# Patient Record
Sex: MALE | Race: WHITE | NOT HISPANIC OR LATINO
[De-identification: names, ages, dates, MRNs, and addresses within clinical notes are randomized per-mention and may not be internally consistent; named-entity substitution may affect disease eponyms.]

---

## 2017-02-15 ENCOUNTER — APPOINTMENT (OUTPATIENT)
Dept: CARDIOLOGY | Facility: CLINIC | Age: 62
End: 2017-02-15

## 2017-02-15 VITALS — HEIGHT: 72 IN | WEIGHT: 309 LBS | BODY MASS INDEX: 41.85 KG/M2

## 2017-02-15 VITALS — RESPIRATION RATE: 18 BRPM | DIASTOLIC BLOOD PRESSURE: 80 MMHG | HEART RATE: 84 BPM | SYSTOLIC BLOOD PRESSURE: 130 MMHG

## 2017-02-16 ENCOUNTER — APPOINTMENT (OUTPATIENT)
Age: 62
End: 2017-02-16

## 2017-02-16 ENCOUNTER — RX RENEWAL (OUTPATIENT)
Age: 62
End: 2017-02-16

## 2017-05-11 ENCOUNTER — APPOINTMENT (OUTPATIENT)
Dept: CARDIOLOGY | Facility: CLINIC | Age: 62
End: 2017-05-11

## 2017-05-16 ENCOUNTER — OUTPATIENT (OUTPATIENT)
Dept: OUTPATIENT SERVICES | Facility: HOSPITAL | Age: 62
LOS: 1 days | Discharge: HOME | End: 2017-05-16

## 2017-05-26 ENCOUNTER — MEDICATION RENEWAL (OUTPATIENT)
Age: 62
End: 2017-05-26

## 2017-05-31 ENCOUNTER — MEDICATION RENEWAL (OUTPATIENT)
Age: 62
End: 2017-05-31

## 2017-06-12 ENCOUNTER — MEDICATION RENEWAL (OUTPATIENT)
Age: 62
End: 2017-06-12

## 2017-06-22 ENCOUNTER — MEDICATION RENEWAL (OUTPATIENT)
Age: 62
End: 2017-06-22

## 2017-06-27 ENCOUNTER — APPOINTMENT (OUTPATIENT)
Dept: CARDIOLOGY | Facility: CLINIC | Age: 62
End: 2017-06-27

## 2017-06-27 VITALS — HEART RATE: 80 BPM | RESPIRATION RATE: 18 BRPM | DIASTOLIC BLOOD PRESSURE: 70 MMHG | SYSTOLIC BLOOD PRESSURE: 100 MMHG

## 2017-06-27 VITALS — BODY MASS INDEX: 40.36 KG/M2 | HEIGHT: 72 IN | WEIGHT: 298 LBS

## 2017-06-28 DIAGNOSIS — I10 ESSENTIAL (PRIMARY) HYPERTENSION: ICD-10-CM

## 2017-07-21 ENCOUNTER — APPOINTMENT (OUTPATIENT)
Dept: SURGERY | Facility: CLINIC | Age: 62
End: 2017-07-21
Payer: COMMERCIAL

## 2017-07-21 VITALS
DIASTOLIC BLOOD PRESSURE: 70 MMHG | BODY MASS INDEX: 40.23 KG/M2 | HEIGHT: 72 IN | WEIGHT: 297 LBS | SYSTOLIC BLOOD PRESSURE: 138 MMHG

## 2017-07-21 PROCEDURE — 99214 OFFICE O/P EST MOD 30 MIN: CPT

## 2017-08-09 ENCOUNTER — APPOINTMENT (OUTPATIENT)
Dept: CARDIOLOGY | Facility: CLINIC | Age: 62
End: 2017-08-09

## 2017-08-09 VITALS — HEIGHT: 72 IN | BODY MASS INDEX: 40.63 KG/M2 | WEIGHT: 300 LBS

## 2017-08-09 VITALS — DIASTOLIC BLOOD PRESSURE: 80 MMHG | RESPIRATION RATE: 18 BRPM | SYSTOLIC BLOOD PRESSURE: 120 MMHG | HEART RATE: 80 BPM

## 2017-08-25 ENCOUNTER — EMERGENCY (EMERGENCY)
Facility: HOSPITAL | Age: 62
LOS: 0 days | Discharge: LEFT BEFORE DISCHARGE | End: 2017-08-25
Admitting: INTERNAL MEDICINE

## 2017-08-25 DIAGNOSIS — Z79.899 OTHER LONG TERM (CURRENT) DRUG THERAPY: ICD-10-CM

## 2017-08-25 DIAGNOSIS — L03.119 CELLULITIS OF UNSPECIFIED PART OF LIMB: ICD-10-CM

## 2017-08-25 DIAGNOSIS — K86.1 OTHER CHRONIC PANCREATITIS: ICD-10-CM

## 2017-08-25 DIAGNOSIS — Z95.1 PRESENCE OF AORTOCORONARY BYPASS GRAFT: ICD-10-CM

## 2017-08-25 DIAGNOSIS — M62.81 MUSCLE WEAKNESS (GENERALIZED): ICD-10-CM

## 2017-08-25 DIAGNOSIS — E11.8 TYPE 2 DIABETES MELLITUS WITH UNSPECIFIED COMPLICATIONS: ICD-10-CM

## 2017-08-25 DIAGNOSIS — Z79.4 LONG TERM (CURRENT) USE OF INSULIN: ICD-10-CM

## 2017-08-25 DIAGNOSIS — R51 HEADACHE: ICD-10-CM

## 2017-08-25 DIAGNOSIS — M79.605 PAIN IN LEFT LEG: ICD-10-CM

## 2017-08-25 DIAGNOSIS — R20.2 PARESTHESIA OF SKIN: ICD-10-CM

## 2017-08-25 DIAGNOSIS — I48.91 UNSPECIFIED ATRIAL FIBRILLATION: ICD-10-CM

## 2017-08-25 DIAGNOSIS — I10 ESSENTIAL (PRIMARY) HYPERTENSION: ICD-10-CM

## 2017-08-25 DIAGNOSIS — E11.9 TYPE 2 DIABETES MELLITUS WITHOUT COMPLICATIONS: ICD-10-CM

## 2017-08-25 DIAGNOSIS — I25.10 ATHEROSCLEROTIC HEART DISEASE OF NATIVE CORONARY ARTERY WITHOUT ANGINA PECTORIS: ICD-10-CM

## 2017-08-25 DIAGNOSIS — L03.90 CELLULITIS, UNSPECIFIED: ICD-10-CM

## 2017-08-25 DIAGNOSIS — J18.9 PNEUMONIA, UNSPECIFIED ORGANISM: ICD-10-CM

## 2017-09-08 ENCOUNTER — INPATIENT (INPATIENT)
Facility: HOSPITAL | Age: 62
LOS: 0 days | Discharge: HOME | End: 2017-09-09
Attending: INTERNAL MEDICINE | Admitting: INTERNAL MEDICINE

## 2017-09-08 DIAGNOSIS — L03.90 CELLULITIS, UNSPECIFIED: ICD-10-CM

## 2017-09-08 DIAGNOSIS — K86.1 OTHER CHRONIC PANCREATITIS: ICD-10-CM

## 2017-09-08 DIAGNOSIS — I25.10 ATHEROSCLEROTIC HEART DISEASE OF NATIVE CORONARY ARTERY WITHOUT ANGINA PECTORIS: ICD-10-CM

## 2017-09-08 DIAGNOSIS — L03.119 CELLULITIS OF UNSPECIFIED PART OF LIMB: ICD-10-CM

## 2017-09-08 DIAGNOSIS — I48.91 UNSPECIFIED ATRIAL FIBRILLATION: ICD-10-CM

## 2017-09-08 DIAGNOSIS — J18.9 PNEUMONIA, UNSPECIFIED ORGANISM: ICD-10-CM

## 2017-09-08 DIAGNOSIS — E11.8 TYPE 2 DIABETES MELLITUS WITH UNSPECIFIED COMPLICATIONS: ICD-10-CM

## 2017-09-08 DIAGNOSIS — I10 ESSENTIAL (PRIMARY) HYPERTENSION: ICD-10-CM

## 2017-09-12 ENCOUNTER — APPOINTMENT (OUTPATIENT)
Dept: SURGERY | Facility: CLINIC | Age: 62
End: 2017-09-12
Payer: COMMERCIAL

## 2017-09-12 DIAGNOSIS — K85.90 ACUTE PANCREATITIS WITHOUT NECROSIS OR INFECTION, UNSPECIFIED: ICD-10-CM

## 2017-09-12 PROCEDURE — 99213 OFFICE O/P EST LOW 20 MIN: CPT

## 2017-09-13 DIAGNOSIS — K86.1 OTHER CHRONIC PANCREATITIS: ICD-10-CM

## 2017-09-13 DIAGNOSIS — Z79.01 LONG TERM (CURRENT) USE OF ANTICOAGULANTS: ICD-10-CM

## 2017-09-13 DIAGNOSIS — N40.0 BENIGN PROSTATIC HYPERPLASIA WITHOUT LOWER URINARY TRACT SYMPTOMS: ICD-10-CM

## 2017-09-13 DIAGNOSIS — E11.22 TYPE 2 DIABETES MELLITUS WITH DIABETIC CHRONIC KIDNEY DISEASE: ICD-10-CM

## 2017-09-13 DIAGNOSIS — I12.9 HYPERTENSIVE CHRONIC KIDNEY DISEASE WITH STAGE 1 THROUGH STAGE 4 CHRONIC KIDNEY DISEASE, OR UNSPECIFIED CHRONIC KIDNEY DISEASE: ICD-10-CM

## 2017-09-13 DIAGNOSIS — Z79.4 LONG TERM (CURRENT) USE OF INSULIN: ICD-10-CM

## 2017-09-13 DIAGNOSIS — G47.33 OBSTRUCTIVE SLEEP APNEA (ADULT) (PEDIATRIC): ICD-10-CM

## 2017-09-13 DIAGNOSIS — Z95.1 PRESENCE OF AORTOCORONARY BYPASS GRAFT: ICD-10-CM

## 2017-09-13 DIAGNOSIS — N18.3 CHRONIC KIDNEY DISEASE, STAGE 3 (MODERATE): ICD-10-CM

## 2017-09-13 DIAGNOSIS — E11.65 TYPE 2 DIABETES MELLITUS WITH HYPERGLYCEMIA: ICD-10-CM

## 2017-09-13 DIAGNOSIS — R11.2 NAUSEA WITH VOMITING, UNSPECIFIED: ICD-10-CM

## 2017-09-13 DIAGNOSIS — Z87.891 PERSONAL HISTORY OF NICOTINE DEPENDENCE: ICD-10-CM

## 2017-09-13 DIAGNOSIS — E11.00 TYPE 2 DIABETES MELLITUS WITH HYPEROSMOLARITY WITHOUT NONKETOTIC HYPERGLYCEMIC-HYPEROSMOLAR COMA (NKHHC): ICD-10-CM

## 2017-09-13 DIAGNOSIS — I25.10 ATHEROSCLEROTIC HEART DISEASE OF NATIVE CORONARY ARTERY WITHOUT ANGINA PECTORIS: ICD-10-CM

## 2017-09-13 DIAGNOSIS — E87.5 HYPERKALEMIA: ICD-10-CM

## 2017-09-13 DIAGNOSIS — I48.91 UNSPECIFIED ATRIAL FIBRILLATION: ICD-10-CM

## 2017-09-13 DIAGNOSIS — N17.9 ACUTE KIDNEY FAILURE, UNSPECIFIED: ICD-10-CM

## 2017-10-17 ENCOUNTER — MEDICATION RENEWAL (OUTPATIENT)
Age: 62
End: 2017-10-17

## 2017-10-30 ENCOUNTER — INPATIENT (INPATIENT)
Facility: HOSPITAL | Age: 62
LOS: 38 days | Discharge: NURSING, REHAB OR LONG-TERM CARE | End: 2017-12-08
Attending: INTERNAL MEDICINE | Admitting: INTERNAL MEDICINE

## 2017-10-30 DIAGNOSIS — L03.119 CELLULITIS OF UNSPECIFIED PART OF LIMB: ICD-10-CM

## 2017-10-30 DIAGNOSIS — E11.8 TYPE 2 DIABETES MELLITUS WITH UNSPECIFIED COMPLICATIONS: ICD-10-CM

## 2017-10-30 DIAGNOSIS — L03.90 CELLULITIS, UNSPECIFIED: ICD-10-CM

## 2017-10-30 DIAGNOSIS — K86.1 OTHER CHRONIC PANCREATITIS: ICD-10-CM

## 2017-10-30 DIAGNOSIS — I10 ESSENTIAL (PRIMARY) HYPERTENSION: ICD-10-CM

## 2017-10-30 DIAGNOSIS — J18.9 PNEUMONIA, UNSPECIFIED ORGANISM: ICD-10-CM

## 2017-10-30 DIAGNOSIS — I25.10 ATHEROSCLEROTIC HEART DISEASE OF NATIVE CORONARY ARTERY WITHOUT ANGINA PECTORIS: ICD-10-CM

## 2017-10-30 DIAGNOSIS — I48.91 UNSPECIFIED ATRIAL FIBRILLATION: ICD-10-CM

## 2017-12-08 ENCOUNTER — INPATIENT (INPATIENT)
Facility: HOSPITAL | Age: 62
LOS: 13 days | Discharge: HOME HEALTH | End: 2017-12-22
Attending: PHYSICAL MEDICINE & REHABILITATION

## 2017-12-08 DIAGNOSIS — L03.119 CELLULITIS OF UNSPECIFIED PART OF LIMB: ICD-10-CM

## 2017-12-08 DIAGNOSIS — I10 ESSENTIAL (PRIMARY) HYPERTENSION: ICD-10-CM

## 2017-12-08 DIAGNOSIS — J18.9 PNEUMONIA, UNSPECIFIED ORGANISM: ICD-10-CM

## 2017-12-08 DIAGNOSIS — I48.91 UNSPECIFIED ATRIAL FIBRILLATION: ICD-10-CM

## 2017-12-08 DIAGNOSIS — I25.10 ATHEROSCLEROTIC HEART DISEASE OF NATIVE CORONARY ARTERY WITHOUT ANGINA PECTORIS: ICD-10-CM

## 2017-12-08 DIAGNOSIS — L03.90 CELLULITIS, UNSPECIFIED: ICD-10-CM

## 2017-12-08 DIAGNOSIS — K86.1 OTHER CHRONIC PANCREATITIS: ICD-10-CM

## 2017-12-08 DIAGNOSIS — E11.8 TYPE 2 DIABETES MELLITUS WITH UNSPECIFIED COMPLICATIONS: ICD-10-CM

## 2017-12-11 ENCOUNTER — APPOINTMENT (OUTPATIENT)
Dept: OTOLARYNGOLOGY | Facility: CLINIC | Age: 62
End: 2017-12-11

## 2017-12-14 DIAGNOSIS — N40.1 BENIGN PROSTATIC HYPERPLASIA WITH LOWER URINARY TRACT SYMPTOMS: ICD-10-CM

## 2017-12-14 DIAGNOSIS — I50.32 CHRONIC DIASTOLIC (CONGESTIVE) HEART FAILURE: ICD-10-CM

## 2017-12-14 DIAGNOSIS — K86.1 OTHER CHRONIC PANCREATITIS: ICD-10-CM

## 2017-12-14 DIAGNOSIS — G47.33 OBSTRUCTIVE SLEEP APNEA (ADULT) (PEDIATRIC): ICD-10-CM

## 2017-12-14 DIAGNOSIS — Z79.01 LONG TERM (CURRENT) USE OF ANTICOAGULANTS: ICD-10-CM

## 2017-12-14 DIAGNOSIS — M62.82 RHABDOMYOLYSIS: ICD-10-CM

## 2017-12-14 DIAGNOSIS — B35.3 TINEA PEDIS: ICD-10-CM

## 2017-12-14 DIAGNOSIS — I13.0 HYPERTENSIVE HEART AND CHRONIC KIDNEY DISEASE WITH HEART FAILURE AND STAGE 1 THROUGH STAGE 4 CHRONIC KIDNEY DISEASE, OR UNSPECIFIED CHRONIC KIDNEY DISEASE: ICD-10-CM

## 2017-12-14 DIAGNOSIS — Z87.891 PERSONAL HISTORY OF NICOTINE DEPENDENCE: ICD-10-CM

## 2017-12-14 DIAGNOSIS — A41.9 SEPSIS, UNSPECIFIED ORGANISM: ICD-10-CM

## 2017-12-14 DIAGNOSIS — L97.521 NON-PRESSURE CHRONIC ULCER OF OTHER PART OF LEFT FOOT LIMITED TO BREAKDOWN OF SKIN: ICD-10-CM

## 2017-12-14 DIAGNOSIS — E11.621 TYPE 2 DIABETES MELLITUS WITH FOOT ULCER: ICD-10-CM

## 2017-12-14 DIAGNOSIS — J96.01 ACUTE RESPIRATORY FAILURE WITH HYPOXIA: ICD-10-CM

## 2017-12-14 DIAGNOSIS — J15.6 PNEUMONIA DUE TO OTHER GRAM-NEGATIVE BACTERIA: ICD-10-CM

## 2017-12-14 DIAGNOSIS — E83.41 HYPERMAGNESEMIA: ICD-10-CM

## 2017-12-14 DIAGNOSIS — N18.3 CHRONIC KIDNEY DISEASE, STAGE 3 (MODERATE): ICD-10-CM

## 2017-12-14 DIAGNOSIS — E66.01 MORBID (SEVERE) OBESITY DUE TO EXCESS CALORIES: ICD-10-CM

## 2017-12-14 DIAGNOSIS — L97.511 NON-PRESSURE CHRONIC ULCER OF OTHER PART OF RIGHT FOOT LIMITED TO BREAKDOWN OF SKIN: ICD-10-CM

## 2017-12-14 DIAGNOSIS — E87.5 HYPERKALEMIA: ICD-10-CM

## 2017-12-14 DIAGNOSIS — E11.22 TYPE 2 DIABETES MELLITUS WITH DIABETIC CHRONIC KIDNEY DISEASE: ICD-10-CM

## 2017-12-14 DIAGNOSIS — N39.498 OTHER SPECIFIED URINARY INCONTINENCE: ICD-10-CM

## 2017-12-14 DIAGNOSIS — E78.00 PURE HYPERCHOLESTEROLEMIA, UNSPECIFIED: ICD-10-CM

## 2017-12-14 DIAGNOSIS — J18.9 PNEUMONIA, UNSPECIFIED ORGANISM: ICD-10-CM

## 2017-12-14 DIAGNOSIS — Z95.1 PRESENCE OF AORTOCORONARY BYPASS GRAFT: ICD-10-CM

## 2017-12-14 DIAGNOSIS — R31.9 HEMATURIA, UNSPECIFIED: ICD-10-CM

## 2017-12-14 DIAGNOSIS — J96.00 ACUTE RESPIRATORY FAILURE, UNSPECIFIED WHETHER WITH HYPOXIA OR HYPERCAPNIA: ICD-10-CM

## 2017-12-14 DIAGNOSIS — I25.10 ATHEROSCLEROTIC HEART DISEASE OF NATIVE CORONARY ARTERY WITHOUT ANGINA PECTORIS: ICD-10-CM

## 2017-12-14 DIAGNOSIS — J90 PLEURAL EFFUSION, NOT ELSEWHERE CLASSIFIED: ICD-10-CM

## 2017-12-14 DIAGNOSIS — D47.3 ESSENTIAL (HEMORRHAGIC) THROMBOCYTHEMIA: ICD-10-CM

## 2017-12-14 DIAGNOSIS — N17.9 ACUTE KIDNEY FAILURE, UNSPECIFIED: ICD-10-CM

## 2017-12-14 DIAGNOSIS — I48.91 UNSPECIFIED ATRIAL FIBRILLATION: ICD-10-CM

## 2017-12-14 DIAGNOSIS — E87.1 HYPO-OSMOLALITY AND HYPONATREMIA: ICD-10-CM

## 2017-12-22 ENCOUNTER — TRANSCRIPTION ENCOUNTER (OUTPATIENT)
Age: 62
End: 2017-12-22

## 2017-12-29 DIAGNOSIS — B35.1 TINEA UNGUIUM: ICD-10-CM

## 2017-12-29 DIAGNOSIS — I95.2 HYPOTENSION DUE TO DRUGS: ICD-10-CM

## 2017-12-29 DIAGNOSIS — I48.91 UNSPECIFIED ATRIAL FIBRILLATION: ICD-10-CM

## 2017-12-29 DIAGNOSIS — T50.2X5A ADVERSE EFFECT OF CARBONIC-ANHYDRASE INHIBITORS, BENZOTHIADIAZIDES AND OTHER DIURETICS, INITIAL ENCOUNTER: ICD-10-CM

## 2017-12-29 DIAGNOSIS — G72.81 CRITICAL ILLNESS MYOPATHY: ICD-10-CM

## 2017-12-29 DIAGNOSIS — R65.10 SYSTEMIC INFLAMMATORY RESPONSE SYNDROME (SIRS) OF NON-INFECTIOUS ORIGIN WITHOUT ACUTE ORGAN DYSFUNCTION: ICD-10-CM

## 2017-12-29 DIAGNOSIS — E11.9 TYPE 2 DIABETES MELLITUS WITHOUT COMPLICATIONS: ICD-10-CM

## 2017-12-29 DIAGNOSIS — N39.41 URGE INCONTINENCE: ICD-10-CM

## 2017-12-29 DIAGNOSIS — L85.3 XEROSIS CUTIS: ICD-10-CM

## 2017-12-29 DIAGNOSIS — F43.22 ADJUSTMENT DISORDER WITH ANXIETY: ICD-10-CM

## 2017-12-29 DIAGNOSIS — I50.812 CHRONIC RIGHT HEART FAILURE: ICD-10-CM

## 2017-12-29 DIAGNOSIS — N04.9 NEPHROTIC SYNDROME WITH UNSPECIFIED MORPHOLOGIC CHANGES: ICD-10-CM

## 2017-12-29 DIAGNOSIS — E78.5 HYPERLIPIDEMIA, UNSPECIFIED: ICD-10-CM

## 2017-12-29 DIAGNOSIS — I11.0 HYPERTENSIVE HEART DISEASE WITH HEART FAILURE: ICD-10-CM

## 2017-12-29 DIAGNOSIS — Z95.1 PRESENCE OF AORTOCORONARY BYPASS GRAFT: ICD-10-CM

## 2017-12-29 DIAGNOSIS — Z79.01 LONG TERM (CURRENT) USE OF ANTICOAGULANTS: ICD-10-CM

## 2017-12-29 DIAGNOSIS — Z93.0 TRACHEOSTOMY STATUS: ICD-10-CM

## 2017-12-29 DIAGNOSIS — I25.10 ATHEROSCLEROTIC HEART DISEASE OF NATIVE CORONARY ARTERY WITHOUT ANGINA PECTORIS: ICD-10-CM

## 2017-12-29 DIAGNOSIS — B35.3 TINEA PEDIS: ICD-10-CM

## 2017-12-29 DIAGNOSIS — E11.21 TYPE 2 DIABETES MELLITUS WITH DIABETIC NEPHROPATHY: ICD-10-CM

## 2017-12-29 DIAGNOSIS — N40.1 BENIGN PROSTATIC HYPERPLASIA WITH LOWER URINARY TRACT SYMPTOMS: ICD-10-CM

## 2017-12-29 DIAGNOSIS — G47.33 OBSTRUCTIVE SLEEP APNEA (ADULT) (PEDIATRIC): ICD-10-CM

## 2017-12-29 DIAGNOSIS — Z87.891 PERSONAL HISTORY OF NICOTINE DEPENDENCE: ICD-10-CM

## 2017-12-29 DIAGNOSIS — K86.1 OTHER CHRONIC PANCREATITIS: ICD-10-CM

## 2017-12-29 DIAGNOSIS — H61.23 IMPACTED CERUMEN, BILATERAL: ICD-10-CM

## 2017-12-29 DIAGNOSIS — J44.9 CHRONIC OBSTRUCTIVE PULMONARY DISEASE, UNSPECIFIED: ICD-10-CM

## 2017-12-29 DIAGNOSIS — I27.29 OTHER SECONDARY PULMONARY HYPERTENSION: ICD-10-CM

## 2017-12-29 DIAGNOSIS — J11.1 INFLUENZA DUE TO UNIDENTIFIED INFLUENZA VIRUS WITH OTHER RESPIRATORY MANIFESTATIONS: ICD-10-CM

## 2018-01-08 ENCOUNTER — APPOINTMENT (OUTPATIENT)
Dept: OTOLARYNGOLOGY | Facility: CLINIC | Age: 63
End: 2018-01-08
Payer: COMMERCIAL

## 2018-01-08 VITALS — HEIGHT: 72 IN | WEIGHT: 300 LBS | BODY MASS INDEX: 40.63 KG/M2

## 2018-01-08 PROCEDURE — 92557 COMPREHENSIVE HEARING TEST: CPT

## 2018-01-08 PROCEDURE — 99213 OFFICE O/P EST LOW 20 MIN: CPT | Mod: 25

## 2018-01-08 PROCEDURE — 92550 TYMPANOMETRY & REFLEX THRESH: CPT

## 2018-01-08 PROCEDURE — G0268 REMOVAL OF IMPACTED WAX MD: CPT

## 2018-01-08 RX ORDER — TESTOSTERONE CYPIONATE 200 MG/ML
200 INJECTION, SOLUTION INTRAMUSCULAR
Qty: 3 | Refills: 0 | Status: COMPLETED | COMMUNITY
Start: 2017-07-17

## 2018-01-08 RX ORDER — AZITHROMYCIN 250 MG/1
250 TABLET, FILM COATED ORAL
Qty: 6 | Refills: 0 | Status: COMPLETED | COMMUNITY
Start: 2017-07-13

## 2018-01-18 ENCOUNTER — OUTPATIENT (OUTPATIENT)
Dept: OUTPATIENT SERVICES | Facility: HOSPITAL | Age: 63
LOS: 1 days | Discharge: HOME | End: 2018-01-18

## 2018-01-18 DIAGNOSIS — K86.1 OTHER CHRONIC PANCREATITIS: ICD-10-CM

## 2018-01-18 DIAGNOSIS — I25.10 ATHEROSCLEROTIC HEART DISEASE OF NATIVE CORONARY ARTERY WITHOUT ANGINA PECTORIS: ICD-10-CM

## 2018-01-18 DIAGNOSIS — I10 ESSENTIAL (PRIMARY) HYPERTENSION: ICD-10-CM

## 2018-01-18 DIAGNOSIS — J18.9 PNEUMONIA, UNSPECIFIED ORGANISM: ICD-10-CM

## 2018-01-18 DIAGNOSIS — I48.91 UNSPECIFIED ATRIAL FIBRILLATION: ICD-10-CM

## 2018-01-18 DIAGNOSIS — E11.8 TYPE 2 DIABETES MELLITUS WITH UNSPECIFIED COMPLICATIONS: ICD-10-CM

## 2018-01-18 DIAGNOSIS — L03.119 CELLULITIS OF UNSPECIFIED PART OF LIMB: ICD-10-CM

## 2018-01-18 DIAGNOSIS — E78.4 OTHER HYPERLIPIDEMIA: ICD-10-CM

## 2018-01-18 DIAGNOSIS — L03.90 CELLULITIS, UNSPECIFIED: ICD-10-CM

## 2018-01-18 DIAGNOSIS — E11.9 TYPE 2 DIABETES MELLITUS WITHOUT COMPLICATIONS: ICD-10-CM

## 2018-01-25 ENCOUNTER — APPOINTMENT (OUTPATIENT)
Dept: OTOLARYNGOLOGY | Facility: CLINIC | Age: 63
End: 2018-01-25
Payer: COMMERCIAL

## 2018-01-25 VITALS
HEIGHT: 72 IN | DIASTOLIC BLOOD PRESSURE: 87 MMHG | WEIGHT: 300 LBS | SYSTOLIC BLOOD PRESSURE: 133 MMHG | BODY MASS INDEX: 40.63 KG/M2

## 2018-01-25 PROCEDURE — 99212 OFFICE O/P EST SF 10 MIN: CPT | Mod: 25

## 2018-01-25 PROCEDURE — 69210 REMOVE IMPACTED EAR WAX UNI: CPT

## 2018-02-02 ENCOUNTER — MEDICATION RENEWAL (OUTPATIENT)
Age: 63
End: 2018-02-02

## 2018-02-07 ENCOUNTER — APPOINTMENT (OUTPATIENT)
Dept: CARDIOLOGY | Facility: CLINIC | Age: 63
End: 2018-02-07

## 2018-02-07 VITALS — DIASTOLIC BLOOD PRESSURE: 80 MMHG | HEART RATE: 88 BPM | SYSTOLIC BLOOD PRESSURE: 130 MMHG | RESPIRATION RATE: 18 BRPM

## 2018-02-07 VITALS — HEIGHT: 72 IN | BODY MASS INDEX: 39.01 KG/M2 | WEIGHT: 288 LBS

## 2018-02-07 DIAGNOSIS — R00.2 PALPITATIONS: ICD-10-CM

## 2018-05-17 ENCOUNTER — APPOINTMENT (OUTPATIENT)
Dept: OTOLARYNGOLOGY | Facility: CLINIC | Age: 63
End: 2018-05-17
Payer: COMMERCIAL

## 2018-05-17 PROCEDURE — 99212 OFFICE O/P EST SF 10 MIN: CPT | Mod: 25

## 2018-05-17 PROCEDURE — 69210 REMOVE IMPACTED EAR WAX UNI: CPT

## 2018-06-01 ENCOUNTER — MEDICATION RENEWAL (OUTPATIENT)
Age: 63
End: 2018-06-01

## 2018-07-11 ENCOUNTER — RX RENEWAL (OUTPATIENT)
Age: 63
End: 2018-07-11

## 2018-07-15 ENCOUNTER — INPATIENT (INPATIENT)
Facility: HOSPITAL | Age: 63
LOS: 3 days | Discharge: HOME | End: 2018-07-19
Attending: INTERNAL MEDICINE | Admitting: INTERNAL MEDICINE
Payer: COMMERCIAL

## 2018-07-15 VITALS
HEART RATE: 93 BPM | OXYGEN SATURATION: 95 % | DIASTOLIC BLOOD PRESSURE: 59 MMHG | RESPIRATION RATE: 20 BRPM | SYSTOLIC BLOOD PRESSURE: 81 MMHG | HEIGHT: 72 IN | TEMPERATURE: 99 F | WEIGHT: 294.98 LBS

## 2018-07-15 DIAGNOSIS — R74.8 ABNORMAL LEVELS OF OTHER SERUM ENZYMES: ICD-10-CM

## 2018-07-15 DIAGNOSIS — A41.9 SEPSIS, UNSPECIFIED ORGANISM: ICD-10-CM

## 2018-07-15 DIAGNOSIS — Z90.49 ACQUIRED ABSENCE OF OTHER SPECIFIED PARTS OF DIGESTIVE TRACT: Chronic | ICD-10-CM

## 2018-07-15 DIAGNOSIS — E87.1 HYPO-OSMOLALITY AND HYPONATREMIA: ICD-10-CM

## 2018-07-15 DIAGNOSIS — E11.9 TYPE 2 DIABETES MELLITUS WITHOUT COMPLICATIONS: ICD-10-CM

## 2018-07-15 DIAGNOSIS — G47.33 OBSTRUCTIVE SLEEP APNEA (ADULT) (PEDIATRIC): ICD-10-CM

## 2018-07-15 DIAGNOSIS — I48.91 UNSPECIFIED ATRIAL FIBRILLATION: ICD-10-CM

## 2018-07-15 DIAGNOSIS — Z90.410 ACQUIRED TOTAL ABSENCE OF PANCREAS: Chronic | ICD-10-CM

## 2018-07-15 DIAGNOSIS — Z95.5 PRESENCE OF CORONARY ANGIOPLASTY IMPLANT AND GRAFT: Chronic | ICD-10-CM

## 2018-07-15 DIAGNOSIS — N17.9 ACUTE KIDNEY FAILURE, UNSPECIFIED: ICD-10-CM

## 2018-07-15 DIAGNOSIS — K86.1 OTHER CHRONIC PANCREATITIS: ICD-10-CM

## 2018-07-15 DIAGNOSIS — E66.9 OBESITY, UNSPECIFIED: ICD-10-CM

## 2018-07-15 LAB
ANION GAP SERPL CALC-SCNC: 16 MMOL/L — HIGH (ref 7–14)
APTT BLD: 38.9 SEC — SIGNIFICANT CHANGE UP (ref 27–39.2)
BASOPHILS # BLD AUTO: 0.05 K/UL — SIGNIFICANT CHANGE UP (ref 0–0.2)
BASOPHILS NFR BLD AUTO: 0.2 % — SIGNIFICANT CHANGE UP (ref 0–1)
BUN SERPL-MCNC: 49 MG/DL — HIGH (ref 10–20)
CALCIUM SERPL-MCNC: 9.4 MG/DL — SIGNIFICANT CHANGE UP (ref 8.5–10.1)
CHLORIDE SERPL-SCNC: 95 MMOL/L — LOW (ref 98–110)
CK MB CFR SERPL CALC: 2.7 NG/ML — SIGNIFICANT CHANGE UP (ref 0.6–6.3)
CK SERPL-CCNC: 136 U/L — SIGNIFICANT CHANGE UP (ref 0–225)
CO2 SERPL-SCNC: 21 MMOL/L — SIGNIFICANT CHANGE UP (ref 17–32)
CREAT SERPL-MCNC: 1.6 MG/DL — HIGH (ref 0.7–1.5)
EOSINOPHIL # BLD AUTO: 0 K/UL — SIGNIFICANT CHANGE UP (ref 0–0.7)
EOSINOPHIL NFR BLD AUTO: 0 % — SIGNIFICANT CHANGE UP (ref 0–8)
GLUCOSE SERPL-MCNC: 304 MG/DL — HIGH (ref 70–99)
HCT VFR BLD CALC: 42.5 % — SIGNIFICANT CHANGE UP (ref 42–52)
HGB BLD-MCNC: 14.5 G/DL — SIGNIFICANT CHANGE UP (ref 14–18)
IMM GRANULOCYTES NFR BLD AUTO: 1.3 % — HIGH (ref 0.1–0.3)
INR BLD: 1.77 RATIO — HIGH (ref 0.65–1.3)
LACTATE SERPL-SCNC: 3.5 MMOL/L — HIGH (ref 0.5–2.2)
LYMPHOCYTES # BLD AUTO: 0.89 K/UL — LOW (ref 1.2–3.4)
LYMPHOCYTES # BLD AUTO: 3.5 % — LOW (ref 20.5–51.1)
MCHC RBC-ENTMCNC: 30.6 PG — SIGNIFICANT CHANGE UP (ref 27–31)
MCHC RBC-ENTMCNC: 34.1 G/DL — SIGNIFICANT CHANGE UP (ref 32–37)
MCV RBC AUTO: 89.7 FL — SIGNIFICANT CHANGE UP (ref 80–94)
MONOCYTES # BLD AUTO: 1.07 K/UL — HIGH (ref 0.1–0.6)
MONOCYTES NFR BLD AUTO: 4.2 % — SIGNIFICANT CHANGE UP (ref 1.7–9.3)
NEUTROPHILS # BLD AUTO: 23.41 K/UL — HIGH (ref 1.4–6.5)
NEUTROPHILS NFR BLD AUTO: 90.8 % — HIGH (ref 42.2–75.2)
NRBC # BLD: 0 /100 WBCS — SIGNIFICANT CHANGE UP (ref 0–0)
PLATELET # BLD AUTO: 402 K/UL — HIGH (ref 130–400)
POTASSIUM SERPL-MCNC: 5.4 MMOL/L — HIGH (ref 3.5–5)
POTASSIUM SERPL-SCNC: 5.4 MMOL/L — HIGH (ref 3.5–5)
PROTHROM AB SERPL-ACNC: 19.3 SEC — HIGH (ref 9.95–12.87)
RBC # BLD: 4.74 M/UL — SIGNIFICANT CHANGE UP (ref 4.7–6.1)
RBC # FLD: 14.5 % — SIGNIFICANT CHANGE UP (ref 11.5–14.5)
SODIUM SERPL-SCNC: 132 MMOL/L — LOW (ref 135–146)
TROPONIN T SERPL-MCNC: 0.05 NG/ML — CRITICAL HIGH
TROPONIN T SERPL-MCNC: 0.06 NG/ML — CRITICAL HIGH
WBC # BLD: 25.75 K/UL — HIGH (ref 4.8–10.8)
WBC # FLD AUTO: 25.75 K/UL — HIGH (ref 4.8–10.8)

## 2018-07-15 PROCEDURE — 93970 EXTREMITY STUDY: CPT | Mod: 26

## 2018-07-15 RX ORDER — RIVAROXABAN 15 MG-20MG
15 KIT ORAL EVERY 24 HOURS
Qty: 0 | Refills: 0 | Status: DISCONTINUED | OUTPATIENT
Start: 2018-07-15 | End: 2018-07-15

## 2018-07-15 RX ORDER — PIPERACILLIN AND TAZOBACTAM 4; .5 G/20ML; G/20ML
4.5 INJECTION, POWDER, LYOPHILIZED, FOR SOLUTION INTRAVENOUS ONCE
Qty: 0 | Refills: 0 | Status: COMPLETED | OUTPATIENT
Start: 2018-07-15 | End: 2018-07-15

## 2018-07-15 RX ORDER — ACETAMINOPHEN 500 MG
650 TABLET ORAL ONCE
Qty: 0 | Refills: 0 | Status: COMPLETED | OUTPATIENT
Start: 2018-07-15 | End: 2018-07-15

## 2018-07-15 RX ORDER — RIVAROXABAN 15 MG-20MG
20 KIT ORAL EVERY 24 HOURS
Qty: 0 | Refills: 0 | Status: DISCONTINUED | OUTPATIENT
Start: 2018-07-15 | End: 2018-07-19

## 2018-07-15 RX ORDER — DEXTROSE 50 % IN WATER 50 %
25 SYRINGE (ML) INTRAVENOUS ONCE
Qty: 0 | Refills: 0 | Status: DISCONTINUED | OUTPATIENT
Start: 2018-07-15 | End: 2018-07-19

## 2018-07-15 RX ORDER — SODIUM CHLORIDE 9 MG/ML
500 INJECTION INTRAMUSCULAR; INTRAVENOUS; SUBCUTANEOUS
Qty: 0 | Refills: 0 | Status: COMPLETED | OUTPATIENT
Start: 2018-07-15 | End: 2018-07-15

## 2018-07-15 RX ORDER — CHLORHEXIDINE GLUCONATE 213 G/1000ML
1 SOLUTION TOPICAL
Qty: 0 | Refills: 0 | Status: DISCONTINUED | OUTPATIENT
Start: 2018-07-15 | End: 2018-07-19

## 2018-07-15 RX ORDER — DEXTROSE 50 % IN WATER 50 %
12.5 SYRINGE (ML) INTRAVENOUS ONCE
Qty: 0 | Refills: 0 | Status: DISCONTINUED | OUTPATIENT
Start: 2018-07-15 | End: 2018-07-19

## 2018-07-15 RX ORDER — SIMVASTATIN 20 MG/1
20 TABLET, FILM COATED ORAL AT BEDTIME
Qty: 0 | Refills: 0 | Status: DISCONTINUED | OUTPATIENT
Start: 2018-07-15 | End: 2018-07-19

## 2018-07-15 RX ORDER — GLUCAGON INJECTION, SOLUTION 0.5 MG/.1ML
1 INJECTION, SOLUTION SUBCUTANEOUS ONCE
Qty: 0 | Refills: 0 | Status: DISCONTINUED | OUTPATIENT
Start: 2018-07-15 | End: 2018-07-19

## 2018-07-15 RX ORDER — INSULIN GLARGINE 100 [IU]/ML
60 INJECTION, SOLUTION SUBCUTANEOUS EVERY MORNING
Qty: 0 | Refills: 0 | Status: DISCONTINUED | OUTPATIENT
Start: 2018-07-15 | End: 2018-07-19

## 2018-07-15 RX ORDER — ACETAMINOPHEN 500 MG
650 TABLET ORAL EVERY 6 HOURS
Qty: 0 | Refills: 0 | Status: DISCONTINUED | OUTPATIENT
Start: 2018-07-15 | End: 2018-07-19

## 2018-07-15 RX ORDER — ASPIRIN/CALCIUM CARB/MAGNESIUM 324 MG
81 TABLET ORAL DAILY
Qty: 0 | Refills: 0 | Status: DISCONTINUED | OUTPATIENT
Start: 2018-07-15 | End: 2018-07-19

## 2018-07-15 RX ORDER — DEXTROSE 50 % IN WATER 50 %
15 SYRINGE (ML) INTRAVENOUS ONCE
Qty: 0 | Refills: 0 | Status: DISCONTINUED | OUTPATIENT
Start: 2018-07-15 | End: 2018-07-19

## 2018-07-15 RX ORDER — INSULIN LISPRO 100/ML
20 VIAL (ML) SUBCUTANEOUS
Qty: 0 | Refills: 0 | Status: DISCONTINUED | OUTPATIENT
Start: 2018-07-15 | End: 2018-07-19

## 2018-07-15 RX ORDER — SODIUM CHLORIDE 9 MG/ML
1000 INJECTION, SOLUTION INTRAVENOUS
Qty: 0 | Refills: 0 | Status: DISCONTINUED | OUTPATIENT
Start: 2018-07-15 | End: 2018-07-19

## 2018-07-15 RX ORDER — FINASTERIDE 5 MG/1
5 TABLET, FILM COATED ORAL DAILY
Qty: 0 | Refills: 0 | Status: DISCONTINUED | OUTPATIENT
Start: 2018-07-15 | End: 2018-07-19

## 2018-07-15 RX ORDER — SODIUM CHLORIDE 9 MG/ML
1000 INJECTION INTRAMUSCULAR; INTRAVENOUS; SUBCUTANEOUS
Qty: 0 | Refills: 0 | Status: DISCONTINUED | OUTPATIENT
Start: 2018-07-15 | End: 2018-07-16

## 2018-07-15 RX ORDER — ALPRAZOLAM 0.25 MG
1 TABLET ORAL ONCE
Qty: 0 | Refills: 0 | Status: DISCONTINUED | OUTPATIENT
Start: 2018-07-15 | End: 2018-07-15

## 2018-07-15 RX ORDER — PIPERACILLIN AND TAZOBACTAM 4; .5 G/20ML; G/20ML
3.38 INJECTION, POWDER, LYOPHILIZED, FOR SOLUTION INTRAVENOUS EVERY 12 HOURS
Qty: 0 | Refills: 0 | Status: DISCONTINUED | OUTPATIENT
Start: 2018-07-15 | End: 2018-07-16

## 2018-07-15 RX ORDER — INSULIN GLARGINE 100 [IU]/ML
60 INJECTION, SOLUTION SUBCUTANEOUS AT BEDTIME
Qty: 0 | Refills: 0 | Status: DISCONTINUED | OUTPATIENT
Start: 2018-07-15 | End: 2018-07-19

## 2018-07-15 RX ORDER — OXYBUTYNIN CHLORIDE 5 MG
5 TABLET ORAL AT BEDTIME
Qty: 0 | Refills: 0 | Status: DISCONTINUED | OUTPATIENT
Start: 2018-07-15 | End: 2018-07-19

## 2018-07-15 RX ADMIN — SODIUM CHLORIDE 2000 MILLILITER(S): 9 INJECTION INTRAMUSCULAR; INTRAVENOUS; SUBCUTANEOUS at 16:27

## 2018-07-15 RX ADMIN — PIPERACILLIN AND TAZOBACTAM 200 GRAM(S): 4; .5 INJECTION, POWDER, LYOPHILIZED, FOR SOLUTION INTRAVENOUS at 14:30

## 2018-07-15 RX ADMIN — Medication 650 MILLIGRAM(S): at 14:31

## 2018-07-15 RX ADMIN — SODIUM CHLORIDE 2000 MILLILITER(S): 9 INJECTION INTRAMUSCULAR; INTRAVENOUS; SUBCUTANEOUS at 14:27

## 2018-07-15 RX ADMIN — INSULIN GLARGINE 60 UNIT(S): 100 INJECTION, SOLUTION SUBCUTANEOUS at 21:52

## 2018-07-15 RX ADMIN — Medication 1 MILLIGRAM(S): at 23:30

## 2018-07-15 RX ADMIN — SIMVASTATIN 20 MILLIGRAM(S): 20 TABLET, FILM COATED ORAL at 21:53

## 2018-07-15 RX ADMIN — SODIUM CHLORIDE 2000 MILLILITER(S): 9 INJECTION INTRAMUSCULAR; INTRAVENOUS; SUBCUTANEOUS at 16:25

## 2018-07-15 RX ADMIN — SODIUM CHLORIDE 2000 MILLILITER(S): 9 INJECTION INTRAMUSCULAR; INTRAVENOUS; SUBCUTANEOUS at 15:15

## 2018-07-15 RX ADMIN — SODIUM CHLORIDE 2000 MILLILITER(S): 9 INJECTION INTRAMUSCULAR; INTRAVENOUS; SUBCUTANEOUS at 14:19

## 2018-07-15 RX ADMIN — Medication 30 MILLILITER(S): at 23:50

## 2018-07-15 RX ADMIN — Medication 650 MILLIGRAM(S): at 20:15

## 2018-07-15 RX ADMIN — Medication 5 MILLIGRAM(S): at 23:30

## 2018-07-15 RX ADMIN — SODIUM CHLORIDE 75 MILLILITER(S): 9 INJECTION INTRAMUSCULAR; INTRAVENOUS; SUBCUTANEOUS at 20:15

## 2018-07-15 RX ADMIN — SODIUM CHLORIDE 2000 MILLILITER(S): 9 INJECTION INTRAMUSCULAR; INTRAVENOUS; SUBCUTANEOUS at 14:26

## 2018-07-15 NOTE — ED PROVIDER NOTE - CARE PLAN
Principal Discharge DX:	Severe sepsis  Secondary Diagnosis:	Cellulitis  Secondary Diagnosis:	Elevated troponin

## 2018-07-15 NOTE — H&P ADULT - PSH
H/O right coronary artery stent placement    History of cholecystectomy    History of resection of pancreas

## 2018-07-15 NOTE — ED PROVIDER NOTE - CRITICAL CARE PROVIDED
direct patient care (not related to procedure)/documentation/interpretation of diagnostic studies/additional history taking/consultation with other physicians/consult w/ pt's family directly relating to pts condition

## 2018-07-15 NOTE — ED PROVIDER NOTE - MEDICAL DECISION MAKING DETAILS
pt with hx of severe sepsis with sepsis 2/2 cellulitis, originally hypotensive but responding to tx.  ICU consulted - admitted to floor.

## 2018-07-15 NOTE — H&P ADULT - NSHPLABSRESULTS_GEN_ALL_CORE
14.5   25.75 )-----------( 402      ( 15 Jul 2018 14:25 )             42.5   07-15    132<L>  |  95<L>  |  49<H>  ----------------------------<  304<H>  5.4<H>   |  21  |  1.6<H>      Reviewed Past medical record Creatine 12/18/2017 1.01 with GFR 75  Ca    9.4      15 Jul 2018 14:25        EKG SInus tachycardia without st elevation interpreted by me 14.5   25.75 )-----------( 402      ( 15 Jul 2018 14:25 )             42.5   07-15    132<L>  |  95<L>  |  49<H>  ----------------------------<  304<H>  5.4<H>   |  21  |  1.6<H>      Reviewed Past medical record Creatine 12/18/2017 1.01 with GFR 75  Ca    9.4      15 Jul 2018 14:25        EKG SInus tachycardia without st elevation interpreted by me\    CXR f/u official no acute pathology idnetified

## 2018-07-15 NOTE — CONSULT NOTE ADULT - ASSESSMENT
IMPRESSION: RLE cellulitis                         Hypotension responded to fluids      PLAN:    CNS: No sedation    HEENT: Oral care. HOB >45 degrees    PULMONARY: Nasal cannula PRN. Keep pulse ox>93%    CARDIOVASCULAR:     GI: GI prophylaxis.  Feeding     RENAL: f/u lytes    INFECTIOUS DISEASE: Pan cultures. IV abx. ID eval. LE doppler to r/o DVT.    HEMATOLOGICAL:  DVT prophylaxis.    ENDOCRINE:  Follow up FS.  Insulin protocol if needed.    MUSCULOSKELETAL:        CRITICAL CARE TIME SPENT: *** IMPRESSION: RLE cellulitis                         Hypotension responded to fluids      PLAN:    CNS: No sedation    HEENT: Oral care. HOB >45 degrees    PULMONARY: Nasal cannula PRN. Keep pulse ox>93%    CARDIOVASCULAR: Afib on Xarelto. Rate control    GI: GI prophylaxis.  Feeding     RENAL: f/u lytes    INFECTIOUS DISEASE: Pan cultures. IV abx. ID eval. LE doppler to r/o DVT.    HEMATOLOGICAL:  DVT prophylaxis.    ENDOCRINE:  Follow up FS.  Insulin protocol if needed.    MUSCULOSKELETAL:        CRITICAL CARE TIME SPENT: ***

## 2018-07-15 NOTE — H&P ADULT - NSHPSOCIALHISTORY_GEN_ALL_CORE
lives at home alone,   quit smoking 30 years ago  denies ethanol  denies drug use  works as  of it  no recent travel

## 2018-07-15 NOTE — ED PROVIDER NOTE - ATTENDING CONTRIBUTION TO CARE
Pt is a 62yo male who comes in for R leg pain 3d ago with chills last night and redness noted this AM.  Vomiting and diarrhea noted.  No CP/SOB/cough.  No dysuria.  No other complaints.    Exam: R leg cellulitis, no calf tenderness, soft nontender abdomen, clear lungs, MMM, normal neuro exam  Imp: sepsis 2/2 cellulitis  Plan: labs, IVF, abx, admission

## 2018-07-15 NOTE — ED PROVIDER NOTE - PROGRESS NOTE DETAILS
Case d/w ICU Dr. Hinds.  Fellow Dr. Clayton to come to bedside to evaluate pt. Dr. Clayton evaluated pt at bedside - states pt is not ICU candidate at this time.  Will admit to floor. patient hypotensive, febrile, with source likely leg IVF bolus started, abx started, sepsis patient hypotensive, febrile, with source likely leg IVF bolus started, abx started, sepsis code started at 1420

## 2018-07-15 NOTE — H&P ADULT - ASSESSMENT
Patient is a 63y old  Male who presents with a chief complaint of malaise and vomitting     Severe sepsis secondary to right lower extremity cellulits  BP: 113/64 (15 Jul 2018 16:29) (73/33 - 113/64)  Hypotension in ER, DW DR OCONNOR extensively regarding observation in icu , in light of patient resuscitated does no accept patient to unit, eladio Intensivist regarding severe sepsis and lactate decrasing to 2.1 , denied unit , will place in telemetry   if patient is tachycardic - check blood pressure for signs of hypotension , will cw gentle hydration in light of patient having h/o HFpEF , at noted in 12/8/2017 echocardiogram, hold diuretics and antihypertensives for now ,   complicated by troponemia,hyponatremia

## 2018-07-15 NOTE — H&P ADULT - HISTORY OF PRESENT ILLNESS
63y male presente [facility] complaints of right lower extremity erythema , associated with fever, nausea , vomitting x 1 day  , and malaise , constatn without associated factors or aggravating factors.   + Sick contact of daughter who returned from [place]  Yesterday patient went to restaurant in [place] when he began experiencing nausea and vomitting . Then patient returned home and due to no relief came to ER.   PMHx signicant for Pneumonia which resulted in sepsis and intubation .

## 2018-07-15 NOTE — H&P ADULT - NSHPPHYSICALEXAM_GEN_ALL_CORE
GEN Lying in no acute distress  HEENT Pupils equal and reactive to light and accommodationSupple Neck  PULM Clear to auscultation bilaterally  CV s1s2 regular rate and rhythm  GI + bowel sounds nontnender obese  EXT right lower extremity edema non pitting with overlying erythema, near site of prior vein harvest  PSYCH awake alert and oriented x 3

## 2018-07-15 NOTE — CONSULT NOTE ADULT - SUBJECTIVE AND OBJECTIVE BOX
Patient is a 63y old  Male who presents with a chief complaint of RLE cellulitis    HPI: 63 years old M pmhx CABG, pneumonia/sepsis, DM, HTN, Hypercholesterolemia, presented to the ED for evaluation of cellulitis to lower extremity right and fever. He said he has history of similar episodes in the past and admitted for IV abx.     Critical care was consulted for hypotension. Responded to IV fluids. Currently comfortable on bed. C/o chills and nausea.    Deneis any other complaints.    PAST MEDICAL & SURGICAL HISTORY:  Intubation of airway performed without difficulty  Pneumonia  Sepsis  H/O right coronary artery stent placement    Allergies    No Known Allergies    Intolerances      FAMILY HISTORY:    Home Medications:  clorazepate:  (15 Jul 2018 14:48)  Ecotrin:  (15 Jul 2018 14:44)  finasteride:  (15 Jul 2018 14:48)  Flomax 0.4 mg oral capsule:  (15 Jul 2018 14:46)  furosemide 80 mg oral tablet:  (15 Jul 2018 14:45)  HumaLOG:  (15 Jul 2018 14:46)  Klor-Con:  (15 Jul 2018 14:45)  Lantus:  (15 Jul 2018 14:46)  lisinopril:  (15 Jul 2018 14:45)  magnesium oxide:  (15 Jul 2018 14:44)  metOLazone:  (15 Jul 2018 14:45)  Metoprolol Tartrate:  (15 Jul 2018 14:45)  oxybutynin:  (15 Jul 2018 14:47)  simvastatin:  (15 Jul 2018 14:46)  spironolactone:  (15 Jul 2018 14:47)  testosterone:  (15 Jul 2018 14:47)  Xarelto:  (15 Jul 2018 14:47)    Occupation:  Alochol:  Smoking:   Drug Use:  Marital Status:      ROS: as in HPI; All other systems reviewed are negative    ICU Vital Signs Last 24 Hrs  T(C): 37.7 (15 Jul 2018 15:16), Max: 38.2 (15 Jul 2018 14:13)  T(F): 99.9 (15 Jul 2018 15:16), Max: 100.8 (15 Jul 2018 14:13)  HR: 85 (15 Jul 2018 15:16) (85 - 93)  BP: 105/54 (15 Jul 2018 15:16) (73/33 - 105/54)  BP(mean): --  ABP: --  ABP(mean): --  RR: 20 (15 Jul 2018 15:16) (20 - 20)  SpO2: 96% (15 Jul 2018 15:16) (95% - 96%)        Physical Examination:    General: No acute distress.  Alert, oriented, interactive, nonfocal    HEENT: Pupils equal, reactive to light.  Symmetric.    PULM: Clear to auscultation bilaterally, no significant sputum production    CVS: Regular rate and rhythm, no murmurs, rubs, or gallops    ABD: Soft, nondistended, nontender, normoactive bowel sounds, no masses    EXT: RLE swollen, erythematous    SKIN: Warm and well perfused, no rashes noted.              I&O's Detail        LABS:                        14.5   25.75 )-----------( 402      ( 15 Jul 2018 14:25 )             42.5     15 Jul 2018 14:25    132    |  95     |  49     ----------------------------<  304    5.4     |  21     |  1.6      Ca    9.4        15 Jul 2018 14:25        CARDIAC MARKERS ( 15 Jul 2018 14:25 )  x     / 0.06 ng/mL / 136 U/L / x     / 2.7 ng/mL      CAPILLARY BLOOD GLUCOSE        PT/INR - ( 15 Jul 2018 14:25 )   PT: 19.30 sec;   INR: 1.77 ratio         PTT - ( 15 Jul 2018 14:25 )  PTT:38.9 sec    Culture    Lactate, Blood: 3.5 mmol/L (07-15-18 @ 14:25)      MEDICATIONS  (STANDING):  sodium chloride 0.9% Bolus 500 milliLiter(s) IV Bolus every 15 minutes    MEDICATIONS  (PRN):        RADIOLOGY: ***     CXR:  TLC:  OG:  ET tube:          ECHO:

## 2018-07-15 NOTE — ED PROVIDER NOTE - PHYSICAL EXAMINATION
Gen: Alert, NAD, well appearing  Head: NC, AT, PERRL, EOMI, normal lids/conjunctiva  Neck: +supple, no tenderness/meningismus/JVD, +Trachea midline  Pulm: Bilateral BS, normal resp effort, no wheeze/stridor/retractions  CV: RRR  Abd: soft, NT/ND  Skin: +erythema to right lower extremity pedal pulses normal bilaterally  Neuro: AAOx3

## 2018-07-15 NOTE — H&P ADULT - FAMILY HISTORY
Sibling  Still living? Unknown  Family history of lung cancer, Age at diagnosis: Age Unknown     Mother  Still living? Unknown  Family history of diabetes mellitus, Age at diagnosis: Age Unknown     Father  Still living? Unknown  Family history of coronary artery disease, Age at diagnosis: Age Unknown

## 2018-07-15 NOTE — H&P ADULT - PROBLEM SELECTOR PLAN 1
secondary to cellulits, denied  by intensivist for unit, monitor vitals , monitor for signs of tachycardia-> then check blood pressure, cw zosyn , panculture , id consult

## 2018-07-15 NOTE — PROGRESS NOTE ADULT - SUBJECTIVE AND OBJECTIVE BOX
Patient takes 60 units long acting insulin twice daily along with about 20 units short acting insulin with meals. He is eating so I ordered these doses for now

## 2018-07-15 NOTE — ED PROVIDER NOTE - OBJECTIVE STATEMENT
Patient is a 63 years old M pmhx CABG, pneumonia/sepsis, DM, HTN, Hypercholesterolemia, -smoker, presents to the ED for evaluation of cellulitis to lower extremity right and fever since yesterday.

## 2018-07-15 NOTE — ED PROVIDER NOTE - NS ED ROS FT
Review of Systems  Constitutional: (+) fever  Eyes/ENT: (-) blurry vision, (-) epistaxis  Cardiovascular: (-) chest pain, (-) syncope  Respiratory: (-) cough, (-) shortness of breath  Gastrointestinal: (-) vomiting, (-) diarrhea  Integumentary: (-) rash, (-) edema  Neurological: (-) headache, (-) altered mental status

## 2018-07-16 LAB
AMYLASE P1 CFR SERPL: 28 U/L — SIGNIFICANT CHANGE UP (ref 25–115)
ANION GAP SERPL CALC-SCNC: 15 MMOL/L — HIGH (ref 7–14)
BUN SERPL-MCNC: 37 MG/DL — HIGH (ref 10–20)
CALCIUM SERPL-MCNC: 8.4 MG/DL — LOW (ref 8.5–10.1)
CHLORIDE SERPL-SCNC: 100 MMOL/L — SIGNIFICANT CHANGE UP (ref 98–110)
CO2 SERPL-SCNC: 18 MMOL/L — SIGNIFICANT CHANGE UP (ref 17–32)
CREAT SERPL-MCNC: 1.1 MG/DL — SIGNIFICANT CHANGE UP (ref 0.7–1.5)
ESTIMATED AVERAGE GLUCOSE: 295 MG/DL — HIGH (ref 68–114)
GLUCOSE SERPL-MCNC: 276 MG/DL — HIGH (ref 70–99)
HBA1C BLD-MCNC: 11.9 % — HIGH (ref 4–5.6)
HCT VFR BLD CALC: 40.5 % — LOW (ref 42–52)
HGB BLD-MCNC: 13.9 G/DL — LOW (ref 14–18)
LACTATE SERPL-SCNC: 1.7 MMOL/L — SIGNIFICANT CHANGE UP (ref 0.5–2.2)
LIDOCAIN IGE QN: 16 U/L — SIGNIFICANT CHANGE UP (ref 7–60)
MCHC RBC-ENTMCNC: 31 PG — SIGNIFICANT CHANGE UP (ref 27–31)
MCHC RBC-ENTMCNC: 34.3 G/DL — SIGNIFICANT CHANGE UP (ref 32–37)
MCV RBC AUTO: 90.2 FL — SIGNIFICANT CHANGE UP (ref 80–94)
NRBC # BLD: 0 /100 WBCS — SIGNIFICANT CHANGE UP (ref 0–0)
PLATELET # BLD AUTO: 295 K/UL — SIGNIFICANT CHANGE UP (ref 130–400)
POTASSIUM SERPL-MCNC: 4.9 MMOL/L — SIGNIFICANT CHANGE UP (ref 3.5–5)
POTASSIUM SERPL-SCNC: 4.9 MMOL/L — SIGNIFICANT CHANGE UP (ref 3.5–5)
RBC # BLD: 4.49 M/UL — LOW (ref 4.7–6.1)
RBC # FLD: 14.9 % — HIGH (ref 11.5–14.5)
SODIUM SERPL-SCNC: 133 MMOL/L — LOW (ref 135–146)
TROPONIN T SERPL-MCNC: 0.04 NG/ML — CRITICAL HIGH
WBC # BLD: 16.5 K/UL — HIGH (ref 4.8–10.8)
WBC # FLD AUTO: 16.5 K/UL — HIGH (ref 4.8–10.8)

## 2018-07-16 RX ORDER — MAGNESIUM OXIDE 400 MG ORAL TABLET 241.3 MG
0 TABLET ORAL
Qty: 0 | Refills: 0 | COMMUNITY

## 2018-07-16 RX ORDER — FUROSEMIDE 40 MG
0 TABLET ORAL
Qty: 0 | Refills: 0 | COMMUNITY

## 2018-07-16 RX ORDER — METOPROLOL TARTRATE 50 MG
0 TABLET ORAL
Qty: 0 | Refills: 0 | COMMUNITY

## 2018-07-16 RX ORDER — FINASTERIDE 5 MG/1
0 TABLET, FILM COATED ORAL
Qty: 0 | Refills: 0 | COMMUNITY

## 2018-07-16 RX ORDER — ASPIRIN/CALCIUM CARB/MAGNESIUM 324 MG
0 TABLET ORAL
Qty: 0 | Refills: 0 | COMMUNITY

## 2018-07-16 RX ORDER — METOPROLOL TARTRATE 50 MG
25 TABLET ORAL
Qty: 0 | Refills: 0 | Status: DISCONTINUED | OUTPATIENT
Start: 2018-07-16 | End: 2018-07-19

## 2018-07-16 RX ORDER — TAMSULOSIN HYDROCHLORIDE 0.4 MG/1
0.4 CAPSULE ORAL AT BEDTIME
Qty: 0 | Refills: 0 | Status: DISCONTINUED | OUTPATIENT
Start: 2018-07-16 | End: 2018-07-19

## 2018-07-16 RX ORDER — POTASSIUM CHLORIDE 20 MEQ
0 PACKET (EA) ORAL
Qty: 0 | Refills: 0 | COMMUNITY

## 2018-07-16 RX ORDER — ONDANSETRON 8 MG/1
4 TABLET, FILM COATED ORAL ONCE
Qty: 0 | Refills: 0 | Status: COMPLETED | OUTPATIENT
Start: 2018-07-16 | End: 2018-07-16

## 2018-07-16 RX ORDER — ALPRAZOLAM 0.25 MG
0.5 TABLET ORAL
Qty: 0 | Refills: 0 | Status: DISCONTINUED | OUTPATIENT
Start: 2018-07-16 | End: 2018-07-19

## 2018-07-16 RX ORDER — MORPHINE SULFATE 50 MG/1
2 CAPSULE, EXTENDED RELEASE ORAL EVERY 4 HOURS
Qty: 0 | Refills: 0 | Status: DISCONTINUED | OUTPATIENT
Start: 2018-07-16 | End: 2018-07-19

## 2018-07-16 RX ORDER — SPIRONOLACTONE 25 MG/1
0 TABLET, FILM COATED ORAL
Qty: 0 | Refills: 0 | COMMUNITY

## 2018-07-16 RX ORDER — TAMSULOSIN HYDROCHLORIDE 0.4 MG/1
0 CAPSULE ORAL
Qty: 0 | Refills: 0 | COMMUNITY

## 2018-07-16 RX ORDER — INSULIN GLARGINE 100 [IU]/ML
0 INJECTION, SOLUTION SUBCUTANEOUS
Qty: 0 | Refills: 0 | COMMUNITY

## 2018-07-16 RX ORDER — CLORAZEPATE DIPOTASSIUM 3.75 MG
0 TABLET ORAL
Qty: 0 | Refills: 0 | COMMUNITY

## 2018-07-16 RX ORDER — MORPHINE SULFATE 50 MG/1
4 CAPSULE, EXTENDED RELEASE ORAL
Qty: 0 | Refills: 0 | Status: DISCONTINUED | OUTPATIENT
Start: 2018-07-16 | End: 2018-07-19

## 2018-07-16 RX ORDER — CEFAZOLIN SODIUM 1 G
1000 VIAL (EA) INJECTION EVERY 8 HOURS
Qty: 0 | Refills: 0 | Status: DISCONTINUED | OUTPATIENT
Start: 2018-07-16 | End: 2018-07-19

## 2018-07-16 RX ORDER — LISINOPRIL 2.5 MG/1
20 TABLET ORAL DAILY
Qty: 0 | Refills: 0 | Status: DISCONTINUED | OUTPATIENT
Start: 2018-07-16 | End: 2018-07-19

## 2018-07-16 RX ORDER — CLORAZEPATE DIPOTASSIUM 3.75 MG
3.38 TABLET ORAL
Qty: 0 | Refills: 0 | COMMUNITY

## 2018-07-16 RX ORDER — LISINOPRIL 2.5 MG/1
0 TABLET ORAL
Qty: 0 | Refills: 0 | COMMUNITY

## 2018-07-16 RX ADMIN — MORPHINE SULFATE 2 MILLIGRAM(S): 50 CAPSULE, EXTENDED RELEASE ORAL at 11:34

## 2018-07-16 RX ADMIN — Medication 20 UNIT(S): at 12:12

## 2018-07-16 RX ADMIN — Medication 81 MILLIGRAM(S): at 12:11

## 2018-07-16 RX ADMIN — MORPHINE SULFATE 4 MILLIGRAM(S): 50 CAPSULE, EXTENDED RELEASE ORAL at 01:45

## 2018-07-16 RX ADMIN — TAMSULOSIN HYDROCHLORIDE 0.4 MILLIGRAM(S): 0.4 CAPSULE ORAL at 21:42

## 2018-07-16 RX ADMIN — LISINOPRIL 20 MILLIGRAM(S): 2.5 TABLET ORAL at 17:23

## 2018-07-16 RX ADMIN — Medication 100 MILLIGRAM(S): at 21:43

## 2018-07-16 RX ADMIN — Medication 650 MILLIGRAM(S): at 02:29

## 2018-07-16 RX ADMIN — MORPHINE SULFATE 4 MILLIGRAM(S): 50 CAPSULE, EXTENDED RELEASE ORAL at 20:28

## 2018-07-16 RX ADMIN — Medication 20 UNIT(S): at 17:24

## 2018-07-16 RX ADMIN — RIVAROXABAN 20 MILLIGRAM(S): KIT at 17:25

## 2018-07-16 RX ADMIN — PIPERACILLIN AND TAZOBACTAM 25 GRAM(S): 4; .5 INJECTION, POWDER, LYOPHILIZED, FOR SOLUTION INTRAVENOUS at 06:58

## 2018-07-16 RX ADMIN — Medication 5 MILLIGRAM(S): at 21:43

## 2018-07-16 RX ADMIN — SIMVASTATIN 20 MILLIGRAM(S): 20 TABLET, FILM COATED ORAL at 21:42

## 2018-07-16 RX ADMIN — FINASTERIDE 5 MILLIGRAM(S): 5 TABLET, FILM COATED ORAL at 12:12

## 2018-07-16 RX ADMIN — Medication 650 MILLIGRAM(S): at 17:23

## 2018-07-16 RX ADMIN — Medication 20 UNIT(S): at 09:12

## 2018-07-16 RX ADMIN — MORPHINE SULFATE 2 MILLIGRAM(S): 50 CAPSULE, EXTENDED RELEASE ORAL at 10:29

## 2018-07-16 RX ADMIN — INSULIN GLARGINE 60 UNIT(S): 100 INJECTION, SOLUTION SUBCUTANEOUS at 21:43

## 2018-07-16 RX ADMIN — Medication 0.5 MILLIGRAM(S): at 17:28

## 2018-07-16 RX ADMIN — Medication 25 MILLIGRAM(S): at 06:57

## 2018-07-16 RX ADMIN — INSULIN GLARGINE 60 UNIT(S): 100 INJECTION, SOLUTION SUBCUTANEOUS at 09:13

## 2018-07-16 RX ADMIN — ONDANSETRON 4 MILLIGRAM(S): 8 TABLET, FILM COATED ORAL at 04:57

## 2018-07-16 RX ADMIN — Medication 25 MILLIGRAM(S): at 17:25

## 2018-07-16 NOTE — CONSULT NOTE ADULT - ASSESSMENT
IMPRESSION  Pt with cellulitis, SIRS (leukocytosis, tachycardia)    Hx DM, pancreatitis    Pt with Lactic Acidosis, hyponatremia, KRISTINE    On piperacillin/tazobactam IVPB. 3.375 Gram(s) IV Intermittent every 12 hours    SUGGESTIONs  D/C Zosyn  Nizoral cream BID between toes both feet  Ancef 2gm q12h to cover Staph/strep

## 2018-07-16 NOTE — PROGRESS NOTE ADULT - SUBJECTIVE AND OBJECTIVE BOX
Medication doses provided by patient. Although I added flomax and metoprolol to in hospital medications, I did not add lasix, spirolactone, metolazone, lisinopril, magnesium oxide or KCL due to low blood pressure initially in ER

## 2018-07-16 NOTE — PROGRESS NOTE ADULT - SUBJECTIVE AND OBJECTIVE BOX
MORGAN BUSH  63y  Male      Patient is a 63y old  Male who presents with a chief complaint of sepsis (15 Jul 2018 18:16)      INTERVAL HPI/OVERNIGHT EVENTS:    Vital Signs Last 24 Hrs  T(C): 37.6 (16 Jul 2018 06:43), Max: 38.2 (15 Jul 2018 14:13)  T(F): 99.6 (16 Jul 2018 06:43), Max: 100.8 (15 Jul 2018 14:13)  HR: 107 (16 Jul 2018 06:43) (83 - 107)  BP: 135/68 (16 Jul 2018 06:43) (73/33 - 135/68)  BP(mean): --  RR: 16 (16 Jul 2018 06:43) (16 - 20)  SpO2: 96% (15 Jul 2018 17:21) (95% - 96%)            Consultant(s) Notes Reviewed:  [x ] YES  [ ] NO          MEDICATIONS  (STANDING):  aspirin enteric coated 81 milliGRAM(s) Oral daily  dextrose 5%. 1000 milliLiter(s) (50 mL/Hr) IV Continuous <Continuous>  dextrose 50% Injectable 12.5 Gram(s) IV Push once  dextrose 50% Injectable 25 Gram(s) IV Push once  dextrose 50% Injectable 25 Gram(s) IV Push once  finasteride 5 milliGRAM(s) Oral daily  insulin glargine Injectable (LANTUS) 60 Unit(s) SubCutaneous every morning  insulin glargine Injectable (LANTUS) 60 Unit(s) SubCutaneous at bedtime  insulin lispro Injectable (HumaLOG) 20 Unit(s) SubCutaneous three times a day before meals  metoprolol tartrate 25 milliGRAM(s) Oral two times a day  oxybutynin 5 milliGRAM(s) Oral at bedtime  piperacillin/tazobactam IVPB. 3.375 Gram(s) IV Intermittent every 12 hours  rivaroxaban 20 milliGRAM(s) Oral every 24 hours  simvastatin 20 milliGRAM(s) Oral at bedtime  sodium chloride 0.9%. 1000 milliLiter(s) (75 mL/Hr) IV Continuous <Continuous>  tamsulosin 0.4 milliGRAM(s) Oral at bedtime    MEDICATIONS  (PRN):  acetaminophen   Tablet 650 milliGRAM(s) Oral every 6 hours PRN For Temp greater than 38 C (100.4 F) or mild pain  chlorhexidine 4% Liquid 1 Application(s) Topical two times a day PRN per rpotolc  dextrose 40% Gel 15 Gram(s) Oral once PRN Blood Glucose LESS THAN 70 milliGRAM(s)/deciliter  glucagon  Injectable 1 milliGRAM(s) IntraMuscular once PRN Glucose LESS THAN 70 milligrams/deciliter      LABS                          14.5   25.75 )-----------( 402      ( 15 Jul 2018 14:25 )             42.5     07-15    132<L>  |  95<L>  |  49<H>  ----------------------------<  304<H>  5.4<H>   |  21  |  1.6<H>    Ca    9.4      15 Jul 2018 14:25          PT/INR - ( 15 Jul 2018 14:25 )   PT: 19.30 sec;   INR: 1.77 ratio         PTT - ( 15 Jul 2018 14:25 )  PTT:38.9 sec  Lactate Trend  07-15 @ 14:25 Lactate:3.5     CARDIAC MARKERS ( 15 Jul 2018 19:14 )  x     / 0.05 ng/mL / x     / x     / x      CARDIAC MARKERS ( 15 Jul 2018 14:25 )  x     / 0.06 ng/mL / 136 U/L / x     / 2.7 ng/mL      CAPILLARY BLOOD GLUCOSE  304 (15 Jul 2018 21:25)            RADIOLOGY & ADDITIONAL TESTS:    Imaging Personally Reviewed:  [ ] YES  [ ] NO    HEALTH ISSUES - PROBLEM Dx:  Chronic pancreatitis: Chronic pancreatitis  Obstructive sleep apnea: Obstructive sleep apnea  Atrial fibrillation: Atrial fibrillation  Obesity: Obesity  KRISTINE (acute kidney injury): KRISTINE (acute kidney injury)  Hyponatremia: Hyponatremia  Diabetes: Diabetes  Elevated troponin: Elevated troponin  Sepsis: Sepsis    PHYSICAL EXAM:  GENERAL: NAD, well-developed  HEAD:  Atraumatic, Normocephalic  EYES: EOMI, PERRLA, conjunctiva and sclera clear  NECK: Supple, No JVD  CHEST/LUNG: Clear to auscultation bilaterally; No wheeze  HEART: Iregular rate and rhythm; No murmurs, rubs, or gallops  ABDOMEN: Soft, Nontender, Nondistended; Bowel sounds present  EXTREMITIES:  RLE edema with erythematous changes. MORGAN BUSH  63y  Male      Patient is a 63y old  Male who presents with a chief complaint of sepsis (15 Jul 2018 18:16)      INTERVAL HPI/OVERNIGHT EVENTS:  He c/o abdominal pain, jared-umbilical cramping, with diarrhea, watery, no fever today, his leg pain and erythema improves.     Vital Signs Last 24 Hrs  T(C): 37.6 (16 Jul 2018 06:43), Max: 38.2 (15 Jul 2018 14:13)  T(F): 99.6 (16 Jul 2018 06:43), Max: 100.8 (15 Jul 2018 14:13)  HR: 107 (16 Jul 2018 06:43) (83 - 107)  BP: 135/68 (16 Jul 2018 06:43) (73/33 - 135/68)  BP(mean): --  RR: 16 (16 Jul 2018 06:43) (16 - 20)  SpO2: 96% (15 Jul 2018 17:21) (95% - 96%)            Consultant(s) Notes Reviewed:  [x ] YES  [ ] NO          MEDICATIONS  (STANDING):  aspirin enteric coated 81 milliGRAM(s) Oral daily  dextrose 5%. 1000 milliLiter(s) (50 mL/Hr) IV Continuous <Continuous>  dextrose 50% Injectable 12.5 Gram(s) IV Push once  dextrose 50% Injectable 25 Gram(s) IV Push once  dextrose 50% Injectable 25 Gram(s) IV Push once  finasteride 5 milliGRAM(s) Oral daily  insulin glargine Injectable (LANTUS) 60 Unit(s) SubCutaneous every morning  insulin glargine Injectable (LANTUS) 60 Unit(s) SubCutaneous at bedtime  insulin lispro Injectable (HumaLOG) 20 Unit(s) SubCutaneous three times a day before meals  metoprolol tartrate 25 milliGRAM(s) Oral two times a day  oxybutynin 5 milliGRAM(s) Oral at bedtime  piperacillin/tazobactam IVPB. 3.375 Gram(s) IV Intermittent every 12 hours  rivaroxaban 20 milliGRAM(s) Oral every 24 hours  simvastatin 20 milliGRAM(s) Oral at bedtime  sodium chloride 0.9%. 1000 milliLiter(s) (75 mL/Hr) IV Continuous <Continuous>  tamsulosin 0.4 milliGRAM(s) Oral at bedtime    MEDICATIONS  (PRN):  acetaminophen   Tablet 650 milliGRAM(s) Oral every 6 hours PRN For Temp greater than 38 C (100.4 F) or mild pain  chlorhexidine 4% Liquid 1 Application(s) Topical two times a day PRN per oto  dextrose 40% Gel 15 Gram(s) Oral once PRN Blood Glucose LESS THAN 70 milliGRAM(s)/deciliter  glucagon  Injectable 1 milliGRAM(s) IntraMuscular once PRN Glucose LESS THAN 70 milligrams/deciliter      LABS                          14.5   25.75 )-----------( 402      ( 15 Jul 2018 14:25 )             42.5     07-15    132<L>  |  95<L>  |  49<H>  ----------------------------<  304<H>  5.4<H>   |  21  |  1.6<H>    Ca    9.4      15 Jul 2018 14:25          PT/INR - ( 15 Jul 2018 14:25 )   PT: 19.30 sec;   INR: 1.77 ratio         PTT - ( 15 Jul 2018 14:25 )  PTT:38.9 sec  Lactate Trend  07-15 @ 14:25 Lactate:3.5     CARDIAC MARKERS ( 15 Jul 2018 19:14 )  x     / 0.05 ng/mL / x     / x     / x      CARDIAC MARKERS ( 15 Jul 2018 14:25 )  x     / 0.06 ng/mL / 136 U/L / x     / 2.7 ng/mL      CAPILLARY BLOOD GLUCOSE  304 (15 Jul 2018 21:25)            RADIOLOGY & ADDITIONAL TESTS:    Imaging Personally Reviewed:  [ ] YES  [ ] NO    HEALTH ISSUES - PROBLEM Dx:  Chronic pancreatitis: Chronic pancreatitis  Obstructive sleep apnea: Obstructive sleep apnea  Atrial fibrillation: Atrial fibrillation  Obesity: Obesity  KRISTINE (acute kidney injury): KRISTINE (acute kidney injury)  Hyponatremia: Hyponatremia  Diabetes: Diabetes  Elevated troponin: Elevated troponin  Sepsis: Sepsis    PHYSICAL EXAM:  GENERAL: NAD, well-developed  HEAD:  Atraumatic, Normocephalic  EYES: EOMI, PERRLA, conjunctiva and sclera clear  NECK: Supple, No JVD  CHEST/LUNG: Clear to auscultation bilaterally; No wheeze  HEART: Irregular rate and rhythm; No murmurs, rubs, or gallops  ABDOMEN: Soft, Nontender, Nondistended; Bowel sounds present  EXTREMITIES:  RLE edema with erythematous changes.

## 2018-07-16 NOTE — PROGRESS NOTE ADULT - ASSESSMENT
This is 64 yo male with PMH of HFpEF, HTN, DM, CKD stage 3, AFIB on Xarelto, whoe presented to ED for right LE erythema, pain and fever with nausea and vomiting for one day. He denies chest pain, SOB, abdominal pain or urinary symptoms. In the ED his BP was low, 81/59, HR: 91, Temp: 99.1F, labs showed leukcoytosis 25k, exam showed right leg erythema. Lactic acid was 3.5, patient was rescucitated in the ED with IVF, blood pressure improved, ICU evaluated the patient, he is stable to floor, patient was admitted for severe sepossi, LE cellulitis and KRISTINE.     A/P:   1. Severe Sepsis: BP improved with IV fluid.   No fever since admission.     2. Right lower extremity Cellulitis:   Continue Zosyn, pending blood culutre  ID consult.     3. KRISTINE: on CKD  Likely from sepsis.   monitor BMP, I/Os.     4. HfpEF:   Lasix, metolazone, Aldactone are on hold due hypotension and severe sepsis, will resume them slowly when BP improves.   Continue Metoprolol  Mild Troponin elevation, likely from KRISTINE and Sepsis    5. CAD s/p CABG  Continue Aspirin, Metoprolol and Statin.     6. Atrial Fibrillation:   Continue with Xarelto and Metoprolol.     7. DM type 2:   Continue with Lantus and ISS.   Diabetic diet.     8. COPD:     DVT PPX: on Xarelto. This is 62 yo male with PMH of HFpEF, HTN, DM, CKD stage 3, AFIB on Xarelto, whoe presented to ED for right LE erythema, pain and fever with nausea and vomiting for one day. He denies chest pain, SOB, abdominal pain or urinary symptoms. In the ED his BP was low, 81/59, HR: 91, Temp: 99.1F, labs showed leukcoytosis 25k, exam showed right leg erythema. Lactic acid was 3.5, patient was rescucitated in the ED with IVF, blood pressure improved, ICU evaluated the patient, he is stable to floor, patient was admitted for severe sepossi, LE cellulitis and KRISTINE.     A/P:   1. Severe Sepsis: BP improved with IV fluid.   No fever since admission.     2. Right lower extremity Cellulitis:   Continue Zosyn, pending blood culutre  ID consult.     3. Abdominal pain: with diarrhea, possible gastritis.   Continue with PPI, check Lipase, amylase level.   If pain continued will order abdomen CT with oral contrast.     3. KRISTINE: improved  Likely from sepsis. Cr from 1.6 to 1.0  monitor BMP, I/Os.   encourage oral hydration. Discontinue IV fluid.     4. HFpEF:   Lasix, metolazone, Aldactone are on hold due hypotension and severe sepsis, will resume them slowly when BP improves.   Continue Metoprolol, resume Lisinopril 20mg for now.   Mild Troponin elevation, likely from KRISTINE and Sepsis    5. CAD s/p CABG  Continue Aspirin, Metoprolol and Statin.     6. Atrial Fibrillation:   Continue with Xarelto and Metoprolol.     7. DM type 2:   Continue with Lantus and ISS.   Diabetic diet.     8. Obstructive sleep apnea:   stable.     DVT PPX: on Xarelto.

## 2018-07-16 NOTE — CONSULT NOTE ADULT - SUBJECTIVE AND OBJECTIVE BOX
MORGAN BUSH 63yMalePatient is a 63y old  Male who presents with a chief complaint of sepsis (15 Jul 2018 18:16)      Patient has history of:  No Known Allergies          SEVERE SEPSIS CELLULITIS ELEVATED TROPONIN  ^SEVERE SEPSIS CELLULITIS ELEVATED TROPONIN  Family history of coronary artery disease (Father)  Family history of diabetes mellitus (Mother)  Family history of lung cancer (Sibling)  Handoff  MEWS Score  Intubation of airway performed without difficulty  Pneumonia  Sepsis  Severe sepsis  Chronic pancreatitis  Obstructive sleep apnea  Atrial fibrillation  Obesity  KRISTINE (acute kidney injury)  Hyponatremia  Diabetes  Elevated troponin  Sepsis  History of cholecystectomy  History of resection of pancreas  H/O right coronary artery stent placement  R/O CELLULITIS  90+  Elevated troponin  Cellulitis        Patient treated with:  piperacillin/tazobactam IVPB. 3.375 Gram(s) IV Intermittent every 12 hours        PHYSICAL EXAM  T(F): 99.6 (07-16-18 @ 06:43), Max: 100.8 (07-15-18 @ 14:13)  HR: 107 (07-16-18 @ 06:43) (83 - 107)  BP: 135/68 (07-16-18 @ 06:43) (73/33 - 135/68)  RR: 16 (07-16-18 @ 06:43) (16 - 20)  SpO2: 96% (07-15-18 @ 17:21) (95% - 96%)  Daily Height in cm: 182.88 (15 Jul 2018 18:10)    Daily   HEENT: normal, no nuchal rigidity  Cor: RSR Nl S1 S2  Lungs: clear  Decreased breath sounds at bases    Abdomen: Nontender, Nl BS,     Ext: No clubbing,cyanosis or edema    LAB & RADIOLOGIC RESULTS:                        13.9   16.50 )-----------( 295      ( 16 Jul 2018 07:28 )             40.5         07-16    133<L>  |  100  |  37<H>  ----------------------------<  276<H>  4.9   |  18  |  1.1        <--<9>>2.1  <--<9>>3.0    Sodium, Serum: 133 mmol/L (07-16-18 @ 07:28)  Sodium, Serum: 132 mmol/L (07-15-18 @ 14:25)    Hyponatremia              Lactic Acidosis   Lactate, Blood: 1.7 mmol/L (07-16-18 @ 11:28)  Blood Gas Venous - Lactate: 2.1 mmoL/L (07-15-18 @ 16:13)  Lactate, Blood: 3.5 mmol/L (07-15-18 @ 14:25)  Blood Gas Venous - Lactate: 3.0 mmoL/L (07-15-18 @ 14:20)        Acidosis         Creatinine, Serum: 1.1 mg/dL (07-16-18 @ 07:28)  eGFR if Non African American: 72 mL/min/1.73M2 (07-16-18 @ 07:28)  eGFR if : 84 mL/min/1.73M2 (07-16-18 @ 07:28)  Creatinine, Serum: 1.6 mg/dL (07-15-18 @ 14:25)  eGFR if Non African American: 46 mL/min/1.73M2 (07-15-18 @ 14:25)  eGFR if African American: 53 mL/min/1.73M2 (07-15-18 @ 14:25)            PT/INR - ( 15 Jul 2018 14:25 )   PT: 19.30 sec;   INR: 1.77 ratio         PTT - ( 15 Jul 2018 14:25 )  PTT:38.9 sec

## 2018-07-17 LAB
-  COAGULASE NEGATIVE STAPHYLOCOCCUS: SIGNIFICANT CHANGE UP
ANION GAP SERPL CALC-SCNC: 13 MMOL/L — SIGNIFICANT CHANGE UP (ref 7–14)
BUN SERPL-MCNC: 28 MG/DL — HIGH (ref 10–20)
CALCIUM SERPL-MCNC: 8.4 MG/DL — LOW (ref 8.5–10.1)
CHLORIDE SERPL-SCNC: 104 MMOL/L — SIGNIFICANT CHANGE UP (ref 98–110)
CO2 SERPL-SCNC: 20 MMOL/L — SIGNIFICANT CHANGE UP (ref 17–32)
CREAT SERPL-MCNC: 0.9 MG/DL — SIGNIFICANT CHANGE UP (ref 0.7–1.5)
CULTURE RESULTS: SIGNIFICANT CHANGE UP
GLUCOSE SERPL-MCNC: 212 MG/DL — HIGH (ref 70–99)
GRAM STN FLD: SIGNIFICANT CHANGE UP
GRAM STN FLD: SIGNIFICANT CHANGE UP
HCT VFR BLD CALC: 38.4 % — LOW (ref 42–52)
HGB BLD-MCNC: 12.7 G/DL — LOW (ref 14–18)
MCHC RBC-ENTMCNC: 30.3 PG — SIGNIFICANT CHANGE UP (ref 27–31)
MCHC RBC-ENTMCNC: 33.1 G/DL — SIGNIFICANT CHANGE UP (ref 32–37)
MCV RBC AUTO: 91.6 FL — SIGNIFICANT CHANGE UP (ref 80–94)
METHOD TYPE: SIGNIFICANT CHANGE UP
NRBC # BLD: 0 /100 WBCS — SIGNIFICANT CHANGE UP (ref 0–0)
ORGANISM # SPEC MICROSCOPIC CNT: SIGNIFICANT CHANGE UP
ORGANISM # SPEC MICROSCOPIC CNT: SIGNIFICANT CHANGE UP
PLATELET # BLD AUTO: 355 K/UL — SIGNIFICANT CHANGE UP (ref 130–400)
POTASSIUM SERPL-MCNC: 4.7 MMOL/L — SIGNIFICANT CHANGE UP (ref 3.5–5)
POTASSIUM SERPL-SCNC: 4.7 MMOL/L — SIGNIFICANT CHANGE UP (ref 3.5–5)
RBC # BLD: 4.19 M/UL — LOW (ref 4.7–6.1)
RBC # FLD: 15.4 % — HIGH (ref 11.5–14.5)
SODIUM SERPL-SCNC: 137 MMOL/L — SIGNIFICANT CHANGE UP (ref 135–146)
SPECIMEN SOURCE: SIGNIFICANT CHANGE UP
WBC # BLD: 11.45 K/UL — HIGH (ref 4.8–10.8)
WBC # FLD AUTO: 11.45 K/UL — HIGH (ref 4.8–10.8)

## 2018-07-17 RX ORDER — INSULIN GLARGINE 100 [IU]/ML
40 INJECTION, SOLUTION SUBCUTANEOUS ONCE
Qty: 0 | Refills: 0 | Status: COMPLETED | OUTPATIENT
Start: 2018-07-17 | End: 2018-07-17

## 2018-07-17 RX ADMIN — Medication 81 MILLIGRAM(S): at 13:59

## 2018-07-17 RX ADMIN — Medication 100 MILLIGRAM(S): at 21:08

## 2018-07-17 RX ADMIN — Medication 0.5 MILLIGRAM(S): at 21:14

## 2018-07-17 RX ADMIN — MORPHINE SULFATE 2 MILLIGRAM(S): 50 CAPSULE, EXTENDED RELEASE ORAL at 21:08

## 2018-07-17 RX ADMIN — INSULIN GLARGINE 40 UNIT(S): 100 INJECTION, SOLUTION SUBCUTANEOUS at 22:30

## 2018-07-17 RX ADMIN — INSULIN GLARGINE 60 UNIT(S): 100 INJECTION, SOLUTION SUBCUTANEOUS at 09:06

## 2018-07-17 RX ADMIN — FINASTERIDE 5 MILLIGRAM(S): 5 TABLET, FILM COATED ORAL at 13:59

## 2018-07-17 RX ADMIN — Medication 650 MILLIGRAM(S): at 21:08

## 2018-07-17 RX ADMIN — Medication 650 MILLIGRAM(S): at 05:54

## 2018-07-17 RX ADMIN — Medication 100 MILLIGRAM(S): at 14:06

## 2018-07-17 RX ADMIN — Medication 25 MILLIGRAM(S): at 17:41

## 2018-07-17 RX ADMIN — MORPHINE SULFATE 4 MILLIGRAM(S): 50 CAPSULE, EXTENDED RELEASE ORAL at 05:54

## 2018-07-17 RX ADMIN — TAMSULOSIN HYDROCHLORIDE 0.4 MILLIGRAM(S): 0.4 CAPSULE ORAL at 21:07

## 2018-07-17 RX ADMIN — Medication 20 UNIT(S): at 09:06

## 2018-07-17 RX ADMIN — MORPHINE SULFATE 2 MILLIGRAM(S): 50 CAPSULE, EXTENDED RELEASE ORAL at 19:26

## 2018-07-17 RX ADMIN — SIMVASTATIN 20 MILLIGRAM(S): 20 TABLET, FILM COATED ORAL at 21:07

## 2018-07-17 RX ADMIN — Medication 25 MILLIGRAM(S): at 05:43

## 2018-07-17 RX ADMIN — Medication 100 MILLIGRAM(S): at 05:43

## 2018-07-17 RX ADMIN — Medication 20 UNIT(S): at 13:59

## 2018-07-17 RX ADMIN — Medication 0.5 MILLIGRAM(S): at 05:43

## 2018-07-17 RX ADMIN — Medication 5 MILLIGRAM(S): at 21:07

## 2018-07-17 RX ADMIN — Medication 20 UNIT(S): at 17:41

## 2018-07-17 RX ADMIN — LISINOPRIL 20 MILLIGRAM(S): 2.5 TABLET ORAL at 05:43

## 2018-07-17 RX ADMIN — RIVAROXABAN 20 MILLIGRAM(S): KIT at 17:41

## 2018-07-17 NOTE — PROGRESS NOTE ADULT - SUBJECTIVE AND OBJECTIVE BOX
MORGAN BUSH  63y  Male      Patient is a 63y old  Male who presents with a chief complaint of sepsis (15 Jul 2018 18:16)      INTERVAL HPI/OVERNIGHT EVENTS:  He still with right LE pain and tenderness worse when he stand on the leg, no fever, abdominal pain improved.     Vital Signs Last 24 Hrs  T(C): 36.3 (16 Jul 2018 22:00), Max: 37.1 (16 Jul 2018 13:30)  T(F): 97.4 (16 Jul 2018 22:00), Max: 98.8 (16 Jul 2018 13:30)  HR: 86 (17 Jul 2018 05:30) (86 - 100)  BP: 122/64 (17 Jul 2018 05:30) (118/68 - 132/65)  BP(mean): --  RR: 16 (17 Jul 2018 05:30) (16 - 16)  SpO2: --      07-16-18 @ 07:01  -  07-17-18 @ 07:00  --------------------------------------------------------  IN: 0 mL / OUT: 2125 mL / NET: -2125 mL            Consultant(s) Notes Reviewed:  [x ] YES  [ ] NO          MEDICATIONS  (STANDING):  ALPRAZolam 0.5 milliGRAM(s) Oral two times a day  aspirin enteric coated 81 milliGRAM(s) Oral daily  ceFAZolin   IVPB 1000 milliGRAM(s) IV Intermittent every 8 hours  dextrose 5%. 1000 milliLiter(s) (50 mL/Hr) IV Continuous <Continuous>  dextrose 50% Injectable 12.5 Gram(s) IV Push once  dextrose 50% Injectable 25 Gram(s) IV Push once  dextrose 50% Injectable 25 Gram(s) IV Push once  finasteride 5 milliGRAM(s) Oral daily  insulin glargine Injectable (LANTUS) 60 Unit(s) SubCutaneous every morning  insulin glargine Injectable (LANTUS) 60 Unit(s) SubCutaneous at bedtime  insulin lispro Injectable (HumaLOG) 20 Unit(s) SubCutaneous three times a day before meals  lisinopril 20 milliGRAM(s) Oral daily  metoprolol tartrate 25 milliGRAM(s) Oral two times a day  oxybutynin 5 milliGRAM(s) Oral at bedtime  rivaroxaban 20 milliGRAM(s) Oral every 24 hours  simvastatin 20 milliGRAM(s) Oral at bedtime  tamsulosin 0.4 milliGRAM(s) Oral at bedtime    MEDICATIONS  (PRN):  acetaminophen   Tablet 650 milliGRAM(s) Oral every 6 hours PRN For Temp greater than 38 C (100.4 F) or mild pain  chlorhexidine 4% Liquid 1 Application(s) Topical two times a day PRN per rpotolc  dextrose 40% Gel 15 Gram(s) Oral once PRN Blood Glucose LESS THAN 70 milliGRAM(s)/deciliter  glucagon  Injectable 1 milliGRAM(s) IntraMuscular once PRN Glucose LESS THAN 70 milligrams/deciliter  morphine  - Injectable 2 milliGRAM(s) IV Push every 4 hours PRN Severe Pain (7 - 10)  morphine  - Injectable 4 milliGRAM(s) IV Push every 3 hours PRN Severe Pain (7 - 10)      LABS                          13.9   16.50 )-----------( 295      ( 16 Jul 2018 07:28 )             40.5     07-16    133<L>  |  100  |  37<H>  ----------------------------<  276<H>  4.9   |  18  |  1.1    Ca    8.4<L>      16 Jul 2018 07:28          PT/INR - ( 15 Jul 2018 14:25 )   PT: 19.30 sec;   INR: 1.77 ratio         PTT - ( 15 Jul 2018 14:25 )  PTT:38.9 sec  Lactate Trend  07-16 @ 11:28 Lactate:1.7   07-15 @ 14:25 Lactate:3.5     CARDIAC MARKERS ( 16 Jul 2018 07:28 )  x     / 0.04 ng/mL / x     / x     / x      CARDIAC MARKERS ( 15 Jul 2018 19:14 )  x     / 0.05 ng/mL / x     / x     / x      CARDIAC MARKERS ( 15 Jul 2018 14:25 )  x     / 0.06 ng/mL / 136 U/L / x     / 2.7 ng/mL      CAPILLARY BLOOD GLUCOSE  186 (17 Jul 2018 07:32)          Culture - Blood (collected 07-15-18 @ 14:25)  Source: .Blood Blood-Peripheral  Preliminary Report (07-16-18 @ 22:01):    No growth to date.    Culture - Blood (collected 07-15-18 @ 14:25)  Source: .Blood Blood-Peripheral  Gram Stain (07-17-18 @ 02:14):    Growth in anaerobic bottle: Gram Positive Cocci in Clusters  Preliminary Report (07-17-18 @ 02:14):    Growth in anaerobic bottle: Gram Positive Cocci in Clusters    "Due to technical problems, Proteus sp. will Not be reported as part of    the BCID panel until further notice"    ***Blood Panel PCR results on this specimen are available    approximately 3 hours after the Gram stain result.***    Gram stain, PCR, and/or culture results may not always    correspond due to difference in methodologies.    ************************************************************    This PCR assay was performed using [employer].    The following targets are tested for: Enterococcus,    vancomycin resistant enterococci, Listeria monocytogenes,    coagulase negative staphylococci, S. aureus,    methicillin resistant S. aureus, Streptococcus agalactiae    (Group B), S. pneumoniae, S. pyogenes (Group A),    Acinetobacter baumannii, Enterobacter cloacae, E. coli,    Klebsiella oxytoca, K. pneumoniae, Proteus sp.,    Serratia marcescens, Haemophilus influenzae,    Neisseria meningitidis, Pseudomonas aeruginosa, Candida    albicans, C. glabrata, C krusei, C parapsilosis,    C. tropicalis and the KPC resistance gene.  Organism: Blood Culture PCR (07-17-18 @ 04:03)  Organism: Blood Culture PCR (07-17-18 @ 04:03)      -  Coagulase negative Staphylococcus: Detec      Method Type: PCR        RADIOLOGY & ADDITIONAL TESTS:    Imaging Personally Reviewed:  [ ] YES  [ ] NO    HEALTH ISSUES - PROBLEM Dx:  Chronic pancreatitis: Chronic pancreatitis  Obstructive sleep apnea: Obstructive sleep apnea  Atrial fibrillation: Atrial fibrillation  Obesity: Obesity  KRISTINE (acute kidney injury): KRISTINE (acute kidney injury)  Hyponatremia: Hyponatremia  Diabetes: Diabetes  Elevated troponin: Elevated troponin  Sepsis: Sepsis        PHYSICAL EXAM:  GENERAL: NAD, well-developed  HEAD:  Atraumatic, Normocephalic  EYES: EOMI, PERRLA, conjunctiva and sclera clear  NECK: Supple, No JVD  CHEST/LUNG: Clear to auscultation bilaterally; No wheeze  HEART: Irregular rate and rhythm; No murmurs, rubs, or gallops  ABDOMEN: Soft, Nontender, Nondistended; Bowel sounds present  EXTREMITIES:  RLE edema with erythema, warmness and tenderness from the knee down to the ankle, pulse 2+ b/l.

## 2018-07-17 NOTE — PROGRESS NOTE ADULT - ASSESSMENT
This is 62 yo male with PMH of HFpEF, HTN, DM, CKD stage 3, AFIB on Xarelto, who presented to ED for right LE erythema, pain and fever with nausea and vomiting for one day. He denies chest pain, SOB, abdominal pain or urinary symptoms. In the ED his BP was low, 81/59, HR: 91, Temp: 99.1F, labs showed leukocytosis 25k, exam showed right leg erythema. Lactic acid was 3.5, patient was resuscitated in the ED with IVF, blood pressure improved, ICU evaluated the patient, he is stable to floor, patient was admitted for severe sepsis, LE cellulitis and KRISTINE.     A/P:   1. Severe Sepsis: BP improved with IV fluid.   Fever and leukocytosis improved.     2. Right lower extremity Cellulitis:   Blood culture grew goagulase negative Cocci in one bottle, likely contamination.   ID consult recommended Cefazolin.     3. Abdominal pain: with diarrhea, possible gastritis.   Improved, lipase and amylase normal.   Continue with PPI.     3. KRISTINE: improved  Likely from sepsis. Cr from 1.6 to 1.0  monitor BMP, I/Os.   encourage oral hydration. Discontinue IV fluid.     4. HFpEF:   Lasix, metolazone, Aldactone are on hold due hypotension and severe sepsis, will resume them slowly when BP improves.   Continue Metoprolol, resume Lisinopril 20mg for now.   Mild Troponin elevation, likely from KRISTINE and Sepsis    5. CAD s/p CABG  Continue Aspirin, Metoprolol and Statin.     6. Atrial Fibrillation:   Continue with Xarelto and Metoprolol.     7. DM type 2:   Continue with Lantus and ISS.   Diabetic diet.     8. Obstructive sleep apnea:   stable.     DVT PPX: on Xarelto.

## 2018-07-18 LAB
ANION GAP SERPL CALC-SCNC: 11 MMOL/L — SIGNIFICANT CHANGE UP (ref 7–14)
BUN SERPL-MCNC: 23 MG/DL — HIGH (ref 10–20)
CALCIUM SERPL-MCNC: 8.8 MG/DL — SIGNIFICANT CHANGE UP (ref 8.5–10.1)
CHLORIDE SERPL-SCNC: 105 MMOL/L — SIGNIFICANT CHANGE UP (ref 98–110)
CO2 SERPL-SCNC: 22 MMOL/L — SIGNIFICANT CHANGE UP (ref 17–32)
CREAT SERPL-MCNC: 0.9 MG/DL — SIGNIFICANT CHANGE UP (ref 0.7–1.5)
GLUCOSE SERPL-MCNC: 160 MG/DL — HIGH (ref 70–99)
HCT VFR BLD CALC: 39.1 % — LOW (ref 42–52)
HGB BLD-MCNC: 12.9 G/DL — LOW (ref 14–18)
MCHC RBC-ENTMCNC: 30.1 PG — SIGNIFICANT CHANGE UP (ref 27–31)
MCHC RBC-ENTMCNC: 33 G/DL — SIGNIFICANT CHANGE UP (ref 32–37)
MCV RBC AUTO: 91.4 FL — SIGNIFICANT CHANGE UP (ref 80–94)
NRBC # BLD: 0 /100 WBCS — SIGNIFICANT CHANGE UP (ref 0–0)
PLATELET # BLD AUTO: 398 K/UL — SIGNIFICANT CHANGE UP (ref 130–400)
POTASSIUM SERPL-MCNC: 5.2 MMOL/L — HIGH (ref 3.5–5)
POTASSIUM SERPL-SCNC: 5.2 MMOL/L — HIGH (ref 3.5–5)
RBC # BLD: 4.28 M/UL — LOW (ref 4.7–6.1)
RBC # FLD: 15.5 % — HIGH (ref 11.5–14.5)
SODIUM SERPL-SCNC: 138 MMOL/L — SIGNIFICANT CHANGE UP (ref 135–146)
WBC # BLD: 11.86 K/UL — HIGH (ref 4.8–10.8)
WBC # FLD AUTO: 11.86 K/UL — HIGH (ref 4.8–10.8)

## 2018-07-18 RX ORDER — SODIUM POLYSTYRENE SULFONATE 4.1 MEQ/G
30 POWDER, FOR SUSPENSION ORAL ONCE
Qty: 0 | Refills: 0 | Status: COMPLETED | OUTPATIENT
Start: 2018-07-18 | End: 2018-07-18

## 2018-07-18 RX ADMIN — Medication 81 MILLIGRAM(S): at 12:24

## 2018-07-18 RX ADMIN — Medication 25 MILLIGRAM(S): at 05:30

## 2018-07-18 RX ADMIN — MORPHINE SULFATE 2 MILLIGRAM(S): 50 CAPSULE, EXTENDED RELEASE ORAL at 16:53

## 2018-07-18 RX ADMIN — Medication 20 UNIT(S): at 12:24

## 2018-07-18 RX ADMIN — INSULIN GLARGINE 60 UNIT(S): 100 INJECTION, SOLUTION SUBCUTANEOUS at 09:06

## 2018-07-18 RX ADMIN — Medication 20 UNIT(S): at 09:06

## 2018-07-18 RX ADMIN — Medication 100 MILLIGRAM(S): at 14:00

## 2018-07-18 RX ADMIN — INSULIN GLARGINE 60 UNIT(S): 100 INJECTION, SOLUTION SUBCUTANEOUS at 21:43

## 2018-07-18 RX ADMIN — MORPHINE SULFATE 2 MILLIGRAM(S): 50 CAPSULE, EXTENDED RELEASE ORAL at 17:17

## 2018-07-18 RX ADMIN — Medication 5 MILLIGRAM(S): at 21:43

## 2018-07-18 RX ADMIN — SODIUM POLYSTYRENE SULFONATE 30 GRAM(S): 4.1 POWDER, FOR SUSPENSION ORAL at 14:00

## 2018-07-18 RX ADMIN — Medication 100 MILLIGRAM(S): at 22:00

## 2018-07-18 RX ADMIN — Medication 0.5 MILLIGRAM(S): at 17:09

## 2018-07-18 RX ADMIN — TAMSULOSIN HYDROCHLORIDE 0.4 MILLIGRAM(S): 0.4 CAPSULE ORAL at 21:43

## 2018-07-18 RX ADMIN — Medication 100 MILLIGRAM(S): at 05:30

## 2018-07-18 RX ADMIN — Medication 20 UNIT(S): at 16:52

## 2018-07-18 RX ADMIN — Medication 25 MILLIGRAM(S): at 17:09

## 2018-07-18 RX ADMIN — LISINOPRIL 20 MILLIGRAM(S): 2.5 TABLET ORAL at 05:30

## 2018-07-18 RX ADMIN — Medication 0.5 MILLIGRAM(S): at 05:30

## 2018-07-18 RX ADMIN — RIVAROXABAN 20 MILLIGRAM(S): KIT at 16:52

## 2018-07-18 RX ADMIN — FINASTERIDE 5 MILLIGRAM(S): 5 TABLET, FILM COATED ORAL at 12:24

## 2018-07-18 RX ADMIN — SIMVASTATIN 20 MILLIGRAM(S): 20 TABLET, FILM COATED ORAL at 21:43

## 2018-07-18 NOTE — PROGRESS NOTE ADULT - SUBJECTIVE AND OBJECTIVE BOX
Pt seen and examined. Pt feels well. Wants to go home    T(F): , Max: 98.4 (07-17-18 @ 22:00)  HR: 79 (07-18-18 @ 05:15) (79 - 83)  BP: 136/63 (07-18-18 @ 05:15)  RR: 16 (07-18-18 @ 05:15)  SpO2: --  General: No apparent distress  Cardiovascular: S1, S2  Gastrointestinal: Soft, Non-tender, Non-distended  Respiratory: Good air entry bilaterally  Musculoskeletal: Moves all extremities  Lymphatic: No edema  Neurologic: No gross motor deficit  Dermatologic: RLE Erythema below the knee with mild edema compared to L leg                          12.9   11.86 )-----------( 398      ( 18 Jul 2018 06:29 )             39.1     07-18    138  |  105  |  23<H>  ----------------------------<  160<H>  5.2<H>   |  22  |  0.9    Ca    8.8      18 Jul 2018 06:29        Culture - Blood (collected 15 Jul 2018 14:25)  Source: .Blood Blood-Peripheral  Gram Stain (17 Jul 2018 15:29):    Growth in anaerobic bottle: Gram Positive Cocci in Clusters  Preliminary Report (18 Jul 2018 11:58):    Growth in anaerobic bottle: Coag Negative Staphylococcus Susceptibility    to follow.    Culture - Blood (collected 15 Jul 2018 14:25)  Source: .Blood Blood-Peripheral  Gram Stain (17 Jul 2018 02:14):    Growth in anaerobic bottle: Gram Positive Cocci in Clusters  Final Report (17 Jul 2018 22:49):    Growth in anaerobic bottle: Coag Negative Staphylococcus    Single set isolate, possible contaminant. Contact    Microbiology if susceptibility testing clinically    indicated.    "Due to technical problems, Proteus sp. will Not be reported as part of    theBCID panel until further notice"    ***Blood Panel PCR results on this specimen are available    approximately 3 hours after the Gram stain result.***    Gram stain, PCR, and/or culture results may not always    correspond due to difference in methodologies.    ************************************************************    This PCR assay was performed using [employer].    The following targets are tested for: Enterococcus,    vancomycin resistant enterococci, Listeria monocytogenes,    coagulase negative staphylococci, S. aureus,    methicillin resistant S. aureus, Streptococcus agalactiae    (Group B), S. pneumoniae, S. pyogenes (Group A),    Acinetobacter baumannii, Enterobacter cloacae, E. coli,    Klebsiella oxytoca, K. pneumoniae, Proteus sp.,    Serratia marcescens, Haemophilus influenzae,    Neisseria meningitidis, Pseudomonas aeruginosa, Candida    albicans, C. glabrata, C krusei, C parapsilosis,    C. tropicalis and the KPC resistance gene.  Organism: Blood Culture PCR (17 Jul 2018 22:49)  Organism: Blood Culture PCR (17 Jul 2018 22:49)

## 2018-07-18 NOTE — PROGRESS NOTE ADULT - ASSESSMENT
1. Severe Sepsis:   Resolving    2. Right lower extremity Cellulitis:   Cefazolin.  F/U ID for PO alternative     3. Abdominal pain  resolved    3. KRISTINE: improved  resolved    4. HFpEF:   Lasix, metolazone, Aldactone are on hold   Continue Metoprolol, Lisinopril 20mg for now.       5. CAD s/p CABG  Continue Aspirin, Metoprolol and Statin.     6. Atrial Fibrillation:   Continue with Xarelto and Metoprolol.     7. DM type 2:   Continue with Lantus and ISS.   Diabetic diet.     8. Obstructive sleep apnea:   stable.     9. Type 2 MI POA  Likely due to severe sepsis     10. Hyperkalemia  Kayexalate today    D/C planning for AM

## 2018-07-19 ENCOUNTER — TRANSCRIPTION ENCOUNTER (OUTPATIENT)
Age: 63
End: 2018-07-19

## 2018-07-19 VITALS — WEIGHT: 302.03 LBS

## 2018-07-19 LAB
-  AMPICILLIN/SULBACTAM: SIGNIFICANT CHANGE UP
-  CEFAZOLIN: SIGNIFICANT CHANGE UP
-  CLINDAMYCIN: SIGNIFICANT CHANGE UP
-  ERYTHROMYCIN: SIGNIFICANT CHANGE UP
-  GENTAMICIN: SIGNIFICANT CHANGE UP
-  OXACILLIN: SIGNIFICANT CHANGE UP
-  PENICILLIN: SIGNIFICANT CHANGE UP
-  RIFAMPIN: SIGNIFICANT CHANGE UP
-  TETRACYCLINE: SIGNIFICANT CHANGE UP
-  TRIMETHOPRIM/SULFAMETHOXAZOLE: SIGNIFICANT CHANGE UP
-  VANCOMYCIN: SIGNIFICANT CHANGE UP
ALBUMIN SERPL ELPH-MCNC: 3.1 G/DL — LOW (ref 3.5–5.2)
ALP SERPL-CCNC: 168 U/L — HIGH (ref 30–115)
ALT FLD-CCNC: 47 U/L — HIGH (ref 0–41)
ANION GAP SERPL CALC-SCNC: 13 MMOL/L — SIGNIFICANT CHANGE UP (ref 7–14)
AST SERPL-CCNC: 47 U/L — HIGH (ref 0–41)
BILIRUB SERPL-MCNC: 0.3 MG/DL — SIGNIFICANT CHANGE UP (ref 0.2–1.2)
BUN SERPL-MCNC: 15 MG/DL — SIGNIFICANT CHANGE UP (ref 10–20)
CALCIUM SERPL-MCNC: 8.7 MG/DL — SIGNIFICANT CHANGE UP (ref 8.5–10.1)
CHLORIDE SERPL-SCNC: 106 MMOL/L — SIGNIFICANT CHANGE UP (ref 98–110)
CO2 SERPL-SCNC: 22 MMOL/L — SIGNIFICANT CHANGE UP (ref 17–32)
CREAT SERPL-MCNC: 0.7 MG/DL — SIGNIFICANT CHANGE UP (ref 0.7–1.5)
CULTURE RESULTS: SIGNIFICANT CHANGE UP
GLUCOSE SERPL-MCNC: 137 MG/DL — HIGH (ref 70–99)
HCT VFR BLD CALC: 39 % — LOW (ref 42–52)
HGB BLD-MCNC: 13.3 G/DL — LOW (ref 14–18)
MCHC RBC-ENTMCNC: 30.7 PG — SIGNIFICANT CHANGE UP (ref 27–31)
MCHC RBC-ENTMCNC: 34.1 G/DL — SIGNIFICANT CHANGE UP (ref 32–37)
MCV RBC AUTO: 90.1 FL — SIGNIFICANT CHANGE UP (ref 80–94)
METHOD TYPE: SIGNIFICANT CHANGE UP
NRBC # BLD: 0 /100 WBCS — SIGNIFICANT CHANGE UP (ref 0–0)
ORGANISM # SPEC MICROSCOPIC CNT: SIGNIFICANT CHANGE UP
ORGANISM # SPEC MICROSCOPIC CNT: SIGNIFICANT CHANGE UP
PLATELET # BLD AUTO: 421 K/UL — HIGH (ref 130–400)
POTASSIUM SERPL-MCNC: 4.2 MMOL/L — SIGNIFICANT CHANGE UP (ref 3.5–5)
POTASSIUM SERPL-SCNC: 4.2 MMOL/L — SIGNIFICANT CHANGE UP (ref 3.5–5)
PROT SERPL-MCNC: 6.1 G/DL — SIGNIFICANT CHANGE UP (ref 6–8)
RBC # BLD: 4.33 M/UL — LOW (ref 4.7–6.1)
RBC # FLD: 14.8 % — HIGH (ref 11.5–14.5)
SODIUM SERPL-SCNC: 141 MMOL/L — SIGNIFICANT CHANGE UP (ref 135–146)
SPECIMEN SOURCE: SIGNIFICANT CHANGE UP
WBC # BLD: 11.89 K/UL — HIGH (ref 4.8–10.8)
WBC # FLD AUTO: 11.89 K/UL — HIGH (ref 4.8–10.8)

## 2018-07-19 RX ORDER — RIVAROXABAN 15 MG-20MG
0 KIT ORAL
Qty: 0 | Refills: 0 | COMMUNITY

## 2018-07-19 RX ORDER — CLORAZEPATE DIPOTASSIUM 3.75 MG
3.75 TABLET ORAL
Qty: 0 | Refills: 0 | COMMUNITY

## 2018-07-19 RX ORDER — RIVAROXABAN 15 MG-20MG
1 KIT ORAL
Qty: 0 | Refills: 0 | DISCHARGE
Start: 2018-07-19

## 2018-07-19 RX ADMIN — INSULIN GLARGINE 60 UNIT(S): 100 INJECTION, SOLUTION SUBCUTANEOUS at 09:26

## 2018-07-19 RX ADMIN — Medication 100 MILLIGRAM(S): at 05:46

## 2018-07-19 RX ADMIN — LISINOPRIL 20 MILLIGRAM(S): 2.5 TABLET ORAL at 05:46

## 2018-07-19 RX ADMIN — Medication 0.5 MILLIGRAM(S): at 05:46

## 2018-07-19 RX ADMIN — Medication 200 MILLIGRAM(S): at 00:05

## 2018-07-19 RX ADMIN — Medication 20 UNIT(S): at 09:27

## 2018-07-19 RX ADMIN — Medication 25 MILLIGRAM(S): at 05:46

## 2018-07-19 NOTE — DISCHARGE NOTE ADULT - HOSPITAL COURSE
Pt admitted for RLE swelling and redness. Pt seen by ID and started on ancef with dramatic improvement. Pt discharged in stable condition with instructions to f/u PMD within 2 weeks

## 2018-07-19 NOTE — DISCHARGE NOTE ADULT - PATIENT PORTAL LINK FT
You can access the [facility], offered by [facility], by registering with the following website: [email or web address]

## 2018-07-19 NOTE — DISCHARGE NOTE ADULT - MEDICATION SUMMARY - MEDICATIONS TO STOP TAKING
I will STOP taking the medications listed below when I get home from the hospital:    Ecotrin    magnesium oxide    furosemide 80 mg oral tablet    metOLazone    Klor-Con    lisinopril    Metoprolol Tartrate    Lantus    HumaLOG    Flomax 0.4 mg oral capsule

## 2018-07-19 NOTE — DISCHARGE NOTE ADULT - CARE PLAN
Principal Discharge DX:	Cellulitis of right lower extremity  Goal:	To prevent infection  Assessment and plan of treatment:	Take all medications as presribed and f/u with PMD as outpt within 2 weeks

## 2018-07-19 NOTE — DISCHARGE NOTE ADULT - MEDICATION SUMMARY - MEDICATIONS TO TAKE
I will START or STAY ON the medications listed below when I get home from the hospital:    finasteride  -- orally once a day  -- Indication: For BPH    spironolactone  -- 25 milligram(s) by mouth once a day  -- Indication: For ChF    Ecotrin  -- orally once a day  -- Indication: For H/O right coronary artery stent placement    lisinopril  -- 20  by mouth 2 times a day  -- Indication: For HTN    Flomax 0.4 mg oral capsule  -- orally once a day  -- Indication: For BPH    rivaroxaban 20 mg oral tablet  -- 1 tab(s) by mouth every 24 hours  -- Indication: For Atrial fibrillation    Lantus  -- 60  subcutaneous 2 times a day  -- Indication: For Diabetes    HumaLOG  -- Indication: For Diabetes    simvastatin  -- Indication: For HLD    Metoprolol Tartrate  -- 25  by mouth 2 times a day  -- Indication: For HTN    metOLazone  -- 2.5  by mouth Monday, Wednesday, and Friday  -- Indication: For ChF    furosemide 80 mg oral tablet  -- orally once a day  -- Indication: For ChF    Klor-Con  -- 10  by mouth once a day  -- Indication: For Mineral    magnesium oxide  -- 400  by mouth once a day  -- Indication: For Mineral    testosterone  -- Indication: For Hypogonadism    Bactrim  mg-160 mg oral tablet  -- 1 tab(s) by mouth 2 times a day   -- Avoid prolonged or excessive exposure to direct and/or artificial sunlight while taking this medication.  Finish all this medication unless otherwise directed by prescriber.  Medication should be taken with plenty of water.    -- Indication: For Cellulitis    oxybutynin  -- Indication: For Diabetic neuropathy

## 2018-07-24 LAB
CULTURE RESULTS: SIGNIFICANT CHANGE UP
SPECIMEN SOURCE: SIGNIFICANT CHANGE UP

## 2018-07-25 DIAGNOSIS — N17.9 ACUTE KIDNEY FAILURE, UNSPECIFIED: ICD-10-CM

## 2018-07-25 DIAGNOSIS — I11.0 HYPERTENSIVE HEART DISEASE WITH HEART FAILURE: ICD-10-CM

## 2018-07-25 DIAGNOSIS — N40.0 BENIGN PROSTATIC HYPERPLASIA WITHOUT LOWER URINARY TRACT SYMPTOMS: ICD-10-CM

## 2018-07-25 DIAGNOSIS — Z79.4 LONG TERM (CURRENT) USE OF INSULIN: ICD-10-CM

## 2018-07-25 DIAGNOSIS — Z79.82 LONG TERM (CURRENT) USE OF ASPIRIN: ICD-10-CM

## 2018-07-25 DIAGNOSIS — Z79.01 LONG TERM (CURRENT) USE OF ANTICOAGULANTS: ICD-10-CM

## 2018-07-25 DIAGNOSIS — G47.33 OBSTRUCTIVE SLEEP APNEA (ADULT) (PEDIATRIC): ICD-10-CM

## 2018-07-25 DIAGNOSIS — I50.30 UNSPECIFIED DIASTOLIC (CONGESTIVE) HEART FAILURE: ICD-10-CM

## 2018-07-25 DIAGNOSIS — A41.9 SEPSIS, UNSPECIFIED ORGANISM: ICD-10-CM

## 2018-07-25 DIAGNOSIS — E11.9 TYPE 2 DIABETES MELLITUS WITHOUT COMPLICATIONS: ICD-10-CM

## 2018-07-25 DIAGNOSIS — Z95.1 PRESENCE OF AORTOCORONARY BYPASS GRAFT: ICD-10-CM

## 2018-07-25 DIAGNOSIS — I21.A1 MYOCARDIAL INFARCTION TYPE 2: ICD-10-CM

## 2018-07-25 DIAGNOSIS — E87.1 HYPO-OSMOLALITY AND HYPONATREMIA: ICD-10-CM

## 2018-07-25 DIAGNOSIS — E66.01 MORBID (SEVERE) OBESITY DUE TO EXCESS CALORIES: ICD-10-CM

## 2018-07-25 DIAGNOSIS — I48.91 UNSPECIFIED ATRIAL FIBRILLATION: ICD-10-CM

## 2018-07-25 DIAGNOSIS — E87.5 HYPERKALEMIA: ICD-10-CM

## 2018-07-25 DIAGNOSIS — L03.115 CELLULITIS OF RIGHT LOWER LIMB: ICD-10-CM

## 2018-07-25 DIAGNOSIS — E87.2 ACIDOSIS: ICD-10-CM

## 2018-07-25 DIAGNOSIS — I25.10 ATHEROSCLEROTIC HEART DISEASE OF NATIVE CORONARY ARTERY WITHOUT ANGINA PECTORIS: ICD-10-CM

## 2018-07-25 DIAGNOSIS — K86.1 OTHER CHRONIC PANCREATITIS: ICD-10-CM

## 2018-07-25 DIAGNOSIS — R65.20 SEVERE SEPSIS WITHOUT SEPTIC SHOCK: ICD-10-CM

## 2018-07-25 DIAGNOSIS — Z87.891 PERSONAL HISTORY OF NICOTINE DEPENDENCE: ICD-10-CM

## 2018-08-10 ENCOUNTER — APPOINTMENT (OUTPATIENT)
Dept: CARDIOLOGY | Facility: CLINIC | Age: 63
End: 2018-08-10

## 2018-08-10 VITALS — DIASTOLIC BLOOD PRESSURE: 80 MMHG | SYSTOLIC BLOOD PRESSURE: 130 MMHG | HEART RATE: 80 BPM | RESPIRATION RATE: 18 BRPM

## 2018-08-10 VITALS — HEIGHT: 72 IN | BODY MASS INDEX: 40.63 KG/M2 | WEIGHT: 300 LBS

## 2018-08-10 DIAGNOSIS — L03.115 CELLULITIS OF RIGHT LOWER LIMB: ICD-10-CM

## 2018-08-10 RX ORDER — ONDANSETRON 4 MG/1
4 TABLET ORAL
Qty: 30 | Refills: 0 | Status: DISCONTINUED | COMMUNITY
Start: 2017-07-28 | End: 2018-08-10

## 2018-08-10 RX ORDER — INSULIN LISPRO 100 [IU]/ML
100 INJECTION, SOLUTION INTRAVENOUS; SUBCUTANEOUS
Refills: 0 | Status: DISCONTINUED | COMMUNITY
End: 2018-08-10

## 2018-08-10 RX ORDER — POTASSIUM CHLORIDE 750 MG/1
10 TABLET, FILM COATED, EXTENDED RELEASE ORAL
Qty: 30 | Refills: 0 | Status: DISCONTINUED | COMMUNITY
Start: 2016-10-31 | End: 2018-08-10

## 2018-08-10 RX ORDER — SILDENAFIL CITRATE 100 MG/1
100 TABLET, FILM COATED ORAL
Qty: 10 | Refills: 2 | Status: DISCONTINUED | COMMUNITY
Start: 2017-05-12 | End: 2018-08-10

## 2018-11-08 ENCOUNTER — APPOINTMENT (OUTPATIENT)
Dept: OTOLARYNGOLOGY | Facility: CLINIC | Age: 63
End: 2018-11-08
Payer: COMMERCIAL

## 2018-11-08 VITALS
BODY MASS INDEX: 40.63 KG/M2 | SYSTOLIC BLOOD PRESSURE: 133 MMHG | DIASTOLIC BLOOD PRESSURE: 88 MMHG | HEIGHT: 72 IN | WEIGHT: 300 LBS

## 2018-11-08 PROCEDURE — 69210 REMOVE IMPACTED EAR WAX UNI: CPT

## 2018-11-08 PROCEDURE — 99212 OFFICE O/P EST SF 10 MIN: CPT | Mod: 25

## 2018-12-04 ENCOUNTER — OUTPATIENT (OUTPATIENT)
Dept: OUTPATIENT SERVICES | Facility: HOSPITAL | Age: 63
LOS: 1 days | Discharge: HOME | End: 2018-12-04

## 2018-12-04 DIAGNOSIS — Z90.410 ACQUIRED TOTAL ABSENCE OF PANCREAS: Chronic | ICD-10-CM

## 2018-12-04 DIAGNOSIS — Z90.49 ACQUIRED ABSENCE OF OTHER SPECIFIED PARTS OF DIGESTIVE TRACT: Chronic | ICD-10-CM

## 2018-12-04 DIAGNOSIS — Z95.5 PRESENCE OF CORONARY ANGIOPLASTY IMPLANT AND GRAFT: Chronic | ICD-10-CM

## 2018-12-05 DIAGNOSIS — I10 ESSENTIAL (PRIMARY) HYPERTENSION: ICD-10-CM

## 2018-12-05 DIAGNOSIS — E11.9 TYPE 2 DIABETES MELLITUS WITHOUT COMPLICATIONS: ICD-10-CM

## 2018-12-18 ENCOUNTER — RX RENEWAL (OUTPATIENT)
Age: 63
End: 2018-12-18

## 2019-02-08 ENCOUNTER — APPOINTMENT (OUTPATIENT)
Dept: CARDIOLOGY | Facility: CLINIC | Age: 64
End: 2019-02-08

## 2019-02-12 ENCOUNTER — OUTPATIENT (OUTPATIENT)
Dept: OUTPATIENT SERVICES | Facility: HOSPITAL | Age: 64
LOS: 1 days | Discharge: HOME | End: 2019-02-12

## 2019-02-12 DIAGNOSIS — R05 COUGH: ICD-10-CM

## 2019-02-12 DIAGNOSIS — Z90.410 ACQUIRED TOTAL ABSENCE OF PANCREAS: Chronic | ICD-10-CM

## 2019-02-12 DIAGNOSIS — Z95.5 PRESENCE OF CORONARY ANGIOPLASTY IMPLANT AND GRAFT: Chronic | ICD-10-CM

## 2019-02-12 DIAGNOSIS — J12.9 VIRAL PNEUMONIA, UNSPECIFIED: ICD-10-CM

## 2019-02-12 DIAGNOSIS — Z90.49 ACQUIRED ABSENCE OF OTHER SPECIFIED PARTS OF DIGESTIVE TRACT: Chronic | ICD-10-CM

## 2019-03-20 ENCOUNTER — RX RENEWAL (OUTPATIENT)
Age: 64
End: 2019-03-20

## 2019-04-18 ENCOUNTER — TRANSCRIPTION ENCOUNTER (OUTPATIENT)
Age: 64
End: 2019-04-18

## 2019-05-09 ENCOUNTER — APPOINTMENT (OUTPATIENT)
Dept: OTOLARYNGOLOGY | Facility: CLINIC | Age: 64
End: 2019-05-09

## 2019-07-17 ENCOUNTER — INPATIENT (INPATIENT)
Facility: HOSPITAL | Age: 64
LOS: 1 days | Discharge: HOME | End: 2019-07-19
Attending: STUDENT IN AN ORGANIZED HEALTH CARE EDUCATION/TRAINING PROGRAM | Admitting: STUDENT IN AN ORGANIZED HEALTH CARE EDUCATION/TRAINING PROGRAM
Payer: COMMERCIAL

## 2019-07-17 VITALS
HEART RATE: 112 BPM | SYSTOLIC BLOOD PRESSURE: 126 MMHG | TEMPERATURE: 99 F | OXYGEN SATURATION: 95 % | WEIGHT: 304.9 LBS | HEIGHT: 72 IN | RESPIRATION RATE: 18 BRPM | DIASTOLIC BLOOD PRESSURE: 66 MMHG

## 2019-07-17 DIAGNOSIS — Z90.410 ACQUIRED TOTAL ABSENCE OF PANCREAS: Chronic | ICD-10-CM

## 2019-07-17 DIAGNOSIS — I10 ESSENTIAL (PRIMARY) HYPERTENSION: ICD-10-CM

## 2019-07-17 DIAGNOSIS — R06.09 OTHER FORMS OF DYSPNEA: ICD-10-CM

## 2019-07-17 DIAGNOSIS — R05 COUGH: ICD-10-CM

## 2019-07-17 DIAGNOSIS — Z90.49 ACQUIRED ABSENCE OF OTHER SPECIFIED PARTS OF DIGESTIVE TRACT: Chronic | ICD-10-CM

## 2019-07-17 DIAGNOSIS — Z95.5 PRESENCE OF CORONARY ANGIOPLASTY IMPLANT AND GRAFT: Chronic | ICD-10-CM

## 2019-07-17 DIAGNOSIS — R74.8 ABNORMAL LEVELS OF OTHER SERUM ENZYMES: ICD-10-CM

## 2019-07-17 DIAGNOSIS — N40.0 BENIGN PROSTATIC HYPERPLASIA WITHOUT LOWER URINARY TRACT SYMPTOMS: ICD-10-CM

## 2019-07-17 DIAGNOSIS — E11.9 TYPE 2 DIABETES MELLITUS WITHOUT COMPLICATIONS: ICD-10-CM

## 2019-07-17 LAB
ALBUMIN SERPL ELPH-MCNC: 3.9 G/DL — SIGNIFICANT CHANGE UP (ref 3.5–5.2)
ALP SERPL-CCNC: 102 U/L — SIGNIFICANT CHANGE UP (ref 30–115)
ALT FLD-CCNC: 27 U/L — SIGNIFICANT CHANGE UP (ref 0–41)
ANION GAP SERPL CALC-SCNC: 13 MMOL/L — SIGNIFICANT CHANGE UP (ref 7–14)
AST SERPL-CCNC: 26 U/L — SIGNIFICANT CHANGE UP (ref 0–41)
BASE EXCESS BLDV CALC-SCNC: 0.9 MMOL/L — SIGNIFICANT CHANGE UP (ref -2–2)
BASOPHILS # BLD AUTO: 0.1 K/UL — SIGNIFICANT CHANGE UP (ref 0–0.2)
BASOPHILS NFR BLD AUTO: 0.7 % — SIGNIFICANT CHANGE UP (ref 0–1)
BILIRUB SERPL-MCNC: 0.3 MG/DL — SIGNIFICANT CHANGE UP (ref 0.2–1.2)
BUN SERPL-MCNC: 28 MG/DL — HIGH (ref 10–20)
CA-I SERPL-SCNC: 1.22 MMOL/L — SIGNIFICANT CHANGE UP (ref 1.12–1.3)
CALCIUM SERPL-MCNC: 9.2 MG/DL — SIGNIFICANT CHANGE UP (ref 8.5–10.1)
CHLORIDE SERPL-SCNC: 100 MMOL/L — SIGNIFICANT CHANGE UP (ref 98–110)
CO2 SERPL-SCNC: 23 MMOL/L — SIGNIFICANT CHANGE UP (ref 17–32)
CREAT SERPL-MCNC: 1.5 MG/DL — SIGNIFICANT CHANGE UP (ref 0.7–1.5)
EOSINOPHIL # BLD AUTO: 0.15 K/UL — SIGNIFICANT CHANGE UP (ref 0–0.7)
EOSINOPHIL NFR BLD AUTO: 1 % — SIGNIFICANT CHANGE UP (ref 0–8)
GAS PNL BLDV: 140 MMOL/L — SIGNIFICANT CHANGE UP (ref 136–145)
GAS PNL BLDV: SIGNIFICANT CHANGE UP
GLUCOSE BLDC GLUCOMTR-MCNC: 120 MG/DL — HIGH (ref 70–99)
GLUCOSE SERPL-MCNC: 81 MG/DL — SIGNIFICANT CHANGE UP (ref 70–99)
HCO3 BLDV-SCNC: 26 MMOL/L — SIGNIFICANT CHANGE UP (ref 22–29)
HCT VFR BLD CALC: 47.7 % — SIGNIFICANT CHANGE UP (ref 42–52)
HCT VFR BLDA CALC: 50.3 % — HIGH (ref 34–44)
HGB BLD CALC-MCNC: 16.4 G/DL — SIGNIFICANT CHANGE UP (ref 14–18)
HGB BLD-MCNC: 15.6 G/DL — SIGNIFICANT CHANGE UP (ref 14–18)
HOROWITZ INDEX BLDV+IHG-RTO: 21 — SIGNIFICANT CHANGE UP
IMM GRANULOCYTES NFR BLD AUTO: 0.5 % — HIGH (ref 0.1–0.3)
LACTATE BLDV-MCNC: 1.5 MMOL/L — SIGNIFICANT CHANGE UP (ref 0.5–1.6)
LYMPHOCYTES # BLD AUTO: 1.16 K/UL — LOW (ref 1.2–3.4)
LYMPHOCYTES # BLD AUTO: 7.8 % — LOW (ref 20.5–51.1)
MCHC RBC-ENTMCNC: 30 PG — SIGNIFICANT CHANGE UP (ref 27–31)
MCHC RBC-ENTMCNC: 32.7 G/DL — SIGNIFICANT CHANGE UP (ref 32–37)
MCV RBC AUTO: 91.7 FL — SIGNIFICANT CHANGE UP (ref 80–94)
MONOCYTES # BLD AUTO: 1.59 K/UL — HIGH (ref 0.1–0.6)
MONOCYTES NFR BLD AUTO: 10.7 % — HIGH (ref 1.7–9.3)
NEUTROPHILS # BLD AUTO: 11.78 K/UL — HIGH (ref 1.4–6.5)
NEUTROPHILS NFR BLD AUTO: 79.3 % — HIGH (ref 42.2–75.2)
NRBC # BLD: 0 /100 WBCS — SIGNIFICANT CHANGE UP (ref 0–0)
NT-PROBNP SERPL-SCNC: 349 PG/ML — HIGH (ref 0–300)
PCO2 BLDV: 41 MMHG — SIGNIFICANT CHANGE UP (ref 41–51)
PH BLDV: 7.4 — SIGNIFICANT CHANGE UP (ref 7.26–7.43)
PLATELET # BLD AUTO: 403 K/UL — HIGH (ref 130–400)
PO2 BLDV: 38 MMHG — SIGNIFICANT CHANGE UP (ref 20–40)
POTASSIUM BLDV-SCNC: 4.5 MMOL/L — SIGNIFICANT CHANGE UP (ref 3.3–5.6)
POTASSIUM SERPL-MCNC: 4.6 MMOL/L — SIGNIFICANT CHANGE UP (ref 3.5–5)
POTASSIUM SERPL-SCNC: 4.6 MMOL/L — SIGNIFICANT CHANGE UP (ref 3.5–5)
PROT SERPL-MCNC: 6.9 G/DL — SIGNIFICANT CHANGE UP (ref 6–8)
RBC # BLD: 5.2 M/UL — SIGNIFICANT CHANGE UP (ref 4.7–6.1)
RBC # FLD: 15.8 % — HIGH (ref 11.5–14.5)
SAO2 % BLDV: 73 % — SIGNIFICANT CHANGE UP
SODIUM SERPL-SCNC: 136 MMOL/L — SIGNIFICANT CHANGE UP (ref 135–146)
TROPONIN T SERPL-MCNC: 0.04 NG/ML — CRITICAL HIGH
TROPONIN T SERPL-MCNC: 0.05 NG/ML — CRITICAL HIGH
WBC # BLD: 14.85 K/UL — HIGH (ref 4.8–10.8)
WBC # FLD AUTO: 14.85 K/UL — HIGH (ref 4.8–10.8)

## 2019-07-17 PROCEDURE — 76705 ECHO EXAM OF ABDOMEN: CPT | Mod: 26

## 2019-07-17 PROCEDURE — 99222 1ST HOSP IP/OBS MODERATE 55: CPT

## 2019-07-17 PROCEDURE — 99285 EMERGENCY DEPT VISIT HI MDM: CPT

## 2019-07-17 PROCEDURE — 71046 X-RAY EXAM CHEST 2 VIEWS: CPT | Mod: 26

## 2019-07-17 RX ORDER — FUROSEMIDE 40 MG
80 TABLET ORAL DAILY
Refills: 0 | Status: DISCONTINUED | OUTPATIENT
Start: 2019-07-17 | End: 2019-07-19

## 2019-07-17 RX ORDER — ASPIRIN/CALCIUM CARB/MAGNESIUM 324 MG
325 TABLET ORAL DAILY
Refills: 0 | Status: DISCONTINUED | OUTPATIENT
Start: 2019-07-17 | End: 2019-07-19

## 2019-07-17 RX ORDER — INSULIN GLARGINE 100 [IU]/ML
80 INJECTION, SOLUTION SUBCUTANEOUS EVERY MORNING
Refills: 0 | Status: DISCONTINUED | OUTPATIENT
Start: 2019-07-17 | End: 2019-07-19

## 2019-07-17 RX ORDER — PANTOPRAZOLE SODIUM 20 MG/1
40 TABLET, DELAYED RELEASE ORAL
Refills: 0 | Status: DISCONTINUED | OUTPATIENT
Start: 2019-07-17 | End: 2019-07-19

## 2019-07-17 RX ORDER — INSULIN GLARGINE 100 [IU]/ML
18 INJECTION, SOLUTION SUBCUTANEOUS
Refills: 0 | Status: DISCONTINUED | OUTPATIENT
Start: 2019-07-17 | End: 2019-07-17

## 2019-07-17 RX ORDER — LISINOPRIL 2.5 MG/1
20 TABLET ORAL
Refills: 0 | Status: DISCONTINUED | OUTPATIENT
Start: 2019-07-17 | End: 2019-07-19

## 2019-07-17 RX ORDER — TAMSULOSIN HYDROCHLORIDE 0.4 MG/1
0.4 CAPSULE ORAL AT BEDTIME
Refills: 0 | Status: DISCONTINUED | OUTPATIENT
Start: 2019-07-17 | End: 2019-07-19

## 2019-07-17 RX ORDER — SIMVASTATIN 20 MG/1
0 TABLET, FILM COATED ORAL
Qty: 0 | Refills: 0 | DISCHARGE

## 2019-07-17 RX ORDER — OXYBUTYNIN CHLORIDE 5 MG
10 TABLET ORAL DAILY
Refills: 0 | Status: DISCONTINUED | OUTPATIENT
Start: 2019-07-17 | End: 2019-07-18

## 2019-07-17 RX ORDER — INSULIN GLARGINE 100 [IU]/ML
60 INJECTION, SOLUTION SUBCUTANEOUS AT BEDTIME
Refills: 0 | Status: DISCONTINUED | OUTPATIENT
Start: 2019-07-17 | End: 2019-07-19

## 2019-07-17 RX ORDER — SODIUM CHLORIDE 9 MG/ML
500 INJECTION INTRAMUSCULAR; INTRAVENOUS; SUBCUTANEOUS ONCE
Refills: 0 | Status: COMPLETED | OUTPATIENT
Start: 2019-07-17 | End: 2019-07-17

## 2019-07-17 RX ORDER — METOPROLOL TARTRATE 50 MG
25 TABLET ORAL
Refills: 0 | Status: DISCONTINUED | OUTPATIENT
Start: 2019-07-17 | End: 2019-07-19

## 2019-07-17 RX ORDER — RIVAROXABAN 15 MG-20MG
15 KIT ORAL
Refills: 0 | Status: DISCONTINUED | OUTPATIENT
Start: 2019-07-17 | End: 2019-07-19

## 2019-07-17 RX ORDER — SPIRONOLACTONE 25 MG/1
25 TABLET, FILM COATED ORAL DAILY
Refills: 0 | Status: DISCONTINUED | OUTPATIENT
Start: 2019-07-17 | End: 2019-07-19

## 2019-07-17 RX ORDER — INSULIN GLARGINE 100 [IU]/ML
60 INJECTION, SOLUTION SUBCUTANEOUS
Qty: 0 | Refills: 0 | DISCHARGE

## 2019-07-17 RX ORDER — POTASSIUM CHLORIDE 20 MEQ
10 PACKET (EA) ORAL DAILY
Refills: 0 | Status: DISCONTINUED | OUTPATIENT
Start: 2019-07-17 | End: 2019-07-19

## 2019-07-17 RX ADMIN — LISINOPRIL 20 MILLIGRAM(S): 2.5 TABLET ORAL at 22:55

## 2019-07-17 RX ADMIN — RIVAROXABAN 15 MILLIGRAM(S): KIT at 22:55

## 2019-07-17 RX ADMIN — Medication 25 MILLIGRAM(S): at 22:55

## 2019-07-17 RX ADMIN — TAMSULOSIN HYDROCHLORIDE 0.4 MILLIGRAM(S): 0.4 CAPSULE ORAL at 22:55

## 2019-07-17 RX ADMIN — SODIUM CHLORIDE 500 MILLILITER(S): 9 INJECTION INTRAMUSCULAR; INTRAVENOUS; SUBCUTANEOUS at 19:15

## 2019-07-17 RX ADMIN — SODIUM CHLORIDE 500 MILLILITER(S): 9 INJECTION INTRAMUSCULAR; INTRAVENOUS; SUBCUTANEOUS at 18:17

## 2019-07-17 NOTE — ED PROVIDER NOTE - NS ED ROS FT
Constitutional: (-) fever, (-) chills  Eyes: (-) visual changes  ENT: (-) ear pain, (-) discharge, (+) nasal congestions, (-) sinus pain, (-) sore throat   Cardiovascular: (-) chest pain, (-) syncope  Respiratory: (+) cough, (+) shortness of breath, (+) dyspnea,   Gastrointestinal: (-) vomiting, (-) diarrhea, (-)nausea, l  Musculoskeletal: (-) neck pain, (-) back pain, (-) joint pain,  Integumentary: (-) rash, (-) edema   Neurological: (-) headache, , (-) dizziness, (-) tingling, (-)numbness,   Peripheral Vascular: (-) pain while walking, (-) leg swelling, (-) color changes  : (-)dysuria  Allergic/Immunologic: (-) pruritus

## 2019-07-17 NOTE — H&P ADULT - ASSESSMENT
Patient is a 64y old  Male who presents with a chief complaint of SOb (17 Jul 2019 18:40)                                                                                                                                                                                                                                                                                       HEALTH ISSUES - PROBLEM Dx:

## 2019-07-17 NOTE — H&P ADULT - NSICDXPASTMEDICALHX_GEN_ALL_CORE_FT
PAST MEDICAL HISTORY:  BPH (benign prostatic hyperplasia)     Diabetes mellitus, type 2     Essential hypertension     Intubation of airway performed without difficulty     Pneumonia     Sepsis     Water retention

## 2019-07-17 NOTE — ED PROVIDER NOTE - PROGRESS NOTE DETAILS
PODLOG: Labs and imaging reviewed and discussed with pt. Discussed with Dr. Gomez, states can place pt on low risk tele. dr. pelaez seen pt in ed, cleared for low risk tele. case discussed with hospitalist dr magdaleno, aware of admission and pending ruq us. admitting team aware of repeating trop.

## 2019-07-17 NOTE — CONSULT NOTE ADULT - SUBJECTIVE AND OBJECTIVE BOX
Patient is a 64y old  Male who presents with a chief complaint of SOb, Nx , bloating     HPI: Pt is a 63 y/o male with hx of DM, DLD, HTN, CAD s/p CABG, afib on xarelto, CHF on lasix 80mg, presents to ED for worsening SOB for the past 4 days. Sx's are mild, constant, worse with ambulation also complaining of dry cough nasal congestion postnasal drip , ??  subjective fever, congestion, body aches. No chest pain, new abd pain (pt has chronic abd pain and Bloating since pancreatic surgery), vomiting, diarrhea. PT states went to [facility] yesterday, had negative strep and flu test, sent home. PT states had PNA in past, feels similar.  now feel better     PAST MEDICAL & SURGICAL HISTORY:  Essential hypertension  Water retention  BPH (benign prostatic hyperplasia)  Diabetes mellitus, type 2  Intubation of airway performed without difficulty  Pneumonia  Sepsis  History of cholecystectomy  History of resection of pancreas  H/O right coronary artery stent placement    Allergies    No Known Allergies    Intolerances      Family history : no cardiovscular family history   Home Medications:  Cialis:  (17 Jul 2019 18:21)  Ecotrin: orally once a day (17 Jul 2019 18:21)  Flomax 0.4 mg oral capsule: orally once a day (17 Jul 2019 18:21)  furosemide 80 mg oral tablet: orally once a day (17 Jul 2019 18:21)  HumaLOG:  (17 Jul 2019 18:21)  Klor-Con: 10  orally once a day (17 Jul 2019 18:21)  Lantus: subcutaneous 2 times a day (17 Jul 2019 18:21)  lisinopril: 20  orally 2 times a day (17 Jul 2019 18:21)  metOLazone: 2.5  orally once a week (17 Jul 2019 18:21)  Metoprolol Tartrate: 25  orally 2 times a day (17 Jul 2019 18:21)  oxybutynin:  (17 Jul 2019 18:21)  rivaroxaban 20 mg oral tablet: 1 tab(s) orally every 24 hours (17 Jul 2019 18:21)  spironolactone: 25 milligram(s) orally once a day (17 Jul 2019 18:21)  testosterone:  (17 Jul 2019 18:21)    Occupation:  Alochol: Denied  Smoking: Denied  Drug Use: Denied  Marital Status:         ROS: as in HPI; All other systems reviewed are negative    ICU Vital Signs Last 24 Hrs  T(C): 37.4 (17 Jul 2019 16:41), Max: 37.4 (17 Jul 2019 16:41)  T(F): 99.4 (17 Jul 2019 16:41), Max: 99.4 (17 Jul 2019 16:41)  HR: 112 (17 Jul 2019 16:41) (112 - 112)  BP: 126/66 (17 Jul 2019 16:41) (126/66 - 126/66)  BP(mean): --  ABP: --  ABP(mean): --  RR: 18 (17 Jul 2019 16:41) (18 - 18)  SpO2: 95% (17 Jul 2019 16:41) (95% - 95%)        Physical Examination:    General: No acute distress.  Alert, oriented, interactive, nonfocal    HEENT: Pupils equal, reactive to light.  Symmetric.    PULM: Clear to auscultation bilaterally, no significant sputum production    CVS: Regular rate and rhythm, no murmurs, rubs, or gallops    ABD: Soft, nondistended, non localized tenderness RUL     EXT: No edema, nontender, no clubbing     SKIN: Warm and well perfused, no rashes noted.    Neurology : no motor or sensory deficit     Musculoskeletal : move all extremity     Lymphatic system: no Palpable node               I&O's Detail        LABS:                        15.6   14.85 )-----------( 403      ( 17 Jul 2019 17:13 )             47.7     17 Jul 2019 17:13    136    |  100    |  28     ----------------------------<  81     4.6     |  23     |  1.5      Ca    9.2        17 Jul 2019 17:13    TPro  6.9    /  Alb  3.9    /  TBili  0.3    /  DBili  x      /  AST  26     /  ALT  27     /  AlkPhos  102    17 Jul 2019 17:13  Amylase x     lipase x          CARDIAC MARKERS ( 17 Jul 2019 17:13 )  x     / 0.05 ng/mL / x     / x     / x          CAPILLARY BLOOD GLUCOSE            Culture        MEDICATIONS  (STANDING):    MEDICATIONS  (PRN):        RADIOLOGY: ***     CXR: compare to previous stable no acute changes   TLC:  OG:  ET tube:        CAM ICU:  ECHO:

## 2019-07-17 NOTE — ED PROVIDER NOTE - PHYSICAL EXAMINATION
Physical Exam    Vital Signs: I have reviewed the initial vital signs.  Constitutional: well-nourished, appears stated age, no acute distress  Eyes: Conjunctiva pink, Sclera clear, PERRLA, EOMI.  ENT: OP is clear without exudates, normal dentition, normal gingival, tongue without swelling, , nasal turbinates without erythema or discharge, no lymphadenopathy.  Cardiovascular: S1 and S2, tachycardic, regular rhythm, well-perfused extremities, radial pulses equal and 2+, pedal pulses 2+ and equal.   Respiratory: unlabored respiratory effort, bibasilar rales.  Gastrointestinal: soft, non-tender abdomen, no pulsatile mass, normal bowl sounds  Musculoskeletal: supple neck, no lower extremity edema, no midline tenderness  Integumentary: warm, dry, no rash  Neurologic: awake, alert, cranial nerves II-XII grossly intact, extremities’ motor and sensory functions grossly intact

## 2019-07-17 NOTE — ED ADULT NURSE NOTE - PMH
Intubation of airway performed without difficulty    Pneumonia    Sepsis BPH (benign prostatic hyperplasia)    Diabetes mellitus, type 2    Essential hypertension    Intubation of airway performed without difficulty    Pneumonia    Sepsis    Water retention

## 2019-07-17 NOTE — CONSULT NOTE ADULT - ASSESSMENT
IMPRESSION:  URI/ bronchitis  doubt acute ACS    abnormal Troponin  afib stable   chf stable       PLAN:    CNS: no sedation     HEENT: oral care , nasal  saline irrigation   azelastine at night     PULMONARY:  keep pox > 92 % , nebulizer Q 4 hrs and prn   prednisone 40 mg for 3 days     CARDIOVASCULAR: batsheva gomes   cardiology evaluation   continue home meds for now     GI: GI prophylaxis.  Feeding     RENAL: follow lytes IS and OS bun/ cr     INFECTIOUS DISEASE: no abx for now follow cx   viral panel   if spike temp start abx levaquine     HEMATOLOGICAL:  DVT prophylaxis.    ENDOCRINE:  Follow up FS.  Insulin protocol if needed.    MUSCULOSKELETAL:  low risk tele monitoring         CRITICAL CARE TIME SPENT: ***

## 2019-07-17 NOTE — ED PROVIDER NOTE - OBJECTIVE STATEMENT
65 yo male, pmh of cad s/p cabg on asa, htn, hld, and dm, presents to ed for sob and cough. Pt states cough for few days, productive, worse with exertion, has not had prior, no otc meds for sxs. Denies fever, chills, cp, abd pain, nvd, ha, visual changes, back pain, neck pain, rash, dysuria.

## 2019-07-17 NOTE — H&P ADULT - NSICDXPASTSURGICALHX_GEN_ALL_CORE_FT
PAST SURGICAL HISTORY:  H/O right coronary artery stent placement     History of cholecystectomy     History of resection of pancreas

## 2019-07-17 NOTE — H&P ADULT - NSHPLABSRESULTS_GEN_ALL_CORE
15.6   14.85 )-----------( 403      ( 17 Jul 2019 17:13 )             47.7     07-17    136  |  100  |  28<H>  ----------------------------<  81  4.6   |  23  |  1.5    Ca    9.2      17 Jul 2019 17:13    TPro  6.9  /  Alb  3.9  /  TBili  0.3  /  DBili  x   /  AST  26  /  ALT  27  /  AlkPhos  102  07-17              Lactate Trend    CARDIAC MARKERS ( 17 Jul 2019 17:13 )  x     / 0.05 ng/mL / x     / x     / x          CAPILLARY BLOOD GLUCOSE

## 2019-07-17 NOTE — ED PROVIDER NOTE - PMH
BPH (benign prostatic hyperplasia)    Diabetes mellitus, type 2    Essential hypertension    Intubation of airway performed without difficulty    Pneumonia    Sepsis    Water retention

## 2019-07-17 NOTE — ED PROVIDER NOTE - ATTENDING CONTRIBUTION TO CARE
I personally evaluated the patient. I reviewed the Resident’s or Physician Assistant’s note (as assigned above), and agree with the findings and plan except as documented in my note.    Pt is a 63 y/o male with hx of DM, DLD, HTN, CAD s/p CABG, afib on xarelto, CHF on lasix 80mg, presents to ED for worsening SOB for the past 2 days. Sx's are mild, constant, worse with ambulation, cough. + chills, subjective fever, congestion, body aches. No chest pain, new abd pain (pt has chronic pain from pancreatic surgery), vomiting, diarrhea. PT states went to [facility] yesterday, had negative strep and flu test, sent home. PT states had PNA in past, feels similar.    Constitutional: Well developed, well nourished. NAD.  Head: Normocephalic, atraumatic.  Eyes: PERRL. EOMI.  ENT: No nasal discharge. Mucous membranes moist.  Neck: Supple. Painless ROM.  Cardiovascular: Normal S1, S2. Irregularly irregular, tachycardic. No murmurs, rubs, or gallops.  Pulmonary: Normal respiratory rate and effort. Bibasilar rales.  Abdominal: Soft. Nondistended. Nontender. No rebound, guarding, rigidity.  Extremities. Pelvis stable. No lower extremity baltazar. R calf > L calf, chronic per patient.  Skin: No rashes, cyanosis.  Neuro: AAOx3. No focal neurological deficits.  Psych: Normal mood. Normal affect.

## 2019-07-17 NOTE — ED PROVIDER NOTE - CLINICAL SUMMARY MEDICAL DECISION MAKING FREE TEXT BOX
65 y/o male with extensive cardiac history presented to ED for dyspnea on exertion, cough, chills. Labs and imaging reviewed. Pt with trop of 0.05. Discussed with Dr. Gomez who evaluated pt. Will admit to low risk tele for further medical management.

## 2019-07-17 NOTE — H&P ADULT - NSICDXFAMILYHX_GEN_ALL_CORE_FT
FAMILY HISTORY:  Father  Still living? Unknown  Family history of coronary artery disease, Age at diagnosis: Age Unknown    Mother  Still living? Unknown  Family history of diabetes mellitus, Age at diagnosis: Age Unknown    Sibling  Still living? Unknown  Family history of lung cancer, Age at diagnosis: Age Unknown

## 2019-07-17 NOTE — PHARMACOTHERAPY INTERVENTION NOTE - COMMENTS
IR oxybutynin ordered to be administered once daily. Recommend changing order to Oxybutynin XL for once daily administration. IR oxybutynin ordered to be administered once daily. Recommend order be changed to oxybutynin 5mg po twice daily.

## 2019-07-17 NOTE — H&P ADULT - HISTORY OF PRESENT ILLNESS
Pt is a 63 y/o male with hx of DM, DLD, HTN, CAD s/p CABG, afib on xarelto, CHF on lasix 80mg, presents to ED for worsening SOB for the past 2 days. Sx's are mild, constant, worse with ambulation, cough. + chills, subjective fever, congestion, body aches. No chest pain, new abd pain (pt has chronic pain from pancreatic surgery), vomiting, diarrhea. PT [facility], had negative strep and flu test, sent home. PT states had PNA in past, feels similar. Pt is a 63 y/o male with hx of DM, DLD, HTN, CAD s/p CABG, afib on xarelto, CHF on lasix 80mg, presents to ED for worsening SOB for the past 2 days. Sx's are mild, constant, worse with ambulation, cough. + chills, subjective fever, congestion, body aches. No chest pain, new abd pain (pt has chronic pain from pancreatic surgery), vomiting, diarrhea. PT states went to [facility] [place] [facility] yesterday, had negative strep and flu test, sent home. PT states had PNA in past, feels similar.          High fever, hemoptysis,----n/a  but   has worsening of  swelling of legs present

## 2019-07-17 NOTE — ED PROVIDER NOTE - CARE PLAN
Principal Discharge DX:	Dyspnea on exertion  Secondary Diagnosis:	Elevated troponin  Secondary Diagnosis:	Cough

## 2019-07-17 NOTE — H&P ADULT - NSHPREVIEWOFSYSTEMS_GEN_ALL_CORE
REVIEW OF SYSTEMS:      CONSTITUTIONAL:        wt gain/  loss---n/a , high fever---n/a  EYES/ENT:                       No chronic infection  NECK:                           No mass lesions  RESPIRATORY:                 non productive cough +, hemoptysis---n/a  CARDIOVASCULAR:       presebnting symptom  [ sob/cough especially on  exertion  GASTROINTESTINAL:       Dm +  GENITOURINARY:         bph +  NEUROLOGICAL:          No h/o cva/  seizures  SKIN:                             Nil significant   contributary

## 2019-07-18 LAB
ANION GAP SERPL CALC-SCNC: 13 MMOL/L — SIGNIFICANT CHANGE UP (ref 7–14)
BUN SERPL-MCNC: 27 MG/DL — HIGH (ref 10–20)
CALCIUM SERPL-MCNC: 8.4 MG/DL — LOW (ref 8.5–10.1)
CHLORIDE SERPL-SCNC: 99 MMOL/L — SIGNIFICANT CHANGE UP (ref 98–110)
CK MB CFR SERPL CALC: 5.8 NG/ML — SIGNIFICANT CHANGE UP (ref 0.6–6.3)
CO2 SERPL-SCNC: 24 MMOL/L — SIGNIFICANT CHANGE UP (ref 17–32)
CREAT SERPL-MCNC: 1.2 MG/DL — SIGNIFICANT CHANGE UP (ref 0.7–1.5)
GLUCOSE BLDC GLUCOMTR-MCNC: 150 MG/DL — HIGH (ref 70–99)
GLUCOSE BLDC GLUCOMTR-MCNC: 251 MG/DL — HIGH (ref 70–99)
GLUCOSE BLDC GLUCOMTR-MCNC: 255 MG/DL — HIGH (ref 70–99)
GLUCOSE BLDC GLUCOMTR-MCNC: 323 MG/DL — HIGH (ref 70–99)
GLUCOSE SERPL-MCNC: 157 MG/DL — HIGH (ref 70–99)
HCT VFR BLD CALC: 44 % — SIGNIFICANT CHANGE UP (ref 42–52)
HCV AB S/CO SERPL IA: 0.08 S/CO — SIGNIFICANT CHANGE UP (ref 0–0.99)
HCV AB SERPL-IMP: SIGNIFICANT CHANGE UP
HGB BLD-MCNC: 14.4 G/DL — SIGNIFICANT CHANGE UP (ref 14–18)
INR BLD: 1.33 RATIO — HIGH (ref 0.65–1.3)
MCHC RBC-ENTMCNC: 30.5 PG — SIGNIFICANT CHANGE UP (ref 27–31)
MCHC RBC-ENTMCNC: 32.7 G/DL — SIGNIFICANT CHANGE UP (ref 32–37)
MCV RBC AUTO: 93.2 FL — SIGNIFICANT CHANGE UP (ref 80–94)
NRBC # BLD: 0 /100 WBCS — SIGNIFICANT CHANGE UP (ref 0–0)
PLATELET # BLD AUTO: 393 K/UL — SIGNIFICANT CHANGE UP (ref 130–400)
POTASSIUM SERPL-MCNC: 4.4 MMOL/L — SIGNIFICANT CHANGE UP (ref 3.5–5)
POTASSIUM SERPL-SCNC: 4.4 MMOL/L — SIGNIFICANT CHANGE UP (ref 3.5–5)
PROTHROM AB SERPL-ACNC: 15.3 SEC — HIGH (ref 9.95–12.87)
RAPID RVP RESULT: DETECTED
RBC # BLD: 4.72 M/UL — SIGNIFICANT CHANGE UP (ref 4.7–6.1)
RBC # FLD: 16.2 % — HIGH (ref 11.5–14.5)
RV+EV RNA SPEC QL NAA+PROBE: DETECTED
SODIUM SERPL-SCNC: 136 MMOL/L — SIGNIFICANT CHANGE UP (ref 135–146)
TROPONIN T SERPL-MCNC: 0.03 NG/ML — CRITICAL HIGH
TROPONIN T SERPL-MCNC: 0.05 NG/ML — CRITICAL HIGH
TROPONIN T SERPL-MCNC: 0.06 NG/ML — CRITICAL HIGH
WBC # BLD: 11.75 K/UL — HIGH (ref 4.8–10.8)
WBC # FLD AUTO: 11.75 K/UL — HIGH (ref 4.8–10.8)

## 2019-07-18 PROCEDURE — 99233 SBSQ HOSP IP/OBS HIGH 50: CPT

## 2019-07-18 RX ORDER — SODIUM CHLORIDE 9 MG/ML
1000 INJECTION, SOLUTION INTRAVENOUS
Refills: 0 | Status: DISCONTINUED | OUTPATIENT
Start: 2019-07-18 | End: 2019-07-19

## 2019-07-18 RX ORDER — DEXTROSE 50 % IN WATER 50 %
12.5 SYRINGE (ML) INTRAVENOUS ONCE
Refills: 0 | Status: DISCONTINUED | OUTPATIENT
Start: 2019-07-18 | End: 2019-07-19

## 2019-07-18 RX ORDER — DEXTROSE 50 % IN WATER 50 %
25 SYRINGE (ML) INTRAVENOUS ONCE
Refills: 0 | Status: DISCONTINUED | OUTPATIENT
Start: 2019-07-18 | End: 2019-07-19

## 2019-07-18 RX ORDER — OXYBUTYNIN CHLORIDE 5 MG
10 TABLET ORAL DAILY
Refills: 0 | Status: DISCONTINUED | OUTPATIENT
Start: 2019-07-18 | End: 2019-07-18

## 2019-07-18 RX ORDER — DEXTROSE 50 % IN WATER 50 %
15 SYRINGE (ML) INTRAVENOUS ONCE
Refills: 0 | Status: DISCONTINUED | OUTPATIENT
Start: 2019-07-18 | End: 2019-07-19

## 2019-07-18 RX ORDER — GLUCAGON INJECTION, SOLUTION 0.5 MG/.1ML
1 INJECTION, SOLUTION SUBCUTANEOUS ONCE
Refills: 0 | Status: DISCONTINUED | OUTPATIENT
Start: 2019-07-18 | End: 2019-07-19

## 2019-07-18 RX ORDER — INSULIN LISPRO 100/ML
25 VIAL (ML) SUBCUTANEOUS ONCE
Refills: 0 | Status: COMPLETED | OUTPATIENT
Start: 2019-07-18 | End: 2019-07-18

## 2019-07-18 RX ORDER — ACETAMINOPHEN 500 MG
650 TABLET ORAL EVERY 6 HOURS
Refills: 0 | Status: DISCONTINUED | OUTPATIENT
Start: 2019-07-18 | End: 2019-07-19

## 2019-07-18 RX ORDER — OXYBUTYNIN CHLORIDE 5 MG
5 TABLET ORAL
Refills: 0 | Status: DISCONTINUED | OUTPATIENT
Start: 2019-07-18 | End: 2019-07-19

## 2019-07-18 RX ORDER — INSULIN LISPRO 100/ML
VIAL (ML) SUBCUTANEOUS
Refills: 0 | Status: DISCONTINUED | OUTPATIENT
Start: 2019-07-18 | End: 2019-07-19

## 2019-07-18 RX ORDER — INSULIN LISPRO 100/ML
25 VIAL (ML) SUBCUTANEOUS
Refills: 0 | Status: DISCONTINUED | OUTPATIENT
Start: 2019-07-18 | End: 2019-07-19

## 2019-07-18 RX ADMIN — LISINOPRIL 20 MILLIGRAM(S): 2.5 TABLET ORAL at 08:28

## 2019-07-18 RX ADMIN — TAMSULOSIN HYDROCHLORIDE 0.4 MILLIGRAM(S): 0.4 CAPSULE ORAL at 21:51

## 2019-07-18 RX ADMIN — RIVAROXABAN 15 MILLIGRAM(S): KIT at 18:10

## 2019-07-18 RX ADMIN — Medication 25 UNIT(S): at 12:41

## 2019-07-18 RX ADMIN — LISINOPRIL 20 MILLIGRAM(S): 2.5 TABLET ORAL at 18:09

## 2019-07-18 RX ADMIN — PANTOPRAZOLE SODIUM 40 MILLIGRAM(S): 20 TABLET, DELAYED RELEASE ORAL at 05:04

## 2019-07-18 RX ADMIN — Medication 25 MILLIGRAM(S): at 18:10

## 2019-07-18 RX ADMIN — Medication 25 UNIT(S): at 16:56

## 2019-07-18 RX ADMIN — SPIRONOLACTONE 25 MILLIGRAM(S): 25 TABLET, FILM COATED ORAL at 05:04

## 2019-07-18 RX ADMIN — Medication 80 MILLIGRAM(S): at 05:04

## 2019-07-18 RX ADMIN — INSULIN GLARGINE 60 UNIT(S): 100 INJECTION, SOLUTION SUBCUTANEOUS at 21:52

## 2019-07-18 RX ADMIN — Medication 6: at 16:56

## 2019-07-18 RX ADMIN — Medication 10 MILLIEQUIVALENT(S): at 12:01

## 2019-07-18 RX ADMIN — Medication 5 MILLIGRAM(S): at 18:10

## 2019-07-18 RX ADMIN — INSULIN GLARGINE 80 UNIT(S): 100 INJECTION, SOLUTION SUBCUTANEOUS at 08:27

## 2019-07-18 RX ADMIN — Medication 40 MILLIGRAM(S): at 12:01

## 2019-07-18 RX ADMIN — Medication 325 MILLIGRAM(S): at 12:02

## 2019-07-18 RX ADMIN — Medication 25 MILLIGRAM(S): at 05:04

## 2019-07-18 NOTE — CONSULT NOTE ADULT - ASSESSMENT
Patient with above history. Now denies  chest pain. Reviewed enzymes doubt. RX pulmonary > Repeat trop cpk mb. Consider echo. Consider out patient stress in 4- 6 weeks. Continue meds

## 2019-07-18 NOTE — CONSULT NOTE ADULT - SUBJECTIVE AND OBJECTIVE BOX
CARDIOLOGY CONSULT NOTE     CHIEF COMPLAINT/REASON FOR CONSULT:    HPI:  Pt is a 65 y/o male with hx of DM, DLD, HTN, CAD s/p CABG, afib on xarelto, CHF on lasix 80mg, presents to ED for worsening SOB for the past 2 days. Sx's are mild, constant, worse with ambulation, cough. + chills, subjective fever, congestion, body aches. No chest pain, new abd pain (pt has chronic pain from pancreatic surgery), vomiting, diarrhea. PT states went to [facility] [place] [facility] yesterday, had negative strep and flu test, sent home. PT states had PNA in past, feels similar.          High fever, hemoptysis,----n/a  but   has worsening of  swelling of legs present (17 Jul 2019 19:01)      PAST MEDICAL & SURGICAL HISTORY:  Essential hypertension  Water retention  BPH (benign prostatic hyperplasia)  Diabetes mellitus, type 2  Intubation of airway performed without difficulty  Pneumonia  Sepsis  History of cholecystectomy  History of resection of pancreas  H/O right coronary artery stent placement      Cardiac Risks:   [ x]HTN, [x ] DM, [ ] Smoking, [x ] FH,  [x ] Lipids        MEDICATIONS:  MEDICATIONS  (STANDING):  aspirin enteric coated 325 milliGRAM(s) Oral daily  furosemide    Tablet 80 milliGRAM(s) Oral daily  insulin glargine Injectable (LANTUS) 80 Unit(s) SubCutaneous every morning  insulin glargine Injectable (LANTUS) 60 Unit(s) SubCutaneous at bedtime  lisinopril 20 milliGRAM(s) Oral two times a day  metoprolol tartrate 25 milliGRAM(s) Oral two times a day  oxybutynin 5 milliGRAM(s) Oral two times a day  pantoprazole    Tablet 40 milliGRAM(s) Oral before breakfast  potassium chloride    Tablet ER 10 milliEquivalent(s) Oral daily  predniSONE   Tablet 40 milliGRAM(s) Oral daily  rivaroxaban 15 milliGRAM(s) Oral with dinner  spironolactone 25 milliGRAM(s) Oral daily  tamsulosin 0.4 milliGRAM(s) Oral at bedtime      FAMILY HISTORY:  Family history of coronary artery disease (Father)  Family history of diabetes mellitus (Mother)  Family history of lung cancer (Sibling)      SOCIAL HISTORY:      [ ] Marital status    Allergies    No Known Allergies      	    REVIEW OF SYSTEMS:  CONSTITUTIONAL: No fever, weight loss, or fatigue  EYES: No eye pain, visual disturbances, or discharge  ENMT:  No difficulty hearing, tinnitus, vertigo; No sinus or throat pain  NECK: No pain or stiffness  RESPIRATORY: See above  CARDIOVASCULAR: No chest pain, palpitations, passing out, dizziness, or leg swelling  GASTROINTESTINAL: No abdominal or epigastric pain. No nausea, vomiting, or hematemesis; No diarrhea or constipation. No melena or hematochezia.  GENITOURINARY: No dysuria, frequency, hematuria, or incontinence  NEUROLOGICAL: No headaches, memory loss, loss of strength, numbness, or tremors  SKIN: No itching, burning, rashes, or lesions       PHYSICAL EXAM:   T(C): 37.4 (07-18-19 @ 06:46), Max: 37.7 (07-17-19 @ 17:00)  HR: 89 (07-18-19 @ 06:46) (89 - 112)  BP: 173/77 (07-18-19 @ 06:46) (126/66 - 173/77)  RR: 16 (07-18-19 @ 06:46) (16 - 18)  SpO2: 95% (07-17-19 @ 19:15) (95% - 96%)  Wt(kg): --  I&O's Summary      Appearance: Obese	  Psychiatry: A & O x 3, Mood & affect appropriate  HEENT:   Normal oral mucosa, PERRL, EOMI	  Lymphatic: No lymphadenopathy  Cardiovascular: Normal S1 S2,RRR, No JVD, No murmurs  Respiratory: Lungs clear to auscultation	  Gastrointestinal:  Soft, Non-tender, + BS	  Skin: No rashes, No ecchymoses, No cyanosis	  Neurologic: Non-focal  Extremities: Normal range of motion, No clubbing, cyanosis or edema  Vascular: Peripheral pulses palpable 2+ bilaterally      ECG:  	< from: 12 Lead ECG (07.15.18 @ 14:04) >  Diagnosis Line Normal sinus rhythm  Incomplete right bundle branch block  Borderline ECG    Confirmed by SYEDA CHOWDARY MD ([id number]) on 7/16/2018 10:22:43 AM    < end of copied text >      	  LABS:	 	    CARDIAC MARKERS:          Serum Pro-Brain Natriuretic Peptide: 349 pg/mL (07-17 @ 17:13)                            14.4   11.75 )-----------( 393      ( 18 Jul 2019 07:22 )             44.0     07-18    136  |  99  |  27<H>  ----------------------------<  157<H>  4.4   |  24  |  1.2    Ca    8.4<L>      18 Jul 2019 07:22    TPro  6.9  /  Alb  3.9  /  TBili  0.3  /  DBili  x   /  AST  26  /  ALT  27  /  AlkPhos  102  07-17    PT/INR - ( 18 Jul 2019 07:22 )   PT: 15.30 sec;   INR: 1.33 ratio           proBNP: Serum Pro-Brain Natriuretic Peptide: 349 pg/mL (07-17 @ 17:13)

## 2019-07-18 NOTE — PROGRESS NOTE ADULT - SUBJECTIVE AND OBJECTIVE BOX
Progress Note:  Provider Speciality                            Hospitalist      RACHELLEMORGAN MRN-[id number] 64y Male     CHIEF PRESENTING COMPLAINT:  Patient is a 64y old  Male who presents with a chief complaint of SOb (17 Jul 2019 18:40)        SUBJECTIVE:  Patient was seen and examined at bedside.   reports worsening LOGAN/ denies CP    No significant overnight events reported.     HISTORY OF PRESENTING ILLNESS:  HPI:  Pt is a 65 y/o male with hx of DM, DLD, HTN, CAD s/p CABG, afib on xarelto, CHF on lasix 80mg, presents to ED for worsening SOB for the past 2 days. Sx's are mild, constant, worse with ambulation, cough. + chills, subjective fever, congestion, body aches. No chest pain, new abd pain (pt has chronic pain from pancreatic surgery), vomiting, diarrhea. PT states went to [facility] yesterday, had negative strep and flu test, sent home. PT states had PNA in past, feels similar.          High fever, hemoptysis,----n/a  but   has worsening of  swelling of legs present (17 Jul 2019 19:01)      PAST MEDICAL & SURGICAL HISTORY:  PAST MEDICAL & SURGICAL HISTORY:  Essential hypertension  Water retention  BPH (benign prostatic hyperplasia)  Diabetes mellitus, type 2  Intubation of airway performed without difficulty  Pneumonia  Sepsis  History of cholecystectomy  History of resection of pancreas  H/O right coronary artery stent placement      VITAL SIGNS:  Vital Signs Last 24 Hrs  T(C): 37.2 (18 Jul 2019 13:40), Max: 37.7 (17 Jul 2019 17:00)  T(F): 99 (18 Jul 2019 13:40), Max: 99.8 (17 Jul 2019 17:00)  HR: 87 (18 Jul 2019 13:40) (87 - 112)  BP: 176/78 (18 Jul 2019 13:40) (126/66 - 176/78)  BP(mean): --  RR: 16 (18 Jul 2019 13:40) (16 - 18)  SpO2: 95% (17 Jul 2019 19:15) (95% - 96%)    PHYSICAL EXAMINATION:    General: AAOx3   , not in acute distress  HEENT:  LEXI, EOMI  Heart: S1+S2 audible, no murmur  Lungs: bilateral  fair air entry, no wheezing, no crepitations.  Abdomen: Soft, non-tender, non-distended   CNS: AAOx 3 , no focal deficits.  Extremities:  No edema          REVIEW OF SYSTEMS:    At least 10 systems were reviewed in ROS. All systems reviewed  are within normal limits except for the complaints as described in Subjective.    CONSULTS:  Consultant(s) Notes Reviewed by me.   Care Discussed with Consultants/Other Providers where required.        MEDICATIONS:  MEDICATIONS  (STANDING):  aspirin enteric coated 325 milliGRAM(s) Oral daily  dextrose 5%. 1000 milliLiter(s) (50 mL/Hr) IV Continuous <Continuous>  dextrose 50% Injectable 12.5 Gram(s) IV Push once  dextrose 50% Injectable 25 Gram(s) IV Push once  dextrose 50% Injectable 25 Gram(s) IV Push once  furosemide    Tablet 80 milliGRAM(s) Oral daily  insulin glargine Injectable (LANTUS) 80 Unit(s) SubCutaneous every morning  insulin glargine Injectable (LANTUS) 60 Unit(s) SubCutaneous at bedtime  insulin lispro (HumaLOG) corrective regimen sliding scale   SubCutaneous three times a day before meals  insulin lispro Injectable (HumaLOG) 25 Unit(s) SubCutaneous three times a day before meals  lisinopril 20 milliGRAM(s) Oral two times a day  metoprolol tartrate 25 milliGRAM(s) Oral two times a day  oxybutynin 5 milliGRAM(s) Oral two times a day  pantoprazole    Tablet 40 milliGRAM(s) Oral before breakfast  potassium chloride    Tablet ER 10 milliEquivalent(s) Oral daily  predniSONE   Tablet 40 milliGRAM(s) Oral daily  rivaroxaban 15 milliGRAM(s) Oral with dinner  spironolactone 25 milliGRAM(s) Oral daily  tamsulosin 0.4 milliGRAM(s) Oral at bedtime    MEDICATIONS  (PRN):  acetaminophen   Tablet .. 650 milliGRAM(s) Oral every 6 hours PRN Temp greater or equal to 38C (100.4F), Moderate Pain (4 - 6)  dextrose 40% Gel 15 Gram(s) Oral once PRN Blood Glucose LESS THAN 70 milliGRAM(s)/deciliter  glucagon  Injectable 1 milliGRAM(s) IntraMuscular once PRN Glucose LESS THAN 70 milligrams/deciliter      LABOROTORY DATA/MICROBIOLOGY/I & O's:                        14.4   11.75 )-----------( 393      ( 18 Jul 2019 07:22 )             44.0     07-18    136  |  99  |  27<H>  ----------------------------<  157<H>  4.4   |  24  |  1.2    Ca    8.4<L>      18 Jul 2019 07:22    TPro  6.9  /  Alb  3.9  /  TBili  0.3  /  DBili  x   /  AST  26  /  ALT  27  /  AlkPhos  102  07-17    PT/INR - ( 18 Jul 2019 07:22 )   PT: 15.30 sec;   INR: 1.33 ratio             CAPILLARY BLOOD GLUCOSE      POCT Blood Glucose.: 251 mg/dL (18 Jul 2019 11:30)  POCT Blood Glucose.: 150 mg/dL (18 Jul 2019 07:16)  POCT Blood Glucose.: 120 mg/dL (17 Jul 2019 21:21)                        ASSESSMENT:    Pt is a 65 y/o male with hx of DM, DLD, HTN, CAD s/p CABG, afib on xarelto, CHF on lasix 80mg, presents to ED for worsening SOB for the past 2 days.    LOGAN possibly       #Progress Note Handoff  Pending :    Disposition:    Discussion: Progress Note:  Provider Speciality                            Hospitalist      RACHELLEMORGAN [id number] 64y Male     CHIEF PRESENTING COMPLAINT:  Patient is a 64y old  Male who presents with a chief complaint of SOb (17 Jul 2019 18:40)        SUBJECTIVE:  Patient was seen and examined at bedside.   reports worsening LOGAN/ denies CP    No significant overnight events reported.     HISTORY OF PRESENTING ILLNESS:  HPI:  Pt is a 65 y/o male with hx of DM, DLD, HTN, CAD s/p CABG, afib on xarelto, CHF on lasix 80mg, presents to ED for worsening SOB for the past 2 days. Sx's are mild, constant, worse with ambulation, cough. + chills, subjective fever, congestion, body aches. No chest pain, new abd pain (pt has chronic pain from pancreatic surgery), vomiting, diarrhea. PT states went to [facility] yesterday, had negative strep and flu test, sent home. PT states had PNA in past, feels similar.          High fever, hemoptysis,----n/a  but   has worsening of  swelling of legs present (17 Jul 2019 19:01)      PAST MEDICAL & SURGICAL HISTORY:  PAST MEDICAL & SURGICAL HISTORY:  Essential hypertension  Water retention  BPH (benign prostatic hyperplasia)  Diabetes mellitus, type 2  Intubation of airway performed without difficulty  Pneumonia  Sepsis  History of cholecystectomy  History of resection of pancreas  H/O right coronary artery stent placement      VITAL SIGNS:  Vital Signs Last 24 Hrs  T(C): 37.2 (18 Jul 2019 13:40), Max: 37.7 (17 Jul 2019 17:00)  T(F): 99 (18 Jul 2019 13:40), Max: 99.8 (17 Jul 2019 17:00)  HR: 87 (18 Jul 2019 13:40) (87 - 112)  BP: 176/78 (18 Jul 2019 13:40) (126/66 - 176/78)  BP(mean): --  RR: 16 (18 Jul 2019 13:40) (16 - 18)  SpO2: 95% (17 Jul 2019 19:15) (95% - 96%)    PHYSICAL EXAMINATION:    General: AAOx3   , not in acute distress  HEENT:  LEXI, EOMI  Heart: S1+S2 audible, no murmur  Lungs: bilateral  fair air entry, no wheezing, no crepitations.  Abdomen: Soft, non-tender, non-distended   CNS: AAOx 3 , no focal deficits.  Extremities:  No edema          REVIEW OF SYSTEMS:    At least 10 systems were reviewed in ROS. All systems reviewed  are within normal limits except for the complaints as described in Subjective.    CONSULTS:  Consultant(s) Notes Reviewed by me.   Care Discussed with Consultants/Other Providers where required.        MEDICATIONS:  MEDICATIONS  (STANDING):  aspirin enteric coated 325 milliGRAM(s) Oral daily  dextrose 5%. 1000 milliLiter(s) (50 mL/Hr) IV Continuous <Continuous>  dextrose 50% Injectable 12.5 Gram(s) IV Push once  dextrose 50% Injectable 25 Gram(s) IV Push once  dextrose 50% Injectable 25 Gram(s) IV Push once  furosemide    Tablet 80 milliGRAM(s) Oral daily  insulin glargine Injectable (LANTUS) 80 Unit(s) SubCutaneous every morning  insulin glargine Injectable (LANTUS) 60 Unit(s) SubCutaneous at bedtime  insulin lispro (HumaLOG) corrective regimen sliding scale   SubCutaneous three times a day before meals  insulin lispro Injectable (HumaLOG) 25 Unit(s) SubCutaneous three times a day before meals  lisinopril 20 milliGRAM(s) Oral two times a day  metoprolol tartrate 25 milliGRAM(s) Oral two times a day  oxybutynin 5 milliGRAM(s) Oral two times a day  pantoprazole    Tablet 40 milliGRAM(s) Oral before breakfast  potassium chloride    Tablet ER 10 milliEquivalent(s) Oral daily  predniSONE   Tablet 40 milliGRAM(s) Oral daily  rivaroxaban 15 milliGRAM(s) Oral with dinner  spironolactone 25 milliGRAM(s) Oral daily  tamsulosin 0.4 milliGRAM(s) Oral at bedtime    MEDICATIONS  (PRN):  acetaminophen   Tablet .. 650 milliGRAM(s) Oral every 6 hours PRN Temp greater or equal to 38C (100.4F), Moderate Pain (4 - 6)  dextrose 40% Gel 15 Gram(s) Oral once PRN Blood Glucose LESS THAN 70 milliGRAM(s)/deciliter  glucagon  Injectable 1 milliGRAM(s) IntraMuscular once PRN Glucose LESS THAN 70 milligrams/deciliter      LABOROTORY DATA/MICROBIOLOGY/I & O's:                        14.4   11.75 )-----------( 393      ( 18 Jul 2019 07:22 )             44.0     07-18    136  |  99  |  27<H>  ----------------------------<  157<H>  4.4   |  24  |  1.2    Ca    8.4<L>      18 Jul 2019 07:22    TPro  6.9  /  Alb  3.9  /  TBili  0.3  /  DBili  x   /  AST  26  /  ALT  27  /  AlkPhos  102  07-17    PT/INR - ( 18 Jul 2019 07:22 )   PT: 15.30 sec;   INR: 1.33 ratio             CAPILLARY BLOOD GLUCOSE      POCT Blood Glucose.: 251 mg/dL (18 Jul 2019 11:30)  POCT Blood Glucose.: 150 mg/dL (18 Jul 2019 07:16)  POCT Blood Glucose.: 120 mg/dL (17 Jul 2019 21:21)          ASSESSMENT:    Pt is a 65 y/o male with hx of DM, DLD, HTN, CAD s/p CABG, afib on xarelto, CHF on lasix 80mg, presents to ED for worsening SOB for the past 2 days.    LOGAN possibly URI/bronchitis /rule out ACS with h/o CAD sp CABG  DM  HTN  HLD  A-fib- rate controlled   CHF possibly diastolic     Plan:   trops trending up / took off tele in the morning/ monitor repeat trop from 3pm if persistently trending up/ would need cardiac monitor to be placed back until trops trend down   c/w ASA high dose/on xarelto for AC for A-fib   c/w oral prednisone for 3 days as suggested by pulm  monitor for fever spikes/ monitor off ABX for now   c/w lopressor /ACEI/ aldactone   TTE noted: mild LVH / LVSF NL with EF>55%   cardio consult appreciated- suggest to trend CE's and OP stress test   FSG monitoring and ISS for coverage  c/w home meds for HTN  need to clarify why patient not on statin  c/w oral lasix    DVT ppx : on xarelto         #Progress Note Handoff  Pending : trend down of trops    Disposition:  home possibly by tomorrow   Discussion: patient - satisfied

## 2019-07-18 NOTE — CHART NOTE - NSCHARTNOTEFT_GEN_A_CORE
Upon Nutritional Assessment by the Registered Dietitian your patient was determined to meet criteria / has evidence of the following diagnosis/diagnoses:          [x] Morbid Obesity / BMI > 40      Findings as based on:  •  Anthropometric data    Ht: 182.88 cm.  Wt: 141.6 kg.  BMI: 42.3      PROVIDER Section:     By signing this assessment you are acknowledging and agree with the diagnosis/diagnoses assigned by the Registered Dietitian    Comments:

## 2019-07-19 ENCOUNTER — TRANSCRIPTION ENCOUNTER (OUTPATIENT)
Age: 64
End: 2019-07-19

## 2019-07-19 ENCOUNTER — RX RENEWAL (OUTPATIENT)
Age: 64
End: 2019-07-19

## 2019-07-19 VITALS
HEART RATE: 71 BPM | RESPIRATION RATE: 16 BRPM | DIASTOLIC BLOOD PRESSURE: 77 MMHG | SYSTOLIC BLOOD PRESSURE: 163 MMHG | TEMPERATURE: 97 F

## 2019-07-19 LAB
ANION GAP SERPL CALC-SCNC: 11 MMOL/L — SIGNIFICANT CHANGE UP (ref 7–14)
BASOPHILS # BLD AUTO: 0.03 K/UL — SIGNIFICANT CHANGE UP (ref 0–0.2)
BASOPHILS NFR BLD AUTO: 0.3 % — SIGNIFICANT CHANGE UP (ref 0–1)
BUN SERPL-MCNC: 32 MG/DL — HIGH (ref 10–20)
CALCIUM SERPL-MCNC: 8.5 MG/DL — SIGNIFICANT CHANGE UP (ref 8.5–10.1)
CHLORIDE SERPL-SCNC: 100 MMOL/L — SIGNIFICANT CHANGE UP (ref 98–110)
CK MB CFR SERPL CALC: 5.8 NG/ML — SIGNIFICANT CHANGE UP (ref 0.6–6.3)
CO2 SERPL-SCNC: 24 MMOL/L — SIGNIFICANT CHANGE UP (ref 17–32)
CREAT SERPL-MCNC: 1.2 MG/DL — SIGNIFICANT CHANGE UP (ref 0.7–1.5)
EOSINOPHIL # BLD AUTO: 0.01 K/UL — SIGNIFICANT CHANGE UP (ref 0–0.7)
EOSINOPHIL NFR BLD AUTO: 0.1 % — SIGNIFICANT CHANGE UP (ref 0–8)
ESTIMATED AVERAGE GLUCOSE: 246 MG/DL — HIGH (ref 68–114)
GLUCOSE BLDC GLUCOMTR-MCNC: 264 MG/DL — HIGH (ref 70–99)
GLUCOSE SERPL-MCNC: 307 MG/DL — HIGH (ref 70–99)
HBA1C BLD-MCNC: 10.2 % — HIGH (ref 4–5.6)
HCT VFR BLD CALC: 43.3 % — SIGNIFICANT CHANGE UP (ref 42–52)
HGB BLD-MCNC: 14.2 G/DL — SIGNIFICANT CHANGE UP (ref 14–18)
IMM GRANULOCYTES NFR BLD AUTO: 0.4 % — HIGH (ref 0.1–0.3)
LYMPHOCYTES # BLD AUTO: 1.63 K/UL — SIGNIFICANT CHANGE UP (ref 1.2–3.4)
LYMPHOCYTES # BLD AUTO: 17.3 % — LOW (ref 20.5–51.1)
MCHC RBC-ENTMCNC: 30.3 PG — SIGNIFICANT CHANGE UP (ref 27–31)
MCHC RBC-ENTMCNC: 32.8 G/DL — SIGNIFICANT CHANGE UP (ref 32–37)
MCV RBC AUTO: 92.5 FL — SIGNIFICANT CHANGE UP (ref 80–94)
MONOCYTES # BLD AUTO: 1.13 K/UL — HIGH (ref 0.1–0.6)
MONOCYTES NFR BLD AUTO: 12 % — HIGH (ref 1.7–9.3)
NEUTROPHILS # BLD AUTO: 6.57 K/UL — HIGH (ref 1.4–6.5)
NEUTROPHILS NFR BLD AUTO: 69.9 % — SIGNIFICANT CHANGE UP (ref 42.2–75.2)
NRBC # BLD: 0 /100 WBCS — SIGNIFICANT CHANGE UP (ref 0–0)
PLATELET # BLD AUTO: 386 K/UL — SIGNIFICANT CHANGE UP (ref 130–400)
POTASSIUM SERPL-MCNC: 4.6 MMOL/L — SIGNIFICANT CHANGE UP (ref 3.5–5)
POTASSIUM SERPL-SCNC: 4.6 MMOL/L — SIGNIFICANT CHANGE UP (ref 3.5–5)
RBC # BLD: 4.68 M/UL — LOW (ref 4.7–6.1)
RBC # FLD: 15.9 % — HIGH (ref 11.5–14.5)
SODIUM SERPL-SCNC: 135 MMOL/L — SIGNIFICANT CHANGE UP (ref 135–146)
TROPONIN T SERPL-MCNC: 0.02 NG/ML — HIGH
WBC # BLD: 9.41 K/UL — SIGNIFICANT CHANGE UP (ref 4.8–10.8)
WBC # FLD AUTO: 9.41 K/UL — SIGNIFICANT CHANGE UP (ref 4.8–10.8)

## 2019-07-19 PROCEDURE — 99239 HOSP IP/OBS DSCHRG MGMT >30: CPT

## 2019-07-19 RX ORDER — TAMSULOSIN HYDROCHLORIDE 0.4 MG/1
0 CAPSULE ORAL
Qty: 0 | Refills: 0 | DISCHARGE

## 2019-07-19 RX ORDER — METOPROLOL TARTRATE 50 MG
1 TABLET ORAL
Qty: 0 | Refills: 0 | DISCHARGE
Start: 2019-07-19

## 2019-07-19 RX ORDER — TAMSULOSIN HYDROCHLORIDE 0.4 MG/1
1 CAPSULE ORAL
Qty: 0 | Refills: 0 | DISCHARGE
Start: 2019-07-19

## 2019-07-19 RX ORDER — PANTOPRAZOLE SODIUM 20 MG/1
1 TABLET, DELAYED RELEASE ORAL
Qty: 3 | Refills: 0
Start: 2019-07-19 | End: 2019-07-21

## 2019-07-19 RX ORDER — LISINOPRIL 2.5 MG/1
20 TABLET ORAL
Qty: 0 | Refills: 0 | DISCHARGE

## 2019-07-19 RX ORDER — LISINOPRIL 2.5 MG/1
1 TABLET ORAL
Qty: 0 | Refills: 0 | DISCHARGE
Start: 2019-07-19

## 2019-07-19 RX ORDER — ACETAMINOPHEN 500 MG
2 TABLET ORAL
Qty: 56 | Refills: 0
Start: 2019-07-19 | End: 2019-07-25

## 2019-07-19 RX ORDER — METOPROLOL TARTRATE 50 MG
25 TABLET ORAL
Qty: 0 | Refills: 0 | DISCHARGE

## 2019-07-19 RX ADMIN — Medication 80 MILLIGRAM(S): at 06:23

## 2019-07-19 RX ADMIN — SPIRONOLACTONE 25 MILLIGRAM(S): 25 TABLET, FILM COATED ORAL at 06:23

## 2019-07-19 RX ADMIN — INSULIN GLARGINE 80 UNIT(S): 100 INJECTION, SOLUTION SUBCUTANEOUS at 08:50

## 2019-07-19 RX ADMIN — Medication 40 MILLIGRAM(S): at 06:23

## 2019-07-19 RX ADMIN — Medication 25 MILLIGRAM(S): at 06:24

## 2019-07-19 RX ADMIN — PANTOPRAZOLE SODIUM 40 MILLIGRAM(S): 20 TABLET, DELAYED RELEASE ORAL at 06:23

## 2019-07-19 RX ADMIN — LISINOPRIL 20 MILLIGRAM(S): 2.5 TABLET ORAL at 06:23

## 2019-07-19 RX ADMIN — Medication 25 UNIT(S): at 08:49

## 2019-07-19 RX ADMIN — Medication 5 MILLIGRAM(S): at 06:23

## 2019-07-19 RX ADMIN — Medication 6: at 08:48

## 2019-07-19 NOTE — DISCHARGE NOTE PROVIDER - HOSPITAL COURSE
Pt is a 65 y/o male with hx of DM, DLD, HTN, CAD s/p CABG, afib on xarelto, CHF on lasix 80mg, presents to ED for worsening SOB for the past 2 days.            Patient was evaluated by pulmonary critical care in ED who suggested to treat patient for possible bronchitis. Patient treated with oral prednisone. His RVP panel is Positive for entero/rhinovirus and remains afebrile/ not requiring ABX treatment.         Patient also had elevated trops but had no chest pain . Cardiology evaluated patient and suggested OP Stress test. Trops trended down with no events on telemonitor. Patient remains asymptomatic and advised to c/w current cardiac meds and fu cardiologist as Op for stress test. Echo shows NL LVSF with EF of >55%.         Patient seen and examined at [facility]. Pt is a 65 y/o male with hx of DM, DLD, HTN, CAD s/p CABG, afib on xarelto, CHF on lasix 80mg, presents to ED for worsening SOB for the past 2 days.            Patient was evaluated by pulmonary critical care in ED who suggested to treat patient for possible bronchitis. Patient treated with oral prednisone. His RVP panel is Positive for entero/rhinovirus and remains afebrile/ not requiring ABX treatment.         Patient also had elevated trops but had no chest pain . Cardiology evaluated patient and suggested OP Stress test. Trops trended down with no events on telemonitor. Patient remains asymptomatic and advised to c/w current cardiac meds and fu cardiologist as Op for stress test. Echo shows NL LVSF with EF of >55%.         Patient seen and examined at bedside, doing better, has no complaints today morning.                 Vital Signs Last 24 Hrs    T(C): 35.9 (19 Jul 2019 06:00), Max: 37.2 (18 Jul 2019 13:40)    T(F): 96.7 (19 Jul 2019 06:00), Max: 99 (18 Jul 2019 13:40)    HR: 71 (19 Jul 2019 06:00) (71 - 87)    BP: 163/77 (19 Jul 2019 06:00) (132/62 - 176/78)    BP(mean): --    RR: 16 (19 Jul 2019 06:00) (16 - 16)    SpO2: --        Physical Examination:    General: AAO x  3  , NAD.    HEENT: PERRLA, EOMI. NO JVD.    CVS:  S1 & S2 appreciated, regular rate and rhythm, no murmurs.     Lungs: clear to auscultation, no wheezing, no rales.     Abdominal Examination: soft, nontender, nondistended, +ve BS.    Neuro: AAO x 3   . no focal deficits.     Extremity Examination: No edema peripherally.        clinically stable for discharge home today

## 2019-07-19 NOTE — DISCHARGE NOTE NURSING/CASE MANAGEMENT/SOCIAL WORK - NSDCDPATPORTLINK_GEN_ALL_CORE
You can access the [facility] Portal, offered by [facility], by registering with the following website: [email or web address]

## 2019-07-19 NOTE — DISCHARGE NOTE PROVIDER - NSDCCPCAREPLAN_GEN_ALL_CORE_FT
PRINCIPAL DISCHARGE DIAGNOSIS  Diagnosis: Dyspnea on exertion  Assessment and Plan of Treatment: likely due to viral bronchitis/ complete oral prednisone/ keep yourself hydrated and avoid excessive physical exertion for about 5-7 days. c/w tylenol as needed for fever/bodyaches.      SECONDARY DISCHARGE DIAGNOSES  Diagnosis: Elevated troponin  Assessment and Plan of Treatment: likely 2/2 pulmonary etiology. low suspicion for acute MI/trops trended down/ Cardiology evalauted and advised to follow up cardiologist as OP for stress test in 4- 6 weeks and c/w current meds.

## 2019-07-23 LAB
CULTURE RESULTS: SIGNIFICANT CHANGE UP
CULTURE RESULTS: SIGNIFICANT CHANGE UP
SPECIMEN SOURCE: SIGNIFICANT CHANGE UP
SPECIMEN SOURCE: SIGNIFICANT CHANGE UP

## 2019-07-25 DIAGNOSIS — Z90.49 ACQUIRED ABSENCE OF OTHER SPECIFIED PARTS OF DIGESTIVE TRACT: ICD-10-CM

## 2019-07-25 DIAGNOSIS — Z79.4 LONG TERM (CURRENT) USE OF INSULIN: ICD-10-CM

## 2019-07-25 DIAGNOSIS — R74.8 ABNORMAL LEVELS OF OTHER SERUM ENZYMES: ICD-10-CM

## 2019-07-25 DIAGNOSIS — N40.0 BENIGN PROSTATIC HYPERPLASIA WITHOUT LOWER URINARY TRACT SYMPTOMS: ICD-10-CM

## 2019-07-25 DIAGNOSIS — Z82.49 FAMILY HISTORY OF ISCHEMIC HEART DISEASE AND OTHER DISEASES OF THE CIRCULATORY SYSTEM: ICD-10-CM

## 2019-07-25 DIAGNOSIS — Z79.82 LONG TERM (CURRENT) USE OF ASPIRIN: ICD-10-CM

## 2019-07-25 DIAGNOSIS — B97.4 RESPIRATORY SYNCYTIAL VIRUS AS THE CAUSE OF DISEASES CLASSIFIED ELSEWHERE: ICD-10-CM

## 2019-07-25 DIAGNOSIS — Z95.1 PRESENCE OF AORTOCORONARY BYPASS GRAFT: ICD-10-CM

## 2019-07-25 DIAGNOSIS — R06.02 SHORTNESS OF BREATH: ICD-10-CM

## 2019-07-25 DIAGNOSIS — E11.9 TYPE 2 DIABETES MELLITUS WITHOUT COMPLICATIONS: ICD-10-CM

## 2019-07-25 DIAGNOSIS — Z80.1 FAMILY HISTORY OF MALIGNANT NEOPLASM OF TRACHEA, BRONCHUS AND LUNG: ICD-10-CM

## 2019-07-25 DIAGNOSIS — J20.8 ACUTE BRONCHITIS DUE TO OTHER SPECIFIED ORGANISMS: ICD-10-CM

## 2019-07-25 DIAGNOSIS — I25.10 ATHEROSCLEROTIC HEART DISEASE OF NATIVE CORONARY ARTERY WITHOUT ANGINA PECTORIS: ICD-10-CM

## 2019-07-25 DIAGNOSIS — I45.10 UNSPECIFIED RIGHT BUNDLE-BRANCH BLOCK: ICD-10-CM

## 2019-07-25 DIAGNOSIS — I48.91 UNSPECIFIED ATRIAL FIBRILLATION: ICD-10-CM

## 2019-07-25 DIAGNOSIS — I11.0 HYPERTENSIVE HEART DISEASE WITH HEART FAILURE: ICD-10-CM

## 2019-07-25 DIAGNOSIS — Z95.5 PRESENCE OF CORONARY ANGIOPLASTY IMPLANT AND GRAFT: ICD-10-CM

## 2019-07-25 DIAGNOSIS — E66.01 MORBID (SEVERE) OBESITY DUE TO EXCESS CALORIES: ICD-10-CM

## 2019-07-25 DIAGNOSIS — R00.0 TACHYCARDIA, UNSPECIFIED: ICD-10-CM

## 2019-07-25 DIAGNOSIS — E78.5 HYPERLIPIDEMIA, UNSPECIFIED: ICD-10-CM

## 2019-07-25 DIAGNOSIS — Z87.891 PERSONAL HISTORY OF NICOTINE DEPENDENCE: ICD-10-CM

## 2019-07-25 DIAGNOSIS — Z83.3 FAMILY HISTORY OF DIABETES MELLITUS: ICD-10-CM

## 2019-08-02 ENCOUNTER — RX RENEWAL (OUTPATIENT)
Age: 64
End: 2019-08-02

## 2019-08-05 ENCOUNTER — RX RENEWAL (OUTPATIENT)
Age: 64
End: 2019-08-05

## 2019-08-07 ENCOUNTER — MESSAGE (OUTPATIENT)
Age: 64
End: 2019-08-07

## 2019-08-07 PROBLEM — I10 ESSENTIAL (PRIMARY) HYPERTENSION: Chronic | Status: ACTIVE | Noted: 2019-07-17

## 2019-08-07 PROBLEM — N40.0 BENIGN PROSTATIC HYPERPLASIA WITHOUT LOWER URINARY TRACT SYMPTOMS: Chronic | Status: ACTIVE | Noted: 2019-07-17

## 2019-08-07 PROBLEM — R60.9 EDEMA, UNSPECIFIED: Chronic | Status: ACTIVE | Noted: 2019-07-17

## 2019-08-07 PROBLEM — E11.9 TYPE 2 DIABETES MELLITUS WITHOUT COMPLICATIONS: Chronic | Status: ACTIVE | Noted: 2019-07-17

## 2019-09-19 ENCOUNTER — APPOINTMENT (OUTPATIENT)
Dept: CARDIOLOGY | Facility: CLINIC | Age: 64
End: 2019-09-19
Payer: COMMERCIAL

## 2019-09-19 VITALS — RESPIRATION RATE: 18 BRPM | HEART RATE: 80 BPM | SYSTOLIC BLOOD PRESSURE: 136 MMHG | DIASTOLIC BLOOD PRESSURE: 84 MMHG

## 2019-09-19 VITALS — HEIGHT: 72 IN | BODY MASS INDEX: 42.39 KG/M2 | WEIGHT: 313 LBS

## 2019-09-19 PROCEDURE — 93000 ELECTROCARDIOGRAM COMPLETE: CPT

## 2019-09-19 PROCEDURE — 99214 OFFICE O/P EST MOD 30 MIN: CPT

## 2019-09-19 NOTE — PHYSICAL EXAM
[General Appearance - Well Developed] : well developed [Normal Appearance] : normal appearance [Well Groomed] : well groomed [General Appearance - Well Nourished] : well nourished [No Deformities] : no deformities [General Appearance - In No Acute Distress] : no acute distress [Normal Conjunctiva] : the conjunctiva exhibited no abnormalities [Eyelids - No Xanthelasma] : the eyelids demonstrated no xanthelasmas [Normal Oral Mucosa] : normal oral mucosa [No Oral Pallor] : no oral pallor [No Oral Cyanosis] : no oral cyanosis [Normal Jugular Venous A Waves Present] : normal jugular venous A waves present [Normal Jugular Venous V Waves Present] : normal jugular venous V waves present [No Jugular Venous Nathan A Waves] : no jugular venous nathan A waves [Heart Rate And Rhythm] : heart rate and rhythm were normal [Heart Sounds] : normal S1 and S2 [Murmurs] : no murmurs present [Respiration, Rhythm And Depth] : normal respiratory rhythm and effort [Exaggerated Use Of Accessory Muscles For Inspiration] : no accessory muscle use [Auscultation Breath Sounds / Voice Sounds] : lungs were clear to auscultation bilaterally [Bowel Sounds] : normal bowel sounds [Abdomen Soft] : soft [Abdomen Tenderness] : non-tender [Abdomen Mass (___ Cm)] : no abdominal mass palpated [Abnormal Walk] : normal gait [Gait - Sufficient For Exercise Testing] : the gait was sufficient for exercise testing [Nail Clubbing] : no clubbing of the fingernails [Cyanosis, Localized] : no localized cyanosis [Petechial Hemorrhages (___cm)] : no petechial hemorrhages [Skin Color & Pigmentation] : normal skin color and pigmentation [] : no rash [No Venous Stasis] : no venous stasis [Skin Lesions] : no skin lesions [No Skin Ulcers] : no skin ulcer [No Xanthoma] : no  xanthoma was observed [Oriented To Time, Place, And Person] : oriented to person, place, and time

## 2019-10-07 ENCOUNTER — OTHER (OUTPATIENT)
Age: 64
End: 2019-10-07

## 2019-10-07 ENCOUNTER — APPOINTMENT (OUTPATIENT)
Dept: CARDIOLOGY | Facility: CLINIC | Age: 64
End: 2019-10-07

## 2019-10-20 ENCOUNTER — FORM ENCOUNTER (OUTPATIENT)
Age: 64
End: 2019-10-20

## 2019-10-21 ENCOUNTER — OUTPATIENT (OUTPATIENT)
Dept: OUTPATIENT SERVICES | Facility: HOSPITAL | Age: 64
LOS: 1 days | Discharge: HOME | End: 2019-10-21
Payer: COMMERCIAL

## 2019-10-21 DIAGNOSIS — Z90.49 ACQUIRED ABSENCE OF OTHER SPECIFIED PARTS OF DIGESTIVE TRACT: Chronic | ICD-10-CM

## 2019-10-21 DIAGNOSIS — I25.10 ATHEROSCLEROTIC HEART DISEASE OF NATIVE CORONARY ARTERY WITHOUT ANGINA PECTORIS: ICD-10-CM

## 2019-10-21 DIAGNOSIS — Z95.5 PRESENCE OF CORONARY ANGIOPLASTY IMPLANT AND GRAFT: Chronic | ICD-10-CM

## 2019-10-21 DIAGNOSIS — Z90.410 ACQUIRED TOTAL ABSENCE OF PANCREAS: Chronic | ICD-10-CM

## 2019-10-21 PROCEDURE — 78452 HT MUSCLE IMAGE SPECT MULT: CPT | Mod: 26

## 2019-10-31 ENCOUNTER — RX RENEWAL (OUTPATIENT)
Age: 64
End: 2019-10-31

## 2019-11-13 ENCOUNTER — APPOINTMENT (OUTPATIENT)
Dept: CARDIOLOGY | Facility: CLINIC | Age: 64
End: 2019-11-13
Payer: COMMERCIAL

## 2019-11-13 PROCEDURE — 93306 TTE W/DOPPLER COMPLETE: CPT

## 2019-12-13 ENCOUNTER — APPOINTMENT (OUTPATIENT)
Dept: CARDIOLOGY | Facility: CLINIC | Age: 64
End: 2019-12-13
Payer: COMMERCIAL

## 2019-12-13 VITALS — DIASTOLIC BLOOD PRESSURE: 80 MMHG | RESPIRATION RATE: 18 BRPM | SYSTOLIC BLOOD PRESSURE: 120 MMHG | HEART RATE: 84 BPM

## 2019-12-13 VITALS — HEIGHT: 72 IN | BODY MASS INDEX: 42.53 KG/M2 | WEIGHT: 314 LBS

## 2019-12-13 PROCEDURE — 99213 OFFICE O/P EST LOW 20 MIN: CPT

## 2019-12-13 PROCEDURE — 93000 ELECTROCARDIOGRAM COMPLETE: CPT

## 2019-12-13 NOTE — PHYSICAL EXAM
[Normal Appearance] : normal appearance [General Appearance - Well Developed] : well developed [Well Groomed] : well groomed [General Appearance - Well Nourished] : well nourished [Normal Conjunctiva] : the conjunctiva exhibited no abnormalities [General Appearance - In No Acute Distress] : no acute distress [No Deformities] : no deformities [Normal Oral Mucosa] : normal oral mucosa [Eyelids - No Xanthelasma] : the eyelids demonstrated no xanthelasmas [No Oral Pallor] : no oral pallor [No Oral Cyanosis] : no oral cyanosis [Normal Jugular Venous V Waves Present] : normal jugular venous V waves present [No Jugular Venous Nathan A Waves] : no jugular venous nathan A waves [Normal Jugular Venous A Waves Present] : normal jugular venous A waves present [Heart Sounds] : normal S1 and S2 [Heart Rate And Rhythm] : heart rate and rhythm were normal [Murmurs] : no murmurs present [Respiration, Rhythm And Depth] : normal respiratory rhythm and effort [Exaggerated Use Of Accessory Muscles For Inspiration] : no accessory muscle use [Bowel Sounds] : normal bowel sounds [Auscultation Breath Sounds / Voice Sounds] : lungs were clear to auscultation bilaterally [Abdomen Tenderness] : non-tender [Abdomen Soft] : soft [Abdomen Mass (___ Cm)] : no abdominal mass palpated [Abnormal Walk] : normal gait [Gait - Sufficient For Exercise Testing] : the gait was sufficient for exercise testing [Cyanosis, Localized] : no localized cyanosis [Nail Clubbing] : no clubbing of the fingernails [Petechial Hemorrhages (___cm)] : no petechial hemorrhages [Skin Color & Pigmentation] : normal skin color and pigmentation [No Venous Stasis] : no venous stasis [] : no rash [Skin Lesions] : no skin lesions [No Skin Ulcers] : no skin ulcer [No Xanthoma] : no  xanthoma was observed [Oriented To Time, Place, And Person] : oriented to person, place, and time

## 2020-01-27 ENCOUNTER — RX RENEWAL (OUTPATIENT)
Age: 65
End: 2020-01-27

## 2020-04-09 ENCOUNTER — APPOINTMENT (OUTPATIENT)
Dept: CARDIOLOGY | Facility: CLINIC | Age: 65
End: 2020-04-09

## 2020-04-25 ENCOUNTER — RX RENEWAL (OUTPATIENT)
Age: 65
End: 2020-04-25

## 2020-05-04 ENCOUNTER — APPOINTMENT (OUTPATIENT)
Dept: OTOLARYNGOLOGY | Facility: CLINIC | Age: 65
End: 2020-05-04
Payer: COMMERCIAL

## 2020-05-04 VITALS — WEIGHT: 296 LBS | BODY MASS INDEX: 40.09 KG/M2 | HEIGHT: 72 IN

## 2020-05-04 DIAGNOSIS — T16.9XXA FOREIGN BODY IN EAR, UNSPECIFIED EAR, INITIAL ENCOUNTER: ICD-10-CM

## 2020-05-04 DIAGNOSIS — H61.23 IMPACTED CERUMEN, BILATERAL: ICD-10-CM

## 2020-05-04 PROCEDURE — 99212 OFFICE O/P EST SF 10 MIN: CPT | Mod: 25

## 2020-05-04 PROCEDURE — 69200 CLEAR OUTER EAR CANAL: CPT

## 2020-05-04 NOTE — HISTORY OF PRESENT ILLNESS
[FreeTextEntry1] : 5/4/2020 Patient is following up for clogged ears. denies ear pain, and tinnitus. patient has an appointment for hearing aid maintenance tomorrow. Patient with history of cerumen impaction.

## 2020-05-04 NOTE — PHYSICAL EXAM
[Normal] : tympanic membranes are normal in both ears [de-identified] : bilateral cerumen impaction and foreign body in the left ear

## 2020-07-17 ENCOUNTER — RX RENEWAL (OUTPATIENT)
Age: 65
End: 2020-07-17

## 2020-08-26 ENCOUNTER — APPOINTMENT (OUTPATIENT)
Dept: SURGERY | Facility: CLINIC | Age: 65
End: 2020-08-26
Payer: COMMERCIAL

## 2020-08-26 VITALS
DIASTOLIC BLOOD PRESSURE: 78 MMHG | HEIGHT: 72 IN | TEMPERATURE: 97.3 F | SYSTOLIC BLOOD PRESSURE: 130 MMHG | HEART RATE: 80 BPM | WEIGHT: 315 LBS | BODY MASS INDEX: 42.66 KG/M2

## 2020-08-26 PROCEDURE — 99214 OFFICE O/P EST MOD 30 MIN: CPT

## 2020-08-27 ENCOUNTER — RX RENEWAL (OUTPATIENT)
Age: 65
End: 2020-08-27

## 2020-08-30 NOTE — ASSESSMENT
[FreeTextEntry1] : The patient presents with a large mass on the posterior scalp which has been present for years but is increasing. I explained the risks, benefits and alternatives of resection of the mass. The antiplatelet medications should be held perioperatively. He will undergo medical evaluation prior to surgery.

## 2020-08-30 NOTE — REVIEW OF SYSTEMS
[Loss Of Hearing] : hearing loss [Palpitations] : palpitations [Abdominal Pain] : abdominal pain [Vomiting] : vomiting [As Noted in HPI] : as noted in HPI [Negative] : Heme/Lymph

## 2020-08-30 NOTE — HISTORY OF PRESENT ILLNESS
[de-identified] : The patient is known to me  for prior biliary tract disease. He now presents with a large mass on the back of the scalp. The lesion is movable and appears subcutaneous. He patient has multiple medical issues and is no anti-platelet drugs.

## 2020-08-30 NOTE — PHYSICAL EXAM
[Respiratory Effort] : normal respiratory effort [JVD] : no jugular venous distention  [Abdominal Masses] : No abdominal masses [Abdominal Aorta] : Normal abdominal aorta [Abdomen Tenderness] : ~T ~M Abdominal tenderness [No HSM] : no hepatosplenomegaly [No Rash or Lesion] : No rash or lesion [Alert] : alert [Oriented to Person] : oriented to person [Oriented to Place] : oriented to place [Oriented to Time] : oriented to time [Anxious] : anxious [de-identified] : WD/WN obese man in NAD [de-identified] : NCAT no scleral icterus, large 4 cm mass on posterior scalp [de-identified] : Abdomen in distended mostly midepigastrium [de-identified] : FROM no deformities

## 2020-09-21 ENCOUNTER — APPOINTMENT (OUTPATIENT)
Dept: CARDIOLOGY | Facility: CLINIC | Age: 65
End: 2020-09-21
Payer: COMMERCIAL

## 2020-09-21 VITALS — HEART RATE: 76 BPM | RESPIRATION RATE: 18 BRPM | SYSTOLIC BLOOD PRESSURE: 118 MMHG | DIASTOLIC BLOOD PRESSURE: 70 MMHG

## 2020-09-21 VITALS — WEIGHT: 315 LBS | TEMPERATURE: 97.2 F | HEIGHT: 72 IN | BODY MASS INDEX: 42.66 KG/M2

## 2020-09-21 DIAGNOSIS — Z01.810 ENCOUNTER FOR PREPROCEDURAL CARDIOVASCULAR EXAMINATION: ICD-10-CM

## 2020-09-21 PROCEDURE — 99214 OFFICE O/P EST MOD 30 MIN: CPT

## 2020-09-21 PROCEDURE — 93000 ELECTROCARDIOGRAM COMPLETE: CPT

## 2020-09-21 RX ORDER — INSULIN LISPRO 100 [IU]/ML
100 INJECTION, SOLUTION INTRAVENOUS; SUBCUTANEOUS
Refills: 0 | Status: ACTIVE | COMMUNITY

## 2020-09-23 ENCOUNTER — OUTPATIENT (OUTPATIENT)
Dept: OUTPATIENT SERVICES | Facility: HOSPITAL | Age: 65
LOS: 1 days | Discharge: HOME | End: 2020-09-23
Payer: MEDICARE

## 2020-09-23 VITALS
HEART RATE: 79 BPM | RESPIRATION RATE: 16 BRPM | HEIGHT: 72 IN | SYSTOLIC BLOOD PRESSURE: 142 MMHG | WEIGHT: 315 LBS | OXYGEN SATURATION: 95 % | TEMPERATURE: 97 F | DIASTOLIC BLOOD PRESSURE: 76 MMHG

## 2020-09-23 DIAGNOSIS — Z01.818 ENCOUNTER FOR OTHER PREPROCEDURAL EXAMINATION: ICD-10-CM

## 2020-09-23 DIAGNOSIS — R22.0 LOCALIZED SWELLING, MASS AND LUMP, HEAD: ICD-10-CM

## 2020-09-23 DIAGNOSIS — Z90.49 ACQUIRED ABSENCE OF OTHER SPECIFIED PARTS OF DIGESTIVE TRACT: Chronic | ICD-10-CM

## 2020-09-23 DIAGNOSIS — Z95.5 PRESENCE OF CORONARY ANGIOPLASTY IMPLANT AND GRAFT: Chronic | ICD-10-CM

## 2020-09-23 DIAGNOSIS — Z90.410 ACQUIRED TOTAL ABSENCE OF PANCREAS: Chronic | ICD-10-CM

## 2020-09-23 DIAGNOSIS — Z96.652 PRESENCE OF LEFT ARTIFICIAL KNEE JOINT: Chronic | ICD-10-CM

## 2020-09-23 LAB
A1C WITH ESTIMATED AVERAGE GLUCOSE RESULT: 8.2 % — HIGH (ref 4–5.6)
ALBUMIN SERPL ELPH-MCNC: 4.1 G/DL — SIGNIFICANT CHANGE UP (ref 3.5–5.2)
ALP SERPL-CCNC: 99 U/L — SIGNIFICANT CHANGE UP (ref 30–115)
ALT FLD-CCNC: 36 U/L — SIGNIFICANT CHANGE UP (ref 0–41)
ANION GAP SERPL CALC-SCNC: 12 MMOL/L — SIGNIFICANT CHANGE UP (ref 7–14)
APTT BLD: 36.7 SEC — SIGNIFICANT CHANGE UP (ref 27–39.2)
AST SERPL-CCNC: 27 U/L — SIGNIFICANT CHANGE UP (ref 0–41)
BASOPHILS # BLD AUTO: 0.1 K/UL — SIGNIFICANT CHANGE UP (ref 0–0.2)
BASOPHILS NFR BLD AUTO: 0.9 % — SIGNIFICANT CHANGE UP (ref 0–1)
BILIRUB SERPL-MCNC: <0.2 MG/DL — SIGNIFICANT CHANGE UP (ref 0.2–1.2)
BUN SERPL-MCNC: 45 MG/DL — HIGH (ref 10–20)
CALCIUM SERPL-MCNC: 9.2 MG/DL — SIGNIFICANT CHANGE UP (ref 8.5–10.1)
CHLORIDE SERPL-SCNC: 102 MMOL/L — SIGNIFICANT CHANGE UP (ref 98–110)
CO2 SERPL-SCNC: 22 MMOL/L — SIGNIFICANT CHANGE UP (ref 17–32)
CREAT SERPL-MCNC: 1.4 MG/DL — SIGNIFICANT CHANGE UP (ref 0.7–1.5)
EOSINOPHIL # BLD AUTO: 0.54 K/UL — SIGNIFICANT CHANGE UP (ref 0–0.7)
EOSINOPHIL NFR BLD AUTO: 4.9 % — SIGNIFICANT CHANGE UP (ref 0–8)
ESTIMATED AVERAGE GLUCOSE: 189 MG/DL — HIGH (ref 68–114)
GLUCOSE SERPL-MCNC: 209 MG/DL — HIGH (ref 70–99)
HCT VFR BLD CALC: 45.7 % — SIGNIFICANT CHANGE UP (ref 42–52)
HGB BLD-MCNC: 14.5 G/DL — SIGNIFICANT CHANGE UP (ref 14–18)
IMM GRANULOCYTES NFR BLD AUTO: 0.2 % — SIGNIFICANT CHANGE UP (ref 0.1–0.3)
INR BLD: 1.17 RATIO — SIGNIFICANT CHANGE UP (ref 0.65–1.3)
LYMPHOCYTES # BLD AUTO: 28.1 % — SIGNIFICANT CHANGE UP (ref 20.5–51.1)
LYMPHOCYTES # BLD AUTO: 3.09 K/UL — SIGNIFICANT CHANGE UP (ref 1.2–3.4)
MCHC RBC-ENTMCNC: 31 PG — SIGNIFICANT CHANGE UP (ref 27–31)
MCHC RBC-ENTMCNC: 31.7 G/DL — LOW (ref 32–37)
MCV RBC AUTO: 97.6 FL — HIGH (ref 80–94)
MONOCYTES # BLD AUTO: 1.49 K/UL — HIGH (ref 0.1–0.6)
MONOCYTES NFR BLD AUTO: 13.5 % — HIGH (ref 1.7–9.3)
NEUTROPHILS # BLD AUTO: 5.77 K/UL — SIGNIFICANT CHANGE UP (ref 1.4–6.5)
NEUTROPHILS NFR BLD AUTO: 52.4 % — SIGNIFICANT CHANGE UP (ref 42.2–75.2)
NRBC # BLD: 0 /100 WBCS — SIGNIFICANT CHANGE UP (ref 0–0)
PLATELET # BLD AUTO: 380 K/UL — SIGNIFICANT CHANGE UP (ref 130–400)
POTASSIUM SERPL-MCNC: 5.2 MMOL/L — HIGH (ref 3.5–5)
POTASSIUM SERPL-SCNC: 5.2 MMOL/L — HIGH (ref 3.5–5)
PROT SERPL-MCNC: 7.1 G/DL — SIGNIFICANT CHANGE UP (ref 6–8)
PROTHROM AB SERPL-ACNC: 13.5 SEC — HIGH (ref 9.95–12.87)
RBC # BLD: 4.68 M/UL — LOW (ref 4.7–6.1)
RBC # FLD: 14.3 % — SIGNIFICANT CHANGE UP (ref 11.5–14.5)
SODIUM SERPL-SCNC: 136 MMOL/L — SIGNIFICANT CHANGE UP (ref 135–146)
WBC # BLD: 11.01 K/UL — HIGH (ref 4.8–10.8)
WBC # FLD AUTO: 11.01 K/UL — HIGH (ref 4.8–10.8)

## 2020-09-23 PROCEDURE — 93010 ELECTROCARDIOGRAM REPORT: CPT

## 2020-09-23 RX ORDER — INSULIN GLARGINE 100 [IU]/ML
0 INJECTION, SOLUTION SUBCUTANEOUS
Qty: 0 | Refills: 0 | DISCHARGE

## 2020-09-23 RX ORDER — POTASSIUM CHLORIDE 20 MEQ
10 PACKET (EA) ORAL
Qty: 0 | Refills: 0 | DISCHARGE

## 2020-09-23 NOTE — H&P PST ADULT - NSICDXPASTSURGICALHX_GEN_ALL_CORE_FT
PAST SURGICAL HISTORY:  H/O right coronary artery stent placement     History of cholecystectomy     History of resection of pancreas      PAST SURGICAL HISTORY:  H/O right coronary artery stent placement     History of cholecystectomy     History of left knee replacement     History of resection of pancreas

## 2020-09-23 NOTE — H&P PST ADULT - HISTORY OF PRESENT ILLNESS
66 y/o obese male w/PMH of CABG, DM( on insulin pump)  HTN, pneumonia insudation is scheduled for excision of posterior scalp mass on 10/7/2020 at [facility] under LSB with Dr. Hood.   Pt complains of painful growth  to back of head (end of hairline, left side)  descried as burning 4/10, over 5 years. Pt  denies lightheadedness, dizziness or any other symptoms associated with growth.  Denies any chest pain, difficulty breathing, SOB, palpitations, dysuria, URI, or any other infections in the last 2 weeks/1 month. Denies any recent travel, contact, or exposure to any persons with known or suspected COVID-19. Pt also denies COVID testing within the last 2 weeks. Pt advised to self quarantine until day of procedure. Exercise tolerance of 1/2-1 flights of stairs without dyspnea. RASHAD reviewed with patient.    Anesthesia Alert  NO--Difficult Airway  NO--History of neck surgery or radiation  NO--Limited ROM of neck  NO--History of Malignant hyperthermia  NO--Personal or family history of Pseudocholinesterase deficiency  NO--Prior Anesthesia Complication  NO--Latex Allergy  NO--Loose teeth  NO--History of Rheumatoid Arthritis  YES--RASHAD  NO--Other_____

## 2020-09-23 NOTE — H&P PST ADULT - REASON FOR ADMISSION
excision of posterior scalp mass scheduled for 10/7/2020 under LSB at [facility] with Dr. Hood

## 2020-10-04 ENCOUNTER — OUTPATIENT (OUTPATIENT)
Dept: OUTPATIENT SERVICES | Facility: HOSPITAL | Age: 65
LOS: 1 days | Discharge: HOME | End: 2020-10-04

## 2020-10-04 DIAGNOSIS — Z11.59 ENCOUNTER FOR SCREENING FOR OTHER VIRAL DISEASES: ICD-10-CM

## 2020-10-04 DIAGNOSIS — Z90.49 ACQUIRED ABSENCE OF OTHER SPECIFIED PARTS OF DIGESTIVE TRACT: Chronic | ICD-10-CM

## 2020-10-04 DIAGNOSIS — Z95.5 PRESENCE OF CORONARY ANGIOPLASTY IMPLANT AND GRAFT: Chronic | ICD-10-CM

## 2020-10-04 DIAGNOSIS — Z90.410 ACQUIRED TOTAL ABSENCE OF PANCREAS: Chronic | ICD-10-CM

## 2020-10-04 DIAGNOSIS — Z96.652 PRESENCE OF LEFT ARTIFICIAL KNEE JOINT: Chronic | ICD-10-CM

## 2020-10-06 NOTE — ASU PATIENT PROFILE, ADULT - PSH
H/O right coronary artery stent placement    History of cholecystectomy    History of left knee replacement    History of resection of pancreas

## 2020-10-07 ENCOUNTER — OUTPATIENT (OUTPATIENT)
Dept: OUTPATIENT SERVICES | Facility: HOSPITAL | Age: 65
LOS: 1 days | Discharge: HOME | End: 2020-10-07
Payer: COMMERCIAL

## 2020-10-07 ENCOUNTER — APPOINTMENT (OUTPATIENT)
Dept: SURGERY | Facility: CLINIC | Age: 65
End: 2020-10-07

## 2020-10-07 ENCOUNTER — RESULT REVIEW (OUTPATIENT)
Age: 65
End: 2020-10-07

## 2020-10-07 VITALS
OXYGEN SATURATION: 97 % | HEIGHT: 72 IN | RESPIRATION RATE: 18 BRPM | TEMPERATURE: 98 F | HEART RATE: 78 BPM | WEIGHT: 315 LBS | DIASTOLIC BLOOD PRESSURE: 58 MMHG | SYSTOLIC BLOOD PRESSURE: 125 MMHG

## 2020-10-07 VITALS
RESPIRATION RATE: 19 BRPM | OXYGEN SATURATION: 96 % | DIASTOLIC BLOOD PRESSURE: 60 MMHG | HEART RATE: 82 BPM | SYSTOLIC BLOOD PRESSURE: 119 MMHG | TEMPERATURE: 98 F

## 2020-10-07 DIAGNOSIS — Z95.5 PRESENCE OF CORONARY ANGIOPLASTY IMPLANT AND GRAFT: Chronic | ICD-10-CM

## 2020-10-07 DIAGNOSIS — Z96.652 PRESENCE OF LEFT ARTIFICIAL KNEE JOINT: Chronic | ICD-10-CM

## 2020-10-07 DIAGNOSIS — Z90.49 ACQUIRED ABSENCE OF OTHER SPECIFIED PARTS OF DIGESTIVE TRACT: Chronic | ICD-10-CM

## 2020-10-07 DIAGNOSIS — Z90.410 ACQUIRED TOTAL ABSENCE OF PANCREAS: Chronic | ICD-10-CM

## 2020-10-07 LAB — GLUCOSE BLDC GLUCOMTR-MCNC: 204 MG/DL — HIGH (ref 70–99)

## 2020-10-07 PROCEDURE — 88304 TISSUE EXAM BY PATHOLOGIST: CPT | Mod: 26

## 2020-10-07 PROCEDURE — 21552 EXC NECK LES SC 3 CM/>: CPT

## 2020-10-07 RX ORDER — SODIUM CHLORIDE 9 MG/ML
1000 INJECTION, SOLUTION INTRAVENOUS
Refills: 0 | Status: DISCONTINUED | OUTPATIENT
Start: 2020-10-07 | End: 2020-10-21

## 2020-10-07 RX ORDER — OXYCODONE HYDROCHLORIDE 5 MG/1
1 TABLET ORAL
Qty: 12 | Refills: 0
Start: 2020-10-07 | End: 2020-10-09

## 2020-10-07 RX ORDER — OXYCODONE AND ACETAMINOPHEN 5; 325 MG/1; MG/1
1 TABLET ORAL EVERY 4 HOURS
Refills: 0 | Status: DISCONTINUED | OUTPATIENT
Start: 2020-10-07 | End: 2020-10-07

## 2020-10-07 RX ORDER — ONDANSETRON 8 MG/1
4 TABLET, FILM COATED ORAL ONCE
Refills: 0 | Status: DISCONTINUED | OUTPATIENT
Start: 2020-10-07 | End: 2020-10-21

## 2020-10-07 RX ORDER — HYDROMORPHONE HYDROCHLORIDE 2 MG/ML
0.5 INJECTION INTRAMUSCULAR; INTRAVENOUS; SUBCUTANEOUS
Refills: 0 | Status: DISCONTINUED | OUTPATIENT
Start: 2020-10-07 | End: 2020-10-07

## 2020-10-07 RX ADMIN — SODIUM CHLORIDE 100 MILLILITER(S): 9 INJECTION, SOLUTION INTRAVENOUS at 10:06

## 2020-10-07 NOTE — BRIEF OPERATIVE NOTE - NSICDXBRIEFPROCEDURE_GEN_ALL_CORE_FT
PROCEDURES:  Excisional biopsy, mass, neck 07-Oct-2020 09:53:43 posterior scalp Layo Seals  
Implemented All Universal Safety Interventions:  [place] to call system. Call bell, personal items and telephone within reach. Instruct patient to call for assistance. Room bathroom lighting operational. Non-slip footwear when patient is off stretcher. Physically safe environment: no spills, clutter or unnecessary equipment. Stretcher in lowest position, wheels locked, appropriate side rails in place.

## 2020-10-07 NOTE — ASU PREOP CHECKLIST - ALLERGIES REVIEWED
Here today for right knee pain.  Ashley Olguin is a 87 year old female   Denies known Latex allergy or symptoms of Latex sensitivity.  Denies any known metal allergies.  Medications reviewed and updated.  Social History     Tobacco Use   Smoking Status Never Smoker   Smokeless Tobacco Never Used       
The risks and benefits of the injection were discussed. Patient understands these risks and wishes to proceed with the treatment.     After the right  knee was prepped in sterile fashion, the right knee joint was injected with 4cc of 1% lidocaine and 40mg of depomedrol. Patient tolerated the procedure well without complications.     On 10/14/2019, Ravin WOLFE scribed the services personally performed by Karan Gallegos MD    The documentation recorded by the scribe accurately and completely reflects the service(s) I personally performed and the decisions made by me.       
done

## 2020-10-07 NOTE — ASU DISCHARGE PLAN (ADULT/PEDIATRIC) - ASU DC SPECIAL INSTRUCTIONSFT
Continue regular diet.  Dressings : Remove outside dressing in 1 day, keep area dry  Pain : You can take over the counter pain medications. Take oxycodone as prescribed for breakthrough pain.  Activity : Please avoid heavy lifting (anything over 10 pounds) for at least 6 weeks.   Follow up : Call to schedule a follow up appointment in 2 weeks

## 2020-10-07 NOTE — ASU DISCHARGE PLAN (ADULT/PEDIATRIC) - CARE PROVIDER_API CALL
Luis Manuel Hood F  SURGERY  [address] [place] [address], [place] [address] [place] [address]  Phone: [phone]  Fax: [phone]  Follow Up Time: 2 weeks

## 2020-10-12 LAB — SURGICAL PATHOLOGY STUDY: SIGNIFICANT CHANGE UP

## 2020-10-15 DIAGNOSIS — E78.5 HYPERLIPIDEMIA, UNSPECIFIED: ICD-10-CM

## 2020-10-15 DIAGNOSIS — I10 ESSENTIAL (PRIMARY) HYPERTENSION: ICD-10-CM

## 2020-10-15 DIAGNOSIS — I48.0 PAROXYSMAL ATRIAL FIBRILLATION: ICD-10-CM

## 2020-10-15 DIAGNOSIS — Z79.4 LONG TERM (CURRENT) USE OF INSULIN: ICD-10-CM

## 2020-10-15 DIAGNOSIS — Z79.01 LONG TERM (CURRENT) USE OF ANTICOAGULANTS: ICD-10-CM

## 2020-10-15 DIAGNOSIS — Z87.891 PERSONAL HISTORY OF NICOTINE DEPENDENCE: ICD-10-CM

## 2020-10-15 DIAGNOSIS — Z90.410 ACQUIRED TOTAL ABSENCE OF PANCREAS: ICD-10-CM

## 2020-10-15 DIAGNOSIS — Z96.41 PRESENCE OF INSULIN PUMP (EXTERNAL) (INTERNAL): ICD-10-CM

## 2020-10-15 DIAGNOSIS — Z95.5 PRESENCE OF CORONARY ANGIOPLASTY IMPLANT AND GRAFT: ICD-10-CM

## 2020-10-15 DIAGNOSIS — I25.10 ATHEROSCLEROTIC HEART DISEASE OF NATIVE CORONARY ARTERY WITHOUT ANGINA PECTORIS: ICD-10-CM

## 2020-10-15 DIAGNOSIS — Z95.1 PRESENCE OF AORTOCORONARY BYPASS GRAFT: ICD-10-CM

## 2020-10-15 DIAGNOSIS — R22.1 LOCALIZED SWELLING, MASS AND LUMP, NECK: ICD-10-CM

## 2020-10-15 DIAGNOSIS — D23.4 OTHER BENIGN NEOPLASM OF SKIN OF SCALP AND NECK: ICD-10-CM

## 2020-10-15 DIAGNOSIS — E11.9 TYPE 2 DIABETES MELLITUS WITHOUT COMPLICATIONS: ICD-10-CM

## 2020-10-15 DIAGNOSIS — Z90.49 ACQUIRED ABSENCE OF OTHER SPECIFIED PARTS OF DIGESTIVE TRACT: ICD-10-CM

## 2020-10-15 DIAGNOSIS — Z96.652 PRESENCE OF LEFT ARTIFICIAL KNEE JOINT: ICD-10-CM

## 2020-10-15 DIAGNOSIS — G47.33 OBSTRUCTIVE SLEEP APNEA (ADULT) (PEDIATRIC): ICD-10-CM

## 2020-10-15 DIAGNOSIS — N40.0 BENIGN PROSTATIC HYPERPLASIA WITHOUT LOWER URINARY TRACT SYMPTOMS: ICD-10-CM

## 2020-10-23 ENCOUNTER — APPOINTMENT (OUTPATIENT)
Dept: SURGERY | Facility: CLINIC | Age: 65
End: 2020-10-23
Payer: COMMERCIAL

## 2020-10-23 VITALS
SYSTOLIC BLOOD PRESSURE: 122 MMHG | HEART RATE: 83 BPM | HEIGHT: 72 IN | DIASTOLIC BLOOD PRESSURE: 74 MMHG | BODY MASS INDEX: 42.66 KG/M2 | TEMPERATURE: 97.3 F | WEIGHT: 315 LBS

## 2020-10-23 DIAGNOSIS — R22.0 LOCALIZED SWELLING, MASS AND LUMP, HEAD: ICD-10-CM

## 2020-10-23 PROCEDURE — 99024 POSTOP FOLLOW-UP VISIT: CPT

## 2020-10-24 PROBLEM — R22.0 SCALP MASS: Status: ACTIVE | Noted: 2020-08-30

## 2020-10-24 NOTE — HISTORY OF PRESENT ILLNESS
[de-identified] : The patient is now 2 weeks after excision of large mass of posterior neck/head. 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 The patient has minimal complaints and is active.

## 2020-10-24 NOTE — PHYSICAL EXAM
[de-identified] : The incision is clean and dry and the hair is growing back. The skin sutures were removed.

## 2020-10-24 NOTE — ASSESSMENT
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 He will be seen as needed.

## 2020-11-04 ENCOUNTER — APPOINTMENT (OUTPATIENT)
Dept: OTOLARYNGOLOGY | Facility: CLINIC | Age: 65
End: 2020-11-04
Payer: COMMERCIAL

## 2020-11-04 PROCEDURE — 31231 NASAL ENDOSCOPY DX: CPT

## 2020-11-04 PROCEDURE — 99072 ADDL SUPL MATRL&STAF TM PHE: CPT

## 2020-11-04 PROCEDURE — 99214 OFFICE O/P EST MOD 30 MIN: CPT | Mod: 25

## 2020-11-04 NOTE — HISTORY OF PRESENT ILLNESS
[de-identified] : Patient is here to follow up with Clogged ears.  He has a h/o cerumen impaction. Ears last cleaned in May. No hearing loss or discharge .  Patient has persistent PND. Symptoms worse at night. He is unable to lie down, sleeps upright due to symptoms.He has had minimal improvement with Flonase. \par

## 2020-11-04 NOTE — PHYSICAL EXAM
[Nasal Endoscopy Performed] : nasal endoscopy was performed, see procedure section for findings [Midline] : trachea located in midline position [de-identified] : left cerumen impaction [Normal] : external appearance is normal [de-identified] : edema  [de-identified] : thickened

## 2020-11-04 NOTE — PROCEDURE
[Flexible Endoscope] : examined with the flexible endoscope [Red] : red [Nasal Mucosa] : bilateral purulence

## 2020-12-01 ENCOUNTER — APPOINTMENT (OUTPATIENT)
Dept: OTOLARYNGOLOGY | Facility: CLINIC | Age: 65
End: 2020-12-01
Payer: COMMERCIAL

## 2020-12-01 VITALS — TEMPERATURE: 98 F

## 2020-12-01 PROCEDURE — 99213 OFFICE O/P EST LOW 20 MIN: CPT | Mod: 25

## 2020-12-01 PROCEDURE — 99072 ADDL SUPL MATRL&STAF TM PHE: CPT

## 2020-12-01 PROCEDURE — 31231 NASAL ENDOSCOPY DX: CPT

## 2020-12-01 RX ORDER — TADALAFIL 20 MG/1
20 TABLET ORAL
Qty: 2 | Refills: 0 | Status: ACTIVE | COMMUNITY
Start: 2020-02-13

## 2020-12-01 RX ORDER — BLOOD SUGAR DIAGNOSTIC
STRIP MISCELLANEOUS
Qty: 100 | Refills: 0 | Status: ACTIVE | COMMUNITY
Start: 2019-12-16

## 2020-12-01 RX ORDER — OXYBUTYNIN CHLORIDE 10 MG/1
10 TABLET, EXTENDED RELEASE ORAL
Qty: 90 | Refills: 0 | Status: ACTIVE | COMMUNITY
Start: 2020-11-04

## 2020-12-01 NOTE — REASON FOR VISIT
[Subsequent Evaluation] : a subsequent evaluation for [FreeTextEntry2] : chronic rhinitis, nasal congestion

## 2020-12-01 NOTE — ASSESSMENT
[FreeTextEntry1] : Pt will continue on nose sprays and stop allergy meds. Pt will use a nasal saline.

## 2020-12-01 NOTE — HISTORY OF PRESENT ILLNESS
[FreeTextEntry1] : Patient following up on chronic rhinitis, nasal congestion.  Patient states he is doing well since last visit.  Patient has been using Ipratropium Bromide nasal solution has been helping patient . When patient leans over pt has Pt has  increased congestions. Pt has not been choking on his secretions and he feels much improved overall. Pt is compliant with his meds. \par \par Patient has had no ear pain since last visit, no recent complaints .

## 2020-12-01 NOTE — PROCEDURE
[Recalcitrant Symptoms] : recalcitrant symptoms  [Normal] : the paranasal sinuses had no abnormalities

## 2020-12-23 ENCOUNTER — RX RENEWAL (OUTPATIENT)
Age: 65
End: 2020-12-23

## 2021-03-01 ENCOUNTER — RX RENEWAL (OUTPATIENT)
Age: 66
End: 2021-03-01

## 2021-03-11 ENCOUNTER — NON-APPOINTMENT (OUTPATIENT)
Age: 66
End: 2021-03-11

## 2021-03-11 ENCOUNTER — APPOINTMENT (OUTPATIENT)
Dept: OTOLARYNGOLOGY | Facility: CLINIC | Age: 66
End: 2021-03-11
Payer: COMMERCIAL

## 2021-03-11 PROCEDURE — 31231 NASAL ENDOSCOPY DX: CPT

## 2021-03-11 PROCEDURE — 99072 ADDL SUPL MATRL&STAF TM PHE: CPT

## 2021-03-11 PROCEDURE — 99213 OFFICE O/P EST LOW 20 MIN: CPT | Mod: 25

## 2021-03-11 NOTE — HISTORY OF PRESENT ILLNESS
[FreeTextEntry1] : Patient presents today following up on chronic rhinitis, nasal congestion. Patient admits congestion still present. He continues to use nasal sprays and allergy medication. Patient also c/o clogged ears.

## 2021-03-11 NOTE — PHYSICAL EXAM
[Nasal Endoscopy Performed] : nasal endoscopy was performed, see procedure section for findings [Midline] : trachea located in midline position [Normal] : no abnormal secretions [de-identified] : edema

## 2021-03-11 NOTE — ASSESSMENT
[FreeTextEntry1] : Risks, benefits, and alternatives of cryoblation and turbinate reduction were explained including but not limited to bleeding, infection, persistent symptoms, numbness, perforation, change in smell, change in taste, need for additional surgery, etc...\par

## 2021-03-12 NOTE — H&P PST ADULT - PRESSURE ULCER(S)
no Valtrex Counseling: I discussed with the patient the risks of valacyclovir including but not limited to kidney damage, nausea, vomiting and severe allergy.  The patient understands that if the infection seems to be worsening or is not improving, they are to call.

## 2021-03-22 ENCOUNTER — RX RENEWAL (OUTPATIENT)
Age: 66
End: 2021-03-22

## 2021-03-22 ENCOUNTER — APPOINTMENT (OUTPATIENT)
Dept: CARDIOLOGY | Facility: CLINIC | Age: 66
End: 2021-03-22
Payer: COMMERCIAL

## 2021-03-22 VITALS — HEIGHT: 72 IN | TEMPERATURE: 98.2 F | WEIGHT: 315 LBS | BODY MASS INDEX: 42.66 KG/M2

## 2021-03-22 VITALS — DIASTOLIC BLOOD PRESSURE: 80 MMHG | SYSTOLIC BLOOD PRESSURE: 118 MMHG | RESPIRATION RATE: 18 BRPM | HEART RATE: 92 BPM

## 2021-03-22 PROCEDURE — 99213 OFFICE O/P EST LOW 20 MIN: CPT

## 2021-03-22 PROCEDURE — 93000 ELECTROCARDIOGRAM COMPLETE: CPT

## 2021-03-22 PROCEDURE — 99072 ADDL SUPL MATRL&STAF TM PHE: CPT

## 2021-03-22 RX ORDER — LANCETS
EACH MISCELLANEOUS
Qty: 816 | Refills: 0 | Status: ACTIVE | COMMUNITY
Start: 2021-02-25

## 2021-03-22 RX ORDER — TESTOSTERONE 200 MG
200 PELLET (EA) IMPLANTATION
Refills: 0 | Status: ACTIVE | COMMUNITY

## 2021-03-22 RX ORDER — SPIRONOLACTONE 25 MG/1
25 TABLET ORAL DAILY
Qty: 90 | Refills: 3 | Status: ACTIVE | COMMUNITY
Start: 2017-05-12

## 2021-04-21 ENCOUNTER — RX RENEWAL (OUTPATIENT)
Age: 66
End: 2021-04-21

## 2021-04-27 ENCOUNTER — APPOINTMENT (OUTPATIENT)
Dept: OTOLARYNGOLOGY | Facility: CLINIC | Age: 66
End: 2021-04-27
Payer: COMMERCIAL

## 2021-04-27 PROCEDURE — 30117 REMOVAL OF INTRANASAL LESION: CPT

## 2021-04-27 PROCEDURE — 30930 THER FX NASAL INF TURBINATE: CPT | Mod: 59

## 2021-04-27 PROCEDURE — 30802 ABLATE INF TURBINATE SUBMUC: CPT

## 2021-04-27 PROCEDURE — 99072 ADDL SUPL MATRL&STAF TM PHE: CPT

## 2021-04-29 ENCOUNTER — APPOINTMENT (OUTPATIENT)
Dept: OTOLARYNGOLOGY | Facility: CLINIC | Age: 66
End: 2021-04-29

## 2021-05-06 ENCOUNTER — APPOINTMENT (OUTPATIENT)
Dept: OTOLARYNGOLOGY | Facility: CLINIC | Age: 66
End: 2021-05-06
Payer: COMMERCIAL

## 2021-05-06 DIAGNOSIS — Z98.890 OTHER SPECIFIED POSTPROCEDURAL STATES: ICD-10-CM

## 2021-05-06 PROCEDURE — 99024 POSTOP FOLLOW-UP VISIT: CPT

## 2021-05-06 PROCEDURE — 31237 NSL/SINS NDSC SURG BX POLYPC: CPT | Mod: 58,RT

## 2021-05-06 NOTE — REASON FOR VISIT
[Post-Operative Visit] : a post-operative visit [FreeTextEntry2] : s/p turb reduction, outfracture, cryoblation of nasal nerves

## 2021-05-06 NOTE — HISTORY OF PRESENT ILLNESS
[FreeTextEntry1] : Patient here s/p turb reduction, outfracture, cryoblation of nasal nerves 4/27/21.

## 2021-07-28 ENCOUNTER — RX RENEWAL (OUTPATIENT)
Age: 66
End: 2021-07-28

## 2021-08-02 ENCOUNTER — RX RENEWAL (OUTPATIENT)
Age: 66
End: 2021-08-02

## 2021-08-04 ENCOUNTER — RX RENEWAL (OUTPATIENT)
Age: 66
End: 2021-08-04

## 2021-08-09 ENCOUNTER — APPOINTMENT (OUTPATIENT)
Dept: OTOLARYNGOLOGY | Facility: CLINIC | Age: 66
End: 2021-08-09
Payer: COMMERCIAL

## 2021-08-09 DIAGNOSIS — J01.90 ACUTE SINUSITIS, UNSPECIFIED: ICD-10-CM

## 2021-08-09 PROCEDURE — 99213 OFFICE O/P EST LOW 20 MIN: CPT | Mod: 25

## 2021-08-09 PROCEDURE — 31231 NASAL ENDOSCOPY DX: CPT

## 2021-08-09 PROCEDURE — 69210 REMOVE IMPACTED EAR WAX UNI: CPT

## 2021-08-09 NOTE — PHYSICAL EXAM
[Midline] : trachea located in midline position [Normal] : no rashes [de-identified] : bilateral cerumen impaction, removed with curette

## 2021-08-09 NOTE — REASON FOR VISIT
[Subsequent Evaluation] : a subsequent evaluation for [FreeTextEntry2] : nasal surgery, chronic rhinitis, post nasal drip

## 2021-08-09 NOTE — HISTORY OF PRESENT ILLNESS
[FreeTextEntry1] : Patient presents today following up on nasal surgery, chronic rhinitis, post nasal drip. Patient is s/p turb reduction, outfracture, cryoblation of nasal nerves 4/27/21. Still continues with phlegm and mucous production. Taking nasal spray and antihistamine as prescribed.

## 2021-08-31 ENCOUNTER — APPOINTMENT (OUTPATIENT)
Dept: CARDIOLOGY | Facility: CLINIC | Age: 66
End: 2021-08-31
Payer: COMMERCIAL

## 2021-08-31 VITALS — BODY MASS INDEX: 42.66 KG/M2 | WEIGHT: 315 LBS | HEIGHT: 72 IN

## 2021-08-31 VITALS — DIASTOLIC BLOOD PRESSURE: 80 MMHG | SYSTOLIC BLOOD PRESSURE: 130 MMHG

## 2021-08-31 VITALS — HEART RATE: 64 BPM | RESPIRATION RATE: 18 BRPM

## 2021-08-31 PROCEDURE — 99214 OFFICE O/P EST MOD 30 MIN: CPT

## 2021-08-31 PROCEDURE — 93000 ELECTROCARDIOGRAM COMPLETE: CPT

## 2021-09-23 ENCOUNTER — APPOINTMENT (OUTPATIENT)
Dept: OTOLARYNGOLOGY | Facility: CLINIC | Age: 66
End: 2021-09-23

## 2021-10-27 ENCOUNTER — RX RENEWAL (OUTPATIENT)
Age: 66
End: 2021-10-27

## 2022-02-07 ENCOUNTER — RX RENEWAL (OUTPATIENT)
Age: 67
End: 2022-02-07

## 2022-03-01 ENCOUNTER — APPOINTMENT (OUTPATIENT)
Dept: CARDIOLOGY | Facility: CLINIC | Age: 67
End: 2022-03-01
Payer: COMMERCIAL

## 2022-03-01 VITALS — WEIGHT: 315 LBS | BODY MASS INDEX: 42.66 KG/M2 | HEIGHT: 72 IN

## 2022-03-01 VITALS — DIASTOLIC BLOOD PRESSURE: 80 MMHG | RESPIRATION RATE: 18 BRPM | HEART RATE: 106 BPM | SYSTOLIC BLOOD PRESSURE: 120 MMHG

## 2022-03-01 PROCEDURE — 99214 OFFICE O/P EST MOD 30 MIN: CPT

## 2022-03-01 PROCEDURE — 93000 ELECTROCARDIOGRAM COMPLETE: CPT

## 2022-03-17 ENCOUNTER — OUTPATIENT (OUTPATIENT)
Dept: OUTPATIENT SERVICES | Facility: HOSPITAL | Age: 67
LOS: 1 days | Discharge: HOME | End: 2022-03-17
Payer: COMMERCIAL

## 2022-03-17 ENCOUNTER — RESULT REVIEW (OUTPATIENT)
Age: 67
End: 2022-03-17

## 2022-03-17 DIAGNOSIS — Z90.49 ACQUIRED ABSENCE OF OTHER SPECIFIED PARTS OF DIGESTIVE TRACT: Chronic | ICD-10-CM

## 2022-03-17 DIAGNOSIS — Z95.5 PRESENCE OF CORONARY ANGIOPLASTY IMPLANT AND GRAFT: Chronic | ICD-10-CM

## 2022-03-17 DIAGNOSIS — E78.5 HYPERLIPIDEMIA, UNSPECIFIED: ICD-10-CM

## 2022-03-17 DIAGNOSIS — E11.9 TYPE 2 DIABETES MELLITUS WITHOUT COMPLICATIONS: ICD-10-CM

## 2022-03-17 DIAGNOSIS — I70.90 UNSPECIFIED ATHEROSCLEROSIS: ICD-10-CM

## 2022-03-17 DIAGNOSIS — Z90.410 ACQUIRED TOTAL ABSENCE OF PANCREAS: Chronic | ICD-10-CM

## 2022-03-17 DIAGNOSIS — Z96.652 PRESENCE OF LEFT ARTIFICIAL KNEE JOINT: Chronic | ICD-10-CM

## 2022-03-17 DIAGNOSIS — I25.10 ATHEROSCLEROTIC HEART DISEASE OF NATIVE CORONARY ARTERY WITHOUT ANGINA PECTORIS: ICD-10-CM

## 2022-03-17 PROCEDURE — 78452 HT MUSCLE IMAGE SPECT MULT: CPT | Mod: 26

## 2022-03-31 ENCOUNTER — APPOINTMENT (OUTPATIENT)
Dept: CARDIOLOGY | Facility: CLINIC | Age: 67
End: 2022-03-31
Payer: COMMERCIAL

## 2022-03-31 PROCEDURE — 93306 TTE W/DOPPLER COMPLETE: CPT

## 2022-04-19 ENCOUNTER — NON-APPOINTMENT (OUTPATIENT)
Age: 67
End: 2022-04-19

## 2022-05-02 ENCOUNTER — RX RENEWAL (OUTPATIENT)
Age: 67
End: 2022-05-02

## 2022-05-05 ENCOUNTER — RX RENEWAL (OUTPATIENT)
Age: 67
End: 2022-05-05

## 2022-05-05 ENCOUNTER — APPOINTMENT (OUTPATIENT)
Dept: OTOLARYNGOLOGY | Facility: CLINIC | Age: 67
End: 2022-05-05
Payer: COMMERCIAL

## 2022-05-05 DIAGNOSIS — H66.90 OTITIS MEDIA, UNSPECIFIED, UNSPECIFIED EAR: ICD-10-CM

## 2022-05-05 DIAGNOSIS — H93.8X9 OTHER SPECIFIED DISORDERS OF EAR, UNSPECIFIED EAR: ICD-10-CM

## 2022-05-05 PROCEDURE — 92557 COMPREHENSIVE HEARING TEST: CPT

## 2022-05-05 PROCEDURE — 99214 OFFICE O/P EST MOD 30 MIN: CPT | Mod: 25

## 2022-05-05 PROCEDURE — 92550 TYMPANOMETRY & REFLEX THRESH: CPT

## 2022-05-05 PROCEDURE — 69420 INCISION OF EARDRUM: CPT | Mod: 50

## 2022-05-05 NOTE — PROCEDURE
[Flexible Endoscope] : examined with the flexible endoscope [Normal] : the paranasal sinuses had no abnormalities [Same] : same as the Pre Op Dx. [] : Myringotomy [FreeTextEntry4] : tetracaine

## 2022-05-05 NOTE — HISTORY OF PRESENT ILLNESS
[FreeTextEntry1] : Patient presents today with c/o b/l ear fluid. Patient states recently had ears flushed out. Feels like he is under water. No otalgia. No pressure or pain. Pulsatile tinnitus.

## 2022-05-19 ENCOUNTER — APPOINTMENT (OUTPATIENT)
Dept: OTOLARYNGOLOGY | Facility: CLINIC | Age: 67
End: 2022-05-19
Payer: COMMERCIAL

## 2022-05-19 PROCEDURE — 92550 TYMPANOMETRY & REFLEX THRESH: CPT

## 2022-05-19 PROCEDURE — 99214 OFFICE O/P EST MOD 30 MIN: CPT | Mod: 25

## 2022-05-19 NOTE — REASON FOR VISIT
[Subsequent Evaluation] : a subsequent evaluation for [FreeTextEntry2] : chronic rhinitis and nasal drip  and fluid in ears

## 2022-05-19 NOTE — HISTORY OF PRESENT ILLNESS
[FreeTextEntry1] : Patient following up today on chronic rhinitis, post nasal drip and fluid behind ears.  Patient is doing well . Has occasional pain when fluid builds up.  Patient states still with excess phlegm and mucus production. Bilateral myringotomies at last visit May 5th.

## 2022-07-21 ENCOUNTER — APPOINTMENT (OUTPATIENT)
Dept: OTOLARYNGOLOGY | Facility: CLINIC | Age: 67
End: 2022-07-21
Payer: COMMERCIAL

## 2022-07-21 PROCEDURE — 99213 OFFICE O/P EST LOW 20 MIN: CPT | Mod: 25

## 2022-07-21 PROCEDURE — 31231 NASAL ENDOSCOPY DX: CPT

## 2022-07-21 NOTE — REASON FOR VISIT
[Subsequent Evaluation] : a subsequent evaluation for [FreeTextEntry2] : chronic dysfunction b/l eustachian tubes

## 2022-07-21 NOTE — HISTORY OF PRESENT ILLNESS
[FreeTextEntry1] : Patient returns today following up on chronic dysfunction b/l eustachian tubes. Denies any recent ear discomfort . Would like  ear  evaluated   for cerumen . No  itching or pressure in ear .

## 2022-08-31 ENCOUNTER — APPOINTMENT (OUTPATIENT)
Dept: CARDIOLOGY | Facility: CLINIC | Age: 67
End: 2022-08-31

## 2022-08-31 VITALS — HEIGHT: 72 IN | BODY MASS INDEX: 42.66 KG/M2 | WEIGHT: 315 LBS

## 2022-08-31 VITALS — HEART RATE: 80 BPM | SYSTOLIC BLOOD PRESSURE: 126 MMHG | RESPIRATION RATE: 18 BRPM | DIASTOLIC BLOOD PRESSURE: 84 MMHG

## 2022-08-31 PROCEDURE — 93000 ELECTROCARDIOGRAM COMPLETE: CPT

## 2022-08-31 PROCEDURE — 99214 OFFICE O/P EST MOD 30 MIN: CPT | Mod: 25

## 2022-08-31 RX ORDER — METOLAZONE 2.5 MG/1
2.5 TABLET ORAL
Refills: 0 | Status: ACTIVE | COMMUNITY

## 2022-09-24 ENCOUNTER — INPATIENT (INPATIENT)
Facility: HOSPITAL | Age: 67
LOS: 2 days | Discharge: HOME | End: 2022-09-27
Attending: INTERNAL MEDICINE | Admitting: INTERNAL MEDICINE

## 2022-09-24 VITALS
RESPIRATION RATE: 20 BRPM | HEART RATE: 121 BPM | SYSTOLIC BLOOD PRESSURE: 154 MMHG | TEMPERATURE: 101 F | DIASTOLIC BLOOD PRESSURE: 72 MMHG | OXYGEN SATURATION: 96 %

## 2022-09-24 DIAGNOSIS — Z96.652 PRESENCE OF LEFT ARTIFICIAL KNEE JOINT: Chronic | ICD-10-CM

## 2022-09-24 DIAGNOSIS — Z95.5 PRESENCE OF CORONARY ANGIOPLASTY IMPLANT AND GRAFT: Chronic | ICD-10-CM

## 2022-09-24 DIAGNOSIS — Z90.49 ACQUIRED ABSENCE OF OTHER SPECIFIED PARTS OF DIGESTIVE TRACT: Chronic | ICD-10-CM

## 2022-09-24 DIAGNOSIS — Z90.410 ACQUIRED TOTAL ABSENCE OF PANCREAS: Chronic | ICD-10-CM

## 2022-09-24 PROCEDURE — 93010 ELECTROCARDIOGRAM REPORT: CPT

## 2022-09-24 PROCEDURE — 99285 EMERGENCY DEPT VISIT HI MDM: CPT

## 2022-09-24 RX ORDER — ACETAMINOPHEN 500 MG
975 TABLET ORAL ONCE
Refills: 0 | Status: COMPLETED | OUTPATIENT
Start: 2022-09-24 | End: 2022-09-24

## 2022-09-24 NOTE — ED PROVIDER NOTE - NS ED ROS FT
Constitutional: (+) body aches, (-) fever  Eyes/ENT: (-) blurry vision, (-) epistaxis  Cardiovascular: (+) chest pain, (-) syncope  Respiratory: (+) cough, (-) shortness of breath  Gastrointestinal: (-) vomiting, (-) diarrhea  : (-) dysuria, (-) hematuria  Musculoskeletal: (-) neck pain, (-) back pain, (-) joint pain  Integumentary: (-) rash, (-) edema  Neurological: (-) headache, (-) altered mental status  Allergic/Immunologic: (-) pruritus

## 2022-09-24 NOTE — ED PROVIDER NOTE - NSICDXPASTMEDICALHX_GEN_ALL_CORE_FT
PAST MEDICAL HISTORY:  BPH (benign prostatic hyperplasia)     CAD (coronary artery disease)     Diabetes     Diabetes mellitus, type 2     Essential hypertension     H/O hypercholesterolemia     Intubation of airway performed without difficulty     Pneumonia     Sepsis     Water retention

## 2022-09-24 NOTE — ED PROVIDER NOTE - ATTENDING APP SHARED VISIT CONTRIBUTION OF CARE
I personally evaluated the patient. I reviewed the Resident´s or Physician Assistant´s note (as assigned above), and agree with the findings and plan except as documented in my note.  67-year-old male, status post CABG, cholecystectomy, pancreatitis, presents with fever, cough and body aches associated with chest pain for 2 days.  Exam shows alert patient in no distress, HEENT NCAT PERRL, neck supple, throat no exudates, lungs clear, RR S1S2, abdomen soft NT +BS, no CCE, skin no rash, neuro A&OX3 GCS 15 no deficits.

## 2022-09-24 NOTE — ED PROVIDER NOTE - NSICDXPASTSURGICALHX_GEN_ALL_CORE_FT
PAST SURGICAL HISTORY:  H/O right coronary artery stent placement     History of cholecystectomy     History of left knee replacement     History of resection of pancreas

## 2022-09-24 NOTE — ED PROVIDER NOTE - PHYSICAL EXAMINATION
CONST: Febrile. NAD  EYES: Sclera and conjunctiva clear.   ENT: No nasal discharge. Oropharynx normal appearing, no erythema or exudates. No abscess or swelling. Uvula midline.   NECK: Non-tender, no meningeal signs. normal ROM. supple   CARD: Tachycardiac S1 S2; No jvd  RESP: Equal BS B/L, No wheezes, rhonchi or rales. No distress  GI: Soft, non-tender, non-distended. no cva tenderness. normal BS  MS: Normal ROM in all extremities. pulses 2 +. no calf tenderness or swelling  SKIN: Warm, dry, no acute rashes. Good turgor  NEURO: A&Ox3, No focal deficits. Strength 5/5 with no sensory deficits.

## 2022-09-24 NOTE — ED PROVIDER NOTE - CLINICAL SUMMARY MEDICAL DECISION MAKING FREE TEXT BOX
Labs noted for WBC 32, 9% bands, troponin 0.04, .  RVP swab negative.  EKG sinus tach no acute changes.  Chest x-ray no opacities.  CTA chest bilateral nodules, no dissection.  Given IV fluids, ceftriaxone and azithromycin.  Will admit to telemetry.

## 2022-09-24 NOTE — ED PROVIDER NOTE - NS ED ATTENDING STATEMENT MOD
This was a shared visit with the TRIXIE. I reviewed and verified the documentation and independently performed the documented:

## 2022-09-24 NOTE — ED PROVIDER NOTE - OBJECTIVE STATEMENT
67y M pmh DM, DLD, HTN, CAD s/p CABG, Afib on xarelto presents for eval of chest pain. Pt has moderate aching substernal chest pain x2 days, no inciting or relieving factors. Associated body aches and np cough. Denies fever, ha, sob, abd pain, dysuria, hematuria, n/v/d/c

## 2022-09-24 NOTE — ED PROVIDER NOTE - CARE PLAN
1 Principal Discharge DX:	Sepsis  Secondary Diagnosis:	Chest pain  Secondary Diagnosis:	Hypomagnesemia  Secondary Diagnosis:	Pulmonary nodules

## 2022-09-25 LAB
ALBUMIN SERPL ELPH-MCNC: 3.7 G/DL — SIGNIFICANT CHANGE UP (ref 3.5–5.2)
ALP SERPL-CCNC: 90 U/L — SIGNIFICANT CHANGE UP (ref 30–115)
ALT FLD-CCNC: 28 U/L — SIGNIFICANT CHANGE UP (ref 0–41)
ANION GAP SERPL CALC-SCNC: 12 MMOL/L — SIGNIFICANT CHANGE UP (ref 7–14)
ANION GAP SERPL CALC-SCNC: 13 MMOL/L — SIGNIFICANT CHANGE UP (ref 7–14)
APPEARANCE UR: CLEAR — SIGNIFICANT CHANGE UP
APTT BLD: 33.7 SEC — SIGNIFICANT CHANGE UP (ref 27–39.2)
AST SERPL-CCNC: 32 U/L — SIGNIFICANT CHANGE UP (ref 0–41)
BACTERIA # UR AUTO: ABNORMAL
BASOPHILS # BLD AUTO: 0.13 K/UL — SIGNIFICANT CHANGE UP (ref 0–0.2)
BASOPHILS NFR BLD AUTO: 0.4 % — SIGNIFICANT CHANGE UP (ref 0–1)
BILIRUB SERPL-MCNC: 0.6 MG/DL — SIGNIFICANT CHANGE UP (ref 0.2–1.2)
BILIRUB UR-MCNC: NEGATIVE — SIGNIFICANT CHANGE UP
BUN SERPL-MCNC: 30 MG/DL — HIGH (ref 10–20)
BUN SERPL-MCNC: 34 MG/DL — HIGH (ref 10–20)
CALCIUM SERPL-MCNC: 10.3 MG/DL — SIGNIFICANT CHANGE UP (ref 8.4–10.5)
CALCIUM SERPL-MCNC: 9.3 MG/DL — SIGNIFICANT CHANGE UP (ref 8.4–10.5)
CHLORIDE SERPL-SCNC: 92 MMOL/L — LOW (ref 98–110)
CHLORIDE SERPL-SCNC: 95 MMOL/L — LOW (ref 98–110)
CO2 SERPL-SCNC: 27 MMOL/L — SIGNIFICANT CHANGE UP (ref 17–32)
CO2 SERPL-SCNC: 28 MMOL/L — SIGNIFICANT CHANGE UP (ref 17–32)
COD CRY URNS QL: NEGATIVE — SIGNIFICANT CHANGE UP
COLOR SPEC: YELLOW — SIGNIFICANT CHANGE UP
CREAT SERPL-MCNC: 1.5 MG/DL — SIGNIFICANT CHANGE UP (ref 0.7–1.5)
CREAT SERPL-MCNC: 1.7 MG/DL — HIGH (ref 0.7–1.5)
DIFF PNL FLD: NEGATIVE — SIGNIFICANT CHANGE UP
EGFR: 44 ML/MIN/1.73M2 — LOW
EGFR: 51 ML/MIN/1.73M2 — LOW
EOSINOPHIL # BLD AUTO: 0.01 K/UL — SIGNIFICANT CHANGE UP (ref 0–0.7)
EOSINOPHIL NFR BLD AUTO: 0 % — SIGNIFICANT CHANGE UP (ref 0–8)
EPI CELLS # UR: NEGATIVE — SIGNIFICANT CHANGE UP
GLUCOSE BLDC GLUCOMTR-MCNC: 294 MG/DL — HIGH (ref 70–99)
GLUCOSE BLDC GLUCOMTR-MCNC: 324 MG/DL — HIGH (ref 70–99)
GLUCOSE BLDC GLUCOMTR-MCNC: 462 MG/DL — CRITICAL HIGH (ref 70–99)
GLUCOSE SERPL-MCNC: 102 MG/DL — HIGH (ref 70–99)
GLUCOSE SERPL-MCNC: 281 MG/DL — HIGH (ref 70–99)
GLUCOSE UR QL: 100 MG/DL
GRAN CASTS # UR COMP ASSIST: NEGATIVE — SIGNIFICANT CHANGE UP
HCT VFR BLD CALC: 42.6 % — SIGNIFICANT CHANGE UP (ref 42–52)
HCT VFR BLD CALC: 45.8 % — SIGNIFICANT CHANGE UP (ref 42–52)
HGB BLD-MCNC: 13.9 G/DL — LOW (ref 14–18)
HGB BLD-MCNC: 15.1 G/DL — SIGNIFICANT CHANGE UP (ref 14–18)
HYALINE CASTS # UR AUTO: NEGATIVE — SIGNIFICANT CHANGE UP
IMM GRANULOCYTES NFR BLD AUTO: 1.9 % — HIGH (ref 0.1–0.3)
INR BLD: 1.19 RATIO — SIGNIFICANT CHANGE UP (ref 0.65–1.3)
KETONES UR-MCNC: NEGATIVE — SIGNIFICANT CHANGE UP
LACTATE SERPL-SCNC: 2.4 MMOL/L — HIGH (ref 0.7–2)
LDH SERPL L TO P-CCNC: 190 U/L — SIGNIFICANT CHANGE UP (ref 50–242)
LEUKOCYTE ESTERASE UR-ACNC: NEGATIVE — SIGNIFICANT CHANGE UP
LIDOCAIN IGE QN: 13 U/L — SIGNIFICANT CHANGE UP (ref 7–60)
LYMPHOCYTES # BLD AUTO: 0.95 K/UL — LOW (ref 1.2–3.4)
LYMPHOCYTES # BLD AUTO: 2.9 % — LOW (ref 20.5–51.1)
MAGNESIUM SERPL-MCNC: 1.2 MG/DL — LOW (ref 1.8–2.4)
MAGNESIUM SERPL-MCNC: 1.8 MG/DL — SIGNIFICANT CHANGE UP (ref 1.8–2.4)
MANUAL SMEAR VERIFICATION: SIGNIFICANT CHANGE UP
MCHC RBC-ENTMCNC: 31.3 PG — HIGH (ref 27–31)
MCHC RBC-ENTMCNC: 31.4 PG — HIGH (ref 27–31)
MCHC RBC-ENTMCNC: 32.6 G/DL — SIGNIFICANT CHANGE UP (ref 32–37)
MCHC RBC-ENTMCNC: 33 G/DL — SIGNIFICANT CHANGE UP (ref 32–37)
MCV RBC AUTO: 95 FL — HIGH (ref 80–94)
MCV RBC AUTO: 96.4 FL — HIGH (ref 80–94)
MONOCYTES # BLD AUTO: 2.27 K/UL — HIGH (ref 0.1–0.6)
MONOCYTES NFR BLD AUTO: 7 % — SIGNIFICANT CHANGE UP (ref 1.7–9.3)
NEUTROPHILS # BLD AUTO: 28.51 K/UL — HIGH (ref 1.4–6.5)
NEUTROPHILS NFR BLD AUTO: 87.8 % — HIGH (ref 42.2–75.2)
NEUTS BAND # BLD: 9 % — HIGH (ref 0–6)
NITRITE UR-MCNC: NEGATIVE — SIGNIFICANT CHANGE UP
NRBC # BLD: 0 /100 WBCS — SIGNIFICANT CHANGE UP (ref 0–0)
NRBC # BLD: 0 /100 WBCS — SIGNIFICANT CHANGE UP (ref 0–0)
NRBC # BLD: 1 /100 — HIGH (ref 0–0)
NT-PROBNP SERPL-SCNC: 208 PG/ML — SIGNIFICANT CHANGE UP (ref 0–300)
PH UR: 6 — SIGNIFICANT CHANGE UP (ref 5–8)
PHOSPHATE SERPL-MCNC: 4 MG/DL — SIGNIFICANT CHANGE UP (ref 2.1–4.9)
PLAT MORPH BLD: NORMAL — SIGNIFICANT CHANGE UP
PLATELET # BLD AUTO: 328 K/UL — SIGNIFICANT CHANGE UP (ref 130–400)
PLATELET # BLD AUTO: 351 K/UL — SIGNIFICANT CHANGE UP (ref 130–400)
PLATELET COUNT - ESTIMATE: NORMAL — SIGNIFICANT CHANGE UP
POTASSIUM SERPL-MCNC: 4.5 MMOL/L — SIGNIFICANT CHANGE UP (ref 3.5–5)
POTASSIUM SERPL-MCNC: 5.2 MMOL/L — HIGH (ref 3.5–5)
POTASSIUM SERPL-SCNC: 4.5 MMOL/L — SIGNIFICANT CHANGE UP (ref 3.5–5)
POTASSIUM SERPL-SCNC: 5.2 MMOL/L — HIGH (ref 3.5–5)
PROCALCITONIN SERPL-MCNC: 0.98 NG/ML — HIGH (ref 0.02–0.1)
PROT SERPL-MCNC: 6.7 G/DL — SIGNIFICANT CHANGE UP (ref 6–8)
PROT UR-MCNC: >=300 MG/DL
PROTHROM AB SERPL-ACNC: 13.7 SEC — HIGH (ref 9.95–12.87)
RAPID RVP RESULT: SIGNIFICANT CHANGE UP
RBC # BLD: 4.42 M/UL — LOW (ref 4.7–6.1)
RBC # BLD: 4.82 M/UL — SIGNIFICANT CHANGE UP (ref 4.7–6.1)
RBC # FLD: 14.6 % — HIGH (ref 11.5–14.5)
RBC # FLD: 15 % — HIGH (ref 11.5–14.5)
RBC BLD AUTO: NORMAL — SIGNIFICANT CHANGE UP
RBC CASTS # UR COMP ASSIST: NEGATIVE — SIGNIFICANT CHANGE UP
SARS-COV-2 RNA SPEC QL NAA+PROBE: SIGNIFICANT CHANGE UP
SODIUM SERPL-SCNC: 132 MMOL/L — LOW (ref 135–146)
SODIUM SERPL-SCNC: 135 MMOL/L — SIGNIFICANT CHANGE UP (ref 135–146)
SP GR SPEC: 1.02 — SIGNIFICANT CHANGE UP (ref 1.01–1.03)
TRI-PHOS CRY UR QL COMP ASSIST: NEGATIVE — SIGNIFICANT CHANGE UP
TROPONIN T SERPL-MCNC: 0.04 NG/ML — CRITICAL HIGH
TROPONIN T SERPL-MCNC: 0.04 NG/ML — CRITICAL HIGH
URATE CRY FLD QL MICRO: NEGATIVE — SIGNIFICANT CHANGE UP
UROBILINOGEN FLD QL: 0.2 MG/DL — SIGNIFICANT CHANGE UP
WBC # BLD: 24.93 K/UL — HIGH (ref 4.8–10.8)
WBC # BLD: 32.49 K/UL — HIGH (ref 4.8–10.8)
WBC # FLD AUTO: 24.93 K/UL — HIGH (ref 4.8–10.8)
WBC # FLD AUTO: 32.49 K/UL — HIGH (ref 4.8–10.8)
WBC UR QL: SIGNIFICANT CHANGE UP /HPF

## 2022-09-25 PROCEDURE — 71275 CT ANGIOGRAPHY CHEST: CPT | Mod: 26,MA

## 2022-09-25 PROCEDURE — 99222 1ST HOSP IP/OBS MODERATE 55: CPT

## 2022-09-25 PROCEDURE — 74174 CTA ABD&PLVS W/CONTRAST: CPT | Mod: 26,MA

## 2022-09-25 PROCEDURE — 71045 X-RAY EXAM CHEST 1 VIEW: CPT | Mod: 26

## 2022-09-25 PROCEDURE — 99223 1ST HOSP IP/OBS HIGH 75: CPT

## 2022-09-25 RX ORDER — OXYCODONE AND ACETAMINOPHEN 5; 325 MG/1; MG/1
1 TABLET ORAL ONCE
Refills: 0 | Status: DISCONTINUED | OUTPATIENT
Start: 2022-09-25 | End: 2022-09-25

## 2022-09-25 RX ORDER — SODIUM CHLORIDE 9 MG/ML
1000 INJECTION INTRAMUSCULAR; INTRAVENOUS; SUBCUTANEOUS
Refills: 0 | Status: DISCONTINUED | OUTPATIENT
Start: 2022-09-25 | End: 2022-09-27

## 2022-09-25 RX ORDER — CEFTRIAXONE 500 MG/1
1000 INJECTION, POWDER, FOR SOLUTION INTRAMUSCULAR; INTRAVENOUS EVERY 24 HOURS
Refills: 0 | Status: DISCONTINUED | OUTPATIENT
Start: 2022-09-25 | End: 2022-09-26

## 2022-09-25 RX ORDER — ATORVASTATIN CALCIUM 80 MG/1
10 TABLET, FILM COATED ORAL DAILY
Refills: 0 | Status: DISCONTINUED | OUTPATIENT
Start: 2022-09-25 | End: 2022-09-27

## 2022-09-25 RX ORDER — SODIUM CHLORIDE 9 MG/ML
1000 INJECTION, SOLUTION INTRAVENOUS
Refills: 0 | Status: DISCONTINUED | OUTPATIENT
Start: 2022-09-25 | End: 2022-09-27

## 2022-09-25 RX ORDER — CEFTRIAXONE 500 MG/1
1000 INJECTION, POWDER, FOR SOLUTION INTRAMUSCULAR; INTRAVENOUS ONCE
Refills: 0 | Status: COMPLETED | OUTPATIENT
Start: 2022-09-25 | End: 2022-09-25

## 2022-09-25 RX ORDER — MORPHINE SULFATE 50 MG/1
2 CAPSULE, EXTENDED RELEASE ORAL EVERY 4 HOURS
Refills: 0 | Status: DISCONTINUED | OUTPATIENT
Start: 2022-09-25 | End: 2022-09-27

## 2022-09-25 RX ORDER — AZITHROMYCIN 500 MG/1
500 TABLET, FILM COATED ORAL EVERY 24 HOURS
Refills: 0 | Status: DISCONTINUED | OUTPATIENT
Start: 2022-09-25 | End: 2022-09-26

## 2022-09-25 RX ORDER — LISINOPRIL 2.5 MG/1
20 TABLET ORAL DAILY
Refills: 0 | Status: DISCONTINUED | OUTPATIENT
Start: 2022-09-25 | End: 2022-09-26

## 2022-09-25 RX ORDER — TAMSULOSIN HYDROCHLORIDE 0.4 MG/1
0.4 CAPSULE ORAL AT BEDTIME
Refills: 0 | Status: DISCONTINUED | OUTPATIENT
Start: 2022-09-25 | End: 2022-09-27

## 2022-09-25 RX ORDER — MAGNESIUM SULFATE 500 MG/ML
2 VIAL (ML) INJECTION ONCE
Refills: 0 | Status: COMPLETED | OUTPATIENT
Start: 2022-09-25 | End: 2022-09-25

## 2022-09-25 RX ORDER — OXYBUTYNIN CHLORIDE 5 MG
10 TABLET ORAL AT BEDTIME
Refills: 0 | Status: DISCONTINUED | OUTPATIENT
Start: 2022-09-25 | End: 2022-09-27

## 2022-09-25 RX ORDER — AZITHROMYCIN 500 MG/1
500 TABLET, FILM COATED ORAL ONCE
Refills: 0 | Status: COMPLETED | OUTPATIENT
Start: 2022-09-25 | End: 2022-09-25

## 2022-09-25 RX ORDER — INSULIN LISPRO 100/ML
VIAL (ML) SUBCUTANEOUS
Refills: 0 | Status: DISCONTINUED | OUTPATIENT
Start: 2022-09-25 | End: 2022-09-26

## 2022-09-25 RX ORDER — DEXTROSE 50 % IN WATER 50 %
15 SYRINGE (ML) INTRAVENOUS ONCE
Refills: 0 | Status: DISCONTINUED | OUTPATIENT
Start: 2022-09-25 | End: 2022-09-27

## 2022-09-25 RX ORDER — DEXTROSE 50 % IN WATER 50 %
25 SYRINGE (ML) INTRAVENOUS ONCE
Refills: 0 | Status: DISCONTINUED | OUTPATIENT
Start: 2022-09-25 | End: 2022-09-27

## 2022-09-25 RX ORDER — INSULIN LISPRO 100/ML
8 VIAL (ML) SUBCUTANEOUS ONCE
Refills: 0 | Status: COMPLETED | OUTPATIENT
Start: 2022-09-25 | End: 2022-09-25

## 2022-09-25 RX ORDER — METOPROLOL TARTRATE 50 MG
25 TABLET ORAL ONCE
Refills: 0 | Status: COMPLETED | OUTPATIENT
Start: 2022-09-25 | End: 2022-09-25

## 2022-09-25 RX ORDER — VANCOMYCIN HCL 1 G
2000 VIAL (EA) INTRAVENOUS ONCE
Refills: 0 | Status: COMPLETED | OUTPATIENT
Start: 2022-09-25 | End: 2022-09-25

## 2022-09-25 RX ORDER — SODIUM CHLORIDE 9 MG/ML
1000 INJECTION, SOLUTION INTRAVENOUS ONCE
Refills: 0 | Status: COMPLETED | OUTPATIENT
Start: 2022-09-25 | End: 2022-09-25

## 2022-09-25 RX ORDER — RIVAROXABAN 15 MG-20MG
20 KIT ORAL DAILY
Refills: 0 | Status: DISCONTINUED | OUTPATIENT
Start: 2022-09-25 | End: 2022-09-27

## 2022-09-25 RX ORDER — SPIRONOLACTONE 25 MG/1
25 TABLET, FILM COATED ORAL DAILY
Refills: 0 | Status: DISCONTINUED | OUTPATIENT
Start: 2022-09-25 | End: 2022-09-26

## 2022-09-25 RX ORDER — MORPHINE SULFATE 50 MG/1
4 CAPSULE, EXTENDED RELEASE ORAL ONCE
Refills: 0 | Status: DISCONTINUED | OUTPATIENT
Start: 2022-09-25 | End: 2022-09-25

## 2022-09-25 RX ORDER — ASPIRIN/CALCIUM CARB/MAGNESIUM 324 MG
81 TABLET ORAL DAILY
Refills: 0 | Status: DISCONTINUED | OUTPATIENT
Start: 2022-09-25 | End: 2022-09-27

## 2022-09-25 RX ORDER — METOPROLOL TARTRATE 50 MG
25 TABLET ORAL
Refills: 0 | Status: DISCONTINUED | OUTPATIENT
Start: 2022-09-25 | End: 2022-09-27

## 2022-09-25 RX ORDER — LORATADINE 10 MG/1
10 TABLET ORAL AT BEDTIME
Refills: 0 | Status: DISCONTINUED | OUTPATIENT
Start: 2022-09-25 | End: 2022-09-27

## 2022-09-25 RX ORDER — GLUCAGON INJECTION, SOLUTION 0.5 MG/.1ML
1 INJECTION, SOLUTION SUBCUTANEOUS ONCE
Refills: 0 | Status: DISCONTINUED | OUTPATIENT
Start: 2022-09-25 | End: 2022-09-27

## 2022-09-25 RX ORDER — ACETAMINOPHEN 500 MG
650 TABLET ORAL EVERY 6 HOURS
Refills: 0 | Status: DISCONTINUED | OUTPATIENT
Start: 2022-09-25 | End: 2022-09-27

## 2022-09-25 RX ORDER — FUROSEMIDE 40 MG
80 TABLET ORAL DAILY
Refills: 0 | Status: DISCONTINUED | OUTPATIENT
Start: 2022-09-25 | End: 2022-09-26

## 2022-09-25 RX ORDER — DEXTROSE 50 % IN WATER 50 %
12.5 SYRINGE (ML) INTRAVENOUS ONCE
Refills: 0 | Status: DISCONTINUED | OUTPATIENT
Start: 2022-09-25 | End: 2022-09-27

## 2022-09-25 RX ADMIN — MORPHINE SULFATE 4 MILLIGRAM(S): 50 CAPSULE, EXTENDED RELEASE ORAL at 01:40

## 2022-09-25 RX ADMIN — TAMSULOSIN HYDROCHLORIDE 0.4 MILLIGRAM(S): 0.4 CAPSULE ORAL at 22:00

## 2022-09-25 RX ADMIN — Medication 8 UNIT(S): at 21:54

## 2022-09-25 RX ADMIN — Medication 25 MILLIGRAM(S): at 12:21

## 2022-09-25 RX ADMIN — LISINOPRIL 20 MILLIGRAM(S): 2.5 TABLET ORAL at 11:28

## 2022-09-25 RX ADMIN — Medication 4: at 16:52

## 2022-09-25 RX ADMIN — Medication 650 MILLIGRAM(S): at 17:51

## 2022-09-25 RX ADMIN — CEFTRIAXONE 100 MILLIGRAM(S): 500 INJECTION, POWDER, FOR SOLUTION INTRAMUSCULAR; INTRAVENOUS at 00:48

## 2022-09-25 RX ADMIN — RIVAROXABAN 20 MILLIGRAM(S): KIT at 11:28

## 2022-09-25 RX ADMIN — Medication 650 MILLIGRAM(S): at 11:27

## 2022-09-25 RX ADMIN — Medication 3: at 12:02

## 2022-09-25 RX ADMIN — Medication 30 MILLILITER(S): at 10:38

## 2022-09-25 RX ADMIN — SODIUM CHLORIDE 1000 MILLILITER(S): 9 INJECTION, SOLUTION INTRAVENOUS at 00:48

## 2022-09-25 RX ADMIN — Medication 80 MILLIGRAM(S): at 11:28

## 2022-09-25 RX ADMIN — Medication 25 GRAM(S): at 03:20

## 2022-09-25 RX ADMIN — Medication 650 MILLIGRAM(S): at 12:04

## 2022-09-25 RX ADMIN — MORPHINE SULFATE 2 MILLIGRAM(S): 50 CAPSULE, EXTENDED RELEASE ORAL at 08:09

## 2022-09-25 RX ADMIN — Medication 81 MILLIGRAM(S): at 11:27

## 2022-09-25 RX ADMIN — Medication 975 MILLIGRAM(S): at 00:19

## 2022-09-25 RX ADMIN — SPIRONOLACTONE 25 MILLIGRAM(S): 25 TABLET, FILM COATED ORAL at 11:28

## 2022-09-25 RX ADMIN — SODIUM CHLORIDE 100 MILLILITER(S): 9 INJECTION INTRAMUSCULAR; INTRAVENOUS; SUBCUTANEOUS at 08:02

## 2022-09-25 RX ADMIN — MORPHINE SULFATE 2 MILLIGRAM(S): 50 CAPSULE, EXTENDED RELEASE ORAL at 09:00

## 2022-09-25 RX ADMIN — AZITHROMYCIN 255 MILLIGRAM(S): 500 TABLET, FILM COATED ORAL at 01:40

## 2022-09-25 RX ADMIN — OXYCODONE AND ACETAMINOPHEN 1 TABLET(S): 5; 325 TABLET ORAL at 16:37

## 2022-09-25 RX ADMIN — Medication 650 MILLIGRAM(S): at 17:45

## 2022-09-25 RX ADMIN — Medication 250 MILLIGRAM(S): at 10:23

## 2022-09-25 RX ADMIN — OXYCODONE AND ACETAMINOPHEN 1 TABLET(S): 5; 325 TABLET ORAL at 17:45

## 2022-09-25 RX ADMIN — Medication 25 MILLIGRAM(S): at 17:45

## 2022-09-25 NOTE — CHART NOTE - NSCHARTNOTEFT_GEN_A_CORE
Patient with insulin p[ump, patient reports he uses Lispro however the pump is empty at this time.  Will order FS AC w/ss coverage for now.  Will consider basal/bolus based on FS trend.  D/w medicine attending.

## 2022-09-25 NOTE — CHART NOTE - NSCHARTNOTEFT_GEN_A_CORE
Pt c/o left lower extremity pain, erythema, swelling.  Pt states he has had cellulitis in the past and is concerned his left lower extremity is presenting as before.  ID consulted for fever.   Pt is presently on vanco, zithromycin and rocephin.

## 2022-09-25 NOTE — PATIENT PROFILE ADULT - MONEY FOR FOOD
Returned call to [employer]. GISELLE with details of last script sent from our clinic was on 12/21 for torsemide (increased dose to 30mg daily x 3 days). Also informed that script sent today (clindamycin)  was from Dr Nadege Bay. Any further questions, they may call back. no

## 2022-09-25 NOTE — PROGRESS NOTE ADULT - ASSESSMENT
66 y/o M with a pmhx of DM, CAD, HLD, HTN, hx of pancreatitis, who reports complaining of a sudden onset of chest pain/ epigastric abdominal pain, body aches, fever (tmax 100F) and chills that began this afternoon.     #Elevated troponin likely demand ischemia in the setting of SIRS/KRISTINE, less likely ACS  #Hx of CAD s/p CABG  -EKG sinus tachycardia, no acute changes  -Initial trop .04-->0.04  PLAN  -Cardiac monitoring  -Cardiology eval appreciated  -F/U Dr Moore  as outpatient    #SIRS, Fever + Leukocytosis with left shift - undetermined etiology  -will cover emperically; Continue with antibiotics, ceftriaxone, azithromycin, Vancomycin X1 pending ID evaluation  -Follow up procalcitonin  -Follow up UA  -f/u blood cultures  -ID consult for further abx recommendations.     #Chest pain/ epigastric abdominal pain  -R/O ACS vs. atypical chest pain  -Patient has a hx of pancreatitis in the past.  -GI consult    #Elevated lactate   -Lactate 2.4  -Continue with IVF at 100cc/hr  -Will repeat    #Hypomagnesemia  -Mg 1.2  -s/p IV Mg2+  -monitor Mg2+    #Multiple bilateral pulmonary nodules measuring up to 5 mm.   -Pt is a non smoker  -Outpatient repeat CT recommended in 6-12 months  -Explained to patient, pt expressed verbal understanding and will follow up as out patient for repeat imaging    #DM  -ISS  -FS ac/hs    #HTN  -Low sodium diet  -Monitor blood pressure  -Continue with home medications    #HLD  -Continue with home medications    Progress Note Handoff  Pending Consults: GI, ID  Pending Tests: blood cx, urine cx, further w/u for SIRS and abd pain  Pending Results: clinical improvement   Family Discussion: Patient   Disposition: Home__Xacute___/SNF______/Other_____/Unknown at this time_____  Spent over 35 min reviewing chart and on coordinating patient care during interdisciplinary rounds

## 2022-09-25 NOTE — PROGRESS NOTE ADULT - SUBJECTIVE AND OBJECTIVE BOX
MORGAN BUSH  67y  Male      Patient is a 67y old  Male who presents with a chief complaint of     INTERVAL HPI/OVERNIGHT EVENTS:  Patient seen and examined earlier this morning; patient sitting up and having breakfast. He reports of epigastric abd pain, reports pain;       REVIEW OF SYSTEMS:  CONSTITUTIONAL: No fever, weight loss, or fatigue  EYES: No eye pain, visual disturbances, or discharge  ENMT:  No difficulty hearing, tinnitus, vertigo; No sinus or throat pain  NECK: No pain or stiffness  RESPIRATORY: No cough, wheezing, chills or hemoptysis; No shortness of breath  CARDIOVASCULAR: No chest pain, palpitations, dizziness, or leg swelling  GASTROINTESTINAL: No abdominal or epigastric pain. No nausea, vomiting, or hematemesis; No diarrhea or constipation.   GENITOURINARY: No dysuria, frequency, hematuria, or incontinence  NEUROLOGICAL: No headaches, memory loss, loss of strength, numbness, or tremors  MUSCULOSKELETAL: No joint pain or swelling; No muscle, back, or extremity pain  PSYCHIATRIC: No depression, anxiety, mood swings, or difficulty sleeping      T(C): 35.7 (09-25-22 @ 06:17), Max: 38.2 (09-24-22 @ 23:30)  HR: 99 (09-25-22 @ 11:26) (99 - 121)  BP: 128/58 (09-25-22 @ 12:20) (128/58 - 179/89)  RR: 16 (09-25-22 @ 06:17) (16 - 20)  SpO2: 97% (09-25-22 @ 05:40) (96% - 97%)    PHYSICAL EXAM:  GENERAL: NAD, well-groomed, well-developed  HEAD:  Atraumatic, Normocephalic  EYES: EOMI, PERRLA, conjunctiva and sclera clear  ENMT: No tonsillar erythema, exudates, or enlargement; Moist mucous membranes  NECK: Supple, No JVD, Normal thyroid  NERVOUS SYSTEM:  Alert & Oriented X3, Good concentration; Motor Strength 5/5 B/L upper and lower extremities  CHEST/LUNG: Clear to percussion bilaterally; No rales, rhonchi, wheezing, or rubs  HEART: Regular rate and rhythm; No murmurs, rubs, or gallops  ABDOMEN: Soft, Nontender, Nondistended; Bowel sounds present  EXTREMITIES:  2+ Peripheral Pulses, No clubbing, cyanosis, or edema      Consultant(s) Notes Reviewed:  [x ] YES  [ ] NO  Care Discussed with Consultants/Other Providers [ x] YES  [ ] NO    LAB:                        15.1   32.49 )-----------( 351      ( 25 Sep 2022 00:10 )             45.8     09-25    132<L>  |  92<L>  |  34<H>  ----------------------------<  281<H>  5.2<H>   |  28  |  1.7<H>    Ca    9.3      25 Sep 2022 11:12  Phos  4.0     09-25  Mg     1.8     09-25    TPro  6.7  /  Alb  3.7  /  TBili  0.6  /  DBili  x   /  AST  32  /  ALT  28  /  AlkPhos  90  09-25    LIVER FUNCTIONS - ( 25 Sep 2022 00:10 )  Alb: 3.7 g/dL / Pro: 6.7 g/dL / ALK PHOS: 90 U/L / ALT: 28 U/L / AST: 32 U/L / GGT: x           CARDIAC MARKERS ( 25 Sep 2022 11:12 )  x     / 0.04 ng/mL / x     / x     / x      CARDIAC MARKERS ( 25 Sep 2022 00:10 )  x     / 0.04 ng/mL / x     / x     / x                  Drug Dosing Weight  Height (cm): 182.9 (25 Sep 2022 06:17)  Weight (kg): 141 (25 Sep 2022 06:17)  BMI (kg/m2): 42.1 (25 Sep 2022 06:17)  BSA (m2): 2.57 (25 Sep 2022 06:17)  Height (cm): 182.9 (09-25-22 @ 06:17)  Weight (kg): 141 (09-25-22 @ 06:17)  BMI (kg/m2): 42.1 (09-25-22 @ 06:17)  BSA (m2): 2.57 (09-25-22 @ 06:17)  CAPILLARY BLOOD GLUCOSE      POCT Blood Glucose.: 294 mg/dL (25 Sep 2022 11:48)    I&O's Summary        RADIOLOGY & ADDITIONAL TESTS:  Imaging Personally Reviewed:  [x] YES  [ ] NO    HEALTH ISSUES - PROBLEM Dx:          MEDS:  acetaminophen     Tablet .. 650 milliGRAM(s) Oral every 6 hours  aspirin enteric coated 81 milliGRAM(s) Oral daily  azithromycin  IVPB 500 milliGRAM(s) IV Intermittent every 24 hours  cefTRIAXone   IVPB 1000 milliGRAM(s) IV Intermittent every 24 hours  dextrose 5%. 1000 milliLiter(s) IV Continuous <Continuous>  dextrose 5%. 1000 milliLiter(s) IV Continuous <Continuous>  dextrose 50% Injectable 25 Gram(s) IV Push once  dextrose 50% Injectable 12.5 Gram(s) IV Push once  dextrose 50% Injectable 25 Gram(s) IV Push once  dextrose Oral Gel 15 Gram(s) Oral once PRN  furosemide    Tablet 80 milliGRAM(s) Oral daily  glucagon  Injectable 1 milliGRAM(s) IntraMuscular once  insulin lispro (ADMELOG) corrective regimen sliding scale   SubCutaneous three times a day before meals  lisinopril 20 milliGRAM(s) Oral daily  metolazone 2.5 milliGRAM(s) Oral <User Schedule>  metoprolol tartrate 25 milliGRAM(s) Oral two times a day  morphine  - Injectable 2 milliGRAM(s) IV Push every 4 hours PRN  rivaroxaban 20 milliGRAM(s) Oral daily  sodium chloride 0.9%. 1000 milliLiter(s) IV Continuous <Continuous>  spironolactone 25 milliGRAM(s) Oral daily  tamsulosin 0.4 milliGRAM(s) Oral at bedtime      Progress Note Handoff  Pending Consults:  Pending Tests:  Pending Results:  Family Discussion:  Disposition: Home_____/SNF______/Other_____/Unknown at this time_____  Spent over 35 min reviewing chart and on coordinating patient care during interdisciplinary rounds     Please call me with any questions at extension [phone]   MORGAN BUSH  67y  Male      Patient is a 67y old  Male who presents with a chief complaint of     INTERVAL HPI/OVERNIGHT EVENTS:  Patient seen and examined earlier this morning; patient sitting up and having breakfast. He reports of epigastric abd pain, reports pain;       REVIEW OF SYSTEMS:  CONSTITUTIONAL: No fever, weight loss, or fatigue  EYES: No eye pain, visual disturbances, or discharge  ENMT:  No difficulty hearing, tinnitus, vertigo; No sinus or throat pain  NECK: No pain or stiffness  RESPIRATORY: No cough, wheezing, chills or hemoptysis; No shortness of breath  CARDIOVASCULAR: No chest pain, palpitations, dizziness, or leg swelling  GASTROINTESTINAL: +epigastric pain  GENITOURINARY: No dysuria, frequency, hematuria, or incontinence  NEUROLOGICAL: No headaches, memory loss, loss of strength, numbness, or tremors  MUSCULOSKELETAL: No joint pain or swelling; No muscle, back, or extremity pain  PSYCHIATRIC: No depression, anxiety, mood swings, or difficulty sleeping      T(C): 35.7 (09-25-22 @ 06:17), Max: 38.2 (09-24-22 @ 23:30)  HR: 99 (09-25-22 @ 11:26) (99 - 121)  BP: 128/58 (09-25-22 @ 12:20) (128/58 - 179/89)  RR: 16 (09-25-22 @ 06:17) (16 - 20)  SpO2: 97% (09-25-22 @ 05:40) (96% - 97%)    PHYSICAL EXAM:  GENERAL: NAD, obese  HEAD:  Atraumatic, Normocephalic  EYES: EOMI, PERRLA, conjunctiva and sclera clear  ENMT: No tonsillar erythema, exudates, or enlargement; Moist mucous membranes  NECK: Supple, No JVD, Normal thyroid  NERVOUS SYSTEM:  Alert & Oriented X3, Good concentration; Motor Strength 5/5 B/L upper and lower extremities  CHEST/LUNG: Clear to percussion bilaterally; No rales, rhonchi, wheezing, or rubs  HEART: Regular rate and rhythm; No murmurs, rubs, or gallops  ABDOMEN: obese, +epigastric tenderness, no guarding, no rigidity, no rebound tenderness  EXTREMITIES:  2+ Peripheral Pulses, No clubbing, cyanosis, or edema      Consultant(s) Notes Reviewed:  [x ] YES  [ ] NO  Care Discussed with Consultants/Other Providers [ x] YES  [ ] NO    LAB:                        15.1   32.49 )-----------( 351      ( 25 Sep 2022 00:10 )             45.8     09-25    132<L>  |  92<L>  |  34<H>  ----------------------------<  281<H>  5.2<H>   |  28  |  1.7<H>    Ca    9.3      25 Sep 2022 11:12  Phos  4.0     09-25  Mg     1.8     09-25    TPro  6.7  /  Alb  3.7  /  TBili  0.6  /  DBili  x   /  AST  32  /  ALT  28  /  AlkPhos  90  09-25    LIVER FUNCTIONS - ( 25 Sep 2022 00:10 )  Alb: 3.7 g/dL / Pro: 6.7 g/dL / ALK PHOS: 90 U/L / ALT: 28 U/L / AST: 32 U/L / GGT: x           CARDIAC MARKERS ( 25 Sep 2022 11:12 )  x     / 0.04 ng/mL / x     / x     / x      CARDIAC MARKERS ( 25 Sep 2022 00:10 )  x     / 0.04 ng/mL / x     / x     / x                  Drug Dosing Weight  Height (cm): 182.9 (25 Sep 2022 06:17)  Weight (kg): 141 (25 Sep 2022 06:17)  BMI (kg/m2): 42.1 (25 Sep 2022 06:17)  BSA (m2): 2.57 (25 Sep 2022 06:17)  Height (cm): 182.9 (09-25-22 @ 06:17)  Weight (kg): 141 (09-25-22 @ 06:17)  BMI (kg/m2): 42.1 (09-25-22 @ 06:17)  BSA (m2): 2.57 (09-25-22 @ 06:17)  CAPILLARY BLOOD GLUCOSE      POCT Blood Glucose.: 294 mg/dL (25 Sep 2022 11:48)    I&O's Summary        RADIOLOGY & ADDITIONAL TESTS:  Imaging Personally Reviewed:  [x] YES  [ ] NO    HEALTH ISSUES - PROBLEM Dx:          MEDS:  acetaminophen     Tablet .. 650 milliGRAM(s) Oral every 6 hours  aspirin enteric coated 81 milliGRAM(s) Oral daily  azithromycin  IVPB 500 milliGRAM(s) IV Intermittent every 24 hours  cefTRIAXone   IVPB 1000 milliGRAM(s) IV Intermittent every 24 hours  dextrose 5%. 1000 milliLiter(s) IV Continuous <Continuous>  dextrose 5%. 1000 milliLiter(s) IV Continuous <Continuous>  dextrose 50% Injectable 25 Gram(s) IV Push once  dextrose 50% Injectable 12.5 Gram(s) IV Push once  dextrose 50% Injectable 25 Gram(s) IV Push once  dextrose Oral Gel 15 Gram(s) Oral once PRN  furosemide    Tablet 80 milliGRAM(s) Oral daily  glucagon  Injectable 1 milliGRAM(s) IntraMuscular once  insulin lispro (ADMELOG) corrective regimen sliding scale   SubCutaneous three times a day before meals  lisinopril 20 milliGRAM(s) Oral daily  metolazone 2.5 milliGRAM(s) Oral <User Schedule>  metoprolol tartrate 25 milliGRAM(s) Oral two times a day  morphine  - Injectable 2 milliGRAM(s) IV Push every 4 hours PRN  rivaroxaban 20 milliGRAM(s) Oral daily  sodium chloride 0.9%. 1000 milliLiter(s) IV Continuous <Continuous>  spironolactone 25 milliGRAM(s) Oral daily  tamsulosin 0.4 milliGRAM(s) Oral at bedtime

## 2022-09-25 NOTE — H&P ADULT - ASSESSMENT
Assessment:        Plan:    # Leukocytosis with left shift   Continue with antibiotics, ceftriaxone and azithromycin  Follow up procalcitonin  Follow up UA  ID consult    #Elevated troponin  Admit to inpatient level of care  Cardiac monitoring  Cardiology consult  Trend troponin    #Elevated lactate   Lactate 2.4  Continue with IVF at 100cc/hr  Will repeat    #Hypomagnesemia  Mg 1.2  Mg repleted in the ER.  Will trend.    #Multiple bilateral pulmonary nodules measuring up to 5 mm.   Outpatient repeat CT recommended in 6-12 months Assessment:  Patient is a 66 y/o M with a pmhx of DM, CAD, HLD, HTN, hx of pancreatitis, who reports complaining of a sudden onset of chest pain/ epigastric abdominal pain, body aches, fever (tmax 100F) and chills that began this afternoon.     Plan:  #Elevated troponin R/O ACS  Admit to inpatient level of care  EKG sinus tachycardia  Cardiac monitoring  Cardiology consult  Trend troponin  Repeat EKG in the AM.    # Leukocytosis with left shift   Continue with antibiotics, ceftriaxone and azithromycin  Follow up procalcitonin  Follow up UA  ID consult for further abx recommendations.     #Chest pain/ epigastric abdominal pain  R/O ACS vs. atypical chest pain  Patient has a hx of pancreatitis in the past.  GI consult    #Elevated lactate   Lactate 2.4  Continue with IVF at 100cc/hr  Will repeat    #Hypomagnesemia  Mg 1.2  Mg repleted in the ER.  Will trend.    #Multiple bilateral pulmonary nodules measuring up to 5 mm.   Outpatient repeat CT recommended in 6-12 months    #DM  ISS  FS ac/hs    #HTN  Low sodium diet.  Monitor blood pressure.  Continue with home medications.    #HLD  Continue with home medications.    Above discussed with Dr. Valencia.  Assessment:  Patient is a 66 y/o M with a pmhx of DM, CAD, HLD, HTN, hx of pancreatitis, who reports complaining of a sudden onset of chest pain/ epigastric abdominal pain, body aches, fever (tmax 100F) and chills that began this afternoon.     Plan:  #Elevated troponin R/O ACS  Admit to inpatient level of care  EKG sinus tachycardia, no acute changes  Cardiac monitoring  Cardiology consult  Initial trop .04  Trend troponin  Repeat EKG in the AM.    #SIRS, Fever + Leukocytosis with left shift - undetermined etiology  will cover emperically  Continue with antibiotics, ceftriaxone and azithromycin pending ID evaluation  Follow up procalcitonin  Follow up UA  f/u blood cultures  ID consult for further abx recommendations.     #Chest pain/ epigastric abdominal pain  R/O ACS vs. atypical chest pain  Patient has a hx of pancreatitis in the past.  GI consult    #Elevated lactate   Lactate 2.4  Continue with IVF at 100cc/hr  Will repeat    #Hypomagnesemia  Mg 1.2  Mg repleted in the ER.  Will trend.    #Multiple bilateral pulmonary nodules measuring up to 5 mm.   Pt is a non smoker  Outpatient repeat CT recommended in 6-12 months  Explained to patient, pt expressed verbal understanding and will follow up as out patient for repeat imaging    #DM  ISS  FS ac/hs    #HTN  Low sodium diet.  Monitor blood pressure.  Continue with home medications.    #HLD  Continue with home medications.    Above discussed with Dr. Valencia.  Assessment:  Patient is a 66 y/o M with a pmhx of DM, CAD, HLD, HTN, hx of pancreatitis, who reports complaining of a sudden onset of chest pain/ epigastric abdominal pain, body aches, fever (tmax 100F) and chills that began this afternoon.     Plan:  #Elevated troponin R/O ACS  Admit to inpatient level of care  EKG sinus tachycardia, no acute changes  Cardiac monitoring  Cardiology consult  Initial trop .04  Trend troponin  Repeat EKG in the AM.    #SIRS, Fever + Leukocytosis with left shift - undetermined etiology  will cover emperically  Continue with antibiotics, ceftriaxone, azithromycin, Vancomycin X1 pending ID evaluation  Follow up procalcitonin  Follow up UA  f/u blood cultures  ID consult for further abx recommendations.     #Chest pain/ epigastric abdominal pain  R/O ACS vs. atypical chest pain  Patient has a hx of pancreatitis in the past.  GI consult    #Elevated lactate   Lactate 2.4  Continue with IVF at 100cc/hr  Will repeat    #Hypomagnesemia  Mg 1.2  Mg repleted in the ER.  Will trend.    #Multiple bilateral pulmonary nodules measuring up to 5 mm.   Pt is a non smoker  Outpatient repeat CT recommended in 6-12 months  Explained to patient, pt expressed verbal understanding and will follow up as out patient for repeat imaging    #DM  ISS  FS ac/hs    #HTN  Low sodium diet.  Monitor blood pressure.  Continue with home medications.    #HLD  Continue with home medications.    Above discussed with Dr. Valencia.

## 2022-09-25 NOTE — H&P ADULT - NS ATTEND OPT1 GEN_ALL_CORE
Coumadin/Warfarin - Compliance.../Coumadin/Warfarin - Dietary Advice.../Coumadin/Warfarin - Follow-up monitoring.../Coumadin/Warfarin - Potential for adverse drug reactions and interactions I attest my time as attending is greater than 50% of the total combined time spent on qualifying patient care activities by the PA/NP and attending.

## 2022-09-25 NOTE — CONSULT NOTE ADULT - SUBJECTIVE AND OBJECTIVE BOX
CARDIOLOGY CONSULT NOTE     CHIEF COMPLAINT/REASON FOR CONSULT:    HPI:  Patient is a 66 y/o M with a pmhx of DM, CAD, HLD, HTN, hx of pancreatitis, who reports complaining of a sudden onset of chest pain/ epigastric abdominal pain, body aches, fever (tmax 100F) and chills that began this afternoon. Chest pain began 1 day ago however, it progressively worsened prompting evaluation to the ER. Patient last followed up with his cardiologist Dr. Lane 1 month ago. During that time he had an echo and stress test done which were negative per patient. No similar episodes of the chest pain in the pain. Patient denies radiation of pain, sick contacts, bowel changes, urinary complaints.  (25 Sep 2022 06:08)      PAST MEDICAL & SURGICAL HISTORY:  Sepsis      Pneumonia      Intubation of airway performed without difficulty      Diabetes mellitus, type 2      BPH (benign prostatic hyperplasia)      Water retention      Essential hypertension      Diabetes      CAD (coronary artery disease)      H/O hypercholesterolemia      H/O right coronary artery stent placement      History of resection of pancreas      History of cholecystectomy      History of left knee replacement          Cardiac Risks:   [x ]HTN, [x ] DM, [ ] Smoking, [ ] FH,  [x ] Lipids        MEDICATIONS:  MEDICATIONS  (STANDING):  acetaminophen     Tablet .. 650 milliGRAM(s) Oral every 6 hours  aluminum hydroxide/magnesium hydroxide/simethicone Suspension 30 milliLiter(s) Oral once  aspirin enteric coated 81 milliGRAM(s) Oral daily  azithromycin  IVPB 500 milliGRAM(s) IV Intermittent every 24 hours  cefTRIAXone   IVPB 1000 milliGRAM(s) IV Intermittent every 24 hours  furosemide    Tablet 80 milliGRAM(s) Oral daily  lisinopril 20 milliGRAM(s) Oral daily  metolazone 2.5 milliGRAM(s) Oral <User Schedule>  metoprolol tartrate 25 milliGRAM(s) Oral two times a day  rivaroxaban 20 milliGRAM(s) Oral daily  sodium chloride 0.9%. 1000 milliLiter(s) (100 mL/Hr) IV Continuous <Continuous>  spironolactone 25 milliGRAM(s) Oral daily  tamsulosin 0.4 milliGRAM(s) Oral at bedtime      FAMILY HISTORY:  Family history of lung cancer (Sibling)    Family history of diabetes mellitus (Mother)    Family history of coronary artery disease (Father)        SOCIAL HISTORY:      [ ] Marital status    Allergies    No Known Allergies      	    REVIEW OF SYSTEMS:  CONSTITUTIONAL: No fever, weight loss, or fatigue  EYES: No eye pain, visual disturbances, or discharge  ENMT:  No difficulty hearing, tinnitus, vertigo; No sinus or throat pain  NECK: No pain or stiffness  RESPIRATORY: No cough, wheezing, chills or hemoptysis; No Shortness of Breath  CARDIOVASCULAR: See above  GASTROINTESTINAL: No abdominal or epigastric pain. No nausea, vomiting, or hematemesis; No diarrhea or constipation. No melena or hematochezia.  GENITOURINARY: No dysuria, frequency, hematuria, or incontinence  NEUROLOGICAL: No headaches, memory loss, loss of strength, numbness, or tremors  SKIN: No itching, burning, rashes, or lesions   	      PHYSICAL EXAM:  T(C): 35.7 (09-25-22 @ 06:17), Max: 38.2 (09-24-22 @ 23:30)  HR: 108 (09-25-22 @ 06:17) (108 - 121)  BP: 179/89 (09-25-22 @ 06:17) (153/76 - 179/89)  RR: 16 (09-25-22 @ 06:17) (16 - 20)  SpO2: 97% (09-25-22 @ 05:40) (96% - 97%)  Wt(kg): --  I&O's Summary      Appearance: Normal	  Psychiatry: A & O x 3, Mood & affect appropriate  HEENT:   Normal oral mucosa, PERRL, EOMI	  Lymphatic: No lymphadenopathy  Cardiovascular: Normal S1 S2,RRR, No JVD, No murmurs  Respiratory: Lungs clear to auscultation	  Gastrointestinal:  Soft, Non-tender, + BS	  Skin: No rashes, No ecchymoses, No cyanosis	  Neurologic: Non-focal  Extremities: Normal range of motion, No clubbing, cyanosis or edema  Vascular: Peripheral pulses palpable 2+ bilaterally      ECG:  	< from: 12 Lead ECG (09.24.22 @ 23:27) >  Diagnosis Line   Sinus tachycardia  Incomplete right bundle branch block  Nonspecific ST abnormality  Abnormal ECG    Confirmed by SYEDA CHOWDARY MD ([id number]) on 9/25/2022 9:10:07 AM    < end of copied text >      	  LABS:	 	    CARDIAC MARKERS:          Serum Pro-Brain Natriuretic Peptide: 208 pg/mL (09-25 @ 00:10)                            15.1   32.49 )-----------( 351      ( 25 Sep 2022 00:10 )             45.8     09-25    135  |  95<L>  |  30<H>  ----------------------------<  102<H>  4.5   |  27  |  1.5    Ca    10.3      25 Sep 2022 00:10  Mg     1.2     09-25    TPro  6.7  /  Alb  3.7  /  TBili  0.6  /  DBili  x   /  AST  32  /  ALT  28  /  AlkPhos  90  09-25    PT/INR - ( 25 Sep 2022 00:10 )   PT: 13.70 sec;   INR: 1.19 ratio         PTT - ( 25 Sep 2022 00:10 )  PTT:33.7 sec  proBNP: Serum Pro-Brain Natriuretic Peptide: 208 pg/mL (09-25 @ 00:10)

## 2022-09-25 NOTE — H&P ADULT - NSHPPHYSICALEXAM_GEN_ALL_CORE
Vital Signs Last 24 Hrs  T(C): 36.8 (25 Sep 2022 05:40), Max: 38.2 (24 Sep 2022 23:30)  T(F): 98.3 (25 Sep 2022 05:40), Max: 100.8 (24 Sep 2022 23:30)  HR: 108 (25 Sep 2022 05:40) (108 - 121)  BP: 153/76 (25 Sep 2022 05:40) (153/76 - 154/72)  RR: 18 (25 Sep 2022 05:40) (18 - 20)  SpO2: 97% (25 Sep 2022 05:40) (96% - 97%)    Parameters below as of 25 Sep 2022 05:40  Patient On (Oxygen Delivery Method): room air    GENERAL: NAD, lying in bed comfortably  HEAD:  Atraumatic, Normocephalic  EYES: EOMI, PERRLA, conjunctiva and sclera clear  ENT: Moist mucous membranes  NECK: Supple, No JVD  CHEST/LUNG: Clear to auscultation bilaterally; No rales, rhonchi, wheezing, or rubs. Unlabored respirations  HEART: Regular rate and rhythm; No murmurs, rubs, or gallops  ABDOMEN: Bowel sounds present; Soft, Nontender, Nondistended. No hepatomegally  EXTREMITIES:  2+ Peripheral Pulses, brisk capillary refill. No clubbing, cyanosis, or edema  NERVOUS SYSTEM:  Alert & Oriented X3, speech clear. No deficits   MSK: FROM all 4 extremities, full and equal strength  SKIN: No rashes or lesions

## 2022-09-25 NOTE — H&P ADULT - NSHPLABSRESULTS_GEN_ALL_CORE
LABS:                        15.1   32.49 )-----------( 351      ( 25 Sep 2022 00:10 )             45.8     09-25    135  |  95<L>  |  30<H>  ----------------------------<  102<H>  4.5   |  27  |  1.5    Ca    10.3      25 Sep 2022 00:10  Mg     1.2     09-25    TPro  6.7  /  Alb  3.7  /  TBili  0.6  /  DBili  x   /  AST  32  /  ALT  28  /  AlkPhos  90  09-25    LIVER FUNCTIONS - ( 25 Sep 2022 00:10 )  Alb: 3.7 g/dL / Pro: 6.7 g/dL / ALK PHOS: 90 U/L / ALT: 28 U/L / AST: 32 U/L / GGT: x           PT/INR - ( 25 Sep 2022 00:10 )   PT: 13.70 sec;   INR: 1.19 ratio       PTT - ( 25 Sep 2022 00:10 )  PTT:33.7 sec    < from: CT Angio Abdomen and Pelvis w/ IV Cont (09.25.22 @ 02:10) >  IMPRESSION:  No evidence of aortic dissection, intramural hematoma or aneurysmal   dilation.  Multiple bilateral pulmonary nodules measuring up to 5 mm. Optional CT   chest follow-up examination is recommended in 12 months for patients at   high risk for lung cancer.    ******PRELIMINARY REPORT******    ******PRELIMINARY REPORT******   CARLA KAPOOR MD; Resident Radiologist  This document is a PRELIMINARY interpretation and is pending final   attending approval. Sep 25 2022  4:11AM  +++++++++++++++++++++++++++++++++++++++++++++++++++++++++++++  < end of copied text >    < from: CT Angio Chest Aorta w/wo IV Cont (09.25.22 @ 02:10) >  IMPRESSION:  No evidence of aortic dissection, intramural hematoma or aneurysmal   dilation.  Multiple bilateral pulmonary nodules measuring up to 5 mm. Optional CT   chest follow-up examination is recommended in 12 months for patients at   high risk for lung cancer.  ******PRELIMINARY REPORT******    ******PRELIMINARY REPORT******   CARLA KAPOOR MD; Resident Radiologist  This document is a PRELIMINARY interpretation and is pending final   attending approval. Sep 25 2022  4:11AM    < end of copied text >

## 2022-09-25 NOTE — PATIENT PROFILE ADULT - FALL HARM RISK - UNIVERSAL INTERVENTIONS
Bed in lowest position, wheels locked, appropriate side rails in place/Call bell, personal items and telephone in reach/Instruct patient to call for assistance before getting out of bed or chair/Non-slip footwear when patient is out of bed/[place] to call system/Physically safe environment - no spills, clutter or unnecessary equipment/Purposeful Proactive Rounding/Room/bathroom lighting operational, light cord in reach

## 2022-09-25 NOTE — H&P ADULT - HISTORY OF PRESENT ILLNESS
Patient is a 68 y/o M with a pmhx of DM, CAD, HLD, HTN, hx of pancreatitis, who reports complaining of a sudden onset of chest pain/ epigastric abdominal pain, body aches, fever (tmax 100F) and chills that began this afternoon. Chest pain began 1 day ago however, it progressively worsened prompting evaluation to the ER. Patient last followed up with his cardiologist Dr. Lane 1 month ago. During that time he had an echo and stress test done which were negative per patient. No similar episodes of the chest pain in the pain. Patient denies radiation of pain, sick contacts, bowel changes, urinary complaints.

## 2022-09-26 LAB
A1C WITH ESTIMATED AVERAGE GLUCOSE RESULT: 9.7 % — HIGH (ref 4–5.6)
ALBUMIN SERPL ELPH-MCNC: 3.2 G/DL — LOW (ref 3.5–5.2)
ALP SERPL-CCNC: 94 U/L — SIGNIFICANT CHANGE UP (ref 30–115)
ALT FLD-CCNC: 29 U/L — SIGNIFICANT CHANGE UP (ref 0–41)
ANION GAP SERPL CALC-SCNC: 12 MMOL/L — SIGNIFICANT CHANGE UP (ref 7–14)
ANION GAP SERPL CALC-SCNC: 14 MMOL/L — SIGNIFICANT CHANGE UP (ref 7–14)
AST SERPL-CCNC: 29 U/L — SIGNIFICANT CHANGE UP (ref 0–41)
BILIRUB SERPL-MCNC: <0.2 MG/DL — SIGNIFICANT CHANGE UP (ref 0.2–1.2)
BUN SERPL-MCNC: 45 MG/DL — HIGH (ref 10–20)
BUN SERPL-MCNC: 45 MG/DL — HIGH (ref 10–20)
CALCIUM SERPL-MCNC: 8.3 MG/DL — LOW (ref 8.4–10.5)
CALCIUM SERPL-MCNC: 8.4 MG/DL — SIGNIFICANT CHANGE UP (ref 8.4–10.5)
CHLORIDE SERPL-SCNC: 89 MMOL/L — LOW (ref 98–110)
CHLORIDE SERPL-SCNC: 92 MMOL/L — LOW (ref 98–110)
CO2 SERPL-SCNC: 23 MMOL/L — SIGNIFICANT CHANGE UP (ref 17–32)
CO2 SERPL-SCNC: 25 MMOL/L — SIGNIFICANT CHANGE UP (ref 17–32)
CREAT SERPL-MCNC: 2.2 MG/DL — HIGH (ref 0.7–1.5)
CREAT SERPL-MCNC: 2.4 MG/DL — HIGH (ref 0.7–1.5)
CULTURE RESULTS: SIGNIFICANT CHANGE UP
EGFR: 29 ML/MIN/1.73M2 — LOW
EGFR: 32 ML/MIN/1.73M2 — LOW
ERYTHROCYTE [SEDIMENTATION RATE] IN BLOOD: 55 MM/HR — HIGH (ref 0–10)
ESTIMATED AVERAGE GLUCOSE: 232 MG/DL — HIGH (ref 68–114)
GLUCOSE BLDC GLUCOMTR-MCNC: 186 MG/DL — HIGH (ref 70–99)
GLUCOSE BLDC GLUCOMTR-MCNC: 323 MG/DL — HIGH (ref 70–99)
GLUCOSE BLDC GLUCOMTR-MCNC: 331 MG/DL — HIGH (ref 70–99)
GLUCOSE BLDC GLUCOMTR-MCNC: 400 MG/DL — HIGH (ref 70–99)
GLUCOSE BLDC GLUCOMTR-MCNC: 461 MG/DL — CRITICAL HIGH (ref 70–99)
GLUCOSE BLDC GLUCOMTR-MCNC: 471 MG/DL — CRITICAL HIGH (ref 70–99)
GLUCOSE BLDC GLUCOMTR-MCNC: 570 MG/DL — CRITICAL HIGH (ref 70–99)
GLUCOSE BLDC GLUCOMTR-MCNC: 586 MG/DL — CRITICAL HIGH (ref 70–99)
GLUCOSE SERPL-MCNC: 456 MG/DL — CRITICAL HIGH (ref 70–99)
GLUCOSE SERPL-MCNC: 505 MG/DL — CRITICAL HIGH (ref 70–99)
HCT VFR BLD CALC: 41 % — LOW (ref 42–52)
HGB BLD-MCNC: 13.4 G/DL — LOW (ref 14–18)
LACTATE SERPL-SCNC: 1.6 MMOL/L — SIGNIFICANT CHANGE UP (ref 0.7–2)
MAGNESIUM SERPL-MCNC: 1.9 MG/DL — SIGNIFICANT CHANGE UP (ref 1.8–2.4)
MCHC RBC-ENTMCNC: 31.8 PG — HIGH (ref 27–31)
MCHC RBC-ENTMCNC: 32.7 G/DL — SIGNIFICANT CHANGE UP (ref 32–37)
MCV RBC AUTO: 97.2 FL — HIGH (ref 80–94)
NRBC # BLD: 0 /100 WBCS — SIGNIFICANT CHANGE UP (ref 0–0)
PLATELET # BLD AUTO: 295 K/UL — SIGNIFICANT CHANGE UP (ref 130–400)
POTASSIUM SERPL-MCNC: 5.1 MMOL/L — HIGH (ref 3.5–5)
POTASSIUM SERPL-MCNC: 5.5 MMOL/L — HIGH (ref 3.5–5)
POTASSIUM SERPL-SCNC: 5.1 MMOL/L — HIGH (ref 3.5–5)
POTASSIUM SERPL-SCNC: 5.5 MMOL/L — HIGH (ref 3.5–5)
PROT SERPL-MCNC: 6.1 G/DL — SIGNIFICANT CHANGE UP (ref 6–8)
RBC # BLD: 4.22 M/UL — LOW (ref 4.7–6.1)
RBC # FLD: 15.2 % — HIGH (ref 11.5–14.5)
SODIUM SERPL-SCNC: 126 MMOL/L — LOW (ref 135–146)
SODIUM SERPL-SCNC: 129 MMOL/L — LOW (ref 135–146)
SPECIMEN SOURCE: SIGNIFICANT CHANGE UP
TSH SERPL-MCNC: 2.68 UIU/ML — SIGNIFICANT CHANGE UP (ref 0.27–4.2)
WBC # BLD: 11.71 K/UL — HIGH (ref 4.8–10.8)
WBC # FLD AUTO: 11.71 K/UL — HIGH (ref 4.8–10.8)

## 2022-09-26 PROCEDURE — 99221 1ST HOSP IP/OBS SF/LOW 40: CPT

## 2022-09-26 PROCEDURE — 99233 SBSQ HOSP IP/OBS HIGH 50: CPT

## 2022-09-26 RX ORDER — INSULIN LISPRO 100/ML
VIAL (ML) SUBCUTANEOUS
Refills: 0 | Status: DISCONTINUED | OUTPATIENT
Start: 2022-09-26 | End: 2022-09-27

## 2022-09-26 RX ORDER — INSULIN LISPRO 100/ML
8 VIAL (ML) SUBCUTANEOUS
Refills: 0 | Status: DISCONTINUED | OUTPATIENT
Start: 2022-09-26 | End: 2022-09-26

## 2022-09-26 RX ORDER — INSULIN GLARGINE 100 [IU]/ML
55 INJECTION, SOLUTION SUBCUTANEOUS EVERY MORNING
Refills: 0 | Status: DISCONTINUED | OUTPATIENT
Start: 2022-09-26 | End: 2022-09-26

## 2022-09-26 RX ORDER — INSULIN GLARGINE 100 [IU]/ML
20 INJECTION, SOLUTION SUBCUTANEOUS ONCE
Refills: 0 | Status: COMPLETED | OUTPATIENT
Start: 2022-09-26 | End: 2022-09-26

## 2022-09-26 RX ORDER — SODIUM CHLORIDE 9 MG/ML
500 INJECTION INTRAMUSCULAR; INTRAVENOUS; SUBCUTANEOUS ONCE
Refills: 0 | Status: COMPLETED | OUTPATIENT
Start: 2022-09-26 | End: 2022-09-26

## 2022-09-26 RX ORDER — INSULIN GLARGINE 100 [IU]/ML
20 INJECTION, SOLUTION SUBCUTANEOUS EVERY MORNING
Refills: 0 | Status: DISCONTINUED | OUTPATIENT
Start: 2022-09-26 | End: 2022-09-26

## 2022-09-26 RX ORDER — INSULIN LISPRO 100/ML
20 VIAL (ML) SUBCUTANEOUS
Refills: 0 | Status: DISCONTINUED | OUTPATIENT
Start: 2022-09-26 | End: 2022-09-27

## 2022-09-26 RX ORDER — INSULIN LISPRO 100/ML
15 VIAL (ML) SUBCUTANEOUS
Refills: 0 | Status: DISCONTINUED | OUTPATIENT
Start: 2022-09-26 | End: 2022-09-26

## 2022-09-26 RX ORDER — INSULIN GLARGINE 100 [IU]/ML
40 INJECTION, SOLUTION SUBCUTANEOUS EVERY MORNING
Refills: 0 | Status: DISCONTINUED | OUTPATIENT
Start: 2022-09-26 | End: 2022-09-27

## 2022-09-26 RX ORDER — CEFAZOLIN SODIUM 1 G
2000 VIAL (EA) INJECTION EVERY 8 HOURS
Refills: 0 | Status: DISCONTINUED | OUTPATIENT
Start: 2022-09-26 | End: 2022-09-27

## 2022-09-26 RX ORDER — INSULIN GLARGINE 100 [IU]/ML
55 INJECTION, SOLUTION SUBCUTANEOUS AT BEDTIME
Refills: 0 | Status: DISCONTINUED | OUTPATIENT
Start: 2022-09-26 | End: 2022-09-26

## 2022-09-26 RX ORDER — INSULIN GLARGINE 100 [IU]/ML
40 INJECTION, SOLUTION SUBCUTANEOUS AT BEDTIME
Refills: 0 | Status: DISCONTINUED | OUTPATIENT
Start: 2022-09-26 | End: 2022-09-27

## 2022-09-26 RX ORDER — INSULIN GLARGINE 100 [IU]/ML
40 INJECTION, SOLUTION SUBCUTANEOUS AT BEDTIME
Refills: 0 | Status: DISCONTINUED | OUTPATIENT
Start: 2022-09-26 | End: 2022-09-26

## 2022-09-26 RX ADMIN — CEFTRIAXONE 100 MILLIGRAM(S): 500 INJECTION, POWDER, FOR SOLUTION INTRAMUSCULAR; INTRAVENOUS at 00:40

## 2022-09-26 RX ADMIN — Medication 81 MILLIGRAM(S): at 10:58

## 2022-09-26 RX ADMIN — Medication 100 MILLIGRAM(S): at 15:39

## 2022-09-26 RX ADMIN — LORATADINE 10 MILLIGRAM(S): 10 TABLET ORAL at 22:43

## 2022-09-26 RX ADMIN — INSULIN GLARGINE 20 UNIT(S): 100 INJECTION, SOLUTION SUBCUTANEOUS at 09:16

## 2022-09-26 RX ADMIN — LISINOPRIL 20 MILLIGRAM(S): 2.5 TABLET ORAL at 05:08

## 2022-09-26 RX ADMIN — MORPHINE SULFATE 2 MILLIGRAM(S): 50 CAPSULE, EXTENDED RELEASE ORAL at 04:10

## 2022-09-26 RX ADMIN — Medication 650 MILLIGRAM(S): at 05:09

## 2022-09-26 RX ADMIN — Medication 10: at 15:45

## 2022-09-26 RX ADMIN — AZITHROMYCIN 255 MILLIGRAM(S): 500 TABLET, FILM COATED ORAL at 00:40

## 2022-09-26 RX ADMIN — MORPHINE SULFATE 2 MILLIGRAM(S): 50 CAPSULE, EXTENDED RELEASE ORAL at 03:38

## 2022-09-26 RX ADMIN — Medication 6: at 08:03

## 2022-09-26 RX ADMIN — SODIUM CHLORIDE 1000 MILLILITER(S): 9 INJECTION INTRAMUSCULAR; INTRAVENOUS; SUBCUTANEOUS at 11:45

## 2022-09-26 RX ADMIN — INSULIN GLARGINE 20 UNIT(S): 100 INJECTION, SOLUTION SUBCUTANEOUS at 11:01

## 2022-09-26 RX ADMIN — Medication 12: at 12:36

## 2022-09-26 RX ADMIN — Medication 25 MILLIGRAM(S): at 18:19

## 2022-09-26 RX ADMIN — Medication 975 MILLIGRAM(S): at 08:08

## 2022-09-26 RX ADMIN — INSULIN GLARGINE 40 UNIT(S): 100 INJECTION, SOLUTION SUBCUTANEOUS at 22:54

## 2022-09-26 RX ADMIN — Medication 650 MILLIGRAM(S): at 18:19

## 2022-09-26 RX ADMIN — Medication 650 MILLIGRAM(S): at 00:10

## 2022-09-26 RX ADMIN — SODIUM CHLORIDE 100 MILLILITER(S): 9 INJECTION INTRAMUSCULAR; INTRAVENOUS; SUBCUTANEOUS at 08:10

## 2022-09-26 RX ADMIN — Medication 100 MILLIGRAM(S): at 22:43

## 2022-09-26 RX ADMIN — Medication 650 MILLIGRAM(S): at 00:39

## 2022-09-26 RX ADMIN — ATORVASTATIN CALCIUM 10 MILLIGRAM(S): 80 TABLET, FILM COATED ORAL at 14:06

## 2022-09-26 RX ADMIN — Medication 15 UNIT(S): at 15:44

## 2022-09-26 RX ADMIN — SODIUM CHLORIDE 100 MILLILITER(S): 9 INJECTION INTRAMUSCULAR; INTRAVENOUS; SUBCUTANEOUS at 11:43

## 2022-09-26 RX ADMIN — RIVAROXABAN 20 MILLIGRAM(S): KIT at 15:38

## 2022-09-26 RX ADMIN — Medication 650 MILLIGRAM(S): at 08:08

## 2022-09-26 RX ADMIN — MORPHINE SULFATE 4 MILLIGRAM(S): 50 CAPSULE, EXTENDED RELEASE ORAL at 08:08

## 2022-09-26 RX ADMIN — Medication 20 UNIT(S): at 18:18

## 2022-09-26 RX ADMIN — Medication 15 UNIT(S): at 11:10

## 2022-09-26 RX ADMIN — Medication 10 MILLIGRAM(S): at 22:43

## 2022-09-26 RX ADMIN — SODIUM CHLORIDE 100 MILLILITER(S): 9 INJECTION INTRAMUSCULAR; INTRAVENOUS; SUBCUTANEOUS at 22:44

## 2022-09-26 RX ADMIN — Medication 25 MILLIGRAM(S): at 05:09

## 2022-09-26 RX ADMIN — SPIRONOLACTONE 25 MILLIGRAM(S): 25 TABLET, FILM COATED ORAL at 05:08

## 2022-09-26 RX ADMIN — Medication 650 MILLIGRAM(S): at 13:12

## 2022-09-26 RX ADMIN — Medication 650 MILLIGRAM(S): at 10:59

## 2022-09-26 RX ADMIN — TAMSULOSIN HYDROCHLORIDE 0.4 MILLIGRAM(S): 0.4 CAPSULE ORAL at 22:43

## 2022-09-26 RX ADMIN — Medication 80 MILLIGRAM(S): at 05:08

## 2022-09-26 RX ADMIN — Medication 6: at 11:08

## 2022-09-26 NOTE — PROGRESS NOTE ADULT - SUBJECTIVE AND OBJECTIVE BOX
MORGAN BUSH  67y  Male      Patient is a 67y old  Male who presents with a chief complaint of     INTERVAL HPI/OVERNIGHT EVENTS:  Patient seen and examined earlier this morning; patient sitting up and having breakfast. He denies any chest or abd pain today. Reports his fingersticks were very high this AM; reports he wants to see infectious disease.       REVIEW OF SYSTEMS:  CONSTITUTIONAL: No fever, weight loss, or fatigue  EYES: No eye pain, visual disturbances, or discharge  ENMT:  No difficulty hearing, tinnitus, vertigo; No sinus or throat pain  NECK: No pain or stiffness  RESPIRATORY: No cough, wheezing, chills or hemoptysis; No shortness of breath  CARDIOVASCULAR: No chest pain, palpitations, dizziness, or leg swelling  GASTROINTESTINAL: +epigastric pain  GENITOURINARY: No dysuria, frequency, hematuria, or incontinence  NEUROLOGICAL: No headaches, memory loss, loss of strength, numbness, or tremors  MUSCULOSKELETAL: No joint pain or swelling; No muscle, back, +left leg cellulitis   PSYCHIATRIC: No depression, anxiety, mood swings, or difficulty sleeping      T(C): 35.7 (09-25-22 @ 06:17), Max: 38.2 (09-24-22 @ 23:30)  HR: 99 (09-25-22 @ 11:26) (99 - 121)  BP: 128/58 (09-25-22 @ 12:20) (128/58 - 179/89)  RR: 16 (09-25-22 @ 06:17) (16 - 20)  SpO2: 97% (09-25-22 @ 05:40) (96% - 97%)    PHYSICAL EXAM:  GENERAL: NAD, obese  HEAD:  Atraumatic, Normocephalic  EYES: EOMI, PERRLA, conjunctiva and sclera clear  ENMT: No tonsillar erythema, exudates, or enlargement; Moist mucous membranes  NECK: Supple, No JVD, Normal thyroid  NERVOUS SYSTEM:  Alert & Oriented X3, Good concentration; Motor Strength 5/5 B/L upper and lower extremities  CHEST/LUNG: Clear to percussion bilaterally; No rales, rhonchi, wheezing, or rubs  HEART: Regular rate and rhythm; No murmurs, rubs, or gallops  ABDOMEN: obese, +epigastric tenderness, no guarding, no rigidity, no rebound tenderness  EXTREMITIES:  2+ Peripheral Pulses, No clubbing, cyanosis, or edema; +left lower leg erythema, warmth, swelling      Consultant(s) Notes Reviewed:  [x ] YES  [ ] NO  Care Discussed with Consultants/Other Providers [ x] YES  [ ] NO    LAB:                        13.4   11.71 )-----------( 295      ( 26 Sep 2022 06:55 )             41.0   09-26    129<L>  |  92<L>  |  45<H>  ----------------------------<  456<HH>  5.1<H>   |  23  |  2.2<H>    Ca    8.3<L>      26 Sep 2022 06:55  Phos  4.0     09-25  Mg     1.9     09-26    TPro  6.1  /  Alb  3.2<L>  /  TBili  <0.2  /  DBili  x   /  AST  29  /  ALT  29  /  AlkPhos  94  09-26              Drug Dosing Weight  Height (cm): 182.9 (25 Sep 2022 06:17)  Weight (kg): 141 (25 Sep 2022 06:17)  BMI (kg/m2): 42.1 (25 Sep 2022 06:17)  BSA (m2): 2.57 (25 Sep 2022 06:17)  Height (cm): 182.9 (09-25-22 @ 06:17)  Weight (kg): 141 (09-25-22 @ 06:17)  BMI (kg/m2): 42.1 (09-25-22 @ 06:17)  BSA (m2): 2.57 (09-25-22 @ 06:17)  CAPILLARY BLOOD GLUCOSE      POCT Blood Glucose.: 294 mg/dL (25 Sep 2022 11:48)    I&O's Summary        RADIOLOGY & ADDITIONAL TESTS:  Imaging Personally Reviewed:  [x] YES  [ ] NO    HEALTH ISSUES - PROBLEM Dx:      MEDICATIONS  (STANDING):  acetaminophen     Tablet .. 650 milliGRAM(s) Oral every 6 hours  aspirin enteric coated 81 milliGRAM(s) Oral daily  atorvastatin 10 milliGRAM(s) Oral daily  ceFAZolin   IVPB 2000 milliGRAM(s) IV Intermittent every 8 hours  dextrose 5%. 1000 milliLiter(s) (50 mL/Hr) IV Continuous <Continuous>  dextrose 5%. 1000 milliLiter(s) (100 mL/Hr) IV Continuous <Continuous>  dextrose 50% Injectable 25 Gram(s) IV Push once  dextrose 50% Injectable 12.5 Gram(s) IV Push once  dextrose 50% Injectable 25 Gram(s) IV Push once  furosemide    Tablet 80 milliGRAM(s) Oral daily  glucagon  Injectable 1 milliGRAM(s) IntraMuscular once  insulin glargine Injectable (LANTUS) 40 Unit(s) SubCutaneous at bedtime  insulin lispro (ADMELOG) corrective regimen sliding scale   SubCutaneous three times a day before meals  insulin lispro Injectable (ADMELOG) 15 Unit(s) SubCutaneous three times a day before meals  lisinopril 20 milliGRAM(s) Oral daily  loratadine 10 milliGRAM(s) Oral at bedtime  metolazone 2.5 milliGRAM(s) Oral daily  metoprolol tartrate 25 milliGRAM(s) Oral two times a day  oxybutynin 10 milliGRAM(s) Oral at bedtime  rivaroxaban 20 milliGRAM(s) Oral daily  sodium chloride 0.9%. 1000 milliLiter(s) (100 mL/Hr) IV Continuous <Continuous>  spironolactone 25 milliGRAM(s) Oral daily  tamsulosin 0.4 milliGRAM(s) Oral at bedtime    MEDICATIONS  (PRN):  dextrose Oral Gel 15 Gram(s) Oral once PRN Blood Glucose LESS THAN 70 milliGRAM(s)/deciliter  morphine  - Injectable 2 milliGRAM(s) IV Push every 4 hours PRN Severe Pain (7 - 10)

## 2022-09-26 NOTE — CONSULT NOTE ADULT - SUBJECTIVE AND OBJECTIVE BOX
Reason for Endocrinology Consult: Diabetes    HPI: 67y Male    Diabetes Type:  Duration of Diabetes:  Diabetes Complications:  History of DKA?  Eye doctor within past year?  Current Therapy:      Home FSG:  Fasting:  Lunch:  Dinner:  Bedtime:    Hypoglycemia?    Neuroglycopenia within past year?    Outpatient Endocrinologist?    PAST MEDICAL & SURGICAL HISTORY:  Sepsis      Pneumonia      Intubation of airway performed without difficulty      Diabetes mellitus, type 2      BPH (benign prostatic hyperplasia)      Water retention      Essential hypertension      Diabetes      CAD (coronary artery disease)      H/O hypercholesterolemia      H/O right coronary artery stent placement      History of resection of pancreas      History of cholecystectomy      History of left knee replacement        FAMILY HISTORY:  Family history of lung cancer (Sibling)    Family history of diabetes mellitus (Mother)    Family history of coronary artery disease (Father)        SH:  Smoking  Etoh:  Recreational Drugs:    Home Medications:  Cialis:  (07 Oct 2020 07:32)  Ecotrin: orally once a day (07 Oct 2020 07:32)  furosemide 80 mg oral tablet: orally once a day (07 Oct 2020 07:32)  HumaLOG:  (07 Oct 2020 07:32)  lisinopril 20 mg oral tablet: 1 tab(s) orally 2 times a day (07 Oct 2020 07:32)  metOLazone: 2.5  orally 2 times a day (25 Sep 2022 07:02)  metoprolol tartrate 25 mg oral tablet: 1 tab(s) orally 2 times a day (07 Oct 2020 07:32)  oxybutynin:  (07 Oct 2020 07:32)  rivaroxaban 20 mg oral tablet: 1 tab(s) orally every 24 hours (07 Oct 2020 07:32)  spironolactone: 25 milligram(s) orally once a day (07 Oct 2020 07:32)  tamsulosin 0.4 mg oral capsule: 1 cap(s) orally once a day (at bedtime) (07 Oct 2020 07:32)  testosterone:  (07 Oct 2020 07:32)      Current (Non-Endocrine) Meds:  acetaminophen     Tablet .. 650 milliGRAM(s) Oral every 6 hours  aspirin enteric coated 81 milliGRAM(s) Oral daily  ceFAZolin   IVPB 2000 milliGRAM(s) IV Intermittent every 8 hours  dextrose 5%. 1000 milliLiter(s) IV Continuous <Continuous>  dextrose 5%. 1000 milliLiter(s) IV Continuous <Continuous>  loratadine 10 milliGRAM(s) Oral at bedtime  metoprolol tartrate 25 milliGRAM(s) Oral two times a day  morphine  - Injectable 2 milliGRAM(s) IV Push every 4 hours PRN  oxybutynin 10 milliGRAM(s) Oral at bedtime  rivaroxaban 20 milliGRAM(s) Oral daily  sodium chloride 0.9%. 1000 milliLiter(s) IV Continuous <Continuous>  tamsulosin 0.4 milliGRAM(s) Oral at bedtime      Current Endocrine Meds:   atorvastatin 10 milliGRAM(s) Oral daily  dextrose 50% Injectable 25 Gram(s) IV Push once  dextrose 50% Injectable 12.5 Gram(s) IV Push once  dextrose 50% Injectable 25 Gram(s) IV Push once  dextrose Oral Gel 15 Gram(s) Oral once PRN  glucagon  Injectable 1 milliGRAM(s) IntraMuscular once  insulin glargine Injectable (LANTUS) 55 Unit(s) SubCutaneous every morning  insulin glargine Injectable (LANTUS) 55 Unit(s) SubCutaneous at bedtime  insulin lispro (ADMELOG) corrective regimen sliding scale   SubCutaneous three times a day before meals  insulin lispro Injectable (ADMELOG) 20 Unit(s) SubCutaneous three times a day before meals      Allergies:  No Known Allergies      ROS:  Denies the following except as indicated.    General: weight loss/weight gain, decreased appetite, fatigue, fever  Eyes: blurry vision, double vision  ENT: neck swelling, dysphagia, voice changes   CV: palpitations, SOB, chest pain, cough  GI: nausea, vomiting, diarrhea, constipation, abdominal pain  : nocturia,  polyuria, dysuria  Endo: decreased libido, heat/cold intolerance, jitteriness  MSK: arthralgias, myalgias  Skin: rash, dryness, diaphoresis  Neuro: pedal numbness,pedal paresthesias, pedal pain    Height (cm): 182.9 ( @ 06:17)  Weight (kg): 141 ( @ :17)  BMI (kg/m2): 42.1 ( @ 06:17)    Vital Signs Last 24 Hrs  T(C): 35.6 (26 Sep 2022 14:42), Max: 36.8 (25 Sep 2022 21:00)  T(F): 96 (26 Sep 2022 14:42), Max: 98.3 (25 Sep 2022 21:00)  HR: 83 (26 Sep 2022 14:42) (77 - 89)  BP: 119/60 (26 Sep 2022 14:42) (118/55 - 131/60)  BP(mean): --  RR: 18 (26 Sep 2022 14:42) (16 - 18)  SpO2: 95% (26 Sep 2022 05:12) (95% - 95%)    Parameters below as of 26 Sep 2022 05:12  Patient On (Oxygen Delivery Method): room air      Constitutional: WN/WD in NAD.   Neck: no thyromegaly or palpable thyroid nodules   Respiratory: lungs CTAB.  Cardiovascular: regular rate and rhythm, normal S1 and S2, no audible murmurs  GI: soft, NT/ND, no masses/HSM appreciated.  Ext: no edema, no ulcers, pedal pulses palpable bilaterally  Neurology: no tremor, monofilament sensation intact in feet  Psychiatric: A&O x 3, normal affect/mood.        LABS:                        13.4   11.71 )-----------( 295      ( 26 Sep 2022 06:55 )             41.0         126<L>  |  89<L>  |  45<H>  ----------------------------<  505<HH>  5.5<H>   |  25  |  2.4<H>    Ca    8.4      26 Sep 2022 13:30  Phos  4.0       Mg     1.9         TPro  6.1  /  Alb  3.2<L>  /  TBili  <0.2  /  DBili  x   /  AST  29  /  ALT  29  /  AlkPhos  94      PT/INR - ( 25 Sep 2022 00:10 )   PT: 13.70 sec;   INR: 1.19 ratio         PTT - ( 25 Sep 2022 00:10 )  PTT:33.7 sec  Urinalysis Basic - ( 25 Sep 2022 14:20 )    Color: Yellow / Appearance: Clear / S.025 / pH: x  Gluc: x / Ketone: Negative  / Bili: Negative / Urobili: 0.2 mg/dL   Blood: x / Protein: >=300 mg/dL / Nitrite: Negative   Leuk Esterase: Negative / RBC: Negative / WBC 1-2 /HPF   Sq Epi: x / Non Sq Epi: Negative / Bacteria: Few                             Thyroid Stimulating Hormone, Serum: 2.68 ( @ 13:30)          RADIOLOGY & ADDITIONAL STUDIES:    A/P:67yMale

## 2022-09-26 NOTE — CHART NOTE - NSCHARTNOTEFT_GEN_A_CORE
pt with elevated   will give a total of 40 units Lantus this am and continue 40 bid  will give 15 units lispro before meal   GAP 14  will continue to monitor FS pt with elevated   will give a total of 40 units Lantus this am and continue 40 bid  will give 15 units lispro before meal   GAP 14  will continue to monitor FS      addendum   fs 570   will give 500 cc bolus   will increase ISS to moderate   will recheck BMP at 3 pm

## 2022-09-26 NOTE — PROGRESS NOTE ADULT - ASSESSMENT
68 y/o M with a pmhx of DM, CAD, HLD, HTN, hx of pancreatitis, who reports complaining of a sudden onset of chest pain/ epigastric abdominal pain, body aches, fever (tmax 100F) and chills that began this afternoon.     #Elevated troponin likely demand ischemia in the setting of SIRS/KRISTINE, less likely ACS  #Hx of CAD s/p CABG  -EKG sinus tachycardia, no acute changes  -Initial trop .04-->0.04  PLAN  -Cardiology eval appreciated  -F/U Dr Moore  as outpatient    #SIRS, Fever + Leukocytosis with left shift - undetermined etiology  -will cover emperically; Continue with antibiotics, ceftriaxone, azithromycin, Vancomycin X1 pending ID evaluation  -Follow up procalcitonin  -Follow up UA  -f/u blood cultures  -ID consult for further abx recommendations.     #Chest pain/ epigastric abdominal pain  -R/O ACS vs. atypical chest pain  -Patient has a hx of pancreatitis in the past.  -GI consult    #Elevated lactate   -Lactate 2.4  -Continue with IVF at 100cc/hr  -Will repeat    #Hypomagnesemia  -Mg 1.2  -s/p IV Mg2+  -monitor Mg2+    #Multiple bilateral pulmonary nodules measuring up to 5 mm.   -Pt is a non smoker  -Outpatient repeat CT recommended in 6-12 months  -Explained to patient, pt expressed verbal understanding and will follow up as out patient for repeat imaging    #DM  -ISS  -FS ac/hs    #HTN  -Low sodium diet  -Monitor blood pressure  -Continue with home medications    #HLD  -Continue with home medications    Progress Note Handoff  Pending Consults: GI, ID  Pending Tests: blood cx, urine cx, further w/u for SIRS and abd pain  Pending Results: clinical improvement   Family Discussion: Patient   Disposition: Home__Xacute___/SNF______/Other_____/Unknown at this time_____  Spent over 35 min reviewing chart and on coordinating patient care during interdisciplinary rounds  68 y/o M with a pmhx of DM, CAD, HLD, HTN, hx of pancreatitis, who reports complaining of a sudden onset of chest pain/ epigastric abdominal pain, body aches, fever (tmax 100F) and chills that began this afternoon.     #Elevated troponin likely demand ischemia in the setting of SIRS/KRISTINE, less likely ACS  #Hx of CAD s/p CABG  -EKG sinus tachycardia, no acute changes  -Initial trop .04-->0.04  PLAN  -Cardiology eval appreciated  -F/U Dr Moore  as outpatient    #SIRS, Fever + Leukocytosis with left shift - undetermined etiology  -will cover emperically; Continue with antibiotics, ceftriaxone, azithromycin, Vancomycin X1 pending ID evaluation  -Follow up procalcitonin  -Follow up UA  -f/u blood cultures  -ID consult for further abx recommendations    Pt with Severe Sepsis (T<96.8F, Pulse>90, WBC>12), lactic acidosis, KRISTINE due to LE cellulitis    New problem with additional W/U   acute illness with systemic symptoms     azithromycin  IVPB 500 milliGRAM(s) IV Intermittent every 24 hours  cefTRIAXone   IVPB 1000 milliGRAM(s) IV Intermittent every 24 hours    Cr 2.2  Cr CL 67    Morbid Obesity  BMI >42    hyponatremia    PLAN  - Ancef 2gm q8h IVPB  - D/C Ceftriaxone  - D/C Zithromax  - ESR, CRP  - Repeat Cr, wbc, Na  - Control blood sugar  - TSH    #Chest pain/ epigastric abdominal pain likely gastritis, esophagitis  -resolved  -Patient has a hx of pancreatitis in the past  PLAN  -GI eval appreciated  -PPI daily if nausea returns   -Follow up with our GI office located on [address] [place] [address], Phone number [phone] with Dr. Cardenas   -No acute GI intervention    #Elevated lactate   -Lactate 2.4  -Continue with IVF at 100cc/hr  -Will repeat    #Hypomagnesemia  -Mg 1.2  -s/p IV Mg2+  -monitor Mg2+    #Multiple bilateral pulmonary nodules measuring up to 5 mm.   -Pt is a non smoker  -Outpatient repeat CT recommended in 6-12 months  -Explained to patient, pt expressed verbal understanding and will follow up as out patient for repeat imaging    #DM  -ISS  -FS ac/hs    #HTN  -Low sodium diet  -Monitor blood pressure  -Continue with home medications    #HLD  -Continue with home medications    Progress Note Handoff  Pending Consults: GI, ID  Pending Tests: blood cx, urine cx, further w/u for SIRS and abd pain  Pending Results: clinical improvement   Family Discussion: Patient   Disposition: Home__Xacute___/SNF______/Other_____/Unknown at this time_____  Spent over 35 min reviewing chart and on coordinating patient care during interdisciplinary rounds  68 y/o M with a pmhx of DM, CAD, HLD, HTN, hx of pancreatitis, who reports complaining of a sudden onset of chest pain/ epigastric abdominal pain, body aches, fever (tmax 100F) and chills that began this afternoon.     #Elevated troponin likely demand ischemia in the setting of SIRS/KRISTINE, less likely ACS  #Hx of CAD s/p CABG  -EKG sinus tachycardia, no acute changes  -Initial trop .04-->0.04  PLAN  -Cardiology eval appreciated  -F/U Dr Moore  as outpatient    #sepsis due to LE cellulitis  -ID on board  -Ancef 2gm q8h IVPB  -D/C Ceftriaxone and Zithromax  -ESR, CRP  -f/u blood cultures    #Pseudohyponatremia 2/2 hyperglycemia  -correct hyperglycemia  -IVF    #KRISTINE likely prerenal  -will hold lasix, lisinopril and metolazone and aldactone  -IVF  -monitor BMP    #Hyperglycemia in the setting of uncontrolled DM2  #Insulin dependent diabetes// hx of pancreatectomy   -patient on insulin pump at home; ran out of insulin  PLAN  -will increase to lantus 55U AM and HS  -lispro 20U AC  -ISS  -IVF  -will get endocrinology eval    #Chest pain/ epigastric abdominal pain likely gastritis, esophagitis  -resolved  -Patient has a hx of pancreatitis in the past  PLAN  -GI eval appreciated  -PPI daily if nausea returns   -Follow up with our GI office located on [address] [place] [address], Phone number [phone] with Dr. Cardenas   -No acute GI intervention    #Hypomagnesemia  -Mg 1.2  -s/p IV Mg2+  -monitor Mg2+    #Multiple bilateral pulmonary nodules measuring up to 5 mm.   -Pt is a non smoker  -Outpatient repeat CT recommended in 6-12 months  -Explained to patient, pt expressed verbal understanding and will follow up as out patient for repeat imaging    #HTN  -Low sodium diet  -Monitor blood pressure  -will hold lasix, lisinopril and metolazone and aldactone    #HLD  -Continue with home medications    Progress Note Handoff  Pending Consults: GI, ID  Pending Tests: blood cx, urine cx, further w/u for SIRS and abd pain  Pending Results: clinical improvement   Family Discussion: Patient   Disposition: Home__Xacute___/SNF______/Other_____/Unknown at this time_____  Spent over 35 min reviewing chart and on coordinating patient care during interdisciplinary rounds

## 2022-09-26 NOTE — CONSULT NOTE ADULT - SUBJECTIVE AND OBJECTIVE BOX
RACHELLE MORGAN  67y, Male  Allergy: No Known Allergies      CHIEF COMPLAINT:     HPI:  Patient is a 66 y/o M with a pmhx of DM, CAD, HLD, HTN, hx of pancreatitis, BPH, Hx intubation & pneumonia who reports complaining of a sudden onset of chest pain/ epigastric abdominal pain, body aches, fever (Tmax 100F) and chills that began this afternoon. Chest pain began 1 day ago however, it progressively worsened prompting evaluation to the ER. Patient last followed up with his cardiologist Dr. Lane 1 month ago. During that time he had an echo and stress test done which were negative per patient. No similar episodes of the chest pain in the pain. Patient denies radiation of pain, sick contacts, bowel changes, urinary complaints.  (25 Sep 2022 06:08)    FAMILY HISTORY:  Family history of lung cancer (Sibling)    Family history of diabetes mellitus (Mother)    Family history of coronary artery disease (Father)      PAST MEDICAL & SURGICAL HISTORY:  Sepsis      Pneumonia      Intubation of airway performed without difficulty      Diabetes mellitus, type 2      BPH (benign prostatic hyperplasia)      Water retention      Essential hypertension      Diabetes      CAD (coronary artery disease)      H/O hypercholesterolemia      H/O right coronary artery stent placement      History of resection of pancreas      History of cholecystectomy      History of left knee replacement          SOCIAL HISTORY  Social History:  Substance Use (street drugs): ( x ) never used  (  ) other:  Tobacco Usage:  ( x  ) never smoked   (   ) former smoker   (   ) current smoker  (     ) pack year  Alcohol Usage: None (25 Sep 2022 06:08)        ROS  General: Denies fevers, chills, nightsweats, weight loss  HEENT: Denies headache, rhinorrhea, sore throat, eye pain  PULM: Denies SOB, cough  GI: Denies abdominal pain, diarrhea  : Denies dysuria, hematuria  MSK: Denies arthralgias  SKIN: Denies rash   NEURO: Denies paresthesias, weakness  PSYCH: Denies depression    VITALS:  T(F): 96.3, Max: 98.3 (22 @ 21:00)  HR: 77  BP: 118/55  RR: 18Vital Signs Last 24 Hrs  T(C): 35.7 (26 Sep 2022 05:03), Max: 36.8 (25 Sep 2022 21:00)  T(F): 96.3 (26 Sep 2022 05:03), Max: 98.3 (25 Sep 2022 21:00)  HR: 77 (26 Sep 2022 05:03) (77 - 94)  BP: 118/55 (26 Sep 2022 05:03) (118/55 - 142/68)  BP(mean): --  RR: 18 (26 Sep 2022 05:03) (16 - 18)  SpO2: 95% (26 Sep 2022 05:12) (95% - 95%)    Parameters below as of 26 Sep 2022 05:12  Patient On (Oxygen Delivery Method): room air        PHYSICAL EXAM:  Gen: NAD, resting in bed  HEENT: Normocephalic, atraumatic  Neck: supple, no lymphadenopathy  CV: Regular rate & regular rhythm  Lungs: decreased BS at bases, no fremitus  Abdomen: Soft, BS present  Ext: Warm, well perfused  Neuro: non focal, awake  Skin: no rash, no erythema    TESTS & MEASUREMENTS:                        13.4   11.71 )-----------( 295      ( 26 Sep 2022 06:55 )             41.0     09-    129<L>  |  92<L>  |  45<H>  ----------------------------<  456<HH>  5.1<H>   |  23  |  2.2<H>    Ca    8.3<L>      26 Sep 2022 06:55  Phos  4.0     -  Mg     1.9         TPro  6.1  /  Alb  3.2<L>  /  TBili  <0.2  /  DBili  x   /  AST  29  /  ALT  29  /  AlkPhos  94  09-      LIVER FUNCTIONS - ( 26 Sep 2022 06:55 )  Alb: 3.2 g/dL / Pro: 6.1 g/dL / ALK PHOS: 94 U/L / ALT: 29 U/L / AST: 29 U/L / GGT: x           Urinalysis Basic - ( 25 Sep 2022 14:20 )    Color: Yellow / Appearance: Clear / S.025 / pH: x  Gluc: x / Ketone: Negative  / Bili: Negative / Urobili: 0.2 mg/dL   Blood: x / Protein: >=300 mg/dL / Nitrite: Negative   Leuk Esterase: Negative / RBC: Negative / WBC 1-2 /HPF   Sq Epi: x / Non Sq Epi: Negative / Bacteria: Few          Lactate, Blood: 1.6 mmol/L (22 @ 06:55)  Lactate, Blood: 2.4 mmol/L (22 @ 00:10)      INFECTIOUS DISEASES TESTING      RADIOLOGY & ADDITIONAL TESTS:  I have personally reviewed the last Chest xray  CXR  Xray Chest 1 View- PORTABLE-Urgent:   ACC: [id number] EXAM:  XR CHEST PORTABLE URGENT 1V                          PROCEDURE DATE:  2022          INTERPRETATION:  Clinical History / Reason for exam: Chest pain.    Comparison : Chest radiograph 2019.    Technique/Positioning: Single frontal chest x-ray obtained.    Findings:    Support devices: None.    Cardiac/mediastinum/hilum: Post median sternotomy/CABG. Stable cardiac   silhouette.    Lung parenchyma/Pleura: Within normal limits.    Skeleton/soft tissues: Unchanged.    Impression:    No radiographic evidence of acute cardiopulmonary disease.        --- End of Report ---            IVETTE HEART MD; Attending Radiologist  This document has been electronically signed. Sep 25 2022  4:15PM (22 @ 01:24)      CT      CARDIOLOGY TESTING  12 Lead ECG:   Ventricular Rate 119 BPM    Atrial Rate 119 BPM    P-R Interval 166 ms    QRS Duration 98 ms    Q-T Interval 308 ms    QTC Calculation(Bazett) 433 ms    P Axis 57 degrees    R Axis 76 degrees    T Axis 62 degrees    Diagnosis Line   Sinus tachycardia  Incomplete right bundle branch block  Nonspecific ST abnormality  Abnormal ECG    Confirmed by SYEDA CHOWDARY MD ([id number]) on 2022 9:10:07 AM (22 @ 23:27)      MEDICATIONS  acetaminophen     Tablet .. 650  aspirin enteric coated 81  atorvastatin 10  azithromycin  IVPB 500  cefTRIAXone   IVPB 1000  dextrose 5%. 1000  dextrose 5%. 1000  dextrose 50% Injectable 25  dextrose 50% Injectable 12.5  dextrose 50% Injectable 25  furosemide    Tablet 80  glucagon  Injectable 1  insulin glargine Injectable (LANTUS) 40  insulin lispro (ADMELOG) corrective regimen sliding scale   insulin lispro Injectable (ADMELOG) 15  lisinopril 20  loratadine 10  metolazone 2.5  metoprolol tartrate 25  oxybutynin 10  rivaroxaban 20  sodium chloride 0.9%. 1000  spironolactone 25  tamsulosin 0.4      ANTIBIOTICS:  azithromycin  IVPB 500 milliGRAM(s) IV Intermittent every 24 hours  cefTRIAXone   IVPB 1000 milliGRAM(s) IV Intermittent every 24 hours      All available historical data has been reviewed

## 2022-09-26 NOTE — CONSULT NOTE ADULT - SUBJECTIVE AND OBJECTIVE BOX
Chief complaint/Reason for consult: n/v    HPI:  Patient is a 68 y/o M with a pmhx of DM, CAD, HLD, HTN, hx of pancreatitis, who reports complaining of a sudden onset of chest pain/ epigastric abdominal pain, body aches, fever (tmax 100F) and chills that began this afternoon. Chest pain began 1 day ago however, it progressively worsened prompting evaluation to the ER. Patient last followed up with his cardiologist Dr. Lane 1 month ago. During that time he had an echo and stress test done which were negative per patient. No similar episodes of the chest pain in the pain. Patient denies radiation of pain, sick contacts, bowel changes, urinary complaints.  (25 Sep 2022 06:08)    GI Updates: 67yMale pmh DM, CAD, HLD, HTN presents for chest pain, nausea and vomiting. Patient reports after cellulitis he gets n/v. Patient reports it has resolved. Patient denies nausea, vomiting, hematemesis, melena, blood in stool, diarrhea, constipation, abdominal pain.      PAST MEDICAL & SURGICAL HISTORY:  Sepsis      Pneumonia      Intubation of airway performed without difficulty      Diabetes mellitus, type 2      BPH (benign prostatic hyperplasia)      Water retention      Essential hypertension      Diabetes      CAD (coronary artery disease)      H/O hypercholesterolemia      H/O right coronary artery stent placement      History of resection of pancreas      History of cholecystectomy      History of left knee replacement            Family history:  FAMILY HISTORY:  Family history of lung cancer (Sibling)    Family history of diabetes mellitus (Mother)    Family history of coronary artery disease (Father)      No GI cancers in first or second degree relatives    Social History: No smoking. No alcohol. No illegal drug use.    Allergies:   No Known Allergies      MEDICATIONS: Home Medications:  Cialis:  (07 Oct 2020 07:32)  Ecotrin: orally once a day (07 Oct 2020 07:32)  furosemide 80 mg oral tablet: orally once a day (07 Oct 2020 07:32)  HumaLOG:  (07 Oct 2020 07:32)  lisinopril 20 mg oral tablet: 1 tab(s) orally 2 times a day (07 Oct 2020 07:32)  metOLazone: 2.5  orally 2 times a day (25 Sep 2022 07:02)  metoprolol tartrate 25 mg oral tablet: 1 tab(s) orally 2 times a day (07 Oct 2020 07:32)  oxybutynin:  (07 Oct 2020 07:32)  rivaroxaban 20 mg oral tablet: 1 tab(s) orally every 24 hours (07 Oct 2020 07:32)  spironolactone: 25 milligram(s) orally once a day (07 Oct 2020 07:32)  tamsulosin 0.4 mg oral capsule: 1 cap(s) orally once a day (at bedtime) (07 Oct 2020 07:32)  testosterone:  (07 Oct 2020 07:32)      MEDICATIONS  (STANDING):  acetaminophen     Tablet .. 650 milliGRAM(s) Oral every 6 hours  aspirin enteric coated 81 milliGRAM(s) Oral daily  atorvastatin 10 milliGRAM(s) Oral daily  azithromycin  IVPB 500 milliGRAM(s) IV Intermittent every 24 hours  cefTRIAXone   IVPB 1000 milliGRAM(s) IV Intermittent every 24 hours  dextrose 5%. 1000 milliLiter(s) (50 mL/Hr) IV Continuous <Continuous>  dextrose 5%. 1000 milliLiter(s) (100 mL/Hr) IV Continuous <Continuous>  dextrose 50% Injectable 25 Gram(s) IV Push once  dextrose 50% Injectable 12.5 Gram(s) IV Push once  dextrose 50% Injectable 25 Gram(s) IV Push once  furosemide    Tablet 80 milliGRAM(s) Oral daily  glucagon  Injectable 1 milliGRAM(s) IntraMuscular once  insulin glargine Injectable (LANTUS) 40 Unit(s) SubCutaneous at bedtime  insulin lispro (ADMELOG) corrective regimen sliding scale   SubCutaneous three times a day before meals  insulin lispro Injectable (ADMELOG) 15 Unit(s) SubCutaneous three times a day before meals  lisinopril 20 milliGRAM(s) Oral daily  loratadine 10 milliGRAM(s) Oral at bedtime  metolazone 2.5 milliGRAM(s) Oral daily  metoprolol tartrate 25 milliGRAM(s) Oral two times a day  oxybutynin 10 milliGRAM(s) Oral at bedtime  rivaroxaban 20 milliGRAM(s) Oral daily  sodium chloride 0.9%. 1000 milliLiter(s) (100 mL/Hr) IV Continuous <Continuous>  spironolactone 25 milliGRAM(s) Oral daily  tamsulosin 0.4 milliGRAM(s) Oral at bedtime    MEDICATIONS  (PRN):  dextrose Oral Gel 15 Gram(s) Oral once PRN Blood Glucose LESS THAN 70 milliGRAM(s)/deciliter  morphine  - Injectable 2 milliGRAM(s) IV Push every 4 hours PRN Severe Pain (7 - 10)        REVIEW OF SYSTEMS  General:  No weight loss, fevers, or chills.  Eyes:  No reported pain or visual changes  ENT:  No sore throat or runny nose.  NECK: No stiffness or lymphadenopathy  CV:  No chest pain or palpitations.  Resp:  No shortness of breath, cough, wheezing or hemoptysis  GI:  No abdominal pain, nausea, vomiting, dysphagia, diarrhea or constipation. No rectal bleeding, melena, or hematemesis.  Muscle:  No aches or weakness  Neuro:  No tingling, numbness       VITALS:   T(F): 96.3 (09-26-22 @ 05:03), Max: 98.3 (09-25-22 @ 21:00)  HR: 77 (09-26-22 @ 05:03) (77 - 94)  BP: 118/55 (09-26-22 @ 05:03) (104/54 - 142/68)  RR: 18 (09-26-22 @ 05:03) (16 - 18)  SpO2: 95% (09-26-22 @ 05:12) (95% - 95%)    PHYSICAL EXAM:  GENERAL: AAOx3, no acute distress.  HEAD:  Atraumatic, Normocephalic  EYES: conjunctiva and sclera clear  NECK: Supple, No thyromegaly   CHEST/LUNG: Clear to auscultation bilaterally; No wheeze, rhonchi, or rales  HEART: Regular rate and rhythm; normal S1, S2, No murmurs.  ABDOMEN: Soft, nontender, nondistended; Bowel sounds present  NEUROLOGY: No asterixis or tremor  SKIN: Intact, no jaundice          LABS:  09-26    129<L>  |  92<L>  |  45<H>  ----------------------------<  456<HH>  5.1<H>   |  23  |  2.2<H>    Ca    8.3<L>      26 Sep 2022 06:55  Phos  4.0     09-25  Mg     1.9     09-26    TPro  6.1  /  Alb  3.2<L>  /  TBili  <0.2  /  DBili  x   /  AST  29  /  ALT  29  /  AlkPhos  94  09-26                          13.4   11.71 )-----------( 295      ( 26 Sep 2022 06:55 )             41.0     LIVER FUNCTIONS - ( 26 Sep 2022 06:55 )  Alb: 3.2 g/dL / Pro: 6.1 g/dL / ALK PHOS: 94 U/L / ALT: 29 U/L / AST: 29 U/L / GGT: x           PT/INR - ( 25 Sep 2022 00:10 )   PT: 13.70 sec;   INR: 1.19 ratio         PTT - ( 25 Sep 2022 00:10 )  PTT:33.7 sec    IMAGING:      < from: CT Angio Abdomen and Pelvis w/ IV Cont (09.25.22 @ 02:10) >  ACC: [id number] EXAM:  CT ANGIO ABD PELV (W)AW IC                        ACC: [id number] EXAM:  CT ANGIO CHEST AORTA [facility]                          PROCEDURE DATE:  09/25/2022          INTERPRETATION:  CLINICAL HISTORY/REASON FOR EXAM: Chest pain.    TECHNIQUE: Initial contiguous axial CT images of the chest without IV   contrast obtained. Post-administration of 95 cc Omnipaque 350 intravenous   contrast, CT angiographic axial images of the chest, abdomen and pelvis   were obtained. Coronal and sagittal reformats were performed. 3D (MIP)   reformats obtained.    COMPARISON: CT chest 10/31/2017, CT abdomen and pelvis 11/9/2017.    FINDINGS:    VASCULATURE: No evidence of aortic dissection, acute thoracic intramural   hematoma or aortic aneurysm.Scattered calcified and noncalcified plaque   of the aorta and its branches. Main pulmonary artery measures 3.1 cm.   Patient is post CABG. Patent celiac artery, SMA, JENNIFER and bilateral renal   arteries. Postsurgical resection of the splenic artery.    LUNGS, PLEURA, AIRWAYS: Right hemidiaphragm elevation with right basilar   atelectasis. A few scattered pulmonary nodules measuring 4-5 mm, for   example right lower lobe (6/105), left upper lobe (6/76) and left upper   lobe (6/99). Limited comparison to prior given motion artifact and   consolidations on prior.  Central tracheobronchial tree is patent. No focal consolidation, pleural   effusion, or pneumothorax.    THORACIC NODES: No enlarged thoracic lymph nodes.    MEDIASTINUM: Heart size is within normal limits. No pericardial effusion.    HEPATOBILIARY: Post cholecystectomy. Hepatic steatosis.    SPLEEN: Post splenectomy.    PANCREAS: Post distal pancreatectomy.    ADRENAL GLANDS: Unremarkable.    KIDNEYS: Symmetric renal enhancement. No hydronephrosis. Right renal cyst.    ABDOMINAL NODES: No lymphadenopathy.    PELVIC VISCERA: Unremarkable.    VISUALIZED PERITONEUM /MESENTERY/BOWEL/PELVIS: No CT evidence of bowel   obstruction, free fluid, or pneumoperitoneum. Normal caliber appendix.    BONES/SOFT TISSUES:Post sternotomy. Degenerative changes of the spine.    IMPRESSION:    No evidence for acute intrathoracic, abdominal or pelvic pathology,   specifically no aortic aneurysm or dissection.    A few scattered pulmonary nodules measuring up to 5 mm. Correlate with   reason for patients distal pancreatectomy and splenectomy. If no history   of malignancy, optional CT chest follow-up examination can be obtained in   12 months for high risk patients.    --- End of Report ---          CARLA KAPOOR MD; Resident Radiologist  This document has been electronically signed.  WILVER ELLIOTT MD; Attending Radiologist  This document has been electronically signed. Sep 25 2022  6:45AM    < end of copied text >     response to care/treatments: Chief complaint/Reason for consult: n/v    HPI:  Patient is a 66 y/o M with a pmhx of DM, CAD, HLD, HTN, hx of pancreatitis, who reports complaining of a sudden onset of chest pain/ epigastric abdominal pain, body aches, fever (tmax 100F) and chills that began this afternoon. Chest pain began 1 day ago however, it progressively worsened prompting evaluation to the ER. Patient last followed up with his cardiologist Dr. Lane 1 month ago. During that time he had an echo and stress test done which were negative per patient. No similar episodes of the chest pain in the pain. Patient denies radiation of pain, sick contacts, bowel changes, urinary complaints.  (25 Sep 2022 06:08)    GI Updates: 67yMale pmh DM, CAD, HLD, HTN presents for chest pain, nausea and vomiting. Patient reports after cellulitis he gets n/v. Patient reports it has resolved. Patient denies nausea, vomiting, hematemesis, melena, blood in stool, diarrhea, constipation, abdominal pain.      PAST MEDICAL & SURGICAL HISTORY:  Sepsis      Pneumonia      Intubation of airway performed without difficulty      Diabetes mellitus, type 2      BPH (benign prostatic hyperplasia)      Water retention      Essential hypertension      Diabetes      CAD (coronary artery disease)      H/O hypercholesterolemia      H/O right coronary artery stent placement      History of resection of pancreas      History of cholecystectomy      History of left knee replacement            Family history:  FAMILY HISTORY:  Family history of lung cancer (Sibling)    Family history of diabetes mellitus (Mother)    Family history of coronary artery disease (Father)      No GI cancers in first or second degree relatives    Social History: No smoking. No alcohol. No illegal drug use.    Allergies:   No Known Allergies      MEDICATIONS: Home Medications:  Cialis:  (07 Oct 2020 07:32)  Ecotrin: orally once a day (07 Oct 2020 07:32)  furosemide 80 mg oral tablet: orally once a day (07 Oct 2020 07:32)  HumaLOG:  (07 Oct 2020 07:32)  lisinopril 20 mg oral tablet: 1 tab(s) orally 2 times a day (07 Oct 2020 07:32)  metOLazone: 2.5  orally 2 times a day (25 Sep 2022 07:02)  metoprolol tartrate 25 mg oral tablet: 1 tab(s) orally 2 times a day (07 Oct 2020 07:32)  oxybutynin:  (07 Oct 2020 07:32)  rivaroxaban 20 mg oral tablet: 1 tab(s) orally every 24 hours (07 Oct 2020 07:32)  spironolactone: 25 milligram(s) orally once a day (07 Oct 2020 07:32)  tamsulosin 0.4 mg oral capsule: 1 cap(s) orally once a day (at bedtime) (07 Oct 2020 07:32)  testosterone:  (07 Oct 2020 07:32)      MEDICATIONS  (STANDING):  acetaminophen     Tablet .. 650 milliGRAM(s) Oral every 6 hours  aspirin enteric coated 81 milliGRAM(s) Oral daily  atorvastatin 10 milliGRAM(s) Oral daily  azithromycin  IVPB 500 milliGRAM(s) IV Intermittent every 24 hours  cefTRIAXone   IVPB 1000 milliGRAM(s) IV Intermittent every 24 hours  dextrose 5%. 1000 milliLiter(s) (50 mL/Hr) IV Continuous <Continuous>  dextrose 5%. 1000 milliLiter(s) (100 mL/Hr) IV Continuous <Continuous>  dextrose 50% Injectable 25 Gram(s) IV Push once  dextrose 50% Injectable 12.5 Gram(s) IV Push once  dextrose 50% Injectable 25 Gram(s) IV Push once  furosemide    Tablet 80 milliGRAM(s) Oral daily  glucagon  Injectable 1 milliGRAM(s) IntraMuscular once  insulin glargine Injectable (LANTUS) 40 Unit(s) SubCutaneous at bedtime  insulin lispro (ADMELOG) corrective regimen sliding scale   SubCutaneous three times a day before meals  insulin lispro Injectable (ADMELOG) 15 Unit(s) SubCutaneous three times a day before meals  lisinopril 20 milliGRAM(s) Oral daily  loratadine 10 milliGRAM(s) Oral at bedtime  metolazone 2.5 milliGRAM(s) Oral daily  metoprolol tartrate 25 milliGRAM(s) Oral two times a day  oxybutynin 10 milliGRAM(s) Oral at bedtime  rivaroxaban 20 milliGRAM(s) Oral daily  sodium chloride 0.9%. 1000 milliLiter(s) (100 mL/Hr) IV Continuous <Continuous>  spironolactone 25 milliGRAM(s) Oral daily  tamsulosin 0.4 milliGRAM(s) Oral at bedtime    MEDICATIONS  (PRN):  dextrose Oral Gel 15 Gram(s) Oral once PRN Blood Glucose LESS THAN 70 milliGRAM(s)/deciliter  morphine  - Injectable 2 milliGRAM(s) IV Push every 4 hours PRN Severe Pain (7 - 10)        REVIEW OF SYSTEMS  General:  No weight loss, fevers, or chills.  Eyes:  No reported pain or visual changes  ENT:  No sore throat or runny nose.  NECK: No stiffness or lymphadenopathy  CV:  No chest pain or palpitations.  Resp:  No shortness of breath, cough, wheezing or hemoptysis  GI:  No abdominal pain, nausea, vomiting, dysphagia, diarrhea or constipation. No rectal bleeding, melena, or hematemesis.  Muscle:  No aches or weakness  Neuro:  No tingling, numbness       VITALS:   T(F): 96.3 (09-26-22 @ 05:03), Max: 98.3 (09-25-22 @ 21:00)  HR: 77 (09-26-22 @ 05:03) (77 - 94)  BP: 118/55 (09-26-22 @ 05:03) (104/54 - 142/68)  RR: 18 (09-26-22 @ 05:03) (16 - 18)  SpO2: 95% (09-26-22 @ 05:12) (95% - 95%)    PHYSICAL EXAM:  GENERAL: AAOx3, no acute distress.  HEAD:  Atraumatic, Normocephalic  EYES: conjunctiva and sclera clear  NECK: Supple, No thyromegaly   CHEST/LUNG: Clear to auscultation bilaterally; No wheeze, rhonchi, or rales  HEART: Regular rate and rhythm; normal S1, S2, No murmurs.  ABDOMEN: Soft, nontender, nondistended; Bowel sounds present  NEUROLOGY: No asterixis or tremor  SKIN: Intact, no jaundice          LABS:  09-26    129<L>  |  92<L>  |  45<H>  ----------------------------<  456<HH>  This data was reviewed.   5.1<H>   |  23  |  2.2<H>    Ca    8.3<L>      26 Sep 2022 06:55 This data was reviewed.   Phos  4.0     09-25  Mg     1.9     09-26    TPro  6.1  /  Alb  3.2<L>  /  TBili  <0.2  /  DBili  x   /  AST  29  /  ALT  29  /  AlkPhos  94  09-26 This data was reviewed.                           13.4   11.71 )-----------( 295      ( 26 Sep 2022 06:55 ) This data was reviewed.              41.0     LIVER FUNCTIONS - ( 26 Sep 2022 06:55 )  Alb: 3.2 g/dL / Pro: 6.1 g/dL / ALK PHOS: 94 U/L / ALT: 29 U/L / AST: 29 U/L / GGT: x       This data was reviewed.     PT/INR - ( 25 Sep 2022 00:10 )   PT: 13.70 sec;   INR: 1.19 ratio         PTT - ( 25 Sep 2022 00:10 )  PTT:33.7 sec    IMAGING:      < from: CT Angio Abdomen and Pelvis w/ IV Cont (09.25.22 @ 02:10) > This data was reviewed.   ACC: [id number] EXAM:  CT ANGIO ABD PELV (W)AW IC                        ACC: [id number] EXAM:  CT ANGIO CHEST AORTA [facility]                          PROCEDURE DATE:  09/25/2022          INTERPRETATION:  CLINICAL HISTORY/REASON FOR EXAM: Chest pain.    TECHNIQUE: Initial contiguous axial CT images of the chest without IV   contrast obtained. Post-administration of 95 cc Omnipaque 350 intravenous   contrast, CT angiographic axial images of the chest, abdomen and pelvis   were obtained. Coronal and sagittal reformats were performed. 3D (MIP)   reformats obtained.    COMPARISON: CT chest 10/31/2017, CT abdomen and pelvis 11/9/2017.    FINDINGS:    VASCULATURE: No evidence of aortic dissection, acute thoracic intramural   hematoma or aortic aneurysm.Scattered calcified and noncalcified plaque   of the aorta and its branches. Main pulmonary artery measures 3.1 cm.   Patient is post CABG. Patent celiac artery, SMA, JENNIFER and bilateral renal   arteries. Postsurgical resection of the splenic artery.    LUNGS, PLEURA, AIRWAYS: Right hemidiaphragm elevation with right basilar   atelectasis. A few scattered pulmonary nodules measuring 4-5 mm, for   example right lower lobe (6/105), left upper lobe (6/76) and left upper   lobe (6/99). Limited comparison to prior given motion artifact and   consolidations on prior.  Central tracheobronchial tree is patent. No focal consolidation, pleural   effusion, or pneumothorax.    THORACIC NODES: No enlarged thoracic lymph nodes.    MEDIASTINUM: Heart size is within normal limits. No pericardial effusion.    HEPATOBILIARY: Post cholecystectomy. Hepatic steatosis.    SPLEEN: Post splenectomy.    PANCREAS: Post distal pancreatectomy.    ADRENAL GLANDS: Unremarkable.    KIDNEYS: Symmetric renal enhancement. No hydronephrosis. Right renal cyst.    ABDOMINAL NODES: No lymphadenopathy.    PELVIC VISCERA: Unremarkable.    VISUALIZED PERITONEUM /MESENTERY/BOWEL/PELVIS: No CT evidence of bowel   obstruction, free fluid, or pneumoperitoneum. Normal caliber appendix.    BONES/SOFT TISSUES:Post sternotomy. Degenerative changes of the spine.    IMPRESSION:    No evidence for acute intrathoracic, abdominal or pelvic pathology,   specifically no aortic aneurysm or dissection.    A few scattered pulmonary nodules measuring up to 5 mm. Correlate with   reason for patients distal pancreatectomy and splenectomy. If no history   of malignancy, optional CT chest follow-up examination can be obtained in   12 months for high risk patients.    --- End of Report ---          CARLA KAPOOR MD; Resident Radiologist  This document has been electronically signed.  WILVER ELLIOTT MD; Attending Radiologist  This document has been electronically signed. Sep 25 2022  6:45AM    < end of copied text >     Oriented - self; Oriented - place; Oriented - time

## 2022-09-26 NOTE — CONSULT NOTE ADULT - ASSESSMENT
67yMale pmh DM, CAD, HLD, HTN presents for chest pain, nausea and vomiting. Patient reports after cellulitis he gets n/v.    Problem 1-N/V--Resolved  gastritis, esophagitis  Rec  -PPI daily if nausea returns   - Follow up with our GI office located on [address] [place] [address], Phone number [phone] with Dr. Cardenas   -No acute GI intervention    Problem 2-CRC screening   Rec  - Follow up with our GI office located on [address] [place] [address], Phone number [phone] with Dr. Cardenas to schedule CRC screening Colonoscopy     Problem 3-A few scattered pulmonary nodules measuring up to 5 mm. Correlate with   reason for patients distal pancreatectomy and splenectomy. If no history   of malignancy, optional CT chest follow-up examination can be obtained in   12 months for high risk patients.  Rec  - Care as per primary team     Recall GI if needed 
Patient is a 68 y/o M with a pmhx of DM, CAD, HLD, HTN, hx of pancreatitis, who reports complaining of a sudden onset of  epigastric abdominal pain, body aches, fever (tmax 100F) and chills that began this afternoon. Patient with hx CABG 2015. No chest pain. Pain not like pain prior CABG. He has OASA . Not using C-pap. His WBC is high. Trop .04. Need repeat. No evidence MI. Recent neg stress. Repeat enzymes. Culture antbiotics. F/U Dr Moore 
ASSESSMENT  67y M admitted with SEPSIS; CHEST PAIN; HYPOMAGNESEMIA; PULMONARY NODULES    HPI:  Patient is a 68 y/o M with a pmhx of left knee replacement, DM, CAD, HLD, HTN, hx of pancreatitis, BPH, Hx intubation & pneumonia who reports complaining of a sudden onset of chest pain/ epigastric abdominal pain, body aches, fever (Tmax 100F) and chills that began this afternoon. Chest pain began 1 day ago however, it progressively worsened prompting evaluation to the ER. Patient last followed up with his cardiologist Dr. Lane 1 month ago. During that time he had an echo and stress test done which were negative per patient. No similar episodes of the chest pain in the pain. Patient denies radiation of pain, sick contacts, bowel changes, urinary complaints.     PROBLEMS  Pt with Severe Sepsis (T<96.8F, Pulse>90, WBC>12), lactic acidosis, KRISTINE due to LE cellulitis    New problem with additional W/U   acute illness with systemic symptoms     azithromycin  IVPB 500 milliGRAM(s) IV Intermittent every 24 hours  cefTRIAXone   IVPB 1000 milliGRAM(s) IV Intermittent every 24 hours    Cr 2.2  Cr CL 67    Morbid Obesity  BMI >42    hyponatremia    PLAN  - Ancef 2gm q8h IVPB  - D/C Ceftriaxone  - D/C Zithromax  - ESR, CRP  - Repeat Cr, wbc, Na  - Control blood sugar  - TSH
morbid obesity, clearly needs weight loss, PLEASE STOP ALL FRUIT JUICES, change regimen to glargine 40 units twice daily, and lispro 20 units before meals. ask dietititain to see him, can go back on pump at discharge. sees an endo outside, can follow up with dr merino.

## 2022-09-27 ENCOUNTER — TRANSCRIPTION ENCOUNTER (OUTPATIENT)
Age: 67
End: 2022-09-27

## 2022-09-27 VITALS — WEIGHT: 310.85 LBS

## 2022-09-27 LAB
A1C WITH ESTIMATED AVERAGE GLUCOSE RESULT: 10.1 % — HIGH (ref 4–5.6)
ALBUMIN SERPL ELPH-MCNC: 3.4 G/DL — LOW (ref 3.5–5.2)
ALP SERPL-CCNC: 107 U/L — SIGNIFICANT CHANGE UP (ref 30–115)
ALT FLD-CCNC: 36 U/L — SIGNIFICANT CHANGE UP (ref 0–41)
ANION GAP SERPL CALC-SCNC: 11 MMOL/L — SIGNIFICANT CHANGE UP (ref 7–14)
AST SERPL-CCNC: 46 U/L — HIGH (ref 0–41)
BILIRUB SERPL-MCNC: <0.2 MG/DL — SIGNIFICANT CHANGE UP (ref 0.2–1.2)
BUN SERPL-MCNC: 45 MG/DL — HIGH (ref 10–20)
CALCIUM SERPL-MCNC: 8.8 MG/DL — SIGNIFICANT CHANGE UP (ref 8.4–10.5)
CHLORIDE SERPL-SCNC: 96 MMOL/L — LOW (ref 98–110)
CO2 SERPL-SCNC: 25 MMOL/L — SIGNIFICANT CHANGE UP (ref 17–32)
CREAT SERPL-MCNC: 2.1 MG/DL — HIGH (ref 0.7–1.5)
CRP SERPL-MCNC: 141 MG/L — HIGH
EGFR: 34 ML/MIN/1.73M2 — LOW
ESTIMATED AVERAGE GLUCOSE: 243 MG/DL — HIGH (ref 68–114)
GLUCOSE BLDC GLUCOMTR-MCNC: 135 MG/DL — HIGH (ref 70–99)
GLUCOSE BLDC GLUCOMTR-MCNC: 278 MG/DL — HIGH (ref 70–99)
GLUCOSE SERPL-MCNC: 139 MG/DL — HIGH (ref 70–99)
HCT VFR BLD CALC: 40.7 % — LOW (ref 42–52)
HGB BLD-MCNC: 13.4 G/DL — LOW (ref 14–18)
MANUAL DIF COMMENT BLD-IMP: SIGNIFICANT CHANGE UP
MCHC RBC-ENTMCNC: 31.8 PG — HIGH (ref 27–31)
MCHC RBC-ENTMCNC: 32.9 G/DL — SIGNIFICANT CHANGE UP (ref 32–37)
MCV RBC AUTO: 96.7 FL — HIGH (ref 80–94)
NRBC # BLD: 0 /100 WBCS — SIGNIFICANT CHANGE UP (ref 0–0)
PLATELET # BLD AUTO: 325 K/UL — SIGNIFICANT CHANGE UP (ref 130–400)
POTASSIUM SERPL-MCNC: 4.2 MMOL/L — SIGNIFICANT CHANGE UP (ref 3.5–5)
POTASSIUM SERPL-SCNC: 4.2 MMOL/L — SIGNIFICANT CHANGE UP (ref 3.5–5)
PROT SERPL-MCNC: 6.3 G/DL — SIGNIFICANT CHANGE UP (ref 6–8)
RBC # BLD: 4.21 M/UL — LOW (ref 4.7–6.1)
RBC # FLD: 14.7 % — HIGH (ref 11.5–14.5)
SODIUM SERPL-SCNC: 132 MMOL/L — LOW (ref 135–146)
WBC # BLD: 10.58 K/UL — SIGNIFICANT CHANGE UP (ref 4.8–10.8)
WBC # FLD AUTO: 10.58 K/UL — SIGNIFICANT CHANGE UP (ref 4.8–10.8)

## 2022-09-27 PROCEDURE — 99239 HOSP IP/OBS DSCHRG MGMT >30: CPT

## 2022-09-27 RX ORDER — ATORVASTATIN CALCIUM 80 MG/1
1 TABLET, FILM COATED ORAL
Qty: 0 | Refills: 0 | DISCHARGE
Start: 2022-09-27

## 2022-09-27 RX ORDER — SODIUM CHLORIDE 9 MG/ML
1000 INJECTION INTRAMUSCULAR; INTRAVENOUS; SUBCUTANEOUS
Refills: 0 | Status: DISCONTINUED | OUTPATIENT
Start: 2022-09-27 | End: 2022-09-27

## 2022-09-27 RX ORDER — CEPHALEXIN 500 MG
1 CAPSULE ORAL
Qty: 20 | Refills: 0
Start: 2022-09-27 | End: 2022-10-06

## 2022-09-27 RX ADMIN — Medication 650 MILLIGRAM(S): at 04:00

## 2022-09-27 RX ADMIN — SODIUM CHLORIDE 60 MILLILITER(S): 9 INJECTION INTRAMUSCULAR; INTRAVENOUS; SUBCUTANEOUS at 09:06

## 2022-09-27 RX ADMIN — ATORVASTATIN CALCIUM 10 MILLIGRAM(S): 80 TABLET, FILM COATED ORAL at 12:06

## 2022-09-27 RX ADMIN — RIVAROXABAN 20 MILLIGRAM(S): KIT at 12:06

## 2022-09-27 RX ADMIN — Medication 25 MILLIGRAM(S): at 05:24

## 2022-09-27 RX ADMIN — Medication 100 MILLIGRAM(S): at 05:24

## 2022-09-27 RX ADMIN — INSULIN GLARGINE 40 UNIT(S): 100 INJECTION, SOLUTION SUBCUTANEOUS at 09:14

## 2022-09-27 RX ADMIN — Medication 650 MILLIGRAM(S): at 03:36

## 2022-09-27 RX ADMIN — Medication 650 MILLIGRAM(S): at 12:05

## 2022-09-27 RX ADMIN — Medication 20 UNIT(S): at 12:07

## 2022-09-27 RX ADMIN — Medication 20 UNIT(S): at 08:26

## 2022-09-27 RX ADMIN — Medication 6: at 12:06

## 2022-09-27 RX ADMIN — Medication 81 MILLIGRAM(S): at 12:06

## 2022-09-27 NOTE — DIETITIAN INITIAL EVALUATION ADULT - OTHER INFO
pt is 67 year old male with hx of DM s/p insulin pump, HTN, pancreatitis, CAD, presents with sudden onset chest and epigastric abd pain with temp. admitted with SIRS, sepsis 2/2 LE cellulitis, elevated trops R/O ACS. Insulin pump is empty, + gastritis s/p PPI. RD consulted for diabetic diet education however pt refuses at this time states has been a diabetic for 30 years and is compliant with diet.  Encouraged pt to recall RD if becomes more receptive to education prior to D/C

## 2022-09-27 NOTE — DISCHARGE NOTE PROVIDER - NSDCFUSCHEDAPPT_GEN_ALL_CORE_FT
Luis Gallardo  [facility]  [address]  Scheduled Appointment: 11/17/2022

## 2022-09-27 NOTE — DIETITIAN INITIAL EVALUATION ADULT - NUTRITIONGOAL OUTCOME1
pt to continue to  comply with diabetic diet and meet >75% of estimated nutrient needs within 4-6 days

## 2022-09-27 NOTE — DISCHARGE NOTE NURSING/CASE MANAGEMENT/SOCIAL WORK - BRAND OF COVID-19 VACCINATION
[address]                      [address], [address] [facility]                                OPERATIVE REPORT    PATIENT NAME: Natasha Martínez                     :        1996  MED REC NO:   [id number]                            ROOM:       W465  ACCOUNT NO:   [de-identified]                           ADMIT DATE: 2021  PROVIDER:     Alison Padron DO    DATE OF PROCEDURE:  2021    PREOPERATIVE DIAGNOSES:  Pelvic pain, hemoperitoneum, and a hemorrhagic  cyst of the right ovary measuring between 7 cm and 11 cm. POSTOPERATIVE DIAGNOSES:  Pelvic pain, hemoperitoneum, and a hemorrhagic  cyst of the right ovary measuring between 7 cm and 11 cm with a ruptured  actively bleeding right ovarian hemorrhagic cyst with 600 mL of  hemoperitoneum removed and right paratubal cyst.    PROCEDURES:  Operative laparoscopy with right ovarian cystectomy and  aspiration of hemoperitoneum and excision of a right paratubal cyst.    SURGEON:  Gwyn Hutchins. DO Sp    ASSISTANT:  [facility]    ANESTHESIA:  General.    SURGICAL BLOOD LOSS:  Minimal.    600 mL of hemoperitoneum removed. COMPLICATIONS:  None. INDICATIONS:  This is a 57-year-old  0 female who was in the  emergency room yesterday in [place] and she was transferred to [facility] in  [facility] due to acute onset of pelvic pain and a hemorrhagic cyst on the  right ovary measuring 7-7.5 cm. The patient's initial hemoglobin was 11  and after hydration, she had dropped down to 8.8 gm. She had an  ultrasound this morning which showed a 7.5 cm cystic mass in an 11 cm  total-sized right ovary. There was also hemoperitoneum present in the  pelvis. The patient continued to have pain. I had a lengthy discussion  with the patient, her wife, and her mother and the decision was made to  proceed with a surgical evaluation. The plan was to remove the ovarian  cyst and possibly the right ovary.   The potential complications of the  procedure; bleeding; infection; medication reaction; bowel, bladder,  ureter injury or vascular complications were all explained to the  patient in detail. Appropriate consent was obtained and is placed on  the chart. DESCRIPTION OF PROCEDURE:  The patient was taken to the operating room  where a general anesthetic was administered. She was then placed in the  dorsal lithotomy position, prepped and draped in usual fashion. The  bladder was drained. Exam under anesthesia reveals a small uterus which  was mobile and I was unable to feel the mass. The uterine manipulator  was inserted. A small infraumbilical incision was made. The lower  abdomen was elevated and with the long 5-mm blunt trocar, direct  visualization entry was accomplished while aiming toward the hollow of  the sacrum directly in the midline. Entry was uneventful. Pneumoperitoneum was established. Second access port was placed along  the midclavicular line in the periumbilical region and then eventually a  5 port was placed on the right side as well. Upon entering, blood  covered all of the peritoneal structures was aspirated. The cyst had  ruptured and was actively bleeding from the medial upper pole area. I  was able to remove the cyst wall and a small portion of the ovary  leaving the majority of the ovary still in place. The right fallopian  tube was normal.  The left fallopian tube and ovary was normal as well. The appendix was normal.  I was able to remove 600 mL of hemoperitoneum. Hemostasis was achieved by coagulating the edges of the ovary where the  bleeding was occurring. Again, the pelvis was then copiously irrigated. The fluid and clots were all removed. Again, hemostasis was present  throughout the pelvis and on the right ovary and I let the pressure down  to less than 5 mmHg and there is no bleeding noted. The Surgicel was  then sprayed across the ovary. Again, hemostasis was present.   After  satisfactory completion of the procedure, the instruments removed. The  excess carbon dioxide was allowed to escape. The incisions were closed  with a subcuticular stitch after being cleansed and appropriately  dressed. The patient was then allowed to emerge from anesthesia, was  taken to the PACU in good condition. She tolerated the surgery and the  anesthesia well. The surgical blood loss was minimal.  There were no  complications. She also had a right paratubal cyst and that was removed  also. After satisfactory completion of the procedure, the instruments  removed. The patient was then allowed to emerge from anesthesia, was  taken to the PACU in good condition. She tolerated the surgery and the  anesthesia well.         Lola Carlisle DO    D: 04/05/2021 15:39:03       T: 04/05/2021 15:42:21     [email or web address]  Job#: [id number]     Doc#: [id number]    CC: Moderna dose 1 and 2

## 2022-09-27 NOTE — DISCHARGE NOTE PROVIDER - NSDCFUADDINST_GEN_ALL_CORE_FT
-follow up with your endocrinologist  -follow up with GI   -follow up with your PCP for repeat CT chest in 6-12 months

## 2022-09-27 NOTE — DISCHARGE NOTE PROVIDER - CARE PROVIDERS DIRECT ADDRESSES
,DirectAddress_Unknown ,DirectAddress_Unknown,[email or web address],DirectAddress_Unknown ,DirectAddress_Unknown,[email or web address],DirectAddress_Unknown,[email or web address]

## 2022-09-27 NOTE — DIETITIAN INITIAL EVALUATION ADULT - NS FNS DIET ORDER
Diet, DASH/TLC:   Sodium & Cholesterol Restricted  Consistent Carbohydrate {No Snacks} (09-26-22 @ 12:38)

## 2022-09-27 NOTE — DISCHARGE NOTE NURSING/CASE MANAGEMENT/SOCIAL WORK - NSDCPEFALRISK_GEN_ALL_CORE
For information on Fall & Injury Prevention, visit: [email or web address] OR  [email or web address] OR  https://www.cdc.gov/steadi/patient.html

## 2022-09-27 NOTE — DISCHARGE NOTE PROVIDER - HOSPITAL COURSE
Patient is a 68 y/o M with a PMHx of DM, CAD, HLD, HTN, hx of pancreatitis, who reports c/o sudden onset chest pain/ epigastric abdominal pain, body aches, fever (tmax 100F) & chills that began the afternoon of 9/24. Patient admitted to hospital for treatment of sepsis and elevated troponin. Troponin was initially elevated (0.04) & was repeated while remaining stable (0.04). CXR negative for any significant findings. EKG was performed and showed sinus tachycardia with no acute changes. Pt placed on cardiac monitor and cardiology was consulted: where they reported no evidence MI, a recent neg stress. Cardiology requested a repeat enzymes, a blood culture, initiation of antibiotics, & a F/U w/ Dr. Moore. Blood cultures performed and were negative. Pt given IV antibiotics over course of hospital admission (ID consulted for antibiotic recommendation) for treatment of sepsis (fever + leukocytosis w/ L shift). GI consulted for nausea, vomiting, and epigastric pain. GI recommended initiating a PPI if nausea returns w/ no acute intervention indicated at this time. They also recommended a f/u w/ outpt office w/ Dr. Cardenas (on [address] [place] [address], Phone number [phone]) to schedule CRC screening colonoscopy. Pt's elevated lactate levels (2.4) were treated w/ continuous IV fluids and repeat levels were normal. Pt's hypomagnesemia (1.2) was treated w/ Mg repletion in the ED & were normal on repeat (Mg levels trended throughout hospital stay). CT angio displayed B/L pulmonary nodules (measuring up to 5 mm in size) and pt was advised to have outpt repeat of CT in 6-12 months (pt expressed verbal understanding & stated he would f/u outpt). Pt's, w/ PMHx of DM, fingersticks remained elevated regardless of insulin therapy initiated by medicine. Pt seen by endocrinology for management of elevated fingersticks, and recommended weight loss & discontinuation of fruit juices. Endocrinology also recommended changing insulin regimen to glargine 40 units BID & lispro 20 units pre-prandial. They also noted that pt could continue back on insulin pump once discharged from hospital. Consult for dietician to speak to patient ordered. Pt should f/u w/ Dr. Webb (endocrinologist) outpt. Patient is a 66 y/o M with a PMHx of DM, CAD, HLD, HTN, hx of pancreatitis, who reports c/o sudden onset chest pain/ epigastric abdominal pain, body aches, fever (tmax 100F) & chills that began the afternoon of 9/24. Patient admitted to hospital for treatment of sepsis and elevated troponin. Troponin was initially elevated (0.04) & was repeated while remaining stable (0.04). CXR negative for any significant findings. EKG was performed and showed sinus tachycardia with no acute changes. Pt placed on cardiac monitor and cardiology was consulted: where they reported no evidence MI, a recent neg stress.   Pt given IV antibiotics over course of hospital admission (ID consulted for antibiotic recommendation) for treatment of sepsis (fever + leukocytosis w/ L shift). blood cultures negatvie, pt afebrile , wbc wnl.   GI consulted for nausea, vomiting, and epigastric pain. GI recommended initiating a PPI if nausea returns w/ no acute intervention indicated at this time. They also recommended a f/u w/ outpt office w/ Dr. Cardenas (on [address] [place] [address], Phone number [phone]) to schedule CRC screening colonoscopy.    Pt's hypomagnesemia (1.2) was treated w/ Mg repletion in the ED & were normal on repeat (Mg levels trended throughout hospital stay).   CT angio displayed B/L pulmonary nodules (measuring up to 5 mm in size) and pt was advised to have outpt repeat of CT in 6-12 months (pt expressed verbal understanding & stated he would f/u outpt).   Pt's, w/ PMHx of DM, fingersticks remained elevated regardless of insulin therapy initiated by medicine. Pt seen by endocrinology for management of elevated fingersticks, and recommended weight loss & discontinuation of fruit juices. Endocrinology also recommended changing insulin regimen to glargine 40 units BID & lispro 20 units pre-prandial. They also noted that pt could continue back on insulin pump once discharged from hospital. Consult for dietician to speak to patient ordered. Pt should f/u w/ Dr. Webb (endocrinologist) outpt. Patient is a 66 y/o M with a PMHx of DM, CAD, HLD, HTN, hx of pancreatitis, who reports c/o sudden onset chest pain/ epigastric abdominal pain, body aches, fever (tmax 100F) & chills that began the afternoon of 9/24. Patient admitted to hospital for treatment of sepsis and elevated troponin. Troponin was initially elevated (0.04) & was repeated while remaining stable (0.04). CXR negative for any significant findings. EKG was performed and showed sinus tachycardia with no acute changes. Pt placed on cardiac monitor and cardiology was consulted: where they reported no evidence MI, a recent neg stress.   Pt given IV antibiotics over course of hospital admission (ID consulted for antibiotic recommendation) for treatment of sepsis (fever + leukocytosis w/ L shift). blood cultures negatvie, pt afebrile , wbc wnl.   GI consulted for nausea, vomiting, and epigastric pain. GI recommended initiating a PPI if nausea returns w/ no acute intervention indicated at this time. They also recommended a f/u w/ outpt office w/ Dr. Cardenas (on [address] [place] [address], Phone number [phone]) to schedule CRC screening colonoscopy.    Pt's hypomagnesemia (1.2) was treated w/ Mg repletion in the ED & were normal on repeat (Mg levels trended throughout hospital stay).   CT angio displayed B/L pulmonary nodules (measuring up to 5 mm in size) and pt was advised to have outpt repeat of CT in 6-12 months (pt expressed verbal understanding & stated he would f/u outpt).   Pt's, w/ PMHx of DM, fingersticks remained elevated regardless of insulin therapy initiated by medicine. Pt seen by endocrinology for management of elevated fingersticks, and recommended weight loss & discontinuation of fruit juices. Endocrinology also recommended changing insulin regimen to glargine 40 units BID & lispro 20 units pre-prandial. They also noted that pt could continue back on insulin pump once discharged from hospital. Consult for dietician to speak to patient ordered. Pt should f/u w/ Dr. Webb (endocrinologist) outpt.     Patient was seen and examined at bedside; patient reports he wants to go home today; reports improvement in his cellulitis; spoke to Dr. Lisa Larsen who recommended switching to PO Keflex for 10 days; patient reports his baseline creatinine is 2.1 and that he has a follow up with Dr. Kleiner (nephrologist)    Vital Signs Last 24 Hrs  T(C): 35.9 (27 Sep 2022 05:08), Max: 35.9 (26 Sep 2022 21:10)  T(F): 96.7 (27 Sep 2022 05:08), Max: 96.7 (26 Sep 2022 21:10)  HR: 80 (27 Sep 2022 05:08) (77 - 84)  BP: 135/69 (27 Sep 2022 05:08) (119/60 - 135/69)  BP(mean): --  RR: 18 (27 Sep 2022 05:08) (18 - 18)  SpO2: 97% (27 Sep 2022 05:27) (96% - 98%)    Parameters below as of 27 Sep 2022 05:27  Patient On (Oxygen Delivery Method): room air    GENERAL: NAD, obese  HEAD:  Atraumatic, Normocephalic  EYES: EOMI, PERRLA, conjunctiva and sclera clear  ENMT: No tonsillar erythema, exudates, or enlargement; Moist mucous membranes  NECK: Supple, No JVD, Normal thyroid  NERVOUS SYSTEM:  Alert & Oriented X3, Good concentration; Motor Strength 5/5 B/L upper and lower extremities  CHEST/LUNG: Clear to percussion bilaterally; No rales, rhonchi, wheezing, or rubs  HEART: Regular rate and rhythm; No murmurs, rubs, or gallops  ABDOMEN: obese, +epigastric tenderness, no guarding, no rigidity, no rebound tenderness  EXTREMITIES:  2+ Peripheral Pulses, No clubbing, cyanosis, or edema; +left lower leg erythema, warmth, swelling

## 2022-09-27 NOTE — DIETITIAN INITIAL EVALUATION ADULT - ADD RECOMMEND
RD  to monitor tolerance to po diet, labs/meds, NFPF and f/u as needed within 4-6 days  moderate RISK

## 2022-09-27 NOTE — DIETITIAN INITIAL EVALUATION ADULT - PERTINENT MEDS FT
MEDICATIONS  (STANDING):  acetaminophen     Tablet .. 650 milliGRAM(s) Oral every 6 hours  aspirin enteric coated 81 milliGRAM(s) Oral daily  atorvastatin 10 milliGRAM(s) Oral daily  ceFAZolin   IVPB 2000 milliGRAM(s) IV Intermittent every 8 hours  dextrose 5%. 1000 milliLiter(s) (100 mL/Hr) IV Continuous <Continuous>  dextrose 5%. 1000 milliLiter(s) (50 mL/Hr) IV Continuous <Continuous>  dextrose 50% Injectable 25 Gram(s) IV Push once  dextrose 50% Injectable 12.5 Gram(s) IV Push once  dextrose 50% Injectable 25 Gram(s) IV Push once  glucagon  Injectable 1 milliGRAM(s) IntraMuscular once  insulin glargine Injectable (LANTUS) 40 Unit(s) SubCutaneous at bedtime  insulin glargine Injectable (LANTUS) 40 Unit(s) SubCutaneous every morning  insulin lispro (ADMELOG) corrective regimen sliding scale   SubCutaneous three times a day before meals  insulin lispro Injectable (ADMELOG) 20 Unit(s) SubCutaneous three times a day before meals  loratadine 10 milliGRAM(s) Oral at bedtime  metoprolol tartrate 25 milliGRAM(s) Oral two times a day  oxybutynin 10 milliGRAM(s) Oral at bedtime  rivaroxaban 20 milliGRAM(s) Oral daily  sodium chloride 0.9%. 1000 milliLiter(s) (60 mL/Hr) IV Continuous <Continuous>  tamsulosin 0.4 milliGRAM(s) Oral at bedtime    MEDICATIONS  (PRN):  dextrose Oral Gel 15 Gram(s) Oral once PRN Blood Glucose LESS THAN 70 milliGRAM(s)/deciliter  morphine  - Injectable 2 milliGRAM(s) IV Push every 4 hours PRN Severe Pain (7 - 10)

## 2022-09-27 NOTE — DISCHARGE NOTE PROVIDER - NSDCCPCAREPLAN_GEN_ALL_CORE_FT
PRINCIPAL DISCHARGE DIAGNOSIS  Diagnosis: Sepsis  Assessment and Plan of Treatment: ID consulted for antibiotic recommendations & was started on IV antibiotics throughout course of hospital stay.      SECONDARY DISCHARGE DIAGNOSES  Diagnosis: Chest pain  Assessment and Plan of Treatment: Troponin was trended and remained stable throughout stay. EKG showed sinus tachycardia w/ no acute findings. Pt placed on cardiac monitor and cardiology was consulted: where they reported no evidence MI, a recent neg stress. Cardiology requested a repeat enzymes, a blood culture (negative), initiation of antibiotics, & a F/U w/ Dr. Moore.    Diagnosis: Hypomagnesemia  Assessment and Plan of Treatment: Pt given Mg repletion in ED. Repeated levels were within normal limits.    Diagnosis: Pulmonary nodules  Assessment and Plan of Treatment: Pt advised to f/u outpt for repeat CT 6-12 months from now. Pt verbalized understanding and agreed.     PRINCIPAL DISCHARGE DIAGNOSIS  Diagnosis: Sepsis  Assessment and Plan of Treatment: ID consulted for antibiotic recommendations & was started on IV antibiotics throughout course of hospital stay. wbc within normal limit, aferbile, take antibiotics as prescribed.      SECONDARY DISCHARGE DIAGNOSES  Diagnosis: Chest pain  Assessment and Plan of Treatment: Troponin was trended and remained stable throughout stay. EKG showed sinus tachycardia w/ no acute findings. Pt placed on cardiac monitor and cardiology was consulted: where they reported no evidence MI, a recent neg stress.  follow up with Dr. Moore.    Diagnosis: Hypomagnesemia  Assessment and Plan of Treatment: Pt given Mg repletion in ED. Repeated levels were within normal limits.    Diagnosis: Pulmonary nodules  Assessment and Plan of Treatment: Pt advised to f/u outpt for repeat CT 6-12 months from now with PCP.  Pt verbalized understanding and agreed.

## 2022-09-27 NOTE — DISCHARGE NOTE PROVIDER - PROVIDER TOKENS
PROVIDER:[TOKEN:[[id number]],FOLLOWUP:[1 week]] PROVIDER:[TOKEN:[[id number]],FOLLOWUP:[1 week]],PROVIDER:[TOKEN:[[id number]],FOLLOWUP:[1 week]],FREE:[LAST:[GI clinic],PHONE:[phone]],FAX:[(   )    -],ADDRESS:[Dr. Cardenas   on [address] [place] [address],  to schedule CRC screening colonoscopy.]] PROVIDER:[TOKEN:[[id number]],FOLLOWUP:[1 week]],PROVIDER:[TOKEN:[[id number]],FOLLOWUP:[1 week]],FREE:[LAST:[GI clinic],PHONE:[phone]],FAX:[(   )    -],ADDRESS:[Dr. Cardenas   on [address] [place] [address],  to schedule CRC screening colonoscopy.]],PROVIDER:[TOKEN:[[id number]],FOLLOWUP:[2 weeks]]

## 2022-09-27 NOTE — DIETITIAN INITIAL EVALUATION ADULT - PERTINENT LABORATORY DATA
09-27    132<L>  |  96<L>  |  45<H>  ----------------------------<  139<H>  4.2   |  25  |  2.1<H>    Ca    8.8      27 Sep 2022 06:49  Mg     1.9     09-26    TPro  6.3  /  Alb  3.4<L>  /  TBili  <0.2  /  DBili  x   /  AST  46<H>  /  ALT  36  /  AlkPhos  107  09-27  POCT Blood Glucose.: 278 mg/dL (09-27-22 @ 11:45)  A1C with Estimated Average Glucose Result: 9.7 % (09-26-22 @ 06:55)

## 2022-09-27 NOTE — DISCHARGE NOTE PROVIDER - NSDCMRMEDTOKEN_GEN_ALL_CORE_FT
acetaminophen 325 mg oral tablet: 2 tab(s) orally every 6 hours, As needed, Temp greater or equal to 38C (100.4F), Moderate Pain (4 - 6)  Cialis:   Ecotrin: orally once a day  furosemide 80 mg oral tablet: orally once a day  HumaLOG:   lisinopril 20 mg oral tablet: 1 tab(s) orally 2 times a day  metOLazone: 2.5  orally 2 times a day  metoprolol tartrate 25 mg oral tablet: 1 tab(s) orally 2 times a day  oxybutynin:   oxyCODONE 5 mg oral tablet: 1 tab(s) orally every 6 hours, As Needed MDD:4   rivaroxaban 20 mg oral tablet: 1 tab(s) orally every 24 hours  spironolactone: 25 milligram(s) orally once a day  tamsulosin 0.4 mg oral capsule: 1 cap(s) orally once a day (at bedtime)  testosterone:    acetaminophen 325 mg oral tablet: 2 tab(s) orally every 6 hours, As needed, Temp greater or equal to 38C (100.4F), Moderate Pain (4 - 6)  cephalexin 500 mg oral capsule: 1 cap(s) orally 2 times a day   Cialis:   Ecotrin: orally once a day  furosemide 80 mg oral tablet: orally once a day  HumaLOG:   Lipitor 10 mg oral tablet: 1 tab(s) orally once a day  lisinopril 20 mg oral tablet: 1 tab(s) orally 2 times a day  (Recommend holding this medication until evaluated by nephrologist due to acute kidney injury)  metOLazone: 2.5  orally 2 times a day  metoprolol tartrate 25 mg oral tablet: 1 tab(s) orally 2 times a day  oxybutynin:   oxyCODONE 5 mg oral tablet: 1 tab(s) orally every 6 hours, As Needed MDD:4   rivaroxaban 20 mg oral tablet: 1 tab(s) orally every 24 hours  spironolactone: 25 milligram(s) orally once a day  tamsulosin 0.4 mg oral capsule: 1 cap(s) orally once a day (at bedtime)  testosterone:

## 2022-09-27 NOTE — DIETITIAN INITIAL EVALUATION ADULT - NSPROEDALEARNPREF_GEN_A_NUR
pt non-receptive to diet education at this time. Encouraged pt to recall RD prior to discharge/verbal instruction

## 2022-09-27 NOTE — DISCHARGE NOTE NURSING/CASE MANAGEMENT/SOCIAL WORK - PATIENT PORTAL LINK FT
You can access the FollowMyHealth Patient Portal offered by [facility] by registering at the following website: [email or web address]. By joining [facility]’s FollowMyHealth portal, you will also be able to view your health information using other applications (apps) compatible with our system.

## 2022-09-27 NOTE — DIETITIAN INITIAL EVALUATION ADULT - ORAL INTAKE PTA/DIET HISTORY
as per pt follow a diabetic diet for 30 years, denies any recent weight/appetite changes    pt presently on a DASh/TLC CHO consistent diet tolerating well consumes >75% of meals

## 2022-09-27 NOTE — DISCHARGE NOTE PROVIDER - CARE PROVIDER_API CALL
Layo Webb  ENDOCRINOLOGY/METAB/DIABETES  [address] [place] [address]  Phone: [phone]  Fax: [phone]  Follow Up Time: 1 week   Layo Webb  ENDOCRINOLOGY/METAB/DIABETES  [address] [place] [address]  Phone: [phone]  Fax: [phone]  Follow Up Time: 1 week    Saad Lane)  Cardiovascular Disease; Internal Medicine  [address] [place] [address]  Phone: [phone]  Fax: [phone]  Follow Up Time: 1 week    GI clinic,   Dr. Cardenas   on [address] [place] [address],  to schedule CRC screening colonoscopy.  Phone: [phone]  Fax: (   )    -  Follow Up Time:    Layo Webb  ENDOCRINOLOGY/METAB/DIABETES  [address] [place] [address]  Phone: [phone]  Fax: [phone]  Follow Up Time: 1 week    Saad Lane)  Cardiovascular Disease; Internal Medicine  [address] [place] [address]  Phone: [phone]  Fax: [phone]  Follow Up Time: 1 week    GI clinic,   Dr. Cardenas   on [address] [place] [address],  to schedule CRC screening colonoscopy.  Phone: [phone]  Fax: (   )    -  Follow Up Time:     Kleiner, Morton J (MD)  Internal Medicine; Nephrology  [address] [place] [address]  Phone: [phone]  Fax: [phone]  Follow Up Time: 2 weeks

## 2022-10-03 DIAGNOSIS — E11.65 TYPE 2 DIABETES MELLITUS WITH HYPERGLYCEMIA: ICD-10-CM

## 2022-10-03 DIAGNOSIS — Z79.890 HORMONE REPLACEMENT THERAPY: ICD-10-CM

## 2022-10-03 DIAGNOSIS — Z79.4 LONG TERM (CURRENT) USE OF INSULIN: ICD-10-CM

## 2022-10-03 DIAGNOSIS — R65.20 SEVERE SEPSIS WITHOUT SEPTIC SHOCK: ICD-10-CM

## 2022-10-03 DIAGNOSIS — A41.9 SEPSIS, UNSPECIFIED ORGANISM: ICD-10-CM

## 2022-10-03 DIAGNOSIS — Z95.1 PRESENCE OF AORTOCORONARY BYPASS GRAFT: ICD-10-CM

## 2022-10-03 DIAGNOSIS — L03.116 CELLULITIS OF LEFT LOWER LIMB: ICD-10-CM

## 2022-10-03 DIAGNOSIS — I25.10 ATHEROSCLEROTIC HEART DISEASE OF NATIVE CORONARY ARTERY WITHOUT ANGINA PECTORIS: ICD-10-CM

## 2022-10-03 DIAGNOSIS — Z79.899 OTHER LONG TERM (CURRENT) DRUG THERAPY: ICD-10-CM

## 2022-10-03 DIAGNOSIS — E87.1 HYPO-OSMOLALITY AND HYPONATREMIA: ICD-10-CM

## 2022-10-03 DIAGNOSIS — Z96.41 PRESENCE OF INSULIN PUMP (EXTERNAL) (INTERNAL): ICD-10-CM

## 2022-10-03 DIAGNOSIS — N17.9 ACUTE KIDNEY FAILURE, UNSPECIFIED: ICD-10-CM

## 2022-10-03 DIAGNOSIS — R07.9 CHEST PAIN, UNSPECIFIED: ICD-10-CM

## 2022-10-03 DIAGNOSIS — R77.8 OTHER SPECIFIED ABNORMALITIES OF PLASMA PROTEINS: ICD-10-CM

## 2022-10-03 DIAGNOSIS — E66.01 MORBID (SEVERE) OBESITY DUE TO EXCESS CALORIES: ICD-10-CM

## 2022-10-03 DIAGNOSIS — K20.90 ESOPHAGITIS, UNSPECIFIED WITHOUT BLEEDING: ICD-10-CM

## 2022-10-03 DIAGNOSIS — I10 ESSENTIAL (PRIMARY) HYPERTENSION: ICD-10-CM

## 2022-10-03 DIAGNOSIS — E87.2 ACIDOSIS: ICD-10-CM

## 2022-10-03 DIAGNOSIS — Z20.822 CONTACT WITH AND (SUSPECTED) EXPOSURE TO COVID-19: ICD-10-CM

## 2022-10-03 DIAGNOSIS — R91.8 OTHER NONSPECIFIC ABNORMAL FINDING OF LUNG FIELD: ICD-10-CM

## 2022-10-03 DIAGNOSIS — K29.70 GASTRITIS, UNSPECIFIED, WITHOUT BLEEDING: ICD-10-CM

## 2022-10-03 DIAGNOSIS — Z96.652 PRESENCE OF LEFT ARTIFICIAL KNEE JOINT: ICD-10-CM

## 2022-10-03 DIAGNOSIS — R65.10 SYSTEMIC INFLAMMATORY RESPONSE SYNDROME (SIRS) OF NON-INFECTIOUS ORIGIN WITHOUT ACUTE ORGAN DYSFUNCTION: ICD-10-CM

## 2022-10-03 DIAGNOSIS — Z79.891 LONG TERM (CURRENT) USE OF OPIATE ANALGESIC: ICD-10-CM

## 2022-10-03 DIAGNOSIS — E83.42 HYPOMAGNESEMIA: ICD-10-CM

## 2022-10-26 NOTE — DISCHARGE NOTE ADULT - PRINCIPAL DIAGNOSIS
Rissa Alcaraz was seen by steff Gerber, neuropsychometrist, for a neuropsychological assesment on 10/20/22 
Cellulitis of right lower extremity

## 2022-10-27 NOTE — H&P ADULT - RESPIRATORY COMMENTS
Gabriela Mejia is a 73 year old female, c/o cough, HA, body aches, sneezing, and runny nose - onset 10/21/22. Bilateral ear pain that started today. Reports, no known COVID exposure. Per patient, took a home COVID test yesterday and result was negative.     TX: Mucinex, Tylenol, Nyquil and Cough medication       Visit Vitals  /82 (BP Location: RUE - Right upper extremity, Patient Position: Sitting, Cuff Size: Large Adult)   Pulse 82   Temp 98.4 °F (36.9 °C) (Temporal)   Resp 18   Ht 5' 9\" (1.753 m) Comment: per pt   Wt 79.4 kg (175 lb)   LMP  (LMP Unknown)   SpO2 96%   BMI 25.84 kg/m²       Patient would like communication of their results via:     Cell Phone:   Telephone Information:   Mobile [phone]     Okay to leave a message containing results? Yes  ?  Per protocol, full PPE attire worn during the care of this patient.     Denies known Latex allergy or symptoms of Latex sensitivity.  Medications verified and reviewed.  Allergies verified and reviewed.  Tobacco history verified 10/27/2022.  PCP verified or updated.   Adal Goss MD     scattered rhoncii and creps heard  bilaterally

## 2022-11-18 NOTE — ASU PREOP CHECKLIST - BLOOD AVAILABLE
[FreeTextEntry1] : Mr. Moore is a 68 year old man with multiple medical problems including obstructive sleep apnea, neuropathy, mild cognitive impairment. He now has vertigo as well as worsening of mood.\par \par 1. Obstructive sleep apnea - Previous polysomnogram showed evidence of severe MILLICENT. He has been compliant with CPAP.  His newest machine was set at 12 cm H2O. \par His newest machine is broken beyond repair. He does use his old machine on a nightly basis. It is set at 10 cm H2O.\par A new machine will be ordered.\par \par \par 2. Mild cognitive impairment - Neuropsych testing shows stable MCI and does not suggest a progressive dementing process.\par Continue to participate in physically and mentally stimulating activities. \par Dysthymic disorder also likely contributing.\par \par 3. Neuropathy \par He notes worsening symptoms despite improved blood sugars.\par Will check vitamin B12, vitamin B6, SPEP, paraneoplastic panel to look for other causes of neuropathy.\par Continue gabapentin. Will increase to 600-300-600.\par Will also prescribe lidocaine patches. \par \par 4.  Vision change/pupil shape change\par Vision is improved and pupil appears round.\par He never had MRI brain. will hold off for now.\par \par 5. Depression, dysthymic disorder.\par Will increase citalopram to 30 mg/day. if he has side effects, decrease back to 20 mg/day.\par \par Follow-up in 4-6 months, sooner if needed. \par  \par  n/a

## 2022-11-28 ENCOUNTER — APPOINTMENT (OUTPATIENT)
Dept: OTOLARYNGOLOGY | Facility: CLINIC | Age: 67
End: 2022-11-28

## 2022-11-28 DIAGNOSIS — H90.3 SENSORINEURAL HEARING LOSS, BILATERAL: ICD-10-CM

## 2022-11-28 PROCEDURE — 99214 OFFICE O/P EST MOD 30 MIN: CPT

## 2022-11-28 NOTE — HISTORY OF PRESENT ILLNESS
[FreeTextEntry1] : Patient returns today following up on clogged ears .  He denies any recent  ear discomfort . Mometasone is not covered by insurance , needs  new  script . Pt has had a few episodes of rhinitis and they are limited episodes. Pt feel some clogging in the ears. Meds are helpful with nasal symptoms.

## 2023-03-01 NOTE — PATIENT PROFILE ADULT - NSPROGENBLOODRESTRICT_GEN_A_NUR
What Is The Reason For Today's Visit?: Full Body Skin Examination What Is The Reason For Today's Visit? (Being Monitored For X): concerning skin lesions on an annual basis How Severe Are Your Spot(S)?: mild none

## 2023-03-11 ENCOUNTER — EMERGENCY (EMERGENCY)
Facility: HOSPITAL | Age: 68
LOS: 0 days | Discharge: HOME | End: 2023-03-12
Attending: EMERGENCY MEDICINE
Payer: MEDICARE

## 2023-03-11 VITALS
TEMPERATURE: 99 F | HEART RATE: 102 BPM | DIASTOLIC BLOOD PRESSURE: 75 MMHG | OXYGEN SATURATION: 98 % | RESPIRATION RATE: 18 BRPM | SYSTOLIC BLOOD PRESSURE: 146 MMHG

## 2023-03-11 DIAGNOSIS — R29.810 FACIAL WEAKNESS: ICD-10-CM

## 2023-03-11 DIAGNOSIS — I48.91 UNSPECIFIED ATRIAL FIBRILLATION: ICD-10-CM

## 2023-03-11 DIAGNOSIS — I10 ESSENTIAL (PRIMARY) HYPERTENSION: ICD-10-CM

## 2023-03-11 DIAGNOSIS — I25.10 ATHEROSCLEROTIC HEART DISEASE OF NATIVE CORONARY ARTERY WITHOUT ANGINA PECTORIS: ICD-10-CM

## 2023-03-11 DIAGNOSIS — Z95.5 PRESENCE OF CORONARY ANGIOPLASTY IMPLANT AND GRAFT: Chronic | ICD-10-CM

## 2023-03-11 DIAGNOSIS — Z87.891 PERSONAL HISTORY OF NICOTINE DEPENDENCE: ICD-10-CM

## 2023-03-11 DIAGNOSIS — Z90.49 ACQUIRED ABSENCE OF OTHER SPECIFIED PARTS OF DIGESTIVE TRACT: Chronic | ICD-10-CM

## 2023-03-11 DIAGNOSIS — E11.9 TYPE 2 DIABETES MELLITUS WITHOUT COMPLICATIONS: ICD-10-CM

## 2023-03-11 DIAGNOSIS — Z96.652 PRESENCE OF LEFT ARTIFICIAL KNEE JOINT: Chronic | ICD-10-CM

## 2023-03-11 DIAGNOSIS — Z79.82 LONG TERM (CURRENT) USE OF ASPIRIN: ICD-10-CM

## 2023-03-11 DIAGNOSIS — Z20.822 CONTACT WITH AND (SUSPECTED) EXPOSURE TO COVID-19: ICD-10-CM

## 2023-03-11 DIAGNOSIS — E78.5 HYPERLIPIDEMIA, UNSPECIFIED: ICD-10-CM

## 2023-03-11 DIAGNOSIS — Z79.01 LONG TERM (CURRENT) USE OF ANTICOAGULANTS: ICD-10-CM

## 2023-03-11 DIAGNOSIS — G51.0 BELL'S PALSY: ICD-10-CM

## 2023-03-11 DIAGNOSIS — Z90.410 ACQUIRED TOTAL ABSENCE OF PANCREAS: Chronic | ICD-10-CM

## 2023-03-11 DIAGNOSIS — Z79.4 LONG TERM (CURRENT) USE OF INSULIN: ICD-10-CM

## 2023-03-11 LAB
ALBUMIN SERPL ELPH-MCNC: 3.6 G/DL — SIGNIFICANT CHANGE UP (ref 3.5–5.2)
ALP SERPL-CCNC: 85 U/L — SIGNIFICANT CHANGE UP (ref 30–115)
ALT FLD-CCNC: 39 U/L — SIGNIFICANT CHANGE UP (ref 0–41)
ANION GAP SERPL CALC-SCNC: 11 MMOL/L — SIGNIFICANT CHANGE UP (ref 7–14)
APTT BLD: 40.3 SEC — HIGH (ref 27–39.2)
AST SERPL-CCNC: 31 U/L — SIGNIFICANT CHANGE UP (ref 0–41)
BASOPHILS # BLD AUTO: 0.08 K/UL — SIGNIFICANT CHANGE UP (ref 0–0.2)
BASOPHILS NFR BLD AUTO: 0.9 % — SIGNIFICANT CHANGE UP (ref 0–1)
BILIRUB SERPL-MCNC: 0.2 MG/DL — SIGNIFICANT CHANGE UP (ref 0.2–1.2)
BUN SERPL-MCNC: 41 MG/DL — HIGH (ref 10–20)
CALCIUM SERPL-MCNC: 8.7 MG/DL — SIGNIFICANT CHANGE UP (ref 8.4–10.5)
CHLORIDE SERPL-SCNC: 93 MMOL/L — LOW (ref 98–110)
CO2 SERPL-SCNC: 29 MMOL/L — SIGNIFICANT CHANGE UP (ref 17–32)
CREAT SERPL-MCNC: 1.6 MG/DL — HIGH (ref 0.7–1.5)
EGFR: 47 ML/MIN/1.73M2 — LOW
EOSINOPHIL # BLD AUTO: 0.35 K/UL — SIGNIFICANT CHANGE UP (ref 0–0.7)
EOSINOPHIL NFR BLD AUTO: 3.8 % — SIGNIFICANT CHANGE UP (ref 0–8)
GLUCOSE SERPL-MCNC: 206 MG/DL — HIGH (ref 70–99)
HCT VFR BLD CALC: 43.7 % — SIGNIFICANT CHANGE UP (ref 42–52)
HGB BLD-MCNC: 14.7 G/DL — SIGNIFICANT CHANGE UP (ref 14–18)
IMM GRANULOCYTES NFR BLD AUTO: 0.3 % — SIGNIFICANT CHANGE UP (ref 0.1–0.3)
INR BLD: 1.21 RATIO — SIGNIFICANT CHANGE UP (ref 0.65–1.3)
LYMPHOCYTES # BLD AUTO: 2.9 K/UL — SIGNIFICANT CHANGE UP (ref 1.2–3.4)
LYMPHOCYTES # BLD AUTO: 31.3 % — SIGNIFICANT CHANGE UP (ref 20.5–51.1)
MCHC RBC-ENTMCNC: 31.5 PG — HIGH (ref 27–31)
MCHC RBC-ENTMCNC: 33.6 G/DL — SIGNIFICANT CHANGE UP (ref 32–37)
MCV RBC AUTO: 93.8 FL — SIGNIFICANT CHANGE UP (ref 80–94)
MONOCYTES # BLD AUTO: 1.42 K/UL — HIGH (ref 0.1–0.6)
MONOCYTES NFR BLD AUTO: 15.3 % — HIGH (ref 1.7–9.3)
NEUTROPHILS # BLD AUTO: 4.49 K/UL — SIGNIFICANT CHANGE UP (ref 1.4–6.5)
NEUTROPHILS NFR BLD AUTO: 48.4 % — SIGNIFICANT CHANGE UP (ref 42.2–75.2)
NRBC # BLD: 0 /100 WBCS — SIGNIFICANT CHANGE UP (ref 0–0)
PLATELET # BLD AUTO: 412 K/UL — HIGH (ref 130–400)
POTASSIUM SERPL-MCNC: 4.3 MMOL/L — SIGNIFICANT CHANGE UP (ref 3.5–5)
POTASSIUM SERPL-SCNC: 4.3 MMOL/L — SIGNIFICANT CHANGE UP (ref 3.5–5)
PROT SERPL-MCNC: 6.6 G/DL — SIGNIFICANT CHANGE UP (ref 6–8)
PROTHROM AB SERPL-ACNC: 13.9 SEC — HIGH (ref 9.95–12.87)
RBC # BLD: 4.66 M/UL — LOW (ref 4.7–6.1)
RBC # FLD: 15.9 % — HIGH (ref 11.5–14.5)
SODIUM SERPL-SCNC: 133 MMOL/L — LOW (ref 135–146)
WBC # BLD: 9.27 K/UL — SIGNIFICANT CHANGE UP (ref 4.8–10.8)
WBC # FLD AUTO: 9.27 K/UL — SIGNIFICANT CHANGE UP (ref 4.8–10.8)

## 2023-03-11 PROCEDURE — 0042T: CPT | Mod: MA

## 2023-03-11 PROCEDURE — 70496 CT ANGIOGRAPHY HEAD: CPT | Mod: XU,MA

## 2023-03-11 PROCEDURE — 70551 MRI BRAIN STEM W/O DYE: CPT | Mod: MA

## 2023-03-11 PROCEDURE — 93005 ELECTROCARDIOGRAM TRACING: CPT

## 2023-03-11 PROCEDURE — 70498 CT ANGIOGRAPHY NECK: CPT | Mod: MA

## 2023-03-11 PROCEDURE — 36415 COLL VENOUS BLD VENIPUNCTURE: CPT

## 2023-03-11 PROCEDURE — 84484 ASSAY OF TROPONIN QUANT: CPT

## 2023-03-11 PROCEDURE — 80053 COMPREHEN METABOLIC PANEL: CPT

## 2023-03-11 PROCEDURE — 99285 EMERGENCY DEPT VISIT HI MDM: CPT | Mod: 25

## 2023-03-11 PROCEDURE — 96374 THER/PROPH/DIAG INJ IV PUSH: CPT | Mod: XU

## 2023-03-11 PROCEDURE — 85730 THROMBOPLASTIN TIME PARTIAL: CPT

## 2023-03-11 PROCEDURE — 70450 CT HEAD/BRAIN W/O DYE: CPT | Mod: MA

## 2023-03-11 PROCEDURE — 82962 GLUCOSE BLOOD TEST: CPT

## 2023-03-11 PROCEDURE — 99285 EMERGENCY DEPT VISIT HI MDM: CPT

## 2023-03-11 PROCEDURE — 70498 CT ANGIOGRAPHY NECK: CPT | Mod: 26,MA

## 2023-03-11 PROCEDURE — 87635 SARS-COV-2 COVID-19 AMP PRB: CPT

## 2023-03-11 PROCEDURE — 85610 PROTHROMBIN TIME: CPT

## 2023-03-11 PROCEDURE — 70496 CT ANGIOGRAPHY HEAD: CPT | Mod: 26,MA

## 2023-03-11 PROCEDURE — 85025 COMPLETE CBC W/AUTO DIFF WBC: CPT

## 2023-03-11 PROCEDURE — 70450 CT HEAD/BRAIN W/O DYE: CPT | Mod: 26,MA

## 2023-03-11 PROCEDURE — G0378: CPT

## 2023-03-11 NOTE — ED PROVIDER NOTE - CLINICAL SUMMARY MEDICAL DECISION MAKING FREE TEXT BOX
68-year-old male presented with left-sided facial droop and numbness NIHSS of 2, patient out of window for TNK.  Stroke alert called.  No acute stroke seen on CT imaging.  Neurology recommending medications for Bell's palsy as well as ED observation for MRI.  Labs EKG and imaging ordered and reviewed by me.  Patient placed in ED observation at this time for further care and management.

## 2023-03-11 NOTE — ED PROVIDER NOTE - OBJECTIVE STATEMENT
69 y/o male with a PMH of DM, CAD, HLD, HTN, hx of pancreatitis, and afib on xarelto presents to the ED for evaluation of sudden onset left sided facial numbness and drop at 5pm this evening. pt denies visual changes, neck pain headache, dizziness, chest pain, sob, weakness, urinary or bowel retention or incontinence, rashes, or recent viral illness.

## 2023-03-11 NOTE — ED PROVIDER NOTE - PROGRESS NOTE DETAILS
Authored by Mignon Zarate DO: Stroke code called immediately on assessment. NIHSS 2 for facial numbness and left sided facial droop. Spoke w/ telestroke Teri KRUEGER - not a candidate for TNK out of window at this time. will review imaging and reassess patient.

## 2023-03-11 NOTE — ED PROVIDER NOTE - ATTENDING APP SHARED VISIT CONTRIBUTION OF CARE
68-year-old male past medical history diabetes, CAD, hyperlipidemia, pretension, pancreatitis, A-fib on Xarelto presents the emergency department for evaluation of left-sided facial numbness that started 5 PM while eating.  Patient's girlfriend noticed that left-sided facial droop as well.  On evaluation stroke code called.  Patient denies nausea vomiting, visual changes, chest pain, shortness of breath.    Vital Signs: I have reviewed the initial vital signs.  Constitutional: Patient in no acute distress.  Integumentary: No rash.  ENT: MMM  NECK: Supple.   Cardiovascular: RRR, radial pulses 2/4 bilaterally.   Respiratory: Breath sounds CTA b/l, no wheezing or crackles, good air exchange, good resp effort and excursion, no accessory muscle use, no stridor.  Gastrointestinal: Abdomen soft, non-tender, non-distended, no rebound tenderness or guarding, no CVAT.   Musculoskeletal: FROM, no edema, no calf pain/swelling/erythema.  Neurologic: AAOx3, motor 5/5 and sensation intact throughout upper and lower ext, No slurring of speech. No focal deficits. +L sided facial droop not involving forehead and numbness NIHSS 2.

## 2023-03-11 NOTE — ED PROVIDER NOTE - CARE PLAN
1 Principal Discharge DX:	Facial droop   Principal Discharge DX:	Facial droop  Assessment and plan of treatment:	plan - stroke code, labs, xr, ekg ,reassess

## 2023-03-11 NOTE — ED PROVIDER NOTE - PHYSICAL EXAMINATION
Physical Exam    Vital Signs: I have reviewed the initial vital signs.  Constitutional: well-nourished, appears stated age, no acute distress  Eyes: Conjunctiva pink, Sclera clear, PERRLA, EOMI without pain.  ENT: pt able to move tongue side to side.   Cardiovascular: S1 and S2, regular rate, regular rhythm, well-perfused extremities, radial pulses equal and 2+ b/l.   Respiratory: unlabored respiratory effort, clear to auscultation bilaterally no wheezing, rales and rhonchi. pt is speaking full sentences. no accessory muscle use.   Gastrointestinal: soft, non-tender, nondistended abdomen, no pulsatile mass, normal bowl sounds, no rebound, no guarding  Musculoskeletal: supple neck, no lower extremity edema, no calf tenderness FROM of b/l upper and lower extremities.   Integumentary: warm, dry, no rash  Neurologic: awake, alert, cranial nerves II-XII grossly intact, extremities’ motor and sensory functions grossly intact. left sided facial droop sparring the forehead. finger to nose intact. steady gait. 5/5 strength throughout. decrease sensation to the left side of the face.   Psychiatric: appropriate mood, appropriate affect

## 2023-03-12 VITALS
DIASTOLIC BLOOD PRESSURE: 80 MMHG | SYSTOLIC BLOOD PRESSURE: 185 MMHG | HEART RATE: 105 BPM | RESPIRATION RATE: 18 BRPM | OXYGEN SATURATION: 97 %

## 2023-03-12 PROBLEM — I25.10 ATHEROSCLEROTIC HEART DISEASE OF NATIVE CORONARY ARTERY WITHOUT ANGINA PECTORIS: Chronic | Status: ACTIVE | Noted: 2022-09-25

## 2023-03-12 PROBLEM — Z86.39 PERSONAL HISTORY OF OTHER ENDOCRINE, NUTRITIONAL AND METABOLIC DISEASE: Chronic | Status: ACTIVE | Noted: 2022-09-25

## 2023-03-12 PROBLEM — E11.9 TYPE 2 DIABETES MELLITUS WITHOUT COMPLICATIONS: Chronic | Status: ACTIVE | Noted: 2022-09-25

## 2023-03-12 LAB
GLUCOSE BLDC GLUCOMTR-MCNC: 223 MG/DL — HIGH (ref 70–99)
SARS-COV-2 RNA SPEC QL NAA+PROBE: SIGNIFICANT CHANGE UP
TROPONIN T SERPL-MCNC: 0.03 NG/ML — CRITICAL HIGH
TROPONIN T SERPL-MCNC: 0.03 NG/ML — CRITICAL HIGH

## 2023-03-12 PROCEDURE — 70551 MRI BRAIN STEM W/O DYE: CPT | Mod: 26,MA

## 2023-03-12 PROCEDURE — 99236 HOSP IP/OBS SAME DATE HI 85: CPT

## 2023-03-12 PROCEDURE — 99283 EMERGENCY DEPT VISIT LOW MDM: CPT

## 2023-03-12 PROCEDURE — 93010 ELECTROCARDIOGRAM REPORT: CPT | Mod: 76

## 2023-03-12 RX ORDER — LISINOPRIL 2.5 MG/1
20 TABLET ORAL DAILY
Refills: 0 | Status: DISCONTINUED | OUTPATIENT
Start: 2023-03-12 | End: 2023-03-12

## 2023-03-12 RX ORDER — ASPIRIN/CALCIUM CARB/MAGNESIUM 324 MG
0 TABLET ORAL
Qty: 0 | Refills: 0 | DISCHARGE

## 2023-03-12 RX ORDER — OXYBUTYNIN CHLORIDE 5 MG
10 TABLET ORAL AT BEDTIME
Refills: 0 | Status: DISCONTINUED | OUTPATIENT
Start: 2023-03-12 | End: 2023-03-12

## 2023-03-12 RX ORDER — VALACYCLOVIR 500 MG/1
1000 TABLET, FILM COATED ORAL THREE TIMES A DAY
Refills: 0 | Status: DISCONTINUED | OUTPATIENT
Start: 2023-03-12 | End: 2023-03-12

## 2023-03-12 RX ORDER — TAMSULOSIN HYDROCHLORIDE 0.4 MG/1
0.4 CAPSULE ORAL AT BEDTIME
Refills: 0 | Status: DISCONTINUED | OUTPATIENT
Start: 2023-03-12 | End: 2023-03-12

## 2023-03-12 RX ORDER — OXYBUTYNIN CHLORIDE 5 MG
0 TABLET ORAL
Qty: 0 | Refills: 0 | DISCHARGE

## 2023-03-12 RX ORDER — FUROSEMIDE 40 MG
80 TABLET ORAL DAILY
Refills: 0 | Status: DISCONTINUED | OUTPATIENT
Start: 2023-03-12 | End: 2023-03-12

## 2023-03-12 RX ORDER — LORATADINE 10 MG/1
10 TABLET ORAL DAILY
Refills: 0 | Status: DISCONTINUED | OUTPATIENT
Start: 2023-03-12 | End: 2023-03-12

## 2023-03-12 RX ORDER — INSULIN HUMAN 100 [IU]/ML
4 INJECTION, SOLUTION SUBCUTANEOUS ONCE
Refills: 0 | Status: COMPLETED | OUTPATIENT
Start: 2023-03-12 | End: 2023-03-12

## 2023-03-12 RX ORDER — RIVAROXABAN 15 MG-20MG
20 KIT ORAL DAILY
Refills: 0 | Status: DISCONTINUED | OUTPATIENT
Start: 2023-03-12 | End: 2023-03-12

## 2023-03-12 RX ORDER — SPIRONOLACTONE 25 MG/1
25 TABLET, FILM COATED ORAL DAILY
Refills: 0 | Status: DISCONTINUED | OUTPATIENT
Start: 2023-03-12 | End: 2023-03-12

## 2023-03-12 RX ORDER — ATORVASTATIN CALCIUM 80 MG/1
10 TABLET, FILM COATED ORAL AT BEDTIME
Refills: 0 | Status: DISCONTINUED | OUTPATIENT
Start: 2023-03-12 | End: 2023-03-12

## 2023-03-12 RX ORDER — TADALAFIL 10 MG/1
0 TABLET, FILM COATED ORAL
Qty: 0 | Refills: 0 | DISCHARGE

## 2023-03-12 RX ORDER — VALACYCLOVIR 500 MG/1
1 TABLET, FILM COATED ORAL
Qty: 7 | Refills: 0
Start: 2023-03-12 | End: 2023-03-18

## 2023-03-12 RX ADMIN — LISINOPRIL 20 MILLIGRAM(S): 2.5 TABLET ORAL at 05:37

## 2023-03-12 RX ADMIN — INSULIN HUMAN 4 UNIT(S): 100 INJECTION, SOLUTION SUBCUTANEOUS at 11:59

## 2023-03-12 RX ADMIN — RIVAROXABAN 20 MILLIGRAM(S): KIT at 11:35

## 2023-03-12 RX ADMIN — Medication 80 MILLIGRAM(S): at 03:17

## 2023-03-12 RX ADMIN — LORATADINE 10 MILLIGRAM(S): 10 TABLET ORAL at 11:35

## 2023-03-12 RX ADMIN — VALACYCLOVIR 1000 MILLIGRAM(S): 500 TABLET, FILM COATED ORAL at 04:25

## 2023-03-12 RX ADMIN — Medication 1 MILLIGRAM(S): at 09:53

## 2023-03-12 RX ADMIN — SPIRONOLACTONE 25 MILLIGRAM(S): 25 TABLET, FILM COATED ORAL at 05:38

## 2023-03-12 RX ADMIN — Medication 80 MILLIGRAM(S): at 05:38

## 2023-03-12 NOTE — ED CDU PROVIDER DISPOSITION NOTE - NSFOLLOWUPINSTRUCTIONS_ED_ALL_ED_FT
return if symptoms persist or gets worse  follow up with primary care and neurologist  take medication as prescribed

## 2023-03-12 NOTE — CONSULT NOTE ADULT - ATTENDING COMMENTS
Pt is a 67 yo M with PMHx of DM (last A1c 10.0 per report), HTN, HLD, CAD s/p PCI, who presented with left facial "numbness." Pt with left sided Bell's palsy, House Brackmann score III. Ruling out stroke in the setting of moderate right ICA stenosis, r M2 stenosis and severe r VA stenosis. MRI brain without acute ischemic stroke on my read, however note an embolic appearing chronic stroke in left frontal lobe (possibly occurred in 06/2014 in reviewing past CT head imaging). Agree with steroids, anti-virals, and eye drops. Outpatient f/u with ophthalmology as well as neurology. Continue home ASA/statin. Pt is a 67 yo M with PMHx of DM (last A1c 10.0 per report), HTN, HLD, CAD s/p PCI, who presented with left facial "numbness." Pt with left sided Bell's palsy, House Brackmann score III. Ruling out stroke in the setting of moderate right ICA stenosis, r M2 stenosis and severe r VA stenosis. MRI brain without acute ischemic stroke on my read, however note an embolic appearing chronic stroke in left frontal lobe (possibly occurred in 06/2014 in reviewing past CT head imaging). Agree with steroids, anti-virals, and eye drops. Outpatient f/u with ophthalmology as well as neurology. Continue home ASA/statin. Discussed importance of DM management/optimization.

## 2023-03-12 NOTE — CONSULT NOTE ADULT - SUBJECTIVE AND OBJECTIVE BOX
NEUROLOGY CONSULT    HPI: 67 yo M w PMH of DM, HTN, HLD, obesity p/w L sided facial weakness, tearing from his L eye. Started about 5pm during eating his dinner, since then got worse with some numbness around his mouth. Upon arrival stroke code activated by ED team, CTH, CTP negative for acute changes, CTA - mild to moderate multi-vascular stenosis. His DM is poor controlled, usually in 250th, even with insulin pump.      MEDICATIONS  Home Medications:  Cialis:  (07 Oct 2020 07:32)  Ecotrin: orally once a day (07 Oct 2020 07:32)  furosemide 80 mg oral tablet: orally once a day (07 Oct 2020 07:32)  HumaLOG:  (07 Oct 2020 07:32)  Lipitor 10 mg oral tablet: 1 tab(s) orally once a day (27 Sep 2022 11:30)  lisinopril 20 mg oral tablet: 1 tab(s) orally 2 times a day  (Recommend holding this medication until evaluated by nephrologist due to acute kidney injury) (27 Sep 2022 11:25)  metOLazone: 2.5  orally 2 times a day (25 Sep 2022 07:02)  metoprolol tartrate 25 mg oral tablet: 1 tab(s) orally 2 times a day (07 Oct 2020 07:32)  oxybutynin:  (07 Oct 2020 07:32)  rivaroxaban 20 mg oral tablet: 1 tab(s) orally every 24 hours (07 Oct 2020 07:32)  spironolactone: 25 milligram(s) orally once a day (07 Oct 2020 07:32)  tamsulosin 0.4 mg oral capsule: 1 cap(s) orally once a day (at bedtime) (07 Oct 2020 07:32)  testosterone:  (07 Oct 2020 07:32)    MEDICATIONS  (STANDING):    MEDICATIONS  (PRN):      FAMILY HISTORY:  Family history of lung cancer (Sibling)    Family history of diabetes mellitus (Mother)    Family history of coronary artery disease (Father)      SOCIAL HISTORY: negative for tobacco, alcohol, or illicit drug use.    Allergies    No Known Allergies    Intolerances        GEN: NAD, pleasant, cooperative    NEURO:   MENTAL STATUS: AAOx3  LANG/SPEECH: Fluent, intact naming, repetition & comprehension  CRANIAL NERVES:  II: Pupils equal and reactive, no RAPD, normal visual field on confrontation  III, IV, VI: EOM intact, no gaze preference or deviation  V: normal  VII: Left upper and lower part peripheral facial paresis  VIII: normal hearing to speech  MOTOR: 5/5 in both upper and lower extremities  REFLEXES: 2/4 throughout, bilateral flexor plantars  SENSORY: Normal to touch, temperature & pin prick in all extremiteis  COORD: Normal finger to nose and heel to shin, no tremor, no dysmetria  NIHSS: 2 (facial)    LABS:                        14.7   9.27  )-----------( 412      ( 11 Mar 2023 22:59 )             43.7     03-11    133<L>  |  93<L>  |  41<H>  ----------------------------<  206<H>  4.3   |  29  |  1.6<H>    Ca    8.7      11 Mar 2023 22:59    TPro  6.6  /  Alb  3.6  /  TBili  0.2  /  DBili  x   /  AST  31  /  ALT  39  /  AlkPhos  85  03-11    Hemoglobin A1C:   Vitamin B12   PT/INR - ( 11 Mar 2023 22:59 )   PT: 13.90 sec;   INR: 1.21 ratio         PTT - ( 11 Mar 2023 22:59 )  PTT:40.3 sec  CAPILLARY BLOOD GLUCOSE      POCT Blood Glucose.: 243 mg/dL (11 Mar 2023 21:18)              Microbiology:      RADIOLOGY, EKG AND ADDITIONAL TESTS: Reviewed.           NEUROLOGY CONSULT    HPI: 67 yo M w PMH of DM, HTN, HLD, obesity p/w L sided facial weakness, tearing from his L eye. Started about 5pm today, during his dinner, since then got worse with some numbness around his mouth. Upon arrival stroke code activated by ED team, CTH, CTP negative for acute changes, CTA - mild to moderate multi-vascular stenosis. His DM is poor controlled, usually in 250th, even with insulin pump.      MEDICATIONS  Home Medications:  Cialis:  (07 Oct 2020 07:32)  Ecotrin: orally once a day (07 Oct 2020 07:32)  furosemide 80 mg oral tablet: orally once a day (07 Oct 2020 07:32)  HumaLOG:  (07 Oct 2020 07:32)  Lipitor 10 mg oral tablet: 1 tab(s) orally once a day (27 Sep 2022 11:30)  lisinopril 20 mg oral tablet: 1 tab(s) orally 2 times a day  (Recommend holding this medication until evaluated by nephrologist due to acute kidney injury) (27 Sep 2022 11:25)  metOLazone: 2.5  orally 2 times a day (25 Sep 2022 07:02)  metoprolol tartrate 25 mg oral tablet: 1 tab(s) orally 2 times a day (07 Oct 2020 07:32)  oxybutynin:  (07 Oct 2020 07:32)  rivaroxaban 20 mg oral tablet: 1 tab(s) orally every 24 hours (07 Oct 2020 07:32)  spironolactone: 25 milligram(s) orally once a day (07 Oct 2020 07:32)  tamsulosin 0.4 mg oral capsule: 1 cap(s) orally once a day (at bedtime) (07 Oct 2020 07:32)  testosterone:  (07 Oct 2020 07:32)    MEDICATIONS  (STANDING):    MEDICATIONS  (PRN):      FAMILY HISTORY:  Family history of lung cancer (Sibling)    Family history of diabetes mellitus (Mother)    Family history of coronary artery disease (Father)      SOCIAL HISTORY: negative for tobacco, alcohol, or illicit drug use.    Allergies    No Known Allergies    Intolerances        GEN: NAD, pleasant, cooperative    NEURO:   MENTAL STATUS: AAOx3  LANG/SPEECH: Fluent, intact naming, repetition & comprehension  CRANIAL NERVES:  II: Pupils equal and reactive, no RAPD, normal visual field on confrontation  III, IV, VI: EOM intact, no gaze preference or deviation  V: normal  VII: Left upper and lower part peripheral facial paresis  VIII: normal hearing to speech  MOTOR: 5/5 in both upper and lower extremities  REFLEXES: 2/4 throughout, bilateral flexor plantars  SENSORY: Normal to touch, temperature & pin prick in all extremiteis  COORD: Normal finger to nose and heel to shin, no tremor, no dysmetria  NIHSS: 2 (facial)    LABS:                        14.7   9.27  )-----------( 412      ( 11 Mar 2023 22:59 )             43.7     03-11    133<L>  |  93<L>  |  41<H>  ----------------------------<  206<H>  4.3   |  29  |  1.6<H>    Ca    8.7      11 Mar 2023 22:59    TPro  6.6  /  Alb  3.6  /  TBili  0.2  /  DBili  x   /  AST  31  /  ALT  39  /  AlkPhos  85  03-11    Hemoglobin A1C:   Vitamin B12   PT/INR - ( 11 Mar 2023 22:59 )   PT: 13.90 sec;   INR: 1.21 ratio         PTT - ( 11 Mar 2023 22:59 )  PTT:40.3 sec  CAPILLARY BLOOD GLUCOSE      POCT Blood Glucose.: 243 mg/dL (11 Mar 2023 21:18)              Microbiology:      RADIOLOGY, EKG AND ADDITIONAL TESTS: Reviewed.

## 2023-03-12 NOTE — ED CDU PROVIDER INITIAL DAY NOTE - PROGRESS NOTE DETAILS
patient signed out from martha adkins, no complaint sitting comfortable, awaiting mri, will continue to monitor states feeling better

## 2023-03-12 NOTE — CONSULT NOTE ADULT - ASSESSMENT
67 yo M w PMH of DM, HTN, HLD, obesity p/w L sided facial weakness, tearing from his L eye, started about 5pm today. Today's neurological exam remarkable only for L peripheral facial palsy. CTH, CTP, CTA negative for acute CVA. Taking into account his cerebrovascular risk factors, obesity, poor controlled DM, his symptoms most likely due to left sided Bell's palsy. Nevertheless, MRI w/o contrast and observation w frequent neuro-checks would be prudent at this time. Will start Tx for Bell's palsy as well.       Recommendations     - Observation in ED for MRI brain w/o contrast  - Frequent neuro-checks   - Please start Valacyclovir 1 g bid PO  - Please start Prednisone 80 mg qd for 1 week.  - BP control  - POCT control qid (needs readjustment his insulin pump) - Endocrinology consult      The case was discussed w attending  67 yo M w PMH of DM, HTN, HLD, obesity p/w L sided facial weakness, tearing from his L eye, started about 5pm today. Today's neurological exam remarkable only for L peripheral facial palsy. CTH, CTP, CTA negative for acute CVA. Taking into account his cerebrovascular risk factors, obesity, poor controlled DM, his symptoms most likely due to left sided Bell's palsy. Nevertheless, MRI w/o contrast and observation w frequent neuro-checks would be prudent at this time. Will start Tx for Bell's palsy as well.       Recommendations     - Observation in ED for MRI brain w/o contrast  - Frequent neuro-checks   - Please start Valacyclovir 1 g tid PO for 1 week.  - Please start Prednisone 80 mg qd for 1 week.  - BP control  - POCT control qid (needs readjustment his insulin pump) - Endocrinology consult      The case was discussed w attending  69 yo M w PMH of DM, HTN, HLD, obesity p/w L sided facial weakness, tearing from his L eye, started about 5pm today. Today's neurological exam remarkable only for L peripheral facial palsy. CTH, CTP, CTA negative for acute CVA. Taking into account his cerebrovascular risk factors, obesity, poor controlled DM, his symptoms most likely due to left sided Bell's palsy. Nevertheless, MRI w/o contrast and observation w frequent neuro-checks would be prudent at this time. Will start Tx for Bell's palsy as well.       Recommendations     - Observation in ED for MRI brain w/o contrast  - Frequent neuro-checks   - Please start Valacyclovir 1 g tid PO for 1 week.  - Please start Prednisone 80 mg qd for 1 week.  - Artificial tears eye drops for L eye, qid and PRN  - Taping corner of his L eye to narrow palpebral fissure  - BP control  - POCT control qid (needs readjustment his insulin pump) - Endocrinology consult      The case was discussed w attending Dr. Macdonald

## 2023-03-12 NOTE — ED CDU PROVIDER INITIAL DAY NOTE - ATTENDING APP SHARED VISIT CONTRIBUTION OF CARE
68-year-old male presented with left-sided facial droop and numbness NIHSS of 2, patient out of window for TNK.  Stroke alert called.  No acute stroke seen on CT imaging.  Neurology recommending medications for Bell's palsy as well as ED observation for MRI.  Labs EKG and imaging ordered and reviewed by me.  Patient placed in ED observation at this time for further care and management.    See ED initial note also written by me for physical exam.

## 2023-03-12 NOTE — ED CDU PROVIDER DISPOSITION NOTE - PATIENT PORTAL LINK FT
You can access the FollowMyHealth Patient Portal offered by [facility] by registering at the following website: [email or web address]. By joining [employer]’s FollowMyHealth portal, you will also be able to view your health information using other applications (apps) compatible with our system.

## 2023-03-12 NOTE — ED CDU PROVIDER DISPOSITION NOTE - CARE PROVIDER_API CALL
Alberto Kelly)  EEGEpilepsy; Neurology  [facility] [facility], [address]  [place] [address]  Phone: [phone]  Fax: [phone]  Follow Up Time: 4-6 Days    Layo Webb  ENDOCRINOLOGY/METAB/DIABETES  [address] [place] [address]  Phone: [phone]  Fax: [phone]  Established Patient  Follow Up Time: 1-3 Days

## 2023-03-12 NOTE — ED CDU PROVIDER DISPOSITION NOTE - PROVIDER TOKENS
PROVIDER:[TOKEN:[[id number]],FOLLOWUP:[4-6 Days]],PROVIDER:[TOKEN:[[id number]],FOLLOWUP:[1-3 Days],ESTABLISHEDPATIENT:[T]]

## 2023-03-12 NOTE — ED ADULT NURSE REASSESSMENT NOTE - NS ED NURSE REASSESS COMMENT FT1
Pt reassessed A/O times 4 Vs stable back from MRI is done completed ,pt is seen evaluate by Ed attending clear to go home with family members ED attending made aware ,on going nursing observation .

## 2023-03-12 NOTE — STROKE CODE NOTE - ASSESSMENT/PLAN
Discussion via audio call with:  (ED physician) at 2216 on 3/11/2023    HISTORY OF PRESENT ILLNESS: Patient is a 67 y/o M, with a PMHx of HTN, HLD, and DM, who p/w an acute onset of a left facial droop. AT 5pm on 3/11/2022 patient noticed some left facial numbness and then was noted to have a drooping of the left lower half of the face. In the ED, initial NIHSS of 2 for left facial weakness and numbness.    PMH/PSH is PAST MEDICAL & SURGICAL HISTORY:  Sepsis  Pneumonia  Intubation of airway performed without difficulty  Diabetes mellitus, type 2  BPH (benign prostatic hyperplasia)  Water retention  Essential hypertension  Diabetes  CAD (coronary artery disease)  H/O hypercholesterolemia  H/O right coronary artery stent placement  History of resection of pancreas  History of cholecystectomy  History of left knee replacement    Pre-stroke MRS= 0 - No symptoms.    CT HEAD IMAGING RESULTS:  CT Head: No acute abnormality   CT PERFUSION:No evidence of perfusion abnormality.  CTA HEAD/NECK:  Evaluation is slightly limited secondary to suboptimal contrast bolus   timing.Noncalcified plaque contributing to moderate narrowing at the right   carotid bulb.Irregular focal moderate stenoses of an M2/M3 branch of the right MCA in   the sylvian fissure.Scattered irregular moderate to high-grade focal stenoses in the proximal   right vertebral artery.    Labs:  CAPILLARY BLOOD GLUCOSE  243 (11 Mar 2023 21:20)  POCT Blood Glucose.: 243 mg/dL (11 Mar 2023 21:18)Platelet Count - Automated: 412 K/uL (03-11-23 @ 22:59)PT/INR - ( 11 Mar 2023 22:59 )   PT: 13.90 sec;   INR: 1.21 ratio  PTT - ( 11 Mar 2023 22:59 )  PTT:40.3 sec    NEUROLOGIC EXAMINATION deferred – interprofessional audio discussion only     ABSOLUTE TNK EXCLUSION CRITERIA:  [x] Patient outside of the appropriate time window for IV thrombolysis    ASSESSMENT: Patient is a 67 y/o M, with a PMHx of HTN, HLD, and DM, who p/w an acute onset of a left facial droop, initial NIHSS of 2 for left facial weakness and numbness. CTH read no acute abnormality. CTA without LVO, CTP without perfusion deficit. Patient is not a TNK candidate as he is out of the 4.5 hour window. Patient is not a neuro IR candidate as there is no LVO on vessel imaging.     RECOMMENDATIONS:  [] not a current IR or TNK candidate, please reconsult Telestroke if patient develops worsening of symptoms.      Plan discussed with Dr. Lena Manuel Discussion via audio call with:  (ED physician) at 2216 on 3/11/2023    HISTORY OF PRESENT ILLNESS: Patient is a 67 y/o M, with a PMHx of HTN, HLD, and DM, who p/w an acute onset of a left facial droop. AT 5pm on 3/11/2022 patient noticed some left facial numbness and then was noted to have a drooping of the left lower half of the face. In the ED, initial NIHSS of 2 for left facial weakness and numbness.    PMH/PSH is PAST MEDICAL & SURGICAL HISTORY:  Sepsis  Pneumonia  Intubation of airway performed without difficulty  Diabetes mellitus, type 2  BPH (benign prostatic hyperplasia)  Water retention  Essential hypertension  Diabetes  CAD (coronary artery disease)  H/O hypercholesterolemia  H/O right coronary artery stent placement  History of resection of pancreas  History of cholecystectomy  History of left knee replacement    Pre-stroke MRS= 0 - No symptoms.    CT HEAD IMAGING RESULTS:  CT Head: No acute abnormality   CT PERFUSION:No evidence of perfusion abnormality.  CTA HEAD/NECK:  Evaluation is slightly limited secondary to suboptimal contrast bolus   timing.Noncalcified plaque contributing to moderate narrowing at the right   carotid bulb.Irregular focal moderate stenoses of an M2/M3 branch of the right MCA in   the sylvian fissure.Scattered irregular moderate to high-grade focal stenoses in the proximal   right vertebral artery.    Labs:  CAPILLARY BLOOD GLUCOSE  243 (11 Mar 2023 21:20)  POCT Blood Glucose.: 243 mg/dL (11 Mar 2023 21:18)Platelet Count - Automated: 412 K/uL (03-11-23 @ 22:59)PT/INR - ( 11 Mar 2023 22:59 )   PT: 13.90 sec;   INR: 1.21 ratio  PTT - ( 11 Mar 2023 22:59 )  PTT:40.3 sec    NEUROLOGIC EXAMINATION deferred – interprofessional audio discussion only     ABSOLUTE TNK EXCLUSION CRITERIA:  [x] Patient outside of the appropriate time window for IV thrombolysis    ASSESSMENT: Patient is a 67 y/o M, with a PMHx of HTN, HLD, and DM, who p/w an acute onset of a left facial droop, initial NIHSS of 2 for left facial weakness and numbness. CTH read no acute abnormality. CTA without LVO, CTP without perfusion deficit. Patient is not a TNK candidate as he is out of the 4.5 hour window. Patient is not a neuro IR candidate as there is no LVO on vessel imaging.     RECOMMENDATIONS:  [] not a current IR or TNK candidate, please reconsult Telestroke if patient develops worsening of symptoms.      Plan discussed with Dr. Lena Manuel    Telestroke Attending attestatoin : I had an audio conversation with Telestroke ACP regarding clinical presentatoin, neurologic exam and imaging results. Agree with recommendations outlined in note above

## 2023-03-12 NOTE — ED CDU PROVIDER DISPOSITION NOTE - CLINICAL COURSE
68-year-old male with history of diabetes, dyslipidemia, hypertension, CAD presents for evaluation of left facial numbness.  The patient was placed in observation unit to rule out a stroke.  On exam the had left upper and lower peripheral facial paresis normal speech extract movements intact motor 5\5 in both the upper and lower extremities sensation intact x4 S1-S2 clear to auscultation belly soft nontender.  CT head unremarkable, CT angio of the head and neck demonstrating Noncalcified plaque contributing to moderate narrowing at the origin of the right internal carotid artery. Noncalcified plaque contributing to mild narrowing at the origin of the left internal carotid artery. Irregular focal moderate stenoses of an M2/M3 branch of the right MCA in the sylvian fissure. Scattered irregular moderate to high-grade focal stenoses in the proximal right vertebral artery.  Due to the vast abnormalities neurology recommended MRI which was also negative.  The patient is to be discharged home on valacyclovir prednisone artificial tears.  Patient is aware that we will need tight glycemic control in addition patient's creatinine clearance is 21 and the dose of valacyclovir was adjusted based off that.  The patient has follow-up tomorrow with nephrology and will follow-up with his PMD\endocrinologist.  Lastly the patient will also follow-up with neurology as an outpatient for Bell's palsy. Continue asa/statin

## 2023-03-20 ENCOUNTER — RX RENEWAL (OUTPATIENT)
Age: 68
End: 2023-03-20

## 2023-03-21 ENCOUNTER — INPATIENT (INPATIENT)
Facility: HOSPITAL | Age: 68
LOS: 1 days | Discharge: HOME | DRG: 682 | End: 2023-03-23
Attending: INTERNAL MEDICINE | Admitting: INTERNAL MEDICINE
Payer: MEDICARE

## 2023-03-21 VITALS
TEMPERATURE: 96 F | HEART RATE: 81 BPM | SYSTOLIC BLOOD PRESSURE: 134 MMHG | RESPIRATION RATE: 20 BRPM | DIASTOLIC BLOOD PRESSURE: 100 MMHG | OXYGEN SATURATION: 95 % | WEIGHT: 315 LBS

## 2023-03-21 DIAGNOSIS — Z96.652 PRESENCE OF LEFT ARTIFICIAL KNEE JOINT: Chronic | ICD-10-CM

## 2023-03-21 DIAGNOSIS — Z90.49 ACQUIRED ABSENCE OF OTHER SPECIFIED PARTS OF DIGESTIVE TRACT: Chronic | ICD-10-CM

## 2023-03-21 DIAGNOSIS — Z95.5 PRESENCE OF CORONARY ANGIOPLASTY IMPLANT AND GRAFT: Chronic | ICD-10-CM

## 2023-03-21 DIAGNOSIS — Z90.410 ACQUIRED TOTAL ABSENCE OF PANCREAS: Chronic | ICD-10-CM

## 2023-03-21 DIAGNOSIS — N17.9 ACUTE KIDNEY FAILURE, UNSPECIFIED: ICD-10-CM

## 2023-03-21 LAB
ALBUMIN SERPL ELPH-MCNC: 3.6 G/DL — SIGNIFICANT CHANGE UP (ref 3.5–5.2)
ALP SERPL-CCNC: 75 U/L — SIGNIFICANT CHANGE UP (ref 30–115)
ALT FLD-CCNC: 35 U/L — SIGNIFICANT CHANGE UP (ref 0–41)
ANION GAP SERPL CALC-SCNC: 16 MMOL/L — HIGH (ref 7–14)
APTT BLD: 25.3 SEC — LOW (ref 27–39.2)
AST SERPL-CCNC: 33 U/L — SIGNIFICANT CHANGE UP (ref 0–41)
BASOPHILS # BLD AUTO: 0.05 K/UL — SIGNIFICANT CHANGE UP (ref 0–0.2)
BASOPHILS NFR BLD AUTO: 0.4 % — SIGNIFICANT CHANGE UP (ref 0–1)
BILIRUB SERPL-MCNC: 0.3 MG/DL — SIGNIFICANT CHANGE UP (ref 0.2–1.2)
BUN SERPL-MCNC: 110 MG/DL — CRITICAL HIGH (ref 10–20)
CALCIUM SERPL-MCNC: 8.9 MG/DL — SIGNIFICANT CHANGE UP (ref 8.4–10.5)
CHLORIDE SERPL-SCNC: 90 MMOL/L — LOW (ref 98–110)
CO2 SERPL-SCNC: 20 MMOL/L — SIGNIFICANT CHANGE UP (ref 17–32)
CREAT SERPL-MCNC: 2.5 MG/DL — HIGH (ref 0.7–1.5)
EGFR: 28 ML/MIN/1.73M2 — LOW
EOSINOPHIL # BLD AUTO: 0.18 K/UL — SIGNIFICANT CHANGE UP (ref 0–0.7)
EOSINOPHIL NFR BLD AUTO: 1.4 % — SIGNIFICANT CHANGE UP (ref 0–8)
FLUAV AG NPH QL: SIGNIFICANT CHANGE UP
FLUBV AG NPH QL: SIGNIFICANT CHANGE UP
GLUCOSE BLDC GLUCOMTR-MCNC: 163 MG/DL — HIGH (ref 70–99)
GLUCOSE SERPL-MCNC: 222 MG/DL — HIGH (ref 70–99)
HCT VFR BLD CALC: 47.1 % — SIGNIFICANT CHANGE UP (ref 42–52)
HGB BLD-MCNC: 16.4 G/DL — SIGNIFICANT CHANGE UP (ref 14–18)
IMM GRANULOCYTES NFR BLD AUTO: 1.3 % — HIGH (ref 0.1–0.3)
INR BLD: 0.87 RATIO — SIGNIFICANT CHANGE UP (ref 0.65–1.3)
LYMPHOCYTES # BLD AUTO: 2.81 K/UL — SIGNIFICANT CHANGE UP (ref 1.2–3.4)
LYMPHOCYTES # BLD AUTO: 22.3 % — SIGNIFICANT CHANGE UP (ref 20.5–51.1)
MAGNESIUM SERPL-MCNC: 2.7 MG/DL — HIGH (ref 1.8–2.4)
MCHC RBC-ENTMCNC: 31.7 PG — HIGH (ref 27–31)
MCHC RBC-ENTMCNC: 34.8 G/DL — SIGNIFICANT CHANGE UP (ref 32–37)
MCV RBC AUTO: 91.1 FL — SIGNIFICANT CHANGE UP (ref 80–94)
MONOCYTES # BLD AUTO: 1.4 K/UL — HIGH (ref 0.1–0.6)
MONOCYTES NFR BLD AUTO: 11.1 % — HIGH (ref 1.7–9.3)
NEUTROPHILS # BLD AUTO: 7.98 K/UL — HIGH (ref 1.4–6.5)
NEUTROPHILS NFR BLD AUTO: 63.5 % — SIGNIFICANT CHANGE UP (ref 42.2–75.2)
NRBC # BLD: 0 /100 WBCS — SIGNIFICANT CHANGE UP (ref 0–0)
PLATELET # BLD AUTO: 343 K/UL — SIGNIFICANT CHANGE UP (ref 130–400)
POTASSIUM SERPL-MCNC: 4.8 MMOL/L — SIGNIFICANT CHANGE UP (ref 3.5–5)
POTASSIUM SERPL-SCNC: 4.8 MMOL/L — SIGNIFICANT CHANGE UP (ref 3.5–5)
PROT SERPL-MCNC: 6.8 G/DL — SIGNIFICANT CHANGE UP (ref 6–8)
PROTHROM AB SERPL-ACNC: 9.9 SEC — LOW (ref 9.95–12.87)
RBC # BLD: 5.17 M/UL — SIGNIFICANT CHANGE UP (ref 4.7–6.1)
RBC # FLD: 15.1 % — HIGH (ref 11.5–14.5)
RSV RNA NPH QL NAA+NON-PROBE: SIGNIFICANT CHANGE UP
SARS-COV-2 RNA SPEC QL NAA+PROBE: SIGNIFICANT CHANGE UP
SODIUM SERPL-SCNC: 126 MMOL/L — LOW (ref 135–146)
TROPONIN T SERPL-MCNC: 0.04 NG/ML — CRITICAL HIGH
TROPONIN T SERPL-MCNC: 0.06 NG/ML — CRITICAL HIGH
WBC # BLD: 12.58 K/UL — HIGH (ref 4.8–10.8)
WBC # FLD AUTO: 12.58 K/UL — HIGH (ref 4.8–10.8)

## 2023-03-21 PROCEDURE — 93306 TTE W/DOPPLER COMPLETE: CPT

## 2023-03-21 PROCEDURE — 99223 1ST HOSP IP/OBS HIGH 75: CPT

## 2023-03-21 PROCEDURE — 82553 CREATINE MB FRACTION: CPT

## 2023-03-21 PROCEDURE — 85025 COMPLETE CBC W/AUTO DIFF WBC: CPT

## 2023-03-21 PROCEDURE — 76770 US EXAM ABDO BACK WALL COMP: CPT

## 2023-03-21 PROCEDURE — 85027 COMPLETE CBC AUTOMATED: CPT

## 2023-03-21 PROCEDURE — 83735 ASSAY OF MAGNESIUM: CPT

## 2023-03-21 PROCEDURE — 82533 TOTAL CORTISOL: CPT

## 2023-03-21 PROCEDURE — 36415 COLL VENOUS BLD VENIPUNCTURE: CPT

## 2023-03-21 PROCEDURE — 80053 COMPREHEN METABOLIC PANEL: CPT

## 2023-03-21 PROCEDURE — 82962 GLUCOSE BLOOD TEST: CPT

## 2023-03-21 PROCEDURE — 74176 CT ABD & PELVIS W/O CONTRAST: CPT | Mod: 26,MA

## 2023-03-21 PROCEDURE — 71045 X-RAY EXAM CHEST 1 VIEW: CPT | Mod: 26

## 2023-03-21 PROCEDURE — 94760 N-INVAS EAR/PLS OXIMETRY 1: CPT

## 2023-03-21 PROCEDURE — 87040 BLOOD CULTURE FOR BACTERIA: CPT

## 2023-03-21 PROCEDURE — 84484 ASSAY OF TROPONIN QUANT: CPT

## 2023-03-21 PROCEDURE — 99285 EMERGENCY DEPT VISIT HI MDM: CPT

## 2023-03-21 PROCEDURE — 82550 ASSAY OF CK (CPK): CPT

## 2023-03-21 PROCEDURE — 93010 ELECTROCARDIOGRAM REPORT: CPT

## 2023-03-21 RX ORDER — INSULIN GLARGINE 100 [IU]/ML
40 INJECTION, SOLUTION SUBCUTANEOUS AT BEDTIME
Refills: 0 | Status: DISCONTINUED | OUTPATIENT
Start: 2023-03-21 | End: 2023-03-23

## 2023-03-21 RX ORDER — SODIUM CHLORIDE 9 MG/ML
1000 INJECTION INTRAMUSCULAR; INTRAVENOUS; SUBCUTANEOUS ONCE
Refills: 0 | Status: COMPLETED | OUTPATIENT
Start: 2023-03-21 | End: 2023-03-21

## 2023-03-21 RX ORDER — INSULIN LISPRO 100/ML
VIAL (ML) SUBCUTANEOUS AT BEDTIME
Refills: 0 | Status: DISCONTINUED | OUTPATIENT
Start: 2023-03-21 | End: 2023-03-23

## 2023-03-21 RX ORDER — ACETAMINOPHEN 500 MG
650 TABLET ORAL EVERY 6 HOURS
Refills: 0 | Status: DISCONTINUED | OUTPATIENT
Start: 2023-03-21 | End: 2023-03-23

## 2023-03-21 RX ORDER — METOPROLOL TARTRATE 50 MG
25 TABLET ORAL
Refills: 0 | Status: DISCONTINUED | OUTPATIENT
Start: 2023-03-21 | End: 2023-03-23

## 2023-03-21 RX ORDER — PANTOPRAZOLE SODIUM 20 MG/1
40 TABLET, DELAYED RELEASE ORAL
Refills: 0 | Status: DISCONTINUED | OUTPATIENT
Start: 2023-03-21 | End: 2023-03-23

## 2023-03-21 RX ORDER — INSULIN LISPRO 100/ML
20 VIAL (ML) SUBCUTANEOUS
Refills: 0 | Status: DISCONTINUED | OUTPATIENT
Start: 2023-03-21 | End: 2023-03-23

## 2023-03-21 RX ORDER — INSULIN GLARGINE 100 [IU]/ML
40 INJECTION, SOLUTION SUBCUTANEOUS EVERY MORNING
Refills: 0 | Status: DISCONTINUED | OUTPATIENT
Start: 2023-03-22 | End: 2023-03-23

## 2023-03-21 RX ORDER — RIVAROXABAN 15 MG-20MG
2.5 KIT ORAL
Refills: 0 | Status: DISCONTINUED | OUTPATIENT
Start: 2023-03-21 | End: 2023-03-21

## 2023-03-21 RX ORDER — TAMSULOSIN HYDROCHLORIDE 0.4 MG/1
0.4 CAPSULE ORAL AT BEDTIME
Refills: 0 | Status: DISCONTINUED | OUTPATIENT
Start: 2023-03-21 | End: 2023-03-23

## 2023-03-21 RX ORDER — INSULIN LISPRO 100/ML
0 VIAL (ML) SUBCUTANEOUS
Qty: 0 | Refills: 0 | DISCHARGE

## 2023-03-21 RX ORDER — INSULIN LISPRO 100/ML
VIAL (ML) SUBCUTANEOUS
Refills: 0 | Status: DISCONTINUED | OUTPATIENT
Start: 2023-03-21 | End: 2023-03-23

## 2023-03-21 RX ORDER — MAGNESIUM OXIDE 400 MG ORAL TABLET 241.3 MG
400 TABLET ORAL
Qty: 0 | Refills: 0 | DISCHARGE

## 2023-03-21 RX ORDER — FINASTERIDE 5 MG/1
0 TABLET, FILM COATED ORAL
Qty: 0 | Refills: 0 | DISCHARGE

## 2023-03-21 RX ORDER — OXYBUTYNIN CHLORIDE 5 MG
5 TABLET ORAL
Refills: 0 | Status: DISCONTINUED | OUTPATIENT
Start: 2023-03-21 | End: 2023-03-23

## 2023-03-21 RX ORDER — ATORVASTATIN CALCIUM 80 MG/1
80 TABLET, FILM COATED ORAL AT BEDTIME
Refills: 0 | Status: DISCONTINUED | OUTPATIENT
Start: 2023-03-21 | End: 2023-03-23

## 2023-03-21 RX ADMIN — Medication 1 DROP(S): at 22:04

## 2023-03-21 RX ADMIN — Medication 25 MILLIGRAM(S): at 22:07

## 2023-03-21 RX ADMIN — SODIUM CHLORIDE 1000 MILLILITER(S): 9 INJECTION INTRAMUSCULAR; INTRAVENOUS; SUBCUTANEOUS at 15:41

## 2023-03-21 RX ADMIN — INSULIN GLARGINE 40 UNIT(S): 100 INJECTION, SOLUTION SUBCUTANEOUS at 22:01

## 2023-03-21 RX ADMIN — Medication 30 MILLILITER(S): at 22:08

## 2023-03-21 RX ADMIN — ATORVASTATIN CALCIUM 80 MILLIGRAM(S): 80 TABLET, FILM COATED ORAL at 22:03

## 2023-03-21 RX ADMIN — TAMSULOSIN HYDROCHLORIDE 0.4 MILLIGRAM(S): 0.4 CAPSULE ORAL at 22:08

## 2023-03-21 NOTE — ED PROVIDER NOTE - CARE PLAN
Principal Discharge DX:	KRISTINE (acute kidney injury)  Secondary Diagnosis:	Electrolyte abnormality  Secondary Diagnosis:	Hypotension  Secondary Diagnosis:	Elevated troponin   1

## 2023-03-21 NOTE — PATIENT PROFILE ADULT - NSTOBACCONEVERSMOKERY/N_GEN_A
Wife Wife agrees to plan. No

## 2023-03-21 NOTE — H&P ADULT - NSHPPHYSICALEXAM_GEN_ALL_CORE
Vital Signs (24 Hrs):  T(C): 35.8 (03-21-23 @ 14:40), Max: 35.8 (03-21-23 @ 14:40)  HR: 81 (03-21-23 @ 14:40) (81 - 81)  BP: 134/100 (03-21-23 @ 14:40) (134/100 - 134/100)  RR: 20 (03-21-23 @ 14:40) (20 - 20)  SpO2: 95% (03-21-23 @ 14:40) (95% - 95%)    PHYSICAL EXAM:  GENERAL: NAD, well-developed  SKIN: No rashes or lesions  HEAD:  Atraumatic, Normocephalic  EYES: EOMI, PERRLA, conjunctiva and sclera clear  NECK: Supple, No JVD  CHEST/LUNG: Clear to auscultation bilaterally; No wheeze  HEART: Regular rate and rhythm; No murmurs, rubs, or gallops. distal pulses 2+ and equal bilateral  ABDOMEN: Soft, Nontender, Nondistended; Bowel sounds present  EXTREMITIES:  No clubbing, cyanosis. mild edema bilateral LE  CNS: AAOx3

## 2023-03-21 NOTE — H&P ADULT - NSICDXPASTMEDICALHX_GEN_ALL_CORE_FT
PAST MEDICAL HISTORY:  BPH (benign prostatic hyperplasia)     CAD (coronary artery disease)     Diabetes     Diabetes mellitus, type 2     Essential hypertension     H/O hypercholesterolemia     Water retention

## 2023-03-21 NOTE — PATIENT PROFILE ADULT - NSPROGENBLOODRESTRICT_GEN_A_NUR
HPI





- HPI


Patient complains to provider of: Allergic reaction


Time Seen by Provider: 12/07/20 11:05


Pain Level: Denies


Context: 





3 year 2-month-old female was brought to the emergency room with parents who 

states child started with sudden onset hives about 5 minutes prior to arrival.  

States his symptoms started after eating pancakes and syrup which she is eaten 

in the past.  Denies any new foods, no new medications.  States they just got a 

Ruchi tree and child was playing with it this morning.  No previous allergic

reactions to Ruchi trees.  Mom states she did notice small patches to her 

face yesterday but they went away without intervention.  No previous allergic 

reactions.  No meds were given prior to arrival.


Associated Symptoms: None


Exacerbated by: Denies


Relieved by: Denies


Similar symptoms previously: No


Recently seen / treated by doctor: No





- ROS


Systems Reviewed and Negative: Yes All other systems reviewed and negative





- CONSTITUTIONAL


Constitutional: DENIES: Fever





- EENT


EENT: DENIES: Sore Throat, Nasal Drainage-Clear, Congestion





- RESPIRATORY


Respiratory: DENIES: Trouble Breathing, Coughing





- DERM


Skin Color: Erythema


Skin Problems: Rash





Past Medical History





- General


Information source: Parent





- Social History


Smoking Status: Never Smoker


Family History: Reviewed & Not Pertinent





- Immunizations


Immunizations up to date: Yes


Hx Diphtheria, Pertussis, Tetanus Vaccination: Yes





Vertical Provider Document





- CONSTITUTIONAL


Agree With Documented VS: Yes


Exam Limitations: No Limitations


General Appearance: No Apparent Distress





- INFECTION CONTROL


TRAVEL OUTSIDE OF THE [place] IN LAST 30 DAYS: No





- HEENT


HEENT: Atraumatic, Normocephalic





- RESPIRATORY


Respiratory: Breath Sounds Normal, No Respiratory Distress





- CARDIOVASCULAR


Cardiovascular: No Murmur, Tachycardia





- GI/ABDOMEN


Gastrointestinal: Abdomen Soft, Abdomen Non-Tender, Normal Bowel Sounds





- MUSCULOSKELETAL/EXTREMETIES


Musculoskeletal/Extremeties: FROM





- NEURO


Level of Consciousness: Awake, Alert, Appropriate





- DERM


Integumentary: Warm, Dry, Rash - Scattered erythematous urticaria noted to the 

face, bilateral upper and lower extremities, abdomen, back, trunk.  

Nonblanching.





Course





- Re-evaluation


Re-evalutation: 





12/07/20 12:27


Child is resting comfortably she is in no acute distress.  Tolerates p.o. 

fluids.  Rash has almost completely resolved.  Child asymptomatic.  Counseled 

parents to continue with the prednisone twice a day as prescribed.  Daily Zyrtec

2.5 mg daily.  Recheck with pediatrician tomorrow.  Given strict return to the 

emergency room guidelines.  Return for any new or worsening symptoms.  All 

questions were answered.  Parents verbalized understanding and agree with plan 

of care.


12/07/20 16:34








- Vital Signs


Vital signs: 


                                        











Temp Pulse Resp BP Pulse Ox


 


 98.7 F   135   24   89/50   100 


 


 12/07/20 11:11  12/07/20 11:11  12/07/20 11:11  12/07/20 11:11  12/07/20 11:11














Discharge





- Discharge


Clinical Impression: 


Allergic reaction


Qualifiers:


 Encounter type: initial encounter Qualified Code(s): T78.40XA - Allergy, un

specified, initial encounter





Condition: Stable


Disposition: HOME, SELF-CARE


Instructions:  Acute Allergic Reaction ([facility])


Additional Instructions: 


Can give Zyrtec 2.5 mg daily, continue with prednisone twice a day as 

prescribed.  Recheck with your pediatrician tomorrow.  Return to the emergency 

room for any new or worsening symptoms.


Prescriptions: 


Prednisolone Sod Phosphate [Prelone Soln 15 Mg/5 Ml Oral Syring] 3.5 ml PO BID 5

Days #35 ml


Referrals: 


DEVONTE LILLY MD [ACTIVE STAFF] - Follow up tomorrow
none

## 2023-03-21 NOTE — ED PROVIDER NOTE - NS ED ROS FT
Review of Systems    Constitutional: (-) fever, (-) chills, (+) weakness  Eyes/ENT: (-) blurry vision, (-) epistaxis, (-) sore throat  Cardiovascular: (-) chest pain, (-) syncope  Respiratory: (-) cough, (-) shortness of breath  Gastrointestinal: (-) pain, (+) nausea, (-) vomiting, (-) diarrhea  Musculoskeletal: (-) neck pain, (-) back pain, (-) body aches  Integumentary: (-) rash, (-) edema  Neurological: (-) headache, (-) altered mental status, (+) dizzy  Psychiatric: (-) hallucinations  Allergic/Immunologic: (-) pruritus

## 2023-03-21 NOTE — ED ADULT TRIAGE NOTE - CHIEF COMPLAINT QUOTE
BIBA from home as per EMS pt had missed his Testerone injection and was hypotensive. Pt was 74/40 upon EMS arrival, EMS placed #20 in the LAC and gave 600ml of NS to the pt. Pt BP improved after fluids.

## 2023-03-21 NOTE — H&P ADULT - SOCIAL HISTORY
Name: Edouard Triana  : 30 by Dr. Gaffney at the [facility]. Final   • MONO 05/23/2019 5  % Final   • EOSIN 05/23/2019 3  % Final   • BASO 05/23/2019 1  % Final   • Percent Immature Granuloctyes 05/23/2019 0  % Final   • Absolute Neutrophil 05/23/2019 2.7  1.8 - 7.7 K/mcL Final   • Absolute Lymph 05/23/2019 3.8  1.0 - 4.0 K/mcL Final   • Absolute Mono 05/23/2019 0.4  0.3 - 0.9 K/mcL Final   • Absolute Eos 05/23/2019 0.2  0.1 - 0.5 K/mcL Final   • Absolute Baso 05/23/2019 0.1  0.0 - 0.3 K/mcL Final   • Absolute Immature Granulocytes 05/23/2019 0.0  0 - 0.2 K/mcl Final   • Fasting Status 05/23/2019 10  hrs Final   • Sodium 05/23/2019 142  135 - 145 mmol/L Final   • Potassium 05/23/2019 3.7  3.4 - 5.1 mmol/L Final   • Chloride 05/23/2019 106  98 - 107 mmol/L Final   • Carbon Dioxide 05/23/2019 31  21 - 32 mmol/L Final   • Anion Gap 05/23/2019 9* 10 - 20 mmol/L Final   • Glucose 05/23/2019 71  65 - 99 mg/dL Final   • BUN 05/23/2019 12  6 - 20 mg/dL Final   • Creatinine 05/23/2019 0.78  0.51 - 0.95 mg/dL Final   • GFR Estimate,  05/23/2019 >90   Final    eGFR results = or >90 mL/min/1.73m2 = Normal kidney function.   • GFR Estimate, Non  05/23/2019 83   Final    eGFR 60 - 89 mL/min/1.73m2 = Mild decrease in kidney function.   • BUN/Creatinine Ratio 05/23/2019 15  7 - 25 Final   • CALCIUM 05/23/2019 10.0  8.4 - 10.2 mg/dL Final   • TOTAL BILIRUBIN 05/23/2019 0.7  0.2 - 1.0 mg/dL Final   • AST/SGOT 05/23/2019 24  <38 Units/L Final   • ALT/SGPT 05/23/2019 32  <64 Units/L Final   • ALK PHOSPHATASE 05/23/2019 86  45 - 117 Units/L Final   • TOTAL PROTEIN 05/23/2019 8.3* 6.4 - 8.2 g/dL Final   • Albumin 05/23/2019 4.2  3.6 - 5.1 g/dL Final   • GLOBULIN 05/23/2019 4.1* 2.0 - 4.0 g/dL Final   • A/G Ratio, Serum 05/23/2019 1.0  1.0 - 2.4 Final         ASSESSMENT/PLAN:    1 rheumatoid arthritis  2 monitoring of high-risk medications    Orders Placed This Encounter   • CBC & Auto Differential   • Comprehensive Metabolic Panel   • Sedimentation Rate Westergren    • Bob Doe.         No

## 2023-03-21 NOTE — H&P ADULT - HISTORY OF PRESENT ILLNESS
Patient is a 68 year old male with hx of CAD s/p stent PCI, CABG, DM2, HLD, HTN, pancreatitis, RASHAD presenting with generalized weakness for 2 weeks and hypotension today. Patient missed testosterone dose 3 weeks ago (monthly injection). Patient was started on Jardiance 2 weeks ago and has been urinating excessively since initiating. Patient's bp was 70/40 today so he called EMS. Received fluid bolus from EMS, BP improved to 130/100. Reports feeling lightheaded today.  Denies fever, chills, sob, cp, abd pain, n/v/d, palpitations.

## 2023-03-21 NOTE — CHART NOTE - NSCHARTNOTEFT_GEN_A_CORE
Vital Signs Last 24 Hrs  T(C): 36.2 (21 Mar 2023 20:39), Max: 36.2 (21 Mar 2023 20:39)  T(F): 97.1 (21 Mar 2023 20:39), Max: 97.1 (21 Mar 2023 20:39)  HR: 81 (21 Mar 2023 20:39) (81 - 81)  BP: 130/66 (21 Mar 2023 20:39) (130/66 - 134/100)  BP(mean): --  RR: 18 (21 Mar 2023 20:39) (18 - 20)  SpO2: 95% (21 Mar 2023 14:40) (95% - 95%)  Parameters below as of 21 Mar 2023 14:40  Patient On (Oxygen Delivery Method): room air  Most recent VS as above.  Awaiting BMP results drawn at 10pm.

## 2023-03-21 NOTE — ED PROVIDER NOTE - ATTENDING APP SHARED VISIT CONTRIBUTION OF CARE
68-year-old male above past medical history brought in by EMS for hypotension, patient was feeling fatigued yesterday which he attributed to missing his monthly testosterone injection, today felt unable to stand due to weakness in legs and lightheadedness, no chest pain, did have mild abdominal discomfort which is chronic for him, no recent nausea vomiting or diarrhea, no melena, no fever, no cough, of note did start Jardiance 2 weeks ago and has been excessively urinating, also on diuretic, on EMS arrival hypotensive which improved after fluids and patient is feeling better, on exam vitals appreciated, nontoxic-appearing, conjunctive a pink, dry mucous membranes, neck supple, cor regular, lungs CTA, abdomen with healed midline surgical incision, soft nontender nondistended, has bilateral lower extremity right greater than left which is his baseline, calves nontender, neurovascularly intact, suspect dehydration as etiology, will continue hydration, check EKG, labs, reassess

## 2023-03-21 NOTE — H&P ADULT - NSHPLABSRESULTS_GEN_ALL_CORE
16.4   12.58 )-----------( 343      ( 21 Mar 2023 15:08 )             47.1     03-21    126<L>  |  90<L>  |  110<HH>  ----------------------------<  222<H>  4.8   |  20  |  2.5<H>    Ca    8.9      21 Mar 2023 15:08  Mg     2.7     03-21    TPro  6.8  /  Alb  3.6  /  TBili  0.3  /  DBili  x   /  AST  33  /  ALT  35  /  AlkPhos  75  03-21        Magnesium, Serum: 2.7 mg/dL (03-21-23 @ 15:08)    PT/INR - ( 21 Mar 2023 15:08 )   PT: 9.90 sec;   INR: 0.87 ratio      PTT - ( 21 Mar 2023 15:08 )  PTT:25.3 sec    CARDIAC MARKERS ( 21 Mar 2023 15:08 )  x     / 0.06 ng/mL / x     / x     / x            Xray Chest 1 View- PORTABLE-Urgent (03.21.23 @ 15:07)   Impression:  No radiographic evidence of acute cardiopulmonary disease.    CT Abdomen and Pelvis No Cont (03.21.23 @ 16:55)   IMPRESSION:  No evidence of an acute intra-abdominal abnormality.

## 2023-03-21 NOTE — H&P ADULT - NS ATTEND AMEND GEN_ALL_CORE FT
Seen in ER Seen in ER               PMH includes use of testosterone (missed last scheduled dose)  A Fib (s/p ablation) and use of diuretics for leg swelling Seen in ER, feels better but still not comfortable, also asking for something for hs stomac pain (ordered Maalox)  PMH includes use of testosterone (missed last scheduled dose)  A Fib (s/p ablation) and use of diuretics for leg swelling. Agree with assessment and plan as outlined above Seen in ER, feels better but still not comfortable, also asking for something for hs stomac pain (ordered Maalox)  PMH includes use of testosterone (missed last scheduled dose)  A Fib (s/p ablation) and use of diuretics for leg swelling. Agree with assessment and plan (hold diuretics and DOAC pending repeat labs) as outlined above Seen in ER, feels better but still not comfortable, also asking for something for hs stomach pain (ordered Maalox)  PMH includes use of testosterone (missed last scheduled dose)  A Fib (s/p ablation) and use of diuretics for leg swelling. Agree with assessment and plan (hold diuretics and DOAC pending repeat labs) as outlined above. In addition patient's troponin levels always elevate, current small "bump" probably due to demand ischemia during episode of hypotension

## 2023-03-21 NOTE — ED PROVIDER NOTE - PHYSICAL EXAMINATION
Gen: Alert, NAD, well appearing  Head: NC, AT, PERRL, EOMI, normal lids/conjunctiva  ENT: normal hearing  Neck: +supple, no tenderness/meningismus,  Pulm: Bilateral BS, normal resp effort, no wheeze/stridor/retractions  CV: RRR  Abd: soft, NT/ND  Mskel: no edema/erythema/cyanosis  Skin: no rash, warm/dry  Neuro: AAOx3, no sensory/motor deficits. + left facial droop (Bell's palsy last week)

## 2023-03-21 NOTE — ED PROVIDER NOTE - OBJECTIVE STATEMENT
hx from patient and EMS  68-year-old male history of cardiac bypass, diabetes, hypertension complaining of feeling tired, dizzy, nauseous and weak since yesterday.  Symptoms continued today.  Family called EMS.  Upon EMS arrival blood pressure was 74/40.  Normal saline was started and 600 cc bolus was given with improvement of blood pressure.  Patient denies any chest pains.  No fevers, vomiting, or  dark stools. Patient was started on Jardiance last week. Patient missed last Testosterone injection and thinks that is the cause of symptoms.

## 2023-03-21 NOTE — H&P ADULT - ASSESSMENT
Patient is a 68 year old male with hx of CAD s/p stent PCI, CABG, DM2, HLD, HTN, pancreatitis, RASHAD presenting with generalized weakness for 2 weeks and hypotension today. Patient missed testosterone dose 3 weeks ago (monthly injection). Patient was started on Jardiance 2 weeks ago and has been urinating excessively since initiating. Patient's bp was 70/40 today so he called EMS. Received fluid bolus from EMS, BP improved to 130/100. Reports feeling lightheaded today.  Denies fever, chills, sob, cp, abd pain, n/v/d, palpitations.    s/p total 1600 ml IVF    case discussed with Dr. Knox    # hypotension 2/2 hypovolemia likely 2/2 Jardiance and triple diuretics  # KRISTINE  - admit to medicine  - HOLD Lasix 80 daily, metolazone 2.5 daily, spironolactone 25 daily  - BMP 10 pm, resume meds accordingly  - renal consult  - hold lisinopril    # CAD s/p stents, CABG  - continue Xarelto renally dosed, Lopressor    # paroxysmal afib  - NSR now  - follow up BMP 10 pm to resume Xarelto  - Lopressor 25 mg bid    # low testosterone  - missed injection, follow up outpatient  - cortisol level in AM    # DM2  - patient uses insulin pump  - converted to prior dose of basal/bolus  - FS glucose  - discontinue Jardiance    #HLD  - statin    # HTN  - cont metoprolol  - hold lisinopril    Diet: DASH/CCHO  Activity: OOB  DVT PPX: on Xarelto  GI PPX: PPI  Code status: FULL CODE  Dispo: acute

## 2023-03-21 NOTE — ED PROVIDER NOTE - CLINICAL SUMMARY MEDICAL DECISION MAKING FREE TEXT BOX
68-year-old male above past medical history brought in by EMS for hypotension, patient was feeling fatigued yesterday which he attributed to missing his monthly testosterone injection, today felt unable to stand due to weakness in legs and lightheadedness, no chest pain, did have mild abdominal discomfort which is chronic for him, no recent nausea vomiting or diarrhea, no melena, no fever, no cough, of note did start Jardiance 2 weeks ago and has been excessively urinating, also on diuretic, on EMS arrival hypotensive which improved after fluids and patient is feeling better, on exam vitals appreciated, nontoxic-appearing, conjunctive a pink, dry mucous membranes, neck supple, cor regular, lungs CTA, abdomen with healed midline surgical incision, soft nontender nondistended, has bilateral lower extremity right greater than left which is his baseline, calves nontender, neurovascularly intact based on complaint we obtained ekg per my independent evaluation not consistent with stemi in addition, per my evaluation of his cxr not consistent with pneumonia or ptx   we obtained labs troponin elevated, I will admit for further evaluation considering.

## 2023-03-21 NOTE — PATIENT PROFILE ADULT - FALL HARM RISK - RISK INTERVENTIONS

## 2023-03-21 NOTE — ED ADULT NURSE NOTE - SUICIDE SCREENING QUESTION 3
8/31/2021    Patient: Ayla Gonzalez   YOB: 1957   Date of Visit: 8/31/2021     Lizette Croft MD  [address]  [address] [facility]    Dear Lizette Croft MD,      Thank you for referring Ms. Ketty Murray to [address] [place] [address] for evaluation. My notes for this consultation are attached. If you have questions, please do not hesitate to call me. I look forward to following your patient along with you.       Sincerely,    Navdeep Daly MD
No

## 2023-03-22 LAB
ALBUMIN SERPL ELPH-MCNC: 4 G/DL — SIGNIFICANT CHANGE UP (ref 3.5–5.2)
ALP SERPL-CCNC: 80 U/L — SIGNIFICANT CHANGE UP (ref 30–115)
ALT FLD-CCNC: 42 U/L — HIGH (ref 0–41)
ANION GAP SERPL CALC-SCNC: 14 MMOL/L — SIGNIFICANT CHANGE UP (ref 7–14)
AST SERPL-CCNC: 32 U/L — SIGNIFICANT CHANGE UP (ref 0–41)
BASOPHILS # BLD AUTO: 0.05 K/UL — SIGNIFICANT CHANGE UP (ref 0–0.2)
BASOPHILS NFR BLD AUTO: 0.3 % — SIGNIFICANT CHANGE UP (ref 0–1)
BILIRUB SERPL-MCNC: 0.4 MG/DL — SIGNIFICANT CHANGE UP (ref 0.2–1.2)
BUN SERPL-MCNC: 103 MG/DL — CRITICAL HIGH (ref 10–20)
CALCIUM SERPL-MCNC: 9.2 MG/DL — SIGNIFICANT CHANGE UP (ref 8.4–10.5)
CHLORIDE SERPL-SCNC: 94 MMOL/L — LOW (ref 98–110)
CK MB CFR SERPL CALC: 6.8 NG/ML — HIGH (ref 0.6–6.3)
CK SERPL-CCNC: 240 U/L — HIGH (ref 0–225)
CO2 SERPL-SCNC: 23 MMOL/L — SIGNIFICANT CHANGE UP (ref 17–32)
CREAT SERPL-MCNC: 2 MG/DL — HIGH (ref 0.7–1.5)
EGFR: 36 ML/MIN/1.73M2 — LOW
EOSINOPHIL # BLD AUTO: 0.22 K/UL — SIGNIFICANT CHANGE UP (ref 0–0.7)
EOSINOPHIL NFR BLD AUTO: 1.5 % — SIGNIFICANT CHANGE UP (ref 0–8)
GLUCOSE BLDC GLUCOMTR-MCNC: 288 MG/DL — HIGH (ref 70–99)
GLUCOSE BLDC GLUCOMTR-MCNC: 337 MG/DL — HIGH (ref 70–99)
GLUCOSE BLDC GLUCOMTR-MCNC: 354 MG/DL — HIGH (ref 70–99)
GLUCOSE BLDC GLUCOMTR-MCNC: 496 MG/DL — CRITICAL HIGH (ref 70–99)
GLUCOSE SERPL-MCNC: 367 MG/DL — HIGH (ref 70–99)
HCT VFR BLD CALC: 48.2 % — SIGNIFICANT CHANGE UP (ref 42–52)
HGB BLD-MCNC: 16.5 G/DL — SIGNIFICANT CHANGE UP (ref 14–18)
IMM GRANULOCYTES NFR BLD AUTO: 1.4 % — HIGH (ref 0.1–0.3)
LYMPHOCYTES # BLD AUTO: 22.3 % — SIGNIFICANT CHANGE UP (ref 20.5–51.1)
LYMPHOCYTES # BLD AUTO: 3.34 K/UL — SIGNIFICANT CHANGE UP (ref 1.2–3.4)
MAGNESIUM SERPL-MCNC: 3.1 MG/DL — CRITICAL HIGH (ref 1.8–2.4)
MCHC RBC-ENTMCNC: 31.6 PG — HIGH (ref 27–31)
MCHC RBC-ENTMCNC: 34.2 G/DL — SIGNIFICANT CHANGE UP (ref 32–37)
MCV RBC AUTO: 92.3 FL — SIGNIFICANT CHANGE UP (ref 80–94)
MONOCYTES # BLD AUTO: 1.72 K/UL — HIGH (ref 0.1–0.6)
MONOCYTES NFR BLD AUTO: 11.5 % — HIGH (ref 1.7–9.3)
NEUTROPHILS # BLD AUTO: 9.47 K/UL — HIGH (ref 1.4–6.5)
NEUTROPHILS NFR BLD AUTO: 63 % — SIGNIFICANT CHANGE UP (ref 42.2–75.2)
NRBC # BLD: 0 /100 WBCS — SIGNIFICANT CHANGE UP (ref 0–0)
PLATELET # BLD AUTO: 397 K/UL — SIGNIFICANT CHANGE UP (ref 130–400)
POTASSIUM SERPL-MCNC: 4.9 MMOL/L — SIGNIFICANT CHANGE UP (ref 3.5–5)
POTASSIUM SERPL-SCNC: 4.9 MMOL/L — SIGNIFICANT CHANGE UP (ref 3.5–5)
PROT SERPL-MCNC: 7 G/DL — SIGNIFICANT CHANGE UP (ref 6–8)
RBC # BLD: 5.22 M/UL — SIGNIFICANT CHANGE UP (ref 4.7–6.1)
RBC # FLD: 15.4 % — HIGH (ref 11.5–14.5)
SODIUM SERPL-SCNC: 131 MMOL/L — LOW (ref 135–146)
TROPONIN T SERPL-MCNC: 0.03 NG/ML — CRITICAL HIGH
WBC # BLD: 15.01 K/UL — HIGH (ref 4.8–10.8)
WBC # FLD AUTO: 15.01 K/UL — HIGH (ref 4.8–10.8)

## 2023-03-22 PROCEDURE — 93306 TTE W/DOPPLER COMPLETE: CPT | Mod: 26

## 2023-03-22 PROCEDURE — 99223 1ST HOSP IP/OBS HIGH 75: CPT

## 2023-03-22 PROCEDURE — 99221 1ST HOSP IP/OBS SF/LOW 40: CPT

## 2023-03-22 PROCEDURE — 99232 SBSQ HOSP IP/OBS MODERATE 35: CPT

## 2023-03-22 RX ORDER — ONDANSETRON 8 MG/1
4 TABLET, FILM COATED ORAL EVERY 4 HOURS
Refills: 0 | Status: DISCONTINUED | OUTPATIENT
Start: 2023-03-22 | End: 2023-03-23

## 2023-03-22 RX ORDER — RIVAROXABAN 15 MG-20MG
15 KIT ORAL
Refills: 0 | Status: DISCONTINUED | OUTPATIENT
Start: 2023-03-22 | End: 2023-03-23

## 2023-03-22 RX ADMIN — ATORVASTATIN CALCIUM 80 MILLIGRAM(S): 80 TABLET, FILM COATED ORAL at 21:27

## 2023-03-22 RX ADMIN — Medication 25 MILLIGRAM(S): at 05:20

## 2023-03-22 RX ADMIN — Medication 4: at 17:11

## 2023-03-22 RX ADMIN — Medication 5 MILLIGRAM(S): at 17:12

## 2023-03-22 RX ADMIN — INSULIN GLARGINE 40 UNIT(S): 100 INJECTION, SOLUTION SUBCUTANEOUS at 10:26

## 2023-03-22 RX ADMIN — Medication 20 UNIT(S): at 17:13

## 2023-03-22 RX ADMIN — Medication 25 MILLIGRAM(S): at 17:12

## 2023-03-22 RX ADMIN — Medication 5 MILLIGRAM(S): at 05:20

## 2023-03-22 RX ADMIN — Medication 1 DROP(S): at 17:12

## 2023-03-22 RX ADMIN — Medication 6: at 11:51

## 2023-03-22 RX ADMIN — INSULIN GLARGINE 40 UNIT(S): 100 INJECTION, SOLUTION SUBCUTANEOUS at 21:26

## 2023-03-22 RX ADMIN — Medication 20 UNIT(S): at 07:57

## 2023-03-22 RX ADMIN — PANTOPRAZOLE SODIUM 40 MILLIGRAM(S): 20 TABLET, DELAYED RELEASE ORAL at 05:20

## 2023-03-22 RX ADMIN — TAMSULOSIN HYDROCHLORIDE 0.4 MILLIGRAM(S): 0.4 CAPSULE ORAL at 21:27

## 2023-03-22 RX ADMIN — Medication 20 UNIT(S): at 11:51

## 2023-03-22 RX ADMIN — Medication 1: at 21:26

## 2023-03-22 RX ADMIN — Medication 600 MILLIGRAM(S): at 20:30

## 2023-03-22 RX ADMIN — ONDANSETRON 4 MILLIGRAM(S): 8 TABLET, FILM COATED ORAL at 06:51

## 2023-03-22 RX ADMIN — Medication 5: at 07:57

## 2023-03-22 RX ADMIN — RIVAROXABAN 15 MILLIGRAM(S): KIT at 17:12

## 2023-03-22 NOTE — CONSULT NOTE ADULT - SUBJECTIVE AND OBJECTIVE BOX
HPI:  Patient is a 68 year old male with hx of CAD s/p stent PCI, CABG, DM2, HLD, HTN, pancreatitis, RASHAD presenting with generalized weakness for 2 weeks and hypotension today. Patient missed testosterone dose 3 weeks ago (monthly injection). Patient was started on Jardiance 2 weeks ago and has been urinating excessively since initiating. Patient's bp was 70/40 today so he called EMS. Received fluid bolus from EMS, BP improved to 130/100. Reports feeling lightheaded today.  Denies fever, chills, sob, cp, abd pain, n/v/d, palpitations. (21 Mar 2023 19:14)      PAST MEDICAL & SURGICAL HISTORY  Diabetes mellitus, type 2    BPH (benign prostatic hyperplasia)    Water retention    Essential hypertension    Diabetes    CAD (coronary artery disease)    H/O hypercholesterolemia    H/O right coronary artery stent placement    History of resection of pancreas    History of cholecystectomy    History of left knee replacement        FAMILY HISTORY:  FAMILY HISTORY:  Family history of lung cancer (Sibling)    Family history of diabetes mellitus (Mother)    Family history of coronary artery disease (Father)        SOCIAL HISTORY:  []smoker  []Alcohol  []Drug    ALLERGIES:  No Known Allergies      MEDICATIONS:  MEDICATIONS  (STANDING):  artificial tears (preservative free) Ophthalmic Solution 1 Drop(s) Both EYES two times a day  atorvastatin 80 milliGRAM(s) Oral at bedtime  insulin glargine Injectable (LANTUS) 40 Unit(s) SubCutaneous every morning  insulin glargine Injectable (LANTUS) 40 Unit(s) SubCutaneous at bedtime  insulin lispro (ADMELOG) corrective regimen sliding scale   SubCutaneous three times a day before meals  insulin lispro (ADMELOG) corrective regimen sliding scale   SubCutaneous at bedtime  insulin lispro Injectable (ADMELOG) 20 Unit(s) SubCutaneous three times a day before meals  metoprolol tartrate 25 milliGRAM(s) Oral two times a day  oxybutynin 5 milliGRAM(s) Oral two times a day  pantoprazole    Tablet 40 milliGRAM(s) Oral before breakfast  rivaroxaban 15 milliGRAM(s) Oral with dinner  tamsulosin 0.4 milliGRAM(s) Oral at bedtime    MEDICATIONS  (PRN):  acetaminophen     Tablet .. 650 milliGRAM(s) Oral every 6 hours PRN Temp greater or equal to 38C (100.4F), Mild Pain (1 - 3)  aluminum hydroxide/magnesium hydroxide/simethicone Suspension 30 milliLiter(s) Oral every 4 hours PRN Dyspepsia  ondansetron Injectable 4 milliGRAM(s) IV Push every 4 hours PRN Nausea and/or Vomiting      HOME MEDICATIONS:  Home Medications:  furosemide 80 mg oral tablet: orally once a day (21 Mar 2023 19:13)  lisinopril 20 mg oral tablet: 1 tab(s) orally once a day (21 Mar 2023 19:13)  metOLazone 2.5 mg oral tablet: 1 tab(s) orally once a day (21 Mar 2023 19:13)  metoprolol tartrate 25 mg oral tablet: 1 tab(s) orally 2 times a day (21 Mar 2023 19:13)  oxybutynin 10 mg/24 hr oral tablet, extended release: 1 tab(s) orally once a day (21 Mar 2023 19:13)  pravastatin 40 mg oral tablet: 1 tab(s) orally once a day (21 Mar 2023 19:13)  spironolactone: 25 milligram(s) orally once a day (21 Mar 2023 19:13)  tamsulosin 0.4 mg oral capsule: 1 cap(s) orally once a day (at bedtime) (21 Mar 2023 19:13)  testosterone enanthate 100 mg/0.5 mL subcutaneous solution: 3 milliliter(s) subcutaneous once a month (21 Mar 2023 19:13)  Xarelto 20 mg oral tablet: 1 tab(s) orally once a day (in the evening) (21 Mar 2023 19:13)      VITALS:   T(F): 96.3 (03-22 @ 03:05), Max: 97.1 (03-21 @ 20:39)  HR: 85 (03-22 @ 04:15) (81 - 85)  BP: 124/62 (03-22 @ 04:15) (124/62 - 134/100)  BP(mean): --  RR: 18 (03-22 @ 04:15) (18 - 20)  SpO2: 97% (03-22 @ 04:15) (95% - 98%)    I&O's Summary    21 Mar 2023 07:01  -  22 Mar 2023 07:00  --------------------------------------------------------  IN: 0 mL / OUT: 500 mL / NET: -500 mL        REVIEW OF SYSTEMS:  CONSTITUTIONAL: feels  weakness, no fevers or chills  EYES: No visual changes  NECK: No pain or stiffness  RESPIRATORY: No cough, wheezing, hemoptysis; No shortness of breath  CARDIOVASCULAR: No chest pain or palpitations  GASTROINTESTINAL: No abdominal or epigastric pain. No nausea, vomiting, or hematemesis; No diarrhea or constipation.   GENITOURINARY: + Polyuria  NEUROLOGICAL:  No tremors, no Weakness or numbness  SKIN: No itching, no rashes  MSK: no joint pain    PHYSICAL EXAM:  GENERAL: Patient is awake , alert and oriented,  not in acute distress, obese   NECK: No thyroid enlargement, no palpable nodules  LUNGS: Clear to auscultation bilaterally   CARDIOVASCULAR: S1/S2 present, RRR , no murmurs, surgical scar   ABD: Soft, non-tender, non-distended, +BS, has CGM   EXT: No FRANKI      LABS:                        16.5   15.01 )-----------( 397      ( 22 Mar 2023 06:33 )             48.2     03-22    131<L>  |  94<L>  |  103<HH>  ----------------------------<  367<H>  4.9   |  23  |  2.0<H>    Ca    9.2      22 Mar 2023 06:33  Mg     3.1     03-22    TPro  7.0  /  Alb  4.0  /  TBili  0.4  /  DBili  x   /  AST  32  /  ALT  42<H>  /  AlkPhos  80  03-22    PT/INR - ( 21 Mar 2023 15:08 )   PT: 9.90 sec;   INR: 0.87 ratio         PTT - ( 21 Mar 2023 15:08 )  PTT:25.3 sec  Creatine Kinase, Serum: 240 U/L *H* (03-22-23 @ 06:33)  Troponin T, Serum: 0.03 ng/mL *HH* (03-22-23 @ 06:33)  Troponin T, Serum: 0.04 ng/mL *HH* (03-21-23 @ 21:28)  Troponin T, Serum: 0.06 ng/mL *HH* (03-21-23 @ 15:08)    CARDIAC MARKERS ( 22 Mar 2023 06:33 )  x     / 0.03 ng/mL / 240 U/L / x     / 6.8 ng/mL  CARDIAC MARKERS ( 21 Mar 2023 21:28 )  x     / 0.04 ng/mL / x     / x     / x      CARDIAC MARKERS ( 21 Mar 2023 15:08 )  x     / 0.06 ng/mL / x     / x     / x            POCT Blood Glucose.: 496 mg/dL (03-22-23 @ 11:30)  POCT Blood Glucose.: 354 mg/dL (03-22-23 @ 07:24)  POCT Blood Glucose.: 163 mg/dL (03-21-23 @ 21:03)      A1C with Estimated Average Glucose Result: 10.1: Method: Immunoassay < from: CT Abdomen and Pelvis No Cont (03.21.23 @ 16:55) >    No evidence of an acute intra-abdominal abnormality.    < end of copied text >

## 2023-03-22 NOTE — PHARMACY COMMUNICATION NOTE - COMMENTS
Dose confirmed with [facility] pharmacy. Patient on 600mg every 4 wks. As per  MARYJANE virk today

## 2023-03-22 NOTE — CHART NOTE - NSCHARTNOTEFT_GEN_A_CORE
Renal function improving will resume xarelto, renal dosage adjustment made and dose changed to 15mg po daily

## 2023-03-22 NOTE — CONSULT NOTE ADULT - SUBJECTIVE AND OBJECTIVE BOX
67 y/o m pmh CAD s/p stent PCI, CABG, DM2, HLD, HTN, pancreatitis, RASHAD presenting with generalized weakness and hypotension. Pt on monthly testosterone INJ missed last dose 3 weeks ago, He was also recently started on Jardiance and has been urinating excessively was found hypotensive  per report bp 70/40 improved after fluid bolus. In the ED found in KRISTINE on CKD/ elevated troponin. Denies any exertional chest pain, palpitation, SOB, leg swelling. Pt follows up with Dr Lane cardiologist, Last NM stress 03/2022 with no evidence of ischemia, TTE 03/22/23 EF 72% grossly unremarkable.      PAST MEDICAL & SURGICAL HISTORY  Diabetes mellitus, type 2    BPH (benign prostatic hyperplasia)    Water retention    Essential hypertension    Diabetes    CAD (coronary artery disease)    H/O hypercholesterolemia    H/O right coronary artery stent placement    History of resection of pancreas    History of cholecystectomy    History of left knee replacement        FAMILY HISTORY:  FAMILY HISTORY:  Family history of lung cancer (Sibling)    Family history of diabetes mellitus (Mother)    Family history of coronary artery disease (Father)        SOCIAL HISTORY:  []smoker  []Alcohol  []Drug    ALLERGIES:  No Known Allergies      MEDICATIONS:  MEDICATIONS  (STANDING):  artificial tears (preservative free) Ophthalmic Solution 1 Drop(s) Both EYES two times a day  atorvastatin 80 milliGRAM(s) Oral at bedtime  insulin glargine Injectable (LANTUS) 40 Unit(s) SubCutaneous every morning  insulin glargine Injectable (LANTUS) 40 Unit(s) SubCutaneous at bedtime  insulin lispro (ADMELOG) corrective regimen sliding scale   SubCutaneous three times a day before meals  insulin lispro (ADMELOG) corrective regimen sliding scale   SubCutaneous at bedtime  insulin lispro Injectable (ADMELOG) 20 Unit(s) SubCutaneous three times a day before meals  metoprolol tartrate 25 milliGRAM(s) Oral two times a day  oxybutynin 5 milliGRAM(s) Oral two times a day  pantoprazole    Tablet 40 milliGRAM(s) Oral before breakfast  rivaroxaban 15 milliGRAM(s) Oral with dinner  tamsulosin 0.4 milliGRAM(s) Oral at bedtime    MEDICATIONS  (PRN):  acetaminophen     Tablet .. 650 milliGRAM(s) Oral every 6 hours PRN Temp greater or equal to 38C (100.4F), Mild Pain (1 - 3)  aluminum hydroxide/magnesium hydroxide/simethicone Suspension 30 milliLiter(s) Oral every 4 hours PRN Dyspepsia  ondansetron Injectable 4 milliGRAM(s) IV Push every 4 hours PRN Nausea and/or Vomiting      HOME MEDICATIONS:  Home Medications:  furosemide 80 mg oral tablet: orally once a day (21 Mar 2023 19:13)  lisinopril 20 mg oral tablet: 1 tab(s) orally once a day (21 Mar 2023 19:13)  metOLazone 2.5 mg oral tablet: 1 tab(s) orally once a day (21 Mar 2023 19:13)  metoprolol tartrate 25 mg oral tablet: 1 tab(s) orally 2 times a day (21 Mar 2023 19:13)  oxybutynin 10 mg/24 hr oral tablet, extended release: 1 tab(s) orally once a day (21 Mar 2023 19:13)  pravastatin 40 mg oral tablet: 1 tab(s) orally once a day (21 Mar 2023 19:13)  spironolactone: 25 milligram(s) orally once a day (21 Mar 2023 19:13)  tamsulosin 0.4 mg oral capsule: 1 cap(s) orally once a day (at bedtime) (21 Mar 2023 19:13)  testosterone enanthate 100 mg/0.5 mL subcutaneous solution: 3 milliliter(s) subcutaneous once a month (21 Mar 2023 19:13)  Xarelto 20 mg oral tablet: 1 tab(s) orally once a day (in the evening) (21 Mar 2023 19:13)      VITALS:   T(F): 97.1 (03-22 @ 13:22), Max: 97.1 (03-21 @ 20:39)  HR: 90 (03-22 @ 13:22) (81 - 90)  BP: 128/64 (03-22 @ 13:22) (124/62 - 134/100)  BP(mean): --  RR: 18 (03-22 @ 13:22) (18 - 20)  SpO2: 97% (03-22 @ 04:15) (95% - 98%)    I&O's Summary    21 Mar 2023 07:01  -  22 Mar 2023 07:00  --------------------------------------------------------  IN: 0 mL / OUT: 500 mL / NET: -500 mL        REVIEW OF SYSTEMS:  CONSTITUTIONAL: No weakness, fevers or chills  EYES: No visual changes  ENT: No vertigo or throat pain   NECK: No pain or stiffness  RESPIRATORY: No cough, wheezing, hemoptysis; No shortness of breath  CARDIOVASCULAR: No chest pain or palpitations  GASTROINTESTINAL: No abdominal or epigastric pain. No nausea, vomiting, or hematemesis; No diarrhea or constipation. No melena or hematochezia.  GENITOURINARY: No dysuria, frequency or hematuria  NEUROLOGICAL: No numbness or weakness  SKIN: No itching, no rashes  MSK: no    PHYSICAL EXAM:  NEURO: patient is awake , alert and oriented  GEN: Not in acute distress  NECK: no thyroid enlargement, no JVD  LUNGS: Clear to auscultation bilaterally   CARDIOVASCULAR: S1/S2 present, RRR , no murmurs or rubs, no carotid bruits,  + PP bilaterally  ABD: Soft, non-tender, non-distended, +BS  EXT: No FRANKI  SKIN: Intact    LABS:                        16.5   15.01 )-----------( 397      ( 22 Mar 2023 06:33 )             48.2     03-22    131<L>  |  94<L>  |  103<HH>  ----------------------------<  367<H>  4.9   |  23  |  2.0<H>    Ca    9.2      22 Mar 2023 06:33  Mg     3.1     03-22    TPro  7.0  /  Alb  4.0  /  TBili  0.4  /  DBili  x   /  AST  32  /  ALT  42<H>  /  AlkPhos  80  03-22    PT/INR - ( 21 Mar 2023 15:08 )   PT: 9.90 sec;   INR: 0.87 ratio         PTT - ( 21 Mar 2023 15:08 )  PTT:25.3 sec  Creatine Kinase, Serum: 240 U/L *H* (03-22-23 @ 06:33)  Troponin T, Serum: 0.03 ng/mL *HH* (03-22-23 @ 06:33)  Troponin T, Serum: 0.04 ng/mL *HH* (03-21-23 @ 21:28)    CARDIAC MARKERS ( 22 Mar 2023 06:33 )  x     / 0.03 ng/mL / 240 U/L / x     / 6.8 ng/mL  CARDIAC MARKERS ( 21 Mar 2023 21:28 )  x     / 0.04 ng/mL / x     / x     / x      CARDIAC MARKERS ( 21 Mar 2023 15:08 )  x     / 0.06 ng/mL / x     / x     / x            Troponin trend:            RADIOLOGY:  < from: TTE Echo Complete w/ Contrast w/ Doppler (03.22.23 @ 09:07) >  Summary:   1. LV Ejection Fraction by Roche's Method with a biplane EF of 72 %.   2. Mildly increased LV wall thickness.   3. Normal left atrial size.   4. Mild mitral annular calcification.   5. Sclerotic aortic valve with normal opening.   6. Endocardial visualization was enhanced with intravenous echo contrast.    PHYSICIAN INTERPRETATION:  Left Ventricle: Endocardial visualization was enhanced with intravenous   echo contrast. The left ventricular internal cavity size is normal. Left   ventricular wall thickness is mildly increased. Normal segmental left   ventricular systolic function.  Left Atrium: Normal left atrial size.  Mitral Valve: There is mild mitral annular calcification.  Aortic Valve: Sclerotic aortic valve with normal opening.  SPECTRAL DOPPLER ANALYSIS:  LV DIASTOLIC FUNCTION:  MV Peak E: 0.45 m/s Decel Time: 168 msec  MV Peak A: 0.56 m/s  E/A Ratio: 0.80    < end of copied text >  < from: NM Nuclear Stress Pharmacologic Multiple (03.17.22 @ 14:02) >  Impression:  1. IV Lexiscan Dual Isotope Study which was negative with respect to   symptoms and EKG changes.  2. Myocardial perfusion imaging reveals no fixed no reperfusion defects  3. Gated imaging reveals septal motion consistent with an   interventricular conduction defect with normal thickening and ejection   fraction  Recommendation:  Medical therapy.    < end of copied text >      ECG:  `< from: 12 Lead ECG (03.21.23 @ 15:21) >  Ventricular Rate 75 BPM    Atrial Rate 75 BPM    P-R Interval 188 ms    QRS Duration 96 ms    Q-T Interval 380 ms    QTC Calculation(Bazett) 424 ms    P Axis 65 degrees    R Axis 68 degrees    T Axis 85 degrees    Diagnosis Line Normal sinus rhythm  Incomplete right bundle branch block  Borderline ECG    Confirmed by Damion Ascencio ([id number]) on 3/21/2023 4:30:05 PM    < end of copied text >    TELEMETRY EVENTS:

## 2023-03-22 NOTE — CONSULT NOTE ADULT - ASSESSMENT
#mild DKA, type 2 DM , obesity, KRISTINE   - at home on insulin pump ( MEdtronic 770 G with CGM ) usually in auto- mode settings reviewed his basal rate is 3.15 units/hr (total of around 75 units ) , I: C ratio 1: 3g and ISF 1:13 , was started on jardiance 2 weeks ago   - agree with starting Lantus 40 units BID   - admelog 20 units TIDAC   - change sliding scale to 2:50 > 150   - can uptitrate insulin if Fs remain high   - he will go back on insulin pump on discharge , stop jardiance       # hypogonadism on chronic testosterone   - he gets testosterone cypionate ( 200mg/ml) 3 cc every months and missed his last dose   - checked with dr Webb dose was missed by patient 3 weeks ago , and not because of any Contraindications ,patient insisting to get his dose     discussed with team

## 2023-03-22 NOTE — CONSULT NOTE ADULT - ASSESSMENT
67 y/o m pmh CAD s/p stent PCI, CABG, DM2, HLD, HTN, pancreatitis, RASHAD presenting with generalized weakness and hypotension found in KRISTINE on CKD/ elevated troponin.    Impression  # Elevated troponin likely demand ischemia in setting of renal failure    Plan  - Euvolemic on exam, no evidence of acute cardiac ischemia or ADHF  - Trop also elevated in the past, relatively flat no need to trend further  - ecg no acute ST changes  - continue with GDMT  - Endocrinology eval  - Nephro eval  - op follow up with his cardiologist

## 2023-03-22 NOTE — CONSULT NOTE ADULT - NS ATTEND AMEND GEN_ALL_CORE FT
67 y/o m pmh CAD s/p stent PCI, CABG, DM2, HLD, HTN, pancreatitis, RASHAD presenting with generalized weakness and hypotension found in KRISTINE on CKD/ elevated troponin. No complaints now. No evidence MI. Demand ischemia. Check lytes. When stable. Follow up  Dr Laen

## 2023-03-22 NOTE — CHART NOTE - NSCHARTNOTEFT_GEN_A_CORE
BMP was ordered correctly for 10pm rounds however, BMP was NOT drawn by lab.   D/w Dr Knox.  Will order BMP for am rounds per Dr Knox.

## 2023-03-23 ENCOUNTER — TRANSCRIPTION ENCOUNTER (OUTPATIENT)
Age: 68
End: 2023-03-23

## 2023-03-23 VITALS
RESPIRATION RATE: 20 BRPM | DIASTOLIC BLOOD PRESSURE: 79 MMHG | OXYGEN SATURATION: 100 % | TEMPERATURE: 97 F | SYSTOLIC BLOOD PRESSURE: 145 MMHG | HEART RATE: 80 BPM

## 2023-03-23 LAB
ALBUMIN SERPL ELPH-MCNC: 3.5 G/DL — SIGNIFICANT CHANGE UP (ref 3.5–5.2)
ALP SERPL-CCNC: 77 U/L — SIGNIFICANT CHANGE UP (ref 30–115)
ALT FLD-CCNC: 39 U/L — SIGNIFICANT CHANGE UP (ref 0–41)
ANION GAP SERPL CALC-SCNC: 13 MMOL/L — SIGNIFICANT CHANGE UP (ref 7–14)
AST SERPL-CCNC: 29 U/L — SIGNIFICANT CHANGE UP (ref 0–41)
BILIRUB SERPL-MCNC: 0.3 MG/DL — SIGNIFICANT CHANGE UP (ref 0.2–1.2)
BUN SERPL-MCNC: 87 MG/DL — CRITICAL HIGH (ref 10–20)
CALCIUM SERPL-MCNC: 9 MG/DL — SIGNIFICANT CHANGE UP (ref 8.4–10.5)
CHLORIDE SERPL-SCNC: 95 MMOL/L — LOW (ref 98–110)
CO2 SERPL-SCNC: 23 MMOL/L — SIGNIFICANT CHANGE UP (ref 17–32)
CORTIS AM PEAK SERPL-MCNC: 17.1 UG/DL — SIGNIFICANT CHANGE UP (ref 6–18.4)
CREAT SERPL-MCNC: 1.9 MG/DL — HIGH (ref 0.7–1.5)
EGFR: 38 ML/MIN/1.73M2 — LOW
GLUCOSE BLDC GLUCOMTR-MCNC: 219 MG/DL — HIGH (ref 70–99)
GLUCOSE BLDC GLUCOMTR-MCNC: 267 MG/DL — HIGH (ref 70–99)
GLUCOSE BLDC GLUCOMTR-MCNC: 382 MG/DL — HIGH (ref 70–99)
GLUCOSE BLDC GLUCOMTR-MCNC: 401 MG/DL — HIGH (ref 70–99)
GLUCOSE SERPL-MCNC: 227 MG/DL — HIGH (ref 70–99)
HCT VFR BLD CALC: 44.7 % — SIGNIFICANT CHANGE UP (ref 42–52)
HGB BLD-MCNC: 15.2 G/DL — SIGNIFICANT CHANGE UP (ref 14–18)
MCHC RBC-ENTMCNC: 31.7 PG — HIGH (ref 27–31)
MCHC RBC-ENTMCNC: 34 G/DL — SIGNIFICANT CHANGE UP (ref 32–37)
MCV RBC AUTO: 93.3 FL — SIGNIFICANT CHANGE UP (ref 80–94)
NRBC # BLD: 0 /100 WBCS — SIGNIFICANT CHANGE UP (ref 0–0)
PLATELET # BLD AUTO: 394 K/UL — SIGNIFICANT CHANGE UP (ref 130–400)
POTASSIUM SERPL-MCNC: 4.6 MMOL/L — SIGNIFICANT CHANGE UP (ref 3.5–5)
POTASSIUM SERPL-SCNC: 4.6 MMOL/L — SIGNIFICANT CHANGE UP (ref 3.5–5)
PROT SERPL-MCNC: 6.4 G/DL — SIGNIFICANT CHANGE UP (ref 6–8)
RBC # BLD: 4.79 M/UL — SIGNIFICANT CHANGE UP (ref 4.7–6.1)
RBC # FLD: 15.3 % — HIGH (ref 11.5–14.5)
SODIUM SERPL-SCNC: 131 MMOL/L — LOW (ref 135–146)
WBC # BLD: 13.41 K/UL — HIGH (ref 4.8–10.8)
WBC # FLD AUTO: 13.41 K/UL — HIGH (ref 4.8–10.8)

## 2023-03-23 PROCEDURE — 76770 US EXAM ABDO BACK WALL COMP: CPT | Mod: 26

## 2023-03-23 PROCEDURE — 99233 SBSQ HOSP IP/OBS HIGH 50: CPT

## 2023-03-23 PROCEDURE — 99239 HOSP IP/OBS DSCHRG MGMT >30: CPT

## 2023-03-23 PROCEDURE — 99222 1ST HOSP IP/OBS MODERATE 55: CPT

## 2023-03-23 RX ORDER — PANTOPRAZOLE SODIUM 20 MG/1
1 TABLET, DELAYED RELEASE ORAL
Qty: 30 | Refills: 0
Start: 2023-03-23 | End: 2023-04-21

## 2023-03-23 RX ORDER — SPIRONOLACTONE 25 MG/1
25 TABLET, FILM COATED ORAL
Qty: 0 | Refills: 0 | DISCHARGE

## 2023-03-23 RX ORDER — METOLAZONE 5 MG/1
1 TABLET ORAL
Qty: 0 | Refills: 0 | DISCHARGE

## 2023-03-23 RX ADMIN — Medication 20 UNIT(S): at 08:19

## 2023-03-23 RX ADMIN — Medication 20 UNIT(S): at 11:38

## 2023-03-23 RX ADMIN — PANTOPRAZOLE SODIUM 40 MILLIGRAM(S): 20 TABLET, DELAYED RELEASE ORAL at 05:06

## 2023-03-23 RX ADMIN — Medication 25 MILLIGRAM(S): at 05:07

## 2023-03-23 RX ADMIN — INSULIN GLARGINE 40 UNIT(S): 100 INJECTION, SOLUTION SUBCUTANEOUS at 10:31

## 2023-03-23 RX ADMIN — Medication 5 MILLIGRAM(S): at 05:06

## 2023-03-23 RX ADMIN — Medication 1 DROP(S): at 05:06

## 2023-03-23 RX ADMIN — Medication 2: at 08:18

## 2023-03-23 RX ADMIN — Medication 6: at 11:37

## 2023-03-23 NOTE — PROGRESS NOTE ADULT - ASSESSMENT
Patient is a 68 year old male with hx of CAD s/p stent PCI, CABG, DM2, HLD, HTN, pancreatitis, RASHAD presenting with        # hypotension 2/2 hypovolemia likely 2/2 Jardiance and triple diuretics  # KRISTINE on CKD  - HOLD Lasix 80 daily, metolazone 2.5 daily, spironolactone 25 daily  -IVF  -renal US   - renal consult  - hold lisinopril    # CAD s/p stents, CABG  - continue Xarelto renally dosed, Lopressor    # paroxysmal afib  - NSR now  - follow up BMP 10 pm to resume Xarelto  - Lopressor 25 mg bid    # low testosterone  - missed injection, follow up outpatient  - cortisol level in AM    # DM2  - patient uses insulin pump  - converted to prior dose of basal/bolus  - FS glucose  - discontinue Jardiance    #HLD  - statin    # HTN  - cont metoprolol  - hold lisinopril    #abnormal trop  -level flat  -EKG noticed  -TTE  -cardiology consult     #Progress Note Handoff  Pending (specify):  as above   Family discussion:  plan of care was discussed with patient   in details.  all questions were answered.  seems to understand, and in agreement  Disposition:  unknown    
Patient is a 68 year old male with hx of CAD s/p stent PCI, CABG, DM2, HLD, HTN, pancreatitis, RASHAD presenting with        # hypotension 2/2 hypovolemia likely 2/2 Jardiance and triple diuretics  # KRISTINE on CKD  - HOLD Lasix 80 daily, metolazone 2.5 daily, spironolactone 25 daily  -renal function improving   -renal US and nephrology pending   - hold lisinopril    # CAD s/p stents, CABG  - continue Xarelto renally dosed, Lopressor    # paroxysmal afib  - NSR now  - follow up BMP 10 pm to resume Xarelto  - Lopressor 25 mg bid    # low testosterone  - missed injection, follow up outpatient  - cortisol level in AM    # DM2  - patient uses insulin pump  - converted to prior dose of basal/bolus  - FS glucose  - discontinue Jardiance    #HLD  - statin    # HTN  - cont metoprolol  - hold lisinopril    #abnormal trop  -level flat  -EKG noticed  -TTE  -cardiology recommended outpatient follow up     #Progress Note Handoff  Pending (specify):  as above   Family discussion:  plan of care was discussed with patient   in details.  all questions were answered.  seems to understand, and in agreement  Disposition: home

## 2023-03-23 NOTE — DISCHARGE NOTE PROVIDER - CARE PROVIDERS DIRECT ADDRESSES
,[email or web address]

## 2023-03-23 NOTE — DISCHARGE NOTE PROVIDER - PROVIDER TOKENS
PROVIDER:[TOKEN:[[id number]],FOLLOWUP:[1 week]],PROVIDER:[TOKEN:[[id number]],FOLLOWUP:[1 week]],PROVIDER:[TOKEN:[[id number]],FOLLOWUP:[1 week]]

## 2023-03-23 NOTE — DISCHARGE NOTE PROVIDER - NSDCCPCAREPLAN_GEN_ALL_CORE_FT
PRINCIPAL DISCHARGE DIAGNOSIS  Diagnosis: KRISTINE (acute kidney injury)  Assessment and Plan of Treatment: resolved outpatient follow up with nephrology in one week      SECONDARY DISCHARGE DIAGNOSES  Diagnosis: Hypotension  Assessment and Plan of Treatment: resolved.    Diagnosis: Elevated troponin  Assessment and Plan of Treatment: outpatient follow up with cardiology in one week for stress test

## 2023-03-23 NOTE — DISCHARGE NOTE PROVIDER - HOSPITAL COURSE
Patient is a 68 year old male with hx of CAD s/p stent PCI, CABG, DM2, HLD, HTN, pancreatitis, RASHAD presenting with        # hypotension 2/2 hypovolemia likely 2/2 Jardiance and triple diuretics  # KRISTINE on CKD  - resolved  -No sign of infection   -renal function at baseline  -discussed case with nephrology ok to resume lasix, and hold other medications ( metolazone 2.5 daily, spironolactone 25 daily, and lisinopril)  -Outpatient follow up with nephrology  -No sign of volume overload   -renal US shows no hydronephrosis   -cleared for discharge as per nephrology     # CAD s/p stents, CABG  - resume home medications     # paroxysmal afib  - NSR now  -Xarelto  - Lopressor 25 mg bid    # low testosterone  -received  testosterone shot yesterday     # DM2  -resume home medications   - discontinue Jardiance    #HLD  - statin    # HTN  - cont metoprolol  - hold lisinopril    #abnormal trop  -level flat  -EKG noticed  -TTE shows NL EF  -outpatient follow up with cardiology      patient was seen and examined today  feels better  Offers no other complaints   insisting on going home   Constitutional: No fever, fatigue or weight loss.  Skin: No rash.  Eyes: No recent vision problems or eye pain.  ENT: No congestion, ear pain, or sore throat.  Endocrine: No thyroid problems.  Cardiovascular: No chest pain or palpation.  Respiratory: No cough, shortness of breath, congestion, or wheezing.  Gastrointestinal: No abdominal pain, nausea, vomiting, or diarrhea.  Genitourinary: No dysuria.  Musculoskeletal: No joint swelling.  Neurologic: No headache.  Vital Signs Last 24 Hrs  T(C): 35.5 (03-23-23 @ 05:00), Max: 36.4 (03-22-23 @ 21:15)  T(F): 95.9 (03-23-23 @ 05:00), Max: 97.5 (03-22-23 @ 21:15)  HR: 78 (03-23-23 @ 05:00) (76 - 90)  BP: 109/58 (03-23-23 @ 05:00) (109/58 - 138/63)  BP(mean): --  RR: 18 (03-23-23 @ 05:00) (18 - 18)  SpO2: 96% (03-23-23 @ 05:00) (96% - 96%)  PHYSICAL EXAM-  GENERAL: NAD,    HEAD:  Atraumatic, Normocephalic  EYES: EOMI, PERRLA, conjunctiva and sclera clear  NECK: Supple, No JVD, Normal thyroid  NERVOUS SYSTEM:  Alert & Oriented X3, Moving all extremities  CHEST/LUNG: Clear to percussion bilaterally; No rales, rhonchi, wheezing, or rubs  HEART: Regular rate and rhythm; No murmurs, rubs, or gallops  ABDOMEN: Soft, Nontender, Nondistended; Bowel sounds present  EXTREMITIES: No clubbing, cyanosis, or edema  SKIN: No rashes or lesions  Hospital course, and discharge planning were discussed with patient,   in details.  all questions were answered.  seems to understand, and in agreement.  time 70 min

## 2023-03-23 NOTE — DISCHARGE NOTE PROVIDER - NSDCMRMEDTOKEN_GEN_ALL_CORE_FT
Artificial Tears ophthalmic solution: 1 drop(s) in each eye 4 times a day   furosemide 80 mg oral tablet: orally once a day  metoprolol tartrate 25 mg oral tablet: 1 tab(s) orally 2 times a day  oxybutynin 10 mg/24 hr oral tablet, extended release: 1 tab(s) orally once a day  pantoprazole 40 mg oral delayed release tablet: 1 tab(s) orally once a day (before a meal)  pravastatin 40 mg oral tablet: 1 tab(s) orally once a day  tamsulosin 0.4 mg oral capsule: 1 cap(s) orally once a day (at bedtime)  Xarelto 20 mg oral tablet: 1 tab(s) orally once a day (in the evening)

## 2023-03-23 NOTE — PROGRESS NOTE ADULT - SUBJECTIVE AND OBJECTIVE BOX
CC.  Hypotension   HPI.  Patient reports that he feels better  offers no other complaints                 Constitutional: No fever, fatigue or weight loss.  Skin: No rash.  Eyes: No recent vision problems or eye pain.  ENT: No congestion, ear pain, or sore throat.  Endocrine: No thyroid problems.  Cardiovascular: No chest pain or palpation.  Respiratory: No cough, shortness of breath, congestion, or wheezing.  Gastrointestinal: No abdominal pain, nausea, vomiting, or diarrhea.  Genitourinary: No dysuria.  Musculoskeletal: No joint swelling.  Neurologic: No headache.      Vital Signs Last 24 Hrs  T(C): 35.5 (23 Mar 2023 05:00), Max: 36.4 (22 Mar 2023 21:15)  T(F): 95.9 (23 Mar 2023 05:00), Max: 97.5 (22 Mar 2023 21:15)  HR: 78 (23 Mar 2023 05:00) (76 - 90)  BP: 109/58 (23 Mar 2023 05:00) (109/58 - 138/63)  BP(mean): --  RR: 18 (23 Mar 2023 05:00) (18 - 18)  SpO2: 96% (23 Mar 2023 05:00) (96% - 96%)    Parameters below as of 23 Mar 2023 05:00  Patient On (Oxygen Delivery Method): room air            PHYSICAL EXAM-  GENERAL: NAD,   HEAD:  Atraumatic, Normocephalic  EYES: EOMI, PERRLA, conjunctiva and sclera clear  NECK: Supple, No JVD, Normal thyroid  NERVOUS SYSTEM:  Alert & Oriented X3, Moving all extremities  CHEST/LUNG: Clear to percussion bilaterally; No rales, rhonchi, wheezing, or rubs  HEART: Regular rate and rhythm; No murmurs, rubs, or gallops  ABDOMEN: Soft, Nontender, Nondistended; Bowel sounds present  EXTREMITIES:    No clubbing, cyanosis, or edema  SKIN: No rashes or lesions      .  LABS:                         15.2   13.41 )-----------( 394      ( 23 Mar 2023 06:17 )             44.7     03-23    131<L>  |  95<L>  |  87<HH>  ----------------------------<  227<H>  4.6   |  23  |  1.9<H>    Ca    9.0      23 Mar 2023 06:17  Mg     3.1     03-22    TPro  6.4  /  Alb  3.5  /  TBili  0.3  /  DBili  x   /  AST  29  /  ALT  39  /  AlkPhos  77  03-23    PT/INR - ( 21 Mar 2023 15:08 )   PT: 9.90 sec;   INR: 0.87 ratio         PTT - ( 21 Mar 2023 15:08 )  PTT:25.3 sec      MEDICATIONS  (STANDING):  artificial tears (preservative free) Ophthalmic Solution 1 Drop(s) Both EYES two times a day  atorvastatin 80 milliGRAM(s) Oral at bedtime  insulin glargine Injectable (LANTUS) 40 Unit(s) SubCutaneous every morning  insulin glargine Injectable (LANTUS) 40 Unit(s) SubCutaneous at bedtime  insulin lispro (ADMELOG) corrective regimen sliding scale   SubCutaneous three times a day before meals  insulin lispro (ADMELOG) corrective regimen sliding scale   SubCutaneous at bedtime  insulin lispro Injectable (ADMELOG) 20 Unit(s) SubCutaneous three times a day before meals  metoprolol tartrate 25 milliGRAM(s) Oral two times a day  oxybutynin 5 milliGRAM(s) Oral two times a day  pantoprazole    Tablet 40 milliGRAM(s) Oral before breakfast  rivaroxaban 15 milliGRAM(s) Oral with dinner  tamsulosin 0.4 milliGRAM(s) Oral at bedtime    MEDICATIONS  (PRN):  acetaminophen     Tablet .. 650 milliGRAM(s) Oral every 6 hours PRN Temp greater or equal to 38C (100.4F), Mild Pain (1 - 3)  aluminum hydroxide/magnesium hydroxide/simethicone Suspension 30 milliLiter(s) Oral every 4 hours PRN Dyspepsia  ondansetron Injectable 4 milliGRAM(s) IV Push every 4 hours PRN Nausea and/or Vomiting        Imaging Personally Reviewed:     [x ] YES  [ ] NO    Consultant(s) Notes Reviewed:  [x ] YES  [ ] NO    Care Discussed with Consultants/Other Providers [x ] YES  [ ] No    
CC.  Hypotension   HPI.  Patient reports that he feels better  offers no other complaints                 Constitutional: No fever, fatigue or weight loss.  Skin: No rash.  Eyes: No recent vision problems or eye pain.  ENT: No congestion, ear pain, or sore throat.  Endocrine: No thyroid problems.  Cardiovascular: No chest pain or palpation.  Respiratory: No cough, shortness of breath, congestion, or wheezing.  Gastrointestinal: No abdominal pain, nausea, vomiting, or diarrhea.  Genitourinary: No dysuria.  Musculoskeletal: No joint swelling.  Neurologic: No headache.      Vital Signs Last 24 Hrs  T(C): 36.2 (03-22-23 @ 13:22), Max: 36.2 (03-21-23 @ 20:39)  T(F): 97.1 (03-22-23 @ 13:22), Max: 97.1 (03-21-23 @ 20:39)  HR: 90 (03-22-23 @ 13:22) (81 - 90)  BP: 128/64 (03-22-23 @ 13:22) (124/62 - 134/100)  BP(mean): --  RR: 18 (03-22-23 @ 13:22) (18 - 20)  SpO2: 97% (03-22-23 @ 04:15) (95% - 98%)        PHYSICAL EXAM-  GENERAL: NAD,   HEAD:  Atraumatic, Normocephalic  EYES: EOMI, PERRLA, conjunctiva and sclera clear  NECK: Supple, No JVD, Normal thyroid  NERVOUS SYSTEM:  Alert & Oriented X3, Moving all extremities  CHEST/LUNG: Clear to percussion bilaterally; No rales, rhonchi, wheezing, or rubs  HEART: Regular rate and rhythm; No murmurs, rubs, or gallops  ABDOMEN: Soft, Nontender, Nondistended; Bowel sounds present  EXTREMITIES:    No clubbing, cyanosis, or edema  SKIN: No rashes or lesions                                  16.5   15.01 )-----------( 397      ( 22 Mar 2023 06:33 )             48.2     03-22    131<L>  |  94<L>  |  103<HH>  ----------------------------<  367<H>  4.9   |  23  |  2.0<H>    Ca    9.2      22 Mar 2023 06:33  Mg     3.1     03-22    TPro  7.0  /  Alb  4.0  /  TBili  0.4  /  DBili  x   /  AST  32  /  ALT  42<H>  /  AlkPhos  80  03-22    CARDIAC MARKERS ( 22 Mar 2023 06:33 )  x     / 0.03 ng/mL / 240 U/L / x     / 6.8 ng/mL  CARDIAC MARKERS ( 21 Mar 2023 21:28 )  x     / 0.04 ng/mL / x     / x     / x      CARDIAC MARKERS ( 21 Mar 2023 15:08 )  x     / 0.06 ng/mL / x     / x     / x              PT/INR - ( 21 Mar 2023 15:08 )   PT: 9.90 sec;   INR: 0.87 ratio         PTT - ( 21 Mar 2023 15:08 )  PTT:25.3 sec        MEDICATIONS  (STANDING):  artificial tears (preservative free) Ophthalmic Solution 1 Drop(s) Both EYES two times a day  atorvastatin 80 milliGRAM(s) Oral at bedtime  insulin glargine Injectable (LANTUS) 40 Unit(s) SubCutaneous every morning  insulin glargine Injectable (LANTUS) 40 Unit(s) SubCutaneous at bedtime  insulin lispro (ADMELOG) corrective regimen sliding scale   SubCutaneous three times a day before meals  insulin lispro (ADMELOG) corrective regimen sliding scale   SubCutaneous at bedtime  insulin lispro Injectable (ADMELOG) 20 Unit(s) SubCutaneous three times a day before meals  metoprolol tartrate 25 milliGRAM(s) Oral two times a day  oxybutynin 5 milliGRAM(s) Oral two times a day  pantoprazole    Tablet 40 milliGRAM(s) Oral before breakfast  rivaroxaban 15 milliGRAM(s) Oral with dinner  tamsulosin 0.4 milliGRAM(s) Oral at bedtime    MEDICATIONS  (PRN):  acetaminophen     Tablet .. 650 milliGRAM(s) Oral every 6 hours PRN Temp greater or equal to 38C (100.4F), Mild Pain (1 - 3)  aluminum hydroxide/magnesium hydroxide/simethicone Suspension 30 milliLiter(s) Oral every 4 hours PRN Dyspepsia  ondansetron Injectable 4 milliGRAM(s) IV Push every 4 hours PRN Nausea and/or Vomiting        Imaging Personally Reviewed:     [x ] YES  [ ] NO    Consultant(s) Notes Reviewed:  [x ] YES  [ ] NO    Care Discussed with Consultants/Other Providers [x ] YES  [ ] No medical contraindication for discharge

## 2023-03-23 NOTE — DISCHARGE NOTE PROVIDER - NSDCFUADDAPPT_GEN_ALL_CORE_FT
continue with insulin pump  Please come back to the hospital if you developed any new symptoms or concerns

## 2023-03-23 NOTE — DISCHARGE NOTE NURSING/CASE MANAGEMENT/SOCIAL WORK - PATIENT PORTAL LINK FT
You can access the FollowMyHealth Patient Portal offered by [facility] by registering at the following website: [email or web address]. By joining [facility]’s FollowMyHealth portal, you will also be able to view your health information using other applications (apps) compatible with our system.

## 2023-03-23 NOTE — DISCHARGE NOTE PROVIDER - CARE PROVIDER_API CALL
Kleiner, Morton J (MD)  Internal Medicine; Nephrology  [address] [place] [address]  Phone: [phone]  Fax: [phone]  Follow Up Time: 1 week    Saad Lane)  Cardiovascular Disease; Internal Medicine  [address] [place] [address]  Phone: [phone]  Fax: [phone]  Follow Up Time: 1 week    Layo Webb  ENDOCRINOLOGY/METAB/DIABETES  [address] [place] [address]  Phone: [phone]  Fax: [phone]  Follow Up Time: 1 week

## 2023-03-23 NOTE — DISCHARGE NOTE PROVIDER - NSDCFUSCHEDAPPT_GEN_ALL_CORE_FT
Saad Lane  [facility] Physician [facility]  CARDIOLOGY [address] [place] [address]  Scheduled Appointment: 03/29/2023

## 2023-03-24 ENCOUNTER — RX RENEWAL (OUTPATIENT)
Age: 68
End: 2023-03-24

## 2023-03-25 ENCOUNTER — EMERGENCY (EMERGENCY)
Facility: HOSPITAL | Age: 68
LOS: 0 days | Discharge: HOME | End: 2023-03-25
Payer: MEDICARE

## 2023-03-25 VITALS — HEIGHT: 72 IN

## 2023-03-25 DIAGNOSIS — K59.00 CONSTIPATION, UNSPECIFIED: ICD-10-CM

## 2023-03-25 DIAGNOSIS — R14.0 ABDOMINAL DISTENSION (GASEOUS): ICD-10-CM

## 2023-03-25 DIAGNOSIS — Z95.5 PRESENCE OF CORONARY ANGIOPLASTY IMPLANT AND GRAFT: Chronic | ICD-10-CM

## 2023-03-25 DIAGNOSIS — Z90.49 ACQUIRED ABSENCE OF OTHER SPECIFIED PARTS OF DIGESTIVE TRACT: Chronic | ICD-10-CM

## 2023-03-25 DIAGNOSIS — Z79.01 LONG TERM (CURRENT) USE OF ANTICOAGULANTS: ICD-10-CM

## 2023-03-25 DIAGNOSIS — Z96.652 PRESENCE OF LEFT ARTIFICIAL KNEE JOINT: Chronic | ICD-10-CM

## 2023-03-25 DIAGNOSIS — Z90.410 ACQUIRED TOTAL ABSENCE OF PANCREAS: Chronic | ICD-10-CM

## 2023-03-25 DIAGNOSIS — R10.9 UNSPECIFIED ABDOMINAL PAIN: ICD-10-CM

## 2023-03-25 LAB
ALBUMIN SERPL ELPH-MCNC: 3.4 G/DL — LOW (ref 3.5–5.2)
ALP SERPL-CCNC: 78 U/L — SIGNIFICANT CHANGE UP (ref 30–115)
ALT FLD-CCNC: 44 U/L — HIGH (ref 0–41)
ANION GAP SERPL CALC-SCNC: 15 MMOL/L — HIGH (ref 7–14)
AST SERPL-CCNC: 35 U/L — SIGNIFICANT CHANGE UP (ref 0–41)
BASOPHILS # BLD AUTO: 0.07 K/UL — SIGNIFICANT CHANGE UP (ref 0–0.2)
BASOPHILS NFR BLD AUTO: 0.5 % — SIGNIFICANT CHANGE UP (ref 0–1)
BILIRUB SERPL-MCNC: <0.2 MG/DL — SIGNIFICANT CHANGE UP (ref 0.2–1.2)
BUN SERPL-MCNC: 72 MG/DL — CRITICAL HIGH (ref 10–20)
CALCIUM SERPL-MCNC: 8.3 MG/DL — LOW (ref 8.4–10.5)
CHLORIDE SERPL-SCNC: 101 MMOL/L — SIGNIFICANT CHANGE UP (ref 98–110)
CO2 SERPL-SCNC: 21 MMOL/L — SIGNIFICANT CHANGE UP (ref 17–32)
CREAT SERPL-MCNC: 1.8 MG/DL — HIGH (ref 0.7–1.5)
EGFR: 41 ML/MIN/1.73M2 — LOW
EOSINOPHIL # BLD AUTO: 0.36 K/UL — SIGNIFICANT CHANGE UP (ref 0–0.7)
EOSINOPHIL NFR BLD AUTO: 2.5 % — SIGNIFICANT CHANGE UP (ref 0–8)
GLUCOSE SERPL-MCNC: 334 MG/DL — HIGH (ref 70–99)
HCT VFR BLD CALC: 42 % — SIGNIFICANT CHANGE UP (ref 42–52)
HGB BLD-MCNC: 14.4 G/DL — SIGNIFICANT CHANGE UP (ref 14–18)
IMM GRANULOCYTES NFR BLD AUTO: 0.9 % — HIGH (ref 0.1–0.3)
LIDOCAIN IGE QN: 215 U/L — HIGH (ref 7–60)
LYMPHOCYTES # BLD AUTO: 2.96 K/UL — SIGNIFICANT CHANGE UP (ref 1.2–3.4)
LYMPHOCYTES # BLD AUTO: 20.7 % — SIGNIFICANT CHANGE UP (ref 20.5–51.1)
MCHC RBC-ENTMCNC: 31.9 PG — HIGH (ref 27–31)
MCHC RBC-ENTMCNC: 34.3 G/DL — SIGNIFICANT CHANGE UP (ref 32–37)
MCV RBC AUTO: 92.9 FL — SIGNIFICANT CHANGE UP (ref 80–94)
MONOCYTES # BLD AUTO: 1.38 K/UL — HIGH (ref 0.1–0.6)
MONOCYTES NFR BLD AUTO: 9.6 % — HIGH (ref 1.7–9.3)
NEUTROPHILS # BLD AUTO: 9.42 K/UL — HIGH (ref 1.4–6.5)
NEUTROPHILS NFR BLD AUTO: 65.8 % — SIGNIFICANT CHANGE UP (ref 42.2–75.2)
NRBC # BLD: 0 /100 WBCS — SIGNIFICANT CHANGE UP (ref 0–0)
PLATELET # BLD AUTO: 362 K/UL — SIGNIFICANT CHANGE UP (ref 130–400)
POTASSIUM SERPL-MCNC: 4.8 MMOL/L — SIGNIFICANT CHANGE UP (ref 3.5–5)
POTASSIUM SERPL-SCNC: 4.8 MMOL/L — SIGNIFICANT CHANGE UP (ref 3.5–5)
PROT SERPL-MCNC: 6 G/DL — SIGNIFICANT CHANGE UP (ref 6–8)
RBC # BLD: 4.52 M/UL — LOW (ref 4.7–6.1)
RBC # FLD: 15.4 % — HIGH (ref 11.5–14.5)
SODIUM SERPL-SCNC: 137 MMOL/L — SIGNIFICANT CHANGE UP (ref 135–146)
WBC # BLD: 14.32 K/UL — HIGH (ref 4.8–10.8)
WBC # FLD AUTO: 14.32 K/UL — HIGH (ref 4.8–10.8)

## 2023-03-25 PROCEDURE — 80053 COMPREHEN METABOLIC PANEL: CPT

## 2023-03-25 PROCEDURE — 99284 EMERGENCY DEPT VISIT MOD MDM: CPT

## 2023-03-25 PROCEDURE — 83690 ASSAY OF LIPASE: CPT

## 2023-03-25 PROCEDURE — 74177 CT ABD & PELVIS W/CONTRAST: CPT

## 2023-03-25 PROCEDURE — 82962 GLUCOSE BLOOD TEST: CPT

## 2023-03-25 PROCEDURE — 74177 CT ABD & PELVIS W/CONTRAST: CPT | Mod: 26

## 2023-03-25 PROCEDURE — 74018 RADEX ABDOMEN 1 VIEW: CPT

## 2023-03-25 PROCEDURE — 36415 COLL VENOUS BLD VENIPUNCTURE: CPT

## 2023-03-25 PROCEDURE — 76000 FLUOROSCOPY <1 HR PHYS/QHP: CPT | Mod: 26

## 2023-03-25 PROCEDURE — 85025 COMPLETE CBC W/AUTO DIFF WBC: CPT

## 2023-03-25 PROCEDURE — 99284 EMERGENCY DEPT VISIT MOD MDM: CPT | Mod: 25

## 2023-03-28 DIAGNOSIS — I25.10 ATHEROSCLEROTIC HEART DISEASE OF NATIVE CORONARY ARTERY WITHOUT ANGINA PECTORIS: ICD-10-CM

## 2023-03-28 DIAGNOSIS — I48.0 PAROXYSMAL ATRIAL FIBRILLATION: ICD-10-CM

## 2023-03-28 DIAGNOSIS — Z95.1 PRESENCE OF AORTOCORONARY BYPASS GRAFT: ICD-10-CM

## 2023-03-28 DIAGNOSIS — N17.9 ACUTE KIDNEY FAILURE, UNSPECIFIED: ICD-10-CM

## 2023-03-28 DIAGNOSIS — G47.33 OBSTRUCTIVE SLEEP APNEA (ADULT) (PEDIATRIC): ICD-10-CM

## 2023-03-28 DIAGNOSIS — Z20.822 CONTACT WITH AND (SUSPECTED) EXPOSURE TO COVID-19: ICD-10-CM

## 2023-03-28 DIAGNOSIS — E11.10 TYPE 2 DIABETES MELLITUS WITH KETOACIDOSIS WITHOUT COMA: ICD-10-CM

## 2023-03-28 DIAGNOSIS — E11.22 TYPE 2 DIABETES MELLITUS WITH DIABETIC CHRONIC KIDNEY DISEASE: ICD-10-CM

## 2023-03-28 DIAGNOSIS — E11.9 TYPE 2 DIABETES MELLITUS WITHOUT COMPLICATIONS: ICD-10-CM

## 2023-03-28 DIAGNOSIS — I12.9 HYPERTENSIVE CHRONIC KIDNEY DISEASE WITH STAGE 1 THROUGH STAGE 4 CHRONIC KIDNEY DISEASE, OR UNSPECIFIED CHRONIC KIDNEY DISEASE: ICD-10-CM

## 2023-03-28 DIAGNOSIS — E66.9 OBESITY, UNSPECIFIED: ICD-10-CM

## 2023-03-28 DIAGNOSIS — Z95.5 PRESENCE OF CORONARY ANGIOPLASTY IMPLANT AND GRAFT: ICD-10-CM

## 2023-03-28 DIAGNOSIS — E29.1 TESTICULAR HYPOFUNCTION: ICD-10-CM

## 2023-03-28 DIAGNOSIS — Z79.01 LONG TERM (CURRENT) USE OF ANTICOAGULANTS: ICD-10-CM

## 2023-03-28 DIAGNOSIS — N18.9 CHRONIC KIDNEY DISEASE, UNSPECIFIED: ICD-10-CM

## 2023-03-28 DIAGNOSIS — I95.9 HYPOTENSION, UNSPECIFIED: ICD-10-CM

## 2023-03-28 LAB
CULTURE RESULTS: SIGNIFICANT CHANGE UP
SPECIMEN SOURCE: SIGNIFICANT CHANGE UP

## 2023-03-29 ENCOUNTER — APPOINTMENT (OUTPATIENT)
Dept: CARDIOLOGY | Facility: CLINIC | Age: 68
End: 2023-03-29
Payer: MEDICARE

## 2023-03-29 VITALS — DIASTOLIC BLOOD PRESSURE: 80 MMHG | SYSTOLIC BLOOD PRESSURE: 126 MMHG | HEART RATE: 95 BPM | RESPIRATION RATE: 18 BRPM

## 2023-03-29 VITALS — HEIGHT: 72 IN | WEIGHT: 315 LBS | HEART RATE: 95 BPM | BODY MASS INDEX: 42.66 KG/M2

## 2023-03-29 DIAGNOSIS — Z86.69 PERSONAL HISTORY OF OTHER DISEASES OF THE NERVOUS SYSTEM AND SENSE ORGANS: ICD-10-CM

## 2023-03-29 PROCEDURE — 93000 ELECTROCARDIOGRAM COMPLETE: CPT

## 2023-03-29 PROCEDURE — 99214 OFFICE O/P EST MOD 30 MIN: CPT | Mod: 25

## 2023-03-29 RX ORDER — FUROSEMIDE 80 MG/1
80 TABLET ORAL DAILY
Refills: 0 | Status: ACTIVE | COMMUNITY

## 2023-03-29 RX ORDER — LISINOPRIL 10 MG/1
10 TABLET ORAL DAILY
Qty: 3 | Refills: 3 | Status: ACTIVE | COMMUNITY

## 2023-03-30 NOTE — ED PROVIDER NOTE - ATTENDING APP SHARED VISIT CONTRIBUTION OF CARE
68-year-old male presented today with abdominal pain and constipation.  Patient had labs and imaging performed and was given bowel regimen to assist with likely constipation and increased fecal load.  Patient was signed out pending CAT scan results to Dr. Tam.

## 2023-05-15 ENCOUNTER — RX RENEWAL (OUTPATIENT)
Age: 68
End: 2023-05-15

## 2023-09-10 ENCOUNTER — RX RENEWAL (OUTPATIENT)
Age: 68
End: 2023-09-10

## 2023-09-27 ENCOUNTER — APPOINTMENT (OUTPATIENT)
Dept: CARDIOLOGY | Facility: CLINIC | Age: 68
End: 2023-09-27
Payer: MEDICARE

## 2023-09-27 VITALS — BODY MASS INDEX: 41.58 KG/M2 | HEIGHT: 72 IN | HEART RATE: 84 BPM | WEIGHT: 307 LBS

## 2023-09-27 VITALS — SYSTOLIC BLOOD PRESSURE: 110 MMHG | DIASTOLIC BLOOD PRESSURE: 70 MMHG | RESPIRATION RATE: 18 BRPM

## 2023-09-27 PROCEDURE — 93000 ELECTROCARDIOGRAM COMPLETE: CPT

## 2023-09-27 PROCEDURE — 99214 OFFICE O/P EST MOD 30 MIN: CPT | Mod: 25

## 2023-09-27 RX ORDER — TIRZEPATIDE 2.5 MG/.5ML
2.5 INJECTION, SOLUTION SUBCUTANEOUS
Refills: 0 | Status: ACTIVE | COMMUNITY

## 2023-09-27 RX ORDER — EVOLOCUMAB 140 MG/ML
140 INJECTION, SOLUTION SUBCUTANEOUS
Refills: 0 | Status: ACTIVE | COMMUNITY

## 2024-01-16 ENCOUNTER — RX RENEWAL (OUTPATIENT)
Age: 69
End: 2024-01-16

## 2024-01-16 RX ORDER — PRAVASTATIN SODIUM 40 MG/1
40 TABLET ORAL
Qty: 30 | Refills: 8 | Status: ACTIVE | COMMUNITY
Start: 2019-09-19 | End: 1900-01-01

## 2024-01-25 ENCOUNTER — RX RENEWAL (OUTPATIENT)
Age: 69
End: 2024-01-25

## 2024-01-25 ENCOUNTER — APPOINTMENT (OUTPATIENT)
Dept: OTOLARYNGOLOGY | Facility: CLINIC | Age: 69
End: 2024-01-25
Payer: MEDICARE

## 2024-01-25 DIAGNOSIS — R09.81 NASAL CONGESTION: ICD-10-CM

## 2024-01-25 DIAGNOSIS — J34.3 HYPERTROPHY OF NASAL TURBINATES: ICD-10-CM

## 2024-01-25 PROCEDURE — 69210 REMOVE IMPACTED EAR WAX UNI: CPT

## 2024-01-25 PROCEDURE — 99214 OFFICE O/P EST MOD 30 MIN: CPT | Mod: 25

## 2024-01-25 RX ORDER — AZELASTINE HYDROCHLORIDE 137 UG/1
137 SPRAY, METERED NASAL DAILY
Qty: 1 | Refills: 3 | Status: ACTIVE | COMMUNITY
Start: 2024-01-25 | End: 1900-01-01

## 2024-01-26 ENCOUNTER — RX RENEWAL (OUTPATIENT)
Age: 69
End: 2024-01-26

## 2024-01-26 RX ORDER — LEVOCETIRIZINE DIHYDROCHLORIDE 5 MG/1
5 TABLET ORAL
Qty: 30 | Refills: 3 | Status: ACTIVE | COMMUNITY
Start: 2022-05-19 | End: 1900-01-01

## 2024-03-18 NOTE — PATIENT PROFILE ADULT - NSPROPTRIGHTBILLOFRIGHTS_GEN_A_NUR
Post Operative Wound Care Instructions    At any time, please feel free to call the General Surgery office with any questions or concerns at [phone].     Anticoagulation: You may restart your Aspirin 24 hours after your surgery.    Incision & Dressing: After your Port was placed, the incision area closed with subcutaneous sutures and covered with a waterproof skin glue.This glue will come off on its own in 7-10 days. It is okay to shower the day after surgery. Please do not soak the incision for at least 2 weeks.     Activity: You may continue advancing activity as tolerated. Please use pain as a guide for advancing activity. NO driving while you are on pain medication.    Diet: You have no diet restrictions and may eat a regular diet after surgery.     Pain: We recommend you use scheduled acetaminophen (Tylenol) for pain control for the first 48-72 hours after surgery or until no longer needed. You may take 650 mg acetaminophen (Tylenol) every 6 hours as needed for pain. Do not exceed 4000 mg of Tylenol in 24 hours. Additionally, a script for pain medication has been sent to your pharmacy. This medication is called Tramadol (Ultram). If your pain is not well managed with acetaminophen (Tylenol), you may take this pain medication.  Please only use it if needed.. You may take 25 mg tramadol (Ultram) every 6 hours as needed for pain. You may alternate Tramadol (Ultram) with acetaminophen (Tylenol). Alternating Tramadol (Ultram) with acetaminophen (Tylenol) means you should be taking a dose every 3 hours. For example, if you begin taking 25 mg tramadol (Ultram) at 12:00 PM you would take 650 mg acetaminophen (Tylenol) at 3:00 PM, 25 mg tramadol (Ultram) at 6:00 PM, 650 mg acetaminophen (Tylenol) at 9:00 PM.     Additionally, you may use ice packs as needed for incisional pain. We recommend applying an ice pack to your incisions for 20 minutes at a time every 2-3 hours.      Constipation: If you struggle with  constipation or have not had a bowel movement after two days, we recommend Miralax over the counter.      You were prescribed an opioid pain medication, Tramadol (Ultram).    What you need to know about opioid pain medications:    What are opioids?  Opioids are strong prescription pain medicines that are used to manage severe pain.     What are the serious risks of using opioids?   - Opioids have serious risks of addiction and overdose  - Too much opioid medicine in your body can cause your breathing to stop - which could lead to death. This risk is greater for people taking other medicines that make you feel sleepy or people with sleep apnea.     How to take opioid pain medicine safely?   - Tell your healthcare provider about all the medicines you are taking, including vitamins, herbal supplements, and other over-the-counter medicines  - Read the Medications guide that comes with your prescription  - Take your opioid medicine exactly as prescribed  - Do not cut, break, chew, crush, or dissolve your medicine  - Call your healthcare provider if the opioid medicine is not controlling your pain. Do not increase the dose on your own.   - Do not operate heavy machinery or drive until you know how your opioid medicine affects you. Your medicine can make you sleepy, dizzy, or lightheaded.   - Do not share or give your opioid medicine to anyone else. This opioid and the dose was selected just for you. A dose that is okay for you could cause an overdose for someone else.     When taking opioid pain medication you should avoid the following:   - Alcohol  - Benzodiazepines (like Valium or Xanax)  - Muscle relaxants (like Soma or Flexeril)  - Sleep medicines (like Ambien or Lunesta)   - Other prescription opioid medicines    How to dispose of your unused opioid medicine:  - The best way to dispose of most types of unused or  medicines (both prescription and over the counter) is to drop off the medicine at a drug take  back site, location, or program immediately.  - If you cannot get to a drug take back location promptly, or there is none near you, and your medicine is  on the FDA flush list (Tramadol (Ultram) is), your next best option is to immediately flush these down the toilet  - FDA flush list can be found here: https://www.fda.gov/drugdisposal   patient

## 2024-03-22 ENCOUNTER — NON-APPOINTMENT (OUTPATIENT)
Age: 69
End: 2024-03-22

## 2024-03-27 ENCOUNTER — INPATIENT (INPATIENT)
Facility: HOSPITAL | Age: 69
LOS: 4 days | Discharge: HOME | DRG: 872 | End: 2024-04-01
Attending: STUDENT IN AN ORGANIZED HEALTH CARE EDUCATION/TRAINING PROGRAM | Admitting: INTERNAL MEDICINE
Payer: MEDICARE

## 2024-03-27 ENCOUNTER — APPOINTMENT (OUTPATIENT)
Dept: CARDIOLOGY | Facility: CLINIC | Age: 69
End: 2024-03-27
Payer: MEDICARE

## 2024-03-27 VITALS — HEART RATE: 79 BPM | SYSTOLIC BLOOD PRESSURE: 110 MMHG | DIASTOLIC BLOOD PRESSURE: 70 MMHG | RESPIRATION RATE: 18 BRPM

## 2024-03-27 VITALS — HEIGHT: 72 IN | BODY MASS INDEX: 38.87 KG/M2 | WEIGHT: 287 LBS

## 2024-03-27 VITALS
SYSTOLIC BLOOD PRESSURE: 138 MMHG | HEART RATE: 112 BPM | DIASTOLIC BLOOD PRESSURE: 85 MMHG | OXYGEN SATURATION: 98 % | WEIGHT: 285.06 LBS | TEMPERATURE: 98 F | RESPIRATION RATE: 20 BRPM | HEIGHT: 72 IN

## 2024-03-27 DIAGNOSIS — Z90.410 ACQUIRED TOTAL ABSENCE OF PANCREAS: Chronic | ICD-10-CM

## 2024-03-27 DIAGNOSIS — E78.5 HYPERLIPIDEMIA, UNSPECIFIED: ICD-10-CM

## 2024-03-27 DIAGNOSIS — E11.9 TYPE 2 DIABETES MELLITUS W/OUT COMPLICATIONS: ICD-10-CM

## 2024-03-27 DIAGNOSIS — I25.10 ATHEROSCLEROTIC HEART DISEASE OF NATIVE CORONARY ARTERY W/OUT ANGINA PECTORIS: ICD-10-CM

## 2024-03-27 DIAGNOSIS — Z90.49 ACQUIRED ABSENCE OF OTHER SPECIFIED PARTS OF DIGESTIVE TRACT: Chronic | ICD-10-CM

## 2024-03-27 DIAGNOSIS — I10 ESSENTIAL (PRIMARY) HYPERTENSION: ICD-10-CM

## 2024-03-27 DIAGNOSIS — Z95.5 PRESENCE OF CORONARY ANGIOPLASTY IMPLANT AND GRAFT: Chronic | ICD-10-CM

## 2024-03-27 DIAGNOSIS — I48.0 PAROXYSMAL ATRIAL FIBRILLATION: ICD-10-CM

## 2024-03-27 DIAGNOSIS — Z96.652 PRESENCE OF LEFT ARTIFICIAL KNEE JOINT: Chronic | ICD-10-CM

## 2024-03-27 LAB
APPEARANCE UR: CLEAR — SIGNIFICANT CHANGE UP
APTT BLD: 35.8 SEC — SIGNIFICANT CHANGE UP (ref 27–39.2)
BACTERIA # UR AUTO: ABNORMAL /HPF
BASE EXCESS BLDV CALC-SCNC: 2.9 MMOL/L — SIGNIFICANT CHANGE UP (ref -2–3)
BASOPHILS # BLD AUTO: 0.15 K/UL — SIGNIFICANT CHANGE UP (ref 0–0.2)
BASOPHILS NFR BLD AUTO: 0.7 % — SIGNIFICANT CHANGE UP (ref 0–1)
BILIRUB UR-MCNC: NEGATIVE — SIGNIFICANT CHANGE UP
CA-I SERPL-SCNC: 1.12 MMOL/L — LOW (ref 1.15–1.33)
COLOR SPEC: YELLOW — SIGNIFICANT CHANGE UP
DIFF PNL FLD: NEGATIVE — SIGNIFICANT CHANGE UP
EOSINOPHIL # BLD AUTO: 0.32 K/UL — SIGNIFICANT CHANGE UP (ref 0–0.7)
EOSINOPHIL NFR BLD AUTO: 1.5 % — SIGNIFICANT CHANGE UP (ref 0–8)
EPI CELLS # UR: PRESENT
FLUAV AG NPH QL: SIGNIFICANT CHANGE UP
FLUBV AG NPH QL: SIGNIFICANT CHANGE UP
GAS PNL BLDV: 132 MMOL/L — LOW (ref 136–145)
GAS PNL BLDV: SIGNIFICANT CHANGE UP
GLUCOSE BLDC GLUCOMTR-MCNC: 171 MG/DL — HIGH (ref 70–99)
GLUCOSE UR QL: NEGATIVE MG/DL — SIGNIFICANT CHANGE UP
HCO3 BLDV-SCNC: 28 MMOL/L — SIGNIFICANT CHANGE UP (ref 22–29)
HCT VFR BLD CALC: 42.1 % — SIGNIFICANT CHANGE UP (ref 42–52)
HCT VFR BLDA CALC: 47 % — SIGNIFICANT CHANGE UP (ref 39–51)
HGB BLD CALC-MCNC: 15.5 G/DL — SIGNIFICANT CHANGE UP (ref 12.6–17.4)
HGB BLD-MCNC: 14.9 G/DL — SIGNIFICANT CHANGE UP (ref 14–18)
IMM GRANULOCYTES NFR BLD AUTO: 0.4 % — HIGH (ref 0.1–0.3)
INR BLD: 1.03 RATIO — SIGNIFICANT CHANGE UP (ref 0.65–1.3)
KETONES UR-MCNC: NEGATIVE MG/DL — SIGNIFICANT CHANGE UP
LACTATE BLDV-MCNC: 2.9 MMOL/L — HIGH (ref 0.5–2)
LEUKOCYTE ESTERASE UR-ACNC: NEGATIVE — SIGNIFICANT CHANGE UP
LYMPHOCYTES # BLD AUTO: 12 % — LOW (ref 20.5–51.1)
LYMPHOCYTES # BLD AUTO: 2.57 K/UL — SIGNIFICANT CHANGE UP (ref 1.2–3.4)
MCHC RBC-ENTMCNC: 31.4 PG — HIGH (ref 27–31)
MCHC RBC-ENTMCNC: 35.4 G/DL — SIGNIFICANT CHANGE UP (ref 32–37)
MCV RBC AUTO: 88.6 FL — SIGNIFICANT CHANGE UP (ref 80–94)
MONOCYTES # BLD AUTO: 1.69 K/UL — HIGH (ref 0.1–0.6)
MONOCYTES NFR BLD AUTO: 7.9 % — SIGNIFICANT CHANGE UP (ref 1.7–9.3)
NEUTROPHILS # BLD AUTO: 16.66 K/UL — HIGH (ref 1.4–6.5)
NEUTROPHILS NFR BLD AUTO: 77.5 % — HIGH (ref 42.2–75.2)
NITRITE UR-MCNC: NEGATIVE — SIGNIFICANT CHANGE UP
NRBC # BLD: 0 /100 WBCS — SIGNIFICANT CHANGE UP (ref 0–0)
PCO2 BLDV: 46 MMHG — SIGNIFICANT CHANGE UP (ref 42–55)
PH BLDV: 7.4 — SIGNIFICANT CHANGE UP (ref 7.32–7.43)
PH UR: 6 — SIGNIFICANT CHANGE UP (ref 5–8)
PLATELET # BLD AUTO: 444 K/UL — HIGH (ref 130–400)
PMV BLD: 10.1 FL — SIGNIFICANT CHANGE UP (ref 7.4–10.4)
PO2 BLDV: 31 MMHG — SIGNIFICANT CHANGE UP (ref 25–45)
POTASSIUM BLDV-SCNC: 4.9 MMOL/L — SIGNIFICANT CHANGE UP (ref 3.5–5.1)
PROT UR-MCNC: 100 MG/DL
PROTHROM AB SERPL-ACNC: 11.7 SEC — SIGNIFICANT CHANGE UP (ref 9.95–12.87)
RBC # BLD: 4.75 M/UL — SIGNIFICANT CHANGE UP (ref 4.7–6.1)
RBC # FLD: 13.7 % — SIGNIFICANT CHANGE UP (ref 11.5–14.5)
RBC CASTS # UR COMP ASSIST: 1 /HPF — SIGNIFICANT CHANGE UP (ref 0–4)
RSV RNA NPH QL NAA+NON-PROBE: SIGNIFICANT CHANGE UP
SAO2 % BLDV: 63.4 % — LOW (ref 67–88)
SARS-COV-2 RNA SPEC QL NAA+PROBE: SIGNIFICANT CHANGE UP
SP GR SPEC: 1.02 — SIGNIFICANT CHANGE UP (ref 1–1.03)
UROBILINOGEN FLD QL: 1 MG/DL — SIGNIFICANT CHANGE UP (ref 0.2–1)
WBC # BLD: 21.48 K/UL — HIGH (ref 4.8–10.8)
WBC # FLD AUTO: 21.48 K/UL — HIGH (ref 4.8–10.8)
WBC UR QL: 2 /HPF — SIGNIFICANT CHANGE UP (ref 0–5)

## 2024-03-27 PROCEDURE — 93010 ELECTROCARDIOGRAM REPORT: CPT

## 2024-03-27 PROCEDURE — G2211 COMPLEX E/M VISIT ADD ON: CPT

## 2024-03-27 PROCEDURE — 99214 OFFICE O/P EST MOD 30 MIN: CPT

## 2024-03-27 PROCEDURE — 99285 EMERGENCY DEPT VISIT HI MDM: CPT

## 2024-03-27 PROCEDURE — 73701 CT LOWER EXTREMITY W/DYE: CPT | Mod: 26,RT,MC

## 2024-03-27 PROCEDURE — 93000 ELECTROCARDIOGRAM COMPLETE: CPT

## 2024-03-27 PROCEDURE — 71045 X-RAY EXAM CHEST 1 VIEW: CPT | Mod: 26

## 2024-03-27 PROCEDURE — 93970 EXTREMITY STUDY: CPT | Mod: 26

## 2024-03-27 RX ORDER — ACETAMINOPHEN 500 MG
975 TABLET ORAL ONCE
Refills: 0 | Status: COMPLETED | OUTPATIENT
Start: 2024-03-27 | End: 2024-03-27

## 2024-03-27 RX ORDER — CEFEPIME 1 G/1
2000 INJECTION, POWDER, FOR SOLUTION INTRAMUSCULAR; INTRAVENOUS ONCE
Refills: 0 | Status: COMPLETED | OUTPATIENT
Start: 2024-03-27 | End: 2024-03-27

## 2024-03-27 RX ORDER — SODIUM CHLORIDE 9 MG/ML
1000 INJECTION, SOLUTION INTRAVENOUS ONCE
Refills: 0 | Status: COMPLETED | OUTPATIENT
Start: 2024-03-27 | End: 2024-03-27

## 2024-03-27 RX ORDER — VANCOMYCIN HCL 1 G
1000 VIAL (EA) INTRAVENOUS ONCE
Refills: 0 | Status: COMPLETED | OUTPATIENT
Start: 2024-03-27 | End: 2024-03-27

## 2024-03-27 RX ORDER — SODIUM CHLORIDE 9 MG/ML
1000 INJECTION INTRAMUSCULAR; INTRAVENOUS; SUBCUTANEOUS ONCE
Refills: 0 | Status: COMPLETED | OUTPATIENT
Start: 2024-03-27 | End: 2024-03-27

## 2024-03-27 RX ORDER — IBUPROFEN 200 MG
600 TABLET ORAL ONCE
Refills: 0 | Status: COMPLETED | OUTPATIENT
Start: 2024-03-27 | End: 2024-03-27

## 2024-03-27 RX ADMIN — CEFEPIME 100 MILLIGRAM(S): 1 INJECTION, POWDER, FOR SOLUTION INTRAMUSCULAR; INTRAVENOUS at 20:56

## 2024-03-27 RX ADMIN — Medication 250 MILLIGRAM(S): at 20:56

## 2024-03-27 RX ADMIN — Medication 975 MILLIGRAM(S): at 20:57

## 2024-03-27 RX ADMIN — SODIUM CHLORIDE 1000 MILLILITER(S): 9 INJECTION INTRAMUSCULAR; INTRAVENOUS; SUBCUTANEOUS at 20:55

## 2024-03-27 RX ADMIN — Medication 600 MILLIGRAM(S): at 20:57

## 2024-03-27 RX ADMIN — SODIUM CHLORIDE 1000 MILLILITER(S): 9 INJECTION, SOLUTION INTRAVENOUS at 20:55

## 2024-03-27 NOTE — ED PROVIDER NOTE - PHYSICAL EXAMINATION
VITAL SIGNS: I have reviewed nursing notes and confirm.  CONSTITUTIONAL: Well-developed; well-nourished; actively having rigors  SKIN: Skin exam is warm and dry, no acute rash, RLE is slightly warm but not red, LLE without skin changes  HEAD: Normocephalic; atraumatic.  EYES: PERRL, EOM intact; conjunctiva and sclera clear.  ENT: No nasal discharge; airway clear.   CARD: tachy, regular rhythm  RESP: No wheezes, rales or rhonchi.  ABD: Normal bowel sounds; soft; non-distended; non-tender; no CVA ttp  EXT: Normal ROM. pain with extension at hip  NEURO: Alert, oriented. Grossly unremarkable. No focal deficits.  PSYCH: Cooperative, appropriate.

## 2024-03-27 NOTE — CONSULT NOTE ADULT - SUBJECTIVE AND OBJECTIVE BOX
68 yo M, hx of HTN, HLD, DM II, CAD sp CABG, chronic low back pain, frequent LE cellulitis presents to the ED for assessment of R LE pain associated with chills. Pt was in [facility] this afternoon, went to see his cardiologist and then about 1 hours ago had sudden pain to R thigh down to R calf along with chills. Notes myalgias, mild cough and congestion, pain to RLE but no redness or broken skin as he normally has with cellulitis. Denies trauma/fall.    PAST MEDICAL & SURGICAL HISTORY:  Diabetes mellitus, type 2  BPH (benign prostatic hyperplasia)  Water retention  Essential hypertension  Diabetes  CAD (coronary artery disease)  H/O hypercholesterolemia  H/O right coronary artery stent placement  History of resection of pancreas  History of cholecystectomy  History of left knee replacement      Vital Signs Last 24 Hrs  T(C): 37.1 (27 Mar 2024 21:26), Max: 37.1 (27 Mar 2024 21:26)  T(F): 98.7 (27 Mar 2024 21:26), Max: 98.7 (27 Mar 2024 21:26)  HR: 120 (27 Mar 2024 21:26) (112 - 120)  BP: 146/63 (27 Mar 2024 21:26) (138/85 - 146/63)  BP(mean): --  RR: 20 (27 Mar 2024 21:26) (20 - 20)  SpO2: 98% (27 Mar 2024 21:26) (98% - 98%)    Parameters below as of 27 Mar 2024 21:26  Patient On (Oxygen Delivery Method): room air    PHYSICAL EXAM:      Constitutional: NAD, A&O x3    Eyes: PERRLA, no conjuctivitis    Neck: no lymphadenopathy    Respiratory: +air entry, no rales, no rhonchi, no wheezes    Cardiovascular: +S1 and S2, regular rate and rhythm    Gastrointestinal: +BS, soft, non-tender, not distended    Extremities:  no edema, (+) R thigh and calf tenderness, no redness/bruises/ecchymosis    Vascular: +dorsal pedis and radial pulses, no extremity cyanosis    Neurological: sensation intact, ROM equal B/L, CN II-XII intact    Skin: no rashes, normal turgor                            14.9   21.48 )-----------( 444      ( 27 Mar 2024 19:42 )             42.1                   PT/INR - ( 27 Mar 2024 19:42 )   PT: 11.70 sec;   INR: 1.03 ratio         PTT - ( 27 Mar 2024 19:42 )  PTT:35.8 sec      CAPILLARY BLOOD GLUCOSE      POCT Blood Glucose.: 171 mg/dL (27 Mar 2024 21:49)            < from: CT Lower Extremity w/ IV Cont, Right (03.27.24 @ 20:45) >  IMPRESSION:  Well-circumscribed 9.1 cm soft tissue mass in the anterior right groin   without associated inflammatory changes possibly representing enlarged   lymph node, new since 3/25/2023.    Mild subcutaneous soft tissue swelling in the anterior knee.    No abscess.    Communication: The summary of above findings were discussed with readback   confirmation with MARYJANE Moy by radiology resident Dane Canseco MD   3/27/2024 9:47 PM      < end of copied text >               68 yo M, hx of A-fib on xarelto last taken 2 weeks ago, HTN, HLD, DM II, CAD sp CABG, chronic low back pain, frequent LE cellulitis presents to the ED for assessment of R LE pain associated with chills. Pt was in [facility] this afternoon, went to see his cardiologist and then about 1 hours ago had sudden pain to R thigh down to R calf along with chills. Notes myalgias, mild cough and congestion, pain to RLE but no redness or broken skin as he normally has with cellulitis. Denies trauma/fall. General surgery consulted for Well-circumscribed 9.1 cm soft tissue mass in the anterior right groin  without associated inflammatory changes possibly representing a large infected hematoma. Seen on CT scan       PAST MEDICAL & SURGICAL HISTORY:  Diabetes mellitus, type 2  BPH (benign prostatic hyperplasia)  Water retentionCT scan finding of   Essential hypertension  Diabetes  CAD (coronary artery disease)  H/O hypercholesterolemia  H/O right coronary artery stent placement  History of resection of pancreas  History of cholecystectomy  History of left knee replacement      Vital Signs Last 24 Hrs  T(C): 37.1 (27 Mar 2024 21:26), Max: 37.1 (27 Mar 2024 21:26)  T(F): 98.7 (27 Mar 2024 21:26), Max: 98.7 (27 Mar 2024 21:26)  HR: 120 (27 Mar 2024 21:26) (112 - 120)  BP: 146/63 (27 Mar 2024 21:26) (138/85 - 146/63)  BP(mean): --  RR: 20 (27 Mar 2024 21:26) (20 - 20)  SpO2: 98% (27 Mar 2024 21:26) (98% - 98%)    Parameters below as of 27 Mar 2024 21:26  Patient On (Oxygen Delivery Method): room air    PHYSICAL EXAM:      Constitutional: NAD, A&O x3    Eyes: PERRLA, no conjuctivitis    Neck: no lymphadenopathy    Respiratory: +air entry, no rales, no rhonchi, no wheezes    Cardiovascular: +S1 and S2, regular rate and rhythm    Gastrointestinal: +BS, soft, non-tender, not distended    R Groin: Palpable mass non tender, no redness/ecchymosis     Extremities:  no edema, (+) R thigh and calf tenderness, no redness/bruises/ecchymosis    Vascular: +dorsal pedis and radial pulses, no extremity cyanosis    Neurological: sensation intact, ROM equal B/L, CN II-XII intact    Skin: no rashes, normal turgor                            14.9   21.48 )-----------( 444      ( 27 Mar 2024 19:42 )             42.1                   PT/INR - ( 27 Mar 2024 19:42 )   PT: 11.70 sec;   INR: 1.03 ratio         PTT - ( 27 Mar 2024 19:42 )  PTT:35.8 sec      CAPILLARY BLOOD GLUCOSE      POCT Blood Glucose.: 171 mg/dL (27 Mar 2024 21:49)            < from: CT Lower Extremity w/ IV Cont, Right (03.27.24 @ 20:45) >  IMPRESSION:  Well-circumscribed 9.1 cm soft tissue mass in the anterior right groin   without associated inflammatory changes possibly representing enlarged   lymph node, new since 3/25/2023.    Mild subcutaneous soft tissue swelling in the anterior knee.    No abscess.    Communication: The summary of above findings were discussed with readback   confirmation with MARYJANE Moy by radiology resident Dane Canseco MD   3/27/2024 9:47 PM      < end of copied text >               70 yo M, hx of A-fib on xarelto last taken 2 weeks ago due to affordability, HTN, HLD, DM II, CAD sp CABG on ASA last taken 3/27, chronic low back pain, frequent LE cellulitis presents to the ED for assessment of R LE pain associated with chills. Pt was in [facility] this afternoon, went to see his cardiologist and then about 1 hours ago had sudden pain to R thigh down to R calf along with chills. Notes myalgias, mild cough and congestion, pain to RLE but no redness or broken skin as he normally has with cellulitis. Denies trauma/fall. General surgery consulted for Well-circumscribed 9.1 cm soft tissue mass in the anterior right groin  without associated inflammatory changes possibly representing a large infected hematoma. Seen on CT scan       PAST MEDICAL & SURGICAL HISTORY:  Diabetes mellitus, type 2  BPH (benign prostatic hyperplasia)  Water retentionCT scan finding of   Essential hypertension  Diabetes  CAD (coronary artery disease)  H/O hypercholesterolemia  H/O right coronary artery stent placement  History of resection of pancreas  History of cholecystectomy  History of left knee replacement      Vital Signs Last 24 Hrs  T(C): 37.1 (27 Mar 2024 21:26), Max: 37.1 (27 Mar 2024 21:26)  T(F): 98.7 (27 Mar 2024 21:26), Max: 98.7 (27 Mar 2024 21:26)  HR: 120 (27 Mar 2024 21:26) (112 - 120)  BP: 146/63 (27 Mar 2024 21:26) (138/85 - 146/63)  BP(mean): --  RR: 20 (27 Mar 2024 21:26) (20 - 20)  SpO2: 98% (27 Mar 2024 21:26) (98% - 98%)    Parameters below as of 27 Mar 2024 21:26  Patient On (Oxygen Delivery Method): room air    PHYSICAL EXAM:      Constitutional: NAD, A&O x3    Eyes: PERRLA, no conjuctivitis    Neck: no lymphadenopathy    Respiratory: +air entry, no rales, no rhonchi, no wheezes    Cardiovascular: +S1 and S2, regular rate and rhythm    Gastrointestinal: +BS, soft, non-tender, not distended    R Groin: Palpable mass non tender, no redness/ecchymosis     Extremities:  no edema, (+) R thigh and calf tenderness, no redness/bruises/ecchymosis    Vascular: +dorsal pedis and radial pulses, no extremity cyanosis    Neurological: sensation intact, ROM equal B/L, CN II-XII intact    Skin: no rashes, normal turgor                            14.9   21.48 )-----------( 444      ( 27 Mar 2024 19:42 )             42.1                   PT/INR - ( 27 Mar 2024 19:42 )   PT: 11.70 sec;   INR: 1.03 ratio         PTT - ( 27 Mar 2024 19:42 )  PTT:35.8 sec      CAPILLARY BLOOD GLUCOSE      POCT Blood Glucose.: 171 mg/dL (27 Mar 2024 21:49)            < from: CT Lower Extremity w/ IV Cont, Right (03.27.24 @ 20:45) >  IMPRESSION:  Well-circumscribed 9.1 cm soft tissue mass in the anterior right groin   without associated inflammatory changes possibly representing enlarged   lymph node, new since 3/25/2023.    Mild subcutaneous soft tissue swelling in the anterior knee.    No abscess.    Communication: The summary of above findings were discussed with readback   confirmation with MARYJANE Moy by radiology resident Dane Canseco MD   3/27/2024 9:47 PM      < end of copied text >               70 yo M, hx of A-fib on xarelto last taken 2 weeks ago due to affordability, HTN, HLD, DM II, CAD sp CABG on ASA last taken 3/27, chronic low back pain, frequent LE cellulitis presents to the ED for assessment of R LE pain associated with chills. Pt was in [facility] this afternoon, went to see his cardiologist and then about 1 hours ago had sudden pain to R thigh down to R calf along with chills. Notes myalgias, mild cough and congestion, pain to RLE but no redness or broken skin as he normally has with cellulitis. Denies trauma/fall. General surgery consulted for Well-circumscribed 9.1 cm soft tissue mass in the anterior right groin  without associated inflammatory changes possibly representing a large infected hematoma. Seen on CT scan       PAST MEDICAL & SURGICAL HISTORY:  Diabetes mellitus, type 2  BPH (benign prostatic hyperplasia)  Water retentionCT scan finding of   Essential hypertension  Diabetes  CAD (coronary artery disease)  H/O hypercholesterolemia  H/O right coronary artery stent placement  History of resection of pancreas  History of cholecystectomy  History of left knee replacement      Vital Signs Last 24 Hrs  T(C): 37.1 (27 Mar 2024 21:26), Max: 37.1 (27 Mar 2024 21:26)  T(F): 98.7 (27 Mar 2024 21:26), Max: 98.7 (27 Mar 2024 21:26)  HR: 120 (27 Mar 2024 21:26) (112 - 120)  BP: 146/63 (27 Mar 2024 21:26) (138/85 - 146/63)  BP(mean): --  RR: 20 (27 Mar 2024 21:26) (20 - 20)  SpO2: 98% (27 Mar 2024 21:26) (98% - 98%)    Parameters below as of 27 Mar 2024 21:26  Patient On (Oxygen Delivery Method): room air    PHYSICAL EXAM:      Constitutional: NAD, A&O x3    Eyes: PERRLA, no conjuctivitis    Neck: no lymphadenopathy    Respiratory: +air entry, no rales, no rhonchi, no wheezes    Cardiovascular: +S1 and S2, regular rate and rhythm    Gastrointestinal: +BS, soft, non-tender, not distended    R Groin: Palpable mass non tender, no redness/ecchymosis     Extremities:  no edema, (+) R thigh and calf tenderness, no redness/bruises/ecchymosis    Vascular: +dorsal pedis and radial pulses, no extremity cyanosis    Neurological: sensation intact, ROM equal B/L, CN II-XII intact    Skin: no rashes, normal turgor                            14.9   21.48 )-----------( 444      ( 27 Mar 2024 19:42 )             42.1                   PT/INR - ( 27 Mar 2024 19:42 )   PT: 11.70 sec;   INR: 1.03 ratio         PTT - ( 27 Mar 2024 19:42 )  PTT:35.8 sec      CAPILLARY BLOOD GLUCOSE      POCT Blood Glucose.: 171 mg/dL (27 Mar 2024 21:49)            < from: CT Lower Extremity w/ IV Cont, Right (03.27.24 @ 20:45) >  IMPRESSION:  Well-circumscribed 9.1 cm soft tissue mass in the anterior right groin   without associated inflammatory changes possibly representing enlarged   lymph node, new since 3/25/2023.    Mild subcutaneous soft tissue swelling in the anterior knee.    No abscess.    Communication: The summary of above findings were discussed with readback   confirmation with MARYJANE Moy by radiology resident Dane Canseco MD   3/27/2024 9:47 PM      < end of copied text >    Addendum to CT report:    < from: CT Lower Extremity w/ IV Cont, Right (03.27.24 @ 20:45) >  IMPRESSION:  Well-circumscribed 9.1 cm soft tissue mass in the anterior right groin   without associated inflammatory changes possibly representing a large   infected hematoma, new since 3/25/2023.    Mild subcutaneous soft tissue swelling in the anterior knee.    No abscess.    Communication: The summary of above findings were discussed with readback  confirmation with MARYJANE Moy by radiology resident Dane Canseco MD   3/27/2024 9:47 PM      < end of copied text >

## 2024-03-27 NOTE — ED PROVIDER NOTE - CLINICAL SUMMARY MEDICAL DECISION MAKING FREE TEXT BOX
patient with sepsis, unclear source though likely infected hematoma in RLE -- no recent trauma, instrumentation.     Case discussed with surgical team, recommend admission to medicine at this time.    After fluids, abx, antipyretics patient is feeling much better, will need admission for IV abx, consideration of surgical vs IR drainage of this infected hematoma for source control.

## 2024-03-27 NOTE — CONSULT NOTE ADULT - ASSESSMENT
70 yo M, hx of A-fib on xarelto last taken 2 weeks ago, HTN, HLD, DM II, CAD sp CABG, chronic low back pain, frequent LE cellulitis presents to the ED for assessment of R LE pain associated with chills. Pt was in [facility] this afternoon, went to see his cardiologist and then about 1 hours ago had sudden pain to R thigh down to R calf along with chills. Notes myalgias, mild cough and congestion, pain to RLE but no redness or broken skin as he normally has with cellulitis. Denies trauma/fall. General surgery consulted for Well-circumscribed 9.1 cm soft tissue mass in the anterior right groin  without associated inflammatory changes possibly representing a large infected hematoma. Seen on CT scan     A/P:  -Pain mgmt  -IV hydration  -IV abx  -Blood cx  -GI and DVT prophylaxis  70 yo M, hx of A-fib on xarelto last taken 2 weeks ago due to affordability, HTN, HLD, DM II, CAD sp CABG on ASA, last taken 3/27, chronic low back pain, frequent LE cellulitis presents to the ED for assessment of R LE pain associated with chills. Pt was in [facility] this afternoon, went to see his cardiologist and then about 1 hours ago had sudden pain to R thigh down to R calf along with chills. Notes myalgias, mild cough and congestion, pain to RLE but no redness or broken skin as he normally has with cellulitis. Denies trauma/fall. General surgery consulted for Well-circumscribed 9.1 cm soft tissue mass in the anterior right groin  without associated inflammatory changes possibly representing a large infected hematoma, Seen on CT scan.    A/P:  -Pain mgmt  -IV hydration  -IV abx  -Blood cx  -GI and DVT prophylaxis  70 yo M, hx of A-fib on xarelto last taken 2 weeks ago due to affordability, HTN, HLD, DM II, CAD sp CABG on ASA, last taken 3/27, chronic low back pain, frequent LE cellulitis presents to the ED for assessment of R LE pain associated with chills. Pt was in [facility] this afternoon, went to see his cardiologist and then about 1 hours ago had sudden pain to R thigh down to R calf along with chills. Notes myalgias, mild cough and congestion, pain to RLE but no redness or broken skin as he normally has with cellulitis. Denies trauma/fall. General surgery consulted for Well-circumscribed 9.1 cm soft tissue mass in the anterior right groin  without associated inflammatory changes possibly representing a large infected hematoma, Seen on CT scan.    A/P:  -Pain mgmt  -IV hydration  -IV abx  -Blood cx  -Repeat labs in am including LA  -GI and DVT prophylaxis  70 yo M, hx of A-fib on xarelto last taken 2 weeks ago due to affordability, HTN, HLD, DM II, CAD sp CABG on ASA, last taken 3/27, chronic low back pain, frequent LE cellulitis presents to the ED for assessment of R LE pain associated with chills. Pt was in [facility] this afternoon, went to see his cardiologist and then about 1 hours ago had sudden pain to R thigh down to R calf along with chills. Notes myalgias, mild cough and congestion, pain to RLE but no redness or broken skin as he normally has with cellulitis. Denies trauma/fall. General surgery consulted for Well-circumscribed 9.1 cm soft tissue mass in the anterior right groin  without associated inflammatory changes possibly representing a large infected hematoma, Seen on CT scan.    A/P:  -Pain mgmt  -IV hydration  -IV abx  -Blood cx  -Repeat labs in am including LA  -GI and DVT prophylaxis   Discussed with Dr. Best

## 2024-03-27 NOTE — ED PROVIDER NOTE - OBJECTIVE STATEMENT
68 yo M, hx of HTN, HLD, DM II, CAD sp CAPG, chronic low back pain, frequent LE cellulitis with sepsis here for assessment of rigor -- patient in [facility] this afternoon, went to see his cardiologist and then about 1 hours ago had sudden rigors. Notes myalgias, mild cough and congestion, pain to RLE but no redness or broken skin as he normally has with cellulitis.     No dysuria, nausea, vomiting.

## 2024-03-27 NOTE — PHYSICAL EXAM
[General Appearance - Well Developed] : well developed [Well Groomed] : well groomed [Normal Appearance] : normal appearance [General Appearance - Well Nourished] : well nourished [No Deformities] : no deformities [General Appearance - In No Acute Distress] : no acute distress [Normal Conjunctiva] : the conjunctiva exhibited no abnormalities [Eyelids - No Xanthelasma] : the eyelids demonstrated no xanthelasmas [Normal Oral Mucosa] : normal oral mucosa [No Oral Pallor] : no oral pallor [No Oral Cyanosis] : no oral cyanosis [Normal Jugular Venous A Waves Present] : normal jugular venous A waves present [Normal Jugular Venous V Waves Present] : normal jugular venous V waves present [No Jugular Venous Nathan A Waves] : no jugular venous nathan A waves [Heart Rate And Rhythm] : heart rate and rhythm were normal [Heart Sounds] : normal S1 and S2 [Murmurs] : no murmurs present [Respiration, Rhythm And Depth] : normal respiratory rhythm and effort [Exaggerated Use Of Accessory Muscles For Inspiration] : no accessory muscle use [Auscultation Breath Sounds / Voice Sounds] : lungs were clear to auscultation bilaterally [Bowel Sounds] : normal bowel sounds [Abdomen Soft] : soft [Abdomen Tenderness] : non-tender [Abdomen Mass (___ Cm)] : no abdominal mass palpated [Abnormal Walk] : normal gait [Gait - Sufficient For Exercise Testing] : the gait was sufficient for exercise testing [Nail Clubbing] : no clubbing of the fingernails [Cyanosis, Localized] : no localized cyanosis [Petechial Hemorrhages (___cm)] : no petechial hemorrhages [Skin Color & Pigmentation] : normal skin color and pigmentation [] : no rash [No Venous Stasis] : no venous stasis [No Skin Ulcers] : no skin ulcer [No Xanthoma] : no  xanthoma was observed [Skin Lesions] : no skin lesions [Oriented To Time, Place, And Person] : oriented to person, place, and time

## 2024-03-28 DIAGNOSIS — A41.9 SEPSIS, UNSPECIFIED ORGANISM: ICD-10-CM

## 2024-03-28 LAB
ALBUMIN SERPL ELPH-MCNC: 3.6 G/DL — SIGNIFICANT CHANGE UP (ref 3.5–5.2)
ALBUMIN SERPL ELPH-MCNC: 3.6 G/DL — SIGNIFICANT CHANGE UP (ref 3.5–5.2)
ALBUMIN SERPL ELPH-MCNC: NORMAL G/DL
ALP BLD-CCNC: NORMAL U/L
ALP SERPL-CCNC: 107 U/L — SIGNIFICANT CHANGE UP (ref 30–115)
ALP SERPL-CCNC: 115 U/L — SIGNIFICANT CHANGE UP (ref 30–115)
ALT FLD-CCNC: 34 U/L — SIGNIFICANT CHANGE UP (ref 0–41)
ALT FLD-CCNC: 38 U/L — SIGNIFICANT CHANGE UP (ref 0–41)
ALT SERPL-CCNC: NORMAL U/L
ANION GAP SERPL CALC-SCNC: 11 MMOL/L — SIGNIFICANT CHANGE UP (ref 7–14)
ANION GAP SERPL CALC-SCNC: 14 MMOL/L
ANION GAP SERPL CALC-SCNC: 14 MMOL/L — SIGNIFICANT CHANGE UP (ref 7–14)
ANION GAP SERPL CALC-SCNC: 15 MMOL/L — HIGH (ref 7–14)
AST SERPL-CCNC: 33 U/L — SIGNIFICANT CHANGE UP (ref 0–41)
AST SERPL-CCNC: 40 U/L — SIGNIFICANT CHANGE UP (ref 0–41)
AST SERPL-CCNC: NORMAL U/L
BASOPHILS # BLD AUTO: 0.1 K/UL — SIGNIFICANT CHANGE UP (ref 0–0.2)
BASOPHILS NFR BLD AUTO: 0.4 % — SIGNIFICANT CHANGE UP (ref 0–1)
BILIRUB SERPL-MCNC: 0.4 MG/DL — SIGNIFICANT CHANGE UP (ref 0.2–1.2)
BILIRUB SERPL-MCNC: 0.5 MG/DL — SIGNIFICANT CHANGE UP (ref 0.2–1.2)
BILIRUB SERPL-MCNC: NORMAL MG/DL
BLD GP AB SCN SERPL QL: SIGNIFICANT CHANGE UP
BUN SERPL-MCNC: 64 MG/DL — CRITICAL HIGH (ref 10–20)
BUN SERPL-MCNC: 67 MG/DL — CRITICAL HIGH (ref 10–20)
BUN SERPL-MCNC: 68 MG/DL — CRITICAL HIGH (ref 10–20)
BUN SERPL-MCNC: NORMAL MG/DL
CALCIUM SERPL-MCNC: 8.6 MG/DL — SIGNIFICANT CHANGE UP (ref 8.4–10.5)
CALCIUM SERPL-MCNC: 8.6 MG/DL — SIGNIFICANT CHANGE UP (ref 8.4–10.5)
CALCIUM SERPL-MCNC: 8.8 MG/DL — SIGNIFICANT CHANGE UP (ref 8.4–10.5)
CALCIUM SERPL-MCNC: NORMAL MG/DL
CHLORIDE SERPL-SCNC: 93 MMOL/L — LOW (ref 98–110)
CHLORIDE SERPL-SCNC: 93 MMOL/L — LOW (ref 98–110)
CHLORIDE SERPL-SCNC: 94 MMOL/L — LOW (ref 98–110)
CHLORIDE SERPL-SCNC: NORMAL MMOL/L
CHOLEST SERPL-MCNC: NORMAL
CO2 SERPL-SCNC: 22 MMOL/L — SIGNIFICANT CHANGE UP (ref 17–32)
CO2 SERPL-SCNC: 24 MMOL/L — SIGNIFICANT CHANGE UP (ref 17–32)
CO2 SERPL-SCNC: 25 MMOL/L — SIGNIFICANT CHANGE UP (ref 17–32)
CO2 SERPL-SCNC: NORMAL MMOL/L
CREAT SERPL-MCNC: 2.17 MG/DL — SIGNIFICANT CHANGE UP (ref 0.7–1.5)
CREAT SERPL-MCNC: 2.4 MG/DL — HIGH (ref 0.7–1.5)
CREAT SERPL-MCNC: 2.5 MG/DL — HIGH (ref 0.7–1.5)
CREAT SERPL-MCNC: NORMAL MG/DL
EGFR: 27 ML/MIN/1.73M2 — LOW
EGFR: 29 ML/MIN/1.73M2 — LOW
EGFR: NORMAL ML/MIN/1.73M2
EOSINOPHIL # BLD AUTO: 0.05 K/UL — SIGNIFICANT CHANGE UP (ref 0–0.7)
EOSINOPHIL NFR BLD AUTO: 0.2 % — SIGNIFICANT CHANGE UP (ref 0–8)
GLUCOSE BLDC GLUCOMTR-MCNC: 153 MG/DL — HIGH (ref 70–99)
GLUCOSE BLDC GLUCOMTR-MCNC: 258 MG/DL — HIGH (ref 70–99)
GLUCOSE BLDC GLUCOMTR-MCNC: 277 MG/DL — HIGH (ref 70–99)
GLUCOSE BLDC GLUCOMTR-MCNC: 279 MG/DL — HIGH (ref 70–99)
GLUCOSE SERPL-MCNC: 191 MG/DL — HIGH (ref 70–99)
GLUCOSE SERPL-MCNC: 213 MG/DL — HIGH (ref 70–99)
GLUCOSE SERPL-MCNC: 245 MG/DL — HIGH (ref 70–99)
GLUCOSE SERPL-MCNC: NORMAL MG/DL
HCT VFR BLD CALC: 39 % — LOW (ref 42–52)
HCT VFR BLD CALC: 40.3 % — LOW (ref 42–52)
HDLC SERPL-MCNC: NORMAL
HGB BLD-MCNC: 13.5 G/DL — LOW (ref 14–18)
HGB BLD-MCNC: 13.6 G/DL — LOW (ref 14–18)
IMM GRANULOCYTES NFR BLD AUTO: 0.8 % — HIGH (ref 0.1–0.3)
LACTATE SERPL-SCNC: 1.8 MMOL/L — SIGNIFICANT CHANGE UP (ref 0.7–2)
LACTATE SERPL-SCNC: 2.3 MMOL/L — HIGH (ref 0.7–2)
LACTATE SERPL-SCNC: 2.3 MMOL/L — HIGH (ref 0.7–2)
LDLC SERPL CALC-MCNC: NORMAL MG/DL
LYMPHOCYTES # BLD AUTO: 1.58 K/UL — SIGNIFICANT CHANGE UP (ref 1.2–3.4)
LYMPHOCYTES # BLD AUTO: 6.5 % — LOW (ref 20.5–51.1)
MANUAL SMEAR VERIFICATION: SIGNIFICANT CHANGE UP
MCHC RBC-ENTMCNC: 30.5 PG — SIGNIFICANT CHANGE UP (ref 27–31)
MCHC RBC-ENTMCNC: 31 PG — SIGNIFICANT CHANGE UP (ref 27–31)
MCHC RBC-ENTMCNC: 33.5 G/DL — SIGNIFICANT CHANGE UP (ref 32–37)
MCHC RBC-ENTMCNC: 34.9 G/DL — SIGNIFICANT CHANGE UP (ref 32–37)
MCV RBC AUTO: 88.8 FL — SIGNIFICANT CHANGE UP (ref 80–94)
MCV RBC AUTO: 91 FL — SIGNIFICANT CHANGE UP (ref 80–94)
MONOCYTES # BLD AUTO: 1.06 K/UL — HIGH (ref 0.1–0.6)
MONOCYTES NFR BLD AUTO: 4.3 % — SIGNIFICANT CHANGE UP (ref 1.7–9.3)
NEUTROPHILS # BLD AUTO: 21.47 K/UL — HIGH (ref 1.4–6.5)
NEUTROPHILS NFR BLD AUTO: 87.8 % — HIGH (ref 42.2–75.2)
NEUTS BAND # BLD: 13 % — HIGH (ref 0–6)
NONHDLC SERPL-MCNC: NORMAL MG/DL
NRBC # BLD: 0 /100 WBCS — SIGNIFICANT CHANGE UP (ref 0–0)
PLAT MORPH BLD: NORMAL — SIGNIFICANT CHANGE UP
PLATELET # BLD AUTO: 381 K/UL — SIGNIFICANT CHANGE UP (ref 130–400)
PLATELET # BLD AUTO: 392 K/UL — SIGNIFICANT CHANGE UP (ref 130–400)
PLATELET COUNT - ESTIMATE: NORMAL — SIGNIFICANT CHANGE UP
PMV BLD: 10.1 FL — SIGNIFICANT CHANGE UP (ref 7.4–10.4)
PMV BLD: 9.7 FL — SIGNIFICANT CHANGE UP (ref 7.4–10.4)
POTASSIUM SERPL-MCNC: 5.1 MMOL/L — HIGH (ref 3.5–5)
POTASSIUM SERPL-MCNC: 5.2 MMOL/L — HIGH (ref 3.5–5)
POTASSIUM SERPL-MCNC: 5.3 MMOL/L — HIGH (ref 3.5–5)
POTASSIUM SERPL-SCNC: 5.1 MMOL/L — HIGH (ref 3.5–5)
POTASSIUM SERPL-SCNC: 5.2 MMOL/L — HIGH (ref 3.5–5)
POTASSIUM SERPL-SCNC: 5.3 MMOL/L — HIGH (ref 3.5–5)
POTASSIUM SERPL-SCNC: NORMAL MMOL/L
PROT SERPL-MCNC: 6.8 G/DL — SIGNIFICANT CHANGE UP (ref 6–8)
PROT SERPL-MCNC: 7.3 G/DL — SIGNIFICANT CHANGE UP (ref 6–8)
PROT SERPL-MCNC: NORMAL G/DL
RBC # BLD: 4.39 M/UL — LOW (ref 4.7–6.1)
RBC # BLD: 4.43 M/UL — LOW (ref 4.7–6.1)
RBC # FLD: 13.9 % — SIGNIFICANT CHANGE UP (ref 11.5–14.5)
RBC # FLD: 14 % — SIGNIFICANT CHANGE UP (ref 11.5–14.5)
RBC BLD AUTO: NORMAL — SIGNIFICANT CHANGE UP
SODIUM SERPL-SCNC: 129 MMOL/L — LOW (ref 135–146)
SODIUM SERPL-SCNC: 130 MMOL/L — LOW (ref 135–146)
SODIUM SERPL-SCNC: 132 MMOL/L — LOW (ref 135–146)
SODIUM SERPL-SCNC: NORMAL MMOL/L
TRIGL SERPL-MCNC: NORMAL MG/DL
WBC # BLD: 24.45 K/UL — HIGH (ref 4.8–10.8)
WBC # BLD: 34.5 K/UL — HIGH (ref 4.8–10.8)
WBC # FLD AUTO: 24.45 K/UL — HIGH (ref 4.8–10.8)
WBC # FLD AUTO: 34.5 K/UL — HIGH (ref 4.8–10.8)

## 2024-03-28 PROCEDURE — ZZZZZ: CPT

## 2024-03-28 PROCEDURE — 85025 COMPLETE CBC W/AUTO DIFF WBC: CPT

## 2024-03-28 PROCEDURE — 99221 1ST HOSP IP/OBS SF/LOW 40: CPT

## 2024-03-28 PROCEDURE — 76882 US LMTD JT/FCL EVL NVASC XTR: CPT | Mod: RT

## 2024-03-28 PROCEDURE — 83735 ASSAY OF MAGNESIUM: CPT

## 2024-03-28 PROCEDURE — 85027 COMPLETE CBC AUTOMATED: CPT

## 2024-03-28 PROCEDURE — 86900 BLOOD TYPING SEROLOGIC ABO: CPT

## 2024-03-28 PROCEDURE — 83036 HEMOGLOBIN GLYCOSYLATED A1C: CPT

## 2024-03-28 PROCEDURE — 76770 US EXAM ABDO BACK WALL COMP: CPT

## 2024-03-28 PROCEDURE — 86901 BLOOD TYPING SEROLOGIC RH(D): CPT

## 2024-03-28 PROCEDURE — 86850 RBC ANTIBODY SCREEN: CPT

## 2024-03-28 PROCEDURE — 82962 GLUCOSE BLOOD TEST: CPT

## 2024-03-28 PROCEDURE — 80202 ASSAY OF VANCOMYCIN: CPT

## 2024-03-28 PROCEDURE — 83605 ASSAY OF LACTIC ACID: CPT

## 2024-03-28 PROCEDURE — 80053 COMPREHEN METABOLIC PANEL: CPT

## 2024-03-28 PROCEDURE — 36415 COLL VENOUS BLD VENIPUNCTURE: CPT

## 2024-03-28 PROCEDURE — 80048 BASIC METABOLIC PNL TOTAL CA: CPT

## 2024-03-28 RX ORDER — INSULIN GLARGINE 100 [IU]/ML
30 INJECTION, SOLUTION SUBCUTANEOUS ONCE
Refills: 0 | Status: COMPLETED | OUTPATIENT
Start: 2024-03-28 | End: 2024-03-28

## 2024-03-28 RX ORDER — DEXTROSE 50 % IN WATER 50 %
25 SYRINGE (ML) INTRAVENOUS ONCE
Refills: 0 | Status: DISCONTINUED | OUTPATIENT
Start: 2024-03-28 | End: 2024-04-01

## 2024-03-28 RX ORDER — OXYBUTYNIN CHLORIDE 5 MG
5 TABLET ORAL
Refills: 0 | Status: DISCONTINUED | OUTPATIENT
Start: 2024-03-28 | End: 2024-04-01

## 2024-03-28 RX ORDER — INSULIN LISPRO 100/ML
VIAL (ML) SUBCUTANEOUS
Refills: 0 | Status: DISCONTINUED | OUTPATIENT
Start: 2024-03-28 | End: 2024-03-29

## 2024-03-28 RX ORDER — VANCOMYCIN HCL 1 G
1000 VIAL (EA) INTRAVENOUS EVERY 12 HOURS
Refills: 0 | Status: DISCONTINUED | OUTPATIENT
Start: 2024-03-28 | End: 2024-03-28

## 2024-03-28 RX ORDER — INSULIN GLARGINE 100 [IU]/ML
15 INJECTION, SOLUTION SUBCUTANEOUS AT BEDTIME
Refills: 0 | Status: DISCONTINUED | OUTPATIENT
Start: 2024-03-28 | End: 2024-03-29

## 2024-03-28 RX ORDER — INSULIN LISPRO 100/ML
15 VIAL (ML) SUBCUTANEOUS
Refills: 0 | Status: DISCONTINUED | OUTPATIENT
Start: 2024-03-28 | End: 2024-03-29

## 2024-03-28 RX ORDER — SODIUM CHLORIDE 9 MG/ML
1000 INJECTION INTRAMUSCULAR; INTRAVENOUS; SUBCUTANEOUS
Refills: 0 | Status: DISCONTINUED | OUTPATIENT
Start: 2024-03-28 | End: 2024-03-28

## 2024-03-28 RX ORDER — METOPROLOL TARTRATE 50 MG
25 TABLET ORAL
Refills: 0 | Status: DISCONTINUED | OUTPATIENT
Start: 2024-03-28 | End: 2024-04-01

## 2024-03-28 RX ORDER — SODIUM CHLORIDE 9 MG/ML
1000 INJECTION INTRAMUSCULAR; INTRAVENOUS; SUBCUTANEOUS
Refills: 0 | Status: DISCONTINUED | OUTPATIENT
Start: 2024-03-28 | End: 2024-03-29

## 2024-03-28 RX ORDER — CEFAZOLIN SODIUM 1 G
2000 VIAL (EA) INJECTION EVERY 8 HOURS
Refills: 0 | Status: DISCONTINUED | OUTPATIENT
Start: 2024-03-28 | End: 2024-04-01

## 2024-03-28 RX ORDER — TIRZEPATIDE 15 MG/.5ML
10 INJECTION, SOLUTION SUBCUTANEOUS
Refills: 0 | DISCHARGE

## 2024-03-28 RX ORDER — FUROSEMIDE 40 MG
80 TABLET ORAL DAILY
Refills: 0 | Status: DISCONTINUED | OUTPATIENT
Start: 2024-03-28 | End: 2024-03-28

## 2024-03-28 RX ORDER — MORPHINE SULFATE 50 MG/1
4 CAPSULE, EXTENDED RELEASE ORAL ONCE
Refills: 0 | Status: DISCONTINUED | OUTPATIENT
Start: 2024-03-28 | End: 2024-03-28

## 2024-03-28 RX ORDER — GLUCAGON INJECTION, SOLUTION 0.5 MG/.1ML
1 INJECTION, SOLUTION SUBCUTANEOUS ONCE
Refills: 0 | Status: DISCONTINUED | OUTPATIENT
Start: 2024-03-28 | End: 2024-04-01

## 2024-03-28 RX ORDER — EVOLOCUMAB 140 MG/ML
140 INJECTION, SOLUTION SUBCUTANEOUS
Refills: 0 | DISCHARGE

## 2024-03-28 RX ORDER — DEXTROSE 50 % IN WATER 50 %
12.5 SYRINGE (ML) INTRAVENOUS ONCE
Refills: 0 | Status: DISCONTINUED | OUTPATIENT
Start: 2024-03-28 | End: 2024-04-01

## 2024-03-28 RX ORDER — SODIUM CHLORIDE 9 MG/ML
1000 INJECTION, SOLUTION INTRAVENOUS
Refills: 0 | Status: DISCONTINUED | OUTPATIENT
Start: 2024-03-28 | End: 2024-04-01

## 2024-03-28 RX ORDER — CEFAZOLIN SODIUM 1 G
VIAL (EA) INJECTION
Refills: 0 | Status: DISCONTINUED | OUTPATIENT
Start: 2024-03-28 | End: 2024-04-01

## 2024-03-28 RX ORDER — TAMSULOSIN HYDROCHLORIDE 0.4 MG/1
0.4 CAPSULE ORAL AT BEDTIME
Refills: 0 | Status: DISCONTINUED | OUTPATIENT
Start: 2024-03-28 | End: 2024-04-01

## 2024-03-28 RX ORDER — DEXTROSE 50 % IN WATER 50 %
15 SYRINGE (ML) INTRAVENOUS ONCE
Refills: 0 | Status: DISCONTINUED | OUTPATIENT
Start: 2024-03-28 | End: 2024-04-01

## 2024-03-28 RX ORDER — INSULIN GLARGINE 100 [IU]/ML
30 INJECTION, SOLUTION SUBCUTANEOUS AT BEDTIME
Refills: 0 | Status: DISCONTINUED | OUTPATIENT
Start: 2024-03-28 | End: 2024-03-28

## 2024-03-28 RX ORDER — VANCOMYCIN HCL 1 G
1000 VIAL (EA) INTRAVENOUS EVERY 24 HOURS
Refills: 0 | Status: DISCONTINUED | OUTPATIENT
Start: 2024-03-28 | End: 2024-03-28

## 2024-03-28 RX ORDER — ACETAMINOPHEN 500 MG
650 TABLET ORAL EVERY 6 HOURS
Refills: 0 | Status: DISCONTINUED | OUTPATIENT
Start: 2024-03-28 | End: 2024-04-01

## 2024-03-28 RX ORDER — CEFEPIME 1 G/1
2000 INJECTION, POWDER, FOR SOLUTION INTRAMUSCULAR; INTRAVENOUS EVERY 12 HOURS
Refills: 0 | Status: DISCONTINUED | OUTPATIENT
Start: 2024-03-28 | End: 2024-03-28

## 2024-03-28 RX ORDER — INSULIN LISPRO 100/ML
VIAL (ML) SUBCUTANEOUS AT BEDTIME
Refills: 0 | Status: DISCONTINUED | OUTPATIENT
Start: 2024-03-28 | End: 2024-03-29

## 2024-03-28 RX ORDER — RIVAROXABAN 15 MG-20MG
1 KIT ORAL
Qty: 0 | Refills: 0 | DISCHARGE

## 2024-03-28 RX ORDER — HYDROCORTISONE 1 %
1 OINTMENT (GRAM) TOPICAL THREE TIMES A DAY
Refills: 0 | Status: DISCONTINUED | OUTPATIENT
Start: 2024-03-28 | End: 2024-04-01

## 2024-03-28 RX ORDER — INSULIN GLARGINE 100 [IU]/ML
30 INJECTION, SOLUTION SUBCUTANEOUS EVERY MORNING
Refills: 0 | Status: DISCONTINUED | OUTPATIENT
Start: 2024-03-29 | End: 2024-03-29

## 2024-03-28 RX ORDER — CEFAZOLIN SODIUM 1 G
2000 VIAL (EA) INJECTION ONCE
Refills: 0 | Status: COMPLETED | OUTPATIENT
Start: 2024-03-28 | End: 2024-03-28

## 2024-03-28 RX ORDER — MORPHINE SULFATE 50 MG/1
4 CAPSULE, EXTENDED RELEASE ORAL EVERY 6 HOURS
Refills: 0 | Status: DISCONTINUED | OUTPATIENT
Start: 2024-03-28 | End: 2024-03-31

## 2024-03-28 RX ADMIN — Medication 650 MILLIGRAM(S): at 21:44

## 2024-03-28 RX ADMIN — TAMSULOSIN HYDROCHLORIDE 0.4 MILLIGRAM(S): 0.4 CAPSULE ORAL at 21:40

## 2024-03-28 RX ADMIN — INSULIN GLARGINE 30 UNIT(S): 100 INJECTION, SOLUTION SUBCUTANEOUS at 09:03

## 2024-03-28 RX ADMIN — MORPHINE SULFATE 4 MILLIGRAM(S): 50 CAPSULE, EXTENDED RELEASE ORAL at 03:30

## 2024-03-28 RX ADMIN — Medication 100 MILLIGRAM(S): at 21:40

## 2024-03-28 RX ADMIN — Medication 5 MILLIGRAM(S): at 17:06

## 2024-03-28 RX ADMIN — MORPHINE SULFATE 4 MILLIGRAM(S): 50 CAPSULE, EXTENDED RELEASE ORAL at 03:07

## 2024-03-28 RX ADMIN — Medication 100 MILLIGRAM(S): at 14:41

## 2024-03-28 RX ADMIN — CEFEPIME 100 MILLIGRAM(S): 1 INJECTION, POWDER, FOR SOLUTION INTRAMUSCULAR; INTRAVENOUS at 05:39

## 2024-03-28 RX ADMIN — Medication 15 UNIT(S): at 12:35

## 2024-03-28 RX ADMIN — Medication 650 MILLIGRAM(S): at 16:45

## 2024-03-28 RX ADMIN — SODIUM CHLORIDE 100 MILLILITER(S): 9 INJECTION INTRAMUSCULAR; INTRAVENOUS; SUBCUTANEOUS at 05:39

## 2024-03-28 RX ADMIN — Medication 5 MILLIGRAM(S): at 05:39

## 2024-03-28 RX ADMIN — SODIUM CHLORIDE 50 MILLILITER(S): 9 INJECTION INTRAMUSCULAR; INTRAVENOUS; SUBCUTANEOUS at 21:49

## 2024-03-28 RX ADMIN — Medication 650 MILLIGRAM(S): at 17:07

## 2024-03-28 RX ADMIN — Medication 1 APPLICATION(S): at 21:41

## 2024-03-28 RX ADMIN — Medication 650 MILLIGRAM(S): at 11:43

## 2024-03-28 RX ADMIN — Medication 25 MILLIGRAM(S): at 17:06

## 2024-03-28 RX ADMIN — Medication 25 MILLIGRAM(S): at 05:38

## 2024-03-28 RX ADMIN — Medication 30 MILLILITER(S): at 08:50

## 2024-03-28 RX ADMIN — Medication 650 MILLIGRAM(S): at 16:29

## 2024-03-28 NOTE — H&P ADULT - ASSESSMENT
Infected hematoma with sepsis    DM - for now continue insulin pump    CAD     CKD - numbers actually seem better then previous    Elevated lactic acid       Infected hematoma with sepsis - fro now continue antibiotics started in ER with ID consult (surgery consult appreciated)     DM - for now continue insulin pump    CAD - monitor     CKD - numbers actually seem better then previous    Elevated lactic acid - repeat in am    hyponatremia ( on Lasix) - gentle NS infusion     history of water retention on Lasix - monitor on gentle NS infusion (will stop in am)      Infected hematoma with sepsis - fro now continue antibiotics started in ER with ID consult (surgery consult appreciated)     DM - for now continue insulin pump    CAD - monitor     CKD - numbers actually seem better then previous    Elevated lactic acid - repeat in am    hyponatremia ( on Lasix) - gentle NS infusion     PAF (history of ablation, now NSR on monitor) - continue metoprolol but will continue to hold DOAC pending resolution of hematoma (?)     history of water retention on Lasix - monitor on gentle NS infusion (will stop in am)      Infected hematoma with sepsis - fro now continue antibiotics started in ER with ID consult (surgery consult appreciated)     DM - for now continue insulin pump    CAD - monitor     CKD - creatinine seems to be near or at baseline (high 1's-low 2's)     Elevated lactic acid - repeat in am    hyponatremia ( on Lasix) - gentle NS infusion     PAF (history of ablation, now NSR on monitor) - continue metoprolol but will continue to hold DOAC pending resolution of hematoma (?)     history of water retention on Lasix - monitor on gentle NS infusion (will stop in am)

## 2024-03-28 NOTE — CHART NOTE - NSCHARTNOTEFT_GEN_A_CORE
I saw and examined the patient in the ER.  I have moderate to low suspicion for necrotizing soft tissue infection and not certain that the right groin lesion is a hematoma at this time.  Will follow the patient clinically and continue n.p.o. for possible decision for surgery later today if no improvement. I saw and examined the patient in the ER.  I have low suspicion for necrotizing soft tissue infection and not certain that the right groin lesion is a hematoma at this time.  The patient has never noticed a lump in that region and has been off AC for 2 weeks. Will follow the patient clinically and continue n.p.o. for low probability I&D to R/O necrotizing fasciitis if he worsens markedly.

## 2024-03-28 NOTE — CONSULT NOTE ADULT - ASSESSMENT
68yo male whose PMH includes A Fib (to me it is PAF following an ablation procedure but he ran out of Xarelto 2 weeks ago, DM (uses insulin pump), CAD, apparent  CKD, HTN and recurrent lower extremity cellulitis, presents to the ER from his doctor's office due to episode of rigors and right leg pain.    ID is consulted for right thigh hematoma  Afebrile, with leukocytosis  Bandemia 13%  Elevated lactate  KRISTINE  CT RLE with contrast 3/27: Well-circumscribed 9.1 cm soft tissue mass in the anterior right groin without associated inflammatory changes possibly representing a large infected hematoma.    Antibiotics;  Vancomycin 3/27 ->  Cefepime 3/27 ->      IMPRESSION:  Right thigh hematoma  Leukocytosis  KRISTINE    RECOMMENDATIONS:        * THIS IS AN INCOMPLETE NOTE. FINAL RECOMMENDATION IS PENDING *     70yo male whose PMH includes A Fib (to me it is PAF following an ablation procedure but he ran out of Xarelto 2 weeks ago, DM (uses insulin pump), CAD, apparent  CKD, HTN and recurrent lower extremity cellulitis, presents to the ER from his doctor's office due to episode of rigors and right leg pain.    ID is consulted for right thigh hematoma  Afebrile, with leukocytosis  Bandemia 13%  Elevated lactate  KRISTINE  CT RLE with contrast 3/27: Well-circumscribed 9.1 cm soft tissue mass in the anterior right groin without associated inflammatory changes possibly representing a large infected hematoma.    Antibiotics;  Vancomycin 3/27 ->  Cefepime 3/27 ->      IMPRESSION:  Right thigh hematoma? abscess  RLE cellulitis, low suspicion for necrotizing fasciitis at this time  Leukocytosis  KRISTINE    RECOMMENDATIONS:  - D/C cefepime, start IV cefazolin 2g q8hrs  - Given KRISTINE, hold vancomycin and dose by level; check vancomycin trough at 7PM, if < 15, can give IV vancomycin 1250mg x 1  - Follow up BCx and UCx  - Reach out to surgery regarding I&D  - Antipyretic for fever  - Offloading and frequent position changes, aspiration precaution  - Trend WBC, fever curve, transaminases, creatinine daily      Juanis Zhao D.O.  Attending Physician  Division of Infectious Diseases  [facility] - [facility]  Please contact me via [employer] Teams

## 2024-03-28 NOTE — H&P ADULT - NSHPLABSRESULTS_GEN_ALL_CORE
< from: CT Lower Extremity w/ IV Cont, Right (03.27.24 @ 20:45) >    ACC: [id number] EXAM:  CT LWR EXT IC RT   ORDERED BY: AILYN MOY     PROCEDURE DATE:  03/27/2024    < from: CT Lower Extremity w/ IV Cont, Right (03.27.24 @ 20:45) >    IMPRESSION:  Well-circumscribed 9.1 cm soft tissue mass in the anterior right groin   without associated inflammatory changes possibly representing a large   infected hematoma, new since 3/25/2023.    Mild subcutaneous soft tissue swelling in the anterior knee.    No abscess.    Communication: The summary of above findings were discussed with readback  confirmation with MARYJANE Moy by radiology resident Dane Fernando MD   3/27/2024 9:47 PM    The findings were further discussed with Dr. Ramirez with Dr. De La Paz at   11:01 PM 3/27/2024 with read back.      DANE FERNANDO MD; Resident Radiologist  This document has been electronically signed.  SYEDA RAMIREZ MD; Attending Interventional Radiologist  This document has been electronically signed. Mar 27 2024 11:04PM    < end of copied text >                          14.9   21.48 )-----------( 444      ( 27 Mar 2024 19:42 )             42.1     03-28    129<L>  |  93<L>  |  64<HH>  ----------------------------<  191<H>  5.1<H>   |  22  |  2.17    Ca    8.8      28 Mar 2024 00:34    TPro  7.3  /  Alb  3.6  /  TBili  0.4  /  DBili  x   /  AST  40  /  ALT  38  /  AlkPhos  107  03-28          Urinalysis Basic - ( 28 Mar 2024 00:34 )    Color: x / Appearance: x / SG: x / pH: x  Gluc: 191 mg/dL / Ketone: x  / Bili: x / Urobili: x   Blood: x / Protein: x / Nitrite: x   Leuk Esterase: x / RBC: x / WBC x   Sq Epi: x / Non Sq Epi: x / Bacteria: x      PT/INR - ( 27 Mar 2024 19:42 )   PT: 11.70 sec;   INR: 1.03 ratio         PTT - ( 27 Mar 2024 19:42 )  PTT:35.8 sec  Lactate Trend        CAPILLARY BLOOD GLUCOSE      POCT Blood Glucose.: 171 mg/dL (27 Mar 2024 21:49)

## 2024-03-28 NOTE — PATIENT PROFILE ADULT - PATIENT'S GENDER IDENTITY
Patient: Jenniffer Rene Date: 5/10/2018   : 1952 Attending: Chester Draper MD   65 year old female                      Subjective: Ms. Rene denies any specific new symptoms and had a stable night.      Reviewed: Allergies and Medications    Vital Last Value 24 Hour Range   Temperature 97.8 °F (36.6 °C) (05/10/18 0518) Temp  Min: 97.8 °F (36.6 °C)  Max: 98.1 °F (36.7 °C)   Pulse 76 (05/10/18 0840) Pulse  Min: 66  Max: 88   Respiratory 18 (05/10/18 0518) Resp  Min: 16  Max: 18   Non-Invasive  Blood Pressure 116/64 (05/10/18 0840) BP  Min: 108/60  Max: 130/72   Pulse Oximetry 97 % (18 0925) No Data Recorded     Vital Today Admit   Weight 69 kg (18 1504) Weight: 69 kg (18 1504)   Height N/A Height: 5' 8\" (172.7 cm) (18 1504)   BMI N/A BMI (Calculated): 23.18 (18 1504)     Weight over the past 48 Hours:  Patient Vitals for the past 48 hrs:   Weight   18 1504 69 kg        Intake/Output:    Last Stool Occurrence: 1 (large formed BM) (18 1522)    No intake/output data recorded.    I/O last 3 completed shifts:  In: 1733.6 [P.O.:660; I.V.:1073.6]  Out: -       Intake/Output Summary (Last 24 hours) at 05/10/18 1048  Last data filed at 18 1901   Gross per 24 hour   Intake           634.59 ml   Output                0 ml   Net           634.59 ml       Central Line: No    [place]: No    Physical Exam:   GENERAL:  The patient is well developed, well nourished, in no acute distress.   HEENT:  Atraumatic, normocephalic, sclerae anicteric, conjunctivae not injected, oral mucous membranes are pink and moist.  NECK:  Supple, trachea midline.  CHEST:  Clear to auscultation bilaterally, normal respiratory rate.  CARDIOVASCULAR: Irregularly irregular.  ABDOMEN:  Soft, nontender, nondistended, negative for hepatosplenomegaly and positive bowel sounds in all quadrants of the abdomen.   EXTREMITIES:  Without clubbing, cyanosis, or edema.  MUSCULOSKELETAL:  No gross  appendicular deformities. There is a well-healed postoperative scar noted on the anterior aspect of the right knee.     NEUROLOGIC:  Patient is alert and able to follow one to two-step commands consistently. Patient with ongoing aphasia more of expressive than receptive. Patient with cognitive impairments including organization abilities, exclude functioning and divided attention. .   Cranial nerves II-XII are intact except for right facial weakness and decreased shoulder shrug.    Motor.Strength is 5/5 in left upper and lower extremity, 4 to 4+/5 in right upper extremity and 4+/5 in right lower extremity. Patient with positive pronator drift of the right approximately. Tone was assessed and is normal in upper and lower extremities.    Sensory.Sensation is intact to light touch in upper and lower extremities.  Coordination.Coordination tested with finger nose finger testing demonstrated no dysmetria while performing the test with left arm.   Gait.Impaired with impaired static and dynamic standing balances.      Laboratory Results:    Lab Results   Component Value Date    SODIUM 141 05/03/2018    POTASSIUM 4.0 05/03/2018    CHLORIDE 107 05/03/2018    CO2 26 05/03/2018    CALCIUM 9.3 05/03/2018    BUN 16 05/03/2018    CREATININE 0.81 05/03/2018    MG 2.1 04/27/2018    INR 2.2 05/10/2018    PT 22.5 (H) 05/10/2018    WBC 9.9 05/08/2018    HCT 36.8 05/08/2018    HGB 12.0 05/08/2018     05/08/2018    ALBUMIN 4.0 04/26/2018    GFRA 90 05/03/2018    GFRNA 77 05/03/2018    GLUCOSE 123 (H) 05/03/2018     Imaging:     Other:     Current Functional status over the last 24 hours:    Nursing Skin Documentation:   Integumentary Assessment: Within Defined Limits (05/10/18 0830)   Bladder FIM Documentation:      Score: 7-Independent : Pt demonstrates complete independence with bladder management, no accidents (05/10/18 1021)                  Bowel FIM Documentation:        Score: 6-Modified Independent: Pt uses medication for  bowel control (05/10/18 1021)               Pain Documentation:   Pain Assessment: Within defined limits (05/10/18 0930)   Mobility Documentation:   ,    Sit to Stand: Supervision (Supv) (05/10/18 0930), Stand to Sit: Supervision (Supv) (05/10/18 0930),    Gait Assistance: Minimal Assist (Min) (05/10/18 0930), Assistive Device/: Gait Belt (05/10/18 0930), Ambulation Distance (Feet): 200 Feet (x 3 trials) (05/10/18 0930)   ,     Selfcare Documentation:     Grooming Assistance: Supervision;Standing at sink (05/10/18 0702)  Bathing Assistance: Supervision (05/10/18 0702)  Upper Body Dressing Assistance: Minimal Assist (Min) (sports bra) (05/10/18 0702)  Lower Body Clothing Assistance: Supervision;Set-up (05/10/18 0702)      Communication/Cognition/Swallowing Documentation:   ,        ,     ,                Quality:  VTE Pharmacologic Prophylaxis: Yes  VTE Mechanical Prophylaxis: Yes    Impressions/Plan/DC Plan:  Left MCA ischemic infarct of 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 Overturf will be continued with acute inpatient rehab. The therapist documentation in EPIC noted. Patient with improved therapy participation.      Right hemiparesis. Patient will be continued with the therapy process to improve the motor recovery and functional use of right arm and right leg in daily activities.     Aphasia. Patient will be seen by the speech therapist for ongoing cognitive and communicative rehabilitation to improve the communication abilities, memory skills and cognitive abilities. Caregiver instruction also will be completed to provide ongoing supervision and verbal cues for safe home discharge.     New onset atrial fibrillation. patient be continued on present management of rate control under the guidance of the cardiologist. Patient be continued with gait exam couldn't 40 mg p.o. b.i.d metoprolol ER 50 mg p.o. b.i.d. Patient also will be continued on warfarin and heparin to achieve the therapeutic goal of INR between 2 and  3.     Left atrial clot. patient be continued on anticoagulation and will be followed by the cardiologist for repetition of the echocardiogram and further management after discharge.     Situational depression. patient be seen by the rehabilitation psychologist for ongoing counseling and support. Patient also will be continued on Effexor  mg p.o. Daily. Patient with improved cooping.      Hyperlipidemia. Patient will be advocated low-fat and low-cholesterol diet along with the continuation of the atorvastatin 80 mg p.o. daily    Patient is participating actively with the Rehabilitation Team and is making progress.   Rehab team's documentation reviewed with current status noted above. See team conference reports for specific discharge plans.     I have considered how current medical status and co-morbidities are impacting progress towards goals. Presently, the patient's medical co-morbidities aremaximized and are not inhibiting therapy progress with the medical plan as noted. The patient has continued functional deficits requiring inpatient rehabilitation. Continue intensive rehab, medical supervision , nursing cares and comprehensive discharge planning.            Male

## 2024-03-28 NOTE — PROGRESS NOTE ADULT - ASSESSMENT
68yo male whose PMH includes A Fib (to me it is PAF following an ablation procedure but he ran out of Xarelto 2 weeks ago, DM (uses insulin pump), CAD, apparent  CKD, HTN and recurrent lower extremity cellulitis, presents to the ER from his doctor's office due to episode of rigors and right leg pain    Assessment    ·	Questionable infected hematoma vs. cellulitis with lymphangitis  ·	PAF ran out of xarelto two weeks ago, being held for hematoma  ·	KRISTINE on CKD  ·	CAD  ·	HTN  ·	DM uses insulin pump but ran out (now on lantus / lispro)    Plan    - ID recs appreciated / cefazolin for cellulitis, seen by surgery wanting to keep NPO for now for possible surgery if patient decompensates, trend lactate  - giving gentle fluids for possible KRISTINE on CKD / patient takes lasix 80 / metolazone 2.5 / spironolactone at [facility] which can explain the hyponatremia and KRISTINE, will hold for now  - insulin pump ran out, will give lantus bid, lowered to lantus 15 tonight as he's NPO, lipro for meals  - lopressor bid  - morphine for pain for now  - tamsulosin    Pending: surgery follow up as wanting the patient NPO, trend lactate, monitor pain, repeat labs to monitor hyperkalemia, creatinine, lactate, increase in leukocytosis    # DVT PPX: holding for hematoma

## 2024-03-28 NOTE — CONSULT NOTE ADULT - SUBJECTIVE AND OBJECTIVE BOX
INFECTIOUS DISEASE CONSULT NOTE    Patient is a 69y old  Male who presents with a chief complaint of   HPI:  70yo male whose PMH includes A Fib (to me it is PAF following an ablation procedure but he ran out of Xarelto 2 weeks ago, DM (uses insulin pump), CAD, apparent  CKD, HTN and recurrent lower extremity cellulitis, presents to the ER from his doctor's office due to episode of rigors and right leg pain. In ER found to have an infected hematoma  (28 Mar 2024 02:10)         Prior hospital charts reviewed [Yes]  Primary team notes reviewed [Yes]  Other consultant notes reviewed [Yes]    REVIEW OF SYSTEMS:      PAST MEDICAL & SURGICAL HISTORY:  Diabetes mellitus, type 2      BPH (benign prostatic hyperplasia)      Water retention      Essential hypertension      Diabetes      CAD (coronary artery disease)      H/O hypercholesterolemia      H/O right coronary artery stent placement      History of resection of pancreas      History of cholecystectomy      History of left knee replacement          SOCIAL HISTORY:  - Born in _____, migrated to  in 19XX  - Currently working as / Retired  - Lives with _____; no pets  - No recent travel  - Denies tobacco use  - Denies alcohol use  - Denies illicit drug use  - Currently sexually active, has one male/female sexual partner    FAMILY HISTORY:  Family history of lung cancer (Sibling)    Family history of diabetes mellitus (Mother)    Family history of coronary artery disease (Father)        Allergies:  No Known Allergies      ANTIMICROBIALS:  cefepime   IVPB 2000 every 12 hours  vancomycin  IVPB 1000 every 24 hours      ANTIMICROBIALS (past 90 days):  MEDICATIONS  (STANDING):  cefepime   IVPB   100 mL/Hr IV Intermittent (03-27-24 @ 20:56)    cefepime   IVPB   100 mL/Hr IV Intermittent (03-28-24 @ 05:39)    vancomycin  IVPB.   250 mL/Hr IV Intermittent (03-27-24 @ 20:56)        OTHER MEDS:   MEDICATIONS  (STANDING):  acetaminophen     Tablet .. 650 every 6 hours PRN  aluminum hydroxide/magnesium hydroxide/simethicone Suspension 30 every 6 hours PRN  furosemide    Tablet 80 daily  insulin glargine Injectable (LANTUS) 30 at bedtime  insulin lispro (ADMELOG) corrective regimen sliding scale  three times a day before meals  insulin lispro (ADMELOG) corrective regimen sliding scale  at bedtime  insulin lispro Injectable (ADMELOG) 15 three times a day before meals  metoprolol tartrate 25 two times a day  morphine  - Injectable 4 every 6 hours PRN  oxybutynin 5 two times a day  tamsulosin 0.4 at bedtime      VITALS:  Vital Signs Last 24 Hrs  T(F): 97.2 (03-28-24 @ 04:45), Max: 98.7 (03-27-24 @ 21:26)    Vital Signs Last 24 Hrs  HR: 88 (03-28-24 @ 04:45) (88 - 120)  BP: 105/51 (03-28-24 @ 04:45) (105/51 - 146/63)  RR: 18 (03-28-24 @ 04:45)  SpO2: 98% (03-28-24 @ 04:45) (96% - 98%)  Wt(kg): --    EXAM:    Labs:                        13.6   34.50 )-----------( 392      ( 28 Mar 2024 05:55 )             39.0     03-28    130<L>  |  94<L>  |  67<HH>  ----------------------------<  213<H>  5.2<H>   |  25  |  2.5<H>    Ca    8.6      28 Mar 2024 05:55    TPro  6.8  /  Alb  3.6  /  TBili  0.5  /  DBili  x   /  AST  33  /  ALT  34  /  AlkPhos  115  03-28      WBC Trend:  WBC Count: 34.50 (03-28-24 @ 05:55)  WBC Count: 21.48 (03-27-24 @ 19:42)      Band Neutrophils %: 13.0 % (03-28-24 @ 05:55)  Auto Neutrophil #: 16.66 K/uL (03-27-24 @ 19:42)      Creatine Trend:  Creatinine: 2.5 (03-28)  Creatinine: 2.17 (03-28)      Liver Biochemical Testing Trend:  Alanine Aminotransferase (ALT/SGPT): 34 (03-28)  Alanine Aminotransferase (ALT/SGPT): 38 (03-28)  Aspartate Aminotransferase (AST/SGOT): 33 (03-28-24 @ 05:55)  Aspartate Aminotransferase (AST/SGOT): 40 (03-28-24 @ 00:34)  Bilirubin Total: 0.5 (03-28)  Bilirubin Total: 0.4 (03-28)      Trend LDH  09-25-22 @ 11:12  190      Auto Eosinophil %: 1.5 % (03-27-24 @ 19:42)      Urinalysis Basic - ( 28 Mar 2024 05:55 )    Color: x / Appearance: x / SG: x / pH: x  Gluc: 213 mg/dL / Ketone: x  / Bili: x / Urobili: x   Blood: x / Protein: x / Nitrite: x   Leuk Esterase: x / RBC: x / WBC x   Sq Epi: x / Non Sq Epi: x / Bacteria: x        MICROBIOLOGY:    Lactate, Blood: 2.3 (03-28 @ 05:55)  Blood Gas Venous - Lactate: 2.9 (03-27 @ 20:01)        RADIOLOGY:  imaging below personally reviewed    < from: Xray Chest 1 View-PORTABLE IMMEDIATE (03.27.24 @ 19:26) >  IMPRESSION:    Right basilar opacity.    < end of copied text >    < from: [facility] Duplex Lower Ext Vein Scan, Bilat (03.27.24 @ 20:21) >  IMPRESSION:  No evidence of deep venous thrombosis in either lower extremity.  Large complex cystic mass in the right thigh as described above.    < end of copied text >    < from: CT Lower Extremity w/ IV Cont, Right (03.27.24 @ 20:45) >  IMPRESSION:  Well-circumscribed 9.1 cm soft tissue mass in the anterior right groin   without associated inflammatory changes possibly representing a large   infected hematoma, new since 3/25/2023.    Mild subcutaneous soft tissue swelling in the anterior knee.    No abscess.    < end of copied text >   INFECTIOUS DISEASE CONSULT NOTE    Patient is a 69y old  Male who presents with a chief complaint of RLE cellulitis    HPI:  68yo male whose PMH includes A Fib (to me it is PAF following an ablation procedure but he ran out of Xarelto 2 weeks ago, DM (uses insulin pump), CAD, apparent  CKD, HTN and recurrent lower extremity cellulitis, presents to the ER from his doctor's office due to episode of rigors and right leg pain. In ER found to have an infected hematoma  (28 Mar 2024 02:10)     Prior hospital charts reviewed [Yes]  Primary team notes reviewed [Yes]  Other consultant notes reviewed [Yes]    REVIEW OF SYSTEMS:  CONSTITUTIONAL: + chills  HEAD: No lesion on scalp  EYES: No visual disturbance  ENT: No sore throat  RESPIRATORY: No cough, no shortness of breath  CARDIOVASCULAR: No chest pain or palpitations  GASTROINTESTINAL: No abdominal or epigastric pain  GENITOURINARY: No dysuria  NEUROLOGICAL: No headache/dizziness  MUSCULOSKELETAL: No joint pain, erythema, or swelling; no back pain  SKIN: + erythema on right lower leg, tenderness on right groin  All other ROS negative except noted above      PAST MEDICAL & SURGICAL HISTORY:  Diabetes mellitus, type 2      BPH (benign prostatic hyperplasia)      Water retention      Essential hypertension      Diabetes      CAD (coronary artery disease)      H/O hypercholesterolemia      H/O right coronary artery stent placement      History of resection of pancreas      History of cholecystectomy      History of left knee replacement          SOCIAL HISTORY:  - No recent travel  - Denies tobacco use  - Denies alcohol use  - Denies illicit drug use    FAMILY HISTORY:  Family history of lung cancer (Sibling)    Family history of diabetes mellitus (Mother)    Family history of coronary artery disease (Father)        Allergies:  No Known Allergies      ANTIMICROBIALS:  cefepime   IVPB 2000 every 12 hours  vancomycin  IVPB 1000 every 24 hours      ANTIMICROBIALS (past 90 days):  MEDICATIONS  (STANDING):  cefepime   IVPB   100 mL/Hr IV Intermittent (03-27-24 @ 20:56)    cefepime   IVPB   100 mL/Hr IV Intermittent (03-28-24 @ 05:39)    vancomycin  IVPB.   250 mL/Hr IV Intermittent (03-27-24 @ 20:56)        OTHER MEDS:   MEDICATIONS  (STANDING):  acetaminophen     Tablet .. 650 every 6 hours PRN  aluminum hydroxide/magnesium hydroxide/simethicone Suspension 30 every 6 hours PRN  furosemide    Tablet 80 daily  insulin glargine Injectable (LANTUS) 30 at bedtime  insulin lispro (ADMELOG) corrective regimen sliding scale  three times a day before meals  insulin lispro (ADMELOG) corrective regimen sliding scale  at bedtime  insulin lispro Injectable (ADMELOG) 15 three times a day before meals  metoprolol tartrate 25 two times a day  morphine  - Injectable 4 every 6 hours PRN  oxybutynin 5 two times a day  tamsulosin 0.4 at bedtime      VITALS:  Vital Signs Last 24 Hrs  T(F): 97.2 (03-28-24 @ 04:45), Max: 98.7 (03-27-24 @ 21:26)    Vital Signs Last 24 Hrs  HR: 88 (03-28-24 @ 04:45) (88 - 120)  BP: 105/51 (03-28-24 @ 04:45) (105/51 - 146/63)  RR: 18 (03-28-24 @ 04:45)  SpO2: 98% (03-28-24 @ 04:45) (96% - 98%)  Wt(kg): --    EXAM:  GENERAL: NAD, lying in bed; in chills  HEAD: No head lesions  EYES: Conjunctiva pink and cornea white  EAR, NOSE, MOUTH, THROAT: Normal external ears and nose, no discharges; moist mucous membranes  NECK: Supple, nontender to palpation; no JVD  RESPIRATORY: Clear to auscultation bilaterally  CARDIOVASCULAR: S1 S2  GASTROINTESTINAL: Soft, nontender, nondistended; normoactive bowel sounds  EXTREMITIES: No clubbing, cyanosis, or petal edema  NERVOUS SYSTEM: Alert and oriented to person, time, place and situation, speech clear. No focal deficits   MUSCULOSKELETAL: No joint erythema, swelling or pain  SKIN: Erythema, warmth, and tenderness on right calf, tenderness on right groin, no superficial thrombophlebitis  PSYCH: Normal affect      Labs:                        13.6   34.50 )-----------( 392      ( 28 Mar 2024 05:55 )             39.0     03-28    130<L>  |  94<L>  |  67<HH>  ----------------------------<  213<H>  5.2<H>   |  25  |  2.5<H>    Ca    8.6      28 Mar 2024 05:55    TPro  6.8  /  Alb  3.6  /  TBili  0.5  /  DBili  x   /  AST  33  /  ALT  34  /  AlkPhos  115  03-28      WBC Trend:  WBC Count: 34.50 (03-28-24 @ 05:55)  WBC Count: 21.48 (03-27-24 @ 19:42)      Band Neutrophils %: 13.0 % (03-28-24 @ 05:55)  Auto Neutrophil #: 16.66 K/uL (03-27-24 @ 19:42)      Creatine Trend:  Creatinine: 2.5 (03-28)  Creatinine: 2.17 (03-28)      Liver Biochemical Testing Trend:  Alanine Aminotransferase (ALT/SGPT): 34 (03-28)  Alanine Aminotransferase (ALT/SGPT): 38 (03-28)  Aspartate Aminotransferase (AST/SGOT): 33 (03-28-24 @ 05:55)  Aspartate Aminotransferase (AST/SGOT): 40 (03-28-24 @ 00:34)  Bilirubin Total: 0.5 (03-28)  Bilirubin Total: 0.4 (03-28)      Trend LDH  09-25-22 @ 11:12  190      Auto Eosinophil %: 1.5 % (03-27-24 @ 19:42)      Urinalysis Basic - ( 28 Mar 2024 05:55 )    Color: x / Appearance: x / SG: x / pH: x  Gluc: 213 mg/dL / Ketone: x  / Bili: x / Urobili: x   Blood: x / Protein: x / Nitrite: x   Leuk Esterase: x / RBC: x / WBC x   Sq Epi: x / Non Sq Epi: x / Bacteria: x        MICROBIOLOGY:    Lactate, Blood: 2.3 (03-28 @ 05:55)  Blood Gas Venous - Lactate: 2.9 (03-27 @ 20:01)        RADIOLOGY:  imaging below personally reviewed    < from: Xray Chest 1 View-PORTABLE IMMEDIATE (03.27.24 @ 19:26) >  IMPRESSION:    Right basilar opacity.    < end of copied text >    < from: [facility] Duplex Lower Ext Vein Scan, Bilat (03.27.24 @ 20:21) >  IMPRESSION:  No evidence of deep venous thrombosis in either lower extremity.  Large complex cystic mass in the right thigh as described above.    < end of copied text >    < from: CT Lower Extremity w/ IV Cont, Right (03.27.24 @ 20:45) >  IMPRESSION:  Well-circumscribed 9.1 cm soft tissue mass in the anterior right groin   without associated inflammatory changes possibly representing a large   infected hematoma, new since 3/25/2023.    Mild subcutaneous soft tissue swelling in the anterior knee.    No abscess.    < end of copied text >

## 2024-03-28 NOTE — CHART NOTE - NSCHARTNOTEFT_GEN_A_CORE
Lactic acid level decreased to 1.8 Lactic acid level decreased to 1.8.Also BMP little changed from earlier but in view of potassium now 5.3, will add renal restrictions to diet

## 2024-03-28 NOTE — PROGRESS NOTE ADULT - SUBJECTIVE AND OBJECTIVE BOX
SUBJECTIVE:    Patient is a 69y old Male who presents with a chief complaint of right leg hematoma (28 Mar 2024 09:53)      HPI:  70yo male whose PMH includes A Fib (to me it is PAF following an ablation procedure but he ran out of Xarelto 2 weeks ago, DM (uses insulin pump), CAD, apparent  CKD, HTN and recurrent lower extremity cellulitis, presents to the ER from his doctor's office due to episode of rigors and right leg pain. In ER found to have an infected hematoma  (28 Mar 2024 02:10)      Currently admitted to medicine with the primary diagnosis of Sepsis     having some right leg pain    Besides the pertinent positives and negatives described above, the ROS was within normal limits.    PAST MEDICAL & SURGICAL HISTORY  Diabetes mellitus, type 2    BPH (benign prostatic hyperplasia)    Water retention    Essential hypertension    Diabetes    CAD (coronary artery disease)    H/O hypercholesterolemia    H/O right coronary artery stent placement    History of resection of pancreas    History of cholecystectomy    History of left knee replacement      SOCIAL HISTORY:    ALLERGIES:  No Known Allergies    MEDICATIONS:  STANDING MEDICATIONS  ceFAZolin   IVPB      ceFAZolin   IVPB 2000 milliGRAM(s) IV Intermittent every 8 hours  dextrose 5%. 1000 milliLiter(s) IV Continuous <Continuous>  dextrose 5%. 1000 milliLiter(s) IV Continuous <Continuous>  dextrose 50% Injectable 25 Gram(s) IV Push once  dextrose 50% Injectable 25 Gram(s) IV Push once  dextrose 50% Injectable 12.5 Gram(s) IV Push once  furosemide    Tablet 80 milliGRAM(s) Oral daily  glucagon  Injectable 1 milliGRAM(s) IntraMuscular once  insulin glargine Injectable (LANTUS) 15 Unit(s) SubCutaneous at bedtime  insulin lispro (ADMELOG) corrective regimen sliding scale   SubCutaneous three times a day before meals  insulin lispro (ADMELOG) corrective regimen sliding scale   SubCutaneous at bedtime  insulin lispro Injectable (ADMELOG) 15 Unit(s) SubCutaneous three times a day before meals  metoprolol tartrate 25 milliGRAM(s) Oral two times a day  oxybutynin 5 milliGRAM(s) Oral two times a day  tamsulosin 0.4 milliGRAM(s) Oral at bedtime    PRN MEDICATIONS  acetaminophen     Tablet .. 650 milliGRAM(s) Oral every 6 hours PRN  aluminum hydroxide/magnesium hydroxide/simethicone Suspension 30 milliLiter(s) Oral every 6 hours PRN  dextrose Oral Gel 15 Gram(s) Oral once PRN  morphine  - Injectable 4 milliGRAM(s) IV Push every 6 hours PRN    VITALS:   T(F): 96.4  HR: 85  BP: 173/77  RR: 20  SpO2: 97%    LABS:                        13.6   34.50 )-----------( 392      ( 28 Mar 2024 05:55 )             39.0     03-28    130<L>  |  94<L>  |  67<HH>  ----------------------------<  213<H>  5.2<H>   |  25  |  2.5<H>    Ca    8.6      28 Mar 2024 05:55    TPro  6.8  /  Alb  3.6  /  TBili  0.5  /  DBili  x   /  AST  33  /  ALT  34  /  AlkPhos  115  03-28    PT/INR - ( 27 Mar 2024 19:42 )   PT: 11.70 sec;   INR: 1.03 ratio         PTT - ( 27 Mar 2024 19:42 )  PTT:35.8 sec  Urinalysis Basic - ( 28 Mar 2024 05:55 )    Color: x / Appearance: x / SG: x / pH: x  Gluc: 213 mg/dL / Ketone: x  / Bili: x / Urobili: x   Blood: x / Protein: x / Nitrite: x   Leuk Esterase: x / RBC: x / WBC x   Sq Epi: x / Non Sq Epi: x / Bacteria: x        Lactate, Blood: 2.3 mmol/L *H* (03-28-24 @ 10:14)  Lactate, Blood: 2.3 mmol/L *H* (03-28-24 @ 05:55)          Sepsis      RADIOLOGY:    PHYSICAL EXAM:  General: WN/WD NAD  Neurology: A&Ox3, nonfocal, LUJAN x 4  Head:  Normocephalic, atraumatic  ENT:  Mucosa moist, no ulcerations  Neck:  Supple, no sinuses or palpable masses  Lymphatic:  No palpable cervical, supraclavicular, axillary or inguinal adenopathy  Respiratory: CTA B/L  CV: RRR, S1S2, no murmur  Abdominal: Soft, NT, ND no palpable mass  MSK: No edema  Incisions: intact, no erythema or drainage    Intravenous access: yes  NG tube: no  Ojeda Catheter: no

## 2024-03-28 NOTE — PATIENT PROFILE ADULT - FALL HARM RISK - RISK INTERVENTIONS

## 2024-03-28 NOTE — H&P ADULT - HISTORY OF PRESENT ILLNESS
69y           ran out of Xarelto 2 weeks ago (PAF, S/P ablation), DM (uses insulin pump)  68yo male whose PMH includes A Fib (to me it is PAF following an ablation procedure but he ran out of Xarelto 2 weeks ago, DM (uses insulin pump), CAD, apparent  CKD, HTN and recurrent lower extremity cellulitis, presents to the ER from his doctor's office due to episode of rigors and right leg pain. In ER found to have an infected hematoma

## 2024-03-28 NOTE — PHARMACOTHERAPY INTERVENTION NOTE - COMMENTS
after reviewing and calculating patients Crcl based on Adjusted body weight for obese individuals ~ 46ml/min recommended decreases vancomycin intervals to Q24   accepted .

## 2024-03-29 LAB
A1C WITH ESTIMATED AVERAGE GLUCOSE RESULT: 8.8 % — HIGH (ref 4–5.6)
ALBUMIN SERPL ELPH-MCNC: 3.3 G/DL — LOW (ref 3.5–5.2)
ALP SERPL-CCNC: 111 U/L — SIGNIFICANT CHANGE UP (ref 30–115)
ALT FLD-CCNC: 25 U/L — SIGNIFICANT CHANGE UP (ref 0–41)
ANION GAP SERPL CALC-SCNC: 12 MMOL/L — SIGNIFICANT CHANGE UP (ref 7–14)
AST SERPL-CCNC: 27 U/L — SIGNIFICANT CHANGE UP (ref 0–41)
BASOPHILS # BLD AUTO: 0.1 K/UL — SIGNIFICANT CHANGE UP (ref 0–0.2)
BASOPHILS NFR BLD AUTO: 0.4 % — SIGNIFICANT CHANGE UP (ref 0–1)
BILIRUB SERPL-MCNC: 0.2 MG/DL — SIGNIFICANT CHANGE UP (ref 0.2–1.2)
BUN SERPL-MCNC: 75 MG/DL — CRITICAL HIGH (ref 10–20)
CALCIUM SERPL-MCNC: 8.4 MG/DL — SIGNIFICANT CHANGE UP (ref 8.4–10.5)
CHLORIDE SERPL-SCNC: 96 MMOL/L — LOW (ref 98–110)
CO2 SERPL-SCNC: 24 MMOL/L — SIGNIFICANT CHANGE UP (ref 17–32)
CREAT SERPL-MCNC: 3.2 MG/DL — HIGH (ref 0.7–1.5)
CULTURE RESULTS: NO GROWTH — SIGNIFICANT CHANGE UP
EGFR: 20 ML/MIN/1.73M2 — LOW
EOSINOPHIL # BLD AUTO: 0.23 K/UL — SIGNIFICANT CHANGE UP (ref 0–0.7)
EOSINOPHIL NFR BLD AUTO: 1 % — SIGNIFICANT CHANGE UP (ref 0–8)
ESTIMATED AVERAGE GLUCOSE: 206 MG/DL — HIGH (ref 68–114)
GLUCOSE BLDC GLUCOMTR-MCNC: 173 MG/DL — HIGH (ref 70–99)
GLUCOSE BLDC GLUCOMTR-MCNC: 198 MG/DL — HIGH (ref 70–99)
GLUCOSE BLDC GLUCOMTR-MCNC: 207 MG/DL — HIGH (ref 70–99)
GLUCOSE BLDC GLUCOMTR-MCNC: 243 MG/DL — HIGH (ref 70–99)
GLUCOSE SERPL-MCNC: 188 MG/DL — HIGH (ref 70–99)
HCT VFR BLD CALC: 38.3 % — LOW (ref 42–52)
HGB BLD-MCNC: 13 G/DL — LOW (ref 14–18)
IMM GRANULOCYTES NFR BLD AUTO: 0.7 % — HIGH (ref 0.1–0.3)
LACTATE SERPL-SCNC: 1.5 MMOL/L — SIGNIFICANT CHANGE UP (ref 0.7–2)
LYMPHOCYTES # BLD AUTO: 2.24 K/UL — SIGNIFICANT CHANGE UP (ref 1.2–3.4)
LYMPHOCYTES # BLD AUTO: 9.7 % — LOW (ref 20.5–51.1)
MAGNESIUM SERPL-MCNC: 2.2 MG/DL — SIGNIFICANT CHANGE UP (ref 1.8–2.4)
MCHC RBC-ENTMCNC: 30.6 PG — SIGNIFICANT CHANGE UP (ref 27–31)
MCHC RBC-ENTMCNC: 33.9 G/DL — SIGNIFICANT CHANGE UP (ref 32–37)
MCV RBC AUTO: 90.1 FL — SIGNIFICANT CHANGE UP (ref 80–94)
MONOCYTES # BLD AUTO: 1.42 K/UL — HIGH (ref 0.1–0.6)
MONOCYTES NFR BLD AUTO: 6.2 % — SIGNIFICANT CHANGE UP (ref 1.7–9.3)
NEUTROPHILS # BLD AUTO: 18.87 K/UL — HIGH (ref 1.4–6.5)
NEUTROPHILS NFR BLD AUTO: 82 % — HIGH (ref 42.2–75.2)
NRBC # BLD: 0 /100 WBCS — SIGNIFICANT CHANGE UP (ref 0–0)
PLATELET # BLD AUTO: 357 K/UL — SIGNIFICANT CHANGE UP (ref 130–400)
PMV BLD: 10.2 FL — SIGNIFICANT CHANGE UP (ref 7.4–10.4)
POTASSIUM SERPL-MCNC: 4.9 MMOL/L — SIGNIFICANT CHANGE UP (ref 3.5–5)
POTASSIUM SERPL-SCNC: 4.9 MMOL/L — SIGNIFICANT CHANGE UP (ref 3.5–5)
PROT SERPL-MCNC: 6.5 G/DL — SIGNIFICANT CHANGE UP (ref 6–8)
RBC # BLD: 4.25 M/UL — LOW (ref 4.7–6.1)
RBC # FLD: 14.4 % — SIGNIFICANT CHANGE UP (ref 11.5–14.5)
SODIUM SERPL-SCNC: 132 MMOL/L — LOW (ref 135–146)
SPECIMEN SOURCE: SIGNIFICANT CHANGE UP
VANCOMYCIN TROUGH SERPL-MCNC: 5.6 UG/ML — SIGNIFICANT CHANGE UP (ref 5–10)
WBC # BLD: 23.01 K/UL — HIGH (ref 4.8–10.8)
WBC # FLD AUTO: 23.01 K/UL — HIGH (ref 4.8–10.8)

## 2024-03-29 PROCEDURE — 99232 SBSQ HOSP IP/OBS MODERATE 35: CPT

## 2024-03-29 PROCEDURE — 76770 US EXAM ABDO BACK WALL COMP: CPT | Mod: 26

## 2024-03-29 RX ORDER — GUAIFENESIN/DEXTROMETHORPHAN 600MG-30MG
10 TABLET, EXTENDED RELEASE 12 HR ORAL EVERY 6 HOURS
Refills: 0 | Status: DISCONTINUED | OUTPATIENT
Start: 2024-03-29 | End: 2024-04-01

## 2024-03-29 RX ORDER — HEPARIN SODIUM 5000 [USP'U]/ML
5000 INJECTION INTRAVENOUS; SUBCUTANEOUS EVERY 8 HOURS
Refills: 0 | Status: DISCONTINUED | OUTPATIENT
Start: 2024-03-29 | End: 2024-04-01

## 2024-03-29 RX ORDER — ONDANSETRON 8 MG/1
4 TABLET, FILM COATED ORAL ONCE
Refills: 0 | Status: COMPLETED | OUTPATIENT
Start: 2024-03-29 | End: 2024-03-29

## 2024-03-29 RX ORDER — ONDANSETRON 8 MG/1
4 TABLET, FILM COATED ORAL
Refills: 0 | Status: DISCONTINUED | OUTPATIENT
Start: 2024-03-29 | End: 2024-04-01

## 2024-03-29 RX ADMIN — HEPARIN SODIUM 5000 UNIT(S): 5000 INJECTION INTRAVENOUS; SUBCUTANEOUS at 21:21

## 2024-03-29 RX ADMIN — Medication 100 MILLIGRAM(S): at 12:26

## 2024-03-29 RX ADMIN — MORPHINE SULFATE 4 MILLIGRAM(S): 50 CAPSULE, EXTENDED RELEASE ORAL at 06:04

## 2024-03-29 RX ADMIN — TAMSULOSIN HYDROCHLORIDE 0.4 MILLIGRAM(S): 0.4 CAPSULE ORAL at 21:21

## 2024-03-29 RX ADMIN — Medication 650 MILLIGRAM(S): at 20:39

## 2024-03-29 RX ADMIN — Medication 1 APPLICATION(S): at 21:21

## 2024-03-29 RX ADMIN — Medication 25 MILLIGRAM(S): at 18:58

## 2024-03-29 RX ADMIN — MORPHINE SULFATE 4 MILLIGRAM(S): 50 CAPSULE, EXTENDED RELEASE ORAL at 00:13

## 2024-03-29 RX ADMIN — Medication 100 MILLIGRAM(S): at 21:21

## 2024-03-29 RX ADMIN — ONDANSETRON 4 MILLIGRAM(S): 8 TABLET, FILM COATED ORAL at 00:58

## 2024-03-29 RX ADMIN — Medication 10 MILLILITER(S): at 14:03

## 2024-03-29 RX ADMIN — Medication 100 MILLIGRAM(S): at 06:03

## 2024-03-29 RX ADMIN — Medication 5 MILLIGRAM(S): at 06:03

## 2024-03-29 RX ADMIN — Medication 1 APPLICATION(S): at 06:05

## 2024-03-29 RX ADMIN — Medication 5 MILLIGRAM(S): at 18:57

## 2024-03-29 RX ADMIN — ONDANSETRON 4 MILLIGRAM(S): 8 TABLET, FILM COATED ORAL at 06:41

## 2024-03-29 RX ADMIN — Medication 25 MILLIGRAM(S): at 06:03

## 2024-03-29 RX ADMIN — Medication 650 MILLIGRAM(S): at 12:18

## 2024-03-29 RX ADMIN — Medication 1 APPLICATION(S): at 14:46

## 2024-03-29 RX ADMIN — HEPARIN SODIUM 5000 UNIT(S): 5000 INJECTION INTRAVENOUS; SUBCUTANEOUS at 12:27

## 2024-03-29 RX ADMIN — MORPHINE SULFATE 4 MILLIGRAM(S): 50 CAPSULE, EXTENDED RELEASE ORAL at 20:39

## 2024-03-29 NOTE — PROGRESS NOTE ADULT - SUBJECTIVE AND OBJECTIVE BOX
SUBJECTIVE:    Patient is a 69y old Male who presents with a chief complaint of right leg hematoma (28 Mar 2024 09:53)      HPI:  70yo male whose PMH includes A Fib (to me it is PAF following an ablation procedure but he ran out of Xarelto 2 weeks ago, DM (uses insulin pump), CAD, apparent  CKD, HTN and recurrent lower extremity cellulitis, presents to the ER from his doctor's office due to episode of rigors and right leg pain. In ER found to have an infected hematoma  (28 Mar 2024 02:10)      Currently admitted to medicine with the primary diagnosis of Sepsis     feeling less pain in his leg    Besides the pertinent positives and negatives described above, the ROS was within normal limits.    PAST MEDICAL & SURGICAL HISTORY  Diabetes mellitus, type 2    BPH (benign prostatic hyperplasia)    Water retention    Essential hypertension    Diabetes    CAD (coronary artery disease)    H/O hypercholesterolemia    H/O right coronary artery stent placement    History of resection of pancreas    History of cholecystectomy    History of left knee replacement      SOCIAL HISTORY:    ALLERGIES:  No Known Allergies    MEDICATIONS:  STANDING MEDICATIONS  ceFAZolin   IVPB      ceFAZolin   IVPB 2000 milliGRAM(s) IV Intermittent every 8 hours  dextrose 5%. 1000 milliLiter(s) IV Continuous <Continuous>  dextrose 5%. 1000 milliLiter(s) IV Continuous <Continuous>  dextrose 50% Injectable 25 Gram(s) IV Push once  dextrose 50% Injectable 12.5 Gram(s) IV Push once  dextrose 50% Injectable 25 Gram(s) IV Push once  glucagon  Injectable 1 milliGRAM(s) IntraMuscular once  hydrocortisone 2.5% Ointment 1 Application(s) Topical three times a day  metoprolol tartrate 25 milliGRAM(s) Oral two times a day  oxybutynin 5 milliGRAM(s) Oral two times a day  sodium chloride 0.9%. 1000 milliLiter(s) IV Continuous <Continuous>  tamsulosin 0.4 milliGRAM(s) Oral at bedtime    PRN MEDICATIONS  acetaminophen     Tablet .. 650 milliGRAM(s) Oral every 6 hours PRN  aluminum hydroxide/magnesium hydroxide/simethicone Suspension 30 milliLiter(s) Oral every 6 hours PRN  dextrose Oral Gel 15 Gram(s) Oral once PRN  morphine  - Injectable 4 milliGRAM(s) IV Push every 6 hours PRN  ondansetron Injectable 4 milliGRAM(s) IV Push four times a day PRN    VITALS:   T(F): 98.7  HR: 103  BP: 126/51  RR: 18  SpO2: 93%    LABS:                        13.0   23.01 )-----------( 357      ( 29 Mar 2024 06:49 )             38.3     03-29    132<L>  |  96<L>  |  75<HH>  ----------------------------<  188<H>  4.9   |  24  |  3.2<H>    Ca    8.4      29 Mar 2024 06:49  Mg     2.2     03-29    TPro  6.5  /  Alb  3.3<L>  /  TBili  0.2  /  DBili  x   /  AST  27  /  ALT  25  /  AlkPhos  111  03-29    PT/INR - ( 27 Mar 2024 19:42 )   PT: 11.70 sec;   INR: 1.03 ratio         PTT - ( 27 Mar 2024 19:42 )  PTT:35.8 sec  Urinalysis Basic - ( 29 Mar 2024 06:49 )    Color: x / Appearance: x / SG: x / pH: x  Gluc: 188 mg/dL / Ketone: x  / Bili: x / Urobili: x   Blood: x / Protein: x / Nitrite: x   Leuk Esterase: x / RBC: x / WBC x   Sq Epi: x / Non Sq Epi: x / Bacteria: x        Lactate, Blood: 1.5 mmol/L (03-29-24 @ 06:49)  Lactate, Blood: 1.8 mmol/L (03-28-24 @ 19:27)      Culture - Blood (collected 27 Mar 2024 19:42)  Source: .Blood Blood-Peripheral  Preliminary Report (29 Mar 2024 02:03):    No growth at 24 hours    Culture - Blood (collected 27 Mar 2024 19:42)  Source: .Blood Blood-Peripheral  Preliminary Report (29 Mar 2024 02:03):    No growth at 24 hours          Sepsis      RADIOLOGY:    PHYSICAL EXAM:  General: WN/WD NAD  Neurology: A&Ox3, nonfocal, LUJAN x 4  Head:  Normocephalic, atraumatic  ENT:  Mucosa moist, no ulcerations  Neck:  Supple, no sinuses or palpable masses  Lymphatic:  No palpable cervical, supraclavicular, axillary or inguinal adenopathy  Respiratory: CTA B/L  CV: RRR, S1S2, no murmur  Abdominal: Soft, NT, ND no palpable mass  MSK: right lower extremity erythema with lymphangiitis  Incisions: intact, no erythema or drainage    Intravenous access: yes  NG tube: no  Ojeda Catheter: no

## 2024-03-29 NOTE — PROGRESS NOTE ADULT - SUBJECTIVE AND OBJECTIVE BOX
pt seen ,   doing better , states that he is able to lift his leg with less pain today   no fever / chills / sob / cp / chills / ha     Vital Signs Last 24 Hrs  T(C): 37.1 (29 Mar 2024 04:45), Max: 37.7 (28 Mar 2024 21:53)  T(F): 98.7 (29 Mar 2024 04:45), Max: 99.9 (28 Mar 2024 21:53)  HR: 103 (29 Mar 2024 04:45) (82 - 103)  BP: 126/51 (29 Mar 2024 04:45) (105/57 - 173/77)  BP(mean): --  RR: 18 (29 Mar 2024 04:45) (18 - 20)  SpO2: 93% (29 Mar 2024 04:45) (93% - 97%)    Parameters below as of 29 Mar 2024 04:45  Patient On (Oxygen Delivery Method): room air    PE:  chest: cta b/l  cardio: s1s2 heard  abd: bs+   ext: RLE : +ttp / edema present / drom / + erythema     03-29    132<L>  |  96<L>  |  75<HH>  ----------------------------<  188<H>  4.9   |  24  |  3.2<H>    Ca    8.4      29 Mar 2024 06:49  Mg     2.2     03-29    TPro  6.5  /  Alb  3.3<L>  /  TBili  0.2  /  DBili  x   /  AST  27  /  ALT  25  /  AlkPhos  111  03-29                            13.0   23.01 )-----------( 357      ( 29 Mar 2024 06:49 )             38.3     CAPILLARY BLOOD GLUCOSE      POCT Blood Glucose.: 198 mg/dL (29 Mar 2024 07:31)  POCT Blood Glucose.: 279 mg/dL (28 Mar 2024 21:14)  POCT Blood Glucose.: 258 mg/dL (28 Mar 2024 16:32)  POCT Blood Glucose.: 277 mg/dL (28 Mar 2024 12:14)

## 2024-03-29 NOTE — PROGRESS NOTE ADULT - ASSESSMENT
68yo male whose PMH includes A Fib (to me it is PAF following an ablation procedure but he ran out of Xarelto 2 weeks ago, DM (uses insulin pump), CAD, apparent  CKD, HTN and recurrent lower extremity cellulitis, presents to the ER from his doctor's office due to episode of rigors and right leg pain.    ID is following for right LE cellulitis  Afebrile, with leukocytosis  Bandemia 13%  Elevated lactate  KRISTINE  CT RLE with contrast 3/27: Well-circumscribed 9.1 cm soft tissue mass in the anterior right groin without associated inflammatory changes possibly representing a large infected hematoma.    Antibiotics;  Vancomycin 3/27 - 3/28  Cefepime 3/27 - 3/28      IMPRESSION:  RLE cellulitis, low suspicion for necrotizing fasciitis at this time  Right thigh mass  Leukocytosis  KRISTINE    RECOMMENDATIONS:  - D/C vancomycin; continue IV cefazolin 2g q8hrs  - Follow up BCx and UCx  - Discussed with General surgeon Dr. Olguin, right thigh lesion is more consistent for soft tissue tumor vs LN, unlikely to be hematoma, I&D is not appropriate; consider outpatient biopsy  - Right LE arterial duplex  - Antipyretic for fever  - Offloading and frequent position changes, aspiration precaution  - Trend WBC, fever curve, transaminases, creatinine daily      Juanis Zhao D.O.  Attending Physician  Division of Infectious Diseases  [facility]  Please contact me via [employer] Teams

## 2024-03-29 NOTE — PROGRESS NOTE ADULT - ASSESSMENT
cellulitis of RLE   phlebitis / lymphangiitis  IMPROVING    - may feed   - trend lytes / cbc  - c/w abx as per ID reccs  - will follow   - no surgical intervention as of now     will d/w dr rivera  cellulitis of RLE   phlebitis / lymphangiitis  IMPROVING    - oral diet  - trend lytes / cbc  - c/w abx as per ID reccs  - will follow   - no surgical intervention indicate will follow     d/w dr rivera

## 2024-03-29 NOTE — PROGRESS NOTE ADULT - SUBJECTIVE AND OBJECTIVE BOX
INFECTIOUS DISEASE FOLLOW UP NOTE:    Interval History/ROS: Patient is a 69y old  Male who presents with a chief complaint of right leg hematoma (28 Mar 2024 09:53)      Overnight events: No overnight event. Feels slightly better.    REVIEW OF SYSTEMS:  CONSTITUTIONAL: + chills intermittently  HEAD: No lesion on scalp  EYES: No visual disturbance  ENT: No sore throat  RESPIRATORY: No cough, no shortness of breath  CARDIOVASCULAR: No chest pain or palpitations  GASTROINTESTINAL: No abdominal or epigastric pain  GENITOURINARY: No dysuria  NEUROLOGICAL: No headache/dizziness  MUSCULOSKELETAL: No joint pain, erythema, or swelling; no back pain  SKIN: + erythema on right lower leg, tenderness on right groin  All other ROS negative except noted above    Prior hospital charts reviewed [Yes]  Primary team notes reviewed [Yes]  Other consultant notes reviewed [Yes]    Allergies:  No Known Allergies      ANTIMICROBIALS:   ceFAZolin   IVPB    ceFAZolin   IVPB 2000 every 8 hours      OTHER MEDS: MEDICATIONS  (STANDING):  acetaminophen     Tablet .. 650 every 6 hours PRN  aluminum hydroxide/magnesium hydroxide/simethicone Suspension 30 every 6 hours PRN  dextrose 50% Injectable 25 once  dextrose 50% Injectable 12.5 once  dextrose 50% Injectable 25 once  dextrose Oral Gel 15 once PRN  glucagon  Injectable 1 once  guaifenesin/dextromethorphan Oral Liquid 10 every 6 hours PRN  heparin   Injectable 5000 every 8 hours  metoprolol tartrate 25 two times a day  morphine  - Injectable 4 every 6 hours PRN  ondansetron Injectable 4 four times a day PRN  oxybutynin 5 two times a day  tamsulosin 0.4 at bedtime      Vital Signs Last 24 Hrs  T(F): 97.2 (03-29-24 @ 21:10), Max: 99.9 (03-28-24 @ 21:53)    Vital Signs Last 24 Hrs  HR: 91 (03-29-24 @ 21:10) (91 - 103)  BP: 136/64 (03-29-24 @ 21:10) (117/53 - 136/64)  RR: 18 (03-29-24 @ 21:10)  SpO2: 96% (03-29-24 @ 21:10) (93% - 96%)  Wt(kg): --    EXAM:  GENERAL: NAD, lying in bed; in chills  HEAD: No head lesions  EYES: Conjunctiva pink and cornea white  EAR, NOSE, MOUTH, THROAT: Normal external ears and nose, no discharges; moist mucous membranes  NECK: Supple, nontender to palpation; no JVD  RESPIRATORY: Clear to auscultation bilaterally  CARDIOVASCULAR: S1 S2  GASTROINTESTINAL: Soft, nontender, nondistended; normoactive bowel sounds  EXTREMITIES: No clubbing, cyanosis, or petal edema  NERVOUS SYSTEM: Alert and oriented to person, time, place and situation, speech clear. No focal deficits   MUSCULOSKELETAL: No joint erythema, swelling or pain  SKIN: Erythema, warmth, and tenderness on right calf, tenderness on right groin, no superficial thrombophlebitis  PSYCH: Normal affect      Labs:                        13.0   23.01 )-----------( 357      ( 29 Mar 2024 06:49 )             38.3     03-29    132<L>  |  96<L>  |  75<HH>  ----------------------------<  188<H>  4.9   |  24  |  3.2<H>    Ca    8.4      29 Mar 2024 06:49  Mg     2.2     03-29    TPro  6.5  /  Alb  3.3<L>  /  TBili  0.2  /  DBili  x   /  AST  27  /  ALT  25  /  AlkPhos  111  03-29      WBC Trend:  WBC Count: 23.01 (03-29-24 @ 06:49)  WBC Count: 24.45 (03-28-24 @ 19:27)  WBC Count: 34.50 (03-28-24 @ 05:55)  WBC Count: 21.48 (03-27-24 @ 19:42)      Creatine Trend:  Creatinine: 3.2 (03-29)  Creatinine: 2.4 (03-28)  Creatinine: 2.5 (03-28)  Creatinine: 2.17 (03-28)      Liver Biochemical Testing Trend:  Alanine Aminotransferase (ALT/SGPT): 25 (03-29)  Alanine Aminotransferase (ALT/SGPT): 34 (03-28)  Alanine Aminotransferase (ALT/SGPT): 38 (03-28)  Aspartate Aminotransferase (AST/SGOT): 27 (03-29-24 @ 06:49)  Aspartate Aminotransferase (AST/SGOT): 33 (03-28-24 @ 05:55)  Aspartate Aminotransferase (AST/SGOT): 40 (03-28-24 @ 00:34)  Bilirubin Total: 0.2 (03-29)  Bilirubin Total: 0.5 (03-28)  Bilirubin Total: 0.4 (03-28)      Trend LDH  09-25-22 @ 11:12  190      Urinalysis Basic - ( 29 Mar 2024 06:49 )    Color: x / Appearance: x / SG: x / pH: x  Gluc: 188 mg/dL / Ketone: x  / Bili: x / Urobili: x   Blood: x / Protein: x / Nitrite: x   Leuk Esterase: x / RBC: x / WBC x   Sq Epi: x / Non Sq Epi: x / Bacteria: x        MICROBIOLOGY:  Vancomycin Level, Trough: 5.6 (03-28 @ 19:27)        Culture - Urine (collected 27 Mar 2024 23:04)  Source: Clean Catch Clean Catch (Midstream)  Final Report:    No growth    Culture - Blood (collected 27 Mar 2024 19:42)  Source: .Blood Blood-Peripheral  Preliminary Report:    No growth at 24 hours    Culture - Blood (collected 27 Mar 2024 19:42)  Source: .Blood Blood-Peripheral  Preliminary Report:    No growth at 24 hours    Culture - Blood (collected 22 Mar 2023 19:28)  Source: .Blood None  Final Report:    No Growth Final    Culture - Urine (collected 25 Sep 2022 14:20)  Source: Clean Catch Clean Catch (Midstream)  Final Report:    <10,000 CFU/mL Normal Urogenital Brittany    Culture - Blood (collected 25 Sep 2022 00:10)  Source: .Blood Blood-Peripheral  Final Report:    No Growth Final    Culture - Blood (collected 25 Sep 2022 00:10)  Source: .Blood Blood-Peripheral  Final Report:    No Growth Final      Lactate, Blood: 1.5 (03-29 @ 06:49)  Lactate, Blood: 1.8 (03-28 @ 19:27)  Lactate, Blood: 2.3 (03-28 @ 10:14)  Lactate, Blood: 2.3 (03-28 @ 05:55)      RADIOLOGY:  imaging below personally reviewed    < from: US Kidney and Bladder (03.29.24 @ 13:01) >  IMPRESSION:    Right renal cysts. No hydronephrosis. Minimal urinary bladder retention.    Nodular mildly enlarged prostate gland    < end of copied text >    < from: CT Lower Extremity w/ IV Cont, Right (03.27.24 @ 20:45) >  IMPRESSION:  Well-circumscribed 9.1 cm soft tissue mass in the anterior right groin   without associated inflammatory changes possibly representing a large   infected hematoma, new since 3/25/2023.    Mild subcutaneous soft tissue swelling in the anterior knee.    No abscess.      < end of copied text >

## 2024-03-29 NOTE — PROGRESS NOTE ADULT - ASSESSMENT
70yo male whose PMH includes A Fib (to me it is PAF following an ablation procedure but he ran out of Xarelto 2 weeks ago, DM (uses insulin pump), CAD, apparent  CKD, HTN and recurrent lower extremity cellulitis, presents to the ER from his doctor's office due to episode of rigors and right leg pain    Assessment    ·	Cellulitis with lymphangiitis, doubt infected hematoma  ·	PAF ran out of xarelto two weeks ago  ·	KRISTINE on CKD  ·	CAD  ·	HTN  ·	DM uses insulin pump but ran out (now on lantus / lispro)    Plan    - ID recs appreciated / cefazolin for cellulitis, seen by surgery as patient is improving will monitor for now  - giving gentle fluids for possible KRISTINE on CKD / patient takes lasix 80 / metolazone 2.5 / spironolactone at hoem which can explain the hyponatremia and KRISTINE, will hold for now / will do RBUS, as worsening will consult nephro  - insulin pump has been refilled, discontinued subq  - lopressor bid  - morphine for pain for now  - tamsulosin    Pending: clinical improvement on abx, RBUS, nephro follow up, will hold xarelto for another day of improvement in case surgery will be needed, will start dvt ppx    # DVT PPX: heparin sc for now

## 2024-03-30 LAB
ALBUMIN SERPL ELPH-MCNC: 3.5 G/DL — SIGNIFICANT CHANGE UP (ref 3.5–5.2)
ALP SERPL-CCNC: 116 U/L — HIGH (ref 30–115)
ALT FLD-CCNC: 10 U/L — SIGNIFICANT CHANGE UP (ref 0–41)
ANION GAP SERPL CALC-SCNC: 12 MMOL/L — SIGNIFICANT CHANGE UP (ref 7–14)
AST SERPL-CCNC: 21 U/L — SIGNIFICANT CHANGE UP (ref 0–41)
BASOPHILS # BLD AUTO: 0.08 K/UL — SIGNIFICANT CHANGE UP (ref 0–0.2)
BASOPHILS NFR BLD AUTO: 0.5 % — SIGNIFICANT CHANGE UP (ref 0–1)
BILIRUB SERPL-MCNC: 0.2 MG/DL — SIGNIFICANT CHANGE UP (ref 0.2–1.2)
BUN SERPL-MCNC: 72 MG/DL — CRITICAL HIGH (ref 10–20)
CALCIUM SERPL-MCNC: 9 MG/DL — SIGNIFICANT CHANGE UP (ref 8.4–10.5)
CHLORIDE SERPL-SCNC: 99 MMOL/L — SIGNIFICANT CHANGE UP (ref 98–110)
CO2 SERPL-SCNC: 26 MMOL/L — SIGNIFICANT CHANGE UP (ref 17–32)
CREAT SERPL-MCNC: 2.9 MG/DL — HIGH (ref 0.7–1.5)
EGFR: 23 ML/MIN/1.73M2 — LOW
EOSINOPHIL # BLD AUTO: 0.61 K/UL — SIGNIFICANT CHANGE UP (ref 0–0.7)
EOSINOPHIL NFR BLD AUTO: 4 % — SIGNIFICANT CHANGE UP (ref 0–8)
GLUCOSE BLDC GLUCOMTR-MCNC: 109 MG/DL — HIGH (ref 70–99)
GLUCOSE BLDC GLUCOMTR-MCNC: 209 MG/DL — HIGH (ref 70–99)
GLUCOSE BLDC GLUCOMTR-MCNC: 232 MG/DL — HIGH (ref 70–99)
GLUCOSE SERPL-MCNC: 99 MG/DL — SIGNIFICANT CHANGE UP (ref 70–99)
HCT VFR BLD CALC: 37.9 % — LOW (ref 42–52)
HGB BLD-MCNC: 12.9 G/DL — LOW (ref 14–18)
IMM GRANULOCYTES NFR BLD AUTO: 0.6 % — HIGH (ref 0.1–0.3)
LYMPHOCYTES # BLD AUTO: 1.94 K/UL — SIGNIFICANT CHANGE UP (ref 1.2–3.4)
LYMPHOCYTES # BLD AUTO: 12.8 % — LOW (ref 20.5–51.1)
MAGNESIUM SERPL-MCNC: 2.4 MG/DL — SIGNIFICANT CHANGE UP (ref 1.8–2.4)
MCHC RBC-ENTMCNC: 30.7 PG — SIGNIFICANT CHANGE UP (ref 27–31)
MCHC RBC-ENTMCNC: 34 G/DL — SIGNIFICANT CHANGE UP (ref 32–37)
MCV RBC AUTO: 90.2 FL — SIGNIFICANT CHANGE UP (ref 80–94)
MONOCYTES # BLD AUTO: 1.29 K/UL — HIGH (ref 0.1–0.6)
MONOCYTES NFR BLD AUTO: 8.5 % — SIGNIFICANT CHANGE UP (ref 1.7–9.3)
NEUTROPHILS # BLD AUTO: 11.14 K/UL — HIGH (ref 1.4–6.5)
NEUTROPHILS NFR BLD AUTO: 73.6 % — SIGNIFICANT CHANGE UP (ref 42.2–75.2)
NRBC # BLD: 0 /100 WBCS — SIGNIFICANT CHANGE UP (ref 0–0)
PLATELET # BLD AUTO: 365 K/UL — SIGNIFICANT CHANGE UP (ref 130–400)
PMV BLD: 10.4 FL — SIGNIFICANT CHANGE UP (ref 7.4–10.4)
POTASSIUM SERPL-MCNC: 4.7 MMOL/L — SIGNIFICANT CHANGE UP (ref 3.5–5)
POTASSIUM SERPL-SCNC: 4.7 MMOL/L — SIGNIFICANT CHANGE UP (ref 3.5–5)
PROT SERPL-MCNC: 6.8 G/DL — SIGNIFICANT CHANGE UP (ref 6–8)
RBC # BLD: 4.2 M/UL — LOW (ref 4.7–6.1)
RBC # FLD: 14.3 % — SIGNIFICANT CHANGE UP (ref 11.5–14.5)
SODIUM SERPL-SCNC: 137 MMOL/L — SIGNIFICANT CHANGE UP (ref 135–146)
WBC # BLD: 15.15 K/UL — HIGH (ref 4.8–10.8)
WBC # FLD AUTO: 15.15 K/UL — HIGH (ref 4.8–10.8)

## 2024-03-30 PROCEDURE — 99232 SBSQ HOSP IP/OBS MODERATE 35: CPT

## 2024-03-30 RX ORDER — POLYETHYLENE GLYCOL 3350 17 G/17G
17 POWDER, FOR SOLUTION ORAL EVERY 12 HOURS
Refills: 0 | Status: DISCONTINUED | OUTPATIENT
Start: 2024-03-30 | End: 2024-04-01

## 2024-03-30 RX ORDER — IBUPROFEN 200 MG
600 TABLET ORAL ONCE
Refills: 0 | Status: COMPLETED | OUTPATIENT
Start: 2024-03-30 | End: 2024-03-30

## 2024-03-30 RX ORDER — SENNA PLUS 8.6 MG/1
2 TABLET ORAL AT BEDTIME
Refills: 0 | Status: DISCONTINUED | OUTPATIENT
Start: 2024-03-30 | End: 2024-04-01

## 2024-03-30 RX ADMIN — HEPARIN SODIUM 5000 UNIT(S): 5000 INJECTION INTRAVENOUS; SUBCUTANEOUS at 16:24

## 2024-03-30 RX ADMIN — Medication 100 MILLIGRAM(S): at 21:19

## 2024-03-30 RX ADMIN — Medication 25 MILLIGRAM(S): at 05:34

## 2024-03-30 RX ADMIN — Medication 100 MILLIGRAM(S): at 05:34

## 2024-03-30 RX ADMIN — SENNA PLUS 2 TABLET(S): 8.6 TABLET ORAL at 21:21

## 2024-03-30 RX ADMIN — POLYETHYLENE GLYCOL 3350 17 GRAM(S): 17 POWDER, FOR SOLUTION ORAL at 16:25

## 2024-03-30 RX ADMIN — Medication 5 MILLIGRAM(S): at 05:34

## 2024-03-30 RX ADMIN — HEPARIN SODIUM 5000 UNIT(S): 5000 INJECTION INTRAVENOUS; SUBCUTANEOUS at 21:23

## 2024-03-30 RX ADMIN — Medication 5 MILLIGRAM(S): at 16:22

## 2024-03-30 RX ADMIN — Medication 1 APPLICATION(S): at 05:34

## 2024-03-30 RX ADMIN — Medication 650 MILLIGRAM(S): at 16:18

## 2024-03-30 RX ADMIN — Medication 600 MILLIGRAM(S): at 17:10

## 2024-03-30 RX ADMIN — Medication 10 MILLILITER(S): at 23:21

## 2024-03-30 RX ADMIN — HEPARIN SODIUM 5000 UNIT(S): 5000 INJECTION INTRAVENOUS; SUBCUTANEOUS at 05:34

## 2024-03-30 RX ADMIN — Medication 1 APPLICATION(S): at 16:31

## 2024-03-30 RX ADMIN — TAMSULOSIN HYDROCHLORIDE 0.4 MILLIGRAM(S): 0.4 CAPSULE ORAL at 21:21

## 2024-03-30 RX ADMIN — Medication 25 MILLIGRAM(S): at 16:23

## 2024-03-30 RX ADMIN — POLYETHYLENE GLYCOL 3350 17 GRAM(S): 17 POWDER, FOR SOLUTION ORAL at 11:29

## 2024-03-30 RX ADMIN — SENNA PLUS 2 TABLET(S): 8.6 TABLET ORAL at 11:19

## 2024-03-30 RX ADMIN — Medication 100 MILLIGRAM(S): at 16:18

## 2024-03-30 RX ADMIN — Medication 650 MILLIGRAM(S): at 06:42

## 2024-03-30 RX ADMIN — Medication 1 APPLICATION(S): at 21:22

## 2024-03-30 NOTE — PROGRESS NOTE ADULT - NS ATTEND AMEND GEN_ALL_CORE FT
Pt seen with surgical PA on am rounds, with assessment and plan as noted above.  right groin mass not clearly palpable at present, and will plan for further W/u and Rx as noted above.  no plans for any surgery at this time, but will follow with you.

## 2024-03-30 NOTE — PROGRESS NOTE ADULT - ATTENDING COMMENTS
Improving.  Afebrile.  Cellulitis has improved.  Erythema in the RLE is still present.  Some tenderness is still present along the erythema in the inner thigh. Consistent with phlebitis.  Continue Abx. Elevate RLE. Wrapping.   Would consider biopsy of Right groin mass.  Recommend outpatient follow up with podiatry.

## 2024-03-30 NOTE — PROGRESS NOTE ADULT - ASSESSMENT
70yo male whose PMH includes A Fib (to me it is PAF following an ablation procedure but he ran out of Xarelto 2 weeks ago, DM (uses insulin pump), CAD, apparent  CKD, HTN and recurrent lower extremity cellulitis, presents to the ER from his doctor's office due to episode of rigors and right leg pain    Assessment    ·	Cellulitis with lymphangiitis, doubt infected hematoma  ·	PAF ran out of xarelto two weeks ago  ·	KRISTINE on CKD  ·	CAD  ·	HTN  ·	DM uses insulin pump but ran out (now on lantus / lispro)    Plan    - ID recs appreciated / cefazolin for cellulitis, seen by surgery as patient is improving will monitor for now  - giving gentle fluids for possible KRISTINE on CKD / patient takes lasix 80 / metolazone 2.5 / spironolactone at hoem which can explain the hyponatremia and KRISTINE, will hold for now / will do RBUS, as worsening will consult nephro  - insulin pump has been refilled, discontinued subq  - lopressor bid  - morphine for pain for now  - tamsulosin    Pending: clinical improvement on abx, RBUS, nephro follow up, will hold xarelto for another day of improvement in case surgery will be needed, will start dvt ppx    # DVT PPX: heparin sc for now 68yo male whose PMH includes A Fib (to me it is PAF following an ablation procedure but he ran out of Xarelto 2 weeks ago, DM (uses insulin pump), CAD, apparent  CKD, HTN and recurrent lower extremity cellulitis, presents to the ER from his doctor's office due to episode of rigors and right leg pain    Assessment    ·	Cellulitis with lymphangiitis, doubt infected hematoma  ·	PAF ran out of xarelto two weeks ago  ·	KRISTINE on CKD  ·	CAD  ·	HTN  ·	DM uses insulin pump but ran out (now on lantus / lispro)    Plan    - ID recs appreciated / cefazolin for cellulitis, seen by surgery as patient is improving will monitor for now  -  received gentle fluids for KRISTINE on CKD, RBUS negative, mild improvement, will stop fluids but continue to hold home diuretics  - insulin pump has been refilled, discontinued subq  - lopressor bid  - morphine for pain for now  - tamsulosin    Pending: clinical improvement on abx, ultrasound for improving hematoma will wait before starting home xarelto    # DVT PPX: heparin sc for now

## 2024-03-30 NOTE — CONSULT NOTE ADULT - ASSESSMENT
1)  KRISTINE on CKD likely due to sepsis syndrome from cellulitis, ? infected hematoma.  KRISTINE component may be starting to improve.  Can't r/o a component of DIONICIO    2)  Underlying CKD, w/ fluctuating Screat over the years likely representing episodes of transient KRISTINE.  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  Of note, albuminuria can transiently increase w/ active infection due to increased glomerular permeability    Recommendations:    1)  Continue Cefazolin.  Of note, 2gm q8hrs seems a bit high for current GFR.  Approved by ID    2)  No change in treatment overall from Renal standpoint    3)  No further work up of nephropathy required in hospital    4)  Simply monitor renal function

## 2024-03-30 NOTE — PROGRESS NOTE ADULT - ASSESSMENT
.  Impression:  70 y/o male with past medical history of Afib (on Xarelto), CAD, s/p CABG, Tyle 2 diabetes, HDL, obesity now with Cellulitis and Hematoma Right groin and RLE.           Plan:  Dr. Fernandez recommends:  - Continue Ivabx but recommends if pain, swelling and leukocytosis not improving with Cefazolin 2 grams IV q 8 hours, would try Unasyn.   - ID follow up.    - Follow Blood C&S results.  - Trend WBC count.   - Elevate RLE while in bed or chair.  If in bed would elevate on pillows with foot of bed gatched.  - Warm moist towel compresses with plastic bags wrapped around his leg to keep moisture in.  (Dr. Fernandez relayed this to the patient's RN).   - Pain mgt.-->  If Ok with medical team, suggest to add a low dose Fentanyl patch to help control his pain.   - Recommend to repeat RLE ultrasound on Monday to see if hematoma resolving.    - Surgery to follow.

## 2024-03-30 NOTE — CONSULT NOTE ADULT - SUBJECTIVE AND OBJECTIVE BOX
[facility]  INITIAL CONSULT NOTE  --------------------------------------------------------------------------------  HPI:  70 yo male w/ PMHx as below, including CKD w/ elevated creat from mid-1.0's to over 2 dating back at least 3 yrs.  Sees Dr Kleiner.  Recalls KRISTINE on CKD a year ago when admitted w/ LE cellulitis.  Also aware of having proteinuria, states he was told CKD due to DM.  Came w/ rigors, found to have right groin hematoma, RLE cellulitis.  No hemodynamic instability.  Received IVC w/ CT 3/27.  Renal U/S large kidneys, single simple cyst        PAST HISTORY  --------------------------------------------------------------------------------  PAST MEDICAL & SURGICAL HISTORY:  Diabetes mellitus, type 2      BPH (benign prostatic hyperplasia)      Water retention      Essential hypertension      Diabetes      CAD (coronary artery disease)      H/O hypercholesterolemia      H/O right coronary artery stent placement      History of resection of pancreas      History of cholecystectomy      History of left knee replacement        FAMILY HISTORY:  Family history of lung cancer (Sibling)    Family history of diabetes mellitus (Mother)    Family history of coronary artery disease (Father)      PAST SOCIAL HISTORY:    ALLERGIES & MEDICATIONS  --------------------------------------------------------------------------------  Allergies    No Known Allergies    Intolerances      Standing Inpatient Medications  ceFAZolin   IVPB      ceFAZolin   IVPB 2000 milliGRAM(s) IV Intermittent every 8 hours  dextrose 5%. 1000 milliLiter(s) IV Continuous <Continuous>  dextrose 5%. 1000 milliLiter(s) IV Continuous <Continuous>  dextrose 50% Injectable 25 Gram(s) IV Push once  dextrose 50% Injectable 25 Gram(s) IV Push once  dextrose 50% Injectable 12.5 Gram(s) IV Push once  glucagon  Injectable 1 milliGRAM(s) IntraMuscular once  heparin   Injectable 5000 Unit(s) SubCutaneous every 8 hours  hydrocortisone 2.5% Ointment 1 Application(s) Topical three times a day  metoprolol tartrate 25 milliGRAM(s) Oral two times a day  oxybutynin 5 milliGRAM(s) Oral two times a day  polyethylene glycol 3350 17 Gram(s) Oral every 12 hours  senna 2 Tablet(s) Oral at bedtime  tamsulosin 0.4 milliGRAM(s) Oral at bedtime    PRN Inpatient Medications  acetaminophen     Tablet .. 650 milliGRAM(s) Oral every 6 hours PRN  aluminum hydroxide/magnesium hydroxide/simethicone Suspension 30 milliLiter(s) Oral every 6 hours PRN  dextrose Oral Gel 15 Gram(s) Oral once PRN  guaifenesin/dextromethorphan Oral Liquid 10 milliLiter(s) Oral every 6 hours PRN  morphine  - Injectable 4 milliGRAM(s) IV Push every 6 hours PRN  ondansetron Injectable 4 milliGRAM(s) IV Push four times a day PRN      REVIEW OF SYSTEMS  --------------------------------------------------------------------------------  Gen: No weight changes, fatigue, fevers/chills, weakness  Skin: No rashes  Head/Eyes/Ears/Mouth: No headache; Normal hearing; Normal vision w/o blurriness; No sinus pain/discomfort, sore throat  Respiratory: No dyspnea, cough, wheezing, hemoptysis  CV: No chest pain, PND, orthopnea  GI: No abdominal pain, diarrhea, constipation, nausea, vomiting, melena, hematochezia  : No increased frequency, dysuria, hematuria, nocturia  MSK: No joint pain/swelling; no back pain; no edema  Neuro: No dizziness/lightheadedness, weakness, seizures, numbness, tingling  Heme: No easy bruising or bleeding  Endo: No heat/cold intolerance  Psych: No significant nervousness, anxiety, stress, depression    All other systems were reviewed and are negative, except as noted.    VITALS/PHYSICAL EXAM  --------------------------------------------------------------------------------  T(C): 37.1 (03-30-24 @ 12:45), Max: 37.1 (03-30-24 @ 12:45)  HR: 86 (03-30-24 @ 12:45) (86 - 91)  BP: 119/53 (03-30-24 @ 12:45) (119/53 - 148/67)  RR: 18 (03-30-24 @ 12:45) (18 - 18)  SpO2: 96% (03-30-24 @ 12:45) (96% - 96%)  Wt(kg): --          LABS/STUDIES  --------------------------------------------------------------------------------              12.9   15.15 >-----------<  365      [03-30-24 @ 08:42]              37.9     137  |  99  |  72  ----------------------------<  99      [03-30-24 @ 08:42]  4.7   |  26  |  2.9        Ca     9.0     [03-30-24 @ 08:42]      Mg     2.4     [03-30-24 @ 08:42]    TPro  6.8  /  Alb  3.5  /  TBili  0.2  /  DBili  x   /  AST  21  /  ALT  10  /  AlkPhos  116  [03-30-24 @ 08:42]          Creatinine Trend:  SCr 2.9 [03-30 @ 08:42]  SCr 3.2 [03-29 @ 06:49]  SCr 2.4 [03-28 @ 19:27]  SCr 2.5 [03-28 @ 05:55]  SCr 2.17 [03-28 @ 00:34]    Urinalysis - [03-30-24 @ 08:42]      Color  / Appearance  / SG  / pH       Gluc 99 / Ketone   / Bili  / Urobili        Blood  / Protein  / Leuk Est  / Nitrite       RBC  / WBC  / Hyaline  / Gran  / Sq Epi  / Non Sq Epi  / Bacteria       HbA1c 10.2      [07-19-19 @ 07:27]    HCV 0.08, Nonreact      [07-18-19 @ 07:22]

## 2024-03-30 NOTE — PROGRESS NOTE ADULT - SUBJECTIVE AND OBJECTIVE BOX
SUBJECTIVE:    Patient is a 69y old Male who presents with a chief complaint of Right leg pain (29 Mar 2024 12:41)      HPI:  68yo male whose PMH includes A Fib (to me it is PAF following an ablation procedure but he ran out of Xarelto 2 weeks ago, DM (uses insulin pump), CAD, apparent  CKD, HTN and recurrent lower extremity cellulitis, presents to the ER from his doctor's office due to episode of rigors and right leg pain. In ER found to have an infected hematoma  (28 Mar 2024 02:10)      Currently admitted to medicine with the primary diagnosis of Sepsis         Besides the pertinent positives and negatives described above, the ROS was within normal limits.    PAST MEDICAL & SURGICAL HISTORY  Diabetes mellitus, type 2    BPH (benign prostatic hyperplasia)    Water retention    Essential hypertension    Diabetes    CAD (coronary artery disease)    H/O hypercholesterolemia    H/O right coronary artery stent placement    History of resection of pancreas    History of cholecystectomy    History of left knee replacement      SOCIAL HISTORY:    ALLERGIES:  No Known Allergies    MEDICATIONS:  STANDING MEDICATIONS  ceFAZolin   IVPB      ceFAZolin   IVPB 2000 milliGRAM(s) IV Intermittent every 8 hours  dextrose 5%. 1000 milliLiter(s) IV Continuous <Continuous>  dextrose 5%. 1000 milliLiter(s) IV Continuous <Continuous>  dextrose 50% Injectable 25 Gram(s) IV Push once  dextrose 50% Injectable 25 Gram(s) IV Push once  dextrose 50% Injectable 12.5 Gram(s) IV Push once  glucagon  Injectable 1 milliGRAM(s) IntraMuscular once  heparin   Injectable 5000 Unit(s) SubCutaneous every 8 hours  hydrocortisone 2.5% Ointment 1 Application(s) Topical three times a day  metoprolol tartrate 25 milliGRAM(s) Oral two times a day  oxybutynin 5 milliGRAM(s) Oral two times a day  tamsulosin 0.4 milliGRAM(s) Oral at bedtime    PRN MEDICATIONS  acetaminophen     Tablet .. 650 milliGRAM(s) Oral every 6 hours PRN  aluminum hydroxide/magnesium hydroxide/simethicone Suspension 30 milliLiter(s) Oral every 6 hours PRN  dextrose Oral Gel 15 Gram(s) Oral once PRN  guaifenesin/dextromethorphan Oral Liquid 10 milliLiter(s) Oral every 6 hours PRN  morphine  - Injectable 4 milliGRAM(s) IV Push every 6 hours PRN  ondansetron Injectable 4 milliGRAM(s) IV Push four times a day PRN    VITALS:   T(F): 97.8  HR: 91  BP: 148/67  RR: 18  SpO2: 96%    LABS:                        13.0   23.01 )-----------( 357      ( 29 Mar 2024 06:49 )             38.3     03-29    132<L>  |  96<L>  |  75<HH>  ----------------------------<  188<H>  4.9   |  24  |  3.2<H>    Ca    8.4      29 Mar 2024 06:49  Mg     2.2     03-29    TPro  6.5  /  Alb  3.3<L>  /  TBili  0.2  /  DBili  x   /  AST  27  /  ALT  25  /  AlkPhos  111  03-29      Urinalysis Basic - ( 29 Mar 2024 06:49 )    Color: x / Appearance: x / SG: x / pH: x  Gluc: 188 mg/dL / Ketone: x  / Bili: x / Urobili: x   Blood: x / Protein: x / Nitrite: x   Leuk Esterase: x / RBC: x / WBC x   Sq Epi: x / Non Sq Epi: x / Bacteria: x            Culture - Urine (collected 27 Mar 2024 23:04)  Source: Clean Catch Clean Catch (Midstream)  Final Report (29 Mar 2024 13:51):    No growth    Culture - Blood (collected 27 Mar 2024 19:42)  Source: .Blood Blood-Peripheral  Preliminary Report (30 Mar 2024 02:02):    No growth at 48 Hours    Culture - Blood (collected 27 Mar 2024 19:42)  Source: .Blood Blood-Peripheral  Preliminary Report (30 Mar 2024 02:02):    No growth at 48 Hours          Sepsis      RADIOLOGY:    PHYSICAL EXAM:  General: WN/WD NAD  Neurology: A&Ox3, nonfocal, LUJAN x 4  Head:  Normocephalic, atraumatic  ENT:  Mucosa moist, no ulcerations  Neck:  Supple, no sinuses or palpable masses  Lymphatic:  No palpable cervical, supraclavicular, axillary or inguinal adenopathy  Respiratory: CTA B/L  CV: RRR, S1S2, no murmur  Abdominal: Soft, NT, ND no palpable mass  MSK: No edema, + peripheral pulses  Incisions: intact, no erythema or drainage    Intravenous access:   NG tube:   Ojeda Catheter:        SUBJECTIVE:    Patient is a 69y old Male who presents with a chief complaint of Right leg pain (29 Mar 2024 12:41)      HPI:  70yo male whose PMH includes A Fib (to me it is PAF following an ablation procedure but he ran out of Xarelto 2 weeks ago, DM (uses insulin pump), CAD, apparent  CKD, HTN and recurrent lower extremity cellulitis, presents to the ER from his doctor's office due to episode of rigors and right leg pain. In ER found to have an infected hematoma  (28 Mar 2024 02:10)      Currently admitted to medicine with the primary diagnosis of Sepsis     pain is improving, hasn't jhad a BM requesting laxatives (ordered)    Besides the pertinent positives and negatives described above, the ROS was within normal limits.    PAST MEDICAL & SURGICAL HISTORY  Diabetes mellitus, type 2    BPH (benign prostatic hyperplasia)    Water retention    Essential hypertension    Diabetes    CAD (coronary artery disease)    H/O hypercholesterolemia    H/O right coronary artery stent placement    History of resection of pancreas    History of cholecystectomy    History of left knee replacement      SOCIAL HISTORY:    ALLERGIES:  No Known Allergies    MEDICATIONS:  STANDING MEDICATIONS  ceFAZolin   IVPB      ceFAZolin   IVPB 2000 milliGRAM(s) IV Intermittent every 8 hours  dextrose 5%. 1000 milliLiter(s) IV Continuous <Continuous>  dextrose 5%. 1000 milliLiter(s) IV Continuous <Continuous>  dextrose 50% Injectable 25 Gram(s) IV Push once  dextrose 50% Injectable 25 Gram(s) IV Push once  dextrose 50% Injectable 12.5 Gram(s) IV Push once  glucagon  Injectable 1 milliGRAM(s) IntraMuscular once  heparin   Injectable 5000 Unit(s) SubCutaneous every 8 hours  hydrocortisone 2.5% Ointment 1 Application(s) Topical three times a day  metoprolol tartrate 25 milliGRAM(s) Oral two times a day  oxybutynin 5 milliGRAM(s) Oral two times a day  tamsulosin 0.4 milliGRAM(s) Oral at bedtime    PRN MEDICATIONS  acetaminophen     Tablet .. 650 milliGRAM(s) Oral every 6 hours PRN  aluminum hydroxide/magnesium hydroxide/simethicone Suspension 30 milliLiter(s) Oral every 6 hours PRN  dextrose Oral Gel 15 Gram(s) Oral once PRN  guaifenesin/dextromethorphan Oral Liquid 10 milliLiter(s) Oral every 6 hours PRN  morphine  - Injectable 4 milliGRAM(s) IV Push every 6 hours PRN  ondansetron Injectable 4 milliGRAM(s) IV Push four times a day PRN    VITALS:   T(F): 97.8  HR: 91  BP: 148/67  RR: 18  SpO2: 96%    LABS:                        13.0   23.01 )-----------( 357      ( 29 Mar 2024 06:49 )             38.3     03-29    132<L>  |  96<L>  |  75<HH>  ----------------------------<  188<H>  4.9   |  24  |  3.2<H>    Ca    8.4      29 Mar 2024 06:49  Mg     2.2     03-29    TPro  6.5  /  Alb  3.3<L>  /  TBili  0.2  /  DBili  x   /  AST  27  /  ALT  25  /  AlkPhos  111  03-29      Urinalysis Basic - ( 29 Mar 2024 06:49 )    Color: x / Appearance: x / SG: x / pH: x  Gluc: 188 mg/dL / Ketone: x  / Bili: x / Urobili: x   Blood: x / Protein: x / Nitrite: x   Leuk Esterase: x / RBC: x / WBC x   Sq Epi: x / Non Sq Epi: x / Bacteria: x            Culture - Urine (collected 27 Mar 2024 23:04)  Source: Clean Catch Clean Catch (Midstream)  Final Report (29 Mar 2024 13:51):    No growth    Culture - Blood (collected 27 Mar 2024 19:42)  Source: .Blood Blood-Peripheral  Preliminary Report (30 Mar 2024 02:02):    No growth at 48 Hours    Culture - Blood (collected 27 Mar 2024 19:42)  Source: .Blood Blood-Peripheral  Preliminary Report (30 Mar 2024 02:02):    No growth at 48 Hours          Sepsis      RADIOLOGY:    PHYSICAL EXAM:  General: WN/WD NAD  Neurology: A&Ox3, nonfocal, LUJAN x 4  Head:  Normocephalic, atraumatic  ENT:  Mucosa moist, no ulcerations  Neck:  Supple, no sinuses or palpable masses  Lymphatic:  No palpable cervical, supraclavicular, axillary or inguinal adenopathy  Respiratory: CTA B/L  CV: RRR, S1S2, no murmur  Abdominal: Soft, NT, ND no palpable mass  MSK: erythema right leg, improving  Incisions: intact, no erythema or drainage    Intravenous access: yes  NG tube: no  Ojeda Catheter: no

## 2024-03-30 NOTE — PROGRESS NOTE ADULT - SUBJECTIVE AND OBJECTIVE BOX
.  Patient seen & examined with Dr. Fernandez this morning.    Patient reports still with significant pain to RLE with palpation and movement but does reports the pain and swelling are improving since admission.  Patient denies subjective fever, chills, tremors, N/V/D, CP or SOB.             MEDICATIONS  (STANDING):  ceFAZolin   IVPB      ceFAZolin   IVPB 2000 milliGRAM(s) IV Intermittent every 8 hours  dextrose 5%. 1000 milliLiter(s) (50 mL/Hr) IV Continuous <Continuous>  dextrose 5%. 1000 milliLiter(s) (100 mL/Hr) IV Continuous <Continuous>  dextrose 50% Injectable 25 Gram(s) IV Push once  dextrose 50% Injectable 12.5 Gram(s) IV Push once  dextrose 50% Injectable 25 Gram(s) IV Push once  glucagon  Injectable 1 milliGRAM(s) IntraMuscular once  heparin   Injectable 5000 Unit(s) SubCutaneous every 8 hours  hydrocortisone 2.5% Ointment 1 Application(s) Topical three times a day  metoprolol tartrate 25 milliGRAM(s) Oral two times a day  oxybutynin 5 milliGRAM(s) Oral two times a day  tamsulosin 0.4 milliGRAM(s) Oral at bedtime    MEDICATIONS  (PRN):  acetaminophen     Tablet .. 650 milliGRAM(s) Oral every 6 hours PRN Mild Pain (1 - 3)  aluminum hydroxide/magnesium hydroxide/simethicone Suspension 30 milliLiter(s) Oral every 6 hours PRN Dyspepsia  dextrose Oral Gel 15 Gram(s) Oral once PRN Blood Glucose LESS THAN 70 milliGRAM(s)/deciliter  guaifenesin/dextromethorphan Oral Liquid 10 milliLiter(s) Oral every 6 hours PRN Cough  morphine  - Injectable 4 milliGRAM(s) IV Push every 6 hours PRN Severe Pain (7 - 10)  ondansetron Injectable 4 milliGRAM(s) IV Push four times a day PRN Nausea and/or Vomiting          Vital Signs Last 24 Hrs  T(C): 36.6 (30 Mar 2024 05:22), Max: 36.6 (30 Mar 2024 05:22)  T(F): 97.8 (30 Mar 2024 05:22), Max: 97.8 (30 Mar 2024 05:22)  HR: 91 (30 Mar 2024 05:22) (91 - 102)  BP: 148/67 (30 Mar 2024 05:22) (117/53 - 148/67)  RR: 18 (29 Mar 2024 21:10) (18 - 18)  SpO2: 96% (29 Mar 2024 21:10) (95% - 96%)    Parameters below as of 29 Mar 2024 14:00  Patient On (Oxygen Delivery Method): room air          Physical Exam:  General:  WD, Obese, male conversant in NAD.   Abdomen:  + Bowel sounds, soft, protuberant no distention.  Non-tender,  no rebound/guarding or peritoneal signs.    Extremities:  Anterior right groin and RLE edema and erythema tracking down medial and posterior thigh to calf area.  (++) Tenderness with palpation from right groin down to calf.  Good capillary refill distally, (+) pedal pulses present.  Patient can wiggle toes and plantar and dorsiflex foot.  Sensation intact to light touch.               LABS:                        13.0   23.01 )-----------( 357      ( 29 Mar 2024 06:49 )             38.3     03-29    132<L>  |  96<L>  |  75<HH>  ----------------------------<  188<H>  4.9   |  24  |  3.2<H>    Ca    8.4      29 Mar 2024 06:49  Mg     2.2     03-29    TPro  6.5  /  Alb  3.3<L>  /  TBili  0.2  /  DBili  x   /  AST  27  /  ALT  25  /  AlkPhos  111  03-29      Urinalysis Basic - ( 29 Mar 2024 06:49 )    Color: x / Appearance: x / SG: x / pH: x  Gluc: 188 mg/dL / Ketone: x  / Bili: x / Urobili: x   Blood: x / Protein: x / Nitrite: x   Leuk Esterase: x / RBC: x / WBC x   Sq Epi: x / Non Sq Epi: x / Bacteria: x        Culture - Urine (collected 27 Mar 2024 23:04)  Source: Clean Catch Clean Catch (Midstream)  Final Report (29 Mar 2024 13:51):    No growth    Culture - Blood (collected 27 Mar 2024 19:42)  Source: .Blood Blood-Peripheral  Preliminary Report (30 Mar 2024 02:02):    No growth at 48 Hours    Culture - Blood (collected 27 Mar 2024 19:42)  Source: .Blood Blood-Peripheral  Preliminary Report (30 Mar 2024 02:02):    No growth at 48 Hours          CT Lower Extremity w/ IV Cont, Right (03.27.24 @ 20:45)   IMPRESSION:  Well-circumscribed 9.1 cm soft tissue mass in the anterior right groin   without associated inflammatory changes possibly representing a large   infected hematoma, new since 3/25/2023.    Mild subcutaneous soft tissue swelling in the anterior knee.    No abscess.  .

## 2024-03-31 LAB
ALBUMIN SERPL ELPH-MCNC: 3.5 G/DL — SIGNIFICANT CHANGE UP (ref 3.5–5.2)
ALP SERPL-CCNC: 135 U/L — HIGH (ref 30–115)
ALT FLD-CCNC: 9 U/L — SIGNIFICANT CHANGE UP (ref 0–41)
ANION GAP SERPL CALC-SCNC: 12 MMOL/L — SIGNIFICANT CHANGE UP (ref 7–14)
AST SERPL-CCNC: 26 U/L — SIGNIFICANT CHANGE UP (ref 0–41)
BASOPHILS # BLD AUTO: 0.11 K/UL — SIGNIFICANT CHANGE UP (ref 0–0.2)
BASOPHILS NFR BLD AUTO: 0.9 % — SIGNIFICANT CHANGE UP (ref 0–1)
BILIRUB SERPL-MCNC: <0.2 MG/DL — SIGNIFICANT CHANGE UP (ref 0.2–1.2)
BUN SERPL-MCNC: 69 MG/DL — CRITICAL HIGH (ref 10–20)
CALCIUM SERPL-MCNC: 9 MG/DL — SIGNIFICANT CHANGE UP (ref 8.4–10.5)
CHLORIDE SERPL-SCNC: 100 MMOL/L — SIGNIFICANT CHANGE UP (ref 98–110)
CO2 SERPL-SCNC: 26 MMOL/L — SIGNIFICANT CHANGE UP (ref 17–32)
CREAT SERPL-MCNC: 2.5 MG/DL — HIGH (ref 0.7–1.5)
EGFR: 27 ML/MIN/1.73M2 — LOW
EOSINOPHIL # BLD AUTO: 0.75 K/UL — HIGH (ref 0–0.7)
EOSINOPHIL NFR BLD AUTO: 6.1 % — SIGNIFICANT CHANGE UP (ref 0–8)
GLUCOSE BLDC GLUCOMTR-MCNC: 135 MG/DL — HIGH (ref 70–99)
GLUCOSE BLDC GLUCOMTR-MCNC: 139 MG/DL — HIGH (ref 70–99)
GLUCOSE BLDC GLUCOMTR-MCNC: 223 MG/DL — HIGH (ref 70–99)
GLUCOSE BLDC GLUCOMTR-MCNC: 236 MG/DL — HIGH (ref 70–99)
GLUCOSE SERPL-MCNC: 119 MG/DL — HIGH (ref 70–99)
HCT VFR BLD CALC: 36.6 % — LOW (ref 42–52)
HGB BLD-MCNC: 12.4 G/DL — LOW (ref 14–18)
IMM GRANULOCYTES NFR BLD AUTO: 0.7 % — HIGH (ref 0.1–0.3)
LYMPHOCYTES # BLD AUTO: 16.9 % — LOW (ref 20.5–51.1)
LYMPHOCYTES # BLD AUTO: 2.07 K/UL — SIGNIFICANT CHANGE UP (ref 1.2–3.4)
MAGNESIUM SERPL-MCNC: 2.4 MG/DL — SIGNIFICANT CHANGE UP (ref 1.8–2.4)
MCHC RBC-ENTMCNC: 30.7 PG — SIGNIFICANT CHANGE UP (ref 27–31)
MCHC RBC-ENTMCNC: 33.9 G/DL — SIGNIFICANT CHANGE UP (ref 32–37)
MCV RBC AUTO: 90.6 FL — SIGNIFICANT CHANGE UP (ref 80–94)
MONOCYTES # BLD AUTO: 1.41 K/UL — HIGH (ref 0.1–0.6)
MONOCYTES NFR BLD AUTO: 11.5 % — HIGH (ref 1.7–9.3)
NEUTROPHILS # BLD AUTO: 7.82 K/UL — HIGH (ref 1.4–6.5)
NEUTROPHILS NFR BLD AUTO: 63.9 % — SIGNIFICANT CHANGE UP (ref 42.2–75.2)
NRBC # BLD: 0 /100 WBCS — SIGNIFICANT CHANGE UP (ref 0–0)
PLATELET # BLD AUTO: 420 K/UL — HIGH (ref 130–400)
PMV BLD: 10.2 FL — SIGNIFICANT CHANGE UP (ref 7.4–10.4)
POTASSIUM SERPL-MCNC: 4.8 MMOL/L — SIGNIFICANT CHANGE UP (ref 3.5–5)
POTASSIUM SERPL-SCNC: 4.8 MMOL/L — SIGNIFICANT CHANGE UP (ref 3.5–5)
PROT SERPL-MCNC: 6.6 G/DL — SIGNIFICANT CHANGE UP (ref 6–8)
RBC # BLD: 4.04 M/UL — LOW (ref 4.7–6.1)
RBC # FLD: 14.3 % — SIGNIFICANT CHANGE UP (ref 11.5–14.5)
SODIUM SERPL-SCNC: 138 MMOL/L — SIGNIFICANT CHANGE UP (ref 135–146)
WBC # BLD: 12.24 K/UL — HIGH (ref 4.8–10.8)
WBC # FLD AUTO: 12.24 K/UL — HIGH (ref 4.8–10.8)

## 2024-03-31 PROCEDURE — 99232 SBSQ HOSP IP/OBS MODERATE 35: CPT

## 2024-03-31 RX ORDER — METHOCARBAMOL 500 MG/1
500 TABLET, FILM COATED ORAL THREE TIMES A DAY
Refills: 0 | Status: DISCONTINUED | OUTPATIENT
Start: 2024-03-31 | End: 2024-04-01

## 2024-03-31 RX ORDER — HYDROMORPHONE HYDROCHLORIDE 2 MG/ML
1 INJECTION INTRAMUSCULAR; INTRAVENOUS; SUBCUTANEOUS EVERY 4 HOURS
Refills: 0 | Status: DISCONTINUED | OUTPATIENT
Start: 2024-03-31 | End: 2024-04-01

## 2024-03-31 RX ORDER — HYDROMORPHONE HYDROCHLORIDE 2 MG/ML
1 INJECTION INTRAMUSCULAR; INTRAVENOUS; SUBCUTANEOUS ONCE
Refills: 0 | Status: DISCONTINUED | OUTPATIENT
Start: 2024-03-31 | End: 2024-03-31

## 2024-03-31 RX ADMIN — SENNA PLUS 2 TABLET(S): 8.6 TABLET ORAL at 21:45

## 2024-03-31 RX ADMIN — HYDROMORPHONE HYDROCHLORIDE 1 MILLIGRAM(S): 2 INJECTION INTRAMUSCULAR; INTRAVENOUS; SUBCUTANEOUS at 19:37

## 2024-03-31 RX ADMIN — POLYETHYLENE GLYCOL 3350 17 GRAM(S): 17 POWDER, FOR SOLUTION ORAL at 17:50

## 2024-03-31 RX ADMIN — Medication 5 MILLIGRAM(S): at 17:00

## 2024-03-31 RX ADMIN — HEPARIN SODIUM 5000 UNIT(S): 5000 INJECTION INTRAVENOUS; SUBCUTANEOUS at 05:16

## 2024-03-31 RX ADMIN — HEPARIN SODIUM 5000 UNIT(S): 5000 INJECTION INTRAVENOUS; SUBCUTANEOUS at 21:45

## 2024-03-31 RX ADMIN — Medication 100 MILLIGRAM(S): at 05:14

## 2024-03-31 RX ADMIN — Medication 25 MILLIGRAM(S): at 05:15

## 2024-03-31 RX ADMIN — Medication 5 MILLIGRAM(S): at 05:14

## 2024-03-31 RX ADMIN — Medication 1 APPLICATION(S): at 05:17

## 2024-03-31 RX ADMIN — HYDROMORPHONE HYDROCHLORIDE 1 MILLIGRAM(S): 2 INJECTION INTRAMUSCULAR; INTRAVENOUS; SUBCUTANEOUS at 12:41

## 2024-03-31 RX ADMIN — MORPHINE SULFATE 4 MILLIGRAM(S): 50 CAPSULE, EXTENDED RELEASE ORAL at 04:29

## 2024-03-31 RX ADMIN — HEPARIN SODIUM 5000 UNIT(S): 5000 INJECTION INTRAVENOUS; SUBCUTANEOUS at 13:42

## 2024-03-31 RX ADMIN — HYDROMORPHONE HYDROCHLORIDE 1 MILLIGRAM(S): 2 INJECTION INTRAMUSCULAR; INTRAVENOUS; SUBCUTANEOUS at 13:11

## 2024-03-31 RX ADMIN — Medication 25 MILLIGRAM(S): at 17:50

## 2024-03-31 RX ADMIN — Medication 5 MILLIGRAM(S): at 17:50

## 2024-03-31 RX ADMIN — HYDROMORPHONE HYDROCHLORIDE 1 MILLIGRAM(S): 2 INJECTION INTRAMUSCULAR; INTRAVENOUS; SUBCUTANEOUS at 21:51

## 2024-03-31 RX ADMIN — METHOCARBAMOL 500 MILLIGRAM(S): 500 TABLET, FILM COATED ORAL at 13:41

## 2024-03-31 RX ADMIN — Medication 100 MILLIGRAM(S): at 21:45

## 2024-03-31 RX ADMIN — POLYETHYLENE GLYCOL 3350 17 GRAM(S): 17 POWDER, FOR SOLUTION ORAL at 05:16

## 2024-03-31 RX ADMIN — MORPHINE SULFATE 4 MILLIGRAM(S): 50 CAPSULE, EXTENDED RELEASE ORAL at 05:25

## 2024-03-31 RX ADMIN — Medication 1 APPLICATION(S): at 21:50

## 2024-03-31 RX ADMIN — Medication 5 MILLIGRAM(S): at 21:51

## 2024-03-31 RX ADMIN — TAMSULOSIN HYDROCHLORIDE 0.4 MILLIGRAM(S): 0.4 CAPSULE ORAL at 21:46

## 2024-03-31 RX ADMIN — Medication 100 MILLIGRAM(S): at 13:42

## 2024-03-31 RX ADMIN — Medication 1 APPLICATION(S): at 13:43

## 2024-03-31 NOTE — PROGRESS NOTE ADULT - ASSESSMENT
68yo male whose PMH includes A Fib (to me it is PAF following an ablation procedure but he ran out of Xarelto 2 weeks ago, DM (uses insulin pump), CAD, apparent  CKD, HTN and recurrent lower extremity cellulitis, presents to the ER from his doctor's office due to episode of rigors and right leg pain    Assessment    ·	Cellulitis with lymphangiitis, doubt infected hematoma  ·	PAF ran out of xarelto two weeks ago  ·	KRISTINE on CKD  ·	CAD  ·	HTN  ·	DM uses insulin pump but ran out (now on lantus / lispro)    Plan    - ID recs appreciated / cefazolin for cellulitis, seen by surgery as patient is improving will monitor for now  -  received gentle fluids for KRISTINE on CKD, RBUS negative, creatinine has improved  - insulin pump has been refilled, discontinued subq  - lopressor bid  - morphine for pain for now  - tamsulosin    Pending: clinical improvement on abx, ultrasound for improving hematoma will wait before starting home xarelto    # DVT PPX: heparin sc for now 68yo male whose PMH includes A Fib (to me it is PAF following an ablation procedure but he ran out of Xarelto 2 weeks ago, DM (uses insulin pump), CAD, apparent  CKD, HTN and recurrent lower extremity cellulitis, presents to the ER from his doctor's office due to episode of rigors and right leg pain    Assessment    ·	Cellulitis with lymphangiitis, doubt infected hematoma  ·	PAF ran out of xarelto two weeks ago  ·	KRISTINE on CKD  ·	Chronic back pain  ·	CAD  ·	HTN  ·	DM uses insulin pump    Plan    - ID recs appreciated / cefazolin for cellulitis, seen by surgery as patient is improving will monitor for now  -  received gentle fluids for KRISTINE on CKD, RBUS negative, creatinine has improved  - insulin pump has been refilled, discontinued subq  - started methocarbamol for back pain, c/w morphine  - miralax / senna / added bisacodyl  - lopressor bid  - morphine for pain for now  - tamsulosin    Pending: clinical improvement on abx, ultrasound for improving hematoma will wait before starting home xarelto    # DVT PPX: heparin sc for now

## 2024-03-31 NOTE — PROGRESS NOTE ADULT - SUBJECTIVE AND OBJECTIVE BOX
SUBJECTIVE:    Patient is a 69y old Male who presents with a chief complaint of Right leg pain (29 Mar 2024 12:41)      HPI:  70yo male whose PMH includes A Fib (to me it is PAF following an ablation procedure but he ran out of Xarelto 2 weeks ago, DM (uses insulin pump), CAD, apparent  CKD, HTN and recurrent lower extremity cellulitis, presents to the ER from his doctor's office due to episode of rigors and right leg pain. In ER found to have an infected hematoma  (28 Mar 2024 02:10)      Currently admitted to medicine with the primary diagnosis of Sepsis         Besides the pertinent positives and negatives described above, the ROS was within normal limits.    PAST MEDICAL & SURGICAL HISTORY  Diabetes mellitus, type 2    BPH (benign prostatic hyperplasia)    Water retention    Essential hypertension    Diabetes    CAD (coronary artery disease)    H/O hypercholesterolemia    H/O right coronary artery stent placement    History of resection of pancreas    History of cholecystectomy    History of left knee replacement      SOCIAL HISTORY:    ALLERGIES:  No Known Allergies    MEDICATIONS:  STANDING MEDICATIONS  ceFAZolin   IVPB 2000 milliGRAM(s) IV Intermittent every 8 hours  ceFAZolin   IVPB      dextrose 5%. 1000 milliLiter(s) IV Continuous <Continuous>  dextrose 5%. 1000 milliLiter(s) IV Continuous <Continuous>  dextrose 50% Injectable 25 Gram(s) IV Push once  dextrose 50% Injectable 12.5 Gram(s) IV Push once  dextrose 50% Injectable 25 Gram(s) IV Push once  glucagon  Injectable 1 milliGRAM(s) IntraMuscular once  heparin   Injectable 5000 Unit(s) SubCutaneous every 8 hours  hydrocortisone 2.5% Ointment 1 Application(s) Topical three times a day  metoprolol tartrate 25 milliGRAM(s) Oral two times a day  oxybutynin 5 milliGRAM(s) Oral two times a day  polyethylene glycol 3350 17 Gram(s) Oral every 12 hours  senna 2 Tablet(s) Oral at bedtime  tamsulosin 0.4 milliGRAM(s) Oral at bedtime    PRN MEDICATIONS  acetaminophen     Tablet .. 650 milliGRAM(s) Oral every 6 hours PRN  aluminum hydroxide/magnesium hydroxide/simethicone Suspension 30 milliLiter(s) Oral every 6 hours PRN  dextrose Oral Gel 15 Gram(s) Oral once PRN  guaifenesin/dextromethorphan Oral Liquid 10 milliLiter(s) Oral every 6 hours PRN  morphine  - Injectable 4 milliGRAM(s) IV Push every 6 hours PRN  ondansetron Injectable 4 milliGRAM(s) IV Push four times a day PRN    VITALS:   T(F): 97.4  HR: 79  BP: 126/61  RR: 18  SpO2: 96%    LABS:                        12.4   12.24 )-----------( 420      ( 31 Mar 2024 08:32 )             36.6     03-31    138  |  100  |  69<HH>  ----------------------------<  119<H>  4.8   |  26  |  2.5<H>    Ca    9.0      31 Mar 2024 08:32  Mg     2.4     03-31    TPro  6.6  /  Alb  3.5  /  TBili  <0.2  /  DBili  x   /  AST  26  /  ALT  9   /  AlkPhos  135<H>  03-31      Urinalysis Basic - ( 31 Mar 2024 08:32 )    Color: x / Appearance: x / SG: x / pH: x  Gluc: 119 mg/dL / Ketone: x  / Bili: x / Urobili: x   Blood: x / Protein: x / Nitrite: x   Leuk Esterase: x / RBC: x / WBC x   Sq Epi: x / Non Sq Epi: x / Bacteria: x                Sepsis      RADIOLOGY:    PHYSICAL EXAM:  General: WN/WD NAD  Neurology: A&Ox3, nonfocal, LUJAN x 4  Head:  Normocephalic, atraumatic  ENT:  Mucosa moist, no ulcerations  Neck:  Supple, no sinuses or palpable masses  Lymphatic:  No palpable cervical, supraclavicular, axillary or inguinal adenopathy  Respiratory: CTA B/L  CV: RRR, S1S2, no murmur  Abdominal: Soft, NT, ND no palpable mass  MSK: No edema, + peripheral pulses  Incisions: intact, no erythema or drainage    Intravenous access:   NG tube:   Ojeda Catheter:        SUBJECTIVE:    Patient is a 69y old Male who presents with a chief complaint of Right leg pain (29 Mar 2024 12:41)      HPI:  70yo male whose PMH includes A Fib (to me it is PAF following an ablation procedure but he ran out of Xarelto 2 weeks ago, DM (uses insulin pump), CAD, apparent  CKD, HTN and recurrent lower extremity cellulitis, presents to the ER from his doctor's office due to episode of rigors and right leg pain. In ER found to have an infected hematoma  (28 Mar 2024 02:10)      Currently admitted to medicine with the primary diagnosis of Sepsis     having back pain today, has it chronically but worse now, no BM yet    Besides the pertinent positives and negatives described above, the ROS was within normal limits.    PAST MEDICAL & SURGICAL HISTORY  Diabetes mellitus, type 2    BPH (benign prostatic hyperplasia)    Water retention    Essential hypertension    Diabetes    CAD (coronary artery disease)    H/O hypercholesterolemia    H/O right coronary artery stent placement    History of resection of pancreas    History of cholecystectomy    History of left knee replacement      SOCIAL HISTORY:    ALLERGIES:  No Known Allergies    MEDICATIONS:  STANDING MEDICATIONS  ceFAZolin   IVPB 2000 milliGRAM(s) IV Intermittent every 8 hours  ceFAZolin   IVPB      dextrose 5%. 1000 milliLiter(s) IV Continuous <Continuous>  dextrose 5%. 1000 milliLiter(s) IV Continuous <Continuous>  dextrose 50% Injectable 25 Gram(s) IV Push once  dextrose 50% Injectable 12.5 Gram(s) IV Push once  dextrose 50% Injectable 25 Gram(s) IV Push once  glucagon  Injectable 1 milliGRAM(s) IntraMuscular once  heparin   Injectable 5000 Unit(s) SubCutaneous every 8 hours  hydrocortisone 2.5% Ointment 1 Application(s) Topical three times a day  metoprolol tartrate 25 milliGRAM(s) Oral two times a day  oxybutynin 5 milliGRAM(s) Oral two times a day  polyethylene glycol 3350 17 Gram(s) Oral every 12 hours  senna 2 Tablet(s) Oral at bedtime  tamsulosin 0.4 milliGRAM(s) Oral at bedtime    PRN MEDICATIONS  acetaminophen     Tablet .. 650 milliGRAM(s) Oral every 6 hours PRN  aluminum hydroxide/magnesium hydroxide/simethicone Suspension 30 milliLiter(s) Oral every 6 hours PRN  dextrose Oral Gel 15 Gram(s) Oral once PRN  guaifenesin/dextromethorphan Oral Liquid 10 milliLiter(s) Oral every 6 hours PRN  morphine  - Injectable 4 milliGRAM(s) IV Push every 6 hours PRN  ondansetron Injectable 4 milliGRAM(s) IV Push four times a day PRN    VITALS:   T(F): 97.4  HR: 79  BP: 126/61  RR: 18  SpO2: 96%    LABS:                        12.4   12.24 )-----------( 420      ( 31 Mar 2024 08:32 )             36.6     03-31    138  |  100  |  69<HH>  ----------------------------<  119<H>  4.8   |  26  |  2.5<H>    Ca    9.0      31 Mar 2024 08:32  Mg     2.4     03-31    TPro  6.6  /  Alb  3.5  /  TBili  <0.2  /  DBili  x   /  AST  26  /  ALT  9   /  AlkPhos  135<H>  03-31      Urinalysis Basic - ( 31 Mar 2024 08:32 )    Color: x / Appearance: x / SG: x / pH: x  Gluc: 119 mg/dL / Ketone: x  / Bili: x / Urobili: x   Blood: x / Protein: x / Nitrite: x   Leuk Esterase: x / RBC: x / WBC x   Sq Epi: x / Non Sq Epi: x / Bacteria: x                Sepsis      RADIOLOGY:    PHYSICAL EXAM:  General: WN/WD NAD  Neurology: A&Ox3, nonfocal, LUJAN x 4  Head:  Normocephalic, atraumatic  ENT:  Mucosa moist, no ulcerations  Neck:  Supple, no sinuses or palpable masses  Lymphatic:  No palpable cervical, supraclavicular, axillary or inguinal adenopathy  Respiratory: CTA B/L  CV: RRR, S1S2, no murmur  Abdominal: Soft, NT, ND no palpable mass  MSK: erythema in right leg improving    Intravenous access: yes  NG tube: no  Ojeda Catheter: no

## 2024-03-31 NOTE — PROGRESS NOTE ADULT - SUBJECTIVE AND OBJECTIVE BOX
Nephrology progress note    Patient was seen and examined, events over the last 24 h noted .    Allergies:  No Known Allergies    Hospital Medications:   MEDICATIONS  (STANDING):  ceFAZolin   IVPB      ceFAZolin   IVPB 2000 milliGRAM(s) IV Intermittent every 8 hours  dextrose 5%. 1000 milliLiter(s) (50 mL/Hr) IV Continuous <Continuous>  dextrose 5%. 1000 milliLiter(s) (100 mL/Hr) IV Continuous <Continuous>  dextrose 50% Injectable 25 Gram(s) IV Push once  dextrose 50% Injectable 25 Gram(s) IV Push once  dextrose 50% Injectable 12.5 Gram(s) IV Push once  glucagon  Injectable 1 milliGRAM(s) IntraMuscular once  heparin   Injectable 5000 Unit(s) SubCutaneous every 8 hours  hydrocortisone 2.5% Ointment 1 Application(s) Topical three times a day  metoprolol tartrate 25 milliGRAM(s) Oral two times a day  oxybutynin 5 milliGRAM(s) Oral two times a day  polyethylene glycol 3350 17 Gram(s) Oral every 12 hours  senna 2 Tablet(s) Oral at bedtime  tamsulosin 0.4 milliGRAM(s) Oral at bedtime        VITALS:  T(F): 97.4 (03-31-24 @ 04:47), Max: 98.7 (03-30-24 @ 12:45)  HR: 79 (03-31-24 @ 04:47)  BP: 126/61 (03-31-24 @ 04:47)  RR: 18 (03-31-24 @ 04:47)  SpO2: 96% (03-30-24 @ 12:45)  Wt(kg): --        PHYSICAL EXAM:  Constitutional: NAD  HEENT: anicteric sclera, oropharynx clear, MMM  Neck: No JVD  Respiratory: CTAB, no wheezes, rales or rhonchi  Cardiovascular: S1, S2, RRR  Gastrointestinal: BS+, soft, NT/ND  Extremities: No cyanosis or clubbing. No peripheral edema  :  No moss.   Skin: No rashes    LABS:  03-31    138  |  100  |  69<HH>  ----------------------------<  119<H>  4.8   |  26  |  2.5<H>    Ca    9.0      31 Mar 2024 08:32  Mg     2.4     03-31    TPro  6.6  /  Alb  3.5  /  TBili  <0.2  /  DBili      /  AST  26  /  ALT  9   /  AlkPhos  135<H>  03-31                          12.4   12.24 )-----------( 420      ( 31 Mar 2024 08:32 )             36.6       Urine Studies:  Urinalysis Basic - ( 31 Mar 2024 08:32 )    Color:  / Appearance:  / SG:  / pH:   Gluc: 119 mg/dL / Ketone:   / Bili:  / Urobili:    Blood:  / Protein:  / Nitrite:    Leuk Esterase:  / RBC:  / WBC    Sq Epi:  / Non Sq Epi:  / Bacteria:         RADIOLOGY & ADDITIONAL STUDIES:

## 2024-03-31 NOTE — PROGRESS NOTE ADULT - ASSESSMENT
1)  KRISTINE on CKD likely due to sepsis syndrome from cellulitis, ? infected hematoma.  KRISTINE component may be starting to improve.  Can't r/o a component of DIONICIO    2)  Underlying CKD, (follows w/ Dr. Kleiner) w/ fluctuating Screat over the years likely representing episodes of transient KRISTINE.  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  Of note, albuminuria can transiently increase w/ active infection due to increased glomerular permeability    Recommendations:    1)  Continue Cefazolin.  Of note, 2gm q8hrs seems a bit high for current GFR.  Approved by ID    2)  No change in treatment overall from Renal standpoint    3)  No further work up of nephropathy required in hospital    4)  Simply monitor renal function

## 2024-04-01 ENCOUNTER — TRANSCRIPTION ENCOUNTER (OUTPATIENT)
Age: 69
End: 2024-04-01

## 2024-04-01 VITALS
TEMPERATURE: 96 F | OXYGEN SATURATION: 97 % | RESPIRATION RATE: 18 BRPM | DIASTOLIC BLOOD PRESSURE: 70 MMHG | SYSTOLIC BLOOD PRESSURE: 139 MMHG | HEART RATE: 74 BPM

## 2024-04-01 LAB
ALBUMIN SERPL ELPH-MCNC: 3.5 G/DL — SIGNIFICANT CHANGE UP (ref 3.5–5.2)
ALP SERPL-CCNC: 125 U/L — HIGH (ref 30–115)
ALT FLD-CCNC: 9 U/L — SIGNIFICANT CHANGE UP (ref 0–41)
ANION GAP SERPL CALC-SCNC: 12 MMOL/L — SIGNIFICANT CHANGE UP (ref 7–14)
ANION GAP SERPL CALC-SCNC: 12 MMOL/L — SIGNIFICANT CHANGE UP (ref 7–14)
AST SERPL-CCNC: 26 U/L — SIGNIFICANT CHANGE UP (ref 0–41)
BASOPHILS # BLD AUTO: 0.13 K/UL — SIGNIFICANT CHANGE UP (ref 0–0.2)
BASOPHILS NFR BLD AUTO: 1.1 % — HIGH (ref 0–1)
BILIRUB SERPL-MCNC: <0.2 MG/DL — SIGNIFICANT CHANGE UP (ref 0.2–1.2)
BUN SERPL-MCNC: 53 MG/DL — HIGH (ref 10–20)
BUN SERPL-MCNC: 59 MG/DL — HIGH (ref 10–20)
CALCIUM SERPL-MCNC: 9.6 MG/DL — SIGNIFICANT CHANGE UP (ref 8.4–10.5)
CALCIUM SERPL-MCNC: 9.6 MG/DL — SIGNIFICANT CHANGE UP (ref 8.4–10.5)
CHLORIDE SERPL-SCNC: 100 MMOL/L — SIGNIFICANT CHANGE UP (ref 98–110)
CHLORIDE SERPL-SCNC: 96 MMOL/L — LOW (ref 98–110)
CO2 SERPL-SCNC: 26 MMOL/L — SIGNIFICANT CHANGE UP (ref 17–32)
CO2 SERPL-SCNC: 27 MMOL/L — SIGNIFICANT CHANGE UP (ref 17–32)
CREAT SERPL-MCNC: 2 MG/DL — HIGH (ref 0.7–1.5)
CREAT SERPL-MCNC: 2.1 MG/DL — HIGH (ref 0.7–1.5)
EGFR: 34 ML/MIN/1.73M2 — LOW
EGFR: 36 ML/MIN/1.73M2 — LOW
EOSINOPHIL # BLD AUTO: 0.8 K/UL — HIGH (ref 0–0.7)
EOSINOPHIL NFR BLD AUTO: 6.7 % — SIGNIFICANT CHANGE UP (ref 0–8)
GLUCOSE BLDC GLUCOMTR-MCNC: 121 MG/DL — HIGH (ref 70–99)
GLUCOSE BLDC GLUCOMTR-MCNC: 156 MG/DL — HIGH (ref 70–99)
GLUCOSE BLDC GLUCOMTR-MCNC: 200 MG/DL — HIGH (ref 70–99)
GLUCOSE SERPL-MCNC: 113 MG/DL — HIGH (ref 70–99)
GLUCOSE SERPL-MCNC: 179 MG/DL — HIGH (ref 70–99)
HCT VFR BLD CALC: 38.9 % — LOW (ref 42–52)
HGB BLD-MCNC: 12.7 G/DL — LOW (ref 14–18)
IMM GRANULOCYTES NFR BLD AUTO: 1.1 % — HIGH (ref 0.1–0.3)
LYMPHOCYTES # BLD AUTO: 1.99 K/UL — SIGNIFICANT CHANGE UP (ref 1.2–3.4)
LYMPHOCYTES # BLD AUTO: 16.7 % — LOW (ref 20.5–51.1)
MAGNESIUM SERPL-MCNC: 2.3 MG/DL — SIGNIFICANT CHANGE UP (ref 1.8–2.4)
MCHC RBC-ENTMCNC: 30.4 PG — SIGNIFICANT CHANGE UP (ref 27–31)
MCHC RBC-ENTMCNC: 32.6 G/DL — SIGNIFICANT CHANGE UP (ref 32–37)
MCV RBC AUTO: 93.1 FL — SIGNIFICANT CHANGE UP (ref 80–94)
MONOCYTES # BLD AUTO: 1.81 K/UL — HIGH (ref 0.1–0.6)
MONOCYTES NFR BLD AUTO: 15.1 % — HIGH (ref 1.7–9.3)
NEUTROPHILS # BLD AUTO: 7.09 K/UL — HIGH (ref 1.4–6.5)
NEUTROPHILS NFR BLD AUTO: 59.3 % — SIGNIFICANT CHANGE UP (ref 42.2–75.2)
NRBC # BLD: 0 /100 WBCS — SIGNIFICANT CHANGE UP (ref 0–0)
PLATELET # BLD AUTO: 474 K/UL — HIGH (ref 130–400)
PMV BLD: 10.4 FL — SIGNIFICANT CHANGE UP (ref 7.4–10.4)
POTASSIUM SERPL-MCNC: 5.6 MMOL/L — HIGH (ref 3.5–5)
POTASSIUM SERPL-MCNC: 5.7 MMOL/L — HIGH (ref 3.5–5)
POTASSIUM SERPL-SCNC: 5.6 MMOL/L — HIGH (ref 3.5–5)
POTASSIUM SERPL-SCNC: 5.7 MMOL/L — HIGH (ref 3.5–5)
PROT SERPL-MCNC: 6.7 G/DL — SIGNIFICANT CHANGE UP (ref 6–8)
RBC # BLD: 4.18 M/UL — LOW (ref 4.7–6.1)
RBC # FLD: 14.4 % — SIGNIFICANT CHANGE UP (ref 11.5–14.5)
SODIUM SERPL-SCNC: 135 MMOL/L — SIGNIFICANT CHANGE UP (ref 135–146)
SODIUM SERPL-SCNC: 138 MMOL/L — SIGNIFICANT CHANGE UP (ref 135–146)
WBC # BLD: 11.95 K/UL — HIGH (ref 4.8–10.8)
WBC # FLD AUTO: 11.95 K/UL — HIGH (ref 4.8–10.8)

## 2024-04-01 PROCEDURE — 99238 HOSP IP/OBS DSCHRG MGMT 30/<: CPT

## 2024-04-01 PROCEDURE — 99232 SBSQ HOSP IP/OBS MODERATE 35: CPT

## 2024-04-01 PROCEDURE — 76882 US LMTD JT/FCL EVL NVASC XTR: CPT | Mod: 26,RT

## 2024-04-01 RX ORDER — FUROSEMIDE 40 MG
80 TABLET ORAL DAILY
Refills: 0 | Status: DISCONTINUED | OUTPATIENT
Start: 2024-04-01 | End: 2024-04-01

## 2024-04-01 RX ORDER — RIVAROXABAN 15 MG-20MG
20 KIT ORAL
Refills: 0 | Status: DISCONTINUED | OUTPATIENT
Start: 2024-04-01 | End: 2024-04-01

## 2024-04-01 RX ORDER — CEPHALEXIN 500 MG
2 CAPSULE ORAL
Qty: 24 | Refills: 0
Start: 2024-04-01 | End: 2024-04-04

## 2024-04-01 RX ORDER — OXYBUTYNIN CHLORIDE 5 MG
1 TABLET ORAL
Qty: 0 | Refills: 0 | DISCHARGE

## 2024-04-01 RX ORDER — SODIUM ZIRCONIUM CYCLOSILICATE 10 G/10G
10 POWDER, FOR SUSPENSION ORAL
Qty: 10 | Refills: 0
Start: 2024-04-01 | End: 2024-04-01

## 2024-04-01 RX ORDER — SENNA PLUS 8.6 MG/1
2 TABLET ORAL
Qty: 30 | Refills: 0
Start: 2024-04-01 | End: 2024-04-15

## 2024-04-01 RX ORDER — FUROSEMIDE 40 MG
1 TABLET ORAL
Qty: 0 | Refills: 0 | DISCHARGE

## 2024-04-01 RX ORDER — ACETAMINOPHEN 500 MG
2 TABLET ORAL
Qty: 0 | Refills: 0 | DISCHARGE
Start: 2024-04-01

## 2024-04-01 RX ORDER — METHOCARBAMOL 500 MG/1
1 TABLET, FILM COATED ORAL
Qty: 30 | Refills: 0
Start: 2024-04-01 | End: 2024-04-15

## 2024-04-01 RX ORDER — FENTANYL CITRATE 50 UG/ML
1 INJECTION INTRAVENOUS
Refills: 0 | Status: DISCONTINUED | OUTPATIENT
Start: 2024-04-01 | End: 2024-04-01

## 2024-04-01 RX ORDER — POLYETHYLENE GLYCOL 3350 17 G/17G
17 POWDER, FOR SOLUTION ORAL
Qty: 510 | Refills: 0
Start: 2024-04-01 | End: 2024-04-15

## 2024-04-01 RX ADMIN — POLYETHYLENE GLYCOL 3350 17 GRAM(S): 17 POWDER, FOR SOLUTION ORAL at 17:15

## 2024-04-01 RX ADMIN — HYDROMORPHONE HYDROCHLORIDE 1 MILLIGRAM(S): 2 INJECTION INTRAMUSCULAR; INTRAVENOUS; SUBCUTANEOUS at 18:48

## 2024-04-01 RX ADMIN — Medication 1 APPLICATION(S): at 05:30

## 2024-04-01 RX ADMIN — Medication 5 MILLIGRAM(S): at 17:13

## 2024-04-01 RX ADMIN — HEPARIN SODIUM 5000 UNIT(S): 5000 INJECTION INTRAVENOUS; SUBCUTANEOUS at 05:30

## 2024-04-01 RX ADMIN — HYDROMORPHONE HYDROCHLORIDE 1 MILLIGRAM(S): 2 INJECTION INTRAMUSCULAR; INTRAVENOUS; SUBCUTANEOUS at 01:16

## 2024-04-01 RX ADMIN — HYDROMORPHONE HYDROCHLORIDE 1 MILLIGRAM(S): 2 INJECTION INTRAMUSCULAR; INTRAVENOUS; SUBCUTANEOUS at 05:33

## 2024-04-01 RX ADMIN — HYDROMORPHONE HYDROCHLORIDE 1 MILLIGRAM(S): 2 INJECTION INTRAMUSCULAR; INTRAVENOUS; SUBCUTANEOUS at 10:05

## 2024-04-01 RX ADMIN — POLYETHYLENE GLYCOL 3350 17 GRAM(S): 17 POWDER, FOR SOLUTION ORAL at 05:30

## 2024-04-01 RX ADMIN — Medication 25 MILLIGRAM(S): at 05:30

## 2024-04-01 RX ADMIN — HYDROMORPHONE HYDROCHLORIDE 1 MILLIGRAM(S): 2 INJECTION INTRAMUSCULAR; INTRAVENOUS; SUBCUTANEOUS at 02:26

## 2024-04-01 RX ADMIN — Medication 100 MILLIGRAM(S): at 05:29

## 2024-04-01 RX ADMIN — Medication 25 MILLIGRAM(S): at 17:13

## 2024-04-01 RX ADMIN — Medication 100 MILLIGRAM(S): at 13:49

## 2024-04-01 RX ADMIN — HYDROMORPHONE HYDROCHLORIDE 1 MILLIGRAM(S): 2 INJECTION INTRAMUSCULAR; INTRAVENOUS; SUBCUTANEOUS at 11:22

## 2024-04-01 RX ADMIN — Medication 5 MILLIGRAM(S): at 05:29

## 2024-04-01 RX ADMIN — Medication 10 MILLILITER(S): at 01:18

## 2024-04-01 NOTE — PROGRESS NOTE ADULT - SUBJECTIVE AND OBJECTIVE BOX
Nephrology Progress Note    MORGAN BUSH  MRN-[id number]  69y  Male    S:  Patient is seen and examined, events over the last 24h noted.    O:  Allergies:  No Known Allergies    Hospital Medications:   MEDICATIONS  (STANDING):  bisacodyl 5 milliGRAM(s) Oral at bedtime  ceFAZolin   IVPB 2000 milliGRAM(s) IV Intermittent every 8 hours  dextrose 5%. 1000 milliLiter(s) (50 mL/Hr) IV Continuous <Continuous>  dextrose 5%. 1000 milliLiter(s) (100 mL/Hr) IV Continuous <Continuous>  dextrose 50% Injectable 25 Gram(s) IV Push once  dextrose 50% Injectable 25 Gram(s) IV Push once  dextrose 50% Injectable 12.5 Gram(s) IV Push once  fentaNYL   Patch  12 MICROgram(s)/Hr 1 Patch Transdermal every 72 hours  glucagon  Injectable 1 milliGRAM(s) IntraMuscular once  hydrocortisone 2.5% Ointment 1 Application(s) Topical three times a day  metoprolol tartrate 25 milliGRAM(s) Oral two times a day  oxybutynin 5 milliGRAM(s) Oral two times a day  polyethylene glycol 3350 17 Gram(s) Oral every 12 hours  senna 2 Tablet(s) Oral at bedtime  tamsulosin 0.4 milliGRAM(s) Oral at bedtime    MEDICATIONS  (PRN):  acetaminophen     Tablet .. 650 milliGRAM(s) Oral every 6 hours PRN Mild Pain (1 - 3)  aluminum hydroxide/magnesium hydroxide/simethicone Suspension 30 milliLiter(s) Oral every 6 hours PRN Dyspepsia  dextrose Oral Gel 15 Gram(s) Oral once PRN Blood Glucose LESS THAN 70 milliGRAM(s)/deciliter  guaifenesin/dextromethorphan Oral Liquid 10 milliLiter(s) Oral every 6 hours PRN Cough  HYDROmorphone  Injectable 1 milliGRAM(s) IV Push every 4 hours PRN Severe Pain (7 - 10)  methocarbamol 500 milliGRAM(s) Oral three times a day PRN Muscle Spasm  ondansetron Injectable 4 milliGRAM(s) IV Push four times a day PRN Nausea and/or Vomiting    Home Medications:  furosemide 80 mg oral tablet: orally once a day (28 Mar 2024 02:30)  metoprolol tartrate 25 mg oral tablet: 1 tab(s) orally 2 times a day (28 Mar 2024 02:30)  Mounjaro 10 mg/0.5 mL subcutaneous solution: 10 milligram(s) subcutaneously (28 Mar 2024 02:25)  oxybutynin 10 mg/24 hr oral tablet, extended release: 1 tab(s) orally once a day (28 Mar 2024 02:30)  pravastatin 40 mg oral tablet: 1 tab(s) orally once a day (28 Mar 2024 02:30)  Repatha 140 mg/mL subcutaneous solution: 140 milligram(s) subcutaneously (28 Mar 2024 02:25)  tamsulosin 0.4 mg oral capsule: 1 cap(s) orally once a day (at bedtime) (28 Mar 2024 02:30)  Xarelto 20 mg oral tablet: 1 tab(s) orally once a day (in the evening) (28 Mar 2024 02:30)      VITALS:  Daily     Daily   T(F): 96.3 (04-01-24 @ 13:00), Max: 97.3 (03-31-24 @ 20:24)  HR: 74 (04-01-24 @ 13:00)  BP: 139/70 (04-01-24 @ 13:00)  RR: 18 (04-01-24 @ 13:00)  SpO2: 97% (04-01-24 @ 13:00)  Wt(kg): --  I&O's Detail    I&O's Summary        PHYSICAL EXAM:  Gen: NAD  Chest: b/l breath sounds  Abd: soft  Extremities: no edema      LABS:    04-01    138  |  100  |  59<H>  ----------------------------<  113<H>  5.7<H>   |  26  |  2.0<H>    Ca    9.6      01 Apr 2024 07:42  Mg     2.3     04-01    TPro  6.7  /  Alb  3.5  /  TBili  <0.2  /  DBili      /  AST  26  /  ALT  9   /  AlkPhos  125<H>  04-01      Phosphorus Level, Serum: 4.0 mg/dL (09-25-22 @ 11:12)                            12.7   11.95 )-----------( 474      ( 01 Apr 2024 07:42 )             38.9     Mean Cell Volume: 93.1 fL (04-01-24 @ 07:42)          Urine Studies:  Protein, Urine: 100 mg/dL (03-27-24 @ 23:04)  White Blood Cell - Urine: 2 /HPF (03-27-24 @ 23:04)  Red Blood Cell - Urine: 1 /HPF (03-27-24 @ 23:04)      Culture Results:   No growth (03-27 @ 23:04)  Culture Results:   No growth at 4 days (03-27 @ 19:42)    Creatinine trend:  Creatinine: 2.0 mg/dL (04-01-24 @ 07:42)  Creatinine: 2.5 mg/dL (03-31-24 @ 08:32)  Creatinine: 2.9 mg/dL (03-30-24 @ 08:42)  Creatinine: 3.2 mg/dL (03-29-24 @ 06:49)  Creatinine: 2.4 mg/dL (03-28-24 @ 19:27)  Creatinine: 2.5 mg/dL (03-28-24 @ 05:55)  Creatinine: 2.17 mg/dL (03-28-24 @ 00:34)  Creatinine, Serum: 1.8 mg/dL (03-25-23 @ 06:40)    Hemoglobin A1C, Whole Blood: 10.2 % (07-19-19 @ 07:27)  Hemoglobin A1C, Whole Blood: 11.9 % (07-16-18 @ 07:28)      US Kidney and Bladder:   ACC: [id number] EXAM:  US KIDNEYS AND BLADDER   ORDERED BY: NU ARORA     PROCEDURE DATE:  03/29/2024          INTERPRETATION:  CLINICAL INFORMATION: AK SANDRO    COMPARISON: Renal ultrasound from March 23, 2023    TECHNIQUE: Sonography of the kidneys and bladder.    FINDINGS:    Right kidney: 13.7 cm. No hydronephrosis or calculi. Simple cyst mid to   lower pole measures 2.1 x 1.8 x 2.5 cm.    Left kidney:  13.8 cm. No hydronephrosis or calculi.    Urinary bladder: No debris or calculus. Bilateralureteral jets are   visualized. Prevoid volume of approximately 357 cc.  Postvoid volume is   approximately 60 cc..    The prostate gland is nodular and mildly enlarged measuring 4.3 x 5.4 x   3.9 cm. This corresponds to a large volume of 48 cc.    IMPRESSION:    Right renal cysts. No hydronephrosis. Minimal urinary bladder retention.    Nodular mildly enlarged prostate gland    --- End of Report ---            EDGAR DELGADO MD; Attending Radiologist  This document has been electronically signed.Mar 29 2024  1:32PM (03-29-24 @ 13:01)

## 2024-04-01 NOTE — PROGRESS NOTE ADULT - PROVIDER SPECIALTY LIST ADULT
Hospitalist
Nephrology
Nephrology
Surgery
Infectious Disease
Surgery
Surgery
Infectious Disease

## 2024-04-01 NOTE — DISCHARGE NOTE PROVIDER - PROVIDER TOKENS
PROVIDER:[TOKEN:[[id number]],FOLLOWUP:[1 week]],PROVIDER:[TOKEN:[[id number]],FOLLOWUP:[1 week]],PROVIDER:[TOKEN:[[id number]],FOLLOWUP:[1-3 days]],PROVIDER:[TOKEN:[[id number]],FOLLOWUP:[1-3 days]],PROVIDER:[TOKEN:[[id number]],FOLLOWUP:[1-3 days]]

## 2024-04-01 NOTE — PROGRESS NOTE ADULT - ASSESSMENT
70yo male whose PMH includes A Fib (to me it is PAF following an ablation procedure but he ran out of Xarelto 2 weeks ago, DM (uses insulin pump), CAD, apparent  CKD, HTN and recurrent lower extremity cellulitis, presents to the ER from his doctor's office due to episode of rigors and right leg pain    Assessment    ·	Cellulitis with lymphangiitis, doubt infected hematoma  ·	PAF ran out of xarelto two weeks ago  ·	KRISTINE on CKD  ·	Chronic back pain  ·	CAD  ·	HTN  ·	DM uses insulin pump    Plan    - ID recs appreciated / cefazolin for cellulitis, seen by surgery as patient is improving will monitor for now  - surgery recs appreciated doubt hematoma or pseudo-aneurysm, will restart xarelto  -  received gentle fluids for KRISTINE on CKD, RBUS negative, creatinine has improved  - insulin pump has been refilled, discontinued subq  - started methocarbamol for back pain, c/w dilaudid prn, added fentanyl patch as recommended by surgery  - miralax / senna / added bisacodyl  - lopressor bid  - tamsulosin    Pending: clinical improvement on abx, pain control    # DVT PPX: heparin sc for now

## 2024-04-01 NOTE — PROGRESS NOTE ADULT - ASSESSMENT
70 y/o male with past medical history of Afib (on Xarelto), CAD, s/p CABG, Tyle 2 diabetes, HDL, obesity now with Cellulitis and Hematoma Right groin and RLE.     Plan:  -F/U repeat RLE sono today  - Per ID: Continue Cefazolin; if not improving, would try Unasyn.   - F/U labs today  -consider RLE arterial duplex  - Elevate RLE while in bed or chair.  If in bed would elevate on pillows with foot of bed gatched.  - Warm moist towel compresses with plastic bags wrapped around his leg to keep moisture in.  (Dr. Fernandez relayed this to the patient's RN).   - Pain mgt.-->  If Ok with medical team, suggest to add a low dose Fentanyl patch to help control his pain.   - will cont to follow.

## 2024-04-01 NOTE — DISCHARGE NOTE PROVIDER - NSDCCPCAREPLAN_GEN_ALL_CORE_FT
PRINCIPAL DISCHARGE DIAGNOSIS  Diagnosis: Sepsis  Assessment and Plan of Treatment: You arrived here with sepsis from cellulitis in your leg.  This improved with IV antibiotics and you were seen here by infectious disease and recommended a pill antibiotic for home.  A large lymph node was seen on CT with suspicion for an infected hematoma, during your time here it shrunk on the repeat ultrasound so it was more likely an infectious lymph node (from your cellulitis) however radiology and surgery wants you to have a CT of your chest, abdomen, and pelvis (with no IV contrast) to scan your body and make sure the lymph node enlargement is from infection and nothing malignant.  As discussed you opt to do this as an outpatient, please also follow up with inerventional radiology for a possible biopsy of the lymph node.  Please also follow up with your nephrologist within a week for your hyperkalemia, I discussed with nephrology here and regarding your potassium they're clearing you to follow up with them as an outpatient and continue on your home lasix 80, not the spironolactone or metolazone.  Please also follow up with your primary care doctor and pain management.

## 2024-04-01 NOTE — PROGRESS NOTE ADULT - NSPROGADDITIONALINFOA_GEN_ALL_CORE
I saw and examined the patient.  Agree with above assessment plan.  I saw him today in radiology as he was receiving an ultrasound for the right groin mass.  This appears to be relatively solid with some flow and in my opinion is likely not hematoma.  It is more likely reactive lymph node process.  We also looked at the compressible vein in his upper thigh which does not appear to be thrombosed and there is no fluid collection around this.  No surgical indication at this time.  Interval IR guided biopsy of the right groin lymph node either as an inpatient or as outpatient in short follow-up.
In addition to cellulitis I saw and examined the patient.  Agree with above assessment plan.  The patient feels better and the phlebitic vein is less symptomatic at this time although he still has some pain.  No surgical indication at this time.  Will follow.  Continue to reevaluate with laboratory testing.  Please call for any questions thank you.

## 2024-04-01 NOTE — DISCHARGE NOTE PROVIDER - CARE PROVIDERS DIRECT ADDRESSES
[email or web address]

## 2024-04-01 NOTE — DISCHARGE NOTE NURSING/CASE MANAGEMENT/SOCIAL WORK - PATIENT PORTAL LINK FT
You can access the FollowMyHealth Patient Portal offered by [facility] by registering at the following website: [email or web address]. By joining [employer]’s FollowMyHealth portal, you will also be able to view your health information using other applications (apps) compatible with our system.

## 2024-04-01 NOTE — PROGRESS NOTE ADULT - ASSESSMENT
# KRISTINE on CKD likely due to sepsis syndrome from infection, with possible component of DIONICIO  # Underlying CKD, (follows w/ Dr. Kleiner) w/ fluctuating Screat over the years   # Albuminuria on U/A., supportive of DM nephropathy    creat down-trending, near baseline  hyperkalemia today    Recommendations:  - change diet to low K  - start lokelma 10g daily until K < 5.3  - check bladder scan (or bladder ultrasound if body habitus not allowing for accurate bladder scannin) to r/o urinary retention (pt is on oxybutynin, would highly consider discontinuing)  - cont abx per ID recs, should be dosed for pt's eGFR

## 2024-04-01 NOTE — DISCHARGE NOTE PROVIDER - NSDCMRMEDTOKEN_GEN_ALL_CORE_FT
acetaminophen 325 mg oral tablet: 2 tab(s) orally every 6 hours As needed Mild Pain (1 - 3)  cephalexin 500 mg oral tablet: 2 tab(s) orally every 8 hours  furosemide 80 mg oral tablet: 1 tab(s) orally once a day  Lokelma 10 g oral powder for reconstitution: 10 gram(s) orally once a day  methocarbamol 500 mg oral tablet: 1 tab(s) orally 2 times a day as needed for Muscle Spasm  metoprolol tartrate 25 mg oral tablet: 1 tab(s) orally 2 times a day  Mounjaro 10 mg/0.5 mL subcutaneous solution: 10 milligram(s) subcutaneously  polyethylene glycol 3350 oral powder for reconstitution: 17 gram(s) orally every 12 hours  pravastatin 40 mg oral tablet: 1 tab(s) orally once a day  Repatha 140 mg/mL subcutaneous solution: 140 milligram(s) subcutaneously  senna leaf extract oral tablet: 2 tab(s) orally once a day (at bedtime)  tamsulosin 0.4 mg oral capsule: 1 cap(s) orally once a day (at bedtime)  Xarelto 20 mg oral tablet: 1 tab(s) orally once a day (in the evening)

## 2024-04-01 NOTE — PROGRESS NOTE ADULT - SUBJECTIVE AND OBJECTIVE BOX
S: No significant changes - still with RLE mild erythema to inner thigh and lower leg swelling/warmth - both improved since admission. Afebrile  O: Vital Signs Last 24 Hrs  T(C): 35.6 (01 Apr 2024 05:09), Max: 36.3 (31 Mar 2024 20:24)  T(F): 96.1 (01 Apr 2024 05:09), Max: 97.3 (31 Mar 2024 20:24)  HR: 80 (01 Apr 2024 05:09) (75 - 82)  BP: 177/77 (01 Apr 2024 05:09) (126/60 - 177/77)  BP(mean): 91 (31 Mar 2024 17:51) (90 - 91)  RR: 18 (01 Apr 2024 05:09) (16 - 18)  SpO2: 96% (01 Apr 2024 05:09) (96% - 97%)    Parameters below as of 31 Mar 2024 17:51  Patient On (Oxygen Delivery Method): room air    MEDICATIONS  (STANDING):  bisacodyl 5 milliGRAM(s) Oral at bedtime  ceFAZolin   IVPB      ceFAZolin   IVPB 2000 milliGRAM(s) IV Intermittent every 8 hours  dextrose 5%. 1000 milliLiter(s) (50 mL/Hr) IV Continuous <Continuous>  dextrose 5%. 1000 milliLiter(s) (100 mL/Hr) IV Continuous <Continuous>  dextrose 50% Injectable 25 Gram(s) IV Push once  dextrose 50% Injectable 12.5 Gram(s) IV Push once  dextrose 50% Injectable 25 Gram(s) IV Push once  glucagon  Injectable 1 milliGRAM(s) IntraMuscular once  heparin   Injectable 5000 Unit(s) SubCutaneous every 8 hours  hydrocortisone 2.5% Ointment 1 Application(s) Topical three times a day  metoprolol tartrate 25 milliGRAM(s) Oral two times a day  oxybutynin 5 milliGRAM(s) Oral two times a day  polyethylene glycol 3350 17 Gram(s) Oral every 12 hours  senna 2 Tablet(s) Oral at bedtime  tamsulosin 0.4 milliGRAM(s) Oral at bedtime    MEDICATIONS  (PRN):  acetaminophen     Tablet .. 650 milliGRAM(s) Oral every 6 hours PRN Mild Pain (1 - 3)  aluminum hydroxide/magnesium hydroxide/simethicone Suspension 30 milliLiter(s) Oral every 6 hours PRN Dyspepsia  dextrose Oral Gel 15 Gram(s) Oral once PRN Blood Glucose LESS THAN 70 milliGRAM(s)/deciliter  guaifenesin/dextromethorphan Oral Liquid 10 milliLiter(s) Oral every 6 hours PRN Cough  HYDROmorphone  Injectable 1 milliGRAM(s) IV Push every 4 hours PRN Severe Pain (7 - 10)  methocarbamol 500 milliGRAM(s) Oral three times a day PRN Muscle Spasm  ondansetron Injectable 4 milliGRAM(s) IV Push four times a day PRN Nausea and/or Vomiting    EXAM:  EXT; RLE with mild tenderness and erythema to groin/upper, inner thigh area; right calf swelling/warmth/mild erythema/tenderness      Labs: PENDING    RECENT CULTURES:  03-27 @ 23:04 Clean Catch Clean Catch (Midstream)     No growth   03-27 @ 19:42 .Blood Blood-Peripheral     No growth at 4 days     RESPIRATORY CULTURES:   OTHER:

## 2024-04-01 NOTE — DISCHARGE NOTE PROVIDER - ATTENDING DISCHARGE PHYSICAL EXAMINATION:
General: WN/WD NAD  Neurology: A&Ox3, nonfocal, LUJAN x 4  Head:  Normocephalic, atraumatic  ENT:  Mucosa moist, no ulcerations  Neck:  Supple, no sinuses or palpable masses  Lymphatic:  No palpable cervical, supraclavicular, axillary or inguinal adenopathy  Respiratory: CTA B/L  CV: RRR, S1S2, no murmur  Abdominal: Soft, NT, ND no palpable mass  MSK: erythema improving right leg  Incisions: intact, no erythema or drainage

## 2024-04-01 NOTE — PROGRESS NOTE ADULT - SUBJECTIVE AND OBJECTIVE BOX
INFECTIOUS DISEASE FOLLOW UP NOTE:    Interval History/ROS: Patient is a 69y old  Male who presents with a chief complaint of Right leg pain (29 Mar 2024 12:41)      Overnight events: No overnight event. Erythema, warmth and tenderness is better. Still has more tenderness at right thigh    REVIEW OF SYSTEMS:  CONSTITUTIONAL: No fever/no chills  HEAD: No lesion on scalp  EYES: No visual disturbance  ENT: No sore throat  RESPIRATORY: No cough, no shortness of breath  CARDIOVASCULAR: No chest pain or palpitations  GASTROINTESTINAL: No abdominal or epigastric pain  GENITOURINARY: No dysuria  NEUROLOGICAL: No headache/dizziness  MUSCULOSKELETAL: No joint pain, erythema, or swelling; no back pain  SKIN: + erythema on right lower leg, tenderness on right groin  All other ROS negative except noted above      Prior hospital charts reviewed [Yes]  Primary team notes reviewed [Yes]  Other consultant notes reviewed [Yes]    Allergies:  No Known Allergies      ANTIMICROBIALS:   ceFAZolin   IVPB    ceFAZolin   IVPB 2000 every 8 hours      OTHER MEDS: MEDICATIONS  (STANDING):  acetaminophen     Tablet .. 650 every 6 hours PRN  aluminum hydroxide/magnesium hydroxide/simethicone Suspension 30 every 6 hours PRN  bisacodyl 5 at bedtime  dextrose 50% Injectable 25 once  dextrose 50% Injectable 25 once  dextrose 50% Injectable 12.5 once  dextrose Oral Gel 15 once PRN  fentaNYL   Patch  12 MICROgram(s)/Hr 1 every 72 hours  glucagon  Injectable 1 once  guaifenesin/dextromethorphan Oral Liquid 10 every 6 hours PRN  HYDROmorphone  Injectable 1 every 4 hours PRN  methocarbamol 500 three times a day PRN  metoprolol tartrate 25 two times a day  ondansetron Injectable 4 four times a day PRN  oxybutynin 5 two times a day  polyethylene glycol 3350 17 every 12 hours  senna 2 at bedtime  tamsulosin 0.4 at bedtime      Vital Signs Last 24 Hrs  T(F): 96.3 (04-01-24 @ 13:00), Max: 99.9 (03-28-24 @ 21:53)    Vital Signs Last 24 Hrs  HR: 74 (04-01-24 @ 13:00) (74 - 82)  BP: 139/70 (04-01-24 @ 13:00) (126/60 - 177/77)  RR: 18 (04-01-24 @ 13:00)  SpO2: 97% (04-01-24 @ 13:00) (96% - 97%)  Wt(kg): --    EXAM:  GENERAL: NAD, lying in bed; in chills  HEAD: No head lesions  EYES: Conjunctiva pink and cornea white  EAR, NOSE, MOUTH, THROAT: Normal external ears and nose, no discharges; moist mucous membranes  NECK: Supple, nontender to palpation; no JVD  RESPIRATORY: Clear to auscultation bilaterally  CARDIOVASCULAR: S1 S2  GASTROINTESTINAL: Soft, nontender, nondistended; normoactive bowel sounds  EXTREMITIES: No clubbing, cyanosis, or petal edema  NERVOUS SYSTEM: Alert and oriented to person, time, place and situation, speech clear. No focal deficits   MUSCULOSKELETAL: No joint erythema, swelling or pain  SKIN: Erythema, warmth, and tenderness on right calf, tenderness on right groin, no superficial thrombophlebitis  PSYCH: Normal affect      Labs:                        12.7   11.95 )-----------( 474      ( 01 Apr 2024 07:42 )             38.9     04-01    135  |  96<L>  |  53<H>  ----------------------------<  179<H>  5.6<H>   |  27  |  2.1<H>    Ca    9.6      01 Apr 2024 15:23  Mg     2.3     04-01    TPro  6.7  /  Alb  3.5  /  TBili  <0.2  /  DBili  x   /  AST  26  /  ALT  9   /  AlkPhos  125<H>  04-01      WBC Trend:  WBC Count: 11.95 (04-01-24 @ 07:42)  WBC Count: 12.24 (03-31-24 @ 08:32)  WBC Count: 15.15 (03-30-24 @ 08:42)  WBC Count: 23.01 (03-29-24 @ 06:49)      Creatine Trend:  Creatinine: 2.1 (04-01)  Creatinine: 2.0 (04-01)  Creatinine: 2.5 (03-31)  Creatinine: 2.9 (03-30)      Liver Biochemical Testing Trend:  Alanine Aminotransferase (ALT/SGPT): 9 (04-01)  Alanine Aminotransferase (ALT/SGPT): 9 (03-31)  Alanine Aminotransferase (ALT/SGPT): 10 (03-30)  Alanine Aminotransferase (ALT/SGPT): 25 (03-29)  Alanine Aminotransferase (ALT/SGPT): 34 (03-28)  Aspartate Aminotransferase (AST/SGOT): 26 (04-01-24 @ 07:42)  Aspartate Aminotransferase (AST/SGOT): 26 (03-31-24 @ 08:32)  Aspartate Aminotransferase (AST/SGOT): 21 (03-30-24 @ 08:42)  Aspartate Aminotransferase (AST/SGOT): 27 (03-29-24 @ 06:49)  Aspartate Aminotransferase (AST/SGOT): 33 (03-28-24 @ 05:55)  Bilirubin Total: <0.2 (04-01)  Bilirubin Total: <0.2 (03-31)  Bilirubin Total: 0.2 (03-30)  Bilirubin Total: 0.2 (03-29)  Bilirubin Total: 0.5 (03-28)      Trend LDH  09-25-22 @ 11:12  190      Urinalysis Basic - ( 01 Apr 2024 15:23 )    Color: x / Appearance: x / SG: x / pH: x  Gluc: 179 mg/dL / Ketone: x  / Bili: x / Urobili: x   Blood: x / Protein: x / Nitrite: x   Leuk Esterase: x / RBC: x / WBC x   Sq Epi: x / Non Sq Epi: x / Bacteria: x        MICROBIOLOGY:  Vancomycin Level, Trough: 5.6 (03-28 @ 19:27)        Culture - Urine (collected 27 Mar 2024 23:04)  Source: Clean Catch Clean Catch (Midstream)  Final Report:    No growth    Culture - Blood (collected 27 Mar 2024 19:42)  Source: .Blood Blood-Peripheral  Preliminary Report:    No growth at 4 days    Culture - Blood (collected 27 Mar 2024 19:42)  Source: .Blood Blood-Peripheral  Preliminary Report:    No growth at 4 days    Culture - Blood (collected 22 Mar 2023 19:28)  Source: .Blood None  Final Report:    No Growth Final    Culture - Urine (collected 25 Sep 2022 14:20)  Source: Clean Catch Clean Catch (Midstream)  Final Report:    <10,000 CFU/mL Normal Urogenital Brittany    Culture - Blood (collected 25 Sep 2022 00:10)  Source: .Blood Blood-Peripheral  Final Report:    No Growth Final    Culture - Blood (collected 25 Sep 2022 00:10)  Source: .Blood Blood-Peripheral  Final Report:    No Growth Final        RADIOLOGY:  imaging below personally reviewed    < from: US Extremity Nonvasc Limited, Right (04.01.24 @ 10:50) >  8.9 x 8.5 x 5.7 cm mass versus mass-like collection of the right groin,   previously measured 8.6 x 8.5 x 5.3 cm. Color Doppler demonstrates   internal flow. Continued short-term follow-up and/or tissue sampling is   recommended as a malignancy is on the differential.    Further evaluation with CT chest, abdomen and pelvis is recommended to   investigate for a systemiclymphoproliferative disorders like CLL.    < end of copied text >    < from: US Kidney and Bladder (03.29.24 @ 13:01) >  FINDINGS:    Right kidney: 13.7 cm. No hydronephrosis or calculi. Simple cyst mid to   lower pole measures 2.1 x 1.8 x 2.5 cm.    Left kidney:  13.8 cm. No hydronephrosis or calculi.    Urinary bladder: No debris or calculus. Bilateralureteral jets are   visualized. Prevoid volume of approximately 357 cc.  Postvoid volume is   approximately 60 cc..    The prostate gland is nodular and mildly enlarged measuring 4.3 x 5.4 x   3.9 cm. This corresponds to a large volume of 48 cc.    IMPRESSION:    Right renal cysts. No hydronephrosis. Minimal urinary bladder retention.    Nodular mildly enlarged prostate gland    < end of copied text >

## 2024-04-01 NOTE — PROGRESS NOTE ADULT - SUBJECTIVE AND OBJECTIVE BOX
SUBJECTIVE:    Patient is a 69y old Male who presents with a chief complaint of Right leg pain (29 Mar 2024 12:41)      HPI:  70yo male whose PMH includes A Fib (to me it is PAF following an ablation procedure but he ran out of Xarelto 2 weeks ago, DM (uses insulin pump), CAD, apparent  CKD, HTN and recurrent lower extremity cellulitis, presents to the ER from his doctor's office due to episode of rigors and right leg pain. In ER found to have an infected hematoma  (28 Mar 2024 02:10)      Currently admitted to medicine with the primary diagnosis of Sepsis    back pain improved with dilaudid but not long lasting enough, feels that his leg is improving    Besides the pertinent positives and negatives described above, the ROS was within normal limits.    PAST MEDICAL & SURGICAL HISTORY  Diabetes mellitus, type 2    BPH (benign prostatic hyperplasia)    Water retention    Essential hypertension    Diabetes    CAD (coronary artery disease)    H/O hypercholesterolemia    H/O right coronary artery stent placement    History of resection of pancreas    History of cholecystectomy    History of left knee replacement      SOCIAL HISTORY:    ALLERGIES:  No Known Allergies    MEDICATIONS:  STANDING MEDICATIONS  bisacodyl 5 milliGRAM(s) Oral at bedtime  bisacodyl Suppository 10 milliGRAM(s) Rectal once  ceFAZolin   IVPB      ceFAZolin   IVPB 2000 milliGRAM(s) IV Intermittent every 8 hours  dextrose 5%. 1000 milliLiter(s) IV Continuous <Continuous>  dextrose 5%. 1000 milliLiter(s) IV Continuous <Continuous>  dextrose 50% Injectable 25 Gram(s) IV Push once  dextrose 50% Injectable 12.5 Gram(s) IV Push once  dextrose 50% Injectable 25 Gram(s) IV Push once  fentaNYL   Patch  12 MICROgram(s)/Hr 1 Patch Transdermal every 72 hours  glucagon  Injectable 1 milliGRAM(s) IntraMuscular once  heparin   Injectable 5000 Unit(s) SubCutaneous every 8 hours  hydrocortisone 2.5% Ointment 1 Application(s) Topical three times a day  metoprolol tartrate 25 milliGRAM(s) Oral two times a day  oxybutynin 5 milliGRAM(s) Oral two times a day  polyethylene glycol 3350 17 Gram(s) Oral every 12 hours  senna 2 Tablet(s) Oral at bedtime  tamsulosin 0.4 milliGRAM(s) Oral at bedtime    PRN MEDICATIONS  acetaminophen     Tablet .. 650 milliGRAM(s) Oral every 6 hours PRN  aluminum hydroxide/magnesium hydroxide/simethicone Suspension 30 milliLiter(s) Oral every 6 hours PRN  dextrose Oral Gel 15 Gram(s) Oral once PRN  guaifenesin/dextromethorphan Oral Liquid 10 milliLiter(s) Oral every 6 hours PRN  HYDROmorphone  Injectable 1 milliGRAM(s) IV Push every 4 hours PRN  methocarbamol 500 milliGRAM(s) Oral three times a day PRN  ondansetron Injectable 4 milliGRAM(s) IV Push four times a day PRN    VITALS:   T(F): 96.3  HR: 74  BP: 139/70  RR: 18  SpO2: 97%    LABS:                        12.7   11.95 )-----------( 474      ( 01 Apr 2024 07:42 )             38.9     04-01    138  |  100  |  59<H>  ----------------------------<  113<H>  5.7<H>   |  26  |  2.0<H>    Ca    9.6      01 Apr 2024 07:42  Mg     2.3     04-01    TPro  6.7  /  Alb  3.5  /  TBili  <0.2  /  DBili  x   /  AST  26  /  ALT  9   /  AlkPhos  125<H>  04-01      Urinalysis Basic - ( 01 Apr 2024 07:42 )    Color: x / Appearance: x / SG: x / pH: x  Gluc: 113 mg/dL / Ketone: x  / Bili: x / Urobili: x   Blood: x / Protein: x / Nitrite: x   Leuk Esterase: x / RBC: x / WBC x   Sq Epi: x / Non Sq Epi: x / Bacteria: x                Sepsis      RADIOLOGY:    PHYSICAL EXAM:  General: WN/WD NAD  Neurology: A&Ox3, nonfocal, LUJAN x 4  Head:  Normocephalic, atraumatic  ENT:  Mucosa moist, no ulcerations  Neck:  Supple, no sinuses or palpable masses  Lymphatic:  No palpable cervical, supraclavicular, axillary or inguinal adenopathy  Respiratory: CTA B/L  CV: RRR, S1S2, no murmur  Abdominal: Soft, NT, ND no palpable mass  MSK: right leg erythema  Incisions: intact, no erythema or drainage    Intravenous access: yes  NG tube: no  Ojeda Catheter: no

## 2024-04-01 NOTE — DISCHARGE NOTE PROVIDER - CARE PROVIDER_API CALL
Kleiner, Morton Jay  Nephrology  [address] [place] [address]  Phone: [phone]  Fax: [phone]  Follow Up Time: 1 week    Alonso Olguin  Surgery  [address] [place] [address], [facility] [address]  [place] [address]  Phone: [phone]  Fax: [phone]  Follow Up Time: 1 week    Neri Cruz  Anesthesiology  [address] [place] [address], [facility] [address]  [place] [address]  Phone: [phone]  Fax: [phone]  Follow Up Time: 1-3 days    Layo Webb  Endocrinology/Metab/Diabetes  [address] [place] [address]  Phone: [phone]  Fax: [phone]  Follow Up Time: 1-3 days    Bartolo Easley  Vascular/Intervent Radiology  [address] [place] [address]  Phone: [phone]  Fax: [phone]  Follow Up Time: 1-3 days

## 2024-04-01 NOTE — PROGRESS NOTE ADULT - ASSESSMENT
70yo male whose PMH includes A Fib (to me it is PAF following an ablation procedure but he ran out of Xarelto 2 weeks ago, DM (uses insulin pump), CAD, apparent  CKD, HTN and recurrent lower extremity cellulitis, presents to the ER from his doctor's office due to episode of rigors and right leg pain.    ID is following for right LE cellulitis  Afebrile, with leukocytosis  Bandemia 13%  Elevated lactate  KRISTINE  CT RLE with contrast 3/27: Well-circumscribed 9.1 cm soft tissue mass in the anterior right groin without associated inflammatory changes possibly representing a large infected hematoma.    US RLE 4/1: 8.9 x 8.5 x 5.7 cm mass versus mass-like collection of the right groin,   previously measured 8.6 x 8.5 x 5.3 cm. Color Doppler demonstrates   internal flow. Continued short-term follow-up and/or tissue sampling is   recommended as a malignancy is on the differential    Antibiotics;  Vancomycin 3/27 - 3/28  Cefepime 3/27 - 3/28  Cefazolin 3/29 ->      IMPRESSION:  RLE cellulitis, low suspicion for necrotizing fasciitis at this time  Right thigh mass  Leukocytosis  KRISTINE    RECOMMENDATIONS:  - Continue IV cefazolin 2g q8hrs; on discharge, can switch to PO Keflex 1g q8hrs to complete 10-day treatment (until 4/5)  - Follow up BCx and UCx  - Discussed with General surgeon Dr. Olguin, right thigh lesion is more consistent for soft tissue tumor vs LN, unlikely to be hematoma, I&D is not appropriate; consider outpatient biopsy  - Right LE arterial duplex  - Antipyretic for fever  - Offloading and frequent position changes, aspiration precaution  - Trend WBC, fever curve, transaminases, creatinine daily      Jaunis Zhao D.O.  Attending Physician  Division of Infectious Diseases  [facility]  Please contact me via [employer] Teams

## 2024-04-01 NOTE — DISCHARGE NOTE PROVIDER - HOSPITAL COURSE
70yo male whose PMH includes A Fib (to me it is PAF following an ablation procedure but he ran out of Xarelto 2 weeks ago, DM (uses insulin pump), CAD, apparent  CKD, HTN and recurrent lower extremity cellulitis, presents to the ER from his doctor's office due to episode of rigors and right leg pain    Assessment    Cellulitis with lymphangiitis, doubt infected hematoma  PAF ran out of xarelto two weeks ago  KRISTINE on CKD  Chronic back pain  CAD  HTN  DM uses insulin pump    Plan    - ID recs appreciated / cefazolin for cellulitis, seen by surgery as patient is improving will monitor for now  - surgery recs appreciated doubt hematoma or pseudo-aneurysm, will restart xarelto  -  received gentle fluids for KRISTINE on CKD, RBUS negative, creatinine has improved, lasix 80  - insulin pump has been refilled, discontinued subq  - ultrasound done with surgery and as per note no sign of hematoma or arterial pathology  - started methocarbamol for back pain, c/w dilaudid prn, added fentanyl patch as recommended by surgery  - miralax / senna / added bisacodyl  - lopressor bid  - tamsulosin    discharge with surgery, and nephro follow up

## 2024-04-01 NOTE — DISCHARGE NOTE NURSING/CASE MANAGEMENT/SOCIAL WORK - NSDCPEFALRISK_GEN_ALL_CORE
For information on Fall & Injury Prevention, visit: [email or web address] OR  [email or web address] OR  https://www.cdc.gov/steadi/patient.html

## 2024-04-01 NOTE — DISCHARGE NOTE PROVIDER - NSDCFUADDAPPT_GEN_ALL_CORE_FT
APPTS ARE READY TO BE MADE: [x ] YES    Best Family or Patient Contact (if needed): patient    Additional Information about above appointments (if needed):    1: Dr. Kleiner as soon as possible  2: Dr. Easley / interventional radiology  3: Surgery    Other comments or requests:

## 2024-04-08 ENCOUNTER — APPOINTMENT (OUTPATIENT)
Dept: PAIN MANAGEMENT | Facility: CLINIC | Age: 69
End: 2024-04-08
Payer: MEDICARE

## 2024-04-08 PROCEDURE — 99214 OFFICE O/P EST MOD 30 MIN: CPT

## 2024-04-08 NOTE — CHART NOTE - NSCHARTNOTEFT_GEN_A_CORE
Nephro (KRISTINE) & Endo (DM)/ emailed Juana Sr 4/2- AC/ as per Juana, This patient has been seeing Dr. Webb for 12 years. He was there on 3/13 and has another appointment on 4/15- 4/8- AC

## 2024-04-10 DIAGNOSIS — Z82.49 FAMILY HISTORY OF ISCHEMIC HEART DISEASE AND OTHER DISEASES OF THE CIRCULATORY SYSTEM: ICD-10-CM

## 2024-04-10 DIAGNOSIS — I48.0 PAROXYSMAL ATRIAL FIBRILLATION: ICD-10-CM

## 2024-04-10 DIAGNOSIS — E78.00 PURE HYPERCHOLESTEROLEMIA, UNSPECIFIED: ICD-10-CM

## 2024-04-10 DIAGNOSIS — Z90.410 ACQUIRED TOTAL ABSENCE OF PANCREAS: ICD-10-CM

## 2024-04-10 DIAGNOSIS — Z90.49 ACQUIRED ABSENCE OF OTHER SPECIFIED PARTS OF DIGESTIVE TRACT: ICD-10-CM

## 2024-04-10 DIAGNOSIS — Z79.4 LONG TERM (CURRENT) USE OF INSULIN: ICD-10-CM

## 2024-04-10 DIAGNOSIS — D72.825 BANDEMIA: ICD-10-CM

## 2024-04-10 DIAGNOSIS — Z79.01 LONG TERM (CURRENT) USE OF ANTICOAGULANTS: ICD-10-CM

## 2024-04-10 DIAGNOSIS — L03.115 CELLULITIS OF RIGHT LOWER LIMB: ICD-10-CM

## 2024-04-10 DIAGNOSIS — N18.9 CHRONIC KIDNEY DISEASE, UNSPECIFIED: ICD-10-CM

## 2024-04-10 DIAGNOSIS — N40.0 BENIGN PROSTATIC HYPERPLASIA WITHOUT LOWER URINARY TRACT SYMPTOMS: ICD-10-CM

## 2024-04-10 DIAGNOSIS — E87.1 HYPO-OSMOLALITY AND HYPONATREMIA: ICD-10-CM

## 2024-04-10 DIAGNOSIS — E66.9 OBESITY, UNSPECIFIED: ICD-10-CM

## 2024-04-10 DIAGNOSIS — E87.5 HYPERKALEMIA: ICD-10-CM

## 2024-04-10 DIAGNOSIS — Z95.1 PRESENCE OF AORTOCORONARY BYPASS GRAFT: ICD-10-CM

## 2024-04-10 DIAGNOSIS — I12.9 HYPERTENSIVE CHRONIC KIDNEY DISEASE WITH STAGE 1 THROUGH STAGE 4 CHRONIC KIDNEY DISEASE, OR UNSPECIFIED CHRONIC KIDNEY DISEASE: ICD-10-CM

## 2024-04-10 DIAGNOSIS — A41.9 SEPSIS, UNSPECIFIED ORGANISM: ICD-10-CM

## 2024-04-10 DIAGNOSIS — I25.10 ATHEROSCLEROTIC HEART DISEASE OF NATIVE CORONARY ARTERY WITHOUT ANGINA PECTORIS: ICD-10-CM

## 2024-04-10 DIAGNOSIS — M54.50 LOW BACK PAIN, UNSPECIFIED: ICD-10-CM

## 2024-04-10 DIAGNOSIS — I80.9 PHLEBITIS AND THROMBOPHLEBITIS OF UNSPECIFIED SITE: ICD-10-CM

## 2024-04-10 DIAGNOSIS — G89.29 OTHER CHRONIC PAIN: ICD-10-CM

## 2024-04-10 DIAGNOSIS — E11.22 TYPE 2 DIABETES MELLITUS WITH DIABETIC CHRONIC KIDNEY DISEASE: ICD-10-CM

## 2024-04-10 DIAGNOSIS — Z80.1 FAMILY HISTORY OF MALIGNANT NEOPLASM OF TRACHEA, BRONCHUS AND LUNG: ICD-10-CM

## 2024-04-10 DIAGNOSIS — Z96.652 PRESENCE OF LEFT ARTIFICIAL KNEE JOINT: ICD-10-CM

## 2024-04-10 DIAGNOSIS — Z83.3 FAMILY HISTORY OF DIABETES MELLITUS: ICD-10-CM

## 2024-04-10 NOTE — PHYSICAL EXAM
[] : positive Taylor maneuver facet loading [de-identified] : L spine   No rashes, erythema, edema noted TTP b/l lumbar paraspinal muscles Positive facet loading bilaterally Positive KEMPS test bilaterally LLE: 5/5 strength RLE: 5/5 strength Sensation intact b/l LE

## 2024-04-10 NOTE — HISTORY OF PRESENT ILLNESS
[FreeTextEntry1] : ORIGINAL PRESENTATION: Mr. Ike Narvaez, a 69-year-old male, presents today for lower back pain. He states the pain began approximately 1 month ago while he was playing golf. He states the pain is severe, constant rated 10/10 on the pain scale. He reports the pain is located across the lower lumbar spine and travels into the right buttock area. He states he has trouble standing or straightening. He states the pain is associated with severe stiffness in the area and sometimes occasionally radiates into the bilateral lower extremities giving him weakness. He states he is very frustrated by his worsening symptomology.  TODAY: 4/08/2024, This is a former Dr. Landry patient transferring his care to me. He was last seen in the office on 5/04/2022 for complaints of lower back pain.   He returns to the office after long delay with continued complaints of chronic axial lower back pain. He denies any radicular features into the lower extremities. The pain travels into the buttock but not further. He states the pain is present daily and makes it difficult for him to perform his ADLs. He describes the severity of the pain which causes him to "see stars." Transitioning from a standing position after sitting for a prolonged time, the pain is severe. We discussed trialing a diagnostic medial branch block injection and he is agreeable.   MRI of lumbar spine 2/3/2022: Minimal lumbar dextroscoliosis. Mid, lower lumbar degenerative disc change. Bilateral L5- S1 facet hypertrophy. Right L3-4 foraminal disc extension is new. Lower lumbar marginal osteophytes are progressive. No significant change otherwise. Abnormal signal within the right S1 vertebral body. This finding is new. Neoplastic etiology cannot be excluded. Reactive marrow signal due to degenerative change is still considered. Further evaluation is recommended. A bone/ scan or PET CT may be of benefit. CT lumbar spine may contribute additional specificity. Probable right renal cyst. Renal sonography may confirm.

## 2024-04-10 NOTE — DISCUSSION/SUMMARY
[de-identified] : A discussion regarding available pain management treatment options occurred with the patient.  These included interventional, rehabilitative, pharmacological, and alternative modalities. We will proceed with the following:  Interventional treatment:  bilateral L3, L4, L4 medialbranch block w/o MAC Patient presents with axial lumbar pain that has not responded to 3 months of conservative therapy including physical therapy or NSAID therapy.  The pain is interfering with activities of daily living and functionality.  The pain is exacerbated by facet loading.  Positive Kemps maneuver which is defined by pain reproduction with extension and rotation of the lumbar spine to the affected side.  There is no unexplained neurologic deficit.  There is no history of systemic infection, unstable medical condition, bleeding tendency, or local infection.  The injection is being performed to diagnose the facet joint as the source of the individual's pain.   Medication:  1. Ordered Vicodin, 2 weeks supply, for symptom control. I advised that this is a onetime courtesy prescription, and we will not write it moving forward. 2. Ordered Amitriptyline 1 tab 25 mg to be taken at night for one week and then increase to 2 tablets at night   -Follow up once the injection is complete.    I Adriana Yang attest that this documentation has been prepared under the direction and in the presence of provider Dr. Neri Cruz. The documentation recorded by the scribe in my presence, accurately reflects the service I personally performed, and the decisions made by me with my edits as appropriate.  Neri Cruz DO.

## 2024-04-22 ENCOUNTER — APPOINTMENT (OUTPATIENT)
Dept: PAIN MANAGEMENT | Facility: CLINIC | Age: 69
End: 2024-04-22
Payer: MEDICARE

## 2024-04-22 PROCEDURE — 64494 INJ PARAVERT F JNT L/S 2 LEV: CPT | Mod: 50

## 2024-04-22 PROCEDURE — 64493 INJ PARAVERT F JNT L/S 1 LEV: CPT | Mod: 50

## 2024-04-22 NOTE — PROCEDURE
[FreeTextEntry3] : Date of Service: 04/22/2024   Account: [id number]  Patient: MORGAN BUSH   YOB: 1955  Age: 69 year  Surgeon:                  Neri Cruz DO  Assistant:                None  Pre-Operative Diagnosis:   Lumbar Spondylosis      Post Operative Diagnosis:  Lumbar Spondylosis  Procedure:             Bilateral  (L3, L4, L5 medial branch) L4-5, L5-S1 facet joint block under fluoroscopic guidance.  Anesthesia:            none  This procedure was carried out using fluoroscopic guidance.  The risks and benefits of the procedure were discussed extensively with the patient.  The consent of the patient was obtained and the following procedure was performed. The patient was placed in the prone position on the fluoroscopy table and the area was prepped and draped in a sterile fashion.  A timeout was performed with all essential staff present and the site and side were verified.  The lumbar vertebral bodies were identified and the fluoroscope was obliqued ipsilateral to approximately 30 degrees to reveal the appropriate anatomical view.  The junction of the superior articulate process and transverse process at the right L4, and L5 levels were identified and marked. A spinal needle was then introduced and advanced to the above target points at the junction of the SAP and transverse processes until bone was contacted.  Fluoroscope then focused on the right sacral ala on A/P view, and marked at this point.  A spinal needle was then advanced under fluoroscopic guidance until bone was contacted at the ala.    After negative aspiration for heme and CSF, an 1 cc of a mixture of 0.5% Marcaine and 10mg decadron was injected at each of the injection sites.  The procedure was performed in the exact same fashion on the contralateral left side at the L4, L5 and sacral ala levels.  The needles were subsequently removed.  Vital signs remained normal.  Pulse oximeter was used throughout the procedure and the patient's pulse and oxygen saturation remained within normal limits.  The patient tolerated the procedure well.  There were no complications.  The patient was instructed to apply ice over the injection sites for twenty minutes every two hours for the next 24 to 48 hours.  Disposition:       1. The patient was advised to F/U in 1-2 weeks to assess the response to the injection.       2. They were advised to keep a pain diary to report the results of the diagnostic block at their FUV.      3. The patient was also instructed to contact me immediately if there were any concerns related to the procedure performed.

## 2024-05-01 ENCOUNTER — APPOINTMENT (OUTPATIENT)
Dept: PAIN MANAGEMENT | Facility: CLINIC | Age: 69
End: 2024-05-01
Payer: MEDICARE

## 2024-05-01 VITALS — BODY MASS INDEX: 38.87 KG/M2 | HEIGHT: 72 IN | WEIGHT: 287 LBS

## 2024-05-01 DIAGNOSIS — R60.0 LOCALIZED EDEMA: ICD-10-CM

## 2024-05-01 PROCEDURE — 99214 OFFICE O/P EST MOD 30 MIN: CPT

## 2024-05-01 RX ORDER — AMITRIPTYLINE HYDROCHLORIDE 25 MG/1
25 TABLET, FILM COATED ORAL
Qty: 90 | Refills: 2 | Status: DISCONTINUED | COMMUNITY
Start: 2024-04-08 | End: 2024-05-01

## 2024-05-01 RX ORDER — GABAPENTIN 400 MG/1
400 CAPSULE ORAL 3 TIMES DAILY
Qty: 90 | Refills: 1 | Status: ACTIVE | COMMUNITY
Start: 2024-05-01 | End: 1900-01-01

## 2024-05-01 NOTE — DISCUSSION/SUMMARY
[de-identified] : A discussion regarding available pain management treatment options occurred with the patient.  These included interventional, rehabilitative, pharmacological, and alternative modalities. We will proceed with the following:  Interventional treatment:  1. Proceed with a Bilateral sacroiliac joint injection without sedation.  Treatment options were discussed with the patient. The patient has been having persistent pain in the left sacroiliac joint with minimal improvement with conservative therapies. The patient was given the option to proceed with a left sacroiliac joint injection to try and get some pain relief. The patient understands that the injections are meant to offer pain relief but do not always give pain relief, and thus it is partially diagnostic. If this is the case, this can add to our clinical picture and we can reconsider our options at that point. The risks and benefits were discussed which included bleeding, infection, nerve injury, no pain relief, or worse - increased pain. All questions were answered and the patient will schedule for the injection on the next available date.   Imaging: Patient had a concerning finding on his MRI report. His MRI from 2022 showed abnormal signal within the right S1 vertebral body. I would like to obtain a CT scan of the lumbar spine to rule out any sinister pathology.   Medications: 1. Renewed HYDROcodone-Acetaminophen 5-325 mg q8h as needed, 2 week supply.  2. Ordered Gabapentin 300 mg q8h.  - Discontinue Amitriptyline.   ISTOP Checked. Reference # [ [id number]]  The R/B/A of chronic opiate therapy for non-malignant pain including, but not limited to, the risk of addiction, overdose, respiratory depression, and death was discussed and accepted by the patient. Patient was also informed that they should not consume alcohol or drive a motor vehicle under any circumstances while taking opiate pain medications.  The patient reports good pain control with their current medication regimen. They deny any intolerable side effects. There is no obvious aberrant behavior. The patient is more functional with regard to his ADLs and social activity as result of their current medication regimen.   - Follow up in 2 weeks to discuss imaging.   I Suma Alcocer attest that this documentation has been prepared under the direction and in the presence of provider Dr. Neri Cruz.  The documentation recorded by the scribe in my presence, accurately reflects the service I personally performed, and the decisions made by me with my edits as appropriate.   Neri Cruz, DO

## 2024-05-01 NOTE — HISTORY OF PRESENT ILLNESS
[FreeTextEntry1] : ORIGINAL PRESENTATION: Mr. Ike Narvaez, a 69-year-old male, presents today for lower back pain. He states the pain began approximately 1 month ago while he was playing golf. He states the pain is severe, constant rated 10/10 on the pain scale. He reports the pain is located across the lower lumbar spine and travels into the right buttock area. He states he has trouble standing or straightening. He states the pain is associated with severe stiffness in the area and sometimes occasionally radiates into the bilateral lower extremities giving him weakness. He states he is very frustrated by his worsening symptomology.  TODAY: 4/08/2024, This is a former Dr. Landry patient transferring his care to me. He was last seen in the office on 5/04/2022 for complaints of lower back pain.   He returns to the office after long delay with continued complaints of chronic axial lower back pain. He denies any radicular features into the lower extremities. The pain travels into the buttock but not further. He states the pain is present daily and makes it difficult for him to perform his ADLs. He describes the severity of the pain which causes him to "see stars." Transitioning from a standing position after sitting for a prolonged time, the pain is severe. We discussed trialing a diagnostic medial branch block injection and he is agreeable.   TODAY, 5-1-2024: Revisit encounter.   Since his last visit, he is status post Bilateral L3, L4, L5 medial branch facet block on 4-. He notes little to no relief from this procedure. He continues today with ongoing severe lower back pain. He has severe stiffness in the back. He has difficulty doing any sort of ADLs. He moves very gingerly due to the pain. Transitional movements cause him pain. Pain is made worse with standing, walking, sitting or lying down at night. At times when he does feel good, he can make one wrong move and his pain can become severe. As soon as he sits, he feels a shooting type pain. Pain is present daily.   He has been managing his pain with Hydrocodone-Acetaminophen 5-325 mg q8h as needed along with Amitriptyline 25 mg qhs.

## 2024-05-01 NOTE — PHYSICAL EXAM
[de-identified] : Low back exam   No rashes, erythema, edema noted TTP bilateral si joint Positive JUSTEN test bilaterally Positive Gaenslen's test bilaterally Positive SI joint compression test bilaterally LLE: 5/5 strength RLE: 5/5 strength Sensation intact b/l LE

## 2024-05-07 ENCOUNTER — APPOINTMENT (OUTPATIENT)
Dept: PAIN MANAGEMENT | Facility: CLINIC | Age: 69
End: 2024-05-07

## 2024-05-13 ENCOUNTER — APPOINTMENT (OUTPATIENT)
Dept: PAIN MANAGEMENT | Facility: CLINIC | Age: 69
End: 2024-05-13
Payer: MEDICARE

## 2024-05-13 DIAGNOSIS — M46.1 SACROILIITIS, NOT ELSEWHERE CLASSIFIED: ICD-10-CM

## 2024-05-13 PROCEDURE — 27096 INJECT SACROILIAC JOINT: CPT | Mod: 50

## 2024-05-13 PROCEDURE — 01992 ANES DX/THER NRV BLK&INJ PRN: CPT | Mod: QZ,P3

## 2024-05-13 NOTE — PROCEDURE
[FreeTextEntry3] : Date of Service: 05/13/2024   Account: [id number]  Patient: MORGAN BUSH   YOB: 1955  Age: 69 year  Surgeon:      Neri Cruz DO  Pre - Operative Diagnosis:       Bilateral sacroiliac joint dysfunction         Post - Operative Diagnosis:     Same              Procedure:             Bilateral Sacroiliac arthrogram and joint injection under fluoroscopic guidance  This procedure was carried out using fluoroscopic guidance.  The risks and benefits of the procedure were discussed extensively with the patient.  The consent of the patient was obtained and the following procedure was performed. The patient was placed in the prone position on the fluoroscopy table and the area was prepped and draped in a sterile fashion.  A timeout was performed with all essential staff present and the site and side were verified.  The patient was placed in the prone position.  The patient's back was prepped and draped in a sterile fashion.  The fluoroscopic image intensifier was then positioned valentin maximize visualization of the joint.  This was achieved with slight cephalad and medial angulation.   - The area corresponding to the left inferior SIJ space was then identified and marked.  A 25 gauge 3.5 inch spinal needle was then inserted and directed into the left sacroiliac joint space using fluoroscopic guidance.  After negative aspiration for heme and CSF, 1 cc of omnipaque was injected and appeared to fill the joint space.  An injectate of 1.0 cc 0.25% Marcaine plus 10mg decadron was then injected into the left sacroiliac joint space.  - The area corresponding to the right inferior SIJ space was then identified and marked. A 25 gauge 3.5 inch spinal needle was then inserted and directed into the right sacroiliac joint space using fluoroscopic guidance.  After negative aspiration for heme and CSF, 1 cc of Omnipaque was injected and appeared to fill the joint space.  An injectate of 1.0 cc 0.25% Marcaine plus 10mg decadron was then injected into the right sacroiliac joint space.  The needle was subsequently removed.  Vital signs remained normal.  Pulse oximeter was used throughout the procedure and the patient's pulse and oxygen saturation remained within normal limits.  The patient tolerated the procedure well.  There were no complications.  The patient was instructed to apply ice over the injection sites for twenty minutes every two hours for the next 24 to 48 hours.  Disposition:        1. The patient was advised to F/U in 1-2 weeks to assess the response to the injection.       2. The patient was also instructed to contact me immediately if there were any concerns related to the procedure performed.

## 2024-05-17 ENCOUNTER — APPOINTMENT (OUTPATIENT)
Dept: PAIN MANAGEMENT | Facility: CLINIC | Age: 69
End: 2024-05-17
Payer: MEDICARE

## 2024-05-17 PROCEDURE — 99214 OFFICE O/P EST MOD 30 MIN: CPT

## 2024-05-17 RX ORDER — HYDROCODONE BITARTRATE AND ACETAMINOPHEN 10; 325 MG/1; MG/1
10-325 TABLET ORAL
Qty: 28 | Refills: 0 | Status: ACTIVE | COMMUNITY
Start: 2024-04-08 | End: 1900-01-01

## 2024-05-17 NOTE — HISTORY OF PRESENT ILLNESS
[FreeTextEntry1] : ORIGINAL PRESENTATION: Mr. Ike Narvaez, a 69-year-old male, presents today for lower back pain. He states the pain began approximately 1 month ago while he was playing golf. He states the pain is severe, constant rated 10/10 on the pain scale. He reports the pain is located across the lower lumbar spine and travels into the right buttock area. He states he has trouble standing or straightening. He states the pain is associated with severe stiffness in the area and sometimes occasionally radiates into the bilateral lower extremities giving him weakness. He states he is very frustrated by his worsening symptomology.  TODAY: 4/08/2024, This is a former Dr. Landry patient transferring his care to me. He was last seen in the office on 5/04/2022 for complaints of lower back pain.   He returns to the office after long delay with continued complaints of chronic axial lower back pain. He denies any radicular features into the lower extremities. The pain travels into the buttock but not further. He states the pain is present daily and makes it difficult for him to perform his ADLs. He describes the severity of the pain which causes him to "see stars." Transitioning from a standing position after sitting for a prolonged time, the pain is severe. We discussed trialing a diagnostic medial branch block injection and he is agreeable.   TODAY, 5-1-2024: Revisit encounter.   Since his last visit, he is status post Bilateral L3, L4, L5 medial branch facet block on 4-. He notes little to no relief from this procedure. He continues today with ongoing severe lower back pain. He has severe stiffness in the back. He has difficulty doing any sort of ADLs. He moves very gingerly due to the pain. Transitional movements cause him pain. Pain is made worse with standing, walking, sitting or lying down at night. At times when he does feel good, he can make one wrong move and his pain can become severe. As soon as he sits, he feels a shooting type pain. Pain is present daily.   He has been managing his pain with Hydrocodone-Acetaminophen 5-325 mg q8h as needed along with Amitriptyline 25 mg qhs.   TODAY, 5-: Revisit encounter.   Since his last visit, he underwent a Bilateral sacroiliac joint injection on 5-. He affords little to no relief from this procedure. He has been chair bound since the injection. He did bend over a couple of times after the injection to try and do some things around the house and the pain became debilitating. He has been experiencing pain around the bilateral lower lumbar spine area. He did have some referred pain into the legs however that is not present anymore. Pain is mainly axial and associated with stiffness. He does get electric shock like sensations in the back. He cannot sit or stand without pain. When he sits down, he has to sit in a slouched position to relieve the pain. He continues to use a cane to ambulate for stability. He has been managing his pain with HYDROcodone- Acetaminophen 5-325 mg as needed along with Gabapentin 800 mg q8h. He does not afford any relief with any of these medications. He is going away and is worried he will be in pain on vacation.

## 2024-05-17 NOTE — DISCUSSION/SUMMARY
[de-identified] : A discussion regarding available pain management treatment options occurred with the patient.  These included interventional, rehabilitative, pharmacological, and alternative modalities. We will proceed with the following:  Interventional treatment:  1. Proceed with a L5-S1 lumbar epidural steroid injection under fluoroscopic guidance.  Treatment options were discussed. The patient has been having persistent, severe low back pain and lumbar radicular pain that has minimally improved with conservative therapy thus far (including physical therapy, home stretching and anti-inflammatory medications. The patient was given the option of proceeding with a lumbar epidural steroid injection to try and get the patient some pain relief. The risks and benefits were discussed, which included the associated problems with steroids, bleeding, nerve injury, lack of improvement with pain, and 0.5% chance of a post dural puncture headache.   * Of note, he is on Xarelto and Baby Aspirin 81 mg. We will reach out to his cardiologist in order to obtain clearance before moving forward with this injection.   Imagin. Ordered an X-ray of the bilateral hips and pelvis due to pain and decrease in range of motion in that area to delineate a pain generator.   Medications: 1. Increased HYDROcodone-Acetaminophen to  mg q8h as needed.  - Continue with Gabapentin.   ISTOP Checked Reference # [ [id number]]  The R/B/A of chronic opiate therapy for non-malignant pain including, but not limited to, the risk of addiction, overdose, respiratory depression, and death was discussed and accepted by the patient. Patient was also informed that they should not consume alcohol or drive a motor vehicle under any circumstances while taking opiate pain medications.  The patient reports good pain control with their current medication regimen. They deny any intolerable side effects. There is no obvious aberrant behavior. The patient is more functional with regard to his ADLs and social activity as result of their current medication regimen.   - Follow up in 2 weeks post injection.   I Suma Alcocer attest that this documentation has been prepared under the direction and in the presence of provider Dr. Neri Cruz.  The documentation recorded by the scribe in my presence, accurately reflects the service I personally performed, and the decisions made by me with my edits as appropriate.   Neri Cruz, DO

## 2024-05-17 NOTE — PHYSICAL EXAM
[de-identified] : L spine   No rashes, erythema, edema noted TTP b/l lumbar paraspinals and sciatic notch Positive straight leg raise bilaterally LLE: 5/5 strength RLE: 5/5 strength Sensation intact b/l LE

## 2024-06-06 ENCOUNTER — APPOINTMENT (OUTPATIENT)
Dept: PAIN MANAGEMENT | Facility: CLINIC | Age: 69
End: 2024-06-06

## 2024-06-06 DIAGNOSIS — M62.830 MUSCLE SPASM OF BACK: ICD-10-CM

## 2024-06-06 DIAGNOSIS — M54.16 RADICULOPATHY, LUMBAR REGION: ICD-10-CM

## 2024-06-06 DIAGNOSIS — M47.816 SPONDYLOSIS W/OUT MYELOPATHY OR RADICULOPATHY, LUMBAR REGION: ICD-10-CM

## 2024-06-06 PROCEDURE — 99214 OFFICE O/P EST MOD 30 MIN: CPT | Mod: 25

## 2024-06-06 PROCEDURE — 20552 NJX 1/MLT TRIGGER POINT 1/2: CPT

## 2024-06-08 PROBLEM — M47.816 LUMBAR SPONDYLOSIS: Status: ACTIVE | Noted: 2024-04-08

## 2024-06-08 PROBLEM — M54.16 LUMBAR RADICULITIS: Status: ACTIVE | Noted: 2024-05-17

## 2024-06-08 PROBLEM — M62.830 LUMBAR PARASPINAL MUSCLE SPASM: Status: ACTIVE | Noted: 2024-06-08

## 2024-06-08 NOTE — PROCEDURE
[Trigger point 1-2 muscle groups] : trigger point 1-2 muscle groups [Left] : of the left [Lumbar paraspinal muscle] : lumbar paraspinal muscle [Pain] : pain [Alcohol] : alcohol [Ethyl Chloride sprayed topically] : ethyl chloride sprayed topically [Sterile technique used] : sterile technique used [___ cc    0.5%] : Bupivacaine (Marcaine) ~Vcc of 0.5%  [___ cc    4mg] : Dexamethasone (Decadron) ~Vcc of 4 mg  [] : Patient tolerated procedure well [Call if redness, pain or fever occur] : call if redness, pain or fever occur [Apply ice for 15min out of every hour for the next 12-24 hours as tolerated] : apply ice for 15 minutes out of every hour for the next 12-24 hours as tolerated [Previous OTC use and PT nontherapeutic] : patient has tried OTC's including aspirin, Ibuprofen, Aleve, etc or prescription NSAIDS, and/or exercises at home and/or physical therapy without satisfactory response [Risks, benefits, alternatives discussed / Verbal consent obtained] : the risks benefits, and alternatives have been discussed, and verbal consent was obtained [de-identified] : Chlorhexidine

## 2024-06-08 NOTE — DISCUSSION/SUMMARY
[de-identified] : A discussion regarding available pain management treatment options occurred with the patient.  These included interventional, rehabilitative, pharmacological, and alternative modalities. We will proceed with the following:  Interventional treatment:  1. Done in office today: Trigger point injection.  The risks, benefits and alternatives of the proposed procedure were explained in detail with the patient.  The risks outlined include, but are not limited to, infection, bleeding, nerve injury, a temporary increase in pain, failure to resolve symptoms, allergic reaction, and possible elevation of blood sugar in diabetics.  All questions were answered to patient's apparent satisfaction and he/she verbalized an understanding.   Imagin. Ordered an X-ray of the bilateral hips and pelvis due to pain and decrease in range of motion in that area to delineate a pain generator.   - Follow up in 8 weeks.   I Suma Alcocer attest that this documentation has been prepared under the direction and in the presence of provider Dr. Neri Cruz.  The documentation recorded by the scribe in my presence, accurately reflects the service I personally performed, and the decisions made by me with my edits as appropriate.   Neri Cruz, DO

## 2024-06-08 NOTE — HISTORY OF PRESENT ILLNESS
[FreeTextEntry1] : ORIGINAL PRESENTATION: Mr. Ike Narvaez, a 69-year-old male, presents today for lower back pain. He states the pain began approximately 1 month ago while he was playing golf. He states the pain is severe, constant rated 10/10 on the pain scale. He reports the pain is located across the lower lumbar spine and travels into the right buttock area. He states he has trouble standing or straightening. He states the pain is associated with severe stiffness in the area and sometimes occasionally radiates into the bilateral lower extremities giving him weakness. He states he is very frustrated by his worsening symptomology.  TODAY: 4/08/2024, This is a former Dr. Landry patient transferring his care to me. He was last seen in the office on 5/04/2022 for complaints of lower back pain.   He returns to the office after long delay with continued complaints of chronic axial lower back pain. He denies any radicular features into the lower extremities. The pain travels into the buttock but not further. He states the pain is present daily and makes it difficult for him to perform his ADLs. He describes the severity of the pain which causes him to "see stars." Transitioning from a standing position after sitting for a prolonged time, the pain is severe. We discussed trialing a diagnostic medial branch block injection and he is agreeable.   TODAY, 5-1-2024: Revisit encounter.   Since his last visit, he is status post Bilateral L3, L4, L5 medial branch facet block on 4-. He notes little to no relief from this procedure. He continues today with ongoing severe lower back pain. He has severe stiffness in the back. He has difficulty doing any sort of ADLs. He moves very gingerly due to the pain. Transitional movements cause him pain. Pain is made worse with standing, walking, sitting or lying down at night. At times when he does feel good, he can make one wrong move and his pain can become severe. As soon as he sits, he feels a shooting type pain. Pain is present daily.   He has been managing his pain with Hydrocodone-Acetaminophen 5-325 mg q8h as needed along with Amitriptyline 25 mg qhs.   TODAY, 6-6-2024: Revisit encounter.   Since his last visit, he is presenting with 1 week of having no back pain. He is able to manage his symptoms with medications. He does note some spasms over the left side of the lower back/hip area. He feels cramping.

## 2024-06-08 NOTE — PHYSICAL EXAM
[de-identified] : L spine   No rashes, erythema, edema noted TTP b/l lumbar paraspinals and sciatic notch Positive straight leg raise bilaterally LLE: 5/5 strength RLE: 5/5 strength Sensation intact b/l LE

## 2024-06-17 ENCOUNTER — RX RENEWAL (OUTPATIENT)
Age: 69
End: 2024-06-17

## 2024-06-17 RX ORDER — IPRATROPIUM BROMIDE 42 UG/1
0.06 SPRAY NASAL 3 TIMES DAILY
Qty: 15 | Refills: 0 | Status: ACTIVE | COMMUNITY
Start: 2022-11-28 | End: 1900-01-01

## 2024-06-20 ENCOUNTER — RX RENEWAL (OUTPATIENT)
Age: 69
End: 2024-06-20

## 2024-06-20 ENCOUNTER — APPOINTMENT (OUTPATIENT)
Dept: OTOLARYNGOLOGY | Facility: CLINIC | Age: 69
End: 2024-06-20
Payer: MEDICARE

## 2024-06-20 DIAGNOSIS — H69.93 UNSPECIFIED EUSTACHIAN TUBE DISORDER, BILATERAL: ICD-10-CM

## 2024-06-20 DIAGNOSIS — H61.23 IMPACTED CERUMEN, BILATERAL: ICD-10-CM

## 2024-06-20 DIAGNOSIS — J31.0 CHRONIC RHINITIS: ICD-10-CM

## 2024-06-20 PROCEDURE — 99214 OFFICE O/P EST MOD 30 MIN: CPT | Mod: 25

## 2024-06-20 PROCEDURE — 31231 NASAL ENDOSCOPY DX: CPT

## 2024-06-20 PROCEDURE — 69210 REMOVE IMPACTED EAR WAX UNI: CPT

## 2024-06-20 RX ORDER — IPRATROPIUM BROMIDE 42 UG/1
0.06 SPRAY NASAL 3 TIMES DAILY
Qty: 1 | Refills: 6 | Status: ACTIVE | COMMUNITY
Start: 2024-06-20 | End: 1900-01-01

## 2024-06-20 RX ORDER — ASPIRIN 81 MG
6.5 TABLET, DELAYED RELEASE (ENTERIC COATED) ORAL
Qty: 1 | Refills: 5 | Status: ACTIVE | COMMUNITY
Start: 2024-06-20 | End: 1900-01-01

## 2024-06-20 RX ORDER — AZELASTINE HYDROCHLORIDE 137 UG/1
0.1 SPRAY, METERED NASAL DAILY
Qty: 1 | Refills: 10 | Status: ACTIVE | COMMUNITY
Start: 2024-06-20 | End: 1900-01-01

## 2024-06-20 NOTE — REASON FOR VISIT
[Subsequent Evaluation] : a subsequent evaluation for [FreeTextEntry2] : nasal congestion, chronic rhinitis and Chronic dysfunction of both eustachian tubes

## 2024-06-20 NOTE — HISTORY OF PRESENT ILLNESS
[FreeTextEntry1] : Patient returns today c/o nasal congestion, chronic rhinitis and Chronic dysfunction of both eustachian tubes. Clogged ears,  he denies any ear pain or discomfort. Breathing well from nose .

## 2024-06-20 NOTE — PROCEDURE
[None] : none [Flexible Endoscope] : examined with the flexible endoscope [Congested] : congested [Ermelinda] : ermelinda [FreeTextEntry6] : The following anatomic sites were directly examined in a sequential fashion: The scope was introduced in the nasal passage between the middle and inferior turbinates to exam the inferior portion of the middle meatus and the fontanelle, as well as the maxillary ostia. Next, the scope was passed medically and posteriorly to the middle turbinates to examine the sphenoethmoid recess and the superior turbinate region.

## 2024-07-02 ENCOUNTER — RX RENEWAL (OUTPATIENT)
Age: 69
End: 2024-07-02

## 2024-07-30 PROBLEM — Z86.69 HISTORY OF BELL'S PALSY: Status: RESOLVED | Noted: 2023-03-29 | Resolved: 2024-07-30

## 2024-08-09 ENCOUNTER — APPOINTMENT (OUTPATIENT)
Dept: PAIN MANAGEMENT | Facility: CLINIC | Age: 69
End: 2024-08-09

## 2024-09-25 ENCOUNTER — APPOINTMENT (OUTPATIENT)
Dept: CARDIOLOGY | Facility: CLINIC | Age: 69
End: 2024-09-25
Payer: MEDICARE

## 2024-09-25 VITALS — BODY MASS INDEX: 38.74 KG/M2 | HEART RATE: 77 BPM | HEIGHT: 72 IN | WEIGHT: 286 LBS

## 2024-09-25 VITALS — RESPIRATION RATE: 18 BRPM | DIASTOLIC BLOOD PRESSURE: 70 MMHG | SYSTOLIC BLOOD PRESSURE: 110 MMHG

## 2024-09-25 DIAGNOSIS — I10 ESSENTIAL (PRIMARY) HYPERTENSION: ICD-10-CM

## 2024-09-25 DIAGNOSIS — I48.0 PAROXYSMAL ATRIAL FIBRILLATION: ICD-10-CM

## 2024-09-25 DIAGNOSIS — I25.10 ATHEROSCLEROTIC HEART DISEASE OF NATIVE CORONARY ARTERY W/OUT ANGINA PECTORIS: ICD-10-CM

## 2024-09-25 DIAGNOSIS — E78.5 HYPERLIPIDEMIA, UNSPECIFIED: ICD-10-CM

## 2024-09-25 DIAGNOSIS — E11.9 TYPE 2 DIABETES MELLITUS W/OUT COMPLICATIONS: ICD-10-CM

## 2024-09-25 PROCEDURE — 93000 ELECTROCARDIOGRAM COMPLETE: CPT

## 2024-09-25 PROCEDURE — 99214 OFFICE O/P EST MOD 30 MIN: CPT

## 2024-09-25 PROCEDURE — G2211 COMPLEX E/M VISIT ADD ON: CPT

## 2024-10-08 ENCOUNTER — APPOINTMENT (OUTPATIENT)
Dept: SURGERY | Facility: CLINIC | Age: 69
End: 2024-10-08

## 2024-10-08 VITALS
OXYGEN SATURATION: 97 % | SYSTOLIC BLOOD PRESSURE: 134 MMHG | DIASTOLIC BLOOD PRESSURE: 76 MMHG | WEIGHT: 285 LBS | HEART RATE: 85 BPM | BODY MASS INDEX: 38.6 KG/M2 | HEIGHT: 72 IN

## 2024-10-08 PROCEDURE — 99213 OFFICE O/P EST LOW 20 MIN: CPT

## 2024-10-11 ENCOUNTER — NON-APPOINTMENT (OUTPATIENT)
Age: 69
End: 2024-10-11

## 2024-10-12 ENCOUNTER — OUTPATIENT (OUTPATIENT)
Dept: OUTPATIENT SERVICES | Facility: HOSPITAL | Age: 69
LOS: 1 days | End: 2024-10-12
Payer: MEDICARE

## 2024-10-12 ENCOUNTER — RESULT REVIEW (OUTPATIENT)
Age: 69
End: 2024-10-12

## 2024-10-12 DIAGNOSIS — Z90.410 ACQUIRED TOTAL ABSENCE OF PANCREAS: Chronic | ICD-10-CM

## 2024-10-12 DIAGNOSIS — Z96.652 PRESENCE OF LEFT ARTIFICIAL KNEE JOINT: Chronic | ICD-10-CM

## 2024-10-12 DIAGNOSIS — Z00.8 ENCOUNTER FOR OTHER GENERAL EXAMINATION: ICD-10-CM

## 2024-10-12 DIAGNOSIS — Z90.49 ACQUIRED ABSENCE OF OTHER SPECIFIED PARTS OF DIGESTIVE TRACT: Chronic | ICD-10-CM

## 2024-10-12 DIAGNOSIS — M79.651 PAIN IN RIGHT THIGH: ICD-10-CM

## 2024-10-12 DIAGNOSIS — Z95.5 PRESENCE OF CORONARY ANGIOPLASTY IMPLANT AND GRAFT: Chronic | ICD-10-CM

## 2024-10-12 PROCEDURE — 72193 CT PELVIS W/DYE: CPT | Mod: 26

## 2024-10-12 PROCEDURE — 72193 CT PELVIS W/DYE: CPT

## 2024-10-13 DIAGNOSIS — M79.651 PAIN IN RIGHT THIGH: ICD-10-CM

## 2024-10-18 ENCOUNTER — OUTPATIENT (OUTPATIENT)
Dept: OUTPATIENT SERVICES | Facility: HOSPITAL | Age: 69
LOS: 1 days | End: 2024-10-18
Payer: MEDICARE

## 2024-10-18 VITALS
TEMPERATURE: 98 F | HEART RATE: 98 BPM | RESPIRATION RATE: 20 BRPM | DIASTOLIC BLOOD PRESSURE: 80 MMHG | SYSTOLIC BLOOD PRESSURE: 140 MMHG | OXYGEN SATURATION: 97 % | HEIGHT: 72 IN | WEIGHT: 279.99 LBS

## 2024-10-18 DIAGNOSIS — Z95.5 PRESENCE OF CORONARY ANGIOPLASTY IMPLANT AND GRAFT: Chronic | ICD-10-CM

## 2024-10-18 DIAGNOSIS — Z95.1 PRESENCE OF AORTOCORONARY BYPASS GRAFT: Chronic | ICD-10-CM

## 2024-10-18 DIAGNOSIS — R19.09 OTHER INTRA-ABDOMINAL AND PELVIC SWELLING, MASS AND LUMP: ICD-10-CM

## 2024-10-18 DIAGNOSIS — Z96.652 PRESENCE OF LEFT ARTIFICIAL KNEE JOINT: Chronic | ICD-10-CM

## 2024-10-18 DIAGNOSIS — Z90.49 ACQUIRED ABSENCE OF OTHER SPECIFIED PARTS OF DIGESTIVE TRACT: Chronic | ICD-10-CM

## 2024-10-18 DIAGNOSIS — Z90.410 ACQUIRED TOTAL ABSENCE OF PANCREAS: Chronic | ICD-10-CM

## 2024-10-18 DIAGNOSIS — Z01.818 ENCOUNTER FOR OTHER PREPROCEDURAL EXAMINATION: ICD-10-CM

## 2024-10-18 LAB
A1C WITH ESTIMATED AVERAGE GLUCOSE RESULT: 8.3 % — HIGH (ref 4–5.6)
ALBUMIN SERPL ELPH-MCNC: 4 G/DL — SIGNIFICANT CHANGE UP (ref 3.5–5.2)
ALP SERPL-CCNC: 103 U/L — SIGNIFICANT CHANGE UP (ref 30–115)
ALT FLD-CCNC: 18 U/L — SIGNIFICANT CHANGE UP (ref 0–41)
ANION GAP SERPL CALC-SCNC: 14 MMOL/L — SIGNIFICANT CHANGE UP (ref 7–14)
APTT BLD: 41.5 SEC — HIGH (ref 27–39.2)
AST SERPL-CCNC: 21 U/L — SIGNIFICANT CHANGE UP (ref 0–41)
BASOPHILS # BLD AUTO: 0.15 K/UL — SIGNIFICANT CHANGE UP (ref 0–0.2)
BASOPHILS NFR BLD AUTO: 1.2 % — HIGH (ref 0–1)
BILIRUB SERPL-MCNC: 0.5 MG/DL — SIGNIFICANT CHANGE UP (ref 0.2–1.2)
BUN SERPL-MCNC: 46 MG/DL — HIGH (ref 10–20)
CALCIUM SERPL-MCNC: 8.9 MG/DL — SIGNIFICANT CHANGE UP (ref 8.4–10.5)
CHLORIDE SERPL-SCNC: 100 MMOL/L — SIGNIFICANT CHANGE UP (ref 98–110)
CO2 SERPL-SCNC: 23 MMOL/L — SIGNIFICANT CHANGE UP (ref 17–32)
CREAT SERPL-MCNC: 1.7 MG/DL — HIGH (ref 0.7–1.5)
EGFR: 44 ML/MIN/1.73M2 — LOW
EOSINOPHIL # BLD AUTO: 0.58 K/UL — SIGNIFICANT CHANGE UP (ref 0–0.7)
EOSINOPHIL NFR BLD AUTO: 4.6 % — SIGNIFICANT CHANGE UP (ref 0–8)
ESTIMATED AVERAGE GLUCOSE: 192 MG/DL — HIGH (ref 68–114)
GLUCOSE SERPL-MCNC: 145 MG/DL — HIGH (ref 70–99)
HCT VFR BLD CALC: 36.7 % — LOW (ref 42–52)
HGB BLD-MCNC: 12.5 G/DL — LOW (ref 14–18)
IMM GRANULOCYTES NFR BLD AUTO: 0.4 % — HIGH (ref 0.1–0.3)
INR BLD: 1.45 RATIO — HIGH (ref 0.65–1.3)
LYMPHOCYTES # BLD AUTO: 1.95 K/UL — SIGNIFICANT CHANGE UP (ref 1.2–3.4)
LYMPHOCYTES # BLD AUTO: 15.6 % — LOW (ref 20.5–51.1)
MCHC RBC-ENTMCNC: 31.2 PG — HIGH (ref 27–31)
MCHC RBC-ENTMCNC: 34.1 G/DL — SIGNIFICANT CHANGE UP (ref 32–37)
MCV RBC AUTO: 91.5 FL — SIGNIFICANT CHANGE UP (ref 80–94)
MONOCYTES # BLD AUTO: 1.98 K/UL — HIGH (ref 0.1–0.6)
MONOCYTES NFR BLD AUTO: 15.8 % — HIGH (ref 1.7–9.3)
NEUTROPHILS # BLD AUTO: 7.8 K/UL — HIGH (ref 1.4–6.5)
NEUTROPHILS NFR BLD AUTO: 62.4 % — SIGNIFICANT CHANGE UP (ref 42.2–75.2)
NRBC # BLD: 0 /100 WBCS — SIGNIFICANT CHANGE UP (ref 0–0)
PLATELET # BLD AUTO: 331 K/UL — SIGNIFICANT CHANGE UP (ref 130–400)
PMV BLD: 10.6 FL — HIGH (ref 7.4–10.4)
POTASSIUM SERPL-MCNC: 4.5 MMOL/L — SIGNIFICANT CHANGE UP (ref 3.5–5)
POTASSIUM SERPL-SCNC: 4.5 MMOL/L — SIGNIFICANT CHANGE UP (ref 3.5–5)
PROT SERPL-MCNC: 6.6 G/DL — SIGNIFICANT CHANGE UP (ref 6–8)
PROTHROM AB SERPL-ACNC: 16.6 SEC — HIGH (ref 9.95–12.87)
RBC # BLD: 4.01 M/UL — LOW (ref 4.7–6.1)
RBC # FLD: 14.8 % — HIGH (ref 11.5–14.5)
SODIUM SERPL-SCNC: 137 MMOL/L — SIGNIFICANT CHANGE UP (ref 135–146)
WBC # BLD: 12.51 K/UL — HIGH (ref 4.8–10.8)
WBC # FLD AUTO: 12.51 K/UL — HIGH (ref 4.8–10.8)

## 2024-10-18 PROCEDURE — 80053 COMPREHEN METABOLIC PANEL: CPT

## 2024-10-18 PROCEDURE — 93010 ELECTROCARDIOGRAM REPORT: CPT

## 2024-10-18 PROCEDURE — 99214 OFFICE O/P EST MOD 30 MIN: CPT | Mod: 25

## 2024-10-18 PROCEDURE — 93005 ELECTROCARDIOGRAM TRACING: CPT

## 2024-10-18 PROCEDURE — 83036 HEMOGLOBIN GLYCOSYLATED A1C: CPT

## 2024-10-18 PROCEDURE — 85730 THROMBOPLASTIN TIME PARTIAL: CPT

## 2024-10-18 PROCEDURE — 36415 COLL VENOUS BLD VENIPUNCTURE: CPT

## 2024-10-18 PROCEDURE — 85610 PROTHROMBIN TIME: CPT

## 2024-10-18 PROCEDURE — 85025 COMPLETE CBC W/AUTO DIFF WBC: CPT

## 2024-10-18 RX ORDER — ASPIRIN 325 MG
0 TABLET ORAL
Refills: 0 | DISCHARGE

## 2024-10-18 RX ORDER — MULTI VITAMIN/MINERAL SUPPLEMENT WITH ASCORBIC ACID, NIACIN, PYRIDOXINE, PANTOTHENIC ACID, FOLIC ACID, RIBOFLAVIN, THIAMIN, BIOTIN, COBALAMIN AND ZINC. 60; 20; 12.5; 10; 10; 1.7; 1.5; 1; .3; .006 MG/1; MG/1; MG/1; MG/1; MG/1; MG/1; MG/1; MG/1; MG/1; MG/1
0 TABLET, COATED ORAL
Refills: 0 | DISCHARGE

## 2024-10-18 NOTE — H&P PST ADULT - HISTORY OF PRESENT ILLNESS
70 Y/O MALE PT TO PAST WITH C/O RIGHT GROIN MASS              PT NOW FOR SCHEDULED PROCEDURE ( EXCISIONAL BX OF RIGHT GROIN MASS). PT DENIES ANY CP SOB PALP COUGH DYSURIA FEVER URI.   Anesthesia Alert  NO--Difficult Airway  NO--History of neck surgery or radiation  NO--Limited ROM of neck  NO--History of Malignant hyperthermia  NO--Personal or family history of Pseudocholinesterase deficiency.  NO--Prior Anesthesia Complication  NO--Latex Allergy  NO--Loose teeth  NO--History of Rheumatoid Arthritis  NO--RASHAD  NO--Bleeding risk  NO--Other_____   68 Y/O MALE PT TO PAST WITH C/O RIGHT GROIN MASS, NOTICED INCREASED SIZE AND TENDERNESS PAST 3 MO  PT NOW FOR SCHEDULED PROCEDURE ( EXCISIONAL BX OF RIGHT GROIN MASS). PT DENIES ANY CP SOB PALP COUGH DYSURIA FEVER URI.   Anesthesia Alert  CLASS IV + RASHAD NO CPAP -  ** INSULIN PUMP**  NO--History of neck surgery or radiation  NO--Limited ROM of neck  NO--History of Malignant hyperthermia  NO--Personal or family history of Pseudocholinesterase deficiency.  NO--Prior Anesthesia Complication  NO--Latex Allergy  NO--Loose teeth  NO--History of Rheumatoid Arthritis  Bleeding risk- XARELTO  NO--Other_____  RCRI CLASS II  DASI 7.5 METS

## 2024-10-18 NOTE — H&P PST ADULT - IS PATIENT PREGNANT?
not applicable (Male) Dupixent Counseling: I discussed with the patient the risks of dupilumab including but not limited to eye infection and irritation, cold sores, injection site reactions, worsening of asthma, allergic reactions and increased risk of parasitic infection.  Live vaccines should be avoided while taking dupilumab. Dupilumab will also interact with certain medications such as warfarin and cyclosporine. The patient understands that monitoring is required and they must alert us or the primary physician if symptoms of infection or other concerning signs are noted.

## 2024-10-18 NOTE — H&P PST ADULT - NSICDXPASTSURGICALHX_GEN_ALL_CORE_FT
PAST SURGICAL HISTORY:  H/O right coronary artery stent placement     History of cholecystectomy     History of left knee replacement     History of resection of pancreas      PAST SURGICAL HISTORY:  H/O right coronary artery stent placement     History of cholecystectomy     History of left knee replacement     History of resection of pancreas     S/P CABG x 6

## 2024-10-18 NOTE — H&P PST ADULT - NSICDXPASTMEDICALHX_GEN_ALL_CORE_FT
PAST MEDICAL HISTORY:  BPH (benign prostatic hyperplasia)     CAD (coronary artery disease)     Diabetes     Diabetes mellitus, type 2     Essential hypertension     H/O hypercholesterolemia     Water retention      PAST MEDICAL HISTORY:  BPH (benign prostatic hyperplasia)     CAD (coronary artery disease)     Diabetes     Diabetes mellitus, type 2     Essential hypertension     H/O hypercholesterolemia     Morbid obesity     Water retention      PAST MEDICAL HISTORY:  BPH (benign prostatic hyperplasia)     CAD (coronary artery disease)     Chronic kidney disease (CKD)     Diabetes     Diabetes mellitus, type 2     Essential hypertension     H/O hypercholesterolemia     Morbid obesity     Water retention

## 2024-10-19 DIAGNOSIS — R19.09 OTHER INTRA-ABDOMINAL AND PELVIC SWELLING, MASS AND LUMP: ICD-10-CM

## 2024-10-19 DIAGNOSIS — Z01.818 ENCOUNTER FOR OTHER PREPROCEDURAL EXAMINATION: ICD-10-CM

## 2024-10-24 ENCOUNTER — RX RENEWAL (OUTPATIENT)
Age: 69
End: 2024-10-24

## 2024-10-25 PROBLEM — N18.9 CHRONIC KIDNEY DISEASE, UNSPECIFIED: Chronic | Status: ACTIVE | Noted: 2024-10-18

## 2024-10-25 PROBLEM — E66.01 MORBID (SEVERE) OBESITY DUE TO EXCESS CALORIES: Chronic | Status: ACTIVE | Noted: 2024-10-18

## 2024-10-27 PROBLEM — R19.09 MASS OF RIGHT INGUINAL REGION: Status: ACTIVE | Noted: 2024-10-27

## 2024-11-05 ENCOUNTER — APPOINTMENT (OUTPATIENT)
Dept: CARDIOLOGY | Facility: CLINIC | Age: 69
End: 2024-11-05
Payer: MEDICARE

## 2024-11-05 VITALS
DIASTOLIC BLOOD PRESSURE: 70 MMHG | BODY MASS INDEX: 41.31 KG/M2 | HEART RATE: 82 BPM | WEIGHT: 305 LBS | SYSTOLIC BLOOD PRESSURE: 128 MMHG | HEIGHT: 72 IN

## 2024-11-05 DIAGNOSIS — I25.10 ATHEROSCLEROTIC HEART DISEASE OF NATIVE CORONARY ARTERY W/OUT ANGINA PECTORIS: ICD-10-CM

## 2024-11-05 DIAGNOSIS — R00.2 PALPITATIONS: ICD-10-CM

## 2024-11-05 DIAGNOSIS — Z01.810 ENCOUNTER FOR PREPROCEDURAL CARDIOVASCULAR EXAMINATION: ICD-10-CM

## 2024-11-05 PROCEDURE — 99214 OFFICE O/P EST MOD 30 MIN: CPT | Mod: 25

## 2024-11-05 PROCEDURE — 93000 ELECTROCARDIOGRAM COMPLETE: CPT

## 2024-11-14 RX ORDER — BLOOD-GLUCOSE METER
KIT MISCELLANEOUS
Refills: 0 | Status: ACTIVE | COMMUNITY

## 2024-11-14 RX ORDER — SENNOSIDES 8.6 MG TABLETS 8.6 MG/1
8.6 TABLET ORAL
Refills: 0 | Status: ACTIVE | COMMUNITY

## 2024-12-04 ENCOUNTER — OUTPATIENT (OUTPATIENT)
Dept: OUTPATIENT SERVICES | Facility: HOSPITAL | Age: 69
LOS: 1 days | End: 2024-12-04
Payer: MEDICARE

## 2024-12-04 VITALS
TEMPERATURE: 98 F | SYSTOLIC BLOOD PRESSURE: 140 MMHG | WEIGHT: 300.05 LBS | HEART RATE: 78 BPM | HEIGHT: 72 IN | RESPIRATION RATE: 16 BRPM | DIASTOLIC BLOOD PRESSURE: 68 MMHG | OXYGEN SATURATION: 96 %

## 2024-12-04 DIAGNOSIS — Z95.1 PRESENCE OF AORTOCORONARY BYPASS GRAFT: Chronic | ICD-10-CM

## 2024-12-04 DIAGNOSIS — Z95.5 PRESENCE OF CORONARY ANGIOPLASTY IMPLANT AND GRAFT: Chronic | ICD-10-CM

## 2024-12-04 DIAGNOSIS — Z90.410 ACQUIRED TOTAL ABSENCE OF PANCREAS: Chronic | ICD-10-CM

## 2024-12-04 DIAGNOSIS — Z96.652 PRESENCE OF LEFT ARTIFICIAL KNEE JOINT: Chronic | ICD-10-CM

## 2024-12-04 DIAGNOSIS — R19.09 OTHER INTRA-ABDOMINAL AND PELVIC SWELLING, MASS AND LUMP: ICD-10-CM

## 2024-12-04 DIAGNOSIS — Z01.818 ENCOUNTER FOR OTHER PREPROCEDURAL EXAMINATION: ICD-10-CM

## 2024-12-04 DIAGNOSIS — Z90.49 ACQUIRED ABSENCE OF OTHER SPECIFIED PARTS OF DIGESTIVE TRACT: Chronic | ICD-10-CM

## 2024-12-04 DIAGNOSIS — Z98.890 OTHER SPECIFIED POSTPROCEDURAL STATES: Chronic | ICD-10-CM

## 2024-12-04 LAB
A1C WITH ESTIMATED AVERAGE GLUCOSE RESULT: 8.9 % — HIGH (ref 4–5.6)
ALBUMIN SERPL ELPH-MCNC: 3.9 G/DL — SIGNIFICANT CHANGE UP (ref 3.5–5.2)
ALP SERPL-CCNC: 113 U/L — SIGNIFICANT CHANGE UP (ref 30–115)
ALT FLD-CCNC: 20 U/L — SIGNIFICANT CHANGE UP (ref 0–41)
ANION GAP SERPL CALC-SCNC: 16 MMOL/L — HIGH (ref 7–14)
AST SERPL-CCNC: 32 U/L — SIGNIFICANT CHANGE UP (ref 0–41)
BASOPHILS # BLD AUTO: 0.18 K/UL — SIGNIFICANT CHANGE UP (ref 0–0.2)
BASOPHILS NFR BLD AUTO: 1.5 % — HIGH (ref 0–1)
BILIRUB SERPL-MCNC: <0.2 MG/DL — SIGNIFICANT CHANGE UP (ref 0.2–1.2)
BUN SERPL-MCNC: 35 MG/DL — HIGH (ref 10–20)
CALCIUM SERPL-MCNC: 9.6 MG/DL — SIGNIFICANT CHANGE UP (ref 8.4–10.5)
CHLORIDE SERPL-SCNC: 99 MMOL/L — SIGNIFICANT CHANGE UP (ref 98–110)
CO2 SERPL-SCNC: 26 MMOL/L — SIGNIFICANT CHANGE UP (ref 17–32)
CREAT SERPL-MCNC: 1.8 MG/DL — HIGH (ref 0.7–1.5)
EGFR: 41 ML/MIN/1.73M2 — LOW
EOSINOPHIL # BLD AUTO: 0.69 K/UL — SIGNIFICANT CHANGE UP (ref 0–0.7)
EOSINOPHIL NFR BLD AUTO: 5.6 % — SIGNIFICANT CHANGE UP (ref 0–8)
ESTIMATED AVERAGE GLUCOSE: 209 MG/DL — HIGH (ref 68–114)
GLUCOSE SERPL-MCNC: 109 MG/DL — HIGH (ref 70–99)
HCT VFR BLD CALC: 39 % — LOW (ref 42–52)
HGB BLD-MCNC: 12.7 G/DL — LOW (ref 14–18)
IMM GRANULOCYTES NFR BLD AUTO: 0.4 % — HIGH (ref 0.1–0.3)
LYMPHOCYTES # BLD AUTO: 16.6 % — LOW (ref 20.5–51.1)
LYMPHOCYTES # BLD AUTO: 2.04 K/UL — SIGNIFICANT CHANGE UP (ref 1.2–3.4)
MCHC RBC-ENTMCNC: 31.3 PG — HIGH (ref 27–31)
MCHC RBC-ENTMCNC: 32.6 G/DL — SIGNIFICANT CHANGE UP (ref 32–37)
MCV RBC AUTO: 96.1 FL — HIGH (ref 80–94)
MONOCYTES # BLD AUTO: 1.49 K/UL — HIGH (ref 0.1–0.6)
MONOCYTES NFR BLD AUTO: 12.1 % — HIGH (ref 1.7–9.3)
NEUTROPHILS # BLD AUTO: 7.85 K/UL — HIGH (ref 1.4–6.5)
NEUTROPHILS NFR BLD AUTO: 63.8 % — SIGNIFICANT CHANGE UP (ref 42.2–75.2)
NRBC # BLD: 0 /100 WBCS — SIGNIFICANT CHANGE UP (ref 0–0)
PLATELET # BLD AUTO: 550 K/UL — HIGH (ref 130–400)
PMV BLD: 9.7 FL — SIGNIFICANT CHANGE UP (ref 7.4–10.4)
POTASSIUM SERPL-MCNC: 4.8 MMOL/L — SIGNIFICANT CHANGE UP (ref 3.5–5)
POTASSIUM SERPL-SCNC: 4.8 MMOL/L — SIGNIFICANT CHANGE UP (ref 3.5–5)
PROT SERPL-MCNC: 6.9 G/DL — SIGNIFICANT CHANGE UP (ref 6–8)
RBC # BLD: 4.06 M/UL — LOW (ref 4.7–6.1)
RBC # FLD: 13.9 % — SIGNIFICANT CHANGE UP (ref 11.5–14.5)
SODIUM SERPL-SCNC: 141 MMOL/L — SIGNIFICANT CHANGE UP (ref 135–146)
WBC # BLD: 12.3 K/UL — HIGH (ref 4.8–10.8)
WBC # FLD AUTO: 12.3 K/UL — HIGH (ref 4.8–10.8)

## 2024-12-04 PROCEDURE — 80053 COMPREHEN METABOLIC PANEL: CPT

## 2024-12-04 PROCEDURE — 36415 COLL VENOUS BLD VENIPUNCTURE: CPT

## 2024-12-04 PROCEDURE — 99214 OFFICE O/P EST MOD 30 MIN: CPT | Mod: 25

## 2024-12-04 PROCEDURE — 85025 COMPLETE CBC W/AUTO DIFF WBC: CPT

## 2024-12-04 PROCEDURE — 83036 HEMOGLOBIN GLYCOSYLATED A1C: CPT

## 2024-12-04 RX ORDER — LISINOPRIL 20 MG/1
0 TABLET ORAL
Refills: 0 | DISCHARGE

## 2024-12-04 NOTE — H&P PST ADULT - NSICDXPASTSURGICALHX_GEN_ALL_CORE_FT
PAST SURGICAL HISTORY:  H/O cardiac radiofrequency ablation     H/O right coronary artery stent placement     History of cholecystectomy     History of left knee replacement     History of resection of pancreas     S/P CABG x 6

## 2024-12-04 NOTE — H&P PST ADULT - NSICDXPASTMEDICALHX_GEN_ALL_CORE_FT
PAST MEDICAL HISTORY:  BPH (benign prostatic hyperplasia)     CAD (coronary artery disease)     Chronic kidney disease (CKD)     Diabetes     Diabetes mellitus, type 2     Essential hypertension     H/O hypercholesterolemia     History of atrial fibrillation     Morbid obesity     Water retention

## 2024-12-04 NOTE — H&P PST ADULT - HISTORY OF PRESENT ILLNESS
70 Y/O MALE PT TO PAST WITH C/O RIGHT GROIN MASS, NOTICED INCREASED SIZE AND TENDERNESS PAST 3 MO  PT NOW FOR SCHEDULED PROCEDURE ( EXCISIONAL BX OF RIGHT GROIN MASS). PT DENIES ANY CP SOB PALP COUGH DYSURIA FEVER URI. procedure was postponed in 10/2024 dt flu  Anesthesia Alert  CLASS IV + RASHAD NO CPAP -  ** INSULIN PUMP**  NO--History of neck surgery or radiation  NO--Limited ROM of neck  NO--History of Malignant hyperthermia  NO--Personal or family history of Pseudocholinesterase deficiency.  NO--Prior Anesthesia Complication  NO--Latex Allergy  NO--Loose teeth  NO--History of Rheumatoid Arthritis  Bleeding risk- XARELTO  NO--Other_____  RCRI CLASS II  DASI 7.5 METS

## 2024-12-05 DIAGNOSIS — Z01.818 ENCOUNTER FOR OTHER PREPROCEDURAL EXAMINATION: ICD-10-CM

## 2024-12-05 DIAGNOSIS — R19.09 OTHER INTRA-ABDOMINAL AND PELVIC SWELLING, MASS AND LUMP: ICD-10-CM

## 2024-12-12 ENCOUNTER — APPOINTMENT (OUTPATIENT)
Dept: PAIN MANAGEMENT | Facility: CLINIC | Age: 69
End: 2024-12-12

## 2024-12-17 NOTE — ASU PATIENT PROFILE, ADULT - PATIENT'S HEIGHT AND WEIGHT RECORDED IN THE VITAL SIGNS FLOWSHEET
Pt states he was to see DCE in a week from hosp fu. Please advise where pt should be added and call pt to advise. yes

## 2024-12-17 NOTE — ASU PATIENT PROFILE, ADULT - FALL HARM RISK - PT AGE POPULATION HIDDEN
Adult ED Attending Attestation Note     Date of evaluation: 7/19/2022    This patient was seen by the resident. I have seen and examined the patient, agree with the workup, evaluation, management and diagnosis. The care plan has been discussed. My assessment reveals a 61-year-old female who son has Rishi and lives in her home presenting with a headache myalgias, and shortness of breath. On examination she has intact strength in all 4 extremities, GCS of 4, 5, 6, no diplopia or cranial nerve deficits. Her sensation is preserved throughout and she has no ataxia. She has no meningismus. Andrez Scheuermann, MD  07/20/22 0106      Addendum: I assumed care of this patient from the resident physician at the end of his shift. She was signed out to me pending symptom control and return of diagnostic imaging. Her COVID test returned positive. She was informed of this and instructed to isolate for 10 days. Her head CT showed no acute abnormalities, and her headache was resolved with a single dose of IM Toradol. She ambulated throughout the department without difficulty. We prescribed her Paxlovid given her risk for severe COVID-19, as well as a pulse oximeter. She was given standard return precautions for hypoxia, worsening nausea vomiting inability to take p.o., or other concerning symptoms.     Impression: COVID -19    Disposition: Discharged     Eileen Don MD  07/20/22 [id number]

## 2024-12-17 NOTE — ASU PATIENT PROFILE, ADULT - FALL HARM RISK - UNIVERSAL INTERVENTIONS
Bed in lowest position, wheels locked, appropriate side rails in place/Call bell, personal items and telephone in reach/Instruct patient to call for assistance before getting out of bed or chair/Non-slip footwear when patient is out of bed/[place] to call system/Physically safe environment - no spills, clutter or unnecessary equipment/Purposeful Proactive Rounding/Room/bathroom lighting operational, light cord in reach

## 2024-12-17 NOTE — ASU PATIENT PROFILE, ADULT - NS PRO PT RIGHT SUPPORT PERSON
----- Message from Natalie Lao sent at 3/31/2022  7:18 AM CDT -----  Contact: MARY LEONARDO [[id number]]  Type:  Patient Returning Call    Who Called:  MARY LEONARDO [[id number]]    Who Left Message for Patient: unsure    Does the patient know what this is regarding?: No    Would the patient rather a call back or a response via My [facility]?  Call back     Best Call Back Number: [phone] (mobile)    Additional Information:      
Staff spoke with patient in regards to reschedule appt from today due to  not feeling well. Staff r/s appt to next open slot 4/11/22 @ 3 pm on the 4th floor [address] with . patient verbalized understanding.  
Yes

## 2024-12-18 ENCOUNTER — TRANSCRIPTION ENCOUNTER (OUTPATIENT)
Age: 69
End: 2024-12-18

## 2024-12-18 ENCOUNTER — OUTPATIENT (OUTPATIENT)
Dept: OUTPATIENT SERVICES | Facility: HOSPITAL | Age: 69
LOS: 1 days | Discharge: HOME | End: 2024-12-18
Payer: MEDICARE

## 2024-12-18 ENCOUNTER — RESULT REVIEW (OUTPATIENT)
Age: 69
End: 2024-12-18

## 2024-12-18 VITALS
RESPIRATION RATE: 20 BRPM | SYSTOLIC BLOOD PRESSURE: 118 MMHG | OXYGEN SATURATION: 99 % | DIASTOLIC BLOOD PRESSURE: 75 MMHG | HEART RATE: 75 BPM

## 2024-12-18 VITALS
OXYGEN SATURATION: 97 % | WEIGHT: 300.05 LBS | RESPIRATION RATE: 18 BRPM | DIASTOLIC BLOOD PRESSURE: 57 MMHG | SYSTOLIC BLOOD PRESSURE: 124 MMHG | HEIGHT: 72 IN | HEART RATE: 73 BPM | TEMPERATURE: 98 F

## 2024-12-18 DIAGNOSIS — R19.09 OTHER INTRA-ABDOMINAL AND PELVIC SWELLING, MASS AND LUMP: ICD-10-CM

## 2024-12-18 DIAGNOSIS — Z90.49 ACQUIRED ABSENCE OF OTHER SPECIFIED PARTS OF DIGESTIVE TRACT: Chronic | ICD-10-CM

## 2024-12-18 DIAGNOSIS — Z95.1 PRESENCE OF AORTOCORONARY BYPASS GRAFT: Chronic | ICD-10-CM

## 2024-12-18 DIAGNOSIS — N40.0 BENIGN PROSTATIC HYPERPLASIA WITHOUT LOWER URINARY TRACT SYMPTOMS: ICD-10-CM

## 2024-12-18 DIAGNOSIS — E66.01 MORBID (SEVERE) OBESITY DUE TO EXCESS CALORIES: ICD-10-CM

## 2024-12-18 DIAGNOSIS — Z96.652 PRESENCE OF LEFT ARTIFICIAL KNEE JOINT: Chronic | ICD-10-CM

## 2024-12-18 DIAGNOSIS — Z87.891 PERSONAL HISTORY OF NICOTINE DEPENDENCE: ICD-10-CM

## 2024-12-18 DIAGNOSIS — C85.15 UNSPECIFIED B-CELL LYMPHOMA, LYMPH NODES OF INGUINAL REGION AND LOWER LIMB: ICD-10-CM

## 2024-12-18 DIAGNOSIS — Z79.4 LONG TERM (CURRENT) USE OF INSULIN: ICD-10-CM

## 2024-12-18 DIAGNOSIS — I48.91 UNSPECIFIED ATRIAL FIBRILLATION: ICD-10-CM

## 2024-12-18 DIAGNOSIS — E11.22 TYPE 2 DIABETES MELLITUS WITH DIABETIC CHRONIC KIDNEY DISEASE: ICD-10-CM

## 2024-12-18 DIAGNOSIS — Z95.5 PRESENCE OF CORONARY ANGIOPLASTY IMPLANT AND GRAFT: ICD-10-CM

## 2024-12-18 DIAGNOSIS — G47.33 OBSTRUCTIVE SLEEP APNEA (ADULT) (PEDIATRIC): ICD-10-CM

## 2024-12-18 DIAGNOSIS — Z90.410 ACQUIRED TOTAL ABSENCE OF PANCREAS: Chronic | ICD-10-CM

## 2024-12-18 DIAGNOSIS — I12.9 HYPERTENSIVE CHRONIC KIDNEY DISEASE WITH STAGE 1 THROUGH STAGE 4 CHRONIC KIDNEY DISEASE, OR UNSPECIFIED CHRONIC KIDNEY DISEASE: ICD-10-CM

## 2024-12-18 DIAGNOSIS — Z95.5 PRESENCE OF CORONARY ANGIOPLASTY IMPLANT AND GRAFT: Chronic | ICD-10-CM

## 2024-12-18 DIAGNOSIS — Z95.1 PRESENCE OF AORTOCORONARY BYPASS GRAFT: ICD-10-CM

## 2024-12-18 DIAGNOSIS — Z98.890 OTHER SPECIFIED POSTPROCEDURAL STATES: Chronic | ICD-10-CM

## 2024-12-18 DIAGNOSIS — Z79.85 LONG-TERM (CURRENT) USE OF INJECTABLE NON-INSULIN ANTIDIABETIC DRUGS: ICD-10-CM

## 2024-12-18 DIAGNOSIS — Z79.82 LONG TERM (CURRENT) USE OF ASPIRIN: ICD-10-CM

## 2024-12-18 DIAGNOSIS — I25.10 ATHEROSCLEROTIC HEART DISEASE OF NATIVE CORONARY ARTERY WITHOUT ANGINA PECTORIS: ICD-10-CM

## 2024-12-18 PROCEDURE — 88341 IMHCHEM/IMCYTCHM EA ADD ANTB: CPT

## 2024-12-18 PROCEDURE — 88342 IMHCHEM/IMCYTCHM 1ST ANTB: CPT

## 2024-12-18 PROCEDURE — 87075 CULTR BACTERIA EXCEPT BLOOD: CPT

## 2024-12-18 PROCEDURE — 87015 SPECIMEN INFECT AGNT CONCNTJ: CPT

## 2024-12-18 PROCEDURE — C1889: CPT

## 2024-12-18 PROCEDURE — 87102 FUNGUS ISOLATION CULTURE: CPT

## 2024-12-18 PROCEDURE — 87070 CULTURE OTHR SPECIMN AEROBIC: CPT

## 2024-12-18 PROCEDURE — 87206 SMEAR FLUORESCENT/ACID STAI: CPT

## 2024-12-18 PROCEDURE — 87116 MYCOBACTERIA CULTURE: CPT

## 2024-12-18 PROCEDURE — 38531 OPEN BX/EXC INGUINOFEM NODES: CPT | Mod: RT

## 2024-12-18 PROCEDURE — 88365 INSITU HYBRIDIZATION (FISH): CPT

## 2024-12-18 PROCEDURE — 88360 TUMOR IMMUNOHISTOCHEM/MANUAL: CPT

## 2024-12-18 PROCEDURE — ZZZZZ: CPT

## 2024-12-18 PROCEDURE — 88304 TISSUE EXAM BY PATHOLOGIST: CPT

## 2024-12-18 RX ORDER — AZELASTINE HCL 205.5 UG/1
0 SPRAY NASAL
Refills: 0 | DISCHARGE

## 2024-12-18 RX ORDER — HYDROMORPHONE HYDROCHLORIDE 2 MG/1
0.5 TABLET ORAL
Refills: 0 | Status: DISCONTINUED | OUTPATIENT
Start: 2024-12-18 | End: 2024-12-18

## 2024-12-18 RX ORDER — LISINOPRIL 20 MG/1
1 TABLET ORAL
Refills: 0 | DISCHARGE

## 2024-12-18 RX ORDER — 0.9 % SODIUM CHLORIDE 0.9 %
1000 INTRAVENOUS SOLUTION INTRAVENOUS
Refills: 0 | Status: DISCONTINUED | OUTPATIENT
Start: 2024-12-18 | End: 2024-12-18

## 2024-12-18 RX ORDER — ONDANSETRON HYDROCHLORIDE 4 MG/1
4 TABLET, FILM COATED ORAL ONCE
Refills: 0 | Status: DISCONTINUED | OUTPATIENT
Start: 2024-12-18 | End: 2024-12-18

## 2024-12-18 RX ORDER — OXYCODONE HYDROCHLORIDE 30 MG/1
5 TABLET ORAL ONCE
Refills: 0 | Status: DISCONTINUED | OUTPATIENT
Start: 2024-12-18 | End: 2024-12-18

## 2024-12-18 RX ORDER — IPRATROPIUM BROMIDE 42 MCG
0 AEROSOL, SPRAY (ML) NASAL
Refills: 0 | DISCHARGE

## 2024-12-18 RX ORDER — SPIRONOLACTONE 25 MG
1 TABLET ORAL
Refills: 0 | DISCHARGE

## 2024-12-18 RX ORDER — ACETAMINOPHEN 500MG 500 MG/1
1000 TABLET, COATED ORAL ONCE
Refills: 0 | Status: DISCONTINUED | OUTPATIENT
Start: 2024-12-18 | End: 2024-12-18

## 2024-12-18 NOTE — ASU DISCHARGE PLAN (ADULT/PEDIATRIC) - ASU DC SPECIAL INSTRUCTIONSFT
-Diet: Continue regular diet as tolerated.  -Activity: Avoid heavy lifting or straining (anything over 10-15 pounds) for at least 6 weeks. You may ambulate and otherwise resume normal daily activities as tolerated.   -Incisions: You may remove your dressings in 48hrs, do not remove steri strips (they will fall on their own). You may shower and allow soap and water to rinse over incisions. Please avoid scrubbing the areas and do not submerge your incisions in water for at least 2 weeks.  -Medications: You may resume your home medications. You may take Tylenol 650mg every 6 hours and alternate with Ibuprofen 600mg every 8 hours for pain.   -Follow-up: Please call the office to schedule a follow-up appointment with Dr. Hood within 1-2 weeks, or follow-up as previously scheduled.  -Please call the office or return to the ED with persistent fever greater than 100.4F, uncontrollable nausea/vomiting/abdominal pain, inability to tolerate oral intake.

## 2024-12-18 NOTE — ASU DISCHARGE PLAN (ADULT/PEDIATRIC) - CARE PROVIDER_API CALL
Luis Manuel Hood  Surgery  [address] [place] [address], [address]  [place] [address]  Phone: [phone]  Fax: [phone]  Follow Up Time:

## 2024-12-18 NOTE — ASU DISCHARGE PLAN (ADULT/PEDIATRIC) - FINANCIAL ASSISTANCE
[facility] provides services at a reduced cost to those who are determined to be eligible through [facility]’s financial assistance program. Information regarding [facility]’s financial assistance program can be found by going to [email or web address] or by calling [phone].

## 2024-12-18 NOTE — ASU DISCHARGE PLAN (ADULT/PEDIATRIC) - NS MD DC FALL RISK RISK
For information on Fall & Injury Prevention, visit: [email or web address] OR  [email or web address] OR  https://www.cdc.gov/steadi/patient.html

## 2024-12-18 NOTE — PRE-ANESTHESIA EVALUATION ADULT - BP NONINVASIVE DIASTOLIC (MM HG)
Subjective     Khurram Reynoso is a 76 year old male who presents to clinic today for the following health issues:    HPI   Answers for HPI/ROS submitted by the patient on 11/8/2020   Your back pain is: chronic  Where is your back pain located? : right lower back, left lower back, right hip  How would you describe your back pain? : gnawing  Where does your back pain spread? : right buttocks, right knee  Since you noticed your back pain, how has it changed? : always present, but gets better and worse  Does your back pain interfere with your job?: Not applicable    History of moderate persistent asthma.  Following with pulmonology.    Patient with ongoing back issues with prior DJD and herniated disc with fusion in 2016 with Dr. Araiza.  Continues to have pain at the back and into the legs at times.  This limits his ambulation and he requires a cane for walking many times.  Has foot drop of the left foot that has been consistent since one of his surgeries of his back.    Has had intermittent swelling and redness of the right foot.  History of gout.  Elevated uric acid.  Had ultrasound of the leg to rule out DVT in the past.  No swelling currently.  When this occurs he does not have bilateral swelling.          Review of Systems   RESP:NEGATIVE for significant cough or SOB  MUSCULOSKELETAL: As noted above      Objective    /78 (BP Location: Right arm, Patient Position: Chair, Cuff Size: Adult Large)   Pulse 78   Temp 98.7  F (37.1  C) (Oral)   Ht 1.829 m (6')   Wt 112.3 kg (247 lb 8 oz)   SpO2 95%   BMI 33.57 kg/m    Body mass index is 33.57 kg/m .  Physical Exam   GENERAL: healthy, alert and no distress  CV: regular rate and rhythm, normal S1 S2, no S3 or S4, no murmur, click or rub, no peripheral edema and peripheral pulses strong  MS: No significant swelling noted today of foot        Assessment & Plan     Moderate persistent asthma without complication  Continue current inhalers.    Gait  difficulty  Signed disability parking permit.  Patient uses a cane regularly with lumbar spinal stenosis and foot drop.    Primary cardiomyopathy (H)  Stable.    Need for prophylactic vaccination and inoculation against influenza  - FLUZONE HIGH DOSE 65+  [84191]    Idiopathic chronic gout without tophus, unspecified site  Trial of medication below for possible gout causing his foot swelling issues with elevated uric acid and redness/swelling of the foot.  At any rate this is likely a good idea for preventing gout flares for him in general.  - allopurinol (ZYLOPRIM) 100 MG tablet; Take 1 tablet (100 mg) by mouth daily     BMI:   Estimated body mass index is 33.57 kg/m  as calculated from the following:    Height as of this encounter: 1.829 m (6').    Weight as of this encounter: 112.3 kg (247 lb 8 oz).          No follow-ups on file.    Asif Moctezuma MD  [facility]     59

## 2024-12-18 NOTE — CHART NOTE - NSCHARTNOTEFT_GEN_A_CORE
PACU ANESTHESIA ADMISSION NOTE      Procedure: Open biopsy of mass of groin      Post op diagnosis:  Right groin mass        ____  Intubated  TV:______       Rate: ______      FiO2: ______    _x___  Patent Airway    _x___  Full return of protective reflexes    _x___  Full recovery from anesthesia / back to baseline status    Vitals  SPO2:-99  HR:-80  RR:-11  B.P:-119/54  TEMP:-97.8    Mental Status:  _x___ Awake   ___x_ Alert   _____ Drowsy   _____ Sedated    Nausea/Vomiting:  _x___  NO       ______Yes,   See Post - Op Orders         Pain Scale (0-10):  __0___    Treatment: _x___ None    __x__ See Post - Op/PCA Orders    Post - Operative Fluids:   ___ Oral   ____x See Post - Op Orders    Plan: Discharge:   _x___Home       ___Floor     _____Critical Care    _____  Other:_________________    Comments:  Report endorsed to RN in pacu  Vitals stable  No anesthesia issues or complications noted.  Discharge to patient to home when criteria met.

## 2024-12-18 NOTE — PRE-ANESTHESIA EVALUATION ADULT - NSANTHADDINFOFT_GEN_ALL_CORE
MAC planned, backup GA  Discussed risks, including dental injury and more serious complications including cardiac and pulmonary complications and stroke.  Patient expresses understanding with regard to risks of anesthesia and wishes to proceed.  Insulin pump suspended during procedure. CGM BG measuring 110-118

## 2024-12-18 NOTE — PRE-ANESTHESIA EVALUATION ADULT - NSANTHPMHFT_GEN_ALL_CORE
CABG in 2012  pAF s/p ablation  PNA c/b tracheostomy in 2017  METS>4 without CP/palpitations/sob  Denies orthopnea  IDDM - A1c 8.9 with CGM and insulin pump in place  CKD GFR 44

## 2024-12-19 LAB
GRAM STN FLD: SIGNIFICANT CHANGE UP
NIGHT BLUE STAIN TISS: SIGNIFICANT CHANGE UP
SPECIMEN SOURCE: SIGNIFICANT CHANGE UP
SPECIMEN SOURCE: SIGNIFICANT CHANGE UP

## 2024-12-23 LAB
CULTURE RESULTS: SIGNIFICANT CHANGE UP
SPECIMEN SOURCE: SIGNIFICANT CHANGE UP

## 2024-12-24 LAB — SURGICAL PATHOLOGY STUDY: SIGNIFICANT CHANGE UP

## 2025-01-03 ENCOUNTER — NON-APPOINTMENT (OUTPATIENT)
Age: 70
End: 2025-01-03

## 2025-01-03 PROBLEM — Z86.79 PERSONAL HISTORY OF OTHER DISEASES OF THE CIRCULATORY SYSTEM: Chronic | Status: ACTIVE | Noted: 2024-12-04

## 2025-01-08 ENCOUNTER — LABORATORY RESULT (OUTPATIENT)
Age: 70
End: 2025-01-08

## 2025-01-08 ENCOUNTER — OUTPATIENT (OUTPATIENT)
Dept: OUTPATIENT SERVICES | Facility: HOSPITAL | Age: 70
LOS: 1 days | End: 2025-01-08
Payer: MEDICARE

## 2025-01-08 ENCOUNTER — NON-APPOINTMENT (OUTPATIENT)
Age: 70
End: 2025-01-08

## 2025-01-08 ENCOUNTER — APPOINTMENT (OUTPATIENT)
Age: 70
End: 2025-01-08
Payer: MEDICARE

## 2025-01-08 VITALS
RESPIRATION RATE: 16 BRPM | TEMPERATURE: 97.9 F | HEIGHT: 72 IN | SYSTOLIC BLOOD PRESSURE: 128 MMHG | BODY MASS INDEX: 40.5 KG/M2 | HEART RATE: 79 BPM | DIASTOLIC BLOOD PRESSURE: 77 MMHG | OXYGEN SATURATION: 98 % | WEIGHT: 299 LBS

## 2025-01-08 DIAGNOSIS — Z96.652 PRESENCE OF LEFT ARTIFICIAL KNEE JOINT: Chronic | ICD-10-CM

## 2025-01-08 DIAGNOSIS — C83.30 DIFFUSE LARGE B-CELL LYMPHOMA, UNSPECIFIED SITE: ICD-10-CM

## 2025-01-08 DIAGNOSIS — R19.09 OTHER INTRA-ABDOMINAL AND PELVIC SWELLING, MASS AND LUMP: ICD-10-CM

## 2025-01-08 DIAGNOSIS — Z95.5 PRESENCE OF CORONARY ANGIOPLASTY IMPLANT AND GRAFT: Chronic | ICD-10-CM

## 2025-01-08 DIAGNOSIS — Z90.410 ACQUIRED TOTAL ABSENCE OF PANCREAS: Chronic | ICD-10-CM

## 2025-01-08 DIAGNOSIS — Z90.49 ACQUIRED ABSENCE OF OTHER SPECIFIED PARTS OF DIGESTIVE TRACT: Chronic | ICD-10-CM

## 2025-01-08 DIAGNOSIS — Z98.890 OTHER SPECIFIED POSTPROCEDURAL STATES: Chronic | ICD-10-CM

## 2025-01-08 DIAGNOSIS — Z95.1 PRESENCE OF AORTOCORONARY BYPASS GRAFT: Chronic | ICD-10-CM

## 2025-01-08 LAB
ALBUMIN SERPL ELPH-MCNC: 4 G/DL
ALP BLD-CCNC: 117 U/L
ALT SERPL-CCNC: 18 U/L
ANION GAP SERPL CALC-SCNC: 14 MMOL/L
APTT BLD: 47.9 SEC
AST SERPL-CCNC: 27 U/L
BILIRUB SERPL-MCNC: 0.2 MG/DL
BUN SERPL-MCNC: 46 MG/DL
CALCIUM SERPL-MCNC: 9.2 MG/DL
CHLORIDE SERPL-SCNC: 98 MMOL/L
CO2 SERPL-SCNC: 28 MMOL/L
CREAT SERPL-MCNC: 2 MG/DL
EGFR: 36 ML/MIN/1.73M2
GLUCOSE SERPL-MCNC: 195 MG/DL
HCT VFR BLD CALC: 40.5 %
HCV AB SER QL: NONREACTIVE
HCV S/CO RATIO: 0.05 COI
HGB BLD-MCNC: 13.3 G/DL
INR PPP: 1.61 RATIO
LDH SERPL-CCNC: 287 U/L
MCHC RBC-ENTMCNC: 30.5 PG
MCHC RBC-ENTMCNC: 32.8 G/DL
MCV RBC AUTO: 92.9 FL
PHOSPHATE SERPL-MCNC: 3.7 MG/DL
PLATELET # BLD AUTO: 574 K/UL
PMV BLD: 9.3 FL
POTASSIUM SERPL-SCNC: 4.6 MMOL/L
PROT SERPL-MCNC: 6.7 G/DL
PT BLD: 19.2 SEC
RBC # BLD: 4.36 M/UL
RBC # FLD: 13.2 %
SODIUM SERPL-SCNC: 140 MMOL/L
URATE SERPL-MCNC: 9.1 MG/DL
WBC # FLD AUTO: 9.93 K/UL

## 2025-01-08 PROCEDURE — 86705 HEP B CORE ANTIBODY IGM: CPT

## 2025-01-08 PROCEDURE — 80053 COMPREHEN METABOLIC PANEL: CPT

## 2025-01-08 PROCEDURE — 87350 HEPATITIS BE AG IA: CPT

## 2025-01-08 PROCEDURE — 86704 HEP B CORE ANTIBODY TOTAL: CPT

## 2025-01-08 PROCEDURE — 84100 ASSAY OF PHOSPHORUS: CPT

## 2025-01-08 PROCEDURE — 99205 OFFICE O/P NEW HI 60 MIN: CPT

## 2025-01-08 PROCEDURE — 85610 PROTHROMBIN TIME: CPT

## 2025-01-08 PROCEDURE — 86803 HEPATITIS C AB TEST: CPT

## 2025-01-08 PROCEDURE — 85027 COMPLETE CBC AUTOMATED: CPT

## 2025-01-08 PROCEDURE — 87389 HIV-1 AG W/HIV-1&-2 AB AG IA: CPT

## 2025-01-08 PROCEDURE — 99204 OFFICE O/P NEW MOD 45 MIN: CPT

## 2025-01-08 PROCEDURE — 86706 HEP B SURFACE ANTIBODY: CPT

## 2025-01-08 PROCEDURE — 86707 HEPATITIS BE ANTIBODY: CPT

## 2025-01-08 PROCEDURE — 87340 HEPATITIS B SURFACE AG IA: CPT

## 2025-01-08 PROCEDURE — 85730 THROMBOPLASTIN TIME PARTIAL: CPT

## 2025-01-08 PROCEDURE — 83615 LACTATE (LD) (LDH) ENZYME: CPT

## 2025-01-08 PROCEDURE — 84550 ASSAY OF BLOOD/URIC ACID: CPT

## 2025-01-09 ENCOUNTER — INPATIENT (INPATIENT)
Facility: HOSPITAL | Age: 70
LOS: 7 days | Discharge: HOME | DRG: 842 | End: 2025-01-17
Attending: INTERNAL MEDICINE | Admitting: INTERNAL MEDICINE
Payer: MEDICARE

## 2025-01-09 VITALS
WEIGHT: 285.06 LBS | DIASTOLIC BLOOD PRESSURE: 77 MMHG | HEART RATE: 98 BPM | SYSTOLIC BLOOD PRESSURE: 148 MMHG | OXYGEN SATURATION: 97 % | TEMPERATURE: 98 F | HEIGHT: 72 IN | RESPIRATION RATE: 18 BRPM

## 2025-01-09 DIAGNOSIS — I48.20 CHRONIC ATRIAL FIBRILLATION, UNSPECIFIED: ICD-10-CM

## 2025-01-09 DIAGNOSIS — Z96.41 PRESENCE OF INSULIN PUMP (EXTERNAL) (INTERNAL): ICD-10-CM

## 2025-01-09 DIAGNOSIS — E66.9 OBESITY, UNSPECIFIED: ICD-10-CM

## 2025-01-09 DIAGNOSIS — Z96.652 PRESENCE OF LEFT ARTIFICIAL KNEE JOINT: Chronic | ICD-10-CM

## 2025-01-09 DIAGNOSIS — I12.9 HYPERTENSIVE CHRONIC KIDNEY DISEASE WITH STAGE 1 THROUGH STAGE 4 CHRONIC KIDNEY DISEASE, OR UNSPECIFIED CHRONIC KIDNEY DISEASE: ICD-10-CM

## 2025-01-09 DIAGNOSIS — Z79.01 LONG TERM (CURRENT) USE OF ANTICOAGULANTS: ICD-10-CM

## 2025-01-09 DIAGNOSIS — R60.0 LOCALIZED EDEMA: ICD-10-CM

## 2025-01-09 DIAGNOSIS — Z95.5 PRESENCE OF CORONARY ANGIOPLASTY IMPLANT AND GRAFT: ICD-10-CM

## 2025-01-09 DIAGNOSIS — K59.00 CONSTIPATION, UNSPECIFIED: ICD-10-CM

## 2025-01-09 DIAGNOSIS — D75.839 THROMBOCYTOSIS, UNSPECIFIED: ICD-10-CM

## 2025-01-09 DIAGNOSIS — I08.3 COMBINED RHEUMATIC DISORDERS OF MITRAL, AORTIC AND TRICUSPID VALVES: ICD-10-CM

## 2025-01-09 DIAGNOSIS — Z90.81 ACQUIRED ABSENCE OF SPLEEN: ICD-10-CM

## 2025-01-09 DIAGNOSIS — Z79.82 LONG TERM (CURRENT) USE OF ASPIRIN: ICD-10-CM

## 2025-01-09 DIAGNOSIS — R59.9 ENLARGED LYMPH NODES, UNSPECIFIED: ICD-10-CM

## 2025-01-09 DIAGNOSIS — D84.81 IMMUNODEFICIENCY DUE TO CONDITIONS CLASSIFIED ELSEWHERE: ICD-10-CM

## 2025-01-09 DIAGNOSIS — Z95.1 PRESENCE OF AORTOCORONARY BYPASS GRAFT: Chronic | ICD-10-CM

## 2025-01-09 DIAGNOSIS — D63.1 ANEMIA IN CHRONIC KIDNEY DISEASE: ICD-10-CM

## 2025-01-09 DIAGNOSIS — N40.0 BENIGN PROSTATIC HYPERPLASIA WITHOUT LOWER URINARY TRACT SYMPTOMS: ICD-10-CM

## 2025-01-09 DIAGNOSIS — C83.30 DIFFUSE LARGE B-CELL LYMPHOMA, UNSPECIFIED SITE: ICD-10-CM

## 2025-01-09 DIAGNOSIS — E11.22 TYPE 2 DIABETES MELLITUS WITH DIABETIC CHRONIC KIDNEY DISEASE: ICD-10-CM

## 2025-01-09 DIAGNOSIS — Z98.890 OTHER SPECIFIED POSTPROCEDURAL STATES: Chronic | ICD-10-CM

## 2025-01-09 DIAGNOSIS — Z95.5 PRESENCE OF CORONARY ANGIOPLASTY IMPLANT AND GRAFT: Chronic | ICD-10-CM

## 2025-01-09 DIAGNOSIS — R12 HEARTBURN: ICD-10-CM

## 2025-01-09 DIAGNOSIS — Z90.49 ACQUIRED ABSENCE OF OTHER SPECIFIED PARTS OF DIGESTIVE TRACT: Chronic | ICD-10-CM

## 2025-01-09 DIAGNOSIS — Z95.1 PRESENCE OF AORTOCORONARY BYPASS GRAFT: ICD-10-CM

## 2025-01-09 DIAGNOSIS — Z96.652 PRESENCE OF LEFT ARTIFICIAL KNEE JOINT: ICD-10-CM

## 2025-01-09 DIAGNOSIS — J30.2 OTHER SEASONAL ALLERGIC RHINITIS: ICD-10-CM

## 2025-01-09 DIAGNOSIS — E87.5 HYPERKALEMIA: ICD-10-CM

## 2025-01-09 DIAGNOSIS — K31.84 GASTROPARESIS: ICD-10-CM

## 2025-01-09 DIAGNOSIS — C83.35 DIFFUSE LARGE B-CELL LYMPHOMA, LYMPH NODES OF INGUINAL REGION AND LOWER LIMB: ICD-10-CM

## 2025-01-09 DIAGNOSIS — I25.10 ATHEROSCLEROTIC HEART DISEASE OF NATIVE CORONARY ARTERY WITHOUT ANGINA PECTORIS: ICD-10-CM

## 2025-01-09 DIAGNOSIS — C85.90 NON-HODGKIN LYMPHOMA, UNSPECIFIED, UNSPECIFIED SITE: ICD-10-CM

## 2025-01-09 DIAGNOSIS — N17.9 ACUTE KIDNEY FAILURE, UNSPECIFIED: ICD-10-CM

## 2025-01-09 DIAGNOSIS — Z79.4 LONG TERM (CURRENT) USE OF INSULIN: ICD-10-CM

## 2025-01-09 DIAGNOSIS — R53.1 WEAKNESS: ICD-10-CM

## 2025-01-09 DIAGNOSIS — C91.10 CHRONIC LYMPHOCYTIC LEUKEMIA OF B-CELL TYPE NOT HAVING ACHIEVED REMISSION: ICD-10-CM

## 2025-01-09 DIAGNOSIS — Z90.410 ACQUIRED TOTAL ABSENCE OF PANCREAS: Chronic | ICD-10-CM

## 2025-01-09 DIAGNOSIS — N18.32 CHRONIC KIDNEY DISEASE, STAGE 3B: ICD-10-CM

## 2025-01-09 DIAGNOSIS — E11.65 TYPE 2 DIABETES MELLITUS WITH HYPERGLYCEMIA: ICD-10-CM

## 2025-01-09 DIAGNOSIS — E11.43 TYPE 2 DIABETES MELLITUS WITH DIABETIC AUTONOMIC (POLY)NEUROPATHY: ICD-10-CM

## 2025-01-09 LAB
ALBUMIN SERPL ELPH-MCNC: 3.8 G/DL — SIGNIFICANT CHANGE UP (ref 3.5–5.2)
ALP SERPL-CCNC: 113 U/L — SIGNIFICANT CHANGE UP (ref 30–115)
ALT FLD-CCNC: 19 U/L — SIGNIFICANT CHANGE UP (ref 0–41)
ANION GAP SERPL CALC-SCNC: 14 MMOL/L — SIGNIFICANT CHANGE UP (ref 7–14)
APTT BLD: 38 SEC — SIGNIFICANT CHANGE UP (ref 27–39.2)
AST SERPL-CCNC: 28 U/L — SIGNIFICANT CHANGE UP (ref 0–41)
BASOPHILS # BLD AUTO: 0.22 K/UL — HIGH (ref 0–0.2)
BASOPHILS NFR BLD AUTO: 2.4 % — HIGH (ref 0–1)
BILIRUB SERPL-MCNC: 0.2 MG/DL — SIGNIFICANT CHANGE UP (ref 0.2–1.2)
BUN SERPL-MCNC: 42 MG/DL — HIGH (ref 10–20)
CALCIUM SERPL-MCNC: 9.1 MG/DL — SIGNIFICANT CHANGE UP (ref 8.4–10.5)
CHLORIDE SERPL-SCNC: 101 MMOL/L — SIGNIFICANT CHANGE UP (ref 98–110)
CO2 SERPL-SCNC: 25 MMOL/L — SIGNIFICANT CHANGE UP (ref 17–32)
CREAT SERPL-MCNC: 1.8 MG/DL — HIGH (ref 0.7–1.5)
EGFR: 41 ML/MIN/1.73M2 — LOW
EOSINOPHIL # BLD AUTO: 0.67 K/UL — SIGNIFICANT CHANGE UP (ref 0–0.7)
EOSINOPHIL NFR BLD AUTO: 7.2 % — SIGNIFICANT CHANGE UP (ref 0–8)
GLUCOSE SERPL-MCNC: 125 MG/DL — HIGH (ref 70–99)
HBV CORE IGG+IGM SER QL: NONREACTIVE
HBV CORE IGM SER QL: NONREACTIVE
HBV SURFACE AB SER QL: NONREACTIVE
HBV SURFACE AB SERPL IA-ACNC: <3 MIU/ML
HBV SURFACE AG SER QL: NONREACTIVE
HBV SURFACE AG SERPL QL IA: NONREACTIVE
HCT VFR BLD CALC: 39.9 % — LOW (ref 42–52)
HGB BLD-MCNC: 13.1 G/DL — LOW (ref 14–18)
HIV1+2 AB SPEC QL IA.RAPID: NONREACTIVE
IMM GRANULOCYTES NFR BLD AUTO: 0.4 % — HIGH (ref 0.1–0.3)
INR BLD: 1.07 RATIO — SIGNIFICANT CHANGE UP (ref 0.65–1.3)
LDH SERPL L TO P-CCNC: 267 U/L — HIGH (ref 50–242)
LYMPHOCYTES # BLD AUTO: 1.87 K/UL — SIGNIFICANT CHANGE UP (ref 1.2–3.4)
LYMPHOCYTES # BLD AUTO: 20.2 % — LOW (ref 20.5–51.1)
MAGNESIUM SERPL-MCNC: 2.2 MG/DL — SIGNIFICANT CHANGE UP (ref 1.8–2.4)
MCHC RBC-ENTMCNC: 31.3 PG — HIGH (ref 27–31)
MCHC RBC-ENTMCNC: 32.8 G/DL — SIGNIFICANT CHANGE UP (ref 32–37)
MCV RBC AUTO: 95.2 FL — HIGH (ref 80–94)
MONOCYTES # BLD AUTO: 1.52 K/UL — HIGH (ref 0.1–0.6)
MONOCYTES NFR BLD AUTO: 16.4 % — HIGH (ref 1.7–9.3)
NEUTROPHILS # BLD AUTO: 4.93 K/UL — SIGNIFICANT CHANGE UP (ref 1.4–6.5)
NEUTROPHILS NFR BLD AUTO: 53.4 % — SIGNIFICANT CHANGE UP (ref 42.2–75.2)
NRBC # BLD: 0 /100 WBCS — SIGNIFICANT CHANGE UP (ref 0–0)
PHOSPHATE SERPL-MCNC: 3.8 MG/DL — SIGNIFICANT CHANGE UP (ref 2.1–4.9)
PLATELET # BLD AUTO: 532 K/UL — HIGH (ref 130–400)
PMV BLD: 9.8 FL — SIGNIFICANT CHANGE UP (ref 7.4–10.4)
POTASSIUM SERPL-MCNC: 4.2 MMOL/L — SIGNIFICANT CHANGE UP (ref 3.5–5)
POTASSIUM SERPL-SCNC: 4.2 MMOL/L — SIGNIFICANT CHANGE UP (ref 3.5–5)
PROT SERPL-MCNC: 6.4 G/DL — SIGNIFICANT CHANGE UP (ref 6–8)
PROTHROM AB SERPL-ACNC: 12.7 SEC — SIGNIFICANT CHANGE UP (ref 9.95–12.87)
RBC # BLD: 4.19 M/UL — LOW (ref 4.7–6.1)
RBC # FLD: 13 % — SIGNIFICANT CHANGE UP (ref 11.5–14.5)
SODIUM SERPL-SCNC: 140 MMOL/L — SIGNIFICANT CHANGE UP (ref 135–146)
URATE SERPL-MCNC: 7.9 MG/DL — SIGNIFICANT CHANGE UP (ref 3.4–8.8)
WBC # BLD: 9.25 K/UL — SIGNIFICANT CHANGE UP (ref 4.8–10.8)
WBC # FLD AUTO: 9.25 K/UL — SIGNIFICANT CHANGE UP (ref 4.8–10.8)

## 2025-01-09 PROCEDURE — 88275 CYTOGENETICS 100-300: CPT

## 2025-01-09 PROCEDURE — 86704 HEP B CORE ANTIBODY TOTAL: CPT

## 2025-01-09 PROCEDURE — 88311 DECALCIFY TISSUE: CPT

## 2025-01-09 PROCEDURE — 0225U NFCT DS DNA&RNA 21 SARSCOV2: CPT

## 2025-01-09 PROCEDURE — 83880 ASSAY OF NATRIURETIC PEPTIDE: CPT

## 2025-01-09 PROCEDURE — 85730 THROMBOPLASTIN TIME PARTIAL: CPT

## 2025-01-09 PROCEDURE — 36573 INSJ PICC RS&I 5 YR+: CPT | Mod: RT

## 2025-01-09 PROCEDURE — 82248 BILIRUBIN DIRECT: CPT

## 2025-01-09 PROCEDURE — 88237 TISSUE CULTURE BONE MARROW: CPT

## 2025-01-09 PROCEDURE — 86705 HEP B CORE ANTIBODY IGM: CPT

## 2025-01-09 PROCEDURE — 38222 DX BONE MARROW BX & ASPIR: CPT | Mod: LT

## 2025-01-09 PROCEDURE — 85610 PROTHROMBIN TIME: CPT

## 2025-01-09 PROCEDURE — 99285 EMERGENCY DEPT VISIT HI MDM: CPT

## 2025-01-09 PROCEDURE — 77012 CT SCAN FOR NEEDLE BIOPSY: CPT

## 2025-01-09 PROCEDURE — 99222 1ST HOSP IP/OBS MODERATE 55: CPT

## 2025-01-09 PROCEDURE — 88305 TISSUE EXAM BY PATHOLOGIST: CPT

## 2025-01-09 PROCEDURE — 93970 EXTREMITY STUDY: CPT | Mod: 26

## 2025-01-09 PROCEDURE — 88285 CHROMOSOME COUNT ADDITIONAL: CPT

## 2025-01-09 PROCEDURE — 88185 FLOWCYTOMETRY/TC ADD-ON: CPT

## 2025-01-09 PROCEDURE — 93307 TTE W/O DOPPLER COMPLETE: CPT

## 2025-01-09 PROCEDURE — 88280 CHROMOSOME KARYOTYPE STUDY: CPT

## 2025-01-09 PROCEDURE — 82955 ASSAY OF G6PD ENZYME: CPT

## 2025-01-09 PROCEDURE — 88313 SPECIAL STAINS GROUP 2: CPT

## 2025-01-09 PROCEDURE — 84550 ASSAY OF BLOOD/URIC ACID: CPT

## 2025-01-09 PROCEDURE — 88184 FLOWCYTOMETRY/ TC 1 MARKER: CPT

## 2025-01-09 PROCEDURE — 88342 IMHCHEM/IMCYTCHM 1ST ANTB: CPT

## 2025-01-09 PROCEDURE — 74176 CT ABD & PELVIS W/O CONTRAST: CPT | Mod: 26

## 2025-01-09 PROCEDURE — 80053 COMPREHEN METABOLIC PANEL: CPT

## 2025-01-09 PROCEDURE — 71250 CT THORAX DX C-: CPT | Mod: 26

## 2025-01-09 PROCEDURE — 83615 LACTATE (LD) (LDH) ENZYME: CPT

## 2025-01-09 PROCEDURE — 88271 CYTOGENETICS DNA PROBE: CPT

## 2025-01-09 PROCEDURE — 82962 GLUCOSE BLOOD TEST: CPT

## 2025-01-09 PROCEDURE — 80048 BASIC METABOLIC PNL TOTAL CA: CPT

## 2025-01-09 PROCEDURE — 74176 CT ABD & PELVIS W/O CONTRAST: CPT | Mod: MC

## 2025-01-09 PROCEDURE — 84484 ASSAY OF TROPONIN QUANT: CPT

## 2025-01-09 PROCEDURE — 87205 SMEAR GRAM STAIN: CPT

## 2025-01-09 PROCEDURE — 85027 COMPLETE CBC AUTOMATED: CPT

## 2025-01-09 PROCEDURE — 88264 CHROMOSOME ANALYSIS 20-25: CPT

## 2025-01-09 PROCEDURE — 99223 1ST HOSP IP/OBS HIGH 75: CPT

## 2025-01-09 PROCEDURE — 86900 BLOOD TYPING SEROLOGIC ABO: CPT

## 2025-01-09 PROCEDURE — 84100 ASSAY OF PHOSPHORUS: CPT

## 2025-01-09 PROCEDURE — 86901 BLOOD TYPING SEROLOGIC RH(D): CPT

## 2025-01-09 PROCEDURE — 88341 IMHCHEM/IMCYTCHM EA ADD ANTB: CPT

## 2025-01-09 PROCEDURE — 80074 ACUTE HEPATITIS PANEL: CPT

## 2025-01-09 PROCEDURE — 85025 COMPLETE CBC W/AUTO DIFF WBC: CPT

## 2025-01-09 PROCEDURE — 71250 CT THORAX DX C-: CPT | Mod: MC

## 2025-01-09 PROCEDURE — 74018 RADEX ABDOMEN 1 VIEW: CPT

## 2025-01-09 PROCEDURE — 93005 ELECTROCARDIOGRAM TRACING: CPT

## 2025-01-09 PROCEDURE — 83735 ASSAY OF MAGNESIUM: CPT

## 2025-01-09 PROCEDURE — 86850 RBC ANTIBODY SCREEN: CPT

## 2025-01-09 PROCEDURE — 36415 COLL VENOUS BLD VENIPUNCTURE: CPT

## 2025-01-09 RX ORDER — CETIRIZINE HYDROCHLORIDE 5 MG/5ML
10 SYRUP ORAL DAILY
Refills: 0 | Status: DISCONTINUED | OUTPATIENT
Start: 2025-01-09 | End: 2025-01-17

## 2025-01-09 RX ORDER — HEPARIN SODIUM 1000 [USP'U]/ML
10000 INJECTION, SOLUTION INTRAVENOUS; SUBCUTANEOUS EVERY 6 HOURS
Refills: 0 | Status: DISCONTINUED | OUTPATIENT
Start: 2025-01-09 | End: 2025-01-10

## 2025-01-09 RX ORDER — GINKGO BILOBA 40 MG
3 CAPSULE ORAL AT BEDTIME
Refills: 0 | Status: DISCONTINUED | OUTPATIENT
Start: 2025-01-09 | End: 2025-01-17

## 2025-01-09 RX ORDER — HEPARIN SODIUM 1000 [USP'U]/ML
5000 INJECTION, SOLUTION INTRAVENOUS; SUBCUTANEOUS EVERY 6 HOURS
Refills: 0 | Status: DISCONTINUED | OUTPATIENT
Start: 2025-01-09 | End: 2025-01-10

## 2025-01-09 RX ORDER — ACETAMINOPHEN 80 MG/.8ML
650 SOLUTION/ DROPS ORAL EVERY 6 HOURS
Refills: 0 | Status: DISCONTINUED | OUTPATIENT
Start: 2025-01-09 | End: 2025-01-17

## 2025-01-09 RX ORDER — HEPARIN SODIUM 1000 [USP'U]/ML
INJECTION, SOLUTION INTRAVENOUS; SUBCUTANEOUS
Qty: 25000 | Refills: 0 | Status: DISCONTINUED | OUTPATIENT
Start: 2025-01-09 | End: 2025-01-10

## 2025-01-09 RX ORDER — MAG HYDROX/ALUMINUM HYD/SIMETH 200-200-20
30 SUSPENSION, ORAL (FINAL DOSE FORM) ORAL EVERY 4 HOURS
Refills: 0 | Status: DISCONTINUED | OUTPATIENT
Start: 2025-01-09 | End: 2025-01-17

## 2025-01-09 RX ORDER — B COMPLEX, C NO.20/FOLIC ACID 1 MG
1 CAPSULE ORAL DAILY
Refills: 0 | Status: DISCONTINUED | OUTPATIENT
Start: 2025-01-09 | End: 2025-01-17

## 2025-01-09 RX ORDER — ASCORBIC ACID 1000 MG
500 TABLET ORAL DAILY
Refills: 0 | Status: DISCONTINUED | OUTPATIENT
Start: 2025-01-09 | End: 2025-01-17

## 2025-01-09 RX ORDER — SODIUM CHLORIDE 9 MG/ML
1000 INJECTION, SOLUTION INTRAVENOUS
Refills: 0 | Status: DISCONTINUED | OUTPATIENT
Start: 2025-01-09 | End: 2025-01-12

## 2025-01-09 RX ORDER — METOPROLOL TARTRATE 50 MG
25 TABLET ORAL
Refills: 0 | Status: DISCONTINUED | OUTPATIENT
Start: 2025-01-09 | End: 2025-01-17

## 2025-01-09 RX ORDER — ATORVASTATIN CALCIUM 40 MG/1
10 TABLET, FILM COATED ORAL AT BEDTIME
Refills: 0 | Status: DISCONTINUED | OUTPATIENT
Start: 2025-01-09 | End: 2025-01-17

## 2025-01-09 RX ORDER — ALLOPURINOL 100 MG/1
100 TABLET ORAL THREE TIMES A DAY
Refills: 0 | Status: DISCONTINUED | OUTPATIENT
Start: 2025-01-09 | End: 2025-01-13

## 2025-01-09 RX ORDER — ONDANSETRON 4 MG/1
4 TABLET ORAL EVERY 8 HOURS
Refills: 0 | Status: DISCONTINUED | OUTPATIENT
Start: 2025-01-09 | End: 2025-01-17

## 2025-01-09 RX ORDER — NIFEDIPINE 60 MG/1
60 TABLET, EXTENDED RELEASE ORAL DAILY
Refills: 0 | Status: DISCONTINUED | OUTPATIENT
Start: 2025-01-09 | End: 2025-01-17

## 2025-01-09 RX ORDER — ASPIRIN 81 MG
81 TABLET, DELAYED RELEASE (ENTERIC COATED) ORAL DAILY
Refills: 0 | Status: DISCONTINUED | OUTPATIENT
Start: 2025-01-09 | End: 2025-01-09

## 2025-01-09 RX ADMIN — Medication 25 MILLIGRAM(S): at 17:04

## 2025-01-09 RX ADMIN — NIFEDIPINE 60 MILLIGRAM(S): 60 TABLET, EXTENDED RELEASE ORAL at 17:05

## 2025-01-09 RX ADMIN — ATORVASTATIN CALCIUM 10 MILLIGRAM(S): 40 TABLET, FILM COATED ORAL at 22:13

## 2025-01-09 RX ADMIN — HEPARIN SODIUM 2300 UNIT(S)/HR: 1000 INJECTION, SOLUTION INTRAVENOUS; SUBCUTANEOUS at 20:39

## 2025-01-09 RX ADMIN — ALLOPURINOL 100 MILLIGRAM(S): 100 TABLET ORAL at 22:14

## 2025-01-09 NOTE — ED PROVIDER NOTE - PROGRESS NOTE DETAILS
Kishore Burger, DO: spoke with dr. santiago of onc. will admit to medicine and onc will f/u inpatient. patient aware of plan. prelim dvt negative although with present r groin mass.

## 2025-01-09 NOTE — CONSULT NOTE ADULT - SUBJECTIVE AND OBJECTIVE BOX
HEMATOLOGY/ONCOLOGY CONSULT     Patient is a 69y old  Male who presents with a chief complaint of abnormal labs (09 Jan 2025 14:04)      HPI:  69-year-old man with extensive medical history including CAD (status post CABG;), atrial fibrillation on Xarelto, history of distal pancreatomy and splenectomy for unclear reason referred to the ED by his oncologist for abnormal labs. Patient was recently diagnosed with Diffuse large B cell Lymphoma on pathology of right groin mass and was following his Oncologist Dr Angeli Hilliard. Today patient was seen by his oncologist in office and was referred to the ed for abnormal labs.  Today, he reported having chronic fatigue, weakness, right greater than left progressive leg swelling, poor appetite, no night sweats. Patient reported having right greater than left lower extremity swelling.   (09 Jan 2025 14:04)         Interval Heme/Onc Hx:    ROS:  Constitutional: denies fever, chills  Cardio: denies chest pain   GI: denies abdominal pain, nausea, vomiting, diarrhea   Respiratory: denies cough, SOB    PAST MEDICAL & SURGICAL HISTORY:  Diabetes mellitus, type 2      BPH (benign prostatic hyperplasia)      Water retention      Essential hypertension      Diabetes      CAD (coronary artery disease)      H/O hypercholesterolemia      Morbid obesity      Chronic kidney disease (CKD)      History of atrial fibrillation      H/O right coronary artery stent placement      History of resection of pancreas      History of cholecystectomy      History of left knee replacement      S/P CABG x 6      H/O cardiac radiofrequency ablation          SOCIAL HISTORY:    FAMILY HISTORY:  Family history of lung cancer (Sibling)    Family history of diabetes mellitus (Mother)    Family history of coronary artery disease (Father)        MEDICATIONS  (STANDING):    MEDICATIONS  (PRN):      Allergies    No Known Allergies    Intolerances        Vital Signs Last 24 Hrs  T(C): 36.4 (09 Jan 2025 07:59), Max: 36.4 (09 Jan 2025 07:59)  T(F): 97.5 (09 Jan 2025 07:59), Max: 97.5 (09 Jan 2025 07:59)  HR: 98 (09 Jan 2025 07:59) (98 - 98)  BP: 148/77 (09 Jan 2025 07:59) (148/77 - 148/77)  BP(mean): --  RR: 18 (09 Jan 2025 07:59) (18 - 18)  SpO2: 97% (09 Jan 2025 07:59) (97% - 97%)    Parameters below as of 09 Jan 2025 07:59  Patient On (Oxygen Delivery Method): room air        PHYSICAL EXAM  General: adult in NAD  CV: normal S1/S2 with no murmur rubs or gallops  Lungs: positive air movement b/l ant lungs,clear to auscultation, no wheezes, no rales  Abdomen: soft non-tender non-distended, no hepatosplenomegaly  Ext: 5 cm edematous, erythematous mass to right inguinal region   Neuro: alert and oriented X 4, no focal deficits      LABS:                          13.1   9.25  )-----------( 532      ( 09 Jan 2025 08:40 )             39.9         Mean Cell Volume : 95.2 fL  Mean Cell Hemoglobin : 31.3 pg  Mean Cell Hemoglobin Concentration : 32.8 g/dL  Auto Neutrophil # : 4.93 K/uL  Auto Lymphocyte # : 1.87 K/uL  Auto Monocyte # : 1.52 K/uL  Auto Eosinophil # : 0.67 K/uL  Auto Basophil # : 0.22 K/uL  Auto Neutrophil % : 53.4 %  Auto Lymphocyte % : 20.2 %  Auto Monocyte % : 16.4 %  Auto Eosinophil % : 7.2 %  Auto Basophil % : 2.4 %      01-09    140  |  101  |  42[H]  ----------------------------<  125[H]  4.2   |  25  |  1.8[H]    Ca    9.1      09 Jan 2025 08:40  Phos  3.8     01-09  Mg     2.2     01-09    TPro  6.4  /  Alb  3.8  /  TBili  0.2  /  DBili  x   /  AST  28  /  ALT  19  /  AlkPhos  113  01-09                      BLOOD SMEAR INTERPRETATION:       RADIOLOGY & ADDITIONAL STUDIES:

## 2025-01-09 NOTE — ED PROVIDER NOTE - CLINICAL SUMMARY MEDICAL DECISION MAKING FREE TEXT BOX
33-year-old male, past medical history of asthma, cerebral palsy, presenting for left-sided chest discomfort for the last week as well as his bilateral testicles hurting "for a while."  No alleviating or aggravating factors.  States he has a usual caffeine intake about 2 Celsius with preworkout which he has cut down recently.  Denies trauma, weakness, abdominal pain, nausea, vomiting, shortness of breath, urinary symptoms, testicular swelling. pt with history as above presenting for eval of abnormal outpatient labs. no other acute complaints. exm as above. labs reviewed. oupt oncology note reviewed. dw heme onc and im. will admit for further eval.

## 2025-01-09 NOTE — CONSULT NOTE ADULT - ASSESSMENT
69-year-old man with extensive medical history including CAD (status post CABG;), atrial fibrillation on Xarelto, history of distal pancreatomy and splenectomy for unclear reason referred to the ED by his oncologist for abnormal labs. Patient was recently diagnosed with Diffuse large B cell Lymphoma on pathology of right groin mass and was following his Oncologist Dr Angeli Hilliard. Today patient was seen by his oncologist in office and was referred to the ed for abnormal labs.  IR consulted for bone marrow biopsy recommended by heme-onc and PICC line for inpatient chemotherapy.     - Pt on schedule tomorrow for bone marrow biopsy and PICC line.  - NPO after midnight.  - draw CBC/CMP/coags prior to procedure.  - Hold anticoagulation and antiplatelets until after procedure.  69-year-old man with extensive medical history including CAD (status post CABG;), atrial fibrillation on Xarelto, history of distal pancreatomy and splenectomy for unclear reason referred to the ED by his oncologist for abnormal labs. Patient was recently diagnosed with Diffuse large B cell Lymphoma on pathology of right groin mass and was following his Oncologist Dr Angeli Hilliard. Today patient was seen by his oncologist in office and was referred to the ed for abnormal labs.  IR consulted for bone marrow biopsy recommended by heme-onc and PICC line vs. chemoport.     - Pt on schedule tomorrow for bone marrow biopsy. Will d/w pt on chemoport vs PICC.   - NPO after midnight.  - draw CBC/CMP/coags prior to procedure.  - Hold anticoagulation and antiplatelets until after procedure.

## 2025-01-09 NOTE — H&P ADULT - ATTENDING COMMENTS
69-year-old man with history of HTN, CAD, CABG, Afib on Xarelto, CKD 3B and recent dx large B cell lymphoma with large right inguinal mass  was sent by oncologist for elevated BUN and Cr on outpaitent lab,  Patient complains of pain to the right groin otherwise denies any other complaints including no urinary symptoms, decreased urine output, abdominal pain, body aches, fever, chills, weight loss.  Does complain of leg swelling of unclear etiology.     Physical exam:   General: well developed, no distress  HEENT: PERRLA  Neck: Supple  Chest: Clear  Heart: RRR S1 S2      large B cell Lymphoma  right inguinal mass:   Oncology consult    CKD [id number]  cr 1.8 stable    CAD s/p CABG:   No chest pain    Paroxysmal Atrial Fibrillation:   Sinus rhythm

## 2025-01-09 NOTE — ED ADULT NURSE NOTE - HISTORY OF COVID-19 VACCINATION
Addended by: BARRY GABRIEL on: 9/6/2018 02:09 PM     Modules accepted: Myriam, SmartSet    
Vaccine status unknown

## 2025-01-09 NOTE — CONSULT NOTE ADULT - SUBJECTIVE AND OBJECTIVE BOX
HISTORY OF PRESENTING ILLNESS:    HPI:  69-year-old man with pmhx recent dx large B cell lymphoma with large right inguinal mass (lesion onset ~4/2024), CAD s/p CABG;), afib on Xarelto, hx ? pancreatic stone, s/p distal pancreatomy and splenectomy, sent in from oncology office for further evaluation of large B-cell lymphoma in the setting of abnormal labs. Patient was recently diagnosed with Diffuse large B cell Lymphoma on pathology of right groin mass and was following his Oncologist Dr Angeli Hilliard. Today patient was seen by his oncologist in office and was referred to the ed for abnormal labs ie elevated creatinine and uric acid levels .  On further inquiry, he reported having chronic fatigue, weakness, right leg swelling > left leg swelling , poor appetite. Review of the systems is negative for headache, dizziness, loss of consciousness, chest pain, palpitations, shortness of breath, nausea, vomit, diarrhea, abdomen pain, urine frequency, urgency, extremity weakness and other significant complaints.    ED VITALS     · BP Systolic	148 mm Hg  · BP Diastolic	77 mm Hg  · Heart Rate	98 /min  · Respiration Rate (breaths/min)	18 /min  · Temp (F)	97.5 Degrees F  · Temp (C)	36.4 Degrees C  · Temp site	oral  · SpO2 (%)	97 %  · O2 Delivery/Oxygen Delivery Method	room air  · Temp at ED Arrival (C)	36.4 Degrees C    ED Labs    Hb 13, , creat 1.8, GFR 41    Imaging    Doppler venous LE : No evidence of deep venous thrombosis in either lower extremity. There is a proximal thigh/right groin mass measuring greater than 6.9 x  9.2 x 11 cm     (09 Jan 2025 14:04)    PAST MEDICAL & SURGICAL HISTORY  PAST MEDICAL & SURGICAL HISTORY:  Diabetes mellitus, type 2      BPH (benign prostatic hyperplasia)      Water retention      Essential hypertension      Diabetes      CAD (coronary artery disease)      H/O hypercholesterolemia      Morbid obesity      Chronic kidney disease (CKD)      History of atrial fibrillation      H/O right coronary artery stent placement      History of resection of pancreas      History of cholecystectomy      History of left knee replacement      S/P CABG x 6      H/O cardiac radiofrequency ablation        SOCIAL HISTORY:    ALLERGIES:  No Known Allergies    MEDICATIONS:  STANDING MEDICATIONS  ascorbic acid 500 milliGRAM(s) Oral daily  aspirin  chewable 81 milliGRAM(s) Oral daily  atorvastatin 10 milliGRAM(s) Oral at bedtime  cetirizine 10 milliGRAM(s) Oral daily  metoprolol tartrate 25 milliGRAM(s) Oral two times a day  Nephro-toni 1 Tablet(s) Oral daily  NIFEdipine XL 60 milliGRAM(s) Oral daily    PRN MEDICATIONS  acetaminophen     Tablet .. 650 milliGRAM(s) Oral every 6 hours PRN  aluminum hydroxide/magnesium hydroxide/simethicone Suspension 30 milliLiter(s) Oral every 4 hours PRN  melatonin 3 milliGRAM(s) Oral at bedtime PRN  ondansetron Injectable 4 milliGRAM(s) IV Push every 8 hours PRN    VITALS:   T(F): 97.5  HR: 98  BP: 148/77  RR: 18  SpO2: 97%    LABS:                        13.1   9.25  )-----------( 532      ( 09 Jan 2025 08:40 )             39.9     01-09    140  |  101  |  42[H]  ----------------------------<  125[H]  4.2   |  25  |  1.8[H]    Ca    9.1      09 Jan 2025 08:40  Phos  3.8     01-09  Mg     2.2     01-09    TPro  6.4  /  Alb  3.8  /  TBili  0.2  /  DBili  x   /  AST  28  /  ALT  19  /  AlkPhos  113  01-09      Urinalysis Basic - ( 09 Jan 2025 08:40 )    Color: x / Appearance: x / SG: x / pH: x  Gluc: 125 mg/dL / Ketone: x  / Bili: x / Urobili: x   Blood: x / Protein: x / Nitrite: x   Leuk Esterase: x / RBC: x / WBC x   Sq Epi: x / Non Sq Epi: x / Bacteria: x

## 2025-01-09 NOTE — H&P ADULT - HISTORY OF PRESENT ILLNESS
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 Patient reported that he developed a right groin lesion around 4/2024. Evaluated by surgery (Dr. Hood) in 10/2024. 10/2024: CT pelvis: Interval increase in soft tissue mass within the anterior right groin. Other large soft tissue  masses within the right pelvis and right inguinal region suggests enlarged lymph nodes. Some of these lymph nodes appear to be abnormal with absence of fatty hilum. Findings suspicious for neoplastic process. 12/2024: Biopsy was delayed as patient was admitted for influenza. Pathology showed large B-cell lymphoma.  CD5 weakly positive. Focal CD30 positive, < 5% of the cells. Ki-67, 20-30%. MYC rearranged. CLL FISH panel ([id number]): ABNORMAL FISH. 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  Today, he reported having chronic fatigue, weakness, right greater than leg progressive leg swelling. Somewhat poor appetite. But no night sweats. Denied having chest pain, shortness of breath, no headaches, no visual changes. No other complaints. Labs reviewed. WBC 9.9, hemoglobin 13.3, platelets 574,000, serum creatinine 2.0 (from 1.4-1.8 range), potassium 4.6, calcium 9.2, Phosphorus 3.7, uric acid 9.1, .  Patient reported having right greater than left lower extremity swelling. Additionally noted elevated uric acid,  worsening renal impairment. Findings are concerning for cancer progression with possible renal involvement/ureter obstruction versus tumor lysis syndrome. Case was discussed with patient extensively. Agreed  to initiate workup and treatment as soon as possible. Patient was referred to return to ER/direct admission. He  clearly understands all instructions.      69-year-old man with extensive medical history including CAD (status post CABG;), atrial fibrillation on Xarelto, history of distal pancreatomy and splenectomy for unclear reason referred to the ED by his oncologist for abnormal labs. Patient was recently diagnosed with Diffuse large B cell Lymphoma on pathology of right groin mass and was following his Oncologist Dr Angeli Hilliard. Today patient was seen by his oncologist in office and was referred to the ed for abnormal labs.  Today, he reported having chronic fatigue, weakness, right greater than left progressive leg swelling, poor appetite, no night sweats. Patient reported having right greater than left lower extremity swelling.   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 pancreatic stone, s/p distal pancreatomy and splenectomy, sent in from oncology office for further evaluation of large B-cell lymphoma in the setting of abnormal labs. Patient was recently diagnosed with Diffuse large B cell Lymphoma on pathology of right groin mass and was following his Oncologist Dr Angeli Hilliard. Today patient was seen by his oncologist in office and was referred to the ed for abnormal labs ie elevated creatinine and uric acid levels .  On further inquiry, he reported having chronic fatigue, weakness, right leg swelling > left leg swelling , poor appetite. Review of the systems is negative for headache, dizziness, loss of consciousness, chest pain, palpitations, shortness of breath, nausea, vomit, diarrhea, abdomen pain, urine frequency, urgency, extremity weakness and other significant complaints.    ED VITALS     · BP Systolic	148 mm Hg  · BP Diastolic	77 mm Hg  · Heart Rate	98 /min  · Respiration Rate (breaths/min)	18 /min  · Temp (F)	97.5 Degrees F  · Temp (C)	36.4 Degrees C  · Temp site	oral  · SpO2 (%)	97 %  · O2 Delivery/Oxygen Delivery Method	room air  · Temp at ED Arrival (C)	36.4 Degrees C    ED Labs    Hb 13, , creat 1.8, GFR 41    Imaging    Doppler venous LE : No evidence of deep venous thrombosis in either lower extremity. There is a proximal thigh/right groin mass measuring greater than 6.9 x  9.2 x 11 cm

## 2025-01-09 NOTE — ED ADULT NURSE NOTE - NSFALLUNIVINTERV_ED_ALL_ED
Assistance OOB with selected safe patient handling equipment if applicable/Bed/Stretcher in lowest position, wheels locked, appropriate side rails in place/Call bell, personal items and telephone in reach/Instruct patient to call for assistance before getting out of bed/chair/stretcher/Non-slip footwear applied when patient is off stretcher/[place] to call system/Physically safe environment - no spills, clutter or unnecessary equipment/Purposeful proactive rounding/Room/bathroom lighting operational, light cord in reach

## 2025-01-09 NOTE — ED PROVIDER NOTE - DIFFERENTIAL DIAGNOSIS
Differential Diagnosis KRISTINE on CKD, Vineet lysis syndrome, worsening malignancy KRISTINE on CKD, tumor lysis syndrome, worsening malignancy

## 2025-01-09 NOTE — H&P ADULT - NSHPPHYSICALEXAM_GEN_ALL_CORE
LOS:     VITALS:   T(C): 36.4 (01-09-25 @ 07:59), Max: 36.4 (01-09-25 @ 07:59)  HR: 98 (01-09-25 @ 07:59) (98 - 98)  BP: 148/77 (01-09-25 @ 07:59) (148/77 - 148/77)  RR: 18 (01-09-25 @ 07:59) (18 - 18)  SpO2: 97% (01-09-25 @ 07:59) (97% - 97%)    GENERAL: NAD, lying in bed comfortably  HEAD:  Atraumatic, Normocephalic  EYES: EOMI, PERRLA, conjunctiva and sclera clear  ENT: Moist mucous membranes  NECK: Supple, No JVD  CHEST/LUNG: BI BASAL CRACKLES HEARD AT LUNG BASIS   HEART: Regular rate and rhythm; No murmurs, rubs, or gallops  ABDOMEN: BSx4; Soft, nontender, nondistended  EXTREMITIES:  RIGHT LEG SWELLING >> LEFT LEG SWELLING, 3+++ PITTING EDEMA   NERVOUS SYSTEM:  A&Ox3, no focal deficits   SKIN: No rashes or lesions

## 2025-01-09 NOTE — CONSULT NOTE ADULT - ASSESSMENT
69-year-old man with extensive medical history including CAD (status post CABG;), atrial fibrillation on Xarelto, history of distal pancreatomy and splenectomy for unclear reason referred to the ED by his oncologist for abnormal labs. Patient was recently diagnosed with Diffuse large B cell Lymphoma on pathology of right groin mass and was following his Oncologist Dr Angeli Hilliard. Today patient was seen by his oncologist in office and was referred to the ed for abnormal labs.  Today, he reported having chronic fatigue, weakness, right greater than left progressive leg swelling, poor appetite, no night sweats. Patient reported having right greater than left lower extremity swelling.    #Large B-cell Lymphoma  Patient reports increased swelling and size of R inguinal mass for the past 2-3 months.   CT pelvis on 10/12/24 showed interval increase of mass 10 x 7 x 10 cm with inguinal lymphadenopathy, suspicious for neoplastic process.  Biopsy on 12/18/24 revealed diffuse large B-cell lymphoma with concern for Rodriguez's transformation.  Patient was seen by Dr. Hilliard on 1/8/25 for initial consultation and was advised to come to ED due to concern for tumor lysis syndrome.  Urine acid level now WNL at 7.9, CMP otherwise unremarkable.     Recommendations   Obtain bone marrow biopsy and flow cytometry to further evaluate malignancy.  Imaging with CT Chest/Abdomen/Pelvis.  Recommend inpatient chemotherapy given the high risk for tumor lysis syndrome due to baseline kidney function.  PICC line placement required for chemotherapy.  Would likely consider R-CHOP vs R-EPOCH as initial chemotherapy.    CKD Stage 3B  Creatinine at baseline (1.8)    CAD s/p CABG  Will need echo and cardiology clearance prior to proceeding with chemotherapy     Type 2 DM  Per primary team  69-year-old man with extensive medical history including CAD (status post CABG;), atrial fibrillation on Xarelto, history of distal pancreatomy and splenectomy for unclear reason referred to the ED by his oncologist for abnormal labs. Patient was recently diagnosed with Diffuse large B cell Lymphoma on pathology of right groin mass and was following his Oncologist Dr Angeli Hilliard. Today patient was seen by his oncologist in office and was referred to the ed for abnormal labs.  Today, he reported having chronic fatigue, weakness, right greater than left progressive leg swelling, poor appetite, no night sweats. Patient reported having right greater than left lower extremity swelling.    #Large B-cell Lymphoma  Patient reports increased swelling and size of R inguinal mass for the past 2-3 months.   CT pelvis on 10/12/24 showed interval increase of mass 10 x 7 x 10 cm with inguinal lymphadenopathy, suspicious for neoplastic process.  Biopsy on 12/18/24 revealed diffuse large B-cell lymphoma with concern for Rodriguez's transformation.  Patient was seen by Dr. Hilliard on 1/8/25 for initial consultation and was advised to come to ED due to concern for tumor lysis syndrome.  Uric acid level now WNL at 7.9, CMP otherwise unremarkable.     Recommendations   Obtain bone marrow biopsy and flow cytometry to further evaluate malignancy.  Imaging with CT Chest/Abdomen/Pelvis W/O contrast.  Recommend inpatient chemotherapy given the high risk for tumor lysis syndrome due to baseline kidney function.  PICC line placement required for chemotherapy.  Would likely consider R-CHOP vs R-EPOCH as initial chemotherapy.    CKD Stage 3B  Creatinine at baseline (1.8)    CAD s/p CABG  Will need echo and cardiology clearance prior to proceeding with chemotherapy     Type 2 DM  Per primary team    69-year-old man with extensive medical history including CAD (status post CABG;), atrial fibrillation on Xarelto, history of distal pancreatomy and splenectomy for unclear reason referred to the ED by his oncologist for abnormal labs. Patient was recently diagnosed with Diffuse large B cell Lymphoma on pathology of right groin mass and was following his Oncologist Dr Angeli Hilliard. Today patient was seen by his oncologist in office and was referred to the ed for abnormal labs.  Today, he reported having chronic fatigue, weakness, right greater than left progressive leg swelling, poor appetite, no night sweats. Patient reported having right greater than left lower extremity swelling.    #Large B-cell Lymphoma  Patient reports increased swelling and size of R inguinal mass for the past 2-3 months.   CT pelvis on 10/12/24 showed interval increase of mass 10 x 7 x 10 cm with inguinal lymphadenopathy, suspicious for neoplastic process.  Biopsy on 12/18/24 revealed diffuse large B-cell lymphoma with concern for Rodriguez's transformation.  Patient was seen by Dr. Hilliard on 1/8/25 for initial consultation and was advised to come to ED due to concern for tumor lysis syndrome.  Uric acid level now WNL at 7.9, CMP otherwise unremarkable.     Recommendations   Obtain IR consult bone marrow biopsy to assess bone marrow biopsy.  During our evaluation, patient is hesitant about getting a PICC line, if agreeable would also get IR consult for PICC line placement (for inpatient chemotherapy)  Imaging with CT Chest/Abdomen/Pelvis without contrast to assess for other lymphadenopathy  Recommend inpatient chemotherapy given the high risk for tumor lysis syndrome due to baseline kidney function. (if patient agreeable)  Would likely consider R-CHOP vs R-EPOCH as initial chemotherapy.  Check Echo cardiogram    CKD Stage 3B  Creatinine at baseline (1.8)    CAD s/p CABG  Will need echo and cardiology clearance prior to proceeding with chemotherapy     Type 2 DM  Per primary team

## 2025-01-09 NOTE — ED PROVIDER NOTE - OBJECTIVE STATEMENT
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 pancreatic stone, s/p distal pancreatomy and splenectomy, sent in from oncology office for further evaluation of large B-cell lymphoma in the setting of abnormal renal functions and uric acid.  Patient complains of pain to the right groin otherwise denies any other complaints including no urinary symptoms, decreased urine output, abdominal pain, body aches, fever, chills, weight loss.  Does complain of leg swelling of unclear etiology.    Per chart review 1/8/2025 from Dr. Angeli Hilliard of Oncology:  Evaluated by surgery (Dr. Hood) in 10/2024.   10/2024: CT pelvis: Interval increase in soft tissue mass within the anterior right groin. Other large soft tissue   masses within the right pelvis and right inguinal region suggests enlarged lymph nodes. Some of these lymph   nodes appear to be abnormal with absence of fatty hilum. Findings suspicious for neoplastic process.  12/2024: Biopsy was delayed as patient was admitted for influenza. Pathology showed large B-cell lymphoma.   CD5 weakly positive. Focal CD30 positive, < 5% of the cells. Ki-67, 20-30%. MYC rearranged. CLL FISH panel   ([id number]): ABNORMAL FISH. 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   Today, he reported having chronic fatigue, weakness, right greater than leg progressive leg swelling. Somewhat   poor appetite. But no night sweats. Denied having chest pain, shortness of breath, no headaches, no visual   changes. No other complaints. Labs reviewed. WBC 9.9, hemoglobin 13.3, platelets 574,000, serum creatinine 2.0  (from 1.4-1.8 range), potassium 4.6, calcium 9.2, Phosphorus 3.7, uric acid 9.1, .   Patient reported having right greater than left lower extremity swelling. Additionally noted elevated uric acid,   worsening renal impairment. Findings are concerning for cancer progression with possible renal   involvement/ureter obstruction versus tumor lysis syndrome. Case was discussed with patient extensively. Agreed   to initiate workup and treatment as soon as possible. Patient was referred to return to ER/direct admission. He   clearly understands all instructions. Discussed with inpatient hematology oncology team.   -Referred to ER/inpatient admission and workup  -Cardiology follow-up  -Labs (hepatitis B, hepatitis C HIV pending)  -Ultrasound Doppler lower extremity  -PET scan  -MUGA  -IR guided bone marrow biopsy  -IR guided Chemo-Port placement  -Will offer systemic chemotherapy treatment. Starting with pre- phase chemotherapy with Prednisone + Cytoxan   vs Prednisone + Vincristine. 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   -PCP clinic follow-up  -RTC 2 weeks

## 2025-01-09 NOTE — H&P ADULT - NSHPREVIEWOFSYSTEMS_GEN_ALL_CORE
REVIEW OF SYSTEMS:    CONSTITUTIONAL:  CHRONIC FATIGUE +++   EYES/ENT:  No visual changes;  No vertigo or throat pain   NECK:  No pain or stiffness  RESPIRATORY:  No cough, wheezing, hemoptysis; No shortness of breath  CARDIOVASCULAR:  No chest pain or palpitations  GASTROINTESTINAL:  No abdominal or epigastric pain. No nausea, vomiting, or hematemesis; No diarrhea or constipation. No melena or hematochezia.  GENITOURINARY:  No dysuria, frequency or hematuria  NEUROLOGICAL:  No numbness or weakness  SKIN:  No itching, rashes

## 2025-01-09 NOTE — H&P ADULT - ASSESSMENT
-Referred to ER/inpatient admission and workup  -Cardiology follow-up  -Labs (hepatitis B, hepatitis C HIV pending)  -Ultrasound Doppler lower extremity  -PET scan  -MUGA  -IR guided bone marrow biopsy  -IR guided Chemo-Port placement  -Will offer systemic chemotherapy treatment. Starting with pre- phase chemotherapy with Prednisone + Cytoxan   vs Prednisone + Vincristine. 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    # Findings are concerning for cancer progression with possible renal involvement/ureter obstruction versus tumor lysis syndrome    Out patient labs. WBC 9.9, hemoglobin 13.3, platelets 574,000, serum creatinine 2.0 (from 1.4-1.8 range), potassium 4.6, calcium 9.2, Phosphorus 3.7, uric acid 9.1, .        -Referred to ER/inpatient admission and workup  -Cardiology follow-up  -Labs (hepatitis B, hepatitis C HIV pending)  -Ultrasound Doppler lower extremity  -PET scan  -MUGA  -IR guided bone marrow biopsy  -IR guided Chemo-Port placement  -Will offer systemic chemotherapy treatment. Starting with pre- phase chemotherapy with Prednisone + Cytoxan   vs Prednisone + Vincristine. 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                        INCOMPLETE NOTE   69-year-old man with pmhx recent dx large B cell lymphoma with large right inguinal mass (lesion onset ~4/2024), CAD s/p CABG;), afib on Xarelto, hx ? 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    #Cancer progression with possible renal involvement/ureter obstruction versus tumor lysis syndrome   - Vitally stable   - Out patient labs. WBC 9.9, hemoglobin 13.3, platelets 574,000, serum creatinine 2.0 (baseline from 1.4-1.8 range), potassium 4.6, calcium 9.2, Phosphorus 3.7, uric acid 9.1, .  - In Patient Labs: creat 1.8, uric acid 7.9, , Potassium, Phosphorus , calcium in normal ranges   - Imaging : Doppler venous LE : No evidence of deep venous thrombosis in either lower extremity. There is a proximal thigh/right groin mass measuring greater than 6.9 x  9.2 x 11 cm  Plan  - Trend BMP  - ECHO   - Labs (hepatitis B, hepatitis C HIV pending)  - PET scan  - MUGA  - IR guided bone marrow biopsy  - IR guided Chemo-Port placement  - Oncology follow up.   - Avoid volume overload     #CAD s/p CABG;)  #Chronic afib on Xarelto  #HLD   - cw Pravastatin, metoprolol, aspirin  - hold lisinopril, furosemide , spironolactone, metolazone  - hold XARELTO , switch to heparin infusion     #DM  - on insulin pump at home   - monitor blood sugar   - target blood sugar 140 - 180     #? hx pancreatic stone, s/p distal pancreatomy and splenectomy  - outpt follow up.       DVT PPX: Heparin   GI PPX: None   DIET: Dash.   ACTIVITY:   CODE STATUS: full   DISPOSITION:     PENDING:                                  69-year-old man with pmhx recent dx large B cell lymphoma with large right inguinal mass (lesion onset ~4/2024), CAD s/p CABG;), afib on Xarelto, hx ? 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    #Cancer progression with possible renal involvement/ureter obstruction versus tumor lysis syndrome   - Vitally stable   - Out patient labs. WBC 9.9, hemoglobin 13.3, platelets 574,000, serum creatinine 2.0 (baseline from 1.4-1.8 range), potassium 4.6, calcium 9.2, Phosphorus 3.7, uric acid 9.1, .  - In Patient Labs: creat 1.8, uric acid 7.9, , Potassium, Phosphorus , calcium in normal ranges   - Imaging : Doppler venous LE : No evidence of deep venous thrombosis in either lower extremity. There is a proximal thigh/right groin mass measuring greater than 6.9 x  9.2 x 11 cm  Plan  - Trend BMP  - ECHO   - hepatitis B, hepatitis C HIV NEGATIVE )  - IR guided bone marrow biopsy  - IR guided Chemo-Port placement  - CT chest/abdomen/pelvis   - Oncology follow up. Plan for in patient chemotherapy. will need Cardiology clearance for chemotherapy   - Avoid volume overload     #CAD s/p CABG;)  #Chronic afib on Xarelto  #HLD   - cw Pravastatin, metoprolol, aspirin  - hold lisinopril, furosemide , spironolactone, metolazone  - hold XARELTO , switch to heparin infusion     #DM  - on insulin pump @2.85 at home   - monitor blood sugar   - target blood sugar 140 - 180     #CKD Stage 3B  - Creatinine at baseline (1.8)  - creat 2 on 1/9 labs  - resume  lisinopril, furosemide , spironolactone, metolazone when creat stable   - adjust meds for gfr  - avoid volume overload       #? hx pancreatic stone, s/p distal pancreatomy and splenectomy  - outpt follow up.       DVT PPX: Heparin   GI PPX: None   DIET: Dash.   ACTIVITY:   CODE STATUS: full   DISPOSITION:     PENDING:   med rec confirmed with patient                                69-year-old man with pmhx recent dx large B cell lymphoma with large right inguinal mass (lesion onset ~4/2024), CAD s/p CABG;), afib on Xarelto, hx ? 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    #Cancer progression with possible renal involvement/ureter obstruction versus tumor lysis syndrome   - Vitally stable   - Out patient labs. WBC 9.9, hemoglobin 13.3, platelets 574,000, serum creatinine 2.0 (baseline from 1.4-1.8 range), potassium 4.6, calcium 9.2, Phosphorus 3.7, uric acid 9.1, .  - In Patient Labs: creat 1.8, uric acid 7.9, , Potassium, Phosphorus , calcium in normal ranges   - Imaging : Doppler venous LE : No evidence of deep venous thrombosis in either lower extremity. There is a proximal thigh/right groin mass measuring greater than 6.9 x  9.2 x 11 cm  Plan  - Trend BMP  - ECHO   - hepatitis B, hepatitis C HIV NEGATIVE )  - IR guided bone marrow biopsy  - IR guided Chemo-Port placement  - CT chest/abdomen/pelvis   - Oncology follow up. Plan for in patient chemotherapy. will need Cardiology clearance for chemotherapy   - Avoid volume overload     #CAD s/p CABG;)  #Chronic afib on Xarelto  #HLD   - cw Pravastatin, metoprolol, aspirin  - hold lisinopril, furosemide , spironolactone, metolazone  - hold XARELTO , switch to heparin infusion     #DM  - on insulin pump @2.85 at home . cw pump  - monitor blood sugar   - target blood sugar 140 - 180     #CKD Stage 3B  - Creatinine at baseline (1.8)  - creat 2 on 1/9 labs  - resume  lisinopril, furosemide , spironolactone, metolazone when creat stable   - adjust meds for gfr  - avoid volume overload       #? hx pancreatic stone, s/p distal pancreatomy and splenectomy  - outpt follow up.       DVT PPX: Heparin   GI PPX: None   DIET: Dash.   ACTIVITY:   CODE STATUS: full   DISPOSITION:     PENDING:   med rec confirmed with patient                                69-year-old man with pmhx recent dx large B cell lymphoma with large right inguinal mass (lesion onset ~4/2024), CAD s/p CABG;), afib on Xarelto, hx ? 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    #Cancer progression with possible renal involvement/ureter obstruction versus tumor lysis syndrome   - Vitally stable   - Out patient labs. WBC 9.9, hemoglobin 13.3, platelets 574,000, serum creatinine 2.0 (baseline from 1.4-1.8 range), potassium 4.6, calcium 9.2, Phosphorus 3.7, uric acid 9.1, .  - In Patient Labs: creat 1.8, uric acid 7.9, , Potassium, Phosphorus , calcium in normal ranges   - Imaging : Doppler venous LE : No evidence of deep venous thrombosis in either lower extremity. There is a proximal thigh/right groin mass measuring greater than 6.9 x  9.2 x 11 cm  Plan  - Trend BMP  - ECHO   - hepatitis B, hepatitis C HIV NEGATIVE )  - IR guided bone marrow biopsy  - IR guided Chemo-Port placement  - CT chest/abdomen/pelvis   - Oncology follow up. Plan for in patient chemotherapy. will need Cardiology clearance for chemotherapy   - Avoid volume overload     #CAD s/p CABG;)  #Chronic afib on Xarelto  #HLD   - cw Pravastatin, metoprolol,   - hold lisinopril, furosemide , spironolactone, metolazone, aspirin  - hold XARELTO , switch to heparin infusion     #DM  - on insulin pump @2.85 at home . cw pump  - monitor blood sugar   - target blood sugar 140 - 180     #CKD Stage 3B  - Creatinine at baseline (1.8)  - creat 2 on 1/9 labs  - resume  lisinopril, furosemide , spironolactone, metolazone when creat stable   - adjust meds for gfr  - avoid volume overload       #? hx pancreatic stone, s/p distal pancreatomy and splenectomy  - outpt follow up.       DVT PPX: Heparin   GI PPX: None   DIET: Dash.   ACTIVITY:   CODE STATUS: full   DISPOSITION:     PENDING:   med rec confirmed with patient                                69-year-old man with pmhx recent dx large B cell lymphoma with large right inguinal mass (lesion onset ~4/2024), CAD s/p CABG;), afib on Xarelto, hx ? 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    #Cancer progression with possible renal involvement/ureter obstruction versus tumor lysis syndrome   - Vitally stable   - Out patient labs. WBC 9.9, hemoglobin 13.3, platelets 574,000, serum creatinine 2.0 (baseline from 1.4-1.8 range), potassium 4.6, calcium 9.2, Phosphorus 3.7, uric acid 9.1, .  - In Patient Labs: creat 1.8, uric acid 7.9, , Potassium, Phosphorus , calcium in normal ranges   - Imaging : Doppler venous LE : No evidence of deep venous thrombosis in either lower extremity. There is a proximal thigh/right groin mass measuring greater than 6.9 x  9.2 x 11 cm  Plan  - Trend BMP  - ECHO   - hepatitis B, hepatitis C HIV NEGATIVE )  - IR guided bone marrow biopsy  - IR guided Chemo-Port placement. Spoke to oncology team, and recommended that Chemo port is preferred if done by IR team.  Spoke to the patient at bed side , patient states that he is agreeable for chemo port. Updated the IR team, that oncology team and patient will prefer the Chemo port. IR team agreed for Chemo port as in patient   - CT chest/abdomen/pelvis   - Oncology follow up. Plan for in patient chemotherapy. will need Cardiology clearance for chemotherapy   - Avoid volume overload     #CAD s/p CABG;)  #Chronic afib on Xarelto  #HLD   - cw Pravastatin, metoprolol,   - hold lisinopril, furosemide , spironolactone, metolazone, aspirin  - hold XARELTO , switch to heparin infusion     #DM  - on insulin pump @2.85 at home . cw pump  - monitor blood sugar   - target blood sugar 140 - 180     #CKD Stage 3B  - Creatinine at baseline (1.8)  - creat 2 on 1/9 labs  - resume  lisinopril, furosemide , spironolactone, metolazone when creat stable   - adjust meds for gfr  - avoid volume overload       #? hx pancreatic stone, s/p distal pancreatomy and splenectomy  - outpt follow up.       DVT PPX: Heparin   GI PPX: None   DIET: Dash.   ACTIVITY:   CODE STATUS: full   DISPOSITION:     PENDING:   med rec confirmed with patient                                69-year-old man with pmhx recent dx large B cell lymphoma with large right inguinal mass (lesion onset ~4/2024), CAD s/p CABG;), afib on Xarelto, hx ? 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    #Cancer progression with possible renal involvement/ureter obstruction versus tumor lysis syndrome   - Vitally stable   - Out patient labs. WBC 9.9, hemoglobin 13.3, platelets 574,000, serum creatinine 2.0 (baseline from 1.4-1.8 range), potassium 4.6, calcium 9.2, Phosphorus 3.7, uric acid 9.1, .  - In Patient Labs: creat 1.8, uric acid 7.9, , Potassium, Phosphorus , calcium in normal ranges   - Imaging : Doppler venous LE : No evidence of deep venous thrombosis in either lower extremity. There is a proximal thigh/right groin mass measuring greater than 6.9 x  9.2 x 11 cm  Plan  - IV fluids -- LR 50ml/hr  - Trend BMP  - ECHO   - hepatitis B, hepatitis C HIV NEGATIVE )  - IR guided bone marrow biopsy  - IR guided Chemo-Port placement. Spoke to oncology team, and recommended that Chemo port is preferred if done by IR team.  Spoke to the patient at bed side , patient states that he is agreeable for chemo port. Updated the IR team, that oncology team and patient will prefer the Chemo port. IR team agreed for Chemo port as in patient   - CT chest/abdomen/pelvis   - fu G6PD levels   - Oncology follow up. Plan for in patient chemotherapy. will need Cardiology clearance for chemotherapy   - start allopurinol , adjust dose for GFR   - Avoid volume overload     #CAD s/p CABG;)  #Chronic afib on Xarelto  #HLD   - cw Pravastatin, metoprolol,   - hold lisinopril, furosemide , spironolactone, metolazone, aspirin  - hold XARELTO , switch to heparin infusion     #DM  - on insulin pump @2.85 at home . cw pump  - monitor blood sugar   - target blood sugar 140 - 180     #CKD Stage 3B  - Creatinine at baseline (1.8)  - creat 2 on 1/9 labs  - resume  lisinopril, furosemide , spironolactone, metolazone when creat stable   - adjust meds for gfr  - avoid volume overload       #? hx pancreatic stone, s/p distal pancreatomy and splenectomy  - outpt follow up.       DVT PPX: Heparin   GI PPX: None   DIET: Dash.   ACTIVITY:   CODE STATUS: full   DISPOSITION:     PENDING:   med rec confirmed with patient

## 2025-01-10 ENCOUNTER — NON-APPOINTMENT (OUTPATIENT)
Age: 70
End: 2025-01-10

## 2025-01-10 ENCOUNTER — RESULT REVIEW (OUTPATIENT)
Age: 70
End: 2025-01-10

## 2025-01-10 LAB
ALBUMIN SERPL ELPH-MCNC: 3.8 G/DL — SIGNIFICANT CHANGE UP (ref 3.5–5.2)
ALBUMIN SERPL ELPH-MCNC: 3.9 G/DL — SIGNIFICANT CHANGE UP (ref 3.5–5.2)
ALBUMIN SERPL ELPH-MCNC: 4 G/DL — SIGNIFICANT CHANGE UP (ref 3.5–5.2)
ALP SERPL-CCNC: 107 U/L — SIGNIFICANT CHANGE UP (ref 30–115)
ALP SERPL-CCNC: 107 U/L — SIGNIFICANT CHANGE UP (ref 30–115)
ALP SERPL-CCNC: 117 U/L — HIGH (ref 30–115)
ALT FLD-CCNC: 18 U/L — SIGNIFICANT CHANGE UP (ref 0–41)
ALT FLD-CCNC: 19 U/L — SIGNIFICANT CHANGE UP (ref 0–41)
ALT FLD-CCNC: 20 U/L — SIGNIFICANT CHANGE UP (ref 0–41)
ANION GAP SERPL CALC-SCNC: 11 MMOL/L — SIGNIFICANT CHANGE UP (ref 7–14)
ANION GAP SERPL CALC-SCNC: 13 MMOL/L — SIGNIFICANT CHANGE UP (ref 7–14)
ANION GAP SERPL CALC-SCNC: 16 MMOL/L — HIGH (ref 7–14)
APTT BLD: 149.1 SEC — CRITICAL HIGH (ref 27–39.2)
AST SERPL-CCNC: 25 U/L — SIGNIFICANT CHANGE UP (ref 0–41)
BASOPHILS # BLD AUTO: 0.2 K/UL — SIGNIFICANT CHANGE UP (ref 0–0.2)
BASOPHILS NFR BLD AUTO: 2.1 % — HIGH (ref 0–1)
BILIRUB SERPL-MCNC: 0.3 MG/DL — SIGNIFICANT CHANGE UP (ref 0.2–1.2)
BILIRUB SERPL-MCNC: 0.4 MG/DL — SIGNIFICANT CHANGE UP (ref 0.2–1.2)
BILIRUB SERPL-MCNC: 0.4 MG/DL — SIGNIFICANT CHANGE UP (ref 0.2–1.2)
BLD GP AB SCN SERPL QL: SIGNIFICANT CHANGE UP
BUN SERPL-MCNC: 46 MG/DL — HIGH (ref 10–20)
BUN SERPL-MCNC: 48 MG/DL — HIGH (ref 10–20)
BUN SERPL-MCNC: 52 MG/DL — HIGH (ref 10–20)
CALCIUM SERPL-MCNC: 8.7 MG/DL — SIGNIFICANT CHANGE UP (ref 8.4–10.5)
CALCIUM SERPL-MCNC: 9.1 MG/DL — SIGNIFICANT CHANGE UP (ref 8.4–10.5)
CALCIUM SERPL-MCNC: 9.1 MG/DL — SIGNIFICANT CHANGE UP (ref 8.4–10.5)
CHLORIDE SERPL-SCNC: 100 MMOL/L — SIGNIFICANT CHANGE UP (ref 98–110)
CHLORIDE SERPL-SCNC: 98 MMOL/L — SIGNIFICANT CHANGE UP (ref 98–110)
CHLORIDE SERPL-SCNC: 98 MMOL/L — SIGNIFICANT CHANGE UP (ref 98–110)
CO2 SERPL-SCNC: 24 MMOL/L — SIGNIFICANT CHANGE UP (ref 17–32)
CO2 SERPL-SCNC: 28 MMOL/L — SIGNIFICANT CHANGE UP (ref 17–32)
CO2 SERPL-SCNC: 29 MMOL/L — SIGNIFICANT CHANGE UP (ref 17–32)
CREAT SERPL-MCNC: 1.9 MG/DL — HIGH (ref 0.7–1.5)
CREAT SERPL-MCNC: 2.1 MG/DL — HIGH (ref 0.7–1.5)
CREAT SERPL-MCNC: 2.3 MG/DL — HIGH (ref 0.7–1.5)
EGFR: 30 ML/MIN/1.73M2 — LOW
EGFR: 34 ML/MIN/1.73M2 — LOW
EGFR: 38 ML/MIN/1.73M2 — LOW
EOSINOPHIL # BLD AUTO: 0.63 K/UL — SIGNIFICANT CHANGE UP (ref 0–0.7)
EOSINOPHIL NFR BLD AUTO: 6.5 % — SIGNIFICANT CHANGE UP (ref 0–8)
GLUCOSE BLDC GLUCOMTR-MCNC: 101 MG/DL — HIGH (ref 70–99)
GLUCOSE BLDC GLUCOMTR-MCNC: 143 MG/DL — HIGH (ref 70–99)
GLUCOSE SERPL-MCNC: 146 MG/DL — HIGH (ref 70–99)
GLUCOSE SERPL-MCNC: 180 MG/DL — HIGH (ref 70–99)
GLUCOSE SERPL-MCNC: 95 MG/DL — SIGNIFICANT CHANGE UP (ref 70–99)
HBV E AB SER QL: NONREACTIVE
HBV E AG SER QL: NONREACTIVE
HCT VFR BLD CALC: 38.7 % — LOW (ref 42–52)
HCT VFR BLD CALC: 39.3 % — LOW (ref 42–52)
HCT VFR BLD CALC: 41 % — LOW (ref 42–52)
HGB BLD-MCNC: 12.4 G/DL — LOW (ref 14–18)
HGB BLD-MCNC: 13 G/DL — LOW (ref 14–18)
HGB BLD-MCNC: 13.2 G/DL — LOW (ref 14–18)
IMM GRANULOCYTES NFR BLD AUTO: 0.7 % — HIGH (ref 0.1–0.3)
LDH SERPL L TO P-CCNC: 220 U/L — SIGNIFICANT CHANGE UP (ref 50–242)
LDH SERPL L TO P-CCNC: 222 U/L — SIGNIFICANT CHANGE UP (ref 50–242)
LDH SERPL L TO P-CCNC: 231 U/L — SIGNIFICANT CHANGE UP (ref 50–242)
LYMPHOCYTES # BLD AUTO: 2.25 K/UL — SIGNIFICANT CHANGE UP (ref 1.2–3.4)
LYMPHOCYTES # BLD AUTO: 23.3 % — SIGNIFICANT CHANGE UP (ref 20.5–51.1)
MAGNESIUM SERPL-MCNC: 2.2 MG/DL — SIGNIFICANT CHANGE UP (ref 1.8–2.4)
MCHC RBC-ENTMCNC: 30.5 PG — SIGNIFICANT CHANGE UP (ref 27–31)
MCHC RBC-ENTMCNC: 30.8 PG — SIGNIFICANT CHANGE UP (ref 27–31)
MCHC RBC-ENTMCNC: 31.3 PG — HIGH (ref 27–31)
MCHC RBC-ENTMCNC: 32 G/DL — SIGNIFICANT CHANGE UP (ref 32–37)
MCHC RBC-ENTMCNC: 32.2 G/DL — SIGNIFICANT CHANGE UP (ref 32–37)
MCHC RBC-ENTMCNC: 33.1 G/DL — SIGNIFICANT CHANGE UP (ref 32–37)
MCV RBC AUTO: 94.5 FL — HIGH (ref 80–94)
MCV RBC AUTO: 95.3 FL — HIGH (ref 80–94)
MCV RBC AUTO: 95.8 FL — HIGH (ref 80–94)
MONOCYTES # BLD AUTO: 1.19 K/UL — HIGH (ref 0.1–0.6)
MONOCYTES NFR BLD AUTO: 12.3 % — HIGH (ref 1.7–9.3)
NEUTROPHILS # BLD AUTO: 5.33 K/UL — SIGNIFICANT CHANGE UP (ref 1.4–6.5)
NEUTROPHILS NFR BLD AUTO: 55.1 % — SIGNIFICANT CHANGE UP (ref 42.2–75.2)
NRBC # BLD: 0 /100 WBCS — SIGNIFICANT CHANGE UP (ref 0–0)
NT-PROBNP SERPL-SCNC: 152 PG/ML — SIGNIFICANT CHANGE UP (ref 0–300)
NT-PROBNP SERPL-SCNC: 167 PG/ML — SIGNIFICANT CHANGE UP (ref 0–300)
PHOSPHATE SERPL-MCNC: 3.5 MG/DL — SIGNIFICANT CHANGE UP (ref 2.1–4.9)
PHOSPHATE SERPL-MCNC: 4 MG/DL — SIGNIFICANT CHANGE UP (ref 2.1–4.9)
PHOSPHATE SERPL-MCNC: 4 MG/DL — SIGNIFICANT CHANGE UP (ref 2.1–4.9)
PLATELET # BLD AUTO: 519 K/UL — HIGH (ref 130–400)
PLATELET # BLD AUTO: 522 K/UL — HIGH (ref 130–400)
PLATELET # BLD AUTO: 525 K/UL — HIGH (ref 130–400)
PMV BLD: 9.9 FL — SIGNIFICANT CHANGE UP (ref 7.4–10.4)
POTASSIUM SERPL-MCNC: 4.8 MMOL/L — SIGNIFICANT CHANGE UP (ref 3.5–5)
POTASSIUM SERPL-MCNC: 4.8 MMOL/L — SIGNIFICANT CHANGE UP (ref 3.5–5)
POTASSIUM SERPL-MCNC: 5.1 MMOL/L — HIGH (ref 3.5–5)
POTASSIUM SERPL-SCNC: 4.8 MMOL/L — SIGNIFICANT CHANGE UP (ref 3.5–5)
POTASSIUM SERPL-SCNC: 4.8 MMOL/L — SIGNIFICANT CHANGE UP (ref 3.5–5)
POTASSIUM SERPL-SCNC: 5.1 MMOL/L — HIGH (ref 3.5–5)
PROT SERPL-MCNC: 6.2 G/DL — SIGNIFICANT CHANGE UP (ref 6–8)
PROT SERPL-MCNC: 6.5 G/DL — SIGNIFICANT CHANGE UP (ref 6–8)
PROT SERPL-MCNC: 6.8 G/DL — SIGNIFICANT CHANGE UP (ref 6–8)
RBC # BLD: 4.06 M/UL — LOW (ref 4.7–6.1)
RBC # BLD: 4.16 M/UL — LOW (ref 4.7–6.1)
RBC # BLD: 4.28 M/UL — LOW (ref 4.7–6.1)
RBC # FLD: 13.2 % — SIGNIFICANT CHANGE UP (ref 11.5–14.5)
RBC # FLD: 13.2 % — SIGNIFICANT CHANGE UP (ref 11.5–14.5)
RBC # FLD: 13.3 % — SIGNIFICANT CHANGE UP (ref 11.5–14.5)
SODIUM SERPL-SCNC: 138 MMOL/L — SIGNIFICANT CHANGE UP (ref 135–146)
SODIUM SERPL-SCNC: 138 MMOL/L — SIGNIFICANT CHANGE UP (ref 135–146)
SODIUM SERPL-SCNC: 141 MMOL/L — SIGNIFICANT CHANGE UP (ref 135–146)
TROPONIN T, HIGH SENSITIVITY RESULT: 63 NG/L — CRITICAL HIGH (ref 6–21)
TROPONIN T, HIGH SENSITIVITY RESULT: 66 NG/L — CRITICAL HIGH (ref 6–21)
TROPONIN T, HIGH SENSITIVITY RESULT: 67 NG/L — CRITICAL HIGH (ref 6–21)
URATE SERPL-MCNC: 8.6 MG/DL — SIGNIFICANT CHANGE UP (ref 3.4–8.8)
URATE SERPL-MCNC: 8.6 MG/DL — SIGNIFICANT CHANGE UP (ref 3.4–8.8)
WBC # BLD: 10.96 K/UL — HIGH (ref 4.8–10.8)
WBC # BLD: 11.48 K/UL — HIGH (ref 4.8–10.8)
WBC # BLD: 9.67 K/UL — SIGNIFICANT CHANGE UP (ref 4.8–10.8)
WBC # FLD AUTO: 10.96 K/UL — HIGH (ref 4.8–10.8)
WBC # FLD AUTO: 11.48 K/UL — HIGH (ref 4.8–10.8)
WBC # FLD AUTO: 9.67 K/UL — SIGNIFICANT CHANGE UP (ref 4.8–10.8)

## 2025-01-10 PROCEDURE — 77012 CT SCAN FOR NEEDLE BIOPSY: CPT | Mod: 26

## 2025-01-10 PROCEDURE — 88341 IMHCHEM/IMCYTCHM EA ADD ANTB: CPT | Mod: 26

## 2025-01-10 PROCEDURE — 99232 SBSQ HOSP IP/OBS MODERATE 35: CPT

## 2025-01-10 PROCEDURE — 93306 TTE W/DOPPLER COMPLETE: CPT | Mod: 26

## 2025-01-10 PROCEDURE — 38222 DX BONE MARROW BX & ASPIR: CPT | Mod: LT

## 2025-01-10 PROCEDURE — 88342 IMHCHEM/IMCYTCHM 1ST ANTB: CPT | Mod: 26

## 2025-01-10 PROCEDURE — 99223 1ST HOSP IP/OBS HIGH 75: CPT

## 2025-01-10 PROCEDURE — 36573 INSJ PICC RS&I 5 YR+: CPT | Mod: RT

## 2025-01-10 PROCEDURE — 93010 ELECTROCARDIOGRAM REPORT: CPT

## 2025-01-10 RX ORDER — RIVAROXABAN 2.5 MG/1
15 TABLET, FILM COATED ORAL
Refills: 0 | Status: DISCONTINUED | OUTPATIENT
Start: 2025-01-10 | End: 2025-01-17

## 2025-01-10 RX ORDER — RIVAROXABAN 2.5 MG/1
20 TABLET, FILM COATED ORAL
Refills: 0 | Status: DISCONTINUED | OUTPATIENT
Start: 2025-01-10 | End: 2025-01-10

## 2025-01-10 RX ADMIN — ALLOPURINOL 100 MILLIGRAM(S): 100 TABLET ORAL at 05:36

## 2025-01-10 RX ADMIN — RIVAROXABAN 15 MILLIGRAM(S): 2.5 TABLET, FILM COATED ORAL at 22:08

## 2025-01-10 RX ADMIN — SODIUM CHLORIDE 50 MILLILITER(S): 9 INJECTION, SOLUTION INTRAVENOUS at 05:35

## 2025-01-10 RX ADMIN — HEPARIN SODIUM 0 UNIT(S)/HR: 1000 INJECTION, SOLUTION INTRAVENOUS; SUBCUTANEOUS at 03:54

## 2025-01-10 RX ADMIN — ALLOPURINOL 100 MILLIGRAM(S): 100 TABLET ORAL at 18:16

## 2025-01-10 RX ADMIN — ATORVASTATIN CALCIUM 10 MILLIGRAM(S): 40 TABLET, FILM COATED ORAL at 22:08

## 2025-01-10 RX ADMIN — Medication 25 MILLIGRAM(S): at 18:16

## 2025-01-10 RX ADMIN — NIFEDIPINE 60 MILLIGRAM(S): 60 TABLET, EXTENDED RELEASE ORAL at 05:36

## 2025-01-10 RX ADMIN — CETIRIZINE HYDROCHLORIDE 10 MILLIGRAM(S): 5 SYRUP ORAL at 12:28

## 2025-01-10 RX ADMIN — Medication 25 MILLIGRAM(S): at 05:35

## 2025-01-10 RX ADMIN — ALLOPURINOL 100 MILLIGRAM(S): 100 TABLET ORAL at 22:08

## 2025-01-10 RX ADMIN — Medication 500 MILLIGRAM(S): at 12:27

## 2025-01-10 RX ADMIN — Medication 1 TABLET(S): at 12:28

## 2025-01-10 NOTE — PACU DISCHARGE NOTE - AIRWAY PATENCY:
6/21/2018 3:16 PM 
 
[facility] [id number] Dear Ms. Maral Munoz, We have been unable to reach you by phone to notify you of your test results. Please call our office at [phone] and ask to speak with my nurse in order to explain these results to you and advise you of any recommendations. Sincerely, Jean Martinez MD 
 Satisfactory

## 2025-01-10 NOTE — CONSULT NOTE ADULT - ASSESSMENT
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 pancreatic stone, s/p distal pancreatomy and splenectomy, sent in from oncology office for further evaluation of large B-cell lymphoma in the setting of abnormal labs.     Impression:  CAD s/p CABG  Afib on Xarelto  Newly diagnosed DLBCL    IMPRESSION:  - Would get a repeat Echo to assess EF  - Would get trop and proBNP  - Both proposed regimens (R-CHOP and DA-R EPOCH) include agents with possible myocardial toxicity  - Risk assessment is based on results above  - Will follow  69-year-old man with pmhx recent dx large B cell lymphoma with large right inguinal mass (lesion onset ~4/2024), CAD s/p CABG;), afib on Xarelto, hx ? pancreatic stone, s/p distal pancreatomy and splenectomy, sent in from oncology office for further evaluation of large B-cell lymphoma in the setting of abnormal labs.     Impression:  CAD s/p CABG  Afib on Xarelto  Newly diagnosed DLBCL    IMPRESSION:  - Would get a repeat Echo to assess EF  - Would get trop and proBNP  - Please get an EKG  - Both proposed regimens (R-CHOP and DA-R EPOCH) include agents with possible myocardial toxicity  - Will follow  69-year-old man with pmhx recent dx large B cell lymphoma with large right inguinal mass (lesion onset ~4/2024), CAD s/p CABG, afib on Xarelto, hx ? pancreatic stone, s/p distal pancreatomy and splenectomy, sent in from oncology office for further evaluation of large B-cell lymphoma in the setting of abnormal labs.     Impression:  CAD s/p CABG  Afib on Xarelto  Newly diagnosed DLBCL    Both proposed regimens (R-CHOP and DA-R EPOCH) include agents with possible myocardial toxicity.    IMPRESSION:  - Would get a repeat Echo  - Would get trop and proBNP  - Please get an EKG

## 2025-01-10 NOTE — PROGRESS NOTE ADULT - SUBJECTIVE AND OBJECTIVE BOX
PROCEDURE: Peripherally Inserted Central Catheter (PICC) placement    Procedural Personnel  Attending physician(s): Jean Pierre Escobar  Fellow physician(s): None  Resident physician(s): None  Advanced practice provider(s): None    Pre-procedure diagnosis: Lymphoma  Post-procedure diagnosis: Same  Indication: Administration of chemotherapy  Additional clinical history: None    Complications: No immediate complications.    IMPRESSION:    Insertion of right-sided single-lumen power-injectable PICC, with tip in the expected location of the cavoatrial junction.    Plan:     The catheter may be used immediately.  _______________________________________________________________    PROCEDURE SUMMARY:  - Venous access with ultrasound guidance  - PICC insertion with fluoroscopic guidance  - Additional procedure(s): None    PROCEDURE DETAILS:    Pre-procedure  Consent: Informed consent for the procedure including risks, benefits and alternatives was obtained and time-out was performed prior to the procedure.  Preparation (MIPS): The site was prepared and draped using all elements of maximal sterile barrier technique including sterile gloves, sterile gown, cap, mask, large sterile sheet, sterile ultrasound probe cover, hand hygiene and cutaneous antisepsis with 2% chlorhexidine.   Medical reason for site preparation exception (MIPS): Not applicable    Anesthesia/sedation  None. Access  Local anesthesia was administered. The vessel was sonographically evaluated and determined to be patent. Real time ultrasound was used to visualize needle entry into the vessel and a permanent image was stored.  Vein accessed: Basilic vein  Access technique: Micropuncture set with 21 gauge needle    Catheter placement  The catheter was trimmed to appropriate length and placed into the vein under fluoroscopic guidance via a peel-away sheath. Catheter tip location was fluoroscopically verified and a permanent image was stored. A sterile dressing was applied.  Catheter placed: Bard  Catheter size (Bulgarian): 5  Catheter intravascular length (cm): 41  Catheter flush: Normal saline  Catheter securement technique: Non-absorbable suture    Additional Details  Additional description of procedure: None  Equipment details: None  Specimens removed: None  Estimated blood loss (mL): Less than 10

## 2025-01-10 NOTE — CONSULT NOTE ADULT - SUBJECTIVE AND OBJECTIVE BOX
Outpatient Cardiologist:  Dr. 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 pancreatic stone, s/p distal pancreatomy and splenectomy, sent in from oncology office for further evaluation of large B-cell lymphoma in the setting of abnormal labs. Patient was recently diagnosed with Diffuse large B cell Lymphoma on pathology of right groin mass and was following his Oncologist Dr Angeli Hilliard. Today patient was seen by his oncologist in office and was referred to the ed for abnormal labs ie elevated creatinine and uric acid levels .  On further inquiry, he reported having chronic fatigue, weakness, right leg swelling > left leg swelling , poor appetite. Review of the systems is negative for headache, dizziness, loss of consciousness, chest pain, palpitations, shortness of breath, nausea, vomit, diarrhea, abdomen pain, urine frequency, urgency, extremity weakness and other significant complaints.    ED VITALS     · BP Systolic	148 mm Hg  · BP Diastolic	77 mm Hg  · Heart Rate	98 /min  · Respiration Rate (breaths/min)	18 /min  · Temp (F)	97.5 Degrees F  · Temp (C)	36.4 Degrees C  · Temp site	oral  · SpO2 (%)	97 %  · O2 Delivery/Oxygen Delivery Method	room air  · Temp at ED Arrival (C)	36.4 Degrees C    ED Labs    Hb 13, , creat 1.8, GFR 41    Imaging    Doppler venous LE : No evidence of deep venous thrombosis in either lower extremity. There is a proximal thigh/right groin mass measuring greater than 6.9 x  9.2 x 11 cm     (09 Jan 2025 14:04)      Cardiology Fellow Notes      PAST MEDICAL & SURGICAL HISTORY  Diabetes mellitus, type 2    BPH (benign prostatic hyperplasia)    Water retention    Essential hypertension    Diabetes    CAD (coronary artery disease)    H/O hypercholesterolemia    Morbid obesity    Chronic kidney disease (CKD)    History of atrial fibrillation    H/O right coronary artery stent placement    History of resection of pancreas    History of cholecystectomy    History of left knee replacement    S/P CABG x 6    H/O cardiac radiofrequency ablation        FAMILY HISTORY:  FAMILY HISTORY:  Family history of lung cancer (Sibling)    Family history of diabetes mellitus (Mother)    Family history of coronary artery disease (Father)        SOCIAL HISTORY:  Social History:      ALLERGIES:  No Known Allergies      MEDICATIONS:  allopurinol 100 milliGRAM(s) Oral three times a day  ascorbic acid 500 milliGRAM(s) Oral daily  atorvastatin 10 milliGRAM(s) Oral at bedtime  cetirizine 10 milliGRAM(s) Oral daily  heparin  Infusion.  Unit(s)/Hr (23 mL/Hr) IV Continuous <Continuous>  lactated ringers. 1000 milliLiter(s) (50 mL/Hr) IV Continuous <Continuous>  metoprolol tartrate 25 milliGRAM(s) Oral two times a day  Nephro-toni 1 Tablet(s) Oral daily  NIFEdipine XL 60 milliGRAM(s) Oral daily    PRN:  acetaminophen     Tablet .. 650 milliGRAM(s) Oral every 6 hours PRN  aluminum hydroxide/magnesium hydroxide/simethicone Suspension 30 milliLiter(s) Oral every 4 hours PRN  heparin   Injectable 56391 Unit(s) IV Push every 6 hours PRN  heparin   Injectable 5000 Unit(s) IV Push every 6 hours PRN  melatonin 3 milliGRAM(s) Oral at bedtime PRN  ondansetron Injectable 4 milliGRAM(s) IV Push every 8 hours PRN      HOME MEDICATIONS:  Home Medications:  aspirin 81 mg oral tablet: orally once a day (18 Dec 2024 07:09)  azelastine nasal: prn (18 Dec 2024 07:09)  furosemide 80 mg oral tablet: 1 tab(s) orally once a day (18 Dec 2024 07:09)  INSULIN PUMP - HUMALOG:  (18 Dec 2024 07:09)  ipratropium nasal: prn (18 Dec 2024 07:09)  levocetirizine 5 mg oral tablet: 1 tab(s) orally once a day (09 Jan 2025 16:17)  lisinopril 20 mg oral tablet: 1 tab(s) orally once a day (18 Dec 2024 07:09)  metOLazone 2.5 mg oral tablet: 1 tab(s) orally once a day (18 Dec 2024 07:09)  metoprolol tartrate 25 mg oral tablet: 1 tab(s) orally 2 times a day (18 Dec 2024 07:09)  multivitamin: once a day (18 Dec 2024 07:09)  pravastatin 40 mg oral tablet: 1 tab(s) orally once a day (18 Dec 2024 07:09)  Repatha 140 mg/mL subcutaneous solution: 140 milligram(s) subcutaneously (09 Jan 2025 16:10)  sildenafil 20 mg oral tablet: 1 tab(s) orally (09 Jan 2025 16:15)  spironolactone 25 mg oral tablet: 1 tab(s) orally once a day (18 Dec 2024 07:09)  testosterone cypionate 200 mg/mL intramuscular solution: 200 unit(s) intramuscularly once a month (09 Jan 2025 16:17)  tirzepatide: 0.5 milligram(s) subcutaneous once a week (09 Jan 2025 16:10)  Vitamin C 500 mg oral capsule: 1 cap(s) orally once a day (18 Dec 2024 07:09)  Xarelto 20 mg oral tablet: 1 tab(s) orally once a day (in the evening) (18 Dec 2024 07:09)      VITALS:   T(F): 97.6 (01-09 @ 21:43), Max: 97.6 (01-09 @ 21:43)  HR: 88 (01-09 @ 21:43) (78 - 98)  BP: 116/70 (01-09 @ 21:43) (116/70 - 148/77)  BP(mean): --  RR: 18 (01-09 @ 21:43) (18 - 18)  SpO2: 98% (01-09 @ 21:43) (97% - 98%)    I&O's Summary      REVIEW OF SYSTEMS:  CONSTITUTIONAL: No weakness, fevers or chills  HEENT: No visual changes, neck/ear pain  RESPIRATORY: No cough, sob  CARDIOVASCULAR: See HPI      PHYSICAL EXAM:  *General: Not in distress.  Non-toxic appearing.   *HEENT: EOMI  *Cardio: regular, S1, S2, no murmur  *Pulm: B/L BS.  No wheezing / crackles / rales  *Abdomen: Soft, non-tender, non-distended. Normoactive bowel sounds  *Extremities: No edema b/l le. Warm. Knee caps warm. Pulses + bilaterally. Normal capillary refill.   *Neuro: A&O x3. No focal deficits    LABS:                        13.1   9.25  )-----------( 532      ( 09 Jan 2025 08:40 )             39.9     01-09    140  |  101  |  42[H]  ----------------------------<  125[H]  4.2   |  25  |  1.8[H]    Ca    9.1      09 Jan 2025 08:40  Phos  3.8     01-09  Mg     2.2     01-09    TPro  6.4  /  Alb  3.8  /  TBili  0.2  /  DBili  x   /  AST  28  /  ALT  19  /  AlkPhos  113  01-09    PT/INR - ( 09 Jan 2025 17:05 )   PT: 12.70 sec;   INR: 1.07 ratio         PTT - ( 09 Jan 2025 17:05 )  PTT:38.0 sec          Troponin trend:    NO new trops        IMAGING:  - CXR:  None  - TTE:  3/2023: EF 72%, increased LV thickness  - CT:   clear chest       ECG:  No new EKG  last EKG 10/2024: NSR, iRBBB     TELEMETRY EVENTS:  N/A Outpatient Cardiologist:  Dr. 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 pancreatic stone, s/p distal pancreatomy and splenectomy, sent in from oncology office for further evaluation of large B-cell lymphoma in the setting of abnormal labs. Patient was recently diagnosed with Diffuse large B cell Lymphoma on pathology of right groin mass and was following his Oncologist Dr Angeli Hilliard. Today patient was seen by his oncologist in office and was referred to the ed for abnormal labs ie elevated creatinine and uric acid levels .  On further inquiry, he reported having chronic fatigue, weakness, right leg swelling > left leg swelling , poor appetite. Review of the systems is negative for headache, dizziness, loss of consciousness, chest pain, palpitations, shortness of breath, nausea, vomit, diarrhea, abdomen pain, urine frequency, urgency, extremity weakness and other significant complaints.    ED VITALS     · BP Systolic	148 mm Hg  · BP Diastolic	77 mm Hg  · Heart Rate	98 /min  · Respiration Rate (breaths/min)	18 /min  · Temp (F)	97.5 Degrees F  · Temp (C)	36.4 Degrees C  · Temp site	oral  · SpO2 (%)	97 %  · O2 Delivery/Oxygen Delivery Method	room air  · Temp at ED Arrival (C)	36.4 Degrees C    ED Labs    Hb 13, , creat 1.8, GFR 41    Imaging    Doppler venous LE : No evidence of deep venous thrombosis in either lower extremity. There is a proximal thigh/right groin mass measuring greater than 6.9 x  9.2 x 11 cm     (09 Jan 2025 14:04)      Cardiology Fellow Notes  Patient seen and examined. Reports no CP or SOB at rest or on exertion. His can not ambulate too far due to knee pain and arthritis.    PAST MEDICAL & SURGICAL HISTORY  Diabetes mellitus, type 2    BPH (benign prostatic hyperplasia)    Water retention    Essential hypertension    Diabetes    CAD (coronary artery disease)    H/O hypercholesterolemia    Morbid obesity    Chronic kidney disease (CKD)    History of atrial fibrillation    H/O right coronary artery stent placement    History of resection of pancreas    History of cholecystectomy    History of left knee replacement    S/P CABG x 6    H/O cardiac radiofrequency ablation        FAMILY HISTORY:  FAMILY HISTORY:  Family history of lung cancer (Sibling)    Family history of diabetes mellitus (Mother)    Family history of coronary artery disease (Father)        SOCIAL HISTORY:  Social History:      ALLERGIES:  No Known Allergies      MEDICATIONS:  allopurinol 100 milliGRAM(s) Oral three times a day  ascorbic acid 500 milliGRAM(s) Oral daily  atorvastatin 10 milliGRAM(s) Oral at bedtime  cetirizine 10 milliGRAM(s) Oral daily  heparin  Infusion.  Unit(s)/Hr (23 mL/Hr) IV Continuous <Continuous>  lactated ringers. 1000 milliLiter(s) (50 mL/Hr) IV Continuous <Continuous>  metoprolol tartrate 25 milliGRAM(s) Oral two times a day  Nephro-toni 1 Tablet(s) Oral daily  NIFEdipine XL 60 milliGRAM(s) Oral daily    PRN:  acetaminophen     Tablet .. 650 milliGRAM(s) Oral every 6 hours PRN  aluminum hydroxide/magnesium hydroxide/simethicone Suspension 30 milliLiter(s) Oral every 4 hours PRN  heparin   Injectable 84685 Unit(s) IV Push every 6 hours PRN  heparin   Injectable 5000 Unit(s) IV Push every 6 hours PRN  melatonin 3 milliGRAM(s) Oral at bedtime PRN  ondansetron Injectable 4 milliGRAM(s) IV Push every 8 hours PRN      HOME MEDICATIONS:  Home Medications:  aspirin 81 mg oral tablet: orally once a day (18 Dec 2024 07:09)  azelastine nasal: prn (18 Dec 2024 07:09)  furosemide 80 mg oral tablet: 1 tab(s) orally once a day (18 Dec 2024 07:09)  INSULIN PUMP - HUMALOG:  (18 Dec 2024 07:09)  ipratropium nasal: prn (18 Dec 2024 07:09)  levocetirizine 5 mg oral tablet: 1 tab(s) orally once a day (09 Jan 2025 16:17)  lisinopril 20 mg oral tablet: 1 tab(s) orally once a day (18 Dec 2024 07:09)  metOLazone 2.5 mg oral tablet: 1 tab(s) orally once a day (18 Dec 2024 07:09)  metoprolol tartrate 25 mg oral tablet: 1 tab(s) orally 2 times a day (18 Dec 2024 07:09)  multivitamin: once a day (18 Dec 2024 07:09)  pravastatin 40 mg oral tablet: 1 tab(s) orally once a day (18 Dec 2024 07:09)  Repatha 140 mg/mL subcutaneous solution: 140 milligram(s) subcutaneously (09 Jan 2025 16:10)  sildenafil 20 mg oral tablet: 1 tab(s) orally (09 Jan 2025 16:15)  spironolactone 25 mg oral tablet: 1 tab(s) orally once a day (18 Dec 2024 07:09)  testosterone cypionate 200 mg/mL intramuscular solution: 200 unit(s) intramuscularly once a month (09 Jan 2025 16:17)  tirzepatide: 0.5 milligram(s) subcutaneous once a week (09 Jan 2025 16:10)  Vitamin C 500 mg oral capsule: 1 cap(s) orally once a day (18 Dec 2024 07:09)  Xarelto 20 mg oral tablet: 1 tab(s) orally once a day (in the evening) (18 Dec 2024 07:09)      VITALS:   T(F): 97.6 (01-09 @ 21:43), Max: 97.6 (01-09 @ 21:43)  HR: 88 (01-09 @ 21:43) (78 - 98)  BP: 116/70 (01-09 @ 21:43) (116/70 - 148/77)  BP(mean): --  RR: 18 (01-09 @ 21:43) (18 - 18)  SpO2: 98% (01-09 @ 21:43) (97% - 98%)    I&O's Summary      REVIEW OF SYSTEMS:  CONSTITUTIONAL: No weakness, fevers or chills  HEENT: No visual changes, neck/ear pain  RESPIRATORY: No cough, sob  CARDIOVASCULAR: See HPI      PHYSICAL EXAM:  *General: Not in distress.  Non-toxic appearing.   *HEENT: EOMI  *Cardio: regular, S1, S2, no murmur  *Pulm: B/L BS.  No wheezing / crackles / rales  *Abdomen: Soft, non-tender, non-distended. Normoactive bowel sounds  *Extremities: No edema b/l le. Warm. Knee caps warm. Pulses + bilaterally. Normal capillary refill.   *Neuro: A&O x3. No focal deficits    LABS:                        13.1   9.25  )-----------( 532      ( 09 Jan 2025 08:40 )             39.9     01-09    140  |  101  |  42[H]  ----------------------------<  125[H]  4.2   |  25  |  1.8[H]    Ca    9.1      09 Jan 2025 08:40  Phos  3.8     01-09  Mg     2.2     01-09    TPro  6.4  /  Alb  3.8  /  TBili  0.2  /  DBili  x   /  AST  28  /  ALT  19  /  AlkPhos  113  01-09    PT/INR - ( 09 Jan 2025 17:05 )   PT: 12.70 sec;   INR: 1.07 ratio         PTT - ( 09 Jan 2025 17:05 )  PTT:38.0 sec          Troponin trend:    NO new trops        IMAGING:  - CXR:  None  - TTE:  3/2023: EF 72%, increased LV thickness  - CT:   clear chest       ECG:  No new EKG  last EKG 10/2024: NSR, iRBBB     TELEMETRY EVENTS:  N/A   HISTORY OF PRESENT ILLNESS:  69-year-old man with pmhx recent dx large B cell lymphoma with large right inguinal mass (lesion onset ~4/2024), CAD s/p CABG;), afib on Xarelto, hx ? pancreatic stone, s/p distal pancreatomy and splenectomy, sent in from oncology office for further evaluation of large B-cell lymphoma in the setting of abnormal labs. Patient was recently diagnosed with Diffuse large B cell Lymphoma on pathology of right groin mass and was following his Oncologist Dr Angeli Hilliard. Today patient was seen by his oncologist in office and was referred to the ed for abnormal labs ie elevated creatinine and uric acid levels .  On further inquiry, he reported having chronic fatigue, weakness, right leg swelling > left leg swelling , poor appetite. Review of the systems is negative for headache, dizziness, loss of consciousness, chest pain, palpitations, shortness of breath, nausea, vomit, diarrhea, abdomen pain, urine frequency, urgency, extremity weakness and other significant complaints.    ED VITALS     · BP Systolic	148 mm Hg  · BP Diastolic	77 mm Hg  · Heart Rate	98 /min  · Respiration Rate (breaths/min)	18 /min  · Temp (F)	97.5 Degrees F  · Temp (C)	36.4 Degrees C  · Temp site	oral  · SpO2 (%)	97 %  · O2 Delivery/Oxygen Delivery Method	room air  · Temp at ED Arrival (C)	36.4 Degrees C    ED Labs    Hb 13, , creat 1.8, GFR 41    Imaging    Doppler venous LE : No evidence of deep venous thrombosis in either lower extremity. There is a proximal thigh/right groin mass measuring greater than 6.9 x  9.2 x 11 cm     (09 Jan 2025 14:04)      Cardiology Fellow Notes  Patient seen and examined. Reports no CP or SOB at rest or on exertion. His can not ambulate too far due to knee pain and arthritis.      PAST MEDICAL & SURGICAL HISTORY  Diabetes mellitus, type 2    BPH (benign prostatic hyperplasia)    Water retention    Essential hypertension    Diabetes    CAD (coronary artery disease)    H/O hypercholesterolemia    Morbid obesity    Chronic kidney disease (CKD)    History of atrial fibrillation    H/O right coronary artery stent placement    History of resection of pancreas    History of cholecystectomy    History of left knee replacement    S/P CABG x 6    H/O cardiac radiofrequency ablation        FAMILY HISTORY:  Family history of lung cancer (Sibling)  Family history of diabetes mellitus (Mother)  Family history of coronary artery disease (Father)      ALLERGIES:  No Known Allergies      MEDICATIONS:  allopurinol 100 milliGRAM(s) Oral three times a day  ascorbic acid 500 milliGRAM(s) Oral daily  atorvastatin 10 milliGRAM(s) Oral at bedtime  cetirizine 10 milliGRAM(s) Oral daily  heparin  Infusion.  Unit(s)/Hr (23 mL/Hr) IV Continuous <Continuous>  lactated ringers. 1000 milliLiter(s) (50 mL/Hr) IV Continuous <Continuous>  metoprolol tartrate 25 milliGRAM(s) Oral two times a day  Nephro-toni 1 Tablet(s) Oral daily  NIFEdipine XL 60 milliGRAM(s) Oral daily    PRN:  acetaminophen     Tablet .. 650 milliGRAM(s) Oral every 6 hours PRN  aluminum hydroxide/magnesium hydroxide/simethicone Suspension 30 milliLiter(s) Oral every 4 hours PRN  heparin   Injectable 42833 Unit(s) IV Push every 6 hours PRN  heparin   Injectable 5000 Unit(s) IV Push every 6 hours PRN  melatonin 3 milliGRAM(s) Oral at bedtime PRN  ondansetron Injectable 4 milliGRAM(s) IV Push every 8 hours PRN      HOME MEDICATIONS:  Home Medications:  aspirin 81 mg oral tablet: orally once a day (18 Dec 2024 07:09)  azelastine nasal: prn (18 Dec 2024 07:09)  furosemide 80 mg oral tablet: 1 tab(s) orally once a day (18 Dec 2024 07:09)  INSULIN PUMP - HUMALOG:  (18 Dec 2024 07:09)  ipratropium nasal: prn (18 Dec 2024 07:09)  levocetirizine 5 mg oral tablet: 1 tab(s) orally once a day (09 Jan 2025 16:17)  lisinopril 20 mg oral tablet: 1 tab(s) orally once a day (18 Dec 2024 07:09)  metOLazone 2.5 mg oral tablet: 1 tab(s) orally once a day (18 Dec 2024 07:09)  metoprolol tartrate 25 mg oral tablet: 1 tab(s) orally 2 times a day (18 Dec 2024 07:09)  multivitamin: once a day (18 Dec 2024 07:09)  pravastatin 40 mg oral tablet: 1 tab(s) orally once a day (18 Dec 2024 07:09)  Repatha 140 mg/mL subcutaneous solution: 140 milligram(s) subcutaneously (09 Jan 2025 16:10)  sildenafil 20 mg oral tablet: 1 tab(s) orally (09 Jan 2025 16:15)  spironolactone 25 mg oral tablet: 1 tab(s) orally once a day (18 Dec 2024 07:09)  testosterone cypionate 200 mg/mL intramuscular solution: 200 unit(s) intramuscularly once a month (09 Jan 2025 16:17)  tirzepatide: 0.5 milligram(s) subcutaneous once a week (09 Jan 2025 16:10)  Vitamin C 500 mg oral capsule: 1 cap(s) orally once a day (18 Dec 2024 07:09)  Xarelto 20 mg oral tablet: 1 tab(s) orally once a day (in the evening) (18 Dec 2024 07:09)      VITALS:   T(F): 97.6 (01-09 @ 21:43), Max: 97.6 (01-09 @ 21:43)  HR: 88 (01-09 @ 21:43) (78 - 98)  BP: 116/70 (01-09 @ 21:43) (116/70 - 148/77)  BP(mean): --  RR: 18 (01-09 @ 21:43) (18 - 18)  SpO2: 98% (01-09 @ 21:43) (97% - 98%)      REVIEW OF SYSTEMS:  CONSTITUTIONAL: +gerneralized weakness and fatigue  NECK: No pain or stiffness  RESPIRATORY: See HPI  CARDIOVASCULAR: See HPI  GASTROINTESTINAL: No abdominal/epigastric pain, nausea, vomiting, hematemesis, diarrhea, constipation, melena or hematochezia  GENITOURINARY: No dysuria, frequency, hematuria, incontinence  NEUROLOGICAL: No headaches, memory loss, loss of strength, numbness, tremors  SKIN: No itching, burning, rashes, lesions   ENDOCRINE: No heat/cold intolerance or hair loss  MUSCULOSKELETAL: No joint pain or swelling  HEME/LYMPH: No easy bruising or bleeding gums      PHYSICAL EXAM:  *General: Not in distress.  Non-toxic appearing.   *HEENT: EOMI  *Cardio: regular, S1, S2, no murmur  *Pulm: B/L BS.  No wheezing / crackles / rales  *Abdomen: Soft, non-tender, non-distended  *Extremities: No edema b/l le  *Neuro: A&O x3      LABS:                        13.1   9.25  )-----------( 532      ( 09 Jan 2025 08:40 )             39.9     01-09    140  |  101  |  42[H]  ----------------------------<  125[H]  4.2   |  25  |  1.8[H]    Ca    9.1      09 Jan 2025 08:40  Phos  3.8     01-09  Mg     2.2     01-09    TPro  6.4  /  Alb  3.8  /  TBili  0.2  /  DBili  x   /  AST  28  /  ALT  19  /  AlkPhos  113  01-09    PT/INR - ( 09 Jan 2025 17:05 )   PT: 12.70 sec;   INR: 1.07 ratio      PTT - ( 09 Jan 2025 17:05 )  PTT:38.0 sec      IMAGING:  - CXR:  None  - TTE:  3/2023: EF 72%, increased LV thickness      ECG:  No new EKG  last EKG 10/2024: NSR, iRBBB

## 2025-01-10 NOTE — PROGRESS NOTE ADULT - SUBJECTIVE AND OBJECTIVE BOX
INTERVENTIONAL RADIOLOGY BRIEF-OPERATIVE NOTE    Procedure:  Percutaneous, CT-directed bone marrow biopsy    Pre-Op Diagnosis:  Large B-Cell Lymphoma;  Abnormal labs    Post-Op Diagnosis:  Same    Attending:  Quita (IR) / Alison (Anaesthesia)  Hem-Onc Fellow:  Elizabeth    Anesthesia (type):  [ ] General Anesthesia  [X] Sedation-- see anaesthesia record  [ ] Spinal Anesthesia  [X] Local/Regional-- 1% Lidocaine, SQ, posterior pelvis, 5 cc    Contrast:  None    Estimated Blood Loss:  3 cc    Condition:   [ ] Critical  [ ] Serious  [ ] Fair   [X] Good    Findings/Follow up Plan of Care:  Posterior left iliac bone biopsied with 11/13G, 15/19 cm [employer] bone biopsy system with CT-guidance:  18 cc marrow aspirate and a single 13G, 1 or so  cm tissue core obtained.  Patient tolerated very well, without incident.    Specimens Removed:  As above    Implants:  None    Complications:  None immediate    Disposition:  Back to floor.  Please f/u specimen labs      Please call Interventional Radiology [phone] with any questions, concerns, or issues.

## 2025-01-10 NOTE — PROGRESS NOTE ADULT - ASSESSMENT
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    #Cancer progression with possible renal involvement/ureter obstruction versus tumor lysis syndrome   - Vitally stable   - Out patient labs. WBC 9.9, hemoglobin 13.3, platelets 574,000, serum creatinine 2.0 (baseline from 1.4-1.8 range), potassium 4.6, calcium 9.2, Phosphorus 3.7, uric acid 9.1, .  - In Patient Labs: creat 1.8, uric acid 7.9, , Potassium, Phosphorus , calcium in normal ranges   - Imaging : Doppler venous LE : No evidence of deep venous thrombosis in either lower extremity. There is a proximal thigh/right groin mass measuring greater than 6.9 x  9.2 x 11 cm  Plan  - IV fluids -- LR 50ml/hr  - Trend BMP  - ECHO   - hepatitis B, hepatitis C HIV NEGATIVE )  - IR guided bone marrow biopsy today plus PICC line or chemo port  - fu G6PD levels   - Oncology follow up. Plan for in patient chemotherapy. will need Cardiology clearance for chemotherapy, order Echo/troponin/pro-bnp  - start allopurinol , adjust dose for GFR   - Avoid volume overload     #CAD s/p CABG;)  #Chronic afib on Xarelto  #HLD   - cw Pravastatin, metoprolol,   - hold lisinopril, furosemide , spironolactone, metolazone, aspirin  - hold XARELTO , switch to heparin infusion     #DM  - on insulin pump @2.85 at home . cw pump  - monitor blood sugar   - target blood sugar 140 - 180     #CKD Stage 3B  - Creatinine at baseline (1.8)  - creat 2 on 1/9 labs  - resume  lisinopril, furosemide , spironolactone, metolazone on 1/11/2025 after bone marrow biopsy   - adjust meds for gfr  - avoid volume overload       #? hx pancreatic stone, s/p distal pancreatomy and splenectomy  - outpt follow up.     #full code

## 2025-01-10 NOTE — PROVIDER CONTACT NOTE (MEDICATION) - SITUATION
Asked MD while she was passing by in [facility] if she wanted the patients heparin drip continued now that he has returned from IR. It was originally stopped at 5AM by overnight Rn as per order to stop for IR. Md said we have to wait for note before starting again??

## 2025-01-10 NOTE — PROGRESS NOTE ADULT - SUBJECTIVE AND OBJECTIVE BOX
MORGAN BUSH 69y Male  MRN#: [id number]   Hospital Day: 1d    SUBJECTIVE  Patient is a 69y old Male who presents with a chief complaint of abnormal labs (10 Nawaf 2025 01:39)  Currently admitted to medicine with the primary diagnosis of Lymphoma      INTERVAL HPI AND OVERNIGHT EVENTS:  Patient was examined and seen at bedside.     REVIEW OF SYMPTOMS:  no fever   no night sweats   upset this morning since he is NPO and waiting for procedure     OBJECTIVE  PAST MEDICAL & SURGICAL HISTORY  Diabetes mellitus, type 2    BPH (benign prostatic hyperplasia)    Water retention    Essential hypertension    Diabetes    CAD (coronary artery disease)    H/O hypercholesterolemia    Morbid obesity    Chronic kidney disease (CKD)    History of atrial fibrillation    H/O right coronary artery stent placement    History of resection of pancreas    History of cholecystectomy    History of left knee replacement    S/P CABG x 6    H/O cardiac radiofrequency ablation      ALLERGIES:  No Known Allergies    MEDICATIONS:  STANDING MEDICATIONS  allopurinol 100 milliGRAM(s) Oral three times a day  ascorbic acid 500 milliGRAM(s) Oral daily  atorvastatin 10 milliGRAM(s) Oral at bedtime  cetirizine 10 milliGRAM(s) Oral daily  heparin  Infusion.  Unit(s)/Hr IV Continuous <Continuous>  lactated ringers. 1000 milliLiter(s) IV Continuous <Continuous>  metoprolol tartrate 25 milliGRAM(s) Oral two times a day  Nephro-toni 1 Tablet(s) Oral daily  NIFEdipine XL 60 milliGRAM(s) Oral daily    PRN MEDICATIONS  acetaminophen     Tablet .. 650 milliGRAM(s) Oral every 6 hours PRN  aluminum hydroxide/magnesium hydroxide/simethicone Suspension 30 milliLiter(s) Oral every 4 hours PRN  heparin   Injectable 85127 Unit(s) IV Push every 6 hours PRN  heparin   Injectable 5000 Unit(s) IV Push every 6 hours PRN  melatonin 3 milliGRAM(s) Oral at bedtime PRN  ondansetron Injectable 4 milliGRAM(s) IV Push every 8 hours PRN      VITAL SIGNS: Last 24 Hours  T(C): 36 (10 Nawaf 2025 04:52), Max: 36.4 (09 Jan 2025 16:43)  T(F): 96.8 (10 Nawaf 2025 04:52), Max: 97.6 (09 Jan 2025 21:43)  HR: 90 (10 Nawaf 2025 04:52) (78 - 90)  BP: 104/64 (10 Nawaf 2025 04:52) (104/64 - 134/78)  BP(mean): 77 (10 Nawaf 2025 04:52) (77 - 77)  RR: 18 (10 Nawaf 2025 04:52) (18 - 18)  SpO2: 94% (10 Nawaf 2025 04:52) (94% - 98%)    LABS:                        13.0   10.96 )-----------( 525      ( 10 Nawaf 2025 02:25 )             39.3     01-09    140  |  101  |  42[H]  ----------------------------<  125[H]  4.2   |  25  |  1.8[H]    Ca    9.1      09 Jan 2025 08:40  Phos  3.8     01-09  Mg     2.2     01-09    TPro  6.4  /  Alb  3.8  /  TBili  0.2  /  DBili  x   /  AST  28  /  ALT  19  /  AlkPhos  113  01-09    PT/INR - ( 09 Jan 2025 17:05 )   PT: 12.70 sec;   INR: 1.07 ratio         PTT - ( 10 Nawaf 2025 02:25 )  PTT:149.1 sec  Urinalysis Basic - ( 09 Jan 2025 08:40 )    Color: x / Appearance: x / SG: x / pH: x  Gluc: 125 mg/dL / Ketone: x  / Bili: x / Urobili: x   Blood: x / Protein: x / Nitrite: x   Leuk Esterase: x / RBC: x / WBC x   Sq Epi: x / Non Sq Epi: x / Bacteria: x          PHYSICAL EXAM:  General: adult in NAD  CV: normal S1/S2 with no murmur rubs or gallops  Lungs: positive air movement b/l ant lungs,clear to auscultation, no wheezes, no rales  Abdomen: soft non-tender non-distended, no hepatosplenomegaly  Ext: large erythematous mass to right inguinal region   Neuro: alert and oriented X 4, no focal deficits      ASSESSMENT & PLAN:    9-year-old man with extensive medical history including CAD (status post CABG;), atrial fibrillation on Xarelto, history of distal pancreatomy and splenectomy for unclear reason referred to the ED by his oncologist for abnormal labs. Patient was recently diagnosed with Diffuse large B cell Lymphoma on pathology of right groin mass and was following his Oncologist Dr Angeli Hilliard. Today patient was seen by his oncologist in office and was referred to the ed for abnormal labs.  Today, he reported having chronic fatigue, weakness, right greater than left progressive leg swelling, poor appetite, no night sweats. Patient reported having right greater than left lower extremity swelling.    #Large B-cell Lymphoma  #Concern for Richters transformation   Patient reports increased swelling and size of R inguinal mass for the past 2-3 months.   CT pelvis on 10/12/24 showed interval increase of mass 10 x 7 x 10 cm with inguinal lymphadenopathy, suspicious for neoplastic process.  Biopsy on 12/18/24 revealed diffuse large B-cell lymphoma with concern for Rodriguez's transformation.  Patient was seen by Dr. Hilliard on 1/8/25 for initial consultation and was advised to come to ED due to concern for tumor lysis syndrome.  Uric acid level now WNL at 7.9, CMP otherwise unremarkable.   CT C/A/P 1.9.25 Stable appearance bulky right inguinal and right iliac lymphadenopathy including partially imaged 10.1 cm right inguinal soft tissue mass/lymph node.      CKD Stage 3B  baseline (1.8)    Recommendations   Planned for IR  bone marrow biopsy + PICC/ Port placement to initiate chemo   Would likely consider R-CHOP vs R-EPOCH as initial chemotherapy.  F/up Echo     MORGAN BUSH 69y Male  MRN#: [id number]   Hospital Day: 1d    SUBJECTIVE  Patient is a 69y old Male who presents with a chief complaint of abnormal labs (10 Nawaf 2025 01:39)  Currently admitted to medicine with the primary diagnosis of Lymphoma      INTERVAL HPI AND OVERNIGHT EVENTS:  Patient was examined and seen at bedside.     REVIEW OF SYMPTOMS:  no fever   no night sweats   upset this morning since he is NPO and waiting for procedure     OBJECTIVE  PAST MEDICAL & SURGICAL HISTORY  Diabetes mellitus, type 2    BPH (benign prostatic hyperplasia)    Water retention    Essential hypertension    Diabetes    CAD (coronary artery disease)    H/O hypercholesterolemia    Morbid obesity    Chronic kidney disease (CKD)    History of atrial fibrillation    H/O right coronary artery stent placement    History of resection of pancreas    History of cholecystectomy    History of left knee replacement    S/P CABG x 6    H/O cardiac radiofrequency ablation      ALLERGIES:  No Known Allergies    MEDICATIONS:  STANDING MEDICATIONS  allopurinol 100 milliGRAM(s) Oral three times a day  ascorbic acid 500 milliGRAM(s) Oral daily  atorvastatin 10 milliGRAM(s) Oral at bedtime  cetirizine 10 milliGRAM(s) Oral daily  heparin  Infusion.  Unit(s)/Hr IV Continuous <Continuous>  lactated ringers. 1000 milliLiter(s) IV Continuous <Continuous>  metoprolol tartrate 25 milliGRAM(s) Oral two times a day  Nephro-toni 1 Tablet(s) Oral daily  NIFEdipine XL 60 milliGRAM(s) Oral daily    PRN MEDICATIONS  acetaminophen     Tablet .. 650 milliGRAM(s) Oral every 6 hours PRN  aluminum hydroxide/magnesium hydroxide/simethicone Suspension 30 milliLiter(s) Oral every 4 hours PRN  heparin   Injectable 91801 Unit(s) IV Push every 6 hours PRN  heparin   Injectable 5000 Unit(s) IV Push every 6 hours PRN  melatonin 3 milliGRAM(s) Oral at bedtime PRN  ondansetron Injectable 4 milliGRAM(s) IV Push every 8 hours PRN      VITAL SIGNS: Last 24 Hours  T(C): 36 (10 Nawaf 2025 04:52), Max: 36.4 (09 Jan 2025 16:43)  T(F): 96.8 (10 Nawaf 2025 04:52), Max: 97.6 (09 Jan 2025 21:43)  HR: 90 (10 Nawaf 2025 04:52) (78 - 90)  BP: 104/64 (10 Nawaf 2025 04:52) (104/64 - 134/78)  BP(mean): 77 (10 Nawaf 2025 04:52) (77 - 77)  RR: 18 (10 Nawaf 2025 04:52) (18 - 18)  SpO2: 94% (10 Nawaf 2025 04:52) (94% - 98%)    LABS:                        13.0   10.96 )-----------( 525      ( 10 Nawaf 2025 02:25 )             39.3     01-09    140  |  101  |  42[H]  ----------------------------<  125[H]  4.2   |  25  |  1.8[H]    Ca    9.1      09 Jan 2025 08:40  Phos  3.8     01-09  Mg     2.2     01-09    TPro  6.4  /  Alb  3.8  /  TBili  0.2  /  DBili  x   /  AST  28  /  ALT  19  /  AlkPhos  113  01-09    PT/INR - ( 09 Jan 2025 17:05 )   PT: 12.70 sec;   INR: 1.07 ratio         PTT - ( 10 Nawaf 2025 02:25 )  PTT:149.1 sec  Urinalysis Basic - ( 09 Jan 2025 08:40 )    Color: x / Appearance: x / SG: x / pH: x  Gluc: 125 mg/dL / Ketone: x  / Bili: x / Urobili: x   Blood: x / Protein: x / Nitrite: x   Leuk Esterase: x / RBC: x / WBC x   Sq Epi: x / Non Sq Epi: x / Bacteria: x          PHYSICAL EXAM:  General: adult in NAD  CV: normal S1/S2 with no murmur rubs or gallops  Lungs: positive air movement b/l ant lungs,clear to auscultation, no wheezes, no rales  Abdomen: soft non-tender non-distended, no hepatosplenomegaly  Ext: large erythematous mass to right inguinal region   Neuro: alert and oriented X 4, no focal deficits      ASSESSMENT & PLAN:    9-year-old man with extensive medical history including CAD (status post CABG;), atrial fibrillation on Xarelto, history of distal pancreatomy and splenectomy for unclear reason referred to the ED by his oncologist for abnormal labs. Patient was recently diagnosed with Diffuse large B cell Lymphoma on pathology of right groin mass and was following his Oncologist Dr Angeli Hilliard. Today patient was seen by his oncologist in office and was referred to the ed for abnormal labs.  Today, he reported having chronic fatigue, weakness, right greater than left progressive leg swelling, poor appetite, no night sweats. Patient reported having right greater than left lower extremity swelling.    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   CT pelvis on 10/12/24 showed interval increase of mass 10 x 7 x 10 cm with inguinal lymphadenopathy, suspicious for neoplastic process.  Biopsy on 12/18/24 revealed diffuse large B-cell lymphoma with concern for Rodriguez's transformation.  Patient was seen by Dr. Hilliard on 1/8/25 for initial consultation and was advised to come to ED due to concern for tumor lysis syndrome.  Uric acid level now WNL at 7.9, CMP otherwise unremarkable.   CT C/A/P 1.9.25 Stable appearance bulky right inguinal and right iliac lymphadenopathy including partially imaged 10.1 cm right inguinal soft tissue mass/lymph node.      CKD Stage 3B  baseline (1.8)    Recommendations   Planned for IR  bone marrow biopsy + PICC/ Port placement to initiate chemo   Would plan for R-EPOCH as initial chemotherapy tomorrow once IV access obtained and BM biopsy done and echo results are available   F/up Echo  C/w allopurinol renally dosed  C/w IV fluids   Consult ID for prophylaxis during chemotherapy   Daily labs including cbc, bmp, LFTS, Uric acid, phosphorus. Please make sure labs are done daily on time, high risk for TLS when chemotherapy is initiated due to bulky lymphadenopathy        MORGAN BUSH 69y Male  MRN#: [id number]   Hospital Day: 1d    SUBJECTIVE  Patient is a 69y old Male who presents with a chief complaint of abnormal labs (10 Nawaf 2025 01:39)  Currently admitted to medicine with the primary diagnosis of Lymphoma      INTERVAL HPI AND OVERNIGHT EVENTS:  Patient was examined and seen at bedside.     REVIEW OF SYMPTOMS:  no fever   no night sweats   upset this morning since he is NPO and waiting for procedure     OBJECTIVE  PAST MEDICAL & SURGICAL HISTORY  Diabetes mellitus, type 2    BPH (benign prostatic hyperplasia)    Water retention    Essential hypertension    Diabetes    CAD (coronary artery disease)    H/O hypercholesterolemia    Morbid obesity    Chronic kidney disease (CKD)    History of atrial fibrillation    H/O right coronary artery stent placement    History of resection of pancreas    History of cholecystectomy    History of left knee replacement    S/P CABG x 6    H/O cardiac radiofrequency ablation      ALLERGIES:  No Known Allergies    MEDICATIONS:  STANDING MEDICATIONS  allopurinol 100 milliGRAM(s) Oral three times a day  ascorbic acid 500 milliGRAM(s) Oral daily  atorvastatin 10 milliGRAM(s) Oral at bedtime  cetirizine 10 milliGRAM(s) Oral daily  heparin  Infusion.  Unit(s)/Hr IV Continuous <Continuous>  lactated ringers. 1000 milliLiter(s) IV Continuous <Continuous>  metoprolol tartrate 25 milliGRAM(s) Oral two times a day  Nephro-toni 1 Tablet(s) Oral daily  NIFEdipine XL 60 milliGRAM(s) Oral daily    PRN MEDICATIONS  acetaminophen     Tablet .. 650 milliGRAM(s) Oral every 6 hours PRN  aluminum hydroxide/magnesium hydroxide/simethicone Suspension 30 milliLiter(s) Oral every 4 hours PRN  heparin   Injectable 87152 Unit(s) IV Push every 6 hours PRN  heparin   Injectable 5000 Unit(s) IV Push every 6 hours PRN  melatonin 3 milliGRAM(s) Oral at bedtime PRN  ondansetron Injectable 4 milliGRAM(s) IV Push every 8 hours PRN      VITAL SIGNS: Last 24 Hours  T(C): 36 (10 Nawaf 2025 04:52), Max: 36.4 (09 Jan 2025 16:43)  T(F): 96.8 (10 Nawaf 2025 04:52), Max: 97.6 (09 Jan 2025 21:43)  HR: 90 (10 Nawaf 2025 04:52) (78 - 90)  BP: 104/64 (10 Nawaf 2025 04:52) (104/64 - 134/78)  BP(mean): 77 (10 Nawaf 2025 04:52) (77 - 77)  RR: 18 (10 Nawaf 2025 04:52) (18 - 18)  SpO2: 94% (10 Nawaf 2025 04:52) (94% - 98%)    LABS:                        13.0   10.96 )-----------( 525      ( 10 Nawaf 2025 02:25 )             39.3     01-09    140  |  101  |  42[H]  ----------------------------<  125[H]  4.2   |  25  |  1.8[H]    Ca    9.1      09 Jan 2025 08:40  Phos  3.8     01-09  Mg     2.2     01-09    TPro  6.4  /  Alb  3.8  /  TBili  0.2  /  DBili  x   /  AST  28  /  ALT  19  /  AlkPhos  113  01-09    PT/INR - ( 09 Jan 2025 17:05 )   PT: 12.70 sec;   INR: 1.07 ratio         PTT - ( 10 Nawaf 2025 02:25 )  PTT:149.1 sec  Urinalysis Basic - ( 09 Jan 2025 08:40 )    Color: x / Appearance: x / SG: x / pH: x  Gluc: 125 mg/dL / Ketone: x  / Bili: x / Urobili: x   Blood: x / Protein: x / Nitrite: x   Leuk Esterase: x / RBC: x / WBC x   Sq Epi: x / Non Sq Epi: x / Bacteria: x          PHYSICAL EXAM:  General: adult in NAD  CV: normal S1/S2 with no murmur rubs or gallops  Lungs: positive air movement b/l ant lungs,clear to auscultation, no wheezes, no rales  Abdomen: soft non-tender non-distended, no hepatosplenomegaly  Ext: large erythematous mass to right inguinal region   Neuro: alert and oriented X 4, no focal deficits      ASSESSMENT & PLAN:    9-year-old man with extensive medical history including CAD (status post CABG;), atrial fibrillation on Xarelto, history of distal pancreatomy and splenectomy for unclear reason referred to the ED by his oncologist for abnormal labs. Patient was recently diagnosed with Diffuse large B cell Lymphoma on pathology of right groin mass and was following his Oncologist Dr Angeli Hilliard. Today patient was seen by his oncologist in office and was referred to the ed for abnormal labs.  Today, he reported having chronic fatigue, weakness, right greater than left progressive leg swelling, poor appetite, no night sweats. Patient reported having right greater than left lower extremity swelling.    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   CT pelvis on 10/12/24 showed interval increase of mass 10 x 7 x 10 cm with inguinal lymphadenopathy, suspicious for neoplastic process.  Biopsy on 12/18/24 revealed diffuse large B-cell lymphoma with concern for Rodriguez's transformation.  Patient was seen by Dr. Hilliard on 1/8/25 for initial consultation and was advised to come to ED due to concern for tumor lysis syndrome.  Uric acid level now WNL at 7.9, CMP otherwise unremarkable.   CT C/A/P 1.9.25 Stable appearance bulky right inguinal and right iliac lymphadenopathy including partially imaged 10.1 cm right inguinal soft tissue mass/lymph node.      CKD Stage 3B  baseline (1.8)    Recommendations   Planned for IR  bone marrow biopsy + PICC/ Port placement to initiate chemo   Would plan for R-EPOCH as initial chemotherapy tomorrow once IV access obtained and BM biopsy done and echo results are available   F/up Echo  C/w allopurinol renally dosed  C/w IV fluids   Daily labs including cbc, bmp, LFTS, Uric acid, phosphorus. Please make sure labs are done daily on time, high risk for TLS when chemotherapy is initiated due to bulky lymphadenopathy

## 2025-01-10 NOTE — PROGRESS NOTE ADULT - SUBJECTIVE AND OBJECTIVE BOX
SUBJECTIVE / OVERNIGHT EVENTS  Patient was seen and examined. No overnight events. npo for bone marrow biopsy    MEDICATIONS  allopurinol 100 milliGRAM(s) Oral three times a day  ascorbic acid 500 milliGRAM(s) Oral daily  atorvastatin 10 milliGRAM(s) Oral at bedtime  cetirizine 10 milliGRAM(s) Oral daily  heparin  Infusion.  Unit(s)/Hr IV Continuous <Continuous>  lactated ringers. 1000 milliLiter(s) IV Continuous <Continuous>  metoprolol tartrate 25 milliGRAM(s) Oral two times a day  Nephro-toni 1 Tablet(s) Oral daily  NIFEdipine XL 60 milliGRAM(s) Oral daily    acetaminophen     Tablet .. 650 milliGRAM(s) Oral every 6 hours PRN Temp greater or equal to 38C (100.4F), Mild Pain (1 - 3)  aluminum hydroxide/magnesium hydroxide/simethicone Suspension 30 milliLiter(s) Oral every 4 hours PRN Dyspepsia  heparin   Injectable 18912 Unit(s) IV Push every 6 hours PRN For aPTT less than 40  heparin   Injectable 5000 Unit(s) IV Push every 6 hours PRN For aPTT between 40 - 57  melatonin 3 milliGRAM(s) Oral at bedtime PRN Insomnia  ondansetron Injectable 4 milliGRAM(s) IV Push every 8 hours PRN Nausea and/or Vomiting    VITALS /  EXAM    T(C): 36 (01-10-25 @ 04:52), Max: 36.4 (01-09-25 @ 16:43)  HR: 90 (01-10-25 @ 04:52) (78 - 90)  BP: 104/64 (01-10-25 @ 04:52) (104/64 - 134/78)  RR: 18 (01-10-25 @ 04:52) (18 - 18)  SpO2: 94% (01-10-25 @ 04:52) (94% - 98%)  POCT Blood Glucose.: 98 mg/dL (01-10-25 @ 11:30)  POCT Blood Glucose.: 143 mg/dL (01-10-25 @ 07:55)  POCT Blood Glucose.: 101 mg/dL (01-10-25 @ 06:13)    GENERAL: NAD, well-developed  CHEST/LUNG: Clear to auscultation bilaterally; No wheezes, rales or rhonchi  HEART: Regular rate and rhythm; No murmurs, rubs, or gallops  ABDOMEN: Soft, Nontender, Nondistended; Bowel sounds present, no masses.  EXTREMITIES:  2+ Peripheral Pulses, No clubbing, cyanosis, or edema    I's & O's     LABS             13.2   9.67  )-----------( 522      ( 01-10-25 @ 11:34 )             41.0     138  |  98  |  46  -------------------------<  95   01-10-25 @ 11:34  4.8  |  29  |  1.9    Ca      9.1     01-10-25 @ 11:34  Phos   4.0     01-10-25 @ 11:34  Mg     2.2     01-10-25 @ 11:34    TPro  6.5  /  Alb  3.9  /  TBili  0.4  /  DBili  x   /  AST  25  /  ALT  19  /  AlkPhos  107  /  GGT  x     01-10-25 @ 11:34    PT/INR - ( 01-09-25 @ 17:05 )   PT: 12.70 sec;   INR: 1.07 ratio  PTT - ( 01-10-25 @ 02:25 )  PTT:149.1 sec    Troponin T, High Sensitivity Result: 63 ng/L (01-10-25 @ 11:34)                Urinalysis Basic - ( 10 Nawaf 2025 11:34 )    Color: x / Appearance: x / SG: x / pH: x  Gluc: 95 mg/dL / Ketone: x  / Bili: x / Urobili: x   Blood: x / Protein: x / Nitrite: x   Leuk Esterase: x / RBC: x / WBC x   Sq Epi: x / Non Sq Epi: x / Bacteria: x      MICRO / IMAGING / CARDIOLOGY  Telemetry: Reviewed   EKG: Reviewed    CULTURES    IMAGING  PACS Image:  (01-09-25 @ 17:30)  PACS Image:  (01-09-25 @ 17:30)  PACS Image:  (01-09-25 @ 09:12)    CARDIOLOGY

## 2025-01-11 LAB
ALBUMIN SERPL ELPH-MCNC: 3.6 G/DL — SIGNIFICANT CHANGE UP (ref 3.5–5.2)
ALP SERPL-CCNC: 105 U/L — SIGNIFICANT CHANGE UP (ref 30–115)
ALT FLD-CCNC: 17 U/L — SIGNIFICANT CHANGE UP (ref 0–41)
ANION GAP SERPL CALC-SCNC: 13 MMOL/L — SIGNIFICANT CHANGE UP (ref 7–14)
AST SERPL-CCNC: 24 U/L — SIGNIFICANT CHANGE UP (ref 0–41)
BASOPHILS # BLD AUTO: 0.22 K/UL — HIGH (ref 0–0.2)
BASOPHILS NFR BLD AUTO: 2.3 % — HIGH (ref 0–1)
BILIRUB SERPL-MCNC: 0.3 MG/DL — SIGNIFICANT CHANGE UP (ref 0.2–1.2)
BUN SERPL-MCNC: 52 MG/DL — HIGH (ref 10–20)
CALCIUM SERPL-MCNC: 8.6 MG/DL — SIGNIFICANT CHANGE UP (ref 8.4–10.5)
CHLORIDE SERPL-SCNC: 100 MMOL/L — SIGNIFICANT CHANGE UP (ref 98–110)
CO2 SERPL-SCNC: 23 MMOL/L — SIGNIFICANT CHANGE UP (ref 17–32)
CREAT SERPL-MCNC: 2.1 MG/DL — HIGH (ref 0.7–1.5)
EGFR: 34 ML/MIN/1.73M2 — LOW
EOSINOPHIL # BLD AUTO: 0.56 K/UL — SIGNIFICANT CHANGE UP (ref 0–0.7)
EOSINOPHIL NFR BLD AUTO: 5.8 % — SIGNIFICANT CHANGE UP (ref 0–8)
GLUCOSE SERPL-MCNC: 84 MG/DL — SIGNIFICANT CHANGE UP (ref 70–99)
HCT VFR BLD CALC: 38 % — LOW (ref 42–52)
HGB BLD-MCNC: 12.2 G/DL — LOW (ref 14–18)
IMM GRANULOCYTES NFR BLD AUTO: 0.8 % — HIGH (ref 0.1–0.3)
LDH SERPL L TO P-CCNC: 276 U/L — HIGH (ref 50–242)
LYMPHOCYTES # BLD AUTO: 2.25 K/UL — SIGNIFICANT CHANGE UP (ref 1.2–3.4)
LYMPHOCYTES # BLD AUTO: 23.1 % — SIGNIFICANT CHANGE UP (ref 20.5–51.1)
MAGNESIUM SERPL-MCNC: 2.1 MG/DL — SIGNIFICANT CHANGE UP (ref 1.8–2.4)
MCHC RBC-ENTMCNC: 31 PG — SIGNIFICANT CHANGE UP (ref 27–31)
MCHC RBC-ENTMCNC: 32.1 G/DL — SIGNIFICANT CHANGE UP (ref 32–37)
MCV RBC AUTO: 96.4 FL — HIGH (ref 80–94)
MONOCYTES # BLD AUTO: 1.4 K/UL — HIGH (ref 0.1–0.6)
MONOCYTES NFR BLD AUTO: 14.4 % — HIGH (ref 1.7–9.3)
NEUTROPHILS # BLD AUTO: 5.22 K/UL — SIGNIFICANT CHANGE UP (ref 1.4–6.5)
NEUTROPHILS NFR BLD AUTO: 53.6 % — SIGNIFICANT CHANGE UP (ref 42.2–75.2)
NRBC # BLD: 0 /100 WBCS — SIGNIFICANT CHANGE UP (ref 0–0)
PHOSPHATE SERPL-MCNC: 3.8 MG/DL — SIGNIFICANT CHANGE UP (ref 2.1–4.9)
PLATELET # BLD AUTO: 467 K/UL — HIGH (ref 130–400)
PMV BLD: 10 FL — SIGNIFICANT CHANGE UP (ref 7.4–10.4)
POTASSIUM SERPL-MCNC: 5 MMOL/L — SIGNIFICANT CHANGE UP (ref 3.5–5)
POTASSIUM SERPL-SCNC: 5 MMOL/L — SIGNIFICANT CHANGE UP (ref 3.5–5)
PROT SERPL-MCNC: 6.1 G/DL — SIGNIFICANT CHANGE UP (ref 6–8)
RBC # BLD: 3.94 M/UL — LOW (ref 4.7–6.1)
RBC # FLD: 13.4 % — SIGNIFICANT CHANGE UP (ref 11.5–14.5)
SODIUM SERPL-SCNC: 136 MMOL/L — SIGNIFICANT CHANGE UP (ref 135–146)
URATE SERPL-MCNC: 8.4 MG/DL — SIGNIFICANT CHANGE UP (ref 3.4–8.8)
WBC # BLD: 9.73 K/UL — SIGNIFICANT CHANGE UP (ref 4.8–10.8)
WBC # FLD AUTO: 9.73 K/UL — SIGNIFICANT CHANGE UP (ref 4.8–10.8)

## 2025-01-11 PROCEDURE — 99232 SBSQ HOSP IP/OBS MODERATE 35: CPT

## 2025-01-11 RX ORDER — ACETAMINOPHEN 80 MG/.8ML
650 SOLUTION/ DROPS ORAL ONCE
Refills: 0 | Status: COMPLETED | OUTPATIENT
Start: 2025-01-16 | End: 2025-01-16

## 2025-01-11 RX ORDER — DEXAMETHASONE SODIUM PHOSPHATE 4 MG/ML
8 VIAL (ML) INJECTION ONCE
Refills: 0 | Status: COMPLETED | OUTPATIENT
Start: 2025-01-11 | End: 2025-01-11

## 2025-01-11 RX ORDER — ASPIRIN 81 MG
81 TABLET, DELAYED RELEASE (ENTERIC COATED) ORAL DAILY
Refills: 0 | Status: DISCONTINUED | OUTPATIENT
Start: 2025-01-11 | End: 2025-01-17

## 2025-01-11 RX ORDER — DOXORUBICIN HYDROCHLORIDE 2 MG/ML
24 INJECTION, SOLUTION INTRAVENOUS EVERY 24 HOURS
Refills: 0 | Status: COMPLETED | OUTPATIENT
Start: 2025-01-11 | End: 2025-01-15

## 2025-01-11 RX ORDER — ONDANSETRON 4 MG/1
16 TABLET ORAL ONCE
Refills: 0 | Status: COMPLETED | OUTPATIENT
Start: 2025-01-15 | End: 2025-01-16

## 2025-01-11 RX ORDER — PREDNISONE 5 MG
145 TABLET ORAL EVERY 12 HOURS
Refills: 0 | Status: COMPLETED | OUTPATIENT
Start: 2025-01-11 | End: 2025-01-16

## 2025-01-11 RX ORDER — DIPHENHYDRAMINE HCL 25 MG
25 TABLET ORAL ONCE
Refills: 0 | Status: COMPLETED | OUTPATIENT
Start: 2025-01-16 | End: 2025-01-16

## 2025-01-11 RX ORDER — FOSAPREPITANT DIMEGLUMINE 150 MG/5ML
150 INJECTION, POWDER, LYOPHILIZED, FOR SOLUTION INTRAVENOUS ONCE
Refills: 0 | Status: COMPLETED | OUTPATIENT
Start: 2025-01-11 | End: 2025-01-11

## 2025-01-11 RX ORDER — PALONOSETRON HYDROCHLORIDE 0.25 MG/5ML
0.25 INJECTION INTRAVENOUS ONCE
Refills: 0 | Status: COMPLETED | OUTPATIENT
Start: 2025-01-11 | End: 2025-01-11

## 2025-01-11 RX ADMIN — ALLOPURINOL 100 MILLIGRAM(S): 100 TABLET ORAL at 06:19

## 2025-01-11 RX ADMIN — NIFEDIPINE 60 MILLIGRAM(S): 60 TABLET, EXTENDED RELEASE ORAL at 06:20

## 2025-01-11 RX ADMIN — PALONOSETRON HYDROCHLORIDE 0.25 MILLIGRAM(S): 0.25 INJECTION INTRAVENOUS at 15:12

## 2025-01-11 RX ADMIN — Medication 25 MILLIGRAM(S): at 06:19

## 2025-01-11 RX ADMIN — CETIRIZINE HYDROCHLORIDE 10 MILLIGRAM(S): 5 SYRUP ORAL at 12:43

## 2025-01-11 RX ADMIN — Medication 81 MILLIGRAM(S): at 14:37

## 2025-01-11 RX ADMIN — Medication 1 TABLET(S): at 12:44

## 2025-01-11 RX ADMIN — FOSAPREPITANT DIMEGLUMINE 500 MILLIGRAM(S): 150 INJECTION, POWDER, LYOPHILIZED, FOR SOLUTION INTRAVENOUS at 15:14

## 2025-01-11 RX ADMIN — Medication 25 MILLIGRAM(S): at 17:55

## 2025-01-11 RX ADMIN — ATORVASTATIN CALCIUM 10 MILLIGRAM(S): 40 TABLET, FILM COATED ORAL at 22:45

## 2025-01-11 RX ADMIN — Medication 100 MILLIGRAM(S): at 15:14

## 2025-01-11 RX ADMIN — Medication 145 MILLIGRAM(S): at 17:55

## 2025-01-11 RX ADMIN — RIVAROXABAN 15 MILLIGRAM(S): 2.5 TABLET, FILM COATED ORAL at 17:55

## 2025-01-11 RX ADMIN — Medication 500 MILLIGRAM(S): at 12:43

## 2025-01-11 RX ADMIN — ALLOPURINOL 100 MILLIGRAM(S): 100 TABLET ORAL at 22:45

## 2025-01-11 RX ADMIN — DOXORUBICIN HYDROCHLORIDE 24 MILLIGRAM(S): 2 INJECTION, SOLUTION INTRAVENOUS at 15:58

## 2025-01-11 RX ADMIN — ALLOPURINOL 100 MILLIGRAM(S): 100 TABLET ORAL at 14:37

## 2025-01-11 NOTE — PROGRESS NOTE ADULT - SUBJECTIVE AND OBJECTIVE BOX
SUBJECTIVE:    Patient is a 69y old Male who presents with a chief complaint of Abnormal labs (10 Nawaf 2025 17:01)    Currently admitted to medicine with the primary diagnosis of Lymphoma       Today is hospital day 2d. This morning he is resting comfortably in bed and reports no new issues or overnight events.     PAST MEDICAL & SURGICAL HISTORY  Diabetes mellitus, type 2    BPH (benign prostatic hyperplasia)    Water retention    Essential hypertension    Diabetes    CAD (coronary artery disease)    H/O hypercholesterolemia    Morbid obesity    Chronic kidney disease (CKD)    History of atrial fibrillation    H/O right coronary artery stent placement    History of resection of pancreas    History of cholecystectomy    History of left knee replacement    S/P CABG x 6    H/O cardiac radiofrequency ablation      SOCIAL HISTORY:  Negative for smoking/alcohol/drug use.     ALLERGIES:  No Known Allergies    MEDICATIONS:  STANDING MEDICATIONS  allopurinol 100 milliGRAM(s) Oral three times a day  ascorbic acid 500 milliGRAM(s) Oral daily  atorvastatin 10 milliGRAM(s) Oral at bedtime  cetirizine 10 milliGRAM(s) Oral daily  lactated ringers. 1000 milliLiter(s) IV Continuous <Continuous>  metoprolol tartrate 25 milliGRAM(s) Oral two times a day  Nephro-toni 1 Tablet(s) Oral daily  NIFEdipine XL 60 milliGRAM(s) Oral daily  rivaroxaban 15 milliGRAM(s) Oral with dinner    PRN MEDICATIONS  acetaminophen     Tablet .. 650 milliGRAM(s) Oral every 6 hours PRN  aluminum hydroxide/magnesium hydroxide/simethicone Suspension 30 milliLiter(s) Oral every 4 hours PRN  melatonin 3 milliGRAM(s) Oral at bedtime PRN  ondansetron Injectable 4 milliGRAM(s) IV Push every 8 hours PRN    VITALS:   T(F): 97.5  HR: 84  BP: 116/72  RR: 18  SpO2: 96%    LABS:                        12.2   9.73  )-----------( 467      ( 11 Jan 2025 07:58 )             38.0     01-11    136  |  100  |  52[H]  ----------------------------<  84  5.0   |  23  |  2.1[H]    Ca    8.6      11 Jan 2025 07:58  Phos  3.8     01-11  Mg     2.1     01-11    TPro  6.1  /  Alb  3.6  /  TBili  0.3  /  DBili  x   /  AST  24  /  ALT  17  /  AlkPhos  105  01-11    PT/INR - ( 09 Jan 2025 17:05 )   PT: 12.70 sec;   INR: 1.07 ratio         PTT - ( 10 Nawaf 2025 02:25 )  PTT:149.1 sec  Urinalysis Basic - ( 11 Jan 2025 07:58 )    Color: x / Appearance: x / SG: x / pH: x  Gluc: 84 mg/dL / Ketone: x  / Bili: x / Urobili: x   Blood: x / Protein: x / Nitrite: x   Leuk Esterase: x / RBC: x / WBC x   Sq Epi: x / Non Sq Epi: x / Bacteria: x                RADIOLOGY:    PHYSICAL EXAM:  GEN: No acute distress  LUNGS: Clear to auscultation bilaterally   HEART: Regular  ABD: Soft, non-tender, non-distended.  EXT: NC/NC/NE/2+PP/LUJAN/Skin Intact.   NEURO: AAOX3    Intravenous access:   NG tube:   Ojeda Catheter:

## 2025-01-11 NOTE — PROGRESS NOTE ADULT - ASSESSMENT
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    #Cancer progression with possible renal involvement/ureter obstruction versus tumor lysis syndrome   - Vitally stable   - Out patient labs. WBC 9.9, hemoglobin 13.3, platelets 574,000, serum creatinine 2.0 (baseline from 1.4-1.8 range), potassium 4.6, calcium 9.2, Phosphorus 3.7, uric acid 9.1, .  - In Patient Labs: creat 1.8, uric acid 7.9, , Potassium, Phosphorus , calcium in normal ranges   - Imaging : Doppler venous LE : No evidence of deep venous thrombosis in either lower extremity. There is a proximal thigh/right groin mass measuring greater than 6.9 x  9.2 x 11 cm  Plan  - IV fluids -- LR 50ml/hr  - Trend BMP  - ECHO w/ normal LVEF  - hepatitis B, hepatitis C HIV NEGATIVE )  - IR guided bone marrow biopsy today plus PICC line or chemo port  - fu G6PD levels   - Oncology follow up. Plan for in patient chemotherapy.  - start allopurinol , adjust dose for GFR   - Avoid volume overload     #CAD s/p CABG;)  #Chronic afib on Xarelto  #HLD   - cw Pravastatin, metoprolol,   - lisinopril, furosemide , spironolactone, metolazone - have been held, cont holding d/t soft bp  - Cont XARELTO     #DM  - on insulin pump @2.85 at home . cw pump  - monitor blood sugar   - target blood sugar 140 - 180     #CKD Stage 3B  - Creatinine at baseline (1.8)  - creat 2 on 1/9 labs  - hold lisinopril, furosemide , spironolactone, metolazone d/t soft bp  - adjust meds for gfr  - avoid volume overload       #? hx pancreatic stone, s/p distal pancreatomy and splenectomy  - outpt follow up.     #full code

## 2025-01-11 NOTE — PROGRESS NOTE ADULT - SUBJECTIVE AND OBJECTIVE BOX
Patient seen and examined. Events over the last 24 hours noted.   Pt seen sitting up in chair, denies any complaints, wants to start chemo    T(F): 97.5 (01-11-25 @ 05:00), Max: 99.1 (01-10-25 @ 15:39)  HR: 84 (01-11-25 @ 05:00)  BP: 116/72 (01-11-25 @ 05:00)  RR: 18 (01-11-25 @ 05:00)  SpO2: 96% (01-10-25 @ 20:20) (96% - 99%)    PHYSICAL EXAM:  GEN: No acute distress  HEENT: normocephalic, atraumatic, anicteric  LUNGS: b/l breath sounds present, clear to auscultation bilaterally   HEART: S1/S2 present. RRR  ABD: Soft, non-tender, non-distended. Bowel sounds present  EXT: warm   NEURO: awake, no new focal deficits    LABS  01-11    136  |  100  |  52[H]  ----------------------------<  84  5.0   |  23  |  2.1[H]    Ca    8.6      11 Jan 2025 07:58  Phos  3.8     01-11  Mg     2.1     01-11    TPro  6.1  /  Alb  3.6  /  TBili  0.3  /  DBili  x   /  AST  24  /  ALT  17  /  AlkPhos  105  01-11                          12.2   9.73  )-----------( 467      ( 11 Jan 2025 07:58 )             38.0     PT/INR - ( 09 Jan 2025 17:05 )   PT: 12.70 sec;   INR: 1.07 ratio         PTT - ( 10 Nawaf 2025 02:25 )  PTT:149.1 sec       MEDICATIONS  (STANDING):  allopurinol 100 milliGRAM(s) Oral three times a day  ascorbic acid 500 milliGRAM(s) Oral daily  atorvastatin 10 milliGRAM(s) Oral at bedtime  cetirizine 10 milliGRAM(s) Oral daily  lactated ringers. 1000 milliLiter(s) (50 mL/Hr) IV Continuous <Continuous>  metoprolol tartrate 25 milliGRAM(s) Oral two times a day  Nephro-toni 1 Tablet(s) Oral daily  NIFEdipine XL 60 milliGRAM(s) Oral daily  rivaroxaban 15 milliGRAM(s) Oral with dinner    MEDICATIONS  (PRN):  acetaminophen     Tablet .. 650 milliGRAM(s) Oral every 6 hours PRN Temp greater or equal to 38C (100.4F), Mild Pain (1 - 3)  aluminum hydroxide/magnesium hydroxide/simethicone Suspension 30 milliLiter(s) Oral every 4 hours PRN Dyspepsia  melatonin 3 milliGRAM(s) Oral at bedtime PRN Insomnia  ondansetron Injectable 4 milliGRAM(s) IV Push every 8 hours PRN Nausea and/or Vomiting

## 2025-01-11 NOTE — PROGRESS NOTE ADULT - ASSESSMENT
9-year-old man with extensive medical history including CAD (status post CABG;), atrial fibrillation on Xarelto, history of distal pancreatomy and splenectomy for unclear reason referred to the ED by his oncologist for abnormal labs. Patient was recently diagnosed with Diffuse large B cell Lymphoma on pathology of right groin mass and was following his Oncologist Dr Angeli Hilliard. Today patient was seen by his oncologist in office and was referred to the ed for abnormal labs.  Today, he reported having chronic fatigue, weakness, right greater than left progressive leg swelling, poor appetite, no night sweats. Patient reported having right greater than left lower extremity swelling.    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   CT pelvis on 10/12/24 showed interval increase of mass 10 x 7 x 10 cm with inguinal lymphadenopathy, suspicious for neoplastic process.  Biopsy on 12/18/24 revealed diffuse large B-cell lymphoma with concern for Rodriguez's transformation.  Patient was seen by Dr. Hilliard on 1/8/25 for initial consultation and was advised to come to ED due to concern for tumor lysis syndrome.  Uric acid level now WNL at 7.9, CMP otherwise unremarkable.   CT C/A/P 1.9.25 Stable appearance bulky right inguinal and right iliac lymphadenopathy including partially imaged 10.1 cm right inguinal soft tissue mass/lymph node.      CKD Stage 3B  baseline (1.8)    Recommendations   Planned for IR  bone marrow biopsy + PICC/ Port placement to initiate chemo   Would plan for R-EPOCH as initial chemotherapy tomorrow once IV access obtained and BM biopsy done and echo results are available   F/up Echo  C/w allopurinol renally dosed  C/w IV fluids   Daily labs including cbc, bmp, LFTS, Uric acid, phosphorus. Please make sure labs are done daily on time, high risk for TLS when chemotherapy is initiated due to bulky lymphadenopathy        9-year-old man with extensive medical history including CAD (status post CABG;), atrial fibrillation on Xarelto, history of distal pancreatomy and splenectomy for unclear reason referred to the ED by his oncologist for abnormal labs. Patient was recently diagnosed with Diffuse large B cell Lymphoma on pathology of right groin mass and was following his Oncologist Dr Angeli Hilliard. Today patient was seen by his oncologist in office and was referred to the ed for abnormal labs.  Today, he reported having chronic fatigue, weakness, right greater than left progressive leg swelling, poor appetite, no night sweats. Patient reported having right greater than left lower extremity swelling.    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   CT pelvis on 10/12/24 showed interval increase of mass 10 x 7 x 10 cm with inguinal lymphadenopathy, suspicious for neoplastic process.  Biopsy on 12/18/24 revealed diffuse large B-cell lymphoma with concern for Rodriguez's transformation.  Patient was seen by Dr. Hilliard on 1/8/25 for initial consultation and was advised to come to ED due to concern for tumor lysis syndrome.  Uric acid level now WNL at 7.9, CMP otherwise unremarkable.   CT C/A/P 1.9.25 Stable appearance bulky right inguinal and right iliac lymphadenopathy including partially imaged 10.1 cm right inguinal soft tissue mass/lymph node.  s/p PICC placement on 1/10/25  s/p Bone marrow biopsy on 1/10/25  Echo 1/10/25: EF 60-65%  Pending hepatitis panel      CKD Stage 3B  baseline (1.8)    Recommendations   Proceeding with R-EPOCH. D1-4 etoposide/vincristine/adriamycin/prednisone, D5 cytoxan, D6 Rituximab. D1 today 1/11/25  C/w allopurinol renally dosed  C/w IV fluids   Daily labs including cbc, bmp, LFTS, Uric acid, phosphorus. Please make sure labs are done daily on time, high risk for TLS when chemotherapy is initiated due to bulky lymphadenopathy

## 2025-01-12 LAB
ALBUMIN SERPL ELPH-MCNC: 3.9 G/DL — SIGNIFICANT CHANGE UP (ref 3.5–5.2)
ALP SERPL-CCNC: 103 U/L — SIGNIFICANT CHANGE UP (ref 30–115)
ALT FLD-CCNC: 20 U/L — SIGNIFICANT CHANGE UP (ref 0–41)
ANION GAP SERPL CALC-SCNC: 12 MMOL/L — SIGNIFICANT CHANGE UP (ref 7–14)
AST SERPL-CCNC: 26 U/L — SIGNIFICANT CHANGE UP (ref 0–41)
BASOPHILS # BLD AUTO: 0.03 K/UL — SIGNIFICANT CHANGE UP (ref 0–0.2)
BASOPHILS NFR BLD AUTO: 0.3 % — SIGNIFICANT CHANGE UP (ref 0–1)
BILIRUB SERPL-MCNC: 0.4 MG/DL — SIGNIFICANT CHANGE UP (ref 0.2–1.2)
BUN SERPL-MCNC: 63 MG/DL — CRITICAL HIGH (ref 10–20)
CALCIUM SERPL-MCNC: 8.7 MG/DL — SIGNIFICANT CHANGE UP (ref 8.4–10.5)
CHLORIDE SERPL-SCNC: 100 MMOL/L — SIGNIFICANT CHANGE UP (ref 98–110)
CO2 SERPL-SCNC: 24 MMOL/L — SIGNIFICANT CHANGE UP (ref 17–32)
CREAT SERPL-MCNC: 2.3 MG/DL — HIGH (ref 0.7–1.5)
EGFR: 30 ML/MIN/1.73M2 — LOW
EOSINOPHIL # BLD AUTO: 0 K/UL — SIGNIFICANT CHANGE UP (ref 0–0.7)
EOSINOPHIL NFR BLD AUTO: 0 % — SIGNIFICANT CHANGE UP (ref 0–8)
GLUCOSE SERPL-MCNC: 193 MG/DL — HIGH (ref 70–99)
HAV IGM SER-ACNC: SIGNIFICANT CHANGE UP
HBV CORE AB SER-ACNC: SIGNIFICANT CHANGE UP
HBV CORE IGM SER-ACNC: SIGNIFICANT CHANGE UP
HBV CORE IGM SER-ACNC: SIGNIFICANT CHANGE UP
HBV SURFACE AG SER-ACNC: SIGNIFICANT CHANGE UP
HCT VFR BLD CALC: 39.7 % — LOW (ref 42–52)
HCV AB S/CO SERPL IA: 0.08 S/CO — SIGNIFICANT CHANGE UP (ref 0–0.99)
HCV AB SERPL-IMP: SIGNIFICANT CHANGE UP
HGB BLD-MCNC: 13 G/DL — LOW (ref 14–18)
IMM GRANULOCYTES NFR BLD AUTO: 1.4 % — HIGH (ref 0.1–0.3)
LDH SERPL L TO P-CCNC: 219 U/L — SIGNIFICANT CHANGE UP (ref 50–242)
LYMPHOCYTES # BLD AUTO: 1.14 K/UL — LOW (ref 1.2–3.4)
LYMPHOCYTES # BLD AUTO: 11.3 % — LOW (ref 20.5–51.1)
MAGNESIUM SERPL-MCNC: 2.3 MG/DL — SIGNIFICANT CHANGE UP (ref 1.8–2.4)
MCHC RBC-ENTMCNC: 31.1 PG — HIGH (ref 27–31)
MCHC RBC-ENTMCNC: 32.7 G/DL — SIGNIFICANT CHANGE UP (ref 32–37)
MCV RBC AUTO: 95 FL — HIGH (ref 80–94)
MONOCYTES # BLD AUTO: 0.14 K/UL — SIGNIFICANT CHANGE UP (ref 0.1–0.6)
MONOCYTES NFR BLD AUTO: 1.4 % — LOW (ref 1.7–9.3)
NEUTROPHILS # BLD AUTO: 8.61 K/UL — HIGH (ref 1.4–6.5)
NEUTROPHILS NFR BLD AUTO: 85.6 % — HIGH (ref 42.2–75.2)
NRBC # BLD: 0 /100 WBCS — SIGNIFICANT CHANGE UP (ref 0–0)
PHOSPHATE SERPL-MCNC: 3.4 MG/DL — SIGNIFICANT CHANGE UP (ref 2.1–4.9)
PLATELET # BLD AUTO: 495 K/UL — HIGH (ref 130–400)
PMV BLD: 9.7 FL — SIGNIFICANT CHANGE UP (ref 7.4–10.4)
POTASSIUM SERPL-MCNC: 5.3 MMOL/L — HIGH (ref 3.5–5)
POTASSIUM SERPL-SCNC: 5.3 MMOL/L — HIGH (ref 3.5–5)
PROT SERPL-MCNC: 6.5 G/DL — SIGNIFICANT CHANGE UP (ref 6–8)
RBC # BLD: 4.18 M/UL — LOW (ref 4.7–6.1)
RBC # FLD: 13.3 % — SIGNIFICANT CHANGE UP (ref 11.5–14.5)
SODIUM SERPL-SCNC: 136 MMOL/L — SIGNIFICANT CHANGE UP (ref 135–146)
URATE SERPL-MCNC: 8.5 MG/DL — SIGNIFICANT CHANGE UP (ref 3.4–8.8)
WBC # BLD: 10.06 K/UL — SIGNIFICANT CHANGE UP (ref 4.8–10.8)
WBC # FLD AUTO: 10.06 K/UL — SIGNIFICANT CHANGE UP (ref 4.8–10.8)

## 2025-01-12 PROCEDURE — 99233 SBSQ HOSP IP/OBS HIGH 50: CPT

## 2025-01-12 PROCEDURE — 99232 SBSQ HOSP IP/OBS MODERATE 35: CPT

## 2025-01-12 RX ORDER — FUROSEMIDE 20 MG
80 TABLET ORAL
Refills: 0 | Status: DISCONTINUED | OUTPATIENT
Start: 2025-01-12 | End: 2025-01-13

## 2025-01-12 RX ORDER — SODIUM CHLORIDE 9 MG/ML
1000 INJECTION, SOLUTION INTRAMUSCULAR; INTRAVENOUS; SUBCUTANEOUS
Refills: 0 | Status: DISCONTINUED | OUTPATIENT
Start: 2025-01-12 | End: 2025-01-13

## 2025-01-12 RX ORDER — SODIUM ZIRCONIUM CYCLOSILICATE 10 G/10G
10 POWDER, FOR SUSPENSION ORAL ONCE
Refills: 0 | Status: COMPLETED | OUTPATIENT
Start: 2025-01-12 | End: 2025-01-12

## 2025-01-12 RX ADMIN — Medication 145 MILLIGRAM(S): at 06:20

## 2025-01-12 RX ADMIN — Medication 81 MILLIGRAM(S): at 12:49

## 2025-01-12 RX ADMIN — Medication 1 TABLET(S): at 12:49

## 2025-01-12 RX ADMIN — Medication 80 MILLIGRAM(S): at 15:17

## 2025-01-12 RX ADMIN — Medication 145 MILLIGRAM(S): at 18:06

## 2025-01-12 RX ADMIN — NIFEDIPINE 60 MILLIGRAM(S): 60 TABLET, EXTENDED RELEASE ORAL at 06:21

## 2025-01-12 RX ADMIN — Medication 500 MILLIGRAM(S): at 12:49

## 2025-01-12 RX ADMIN — CETIRIZINE HYDROCHLORIDE 10 MILLIGRAM(S): 5 SYRUP ORAL at 12:49

## 2025-01-12 RX ADMIN — ALLOPURINOL 100 MILLIGRAM(S): 100 TABLET ORAL at 21:44

## 2025-01-12 RX ADMIN — Medication 25 MILLIGRAM(S): at 06:21

## 2025-01-12 RX ADMIN — ALLOPURINOL 100 MILLIGRAM(S): 100 TABLET ORAL at 06:21

## 2025-01-12 RX ADMIN — ATORVASTATIN CALCIUM 10 MILLIGRAM(S): 40 TABLET, FILM COATED ORAL at 21:44

## 2025-01-12 RX ADMIN — DOXORUBICIN HYDROCHLORIDE 24 MILLIGRAM(S): 2 INJECTION, SOLUTION INTRAVENOUS at 17:07

## 2025-01-12 RX ADMIN — Medication 25 MILLIGRAM(S): at 18:06

## 2025-01-12 RX ADMIN — RIVAROXABAN 15 MILLIGRAM(S): 2.5 TABLET, FILM COATED ORAL at 18:06

## 2025-01-12 RX ADMIN — SODIUM CHLORIDE 75 MILLILITER(S): 9 INJECTION, SOLUTION INTRAMUSCULAR; INTRAVENOUS; SUBCUTANEOUS at 10:34

## 2025-01-12 RX ADMIN — SODIUM ZIRCONIUM CYCLOSILICATE 10 GRAM(S): 10 POWDER, FOR SUSPENSION ORAL at 12:48

## 2025-01-12 RX ADMIN — ALLOPURINOL 100 MILLIGRAM(S): 100 TABLET ORAL at 14:46

## 2025-01-12 NOTE — PROGRESS NOTE ADULT - ASSESSMENT
69-year-old man with extensive medical history including CAD (status post CABG;), atrial fibrillation on Xarelto, history of distal pancreatomy and splenectomy for unclear reason referred to the ED by his oncologist for abnormal labs. Patient was recently diagnosed with Diffuse large B cell Lymphoma on pathology of right groin mass and was following his Oncologist Dr Angeli Hilliard. Today patient was seen by his oncologist in office and was referred to the ed for abnormal labs.  Today, he reported having chronic fatigue, weakness, right greater than left progressive leg swelling, poor appetite, no night sweats. Patient reported having right greater than left lower extremity swelling.    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   CT pelvis on 10/12/24 showed interval increase of mass 10 x 7 x 10 cm with inguinal lymphadenopathy, suspicious for neoplastic process.  Biopsy on 12/18/24 revealed diffuse large B-cell lymphoma with concern for Rodriguez's transformation.  Patient was seen by Dr. Hilliard on 1/8/25 for initial consultation and was advised to come to ED due to concern for tumor lysis syndrome.  Uric acid level now WNL at 7.9, CMP otherwise unremarkable.   CT C/A/P 1.9.25 Stable appearance bulky right inguinal and right iliac lymphadenopathy including partially imaged 10.1 cm right inguinal soft tissue mass/lymph node.  s/p PICC placement on 1/10/25  s/p Bone marrow biopsy on 1/10/25  Echo 1/10/25: EF 60-65%  Pending hepatitis panel      CKD Stage 3B  baseline (1.8)    Recommendations   Proceeding with R-EPOCH. D1-4 etoposide/vincristine/adriamycin/prednisone, D5 cytoxan, D6 Rituximab. D1 today 1/11/25  C/w allopurinol renally dosed  C/w IV fluids   Daily labs including cbc, bmp, LFTS, Uric acid, phosphorus. Please make sure labs are done daily on time, high risk for TLS when chemotherapy is initiated due to bulky lymphadenopathy            69-year-old man with extensive medical history including CAD (status post CABG;), atrial fibrillation on Xarelto, history of distal pancreatomy and splenectomy for unclear reason referred to the ED by his oncologist for abnormal labs. Patient was recently diagnosed with Diffuse large B cell Lymphoma on pathology of right groin mass and was following his Oncologist Dr Angeil Hilliard. Today patient was seen by his oncologist in office and was referred to the ed for abnormal labs.  Today, he reported having chronic fatigue, weakness, right greater than left progressive leg swelling, poor appetite, no night sweats. Patient reported having right greater than left lower extremity swelling.    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   CT pelvis on 10/12/24 showed interval increase of mass 10 x 7 x 10 cm with inguinal lymphadenopathy, suspicious for neoplastic process.  Biopsy on 12/18/24 revealed diffuse large B-cell lymphoma with concern for Rodriguez's transformation.  Patient was seen by Dr. Hilliard on 1/8/25 for initial consultation and was advised to come to ED due to concern for tumor lysis syndrome.  Uric acid level now WNL at 7.9, CMP otherwise unremarkable.   CT C/A/P 1.9.25 Stable appearance bulky right inguinal and right iliac lymphadenopathy including partially imaged 10.1 cm right inguinal soft tissue mass/lymph node.  s/p PICC placement on 1/10/25  s/p Bone marrow biopsy on 1/10/25  Echo 1/10/25: EF 60-65%  Pending hepatitis panel      CKD Stage 3B  baseline (1.8)    Recommendations   Proceeding with R-EPOCH. D1-4 etoposide/vincristine/adriamycin/prednisone, D5 cytoxan and  Rituximab. D1 today 1/11/25  C/w allopurinol renally dosed  C/w IV fluids   Daily labs including cbc, bmp, LFTS, Uric acid, phosphorus. Please make sure labs are done daily on time, high risk for TLS when chemotherapy is initiated due to bulky lymphadenopathy

## 2025-01-12 NOTE — PROGRESS NOTE ADULT - SUBJECTIVE AND OBJECTIVE BOX
Patient seen and examined. Events over the last 24 hours noted.   Pt seen sitting up in chair  Tolerated chemo yesterday w/o complaints  This AM BG rising, also has LE edema b/l, pt states he is predisposed to LE edema when is he off diuretics     T(F): 98 (01-12-25 @ 05:45), Max: 98 (01-12-25 @ 05:45)  HR: 57 (01-12-25 @ 05:45)  BP: 144/59 (01-12-25 @ 05:45)  RR: 18 (01-12-25 @ 05:45)  SpO2: 95% (01-11-25 @ 16:50) (95% - 95%)    PHYSICAL EXAM:  GEN: No acute distress  HEENT: normocephalic, atraumatic, anicteric  LUNGS: b/l breath sounds present, clear to auscultation bilaterally   HEART: S1/S2 present. RRR  ABD: Soft, non-tender, non-distended. Bowel sounds present  EXT: warm   NEURO: awake, no new focal deficits    LABS  01-12    136  |  100  |  63[HH]  ----------------------------<  193[H]  5.3[H]   |  24  |  2.3[H]    Ca    8.7      12 Jan 2025 06:11  Phos  3.4     01-12  Mg     2.3     01-12    TPro  6.5  /  Alb  3.9  /  TBili  0.4  /  DBili  x   /  AST  26  /  ALT  20  /  AlkPhos  103  01-12                          13.0   10.06 )-----------( 495      ( 12 Jan 2025 06:11 )             39.7            MEDICATIONS  (STANDING):  allopurinol 100 milliGRAM(s) Oral three times a day  ascorbic acid 500 milliGRAM(s) Oral daily  aspirin  chewable 81 milliGRAM(s) Oral daily  atorvastatin 10 milliGRAM(s) Oral at bedtime  cetirizine 10 milliGRAM(s) Oral daily  doxorubicin IVPB w/etoposide (eMAR) 24 milliGRAM(s) IV Intermittent every 24 hours  furosemide    Tablet 80 milliGRAM(s) Oral <User Schedule>  metoprolol tartrate 25 milliGRAM(s) Oral two times a day  Nephro-toni 1 Tablet(s) Oral daily  NIFEdipine XL 60 milliGRAM(s) Oral daily  predniSONE   Tablet 145 milliGRAM(s) Oral every 12 hours  rivaroxaban 15 milliGRAM(s) Oral with dinner  sodium chloride 0.9%. 1000 milliLiter(s) (75 mL/Hr) IV Continuous <Continuous>  sodium zirconium cyclosilicate 10 Gram(s) Oral once    MEDICATIONS  (PRN):  acetaminophen     Tablet .. 650 milliGRAM(s) Oral every 6 hours PRN Temp greater or equal to 38C (100.4F), Mild Pain (1 - 3)  aluminum hydroxide/magnesium hydroxide/simethicone Suspension 30 milliLiter(s) Oral every 4 hours PRN Dyspepsia  melatonin 3 milliGRAM(s) Oral at bedtime PRN Insomnia  ondansetron Injectable 4 milliGRAM(s) IV Push every 8 hours PRN Nausea and/or Vomiting

## 2025-01-12 NOTE — PROGRESS NOTE ADULT - SUBJECTIVE AND OBJECTIVE BOX
SUBJECTIVE:    Patient is a 69y old Male who presents with a chief complaint of abnormal labs (11 Jan 2025 13:00)    Currently admitted to medicine with the primary diagnosis of Lymphoma       Today is hospital day 3d. This morning he is resting comfortably in bed and reports no new issues or overnight events.     PAST MEDICAL & SURGICAL HISTORY  Diabetes mellitus, type 2    BPH (benign prostatic hyperplasia)    Water retention    Essential hypertension    Diabetes    CAD (coronary artery disease)    H/O hypercholesterolemia    Morbid obesity    Chronic kidney disease (CKD)    History of atrial fibrillation    H/O right coronary artery stent placement    History of resection of pancreas    History of cholecystectomy    History of left knee replacement    S/P CABG x 6    H/O cardiac radiofrequency ablation      SOCIAL HISTORY:  Negative for smoking/alcohol/drug use.     ALLERGIES:  No Known Allergies    MEDICATIONS:  STANDING MEDICATIONS  allopurinol 100 milliGRAM(s) Oral three times a day  ascorbic acid 500 milliGRAM(s) Oral daily  aspirin  chewable 81 milliGRAM(s) Oral daily  atorvastatin 10 milliGRAM(s) Oral at bedtime  cetirizine 10 milliGRAM(s) Oral daily  doxorubicin IVPB w/etoposide (eMAR) 24 milliGRAM(s) IV Intermittent every 24 hours  lactated ringers. 1000 milliLiter(s) IV Continuous <Continuous>  metoprolol tartrate 25 milliGRAM(s) Oral two times a day  Nephro-toni 1 Tablet(s) Oral daily  NIFEdipine XL 60 milliGRAM(s) Oral daily  predniSONE   Tablet 145 milliGRAM(s) Oral every 12 hours  rivaroxaban 15 milliGRAM(s) Oral with dinner    PRN MEDICATIONS  acetaminophen     Tablet .. 650 milliGRAM(s) Oral every 6 hours PRN  aluminum hydroxide/magnesium hydroxide/simethicone Suspension 30 milliLiter(s) Oral every 4 hours PRN  melatonin 3 milliGRAM(s) Oral at bedtime PRN  ondansetron Injectable 4 milliGRAM(s) IV Push every 8 hours PRN    VITALS:   T(F): 98  HR: 57  BP: 144/59  RR: 18  SpO2: 95%    LABS:                        13.0   10.06 )-----------( 495      ( 12 Jan 2025 06:11 )             39.7     01-12    136  |  100  |  63[HH]  ----------------------------<  193[H]  5.3[H]   |  24  |  2.3[H]    Ca    8.7      12 Jan 2025 06:11  Phos  3.4     01-12  Mg     2.3     01-12    TPro  6.5  /  Alb  3.9  /  TBili  0.4  /  DBili  x   /  AST  26  /  ALT  20  /  AlkPhos  103  01-12      Urinalysis Basic - ( 12 Jan 2025 06:11 )    Color: x / Appearance: x / SG: x / pH: x  Gluc: 193 mg/dL / Ketone: x  / Bili: x / Urobili: x   Blood: x / Protein: x / Nitrite: x   Leuk Esterase: x / RBC: x / WBC x   Sq Epi: x / Non Sq Epi: x / Bacteria: x                RADIOLOGY:    PHYSICAL EXAM:  GEN: No acute distress  LUNGS: Clear to auscultation bilaterally   HEART: Regular  ABD: Soft, non-tender, non-distended.  EXT: NC/NC/NE/2+PP/LUJAN/Skin Intact.   NEURO: AAOX3    Intravenous access:   NG tube:   Ojeda Catheter:

## 2025-01-12 NOTE — PROGRESS NOTE ADULT - ASSESSMENT
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    #Cancer progression with possible renal involvement/ureter obstruction versus tumor lysis syndrome   - Vitally stable   - Out patient labs. WBC 9.9, hemoglobin 13.3, platelets 574,000, serum creatinine 2.0 (baseline from 1.4-1.8 range), potassium 4.6, calcium 9.2, Phosphorus 3.7, uric acid 9.1, .  - In Patient Labs: creat 1.8, uric acid 7.9, , Potassium, Phosphorus , calcium in normal ranges   - Imaging : Doppler venous LE : No evidence of deep venous thrombosis in either lower extremity. There is a proximal thigh/right groin mass measuring greater than 6.9 x  9.2 x 11 cm  Plan  - IV fluids   - Trend BMP  - ECHO w/ normal LVEF  - hepatitis B, hepatitis C HIV NEGATIVE )  - s/p IR guided bone marrow biopsy today and PICC line 1/11/24  - fu G6PD levels   - Oncology follow up. Plan for in patient chemotherapy.  - start allopurinol , adjust dose for GFR   - Avoid volume overload     #CAD s/p CABG;)  #Chronic afib on Xarelto  #HLD   - cw Pravastatin, metoprolol,   - resume lasix d/t LE edema   - hold lisinopril,  spironolactone, metolazone until Nephro eval  - Cont XARELTO     #DM  - on insulin pump @2.85 at home . cw pump  - monitor blood sugar   - target blood sugar 140 - 180     #CKD Stage 3B  - Creatinine at baseline (1.8)  - creat 2 on 1/9 labs  - resume lasix d/t LE edema   - hold lisinopril, spironolactone, metolazone until Nephro eval  - adjust meds for gfr  - avoid volume overload       #? hx pancreatic stone, s/p distal pancreatomy and splenectomy  - outpt follow up.     #full code 69-year-old man with pmhx recent dx large B cell lymphoma with large right inguinal mass (lesion onset ~4/2024), CAD s/p CABG;), afib on Xarelto, hx ? 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    #Cancer progression with possible renal involvement/ureter obstruction versus tumor lysis syndrome   - Vitally stable   - Out patient labs. WBC 9.9, hemoglobin 13.3, platelets 574,000, serum creatinine 2.0 (baseline from 1.4-1.8 range), potassium 4.6, calcium 9.2, Phosphorus 3.7, uric acid 9.1, .  - In Patient Labs: creat 1.8, uric acid 7.9, , Potassium, Phosphorus , calcium in normal ranges   - Imaging : Doppler venous LE : No evidence of deep venous thrombosis in either lower extremity. There is a proximal thigh/right groin mass measuring greater than 6.9 x  9.2 x 11 cm  Plan  - IV fluids   - Trend BMP  - ECHO w/ normal LVEF  - hepatitis B, hepatitis C HIV NEGATIVE )  - s/p IR guided bone marrow biopsy today and PICC line 1/11/24  - fu G6PD levels   - Oncology follow up. Plan for in patient chemotherapy.  - start allopurinol , adjust dose for GFR   - Avoid volume overload     #CAD s/p CABG;)  #Chronic afib on Xarelto  #HLD   - cw Pravastatin, metoprolol,   - resume lasix d/t LE edema   - hold lisinopril,  spironolactone, metolazone until Nephro eval  - Cont XARELTO     #DM  - on insulin pump @2.85 at home . cw pump  - monitor blood sugar   - target blood sugar 140 - 180   - BG increasing with steroids, consult Endo for assistance    #CKD Stage 3B  - Creatinine at baseline (1.8)  - creat 2 on 1/9 labs  - resume lasix d/t LE edema   - hold lisinopril, spironolactone, metolazone until Nephro eval  - adjust meds for gfr  - avoid volume overload       #? hx pancreatic stone, s/p distal pancreatomy and splenectomy  - outpt follow up.     #full code

## 2025-01-13 LAB
ALBUMIN SERPL ELPH-MCNC: 4 G/DL — SIGNIFICANT CHANGE UP (ref 3.5–5.2)
ALP SERPL-CCNC: 98 U/L — SIGNIFICANT CHANGE UP (ref 30–115)
ALT FLD-CCNC: 28 U/L — SIGNIFICANT CHANGE UP (ref 0–41)
ANION GAP SERPL CALC-SCNC: 15 MMOL/L — HIGH (ref 7–14)
AST SERPL-CCNC: 32 U/L — SIGNIFICANT CHANGE UP (ref 0–41)
BASOPHILS # BLD AUTO: 0.01 K/UL — SIGNIFICANT CHANGE UP (ref 0–0.2)
BASOPHILS NFR BLD AUTO: 0.1 % — SIGNIFICANT CHANGE UP (ref 0–1)
BILIRUB SERPL-MCNC: 0.3 MG/DL — SIGNIFICANT CHANGE UP (ref 0.2–1.2)
BUN SERPL-MCNC: 72 MG/DL — CRITICAL HIGH (ref 10–20)
CALCIUM SERPL-MCNC: 8.6 MG/DL — SIGNIFICANT CHANGE UP (ref 8.4–10.5)
CHLORIDE SERPL-SCNC: 99 MMOL/L — SIGNIFICANT CHANGE UP (ref 98–110)
CO2 SERPL-SCNC: 22 MMOL/L — SIGNIFICANT CHANGE UP (ref 17–32)
CREAT SERPL-MCNC: 2.4 MG/DL — HIGH (ref 0.7–1.5)
EGFR: 29 ML/MIN/1.73M2 — LOW
EOSINOPHIL # BLD AUTO: 0 K/UL — SIGNIFICANT CHANGE UP (ref 0–0.7)
EOSINOPHIL NFR BLD AUTO: 0 % — SIGNIFICANT CHANGE UP (ref 0–8)
GLUCOSE SERPL-MCNC: 177 MG/DL — HIGH (ref 70–99)
HCT VFR BLD CALC: 37.9 % — LOW (ref 42–52)
HGB BLD-MCNC: 12.4 G/DL — LOW (ref 14–18)
IMM GRANULOCYTES NFR BLD AUTO: 1.3 % — HIGH (ref 0.1–0.3)
LDH SERPL L TO P-CCNC: 210 U/L — SIGNIFICANT CHANGE UP (ref 50–242)
LYMPHOCYTES # BLD AUTO: 0.84 K/UL — LOW (ref 1.2–3.4)
LYMPHOCYTES # BLD AUTO: 7.6 % — LOW (ref 20.5–51.1)
MAGNESIUM SERPL-MCNC: 2.4 MG/DL — SIGNIFICANT CHANGE UP (ref 1.8–2.4)
MCHC RBC-ENTMCNC: 31.3 PG — HIGH (ref 27–31)
MCHC RBC-ENTMCNC: 32.7 G/DL — SIGNIFICANT CHANGE UP (ref 32–37)
MCV RBC AUTO: 95.7 FL — HIGH (ref 80–94)
MONOCYTES # BLD AUTO: 0.74 K/UL — HIGH (ref 0.1–0.6)
MONOCYTES NFR BLD AUTO: 6.7 % — SIGNIFICANT CHANGE UP (ref 1.7–9.3)
NEUTROPHILS # BLD AUTO: 9.33 K/UL — HIGH (ref 1.4–6.5)
NEUTROPHILS NFR BLD AUTO: 84.3 % — HIGH (ref 42.2–75.2)
NRBC # BLD: 0 /100 WBCS — SIGNIFICANT CHANGE UP (ref 0–0)
PHOSPHATE SERPL-MCNC: 4.5 MG/DL — SIGNIFICANT CHANGE UP (ref 2.1–4.9)
PLATELET # BLD AUTO: 510 K/UL — HIGH (ref 130–400)
PMV BLD: 10.2 FL — SIGNIFICANT CHANGE UP (ref 7.4–10.4)
POTASSIUM SERPL-MCNC: 5.1 MMOL/L — HIGH (ref 3.5–5)
POTASSIUM SERPL-SCNC: 5.1 MMOL/L — HIGH (ref 3.5–5)
PROT SERPL-MCNC: 6.7 G/DL — SIGNIFICANT CHANGE UP (ref 6–8)
RBC # BLD: 3.96 M/UL — LOW (ref 4.7–6.1)
RBC # FLD: 13.2 % — SIGNIFICANT CHANGE UP (ref 11.5–14.5)
SODIUM SERPL-SCNC: 136 MMOL/L — SIGNIFICANT CHANGE UP (ref 135–146)
URATE SERPL-MCNC: 8.6 MG/DL — SIGNIFICANT CHANGE UP (ref 3.4–8.8)
WBC # BLD: 11.06 K/UL — HIGH (ref 4.8–10.8)
WBC # FLD AUTO: 11.06 K/UL — HIGH (ref 4.8–10.8)

## 2025-01-13 PROCEDURE — 99232 SBSQ HOSP IP/OBS MODERATE 35: CPT

## 2025-01-13 RX ORDER — BUMETANIDE 2 MG/1
2 TABLET ORAL EVERY 12 HOURS
Refills: 0 | Status: DISCONTINUED | OUTPATIENT
Start: 2025-01-13 | End: 2025-01-17

## 2025-01-13 RX ORDER — SENNOSIDES 8.6 MG/1
2 TABLET, FILM COATED ORAL AT BEDTIME
Refills: 0 | Status: DISCONTINUED | OUTPATIENT
Start: 2025-01-13 | End: 2025-01-15

## 2025-01-13 RX ORDER — LACTULOSE 10 G/15ML
10 SOLUTION ORAL; RECTAL THREE TIMES A DAY
Refills: 0 | Status: DISCONTINUED | OUTPATIENT
Start: 2025-01-13 | End: 2025-01-15

## 2025-01-13 RX ORDER — PANTOPRAZOLE 40 MG/1
40 TABLET, DELAYED RELEASE ORAL
Refills: 0 | Status: DISCONTINUED | OUTPATIENT
Start: 2025-01-13 | End: 2025-01-17

## 2025-01-13 RX ORDER — POLYETHYLENE GLYCOL 3350 17 G/DOSE
17 POWDER (GRAM) ORAL
Refills: 0 | Status: DISCONTINUED | OUTPATIENT
Start: 2025-01-13 | End: 2025-01-15

## 2025-01-13 RX ORDER — CHLORHEXIDINE GLUCONATE 1.2 MG/ML
1 RINSE ORAL DAILY
Refills: 0 | Status: DISCONTINUED | OUTPATIENT
Start: 2025-01-13 | End: 2025-01-17

## 2025-01-13 RX ORDER — ALLOPURINOL 100 MG/1
100 TABLET ORAL DAILY
Refills: 0 | Status: DISCONTINUED | OUTPATIENT
Start: 2025-01-14 | End: 2025-01-16

## 2025-01-13 RX ADMIN — RIVAROXABAN 15 MILLIGRAM(S): 2.5 TABLET, FILM COATED ORAL at 17:22

## 2025-01-13 RX ADMIN — ALLOPURINOL 100 MILLIGRAM(S): 100 TABLET ORAL at 05:27

## 2025-01-13 RX ADMIN — Medication 81 MILLIGRAM(S): at 11:52

## 2025-01-13 RX ADMIN — ACETAMINOPHEN 650 MILLIGRAM(S): 80 SOLUTION/ DROPS ORAL at 20:28

## 2025-01-13 RX ADMIN — Medication 25 MILLIGRAM(S): at 05:28

## 2025-01-13 RX ADMIN — Medication 25 MILLIGRAM(S): at 17:22

## 2025-01-13 RX ADMIN — Medication 500 MILLIGRAM(S): at 11:51

## 2025-01-13 RX ADMIN — LACTULOSE 10 GRAM(S): 10 SOLUTION ORAL; RECTAL at 17:23

## 2025-01-13 RX ADMIN — Medication 17 GRAM(S): at 11:50

## 2025-01-13 RX ADMIN — ATORVASTATIN CALCIUM 10 MILLIGRAM(S): 40 TABLET, FILM COATED ORAL at 22:34

## 2025-01-13 RX ADMIN — NIFEDIPINE 60 MILLIGRAM(S): 60 TABLET, EXTENDED RELEASE ORAL at 05:28

## 2025-01-13 RX ADMIN — Medication 17 GRAM(S): at 17:23

## 2025-01-13 RX ADMIN — Medication 145 MILLIGRAM(S): at 05:28

## 2025-01-13 RX ADMIN — Medication 145 MILLIGRAM(S): at 17:23

## 2025-01-13 RX ADMIN — Medication 1 TABLET(S): at 11:51

## 2025-01-13 RX ADMIN — CHLORHEXIDINE GLUCONATE 1 APPLICATION(S): 1.2 RINSE ORAL at 11:52

## 2025-01-13 RX ADMIN — BUMETANIDE 2 MILLIGRAM(S): 2 TABLET ORAL at 17:41

## 2025-01-13 RX ADMIN — CETIRIZINE HYDROCHLORIDE 10 MILLIGRAM(S): 5 SYRUP ORAL at 11:50

## 2025-01-13 RX ADMIN — LACTULOSE 10 GRAM(S): 10 SOLUTION ORAL; RECTAL at 22:33

## 2025-01-13 RX ADMIN — SENNOSIDES 2 TABLET(S): 8.6 TABLET, FILM COATED ORAL at 22:34

## 2025-01-13 RX ADMIN — ACETAMINOPHEN 650 MILLIGRAM(S): 80 SOLUTION/ DROPS ORAL at 21:36

## 2025-01-13 NOTE — PROGRESS NOTE ADULT - SUBJECTIVE AND OBJECTIVE BOX
SUBJECTIVE:    Patient is a 69y old Male who presents with a chief complaint of abnormal labs (13 Jan 2025 13:26)    Currently admitted to medicine with the primary diagnosis of Lymphoma       Today is hospital day 4d. This morning he is resting comfortably in bed and reports no new issues or overnight events.     PAST MEDICAL & SURGICAL HISTORY  Diabetes mellitus, type 2    BPH (benign prostatic hyperplasia)    Water retention    Essential hypertension    Diabetes    CAD (coronary artery disease)    H/O hypercholesterolemia    Morbid obesity    Chronic kidney disease (CKD)    History of atrial fibrillation    H/O right coronary artery stent placement    History of resection of pancreas    History of cholecystectomy    History of left knee replacement    S/P CABG x 6    H/O cardiac radiofrequency ablation      SOCIAL HISTORY:  Negative for smoking/alcohol/drug use.     ALLERGIES:  No Known Allergies    MEDICATIONS:  STANDING MEDICATIONS  ascorbic acid 500 milliGRAM(s) Oral daily  aspirin  chewable 81 milliGRAM(s) Oral daily  atorvastatin 10 milliGRAM(s) Oral at bedtime  buMETAnide Injectable 2 milliGRAM(s) IV Push every 12 hours  cetirizine 10 milliGRAM(s) Oral daily  chlorhexidine 2% Cloths 1 Application(s) Topical daily  doxorubicin IVPB w/etoposide (eMAR) 24 milliGRAM(s) IV Intermittent every 24 hours  lactulose Syrup 10 Gram(s) Oral three times a day  metolazone 2.5 milliGRAM(s) Oral daily  metoprolol tartrate 25 milliGRAM(s) Oral two times a day  Nephro-toni 1 Tablet(s) Oral daily  NIFEdipine XL 60 milliGRAM(s) Oral daily  pantoprazole    Tablet 40 milliGRAM(s) Oral before breakfast  polyethylene glycol 3350 17 Gram(s) Oral two times a day  predniSONE   Tablet 145 milliGRAM(s) Oral every 12 hours  rivaroxaban 15 milliGRAM(s) Oral with dinner  senna 2 Tablet(s) Oral at bedtime    PRN MEDICATIONS  acetaminophen     Tablet .. 650 milliGRAM(s) Oral every 6 hours PRN  aluminum hydroxide/magnesium hydroxide/simethicone Suspension 30 milliLiter(s) Oral every 4 hours PRN  melatonin 3 milliGRAM(s) Oral at bedtime PRN  ondansetron Injectable 4 milliGRAM(s) IV Push every 8 hours PRN    VITALS:   T(F): 98  HR: 90  BP: 126/68  RR: 18  SpO2: 96%    LABS:                        12.4   11.06 )-----------( 510      ( 13 Jan 2025 07:08 )             37.9     01-13    136  |  99  |  72[HH]  ----------------------------<  177[H]  5.1[H]   |  22  |  2.4[H]    Ca    8.6      13 Jan 2025 07:08  Phos  4.5     01-13  Mg     2.4     01-13    TPro  6.7  /  Alb  4.0  /  TBili  0.3  /  DBili  x   /  AST  32  /  ALT  28  /  AlkPhos  98  01-13      Urinalysis Basic - ( 13 Jan 2025 07:08 )    Color: x / Appearance: x / SG: x / pH: x  Gluc: 177 mg/dL / Ketone: x  / Bili: x / Urobili: x   Blood: x / Protein: x / Nitrite: x   Leuk Esterase: x / RBC: x / WBC x   Sq Epi: x / Non Sq Epi: x / Bacteria: x                RADIOLOGY:    PHYSICAL EXAM:  GEN: No acute distress  LUNGS: Clear to auscultation bilaterally   HEART: Regular  ABD: Soft, non-tender, non-distended.  EXT: NC/NC/NE/2+PP/LUJAN/Skin Intact.   NEURO: AAOX3    Intravenous access:   NG tube:   Ojeda Catheter:

## 2025-01-13 NOTE — PROGRESS NOTE ADULT - ASSESSMENT
69-year-old man with extensive medical history including CAD (status post CABG;), atrial fibrillation on Xarelto, history of distal pancreatomy and splenectomy for unclear reason referred to the ED by his oncologist for abnormal labs. Patient was recently diagnosed with Diffuse large B cell Lymphoma on pathology of right groin mass and was following his Oncologist Dr Angeli Hilliard. Today patient was seen by his oncologist in office and was referred to the ed for abnormal labs.  Today, he reported having chronic fatigue, weakness, right greater than left progressive leg swelling, poor appetite, no night sweats. Patient reported having right greater than left lower extremity swelling.    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   CT pelvis on 10/12/24 showed interval increase of mass 10 x 7 x 10 cm with inguinal lymphadenopathy, suspicious for neoplastic process.  Biopsy on 12/18/24 revealed diffuse large B-cell lymphoma with concern for Rodriguez's transformation.  Patient was seen by Dr. Hilliard on 1/8/25 for initial consultation and was advised to come to ED due to concern for tumor lysis syndrome.  Uric acid level now WNL at 7.9, CMP otherwise unremarkable.   CT C/A/P 1.9.25 Stable appearance bulky right inguinal and right iliac lymphadenopathy including partially imaged 10.1 cm right inguinal soft tissue mass/lymph node.  s/p PICC placement on 1/10/25  s/p Bone marrow biopsy on 1/10/25  Echo 1/10/25: EF 60-65%  Pending hepatitis panel      CKD Stage 3B  baseline (1.8)    Recommendations   Started R-EPOCH, D1 on 1/11/2024. D1-4 etoposide/vincristine/adriamycin/prednisone, D5 cytoxan and Rituximab.  D3 today 1/13/25  C/w allopurinol renally dosed  C/w IV fluids   Daily labs including cbc, bmp, LFTS, Uric acid, phosphorus. Please make sure labs are done daily on time, high risk for TLS when chemotherapy is initiated due to bulky lymphadenopathy      69-year-old man with extensive medical history including CAD (status post CABG;), atrial fibrillation on Xarelto, history of distal pancreatomy and splenectomy for unclear reason referred to the ED by his oncologist for abnormal labs. Patient was recently diagnosed with Diffuse large B cell Lymphoma on pathology of right groin mass and was following his Oncologist Dr Angeli Hilliard. Today patient was seen by his oncologist in office and was referred to the ed for abnormal labs.  Today, he reported having chronic fatigue, weakness, right greater than left progressive leg swelling, poor appetite, no night sweats. Patient reported having right greater than left lower extremity swelling.    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   CT pelvis on 10/12/24 showed interval increase of mass 10 x 7 x 10 cm with inguinal lymphadenopathy, suspicious for neoplastic process.  Biopsy on 12/18/24 revealed diffuse large B-cell lymphoma with concern for Rodriguez's transformation.  Patient was seen by Dr. Hilliard on 1/8/25 for initial consultation and was advised to come to ED due to concern for tumor lysis syndrome.  Uric acid level now WNL at 7.9, CMP otherwise unremarkable.   CT C/A/P 1.9.25 Stable appearance bulky right inguinal and right iliac lymphadenopathy including partially imaged 10.1 cm right inguinal soft tissue mass/lymph node.  s/p PICC placement on 1/10/25  s/p Bone marrow biopsy on 1/10/25  Echo 1/10/25: EF 60-65%  Pending hepatitis panel      CKD Stage 3B  baseline (1.8)    Recommendations   Started R-EPOCH, D1 on 1/11/2024. D1-4 etoposide/vincristine/adriamycin/prednisone, D5 cytoxan and Rituximab.  D3 today 1/13/25  C/w allopurinol renally dosed  Ideally needs to be IV fluids, follow up nephrology recs   Daily labs including cbc, bmp, LFTS, Uric acid, phosphorus. Please make sure labs are done daily on time, high risk for TLS when chemotherapy is initiated due to bulky lymphadenopathy

## 2025-01-13 NOTE — CONSULT NOTE ADULT - ASSESSMENT
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 pancreatic stone, s/p distal pancreatomy and splenectomy, sent in from oncology office for further evaluation of large B-cell lymphoma in the setting of abnormal labs.   Endocrinology consulted for pt w insulin pump and hyperglycemia in setting of starting prednisone, discontinuing mounjaro.     #DM2  #Hyperglycemia   Dx w DM in his 40s, then had pancreatectomy/splenectomy ~61 y/o and required insulin pump some time following that.   - Medtronic Insulin pump is reportedly in ?auto mode and was set up once and reportedly never adjusted again afterwards, w basal rate of 2.85, unknown ISF, and pt will manually bolus premeal amounts - does not follow a strict scale but will give approximate 10-20u based on his glucose and meal.   - Uses CGM which is paired w insulin pump but notes that his pump's auto mode does not seem to "do much." Has had very well controlled glucose while on Mounjaro in addition to insulin pump but is off mounjaro now   - W prednisone has been in 200-300 range which is what pt typically experiences on insulin.   - Per Hem Onc, plan to be on prednisone for 5 day course, then repeat in 21 days     Recommendations:  -  69-year-old man with pmhx recent dx large B cell lymphoma with large right inguinal mass (lesion onset ~4/2024), CAD s/p CABG;), afib on Xarelto, hx ? pancreatic stone, s/p distal pancreatomy and splenectomy, sent in from oncology office for further evaluation of large B-cell lymphoma in the setting of abnormal labs.   Endocrinology consulted for pt w insulin pump and hyperglycemia in setting of starting prednisone, discontinuing mounjaro.     #DM2  #Hyperglycemia   Dx w DM in his 40s, then had pancreatectomy/splenectomy ~63 y/o and required insulin pump some time following that.   - Medtronic Insulin pump is reportedly in ?auto mode and was set up once and reportedly never adjusted again afterwards, w basal rate of 2.85, unknown ISF, and pt will manually bolus premeal amounts - does not follow a strict scale but will give approximate 10-20u based on his glucose and meal.   - Uses CGM which is paired w insulin pump but notes that his pump's auto mode does not seem to "do much." Has had very well controlled glucose while on Mounjaro in addition to insulin pump but is off mounjaro now   - W prednisone has been in 200-300 range which is what pt typically experiences on insulin.   - Per Hem Onc, plan to be on prednisone for 5 day course, then repeat in 21 days     Recommendations:  - Continue insulin pump at basal rate 2.85u/hr  - Advised pt to adjust premeal boluses to higher end. Typically uses 20-25u, advised to increase to 30u bolus premeal   - If PERSISTENTLY uncontrolled (250-350 range), may require switching to basal/bolus w SQ lantus/lispro instead of insulin pump  - f/u w Hem Onc re: pt's ability to restart Mounjaro as an outpatient   - Discussed w Hem Onc - pt expected to be on Prednisone x5 days then in 21 days return for additional chemo w another 5 day Prednisone course.

## 2025-01-13 NOTE — CONSULT NOTE ADULT - ASSESSMENT
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 pancreatic stone, s/p distal pancreatomy and splenectomy, sent in from oncology office for further evaluation of large B-cell lymphoma in the setting of abnormal labs.    Nephrology was consulted for diuretics management.     #Stable CKD stage 3b  #Hyperkalemia  #Large B cell Lymphoma; on chemotherapy.   #Normocytic anemia likely from CKD stage 3b  #Thrombocytosis     Plan:  Recommend to DC IVF.   Encourage PO intake.   DC lasix now in the hospital.   Start on Bumex 2mg IVP BID.   Start patient on Metolazone 2.5 mg daily in the morning 30 minutes after bumex.   Hold home dose of spironolactone.   Consider adding spironolactone 100 mg daily for gout prophylaxis.     Monitor BMP daily.   Monitor electrolytes and correct accordingly.   -Monitor calcium, phosphorus closely.  -Potassium noted as 5.2; lokelma for potassium >5.5.   Monitor strict intake and output.   Avoid nephrotoxic medications and adjust all medications to GFR <30 mls/min  Nephrology will follow.      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 pancreatic stone, s/p distal pancreatomy and splenectomy, sent in from oncology office for further evaluation of large B-cell lymphoma in the setting of abnormal labs.    Nephrology was consulted for diuretics management.     #Stable CKD stage 3b  #Hyperkalemia  #Large B cell Lymphoma; on chemotherapy.   #Normocytic anemia likely from CKD stage 3b  #Thrombocytosis     Plan:  Recommend to DC IVF.   Encourage PO intake.   DC lasix now in the hospital.   Start on Bumex 2mg IVP BID.   Start patient on Metolazone 2.5 mg daily in the morning 30 minutes after bumex.   Hold home dose of spironolactone.   Consider adding allopurinol  100 mg daily for gout prophylaxis.     Monitor BMP daily.   Monitor electrolytes and correct accordingly.   -Monitor calcium, phosphorus closely.  -Potassium noted as 5.2; lokelma for potassium >5.5.   Monitor strict intake and output.   Avoid nephrotoxic medications and adjust all medications to GFR <30 mls/min  Nephrology will follow.

## 2025-01-13 NOTE — CONSULT NOTE ADULT - SUBJECTIVE AND OBJECTIVE BOX
HPI:  69-year-old man with pmhx recent dx large B cell lymphoma with large right inguinal mass (lesion onset ~4/2024), CAD s/p CABG;), afib on Xarelto, hx ? pancreatic stone, s/p distal pancreatomy and splenectomy, sent in from oncology office for further evaluation of large B-cell lymphoma in the setting of abnormal labs. Patient was recently diagnosed with Diffuse large B cell Lymphoma on pathology of right groin mass and was following his Oncologist Dr Angeli Hilliard. Today patient was seen by his oncologist in office and was referred to the ed for abnormal labs ie elevated creatinine and uric acid levels .  On further inquiry, he reported having chronic fatigue, weakness, right leg swelling > left leg swelling , poor appetite. Review of the systems is negative for headache, dizziness, loss of consciousness, chest pain, palpitations, shortness of breath, nausea, vomit, diarrhea, abdomen pain, urine frequency, urgency, extremity weakness and other significant complaints.    ED VITALS     · BP Systolic	148 mm Hg  · BP Diastolic	77 mm Hg  · Heart Rate	98 /min  · Respiration Rate (breaths/min)	18 /min  · Temp (F)	97.5 Degrees F  · Temp (C)	36.4 Degrees C  · Temp site	oral  · SpO2 (%)	97 %  · O2 Delivery/Oxygen Delivery Method	room air  · Temp at ED Arrival (C)	36.4 Degrees C    ED Labs    Hb 13, , creat 1.8, GFR 41    Imaging    Doppler venous LE : No evidence of deep venous thrombosis in either lower extremity. There is a proximal thigh/right groin mass measuring greater than 6.9 x  9.2 x 11 cm     (09 Jan 2025 14:04)      PAST MEDICAL & SURGICAL HISTORY  Diabetes mellitus, type 2    BPH (benign prostatic hyperplasia)    Water retention    Essential hypertension    Diabetes    CAD (coronary artery disease)    H/O hypercholesterolemia    Morbid obesity    Chronic kidney disease (CKD)    History of atrial fibrillation    H/O right coronary artery stent placement    History of resection of pancreas    History of cholecystectomy    History of left knee replacement    S/P CABG x 6    H/O cardiac radiofrequency ablation        FAMILY HISTORY:  FAMILY HISTORY:  Family history of lung cancer (Sibling)    Family history of diabetes mellitus (Mother)    Family history of coronary artery disease (Father)        ALLERGIES:  No Known Allergies      MEDICATIONS:  MEDICATIONS  (STANDING):  ascorbic acid 500 milliGRAM(s) Oral daily  aspirin  chewable 81 milliGRAM(s) Oral daily  atorvastatin 10 milliGRAM(s) Oral at bedtime  buMETAnide Injectable 2 milliGRAM(s) IV Push every 12 hours  cetirizine 10 milliGRAM(s) Oral daily  chlorhexidine 2% Cloths 1 Application(s) Topical daily  doxorubicin IVPB w/etoposide (eMAR) 24 milliGRAM(s) IV Intermittent every 24 hours  lactulose Syrup 10 Gram(s) Oral three times a day  metolazone 2.5 milliGRAM(s) Oral daily  metoprolol tartrate 25 milliGRAM(s) Oral two times a day  Nephro-toni 1 Tablet(s) Oral daily  NIFEdipine XL 60 milliGRAM(s) Oral daily  pantoprazole    Tablet 40 milliGRAM(s) Oral before breakfast  polyethylene glycol 3350 17 Gram(s) Oral two times a day  predniSONE   Tablet 145 milliGRAM(s) Oral every 12 hours  rivaroxaban 15 milliGRAM(s) Oral with dinner  senna 2 Tablet(s) Oral at bedtime    MEDICATIONS  (PRN):  acetaminophen     Tablet .. 650 milliGRAM(s) Oral every 6 hours PRN Temp greater or equal to 38C (100.4F), Mild Pain (1 - 3)  aluminum hydroxide/magnesium hydroxide/simethicone Suspension 30 milliLiter(s) Oral every 4 hours PRN Dyspepsia  melatonin 3 milliGRAM(s) Oral at bedtime PRN Insomnia  ondansetron Injectable 4 milliGRAM(s) IV Push every 8 hours PRN Nausea and/or Vomiting      HOME MEDICATIONS:  Home Medications:  aspirin 81 mg oral tablet: orally once a day (18 Dec 2024 07:09)  azelastine nasal: prn (18 Dec 2024 07:09)  furosemide 80 mg oral tablet: 1 tab(s) orally once a day (18 Dec 2024 07:09)  INSULIN PUMP - HUMALOG:  (18 Dec 2024 07:09)  ipratropium nasal: prn (18 Dec 2024 07:09)  levocetirizine 5 mg oral tablet: 1 tab(s) orally once a day (09 Jan 2025 16:17)  lisinopril 20 mg oral tablet: 1 tab(s) orally once a day (18 Dec 2024 07:09)  metOLazone 2.5 mg oral tablet: 1 tab(s) orally once a day (18 Dec 2024 07:09)  metoprolol tartrate 25 mg oral tablet: 1 tab(s) orally 2 times a day (18 Dec 2024 07:09)  multivitamin: once a day (18 Dec 2024 07:09)  pravastatin 40 mg oral tablet: 1 tab(s) orally once a day (18 Dec 2024 07:09)  Repatha 140 mg/mL subcutaneous solution: 140 milligram(s) subcutaneously (09 Jan 2025 16:10)  sildenafil 20 mg oral tablet: 1 tab(s) orally (09 Jan 2025 16:15)  spironolactone 25 mg oral tablet: 1 tab(s) orally once a day (18 Dec 2024 07:09)  testosterone cypionate 200 mg/mL intramuscular solution: 200 unit(s) intramuscularly once a month (09 Jan 2025 16:17)  tirzepatide: 0.5 milligram(s) subcutaneous once a week (09 Jan 2025 16:10)  Vitamin C 500 mg oral capsule: 1 cap(s) orally once a day (18 Dec 2024 07:09)  Xarelto 20 mg oral tablet: 1 tab(s) orally once a day (in the evening) (18 Dec 2024 07:09)      VITALS:   T(F): 98 (01-13 @ 12:18), Max: 99.1 (01-10 @ 15:39)  HR: 90 (01-13 @ 12:18) (57 - 102)  BP: 126/68 (01-13 @ 12:18) (92/52 - 144/59)  BP(mean): 81 (01-12 @ 15:10) (71 - 81)  RR: 18 (01-13 @ 12:18) (16 - 20)  SpO2: 96% (01-13 @ 12:18) (95% - 99%)    I&O's Summary      REVIEW OF SYSTEMS:  CONSTITUTIONAL: No weakness, fevers or chills  EYES: No visual changes  ENT: No vertigo or throat pain   NECK: No pain or stiffness  RESPIRATORY: No cough, wheezing, hemoptysis; No shortness of breath  CARDIOVASCULAR: No chest pain or palpitations  GASTROINTESTINAL: No abdominal or epigastric pain. No nausea, vomiting, or hematemesis; No diarrhea or constipation. No melena or hematochezia.  GENITOURINARY: No Polyuria  NEUROLOGICAL:  No tremors, no Weakness or numbness  SKIN: No itching, no rashes  MSK: no joint pain    PHYSICAL EXAM:  GENERAL: Patient is awake , alert and oriented,  not in acute distress  EYES: No proptosis, no lid lag  NECK: No thyroid enlargement, no palpable nodules , no bruit  LUNGS: Clear to auscultation bilaterally   CARDIOVASCULAR: S1/S2 present, RRR , no murmurs or rubs  ABD: Soft, non-tender, non-distended, +BS  EXT: No FRANKI  SKIN: No abdominal striae  NEURO: No tremors, DTR 2+    LABS:                        12.4   11.06 )-----------( 510      ( 13 Jan 2025 07:08 )             37.9     01-13    136  |  99  |  72[HH]  ----------------------------<  177[H]  5.1[H]   |  22  |  2.4[H]    Ca    8.6      13 Jan 2025 07:08  Phos  4.5     01-13  Mg     2.4     01-13    TPro  6.7  /  Alb  4.0  /  TBili  0.3  /  DBili  x   /  AST  32  /  ALT  28  /  AlkPhos  98  01-13              POCT Blood Glucose.: 214 mg/dL (01-13-25 @ 11:14)  POCT Blood Glucose.: 164 mg/dL (01-13-25 @ 07:50)  POCT Blood Glucose.: 329 mg/dL (01-12-25 @ 21:11)  POCT Blood Glucose.: 229 mg/dL (01-12-25 @ 17:22)  POCT Blood Glucose.: 270 mg/dL (01-12-25 @ 11:11)  POCT Blood Glucose.: 193 mg/dL (01-12-25 @ 07:42)  POCT Blood Glucose.: 129 mg/dL (01-11-25 @ 17:18)  POCT Blood Glucose.: 171 mg/dL (01-11-25 @ 11:34)  POCT Blood Glucose.: 87 mg/dL (01-11-25 @ 08:07)  POCT Blood Glucose.: 201 mg/dL (01-10-25 @ 21:11)

## 2025-01-13 NOTE — CONSULT NOTE ADULT - ATTENDING COMMENTS
# CKD 3b stable  # diffuse large B cell lymphoma  # edema lower ext   # anemia chronic     - suggest DC IVF   - Dc aldactone   - switch diuretics to bumex 2 mg IV q12h and metolazone 2.5 mg po q24h   - strict I and O  - can add allopurinol 100 mg q24h   - follow BMP and serum uric acid q12h   - follow K / lokelma 5 g po q12h if K > 5.5     will follow
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 pancreatic stone, s/p distal pancreatomy and splenectomy, sent in from oncology office for further evaluation of large B-cell lymphoma in the setting of abnormal labs.     #Diabetes mellitus  -Appears to be insulin-dependent with A1c 8.9  -Currently on very high-dose steroids for chemotherapy  -On Medtronic pump with [employer] CGM in auto mode  -Boluses by himself currently taking 20 to 25 units  -For now would avoid changing basal insulin as prednisone as temporary, advised to increase prandial insulin to 30 units with each meal  -If blood glucose readings are noted to be fluctuating significantly or if uncontrolled on the pump we will consider switching to basal bolus insulin injections discussed with the patient    Pump settings  Basal 2.85 units/h  -Prandial 1 unit for 3 g of carbs (not using)  -ISF13 not using  -Active insulin time 4 hours blood glucose target 120
Cardiologist:  Domingo    Cardiology consulted for clearance for chemotherapy.    CAD s/p CABG  AF    BNP  167  A1c  8.9  (12/2024)      - ECHO with GLS  - Baseline troponin  - EKG  - Outpatient follow-up with Dr. Lane
Patient examined by myself , above note , chart and images reviewed in detail.  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  On exam , he has massive RLE lymphedema and bulky  right groin mass .   CKD ? . no evidence of TLS or obstructive uropathy   Plan : CT chest abdomen and pelvis          Bone Marrow biopsy           2 D echo , cardiology consult ,           Inpatient chemo with hydration , monitor kidney function ,lytes daily           check G6PD  start allopurinol now ( renal dose ), rasburicase ? if uric acid still elevated             R CHOP or DA-R EPOCH ? (  venetoclax starting with cycle 2 is recommended for Rodriguez's )

## 2025-01-13 NOTE — CONSULT NOTE ADULT - SUBJECTIVE AND OBJECTIVE BOX
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 pancreatic stone, s/p distal pancreatomy and splenectomy, sent in from oncology office for further evaluation of large B-cell lymphoma in the setting of abnormal labs. Patient was recently diagnosed with Diffuse large B cell Lymphoma on pathology of right groin mass and was following his Oncologist Dr Angeli Hilliard. Today patient was seen by his oncologist in office and was referred to the ed for abnormal labs ie elevated creatinine and uric acid levels .  On further inquiry, he reported having chronic fatigue, weakness, right leg swelling > left leg swelling , poor appetite. Review of the systems is negative for headache, dizziness, loss of consciousness, chest pain, palpitations, shortness of breath, nausea, vomit, diarrhea, abdomen pain, urine frequency, urgency, extremity weakness and other significant complaints.  --------------------  Above information obtained from admission H&P.   Nephrology was consulted for diuretics recommendation.   -Patient was seen and examined at bedside.   -Patient reported that he had chronic right LE edema, managed on lasix 80 mg, spironolactone and metolazone.   Patient was sent to ED by oncologist for abnormal lab results.   Patient is s/p BM biopsy on 1/10/25.   Heme/onc on board, started on inpatient chemotherapy.     PAST MEDICAL & SURGICAL HISTORY:  Diabetes mellitus, type 2  BPH (benign prostatic hyperplasia)  Water retention  Essential hypertension  Diabetes  CAD (coronary artery disease)  H/O hypercholesterolemia  Morbid obesity  Chronic kidney disease (CKD)  History of atrial fibrillation  H/O right coronary artery stent placement  History of resection of pancreas  History of cholecystectomy  History of left knee replacement  S/P CABG x 6  H/O cardiac radiofrequency ablation    Allergies:  No Known Allergies    Home Medications:  aspirin 81 mg oral tablet: orally once a day (18 Dec 2024 07:09)  azelastine nasal: prn (18 Dec 2024 07:09)  furosemide 80 mg oral tablet: 1 tab(s) orally once a day (18 Dec 2024 07:09)  INSULIN PUMP - HUMALOG:  (18 Dec 2024 07:09)  ipratropium nasal: prn (18 Dec 2024 07:09)  levocetirizine 5 mg oral tablet: 1 tab(s) orally once a day (09 Jan 2025 16:17)  lisinopril 20 mg oral tablet: 1 tab(s) orally once a day (18 Dec 2024 07:09)  metOLazone 2.5 mg oral tablet: 1 tab(s) orally once a day (18 Dec 2024 07:09)  metoprolol tartrate 25 mg oral tablet: 1 tab(s) orally 2 times a day (18 Dec 2024 07:09)  multivitamin: once a day (18 Dec 2024 07:09)  pravastatin 40 mg oral tablet: 1 tab(s) orally once a day (18 Dec 2024 07:09)  Repatha 140 mg/mL subcutaneous solution: 140 milligram(s) subcutaneously (09 Jan 2025 16:10)  sildenafil 20 mg oral tablet: 1 tab(s) orally (09 Jan 2025 16:15)  spironolactone 25 mg oral tablet: 1 tab(s) orally once a day (18 Dec 2024 07:09)  testosterone cypionate 200 mg/mL intramuscular solution: 200 unit(s) intramuscularly once a month (09 Jan 2025 16:17)  tirzepatide: 0.5 milligram(s) subcutaneous once a week (09 Jan 2025 16:10)  Vitamin C 500 mg oral capsule: 1 cap(s) orally once a day (18 Dec 2024 07:09)  Xarelto 20 mg oral tablet: 1 tab(s) orally once a day (in the evening) (18 Dec 2024 07:09)    Hospital Medications:   MEDICATIONS  (STANDING):  allopurinol 100 milliGRAM(s) Oral three times a day  ascorbic acid 500 milliGRAM(s) Oral daily  aspirin  chewable 81 milliGRAM(s) Oral daily  atorvastatin 10 milliGRAM(s) Oral at bedtime  cetirizine 10 milliGRAM(s) Oral daily  chlorhexidine 2% Cloths 1 Application(s) Topical daily  doxorubicin IVPB w/etoposide (eMAR) 24 milliGRAM(s) IV Intermittent every 24 hours  furosemide    Tablet 80 milliGRAM(s) Oral <User Schedule>  lactulose Syrup 10 Gram(s) Oral three times a day  metoprolol tartrate 25 milliGRAM(s) Oral two times a day  Nephro-toni 1 Tablet(s) Oral daily  NIFEdipine XL 60 milliGRAM(s) Oral daily  pantoprazole    Tablet 40 milliGRAM(s) Oral before breakfast  polyethylene glycol 3350 17 Gram(s) Oral two times a day  predniSONE   Tablet 145 milliGRAM(s) Oral every 12 hours  rivaroxaban 15 milliGRAM(s) Oral with dinner  senna 2 Tablet(s) Oral at bedtime  sodium chloride 0.9%. 1000 milliLiter(s) (75 mL/Hr) IV Continuous <Continuous>    SOCIAL HISTORY:  Denies ETOH,Smoking,   FAMILY HISTORY:  Family history of lung cancer (Sibling)    Family history of diabetes mellitus (Mother)    Family history of coronary artery disease (Father)    REVIEW OF SYSTEMS:  CONSTITUTIONAL: No weakness, fevers or chills  RESPIRATORY: No cough, wheezing, hemoptysis; No shortness of breath  CARDIOVASCULAR: No chest pain or palpitations.  GASTROINTESTINAL: No abdominal or epigastric pain. No nausea, vomiting, or hematemesis; No diarrhea or constipation. No melena or hematochezia.  GENITOURINARY: No dysuria, frequency, foamy urine, urinary urgency, incontinence or hematuria  NEUROLOGICAL: No numbness or weakness  SKIN: No itching, burning, rashes, or lesions   VASCULAR: Bilateral LE edema R>L, chronic right LE edema s/p CABG  All other review of systems is negative unless indicated above.    VITALS:  Vital Signs Last 24 Hrs  T(C): 36.7 (13 Jan 2025 12:18), Max: 36.7 (13 Jan 2025 12:18)  T(F): 98 (13 Jan 2025 12:18), Max: 98 (13 Jan 2025 12:18)  HR: 90 (13 Jan 2025 12:18) (80 - 98)  BP: 126/68 (13 Jan 2025 12:18) (110/63 - 128/61)  BP(mean): 81 (12 Jan 2025 15:10) (81 - 81)  RR: 18 (13 Jan 2025 12:18) (16 - 18)  SpO2: 96% (13 Jan 2025 12:18) (96% - 96%)    Parameters below as of 12 Jan 2025 20:16  Patient On (Oxygen Delivery Method): room air    PHYSICAL EXAM:  Constitutional: NAD  Respiratory: CTAB, no wheezes, rales or rhonchi  Cardiovascular: S1, S2, RRR  Gastrointestinal: BS+, soft, NT/ND  Extremities: No cyanosis or clubbing. bilateral LE edema R>L  Neurological: A/O x 3, no focal deficits  Psychiatric: Normal mood, normal affect  : No CVA tenderness. No moss.   Skin: No rashes  Vascular Access:    LABS:  01-13    136  |  99  |  72[HH]  ----------------------------<  177[H]  5.1[H]   |  22  |  2.4[H]    Ca    8.6      13 Jan 2025 07:08  Phos  4.5     01-13  Mg     2.4     01-13    TPro  6.7  /  Alb  4.0  /  TBili  0.3  /  DBili      /  AST  32  /  ALT  28  /  AlkPhos  98  01-13    Creatinine Trend: 2.4 <--, 2.3 <--, 2.1 <--, 2.3 <--, 2.1 <--, 1.9 <--, 1.8 <--                        12.4   11.06 )-----------( 510      ( 13 Jan 2025 07:08 )             37.9     Urine Studies:  Urinalysis Basic - ( 13 Jan 2025 07:08 )    Color:  / Appearance:  / SG:  / pH:   Gluc: 177 mg/dL / Ketone:   / Bili:  / Urobili:    Blood:  / Protein:  / Nitrite:    Leuk Esterase:  / RBC:  / WBC    Sq Epi:  / Non Sq Epi:  / Bacteria:     RADIOLOGY & ADDITIONAL STUDIES:

## 2025-01-13 NOTE — CONSULT NOTE ADULT - CONSULT REASON
pt with insulin pump, receiving inpatient chemo with steroids, with hyperglycemia please assist with insulin dosing

## 2025-01-13 NOTE — PROGRESS NOTE ADULT - SUBJECTIVE AND OBJECTIVE BOX
24H events:    Patient is a 69y old Male who presents with a chief complaint of abnormal labs (12 Jan 2025 12:01)  Primary diagnosis of Lymphoma      Today is 4d of hospitalization. This morning patient was seen and examined at bedside, resting comfortably in bed.    No acute or major events overnight.    Code Status:    Family communication:  Contact date:  Name of person contacted:  Relationship to patient:  Communication details:  What matters most:    PAST MEDICAL & SURGICAL HISTORY  Diabetes mellitus, type 2    BPH (benign prostatic hyperplasia)    Water retention    Essential hypertension    Diabetes    CAD (coronary artery disease)    H/O hypercholesterolemia    Morbid obesity    Chronic kidney disease (CKD)    History of atrial fibrillation    H/O right coronary artery stent placement    History of resection of pancreas    History of cholecystectomy    History of left knee replacement    S/P CABG x 6    H/O cardiac radiofrequency ablation      SOCIAL HISTORY:  Social History:      ALLERGIES:  No Known Allergies    MEDICATIONS:  STANDING MEDICATIONS  allopurinol 100 milliGRAM(s) Oral three times a day  ascorbic acid 500 milliGRAM(s) Oral daily  aspirin  chewable 81 milliGRAM(s) Oral daily  atorvastatin 10 milliGRAM(s) Oral at bedtime  cetirizine 10 milliGRAM(s) Oral daily  doxorubicin IVPB w/etoposide (eMAR) 24 milliGRAM(s) IV Intermittent every 24 hours  furosemide    Tablet 80 milliGRAM(s) Oral <User Schedule>  lactulose Syrup 10 Gram(s) Oral three times a day  metoprolol tartrate 25 milliGRAM(s) Oral two times a day  Nephro-toni 1 Tablet(s) Oral daily  NIFEdipine XL 60 milliGRAM(s) Oral daily  polyethylene glycol 3350 17 Gram(s) Oral two times a day  predniSONE   Tablet 145 milliGRAM(s) Oral every 12 hours  rivaroxaban 15 milliGRAM(s) Oral with dinner  senna 2 Tablet(s) Oral at bedtime  sodium chloride 0.9%. 1000 milliLiter(s) IV Continuous <Continuous>    PRN MEDICATIONS  acetaminophen     Tablet .. 650 milliGRAM(s) Oral every 6 hours PRN  aluminum hydroxide/magnesium hydroxide/simethicone Suspension 30 milliLiter(s) Oral every 4 hours PRN  melatonin 3 milliGRAM(s) Oral at bedtime PRN  ondansetron Injectable 4 milliGRAM(s) IV Push every 8 hours PRN    VITALS:   T(F): 97.5  HR: 80  BP: 110/63  RR: 16  SpO2: 96%    PHYSICAL EXAM:  GENERAL:   ( x) NAD, lying in bed comfortably     (  ) obtunded     (  ) lethargic     (  ) somnolent    HEART:  Rate -->     (x) normal rate     (  ) bradycardic     (  ) tachycardic  Rhythm -->     (x) regular     (  ) regularly irregular     (  ) irregularly irregular  Murmurs -->     (x) normal s1s2     (  ) systolic murmur     (  ) diastolic murmur     (  ) continuous murmur      (  ) S3 present     (  ) S4 present    LUNGS:   ( x)Unlabored respirations     (  ) tachypnea  ( x) B/L air entry     (  ) decreased breath sounds in:  (location     )    ( x) no adventitious sound     (  ) crackles     (  ) wheezing      (  ) rhonchi      (specify location:       )  (  ) chest wall tenderness (specify location:       )    ABDOMEN:   ( x) Soft     (  ) tense   |   (  ) nondistended     (  ) distended   |   (  ) +BS     (  ) hypoactive bowel sounds     (  ) hyperactive bowel sounds  ( x) nontender     (  ) RUQ tenderness     (  ) RLQ tenderness     (  ) LLQ tenderness     (  ) epigastric tenderness     (  ) diffuse tenderness  (  ) Splenomegaly      (  ) Hepatomegaly      (  ) Jaundice     (  ) ecchymosis     EXTREMITIES:  Warm    NERVOUS SYSTEM:    ( x) A&Ox3     (  ) confused     (  ) lethargic          LABS:                        12.4   11.06 )-----------( 510      ( 13 Jan 2025 07:08 )             37.9     01-13    136  |  99  |  72[HH]  ----------------------------<  177[H]  5.1[H]   |  22  |  2.4[H]    Ca    8.6      13 Jan 2025 07:08  Phos  4.5     01-13  Mg     2.4     01-13    TPro  6.7  /  Alb  4.0  /  TBili  0.3  /  DBili  x   /  AST  32  /  ALT  28  /  AlkPhos  98  01-13      Urinalysis Basic - ( 13 Jan 2025 07:08 )    Color: x / Appearance: x / SG: x / pH: x  Gluc: 177 mg/dL / Ketone: x  / Bili: x / Urobili: x   Blood: x / Protein: x / Nitrite: x   Leuk Esterase: x / RBC: x / WBC x   Sq Epi: x / Non Sq Epi: x / Bacteria: x                RADIOLOGY:    ASSESSMENT AND PLAN  69-year-old man with pmhx recent dx large B cell lymphoma with large right inguinal mass (lesion onset ~4/2024), CAD s/p CABG;), afib on Xarelto, hx ? 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    #Cancer progression with possible renal involvement/ureter obstruction versus tumor lysis syndrome   - Vitally stable   - Out patient labs. WBC 9.9, hemoglobin 13.3, platelets 574,000, serum creatinine 2.0 (baseline from 1.4-1.8 range), potassium 4.6, calcium 9.2, Phosphorus 3.7, uric acid 9.1, .  - In Patient Labs: creat 1.8, uric acid 7.9, , Potassium, Phosphorus , calcium in normal ranges   - Imaging : Doppler venous LE : No evidence of deep venous thrombosis in either lower extremity. There is a proximal thigh/right groin mass measuring greater than 6.9 x  9.2 x 11 cm  - TTE 1/10/25: EF 60-65%, mild MR, mild TR, mild AS  - hepatitis B, hepatitis C HIV NEGATIVE  - IR guided BM biopsy 1/11/2025 => follow up BM bx  - IR placed PICC line 1/11/2025  - Heme/onc following => started chemotherapy with R-EPOCH. D1-4 etoposide/vincristine/adriamycin/prednisone, D5 cytoxan and  Rituximab  - D1 chemo 1/11/2025   - Continue allopurinol   - Continue NS 75cc/h   - Daily tumor lysis labs including CBC, BMP, LFT, Uric acid, phosphorus. Please make sure labs are done daily on time, high risk for TLS when chemotherapy is initiated due to bulky lymphadenopathy     #CAD s/p CABG  #Chronic afib on Xarelto  #HLD   - cw Pravastatin, metoprolol  - hold lisinopril,  spironolactone, metolazone   - resume lasix   - continue xarelto     #DM  - on insulin pump @2.85 at home . cw pump  - monitor blood sugar   - target blood sugar 140 - 180   - BG increasing with steroids, consulted endo for assistance  - Follow up endocrine consult     #CKD Stage 3B  - Creatinine at baseline (1.8)  - creat 2 on 1/9 labs  - avoid volume overload   - Follow up nephrology consult    #? hx pancreatic stone, s/p distal pancreatomy and splenectomy  - outpt follow up.     #Constipation  - 1/13 started BM regimen       #DVT PPX: Rivaroxaban  #GI PPX: Pantoprazole  #Diet: CC  #Code Status: Full code  #Activity Order: IAT  #Dispo: Medicine    #Handoff  - Follow up heme onc  - Daily TLS labs  - Follow up endocrine cs  - Follow up nephrology cs

## 2025-01-14 LAB
ALBUMIN SERPL ELPH-MCNC: 4 G/DL — SIGNIFICANT CHANGE UP (ref 3.5–5.2)
ALP SERPL-CCNC: 93 U/L — SIGNIFICANT CHANGE UP (ref 30–115)
ALT FLD-CCNC: 46 U/L — HIGH (ref 0–41)
ANION GAP SERPL CALC-SCNC: 13 MMOL/L — SIGNIFICANT CHANGE UP (ref 7–14)
AST SERPL-CCNC: 44 U/L — HIGH (ref 0–41)
BASOPHILS # BLD AUTO: 0.01 K/UL — SIGNIFICANT CHANGE UP (ref 0–0.2)
BASOPHILS NFR BLD AUTO: 0.1 % — SIGNIFICANT CHANGE UP (ref 0–1)
BILIRUB SERPL-MCNC: 0.6 MG/DL — SIGNIFICANT CHANGE UP (ref 0.2–1.2)
BUN SERPL-MCNC: 81 MG/DL — CRITICAL HIGH (ref 10–20)
CALCIUM SERPL-MCNC: 9.1 MG/DL — SIGNIFICANT CHANGE UP (ref 8.4–10.4)
CHLORIDE SERPL-SCNC: 98 MMOL/L — SIGNIFICANT CHANGE UP (ref 98–110)
CO2 SERPL-SCNC: 25 MMOL/L — SIGNIFICANT CHANGE UP (ref 17–32)
CREAT SERPL-MCNC: 2.1 MG/DL — HIGH (ref 0.7–1.5)
EGFR: 34 ML/MIN/1.73M2 — LOW
EOSINOPHIL # BLD AUTO: 0 K/UL — SIGNIFICANT CHANGE UP (ref 0–0.7)
EOSINOPHIL NFR BLD AUTO: 0 % — SIGNIFICANT CHANGE UP (ref 0–8)
G6PD RBC-CCNC: 14.7 U/G HGB — SIGNIFICANT CHANGE UP (ref 7–20.5)
GLUCOSE SERPL-MCNC: 159 MG/DL — HIGH (ref 70–99)
HCT VFR BLD CALC: 39.7 % — LOW (ref 42–52)
HGB BLD-MCNC: 13.3 G/DL — LOW (ref 14–18)
IMM GRANULOCYTES NFR BLD AUTO: 1.4 % — HIGH (ref 0.1–0.3)
LDH SERPL L TO P-CCNC: 194 U/L — SIGNIFICANT CHANGE UP (ref 50–242)
LYMPHOCYTES # BLD AUTO: 0.87 K/UL — LOW (ref 1.2–3.4)
LYMPHOCYTES # BLD AUTO: 7.2 % — LOW (ref 20.5–51.1)
MAGNESIUM SERPL-MCNC: 2.5 MG/DL — HIGH (ref 1.8–2.4)
MCHC RBC-ENTMCNC: 31.4 PG — HIGH (ref 27–31)
MCHC RBC-ENTMCNC: 33.5 G/DL — SIGNIFICANT CHANGE UP (ref 32–37)
MCV RBC AUTO: 93.6 FL — SIGNIFICANT CHANGE UP (ref 80–94)
MONOCYTES # BLD AUTO: 0.74 K/UL — HIGH (ref 0.1–0.6)
MONOCYTES NFR BLD AUTO: 6.1 % — SIGNIFICANT CHANGE UP (ref 1.7–9.3)
NEUTROPHILS # BLD AUTO: 10.33 K/UL — HIGH (ref 1.4–6.5)
NEUTROPHILS NFR BLD AUTO: 85.2 % — HIGH (ref 42.2–75.2)
NRBC # BLD: 0 /100 WBCS — SIGNIFICANT CHANGE UP (ref 0–0)
PHOSPHATE SERPL-MCNC: 5.5 MG/DL — HIGH (ref 2.1–4.9)
PLATELET # BLD AUTO: 522 K/UL — HIGH (ref 130–400)
PMV BLD: 10.2 FL — SIGNIFICANT CHANGE UP (ref 7.4–10.4)
POTASSIUM SERPL-MCNC: 4.8 MMOL/L — SIGNIFICANT CHANGE UP (ref 3.5–5)
POTASSIUM SERPL-SCNC: 4.8 MMOL/L — SIGNIFICANT CHANGE UP (ref 3.5–5)
PROT SERPL-MCNC: 6.8 G/DL — SIGNIFICANT CHANGE UP (ref 6–8)
RAPID RVP RESULT: SIGNIFICANT CHANGE UP
RBC # BLD: 4.24 M/UL — LOW (ref 4.7–6.1)
RBC # FLD: 13.2 % — SIGNIFICANT CHANGE UP (ref 11.5–14.5)
SARS-COV-2 RNA SPEC QL NAA+PROBE: SIGNIFICANT CHANGE UP
SODIUM SERPL-SCNC: 136 MMOL/L — SIGNIFICANT CHANGE UP (ref 135–146)
TM INTERPRETATION: SIGNIFICANT CHANGE UP
URATE SERPL-MCNC: 8.3 MG/DL — SIGNIFICANT CHANGE UP (ref 3.4–8.8)
WBC # BLD: 12.12 K/UL — HIGH (ref 4.8–10.8)
WBC # FLD AUTO: 12.12 K/UL — HIGH (ref 4.8–10.8)

## 2025-01-14 PROCEDURE — 99232 SBSQ HOSP IP/OBS MODERATE 35: CPT

## 2025-01-14 PROCEDURE — 74018 RADEX ABDOMEN 1 VIEW: CPT | Mod: 26

## 2025-01-14 RX ORDER — RITUXIMAB 10 MG/ML
930 INJECTION, SOLUTION INTRAVENOUS ONCE
Refills: 0 | Status: COMPLETED | OUTPATIENT
Start: 2025-01-16 | End: 2025-01-16

## 2025-01-14 RX ORDER — CYCLOPHOSPHAMIDE 500 MG/25ML
1860 INJECTION, POWDER, FOR SOLUTION INTRAVENOUS; ORAL ONCE
Refills: 0 | Status: COMPLETED | OUTPATIENT
Start: 2025-01-16 | End: 2025-01-16

## 2025-01-14 RX ADMIN — SENNOSIDES 2 TABLET(S): 8.6 TABLET, FILM COATED ORAL at 21:55

## 2025-01-14 RX ADMIN — BUMETANIDE 2 MILLIGRAM(S): 2 TABLET ORAL at 17:24

## 2025-01-14 RX ADMIN — NIFEDIPINE 60 MILLIGRAM(S): 60 TABLET, EXTENDED RELEASE ORAL at 05:08

## 2025-01-14 RX ADMIN — ACETAMINOPHEN 650 MILLIGRAM(S): 80 SOLUTION/ DROPS ORAL at 04:52

## 2025-01-14 RX ADMIN — RIVAROXABAN 15 MILLIGRAM(S): 2.5 TABLET, FILM COATED ORAL at 17:14

## 2025-01-14 RX ADMIN — CHLORHEXIDINE GLUCONATE 1 APPLICATION(S): 1.2 RINSE ORAL at 12:12

## 2025-01-14 RX ADMIN — LACTULOSE 10 GRAM(S): 10 SOLUTION ORAL; RECTAL at 13:41

## 2025-01-14 RX ADMIN — Medication 145 MILLIGRAM(S): at 17:18

## 2025-01-14 RX ADMIN — LACTULOSE 10 GRAM(S): 10 SOLUTION ORAL; RECTAL at 05:07

## 2025-01-14 RX ADMIN — Medication 81 MILLIGRAM(S): at 13:41

## 2025-01-14 RX ADMIN — DOXORUBICIN HYDROCHLORIDE 24 MILLIGRAM(S): 2 INJECTION, SOLUTION INTRAVENOUS at 00:06

## 2025-01-14 RX ADMIN — BUMETANIDE 2 MILLIGRAM(S): 2 TABLET ORAL at 06:03

## 2025-01-14 RX ADMIN — Medication 1 TABLET(S): at 13:42

## 2025-01-14 RX ADMIN — PANTOPRAZOLE 40 MILLIGRAM(S): 40 TABLET, DELAYED RELEASE ORAL at 06:02

## 2025-01-14 RX ADMIN — ATORVASTATIN CALCIUM 10 MILLIGRAM(S): 40 TABLET, FILM COATED ORAL at 21:55

## 2025-01-14 RX ADMIN — LACTULOSE 10 GRAM(S): 10 SOLUTION ORAL; RECTAL at 21:54

## 2025-01-14 RX ADMIN — CETIRIZINE HYDROCHLORIDE 10 MILLIGRAM(S): 5 SYRUP ORAL at 13:42

## 2025-01-14 RX ADMIN — ONDANSETRON 4 MILLIGRAM(S): 4 TABLET ORAL at 12:11

## 2025-01-14 RX ADMIN — Medication 25 MILLIGRAM(S): at 05:07

## 2025-01-14 RX ADMIN — Medication 25 MILLIGRAM(S): at 17:14

## 2025-01-14 RX ADMIN — ACETAMINOPHEN 650 MILLIGRAM(S): 80 SOLUTION/ DROPS ORAL at 06:57

## 2025-01-14 RX ADMIN — Medication 30 MILLILITER(S): at 12:16

## 2025-01-14 RX ADMIN — Medication 17 GRAM(S): at 17:15

## 2025-01-14 RX ADMIN — Medication 17 GRAM(S): at 05:07

## 2025-01-14 RX ADMIN — ALLOPURINOL 100 MILLIGRAM(S): 100 TABLET ORAL at 13:42

## 2025-01-14 RX ADMIN — Medication 145 MILLIGRAM(S): at 05:07

## 2025-01-14 RX ADMIN — Medication 500 MILLIGRAM(S): at 13:42

## 2025-01-14 NOTE — PROGRESS NOTE ADULT - SUBJECTIVE AND OBJECTIVE BOX
Nephrology progress note    Patient is seen and examined, events over the last 24 h noted .  ambulating     Allergies:  No Known Allergies    Hospital Medications:   MEDICATIONS  (STANDING):  allopurinol 100 milliGRAM(s) Oral daily  ascorbic acid 500 milliGRAM(s) Oral daily  aspirin  chewable 81 milliGRAM(s) Oral daily  atorvastatin 10 milliGRAM(s) Oral at bedtime  buMETAnide Injectable 2 milliGRAM(s) IV Push every 12 hours  cetirizine 10 milliGRAM(s) Oral daily  doxorubicin IVPB w/etoposide (eMAR) 24 milliGRAM(s) IV Intermittent every 24 hours  lactulose Syrup 10 Gram(s) Oral three times a day  metolazone 2.5 milliGRAM(s) Oral daily  metoprolol tartrate 25 milliGRAM(s) Oral two times a day  Nephro-toni 1 Tablet(s) Oral daily  NIFEdipine XL 60 milliGRAM(s) Oral daily  pantoprazole    Tablet 40 milliGRAM(s) Oral before breakfast  polyethylene glycol 3350 17 Gram(s) Oral two times a day  predniSONE   Tablet 145 milliGRAM(s) Oral every 12 hours  rivaroxaban 15 milliGRAM(s) Oral with dinner  senna 2 Tablet(s) Oral at bedtime        VITALS:  T(F): 97.5 (01-14-25 @ 04:00), Max: 98 (01-13-25 @ 12:18)  HR: 83 (01-14-25 @ 04:00)  BP: 159/77 (01-14-25 @ 04:00)  RR: 18 (01-14-25 @ 00:00)  SpO2: 97% (01-14-25 @ 00:00)          PHYSICAL EXAM:  Constitutional: NAD  HEENT: anicteric sclera, oropharynx clear, MMM  Neck: No JVD  Respiratory: CTAB,  Cardiovascular: S1, S2, RRR  Gastrointestinal: BS+, soft, NT/ND  Extremities: No cyanosis or clubbing. plus 2 edema   :  No moss.   Skin: No rashes    LABS:  01-14    136  |  98  |  81[HH]  ----------------------------<  159[H]  4.8   |  25  |  2.1[H]  Creatinine Trend: 2.1<--, 2.4<--, 2.3<--, 2.1<--, 2.3<--, 2.1<--  Ca    9.1      14 Jan 2025 08:15  Phos  5.5     01-14  Mg     2.5     01-14    TPro  6.8  /  Alb  4.0  /  TBili  0.6  /  DBili      /  AST  44[H]  /  ALT  46[H]  /  AlkPhos  93  01-14                          13.3   12.12 )-----------( 522      ( 14 Jan 2025 08:15 )             39.7     Uric Acid: 8.3 mg/dL (01.14.25 @ 08:15)      Urine Studies:  Urinalysis Basic - ( 14 Jan 2025 08:15 )    Color:  / Appearance:  / SG:  / pH:   Gluc: 159 mg/dL / Ketone:   / Bili:  / Urobili:    Blood:  / Protein:  / Nitrite:    Leuk Esterase:  / RBC:  / WBC    Sq Epi:  / Non Sq Epi:  / Bacteria:             HBsAg Nonreact      [01-11-25 @ 13:09]  HBcAb Nonreact      [01-11-25 @ 13:09]  HCV 0.08, Nonreact      [01-11-25 @ 13:09]        RADIOLOGY & ADDITIONAL STUDIES:

## 2025-01-14 NOTE — PROGRESS NOTE ADULT - ASSESSMENT
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 pancreatic stone, s/p distal pancreatomy and splenectomy, sent in from oncology office for further evaluation of large B-cell lymphoma in the setting of abnormal labs.    Nephrology was consulted for diuretics management.     #Stable CKD stage 3b  #Hyperkalemia  #Large B cell Lymphoma; on chemotherapy.   #Normocytic anemia likely from CKD stage 3b  #Thrombocytosis     Plan:  - Continue Bumex Metolazone current  - strict I and O   - keep on allopurinol current  - Daily BMP and IP / serum uric acid   - chemo as per hem onc   no need for RRT    will follow

## 2025-01-14 NOTE — PROGRESS NOTE ADULT - ASSESSMENT
69-year-old man with extensive medical history including CAD (status post CABG;), atrial fibrillation on Xarelto, history of distal pancreatomy and splenectomy for unclear reason referred to the ED by his oncologist for abnormal labs. Patient was recently diagnosed with Diffuse large B cell Lymphoma on pathology of right groin mass and was following his Oncologist Dr Angeli Hilliard. Today patient was seen by his oncologist in office and was referred to the ed for abnormal labs.  Today, he reported having chronic fatigue, weakness, right greater than left progressive leg swelling, poor appetite, no night sweats. Patient reported having right greater than left lower extremity swelling.    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   CT pelvis on 10/12/24 showed interval increase of mass 10 x 7 x 10 cm with inguinal lymphadenopathy, suspicious for neoplastic process.  Biopsy on 12/18/24 revealed diffuse large B-cell lymphoma with concern for Rodriguez's transformation.  Patient was seen by Dr. Hilliard on 1/8/25 for initial consultation and was advised to come to ED due to concern for tumor lysis syndrome.  Uric acid level now WNL at 7.9, CMP otherwise unremarkable.   CT C/A/P 1.9.25 Stable appearance bulky right inguinal and right iliac lymphadenopathy including partially imaged 10.1 cm right inguinal soft tissue mass/lymph node.  s/p PICC placement on 1/10/25  s/p Bone marrow biopsy on 1/10/25  Echo 1/10/25: EF 60-65%  Pending hepatitis panel      CKD Stage 3B  baseline (1.8)    Recommendations   Started R-EPOCH, D1 on 1/11/2024. D1-4 etoposide/vincristine/adriamycin/prednisone, D5 cytoxan and Rituximab.  D3 today 1/13/25  C/w allopurinol renally dosed  Ideally needs to be IV fluids, follow up nephrology recs   Daily labs including cbc, bmp, LFTS, Uric acid, phosphorus. Please make sure labs are done daily on time, high risk for TLS when chemotherapy is initiated due to bulky lymphadenopathy        69-year-old man with extensive medical history including CAD (status post CABG;), atrial fibrillation on Xarelto, history of distal pancreatomy and splenectomy for unclear reason referred to the ED by his oncologist for abnormal labs. Patient was recently diagnosed with Diffuse large B cell Lymphoma on pathology of right groin mass and was following his Oncologist Dr Angeli Hilliard. Today patient was seen by his oncologist in office and was referred to the ed for abnormal labs.  Today, he reported having chronic fatigue, weakness, right greater than left progressive leg swelling, poor appetite, no night sweats. Patient reported having right greater than left lower extremity swelling.    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   CT pelvis on 10/12/24 showed interval increase of mass 10 x 7 x 10 cm with inguinal lymphadenopathy, suspicious for neoplastic process.  Biopsy on 12/18/24 revealed diffuse large B-cell lymphoma with concern for Rodriguez's transformation.  Patient was seen by Dr. Hilliard on 1/8/25 for initial consultation and was advised to come to ED due to concern for tumor lysis syndrome.  Uric acid level now WNL at 7.9, CMP otherwise unremarkable.   CT C/A/P 1.9.25 Stable appearance bulky right inguinal and right iliac lymphadenopathy including partially imaged 10.1 cm right inguinal soft tissue mass/lymph node.  s/p PICC placement on 1/10/25  s/p Bone marrow biopsy on 1/10/25  Echo 1/10/25: EF 60-65%  Pending hepatitis panel      CKD Stage 3B  baseline (1.8)    Recommendations   Started R-EPOCH, D1 on 1/11/2024. D1-4 etoposide/vincristine/adriamycin/prednisone, D5 cytoxan and Rituximab.  D4 today 1/14/25   C/w allopurinol renally dosed  Ideally needs to be IV fluids, follow up nephrology recs   Daily labs including cbc, bmp, LFTS, Uric acid, phosphorus. Please make sure labs are done daily on time, high risk for TLS due to bulky lymphadenopathy

## 2025-01-14 NOTE — PROGRESS NOTE ADULT - ASSESSMENT
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    #Cancer progression with possible renal involvement/ureter obstruction versus tumor lysis syndrome   - Vitally stable   - Out patient labs. WBC 9.9, hemoglobin 13.3, platelets 574,000, serum creatinine 2.0 (baseline from 1.4-1.8 range), potassium 4.6, calcium 9.2, Phosphorus 3.7, uric acid 9.1, .  - In Patient Labs: creat 1.8, uric acid 7.9, , Potassium, Phosphorus , calcium in normal ranges   - Imaging : Doppler venous LE : No evidence of deep venous thrombosis in either lower extremity. There is a proximal thigh/right groin mass measuring greater than 6.9 x  9.2 x 11 cm  - TTE 1/10/25: EF 60-65%, mild MR, mild TR, mild AS  - hepatitis B, hepatitis C HIV NEGATIVE  - IR guided BM biopsy 1/11/2025 => follow up BM bx  - IR placed PICC line 1/11/2025  - Heme/onc following => started chemotherapy with R-EPOCH. D1-4 etoposide/vincristine/adriamycin/prednisone, D5 cytoxan and  Rituximab  - D1 chemo 1/11/2025   - Continue allopurinol   - Daily tumor lysis labs including CBC, BMP, LFT, Uric acid, phosphorus. Please make sure labs are done daily on time, high risk for TLS when chemotherapy is initiated due to bulky lymphadenopathy   - nephro consulted: - Continue Bumex Metolazone current  - strict I and O   - keep on allopurinol current  - Daily BMP and IP / serum uric acid     #CAD s/p CABG  #Chronic afib on Xarelto  #HLD   - cw Pravastatin, metoprolol  - cw metolazone, bumex  - hold lisinopril, spironolactone, lasix   - continue xarelto     #DM  - on insulin pump @2.85 at home . cw pump  - monitor blood sugar   - target blood sugar 140 - 180   - BG increasing with steroids  - endocrine recs noted    #CKD Stage 3B  - Creatinine at baseline (1.8)  - creat 2 on 1/9 labs  - avoid volume overload   -nephrology following  - no RRT at this time    #? hx pancreatic stone, s/p distal pancreatomy and splenectomy  - outpt follow up.     #Constipation  - 1/13 started BM regimen   - 1/14 added enema; has had some BM but still in pain  - KUB: Mild to moderate colonic stool burden. Nonobstructive gas pattern.     #DVT PPX: Rivaroxaban  #GI PPX: Pantoprazole  #Diet: CC  #Code Status: Full code  #Activity Order: IAT  #Dispo: Medicine    #Handoff  - Follow up heme onc  - Daily TLS labs

## 2025-01-14 NOTE — PROGRESS NOTE ADULT - SUBJECTIVE AND OBJECTIVE BOX
SUBJECTIVE:    Patient is a 69y old Male who presents with a chief complaint of abnormal labs (14 Jan 2025 12:02)    Currently admitted to medicine with the primary diagnosis of Lymphoma       Today is hospital day 5d. This morning he is resting comfortably in bed and reports no new issues or overnight events.     PAST MEDICAL & SURGICAL HISTORY  Diabetes mellitus, type 2    BPH (benign prostatic hyperplasia)    Water retention    Essential hypertension    Diabetes    CAD (coronary artery disease)    H/O hypercholesterolemia    Morbid obesity    Chronic kidney disease (CKD)    History of atrial fibrillation    H/O right coronary artery stent placement    History of resection of pancreas    History of cholecystectomy    History of left knee replacement    S/P CABG x 6    H/O cardiac radiofrequency ablation      SOCIAL HISTORY:  Negative for smoking/alcohol/drug use.     ALLERGIES:  No Known Allergies    MEDICATIONS:  STANDING MEDICATIONS  allopurinol 100 milliGRAM(s) Oral daily  ascorbic acid 500 milliGRAM(s) Oral daily  aspirin  chewable 81 milliGRAM(s) Oral daily  atorvastatin 10 milliGRAM(s) Oral at bedtime  buMETAnide Injectable 2 milliGRAM(s) IV Push every 12 hours  cetirizine 10 milliGRAM(s) Oral daily  chlorhexidine 2% Cloths 1 Application(s) Topical daily  doxorubicin IVPB w/etoposide (eMAR) 24 milliGRAM(s) IV Intermittent every 24 hours  lactulose Syrup 10 Gram(s) Oral three times a day  metolazone 2.5 milliGRAM(s) Oral daily  metoprolol tartrate 25 milliGRAM(s) Oral two times a day  Nephro-toni 1 Tablet(s) Oral daily  NIFEdipine XL 60 milliGRAM(s) Oral daily  pantoprazole    Tablet 40 milliGRAM(s) Oral before breakfast  polyethylene glycol 3350 17 Gram(s) Oral two times a day  predniSONE   Tablet 145 milliGRAM(s) Oral every 12 hours  rivaroxaban 15 milliGRAM(s) Oral with dinner  senna 2 Tablet(s) Oral at bedtime    PRN MEDICATIONS  acetaminophen     Tablet .. 650 milliGRAM(s) Oral every 6 hours PRN  aluminum hydroxide/magnesium hydroxide/simethicone Suspension 30 milliLiter(s) Oral every 4 hours PRN  melatonin 3 milliGRAM(s) Oral at bedtime PRN  ondansetron Injectable 4 milliGRAM(s) IV Push every 8 hours PRN    VITALS:   T(F): 97.4  HR: 103  BP: 130/66  RR: 18  SpO2: 97%    LABS:                        13.3   12.12 )-----------( 522      ( 14 Jan 2025 08:15 )             39.7     01-14    136  |  98  |  81[HH]  ----------------------------<  159[H]  4.8   |  25  |  2.1[H]    Ca    9.1      14 Jan 2025 08:15  Phos  5.5     01-14  Mg     2.5     01-14    TPro  6.8  /  Alb  4.0  /  TBili  0.6  /  DBili  x   /  AST  44[H]  /  ALT  46[H]  /  AlkPhos  93  01-14      Urinalysis Basic - ( 14 Jan 2025 08:15 )    Color: x / Appearance: x / SG: x / pH: x  Gluc: 159 mg/dL / Ketone: x  / Bili: x / Urobili: x   Blood: x / Protein: x / Nitrite: x   Leuk Esterase: x / RBC: x / WBC x   Sq Epi: x / Non Sq Epi: x / Bacteria: x                RADIOLOGY:    PHYSICAL EXAM:  GEN: No acute distress  LUNGS: Clear to auscultation bilaterally   HEART: Regular  ABD: Soft, non-tender, non-distended.  EXT: NC/NC/NE/2+PP/LUJAN/Skin Intact.   NEURO: AAOX3    Intravenous access:   NG tube:   Ojeda Catheter:        SUBJECTIVE:    Patient is a 69y old Male who presents with a chief complaint of abnormal labs (14 Jan 2025 12:02)    Currently admitted to medicine with the primary diagnosis of Lymphoma       Today is hospital day 5d. This morning he is complaining of constipation, has not had a bowel movement since Thursday, was started on lactulose and Miralax, had 3 bms but small amount. He feels uncomfortable with stomach cramps    PAST MEDICAL & SURGICAL HISTORY  Diabetes mellitus, type 2    BPH (benign prostatic hyperplasia)    Water retention    Essential hypertension    Diabetes    CAD (coronary artery disease)    H/O hypercholesterolemia    Morbid obesity    Chronic kidney disease (CKD)    History of atrial fibrillation    H/O right coronary artery stent placement    History of resection of pancreas    History of cholecystectomy    History of left knee replacement    S/P CABG x 6    H/O cardiac radiofrequency ablation      SOCIAL HISTORY:  Negative for smoking/alcohol/drug use.     ALLERGIES:  No Known Allergies    MEDICATIONS:  STANDING MEDICATIONS  allopurinol 100 milliGRAM(s) Oral daily  ascorbic acid 500 milliGRAM(s) Oral daily  aspirin  chewable 81 milliGRAM(s) Oral daily  atorvastatin 10 milliGRAM(s) Oral at bedtime  buMETAnide Injectable 2 milliGRAM(s) IV Push every 12 hours  cetirizine 10 milliGRAM(s) Oral daily  chlorhexidine 2% Cloths 1 Application(s) Topical daily  doxorubicin IVPB w/etoposide (eMAR) 24 milliGRAM(s) IV Intermittent every 24 hours  lactulose Syrup 10 Gram(s) Oral three times a day  metolazone 2.5 milliGRAM(s) Oral daily  metoprolol tartrate 25 milliGRAM(s) Oral two times a day  Nephro-toni 1 Tablet(s) Oral daily  NIFEdipine XL 60 milliGRAM(s) Oral daily  pantoprazole    Tablet 40 milliGRAM(s) Oral before breakfast  polyethylene glycol 3350 17 Gram(s) Oral two times a day  predniSONE   Tablet 145 milliGRAM(s) Oral every 12 hours  rivaroxaban 15 milliGRAM(s) Oral with dinner  senna 2 Tablet(s) Oral at bedtime    PRN MEDICATIONS  acetaminophen     Tablet .. 650 milliGRAM(s) Oral every 6 hours PRN  aluminum hydroxide/magnesium hydroxide/simethicone Suspension 30 milliLiter(s) Oral every 4 hours PRN  melatonin 3 milliGRAM(s) Oral at bedtime PRN  ondansetron Injectable 4 milliGRAM(s) IV Push every 8 hours PRN    VITALS:   T(F): 97.4  HR: 103  BP: 130/66  RR: 18  SpO2: 97%    LABS:                        13.3   12.12 )-----------( 522      ( 14 Jan 2025 08:15 )             39.7     01-14    136  |  98  |  81[HH]  ----------------------------<  159[H]  4.8   |  25  |  2.1[H]    Ca    9.1      14 Jan 2025 08:15  Phos  5.5     01-14  Mg     2.5     01-14    TPro  6.8  /  Alb  4.0  /  TBili  0.6  /  DBili  x   /  AST  44[H]  /  ALT  46[H]  /  AlkPhos  93  01-14      Urinalysis Basic - ( 14 Jan 2025 08:15 )    Color: x / Appearance: x / SG: x / pH: x  Gluc: 159 mg/dL / Ketone: x  / Bili: x / Urobili: x   Blood: x / Protein: x / Nitrite: x   Leuk Esterase: x / RBC: x / WBC x   Sq Epi: x / Non Sq Epi: x / Bacteria: x                RADIOLOGY:  < from: Xray Kidney Ureter Bladder (01.14.25 @ 11:04) >  IMPRESSION:  Mild to moderate colonic stool burden. Nonobstructive gas pattern.   Degenerative changes of the spine.      < end of copied text >      PHYSICAL EXAM:  GEN: No acute distress  LUNGS: Clear to auscultation bilaterally   HEART: Regular  ABD: Soft, non-tender, non-distended.  EXT: b/l LE +2 edema   NEURO: AAOX3

## 2025-01-14 NOTE — PROGRESS NOTE ADULT - SUBJECTIVE AND OBJECTIVE BOX
SUBJECTIVE/OVERNIGHT EVENTS  Today is hospital day 5d. This morning patient was seen and examined at bedside, resting comfortably in bed. No acute or major events overnight.    MEDICATIONS  STANDING MEDICATIONS  allopurinol 100 milliGRAM(s) Oral daily  ascorbic acid 500 milliGRAM(s) Oral daily  aspirin  chewable 81 milliGRAM(s) Oral daily  atorvastatin 10 milliGRAM(s) Oral at bedtime  buMETAnide Injectable 2 milliGRAM(s) IV Push every 12 hours  cetirizine 10 milliGRAM(s) Oral daily  chlorhexidine 2% Cloths 1 Application(s) Topical daily  doxorubicin IVPB w/etoposide (eMAR) 24 milliGRAM(s) IV Intermittent every 24 hours  lactulose Syrup 10 Gram(s) Oral three times a day  metolazone 2.5 milliGRAM(s) Oral daily  metoprolol tartrate 25 milliGRAM(s) Oral two times a day  Nephro-toni 1 Tablet(s) Oral daily  NIFEdipine XL 60 milliGRAM(s) Oral daily  pantoprazole    Tablet 40 milliGRAM(s) Oral before breakfast  polyethylene glycol 3350 17 Gram(s) Oral two times a day  predniSONE   Tablet 145 milliGRAM(s) Oral every 12 hours  rivaroxaban 15 milliGRAM(s) Oral with dinner  senna 2 Tablet(s) Oral at bedtime    PRN MEDICATIONS  acetaminophen     Tablet .. 650 milliGRAM(s) Oral every 6 hours PRN  aluminum hydroxide/magnesium hydroxide/simethicone Suspension 30 milliLiter(s) Oral every 4 hours PRN  melatonin 3 milliGRAM(s) Oral at bedtime PRN  ondansetron Injectable 4 milliGRAM(s) IV Push every 8 hours PRN    VITALS  T(F): 97.4 (01-14-25 @ 12:25), Max: 97.7 (01-14-25 @ 00:00)  HR: 102 (01-14-25 @ 17:08) (83 - 103)  BP: 114/77 (01-14-25 @ 17:08) (114/77 - 159/77)  RR: 18 (01-14-25 @ 12:25) (18 - 18)  SpO2: 97% (01-14-25 @ 00:00) (96% - 97%)  POCT Blood Glucose.: 173 mg/dL (01-14-25 @ 08:29)    PHYSICAL EXAM    LABS             13.3   12.12 )-----------( 522      ( 01-14-25 @ 08:15 )             39.7     136  |  98  |  81  -------------------------<  159   01-14-25 @ 08:15  4.8  |  25  |  2.1    Ca      9.1     01-14-25 @ 08:15  Phos   5.5     01-14-25 @ 08:15  Mg     2.5     01-14-25 @ 08:15    TPro  6.8  /  Alb  4.0  /  TBili  0.6  /  DBili  x   /  AST  44  /  ALT  46  /  AlkPhos  93  /  GGT  x     01-14-25 @ 08:15        Urinalysis Basic - ( 14 Jan 2025 08:15 )    Color: x / Appearance: x / SG: x / pH: x  Gluc: 159 mg/dL / Ketone: x  / Bili: x / Urobili: x   Blood: x / Protein: x / Nitrite: x   Leuk Esterase: x / RBC: x / WBC x   Sq Epi: x / Non Sq Epi: x / Bacteria: x          IMAGING

## 2025-01-15 LAB
ALBUMIN SERPL ELPH-MCNC: 3.7 G/DL — SIGNIFICANT CHANGE UP (ref 3.5–5.2)
ALP SERPL-CCNC: 84 U/L — SIGNIFICANT CHANGE UP (ref 30–115)
ALT FLD-CCNC: 43 U/L — HIGH (ref 0–41)
ANION GAP SERPL CALC-SCNC: 13 MMOL/L — SIGNIFICANT CHANGE UP (ref 7–14)
AST SERPL-CCNC: 34 U/L — SIGNIFICANT CHANGE UP (ref 0–41)
BASOPHILS # BLD AUTO: 0.01 K/UL — SIGNIFICANT CHANGE UP (ref 0–0.2)
BASOPHILS NFR BLD AUTO: 0.1 % — SIGNIFICANT CHANGE UP (ref 0–1)
BILIRUB SERPL-MCNC: 0.5 MG/DL — SIGNIFICANT CHANGE UP (ref 0.2–1.2)
BUN SERPL-MCNC: 77 MG/DL — CRITICAL HIGH (ref 10–20)
CALCIUM SERPL-MCNC: 8.7 MG/DL — SIGNIFICANT CHANGE UP (ref 8.4–10.4)
CHLORIDE SERPL-SCNC: 97 MMOL/L — LOW (ref 98–110)
CO2 SERPL-SCNC: 30 MMOL/L — SIGNIFICANT CHANGE UP (ref 17–32)
CREAT SERPL-MCNC: 1.9 MG/DL — HIGH (ref 0.7–1.5)
EGFR: 38 ML/MIN/1.73M2 — LOW
EOSINOPHIL # BLD AUTO: 0 K/UL — SIGNIFICANT CHANGE UP (ref 0–0.7)
EOSINOPHIL NFR BLD AUTO: 0 % — SIGNIFICANT CHANGE UP (ref 0–8)
GLUCOSE BLDC GLUCOMTR-MCNC: 220 MG/DL — HIGH (ref 70–99)
GLUCOSE BLDC GLUCOMTR-MCNC: 236 MG/DL — HIGH (ref 70–99)
GLUCOSE BLDC GLUCOMTR-MCNC: 296 MG/DL — HIGH (ref 70–99)
GLUCOSE SERPL-MCNC: 210 MG/DL — HIGH (ref 70–99)
HCT VFR BLD CALC: 37.8 % — LOW (ref 42–52)
HGB BLD-MCNC: 12.9 G/DL — LOW (ref 14–18)
IMM GRANULOCYTES NFR BLD AUTO: 2.4 % — HIGH (ref 0.1–0.3)
LYMPHOCYTES # BLD AUTO: 0.75 K/UL — LOW (ref 1.2–3.4)
LYMPHOCYTES # BLD AUTO: 7.8 % — LOW (ref 20.5–51.1)
MCHC RBC-ENTMCNC: 31.4 PG — HIGH (ref 27–31)
MCHC RBC-ENTMCNC: 34.1 G/DL — SIGNIFICANT CHANGE UP (ref 32–37)
MCV RBC AUTO: 92 FL — SIGNIFICANT CHANGE UP (ref 80–94)
MONOCYTES # BLD AUTO: 0.55 K/UL — SIGNIFICANT CHANGE UP (ref 0.1–0.6)
MONOCYTES NFR BLD AUTO: 5.7 % — SIGNIFICANT CHANGE UP (ref 1.7–9.3)
NEUTROPHILS # BLD AUTO: 8.1 K/UL — HIGH (ref 1.4–6.5)
NEUTROPHILS NFR BLD AUTO: 84 % — HIGH (ref 42.2–75.2)
NRBC # BLD: 0 /100 WBCS — SIGNIFICANT CHANGE UP (ref 0–0)
PHOSPHATE SERPL-MCNC: 4.6 MG/DL — SIGNIFICANT CHANGE UP (ref 2.1–4.9)
PLATELET # BLD AUTO: 490 K/UL — HIGH (ref 130–400)
PMV BLD: 10.3 FL — SIGNIFICANT CHANGE UP (ref 7.4–10.4)
POTASSIUM SERPL-MCNC: 4.6 MMOL/L — SIGNIFICANT CHANGE UP (ref 3.5–5)
POTASSIUM SERPL-SCNC: 4.6 MMOL/L — SIGNIFICANT CHANGE UP (ref 3.5–5)
PROT SERPL-MCNC: 6.3 G/DL — SIGNIFICANT CHANGE UP (ref 6–8)
RBC # BLD: 4.11 M/UL — LOW (ref 4.7–6.1)
RBC # FLD: 13 % — SIGNIFICANT CHANGE UP (ref 11.5–14.5)
SODIUM SERPL-SCNC: 140 MMOL/L — SIGNIFICANT CHANGE UP (ref 135–146)
URATE SERPL-MCNC: 8.9 MG/DL — HIGH (ref 3.4–8.8)
WBC # BLD: 9.64 K/UL — SIGNIFICANT CHANGE UP (ref 4.8–10.8)
WBC # FLD AUTO: 9.64 K/UL — SIGNIFICANT CHANGE UP (ref 4.8–10.8)

## 2025-01-15 PROCEDURE — 99232 SBSQ HOSP IP/OBS MODERATE 35: CPT

## 2025-01-15 RX ORDER — POLYETHYLENE GLYCOL 3350 17 G/DOSE
17 POWDER (GRAM) ORAL
Refills: 0 | Status: DISCONTINUED | OUTPATIENT
Start: 2025-01-15 | End: 2025-01-17

## 2025-01-15 RX ORDER — BISACODYL 5 MG
10 TABLET, DELAYED RELEASE (ENTERIC COATED) ORAL EVERY 12 HOURS
Refills: 0 | Status: DISCONTINUED | OUTPATIENT
Start: 2025-01-15 | End: 2025-01-15

## 2025-01-15 RX ORDER — SIMETHICONE 125 MG/1
80 CAPSULE, LIQUID FILLED ORAL EVERY 6 HOURS
Refills: 0 | Status: DISCONTINUED | OUTPATIENT
Start: 2025-01-15 | End: 2025-01-17

## 2025-01-15 RX ORDER — BISACODYL 5 MG
10 TABLET, DELAYED RELEASE (ENTERIC COATED) ORAL EVERY 12 HOURS
Refills: 0 | Status: COMPLETED | OUTPATIENT
Start: 2025-01-15 | End: 2025-01-15

## 2025-01-15 RX ADMIN — Medication 10 MILLIGRAM(S): at 17:43

## 2025-01-15 RX ADMIN — RIVAROXABAN 15 MILLIGRAM(S): 2.5 TABLET, FILM COATED ORAL at 17:43

## 2025-01-15 RX ADMIN — LACTULOSE 10 GRAM(S): 10 SOLUTION ORAL; RECTAL at 13:40

## 2025-01-15 RX ADMIN — Medication 1 TABLET(S): at 12:44

## 2025-01-15 RX ADMIN — SIMETHICONE 80 MILLIGRAM(S): 125 CAPSULE, LIQUID FILLED ORAL at 17:43

## 2025-01-15 RX ADMIN — Medication 17 GRAM(S): at 17:46

## 2025-01-15 RX ADMIN — Medication 81 MILLIGRAM(S): at 11:27

## 2025-01-15 RX ADMIN — CHLORHEXIDINE GLUCONATE 1 APPLICATION(S): 1.2 RINSE ORAL at 11:34

## 2025-01-15 RX ADMIN — Medication 10 MILLIGRAM(S): at 11:27

## 2025-01-15 RX ADMIN — Medication 17 GRAM(S): at 05:35

## 2025-01-15 RX ADMIN — LACTULOSE 10 GRAM(S): 10 SOLUTION ORAL; RECTAL at 05:36

## 2025-01-15 RX ADMIN — PANTOPRAZOLE 40 MILLIGRAM(S): 40 TABLET, DELAYED RELEASE ORAL at 05:36

## 2025-01-15 RX ADMIN — Medication 17 GRAM(S): at 11:28

## 2025-01-15 RX ADMIN — BUMETANIDE 2 MILLIGRAM(S): 2 TABLET ORAL at 17:44

## 2025-01-15 RX ADMIN — DOXORUBICIN HYDROCHLORIDE 24 MILLIGRAM(S): 2 INJECTION, SOLUTION INTRAVENOUS at 02:28

## 2025-01-15 RX ADMIN — ATORVASTATIN CALCIUM 10 MILLIGRAM(S): 40 TABLET, FILM COATED ORAL at 22:01

## 2025-01-15 RX ADMIN — NIFEDIPINE 60 MILLIGRAM(S): 60 TABLET, EXTENDED RELEASE ORAL at 05:36

## 2025-01-15 RX ADMIN — Medication 500 MILLIGRAM(S): at 11:27

## 2025-01-15 RX ADMIN — Medication 25 MILLIGRAM(S): at 17:42

## 2025-01-15 RX ADMIN — Medication 145 MILLIGRAM(S): at 05:36

## 2025-01-15 RX ADMIN — Medication 145 MILLIGRAM(S): at 17:43

## 2025-01-15 RX ADMIN — CETIRIZINE HYDROCHLORIDE 10 MILLIGRAM(S): 5 SYRUP ORAL at 11:28

## 2025-01-15 RX ADMIN — Medication 25 MILLIGRAM(S): at 05:36

## 2025-01-15 RX ADMIN — ALLOPURINOL 100 MILLIGRAM(S): 100 TABLET ORAL at 11:28

## 2025-01-15 RX ADMIN — SIMETHICONE 80 MILLIGRAM(S): 125 CAPSULE, LIQUID FILLED ORAL at 13:45

## 2025-01-15 RX ADMIN — BUMETANIDE 2 MILLIGRAM(S): 2 TABLET ORAL at 05:37

## 2025-01-15 NOTE — PROGRESS NOTE ADULT - ASSESSMENT
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 pancreatic stone, s/p distal pancreatomy and splenectomy, sent in from oncology office for further evaluation of large B-cell lymphoma in the setting of abnormal labs.  Nephrology was consulted for diuretics management.   #Stable CKD stage 3b  #Hyperkalemia  #Large B cell Lymphoma; on chemotherapy.   #Normocytic anemia likely from CKD stage 3b  #Thrombocytosis   cr trending down   continue bumex and metolazone   non oliguric / check daily weights   on chemotherapy / check daily labs for TLS   check pr/ cr ratio   ? dose of prednisone   will follow

## 2025-01-15 NOTE — PROGRESS NOTE ADULT - ASSESSMENT
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    CT C: chr lung changes; no lymphoma  CT A/P: CCY; s/p distal pancreatectomy; s/p splenectomy; bulky LN rt inguinal to 10.1 cm  V Dupl: no evidence of deep venous thrombosis in either lower extremity. There is a proximal thigh/right groin mass measuring greater than 6.9 x  9.2 x 11 cm  Echo 1/10/25: EF 60-65%, mild RVH w/ mild RV dysfxn; mild MR/TR/AS  hepatitis B, hepatitis C HIV NEGATIVE  IR guided BM biopsy 1/11/2025 -> path neg (nl marrow)  IR placed PICC line 1/11/2025  Heme/onc following -> started chemotherapy with R-EPOCH. D1-4 etoposide/vincristine/adriamycin/prednisone, D5 cytoxan and Rituximab (C1, D1 - 1/11/25)  Continue allopurinol   Daily tumor lysis labs including CBC, BMP, LFT, Uric acid, phosphorus  c/w Vit C 500mg po q24  Nephrovite q24  c/w melatonin qhs prn    # CKD 3; KRISTINE; leg edema R>L; normo anemia of chr dz (mild)  peak Cr 2.4  base Cr upper 1s  bumex 2mg iv q12  zaroxylyn 2.5mg po q24  consider changing nifedipine  Pred will cause water retention to  some of rt leg edema is from malign obstruction and prior vein harvest for CABG  no IVFs  f/u w renal    # Constipation; abd pain; improving  diet: CC, DASH  KUB: Mild to moderate colonic stool burden. Nonobstructive gas pattern.   bowel reg: PEG q8  dulcolax tabs 10mg po q12 x2 doses - re-eval valentin  glycerine supp pr q24 prn  d/c lactulose (not helping)  consider changing nifedipine    # CAD s/p CABG; Chronic afib on Xarelto; HTN  c/w metoprolol 25mg po q12  nifed xl 60mg po q24  diuretics (above)  HOLD lisinopril (KRISTINE)  HOLD aldactone (KRISTINE)  continue xarelto 15mg w/ dinner    # HLD   c/w Pravastatin -> using lip 10 hs here    # DM  diabetic dash diet  FS - self monitored via dex com  on insulin pump @2.85 at home   target blood sugar 140 - 180 - BG increasing with steroids (this will stop valentin)  f/u endo    # seasonal allergies  c/w zyrtec    # ? hx pancreatic stone, s/p distal pancreatomy and splenectomy  outpt follow up.     # DVT PPX: Rivaroxaban    # GI PPX: Pantoprazole    # Code Status: Full code    # Activity Order: Independent     #Dispo: TLS labs q24; iv/po diuretics; f/u renal/H-O; fix constipation; f/u endo re FS and insulin pump;   eventually, pt will go home w/ no needs - poss d/c Fri (might need Neulasta at [facility] on Fri?)    Long term prog is guarded.

## 2025-01-15 NOTE — PROGRESS NOTE ADULT - SUBJECTIVE AND OBJECTIVE BOX
RACHELLEMORGAN  69y  Male  ***My note supersedes ALL resident notes that I sign.  My corrections for their notes are in my note.***    I can be reached directly via Teams or my office number is [phone] or [phone].    INTERVAL EVENTS: Here for f/u of DLBCL. Pt was having a lot of abd pain, but this seems to be improving. Passing gas and had a couple of BMs that were loose. Pt can eat/drink. Pt says leg edema is improving in lt leg, but not some much rt leg (rt groin has DLBCL). Edema is from knees down and worse in dorsum of feet. Pt is rachelle chemo. FS are high from high-dose Prednisone. Pt has insulin pump and dex com monitor.    T(F): 97.4 (01-15-25 @ 12:00), Max: 98 (01-14-25 @ 19:53)  HR: 102 (01-15-25 @ 12:00) (97 - 110)  BP: 129/70 (01-15-25 @ 12:00) (114/77 - 136/77)  RR: 18 (01-15-25 @ 12:00) (18 - 18)  SpO2: --    Gen: NAD  HEENT: PERRL, EOMI, mouth clr, nose clr  Neck: no nodes, no JVD, thyroid nl  lungs: clr  hrt: s1 s2 rrr no murmur  abd: soft, NT/ND, no HS megaly  groin: rt groin mass (LN)  ext: 2+ edema b/l legs R>L, 3+ edema b/l dorsum of feet; no c/c; rt leg scar from vein harvest  neuro: aa ox3, cn intact, can move all 4 ext    LABS:                      12.9    (    92.0   9.64  )-----------( ---------      490      ( 15 Nawaf 2025 07:06 )             37.8    (    13.0     140   (   97   (   210      01-15-25 @ 07:06  ----------------------               4.6   (   30   (   77                             -----                        1.9  Ca  8.7   Mg  --    P   4.6     Creatinine:   1.9 (01-15 @ 07:06)  eGFR:  38    Creatinine:   2.1 (01-14 @ 08:15)  eGFR:  34    Creatinine:   2.4 (01-13 @ 07:08)  eGFR:  29    Creatinine:   2.3 (01-12 @ 06:11)  eGFR:  30    Creatinine:   2.1 (01-11 @ 07:58)  eGFR:  34    Creatinine:   2.3 (01-10 @ 22:48)  eGFR:  30    Creatinine:   2.1 (01-10 @ 18:24)  eGFR:  34      LFT  6.3  (  0.5  (  34       01-15-25 @ 07:06  -------------------------  3.7  (  84  (  43    Urinalysis Basic - ( 15 Nawaf 2025 07:06 )    Color: x / Appearance: x / SG: x / pH: x  Gluc: 210 mg/dL / Ketone: x  / Bili: x / Urobili: x   Blood: x / Protein: x / Nitrite: x   Leuk Esterase: x / RBC: x / WBC x   Sq Epi: x / Non Sq Epi: x / Bacteria: x    CAPILLARY BLOOD GLUCOSE  POCT Blood Glucose.: 220 (01-15-25 @ 12:19)  POCT Blood Glucose.: 173 (01-14-25 @ 08:29)  POCT Blood Glucose.: 228 (01-13-25 @ 16:51)  POCT Blood Glucose.: 214 (01-13-25 @ 11:14)  POCT Blood Glucose.: 164 (01-13-25 @ 07:50)  POCT Blood Glucose.: 329 (01-12-25 @ 21:11)  POCT Blood Glucose.: 229 (01-12-25 @ 17:22)    RADIOLOGY & ADDITIONAL TESTS:  < from: Xray Kidney Ureter Bladder (01.14.25 @ 11:04) >  Mild to moderate colonic stool burden. Nonobstructive gas pattern. Degenerative changes of the spine.    < end of copied text >    < from: CT Chest No Cont (01.09.25 @ 17:30) >  Chronic lung changes. No acute opacifications, effusions, suspicious-appearing nodules, or lymphadenopathy.    < end of copied text >    < from: CT Abdomen and Pelvis No Cont (01.09.25 @ 17:30) >  LIVER: Within normal limits.  BILE DUCTS: Normal caliber.  GALLBLADDER: Cholecystectomy.  SPLEEN: Splenectomy.  PANCREAS: Post distal pancreatectomy. Remaining pancreatic parenchyma remains unremarkable.  ADRENALS: Within normal limits.  KIDNEYS/URETERS: No hydronephrosis or renal stones bilaterally. Right renal cyst.    BLADDER: Minimally distended.  REPRODUCTIVE ORGANS: Prostate within normal limits.    BOWEL: No bowel obstruction. Appendix is normal.  PERITONEUM/RETROPERITONEUM: No ascites.  VESSELS: Atherosclerotic changes.    LYMPH NODES: Stable appearance bulky right inguinal and right iliac lymph nodes including;  -partially imaged right inguinal 10.1 x 5.8 cm soft tissue mass/lymph node (series 5 image 177).  -right inguinal 4.4 with 3.0 cm lymph node (series 5 image 163).  -right pelvic sidewall 6.0 x 3.1 cm lymph node (series 5 image 147).  -right iliac 5.4 x 2.7 cm node (series 5 image 140)    ABDOMINAL WALL: Focal strandy with subcutaneous emphysema left anterior   abdominal wall, possibly reflecting recent injection.  BONES: No suspicious lytic or blastic lesion. Degenerative changes.    IMPRESSION:    Since October 12, 2024;    Stable appearance bulky right inguinal and right iliac lymphadenopathy   including partially imaged 10.1 cm right inguinal soft tissue mass/lymph node.    < end of copied text >    < from: VA Duplex Lower Ext Vein Scan, Bilat (01.09.25 @ 09:12) >  No evidence of deep venous thrombosis in either lower extremity.    There is a proximal thigh/right groin mass measuring greater than 6.9 x 9.2 x 11 cm.    < end of copied text >    < from: TTE Echo Complete w/ Contrast w/o Doppler (01.10.25 @ 10:16) >   1. Technically difficult study.   2. Left ventricular ejection fraction, by visual estimation, is 60 to 65%.   3. Normal global left ventricular systolic function.   4. Mildly enlarged right ventricle.   5. Mildly reduced RV systolic function.   6. Normal left atrial size.   7. Normal right atrial size.   8. Mild mitral valve regurgitation.   9. Mild thickening of the anterior mitral valve leaflet.  10. Mild tricuspid regurgitation.  11. Endocardial visualization was enhanced with intravenous echo contrast.  12. Mild aortic valve stenosis. MOSHE 1.9 cm^2.    < end of copied text >    MEDICATIONS:    acetaminophen     Tablet .. 650 milliGRAM(s) Oral every 6 hours PRN  allopurinol 100 milliGRAM(s) Oral daily  aluminum hydroxide/magnesium hydroxide/simethicone Suspension 30 milliLiter(s) Oral every 4 hours PRN  ascorbic acid 500 milliGRAM(s) Oral daily  aspirin  chewable 81 milliGRAM(s) Oral daily  atorvastatin 10 milliGRAM(s) Oral at bedtime  bisacodyl 10 milliGRAM(s) Oral every 12 hours  buMETAnide Injectable 2 milliGRAM(s) IV Push every 12 hours  cetirizine 10 milliGRAM(s) Oral daily  chlorhexidine 2% Cloths 1 Application(s) Topical daily  glycerin Suppository - Adult 1 Suppository(s) Rectal daily PRN  lactulose Syrup 10 Gram(s) Oral three times a day  melatonin 3 milliGRAM(s) Oral at bedtime PRN  metolazone 2.5 milliGRAM(s) Oral daily  metoprolol tartrate 25 milliGRAM(s) Oral two times a day  Nephro-toni 1 Tablet(s) Oral daily  NIFEdipine XL 60 milliGRAM(s) Oral daily  ondansetron  IVPB 16 milliGRAM(s) IV Intermittent once  ondansetron Injectable 4 milliGRAM(s) IV Push every 8 hours PRN  pantoprazole    Tablet 40 milliGRAM(s) Oral before breakfast  polyethylene glycol 3350 17 Gram(s) Oral <User Schedule>  predniSONE   Tablet 145 milliGRAM(s) Oral every 12 hours  rivaroxaban 15 milliGRAM(s) Oral with dinner

## 2025-01-15 NOTE — PROGRESS NOTE ADULT - SUBJECTIVE AND OBJECTIVE BOX
SUBJECTIVE:    Patient is a 69y old Male who presents with a chief complaint of abnormal labs (15 Nawaf 2025 14:50)    Currently admitted to medicine with the primary diagnosis of Lymphoma       Today is hospital day 6d. This morning he is resting comfortably in bed and reports no new issues or overnight events.     PAST MEDICAL & SURGICAL HISTORY  Diabetes mellitus, type 2    BPH (benign prostatic hyperplasia)    Water retention    Essential hypertension    Diabetes    CAD (coronary artery disease)    H/O hypercholesterolemia    Morbid obesity    Chronic kidney disease (CKD)    History of atrial fibrillation    H/O right coronary artery stent placement    History of resection of pancreas    History of cholecystectomy    History of left knee replacement    S/P CABG x 6    H/O cardiac radiofrequency ablation      SOCIAL HISTORY:  Negative for smoking/alcohol/drug use.     ALLERGIES:  No Known Allergies    MEDICATIONS:  STANDING MEDICATIONS  allopurinol 100 milliGRAM(s) Oral daily  ascorbic acid 500 milliGRAM(s) Oral daily  aspirin  chewable 81 milliGRAM(s) Oral daily  atorvastatin 10 milliGRAM(s) Oral at bedtime  bisacodyl 10 milliGRAM(s) Oral every 12 hours  buMETAnide Injectable 2 milliGRAM(s) IV Push every 12 hours  cetirizine 10 milliGRAM(s) Oral daily  chlorhexidine 2% Cloths 1 Application(s) Topical daily  metolazone 2.5 milliGRAM(s) Oral daily  metoprolol tartrate 25 milliGRAM(s) Oral two times a day  Nephro-toni 1 Tablet(s) Oral daily  NIFEdipine XL 60 milliGRAM(s) Oral daily  ondansetron  IVPB 16 milliGRAM(s) IV Intermittent once  pantoprazole    Tablet 40 milliGRAM(s) Oral before breakfast  polyethylene glycol 3350 17 Gram(s) Oral <User Schedule>  predniSONE   Tablet 145 milliGRAM(s) Oral every 12 hours  rivaroxaban 15 milliGRAM(s) Oral with dinner  simethicone 80 milliGRAM(s) Chew every 6 hours    PRN MEDICATIONS  acetaminophen     Tablet .. 650 milliGRAM(s) Oral every 6 hours PRN  aluminum hydroxide/magnesium hydroxide/simethicone Suspension 30 milliLiter(s) Oral every 4 hours PRN  glycerin Suppository - Adult 1 Suppository(s) Rectal daily PRN  melatonin 3 milliGRAM(s) Oral at bedtime PRN  ondansetron Injectable 4 milliGRAM(s) IV Push every 8 hours PRN    VITALS:   T(F): 97.4  HR: 102  BP: 129/70  RR: 18  SpO2: --    LABS:                        12.9   9.64  )-----------( 490      ( 15 Nawaf 2025 07:06 )             37.8     01-15    140  |  97[L]  |  77[HH]  ----------------------------<  210[H]  4.6   |  30  |  1.9[H]    Ca    8.7      15 Nawaf 2025 07:06  Phos  4.6     01-15  Mg     2.5     01-14    TPro  6.3  /  Alb  3.7  /  TBili  0.5  /  DBili  x   /  AST  34  /  ALT  43[H]  /  AlkPhos  84  01-15      Urinalysis Basic - ( 15 Nawaf 2025 07:06 )    Color: x / Appearance: x / SG: x / pH: x  Gluc: 210 mg/dL / Ketone: x  / Bili: x / Urobili: x   Blood: x / Protein: x / Nitrite: x   Leuk Esterase: x / RBC: x / WBC x   Sq Epi: x / Non Sq Epi: x / Bacteria: x                RADIOLOGY:    PHYSICAL EXAM:  GEN: No acute distress  LUNGS: Clear to auscultation bilaterally   HEART: Regular  ABD: Soft, non-tender, non-distended.  EXT: NC/NC/NE/2+PP/LUJAN/Skin Intact.   NEURO: AAOX3    Intravenous access:   NG tube:   Ojeda Catheter:        SUBJECTIVE:    Patient is a 69y old Male who presents with a chief complaint of abnormal labs (15 Nawaf 2025 14:50)    Currently admitted to medicine with the primary diagnosis of Lymphoma       Today is hospital day 6d. This morning he is resting in chair, reports improved abdominal pain, had 1 small bm this morning    PAST MEDICAL & SURGICAL HISTORY  Diabetes mellitus, type 2    BPH (benign prostatic hyperplasia)    Water retention    Essential hypertension    Diabetes    CAD (coronary artery disease)    H/O hypercholesterolemia    Morbid obesity    Chronic kidney disease (CKD)    History of atrial fibrillation    H/O right coronary artery stent placement    History of resection of pancreas    History of cholecystectomy    History of left knee replacement    S/P CABG x 6    H/O cardiac radiofrequency ablation      SOCIAL HISTORY:  Negative for smoking/alcohol/drug use.     ALLERGIES:  No Known Allergies    MEDICATIONS:  STANDING MEDICATIONS  allopurinol 100 milliGRAM(s) Oral daily  ascorbic acid 500 milliGRAM(s) Oral daily  aspirin  chewable 81 milliGRAM(s) Oral daily  atorvastatin 10 milliGRAM(s) Oral at bedtime  bisacodyl 10 milliGRAM(s) Oral every 12 hours  buMETAnide Injectable 2 milliGRAM(s) IV Push every 12 hours  cetirizine 10 milliGRAM(s) Oral daily  chlorhexidine 2% Cloths 1 Application(s) Topical daily  metolazone 2.5 milliGRAM(s) Oral daily  metoprolol tartrate 25 milliGRAM(s) Oral two times a day  Nephro-toni 1 Tablet(s) Oral daily  NIFEdipine XL 60 milliGRAM(s) Oral daily  ondansetron  IVPB 16 milliGRAM(s) IV Intermittent once  pantoprazole    Tablet 40 milliGRAM(s) Oral before breakfast  polyethylene glycol 3350 17 Gram(s) Oral <User Schedule>  predniSONE   Tablet 145 milliGRAM(s) Oral every 12 hours  rivaroxaban 15 milliGRAM(s) Oral with dinner  simethicone 80 milliGRAM(s) Chew every 6 hours    PRN MEDICATIONS  acetaminophen     Tablet .. 650 milliGRAM(s) Oral every 6 hours PRN  aluminum hydroxide/magnesium hydroxide/simethicone Suspension 30 milliLiter(s) Oral every 4 hours PRN  glycerin Suppository - Adult 1 Suppository(s) Rectal daily PRN  melatonin 3 milliGRAM(s) Oral at bedtime PRN  ondansetron Injectable 4 milliGRAM(s) IV Push every 8 hours PRN    VITALS:   T(F): 97.4  HR: 102  BP: 129/70  RR: 18  SpO2: --    LABS:                        12.9   9.64  )-----------( 490      ( 15 Nawaf 2025 07:06 )             37.8     01-15    140  |  97[L]  |  77[HH]  ----------------------------<  210[H]  4.6   |  30  |  1.9[H]    Ca    8.7      15 Nawaf 2025 07:06  Phos  4.6     01-15  Mg     2.5     01-14    TPro  6.3  /  Alb  3.7  /  TBili  0.5  /  DBili  x   /  AST  34  /  ALT  43[H]  /  AlkPhos  84  01-15      Urinalysis Basic - ( 15 Nawaf 2025 07:06 )    Color: x / Appearance: x / SG: x / pH: x  Gluc: 210 mg/dL / Ketone: x  / Bili: x / Urobili: x   Blood: x / Protein: x / Nitrite: x   Leuk Esterase: x / RBC: x / WBC x   Sq Epi: x / Non Sq Epi: x / Bacteria: x              PHYSICAL EXAM:  GEN: No acute distress  LUNGS: Clear to auscultation bilaterally   HEART: Regular  ABD: Soft, non-tender, non-distended.  EXT: +2 edema  NEURO: AAOX3

## 2025-01-15 NOTE — PROGRESS NOTE ADULT - ASSESSMENT
69-year-old man with extensive medical history including CAD (status post CABG;), atrial fibrillation on Xarelto, history of distal pancreatomy and splenectomy for unclear reason referred to the ED by his oncologist for abnormal labs. Patient was recently diagnosed with Diffuse large B cell Lymphoma on pathology of right groin mass and was following his Oncologist Dr Angeli Hilliard. Today patient was seen by his oncologist in office and was referred to the ed for abnormal labs.  Today, he reported having chronic fatigue, weakness, right greater than left progressive leg swelling, poor appetite, no night sweats. Patient reported having right greater than left lower extremity swelling.    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   CT pelvis on 10/12/24 showed interval increase of mass 10 x 7 x 10 cm with inguinal lymphadenopathy, suspicious for neoplastic process.  Biopsy on 12/18/24 revealed diffuse large B-cell lymphoma with concern for Rodriguez's transformation.  Patient was seen by Dr. Hilliard on 1/8/25 for initial consultation and was advised to come to ED due to concern for tumor lysis syndrome.  Uric acid level now WNL at 7.9, CMP otherwise unremarkable.   CT C/A/P 1.9.25 Stable appearance bulky right inguinal and right iliac lymphadenopathy including partially imaged 10.1 cm right inguinal soft tissue mass/lymph node.  s/p PICC placement on 1/10/25  s/p Bone marrow biopsy on 1/10/25  Echo 1/10/25: EF 60-65%  Pending hepatitis panel      CKD Stage 3B  baseline (1.8)    Recommendations   Started R-EPOCH, D1 on 1/11/2024. D1-4 etoposide/vincristine/adriamycin/prednisone, D5 cytoxan and Rituximab.  D4 today 1/14/25   C/w allopurinol renally dosed  Ideally needs to be IV fluids, follow up nephrology recs   Daily labs including cbc, bmp, LFTS, Uric acid, phosphorus. Please make sure labs are done daily on time, high risk for TLS due to bulky lymphadenopathy      69-year-old man with extensive medical history including CAD (status post CABG;), atrial fibrillation on Xarelto, history of distal pancreatomy and splenectomy for unclear reason referred to the ED by his oncologist for abnormal labs. Patient was recently diagnosed with Diffuse large B cell Lymphoma on pathology of right groin mass and was following his Oncologist Dr Angeli Hilliard. Today patient was seen by his oncologist in office and was referred to the ed for abnormal labs.  Today, he reported having chronic fatigue, weakness, right greater than left progressive leg swelling, poor appetite, no night sweats. Patient reported having right greater than left lower extremity swelling.    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   CT pelvis on 10/12/24 showed interval increase of mass 10 x 7 x 10 cm with inguinal lymphadenopathy, suspicious for neoplastic process.  Biopsy on 12/18/24 revealed diffuse large B-cell lymphoma with concern for Rodriguez's transformation.  Patient was seen by Dr. Hilliard on 1/8/25 for initial consultation and was advised to come to ED due to concern for tumor lysis syndrome.  Uric acid level now WNL at 7.9, CMP otherwise unremarkable.   CT C/A/P 1.9.25 Stable appearance bulky right inguinal and right iliac lymphadenopathy including partially imaged 10.1 cm right inguinal soft tissue mass/lymph node.  s/p PICC placement on 1/10/25  s/p Bone marrow biopsy on 1/10/25  Echo 1/10/25: EF 60-65%  Pending hepatitis panel      CKD Stage 3B  baseline (1.8)    Recommendations   Started R-EPOCH, D1 on 1/11/2024. D1-4 etoposide/vincristine/adriamycin/prednisone, D5 cytoxan and Rituximab.  Continuing D4 today 1/15/25   C/w allopurinol renally dosed  Ideally needs to be IV fluids, follow up nephrology recs   Daily labs including cbc, bmp, LFTS, Uric acid, phosphorus. Please make sure labs are done daily on time, high risk for TLS due to bulky lymphadenopathy

## 2025-01-15 NOTE — PROGRESS NOTE ADULT - SUBJECTIVE AND OBJECTIVE BOX
seen and examined  24 h events noted   no distress         PAST HISTORY  --------------------------------------------------------------------------------  No significant changes to PMH, PSH, FHx, SHx, unless otherwise noted    ALLERGIES & MEDICATIONS  --------------------------------------------------------------------------------  Allergies    No Known Allergies    Intolerances      Standing Inpatient Medications  allopurinol 100 milliGRAM(s) Oral daily  ascorbic acid 500 milliGRAM(s) Oral daily  aspirin  chewable 81 milliGRAM(s) Oral daily  atorvastatin 10 milliGRAM(s) Oral at bedtime  bisacodyl 10 milliGRAM(s) Oral every 12 hours  buMETAnide Injectable 2 milliGRAM(s) IV Push every 12 hours  cetirizine 10 milliGRAM(s) Oral daily  chlorhexidine 2% Cloths 1 Application(s) Topical daily  metolazone 2.5 milliGRAM(s) Oral daily  metoprolol tartrate 25 milliGRAM(s) Oral two times a day  Nephro-toni 1 Tablet(s) Oral daily  NIFEdipine XL 60 milliGRAM(s) Oral daily  ondansetron  IVPB 16 milliGRAM(s) IV Intermittent once  pantoprazole    Tablet 40 milliGRAM(s) Oral before breakfast  polyethylene glycol 3350 17 Gram(s) Oral <User Schedule>  predniSONE   Tablet 145 milliGRAM(s) Oral every 12 hours  rivaroxaban 15 milliGRAM(s) Oral with dinner  simethicone 80 milliGRAM(s) Chew every 6 hours    PRN Inpatient Medications  acetaminophen     Tablet .. 650 milliGRAM(s) Oral every 6 hours PRN  aluminum hydroxide/magnesium hydroxide/simethicone Suspension 30 milliLiter(s) Oral every 4 hours PRN  glycerin Suppository - Adult 1 Suppository(s) Rectal daily PRN  melatonin 3 milliGRAM(s) Oral at bedtime PRN  ondansetron Injectable 4 milliGRAM(s) IV Push every 8 hours PRN        VITALS/PHYSICAL EXAM  --------------------------------------------------------------------------------  T(C): 36.3 (01-15-25 @ 12:00), Max: 36.7 (01-14-25 @ 19:53)  HR: 102 (01-15-25 @ 12:00) (97 - 110)  BP: 129/70 (01-15-25 @ 12:00) (114/77 - 136/77)  RR: 18 (01-15-25 @ 12:00) (18 - 18)  SpO2: --  Wt(kg): --        01-14-25 @ 07:01  -  01-15-25 @ 07:00  --------------------------------------------------------  IN: 0 mL / OUT: 1650 mL / NET: -1650 mL      Physical Exam:  	Gen: NAD  	Abd: +distented               LE : edema  	    LABS/STUDIES  --------------------------------------------------------------------------------              12.9   9.64  >-----------<  490      [01-15-25 @ 07:06]              37.8     140  |  97  |  77  ----------------------------<  210      [01-15-25 @ 07:06]  4.6   |  30  |  1.9        Ca     8.7     [01-15-25 @ 07:06]      Mg     2.5     [01-14-25 @ 08:15]      Phos  4.6     [01-15-25 @ 07:06]    TPro  6.3  /  Alb  3.7  /  TBili  0.5  /  DBili  x   /  AST  34  /  ALT  43  /  AlkPhos  84  [01-15-25 @ 07:06]        Uric acid 8.9      [01-15-25 @ 07:06]        [01-14-25 @ 08:15]    Creatinine Trend:  SCr 1.9 [01-15 @ 07:06]  SCr 2.1 [01-14 @ 08:15]  SCr 2.4 [01-13 @ 07:08]  SCr 2.3 [01-12 @ 06:11]  SCr 2.1 [01-11 @ 07:58]    Urinalysis - [01-15-25 @ 07:06]      Color  / Appearance  / SG  / pH       Gluc 210 / Ketone   / Bili  / Urobili        Blood  / Protein  / Leuk Est  / Nitrite       RBC  / WBC  / Hyaline  / Gran  / Sq Epi  / Non Sq Epi  / Bacteria         HBsAg Nonreact      [01-11-25 @ 13:09]  HBcAb Nonreact      [01-11-25 @ 13:09]  HCV 0.08, Nonreact      [01-11-25 @ 13:09]

## 2025-01-16 ENCOUNTER — TRANSCRIPTION ENCOUNTER (OUTPATIENT)
Age: 70
End: 2025-01-16

## 2025-01-16 ENCOUNTER — NON-APPOINTMENT (OUTPATIENT)
Age: 70
End: 2025-01-16

## 2025-01-16 LAB
CHROM ANALY INTERPHASE BLD FISH-IMP: SIGNIFICANT CHANGE UP
CHROM ANALY INTERPHASE BLD FISH-IMP: SIGNIFICANT CHANGE UP
GLUCOSE BLDC GLUCOMTR-MCNC: 105 MG/DL — HIGH (ref 70–99)
GLUCOSE BLDC GLUCOMTR-MCNC: 163 MG/DL — HIGH (ref 70–99)
GLUCOSE BLDC GLUCOMTR-MCNC: 201 MG/DL — HIGH (ref 70–99)
GLUCOSE BLDC GLUCOMTR-MCNC: 227 MG/DL — HIGH (ref 70–99)
HCT VFR BLD CALC: 36.8 % — LOW (ref 42–52)
HGB BLD-MCNC: 12.7 G/DL — LOW (ref 14–18)
MCHC RBC-ENTMCNC: 31.8 PG — HIGH (ref 27–31)
MCHC RBC-ENTMCNC: 34.5 G/DL — SIGNIFICANT CHANGE UP (ref 32–37)
MCV RBC AUTO: 92 FL — SIGNIFICANT CHANGE UP (ref 80–94)
NRBC # BLD: 0 /100 WBCS — SIGNIFICANT CHANGE UP (ref 0–0)
PLATELET # BLD AUTO: 404 K/UL — HIGH (ref 130–400)
PMV BLD: 10 FL — SIGNIFICANT CHANGE UP (ref 7.4–10.4)
RBC # BLD: 4 M/UL — LOW (ref 4.7–6.1)
RBC # FLD: 12.8 % — SIGNIFICANT CHANGE UP (ref 11.5–14.5)
URATE SERPL-MCNC: 10.3 MG/DL — HIGH (ref 3.4–8.8)
WBC # BLD: 9.19 K/UL — SIGNIFICANT CHANGE UP (ref 4.8–10.8)
WBC # FLD AUTO: 9.19 K/UL — SIGNIFICANT CHANGE UP (ref 4.8–10.8)

## 2025-01-16 PROCEDURE — 99232 SBSQ HOSP IP/OBS MODERATE 35: CPT

## 2025-01-16 PROCEDURE — 93010 ELECTROCARDIOGRAM REPORT: CPT

## 2025-01-16 PROCEDURE — 99239 HOSP IP/OBS DSCHRG MGMT >30: CPT

## 2025-01-16 RX ORDER — DEXTROSE MONOHYDRATE 25 G/50ML
25 INJECTION, SOLUTION INTRAVENOUS ONCE
Refills: 0 | Status: DISCONTINUED | OUTPATIENT
Start: 2025-01-16 | End: 2025-01-17

## 2025-01-16 RX ORDER — INSULIN LISPRO 100/ML
VIAL (ML) SUBCUTANEOUS
Refills: 0 | Status: DISCONTINUED | OUTPATIENT
Start: 2025-01-16 | End: 2025-01-17

## 2025-01-16 RX ORDER — RIVAROXABAN 2.5 MG/1
1 TABLET, FILM COATED ORAL
Qty: 0 | Refills: 0 | DISCHARGE
Start: 2025-01-16

## 2025-01-16 RX ORDER — ACETAMINOPHEN 80 MG/.8ML
2 SOLUTION/ DROPS ORAL
Qty: 0 | Refills: 0 | DISCHARGE
Start: 2025-01-16

## 2025-01-16 RX ORDER — RASBURICASE
3 KIT TOPICAL ONCE
Refills: 0 | Status: COMPLETED | OUTPATIENT
Start: 2025-01-16 | End: 2025-01-16

## 2025-01-16 RX ORDER — ALLOPURINOL 100 MG/1
1.5 TABLET ORAL
Qty: 45 | Refills: 0
Start: 2025-01-16 | End: 2025-02-14

## 2025-01-16 RX ORDER — SODIUM CHLORIDE 9 MG/ML
1000 INJECTION, SOLUTION INTRAVENOUS
Refills: 0 | Status: DISCONTINUED | OUTPATIENT
Start: 2025-01-16 | End: 2025-01-17

## 2025-01-16 RX ORDER — POLYETHYLENE GLYCOL 3350 17 G/DOSE
17 POWDER (GRAM) ORAL
Qty: 0 | Refills: 0 | DISCHARGE
Start: 2025-01-16

## 2025-01-16 RX ORDER — METOCLOPRAMIDE 10 MG/1
5 TABLET ORAL EVERY 8 HOURS
Refills: 0 | Status: DISCONTINUED | OUTPATIENT
Start: 2025-01-16 | End: 2025-01-17

## 2025-01-16 RX ORDER — ONDANSETRON 4 MG/1
1 TABLET ORAL
Qty: 40 | Refills: 0
Start: 2025-01-16 | End: 2025-01-25

## 2025-01-16 RX ORDER — SIMETHICONE 125 MG/1
1 CAPSULE, LIQUID FILLED ORAL
Qty: 0 | Refills: 0 | DISCHARGE
Start: 2025-01-16

## 2025-01-16 RX ORDER — PROCHLORPERAZINE MALEATE 10 MG
5 TABLET ORAL EVERY 6 HOURS
Refills: 0 | Status: DISCONTINUED | OUTPATIENT
Start: 2025-01-16 | End: 2025-01-17

## 2025-01-16 RX ORDER — NIFEDIPINE 60 MG/1
1 TABLET, EXTENDED RELEASE ORAL
Qty: 30 | Refills: 0
Start: 2025-01-16

## 2025-01-16 RX ORDER — GLUCAGON INJECTION, SOLUTION 0.5 MG/.1ML
1 INJECTION, SOLUTION SUBCUTANEOUS ONCE
Refills: 0 | Status: DISCONTINUED | OUTPATIENT
Start: 2025-01-16 | End: 2025-01-17

## 2025-01-16 RX ORDER — B COMPLEX, C NO.20/FOLIC ACID 1 MG
1 CAPSULE ORAL
Qty: 30 | Refills: 0
Start: 2025-01-16

## 2025-01-16 RX ORDER — DEXTROSE MONOHYDRATE 25 G/50ML
12.5 INJECTION, SOLUTION INTRAVENOUS ONCE
Refills: 0 | Status: DISCONTINUED | OUTPATIENT
Start: 2025-01-16 | End: 2025-01-17

## 2025-01-16 RX ORDER — METOCLOPRAMIDE 10 MG/1
1 TABLET ORAL
Qty: 45 | Refills: 0
Start: 2025-01-16 | End: 2025-01-30

## 2025-01-16 RX ORDER — BISACODYL 5 MG
10 TABLET, DELAYED RELEASE (ENTERIC COATED) ORAL EVERY 12 HOURS
Refills: 0 | Status: COMPLETED | OUTPATIENT
Start: 2025-01-16 | End: 2025-01-16

## 2025-01-16 RX ORDER — DEXTROSE MONOHYDRATE 25 G/50ML
15 INJECTION, SOLUTION INTRAVENOUS ONCE
Refills: 0 | Status: DISCONTINUED | OUTPATIENT
Start: 2025-01-16 | End: 2025-01-17

## 2025-01-16 RX ORDER — CHLORPROMAZINE 200 MG/1
5 TABLET, SUGAR COATED ORAL EVERY 6 HOURS
Refills: 0 | Status: DISCONTINUED | OUTPATIENT
Start: 2025-01-16 | End: 2025-01-16

## 2025-01-16 RX ORDER — OXYCODONE AND ACETAMINOPHEN 5; 325 MG/1; MG/1
1 TABLET ORAL
Qty: 28 | Refills: 0
Start: 2025-01-16 | End: 2025-01-22

## 2025-01-16 RX ORDER — ALLOPURINOL 100 MG/1
150 TABLET ORAL DAILY
Refills: 0 | Status: DISCONTINUED | OUTPATIENT
Start: 2025-01-17 | End: 2025-01-17

## 2025-01-16 RX ADMIN — Medication 17 GRAM(S): at 18:13

## 2025-01-16 RX ADMIN — Medication 100 MILLIGRAM(S): at 11:49

## 2025-01-16 RX ADMIN — ACETAMINOPHEN 650 MILLIGRAM(S): 80 SOLUTION/ DROPS ORAL at 13:30

## 2025-01-16 RX ADMIN — Medication 17 GRAM(S): at 11:33

## 2025-01-16 RX ADMIN — RASBURICASE 104 MILLIGRAM(S): KIT at 21:16

## 2025-01-16 RX ADMIN — METOCLOPRAMIDE 5 MILLIGRAM(S): 10 TABLET ORAL at 21:18

## 2025-01-16 RX ADMIN — ATORVASTATIN CALCIUM 10 MILLIGRAM(S): 40 TABLET, FILM COATED ORAL at 21:19

## 2025-01-16 RX ADMIN — Medication 1 TABLET(S): at 11:34

## 2025-01-16 RX ADMIN — SIMETHICONE 80 MILLIGRAM(S): 125 CAPSULE, LIQUID FILLED ORAL at 11:33

## 2025-01-16 RX ADMIN — CHLORHEXIDINE GLUCONATE 1 APPLICATION(S): 1.2 RINSE ORAL at 11:37

## 2025-01-16 RX ADMIN — Medication 17 GRAM(S): at 06:11

## 2025-01-16 RX ADMIN — Medication 81 MILLIGRAM(S): at 11:33

## 2025-01-16 RX ADMIN — PANTOPRAZOLE 40 MILLIGRAM(S): 40 TABLET, DELAYED RELEASE ORAL at 06:15

## 2025-01-16 RX ADMIN — ONDANSETRON 116 MILLIGRAM(S): 4 TABLET ORAL at 09:29

## 2025-01-16 RX ADMIN — ACETAMINOPHEN 650 MILLIGRAM(S): 80 SOLUTION/ DROPS ORAL at 10:15

## 2025-01-16 RX ADMIN — Medication 10 MILLIGRAM(S): at 09:49

## 2025-01-16 RX ADMIN — SIMETHICONE 80 MILLIGRAM(S): 125 CAPSULE, LIQUID FILLED ORAL at 18:13

## 2025-01-16 RX ADMIN — METOCLOPRAMIDE 5 MILLIGRAM(S): 10 TABLET ORAL at 13:30

## 2025-01-16 RX ADMIN — Medication 25 MILLIGRAM(S): at 06:14

## 2025-01-16 RX ADMIN — ALLOPURINOL 100 MILLIGRAM(S): 100 TABLET ORAL at 11:33

## 2025-01-16 RX ADMIN — CETIRIZINE HYDROCHLORIDE 10 MILLIGRAM(S): 5 SYRUP ORAL at 11:33

## 2025-01-16 RX ADMIN — RITUXIMAB 930 MILLIGRAM(S): 10 INJECTION, SOLUTION INTRAVENOUS at 12:30

## 2025-01-16 RX ADMIN — NIFEDIPINE 60 MILLIGRAM(S): 60 TABLET, EXTENDED RELEASE ORAL at 06:14

## 2025-01-16 RX ADMIN — Medication 500 MILLIGRAM(S): at 11:33

## 2025-01-16 RX ADMIN — ACETAMINOPHEN 650 MILLIGRAM(S): 80 SOLUTION/ DROPS ORAL at 12:56

## 2025-01-16 RX ADMIN — Medication 25 MILLIGRAM(S): at 18:13

## 2025-01-16 RX ADMIN — CYCLOPHOSPHAMIDE 1860 MILLIGRAM(S): 500 INJECTION, POWDER, FOR SOLUTION INTRAVENOUS; ORAL at 10:06

## 2025-01-16 RX ADMIN — BUMETANIDE 2 MILLIGRAM(S): 2 TABLET ORAL at 18:11

## 2025-01-16 RX ADMIN — ACETAMINOPHEN 650 MILLIGRAM(S): 80 SOLUTION/ DROPS ORAL at 09:46

## 2025-01-16 RX ADMIN — BUMETANIDE 2 MILLIGRAM(S): 2 TABLET ORAL at 06:27

## 2025-01-16 RX ADMIN — Medication 10 MILLIGRAM(S): at 18:13

## 2025-01-16 RX ADMIN — RIVAROXABAN 15 MILLIGRAM(S): 2.5 TABLET, FILM COATED ORAL at 18:12

## 2025-01-16 RX ADMIN — Medication 30 MILLILITER(S): at 02:19

## 2025-01-16 RX ADMIN — Medication 145 MILLIGRAM(S): at 06:12

## 2025-01-16 RX ADMIN — SIMETHICONE 80 MILLIGRAM(S): 125 CAPSULE, LIQUID FILLED ORAL at 06:12

## 2025-01-16 NOTE — DISCHARGE NOTE NURSING/CASE MANAGEMENT/SOCIAL WORK - PATIENT PORTAL LINK FT
You can access the FollowMyHealth Patient Portal offered by [facility] by registering at the following website: [email or web address]. By joining [facility]’s FollowMyHealth portal, you will also be able to view your health information using other applications (apps) compatible with our system.

## 2025-01-16 NOTE — PROGRESS NOTE ADULT - SUBJECTIVE AND OBJECTIVE BOX
SUBJECTIVE:    Patient is a 69y old Male who presents with a chief complaint of abnormal labs (16 Jan 2025 12:02)    Currently admitted to medicine with the primary diagnosis of Lymphoma       Today is hospital day 7d. This morning he is resting comfortably in bed and reports no new issues or overnight events.     PAST MEDICAL & SURGICAL HISTORY  Diabetes mellitus, type 2    BPH (benign prostatic hyperplasia)    Water retention    Essential hypertension    Diabetes    CAD (coronary artery disease)    H/O hypercholesterolemia    Morbid obesity    Chronic kidney disease (CKD)    History of atrial fibrillation    H/O right coronary artery stent placement    History of resection of pancreas    History of cholecystectomy    History of left knee replacement    S/P CABG x 6    H/O cardiac radiofrequency ablation      SOCIAL HISTORY:  Negative for smoking/alcohol/drug use.     ALLERGIES:  No Known Allergies    MEDICATIONS:  STANDING MEDICATIONS  acetaminophen     Tablet .. 650 milliGRAM(s) Oral once  allopurinol 100 milliGRAM(s) Oral daily  ascorbic acid 500 milliGRAM(s) Oral daily  aspirin  chewable 81 milliGRAM(s) Oral daily  atorvastatin 10 milliGRAM(s) Oral at bedtime  bisacodyl 10 milliGRAM(s) Oral every 12 hours  buMETAnide Injectable 2 milliGRAM(s) IV Push every 12 hours  cetirizine 10 milliGRAM(s) Oral daily  chlorhexidine 2% Cloths 1 Application(s) Topical daily  metoclopramide 5 milliGRAM(s) Oral every 8 hours  metolazone 2.5 milliGRAM(s) Oral daily  metoprolol tartrate 25 milliGRAM(s) Oral two times a day  Nephro-toni 1 Tablet(s) Oral daily  NIFEdipine XL 60 milliGRAM(s) Oral daily  pantoprazole    Tablet 40 milliGRAM(s) Oral before breakfast  polyethylene glycol 3350 17 Gram(s) Oral <User Schedule>  [id number] (RUXIENCE) IVPB (eMAR) 930 milliGRAM(s) IV Intermittent once  rivaroxaban 15 milliGRAM(s) Oral with dinner  simethicone 80 milliGRAM(s) Chew every 6 hours    PRN MEDICATIONS  acetaminophen     Tablet .. 650 milliGRAM(s) Oral every 6 hours PRN  aluminum hydroxide/magnesium hydroxide/simethicone Suspension 30 milliLiter(s) Oral every 4 hours PRN  glycerin Suppository - Adult 1 Suppository(s) Rectal daily PRN  melatonin 3 milliGRAM(s) Oral at bedtime PRN  ondansetron Injectable 4 milliGRAM(s) IV Push every 8 hours PRN    VITALS:   T(F): 98.7  HR: 88  BP: 141/75  RR: 18  SpO2: 97%    LABS:                        12.7   9.19  )-----------( 404      ( 16 Jan 2025 06:27 )             36.8     01-15    140  |  97[L]  |  77[HH]  ----------------------------<  210[H]  4.6   |  30  |  1.9[H]    Ca    8.7      15 Nawaf 2025 07:06  Phos  4.6     01-15    TPro  6.3  /  Alb  3.7  /  TBili  0.5  /  DBili  x   /  AST  34  /  ALT  43[H]  /  AlkPhos  84  01-15      Urinalysis Basic - ( 15 Nawaf 2025 07:06 )    Color: x / Appearance: x / SG: x / pH: x  Gluc: 210 mg/dL / Ketone: x  / Bili: x / Urobili: x   Blood: x / Protein: x / Nitrite: x   Leuk Esterase: x / RBC: x / WBC x   Sq Epi: x / Non Sq Epi: x / Bacteria: x                RADIOLOGY:    PHYSICAL EXAM:  GEN: No acute distress  LUNGS: Clear to auscultation bilaterally   HEART: Regular  ABD: Soft, non-tender, non-distended.  EXT: NC/NC/NE/2+PP/LUJAN/Skin Intact.   NEURO: AAOX3    Intravenous access:   NG tube:   Ojeda Catheter:

## 2025-01-16 NOTE — PROGRESS NOTE ADULT - ASSESSMENT
69-year-old man with extensive medical history including CAD (status post CABG;), atrial fibrillation on Xarelto, history of distal pancreatomy and splenectomy for unclear reason referred to the ED by his oncologist for abnormal labs. Patient was recently diagnosed with Diffuse large B cell Lymphoma on pathology of right groin mass and was following his Oncologist Dr Angeli Hilliard. Today patient was seen by his oncologist in office and was referred to the ed for abnormal labs.  Today, he reported having chronic fatigue, weakness, right greater than left progressive leg swelling, poor appetite, no night sweats. Patient reported having right greater than left lower extremity swelling.    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   CT pelvis on 10/12/24 showed interval increase of mass 10 x 7 x 10 cm with inguinal lymphadenopathy, suspicious for neoplastic process.  Biopsy on 12/18/24 revealed diffuse large B-cell lymphoma with concern for Rodriguez's transformation.  Patient was seen by Dr. Hilliard on 1/8/25 for initial consultation and was advised to come to ED due to concern for tumor lysis syndrome.  Uric acid level now WNL at 7.9, CMP otherwise unremarkable.   CT C/A/P 1.9.25 Stable appearance bulky right inguinal and right iliac lymphadenopathy including partially imaged 10.1 cm right inguinal soft tissue mass/lymph node.  s/p PICC placement on 1/10/25  s/p Bone marrow biopsy on 1/10/25  Echo 1/10/25: EF 60-65%  Pending hepatitis panel      CKD Stage 3B  baseline (1.8)    Recommendations   Started R-EPOCH, D1 on 1/11/2024. D1-4 etoposide/vincristine/adriamycin/prednisone, D5 cytoxan and Rituximab.  Continuing D4 today 1/15/25   C/w allopurinol renally dosed  Ideally needs to be IV fluids, follow up nephrology recs   Daily labs including cbc, bmp, LFTS, Uric acid, phosphorus. Please make sure labs are done daily on time, high risk for TLS due to bulky lymphadenopathy        69-year-old man with extensive medical history including CAD (status post CABG;), atrial fibrillation on Xarelto, history of distal pancreatomy and splenectomy for unclear reason referred to the ED by his oncologist for abnormal labs. Patient was recently diagnosed with Diffuse large B cell Lymphoma on pathology of right groin mass and was following his Oncologist Dr Angeli Hilliard. Today patient was seen by his oncologist in office and was referred to the ed for abnormal labs.  Today, he reported having chronic fatigue, weakness, right greater than left progressive leg swelling, poor appetite, no night sweats. Patient reported having right greater than left lower extremity swelling.    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   CT pelvis on 10/12/24 showed interval increase of mass 10 x 7 x 10 cm with inguinal lymphadenopathy, suspicious for neoplastic process.  Biopsy on 12/18/24 revealed diffuse large B-cell lymphoma with concern for Rodriguez's transformation.  Patient was seen by Dr. Hilliard on 1/8/25 for initial consultation and was advised to come to ED due to concern for tumor lysis syndrome.  Uric acid level now WNL at 7.9, CMP otherwise unremarkable.   CT C/A/P 1.9.25 Stable appearance bulky right inguinal and right iliac lymphadenopathy including partially imaged 10.1 cm right inguinal soft tissue mass/lymph node.  s/p PICC placement on 1/10/25  s/p Bone marrow biopsy on 1/10/25  Echo 1/10/25: EF 60-65%  Pending hepatitis panel      CKD Stage 3B  baseline (1.8)    Recommendations   Started R-EPOCH, D1 on 1/11/2024. D1-4 etoposide/vincristine/adriamycin/prednisone, D5 cytoxan and Rituximab.  Continuing D5 today 1/16/25   C/w allopurinol renally dosed upon discharge  Ideally needs to be IV fluids, follow up nephrology recs   Can discharge today from oncology's perspective, will arrange for G-CSF injection in [place] in am  PICC line can be removed, will arrange for port placement

## 2025-01-16 NOTE — PROGRESS NOTE ADULT - ASSESSMENT
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 pancreatic stone, s/p distal pancreatomy and splenectomy, sent in from oncology office for further evaluation of large B-cell lymphoma in the setting of abnormal labs.  Nephrology was consulted for diuretics management.   #Stable CKD stage 3b  #Hyperkalemia  #Large B cell Lymphoma; on chemotherapy.   #Normocytic anemia likely from CKD stage 3b  #Thrombocytosis   cr trending down/ check repeat today    continue bumex and metolazone   non oliguric / check daily weights   bp controlled   on chemotherapy / check daily labs for TLS / followed by oncology team   continue allopurinol   follow bicarbonate level   check pr/ cr ratio   will follow

## 2025-01-16 NOTE — DISCHARGE NOTE PROVIDER - PROVIDER TOKENS
PROVIDER:[TOKEN:[[id number]],FOLLOWUP:[1 week]],PROVIDER:[TOKEN:[[id number]],FOLLOWUP:[1 week]] PROVIDER:[TOKEN:[[id number]],FOLLOWUP:[1 week]],PROVIDER:[TOKEN:[[id number]],FOLLOWUP:[1 week]],PROVIDER:[TOKEN:[[id number]],SCHEDULEDAPPT:[01/17/2025]]

## 2025-01-16 NOTE — PROGRESS NOTE ADULT - SUBJECTIVE AND OBJECTIVE BOX
seen and examined  24 h events noted   no distress        PAST HISTORY  --------------------------------------------------------------------------------  No significant changes to PMH, PSH, FHx, SHx, unless otherwise noted    ALLERGIES & MEDICATIONS  --------------------------------------------------------------------------------  Allergies    No Known Allergies    Intolerances      Standing Inpatient Medications  acetaminophen     Tablet .. 650 milliGRAM(s) Oral once  allopurinol 100 milliGRAM(s) Oral daily  ascorbic acid 500 milliGRAM(s) Oral daily  aspirin  chewable 81 milliGRAM(s) Oral daily  atorvastatin 10 milliGRAM(s) Oral at bedtime  bisacodyl 10 milliGRAM(s) Oral every 12 hours  buMETAnide Injectable 2 milliGRAM(s) IV Push every 12 hours  cetirizine 10 milliGRAM(s) Oral daily  chlorhexidine 2% Cloths 1 Application(s) Topical daily  metoclopramide 5 milliGRAM(s) Oral every 8 hours  metolazone 2.5 milliGRAM(s) Oral daily  metoprolol tartrate 25 milliGRAM(s) Oral two times a day  Nephro-toni 1 Tablet(s) Oral daily  NIFEdipine XL 60 milliGRAM(s) Oral daily  pantoprazole    Tablet 40 milliGRAM(s) Oral before breakfast  polyethylene glycol 3350 17 Gram(s) Oral <User Schedule>  riTUXimab-pvvr (RUXIENCE) IVPB (eMAR) 930 milliGRAM(s) IV Intermittent once  rivaroxaban 15 milliGRAM(s) Oral with dinner  simethicone 80 milliGRAM(s) Chew every 6 hours    PRN Inpatient Medications  acetaminophen     Tablet .. 650 milliGRAM(s) Oral every 6 hours PRN  aluminum hydroxide/magnesium hydroxide/simethicone Suspension 30 milliLiter(s) Oral every 4 hours PRN  glycerin Suppository - Adult 1 Suppository(s) Rectal daily PRN  melatonin 3 milliGRAM(s) Oral at bedtime PRN  ondansetron Injectable 4 milliGRAM(s) IV Push every 8 hours PRN          VITALS/PHYSICAL EXAM  --------------------------------------------------------------------------------  T(C): 37.1 (01-16-25 @ 05:13), Max: 37.1 (01-16-25 @ 05:13)  HR: 88 (01-16-25 @ 08:14) (88 - 105)  BP: 141/75 (01-16-25 @ 05:13) (120/70 - 145/61)  RR: 18 (01-16-25 @ 05:13) (17 - 18)  SpO2: 97% (01-16-25 @ 08:14) (95% - 98%)  Wt(kg): --        01-15-25 @ 07:01  -  01-16-25 @ 07:00  --------------------------------------------------------  IN: 0 mL / OUT: 1900 mL / NET: -1900 mL      Physical Exam:  	Gen: NAD, sitting on his chair   	Abd: +distended  	edema of LE     LABS/STUDIES  --------------------------------------------------------------------------------              12.7   9.19  >-----------<  404      [01-16-25 @ 06:27]              36.8     140  |  97  |  77  ----------------------------<  210      [01-15-25 @ 07:06]  4.6   |  30  |  1.9        Ca     8.7     [01-15-25 @ 07:06]      Phos  4.6     [01-15-25 @ 07:06]    TPro  6.3  /  Alb  3.7  /  TBili  0.5  /  DBili  x   /  AST  34  /  ALT  43  /  AlkPhos  84  [01-15-25 @ 07:06]    Uric acid 8.9      [01-15-25 @ 07:06]    Creatinine Trend:  SCr 1.9 [01-15 @ 07:06]  SCr 2.1 [01-14 @ 08:15]  SCr 2.4 [01-13 @ 07:08]  SCr 2.3 [01-12 @ 06:11]  SCr 2.1 [01-11 @ 07:58]    Urinalysis - [01-15-25 @ 07:06]      Color  / Appearance  / SG  / pH       Gluc 210 / Ketone   / Bili  / Urobili        Blood  / Protein  / Leuk Est  / Nitrite       RBC  / WBC  / Hyaline  / Gran  / Sq Epi  / Non Sq Epi  / Bacteria         HBsAg Nonreact      [01-11-25 @ 13:09]  HBcAb Nonreact      [01-11-25 @ 13:09]  HCV 0.08, Nonreact      [01-11-25 @ 13:09]

## 2025-01-16 NOTE — DISCHARGE NOTE PROVIDER - NSDCMRMEDTOKEN_GEN_ALL_CORE_FT
aspirin 81 mg oral tablet: orally once a day  azelastine nasal: prn  furosemide 80 mg oral tablet: 1 tab(s) orally once a day  INSULIN PUMP - HUMALOG:   ipratropium nasal: prn  levocetirizine 5 mg oral tablet: 1 tab(s) orally once a day  lisinopril 20 mg oral tablet: 1 tab(s) orally once a day  metOLazone 2.5 mg oral tablet: 1 tab(s) orally once a day  metoprolol tartrate 25 mg oral tablet: 1 tab(s) orally 2 times a day  multivitamin: once a day  pravastatin 40 mg oral tablet: 1 tab(s) orally once a day  Repatha 140 mg/mL subcutaneous solution: 140 milligram(s) subcutaneously  sildenafil 20 mg oral tablet: 1 tab(s) orally  spironolactone 25 mg oral tablet: 1 tab(s) orally once a day  testosterone cypionate 200 mg/mL intramuscular solution: 200 unit(s) intramuscularly once a month  tirzepatide: 0.5 milligram(s) subcutaneous once a week  Vitamin C 500 mg oral capsule: 1 cap(s) orally once a day  Xarelto 20 mg oral tablet: 1 tab(s) orally once a day (in the evening)   acetaminophen 325 mg oral tablet: 2 tab(s) orally every 6 hours As needed Temp greater or equal to 38C (100.4F), Mild Pain (1 - 3)  allopurinol 100 mg oral tablet: 1.5 cap(s) orally once a day  aspirin 81 mg oral tablet: orally once a day  azelastine nasal: prn  furosemide 80 mg oral tablet: 1 tab(s) orally once a day  INSULIN PUMP - HUMALOG:   ipratropium nasal: prn  levocetirizine 5 mg oral tablet: 1 tab(s) orally once a day  metoclopramide 5 mg oral tablet: 1 tab(s) orally every 8 hours  metOLazone 2.5 mg oral tablet: 1 tab(s) orally once a day  metoprolol tartrate 25 mg oral tablet: 1 tab(s) orally 2 times a day  NIFEdipine 60 mg oral tablet, extended release: 1 tab(s) orally once a day  ondansetron 4 mg oral tablet: 1 tab(s) orally every 6 hours as needed for  nausea  oxycodone-acetaminophen 5 mg-325 mg oral tablet: 1 tab(s) orally every 6 hours as needed for Severe Pain (7 - 10) MDD: 4 tab  polyethylene glycol 3350 oral powder for reconstitution: 17 gram(s) orally 3 times a day  pravastatin 40 mg oral tablet: 1 tab(s) orally once a day  Repatha 140 mg/mL subcutaneous solution: 140 milligram(s) subcutaneously  rivaroxaban 15 mg oral tablet: 1 tab(s) orally once a day (before a meal)  sildenafil 20 mg oral tablet: 1 tab(s) orally  simethicone 80 mg oral tablet, chewable: 1 tab(s) orally every 6 hours  testosterone cypionate 200 mg/mL intramuscular solution: 200 unit(s) intramuscularly once a month  tirzepatide: 0.5 milligram(s) subcutaneous once a week  Vitamin C 500 mg oral capsule: 1 cap(s) orally once a day   acetaminophen 325 mg oral tablet: 2 tab(s) orally every 6 hours As needed Temp greater or equal to 38C (100.4F), Mild Pain (1 - 3)  allopurinol 100 mg oral tablet: 1.5 cap(s) orally once a day  aspirin 81 mg oral tablet: orally once a day  azelastine nasal: prn  furosemide 80 mg oral tablet: 1 tab(s) orally once a day  INSULIN PUMP - HUMALOG:   ipratropium nasal: prn  levocetirizine 5 mg oral tablet: 1 tab(s) orally once a day  metoclopramide 5 mg oral tablet: 1 tab(s) orally every 8 hours  metOLazone 2.5 mg oral tablet: 1 tab(s) orally once a day  metoprolol tartrate 25 mg oral tablet: 1 tab(s) orally 2 times a day  NIFEdipine 60 mg oral tablet, extended release: 1 tab(s) orally once a day  ondansetron 4 mg oral tablet: 1 tab(s) orally every 6 hours as needed for  nausea  oxycodone-acetaminophen 5 mg-325 mg oral tablet: 1 tab(s) orally every 6 hours as needed for Severe Pain (7 - 10) MDD: 4 tab  polyethylene glycol 3350 oral powder for reconstitution: 17 gram(s) orally 3 times a day  pravastatin 40 mg oral tablet: 1 tab(s) orally once a day  Repatha 140 mg/mL subcutaneous solution: 140 milligram(s) subcutaneously  rivaroxaban 15 mg oral tablet: 1 tab(s) orally once a day (before a meal)  sildenafil 20 mg oral tablet: 1 tab(s) orally  simethicone 80 mg oral tablet, chewable: 1 tab(s) orally every 6 hours  testosterone cypionate 200 mg/mL intramuscular solution: 200 unit(s) intramuscularly once a month  tirzepatide: 0.5 milligram(s) subcutaneous once a week  Vitamin C 500 mg oral capsule: 1 cap(s) orally once a day  Zarxio 480 mcg/0.8 mL injectable solution: 480 microgram(s) subcutaneously once a day

## 2025-01-16 NOTE — DISCHARGE NOTE PROVIDER - CARE PROVIDER_API CALL
Layo Webb.  Endocrinology/Metab/Diabetes  [address] [place] [address]  Phone: [phone]  Fax: [phone]  Follow Up Time: 1 week    Kleiner, Morton Jay  Nephrology  [address] [place] [id number]  Phone: [phone]  Fax: [phone]  Follow Up Time: 1 week   Layo Webb  Endocrinology/Metab/Diabetes  [address] [place] [address]  Phone: [phone]  Fax: [phone]  Follow Up Time: 1 week    Kleiner, Morton Jay  Nephrology  [address] [place] [address]  Phone: [phone]  Fax: [phone]  Follow Up Time: 1 week    Shaun Berger  Internal Medicine  [address] [place] [address]  Phone: [phone]  Fax: [phone]  Scheduled Appointment: 01/17/2025

## 2025-01-16 NOTE — DISCHARGE NOTE PROVIDER - NSDCCPCAREPLAN_GEN_ALL_CORE_FT
PRINCIPAL DISCHARGE DIAGNOSIS  Diagnosis: Lymphoma  Assessment and Plan of Treatment: You were admitted to the hospital for inpatient chemotherapy. You had a bone marrow biopsy done and had a PICC line placed. You started cycle 1 of chemotherapy and completed 5 days. You have tolerated treatment well. Please follow up with oncology, nephrology and your endocrinologist outpatient. Please take all medications as prescribed. Return to the hospital with any new or worsening symptoms.      SECONDARY DISCHARGE DIAGNOSES  Diagnosis: Chronic kidney disease (CKD)  Assessment and Plan of Treatment:

## 2025-01-16 NOTE — PROGRESS NOTE ADULT - SUBJECTIVE AND OBJECTIVE BOX
MORGAN BUSH  69y  Male  ***My note supersedes ALL resident notes that I sign.  My corrections for their notes are in my note.***    I can be reached directly via Teams or my office number is [phone] or [phone].    INTERVAL EVENTS: Here for f/u of DLBCL. Pt on day 5 of chemo, getting cytoxan and rituxan today. Pt had diarrhea yesterday x1, but no further BMs, even w/ dulcolax. Seems pt might have intestinal paresis from DM. Abd pain has improved and is gone. Pt is able to eat/drink. He did c/o H/A today, which did not respond to tylenol. He will try percocet. Legs are still quite swollen - he did NOT want to do ace wraps, but will try to recline in chair to elevate legs. He remains in decent spirits. Pt had some heartburn earlier today, which was relieved by Maalox.    T(F): 98.7 (01-16-25 @ 05:13), Max: 98.7 (01-16-25 @ 05:13)  HR: 88 (01-16-25 @ 08:14) (88 - 105)  BP: 141/75 (01-16-25 @ 05:13) (120/70 - 145/61)  RR: 18 (01-16-25 @ 05:13) (17 - 18)  SpO2: 97% (01-16-25 @ 08:14) (95% - 98%)    BMI 38.7 = obesity    Gen: NAD  HEENT: PERRL, EOMI, mouth clr, nose clr  Neck: no nodes, no JVD, thyroid nl  lungs: clr  hrt: s1 s2 rrr no murmur  abd: soft, NT/ND, no HS megaly  groin: rt groin mass (LN)  ext: 2+ edema b/l legs R>L, 3+ edema b/l dorsum of feet; no c/c; rt leg scar from vein harvest  neuro: aa ox3, cn intact, can move all 4 ext    LABS:                      12.7    (    92.0   9.19  )-----------( ---------      404      ( 16 Jan 2025 06:27 )             36.8    (    12.8     Urinalysis Basic - ( 15 Nawaf 2025 07:06 )    Color: x / Appearance: x / SG: x / pH: x  Gluc: 210 mg/dL / Ketone: x  / Bili: x / Urobili: x   Blood: x / Protein: x / Nitrite: x   Leuk Esterase: x / RBC: x / WBC x   Sq Epi: x / Non Sq Epi: x / Bacteria: x    CAPILLARY BLOOD GLUCOSE  POCT Blood Glucose.: 201 (01-16-25 @ 11:07)  POCT Blood Glucose.: 105 (01-16-25 @ 07:46)  POCT Blood Glucose.: 236 (01-15-25 @ 21:09)  POCT Blood Glucose.: 296 (01-15-25 @ 16:50)  POCT Blood Glucose.: 220 (01-15-25 @ 12:19)  POCT Blood Glucose.: 173 (01-14-25 @ 08:29)  POCT Blood Glucose.: 228 (01-13-25 @ 16:51)    RADIOLOGY & ADDITIONAL TESTS:    MEDICATIONS:    acetaminophen     Tablet .. 650 milliGRAM(s) Oral once  acetaminophen     Tablet .. 650 milliGRAM(s) Oral every 6 hours PRN  allopurinol 100 milliGRAM(s) Oral daily  aluminum hydroxide/magnesium hydroxide/simethicone Suspension 30 milliLiter(s) Oral every 4 hours PRN  ascorbic acid 500 milliGRAM(s) Oral daily  aspirin  chewable 81 milliGRAM(s) Oral daily  atorvastatin 10 milliGRAM(s) Oral at bedtime  bisacodyl 10 milliGRAM(s) Oral every 12 hours  buMETAnide Injectable 2 milliGRAM(s) IV Push every 12 hours  cetirizine 10 milliGRAM(s) Oral daily  chlorhexidine 2% Cloths 1 Application(s) Topical daily  glycerin Suppository - Adult 1 Suppository(s) Rectal daily PRN  melatonin 3 milliGRAM(s) Oral at bedtime PRN  metoclopramide 5 milliGRAM(s) Oral every 8 hours  metolazone 2.5 milliGRAM(s) Oral daily  metoprolol tartrate 25 milliGRAM(s) Oral two times a day  Nephro-toni 1 Tablet(s) Oral daily  NIFEdipine XL 60 milliGRAM(s) Oral daily  ondansetron Injectable 4 milliGRAM(s) IV Push every 8 hours PRN  oxycodone    5 mG/acetaminophen 325 mG 1 Tablet(s) Oral every 4 hours PRN  pantoprazole    Tablet 40 milliGRAM(s) Oral before breakfast  polyethylene glycol 3350 17 Gram(s) Oral <User Schedule>  riTUXimab-pvvr (RUXIENCE) IVPB (eMAR) 930 milliGRAM(s) IV Intermittent once  rivaroxaban 15 milliGRAM(s) Oral with dinner  simethicone 80 milliGRAM(s) Chew every 6 hours

## 2025-01-16 NOTE — DISCHARGE NOTE PROVIDER - NSDCFUSCHEDAPPT_GEN_ALL_CORE_FT
Viktor Hilliard  [facility]  Scheduled Appointment: 01/22/2025    [facility] Physician [facility]  [address]  Scheduled Appointment: 01/22/2025    Luis Gallardo  [facility] Physician [facility]  [address]  Scheduled Appointment: 02/06/2025    Saad Lane  [facility]  CARDIOLOGY [address]  Scheduled Appointment: 03/25/2025

## 2025-01-16 NOTE — DISCHARGE NOTE NURSING/CASE MANAGEMENT/SOCIAL WORK - FINANCIAL ASSISTANCE
[facility] provides services at a reduced cost to those who are determined to be eligible through [facility]’s financial assistance program. Information regarding [facility]’s financial assistance program can be found by going to [email or web address] or by calling [phone].

## 2025-01-16 NOTE — DISCHARGE NOTE PROVIDER - CARE PROVIDERS DIRECT ADDRESSES
,DirectAddress_Unknown,[email or web address] ,DirectAddress_[email or web address]

## 2025-01-16 NOTE — PROGRESS NOTE ADULT - ASSESSMENT
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    CT C: chr lung changes; no lymphoma  CT A/P: CCY; s/p distal pancreatectomy; s/p splenectomy; bulky LN rt inguinal to 10.1 cm  V Dupl: no evidence of deep venous thrombosis in either lower extremity. There is a proximal thigh/right groin mass measuring greater than 6.9 x  9.2 x 11 cm  Echo 1/10/25: EF 60-65%, mild RVH w/ mild RV dysfxn; mild MR/TR/AS  hepatitis B, hepatitis C HIV NEGATIVE  IR guided BM biopsy 1/11/2025 -> path neg (nl marrow)  IR placed PICC line 1/11/2025  Heme/onc following -> started chemotherapy (C1, D1 - 1/11/25) with R-EPOCH. D1-4 etoposide/vincristine/adriamycin/prednisone, D5 (today) cytoxan and Rituximab  Continue allopurinol   Daily tumor lysis labs including CBC, BMP, LFT, Uric acid, phosphorus  c/w Vit C 500mg po q24  Nephrovite q24  c/w melatonin qhs prn    # CKD 3; KRISTINE; leg edema R>L; normo anemia of chr dz (mild)  peak Cr 2.4  base Cr upper 1s - back to baseline  bumex 2mg iv q12  zaroxylyn 2.5mg po q24  consider changing nifedipine (for edema and constipation)  Pred will cause water retention to  some of rt leg edema is from malign obstruction at rt groin and prior vein harvest for CABG  no IVFs  f/u w renal  check UTP:Cr ratio (ordered)    # Constipation; abd pain; improving; poss intestinal paresis from DM  diet: CC, DASH  KUB: Mild to moderate colonic stool burden. Nonobstructive gas pattern.   bowel reg: PEG q8  try reglan 5mg po q8 (renal dose); (QTc is nl)  rpt dulcolax tabs 10mg po q12 x2 doses - re-eval valentin  glycerine supp pr q24 prn  d/c lactulose (not helping)  consider changing nifedipine    # CAD s/p CABG; Chronic afib on Xarelto; HTN  c/w metoprolol 25mg po q12  nifed xl 60mg po q24  diuretics (above)  HOLD lisinopril (KRISTINE)  HOLD aldactone (KRISTINE)  continue xarelto 15mg w/ dinner    # HLD   c/w Pravastatin -> using lip 10 hs here    # DM  diabetic dash diet  FS - self monitored via dex com  on insulin pump @2.85 at home   target blood sugar 140 - 180 - BG increasing with steroids (steroids finish today)  f/u endo: pt will use insulin pump for 30 units boluses/meals while on steroids and then go back to 20-25 units boluses/meals once off steroids    # seasonal allergies  c/w zyrtec    # ? hx pancreatic stone, s/p distal pancreatomy and splenectomy  outpt follow up.     # DVT PPX: Rivaroxaban    # GI PPX: Pantoprazole    # Code Status: Full code    # Activity Order: Independent     #Dispo: TLS labs q24; iv/po diuretics; UTP:Cr ratio; f/u renal/H-O; fix constipation - try reglan; f/u endo re FS and insulin pump;   eventually, pt will go home w/ no needs - anticipate d/c Fri (will need Neulasta or Fulphila at [facility] on Fri)    Long term prog is guarded.    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    CT C: chr lung changes; no lymphoma  CT A/P: CCY; s/p distal pancreatectomy; s/p splenectomy; bulky LN rt inguinal to 10.1 cm  V Dupl: no evidence of deep venous thrombosis in either lower extremity. There is a proximal thigh/right groin mass measuring greater than 6.9 x  9.2 x 11 cm  Echo 1/10/25: EF 60-65%, mild RVH w/ mild RV dysfxn; mild MR/TR/AS  hepatitis B, hepatitis C HIV NEGATIVE  IR guided BM biopsy 1/11/2025 -> path neg (nl marrow)  IR placed PICC line 1/11/2025  Heme/onc following -> started chemotherapy (C1, D1 - 1/11/25) with R-EPOCH. D1-4 etoposide/vincristine/adriamycin/prednisone, D5 (today) cytoxan and Rituximab  Continue allopurinol   Daily tumor lysis labs including CBC, BMP, LFT, Uric acid, phosphorus  c/w Vit C 500mg po q24  Nephrovite q24  c/w melatonin qhs prn  Percocet 5/325mg po q6 - sent Rx to pharm    # CKD 3; KRISTINE; leg edema R>L; normo anemia of chr dz (mild)  peak Cr 2.4  base Cr upper 1s - back to baseline  bumex 2mg iv q12 -> to lasix 80mg po q24 on d/c  zaroxylyn 2.5mg po q24  consider changing nifedipine (for edema and constipation)  Pred will cause water retention to  some of rt leg edema is from malign obstruction at rt groin and prior vein harvest for CABG  no IVFs  f/u w renal  check UTP:Cr ratio (ordered) - f/u outpt    # Constipation; abd pain; improving; poss intestinal paresis from DM  diet: CC, DASH  KUB: Mild to moderate colonic stool burden. Nonobstructive gas pattern.   bowel reg: PEG q8  try reglan 5mg po q8 (renal dose); (QTc is nl)  rpt dulcolax tabs 10mg po q12 x2 doses - re-eval valentin  glycerine supp pr q24 prn  d/c lactulose (not helping)  consider changing nifedipine    # CAD s/p CABG; Chronic afib on Xarelto; HTN  c/w metoprolol 25mg po q12  nifed xl 60mg po q24  diuretics (above)  HOLD lisinopril (KRISTINE)  HOLD aldactone (KRISTINE)  continue xarelto 15mg w/ dinner    # HLD   c/w Pravastatin -> using lip 10 hs here  resume Repatha    # DM  diabetic dash diet  FS - self monitored via dex com  on insulin pump @2.85 at home   target blood sugar 140 - 180 - BG increasing with steroids (steroids finish today)  f/u endo: pt will use insulin pump for 30 units boluses/meals while on steroids and then go back to 20-25 units boluses/meals once off steroids  can resume Mounjaro     # seasonal allergies  c/w zyrtec  can resume nasal sprays    # ? hx pancreatic stone, s/p distal pancreatomy and splenectomy  outpt follow up.     # DVT PPX: Rivaroxaban    # GI PPX: Pantoprazole - not needed at home    # Code Status: Full code    # Activity Order: Independent     #Dispo: diuretics; UTP:Cr ratio; f/u renal/H-O; fix constipation - try reglan; f/u endo re FS and insulin pump;   today, pt will go home w/ no needs - (will need Neulasta or Fulphila at [facility] on Fri)    Long term prog is guarded.

## 2025-01-16 NOTE — DISCHARGE NOTE PROVIDER - HOSPITAL COURSE
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 pancreatic stone, s/p distal pancreatomy and splenectomy, sent in from oncology office for further evaluation of large B-cell lymphoma in the setting of abnormal labs. Patient was recently diagnosed with Diffuse large B cell Lymphoma on pathology of right groin mass and was following his Oncologist Dr Angeli Hilliard. Today patient was seen by his oncologist in office and was referred to the ed for abnormal labs ie elevated creatinine and uric acid levels .  On further inquiry, he reported having chronic fatigue, weakness, right leg swelling > left leg swelling , poor appetite. Review of the systems is negative for headache, dizziness, loss of consciousness, chest pain, palpitations, shortness of breath, nausea, vomit, diarrhea, abdomen pain, urine frequency, urgency, extremity weakness and other significant complaints.    Hospital course:  # BMI 38.7 = obesity  wt loss advised; some of weight is from marked edema    # Transformed large B-cell lymphoma from a pre-existing chronic lymphocytic leukemia (CLL).    CT C: chr lung changes; no lymphoma  CT A/P: CCY; s/p distal pancreatectomy; s/p splenectomy; bulky LN rt inguinal to 10.1 cm  V Dupl: no evidence of deep venous thrombosis in either lower extremity. There is a proximal thigh/right groin mass measuring greater than 6.9 x  9.2 x 11 cm  Echo 1/10/25: EF 60-65%, mild RVH w/ mild RV dysfxn; mild MR/TR/AS  hepatitis B, hepatitis C HIV NEGATIVE  IR guided BM biopsy 1/11/2025 -> path neg (nl marrow)  IR placed PICC line 1/11/2025  Heme/onc following -> started chemotherapy (C1, D1 - 1/11/25) with R-EPOCH. D1-4 etoposide/vincristine/adriamycin/prednisone, D5 (today) cytoxan and Rituximab  Continue allopurinol   Daily tumor lysis labs including CBC, BMP, LFT, Uric acid, phosphorus  c/w Vit C 500mg po q24  Nephrovite q24  c/w melatonin qhs prn    # CKD 3; KRISTINE; leg edema R>L; normo anemia of chr dz (mild)  peak Cr 2.4  base Cr upper 1s - back to baseline  bumex 2mg iv q12  zaroxylyn 2.5mg po q24  consider changing nifedipine (for edema and constipation)  Pred will cause water retention too  some of rt leg edema is from malign obstruction at rt groin and prior vein harvest for CABG  no IVFs  f/u w renal  check UTP:Cr ratio (ordered)    # Constipation; abd pain; improving; poss intestinal paresis from DM  diet: CC, DASH  KUB: Mild to moderate colonic stool burden. Nonobstructive gas pattern.   bowel reg: PEG q8  try reglan 5mg po q8 (renal dose); (QTc is nl)  rpt dulcolax tabs 10mg po q12 x2 doses - re-eval valentin  glycerine supp pr q24 prn  d/c lactulose (not helping)  consider changing nifedipine    # CAD s/p CABG; Chronic afib on Xarelto; HTN  c/w metoprolol 25mg po q12  nifed xl 60mg po q24  diuretics (above)  HOLD lisinopril (KRISTINE)  HOLD aldactone (KRISTINE)  continue xarelto 15mg w/ dinner    # HLD   c/w Pravastatin -> using lip 10 hs here    # DM  diabetic dash diet  FS - self monitored via dex com  on insulin pump @2.85 at home   target blood sugar 140 - 180 - BG increasing with steroids (steroids finish today)  f/u endo: pt will use insulin pump for 30 units boluses/meals while on steroids and then go back to 20-25 units boluses/meals once off steroids    # seasonal allergies  c/w zyrtec    # ? hx pancreatic stone, s/p distal pancreatomy and splenectomy  outpt follow up.     Discussion of discharge plan of care, including discharge diagnoses, medication reconciliation, and follow-ups was conducted with  *****on ***** at *****, and discharge was approved. 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 pancreatic stone, s/p distal pancreatomy and splenectomy, sent in from oncology office for further evaluation of large B-cell lymphoma in the setting of abnormal labs. Patient was recently diagnosed with Diffuse large B cell Lymphoma on pathology of right groin mass and was following his Oncologist Dr Angeli Hilliard. Today patient was seen by his oncologist in office and was referred to the ed for abnormal labs ie elevated creatinine and uric acid levels .  On further inquiry, he reported having chronic fatigue, weakness, right leg swelling > left leg swelling , poor appetite. Review of the systems is negative for headache, dizziness, loss of consciousness, chest pain, palpitations, shortness of breath, nausea, vomit, diarrhea, abdomen pain, urine frequency, urgency, extremity weakness and other significant complaints.    Hospital course:  # BMI 38.7 = obesity  wt loss advised; some of weight is from marked edema    # Transformed large B-cell lymphoma from a pre-existing chronic lymphocytic leukemia (CLL).    CT C: chr lung changes; no lymphoma  CT A/P: CCY; s/p distal pancreatectomy; s/p splenectomy; bulky LN rt inguinal to 10.1 cm  V Dupl: no evidence of deep venous thrombosis in either lower extremity. There is a proximal thigh/right groin mass measuring greater than 6.9 x  9.2 x 11 cm  Echo 1/10/25: EF 60-65%, mild RVH w/ mild RV dysfxn; mild MR/TR/AS  hepatitis B, hepatitis C HIV NEGATIVE  IR guided BM biopsy 1/11/2025 -> path neg (nl marrow)  IR placed PICC line 1/11/2025  Heme/onc following -> started chemotherapy (C1, D1 - 1/11/25) with R-EPOCH. D1-4 etoposide/vincristine/adriamycin/prednisone, D5 (today) cytoxan and Rituximab  Continue allopurinol 150mg q24  c/w Vit C 500mg po q24  c/w Nephrovite q24  percocet 5/325 po q6 prn pain    # CKD 3; KRISTINE; leg edema R>L; normo anemia of chr dz (mild)  peak Cr 2.4  base Cr upper 1s - back to baseline  bumex 2mg iv q12 -> change to lasix 80mg po q24 on d/c  c/w zaroxylyn 2.5mg po q24  Pred will cause water retention too  some of rt leg edema is from malign obstruction at rt groin and prior vein harvest for CABG  no IVFs  f/u w renal  check UTP:Cr ratio (ordered) - f/u outpt    # Constipation; abd pain; improving; poss intestinal paresis from DM  diet: CC, DASH  KUB: Mild to moderate colonic stool burden. Nonobstructive gas pattern.   bowel reg: Miralax q8 (OTC)  try reglan 5mg po q8 (renal dose); (QTc is nl)  rpt dulcolax tabs 10mg po q12 x2 doses - re-eval valentin  glycerine supp pr q24 prn  d/c lactulose (not helping)    # CAD s/p CABG; Chronic afib on Xarelto; HTN  c/w metoprolol 25mg po q12  nifed xl 60mg po q24  diuretics (above)  HOLD lisinopril (KRISTINE)  HOLD aldactone (KRISTINE)  continue xarelto 15mg w/ dinner  c/w asa 81mg po q24    # HLD   c/w Pravastatin -> using lip 10 hs here  resume Repatha     # DM  diabetic dash diet  FS - self monitored via dex com  on insulin pump @2.85 at home   target blood sugar 140 - 180 - BG increasing with steroids (steroids finish today)  f/u endo: pt will use insulin pump for 30 units boluses/meals while on steroids and then go back to 20-25 units boluses/meals once off steroids  OK to resume Mounjaro     # seasonal allergies  c/w zyrtec    # ? hx pancreatic stone, s/p distal pancreatomy and splenectomy  outpt follow up.     Discussion of discharge plan of care, including discharge diagnoses, medication reconciliation, and follow-ups was conducted with  *****on ***** at *****, and discharge was approved. 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 pancreatic stone, s/p distal pancreatomy and splenectomy, sent in from oncology office for further evaluation of large B-cell lymphoma in the setting of abnormal labs. Patient was recently diagnosed with Diffuse large B cell Lymphoma on pathology of right groin mass and was following his Oncologist Dr Angeli Hilliard. Today patient was seen by his oncologist in office and was referred to the ed for abnormal labs ie elevated creatinine and uric acid levels .  On further inquiry, he reported having chronic fatigue, weakness, right leg swelling > left leg swelling , poor appetite. Review of the systems is negative for headache, dizziness, loss of consciousness, chest pain, palpitations, shortness of breath, nausea, vomit, diarrhea, abdomen pain, urine frequency, urgency, extremity weakness and other significant complaints.    Hospital course:  # BMI 38.7 = obesity  wt loss advised; some of weight is from marked edema    # Transformed large B-cell lymphoma from a pre-existing chronic lymphocytic leukemia (CLL).    CT C: chr lung changes; no lymphoma  CT A/P: CCY; s/p distal pancreatectomy; s/p splenectomy; bulky LN rt inguinal to 10.1 cm  V Dupl: no evidence of deep venous thrombosis in either lower extremity. There is a proximal thigh/right groin mass measuring greater than 6.9 x  9.2 x 11 cm  Echo 1/10/25: EF 60-65%, mild RVH w/ mild RV dysfxn; mild MR/TR/AS  hepatitis B, hepatitis C HIV NEGATIVE  IR guided BM biopsy 1/11/2025 -> path neg (nl marrow)  IR placed PICC line 1/11/2025  Heme/onc following -> started chemotherapy (C1, D1 - 1/11/25) with R-EPOCH. D1-4 etoposide/vincristine/adriamycin/prednisone, D5 (today) cytoxan and Rituximab  received rasburicase 3mg x1 dose  dc with zarxio 480mcg SQ qd x2 days  Continue allopurinol 150mg q24  c/w Vit C 500mg po q24  c/w Nephrovite q24  percocet 5/325 po q6 prn pain    # CKD 3; KRISTINE; leg edema R>L; normo anemia of chr dz (mild)  peak Cr 2.4  base Cr upper 1s - back to baseline  bumex 2mg iv q12 -> change to lasix 80mg po q24 on d/c  c/w zaroxylyn 2.5mg po q24  Pred will cause water retention too  some of rt leg edema is from malign obstruction at rt groin and prior vein harvest for CABG  no IVFs  f/u w renal  check UTP:Cr ratio (ordered) - f/u outpt    # Constipation; abd pain; improving; poss intestinal paresis from DM  diet: CC, DASH  KUB: Mild to moderate colonic stool burden. Nonobstructive gas pattern.   bowel reg: Miralax q8 (OTC)  try reglan 5mg po q8 (renal dose); (QTc is nl)  rpt dulcolax tabs 10mg po q12 x2 doses - re-eval valentin  glycerine supp pr q24 prn  d/c lactulose (not helping)    # CAD s/p CABG; Chronic afib on Xarelto; HTN  c/w metoprolol 25mg po q12  nifed xl 60mg po q24  diuretics (above)  HOLD lisinopril (KRISTINE)  HOLD aldactone (KRISTINE)  continue xarelto 15mg w/ dinner  c/w asa 81mg po q24    # HLD   c/w Pravastatin -> using lip 10 hs here  resume Repatha     # DM  diabetic dash diet  FS - self monitored via dex com  on insulin pump @2.85 at home   target blood sugar 140 - 180 - BG increasing with steroids (steroids finish today)  f/u endo: pt will use insulin pump for 30 units boluses/meals while on steroids and then go back to 20-25 units boluses/meals once off steroids  OK to resume Mounjaro     # seasonal allergies  c/w zyrtec    # ? hx pancreatic stone, s/p distal pancreatomy and splenectomy  outpt follow up.     Discussion of discharge plan of care, including discharge diagnoses, medication reconciliation, and follow-ups was conducted with Dr. Ferrell on 1/17/25, and discharge was approved.

## 2025-01-16 NOTE — DISCHARGE NOTE NURSING/CASE MANAGEMENT/SOCIAL WORK - NSDCPEFALRISK_GEN_ALL_CORE
For information on Fall & Injury Prevention, visit: [email or web address] OR  [email or web address] OR  https://www.cdc.gov/steadi/patient.html

## 2025-01-17 VITALS
RESPIRATION RATE: 18 BRPM | TEMPERATURE: 98 F | HEART RATE: 112 BPM | DIASTOLIC BLOOD PRESSURE: 60 MMHG | SYSTOLIC BLOOD PRESSURE: 174 MMHG

## 2025-01-17 LAB
ALBUMIN SERPL ELPH-MCNC: 3.9 G/DL — SIGNIFICANT CHANGE UP (ref 3.5–5.2)
ALP SERPL-CCNC: 89 U/L — SIGNIFICANT CHANGE UP (ref 30–115)
ALT FLD-CCNC: 46 U/L — HIGH (ref 0–41)
ANION GAP SERPL CALC-SCNC: 11 MMOL/L — SIGNIFICANT CHANGE UP (ref 7–14)
ANION GAP SERPL CALC-SCNC: 12 MMOL/L — SIGNIFICANT CHANGE UP (ref 7–14)
ANISOCYTOSIS BLD QL: SLIGHT — SIGNIFICANT CHANGE UP
AST SERPL-CCNC: 33 U/L — SIGNIFICANT CHANGE UP (ref 0–41)
BASOPHILS # BLD AUTO: 0 K/UL — SIGNIFICANT CHANGE UP (ref 0–0.2)
BASOPHILS NFR BLD AUTO: 0 % — SIGNIFICANT CHANGE UP (ref 0–1)
BILIRUB SERPL-MCNC: 0.5 MG/DL — SIGNIFICANT CHANGE UP (ref 0.2–1.2)
BUN SERPL-MCNC: 76 MG/DL — CRITICAL HIGH (ref 10–20)
BUN SERPL-MCNC: 82 MG/DL — CRITICAL HIGH (ref 10–20)
CALCIUM SERPL-MCNC: 8.6 MG/DL — SIGNIFICANT CHANGE UP (ref 8.4–10.5)
CALCIUM SERPL-MCNC: 9.7 MG/DL — SIGNIFICANT CHANGE UP (ref 8.4–10.5)
CHLORIDE SERPL-SCNC: 94 MMOL/L — LOW (ref 98–110)
CHLORIDE SERPL-SCNC: 96 MMOL/L — LOW (ref 98–110)
CO2 SERPL-SCNC: 29 MMOL/L — SIGNIFICANT CHANGE UP (ref 17–32)
CO2 SERPL-SCNC: 32 MMOL/L — SIGNIFICANT CHANGE UP (ref 17–32)
CREAT SERPL-MCNC: 2 MG/DL — HIGH (ref 0.7–1.5)
CREAT SERPL-MCNC: 2 MG/DL — HIGH (ref 0.7–1.5)
EGFR: 36 ML/MIN/1.73M2 — LOW
EGFR: 36 ML/MIN/1.73M2 — LOW
EOSINOPHIL # BLD AUTO: 0.09 K/UL — SIGNIFICANT CHANGE UP (ref 0–0.7)
EOSINOPHIL NFR BLD AUTO: 0.8 % — SIGNIFICANT CHANGE UP (ref 0–8)
GLUCOSE BLDC GLUCOMTR-MCNC: 108 MG/DL — HIGH (ref 70–99)
GLUCOSE BLDC GLUCOMTR-MCNC: 329 MG/DL — HIGH (ref 70–99)
GLUCOSE SERPL-MCNC: 161 MG/DL — HIGH (ref 70–99)
GLUCOSE SERPL-MCNC: 229 MG/DL — HIGH (ref 70–99)
HCT VFR BLD CALC: 39.9 % — LOW (ref 42–52)
HGB BLD-MCNC: 13.6 G/DL — LOW (ref 14–18)
LYMPHOCYTES # BLD AUTO: 0.28 K/UL — LOW (ref 1.2–3.4)
LYMPHOCYTES # BLD AUTO: 2.6 % — LOW (ref 20.5–51.1)
MACROCYTES BLD QL: SLIGHT — SIGNIFICANT CHANGE UP
MAGNESIUM SERPL-MCNC: 2.5 MG/DL — HIGH (ref 1.8–2.4)
MANUAL SMEAR VERIFICATION: SIGNIFICANT CHANGE UP
MCHC RBC-ENTMCNC: 31.6 PG — HIGH (ref 27–31)
MCHC RBC-ENTMCNC: 34.1 G/DL — SIGNIFICANT CHANGE UP (ref 32–37)
MCV RBC AUTO: 92.6 FL — SIGNIFICANT CHANGE UP (ref 80–94)
MONOCYTES # BLD AUTO: 0.1 K/UL — SIGNIFICANT CHANGE UP (ref 0.1–0.6)
MONOCYTES NFR BLD AUTO: 0.9 % — LOW (ref 1.7–9.3)
NEUTROPHILS # BLD AUTO: 10.35 K/UL — HIGH (ref 1.4–6.5)
NEUTROPHILS NFR BLD AUTO: 94.8 % — HIGH (ref 42.2–75.2)
PHOSPHATE SERPL-MCNC: 4.4 MG/DL — SIGNIFICANT CHANGE UP (ref 2.1–4.9)
PLAT MORPH BLD: NORMAL — SIGNIFICANT CHANGE UP
PLATELET # BLD AUTO: 398 K/UL — SIGNIFICANT CHANGE UP (ref 130–400)
PMV BLD: 10.2 FL — SIGNIFICANT CHANGE UP (ref 7.4–10.4)
POIKILOCYTOSIS BLD QL AUTO: SLIGHT — SIGNIFICANT CHANGE UP
POTASSIUM SERPL-MCNC: 3.7 MMOL/L — SIGNIFICANT CHANGE UP (ref 3.5–5)
POTASSIUM SERPL-MCNC: 4.1 MMOL/L — SIGNIFICANT CHANGE UP (ref 3.5–5)
POTASSIUM SERPL-SCNC: 3.7 MMOL/L — SIGNIFICANT CHANGE UP (ref 3.5–5)
POTASSIUM SERPL-SCNC: 4.1 MMOL/L — SIGNIFICANT CHANGE UP (ref 3.5–5)
PROT SERPL-MCNC: 6.6 G/DL — SIGNIFICANT CHANGE UP (ref 6–8)
RBC # BLD: 4.31 M/UL — LOW (ref 4.7–6.1)
RBC # FLD: 12.9 % — SIGNIFICANT CHANGE UP (ref 11.5–14.5)
RBC BLD AUTO: ABNORMAL
SODIUM SERPL-SCNC: 135 MMOL/L — SIGNIFICANT CHANGE UP (ref 135–146)
SODIUM SERPL-SCNC: 139 MMOL/L — SIGNIFICANT CHANGE UP (ref 135–146)
TARGETS BLD QL SMEAR: SLIGHT — SIGNIFICANT CHANGE UP
URATE SERPL-MCNC: 6.3 MG/DL — SIGNIFICANT CHANGE UP (ref 3.4–8.8)
URATE SERPL-MCNC: 6.4 MG/DL — SIGNIFICANT CHANGE UP (ref 3.4–8.8)
URATE SERPL-MCNC: 6.5 MG/DL — SIGNIFICANT CHANGE UP (ref 3.4–8.8)
URATE SERPL-MCNC: 6.6 MG/DL — SIGNIFICANT CHANGE UP (ref 3.4–8.8)
VARIANT LYMPHS # BLD: 0.9 % — SIGNIFICANT CHANGE UP (ref 0–5)
WBC # BLD: 10.92 K/UL — HIGH (ref 4.8–10.8)
WBC # FLD AUTO: 10.92 K/UL — HIGH (ref 4.8–10.8)

## 2025-01-17 PROCEDURE — 99232 SBSQ HOSP IP/OBS MODERATE 35: CPT

## 2025-01-17 RX ORDER — INSULIN LISPRO 100/ML
20 VIAL (ML) SUBCUTANEOUS ONCE
Refills: 0 | Status: COMPLETED | OUTPATIENT
Start: 2025-01-17 | End: 2025-01-17

## 2025-01-17 RX ORDER — MAG HYDROX/ALUMINUM HYD/SIMETH 200-200-20
30 SUSPENSION, ORAL (FINAL DOSE FORM) ORAL EVERY 4 HOURS
Refills: 0 | Status: DISCONTINUED | OUTPATIENT
Start: 2025-01-17 | End: 2025-01-17

## 2025-01-17 RX ORDER — FILGRASTIM 300 UG/.5ML
480 INJECTION, SOLUTION INTRAVENOUS; SUBCUTANEOUS
Refills: 0
Start: 2025-01-17

## 2025-01-17 RX ORDER — BENZOCAINE AND MENTHOL 15; 3.6 MG/1; MG/1
1 LOZENGE ORAL ONCE
Refills: 0 | Status: COMPLETED | OUTPATIENT
Start: 2025-01-17 | End: 2025-01-17

## 2025-01-17 RX ORDER — FILGRASTIM 300 UG/.5ML
480 INJECTION, SOLUTION INTRAVENOUS; SUBCUTANEOUS ONCE
Refills: 0 | Status: COMPLETED | OUTPATIENT
Start: 2025-01-17 | End: 2025-01-17

## 2025-01-17 RX ADMIN — Medication 81 MILLIGRAM(S): at 12:59

## 2025-01-17 RX ADMIN — Medication 1 TABLET(S): at 12:57

## 2025-01-17 RX ADMIN — FILGRASTIM 480 MICROGRAM(S): 300 INJECTION, SOLUTION INTRAVENOUS; SUBCUTANEOUS at 15:45

## 2025-01-17 RX ADMIN — BUMETANIDE 2 MILLIGRAM(S): 2 TABLET ORAL at 06:55

## 2025-01-17 RX ADMIN — CHLORHEXIDINE GLUCONATE 1 APPLICATION(S): 1.2 RINSE ORAL at 12:58

## 2025-01-17 RX ADMIN — SIMETHICONE 80 MILLIGRAM(S): 125 CAPSULE, LIQUID FILLED ORAL at 12:59

## 2025-01-17 RX ADMIN — CETIRIZINE HYDROCHLORIDE 10 MILLIGRAM(S): 5 SYRUP ORAL at 13:01

## 2025-01-17 RX ADMIN — ALLOPURINOL 150 MILLIGRAM(S): 100 TABLET ORAL at 13:01

## 2025-01-17 RX ADMIN — NIFEDIPINE 60 MILLIGRAM(S): 60 TABLET, EXTENDED RELEASE ORAL at 05:12

## 2025-01-17 RX ADMIN — BENZOCAINE AND MENTHOL 1 LOZENGE: 15; 3.6 LOZENGE ORAL at 04:59

## 2025-01-17 RX ADMIN — Medication 17 GRAM(S): at 05:12

## 2025-01-17 RX ADMIN — Medication 500 MILLIGRAM(S): at 12:59

## 2025-01-17 RX ADMIN — Medication 8: at 11:30

## 2025-01-17 RX ADMIN — PANTOPRAZOLE 40 MILLIGRAM(S): 40 TABLET, DELAYED RELEASE ORAL at 05:15

## 2025-01-17 RX ADMIN — Medication 20 UNIT(S): at 11:29

## 2025-01-17 RX ADMIN — RIVAROXABAN 15 MILLIGRAM(S): 2.5 TABLET, FILM COATED ORAL at 17:02

## 2025-01-17 RX ADMIN — Medication 25 MILLIGRAM(S): at 05:14

## 2025-01-17 RX ADMIN — METOCLOPRAMIDE 5 MILLIGRAM(S): 10 TABLET ORAL at 13:20

## 2025-01-17 RX ADMIN — METOCLOPRAMIDE 5 MILLIGRAM(S): 10 TABLET ORAL at 05:14

## 2025-01-17 RX ADMIN — Medication 30 MILLILITER(S): at 11:33

## 2025-01-17 RX ADMIN — SIMETHICONE 80 MILLIGRAM(S): 125 CAPSULE, LIQUID FILLED ORAL at 05:14

## 2025-01-17 RX ADMIN — Medication 30 MILLILITER(S): at 04:42

## 2025-01-17 NOTE — PROGRESS NOTE ADULT - SUBJECTIVE AND OBJECTIVE BOX
SUBJECTIVE:    Patient is a 69y old Male who presents with a chief complaint of abnormal labs (17 Jan 2025 13:20)    Currently admitted to medicine with the primary diagnosis of Lymphoma       Today is hospital day 8d. This morning he is resting comfortably in bed and reports no new issues or overnight events.     PAST MEDICAL & SURGICAL HISTORY  Diabetes mellitus, type 2    BPH (benign prostatic hyperplasia)    Water retention    Essential hypertension    Diabetes    CAD (coronary artery disease)    H/O hypercholesterolemia    Morbid obesity    Chronic kidney disease (CKD)    History of atrial fibrillation    H/O right coronary artery stent placement    History of resection of pancreas    History of cholecystectomy    History of left knee replacement    S/P CABG x 6    H/O cardiac radiofrequency ablation      SOCIAL HISTORY:  Negative for smoking/alcohol/drug use.     ALLERGIES:  No Known Allergies    MEDICATIONS:  STANDING MEDICATIONS  allopurinol 150 milliGRAM(s) Oral daily  ascorbic acid 500 milliGRAM(s) Oral daily  aspirin  chewable 81 milliGRAM(s) Oral daily  atorvastatin 10 milliGRAM(s) Oral at bedtime  buMETAnide Injectable 2 milliGRAM(s) IV Push every 12 hours  cetirizine 10 milliGRAM(s) Oral daily  chlorhexidine 2% Cloths 1 Application(s) Topical daily  dextrose 5%. 1000 milliLiter(s) IV Continuous <Continuous>  dextrose 5%. 1000 milliLiter(s) IV Continuous <Continuous>  dextrose 50% Injectable 25 Gram(s) IV Push once  dextrose 50% Injectable 12.5 Gram(s) IV Push once  dextrose 50% Injectable 25 Gram(s) IV Push once  filgrastim-sndz (ZARXIO) Injectable 480 MICROGram(s) SubCutaneous once  glucagon  Injectable 1 milliGRAM(s) IntraMuscular once  insulin lispro (ADMELOG) corrective regimen sliding scale   SubCutaneous three times a day before meals  metoclopramide 5 milliGRAM(s) Oral every 8 hours  metolazone 2.5 milliGRAM(s) Oral daily  metoprolol tartrate 25 milliGRAM(s) Oral two times a day  Nephro-toni 1 Tablet(s) Oral daily  NIFEdipine XL 60 milliGRAM(s) Oral daily  pantoprazole    Tablet 40 milliGRAM(s) Oral before breakfast  polyethylene glycol 3350 17 Gram(s) Oral <User Schedule>  rivaroxaban 15 milliGRAM(s) Oral with dinner  simethicone 80 milliGRAM(s) Chew every 6 hours    PRN MEDICATIONS  acetaminophen     Tablet .. 650 milliGRAM(s) Oral every 6 hours PRN  aluminum hydroxide/magnesium hydroxide/simethicone Suspension 30 milliLiter(s) Oral every 4 hours PRN  dextrose Oral Gel 15 Gram(s) Oral once PRN  glycerin Suppository - Adult 1 Suppository(s) Rectal daily PRN  melatonin 3 milliGRAM(s) Oral at bedtime PRN  ondansetron Injectable 4 milliGRAM(s) IV Push every 8 hours PRN  oxycodone    5 mG/acetaminophen 325 mG 1 Tablet(s) Oral every 4 hours PRN  prochlorperazine   Injectable 5 milliGRAM(s) IV Push every 6 hours PRN    VITALS:   T(F): 98  HR: 112  BP: 174/60  RR: 18  SpO2: 99%    LABS:                        13.6   10.92 )-----------( 398      ( 17 Jan 2025 09:01 )             39.9     01-17    139  |  96[L]  |  76[HH]  ----------------------------<  161[H]  4.1   |  32  |  2.0[H]    Ca    9.7      17 Jan 2025 09:01  Phos  4.4     01-17  Mg     2.5     01-17    TPro  6.6  /  Alb  3.9  /  TBili  0.5  /  DBili  0.2  /  AST  33  /  ALT  46[H]  /  AlkPhos  89  01-17      Urinalysis Basic - ( 17 Jan 2025 09:01 )    Color: x / Appearance: x / SG: x / pH: x  Gluc: 161 mg/dL / Ketone: x  / Bili: x / Urobili: x   Blood: x / Protein: x / Nitrite: x   Leuk Esterase: x / RBC: x / WBC x   Sq Epi: x / Non Sq Epi: x / Bacteria: x                RADIOLOGY:    PHYSICAL EXAM:  GEN: No acute distress  LUNGS: Clear to auscultation bilaterally   HEART: Regular  ABD: Soft, non-tender, non-distended.  EXT: NC/NC/NE/2+PP/LUJAN/Skin Intact.   NEURO: AAOX3    Intravenous access:   NG tube:   Ojeda Catheter:

## 2025-01-17 NOTE — PROGRESS NOTE ADULT - SUBJECTIVE AND OBJECTIVE BOX
RACHELLEMORGAN  69y  Male  ***My note supersedes ALL resident notes that I sign.  My corrections for their notes are in my note.***    I can be reached directly via Teams or my office number is [phone] or [phone].    INTERVAL EVENTS: Here for f/u of cancer. Pt has h/a this am - did not sleep well last night. Says stomach is upset - wants Maalox. Insulin pump ran out of insulin. Although pt not feeling great, he still wants to go home.    T(F): 97.9 (01-17-25 @ 05:33), Max: 97.9 (01-17-25 @ 05:33)  HR: 106 (01-17-25 @ 05:33) (100 - 106)  BP: 148/74 (01-17-25 @ 05:33) (114/64 - 148/74)  RR: 18 (01-17-25 @ 05:33) (18 - 18)  SpO2: 99% (01-17-25 @ 05:33) (96% - 99%)    Gen: NAD  HEENT: PERRL, EOMI, mouth clr, nose clr  Neck: no nodes, no JVD, thyroid nl  lungs: clr  hrt: s1 s2 rrr no murmur  abd: soft, NT/ND, no HS megaly  groin: rt groin mass (LN)  ext: 2+ edema b/l legs R>L, 3+ edema b/l dorsum of feet; no c/c; rt leg scar from vein harvest  neuro: aa ox3, cn intact, can move all 4 ext    LABS:                      13.6    (    92.6   10.92 )-----------( ---------      398      ( 17 Jan 2025 09:01 )             39.9    (    12.9     139   (   96   (   161      01-17-25 @ 09:01  ----------------------               4.1   (   32   (   76                             -----                        2.0  Ca  9.7   Mg  2.5    P   4.4     135   (   94   (   229      01-16-25 @ 21:24  ----------------------               3.7   (   29   (   82                             -----                        2.0  Ca  8.6   Mg  --    P   --     LFT  6.6  (  0.5  (  33       01-17-25 @ 09:01  -------------------------  3.9  (  89  (  46    Urinalysis Basic - ( 17 Jan 2025 09:01 )    Color: x / Appearance: x / SG: x / pH: x  Gluc: 161 mg/dL / Ketone: x  / Bili: x / Urobili: x   Blood: x / Protein: x / Nitrite: x   Leuk Esterase: x / RBC: x / WBC x   Sq Epi: x / Non Sq Epi: x / Bacteria: x    CAPILLARY BLOOD GLUCOSE  POCT Blood Glucose.: 329 (01-17-25 @ 11:25)  POCT Blood Glucose.: 108 (01-17-25 @ 07:38)  POCT Blood Glucose.: 227 (01-16-25 @ 21:43)  POCT Blood Glucose.: 163 (01-16-25 @ 16:58)  POCT Blood Glucose.: 201 (01-16-25 @ 11:07)  POCT Blood Glucose.: 105 (01-16-25 @ 07:46)  POCT Blood Glucose.: 236 (01-15-25 @ 21:09)  POCT Blood Glucose.: 296 (01-15-25 @ 16:50)    RADIOLOGY & ADDITIONAL TESTS:    MEDICATIONS:    acetaminophen     Tablet .. 650 milliGRAM(s) Oral every 6 hours PRN  allopurinol 150 milliGRAM(s) Oral daily  aluminum hydroxide/magnesium hydroxide/simethicone Suspension 30 milliLiter(s) Oral every 4 hours PRN  ascorbic acid 500 milliGRAM(s) Oral daily  aspirin  chewable 81 milliGRAM(s) Oral daily  atorvastatin 10 milliGRAM(s) Oral at bedtime  buMETAnide Injectable 2 milliGRAM(s) IV Push every 12 hours  cetirizine 10 milliGRAM(s) Oral daily  chlorhexidine 2% Cloths 1 Application(s) Topical daily  filgrastim-sndz (ZARXIO) Injectable 480 MICROGram(s) SubCutaneous once  glycerin Suppository - Adult 1 Suppository(s) Rectal daily PRN  insulin lispro (ADMELOG) corrective regimen sliding scale   SubCutaneous three times a day before meals  melatonin 3 milliGRAM(s) Oral at bedtime PRN  metoclopramide 5 milliGRAM(s) Oral every 8 hours  metolazone 2.5 milliGRAM(s) Oral daily  metoprolol tartrate 25 milliGRAM(s) Oral two times a day  Nephro-toni 1 Tablet(s) Oral daily  NIFEdipine XL 60 milliGRAM(s) Oral daily  ondansetron Injectable 4 milliGRAM(s) IV Push every 8 hours PRN  oxycodone    5 mG/acetaminophen 325 mG 1 Tablet(s) Oral every 4 hours PRN  pantoprazole    Tablet 40 milliGRAM(s) Oral before breakfast  polyethylene glycol 3350 17 Gram(s) Oral <User Schedule>  prochlorperazine   Injectable 5 milliGRAM(s) IV Push every 6 hours PRN  rivaroxaban 15 milliGRAM(s) Oral with dinner  simethicone 80 milliGRAM(s) Chew every 6 hours

## 2025-01-17 NOTE — PROGRESS NOTE ADULT - ATTENDING COMMENTS
Patient examined , above note read and edited where appropriate .plan to start chemo in AM . check 2D echo
Patient examined , agree with above note   To start on infusional chemotherapy with etoposide, adriamycin , vincristine Day1 -Day4   Rituxan and cytoxan on Day5 . Hepatitis panel is pending   Hydration , antiemetics, allopurinol .
Patient examined by myself , sitting up in chair , slight increase in edema including left leg ( both IV fluids and diuretics on hold )  Slight increase in BUN/Cr  tolerating chemo so far except for hyperglycemia  Plan : Continue chemo            Hyperglycemic control          resume IV fluids and gentle diuresis . renal consult .
Pt seen and examined. 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 pancreatic stone, s/p distal pancreatomy and splenectomy, sent in from oncology office for further evaluation of large B-cell lymphoma in the setting of abnormal labs. Pt is s/p bone marrow biopsy last week. He is receiving inpatient chemo with R-EPOCH per heme onc recs. Today he is c/o increased b/l LE edema. Appreciate Nepro recs, will dc IVF and start iv bumex, metolazone. Renally dose allopurinol. Hematology to f/u.
Ike was seen earlier today.  He felt fine.  We did give a dose of rasburicase and uric acid improved the rest of blood work has been stable.  He was discharged after we saw him he did get a dose of Zarxio he he also has dosing to last him until Monday when he will come in for Neulasta.  We will arrange blood work next week as well for TLS monitoring and follow-up with us and we will proceed from there.  He is to take allopurinol 300 mg daily. We can remove PICC. Outpatient PORT ordered
Patient was seen for follow-up he is undergoing R-EPOCH cycle 1 day 3 today due to rectal transformation.  Appreciate nephrology consult we will hold IV fluids and resume diuresis.  Continue with allopurinol continue to monitor tumor lysis panel daily for now which include CMP, phosphorus and uric acid continue CBC monitoring as well plan to discharge him after he completes chemotherapy and day 5.  Likely will get venetoclax with second cycle.
Pt seen and examined. 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 pancreatic stone, s/p distal pancreatomy and splenectomy, sent in from oncology office for further evaluation of large B-cell lymphoma in the setting of abnormal labs. Pt frustrated about being NPO status.  Pt to undergo PICC vs. Port and Bone marrow biopsy today per Heme/Onc recs, in prepartion for inpatient chemo. D/w patient and partner at bedside.
Pt seen and examined. 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 pancreatic stone, s/p distal pancreatomy and splenectomy, sent in from oncology office for further evaluation of large B-cell lymphoma in the setting of abnormal labs. Pt is s/p bone marrow biopsy last week. He is receiving inpatient chemo with R-EPOCH per heme onc recs. Today he is c/o constipation and abd discomfort. He is on bowel regimen with lactulose and miralax and said he had some diarrhea earlier this morning but still feels constipated. KUB obtained which shows mild-mod colonic stool burden, nonobstructive gas pattern. Cont bowel regimen. Continues to have 2+ LE pitting edema R>L (per patient he has chronic RLE swelling).  Appreciate Nephro recs, cont diuretics. Renally dose allopurinol. Cont chemo plan per Hematology
He was seen earlier today when rounds overall he is tolerating his chemotherapy well had some constipation which is now improving causing abdominal discomfort day 4 today of  R-EPOCH, for his medical transformation continue tumor lysis panel nephrology input appreciated we will hold IV fluids edema is about the same continue allopurinol
Patient was seen today on rounds plan was to discharge patient home unfortunately uric acid is now just over 10.  CMP was not available for review CBC is stable therefore he will hold discharge today and repeat CBC CMP uric acid tomorrow will likely give rasburicase depending on uric acid level please also check a phosphorus if trending down then we can send him home tomorrow on allopurinol.  Otherwise he tolerated chemotherapy well today he received Rituxan and cyclophosphamide.  Continue with allopurinol continue with Xarelto discharge pending repeat labs in the morning.  Continue with bowel regimen
Patient was seen today for follow-up he is feeling much better in regards to his GI symptoms he is on day 4 today of  R-EPOCH..  Uric acid blood thrombolittle bit but remains on allopurinol we will hold off is very case for now creatinine is a bit better appreciate nephrology's help regards to fluid management plan is for possible discharge tomorrow after Rituxan and cyclophosphamide.  Continue with Xarelto andthe counts are less than 50.  Continue with prednisone as well to complete 5-day course repeat tumor lysis panel tomorrow.  Will arrange for Fulphila on body Neulasta on Friday

## 2025-01-17 NOTE — PROGRESS NOTE ADULT - REASON FOR ADMISSION
abnormal labs
170.18

## 2025-01-17 NOTE — PROGRESS NOTE ADULT - ASSESSMENT
69-year-old man with extensive medical history including CAD (status post CABG;), atrial fibrillation on Xarelto, history of distal pancreatomy and splenectomy for unclear reason referred to the ED by his oncologist for abnormal labs. Patient was recently diagnosed with Diffuse large B cell Lymphoma on pathology of right groin mass and was following his Oncologist Dr Angeli Hilliard. Today patient was seen by his oncologist in office and was referred to the ed for abnormal labs.  Today, he reported having chronic fatigue, weakness, right greater than left progressive leg swelling, poor appetite, no night sweats. Patient reported having right greater than left lower extremity swelling.    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   CT pelvis on 10/12/24 showed interval increase of mass 10 x 7 x 10 cm with inguinal lymphadenopathy, suspicious for neoplastic process.  Biopsy on 12/18/24 revealed diffuse large B-cell lymphoma with concern for Rodriguez's transformation.  Patient was seen by Dr. Hilliard on 1/8/25 for initial consultation and was advised to come to ED due to concern for tumor lysis syndrome.  Uric acid level now WNL at 7.9, CMP otherwise unremarkable.   CT C/A/P 1.9.25 Stable appearance bulky right inguinal and right iliac lymphadenopathy including partially imaged 10.1 cm right inguinal soft tissue mass/lymph node.  s/p PICC placement on 1/10/25  s/p Bone marrow biopsy on 1/10/25  Echo 1/10/25: EF 60-65%  Pending hepatitis panel      CKD Stage 3B  baseline (1.8)    Recommendations   Started R-EPOCH, D1 on 1/11/2024. D1-4 etoposide/vincristine/adriamycin/prednisone, D5 cytoxan and Rituximab.  Continuing D5 today 1/16/25   C/w allopurinol renally dosed upon discharge  Ideally needs to be IV fluids, follow up nephrology recs   Can discharge today from oncology's perspective, will arrange for G-CSF injection in [place] in am  PICC line can be removed, will arrange for port placement            69-year-old man with extensive medical history including CAD (status post CABG;), atrial fibrillation on Xarelto, history of distal pancreatomy and splenectomy for unclear reason referred to the ED by his oncologist for abnormal labs. Patient was recently diagnosed with Diffuse large B cell Lymphoma on pathology of right groin mass and was following his Oncologist Dr Angeli Hilliard. Today patient was seen by his oncologist in office and was referred to the ed for abnormal labs.  Today, he reported having chronic fatigue, weakness, right greater than left progressive leg swelling, poor appetite, no night sweats. Patient reported having right greater than left lower extremity swelling.    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   CT pelvis on 10/12/24 showed interval increase of mass 10 x 7 x 10 cm with inguinal lymphadenopathy, suspicious for neoplastic process.  Biopsy on 12/18/24 revealed diffuse large B-cell lymphoma with concern for Rodriguez's transformation.  Patient was seen by Dr. Hilliard on 1/8/25 for initial consultation and was advised to come to ED due to concern for tumor lysis syndrome.  Uric acid level now WNL at 7.9, CMP otherwise unremarkable.   CT C/A/P 1.9.25 Stable appearance bulky right inguinal and right iliac lymphadenopathy including partially imaged 10.1 cm right inguinal soft tissue mass/lymph node.  s/p PICC placement on 1/10/25  s/p Bone marrow biopsy on 1/10/25  Echo 1/10/25: EF 60-65%  Pending hepatitis panel      CKD Stage 3B  baseline (1.8)    Recommendations   Completed cycle 1 of R-EPOCH (D1 on 1/11/2024). Tolerated well. Rituximab was given on D5   Uric acid 10.3 on 1/16/25, s/p 3 mg IV rasburicase  Give 1 dose of zarxio 480 mcg SC inpatient on 1/17/25  Sent script for 2 doses of zarxio for 1/18/25 and 1/19/25  Scheduled for Neulasta in [facility] on Monday 1/20/25  C/w allopurinol renally dosed upon discharge  Scheduled for CBC, CMP and uric acid on Monday 1/20/25 then weekly   Can discharge today from oncology's perspective  PICC line can be removed, will arrange for port placement

## 2025-01-17 NOTE — PROGRESS NOTE ADULT - ASSESSMENT
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    CT C: chr lung changes; no lymphoma  CT A/P: CCY; s/p distal pancreatectomy; s/p splenectomy; bulky LN rt inguinal to 10.1 cm  V Dupl: no evidence of deep venous thrombosis in either lower extremity. There is a proximal thigh/right groin mass measuring greater than 6.9 x  9.2 x 11 cm  Echo 1/10/25: EF 60-65%, mild RVH w/ mild RV dysfxn; mild MR/TR/AS  hepatitis B, hepatitis C HIV NEGATIVE  IR guided BM biopsy 1/11/2025 -> path neg (nl marrow)  IR placed PICC line 1/11/2025  Heme/onc following -> started chemotherapy (C1, D1 - 1/11/25) with R-EPOCH. D1-4 etoposide/vincristine/adriamycin/prednisone, D5 (today) cytoxan and Rituximab  Uric Acid: 8.3 -> 8.9 -> 10.3 -> 6.5 (cleared by h/o to leave)  incr allopurinol 150mg po q24  s/p rasburicase 3mg iv x1 (1/17)  lytes OK  c/w Vit C 500mg po q24  Nephrovite q24  c/w melatonin qhs prn  Percocet 5/325mg po q6 - sent Rx to pharm    # CKD 3; KRISTINE; leg edema R>L; normo anemia of chr dz (mild)  peak Cr 2.4  base Cr upper 1s - back to baseline  bumex 2mg iv q12 -> to lasix 80mg po q24 on d/c  c/w zaroxylyn 2.5mg po q24  consider changing nifedipine (for edema and constipation)  Pred will cause water retention to  some of rt leg edema is from malign obstruction at rt groin and prior vein harvest for CABG  no IVFs  f/u w renal - has appt to see Dr Kleiner in < 1 wk  check UTP:Cr ratio (ordered) - urine never sent by RN, will have to do as outpt (pt going home today)    # Constipation; abd pain; improving; poss intestinal paresis from DM  diet: CC, DASH  KUB: Mild to moderate colonic stool burden. Nonobstructive gas pattern.   bowel reg: PEG q8  c/w reglan 5mg po q8 (renal dose); (QTc is nl)  can use dulcoalx tabs prn  c/w Maalox prn  glycerine supp pr q24 prn  d/c lactulose (not helping)    # CAD s/p CABG; Chronic afib on Xarelto; HTN  c/w metoprolol 25mg po q12  nifed xl 60mg po q24 (given edema and constipation - consider a diff med)  diuretics (above)  HOLD lisinopril (KRISTINE)  HOLD aldactone (KRISTINE)  continue xarelto 15mg w/ dinner    # HLD   c/w Pravastatin -> using lip 10 hs here  resume Repatha    # DM  diabetic dash diet  FS - self monitored via dex com  on insulin pump @2.85 at home   target blood sugar 140 - 180 - BG increasing with steroids (steroids finish today)  f/u endo: pt will use insulin pump for 30 units boluses/meals while on steroids and then go back to 20-25 units boluses/meals once off steroids  since pump out of insulin - can use humalog sc w/ meals while here for today  can resume Mounjaro     # seasonal allergies  c/w zyrtec  can resume nasal sprays    # ? hx pancreatic stone, s/p distal pancreatomy and splenectomy  outpt follow up.     # DVT PPX: Rivaroxaban    # GI PPX: Pantoprazole - not needed at home    # Code Status: Full code    # Activity Order: Independent     #Dispo: diuretics; f/u renal/H-O; try reglan; f/u endo re FS and insulin pump;   today, pt will go home w/ no needs - (H/O will arrange for G-CSF)    Long term prog is guarded.

## 2025-01-17 NOTE — PROGRESS NOTE ADULT - PROVIDER SPECIALTY LIST ADULT
Heme/Onc
Heme/Onc
Hospitalist
Internal Medicine
Internal Medicine
Intervent Radiology
Intervent Radiology
Nephrology
Heme/Onc
Internal Medicine
Internal Medicine
Heme/Onc
Internal Medicine
Nephrology
Heme/Onc
Hospitalist
Internal Medicine
Nephrology

## 2025-01-18 RX ORDER — FILGRASTIM 300 UG/.5ML
480 INJECTION, SOLUTION INTRAVENOUS; SUBCUTANEOUS
Qty: 2 | Refills: 0
Start: 2025-01-18 | End: 2025-01-19

## 2025-01-18 RX ORDER — DIAZEPAM 2 MG/1
2 TABLET ORAL
Qty: 14 | Refills: 0 | Status: ACTIVE | COMMUNITY
Start: 2025-01-18 | End: 1900-01-01

## 2025-01-20 ENCOUNTER — NON-APPOINTMENT (OUTPATIENT)
Age: 70
End: 2025-01-20

## 2025-01-20 ENCOUNTER — INPATIENT (INPATIENT)
Facility: HOSPITAL | Age: 70
LOS: 27 days | Discharge: NURSING, REHAB OR LONG-TERM CARE | DRG: 871 | End: 2025-02-17
Attending: INTERNAL MEDICINE | Admitting: STUDENT IN AN ORGANIZED HEALTH CARE EDUCATION/TRAINING PROGRAM
Payer: MEDICARE

## 2025-01-20 VITALS
HEART RATE: 89 BPM | DIASTOLIC BLOOD PRESSURE: 86 MMHG | TEMPERATURE: 99 F | OXYGEN SATURATION: 94 % | RESPIRATION RATE: 20 BRPM | HEIGHT: 72 IN | SYSTOLIC BLOOD PRESSURE: 160 MMHG

## 2025-01-20 DIAGNOSIS — Z90.49 ACQUIRED ABSENCE OF OTHER SPECIFIED PARTS OF DIGESTIVE TRACT: Chronic | ICD-10-CM

## 2025-01-20 DIAGNOSIS — J18.9 PNEUMONIA, UNSPECIFIED ORGANISM: ICD-10-CM

## 2025-01-20 DIAGNOSIS — Z95.5 PRESENCE OF CORONARY ANGIOPLASTY IMPLANT AND GRAFT: Chronic | ICD-10-CM

## 2025-01-20 DIAGNOSIS — Z96.652 PRESENCE OF LEFT ARTIFICIAL KNEE JOINT: Chronic | ICD-10-CM

## 2025-01-20 DIAGNOSIS — Z90.410 ACQUIRED TOTAL ABSENCE OF PANCREAS: Chronic | ICD-10-CM

## 2025-01-20 DIAGNOSIS — Z95.1 PRESENCE OF AORTOCORONARY BYPASS GRAFT: Chronic | ICD-10-CM

## 2025-01-20 DIAGNOSIS — Z98.890 OTHER SPECIFIED POSTPROCEDURAL STATES: Chronic | ICD-10-CM

## 2025-01-20 LAB
ALBUMIN SERPL ELPH-MCNC: 3.3 G/DL — LOW (ref 3.5–5.2)
ALP SERPL-CCNC: 85 U/L — SIGNIFICANT CHANGE UP (ref 30–115)
ALT FLD-CCNC: 28 U/L — SIGNIFICANT CHANGE UP (ref 0–41)
ANION GAP SERPL CALC-SCNC: 13 MMOL/L — SIGNIFICANT CHANGE UP (ref 7–14)
APPEARANCE UR: CLEAR — SIGNIFICANT CHANGE UP
APTT BLD: 33.9 SEC — SIGNIFICANT CHANGE UP (ref 27–39.2)
AST SERPL-CCNC: 39 U/L — SIGNIFICANT CHANGE UP (ref 0–41)
BASOPHILS # BLD AUTO: 0.01 K/UL — SIGNIFICANT CHANGE UP (ref 0–0.2)
BASOPHILS NFR BLD AUTO: 3.8 % — HIGH (ref 0–1)
BILIRUB SERPL-MCNC: 0.4 MG/DL — SIGNIFICANT CHANGE UP (ref 0.2–1.2)
BILIRUB UR-MCNC: NEGATIVE — SIGNIFICANT CHANGE UP
BUN SERPL-MCNC: 72 MG/DL — CRITICAL HIGH (ref 10–20)
CALCIUM SERPL-MCNC: 8.6 MG/DL — SIGNIFICANT CHANGE UP (ref 8.4–10.4)
CHLORIDE SERPL-SCNC: 84 MMOL/L — LOW (ref 98–110)
CO2 SERPL-SCNC: 40 MMOL/L — HIGH (ref 17–32)
COLOR SPEC: YELLOW — SIGNIFICANT CHANGE UP
CREAT SERPL-MCNC: 2.1 MG/DL — HIGH (ref 0.7–1.5)
DIFF PNL FLD: ABNORMAL
EGFR: 34 ML/MIN/1.73M2 — LOW
EOSINOPHIL # BLD AUTO: 0.01 K/UL — SIGNIFICANT CHANGE UP (ref 0–0.7)
EOSINOPHIL NFR BLD AUTO: 3.8 % — SIGNIFICANT CHANGE UP (ref 0–8)
FLUAV AG NPH QL: DETECTED
FLUBV AG NPH QL: SIGNIFICANT CHANGE UP
GAS PNL BLDV: SIGNIFICANT CHANGE UP
GLUCOSE BLDC GLUCOMTR-MCNC: 200 MG/DL — HIGH (ref 70–99)
GLUCOSE SERPL-MCNC: 86 MG/DL — SIGNIFICANT CHANGE UP (ref 70–99)
GLUCOSE UR QL: NEGATIVE MG/DL — SIGNIFICANT CHANGE UP
HCT VFR BLD CALC: 33.8 % — LOW (ref 42–52)
HGB BLD-MCNC: 11.3 G/DL — LOW (ref 14–18)
INR BLD: 1.17 RATIO — SIGNIFICANT CHANGE UP (ref 0.65–1.3)
KETONES UR-MCNC: NEGATIVE MG/DL — SIGNIFICANT CHANGE UP
LACTATE SERPL-SCNC: 1.3 MMOL/L — SIGNIFICANT CHANGE UP (ref 0.7–2)
LEUKOCYTE ESTERASE UR-ACNC: NEGATIVE — SIGNIFICANT CHANGE UP
LYMPHOCYTES # BLD AUTO: 0.06 K/UL — LOW (ref 1.2–3.4)
LYMPHOCYTES # BLD AUTO: 38.5 % — SIGNIFICANT CHANGE UP (ref 20.5–51.1)
MCHC RBC-ENTMCNC: 31.2 PG — HIGH (ref 27–31)
MCHC RBC-ENTMCNC: 33.4 G/DL — SIGNIFICANT CHANGE UP (ref 32–37)
MCV RBC AUTO: 93.4 FL — SIGNIFICANT CHANGE UP (ref 80–94)
MONOCYTES # BLD AUTO: 0.02 K/UL — LOW (ref 0.1–0.6)
MONOCYTES NFR BLD AUTO: 15.4 % — HIGH (ref 1.7–9.3)
NEUTROPHILS # BLD AUTO: 0.02 K/UL — LOW (ref 1.4–6.5)
NEUTROPHILS NFR BLD AUTO: 15.4 % — LOW (ref 42.2–75.2)
NITRITE UR-MCNC: NEGATIVE — SIGNIFICANT CHANGE UP
NT-PROBNP SERPL-SCNC: 1169 PG/ML — HIGH (ref 0–300)
PH UR: 7.5 — SIGNIFICANT CHANGE UP (ref 5–8)
PLATELET # BLD AUTO: 95 K/UL — LOW (ref 130–400)
PMV BLD: 10.9 FL — HIGH (ref 7.4–10.4)
POTASSIUM SERPL-MCNC: 3.1 MMOL/L — LOW (ref 3.5–5)
POTASSIUM SERPL-SCNC: 3.1 MMOL/L — LOW (ref 3.5–5)
PROT SERPL-MCNC: 5.7 G/DL — LOW (ref 6–8)
PROT UR-MCNC: 100 MG/DL
PROTHROM AB SERPL-ACNC: 13.9 SEC — HIGH (ref 9.95–12.87)
RBC # BLD: 3.62 M/UL — LOW (ref 4.7–6.1)
RBC # FLD: 13.1 % — SIGNIFICANT CHANGE UP (ref 11.5–14.5)
RSV RNA NPH QL NAA+NON-PROBE: SIGNIFICANT CHANGE UP
SARS-COV-2 RNA SPEC QL NAA+PROBE: SIGNIFICANT CHANGE UP
SODIUM SERPL-SCNC: 137 MMOL/L — SIGNIFICANT CHANGE UP (ref 135–146)
SP GR SPEC: 1.01 — SIGNIFICANT CHANGE UP (ref 1–1.03)
TROPONIN T, HIGH SENSITIVITY RESULT: 63 NG/L — CRITICAL HIGH (ref 6–21)
TROPONIN T, HIGH SENSITIVITY RESULT: 68 NG/L — CRITICAL HIGH (ref 6–21)
UROBILINOGEN FLD QL: 1 MG/DL — SIGNIFICANT CHANGE UP (ref 0.2–1)
WBC # BLD: 0.15 K/UL — CRITICAL LOW (ref 4.8–10.8)
WBC # FLD AUTO: 0.15 K/UL — CRITICAL LOW (ref 4.8–10.8)

## 2025-01-20 PROCEDURE — 97110 THERAPEUTIC EXERCISES: CPT | Mod: GP

## 2025-01-20 PROCEDURE — 86850 RBC ANTIBODY SCREEN: CPT

## 2025-01-20 PROCEDURE — 87899 AGENT NOS ASSAY W/OPTIC: CPT

## 2025-01-20 PROCEDURE — 97163 PT EVAL HIGH COMPLEX 45 MIN: CPT | Mod: GP

## 2025-01-20 PROCEDURE — 84295 ASSAY OF SERUM SODIUM: CPT

## 2025-01-20 PROCEDURE — 86140 C-REACTIVE PROTEIN: CPT

## 2025-01-20 PROCEDURE — 87449 NOS EACH ORGANISM AG IA: CPT

## 2025-01-20 PROCEDURE — 92507 TX SP LANG VOICE COMM INDIV: CPT | Mod: GN

## 2025-01-20 PROCEDURE — 80053 COMPREHEN METABOLIC PANEL: CPT

## 2025-01-20 PROCEDURE — 70450 CT HEAD/BRAIN W/O DYE: CPT | Mod: MC

## 2025-01-20 PROCEDURE — 87640 STAPH A DNA AMP PROBE: CPT

## 2025-01-20 PROCEDURE — 0241U: CPT

## 2025-01-20 PROCEDURE — 99223 1ST HOSP IP/OBS HIGH 75: CPT

## 2025-01-20 PROCEDURE — 87040 BLOOD CULTURE FOR BACTERIA: CPT

## 2025-01-20 PROCEDURE — 85018 HEMOGLOBIN: CPT

## 2025-01-20 PROCEDURE — 94660 CPAP INITIATION&MGMT: CPT

## 2025-01-20 PROCEDURE — 71275 CT ANGIOGRAPHY CHEST: CPT | Mod: MC

## 2025-01-20 PROCEDURE — 80202 ASSAY OF VANCOMYCIN: CPT

## 2025-01-20 PROCEDURE — P9037: CPT

## 2025-01-20 PROCEDURE — 85610 PROTHROMBIN TIME: CPT

## 2025-01-20 PROCEDURE — 92612 ENDOSCOPY SWALLOW (FEES) VID: CPT | Mod: GN

## 2025-01-20 PROCEDURE — 83605 ASSAY OF LACTIC ACID: CPT

## 2025-01-20 PROCEDURE — 85652 RBC SED RATE AUTOMATED: CPT

## 2025-01-20 PROCEDURE — 76770 US EXAM ABDO BACK WALL COMP: CPT

## 2025-01-20 PROCEDURE — 74018 RADEX ABDOMEN 1 VIEW: CPT

## 2025-01-20 PROCEDURE — 73701 CT LOWER EXTREMITY W/DYE: CPT | Mod: MC,LT

## 2025-01-20 PROCEDURE — 76604 US EXAM CHEST: CPT | Mod: 26

## 2025-01-20 PROCEDURE — 97535 SELF CARE MNGMENT TRAINING: CPT | Mod: GO

## 2025-01-20 PROCEDURE — 93308 TTE F-UP OR LMTD: CPT

## 2025-01-20 PROCEDURE — 72170 X-RAY EXAM OF PELVIS: CPT

## 2025-01-20 PROCEDURE — 93005 ELECTROCARDIOGRAM TRACING: CPT

## 2025-01-20 PROCEDURE — 87641 MR-STAPH DNA AMP PROBE: CPT

## 2025-01-20 PROCEDURE — 93010 ELECTROCARDIOGRAM REPORT: CPT

## 2025-01-20 PROCEDURE — 70491 CT SOFT TISSUE NECK W/DYE: CPT | Mod: MC

## 2025-01-20 PROCEDURE — 81001 URINALYSIS AUTO W/SCOPE: CPT

## 2025-01-20 PROCEDURE — 84100 ASSAY OF PHOSPHORUS: CPT

## 2025-01-20 PROCEDURE — P9100: CPT

## 2025-01-20 PROCEDURE — 82962 GLUCOSE BLOOD TEST: CPT

## 2025-01-20 PROCEDURE — 71045 X-RAY EXAM CHEST 1 VIEW: CPT

## 2025-01-20 PROCEDURE — 84484 ASSAY OF TROPONIN QUANT: CPT

## 2025-01-20 PROCEDURE — 80048 BASIC METABOLIC PNL TOTAL CA: CPT

## 2025-01-20 PROCEDURE — 73521 X-RAY EXAM HIPS BI 2 VIEWS: CPT

## 2025-01-20 PROCEDURE — 36415 COLL VENOUS BLD VENIPUNCTURE: CPT

## 2025-01-20 PROCEDURE — 82010 KETONE BODYS QUAN: CPT

## 2025-01-20 PROCEDURE — 87305 ASPERGILLUS AG IA: CPT

## 2025-01-20 PROCEDURE — 94010 BREATHING CAPACITY TEST: CPT

## 2025-01-20 PROCEDURE — 85730 THROMBOPLASTIN TIME PARTIAL: CPT

## 2025-01-20 PROCEDURE — 71250 CT THORAX DX C-: CPT | Mod: MC

## 2025-01-20 PROCEDURE — 86901 BLOOD TYPING SEROLOGIC RH(D): CPT

## 2025-01-20 PROCEDURE — 86900 BLOOD TYPING SEROLOGIC ABO: CPT

## 2025-01-20 PROCEDURE — 92526 ORAL FUNCTION THERAPY: CPT | Mod: GN

## 2025-01-20 PROCEDURE — 84145 PROCALCITONIN (PCT): CPT

## 2025-01-20 PROCEDURE — 97530 THERAPEUTIC ACTIVITIES: CPT | Mod: GP

## 2025-01-20 PROCEDURE — 84132 ASSAY OF SERUM POTASSIUM: CPT

## 2025-01-20 PROCEDURE — 83735 ASSAY OF MAGNESIUM: CPT

## 2025-01-20 PROCEDURE — 83880 ASSAY OF NATRIURETIC PEPTIDE: CPT

## 2025-01-20 PROCEDURE — 94640 AIRWAY INHALATION TREATMENT: CPT

## 2025-01-20 PROCEDURE — 36430 TRANSFUSION BLD/BLD COMPNT: CPT

## 2025-01-20 PROCEDURE — 92610 EVALUATE SWALLOWING FUNCTION: CPT | Mod: GN

## 2025-01-20 PROCEDURE — 84550 ASSAY OF BLOOD/URIC ACID: CPT

## 2025-01-20 PROCEDURE — 71275 CT ANGIOGRAPHY CHEST: CPT | Mod: 26

## 2025-01-20 PROCEDURE — 82330 ASSAY OF CALCIUM: CPT

## 2025-01-20 PROCEDURE — 76937 US GUIDE VASCULAR ACCESS: CPT | Mod: 26

## 2025-01-20 PROCEDURE — 99285 EMERGENCY DEPT VISIT HI MDM: CPT

## 2025-01-20 PROCEDURE — 97167 OT EVAL HIGH COMPLEX 60 MIN: CPT | Mod: GO

## 2025-01-20 PROCEDURE — 71045 X-RAY EXAM CHEST 1 VIEW: CPT | Mod: 26

## 2025-01-20 PROCEDURE — 85014 HEMATOCRIT: CPT

## 2025-01-20 PROCEDURE — 85027 COMPLETE CBC AUTOMATED: CPT

## 2025-01-20 PROCEDURE — 85025 COMPLETE CBC W/AUTO DIFF WBC: CPT

## 2025-01-20 PROCEDURE — 82803 BLOOD GASES ANY COMBINATION: CPT

## 2025-01-20 RX ORDER — CEFEPIME 2 G/20ML
2000 INJECTION, POWDER, FOR SOLUTION INTRAVENOUS ONCE
Refills: 0 | Status: COMPLETED | OUTPATIENT
Start: 2025-01-20 | End: 2025-01-20

## 2025-01-20 RX ORDER — IPRATROPIUM BROMIDE AND ALBUTEROL SULFATE .5; 2.5 MG/3ML; MG/3ML
3 SOLUTION RESPIRATORY (INHALATION)
Refills: 0 | Status: COMPLETED | OUTPATIENT
Start: 2025-01-20 | End: 2025-01-20

## 2025-01-20 RX ORDER — LORAZEPAM 4 MG/ML
2 VIAL (ML) INJECTION ONCE
Refills: 0 | Status: DISCONTINUED | OUTPATIENT
Start: 2025-01-20 | End: 2025-01-20

## 2025-01-20 RX ORDER — MIDAZOLAM IN 0.9 % SOD.CHLORID 1 MG/ML
1 PLASTIC BAG, INJECTION (ML) INTRAVENOUS ONCE
Refills: 0 | Status: DISCONTINUED | OUTPATIENT
Start: 2025-01-20 | End: 2025-01-20

## 2025-01-20 RX ORDER — ACETAMINOPHEN 500 MG/5ML
1000 LIQUID (ML) ORAL ONCE
Refills: 0 | Status: COMPLETED | OUTPATIENT
Start: 2025-01-20 | End: 2025-01-20

## 2025-01-20 RX ORDER — VANCOMYCIN HCL IN 5 % DEXTROSE 1.5G/250ML
1000 PLASTIC BAG, INJECTION (ML) INTRAVENOUS ONCE
Refills: 0 | Status: COMPLETED | OUTPATIENT
Start: 2025-01-20 | End: 2025-01-20

## 2025-01-20 RX ORDER — FUROSEMIDE 10 MG/ML
40 INJECTION INTRAMUSCULAR; INTRAVENOUS ONCE
Refills: 0 | Status: COMPLETED | OUTPATIENT
Start: 2025-01-20 | End: 2025-01-20

## 2025-01-20 RX ORDER — LEVOFLOXACIN 25 MG/ML
750 SOLUTION ORAL ONCE
Refills: 0 | Status: COMPLETED | OUTPATIENT
Start: 2025-01-20 | End: 2025-01-20

## 2025-01-20 RX ADMIN — Medication 2 MILLIGRAM(S): at 22:29

## 2025-01-20 RX ADMIN — Medication 400 MILLIGRAM(S): at 22:28

## 2025-01-20 RX ADMIN — CEFEPIME 100 MILLIGRAM(S): 2 INJECTION, POWDER, FOR SOLUTION INTRAVENOUS at 17:04

## 2025-01-20 RX ADMIN — Medication 250 MILLIGRAM(S): at 20:40

## 2025-01-20 RX ADMIN — IPRATROPIUM BROMIDE AND ALBUTEROL SULFATE 3 MILLILITER(S): .5; 2.5 SOLUTION RESPIRATORY (INHALATION) at 15:35

## 2025-01-20 RX ADMIN — Medication 1000 MILLILITER(S): at 19:15

## 2025-01-20 RX ADMIN — Medication 1 MILLIGRAM(S): at 17:19

## 2025-01-20 RX ADMIN — IPRATROPIUM BROMIDE AND ALBUTEROL SULFATE 3 MILLILITER(S): .5; 2.5 SOLUTION RESPIRATORY (INHALATION) at 16:30

## 2025-01-20 RX ADMIN — IPRATROPIUM BROMIDE AND ALBUTEROL SULFATE 3 MILLILITER(S): .5; 2.5 SOLUTION RESPIRATORY (INHALATION) at 15:44

## 2025-01-20 RX ADMIN — Medication 1 MILLIGRAM(S): at 19:34

## 2025-01-20 NOTE — ED ADULT NURSE NOTE - CHIEF COMPLAINT QUOTE
pt c/o cough, diff breathing and low fevers over the weekend   had chemo fri for lymphoma  fever 100.3 this morning, took tylenol 1230p  also endorses wound to left foot to be eval

## 2025-01-20 NOTE — ED PROVIDER NOTE - OBJECTIVE STATEMENT
69-year-old male past medical history of recent diagnosis of large B-cell lymphoma with right inguinal mass s/p chemo on Thursday, CAD s/p CABG, A-fib on Xarelto, ?hx of pancreatic stone s/p distal pancreatomy and splectomy coming into the ED with cough and shortness of breath.  Patient states he had his last chemo on Thursday.  On Friday patient started to get a productive cough with green sputum production.  Patient is also endorsing chills and nausea which she states is baseline.  Patient was recently admitted for elevated creatinine and uric acid levels and discharged on January 16.  No chest pain, abdominal pain, dysuria

## 2025-01-20 NOTE — ED ADULT NURSE REASSESSMENT NOTE - NS ED NURSE REASSESS COMMENT FT1
Pt resting comfortably at this time on BiPAP. Pt O2 noted to be 90% on FiO2 of 40. Dr. Atkinson made aware of O2 saturation. FiO2 increased to 100% at this time, RT at bedside. Pt O2 saturation increased to 97% on 100 FiO2

## 2025-01-20 NOTE — ED PROVIDER NOTE - BIRTH SEX
"Spoke to pt, told him dr Dinero said:     "Please let  Him know he does have dust mite allergy.  Dust mites are microscopic insects, so small you cant see them and they cant bite but they live in our beds, carpet and upholstered furniture. They have nothing to do with cleanliness. We are most exposed by our beds, at night breathe this allergen in all night. She would benefit from Dust mite avoidance - zippered covers over pillows, mattress and box springs.  Can purchase at [employer], [employer], [employer], or www.[employer].   Place sheets over allergy covers   Wash sheets in hot water weekly      How is he doing with delmaien?'    Pt said ok, he used the nasal spray but hasnt used it for a couple days because he didn't need it.   "
Male

## 2025-01-20 NOTE — ED ADULT TRIAGE NOTE - CHIEF COMPLAINT QUOTE
pt c/o cough, diff breathing and low fevers over the weekend   had chemo fri for lymphoma  fever 100.3 this morning, took tylenol 1230p pt c/o cough, diff breathing and low fevers over the weekend   had chemo fri for lymphoma  fever 100.3 this morning, took tylenol 1230p  also endorses wound to left foot to be eval

## 2025-01-20 NOTE — H&P ADULT - HISTORY OF PRESENT ILLNESS
69-year-old man with extensive medical history including CAD (status post CABG;), DM (on insulin pump), atrial fibrillation on Xarelto, history of distal pancreatomy and splenectomy for unclear reason referred to the ED by his oncologist for abnormal labs. Patient was recently diagnosed with Diffuse large B cell Lymphoma on pathology of right groin mass and is following with [facility] oncology team. Patient was discharged on 01.17.2025 from hospital after inpatient chemotherapy (Completed cycle 1 of R-EPOCH (D1 on 1/11/2024). Tolerated well. Rituximab was given on D5).   Over past 3 days patient started to get a productive cough with yellow sputum production.  Patient is also endorsing chills and nausea which he states is baseline. He had 1 reading on low grade fever 100.5 on day of presentation.    On presentation vitals:  · BP Systolic	160 mm Hg  · BP Diastolic	86 mm Hg  · Heart Rate	89 /min  · Respiration Rate (breaths/min)	20 /min  · Temp (F)	98.9 Degrees F  · Temp (C)	37.2 Degrees C  · Temp site	oral    ED course:  O2 sats low on 4L O2 so he was promptly started on BIPAP. CXR with LLL infiltrate; sepsis labs sent with gentle hydration; abx given, Labs with neutropenia and elevated BNP, trop 68. EKG nonischemic.    CTA Chest: Negative for pulmonary emboli. Interval development of infiltrates within the lower lobes and lingula which may reflect acute multilobar pneumonia.    Patient admitted to medicine for acute hypoxic respiratory failure.

## 2025-01-20 NOTE — ED PROVIDER NOTE - CLINICAL SUMMARY MEDICAL DECISION MAKING FREE TEXT BOX
70yo man recent admission and dx of large B-cell lymphoma w R inguinal mass s/p first chemo thursday c/o fever, cough productive of green sputum, SOB, body aches, generalized weakness. He has a h/o afib on xarelto, distal pancreatectomy, splenectomy. On exam he is tachypneic and tachycardic, looks uncomfortable with hoarse voice and frequent coughing. O2 sats low on 4L O2 so he was promptly started on BIPAP. CXR with LLL infiltrate; sepsis labs sent with gentle hydration; abx given, Labs with neutropenia and elevated BNP, trop 68. EKG nonischemic. Pt admitted to stepdown for close monitoring and further management.

## 2025-01-20 NOTE — H&P ADULT - NSHPPHYSICALEXAM_GEN_ALL_CORE
CONSTITUTIONAL: in no acute distress  SKIN: Warm dry, normal skin turgor  CARD: tachycardic   RESP: b/l rhonchi  ABD: soft, non-tender, non-distended, no rebound or guarding.  EXT: Full ROM, Pitting edema 2+ b/l LE

## 2025-01-20 NOTE — H&P ADULT - ATTENDING COMMENTS
Patient seen and examined at bedside independently of the residents. I read the resident's note and agree with the plan with the additions and corrections as noted below. My note supersedes the resident's note.     REVIEW OF SYSTEMS:  Negative except in HPI.     PMH:     FHx: Reviewed. No fhx of asthma/copd, No fhx of liver and pulmonary disease. No fhx of hematological disorder.     Physical Exam:  GEN: No acute distress. Awake, Alert and oriented x 3.   Head: Atraumatic, Normocephalic.   Eye: PEERLA. No sclera icterus. EOMI.   ENT: Normal oropharynx, no thyromegaly, no mass, no lymphadenopathy.   LUNGS: Clear to auscultation bilaterally. No wheeze/rales/crackles.   HEART: Normal. S1/S2 present. RRR. No murmur/gallops.   ABD: Soft, non-tender, non-distended. Bowel sounds present.   EXT: No pitting edema. No erythema. No tenderness.  Integumentary: No rash, No sore, No petechia.   NEURO: CN III-XII intact. Strength: 5/5 b/l ULE. Sensory intact b/l ULE.     Vital Signs Last 24 Hrs  T(C): 39.2 (20 Jan 2025 22:00), Max: 39.2 (20 Jan 2025 22:00)  T(F): 102.5 (20 Jan 2025 22:00), Max: 102.5 (20 Jan 2025 22:00)  HR: 105 (20 Jan 2025 23:00) (89 - 121)  BP: 114/58 (20 Jan 2025 23:00) (114/58 - 203/89)  BP(mean): --  RR: 22 (20 Jan 2025 23:00) (20 - 22)  SpO2: 100% (20 Jan 2025 23:03) (90% - 100%)    Parameters below as of 20 Jan 2025 23:03  Patient On (Oxygen Delivery Method): BiPAP/CPAP    O2 Concentration (%): 100  I&O's Summary      Please see the above notes for Labs and radiology.     Assessment and Plan:         DVT ppx:   GI ppx:    Diet:   Activity: as tolerated.     Date seen by the attending:   I have spent 75 minutes of time on the encounter which excludes teaching and/or separately reported services. Patient seen and examined at bedside independently of the residents. I read the resident's note and agree with the plan with the additions and corrections as noted below. My note supersedes the resident's note.     REVIEW OF SYSTEMS:  Negative except in HPI.     PMH:  recent dx large B cell lymphoma with right inguinal mass s/p chemo (last chemo Thursday), CAD s/p CABG, Paroxysmal A.fib (Xarelto),    FHx: Reviewed. No fhx of asthma/copd, No fhx of liver and pulmonary disease. No fhx of hematological disorder.     Physical Exam:  GEN: No acute distress. Awake, Alert and oriented x 3.   Head: Atraumatic, Normocephalic.   Eye: PEERLA. No sclera icterus. EOMI.   ENT: Normal oropharynx, no thyromegaly, no mass, no lymphadenopathy.   LUNGS: mild rhonchi b/l lung fields.   HEART: Normal. S1/S2 present. RRR. No murmur/gallops.   ABD: Soft, non-tender, non-distended. Bowel sounds present.   EXT: 1+ pitting edema b/l LE. No erythema. No tenderness.  Integumentary: No rash, No sore, No petechia.   NEURO: CN III-XII intact. Strength: 5/5 b/l ULE. Sensory intact b/l ULE.     Vital Signs Last 24 Hrs  T(C): 39.2 (2025 22:00), Max: 39.2 (2025 22:00)  T(F): 102.5 (2025 22:00), Max: 102.5 (2025 22:00)  HR: 105 (2025 23:00) (89 - 121)  BP: 114/58 (2025 23:00) (114/58 - 203/89)  BP(mean): --  RR: 22 (2025 23:00) (20 - 22)  SpO2: 100% (2025 23:03) (90% - 100%)    Parameters below as of 2025 23:03  Patient On (Oxygen Delivery Method): BiPAP/CPAP    O2 Concentration (%): 100  I&O's Summary      Please see the above notes for Labs and radiology.     Assessment and Plan:     70 yo M with hx of recent dx large B cell lymphoma with right inguinal mass s/p chemo (last chemo Thursday), CAD s/p CABG, Paroxysmal A.fib (Xarelto), presents to ED for SOB and cough.     # Acute hypoxic respiratory failure and Sepsis likely 2/2 Influenza A with superimposed bacterial PNA   # Neutropenic fever   - CTA chest: Interval development of infiltrates within the lower lobes and lingula which may reflect acute multilobar pneumonia. Neg for acute PE.   - s/p Vanc, Cefepime and Levaquin given in ED.   - c/w Vanc and Cefepime for now. Albuterol prn.  - f/u BCx, ESR, CRP, MRSA nares and Atypical PNA panel  - ID consult.     # Large B cell lymphoma   # Pancytopenia likely 2/2 chemo  - s/p chemo on Thursday.   - Follow up Oncology.     # CAD s/p CABG - c/w home med.   # Paroxysmal A.fib - on xarelto.  (hold if plt < 50k or active bleed)    DVT ppx: on Xarelto  GI ppx: PPI  Diet: DASH diet   Activity: as tolerated.     Date seen by the attendin2025  I have spent 75 minutes of time on the encounter which excludes teaching and/or separately reported services. Patient seen and examined at bedside independently of the residents. I read the resident's note and agree with the plan with the additions and corrections as noted below. My note supersedes the resident's note.     REVIEW OF SYSTEMS:  Negative except in HPI.     PMH:  recent dx large B cell lymphoma with right inguinal mass s/p chemo (last chemo Thursday), CAD s/p CABG, Paroxysmal A.fib (Xarelto),    FHx: Reviewed. No fhx of asthma/copd, No fhx of liver and pulmonary disease. No fhx of hematological disorder.     Physical Exam:  GEN: No acute distress. Awake, Alert and oriented x 3.   Head: Atraumatic, Normocephalic.   Eye: PEERLA. No sclera icterus. EOMI.   ENT: Normal oropharynx, no thyromegaly, no mass, no lymphadenopathy.   LUNGS: mild rhonchi b/l lung fields.   HEART: Normal. S1/S2 present. RRR. No murmur/gallops.   ABD: Soft, non-tender, non-distended. Bowel sounds present.   EXT: 1+ pitting edema b/l LE. No erythema. No tenderness.  Integumentary: No rash, No sore, No petechia.   NEURO: CN III-XII intact. Strength: 5/5 b/l ULE. Sensory intact b/l ULE.     Vital Signs Last 24 Hrs  T(C): 39.2 (2025 22:00), Max: 39.2 (2025 22:00)  T(F): 102.5 (2025 22:00), Max: 102.5 (2025 22:00)  HR: 105 (2025 23:00) (89 - 121)  BP: 114/58 (2025 23:00) (114/58 - 203/89)  BP(mean): --  RR: 22 (2025 23:00) (20 - 22)  SpO2: 100% (2025 23:03) (90% - 100%)    Parameters below as of 2025 23:03  Patient On (Oxygen Delivery Method): BiPAP/CPAP    O2 Concentration (%): 100  I&O's Summary      Please see the above notes for Labs and radiology.     Assessment and Plan:     68 yo M with hx of recent dx large B cell lymphoma with right inguinal mass s/p chemo (last chemo Thursday), CAD s/p CABG, Paroxysmal A.fib (Xarelto), presents to ED for SOB and cough.     # Acute hypoxic respiratory failure and Sepsis likely 2/2 Influenza A with superimposed bacterial PNA   # Neutropenic fever   - CTA chest: Interval development of infiltrates within the lower lobes and lingula which may reflect acute multilobar pneumonia. Neg for acute PE.   - s/p Vanc, Cefepime and Levaquin given in ED.   - c/w Vanc and Cefepime for now. Tamiflu.   - albuterol q6h and q4h prn  - f/u BCx, ESR, CRP, MRSA nares and Atypical PNA panel  - ID consult.     # Large B cell lymphoma   # Pancytopenia likely 2/2 chemo  - s/p chemo on Thursday.   - Follow up Oncology.     # CAD s/p CABG - c/w home med.   # Paroxysmal A.fib - on xarelto.  (hold if plt < 50k or active bleed)    DVT ppx: on Xarelto  GI ppx: PPI  Diet: DASH diet   Activity: as tolerated.     Date seen by the attendin2025  I have spent 75 minutes of time on the encounter which excludes teaching and/or separately reported services.

## 2025-01-20 NOTE — ED PROVIDER NOTE - PHYSICAL EXAMINATION
CONSTITUTIONAL: moderate distress   SKIN: Warm dry, normal skin turgor  HEAD: NCAT  EYES: EOMI, PERRLA, no scleral icterus, conjunctiva pink  ENT: normal pharynx with no erythema or exudates  NECK: Supple; non tender. Full ROM.  CARD: tachycardic   RESP: b/l crackles   ABD: soft, non-tender, non-distended, no rebound or guarding.  EXT: Full ROM, Pitting edema 2+ b/l LE   NEURO: normal motor. normal sensory.   PSYCH: Cooperative, appropriate.

## 2025-01-20 NOTE — H&P ADULT - ASSESSMENT
#acute hypoxic respiratory failure   #neutropenic fever secondary to PNA  #pancytopenia in context of active chemotherapy  -Sepsis PoA  -PNA confirmed on CT chest   -patient is influenza A positive   -start Tamiflu course  -s/p Vanco and Cefepime by ED  -c/w Cefepime and Vancomycin in context of neutropenia  -follow up blood and sputum cultures  -patient on HFNC now   -ID consult  -HemeOnc Eval for Zarxio/ Neulasta   - Wean oxygen as tolerated      # CKD 3B; has baseline + LLE  -on lasix 80mg po q24   - on zaroxylyn 2.5mg po q24  - hold diuretics in context of sepsis and fever  - no additional IVF      # CAD s/p CABG; Chronic afib on Xarelto; HTN  c/w metoprolol 25mg po q12  nifed xl 60mg po q24   diuretics (above)  Still HOLDing lisinopril and aldactone from last admission  switch Xarelto 15mg w/ dinner to Lovenox therapeutic dose (hold if platelet continue to trend down)  Troponin noted and is at baseline  ECG non ischemic    # HLD   c/w Pravastatin -> using lip 10 hs here  resume Repatha on discharge    # DM  diabetic dash diet  FS - self monitored via dex com  on insulin pump @2.85 at home   target blood sugar 140 - 180 -   f/u endo from last admission: pt will use insulin pump for 30 units boluses/meals while on steroids and then go back to 20-25 units boluses/meals once off steroids  daughter and patient are managing insulin pump     #seasonal allergies  #URI  c/w zyrtec      # DVT PPX: Lovenox therapeutic  # GI PPX: Pantoprazole   # Code Status: Full code  # Activity Order: Independent    #acute hypoxic respiratory failure   #neutropenic fever secondary to PNA  #pancytopenia in context of active chemotherapy  -Sepsis PoA  -PNA confirmed on CT chest   -patient is influenza A positive   -start Tamiflu course  -s/p Vanco and Cefepime by ED  -c/w Cefepime and Vancomycin in context of neutropenia  -follow up blood and sputum cultures  -patient on HFNC now   -ID consult  -HemeOnc Eval for Zarxio/ Neulasta   - Wean oxygen as tolerated      # CKD 3B; has baseline + LLE  -on lasix 80mg po q24   - on zaroxylyn 2.5mg po q24  - hold diuretics in context of sepsis and fever  - no additional IVF      # CAD s/p CABG; Chronic afib on Xarelto; HTN  c/w metoprolol 25mg po q12  nifed xl 60mg po q24   diuretics (above)  Still HOLDing lisinopril and aldactone from last admission  c/w Xarelto 15mg w/ dinner (hold if platelet continue to trend down)  Troponin noted and is at baseline  ECG non ischemic    # HLD   c/w Pravastatin -> using lip 10 hs here  resume Repatha on discharge    # DM  diabetic dash diet  FS - self monitored via dex com  on insulin pump @2.85 at home   target blood sugar 140 - 180 -   f/u endo from last admission: pt will use insulin pump for 30 units boluses/meals while on steroids and then go back to 20-25 units boluses/meals once off steroids  daughter and patient are managing insulin pump     #seasonal allergies  #URI  c/w zyrtec      # DVT PPX: Xarelto  # GI PPX: Pantoprazole   # Code Status: Full code  # Activity Order: Independent

## 2025-01-20 NOTE — ED PROVIDER NOTE - MDM ORDERS SUBMITTED SELECTION
Fr Mckeon visited the patient and administered the sacrament for the sick   Imaging Studies/Medications

## 2025-01-21 DIAGNOSIS — G47.00 INSOMNIA, UNSPECIFIED: ICD-10-CM

## 2025-01-21 LAB
ALBUMIN SERPL ELPH-MCNC: 2.8 G/DL — LOW (ref 3.5–5.2)
ALP SERPL-CCNC: 66 U/L — SIGNIFICANT CHANGE UP (ref 30–115)
ALT FLD-CCNC: 21 U/L — SIGNIFICANT CHANGE UP (ref 0–41)
ANION GAP SERPL CALC-SCNC: 9 MMOL/L — SIGNIFICANT CHANGE UP (ref 7–14)
AST SERPL-CCNC: 29 U/L — SIGNIFICANT CHANGE UP (ref 0–41)
BASOPHILS # BLD AUTO: 0 K/UL — SIGNIFICANT CHANGE UP (ref 0–0.2)
BASOPHILS NFR BLD AUTO: 0 % — SIGNIFICANT CHANGE UP (ref 0–1)
BILIRUB SERPL-MCNC: 0.4 MG/DL — SIGNIFICANT CHANGE UP (ref 0.2–1.2)
BUN SERPL-MCNC: 70 MG/DL — CRITICAL HIGH (ref 10–20)
CALCIUM SERPL-MCNC: 7.7 MG/DL — LOW (ref 8.4–10.5)
CHLORIDE SERPL-SCNC: 88 MMOL/L — LOW (ref 98–110)
CO2 SERPL-SCNC: 41 MMOL/L — CRITICAL HIGH (ref 17–32)
CREAT SERPL-MCNC: 2.2 MG/DL — HIGH (ref 0.7–1.5)
EGFR: 32 ML/MIN/1.73M2 — LOW
EOSINOPHIL # BLD AUTO: 0 K/UL — SIGNIFICANT CHANGE UP (ref 0–0.7)
EOSINOPHIL NFR BLD AUTO: 0 % — SIGNIFICANT CHANGE UP (ref 0–8)
GLUCOSE BLDC GLUCOMTR-MCNC: 250 MG/DL — HIGH (ref 70–99)
GLUCOSE BLDC GLUCOMTR-MCNC: 283 MG/DL — HIGH (ref 70–99)
GLUCOSE SERPL-MCNC: 215 MG/DL — HIGH (ref 70–99)
HCT VFR BLD CALC: 28 % — LOW (ref 42–52)
HEMATOPATHOLOGY REPORT: SIGNIFICANT CHANGE UP
HGB BLD-MCNC: 9.4 G/DL — LOW (ref 14–18)
IMM GRANULOCYTES NFR BLD AUTO: 9.1 % — HIGH (ref 0.1–0.3)
LYMPHOCYTES # BLD AUTO: 0.09 K/UL — LOW (ref 1.2–3.4)
LYMPHOCYTES # BLD AUTO: 81.8 % — HIGH (ref 20.5–51.1)
MAGNESIUM SERPL-MCNC: 1.8 MG/DL — SIGNIFICANT CHANGE UP (ref 1.8–2.4)
MCHC RBC-ENTMCNC: 31.5 PG — HIGH (ref 27–31)
MCHC RBC-ENTMCNC: 33.6 G/DL — SIGNIFICANT CHANGE UP (ref 32–37)
MCV RBC AUTO: 94 FL — SIGNIFICANT CHANGE UP (ref 80–94)
MONOCYTES # BLD AUTO: 0.01 K/UL — LOW (ref 0.1–0.6)
MONOCYTES NFR BLD AUTO: 9.1 % — SIGNIFICANT CHANGE UP (ref 1.7–9.3)
NEUTROPHILS # BLD AUTO: 0 K/UL — LOW (ref 1.4–6.5)
NEUTROPHILS NFR BLD AUTO: 0 % — LOW (ref 42.2–75.2)
NRBC # BLD: 0 /100 WBCS — SIGNIFICANT CHANGE UP (ref 0–0)
NRBC BLD-RTO: 0 /100 WBCS — SIGNIFICANT CHANGE UP (ref 0–0)
PHOSPHATE SERPL-MCNC: 2.5 MG/DL — SIGNIFICANT CHANGE UP (ref 2.1–4.9)
PLATELET # BLD AUTO: 58 K/UL — LOW (ref 130–400)
PMV BLD: 11.6 FL — HIGH (ref 7.4–10.4)
POTASSIUM SERPL-MCNC: 3.5 MMOL/L — SIGNIFICANT CHANGE UP (ref 3.5–5)
POTASSIUM SERPL-SCNC: 3.5 MMOL/L — SIGNIFICANT CHANGE UP (ref 3.5–5)
PROT SERPL-MCNC: 4.6 G/DL — LOW (ref 6–8)
RBC # BLD: 2.98 M/UL — LOW (ref 4.7–6.1)
RBC # FLD: 13.3 % — SIGNIFICANT CHANGE UP (ref 11.5–14.5)
SODIUM SERPL-SCNC: 138 MMOL/L — SIGNIFICANT CHANGE UP (ref 135–146)
URATE SERPL-MCNC: 5.3 MG/DL — SIGNIFICANT CHANGE UP (ref 3.4–8.8)
WBC # BLD: 0.11 K/UL — CRITICAL LOW (ref 4.8–10.8)
WBC # FLD AUTO: 0.11 K/UL — CRITICAL LOW (ref 4.8–10.8)

## 2025-01-21 PROCEDURE — 99223 1ST HOSP IP/OBS HIGH 75: CPT

## 2025-01-21 PROCEDURE — 90792 PSYCH DIAG EVAL W/MED SRVCS: CPT

## 2025-01-21 PROCEDURE — 72170 X-RAY EXAM OF PELVIS: CPT | Mod: 26

## 2025-01-21 PROCEDURE — 99233 SBSQ HOSP IP/OBS HIGH 50: CPT

## 2025-01-21 RX ORDER — DEXTROSE 50 % IN WATER 50 %
12.5 SYRINGE (ML) INTRAVENOUS ONCE
Refills: 0 | Status: DISCONTINUED | OUTPATIENT
Start: 2025-01-21 | End: 2025-02-17

## 2025-01-21 RX ORDER — ASPIRIN 325 MG
81 TABLET ORAL DAILY
Refills: 0 | Status: DISCONTINUED | OUTPATIENT
Start: 2025-01-21 | End: 2025-02-05

## 2025-01-21 RX ORDER — SERTRALINE 100 MG/1
25 TABLET, FILM COATED ORAL DAILY
Refills: 0 | Status: DISCONTINUED | OUTPATIENT
Start: 2025-01-21 | End: 2025-01-31

## 2025-01-21 RX ORDER — ONDANSETRON HCL/PF 4 MG/2 ML
4 VIAL (ML) INJECTION ONCE
Refills: 0 | Status: COMPLETED | OUTPATIENT
Start: 2025-01-21 | End: 2025-01-21

## 2025-01-21 RX ORDER — VANCOMYCIN HCL IN 5 % DEXTROSE 1.5G/250ML
1750 PLASTIC BAG, INJECTION (ML) INTRAVENOUS EVERY 24 HOURS
Refills: 0 | Status: DISCONTINUED | OUTPATIENT
Start: 2025-01-21 | End: 2025-01-22

## 2025-01-21 RX ORDER — FILGRASTIM 300 UG/.5ML
480 INJECTION, SOLUTION INTRAVENOUS; SUBCUTANEOUS EVERY 24 HOURS
Refills: 0 | Status: DISCONTINUED | OUTPATIENT
Start: 2025-01-21 | End: 2025-01-25

## 2025-01-21 RX ORDER — GLUCAGON 3 MG/1
1 POWDER NASAL ONCE
Refills: 0 | Status: DISCONTINUED | OUTPATIENT
Start: 2025-01-21 | End: 2025-02-17

## 2025-01-21 RX ORDER — ONDANSETRON HCL/PF 4 MG/2 ML
4 VIAL (ML) INJECTION EVERY 6 HOURS
Refills: 0 | Status: DISCONTINUED | OUTPATIENT
Start: 2025-01-21 | End: 2025-01-22

## 2025-01-21 RX ORDER — SILDENAFIL 100 MG/1
1 TABLET, FILM COATED ORAL
Refills: 0 | DISCHARGE

## 2025-01-21 RX ORDER — MELATONIN 5 MG
10 TABLET ORAL AT BEDTIME
Refills: 0 | Status: DISCONTINUED | OUTPATIENT
Start: 2025-01-21 | End: 2025-02-17

## 2025-01-21 RX ORDER — OXYCODONE HYDROCHLORIDE 30 MG/1
5 TABLET ORAL
Refills: 0 | Status: DISCONTINUED | OUTPATIENT
Start: 2025-01-21 | End: 2025-01-25

## 2025-01-21 RX ORDER — SODIUM CHLORIDE 9 G/1000ML
1000 INJECTION, SOLUTION INTRAVENOUS
Refills: 0 | Status: DISCONTINUED | OUTPATIENT
Start: 2025-01-21 | End: 2025-02-17

## 2025-01-21 RX ORDER — OSELTAMIVIR PHOSPHATE 75 MG/1
75 CAPSULE ORAL
Refills: 0 | Status: DISCONTINUED | OUTPATIENT
Start: 2025-01-21 | End: 2025-01-22

## 2025-01-21 RX ORDER — LEVOCETIRIZINE DIHYDROCHLORIDE 5 MG/1
1 TABLET, FILM COATED ORAL
Refills: 0 | DISCHARGE

## 2025-01-21 RX ORDER — ACETAMINOPHEN 500 MG/5ML
650 LIQUID (ML) ORAL EVERY 6 HOURS
Refills: 0 | Status: DISCONTINUED | OUTPATIENT
Start: 2025-01-21 | End: 2025-02-05

## 2025-01-21 RX ORDER — RIVAROXABAN 10 MG/1
15 TABLET, FILM COATED ORAL
Refills: 0 | Status: DISCONTINUED | OUTPATIENT
Start: 2025-01-21 | End: 2025-01-22

## 2025-01-21 RX ORDER — DEXTROSE 50 % IN WATER 50 %
15 SYRINGE (ML) INTRAVENOUS ONCE
Refills: 0 | Status: DISCONTINUED | OUTPATIENT
Start: 2025-01-21 | End: 2025-02-17

## 2025-01-21 RX ORDER — TESTOSTERONE CYPIONATE 100 %
200 POWDER (GRAM) MISCELLANEOUS
Refills: 0 | DISCHARGE

## 2025-01-21 RX ORDER — DEXTROSE 50 % IN WATER 50 %
25 SYRINGE (ML) INTRAVENOUS ONCE
Refills: 0 | Status: DISCONTINUED | OUTPATIENT
Start: 2025-01-21 | End: 2025-02-17

## 2025-01-21 RX ORDER — INSULIN LISPRO 100 U/ML
INJECTION, SOLUTION INTRAVENOUS; SUBCUTANEOUS
Refills: 0 | Status: DISCONTINUED | OUTPATIENT
Start: 2025-01-21 | End: 2025-01-28

## 2025-01-21 RX ORDER — DIAZEPAM 2 MG/1
2 TABLET ORAL DAILY
Refills: 0 | Status: DISCONTINUED | OUTPATIENT
Start: 2025-01-21 | End: 2025-01-25

## 2025-01-21 RX ORDER — IPRATROPIUM BROMIDE AND ALBUTEROL SULFATE .5; 2.5 MG/3ML; MG/3ML
3 SOLUTION RESPIRATORY (INHALATION) EVERY 6 HOURS
Refills: 0 | Status: COMPLETED | OUTPATIENT
Start: 2025-01-21 | End: 2025-01-21

## 2025-01-21 RX ORDER — HYDROXYZINE HYDROCHLORIDE 25 MG/1
25 TABLET, FILM COATED ORAL EVERY 6 HOURS
Refills: 0 | Status: DISCONTINUED | OUTPATIENT
Start: 2025-01-21 | End: 2025-01-27

## 2025-01-21 RX ORDER — TIRZEPATIDE 7.5 MG/.5ML
0.5 INJECTION, SOLUTION SUBCUTANEOUS
Refills: 0 | DISCHARGE

## 2025-01-21 RX ORDER — METOPROLOL SUCCINATE 50 MG/1
25 TABLET, EXTENDED RELEASE ORAL
Refills: 0 | Status: DISCONTINUED | OUTPATIENT
Start: 2025-01-21 | End: 2025-01-26

## 2025-01-21 RX ORDER — CEFEPIME 2 G/20ML
2000 INJECTION, POWDER, FOR SOLUTION INTRAVENOUS EVERY 12 HOURS
Refills: 0 | Status: DISCONTINUED | OUTPATIENT
Start: 2025-01-21 | End: 2025-01-30

## 2025-01-21 RX ORDER — SENNA 187 MG
2 TABLET ORAL AT BEDTIME
Refills: 0 | Status: DISCONTINUED | OUTPATIENT
Start: 2025-01-21 | End: 2025-01-29

## 2025-01-21 RX ORDER — POLYETHYLENE GLYCOL 3350 17 G/17G
17 POWDER, FOR SOLUTION ORAL DAILY
Refills: 0 | Status: DISCONTINUED | OUTPATIENT
Start: 2025-01-21 | End: 2025-01-29

## 2025-01-21 RX ORDER — IPRATROPIUM BROMIDE AND ALBUTEROL SULFATE .5; 2.5 MG/3ML; MG/3ML
3 SOLUTION RESPIRATORY (INHALATION) EVERY 4 HOURS
Refills: 0 | Status: DISCONTINUED | OUTPATIENT
Start: 2025-01-21 | End: 2025-02-07

## 2025-01-21 RX ORDER — ATORVASTATIN CALCIUM 80 MG/1
10 TABLET, FILM COATED ORAL AT BEDTIME
Refills: 0 | Status: DISCONTINUED | OUTPATIENT
Start: 2025-01-21 | End: 2025-02-17

## 2025-01-21 RX ORDER — NIFEDIPINE 30 MG
60 TABLET, EXTENDED RELEASE 24 HR ORAL DAILY
Refills: 0 | Status: DISCONTINUED | OUTPATIENT
Start: 2025-01-21 | End: 2025-01-27

## 2025-01-21 RX ADMIN — IPRATROPIUM BROMIDE AND ALBUTEROL SULFATE 3 MILLILITER(S): .5; 2.5 SOLUTION RESPIRATORY (INHALATION) at 07:34

## 2025-01-21 RX ADMIN — INSULIN LISPRO 3: 100 INJECTION, SOLUTION INTRAVENOUS; SUBCUTANEOUS at 16:10

## 2025-01-21 RX ADMIN — Medication 50 MILLIEQUIVALENT(S): at 01:41

## 2025-01-21 RX ADMIN — IPRATROPIUM BROMIDE AND ALBUTEROL SULFATE 3 MILLILITER(S): .5; 2.5 SOLUTION RESPIRATORY (INHALATION) at 14:55

## 2025-01-21 RX ADMIN — OXYCODONE HYDROCHLORIDE 5 MILLIGRAM(S): 30 TABLET ORAL at 16:16

## 2025-01-21 RX ADMIN — HYDROXYZINE HYDROCHLORIDE 25 MILLIGRAM(S): 25 TABLET, FILM COATED ORAL at 22:11

## 2025-01-21 RX ADMIN — Medication 650 MILLIGRAM(S): at 01:41

## 2025-01-21 RX ADMIN — Medication 500 MILLIGRAM(S): at 11:51

## 2025-01-21 RX ADMIN — POLYETHYLENE GLYCOL 3350 17 GRAM(S): 17 POWDER, FOR SOLUTION ORAL at 11:52

## 2025-01-21 RX ADMIN — OSELTAMIVIR PHOSPHATE 75 MILLIGRAM(S): 75 CAPSULE ORAL at 17:55

## 2025-01-21 RX ADMIN — METOPROLOL SUCCINATE 25 MILLIGRAM(S): 50 TABLET, EXTENDED RELEASE ORAL at 05:49

## 2025-01-21 RX ADMIN — FILGRASTIM 480 MICROGRAM(S): 300 INJECTION, SOLUTION INTRAVENOUS; SUBCUTANEOUS at 16:49

## 2025-01-21 RX ADMIN — Medication 75 MILLILITER(S): at 09:51

## 2025-01-21 RX ADMIN — Medication 150 MILLIGRAM(S): at 11:52

## 2025-01-21 RX ADMIN — OSELTAMIVIR PHOSPHATE 75 MILLIGRAM(S): 75 CAPSULE ORAL at 01:41

## 2025-01-21 RX ADMIN — ATORVASTATIN CALCIUM 10 MILLIGRAM(S): 80 TABLET, FILM COATED ORAL at 22:11

## 2025-01-21 RX ADMIN — IPRATROPIUM BROMIDE AND ALBUTEROL SULFATE 3 MILLILITER(S): .5; 2.5 SOLUTION RESPIRATORY (INHALATION) at 22:12

## 2025-01-21 RX ADMIN — Medication 10 MILLIGRAM(S): at 11:52

## 2025-01-21 RX ADMIN — Medication 10 MILLIGRAM(S): at 22:11

## 2025-01-21 RX ADMIN — Medication 4 MILLIGRAM(S): at 01:42

## 2025-01-21 RX ADMIN — Medication 50 MILLIEQUIVALENT(S): at 06:06

## 2025-01-21 RX ADMIN — METOPROLOL SUCCINATE 25 MILLIGRAM(S): 50 TABLET, EXTENDED RELEASE ORAL at 17:54

## 2025-01-21 RX ADMIN — Medication 650 MILLIGRAM(S): at 11:58

## 2025-01-21 RX ADMIN — HYDROXYZINE HYDROCHLORIDE 25 MILLIGRAM(S): 25 TABLET, FILM COATED ORAL at 16:11

## 2025-01-21 RX ADMIN — OXYCODONE HYDROCHLORIDE 5 MILLIGRAM(S): 30 TABLET ORAL at 22:58

## 2025-01-21 RX ADMIN — Medication 250 MILLIGRAM(S): at 11:53

## 2025-01-21 RX ADMIN — Medication 2 TABLET(S): at 22:11

## 2025-01-21 RX ADMIN — Medication 50 MILLIEQUIVALENT(S): at 03:40

## 2025-01-21 RX ADMIN — CEFEPIME 100 MILLIGRAM(S): 2 INJECTION, POWDER, FOR SOLUTION INTRAVENOUS at 17:54

## 2025-01-21 RX ADMIN — IPRATROPIUM BROMIDE AND ALBUTEROL SULFATE 3 MILLILITER(S): .5; 2.5 SOLUTION RESPIRATORY (INHALATION) at 11:52

## 2025-01-21 RX ADMIN — RIVAROXABAN 15 MILLIGRAM(S): 10 TABLET, FILM COATED ORAL at 17:55

## 2025-01-21 RX ADMIN — DIAZEPAM 2 MILLIGRAM(S): 2 TABLET ORAL at 04:18

## 2025-01-21 RX ADMIN — CEFEPIME 100 MILLIGRAM(S): 2 INJECTION, POWDER, FOR SOLUTION INTRAVENOUS at 05:44

## 2025-01-21 RX ADMIN — Medication 81 MILLIGRAM(S): at 11:52

## 2025-01-21 NOTE — BH CONSULTATION LIAISON ASSESSMENT NOTE - RISK ASSESSMENT
Modifiable risk factors include medical illness, single, retired, anxiety.  Static risk factors include age age >45, male gender.  Protective factors include close family relationship, parenthood, no prior suicide attempts and no access to guns.

## 2025-01-21 NOTE — ED ADULT NURSE REASSESSMENT NOTE - NS ED NURSE REASSESS COMMENT FT1
received handoff from RN. Pt assessed at bedside. Pt AXOx4, RR even and unlabored, no distress noted at this time. IV intact hooked up to bedside cardiac monitor. pulse ox 94%  on high alia NC.  VSS VIEW ALL

## 2025-01-21 NOTE — BH CONSULTATION LIAISON ASSESSMENT NOTE - NSBHCHARTREVIEWVS_PSY_A_CORE FT
Vital Signs Last 24 Hrs  T(C): 38 (21 Jan 2025 12:06), Max: 39.2 (20 Jan 2025 22:00)  T(F): 100.4 (21 Jan 2025 12:06), Max: 102.5 (20 Jan 2025 22:00)  HR: 110 (21 Jan 2025 12:06) (89 - 121)  BP: 122/67 (21 Jan 2025 07:51) (111/57 - 203/89)  BP(mean): 79 (21 Jan 2025 05:40) (79 - 79)  RR: 20 (21 Jan 2025 07:51) (20 - 22)  SpO2: 94% (21 Jan 2025 08:40) (90% - 100%)    Parameters below as of 21 Jan 2025 08:40  Patient On (Oxygen Delivery Method): nasal cannula, high flow  O2 Flow (L/min): 50  O2 Concentration (%): 50

## 2025-01-21 NOTE — BH CONSULTATION LIAISON ASSESSMENT NOTE - NSBHCHARTREVIEWLAB_PSY_A_CORE FT
01-21    138  |  88[L]  |  70[HH]  ----------------------------<  215[H]  3.5   |  41[HH]  |  2.2[H]    Ca    7.7[L]      21 Jan 2025 07:15  Phos  2.5     01-21  Mg     1.8     01-21    TPro  4.6[L]  /  Alb  2.8[L]  /  TBili  0.4  /  DBili  x   /  AST  29  /  ALT  21  /  AlkPhos  66  01-21                          9.4    0.11  )-----------( 58       ( 21 Jan 2025 07:15 )             28.0

## 2025-01-21 NOTE — BH CONSULTATION LIAISON ASSESSMENT NOTE - HPI (INCLUDE ILLNESS QUALITY, SEVERITY, DURATION, TIMING, CONTEXT, MODIFYING FACTORS, ASSOCIATED SIGNS AND SYMPTOMS)
Mr. Ike Narvaez 69-year-old male, domiciled alone, has 1 child daughter and grandchild, retired, PMHx of CAD s/p CABG, A-fib on Xarelto, DM on insulin pump, and recent diagnosis of Large B cell lymphoma on chemo, Rituximab, with last treatment 5 days ago, NSSIB,  who was admitted to the hospital for acute hypoxia respiratory failure 2/2 pneumonia, consulted to psych for possible adjustment disorder and anxiety.     The patient reports "I feel like I cannot breathe and I have not slept in 7 days" and complaining of anxiety that have been ongoing for 7-8 days. He notes when he feels anxious he is "worried that he is going to die", however no doctor has told him with his new cancer diagnosis that he is going to die. He reports that his wife past away in 2015 and has been living alone since then. He has a good relationship with his daughter and grandchild, who he babysits on a weekly basis. Patient notes he was taking Valium for his anxiety that was prescribed by his oncologist but is now uncontrolled "even with 8 mg of Valium". He was previously taking lower doses of Valium with temporary relief to anxiety symptoms. No SI, HI or thoughts of harming himself. No hx of psychiatric hospitalizations. No family history of bipolar, schizophrenia or other mental illness disorders. He is not followed by a psychiatrist or talk therapist. Denies alcohol use, tobacco use, marijuana or illicit drugs. No access to firearms. Patient reports difficulty falling asleep for the last 7 days. Patient has no current complaints about his appetite or eating habits.    Mr. Ike Narvaez 69-year-old male, domiciled alone, has 1 child daughter and grandchild, retired, PMHx of CAD s/p CABG, A-fib on Xarelto, DM on insulin pump, and recent diagnosis of Large B cell lymphoma on chemo, Rituximab, with last treatment 5 days ago, NSSIB,  who was admitted to the hospital for acute hypoxia respiratory failure 2/2 pneumonia, consulted to psych for possible adjustment disorder and anxiety.     The patient reports "I feel like I cannot breathe and I have not slept in 7 days" and complaining of anxiety that have been ongoing for 7-8 days. He notes when he feels anxious he is "worried that he is going to die", however no doctor has told him with his new cancer diagnosis that he is going to die. He reports that his wife past away in 2015 and has been living alone since then. He has a good relationship with his daughter and grandchild, who he babysits on a weekly basis. Patient notes he was taking Valium for his anxiety that was prescribed by his oncologist but is now uncontrolled "even with 8 mg of Valium". He was previously taking lower doses of Valium with temporary relief to anxiety symptoms. No SI, HI or thoughts of harming himself. No hx of psychiatric hospitalizations. No family history of bipolar, schizophrenia or other mental illness disorders. He is not followed by a psychiatrist or talk therapist. Denies alcohol use, tobacco use, marijuana or illicit drugs. No access to firearms. Patient reports difficulty falling asleep for the last 7 days. Patient has no current complaints about his appetite or eating habits.     He also endorses subjective feelings of depression/low mood over the course of the past 7 days; however he denies previous episodes of depression prior to worsening medical health issues.

## 2025-01-21 NOTE — CONSULT NOTE ADULT - SUBJECTIVE AND OBJECTIVE BOX
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 Patient was recently diagnosed with Diffuse large B cell Lymphoma on pathology of right groin mass and is following with [facility] oncology team. Patient was discharged on 01.17.2025 from hospital after inpatient chemotherapy (Completed cycle 1 of R-EPOCH (D1 on 1/11/2024). Tolerated well. Rituximab was given on D5).   Over past 3 days patient started to get a productive cough with yellow sputum production.  Patient is also endorsing chills and nausea which he states is baseline. He had 1 reading on low grade fever 100.5 on day of presentation.    On presentation vitals:  · BP Systolic	160 mm Hg  · BP Diastolic	86 mm Hg  · Heart Rate	89 /min  · Respiration Rate (breaths/min)	20 /min  · Temp (F)	98.9 Degrees F  · Temp (C)	37.2 Degrees C  · Temp site	oral    ED course:  O2 sats low on 4L O2 so he was promptly started on BIPAP. CXR with LLL infiltrate; sepsis labs sent with gentle hydration; abx given, Labs with neutropenia and elevated BNP, trop 68. EKG nonischemic.    CTA Chest: Negative for pulmonary emboli. Interval development of infiltrates within the lower lobes and lingula which may reflect acute multilobar pneumonia.    Patient admitted to medicine for acute hypoxic respiratory failure.   (20 Jan 2025 23:47)      Allergies    No Known Allergies    Intolerances        MEDICATIONS  (STANDING):  albuterol/ipratropium for Nebulization 3 milliLiter(s) Nebulizer every 6 hours  allopurinol 150 milliGRAM(s) Oral daily  ascorbic acid 500 milliGRAM(s) Oral daily  aspirin  chewable 81 milliGRAM(s) Oral daily  atorvastatin 10 milliGRAM(s) Oral at bedtime  cefepime   IVPB 2000 milliGRAM(s) IV Intermittent every 12 hours  cetirizine 10 milliGRAM(s) Oral daily  dextrose 5%. 1000 milliLiter(s) (50 mL/Hr) IV Continuous <Continuous>  dextrose 5%. 1000 milliLiter(s) (100 mL/Hr) IV Continuous <Continuous>  dextrose 50% Injectable 25 Gram(s) IV Push once  dextrose 50% Injectable 12.5 Gram(s) IV Push once  dextrose 50% Injectable 25 Gram(s) IV Push once  glucagon  Injectable 1 milliGRAM(s) IntraMuscular once  metoprolol tartrate 25 milliGRAM(s) Oral two times a day  NIFEdipine XL 60 milliGRAM(s) Oral daily  oseltamivir 75 milliGRAM(s) Oral two times a day  pantoprazole    Tablet 40 milliGRAM(s) Oral before breakfast  polyethylene glycol 3350 17 Gram(s) Oral daily  rivaroxaban 15 milliGRAM(s) Oral with dinner  senna 2 Tablet(s) Oral at bedtime  sodium chloride 0.9%. 1000 milliLiter(s) (75 mL/Hr) IV Continuous <Continuous>  vancomycin  IVPB 1750 milliGRAM(s) IV Intermittent every 24 hours    MEDICATIONS  (PRN):  acetaminophen     Tablet .. 650 milliGRAM(s) Oral every 6 hours PRN Temp greater or equal to 38C (100.4F), Mild Pain (1 - 3)  albuterol/ipratropium for Nebulization 3 milliLiter(s) Nebulizer every 4 hours PRN Shortness of Breath and/or Wheezing  dextrose Oral Gel 15 Gram(s) Oral once PRN Blood Glucose LESS THAN 70 milliGRAM(s)/deciliter  diazepam    Tablet 2 milliGRAM(s) Oral daily PRN anxiety  ondansetron    Tablet 4 milliGRAM(s) Oral every 6 hours PRN for nausea  oxyCODONE    IR 5 milliGRAM(s) Oral four times a day PRN Severe Pain (7 - 10)      PAST MEDICAL & SURGICAL HISTORY:  Diabetes mellitus, type 2      BPH (benign prostatic hyperplasia)      Water retention      Essential hypertension      Diabetes      CAD (coronary artery disease)      H/O hypercholesterolemia      Morbid obesity      Chronic kidney disease (CKD)      History of atrial fibrillation      H/O right coronary artery stent placement      History of resection of pancreas      History of cholecystectomy      History of left knee replacement      S/P CABG x 6      H/O cardiac radiofrequency ablation          FAMILY HISTORY:  Family history of lung cancer (Sibling)    Family history of diabetes mellitus (Mother)    Family history of coronary artery disease (Father)        SOCIAL HISTORY: No EtOH, no tobacco    REVIEW OF SYSTEMS:    CONSTITUTIONAL: No weakness, fevers or chills  EYES/ENT: No visual changes;  No vertigo or throat pain   NECK: No pain or stiffness  RESPIRATORY: No cough, wheezing, hemoptysis; No shortness of breath  CARDIOVASCULAR: No chest pain or palpitations  GASTROINTESTINAL: No abdominal or epigastric pain. No nausea, vomiting, or hematemesis; No diarrhea or constipation. No melena or hematochezia.  GENITOURINARY: No dysuria, frequency or hematuria  NEUROLOGICAL: No numbness or weakness  SKIN: No itching, burning, rashes, or lesions   All other review of systems is negative unless indicated above.    Height (cm): 182.9 (01-20 @ 14:16)  Weight (kg): 124.7 (01-20 @ 19:32)  BMI (kg/m2): 37.3 (01-20 @ 19:32)  BSA (m2): 2.44 (01-20 @ 19:32)    T(F): 99.4 (01-21-25 @ 07:51), Max: 102.5 (01-20-25 @ 22:00)  HR: 95 (01-21-25 @ 07:51)  BP: 122/67 (01-21-25 @ 07:51)  RR: 20 (01-21-25 @ 07:51)  SpO2: 94% (01-21-25 @ 08:40)  Wt(kg): --    GENERAL: NAD, well-developed  HEAD:  Atraumatic, Normocephalic  EYES: EOMI, PERRLA, conjunctiva and sclera clear  NECK: Supple, No JVD  CHEST/LUNG: Clear to auscultation bilaterally; No wheeze  HEART: Regular rate and rhythm; No murmurs, rubs, or gallops  ABDOMEN: Soft, Nontender, Nondistended; Bowel sounds present  EXTREMITIES:  2+ Peripheral Pulses, No clubbing, cyanosis, or edema  NEUROLOGY: non-focal  SKIN: No rashes or lesions                          9.4    0.11  )-----------( 58       ( 21 Jan 2025 07:15 )             28.0       01-21    138  |  88[L]  |  70[HH]  ----------------------------<  215[H]  3.5   |  41[HH]  |  2.2[H]    Ca    7.7[L]      21 Jan 2025 07:15  Phos  2.5     01-21  Mg     1.8     01-21    TPro  4.6[L]  /  Alb  2.8[L]  /  TBili  0.4  /  DBili  x   /  AST  29  /  ALT  21  /  AlkPhos  66  01-21      Magnesium: 1.8 mg/dL (01-21 @ 07:15)  Phosphorus: 2.5 mg/dL (01-21 @ 07:15)  Uric Acid: 5.3 mg/dL (01-21 @ 07:15)

## 2025-01-21 NOTE — BH CONSULTATION LIAISON ASSESSMENT NOTE - NSBHCHARTREVIEWINVESTIGATE_PSY_A_CORE FT
< from: 12 Lead ECG (01.20.25 @ 15:35) >      Ventricular Rate 110 BPM    Atrial Rate 110 BPM    P-R Interval 174 ms    QRS Duration 106 ms    Q-T Interval 330 ms    QTC Calculation(Bazett) 446 ms    < end of copied text >

## 2025-01-21 NOTE — BH CONSULTATION LIAISON ASSESSMENT NOTE - CURRENT MEDICATION
MEDICATIONS  (STANDING):  albuterol/ipratropium for Nebulization 3 milliLiter(s) Nebulizer every 6 hours  allopurinol 150 milliGRAM(s) Oral daily  ascorbic acid 500 milliGRAM(s) Oral daily  aspirin  chewable 81 milliGRAM(s) Oral daily  atorvastatin 10 milliGRAM(s) Oral at bedtime  cefepime   IVPB 2000 milliGRAM(s) IV Intermittent every 12 hours  cetirizine 10 milliGRAM(s) Oral daily  dextrose 5%. 1000 milliLiter(s) (50 mL/Hr) IV Continuous <Continuous>  dextrose 5%. 1000 milliLiter(s) (100 mL/Hr) IV Continuous <Continuous>  dextrose 50% Injectable 25 Gram(s) IV Push once  dextrose 50% Injectable 12.5 Gram(s) IV Push once  dextrose 50% Injectable 25 Gram(s) IV Push once  glucagon  Injectable 1 milliGRAM(s) IntraMuscular once  metoprolol tartrate 25 milliGRAM(s) Oral two times a day  NIFEdipine XL 60 milliGRAM(s) Oral daily  oseltamivir 75 milliGRAM(s) Oral two times a day  pantoprazole    Tablet 40 milliGRAM(s) Oral before breakfast  polyethylene glycol 3350 17 Gram(s) Oral daily  rivaroxaban 15 milliGRAM(s) Oral with dinner  senna 2 Tablet(s) Oral at bedtime  sodium chloride 0.9%. 1000 milliLiter(s) (75 mL/Hr) IV Continuous <Continuous>  vancomycin  IVPB 1750 milliGRAM(s) IV Intermittent every 24 hours    MEDICATIONS  (PRN):  acetaminophen     Tablet .. 650 milliGRAM(s) Oral every 6 hours PRN Temp greater or equal to 38C (100.4F), Mild Pain (1 - 3)  albuterol/ipratropium for Nebulization 3 milliLiter(s) Nebulizer every 4 hours PRN Shortness of Breath and/or Wheezing  dextrose Oral Gel 15 Gram(s) Oral once PRN Blood Glucose LESS THAN 70 milliGRAM(s)/deciliter  diazepam    Tablet 2 milliGRAM(s) Oral daily PRN anxiety  ondansetron    Tablet 4 milliGRAM(s) Oral every 6 hours PRN for nausea  oxyCODONE    IR 5 milliGRAM(s) Oral four times a day PRN Severe Pain (7 - 10)

## 2025-01-21 NOTE — BH CONSULTATION LIAISON ASSESSMENT NOTE - DESCRIPTION
Lives on [address] alone. Wife passed away in 2015. Retired--previously worked in IT. Has daughter and grandchild who he babysits.

## 2025-01-21 NOTE — ED ADULT NURSE REASSESSMENT NOTE - NS ED NURSE REASSESS COMMENT FT1
MD Atkinson contacted regarding pt concern for anxiety. No new meds ordered at this time. MD consulting psych. Pt FS assessed to be 250, as per MD pt informed to bolus insulin through personal insulin pump before breakfast.

## 2025-01-21 NOTE — BH CONSULTATION LIAISON ASSESSMENT NOTE - NSBHATTESTCOMMENTATTENDFT_PSY_A_CORE
I saw and evaluated the patient today 01-21-25 with student during key/critical portions of the visit.  Nursing/primary team/consultant records reviewed. I agree with the student's note including past psych history, home medications, social history, allergies, surgical history, family history, and review of system. I have reviewed relevant vitals, laboratory values, imaging studies, and microbiology.  I agree with the trainee's findings and assessment and plan as documented in the trainee's note.     - Above student's note was edited by me     - agree with rest of A/P as per detailed resident note, unless noted below.     Patient seen at ED with student. Endorsed anxiety and low mood over the course of the last 7 or so days. No recurrent or extended course of illness. No SI/HI per patient. He did not seem delirious or confused. Agreeable to starting medications for anxiety as below.    MSE  Adequate grooming and hygiene, appearance consistent with chronological age, wearing breathing mask. Alert and oriented to person, place, time and situation. Behavior is appropriate. No involuntary movements. Eye contact is appropriate. Speech is non-pressured and of normal amount, rate, volume and articulation. Mood is "anxious" with congruent affect. Flow of thought is linear. Content of thought is unremarkable and no delusional or obsessive material is presented. Denies thoughts of harm to self or others. Denies auditory or visual hallucinations. Reliable informant with fair insight. The patient understands the prescribed medication and has given informed consent to treatment.    Assessment:  Adjustment Disorder  Insomnia    Plan:  - Start Zoloft 25 mg qd for anxiety/mood.  - Start melatonin 10 mg qhs for insomnia.  - Start hydroxyzine 25 mg q6 hours prn for breakthrough anxiety symptoms.  - Will give a referral to outpatient psychiatrist--discussed with psychiatry SW I saw and evaluated the patient today 01-21-25 with student during key/critical portions of the visit.  Nursing/primary team/consultant records reviewed. I agree with the student's note including past psych history, home medications, social history, allergies, surgical history, family history, and review of system. I have reviewed relevant vitals, laboratory values, imaging studies, and microbiology.  I agree with the trainee's findings and assessment and plan as documented in the trainee's note.     - Above student's note was edited by me     - agree with rest of A/P as per detailed student note, unless noted below.     Patient seen at ED with student. Endorsed anxiety and low mood over the course of the last 7 or so days. No recurrent or extended course of illness. No SI/HI per patient. He did not seem delirious or confused. Agreeable to starting medications for anxiety as below.    MSE  Adequate grooming and hygiene, appearance consistent with chronological age, wearing breathing mask. Alert and oriented to person, place, time and situation. Behavior is appropriate. No involuntary movements. Eye contact is appropriate. Speech is non-pressured and of normal amount, rate, volume and articulation. Mood is "anxious" with congruent affect. Flow of thought is linear. Content of thought is unremarkable and no delusional or obsessive material is presented. Denies thoughts of harm to self or others. Denies auditory or visual hallucinations. Reliable informant with fair insight. The patient understands the prescribed medication and has given informed consent to treatment.    Assessment:  Adjustment Disorder  Insomnia    Plan:  - Start Zoloft 25 mg qd for anxiety/mood.  - Start melatonin 10 mg qhs for insomnia.  - Start hydroxyzine 25 mg q6 hours prn for breakthrough anxiety symptoms.  - Will give a referral to outpatient psychiatrist--discussed with psychiatry SW

## 2025-01-21 NOTE — BH CONSULTATION LIAISON ASSESSMENT NOTE - SUMMARY
Mr. Ike Narvaez 69-year-old male, domiciled alone, has 1 child daughter and grandchild, retired, PMHx of CAD s/p CABG, A-fib on Xarelto, DM on insulin pump, and recent diagnosis of Large B cell lymphoma on chemo, Rituximab, with last treatment 5 days ago, NSSIB,  who was admitted to the hospital for acute hypoxia respiratory failure 2/2 pneumonia, consulted to psych for possible adjustment disorder and anxiety.     Patient is interested in starting a daily medication for his anxiety. He is agreeable to starting Zoloft 50 mg qd and hydroxyzine 25 mg q6 hours prn. Discussed side effects and risks vs benefits with the patient. Patient is agreeable to starting melatonin nightly to improve his sleep.  Mr. Ike Narvaez 69-year-old male, domiciled alone, has 1 child daughter and grandchild, retired, PMHx of CAD s/p CABG, A-fib on Xarelto, DM on insulin pump, and recent diagnosis of Large B cell lymphoma on chemo, Rituximab, with last treatment 5 days ago, NSSIB,  who was admitted to the hospital for acute hypoxia respiratory failure 2/2 pneumonia, consulted to psych for possible adjustment disorder and anxiety.     Patient is interested in starting a daily medication for his anxiety. He is agreeable to starting Zoloft 25 mg qd and hydroxyzine 25 mg q6 hours prn. Endorsed relative benefit for his daughter--likely will have improved efficacy. Discussed side effects and risks vs benefits with the patient. Patient is agreeable to starting melatonin nightly to improve his sleep.  Mr. Ike Narvaez 69-year-old male, domiciled alone, has 1 child daughter and grandchild, retired, PMHx of CAD s/p CABG, A-fib on Xarelto, DM on insulin pump, and recent diagnosis of Large B cell lymphoma on chemo, Rituximab, with last treatment 5 days ago, NSSIB,  who was admitted to the hospital for acute hypoxia respiratory failure 2/2 pneumonia, consulted to psych for possible adjustment disorder and anxiety.     Per interview, patient's anxious and depressive emotions seem to stem from worsening medical symptoms and acute distress from difficulty with breathing. No history of recurrent depressant symptoms or significant clinical course of psychiatric illness. Given identifiable stressor and associated mood/anxiety symptoms diagnosis likely is adjustment disorder.    Patient is interested in starting a daily medication for his anxiety. He is agreeable to starting Zoloft 25 mg qd and hydroxyzine 25 mg q6 hours prn. Endorsed relative benefit for his daughter--likely will have improved efficacy. Discussed side effects and risks vs benefits with the patient. Patient is agreeable to starting melatonin nightly to improve his sleep. No acute safety concerns. Patient wants to live and is future oriented. No indication for inpatient psychiatry.

## 2025-01-21 NOTE — PROGRESS NOTE ADULT - ASSESSMENT
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 Patient was recently diagnosed with Diffuse large B cell Lymphoma on pathology of right groin mass and was following his Oncologist Dr Angeli Hilliard. Pt being managed for acute hypoxic respiratory failure ISO Influenza PNA.    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   - CT pelvis on 10/12/24 showed interval increase of mass 10 x 7 x 10 cm with inguinal lymphadenopathy, suspicious for neoplastic process.  - Biopsy on 12/18/24 revealed diffuse large B-cell lymphoma with concern for Rodriguez's transformation.  - Patient was seen by Dr. Hilliard on 1/8/25 for initial consultation and was advised to come to ED due to concern for tumor lysis syndrome.  - Uric acid level now WNL at 7.9, CMP otherwise unremarkable.   - CT C/A/P 1.9.25 Stable appearance bulky right inguinal and right iliac lymphadenopathy including partially imaged 10.1 cm right inguinal soft tissue mass/lymph node.  - s/p PICC placement on 1/10/25  - s/p Bone marrow biopsy on 1/10/25  - Echo 1/10/25: EF 60-65%  - Pending hepatitis panel  - Completed cycle 1 of R-EPOCH (D1 on 1/11/2024). Tolerated well. Rituximab was given on D5   - Uric acid 10.3 on 1/16/25, s/p 3 mg IV rasburicase  - Received 1 dose of ZARXIO 480 mcg SC inpatient on 1/17/25, 2 doses at home over the weekend    #CKD 3B; has baseline + LLE  - On home Lasix 80mg po q24   - Hold diuretics in context of sepsis and fever    #CAD s/p CABG  #Chronic Afib on Xarelto\  #HTN  - C/W metoprolol 25mg po q12  - C/W nifed xl 60mg po q24   - Still holding lisinopril and aldactone from last admission  - c/w Xarelto 15mg w/ dinner (hold if platelet continue to trend down)  - Troponin noted and is at baseline  - ECG non ischemic    # HLD   - c/w Pravastatin -> using lip 10 hs here  - resume Repatha on discharge    #DM  - diabetic dash diet  - FS - self monitored via dex com  - On insulin pump @2.85 at home   - Endo from last admission: pt will use insulin pump for 30 units boluses/meals while on steroids and then go back to 20-25 units boluses/meals once off steroids    #Seasonal allergies  #URI  - c/w zyrtec      # DVT PPX: Xarelto  # GI PPX: Pantoprazole   # Code Status: Full code    #Hand-off: Ween off HFNC. 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 Patient was recently diagnosed with Diffuse large B cell Lymphoma on pathology of right groin mass and was following his Oncologist Dr Angeli Hilliard. Pt being managed for acute hypoxic respiratory failure ISO Influenza PNA.    Plan:     # Neutropenic fever I  # Influenza A  #Acute hypoxic respiratory failure ISO Influenza PNA   - Received 1 dose of ZARXIO 480 mcg SC inpatient on 1/17/25, 2 doses at home over the weekend  - CTA 1/20: Negative for pulmonary emboli.Interval development of infiltrates within the lower lobes and lingula which may reflect acute multilobar pneumonia  - Influenza A positive -> C/W Tamiflu  - C/W Cefepime and Vancomycin in context of neutropenia --> pending MRSA nares.  - patient on HFNC now -> Wean oxygen as tolerated  - F/U ID consult    #Large B-cell Lymphoma  #Concern for Rodriguez's  transformation   - Heme/Onc following   - Patient reports increased swelling and size of R inguinal mass for the past 2-3 months.   - CT pelvis on 10/12/24 showed interval increase of mass 10 x 7 x 10 cm with inguinal lymphadenopathy, suspicious for neoplastic process.  - Biopsy on 12/18/24 revealed diffuse large B-cell lymphoma with concern for Rodriguez's transformation.  - Patient was seen by Dr. Hilliard on 1/8/25 for initial consultation and was advised to come to ED due to concern for tumor lysis syndrome.  - Uric acid level now WNL at 7.9, CMP otherwise unremarkable.   - CT C/A/P 1.9.25 Stable appearance bulky right inguinal and right iliac lymphadenopathy including partially imaged 10.1 cm right inguinal soft tissue mass/lymph node.  - s/p PICC placement on 1/10/25  - s/p Bone marrow biopsy on 1/10/25  - Echo 1/10/25: EF 60-65%  - Pending hepatitis panel  - Completed cycle 1 of R-EPOCH (D1 on 1/11/2024). Tolerated well. Rituximab was given on D5   - Uric acid 10.3 on 1/16/25, s/p 3 mg IV rasburicase  - Received 1 dose of ZARXIO 480 mcg SC inpatient on 1/17/25, 2 doses at home over the weekend  - Zarxio 480 mcg q 24 hr ordered.    #CKD 3B; has baseline + LLE  - On home Lasix 80mg po q24   - Hold diuretics in context of sepsis and fever. currently on IV hydration.  - Avoid volume overload.  - daily assessment for resumption of diuretics    #CAD s/p CABG  #Chronic Afib on Xarelto\  #HTN  - C/W metoprolol 25mg po q12  - C/W nifed xl 60mg po q24   - Still holding lisinopril and aldactone from last admission  - c/w Xarelto 15mg w/ dinner (hold if platelet continue to trend down)  - Troponin noted and is at baseline  - ECG non ischemic    # HLD   - c/w Pravastatin -> using lip 10 hs here  - resume Repatha on discharge    #DM  - diabetic dash diet  - FS - self monitored via dex com  - On insulin pump @2.85 at home   - Endo from last admission: pt will use insulin pump for 30 units boluses/meals while on steroids and then go back to 20-25 units boluses/meals once off steroids    #Seasonal allergies  #URI  - c/w zyrtec      # DVT PPX: Xarelto  # GI PPX: Pantoprazole   # Code Status: Full code    #Hand-off: Ween off HFNC. F/U ID recommendations

## 2025-01-21 NOTE — PROGRESS NOTE ADULT - SUBJECTIVE AND OBJECTIVE BOX
MORGAN BUSH 69y Male  MRN#: [id number]     Hospital Day: 1d    SUBJECTIVE     No overnight events. Patient seen and evaluated at bedside reports feeling short of breath. Otherwise, has no acute complaints. on NC.                                             ----------------------------------------------------------  OBJECTIVE  PAST MEDICAL & SURGICAL HISTORY  Diabetes mellitus, type 2    BPH (benign prostatic hyperplasia)    Water retention    Essential hypertension    Diabetes    CAD (coronary artery disease)    H/O hypercholesterolemia    Morbid obesity    Chronic kidney disease (CKD)    History of atrial fibrillation    H/O right coronary artery stent placement    History of resection of pancreas    History of cholecystectomy    History of left knee replacement    S/P CABG x 6    H/O cardiac radiofrequency ablation                                              -----------------------------------------------------------  ALLERGIES:  No Known Allergies                                            ------------------------------------------------------------    HOME MEDICATIONS  Home Medications:  acetaminophen 325 mg oral tablet: 2 tab(s) orally every 6 hours As needed Temp greater or equal to 38C (100.4F), Mild Pain (1 - 3) (21 Jan 2025 00:58)  aspirin 81 mg oral tablet: orally once a day (21 Jan 2025 00:58)  azelastine nasal: prn (21 Jan 2025 00:58)  furosemide 80 mg oral tablet: 1 tab(s) orally once a day (21 Jan 2025 00:58)  INSULIN PUMP - HUMALOG:  (21 Jan 2025 00:58)  ipratropium nasal: prn (21 Jan 2025 00:58)  levocetirizine 5 mg oral tablet: 1 tab(s) orally once a day (21 Jan 2025 00:58)  metOLazone 2.5 mg oral tablet: 1 tab(s) orally once a day (21 Jan 2025 00:58)  metoprolol tartrate 25 mg oral tablet: 1 tab(s) orally 2 times a day (21 Jan 2025 00:58)  polyethylene glycol 3350 oral powder for reconstitution: 17 gram(s) orally 3 times a day (21 Jan 2025 00:58)  pravastatin 40 mg oral tablet: 1 tab(s) orally once a day (21 Jan 2025 00:58)  Repatha 140 mg/mL subcutaneous solution: 140 milligram(s) subcutaneously (21 Jan 2025 00:58)  rivaroxaban 15 mg oral tablet: 1 tab(s) orally once a day (before a meal) (21 Jan 2025 00:58)  sildenafil 20 mg oral tablet: 1 tab(s) orally (21 Jan 2025 00:58)  simethicone 80 mg oral tablet, chewable: 1 tab(s) orally every 6 hours (21 Jan 2025 00:58)  testosterone cypionate 200 mg/mL intramuscular solution: 200 unit(s) intramuscularly once a month (21 Jan 2025 00:58)  tirzepatide: 0.5 milligram(s) subcutaneous once a week (21 Jan 2025 00:58)  Vitamin C 500 mg oral capsule: 1 cap(s) orally once a day (21 Jan 2025 00:58)                           MEDICATIONS:  STANDING MEDICATIONS  albuterol/ipratropium for Nebulization 3 milliLiter(s) Nebulizer every 6 hours  allopurinol 150 milliGRAM(s) Oral daily  ascorbic acid 500 milliGRAM(s) Oral daily  aspirin  chewable 81 milliGRAM(s) Oral daily  atorvastatin 10 milliGRAM(s) Oral at bedtime  cefepime   IVPB 2000 milliGRAM(s) IV Intermittent every 12 hours  cetirizine 10 milliGRAM(s) Oral daily  dextrose 5%. 1000 milliLiter(s) IV Continuous <Continuous>  dextrose 5%. 1000 milliLiter(s) IV Continuous <Continuous>  dextrose 50% Injectable 25 Gram(s) IV Push once  dextrose 50% Injectable 12.5 Gram(s) IV Push once  dextrose 50% Injectable 25 Gram(s) IV Push once  glucagon  Injectable 1 milliGRAM(s) IntraMuscular once  metoprolol tartrate 25 milliGRAM(s) Oral two times a day  NIFEdipine XL 60 milliGRAM(s) Oral daily  oseltamivir 75 milliGRAM(s) Oral two times a day  pantoprazole    Tablet 40 milliGRAM(s) Oral before breakfast  polyethylene glycol 3350 17 Gram(s) Oral daily  rivaroxaban 15 milliGRAM(s) Oral with dinner  senna 2 Tablet(s) Oral at bedtime  sodium chloride 0.9%. 1000 milliLiter(s) IV Continuous <Continuous>  vancomycin  IVPB 1750 milliGRAM(s) IV Intermittent every 24 hours    PRN MEDICATIONS  acetaminophen     Tablet .. 650 milliGRAM(s) Oral every 6 hours PRN  albuterol/ipratropium for Nebulization 3 milliLiter(s) Nebulizer every 4 hours PRN  dextrose Oral Gel 15 Gram(s) Oral once PRN  diazepam    Tablet 2 milliGRAM(s) Oral daily PRN  ondansetron    Tablet 4 milliGRAM(s) Oral every 6 hours PRN  oxyCODONE    IR 5 milliGRAM(s) Oral four times a day PRN                                            ------------------------------------------------------------  VITAL SIGNS: Last 24 Hours  T(C): 38 (21 Jan 2025 12:06), Max: 39.2 (20 Jan 2025 22:00)  T(F): 100.4 (21 Jan 2025 12:06), Max: 102.5 (20 Jan 2025 22:00)  HR: 110 (21 Jan 2025 12:06) (89 - 121)  BP: 122/67 (21 Jan 2025 07:51) (111/57 - 203/89)  BP(mean): 79 (21 Jan 2025 05:40) (79 - 79)  RR: 20 (21 Jan 2025 07:51) (20 - 22)  SpO2: 94% (21 Jan 2025 08:40) (90% - 100%)                                             --------------------------------------------------------------  LABS:                        9.4    0.11  )-----------( 58       ( 21 Jan 2025 07:15 )             28.0     01-21    138  |  88[L]  |  70[HH]  ----------------------------<  215[H]  3.5   |  41[HH]  |  2.2[H]    Ca    7.7[L]      21 Jan 2025 07:15  Phos  2.5     01-21  Mg     1.8     01-21    TPro  4.6[L]  /  Alb  2.8[L]  /  TBili  0.4  /  DBili  x   /  AST  29  /  ALT  21  /  AlkPhos  66  01-21    PT/INR - ( 20 Jan 2025 15:38 )   PT: 13.90 sec;   INR: 1.17 ratio         PTT - ( 20 Jan 2025 15:38 )  PTT:33.9 sec    Lactate, Blood: 1.3 mmol/L (01-20-25 @ 15:38)        Urinalysis with Rflx Culture (collected 20 Jan 2025 19:07)              PHYSICAL EXAM:  GENERAL: NAD  NECK: Supple, No JVD  CHEST/LUNG: Clear to auscultation bilaterally. Unlabored respirations  HEART: regular rate and rhythm; No murmurs  ABDOMEN:  Soft, Nontender, Nondistended.    EXTREMITIES: Warm. No edema  NERVOUS SYSTEM:  Alert & Oriented X3. No focal deficits   SKIN: No rashes or lesions                                           --------------------------------------------------------------                 MORGAN BUSH 69y Male  MRN#: [id number]     Hospital Day: 1d    SUBJECTIVE     No overnight events. Patient seen and evaluated at bedside reports feeling short of breath. Otherwise, has no acute complaints. on NC.                                             ----------------------------------------------------------  OBJECTIVE  PAST MEDICAL & SURGICAL HISTORY  Diabetes mellitus, type 2    BPH (benign prostatic hyperplasia)    Water retention    Essential hypertension    Diabetes    CAD (coronary artery disease)    H/O hypercholesterolemia    Morbid obesity    Chronic kidney disease (CKD)    History of atrial fibrillation    H/O right coronary artery stent placement    History of resection of pancreas    History of cholecystectomy    History of left knee replacement    S/P CABG x 6    H/O cardiac radiofrequency ablation                                              -----------------------------------------------------------  ALLERGIES:  No Known Allergies                                            ------------------------------------------------------------    HOME MEDICATIONS  Home Medications:  acetaminophen 325 mg oral tablet: 2 tab(s) orally every 6 hours As needed Temp greater or equal to 38C (100.4F), Mild Pain (1 - 3) (21 Jan 2025 00:58)  aspirin 81 mg oral tablet: orally once a day (21 Jan 2025 00:58)  azelastine nasal: prn (21 Jan 2025 00:58)  furosemide 80 mg oral tablet: 1 tab(s) orally once a day (21 Jan 2025 00:58)  INSULIN PUMP - HUMALOG:  (21 Jan 2025 00:58)  ipratropium nasal: prn (21 Jan 2025 00:58)  levocetirizine 5 mg oral tablet: 1 tab(s) orally once a day (21 Jan 2025 00:58)  metOLazone 2.5 mg oral tablet: 1 tab(s) orally once a day (21 Jan 2025 00:58)  metoprolol tartrate 25 mg oral tablet: 1 tab(s) orally 2 times a day (21 Jan 2025 00:58)  polyethylene glycol 3350 oral powder for reconstitution: 17 gram(s) orally 3 times a day (21 Jan 2025 00:58)  pravastatin 40 mg oral tablet: 1 tab(s) orally once a day (21 Jan 2025 00:58)  Repatha 140 mg/mL subcutaneous solution: 140 milligram(s) subcutaneously (21 Jan 2025 00:58)  rivaroxaban 15 mg oral tablet: 1 tab(s) orally once a day (before a meal) (21 Jan 2025 00:58)  sildenafil 20 mg oral tablet: 1 tab(s) orally (21 Jan 2025 00:58)  simethicone 80 mg oral tablet, chewable: 1 tab(s) orally every 6 hours (21 Jan 2025 00:58)  testosterone cypionate 200 mg/mL intramuscular solution: 200 unit(s) intramuscularly once a month (21 Jan 2025 00:58)  tirzepatide: 0.5 milligram(s) subcutaneous once a week (21 Jan 2025 00:58)  Vitamin C 500 mg oral capsule: 1 cap(s) orally once a day (21 Jan 2025 00:58)                           MEDICATIONS:  STANDING MEDICATIONS  albuterol/ipratropium for Nebulization 3 milliLiter(s) Nebulizer every 6 hours  allopurinol 150 milliGRAM(s) Oral daily  ascorbic acid 500 milliGRAM(s) Oral daily  aspirin  chewable 81 milliGRAM(s) Oral daily  atorvastatin 10 milliGRAM(s) Oral at bedtime  cefepime   IVPB 2000 milliGRAM(s) IV Intermittent every 12 hours  cetirizine 10 milliGRAM(s) Oral daily  dextrose 5%. 1000 milliLiter(s) IV Continuous <Continuous>  dextrose 5%. 1000 milliLiter(s) IV Continuous <Continuous>  dextrose 50% Injectable 25 Gram(s) IV Push once  dextrose 50% Injectable 12.5 Gram(s) IV Push once  dextrose 50% Injectable 25 Gram(s) IV Push once  glucagon  Injectable 1 milliGRAM(s) IntraMuscular once  metoprolol tartrate 25 milliGRAM(s) Oral two times a day  NIFEdipine XL 60 milliGRAM(s) Oral daily  oseltamivir 75 milliGRAM(s) Oral two times a day  pantoprazole    Tablet 40 milliGRAM(s) Oral before breakfast  polyethylene glycol 3350 17 Gram(s) Oral daily  rivaroxaban 15 milliGRAM(s) Oral with dinner  senna 2 Tablet(s) Oral at bedtime  sodium chloride 0.9%. 1000 milliLiter(s) IV Continuous <Continuous>  vancomycin  IVPB 1750 milliGRAM(s) IV Intermittent every 24 hours    PRN MEDICATIONS  acetaminophen     Tablet .. 650 milliGRAM(s) Oral every 6 hours PRN  albuterol/ipratropium for Nebulization 3 milliLiter(s) Nebulizer every 4 hours PRN  dextrose Oral Gel 15 Gram(s) Oral once PRN  diazepam    Tablet 2 milliGRAM(s) Oral daily PRN  ondansetron    Tablet 4 milliGRAM(s) Oral every 6 hours PRN  oxyCODONE    IR 5 milliGRAM(s) Oral four times a day PRN                                            ------------------------------------------------------------  VITAL SIGNS: Last 24 Hours  T(C): 38 (21 Jan 2025 12:06), Max: 39.2 (20 Jan 2025 22:00)  T(F): 100.4 (21 Jan 2025 12:06), Max: 102.5 (20 Jan 2025 22:00)  HR: 110 (21 Jan 2025 12:06) (89 - 121)  BP: 122/67 (21 Jan 2025 07:51) (111/57 - 203/89)  BP(mean): 79 (21 Jan 2025 05:40) (79 - 79)  RR: 20 (21 Jan 2025 07:51) (20 - 22)  SpO2: 94% (21 Jan 2025 08:40) (90% - 100%)                                             --------------------------------------------------------------  LABS:                        9.4    0.11  )-----------( 58       ( 21 Jan 2025 07:15 )             28.0     01-21    138  |  88[L]  |  70[HH]  ----------------------------<  215[H]  3.5   |  41[HH]  |  2.2[H]    Ca    7.7[L]      21 Jan 2025 07:15  Phos  2.5     01-21  Mg     1.8     01-21    TPro  4.6[L]  /  Alb  2.8[L]  /  TBili  0.4  /  DBili  x   /  AST  29  /  ALT  21  /  AlkPhos  66  01-21    PT/INR - ( 20 Jan 2025 15:38 )   PT: 13.90 sec;   INR: 1.17 ratio         PTT - ( 20 Jan 2025 15:38 )  PTT:33.9 sec    Lactate, Blood: 1.3 mmol/L (01-20-25 @ 15:38)    Urinalysis with Rflx Culture (collected 20 Jan 2025 19:07)    FluA/FluB/RSV/COVID PCR (01.20.25 @ 19:42)   SARS-CoV-2 Result: RRT Global  Influenza A Result: Detected  Influenza B Result: NotDetec  Resp Syn Virus Result: NotDetec        PHYSICAL EXAM:  GENERAL: NAD  NECK: Supple, No JVD  CHEST/LUNG: Clear to auscultation bilaterally. Unlabored respirations  HEART: regular rate and rhythm; No murmurs  ABDOMEN:  Soft, Nontender, Nondistended.    EXTREMITIES: Warm. No edema  NERVOUS SYSTEM:  Alert & Oriented X3. No focal deficits   SKIN: No rashes or lesions                                           --------------------------------------------------------------

## 2025-01-21 NOTE — CONSULT NOTE ADULT - NS ATTEST RISK PROBLEM GEN_ALL_CORE FT
Severe Sepsis  Acute  illness or injury that poses threat to life or body function  Influenza A   Neutropenic Fever with Severe CAP  High risk medications that require intesive monitoring: Vancomycin     I have personally seen and examined this patient.    I have reviewed all pertinent clinical information and reviewed all relevant imaging and diagnostic studies personally.   I counseled the patient about diagnostic testing and treatment plan. All questions were answered.   I discussed recommendations with the primary team.

## 2025-01-21 NOTE — CONSULT NOTE ADULT - ASSESSMENT
69-year-old man with extensive medical history including CAD (status post CABG;), atrial fibrillation on Xarelto, history of distal pancreatomy and splenectomy for unclear reason referred to the ED by his oncologist for abnormal labs. Patient was recently diagnosed with Diffuse large B cell Lymphoma on pathology of right groin mass and was following his Oncologist Dr Angeli Hilliard.     # Neutropenic fever  # Influenza A  Received 1 dose of zarxio 480 mcg SC inpatient on 1/17/25, 2 doses at home over the weekend      #Large B-cell Lymphoma  #Concern for Rodriguez's  transformation   Patient reports increased swelling and size of R inguinal mass for the past 2-3 months.   CT pelvis on 10/12/24 showed interval increase of mass 10 x 7 x 10 cm with inguinal lymphadenopathy, suspicious for neoplastic process.  Biopsy on 12/18/24 revealed diffuse large B-cell lymphoma with concern for Rodriguez's transformation.  Patient was seen by Dr. Hilliard on 1/8/25 for initial consultation and was advised to come to ED due to concern for tumor lysis syndrome.  Uric acid level now WNL at 7.9, CMP otherwise unremarkable.   CT C/A/P 1.9.25 Stable appearance bulky right inguinal and right iliac lymphadenopathy including partially imaged 10.1 cm right inguinal soft tissue mass/lymph node.  s/p PICC placement on 1/10/25  s/p Bone marrow biopsy on 1/10/25  Echo 1/10/25: EF 60-65%  Pending hepatitis panel      CKD Stage 3B  baseline (1.8)    Recommendations   Completed cycle 1 of R-EPOCH (D1 on 1/11/2024). Tolerated well. Rituximab was given on D5   Uric acid 10.3 on 1/16/25, s/p 3 mg IV rasburicase  Received 1 dose of zarxio 480 mcg SC inpatient on 1/17/25, 2 doses at home over the weekend                69-year-old man with extensive medical history including CAD (status post CABG;), atrial fibrillation on Xarelto, history of distal pancreatomy and splenectomy for unclear reason referred to the ED by his oncologist for abnormal labs. Patient was recently diagnosed with Diffuse large B cell Lymphoma on pathology of right groin mass and was following his Oncologist Dr Angeli Hilliard.     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 Rituximab given D5    CKD Stage 3B  baseline (1.8)    Recommendations   Patient s/p cycle 1 R-EPOCH for DLBL, presenting with neutropenic fever.  Start zarxio 480 mcg SC daily until ANC >1000  c/w vancomycin and cefepime

## 2025-01-21 NOTE — CONSULT NOTE ADULT - ASSESSMENT
ASSESSMENT  69-year-old man with extensive medical history including CAD (status post CABG;), DM (on insulin pump), atrial fibrillation on Xarelto, history of distal pancreatomy and splenectomy for unclear reason referred to the ED by his oncologist for abnormal labs. Patient was recently diagnosed with Diffuse large B cell Lymphoma on pathology of right groin mass and is following with [facility] oncology team. Patient was discharged on 01.17.2025 from hospital after inpatient chemotherapy (Completed cycle 1 of R-EPOCH (D1 on 1/11/2024). Tolerated well. Rituximab was given on D5).   Over past 3 days patient started to get a productive cough with yellow sputum production.  Patient is also endorsing chills and nausea which he states is baseline. He had 1 reading on low grade fever 100.5 on day of presentation.    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    #Abx allergy: No Known Allergies    RECOMMENDATIONS  - continue vancomycin  - decrase to 1250 mg q 24 hours   -- follow-up blood cx and check MRSA nares -- if both negative, stop vancomycin   - continue cefepime 2g q 12 hours   - decrease tamiflu to 30 mg BID -- will plan longer course given immunosuppresion   - check urine legionella and urine strep antigen   - monitor fever curve     Please call or message on Microsoft Teams if with any questions.  Spectra [phone]

## 2025-01-21 NOTE — BH CONSULTATION LIAISON ASSESSMENT NOTE - NSBHCONSULTRECOMMENDOTHER_PSY_A_CORE FT
- Start Zoloft 25 mg qd  - Start melatonin 10 mg qhs   - Start hydroxyzine 25 mg q6 hours prn  - referral to outpatient psychiatrist - Start Zoloft 25 mg qd for anxiety/mood.  - Start melatonin 10 mg qhs for insomnia.  - Start hydroxyzine 25 mg q6 hours prn for breakthrough anxiety symptoms.  - Will give a referral to outpatient psychiatrist--discussed with psychiatry SW

## 2025-01-21 NOTE — CONSULT NOTE ADULT - ATTENDING COMMENTS
The patient was seen. Agree with above.  68 yo male with large B-cell lymphoma, s/p 1st cycle chemo with R-EPOCH was admitted for pancytopenia and neutropenic fever.  Continue Zarxio 480 mg sc daily.  Broad spectrum IV ABx Vanco and cefepime.   Followup blood and urine culture results.  Followup with ID.

## 2025-01-21 NOTE — CONSULT NOTE ADULT - SUBJECTIVE AND OBJECTIVE BOX
MORGAN BUSH  69y, Male  Allergy: No Known Allergies      CHIEF COMPLAINT:     LOS  1d    HPI:  69-year-old man with extensive medical history including CAD (status post CABG;), DM (on insulin pump), atrial fibrillation on Xarelto, history of distal pancreatomy and splenectomy for unclear reason referred to the ED by his oncologist for abnormal labs. Patient was recently diagnosed with Diffuse large B cell Lymphoma on pathology of right groin mass and is following with [facility] oncology team. Patient was discharged on 01.17.2025 from hospital after inpatient chemotherapy (Completed cycle 1 of R-EPOCH (D1 on 1/11/2024). Tolerated well. Rituximab was given on D5).   Over past 3 days patient started to get a productive cough with yellow sputum production.  Patient is also endorsing chills and nausea which he states is baseline. He had 1 reading on low grade fever 100.5 on day of presentation.    On presentation vitals:  · BP Systolic	160 mm Hg  · BP Diastolic	86 mm Hg  · Heart Rate	89 /min  · Respiration Rate (breaths/min)	20 /min  · Temp (F)	98.9 Degrees F  · Temp (C)	37.2 Degrees C  · Temp site	oral    ED course:  O2 sats low on 4L O2 so he was promptly started on BIPAP. CXR with LLL infiltrate; sepsis labs sent with gentle hydration; abx given, Labs with neutropenia and elevated BNP, trop 68. EKG nonischemic.    CTA Chest: Negative for pulmonary emboli. Interval development of infiltrates within the lower lobes and lingula which may reflect acute multilobar pneumonia.    Patient admitted to medicine for acute hypoxic respiratory failure.   (20 Jan 2025 23:47)      INFECTIOUS DISEASE HISTORY:  History as above.  ID consulted for antibiotic management.   Currently on HFNC with NRB.   Started to feel unwell on this past Friday.   Recently underwent chemo last Friday as well.   Did not receive Flu vaccine.     PAST MEDICAL & SURGICAL HISTORY:  Diabetes mellitus, type 2      BPH (benign prostatic hyperplasia)      Water retention      Essential hypertension      Diabetes      CAD (coronary artery disease)      H/O hypercholesterolemia      Morbid obesity      Chronic kidney disease (CKD)      History of atrial fibrillation      H/O right coronary artery stent placement      History of resection of pancreas      History of cholecystectomy      History of left knee replacement      S/P CABG x 6      H/O cardiac radiofrequency ablation          FAMILY HISTORY  Family history of lung cancer (Sibling)    Family history of diabetes mellitus (Mother)    Family history of coronary artery disease (Father)        SOCIAL HISTORY  Social History:  Substance Use (street drugs): ( x ) never used  (  ) other:  Tobacco Usage:  ( x  ) never smoked   (   ) former smoker   (   ) current smoker  (     ) pack year  Alcohol Usage: None (25 Sep 2022 06:08)        ROS  General: Denies rigors, nightsweats  HEENT: Denies headache, rhinorrhea, sore throat, eye pain  CV: Denies CP, palpitations  PULM: Denies wheezing, hemoptysis  GI: Denies hematemesis, hematochezia, melena  : Denies discharge, hematuria  MSK: Denies arthralgias, myalgias  SKIN: Denies rash, lesions  NEURO: Denies paresthesias, weakness  PSYCH: Denies depression, anxiety    VITALS:  T(F): 99.9, Max: 102.5 (01-20-25 @ 22:00)  HR: 96  BP: 141/66  RR: 20Vital Signs Last 24 Hrs  T(C): 37.7 (21 Jan 2025 19:30), Max: 39.2 (20 Jan 2025 22:00)  T(F): 99.9 (21 Jan 2025 19:30), Max: 102.5 (20 Jan 2025 22:00)  HR: 96 (21 Jan 2025 19:30) (95 - 121)  BP: 141/66 (21 Jan 2025 19:30) (111/57 - 203/89)  BP(mean): 95 (21 Jan 2025 19:30) (79 - 95)  RR: 20 (21 Jan 2025 19:30) (20 - 22)  SpO2: 94% (21 Jan 2025 19:30) (90% - 100%)    Parameters below as of 21 Jan 2025 19:30  Patient On (Oxygen Delivery Method): nasal cannula, high flow        PHYSICAL EXAM:  Gen: NAD, resting in bed  HEENT: Normocephalic, atraumatic  Neck: supple, no lymphadenopathy  CV: Regular rate & regular rhythm  Lungs: decreased BS at bases, no fremitus  Abdomen: Soft, BS present  Ext: Warm, well perfused  Neuro: non focal, awake  Skin: no rash, no erythema  Lines: no phlebitis    TESTS & MEASUREMENTS:                        9.4    0.11  )-----------( 58       ( 21 Jan 2025 07:15 )             28.0     01-21    138  |  88[L]  |  70[HH]  ----------------------------<  215[H]  3.5   |  41[HH]  |  2.2[H]    Ca    7.7[L]      21 Jan 2025 07:15  Phos  2.5     01-21  Mg     1.8     01-21    TPro  4.6[L]  /  Alb  2.8[L]  /  TBili  0.4  /  DBili  x   /  AST  29  /  ALT  21  /  AlkPhos  66  01-21      LIVER FUNCTIONS - ( 21 Jan 2025 07:15 )  Alb: 2.8 g/dL / Pro: 4.6 g/dL / ALK PHOS: 66 U/L / ALT: 21 U/L / AST: 29 U/L / GGT: x           Urinalysis Basic - ( 21 Jan 2025 07:15 )    Color: x / Appearance: x / SG: x / pH: x  Gluc: 215 mg/dL / Ketone: x  / Bili: x / Urobili: x   Blood: x / Protein: x / Nitrite: x   Leuk Esterase: x / RBC: x / WBC x   Sq Epi: x / Non Sq Epi: x / Bacteria: x        Urinalysis with Rflx Culture (collected 01-20-25 @ 19:07)    Culture - Fungal, Tissue (collected 12-18-24 @ 08:05)  Source: Tissue  Final Report (01-18-25 @ 23:01):    No fungus isolated at 4 weeks.    Culture - Tissue with Gram Stain (collected 12-18-24 @ 08:05)  Source: Tissue  Gram Stain (12-19-24 @ 05:43):    No polymorphonuclear cells seen per low power field    No organisms seen per oil power field  Final Report (12-23-24 @ 15:52):    No growth at 5 days    Culture - Acid Fast - Tissue w/Smear (collected 12-18-24 @ 08:05)  Source: Tissue  Preliminary Report (01-18-25 @ 23:04):    No acid-fast bacilli isolated at 4 weeks.    Culture - Urine (collected 03-27-24 @ 23:04)  Source: Clean Catch Clean Catch (Midstream)  Final Report (03-29-24 @ 13:51):    No growth    Culture - Blood (collected 03-27-24 @ 19:42)  Source: .Blood Blood-Peripheral  Final Report (04-02-24 @ 02:01):    No growth at 5 days    Culture - Blood (collected 03-27-24 @ 19:42)  Source: .Blood Blood-Peripheral  Final Report (04-02-24 @ 02:01):    No growth at 5 days        Blood Gas Venous - Lactate: 1.4 mmol/L (01-20-25 @ 16:02)  Lactate, Blood: 1.3 mmol/L (01-20-25 @ 15:38)      INFECTIOUS DISEASES TESTING  Hepatitis B Surface Antigen: Nonreact (01-11-25 @ 13:09)  Hepatitis C Virus Interpretation: Nonreact (01-11-25 @ 13:09)      RADIOLOGY & ADDITIONAL TESTS:  I have personally reviewed the last Chest xray  CXR      CT  CT Angio Chest PE Protocol w/ IV Cont:   ACC: [id number] EXAM:  CT ANGIO CHEST PULM ART [facility]   ORDERED BY: IGNACIO MONTES     PROCEDURE DATE:  01/20/2025          INTERPRETATION:  CLINICAL INFORMATION: Evaluate for pulmonary emboli    COMPARISON: CT chest January 9, 2025    CONTRAST/COMPLICATIONS:  IV Contrast: Omnipaque 350  95 cc administered   5 cc discarded  Oral Contrast: NONE  .    PROCEDURE:  CT Angiogram of the chest was obtained with intravenous contrast. Three   dimensional maximum intensity projection (MIP) images were generated.    FINDINGS:    VESSELS: Study limited due to suboptimal opacification of the pulmonary   artery. No definite evidence of pulmonary embolus.    LUNGS AND LARGE AIRWAYS: Patent central airways. Increasing right lower   lobe infiltrate. The trachea and proximal bronchi are patent. Scattered   areas of mosaic attenuation also recognized. New infiltrates within the   lingula and left lower lobe. PLEURA: Trace pleural effusions bilaterally.  VESSELS: Aortic calcifications. Coronary artery calcifications.  HEART: Cardiomegaly. Patient is status post coronary artery bypass.  MEDIASTINUM AND SHANIA: Subcentimeter mediastinal lymph nodes.  CHEST WALL AND LOWER NECK: Within normal limits.  VISUALIZED UPPER ABDOMEN: Status post cholecystectomy.  BONES: The patient is status post median sternotomy. Degenerative changes   of the spine seen.      IMPRESSION:    Negative for pulmonary emboli.    Interval development of infiltrates within the lower lobes and lingula   which may reflect acute multilobar pneumonia.        --- End of Report ---            EDGAR DELGADO MD; Attending Radiologist  This document has been electronically signed. Jan 20 2025  9:39PM (01-20-25 @ 20:56)      CARDIOLOGY TESTING  12 Lead ECG:   Ventricular Rate 110 BPM    Atrial Rate 110 BPM    P-R Interval 174 ms    QRS Duration 106 ms    Q-T Interval 330 ms    QTC Calculation(Bazett) 446 ms    P Axis 71 degrees    R Axis 66 degrees    T Axis 64 degrees    Diagnosis Line Sinus tachycardia  Incomplete right bundle branch block  Borderline ECG    Confirmed by SYEDA CHOWDARY MD ([id number]) on 1/21/2025 6:54:55 AM (01-20-25 @ 15:35)  12 Lead ECG:   Ventricular Rate 96 BPM    Atrial Rate 96 BPM    P-R Interval 192 ms    QRS Duration 120 ms    Q-T Interval 352 ms    QTC Calculation(Bazett) 444 ms    P Axis 31 degrees    R Axis 28 degrees    T Axis 119 degrees    Diagnosis Line Normal sinus rhythm  Non-specific intra-ventricular conduction delay  Nonspecific ST and T wave abnormality  Abnormal ECG    Confirmed by Mahesh Coreas ([id number]) on 1/16/2025 11:02:15 AM (01-16-25 @ 10:20)      MEDICATIONS  albuterol/ipratropium for Nebulization 3 Nebulizer every 6 hours  allopurinol 150 Oral daily  ascorbic acid 500 Oral daily  aspirin  chewable 81 Oral daily  atorvastatin 10 Oral at bedtime  cefepime   IVPB 2000 IV Intermittent every 12 hours  cetirizine 10 Oral daily  dextrose 5%. 1000 IV Continuous <Continuous>  dextrose 5%. 1000 IV Continuous <Continuous>  dextrose 50% Injectable 25 IV Push once  dextrose 50% Injectable 12.5 IV Push once  dextrose 50% Injectable 25 IV Push once  filgrastim-sndz (ZARXIO) Injectable 480 SubCutaneous every 24 hours  glucagon  Injectable 1 IntraMuscular once  insulin lispro (ADMELOG) corrective regimen sliding scale  SubCutaneous three times a day before meals  melatonin 10 Oral at bedtime  metoprolol tartrate 25 Oral two times a day  NIFEdipine XL 60 Oral daily  oseltamivir 75 Oral two times a day  pantoprazole    Tablet 40 Oral before breakfast  polyethylene glycol 3350 17 Oral daily  rivaroxaban 15 Oral with dinner  senna 2 Oral at bedtime  sertraline 25 Oral daily  sodium chloride 0.9%. 1000 IV Continuous <Continuous>  vancomycin  IVPB 1750 IV Intermittent every 24 hours      Weight  Weight (kg): 124.7 (01-20-25 @ 19:32)    ANTIBIOTICS:  cefepime   IVPB 2000 milliGRAM(s) IV Intermittent every 12 hours  oseltamivir 75 milliGRAM(s) Oral two times a day  vancomycin  IVPB 1750 milliGRAM(s) IV Intermittent every 24 hours      ALLERGIES:  No Known Allergies      ASSESSMENT  69-year-old man with extensive medical history including CAD (status post CABG;), DM (on insulin pump), atrial fibrillation on Xarelto, history of distal pancreatomy and splenectomy for unclear reason referred to the ED by his oncologist for abnormal labs. Patient was recently diagnosed with Diffuse large B cell Lymphoma on pathology of right groin mass and is following with [facility] oncology team. Patient was discharged on 01.17.2025 from hospital after inpatient chemotherapy (Completed cycle 1 of R-EPOCH (D1 on 1/11/2024). Tolerated well. Rituximab was given on D5).   Over past 3 days patient started to get a productive cough with yellow sputum production.  Patient is also endorsing chills and nausea which he states is baseline. He had 1 reading on low grade fever 100.5 on day of presentation.    IMPRESSION  #Severe Sepsis - Severe Multifocal Pneumonia with Influenza A infection   #Neutropenic Fever  #Acute Hypoxemic Respiratory failure     #DLBCL - on chemo   #CAD s/p CABG  #DM II   #S/p splenectomy     #Abx allergy: No Known Allergies    RECOMMENDATIONS  - continue vancomycin  - decrase to 1250 mg q 24 hours   -- follow-up blood cx and check MRSA nares -- if both negative, stop vancomycin   - continue cefepime 2g q 12 hours   - decrease tamiflu to 30 mg BID -- will plan longer course given immunosuppresion   - check urine legionella and urine strep antigen   - monitor fever curve     Please call or message on Microsoft Teams if with any questions.  Spectra [id number]     MORGAN BUSH  69y, Male  Allergy: No Known Allergies      CHIEF COMPLAINT:     LOS  1d    HPI:  69-year-old man with extensive medical history including CAD (status post CABG;), DM (on insulin pump), atrial fibrillation on Xarelto, history of distal pancreatomy and splenectomy for unclear reason referred to the ED by his oncologist for abnormal labs. Patient was recently diagnosed with Diffuse large B cell Lymphoma on pathology of right groin mass and is following with [facility] oncology team. Patient was discharged on 01.17.2025 from hospital after inpatient chemotherapy (Completed cycle 1 of R-EPOCH (D1 on 1/11/2024). Tolerated well. Rituximab was given on D5).   Over past 3 days patient started to get a productive cough with yellow sputum production.  Patient is also endorsing chills and nausea which he states is baseline. He had 1 reading on low grade fever 100.5 on day of presentation.    On presentation vitals:  · BP Systolic	160 mm Hg  · BP Diastolic	86 mm Hg  · Heart Rate	89 /min  · Respiration Rate (breaths/min)	20 /min  · Temp (F)	98.9 Degrees F  · Temp (C)	37.2 Degrees C  · Temp site	oral    ED course:  O2 sats low on 4L O2 so he was promptly started on BIPAP. CXR with LLL infiltrate; sepsis labs sent with gentle hydration; abx given, Labs with neutropenia and elevated BNP, trop 68. EKG nonischemic.    CTA Chest: Negative for pulmonary emboli. Interval development of infiltrates within the lower lobes and lingula which may reflect acute multilobar pneumonia.    Patient admitted to medicine for acute hypoxic respiratory failure.   (20 Jan 2025 23:47)      INFECTIOUS DISEASE HISTORY:  History as above.  ID consulted for antibiotic management.   Currently on HFNC with NRB.   Started to feel unwell on this past Friday.   Recently underwent chemo last Friday as well.   Did not receive Flu vaccine.     PAST MEDICAL & SURGICAL HISTORY:  Diabetes mellitus, type 2      BPH (benign prostatic hyperplasia)      Water retention      Essential hypertension      Diabetes      CAD (coronary artery disease)      H/O hypercholesterolemia      Morbid obesity      Chronic kidney disease (CKD)      History of atrial fibrillation      H/O right coronary artery stent placement      History of resection of pancreas      History of cholecystectomy      History of left knee replacement      S/P CABG x 6      H/O cardiac radiofrequency ablation          FAMILY HISTORY  Family history of lung cancer (Sibling)    Family history of diabetes mellitus (Mother)    Family history of coronary artery disease (Father)        SOCIAL HISTORY  Social History:  Substance Use (street drugs): ( x ) never used  (  ) other:  Tobacco Usage:  ( x  ) never smoked   (   ) former smoker   (   ) current smoker  (     ) pack year  Alcohol Usage: None (25 Sep 2022 06:08)        ROS  General: Denies rigors, nightsweats  HEENT: Denies headache, rhinorrhea, sore throat, eye pain  CV: Denies CP, palpitations  PULM: Denies wheezing, hemoptysis  GI: Denies hematemesis, hematochezia, melena  : Denies discharge, hematuria  MSK: Denies arthralgias, myalgias  SKIN: Denies rash, lesions  NEURO: Denies paresthesias, weakness  PSYCH: Denies depression, anxiety    VITALS:  T(F): 99.9, Max: 102.5 (01-20-25 @ 22:00)  HR: 96  BP: 141/66  RR: 20Vital Signs Last 24 Hrs  T(C): 37.7 (21 Jan 2025 19:30), Max: 39.2 (20 Jan 2025 22:00)  T(F): 99.9 (21 Jan 2025 19:30), Max: 102.5 (20 Jan 2025 22:00)  HR: 96 (21 Jan 2025 19:30) (95 - 121)  BP: 141/66 (21 Jan 2025 19:30) (111/57 - 203/89)  BP(mean): 95 (21 Jan 2025 19:30) (79 - 95)  RR: 20 (21 Jan 2025 19:30) (20 - 22)  SpO2: 94% (21 Jan 2025 19:30) (90% - 100%)    Parameters below as of 21 Jan 2025 19:30  Patient On (Oxygen Delivery Method): nasal cannula, high flow        PHYSICAL EXAM:  Gen: NAD, resting in bed  HEENT: Normocephalic, atraumatic  Neck: supple, no lymphadenopathy  CV: Regular rate & regular rhythm  Lungs: decreased BS at bases, no fremitus  Abdomen: Soft, BS present  Ext: Warm, well perfused  Neuro: non focal, awake  Skin: no rash, no erythema  Lines: no phlebitis    TESTS & MEASUREMENTS:                        9.4    0.11  )-----------( 58       ( 21 Jan 2025 07:15 )             28.0     01-21    138  |  88[L]  |  70[HH]  ----------------------------<  215[H]  3.5   |  41[HH]  |  2.2[H]    Ca    7.7[L]      21 Jan 2025 07:15  Phos  2.5     01-21  Mg     1.8     01-21    TPro  4.6[L]  /  Alb  2.8[L]  /  TBili  0.4  /  DBili  x   /  AST  29  /  ALT  21  /  AlkPhos  66  01-21      LIVER FUNCTIONS - ( 21 Jan 2025 07:15 )  Alb: 2.8 g/dL / Pro: 4.6 g/dL / ALK PHOS: 66 U/L / ALT: 21 U/L / AST: 29 U/L / GGT: x           Urinalysis Basic - ( 21 Jan 2025 07:15 )    Color: x / Appearance: x / SG: x / pH: x  Gluc: 215 mg/dL / Ketone: x  / Bili: x / Urobili: x   Blood: x / Protein: x / Nitrite: x   Leuk Esterase: x / RBC: x / WBC x   Sq Epi: x / Non Sq Epi: x / Bacteria: x        Urinalysis with Rflx Culture (collected 01-20-25 @ 19:07)    Culture - Fungal, Tissue (collected 12-18-24 @ 08:05)  Source: Tissue  Final Report (01-18-25 @ 23:01):    No fungus isolated at 4 weeks.    Culture - Tissue with Gram Stain (collected 12-18-24 @ 08:05)  Source: Tissue  Gram Stain (12-19-24 @ 05:43):    No polymorphonuclear cells seen per low power field    No organisms seen per oil power field  Final Report (12-23-24 @ 15:52):    No growth at 5 days    Culture - Acid Fast - Tissue w/Smear (collected 12-18-24 @ 08:05)  Source: Tissue  Preliminary Report (01-18-25 @ 23:04):    No acid-fast bacilli isolated at 4 weeks.    Culture - Urine (collected 03-27-24 @ 23:04)  Source: Clean Catch Clean Catch (Midstream)  Final Report (03-29-24 @ 13:51):    No growth    Culture - Blood (collected 03-27-24 @ 19:42)  Source: .Blood Blood-Peripheral  Final Report (04-02-24 @ 02:01):    No growth at 5 days    Culture - Blood (collected 03-27-24 @ 19:42)  Source: .Blood Blood-Peripheral  Final Report (04-02-24 @ 02:01):    No growth at 5 days        Blood Gas Venous - Lactate: 1.4 mmol/L (01-20-25 @ 16:02)  Lactate, Blood: 1.3 mmol/L (01-20-25 @ 15:38)      INFECTIOUS DISEASES TESTING  Hepatitis B Surface Antigen: Nonreact (01-11-25 @ 13:09)  Hepatitis C Virus Interpretation: Nonreact (01-11-25 @ 13:09)      RADIOLOGY & ADDITIONAL TESTS:  I have personally reviewed the last Chest xray  CXR      CT  CT Angio Chest PE Protocol w/ IV Cont:   ACC: [id number] EXAM:  CT ANGIO CHEST PULM ART [facility]   ORDERED BY: IGNACIO MONTES     PROCEDURE DATE:  01/20/2025          INTERPRETATION:  CLINICAL INFORMATION: Evaluate for pulmonary emboli    COMPARISON: CT chest January 9, 2025    CONTRAST/COMPLICATIONS:  IV Contrast: Omnipaque 350  95 cc administered   5 cc discarded  Oral Contrast: NONE  .    PROCEDURE:  CT Angiogram of the chest was obtained with intravenous contrast. Three   dimensional maximum intensity projection (MIP) images were generated.    FINDINGS:    VESSELS: Study limited due to suboptimal opacification of the pulmonary   artery. No definite evidence of pulmonary embolus.    LUNGS AND LARGE AIRWAYS: Patent central airways. Increasing right lower   lobe infiltrate. The trachea and proximal bronchi are patent. Scattered   areas of mosaic attenuation also recognized. New infiltrates within the   lingula and left lower lobe. PLEURA: Trace pleural effusions bilaterally.  VESSELS: Aortic calcifications. Coronary artery calcifications.  HEART: Cardiomegaly. Patient is status post coronary artery bypass.  MEDIASTINUM AND SHANIA: Subcentimeter mediastinal lymph nodes.  CHEST WALL AND LOWER NECK: Within normal limits.  VISUALIZED UPPER ABDOMEN: Status post cholecystectomy.  BONES: The patient is status post median sternotomy. Degenerative changes   of the spine seen.      IMPRESSION:    Negative for pulmonary emboli.    Interval development of infiltrates within the lower lobes and lingula   which may reflect acute multilobar pneumonia.        --- End of Report ---            EDGAR DELGADO MD; Attending Radiologist  This document has been electronically signed. Jan 20 2025  9:39PM (01-20-25 @ 20:56)      CARDIOLOGY TESTING  12 Lead ECG:   Ventricular Rate 110 BPM    Atrial Rate 110 BPM    P-R Interval 174 ms    QRS Duration 106 ms    Q-T Interval 330 ms    QTC Calculation(Bazett) 446 ms    P Axis 71 degrees    R Axis 66 degrees    T Axis 64 degrees    Diagnosis Line Sinus tachycardia  Incomplete right bundle branch block  Borderline ECG    Confirmed by SYEDA CHOWDARY MD ([id number]) on 1/21/2025 6:54:55 AM (01-20-25 @ 15:35)  12 Lead ECG:   Ventricular Rate 96 BPM    Atrial Rate 96 BPM    P-R Interval 192 ms    QRS Duration 120 ms    Q-T Interval 352 ms    QTC Calculation(Bazett) 444 ms    P Axis 31 degrees    R Axis 28 degrees    T Axis 119 degrees    Diagnosis Line Normal sinus rhythm  Non-specific intra-ventricular conduction delay  Nonspecific ST and T wave abnormality  Abnormal ECG    Confirmed by Mahesh Coreas ([id number]) on 1/16/2025 11:02:15 AM (01-16-25 @ 10:20)      MEDICATIONS  albuterol/ipratropium for Nebulization 3 Nebulizer every 6 hours  allopurinol 150 Oral daily  ascorbic acid 500 Oral daily  aspirin  chewable 81 Oral daily  atorvastatin 10 Oral at bedtime  cefepime   IVPB 2000 IV Intermittent every 12 hours  cetirizine 10 Oral daily  dextrose 5%. 1000 IV Continuous <Continuous>  dextrose 5%. 1000 IV Continuous <Continuous>  dextrose 50% Injectable 25 IV Push once  dextrose 50% Injectable 12.5 IV Push once  dextrose 50% Injectable 25 IV Push once  filgrastim-sndz (ZARXIO) Injectable 480 SubCutaneous every 24 hours  glucagon  Injectable 1 IntraMuscular once  insulin lispro (ADMELOG) corrective regimen sliding scale  SubCutaneous three times a day before meals  melatonin 10 Oral at bedtime  metoprolol tartrate 25 Oral two times a day  NIFEdipine XL 60 Oral daily  oseltamivir 75 Oral two times a day  pantoprazole    Tablet 40 Oral before breakfast  polyethylene glycol 3350 17 Oral daily  rivaroxaban 15 Oral with dinner  senna 2 Oral at bedtime  sertraline 25 Oral daily  sodium chloride 0.9%. 1000 IV Continuous <Continuous>  vancomycin  IVPB 1750 IV Intermittent every 24 hours      Weight  Weight (kg): 124.7 (01-20-25 @ 19:32)    ANTIBIOTICS:  cefepime   IVPB 2000 milliGRAM(s) IV Intermittent every 12 hours  oseltamivir 75 milliGRAM(s) Oral two times a day  vancomycin  IVPB 1750 milliGRAM(s) IV Intermittent every 24 hours      ALLERGIES:  No Known Allergies

## 2025-01-21 NOTE — PROGRESS NOTE ADULT - ATTENDING COMMENTS
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 Patient was recently diagnosed with Diffuse large B cell Lymphoma on pathology of right groin mass and was following his Oncologist Dr Angeli Hilliard. Pt being managed for acute hypoxic respiratory failure ISO Influenza PNA.    Plan:     # Neutropenic fever I  # Influenza A  #Acute hypoxic respiratory failure ISO Influenza PNA   - Received 1 dose of ZARXIO 480 mcg SC inpatient on 1/17/25, 2 doses at home over the weekend  - CTA 1/20: Negative for pulmonary emboli. Interval development of infiltrates within the lower lobes and lingula which may reflect acute multilobar pneumonia  - Influenza A positive -> C/W Tamiflu  - C/W Cefepime and Vancomycin in context of neutropenia --> pending MRSA nares.  - patient on HFNC now -> Wean oxygen as tolerated  - F/U ID consult    #Large B-cell Lymphoma  #Concern for Rodriguez's  transformation   - Heme/Onc following   - Patient reports increased swelling and size of R inguinal mass for the past 2-3 months.   - CT pelvis on 10/12/24 showed interval increase of mass 10 x 7 x 10 cm with inguinal lymphadenopathy, suspicious for neoplastic process.  - Biopsy on 12/18/24 revealed diffuse large B-cell lymphoma with concern for Rodriguez's transformation.  - Patient was seen by Dr. Hilliard on 1/8/25 for initial consultation and was advised to come to ED due to concern for tumor lysis syndrome.  - Uric acid level now WNL at 7.9, CMP otherwise unremarkable.   - CT C/A/P 1.9.25 Stable appearance bulky right inguinal and right iliac lymphadenopathy including partially imaged 10.1 cm right inguinal soft tissue mass/lymph node.  - s/p PICC placement on 1/10/25  - s/p Bone marrow biopsy on 1/10/25  - Echo 1/10/25: EF 60-65%  - Pending hepatitis panel  - Completed cycle 1 of R-EPOCH (D1 on 1/11/2024). Tolerated well. Rituximab was given on D5   - Uric acid 10.3 on 1/16/25, s/p 3 mg IV rasburicase  - Received 1 dose of ZARXIO 480 mcg SC inpatient on 1/17/25, 2 doses at home over the weekend  - Zarxio 480 mcg q 24 hr ordered.    #CKD 3B; has baseline + LLE  - On home Lasix 80mg po q24   - Hold diuretics in context of sepsis and fever. currently on IV hydration.  - Avoid volume overload.  - daily assessment for resumption of diuretics    #CAD s/p CABG  #Chronic Afib on Xarelto\  #HTN  - C/W metoprolol 25mg po q12  - C/W nifed xl 60mg po q24   - Still holding lisinopril and aldactone from last admission  - c/w Xarelto 15mg w/ dinner (hold if platelet continue to trend down)  - Troponin noted and is at baseline  - ECG non ischemic    # Insomnia  # Adjustment disorder  - seen by psych team  - recommendations appreciated.     # HLD   - c/w Pravastatin -> using lip 10 hs here  - resume Repatha on discharge    #DM  - diabetic dash diet  - FS - self monitored via dex com  - On insulin pump @2.85 at home   - Endo from last admission: pt will use insulin pump for 30 units boluses/meals while on steroids and then go back to 20-25 units boluses/meals once off steroids  - insulin s/s ordered.    #Seasonal allergies  #URI  - c/w zyrtec      # DVT PPX: Xarelto  # GI PPX: Pantoprazole   # Code Status: Full code

## 2025-01-22 ENCOUNTER — APPOINTMENT (OUTPATIENT)
Age: 70
End: 2025-01-22

## 2025-01-22 LAB
ALBUMIN SERPL ELPH-MCNC: 2.5 G/DL — LOW (ref 3.5–5.2)
ALP SERPL-CCNC: 90 U/L — SIGNIFICANT CHANGE UP (ref 30–115)
ALT FLD-CCNC: 25 U/L — SIGNIFICANT CHANGE UP (ref 0–41)
ANION GAP SERPL CALC-SCNC: 11 MMOL/L — SIGNIFICANT CHANGE UP (ref 7–14)
AST SERPL-CCNC: 33 U/L — SIGNIFICANT CHANGE UP (ref 0–41)
BASOPHILS # BLD AUTO: 0 K/UL — SIGNIFICANT CHANGE UP (ref 0–0.2)
BASOPHILS NFR BLD AUTO: 0 % — SIGNIFICANT CHANGE UP (ref 0–1)
BILIRUB SERPL-MCNC: 0.5 MG/DL — SIGNIFICANT CHANGE UP (ref 0.2–1.2)
BUN SERPL-MCNC: 86 MG/DL — CRITICAL HIGH (ref 10–20)
CALCIUM SERPL-MCNC: 7.3 MG/DL — LOW (ref 8.4–10.5)
CHLORIDE SERPL-SCNC: 86 MMOL/L — LOW (ref 98–110)
CO2 SERPL-SCNC: 35 MMOL/L — HIGH (ref 17–32)
CREAT SERPL-MCNC: 2.5 MG/DL — HIGH (ref 0.7–1.5)
EGFR: 27 ML/MIN/1.73M2 — LOW
EOSINOPHIL # BLD AUTO: 0 K/UL — SIGNIFICANT CHANGE UP (ref 0–0.7)
EOSINOPHIL NFR BLD AUTO: 0 % — SIGNIFICANT CHANGE UP (ref 0–8)
GLUCOSE BLDC GLUCOMTR-MCNC: 328 MG/DL — HIGH (ref 70–99)
GLUCOSE BLDC GLUCOMTR-MCNC: 347 MG/DL — HIGH (ref 70–99)
GLUCOSE BLDC GLUCOMTR-MCNC: 418 MG/DL — HIGH (ref 70–99)
GLUCOSE BLDC GLUCOMTR-MCNC: 437 MG/DL — HIGH (ref 70–99)
GLUCOSE SERPL-MCNC: 280 MG/DL — HIGH (ref 70–99)
HCT VFR BLD CALC: 28.3 % — LOW (ref 42–52)
HGB BLD-MCNC: 9.6 G/DL — LOW (ref 14–18)
IMM GRANULOCYTES NFR BLD AUTO: 0 % — LOW (ref 0.1–0.3)
LYMPHOCYTES # BLD AUTO: 0.11 K/UL — LOW (ref 1.2–3.4)
LYMPHOCYTES # BLD AUTO: 84.6 % — HIGH (ref 20.5–51.1)
MAGNESIUM SERPL-MCNC: 1.8 MG/DL — SIGNIFICANT CHANGE UP (ref 1.8–2.4)
MCHC RBC-ENTMCNC: 31.6 PG — HIGH (ref 27–31)
MCHC RBC-ENTMCNC: 33.9 G/DL — SIGNIFICANT CHANGE UP (ref 32–37)
MCV RBC AUTO: 93.1 FL — SIGNIFICANT CHANGE UP (ref 80–94)
MONOCYTES # BLD AUTO: 0.01 K/UL — LOW (ref 0.1–0.6)
MONOCYTES NFR BLD AUTO: 7.7 % — SIGNIFICANT CHANGE UP (ref 1.7–9.3)
NEUTROPHILS # BLD AUTO: 0.01 K/UL — LOW (ref 1.4–6.5)
NEUTROPHILS NFR BLD AUTO: 7.7 % — LOW (ref 42.2–75.2)
NRBC # BLD: 0 /100 WBCS — SIGNIFICANT CHANGE UP (ref 0–0)
NRBC BLD-RTO: 0 /100 WBCS — SIGNIFICANT CHANGE UP (ref 0–0)
PLATELET # BLD AUTO: 31 K/UL — LOW (ref 130–400)
PMV BLD: 12.2 FL — HIGH (ref 7.4–10.4)
POTASSIUM SERPL-MCNC: 3.5 MMOL/L — SIGNIFICANT CHANGE UP (ref 3.5–5)
POTASSIUM SERPL-SCNC: 3.5 MMOL/L — SIGNIFICANT CHANGE UP (ref 3.5–5)
PROT SERPL-MCNC: 4.4 G/DL — LOW (ref 6–8)
RBC # BLD: 3.04 M/UL — LOW (ref 4.7–6.1)
RBC # FLD: 13.6 % — SIGNIFICANT CHANGE UP (ref 11.5–14.5)
SODIUM SERPL-SCNC: 132 MMOL/L — LOW (ref 135–146)
WBC # BLD: 0.13 K/UL — CRITICAL LOW (ref 4.8–10.8)
WBC # FLD AUTO: 0.13 K/UL — CRITICAL LOW (ref 4.8–10.8)

## 2025-01-22 PROCEDURE — 71045 X-RAY EXAM CHEST 1 VIEW: CPT | Mod: 26

## 2025-01-22 PROCEDURE — 99233 SBSQ HOSP IP/OBS HIGH 50: CPT

## 2025-01-22 PROCEDURE — 99291 CRITICAL CARE FIRST HOUR: CPT

## 2025-01-22 PROCEDURE — 93010 ELECTROCARDIOGRAM REPORT: CPT

## 2025-01-22 RX ORDER — INSULIN LISPRO 100 U/ML
20 INJECTION, SOLUTION INTRAVENOUS; SUBCUTANEOUS
Refills: 0 | Status: DISCONTINUED | OUTPATIENT
Start: 2025-01-22 | End: 2025-01-28

## 2025-01-22 RX ORDER — LORAZEPAM 4 MG/ML
1 VIAL (ML) INJECTION ONCE
Refills: 0 | Status: DISCONTINUED | OUTPATIENT
Start: 2025-01-22 | End: 2025-01-22

## 2025-01-22 RX ORDER — FUROSEMIDE 10 MG/ML
40 INJECTION INTRAMUSCULAR; INTRAVENOUS EVERY 12 HOURS
Refills: 0 | Status: DISCONTINUED | OUTPATIENT
Start: 2025-01-22 | End: 2025-01-23

## 2025-01-22 RX ORDER — VANCOMYCIN HCL IN 5 % DEXTROSE 1.5G/250ML
1250 PLASTIC BAG, INJECTION (ML) INTRAVENOUS ONCE
Refills: 0 | Status: DISCONTINUED | OUTPATIENT
Start: 2025-01-22 | End: 2025-01-22

## 2025-01-22 RX ORDER — INSULIN GLARGINE-YFGN 100 [IU]/ML
30 INJECTION, SOLUTION SUBCUTANEOUS AT BEDTIME
Refills: 0 | Status: DISCONTINUED | OUTPATIENT
Start: 2025-01-22 | End: 2025-01-22

## 2025-01-22 RX ORDER — ONDANSETRON HCL/PF 4 MG/2 ML
4 VIAL (ML) INJECTION EVERY 6 HOURS
Refills: 0 | Status: DISCONTINUED | OUTPATIENT
Start: 2025-01-22 | End: 2025-02-17

## 2025-01-22 RX ORDER — INSULIN GLARGINE-YFGN 100 [IU]/ML
30 INJECTION, SOLUTION SUBCUTANEOUS
Refills: 0 | Status: DISCONTINUED | OUTPATIENT
Start: 2025-01-22 | End: 2025-01-31

## 2025-01-22 RX ORDER — ONDANSETRON HCL/PF 4 MG/2 ML
4 VIAL (ML) INJECTION EVERY 6 HOURS
Refills: 0 | Status: DISCONTINUED | OUTPATIENT
Start: 2025-01-22 | End: 2025-01-22

## 2025-01-22 RX ORDER — VANCOMYCIN HCL IN 5 % DEXTROSE 1.5G/250ML
1250 PLASTIC BAG, INJECTION (ML) INTRAVENOUS ONCE
Refills: 0 | Status: COMPLETED | OUTPATIENT
Start: 2025-01-22 | End: 2025-01-22

## 2025-01-22 RX ORDER — OSELTAMIVIR PHOSPHATE 75 MG/1
30 CAPSULE ORAL
Refills: 0 | Status: COMPLETED | OUTPATIENT
Start: 2025-01-22 | End: 2025-01-27

## 2025-01-22 RX ORDER — INSULIN LISPRO 100 U/ML
10 INJECTION, SOLUTION INTRAVENOUS; SUBCUTANEOUS
Refills: 0 | Status: DISCONTINUED | OUTPATIENT
Start: 2025-01-22 | End: 2025-01-22

## 2025-01-22 RX ORDER — HYDROXYZINE HYDROCHLORIDE 25 MG/1
25 TABLET, FILM COATED ORAL ONCE
Refills: 0 | Status: COMPLETED | OUTPATIENT
Start: 2025-01-22 | End: 2025-01-22

## 2025-01-22 RX ADMIN — INSULIN LISPRO 4: 100 INJECTION, SOLUTION INTRAVENOUS; SUBCUTANEOUS at 08:01

## 2025-01-22 RX ADMIN — HYDROXYZINE HYDROCHLORIDE 25 MILLIGRAM(S): 25 TABLET, FILM COATED ORAL at 17:24

## 2025-01-22 RX ADMIN — SERTRALINE 25 MILLIGRAM(S): 100 TABLET, FILM COATED ORAL at 12:45

## 2025-01-22 RX ADMIN — CEFEPIME 100 MILLIGRAM(S): 2 INJECTION, POWDER, FOR SOLUTION INTRAVENOUS at 18:07

## 2025-01-22 RX ADMIN — Medication 40 MILLIGRAM(S): at 08:04

## 2025-01-22 RX ADMIN — METOPROLOL SUCCINATE 25 MILLIGRAM(S): 50 TABLET, EXTENDED RELEASE ORAL at 05:40

## 2025-01-22 RX ADMIN — OXYCODONE HYDROCHLORIDE 5 MILLIGRAM(S): 30 TABLET ORAL at 18:14

## 2025-01-22 RX ADMIN — CEFEPIME 100 MILLIGRAM(S): 2 INJECTION, POWDER, FOR SOLUTION INTRAVENOUS at 05:40

## 2025-01-22 RX ADMIN — Medication 500 MILLIGRAM(S): at 12:48

## 2025-01-22 RX ADMIN — OSELTAMIVIR PHOSPHATE 75 MILLIGRAM(S): 75 CAPSULE ORAL at 05:40

## 2025-01-22 RX ADMIN — Medication 650 MILLIGRAM(S): at 03:27

## 2025-01-22 RX ADMIN — INSULIN GLARGINE-YFGN 30 UNIT(S): 100 INJECTION, SOLUTION SUBCUTANEOUS at 21:59

## 2025-01-22 RX ADMIN — HYDROXYZINE HYDROCHLORIDE 25 MILLIGRAM(S): 25 TABLET, FILM COATED ORAL at 09:30

## 2025-01-22 RX ADMIN — FILGRASTIM 480 MICROGRAM(S): 300 INJECTION, SOLUTION INTRAVENOUS; SUBCUTANEOUS at 18:14

## 2025-01-22 RX ADMIN — OSELTAMIVIR PHOSPHATE 30 MILLIGRAM(S): 75 CAPSULE ORAL at 18:13

## 2025-01-22 RX ADMIN — Medication 81 MILLIGRAM(S): at 12:45

## 2025-01-22 RX ADMIN — INSULIN LISPRO 6: 100 INJECTION, SOLUTION INTRAVENOUS; SUBCUTANEOUS at 18:14

## 2025-01-22 RX ADMIN — Medication 10 MILLIGRAM(S): at 12:45

## 2025-01-22 RX ADMIN — Medication 4 MILLIGRAM(S): at 03:27

## 2025-01-22 RX ADMIN — METOPROLOL SUCCINATE 25 MILLIGRAM(S): 50 TABLET, EXTENDED RELEASE ORAL at 18:08

## 2025-01-22 RX ADMIN — FUROSEMIDE 40 MILLIGRAM(S): 10 INJECTION INTRAMUSCULAR; INTRAVENOUS at 18:08

## 2025-01-22 RX ADMIN — INSULIN LISPRO 4: 100 INJECTION, SOLUTION INTRAVENOUS; SUBCUTANEOUS at 13:02

## 2025-01-22 RX ADMIN — HYDROXYZINE HYDROCHLORIDE 25 MILLIGRAM(S): 25 TABLET, FILM COATED ORAL at 04:05

## 2025-01-22 RX ADMIN — Medication 166.67 MILLIGRAM(S): at 18:15

## 2025-01-22 RX ADMIN — Medication 150 MILLIGRAM(S): at 12:46

## 2025-01-22 RX ADMIN — POLYETHYLENE GLYCOL 3350 17 GRAM(S): 17 POWDER, FOR SOLUTION ORAL at 12:41

## 2025-01-22 NOTE — ED ADULT NURSE REASSESSMENT NOTE - NS ED NURSE REASSESS COMMENT FT1
pt resting in bed safety measures in place, call bell within reach. Pt has an insulin pump that is not in use. Pt requested Endocrinology consult for insulin dose, but states the sliding scale does not match his home dose. MD Tammy Branch contacted.

## 2025-01-22 NOTE — PROGRESS NOTE ADULT - ASSESSMENT
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 Patient was recently diagnosed with Diffuse large B cell Lymphoma on pathology of right groin mass and was following his Oncologist Dr Angeli Hilliard. Pt being managed for acute hypoxic respiratory failure ISO Influenza PNA.    Plan:     # Neutropenic fever  # Influenza A  #Acute hypoxic respiratory failure ISO Influenza PNA   - Received 1 dose of ZARXIO 480 mcg SC inpatient on 1/17/25, 2 doses at home over the weekend  - CTA 1/20: Negative for pulmonary emboli.Interval development of infiltrates within the lower lobes and lingula which may reflect acute multilobar pneumonia  - Influenza A positive -> C/W Tamiflu  - C/W Cefepime and Vancomycin in context of neutropenia --> pending MRSA nares.  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   - CT pelvis on 10/12/24 showed interval increase of mass 10 x 7 x 10 cm with inguinal lymphadenopathy, suspicious for neoplastic process.  - Biopsy on 12/18/24 revealed diffuse large B-cell lymphoma with concern for Rodriguez's transformation.  - Patient was seen by Dr. Hilliard on 1/8/25 for initial consultation and was advised to come to ED due to concern for tumor lysis syndrome.  - Uric acid level now WNL at 7.9, CMP otherwise unremarkable.   - CT C/A/P 1.9.25 Stable appearance bulky right inguinal and right iliac lymphadenopathy including partially imaged 10.1 cm right inguinal soft tissue mass/lymph node.  - s/p PICC placement on 1/10/25  - s/p Bone marrow biopsy on 1/10/25  - Pending hepatitis panel  - Completed cycle 1 of R-EPOCH (D1 on 1/11/2024). Tolerated well. Rituximab was given on D5   - Uric acid 10.3 on 1/16/25, s/p 3 mg IV rasburicase  - Received 1 dose of ZARXIO 480 mcg SC inpatient on 1/17/25, 2 doses at home over the weekend  - Zarxio 480 mcg q 24 hr ordered.    #CKD 3B  #Chronic Lower Extremity Edema   - On home Lasix 80mg po q24   - Hold diuretics in context of sepsis and fever  - Avoid volume overload.  - daily assessment for resumption of diuretics    #CAD s/p CABG  #Chronic Afib on Xarelto  #HTN  - Echo 1/10/25: EF 60-65%  - C/W metoprolol 25mg po q12  - C/W Nifedipine xl 60mg po q24   - Still holding lisinopril and aldactone from last admission  - c/w Xarelto 15mg w/ dinner (hold if platelet continue to trend down)    # HLD   - c/w Pravastatin -> using lip 10 hs here  - resume Repatha on discharge    #DM  - FS - self monitored via dex com  - On insulin pump @2.85 at home   - F/U Endo consult as pt's insulin pump is no longer in use & pt's fingerstick glucose's are uncontrolled    #Seasonal allergies  #URI  - c/w zyrtec      # DVT PPX: Xarelto  # GI PPX: Pantoprazole   # Code Status: Full code  # Diet: Dash     #Hand-off: Ween off HFNC. F/U Endo consult.

## 2025-01-22 NOTE — PROGRESS NOTE ADULT - ATTENDING COMMENTS
The patient was seen. Agree with above.  Continue broad spectrum IV ABx.   Zarxio injection daily.  KRISTINE, give IVF for hydration.

## 2025-01-22 NOTE — ED ADULT NURSE REASSESSMENT NOTE - NS ED NURSE REASSESS COMMENT FT1
Pt is complaining of SOB, O2 is 98 on high flow. /67 HR 94. MD Munoz contacted, per MD give a dose of Atarax early to help with anxiety.

## 2025-01-22 NOTE — PROGRESS NOTE ADULT - SUBJECTIVE AND OBJECTIVE BOX
MORGAN BUSH 69y Male  MRN#: [id number]     Hospital Day: 2d    SUBJECTIVE     No overnight events. Patient seen and evaluated at bedside reports SOB, worsened when talking. Otherwise, has no acute complaints.                                             ----------------------------------------------------------  OBJECTIVE  PAST MEDICAL & SURGICAL HISTORY  Diabetes mellitus, type 2    BPH (benign prostatic hyperplasia)    Water retention    Essential hypertension    Diabetes    CAD (coronary artery disease)    H/O hypercholesterolemia    Morbid obesity    Chronic kidney disease (CKD)    History of atrial fibrillation    H/O right coronary artery stent placement    History of resection of pancreas    History of cholecystectomy    History of left knee replacement    S/P CABG x 6    H/O cardiac radiofrequency ablation                                              -----------------------------------------------------------  ALLERGIES:  No Known Allergies                                            ------------------------------------------------------------    HOME MEDICATIONS  Home Medications:  acetaminophen 325 mg oral tablet: 2 tab(s) orally every 6 hours As needed Temp greater or equal to 38C (100.4F), Mild Pain (1 - 3) (21 Jan 2025 00:58)  aspirin 81 mg oral tablet: orally once a day (21 Jan 2025 00:58)  azelastine nasal: prn (21 Jan 2025 00:58)  furosemide 80 mg oral tablet: 1 tab(s) orally once a day (21 Jan 2025 00:58)  INSULIN PUMP - HUMALOG:  (21 Jan 2025 00:58)  ipratropium nasal: prn (21 Jan 2025 00:58)  levocetirizine 5 mg oral tablet: 1 tab(s) orally once a day (21 Jan 2025 00:58)  metOLazone 2.5 mg oral tablet: 1 tab(s) orally once a day (21 Jan 2025 00:58)  metoprolol tartrate 25 mg oral tablet: 1 tab(s) orally 2 times a day (21 Jan 2025 00:58)  polyethylene glycol 3350 oral powder for reconstitution: 17 gram(s) orally 3 times a day (21 Jan 2025 00:58)  pravastatin 40 mg oral tablet: 1 tab(s) orally once a day (21 Jan 2025 00:58)  Repatha 140 mg/mL subcutaneous solution: 140 milligram(s) subcutaneously (21 Jan 2025 00:58)  rivaroxaban 15 mg oral tablet: 1 tab(s) orally once a day (before a meal) (21 Jan 2025 00:58)  sildenafil 20 mg oral tablet: 1 tab(s) orally (21 Jan 2025 00:58)  simethicone 80 mg oral tablet, chewable: 1 tab(s) orally every 6 hours (21 Jan 2025 00:58)  testosterone cypionate 200 mg/mL intramuscular solution: 200 unit(s) intramuscularly once a month (21 Jan 2025 00:58)  tirzepatide: 0.5 milligram(s) subcutaneous once a week (21 Jan 2025 00:58)  Vitamin C 500 mg oral capsule: 1 cap(s) orally once a day (21 Jan 2025 00:58)                           MEDICATIONS:  STANDING MEDICATIONS  allopurinol 150 milliGRAM(s) Oral daily  ascorbic acid 500 milliGRAM(s) Oral daily  aspirin  chewable 81 milliGRAM(s) Oral daily  atorvastatin 10 milliGRAM(s) Oral at bedtime  cefepime   IVPB 2000 milliGRAM(s) IV Intermittent every 12 hours  cetirizine 10 milliGRAM(s) Oral daily  dextrose 5%. 1000 milliLiter(s) IV Continuous <Continuous>  dextrose 5%. 1000 milliLiter(s) IV Continuous <Continuous>  dextrose 50% Injectable 25 Gram(s) IV Push once  dextrose 50% Injectable 12.5 Gram(s) IV Push once  dextrose 50% Injectable 25 Gram(s) IV Push once  filgrastim-sndz (ZARXIO) Injectable 480 MICROGram(s) SubCutaneous every 24 hours  glucagon  Injectable 1 milliGRAM(s) IntraMuscular once  insulin lispro (ADMELOG) corrective regimen sliding scale   SubCutaneous three times a day before meals  melatonin 10 milliGRAM(s) Oral at bedtime  metoprolol tartrate 25 milliGRAM(s) Oral two times a day  NIFEdipine XL 60 milliGRAM(s) Oral daily  oseltamivir 30 milliGRAM(s) Oral two times a day  pantoprazole    Tablet 40 milliGRAM(s) Oral before breakfast  polyethylene glycol 3350 17 Gram(s) Oral daily  rivaroxaban 15 milliGRAM(s) Oral with dinner  senna 2 Tablet(s) Oral at bedtime  sertraline 25 milliGRAM(s) Oral daily  vancomycin  IVPB 1250 milliGRAM(s) IV Intermittent once    PRN MEDICATIONS  acetaminophen     Tablet .. 650 milliGRAM(s) Oral every 6 hours PRN  albuterol/ipratropium for Nebulization 3 milliLiter(s) Nebulizer every 4 hours PRN  dextrose Oral Gel 15 Gram(s) Oral once PRN  diazepam    Tablet 2 milliGRAM(s) Oral daily PRN  hydrOXYzine hydrochloride 25 milliGRAM(s) Oral every 6 hours PRN  ondansetron    Tablet 4 milliGRAM(s) Oral every 6 hours PRN  oxyCODONE    IR 5 milliGRAM(s) Oral four times a day PRN                                            ------------------------------------------------------------  VITAL SIGNS: Last 24 Hours  T(C): 37.4 (22 Jan 2025 07:36), Max: 37.7 (21 Jan 2025 19:30)  T(F): 99.4 (22 Jan 2025 07:36), Max: 99.9 (21 Jan 2025 19:30)  HR: 86 (22 Jan 2025 07:36) (86 - 104)  BP: 131/67 (22 Jan 2025 07:36) (129/61 - 144/67)  BP(mean): 89 (22 Jan 2025 05:38) (89 - 95)  RR: 20 (22 Jan 2025 07:36) (20 - 20)  SpO2: 96% (22 Jan 2025 07:47) (91% - 96%)                                             --------------------------------------------------------------  LABS:                        9.6    0.13  )-----------( 31       ( 22 Jan 2025 08:18 )             28.3     01-22    132[L]  |  86[L]  |  86[HH]  ----------------------------<  280[H]  3.5   |  35[H]  |  2.5[H]    Ca    7.3[L]      22 Jan 2025 08:18  Phos  2.5     01-21  Mg     1.8     01-22    TPro  4.4[L]  /  Alb  2.5[L]  /  TBili  0.5  /  DBili  x   /  AST  33  /  ALT  25  /  AlkPhos  90  01-22    PT/INR - ( 20 Jan 2025 15:38 )   PT: 13.90 sec;   INR: 1.17 ratio         PTT - ( 20 Jan 2025 15:38 )  PTT:33.9 sec          Urinalysis with Rflx Culture (collected 20 Jan 2025 19:07)    Culture - Blood (collected 20 Jan 2025 15:38)  Source: .Blood BLOOD  Preliminary Report (21 Jan 2025 23:01):    No growth at 24 hours    Culture - Blood (collected 20 Jan 2025 15:38)  Source: .Blood BLOOD  Preliminary Report (21 Jan 2025 23:01):    No growth at 24 hours          PHYSICAL EXAM:  GENERAL: NAD  NECK: Supple, No JVD  CHEST/LUNG: Clear to auscultation bilaterally. Unlabored respirations  HEART: regular rate and rhythm; No murmurs  ABDOMEN:  Soft, Nontender, Nondistended.    EXTREMITIES: Warm. No edema  NERVOUS SYSTEM:  Alert & Oriented X3. No focal deficits   SKIN: No rashes or lesions                                             --------------------------------------------------------------

## 2025-01-22 NOTE — CONSULT NOTE ADULT - ASSESSMENT
IMPRESSION:    Acute hypoxemic respiratory failure   Influenza A infection / pneumonia   RASHAD / OHS ?  Possible superimposed bacterial pneumonia   HO Large B Cell lymphoma SP Chemotherapy   Pancytopenia chem induced   HO CAD (status post CABG;), DM (on insulin pump), atrial fibrillation on Xarelto, and distal pancreatomy and splenectomy     PLAN:    CNS:  No depressants.      HEENT: Oral care    PULMONARY:  HOB @ 45 degrees.  Aspiration precautions.  repeat CXR.  Wean HFNCo2 as tolerated.  keep O2 sat 92-96%.  Incentive spirometry.  NIV during sleep     CARDIOVASCULAR:  DC IVF.  Volume contraction as tolerated.  ECHO noted     GI: GI prophylaxis.  Feeding.  Bowel regimen     RENAL:  Follow up lytes.  Correct as needed.  Monitor UO     INFECTIOUS DISEASE: Follow up cultures.  Continue ABX and Tamiflu per ID.  Would add Azithromycin for now. Procal.      HEMATOLOGICAL:  DVT prophylaxis.  On Xarelto.  Monitor CBC.  Hem onc eval noted.  Continue ZARXIO     ENDOCRINE:  Follow up FS.  Insulin protocol if needed.    MUSCULOSKELETAL:  OOB as tolerated with PT OT     SDU

## 2025-01-22 NOTE — CONSULT NOTE ADULT - ASSESSMENT
69-year-old man with extensive medical history including CAD (status post CABG;), DM (on insulin pump), atrial fibrillation on Xarelto, history of distal pancreatomy and splenectomy for unclear reason referred to the ED by his oncologist for abnormal labs. Patient was recently diagnosed with Diffuse large B cell Lymphoma on pathology of right groin mass and is following with [facility] oncology team. Patient was discharged on 01.17.2025 from hospital after inpatient chemotherapy (Completed cycle 1 of R-EPOCH (D1 on 1/11/2024).      # Uncontrolled DM  - On home insulin pump with Mounjaro   - Last A1c 8.9  -     ** Incomplete note  69-year-old man with extensive medical history including CAD (status post CABG;), DM (on insulin pump), atrial fibrillation on Xarelto, history of distal pancreatomy and splenectomy for unclear reason referred to the ED by his oncologist for abnormal labs. Patient was recently diagnosed with Diffuse large B cell Lymphoma on pathology of right groin mass and is following with [facility] oncology team. Patient was discharged on 01.17.2025 from hospital after inpatient chemotherapy (Completed cycle 1 of R-EPOCH (D1 on 1/11/2024).      # Uncontrolled DM  - On home insulin pump with Mounjaro   - Last A1c 8.9  - Keep off insulin pump  - Start 0.5 units/kg divided into Lantus/Lispro  - Can resume his pump on discharge  69-year-old man with extensive medical history including CAD (status post CABG;), DM (on insulin pump), atrial fibrillation on Xarelto, history of distal pancreatomy and splenectomy for unclear reason referred to the ED by his oncologist for abnormal labs. Patient was recently diagnosed with Diffuse large B cell Lymphoma on pathology of right groin mass and is following with [facility] oncology team. Patient was discharged on 01.17.2025 from hospital after inpatient chemotherapy (Completed cycle 1 of R-EPOCH (D1 on 1/11/2024).      # Uncontrolled DM  - On home insulin pump with Mounjaro   - Last A1c 8.9  - Keep off insulin pump  -

## 2025-01-22 NOTE — ED ADULT NURSE REASSESSMENT NOTE - NS ED NURSE REASSESS COMMENT FT1
ANAPHYLAXIS ALLERGY PLAN    Name: Thomas Quiroz Jr.      :  2013    Allergy to:  Peanuts, Tree Nuts    Weight: 0 lbs 0 oz           Asthma:  Yes  (higher risk for a severe reaction)  The medication may be given at school or day care.  Child can carry and use epinephrine auto-injector at school with approval of school nurse.    Do not depend on antihistamines or inhalers (bronchodilators) to treat a severe reaction; USE EPINEPHRINE      MEDICATIONS/DOSES  Epinephrine:  EpiPen/Adrenaclick  Epinephrine dose:  0.3 mg IM  Antihistamine:  Zyrtec (Cetirizine)  Antihistamine dose:  10mg  Other (e.g., inhaler-bronchodilator if wheezing):  None       ANAPHYLAXIS ALLERGY PLAN (Page 2)  Patient:  Thomas Quiroz Jr.  :  2013          Electronically signed on 2024 by:  Oxana Diaz MD  Parent/Guardian Authorization Signature:  ___________________________ Date:    FORM PROVIDED COURTESY OF FOOD ALLERGY RESE[employer] EDUCATION (FARE) ([email or web address]) 2017   as per MD Ledesma, pt oxygen saturation is okay to be between 92-92% on high flow NC.

## 2025-01-22 NOTE — CONSULT NOTE ADULT - SUBJECTIVE AND OBJECTIVE BOX
Patient is a 69y old  Male who presents with a chief complaint of Shortness of breath (21 Jan 2025 19:42)      HPI:  69-year-old man with extensive medical history including CAD (status post CABG;), DM (on insulin pump), atrial fibrillation on Xarelto, history of distal pancreatomy and splenectomy for unclear reason referred to the ED by his oncologist for abnormal labs. Patient was recently diagnosed with Diffuse large B cell Lymphoma on pathology of right groin mass and is following with [facility] oncology team. Patient was discharged on 01.17.2025 from hospital after inpatient chemotherapy (Completed cycle 1 of R-EPOCH (D1 on 1/11/2024). Tolerated well. Rituximab was given on D5).   Over past 3 days patient started to get a productive cough with yellow sputum production.  Patient is also endorsing chills and nausea which he states is baseline. He had 1 reading on low grade fever 100.5 on day of presentation.    On presentation vitals:  · BP Systolic	160 mm Hg  · BP Diastolic	86 mm Hg  · Heart Rate	89 /min  · Respiration Rate (breaths/min)	20 /min  · Temp (F)	98.9 Degrees F  · Temp (C)	37.2 Degrees C  · Temp site	oral    ED course:  O2 sats low on 4L O2 so he was promptly started on BIPAP. CXR with LLL infiltrate; sepsis labs sent with gentle hydration; abx given, Labs with neutropenia and elevated BNP, trop 68. EKG nonischemic.    CTA Chest: Negative for pulmonary emboli. Interval development of infiltrates within the lower lobes and lingula which may reflect acute multilobar pneumonia.    Patient admitted to medicine for acute hypoxic respiratory failure.   (20 Jan 2025 23:47)      PAST MEDICAL & SURGICAL HISTORY:  Diabetes mellitus, type 2      BPH (benign prostatic hyperplasia)      Water retention      Essential hypertension      Diabetes      CAD (coronary artery disease)      H/O hypercholesterolemia      Morbid obesity      Chronic kidney disease (CKD)      History of atrial fibrillation      H/O right coronary artery stent placement      History of resection of pancreas      History of cholecystectomy      History of left knee replacement      S/P CABG x 6      H/O cardiac radiofrequency ablation          SOCIAL HX:   Smoking        Quit 30 years ago                  ETOH                            Other    FAMILY HISTORY:  Family history of lung cancer (Sibling)    Family history of diabetes mellitus (Mother)    Family history of coronary artery disease (Father)    :  No known cardiovacular family hisotry     Review Of Systems:     All ROS are negative except per HPI       Allergies    No Known Allergies    Intolerances          PHYSICAL EXAM    ICU Vital Signs Last 24 Hrs  T(C): 37.3 (22 Jan 2025 05:38), Max: 38 (21 Jan 2025 12:06)  T(F): 99.2 (22 Jan 2025 05:38), Max: 100.4 (21 Jan 2025 12:06)  HR: 92 (22 Jan 2025 05:38) (92 - 110)  BP: 131/63 (22 Jan 2025 05:38) (122/67 - 180/82)  BP(mean): 89 (22 Jan 2025 05:38) (89 - 95)  ABP: --  ABP(mean): --  RR: 20 (22 Jan 2025 05:38) (20 - 20)  SpO2: 93% (22 Jan 2025 05:38) (91% - 95%)    O2 Parameters below as of 22 Jan 2025 05:38  Patient On (Oxygen Delivery Method): HFNCO2             CONSTITUTIONAL:  Ill appearing     ENT:   Airway patent,   Mouth with normal mucosa.       CARDIAC:   Normal rate,   Regular rhythm.    LE edema      RESPIRATORY:   No wheezing  Bilateral crackles   Not tachypneic,  No use of accessory muscles    GASTROINTESTINAL:  Abdomen soft,   Non-tender,   No guarding,   + BS      NEUROLOGICAL:   Alert and oriented   No motor deficits.    SKIN:   Skin normal color for race,   No evidence of rash.        LABS:                          9.4    0.11  )-----------( 58       ( 21 Jan 2025 07:15 )             28.0                                               01-21    138  |  88[L]  |  70[HH]  ----------------------------<  215[H]  3.5   |  41[HH]  |  2.2[H]    Ca    7.7[L]      21 Jan 2025 07:15  Phos  2.5     01-21  Mg     1.8     01-21    TPro  4.6[L]  /  Alb  2.8[L]  /  TBili  0.4  /  DBili  x   /  AST  29  /  ALT  21  /  AlkPhos  66  01-21      PT/INR - ( 20 Jan 2025 15:38 )   PT: 13.90 sec;   INR: 1.17 ratio         PTT - ( 20 Jan 2025 15:38 )  PTT:33.9 sec                                       Urinalysis Basic - ( 21 Jan 2025 07:15 )    Color: x / Appearance: x / SG: x / pH: x  Gluc: 215 mg/dL / Ketone: x  / Bili: x / Urobili: x   Blood: x / Protein: x / Nitrite: x   Leuk Esterase: x / RBC: x / WBC x   Sq Epi: x / Non Sq Epi: x / Bacteria: x                                                  LIVER FUNCTIONS - ( 21 Jan 2025 07:15 )  Alb: 2.8 g/dL / Pro: 4.6 g/dL / ALK PHOS: 66 U/L / ALT: 21 U/L / AST: 29 U/L / GGT: x                                                  Urinalysis with Rflx Culture (collected 20 Jan 2025 19:07)    Culture - Blood (collected 20 Jan 2025 15:38)  Source: .Blood BLOOD  Preliminary Report (21 Jan 2025 23:01):    No growth at 24 hours    Culture - Blood (collected 20 Jan 2025 15:38)  Source: .Blood BLOOD  Preliminary Report (21 Jan 2025 23:01):    No growth at 24 hours                                                                                           X-Rays reviewed                                                                                     ECHO    CXR interpreted by me     MEDICATIONS  (STANDING):  allopurinol 150 milliGRAM(s) Oral daily  ascorbic acid 500 milliGRAM(s) Oral daily  aspirin  chewable 81 milliGRAM(s) Oral daily  atorvastatin 10 milliGRAM(s) Oral at bedtime  cefepime   IVPB 2000 milliGRAM(s) IV Intermittent every 12 hours  cetirizine 10 milliGRAM(s) Oral daily  dextrose 5%. 1000 milliLiter(s) (50 mL/Hr) IV Continuous <Continuous>  dextrose 5%. 1000 milliLiter(s) (100 mL/Hr) IV Continuous <Continuous>  dextrose 50% Injectable 25 Gram(s) IV Push once  dextrose 50% Injectable 12.5 Gram(s) IV Push once  dextrose 50% Injectable 25 Gram(s) IV Push once  filgrastim-sndz (ZARXIO) Injectable 480 MICROGram(s) SubCutaneous every 24 hours  glucagon  Injectable 1 milliGRAM(s) IntraMuscular once  insulin lispro (ADMELOG) corrective regimen sliding scale   SubCutaneous three times a day before meals  melatonin 10 milliGRAM(s) Oral at bedtime  metoprolol tartrate 25 milliGRAM(s) Oral two times a day  NIFEdipine XL 60 milliGRAM(s) Oral daily  oseltamivir 75 milliGRAM(s) Oral two times a day  pantoprazole    Tablet 40 milliGRAM(s) Oral before breakfast  polyethylene glycol 3350 17 Gram(s) Oral daily  rivaroxaban 15 milliGRAM(s) Oral with dinner  senna 2 Tablet(s) Oral at bedtime  sertraline 25 milliGRAM(s) Oral daily  sodium chloride 0.9%. 1000 milliLiter(s) (75 mL/Hr) IV Continuous <Continuous>  vancomycin  IVPB 1750 milliGRAM(s) IV Intermittent every 24 hours    MEDICATIONS  (PRN):  acetaminophen     Tablet .. 650 milliGRAM(s) Oral every 6 hours PRN Temp greater or equal to 38C (100.4F), Mild Pain (1 - 3)  albuterol/ipratropium for Nebulization 3 milliLiter(s) Nebulizer every 4 hours PRN Shortness of Breath and/or Wheezing  dextrose Oral Gel 15 Gram(s) Oral once PRN Blood Glucose LESS THAN 70 milliGRAM(s)/deciliter  diazepam    Tablet 2 milliGRAM(s) Oral daily PRN anxiety  hydrOXYzine hydrochloride 25 milliGRAM(s) Oral every 6 hours PRN Anxiety  ondansetron    Tablet 4 milliGRAM(s) Oral every 6 hours PRN for nausea  oxyCODONE    IR 5 milliGRAM(s) Oral four times a day PRN Severe Pain (7 - 10)

## 2025-01-22 NOTE — PROGRESS NOTE ADULT - ASSESSMENT
69-year-old man with extensive medical history including CAD (status post CABG;), atrial fibrillation on Xarelto, history of distal pancreatomy and splenectomy for unclear reason referred to the ED by his oncologist for abnormal labs. Patient was recently diagnosed with Diffuse large B cell Lymphoma on pathology of right groin mass and was following his Oncologist Dr Angeli Hilliard.     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 Rituximab given D5    CKD Stage 3B  baseline (1.8)    Recommendations   Patient s/p cycle 1 R-EPOCH for DLBL, presenting with neutropenic fever.  Continue zarxio 480 mcg SC daily until ANC >1000 (Start date 1/17/25, skipped one dose on 1/20/25)  c/w vancomycin and cefepime  ID eval appreciated, Bcx NGTD  Hold xarelto, resume when plts > 50k

## 2025-01-22 NOTE — PROGRESS NOTE ADULT - SUBJECTIVE AND OBJECTIVE BOX
SUBJECTIVE:    Patient is a 69y old Male who presents with a chief complaint of Shortness of breath (21 Jan 2025 19:42)    Currently admitted to medicine with the primary diagnosis of Neutropenic fever       Today is hospital day 2d. This morning he is resting comfortably in bed and reports no new issues or overnight events.     PAST MEDICAL & SURGICAL HISTORY  Diabetes mellitus, type 2    BPH (benign prostatic hyperplasia)    Water retention    Essential hypertension    Diabetes    CAD (coronary artery disease)    H/O hypercholesterolemia    Morbid obesity    Chronic kidney disease (CKD)    History of atrial fibrillation    H/O right coronary artery stent placement    History of resection of pancreas    History of cholecystectomy    History of left knee replacement    S/P CABG x 6    H/O cardiac radiofrequency ablation      SOCIAL HISTORY:  Negative for smoking/alcohol/drug use.     ALLERGIES:  No Known Allergies    MEDICATIONS:  STANDING MEDICATIONS  allopurinol 150 milliGRAM(s) Oral daily  ascorbic acid 500 milliGRAM(s) Oral daily  aspirin  chewable 81 milliGRAM(s) Oral daily  atorvastatin 10 milliGRAM(s) Oral at bedtime  cefepime   IVPB 2000 milliGRAM(s) IV Intermittent every 12 hours  cetirizine 10 milliGRAM(s) Oral daily  dextrose 5%. 1000 milliLiter(s) IV Continuous <Continuous>  dextrose 5%. 1000 milliLiter(s) IV Continuous <Continuous>  dextrose 50% Injectable 25 Gram(s) IV Push once  dextrose 50% Injectable 12.5 Gram(s) IV Push once  dextrose 50% Injectable 25 Gram(s) IV Push once  filgrastim-sndz (ZARXIO) Injectable 480 MICROGram(s) SubCutaneous every 24 hours  furosemide   Injectable 40 milliGRAM(s) IV Push every 12 hours  glucagon  Injectable 1 milliGRAM(s) IntraMuscular once  insulin lispro (ADMELOG) corrective regimen sliding scale   SubCutaneous three times a day before meals  melatonin 10 milliGRAM(s) Oral at bedtime  metoprolol tartrate 25 milliGRAM(s) Oral two times a day  NIFEdipine XL 60 milliGRAM(s) Oral daily  oseltamivir 30 milliGRAM(s) Oral two times a day  pantoprazole    Tablet 40 milliGRAM(s) Oral before breakfast  polyethylene glycol 3350 17 Gram(s) Oral daily  senna 2 Tablet(s) Oral at bedtime  sertraline 25 milliGRAM(s) Oral daily  vancomycin  IVPB 1250 milliGRAM(s) IV Intermittent once    PRN MEDICATIONS  acetaminophen     Tablet .. 650 milliGRAM(s) Oral every 6 hours PRN  albuterol/ipratropium for Nebulization 3 milliLiter(s) Nebulizer every 4 hours PRN  dextrose Oral Gel 15 Gram(s) Oral once PRN  diazepam    Tablet 2 milliGRAM(s) Oral daily PRN  hydrOXYzine hydrochloride 25 milliGRAM(s) Oral every 6 hours PRN  ondansetron    Tablet 4 milliGRAM(s) Oral every 6 hours PRN  oxyCODONE    IR 5 milliGRAM(s) Oral four times a day PRN    VITALS:   T(F): 99.4  HR: 120  BP: 133/82  RR: 20  SpO2: 90%    LABS:                        9.6    0.13  )-----------( 31       ( 22 Jan 2025 08:18 )             28.3     01-22    132[L]  |  86[L]  |  86[HH]  ----------------------------<  280[H]  3.5   |  35[H]  |  2.5[H]    Ca    7.3[L]      22 Jan 2025 08:18  Phos  2.5     01-21  Mg     1.8     01-22    TPro  4.4[L]  /  Alb  2.5[L]  /  TBili  0.5  /  DBili  x   /  AST  33  /  ALT  25  /  AlkPhos  90  01-22      Urinalysis Basic - ( 22 Jan 2025 08:18 )    Color: x / Appearance: x / SG: x / pH: x  Gluc: 280 mg/dL / Ketone: x  / Bili: x / Urobili: x   Blood: x / Protein: x / Nitrite: x   Leuk Esterase: x / RBC: x / WBC x   Sq Epi: x / Non Sq Epi: x / Bacteria: x            Urinalysis with Rflx Culture (collected 20 Jan 2025 19:07)    Culture - Blood (collected 20 Jan 2025 15:38)  Source: .Blood BLOOD  Preliminary Report (21 Jan 2025 23:01):    No growth at 24 hours    Culture - Blood (collected 20 Jan 2025 15:38)  Source: .Blood BLOOD  Preliminary Report (21 Jan 2025 23:01):    No growth at 24 hours          RADIOLOGY:    PHYSICAL EXAM:  GEN: No acute distress  LUNGS: Clear to auscultation bilaterally   HEART: Regular  ABD: Soft, non-tender, non-distended.  EXT: NC/NC/NE/2+PP/LUJAN/Skin Intact.   NEURO: AAOX3    Intravenous access:   NG tube:   Ojeda Catheter:

## 2025-01-22 NOTE — ED ADULT NURSE REASSESSMENT NOTE - NS ED NURSE REASSESS COMMENT FT1
pt /63. Nifedipine 0600 med was held as per order since diastolic was less than 70. MD Ledesma made aware via teams. as per MD, it is okay to still give Metoprolol tartrate 0600 med.

## 2025-01-22 NOTE — PROGRESS NOTE ADULT - ATTENDING COMMENTS
69-year-old man with extensive medical history including CAD (status post CABG;), atrial fibrillation on Xarelto, history of distal pancreatomy and splenectomy for unclear reason referred to the ED by his oncologist for abnormal labs. Patient was recently diagnosed with Diffuse large B cell Lymphoma on pathology of right groin mass and was following his Oncologist Dr Angeli Hilliard. Over past 3 days patient started to get a productive cough with yellow sputum production.  Patient is also endorsing chills and nausea which he states is baseline. He had 1 reading on low grade fever 100.5 on day of presentation. Pt being managed for acute hypoxic respiratory failure ISO Influenza PNA.     #Acute Hypoxic Respiratory Failure 2/2 Multifocal Pneumonia  # Neutropenic fever  # Influenza A infection  #Acute hypoxic respiratory failure ISO Influenza PNA   - Received 1 dose of ZARXIO 480 mcg SC inpatient on 1/17/25, 2 doses at home over the weekend  - CTA 1/20: Negative for pulmonary emboli. Interval development of infiltrates within the lower lobes and lingula which may reflect acute multilobar pneumonia  - Influenza A positive -> C/W Tamiflu  - C/W Cefepime and Vancomycin in context of neutropenia --> pending MRSA nares.  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   - CT pelvis on 10/12/24 showed interval increase of mass 10 x 7 x 10 cm with inguinal lymphadenopathy, suspicious for neoplastic process.  - Biopsy on 12/18/24 revealed diffuse large B-cell lymphoma with concern for Rodriguez's transformation.  - Patient was seen by Dr. Hilliard on 1/8/25 for initial consultation and was advised to come to ED due to concern for tumor lysis syndrome.  - Uric acid level now WNL at 7.9, CMP otherwise unremarkable.   - CT C/A/P 1.9.25 Stable appearance bulky right inguinal and right iliac lymphadenopathy including partially imaged 10.1 cm right inguinal soft tissue mass/lymph node.  - s/p PICC placement on 1/10/25  - s/p Bone marrow biopsy on 1/10/25  - Pending hepatitis panel  - Completed cycle 1 of R-EPOCH (D1 on 1/11/2024). Tolerated well. Rituximab was given on D5   - Uric acid 10.3 on 1/16/25, s/p 3 mg IV rasburicase  - Received 1 dose of ZARXIO 480 mcg SC inpatient on 1/17/25, 2 doses at home over the weekend  - Zarxio 480 mcg q 24 hr ordered.    #CKD 3B  #Chronic Lower Extremity Edema   - On home Lasix 80mg po q24   - Hold diuretics in context of sepsis and fever  - Avoid volume overload.  - daily assessment for resumption of diuretics    #CAD s/p CABG  #Chronic Afib on Xarelto  #HTN  - Echo 1/10/25: EF 60-65%  - C/W metoprolol 25mg po q12  - C/W Nifedipine xl 60mg po q24   - Still holding lisinopril and aldactone from last admission  - c/w Xarelto 15mg w/ dinner (hold if platelet continue to trend down)    # HLD   - c/w Pravastatin -> using lip 10 hs here  - resume Repatha on discharge    #DM  - FS - self monitored via dex com  - On insulin pump @2.85 at home   - F/U Endo consult as pt's insulin pump is no longer in use & pt's fingerstick glucose's are uncontrolled    #Seasonal allergies  #URI  - c/w zyrtec      # DVT PPX: Xarelto  # GI PPX: Pantoprazole   # Code Status: Full code  # Diet: Dash     #Hand-off: Ween off HFNC. F/U Endo consult. 69-year-old man with extensive medical history including CAD (status post CABG;), atrial fibrillation on Xarelto, history of distal pancreatomy and splenectomy for unclear reason referred to the ED by his oncologist for abnormal labs. Patient was recently diagnosed with Diffuse large B cell Lymphoma on pathology of right groin mass and was following his Oncologist Dr Angeli Hilliard. Over past 3 days patient started to get a productive cough with yellow sputum production.  Patient is also endorsing chills and nausea which he states is baseline. He had 1 reading on low grade fever 100.5 on day of presentation. Pt being managed for acute hypoxic respiratory failure ISO Influenza PNA.     #Acute Hypoxic Respiratory Failure 2/2 Multifocal Pneumonia  #Possible Volume Overload  # Neutropenic fever  # Influenza A infection  - CTA 1/20: Negative for pulmonary emboli. Interval development of infiltrates within the lower lobes and lingula which may reflect acute multilobar pneumonia  - Pancytopenia s/p s/p 1st cycle chemo with R-EPOCH  - TTE 1/10/25: poor study, EF 60-65%  - Influenza A positive > Tamiflu (E/D 1/27/25): plan longer course given immunosuppresion   - Per ID, c/w cefepime and vancomycin  - check MRSA nares and f/u blood cx > if negative, d/c vanco  - check urine legionella/strep  - BNP elevated, CXR vascular congestion, pitting edema on exam. 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   - CT pelvis on 10/12/24 showed interval increase of mass 10 x 7 x 10 cm with inguinal lymphadenopathy, suspicious for neoplastic process.  - Biopsy on 12/18/24 revealed diffuse large B-cell lymphoma with concern for Rodriguez's transformation.  - Patient was seen by Dr. Hilliard on 1/8/25 for initial consultation and was advised to come to ED due to concern for tumor lysis syndrome.  - Uric acid level now WNL at 7.9, CMP otherwise unremarkable.   - CT C/A/P 1.9.25 Stable appearance bulky right inguinal and right iliac lymphadenopathy including partially imaged 10.1 cm right inguinal soft tissue mass/lymph node.  - s/p PICC placement on 1/10/25  - s/p Bone marrow biopsy on 1/10/25  - Pending hepatitis panel  - Completed cycle 1 of R-EPOCH (D1 on 1/11/2024). Tolerated well. Rituximab was given on D5   - Uric acid 10.3 on 1/16/25, s/p 3 mg IV rasburicase  - Heme/Onc following, c/w Zarxio 480 mcg q 24 hr  - pending transfer to [facility] [place]    #CKD 3B  #Chronic Lower Extremity Edema   - On home Lasix 80mg po q24   - Hold diuretics in context of sepsis and fever  - Avoid volume overload.  - daily assessment for resumption of diuretics    #CAD s/p CABG  #Chronic Afib on Xarelto  #HTN  - Echo 1/10/25: EF 60-65%  - C/W metoprolol 25mg po q12  - C/W Nifedipine xl 60mg po q24   - Still holding lisinopril and aldactone from last admission  - c/w Xarelto 15mg w/ dinner (hold if platelet continue to trend down)    # HLD   - c/w Pravastatin -> using lip 10 hs here  - resume Repatha on discharge    #DM  - FS - self monitored via dex com  - On insulin pump @2.85 at home   - F/U Endo consult as pt's insulin pump is no longer in use & pt's fingerstick glucose's are uncontrolled    #Seasonal allergies  #URI  - c/w zyrtec      # DVT PPX: Xarelto  # GI PPX: Pantoprazole   # Code Status: Full code  # Diet: Dash     Handoff: F/u MRSA, cultures, IV diuresis (monitor Cr), wean O2. pending transfer to [facility] [place]

## 2025-01-22 NOTE — CONSULT NOTE ADULT - SUBJECTIVE AND OBJECTIVE BOX
Request for consultation:  Requested by:    Patient is a 69y old  Male who presents with a chief complaint of Shortness of breath (21 Jan 2025 19:42)    HPI:  69-year-old man with extensive medical history including CAD (status post CABG;), DM (on insulin pump), atrial fibrillation on Xarelto, history of distal pancreatomy and splenectomy for unclear reason referred to the ED by his oncologist for abnormal labs. Patient was recently diagnosed with Diffuse large B cell Lymphoma on pathology of right groin mass and is following with [facility] oncology team. Patient was discharged on 01.17.2025 from hospital after inpatient chemotherapy (Completed cycle 1 of R-EPOCH (D1 on 1/11/2024). Tolerated well. Rituximab was given on D5).   Over past 3 days patient started to get a productive cough with yellow sputum production.  Patient is also endorsing chills and nausea which he states is baseline. He had 1 reading on low grade fever 100.5 on day of presentation.    On presentation vitals:  · BP Systolic	160 mm Hg  · BP Diastolic	86 mm Hg  · Heart Rate	89 /min  · Respiration Rate (breaths/min)	20 /min  · Temp (F)	98.9 Degrees F  · Temp (C)	37.2 Degrees C  · Temp site	oral    ED course:  O2 sats low on 4L O2 so he was promptly started on BIPAP. CXR with LLL infiltrate; sepsis labs sent with gentle hydration; abx given, Labs with neutropenia and elevated BNP, trop 68. EKG nonischemic.    CTA Chest: Negative for pulmonary emboli. Interval development of infiltrates within the lower lobes and lingula which may reflect acute multilobar pneumonia.    Patient admitted to medicine for acute hypoxic respiratory failure.   (20 Jan 2025 23:47)      FAMILY HISTORY:  Family history of lung cancer (Sibling)    Family history of diabetes mellitus (Mother)    Family history of coronary artery disease (Father)      PAST MEDICAL & SURGICAL HISTORY:  Diabetes mellitus, type 2      BPH (benign prostatic hyperplasia)      Water retention      Essential hypertension      Diabetes      CAD (coronary artery disease)      H/O hypercholesterolemia      Morbid obesity      Chronic kidney disease (CKD)      History of atrial fibrillation      H/O right coronary artery stent placement      History of resection of pancreas      History of cholecystectomy      History of left knee replacement      S/P CABG x 6      H/O cardiac radiofrequency ablation    Allergies    No Known Allergies    Intolerances      MEDICATIONS  (STANDING):  allopurinol 150 milliGRAM(s) Oral daily  ascorbic acid 500 milliGRAM(s) Oral daily  aspirin  chewable 81 milliGRAM(s) Oral daily  atorvastatin 10 milliGRAM(s) Oral at bedtime  cefepime   IVPB 2000 milliGRAM(s) IV Intermittent every 12 hours  cetirizine 10 milliGRAM(s) Oral daily  dextrose 5%. 1000 milliLiter(s) (50 mL/Hr) IV Continuous <Continuous>  dextrose 5%. 1000 milliLiter(s) (100 mL/Hr) IV Continuous <Continuous>  dextrose 50% Injectable 25 Gram(s) IV Push once  dextrose 50% Injectable 12.5 Gram(s) IV Push once  dextrose 50% Injectable 25 Gram(s) IV Push once  filgrastim-sndz (ZARXIO) Injectable 480 MICROGram(s) SubCutaneous every 24 hours  glucagon  Injectable 1 milliGRAM(s) IntraMuscular once  insulin lispro (ADMELOG) corrective regimen sliding scale   SubCutaneous three times a day before meals  melatonin 10 milliGRAM(s) Oral at bedtime  metoprolol tartrate 25 milliGRAM(s) Oral two times a day  NIFEdipine XL 60 milliGRAM(s) Oral daily  oseltamivir 30 milliGRAM(s) Oral two times a day  pantoprazole    Tablet 40 milliGRAM(s) Oral before breakfast  polyethylene glycol 3350 17 Gram(s) Oral daily  rivaroxaban 15 milliGRAM(s) Oral with dinner  senna 2 Tablet(s) Oral at bedtime  sertraline 25 milliGRAM(s) Oral daily  vancomycin  IVPB 1250 milliGRAM(s) IV Intermittent once    MEDICATIONS  (PRN):  acetaminophen     Tablet .. 650 milliGRAM(s) Oral every 6 hours PRN Temp greater or equal to 38C (100.4F), Mild Pain (1 - 3)  albuterol/ipratropium for Nebulization 3 milliLiter(s) Nebulizer every 4 hours PRN Shortness of Breath and/or Wheezing  dextrose Oral Gel 15 Gram(s) Oral once PRN Blood Glucose LESS THAN 70 milliGRAM(s)/deciliter  diazepam    Tablet 2 milliGRAM(s) Oral daily PRN anxiety  hydrOXYzine hydrochloride 25 milliGRAM(s) Oral every 6 hours PRN Anxiety  ondansetron    Tablet 4 milliGRAM(s) Oral every 6 hours PRN for nausea  oxyCODONE    IR 5 milliGRAM(s) Oral four times a day PRN Severe Pain (7 - 10)      Vital Signs Last 24 Hrs  T(C): 37.4 (22 Jan 2025 07:36), Max: 37.7 (21 Jan 2025 19:30)  T(F): 99.4 (22 Jan 2025 07:36), Max: 99.9 (21 Jan 2025 19:30)  HR: 86 (22 Jan 2025 07:36) (86 - 104)  BP: 131/67 (22 Jan 2025 07:36) (129/61 - 144/67)  BP(mean): 89 (22 Jan 2025 05:38) (89 - 95)  RR: 20 (22 Jan 2025 07:36) (20 - 20)  SpO2: 96% (22 Jan 2025 07:47) (91% - 96%)    Parameters below as of 22 Jan 2025 07:47  Patient On (Oxygen Delivery Method): nasal cannula, high flow  O2 Flow (L/min): 60  O2 Concentration (%): 100  Height (cm): 182.9 (01-20 @ 14:16)  Weight (kg): 124.7 (01-20 @ 19:32)  BMI (kg/m2): 37.3 (01-20 @ 19:32)    PHYSICAL EXAM  Gen: NAD, resting in bed  HEENT: Normocephalic, atraumatic  Neck: supple, no lymphadenopathy  CV: Regular rate & regular rhythm  Lungs: decreased BS at bases  Abdomen: Soft, BS present  Ext: Warm, well perfused  Neuro: non focal, awake  Skin: no rash, no erythema  Lines: no phlebitis    LABS  VBG - ( 20 Jan 2025 16:02 )  pH: 7.51  /  pCO2: 62    /  pO2: 36    / HCO3: 50    / Base Excess: 21.6  /  SvO2: 67.2  / Lactate: 1.4                            9.6    0.13  )-----------( 31       ( 22 Jan 2025 08:18 )             28.3     01-22    132[L]  |  86[L]  |  86[HH]  ----------------------------<  280[H]  3.5   |  35[H]  |  2.5[H]    Ca    7.3[L]      22 Jan 2025 08:18  Phos  2.5     01-21  Mg     1.8     01-22    TPro  4.4[L]  /  Alb  2.5[L]  /  TBili  0.5  /  DBili  x   /  AST  33  /  ALT  25  /  AlkPhos  90  01-22      Ketone - Urine: Negative mg/dL (01-20 @ 19:07)    CAPILLARY BLOOD GLUCOSE      POCT Blood Glucose.: 328 mg/dL (22 Jan 2025 07:48)  POCT Blood Glucose.: 283 mg/dL (21 Jan 2025 16:04)

## 2025-01-22 NOTE — ED ADULT NURSE REASSESSMENT NOTE - NS ED NURSE REASSESS COMMENT FT1
Pt his on high flow O2 saturation 89-91, MD Manuel Kaiser and respiratory were made aware. Pt is tachycardic, EKG done MD aware.

## 2025-01-23 DIAGNOSIS — F43.23 ADJUSTMENT DISORDER WITH MIXED ANXIETY AND DEPRESSED MOOD: ICD-10-CM

## 2025-01-23 LAB
ALBUMIN SERPL ELPH-MCNC: 2.2 G/DL — LOW (ref 3.5–5.2)
ALP SERPL-CCNC: 181 U/L — HIGH (ref 30–115)
ALT FLD-CCNC: 45 U/L — HIGH (ref 0–41)
ANION GAP SERPL CALC-SCNC: 12 MMOL/L — SIGNIFICANT CHANGE UP (ref 7–14)
AST SERPL-CCNC: 58 U/L — HIGH (ref 0–41)
BASOPHILS # BLD AUTO: 0 K/UL — SIGNIFICANT CHANGE UP (ref 0–0.2)
BASOPHILS NFR BLD AUTO: 0 % — SIGNIFICANT CHANGE UP (ref 0–1)
BILIRUB SERPL-MCNC: 0.5 MG/DL — SIGNIFICANT CHANGE UP (ref 0.2–1.2)
BUN SERPL-MCNC: 102 MG/DL — CRITICAL HIGH (ref 10–20)
CALCIUM SERPL-MCNC: 7.5 MG/DL — LOW (ref 8.4–10.4)
CHLORIDE SERPL-SCNC: 87 MMOL/L — LOW (ref 98–110)
CO2 SERPL-SCNC: 33 MMOL/L — HIGH (ref 17–32)
CREAT SERPL-MCNC: 2.8 MG/DL — HIGH (ref 0.7–1.5)
EGFR: 24 ML/MIN/1.73M2 — LOW
EOSINOPHIL # BLD AUTO: 0 K/UL — SIGNIFICANT CHANGE UP (ref 0–0.7)
EOSINOPHIL NFR BLD AUTO: 0 % — SIGNIFICANT CHANGE UP (ref 0–8)
GLUCOSE BLDC GLUCOMTR-MCNC: 281 MG/DL — HIGH (ref 70–99)
GLUCOSE BLDC GLUCOMTR-MCNC: 281 MG/DL — HIGH (ref 70–99)
GLUCOSE BLDC GLUCOMTR-MCNC: 289 MG/DL — HIGH (ref 70–99)
GLUCOSE BLDC GLUCOMTR-MCNC: 299 MG/DL — HIGH (ref 70–99)
GLUCOSE BLDC GLUCOMTR-MCNC: 318 MG/DL — HIGH (ref 70–99)
GLUCOSE SERPL-MCNC: 322 MG/DL — HIGH (ref 70–99)
HCT VFR BLD CALC: 28 % — LOW (ref 42–52)
HGB BLD-MCNC: 9.7 G/DL — LOW (ref 14–18)
IMM GRANULOCYTES NFR BLD AUTO: 0 % — LOW (ref 0.1–0.3)
LYMPHOCYTES # BLD AUTO: 0.05 K/UL — LOW (ref 1.2–3.4)
LYMPHOCYTES # BLD AUTO: 38.5 % — SIGNIFICANT CHANGE UP (ref 20.5–51.1)
MAGNESIUM SERPL-MCNC: 2 MG/DL — SIGNIFICANT CHANGE UP (ref 1.8–2.4)
MCHC RBC-ENTMCNC: 31.5 PG — HIGH (ref 27–31)
MCHC RBC-ENTMCNC: 34.6 G/DL — SIGNIFICANT CHANGE UP (ref 32–37)
MCV RBC AUTO: 90.9 FL — SIGNIFICANT CHANGE UP (ref 80–94)
MONOCYTES # BLD AUTO: 0.03 K/UL — LOW (ref 0.1–0.6)
MONOCYTES NFR BLD AUTO: 23.1 % — HIGH (ref 1.7–9.3)
NEUTROPHILS # BLD AUTO: 0.05 K/UL — LOW (ref 1.4–6.5)
NEUTROPHILS NFR BLD AUTO: 38.4 % — LOW (ref 42.2–75.2)
NRBC # BLD: 0 /100 WBCS — SIGNIFICANT CHANGE UP (ref 0–0)
NRBC BLD-RTO: 0 /100 WBCS — SIGNIFICANT CHANGE UP (ref 0–0)
NT-PROBNP SERPL-SCNC: 5622 PG/ML — HIGH (ref 0–300)
PLATELET # BLD AUTO: 17 K/UL — CRITICAL LOW (ref 130–400)
PMV BLD: 9.8 FL — SIGNIFICANT CHANGE UP (ref 7.4–10.4)
POTASSIUM SERPL-MCNC: 3.3 MMOL/L — LOW (ref 3.5–5)
POTASSIUM SERPL-SCNC: 3.3 MMOL/L — LOW (ref 3.5–5)
PROT SERPL-MCNC: 4.5 G/DL — LOW (ref 6–8)
RBC # BLD: 3.08 M/UL — LOW (ref 4.7–6.1)
RBC # FLD: 13.3 % — SIGNIFICANT CHANGE UP (ref 11.5–14.5)
SODIUM SERPL-SCNC: 132 MMOL/L — LOW (ref 135–146)
WBC # BLD: 0.13 K/UL — CRITICAL LOW (ref 4.8–10.8)
WBC # FLD AUTO: 0.13 K/UL — CRITICAL LOW (ref 4.8–10.8)

## 2025-01-23 PROCEDURE — 99232 SBSQ HOSP IP/OBS MODERATE 35: CPT

## 2025-01-23 PROCEDURE — 99291 CRITICAL CARE FIRST HOUR: CPT

## 2025-01-23 PROCEDURE — 71045 X-RAY EXAM CHEST 1 VIEW: CPT | Mod: 26

## 2025-01-23 PROCEDURE — 76770 US EXAM ABDO BACK WALL COMP: CPT | Mod: 26

## 2025-01-23 PROCEDURE — 99233 SBSQ HOSP IP/OBS HIGH 50: CPT

## 2025-01-23 RX ORDER — HALOPERIDOL 10 MG/1
2 TABLET ORAL ONCE
Refills: 0 | Status: COMPLETED | OUTPATIENT
Start: 2025-01-23 | End: 2025-01-23

## 2025-01-23 RX ORDER — AZITHROMYCIN 250 MG
CAPSULE ORAL
Refills: 0 | Status: DISCONTINUED | OUTPATIENT
Start: 2025-01-23 | End: 2025-01-27

## 2025-01-23 RX ORDER — BUMETANIDE 1 MG/1
2 TABLET ORAL EVERY 12 HOURS
Refills: 0 | Status: DISCONTINUED | OUTPATIENT
Start: 2025-01-23 | End: 2025-01-31

## 2025-01-23 RX ORDER — HALOPERIDOL 10 MG/1
5 TABLET ORAL ONCE
Refills: 0 | Status: COMPLETED | OUTPATIENT
Start: 2025-01-23 | End: 2025-01-23

## 2025-01-23 RX ORDER — AZITHROMYCIN 250 MG
500 CAPSULE ORAL ONCE
Refills: 0 | Status: COMPLETED | OUTPATIENT
Start: 2025-01-23 | End: 2025-01-23

## 2025-01-23 RX ORDER — AZITHROMYCIN 250 MG
500 CAPSULE ORAL EVERY 24 HOURS
Refills: 0 | Status: DISCONTINUED | OUTPATIENT
Start: 2025-01-24 | End: 2025-01-27

## 2025-01-23 RX ORDER — HALOPERIDOL 10 MG/1
2.5 TABLET ORAL EVERY 6 HOURS
Refills: 0 | Status: DISCONTINUED | OUTPATIENT
Start: 2025-01-23 | End: 2025-01-23

## 2025-01-23 RX ORDER — HALOPERIDOL 10 MG/1
2.5 TABLET ORAL EVERY 6 HOURS
Refills: 0 | Status: DISCONTINUED | OUTPATIENT
Start: 2025-01-23 | End: 2025-01-24

## 2025-01-23 RX ORDER — OXYCODONE HYDROCHLORIDE 30 MG/1
5 TABLET ORAL ONCE
Refills: 0 | Status: COMPLETED | OUTPATIENT
Start: 2025-01-23 | End: 2025-01-24

## 2025-01-23 RX ADMIN — HALOPERIDOL 2 MILLIGRAM(S): 10 TABLET ORAL at 02:31

## 2025-01-23 RX ADMIN — CEFEPIME 100 MILLIGRAM(S): 2 INJECTION, POWDER, FOR SOLUTION INTRAVENOUS at 17:52

## 2025-01-23 RX ADMIN — Medication 10 MILLIGRAM(S): at 21:41

## 2025-01-23 RX ADMIN — DIAZEPAM 2 MILLIGRAM(S): 2 TABLET ORAL at 20:11

## 2025-01-23 RX ADMIN — INSULIN GLARGINE-YFGN 30 UNIT(S): 100 INJECTION, SOLUTION SUBCUTANEOUS at 08:17

## 2025-01-23 RX ADMIN — CEFEPIME 100 MILLIGRAM(S): 2 INJECTION, POWDER, FOR SOLUTION INTRAVENOUS at 06:35

## 2025-01-23 RX ADMIN — Medication 50 MILLIEQUIVALENT(S): at 15:59

## 2025-01-23 RX ADMIN — INSULIN LISPRO 3: 100 INJECTION, SOLUTION INTRAVENOUS; SUBCUTANEOUS at 12:37

## 2025-01-23 RX ADMIN — FILGRASTIM 480 MICROGRAM(S): 300 INJECTION, SOLUTION INTRAVENOUS; SUBCUTANEOUS at 17:47

## 2025-01-23 RX ADMIN — Medication 2 TABLET(S): at 21:41

## 2025-01-23 RX ADMIN — OXYCODONE HYDROCHLORIDE 5 MILLIGRAM(S): 30 TABLET ORAL at 23:59

## 2025-01-23 RX ADMIN — Medication 1 MILLIGRAM(S): at 00:03

## 2025-01-23 RX ADMIN — HALOPERIDOL 5 MILLIGRAM(S): 10 TABLET ORAL at 03:52

## 2025-01-23 RX ADMIN — BUMETANIDE 2 MILLIGRAM(S): 1 TABLET ORAL at 17:52

## 2025-01-23 RX ADMIN — ATORVASTATIN CALCIUM 10 MILLIGRAM(S): 80 TABLET, FILM COATED ORAL at 21:41

## 2025-01-23 RX ADMIN — Medication 50 MILLIEQUIVALENT(S): at 15:29

## 2025-01-23 RX ADMIN — Medication 255 MILLIGRAM(S): at 10:01

## 2025-01-23 RX ADMIN — INSULIN GLARGINE-YFGN 30 UNIT(S): 100 INJECTION, SOLUTION SUBCUTANEOUS at 21:46

## 2025-01-23 RX ADMIN — INSULIN LISPRO 4: 100 INJECTION, SOLUTION INTRAVENOUS; SUBCUTANEOUS at 08:17

## 2025-01-23 RX ADMIN — Medication 4 MILLIGRAM(S): at 00:03

## 2025-01-23 RX ADMIN — Medication 2 MILLIGRAM(S): at 13:14

## 2025-01-23 RX ADMIN — Medication 50 MILLIEQUIVALENT(S): at 11:56

## 2025-01-23 RX ADMIN — FUROSEMIDE 40 MILLIGRAM(S): 10 INJECTION INTRAMUSCULAR; INTRAVENOUS at 06:36

## 2025-01-23 NOTE — BH CONSULTATION LIAISON PROGRESS NOTE - NSBHCONSULTRECOMMENDOTHER_PSY_A_CORE FT
- Continue Zoloft 25 mg qd for anxiety/mood.  - Continue melatonin 10 mg qhs for insomnia, hold if NPO  - Start trazodone 50 mg qhs for sleep, hold if NPO  - Will give a referral to outpatient psychiatrist--discussed with psychiatry SW    For breakthrough anxiety:  - Hydroxyzine 25 mg q6 hours prn     For breakthrough agitation/distress:  - Start haldol 2.5 mg IM q 6 hrs prn

## 2025-01-23 NOTE — PROGRESS NOTE ADULT - ATTENDING COMMENTS
69-year-old man with extensive medical history including CAD (status post CABG;), atrial fibrillation on Xarelto, history of distal pancreatomy and splenectomy for unclear reason referred to the ED by his oncologist for abnormal labs. Patient was recently diagnosed with Diffuse large B cell Lymphoma on pathology of right groin mass and was following his Oncologist Dr Angeli Hilliard. Over past 3 days patient started to get a productive cough with yellow sputum production.  Patient is also endorsing chills and nausea which he states is baseline. He had 1 reading on low grade fever 100.5 on day of presentation. Pt being managed for acute hypoxic respiratory failure ISO Influenza PNA.     #Acute Hypoxic Respiratory Failure 2/2 Multifocal Pneumonia  #Possible Volume Overload  # Neutropenic fever  # Influenza A infection  - CTA 1/20: Negative for pulmonary emboli. Interval development of infiltrates within the lower lobes and lingula which may reflect acute multilobar pneumonia  - blood cx NGTD x2, UA negative  - Pancytopenia s/p s/p 1st cycle chemo with R-EPOCH  - TTE 1/10/25: poor study, EF 60-65%  - Influenza A positive > Tamiflu (E/D 1/27/25): plan longer course given immunosuppresion   - Per ID, c/w cefepime and vancomycin  - check MRSA nares and f/u blood cx > if negative, d/c vanco  - check urine legionella/strep  - BNP elevated, CXR vascular congestion, pitting edema on exam. Home diuretics held on admission  - c/w Lasix 40mg IV BID  - patient on BIPAP-> Wean oxygen as tolerated  - Heme/Onc following: Continue Zarxio 480 mg sc daily    #Large B-cell Lymphoma  #Concern for Rodriguez's  transformation   - Heme/Onc following   - Patient reports increased swelling and size of R inguinal mass for the past 2-3 months.   - CT pelvis on 10/12/24 showed interval increase of mass 10 x 7 x 10 cm with inguinal lymphadenopathy, suspicious for neoplastic process.  - Biopsy on 12/18/24 revealed diffuse large B-cell lymphoma with concern for Rodriguez's transformation.  - Patient was seen by Dr. Hilliard on 1/8/25 for initial consultation and was advised to come to ED due to concern for tumor lysis syndrome.  - Uric acid level now WNL at 7.9, CMP otherwise unremarkable.   - CT C/A/P 1.9.25 Stable appearance bulky right inguinal and right iliac lymphadenopathy including partially imaged 10.1 cm right inguinal soft tissue mass/lymph node.  - s/p PICC placement on 1/10/25  - s/p Bone marrow biopsy on 1/10/25  - Pending hepatitis panel  - Completed cycle 1 of R-EPOCH (D1 on 1/11/2024). Tolerated well. Rituximab was given on D5   - Uric acid 10.3 on 1/16/25, s/p 3 mg IV rasburicase  - Heme/Onc following, c/w Zarxio 480 mcg q 24 hr  - [facility]    #CKD 3B  #Chronic Lower Extremity Edema   - On home Lasix 80mg po q24   - Avoid volume overload.  - Lasix 40mg BID IV    #CAD s/p CABG  #Chronic Afib on Xarelto  #HTN  - Echo 1/10/25: EF 60-65%  - C/W metoprolol 25mg po q12  - C/W Nifedipine xl 60mg po q24   - Still holding lisinopril and aldactone from last admission  - c/w Xarelto 15mg w/ dinner (hold if platelet continue to trend down)    # HLD   - c/w Pravastatin -> using lip 10 hs here  - resume Repatha on discharge    #DM  - FS - self monitored via dex com  - On insulin pump @2.85 at home   - F/U Endo consult as pt's insulin pump is no longer in use & pt's fingerstick glucose's are uncontrolled    #Seasonal allergies  #URI  - c/w zyrtec      # DVT PPX: Xarelto  # GI PPX: Pantoprazole   # Code Status: Full code  # Diet: Dash     Handoff: F/u MRSA, cultures, IV diuresis (monitor Cr), wean O2. pending transfer [facility]. 69-year-old man with extensive medical history including CAD (status post CABG;), atrial fibrillation on Xarelto, history of distal pancreatomy and splenectomy for unclear reason referred to the ED by his oncologist for abnormal labs. Patient was recently diagnosed with Diffuse large B cell Lymphoma on pathology of right groin mass and was following his Oncologist Dr Angeli Hilliard. Over past 3 days patient started to get a productive cough with yellow sputum production.  Patient is also endorsing chills and nausea which he states is baseline. He had 1 reading on low grade fever 100.5 on day of presentation. Pt being managed for acute hypoxic respiratory failure ISO Influenza PNA.     #Acute Hypoxic Respiratory Failure 2/2 Multifocal Pneumonia  #Possible Volume Overload  # Neutropenic fever  # Influenza A infection  - CTA 1/20: Negative for pulmonary emboli. Interval development of infiltrates within the lower lobes and lingula which may reflect acute multilobar pneumonia  - blood cx NGTD x2, UA negative  - Pancytopenia s/p s/p 1st cycle chemo with R-EPOCH  - TTE 1/10/25: poor study, EF 60-65%  - Influenza A positive > Tamiflu (E/D 1/27/25): plan longer course given immunosuppresion   - Per ID, c/w cefepime and vancomycin  - check MRSA nares and f/u blood cx > if negative, d/c vanco  - check urine legionella/strep  - BNP elevated, CXR vascular congestion, pitting edema on exam. Home diuretics held on admission  - c/w Lasix 40mg IV BID  - patient on BIPAP-> Wean oxygen as tolerated  - Heme/Onc following: Continue Zarxio 480 mg sc daily    #Large B-cell Lymphoma  #Concern for Rodriguez's  transformation   - Heme/Onc following   - Patient reports increased swelling and size of R inguinal mass for the past 2-3 months.   - CT pelvis on 10/12/24 showed interval increase of mass 10 x 7 x 10 cm with inguinal lymphadenopathy, suspicious for neoplastic process.  - Biopsy on 12/18/24 revealed diffuse large B-cell lymphoma with concern for Rodriguez's transformation.  - Patient was seen by Dr. Hilliard on 1/8/25 for initial consultation and was advised to come to ED due to concern for tumor lysis syndrome.  - Uric acid level now WNL at 7.9, CMP otherwise unremarkable.   - CT C/A/P 1.9.25 Stable appearance bulky right inguinal and right iliac lymphadenopathy including partially imaged 10.1 cm right inguinal soft tissue mass/lymph node.  - s/p PICC placement on 1/10/25  - s/p Bone marrow biopsy on 1/10/25  - Pending hepatitis panel  - Completed cycle 1 of R-EPOCH (D1 on 1/11/2024). Tolerated well. Rituximab was given on D5   - Uric acid 10.3 on 1/16/25, s/p 3 mg IV rasburicase  - Heme/Onc following, c/w Zarxio 480 mcg q 24 hr  - pending transfer to [facility]    #CKD 3B  #Chronic Lower Extremity Edema   - On home Lasix 80mg po q24   - Avoid volume overload.  - Lasix 40mg BID IV    #CAD s/p CABG  #Chronic Afib on Xarelto  #HTN  - Echo 1/10/25: EF 60-65%  - C/W metoprolol 25mg po q12  - C/W Nifedipine xl 60mg po q24   - Still holding lisinopril and aldactone from last admission  - c/w Xarelto 15mg w/ dinner (hold if platelet continue to trend down)    # HLD   - c/w Pravastatin -> using lip 10 hs here  - resume Repatha on discharge    #DM  - FS - self monitored via dex com  - On insulin pump @2.85 at home   - F/U Endo consult as pt's insulin pump is no longer in use & pt's fingerstick glucose's are uncontrolled    #Seasonal allergies  #URI  - c/w zyrtec      # DVT PPX: Xarelto  # GI PPX: Pantoprazole   # Code Status: Full code  # Diet: Dash     Handoff: F/u MRSA, cultures, IV diuresis (monitor Cr), wean O2. Spoke with [facility] Dr. Kishore Holland ([phone]) regarding transfer, no indication for transfer and pt is unstable for transfer 69-year-old man with extensive medical history including CAD (status post CABG;), atrial fibrillation on Xarelto, history of distal pancreatomy and splenectomy for unclear reason referred to the ED by his oncologist for abnormal labs. Patient was recently diagnosed with Diffuse large B cell Lymphoma on pathology of right groin mass and was following his Oncologist Dr Angeli Hilliard. Over past 3 days patient started to get a productive cough with yellow sputum production.  Patient is also endorsing chills and nausea which he states is baseline. He had 1 reading on low grade fever 100.5 on day of presentation. Pt being managed for acute hypoxic respiratory failure ISO Influenza PNA.     #Acute Hypoxic Respiratory Failure 2/2 Multifocal Pneumonia  #Possible Volume Overload  # Neutropenic fever  # Influenza A infection  - CTA 1/20: Negative for pulmonary emboli. Interval development of infiltrates within the lower lobes and lingula which may reflect acute multilobar pneumonia  - blood cx NGTD x2, UA negative  - Pancytopenia s/p s/p 1st cycle chemo with R-EPOCH  - TTE 1/10/25: poor study, EF 60-65%  - Influenza A positive > Tamiflu (E/D 1/27/25): plan longer course given immunosuppresion   - Per ID, c/w cefepime and vancomycin  - check MRSA nares and f/u blood cx > if negative, d/c vanco  - check urine legionella/strep  - BNP elevated, CXR vascular congestion, pitting edema on exam. Home diuretics held on admission  - c/w Lasix 40mg IV BID  - patient on BIPAP-> Wean oxygen as tolerated  - Heme/Onc following: Continue Zarxio 480 mg sc daily    #Large B-cell Lymphoma  #Concern for Rodriguez's transformation   - Heme/Onc following   - Patient reports increased swelling and size of R inguinal mass for the past 2-3 months.   - CT pelvis on 10/12/24 showed interval increase of mass 10 x 7 x 10 cm with inguinal lymphadenopathy, suspicious for neoplastic process.  - Biopsy on 12/18/24 revealed diffuse large B-cell lymphoma with concern for Rodriguez's transformation.  - Patient was seen by Dr. Hilliard on 1/8/25 for initial consultation and was advised to come to ED due to concern for tumor lysis syndrome.  - Uric acid level now WNL at 7.9, CMP otherwise unremarkable.   - CT C/A/P 1.9.25 Stable appearance bulky right inguinal and right iliac lymphadenopathy including partially imaged 10.1 cm right inguinal soft tissue mass/lymph node.  - s/p PICC placement on 1/10/25  - s/p Bone marrow biopsy on 1/10/25  - Completed cycle 1 of R-EPOCH (D1 on 1/11/2024). Tolerated well. Rituximab was given on D5   - Uric acid 10.3 on 1/16/25, s/p 3 mg IV rasburicase  - Heme/Onc following, c/w Zarxio 480 mcg q 24 hr  - Spoke with [facility] Dr. Kishore Holland ([phone]) regarding transfer 1/23/25, no indication for transfer and pt is unstable for transfer    #KRISTINE on CKD 3B  #Chronic Lower Extremity Edema   - On home Lasix 80mg po q24   - Avoid volume overload.  - Lasix 40mg BID IV    #CAD s/p CABG  #Chronic Afib on Xarelto  #HTN  - Echo 1/10/25: EF 60-65%  - C/W metoprolol 25mg po q12  - C/W Nifedipine xl 60mg po q24   - Still holding lisinopril and aldactone from last admission  - c/w Xarelto 15mg w/ dinner (hold if platelet continue to trend down)    # HLD   - c/w Pravastatin -> using lip 10 hs here  - resume Repatha on discharge    #DM  - FS - self monitored via dex com  - On insulin pump @2.85 at home   - Endo consult: keep off insulin pump  - lantus 30 units BID, lispro 20 TID    #Seasonal allergies  #URI  - c/w zyrtec      # DVT PPX: Xarelto  # GI PPX: Pantoprazole   # Code Status: Full code  # Diet: Dash     Handoff: F/u MRSA, cultures, IV diuresis (monitor Cr), wean O2. Spoke with [facility] Dr. Kishore Holland ([phone]) regarding transfer, no indication for transfer and pt is unstable for transfer

## 2025-01-23 NOTE — PROGRESS NOTE ADULT - SUBJECTIVE AND OBJECTIVE BOX
Patient is a 69y old  Male who presents with a chief complaint of Shortness of breath (21 Jan 2025 19:42)        Over Night Events:  On BiPAP his am.  Off pressors.          ROS:     All ROS are negative except HPI         PHYSICAL EXAM    ICU Vital Signs Last 24 Hrs  T(C): 36.2 (23 Jan 2025 05:40), Max: 37.1 (23 Jan 2025 00:20)  T(F): 97.2 (23 Jan 2025 05:40), Max: 98.7 (23 Jan 2025 00:20)  HR: 98 (23 Jan 2025 05:40) (98 - 120)  BP: 138/83 (23 Jan 2025 05:40) (133/82 - 138/83)  BP(mean): 99 (23 Jan 2025 00:20) (99 - 99)  ABP: --  ABP(mean): --  RR: 18 (23 Jan 2025 05:40) (18 - 20)  SpO2: 97% (23 Jan 2025 05:40) (90% - 97%)    O2 Parameters below as of 23 Jan 2025 05:40  Patient On (Oxygen Delivery Method): BiPAP/CPAP    O2 Concentration (%): 100        CONSTITUTIONAL:  Well nourished.     ENT:   Airway patent,   Mouth with normal mucosa.     EYES:   Pupils equal,   Round and reactive to light.    CARDIAC:   Normal rate,   Regular rhythm.    edema      RESPIRATORY:   No wheezing  Bilateral crackles   Normal chest expansion  Not tachypneic,  No use of accessory muscles    GASTROINTESTINAL:  Abdomen soft,   Non-tender,   No guarding,   + BS    MUSCULOSKELETAL:   Range of motion is not limited,  No clubbing, cyanosis    NEUROLOGICAL:   Alert and oriented   No motor  deficits.    SKIN:   Skin normal color for race,   Warm and dry  No evidence of rash.          LABS:                            9.6    0.13  )-----------( 31       ( 22 Jan 2025 08:18 )             28.3                                               01-22    132[L]  |  86[L]  |  86[HH]  ----------------------------<  280[H]  3.5   |  35[H]  |  2.5[H]    Ca    7.3[L]      22 Jan 2025 08:18  Mg     1.8     01-22    TPro  4.4[L]  /  Alb  2.5[L]  /  TBili  0.5  /  DBili  x   /  AST  33  /  ALT  25  /  AlkPhos  90  01-22                                             Urinalysis Basic - ( 22 Jan 2025 08:18 )    Color: x / Appearance: x / SG: x / pH: x  Gluc: 280 mg/dL / Ketone: x  / Bili: x / Urobili: x   Blood: x / Protein: x / Nitrite: x   Leuk Esterase: x / RBC: x / WBC x   Sq Epi: x / Non Sq Epi: x / Bacteria: x                                                  LIVER FUNCTIONS - ( 22 Jan 2025 08:18 )  Alb: 2.5 g/dL / Pro: 4.4 g/dL / ALK PHOS: 90 U/L / ALT: 25 U/L / AST: 33 U/L / GGT: x                                                  Urinalysis with Rflx Culture (collected 20 Jan 2025 19:07)    Culture - Blood (collected 20 Jan 2025 15:38)  Source: .Blood BLOOD  Preliminary Report (22 Jan 2025 23:08):    No growth at 48 Hours    Culture - Blood (collected 20 Jan 2025 15:38)  Source: .Blood BLOOD  Preliminary Report (22 Jan 2025 23:08):    No growth at 48 Hours                                                                                           MEDICATIONS  (STANDING):  allopurinol 150 milliGRAM(s) Oral daily  ascorbic acid 500 milliGRAM(s) Oral daily  aspirin  chewable 81 milliGRAM(s) Oral daily  atorvastatin 10 milliGRAM(s) Oral at bedtime  cefepime   IVPB 2000 milliGRAM(s) IV Intermittent every 12 hours  cetirizine 10 milliGRAM(s) Oral daily  dextrose 5%. 1000 milliLiter(s) (50 mL/Hr) IV Continuous <Continuous>  dextrose 5%. 1000 milliLiter(s) (100 mL/Hr) IV Continuous <Continuous>  dextrose 50% Injectable 25 Gram(s) IV Push once  dextrose 50% Injectable 12.5 Gram(s) IV Push once  dextrose 50% Injectable 25 Gram(s) IV Push once  filgrastim-sndz (ZARXIO) Injectable 480 MICROGram(s) SubCutaneous every 24 hours  furosemide   Injectable 40 milliGRAM(s) IV Push every 12 hours  glucagon  Injectable 1 milliGRAM(s) IntraMuscular once  insulin glargine Injectable (LANTUS) 30 Unit(s) SubCutaneous two times a day  insulin lispro (ADMELOG) corrective regimen sliding scale   SubCutaneous three times a day before meals  insulin lispro Injectable (ADMELOG) 20 Unit(s) SubCutaneous three times a day before meals  melatonin 10 milliGRAM(s) Oral at bedtime  metoprolol tartrate 25 milliGRAM(s) Oral two times a day  NIFEdipine XL 60 milliGRAM(s) Oral daily  oseltamivir 30 milliGRAM(s) Oral two times a day  pantoprazole    Tablet 40 milliGRAM(s) Oral before breakfast  polyethylene glycol 3350 17 Gram(s) Oral daily  senna 2 Tablet(s) Oral at bedtime  sertraline 25 milliGRAM(s) Oral daily    MEDICATIONS  (PRN):  acetaminophen     Tablet .. 650 milliGRAM(s) Oral every 6 hours PRN Temp greater or equal to 38C (100.4F), Mild Pain (1 - 3)  albuterol/ipratropium for Nebulization 3 milliLiter(s) Nebulizer every 4 hours PRN Shortness of Breath and/or Wheezing  dextrose Oral Gel 15 Gram(s) Oral once PRN Blood Glucose LESS THAN 70 milliGRAM(s)/deciliter  diazepam    Tablet 2 milliGRAM(s) Oral daily PRN anxiety  hydrOXYzine hydrochloride 25 milliGRAM(s) Oral every 6 hours PRN Anxiety  ondansetron Injectable 4 milliGRAM(s) IV Push every 6 hours PRN Nausea and/or Vomiting  oxyCODONE    IR 5 milliGRAM(s) Oral four times a day PRN Severe Pain (7 - 10)      New X-rays reviewed:                                                                                  ECHO

## 2025-01-23 NOTE — ED ADULT NURSE REASSESSMENT NOTE - NS ED NURSE REASSESS COMMENT FT1
On re-assessment, pt non-compliant with Bipap, and removes it stating he wants to drink. Pt's saturation noted to be 82 on RA. Pt educated on importance of keeping Bipap on in order to maintain adequate saturation. Pt care performed, linens changed, comfort measures rendered.

## 2025-01-23 NOTE — PROGRESS NOTE ADULT - ASSESSMENT
# Neutropenic fever  # Influenza A  #Acute hypoxic respiratory failure ISO Influenza PNA   - Received 1 dose of ZARXIO 480 mcg SC inpatient on 1/17/25, 2 doses at home over the weekend  - CTA 1/20: Negative for pulmonary emboli.Interval development of infiltrates within the lower lobes and lingula which may reflect acute multilobar pneumonia  - Influenza A positive -> C/W Tamiflu  - C/W Cefepime and Vancomycin in context of neutropenia --> pending MRSA nares.  - patient on HFNC now -> Wean oxygen as tolerated  - ID following   -Pulm recs 1/23: Add azithromycin, hold AC, PT/OT    #Large B-cell Lymphoma  #Concern for Rodriguez's  transformation   - Heme/Onc following   - Patient reports increased swelling and size of R inguinal mass for the past 2-3 months.   - CT pelvis on 10/12/24 showed interval increase of mass 10 x 7 x 10 cm with inguinal lymphadenopathy, suspicious for neoplastic process.  - Biopsy on 12/18/24 revealed diffuse large B-cell lymphoma with concern for Rodriguez's transformation.  - Patient was seen by Dr. Hilliard on 1/8/25 for initial consultation and was advised to come to ED due to concern for tumor lysis syndrome.  - Uric acid level now WNL at 7.9, CMP otherwise unremarkable.   - CT C/A/P 1.9.25 Stable appearance bulky right inguinal and right iliac lymphadenopathy including partially imaged 10.1 cm right inguinal soft tissue mass/lymph node.  - s/p PICC placement on 1/10/25  - s/p Bone marrow biopsy on 1/10/25  - Pending hepatitis panel  - Completed cycle 1 of R-EPOCH (D1 on 1/11/2024). Tolerated well. Rituximab was given on D5   - Uric acid 10.3 on 1/16/25, s/p 3 mg IV rasburicase  - Received 1 dose of ZARXIO 480 mcg SC inpatient on 1/17/25, 2 doses at home over the weekend  - Zarxio 480 mcg q 24 hr ordered.    #CKD 3B  #Chronic Lower Extremity Edema   - On home Lasix 80mg po q24   - Hold diuretics in context of sepsis and fever  - Avoid volume overload.  - daily assessment for resumption of diuretics    #CAD s/p CABG  #Chronic Afib on Xarelto  #HTN  - Echo 1/10/25: EF 60-65%  - C/W metoprolol 25mg po q12  - C/W Nifedipine xl 60mg po q24   - Still holding lisinopril and aldactone from last admission  - c/w Xarelto 15mg w/ dinner (hold if platelet continue to trend down)    # HLD   - c/w Pravastatin -> using lip 10 hs here  - resume Repatha on discharge    #DM, uncontrolled  - FS - self monitored via dex com  - Fingerstick glucose: 299  - On insulin pump @2.85 at home   - Endo consult 1/22: glargine 30 units twice daily, lispro 20 units before meals, very insulin resistant.       #Seasonal allergies  #URI  - c/w zyrtec      # DVT PPX: Xarelto  # GI PPX: Pantoprazole   # Code Status: Full code  # Diet: Dash     #Hand-off: F/U Nephro consult # Neutropenic fever  # Influenza A  #Acute hypoxic respiratory failure ISO Influenza PNA   - Received 1 dose of ZARXIO 480 mcg SC inpatient on 1/17/25, 2 doses at home over the weekend  - CTA 1/20: Negative for pulmonary emboli.Interval development of infiltrates within the lower lobes and lingula which may reflect acute multilobar pneumonia  - Influenza A positive -> C/W Tamiflu  - C/W Cefepime and Vancomycin in context of neutropenia --> pending MRSA nares.  - patient on HFNC now -> Wean oxygen as tolerated  - ID following   -Pulm recs 1/23: Add azithromycin, hold AC, PT/OT    #Large B-cell Lymphoma  #Concern for Rodriguez's  transformation   - Heme/Onc following   - Patient reports increased swelling and size of R inguinal mass for the past 2-3 months.   - CT pelvis on 10/12/24 showed interval increase of mass 10 x 7 x 10 cm with inguinal lymphadenopathy, suspicious for neoplastic process.  - Biopsy on 12/18/24 revealed diffuse large B-cell lymphoma with concern for Rodriguez's transformation.  - Patient was seen by Dr. Hilliard on 1/8/25 for initial consultation and was advised to come to ED due to concern for tumor lysis syndrome.  - Uric acid level now WNL at 7.9, CMP otherwise unremarkable.   - CT C/A/P 1.9.25 Stable appearance bulky right inguinal and right iliac lymphadenopathy including partially imaged 10.1 cm right inguinal soft tissue mass/lymph node.  - s/p PICC placement on 1/10/25  - s/p Bone marrow biopsy on 1/10/25  - Pending hepatitis panel  - Completed cycle 1 of R-EPOCH (D1 on 1/11/2024). Tolerated well. Rituximab was given on D5   - Uric acid 10.3 on 1/16/25, s/p 3 mg IV rasburicase  - Received 1 dose of ZARXIO 480 mcg SC inpatient on 1/17/25, 2 doses at home over the weekend  - Zarxio 480 mcg q 24 hr ordered.    #CKD 3B  #Chronic Lower Extremity Edema   - On home Lasix 80mg po q24   - Hold diuretics in context of sepsis and fever  - Avoid volume overload.  - daily assessment for resumption of diuretics    #CAD s/p CABG  #Chronic Afib on Xarelto  #HTN  - Echo 1/10/25: EF 60-65%  - C/W metoprolol 25mg po q12  - C/W Nifedipine xl 60mg po q24   - Still holding lisinopril and aldactone from last admission  - c/w Xarelto 15mg w/ dinner (hold if platelet continue to trend down)    # HLD   - c/w Pravastatin -> using lip 10 hs here  - resume Repatha on discharge    #DM, uncontrolled  - FS - self monitored via dex com  - Fingerstick glucose: 299  - On insulin pump @2.85 at home   - Endo consult 1/22: glargine 30 units twice daily, lispro 20 units before meals, very insulin resistant.     #Hypokalemia  - K: 3.3  - Start iv 20 mEQq 2 hrs for 3 doses.  - F/u 16:00 BMP    #Seasonal allergies  #URI  - c/w zyrtec      # DVT PPX: Xarelto  # GI PPX: Pantoprazole   # Code Status: Full code  # Diet: Dash     #Hand-off:  F/U Nephro consult and 16:00 BMP

## 2025-01-23 NOTE — CONSULT NOTE ADULT - ASSESSMENT
Patient is a 69-year-old man with extensive medical history including CAD (status post CABG;), DM (on insulin pump), atrial fibrillation on Xarelto, history of distal pancreatomy and splenectomy for unclear reason referred to the ED by his oncologist for abnormal labs.     "Incomplete note" Patient is a 69-year-old man with extensive medical history including CAD (status post CABG;), DM (on insulin pump), atrial fibrillation on Xarelto, history of distal pancreatomy and splenectomy for unclear reason referred to the ED by his oncologist for abnormal labs.   Patient is known to take lasix, metolazone and spironolactone for his chronic resistant edema.   Nephrology was consulted for KRISTINE and mild hyponatremia.     #Non oliguric KRISTINE on CKD stage 3B likely 2/2 fluid overload  #Hypokalemia  #Hyponatremia likely from fluid overload.   #Normocytic anemia likely 2/2 CKD stage 3B  #h/o Large B cell Lymphoma, s/p chemotherapy.   #AHRF likely 2/2 Influenza A with superimposed bacterial PNA.  #Neutropenia s/p chemotherapy    Plan:  Monitor strict intake and output.   -Continue lasix 40 mg BID  -Patient received IVF total of 2 L on admission contributing to fluid overload.   -Voids freely.   -Check renal/bladder US.   Monitor BMP daily.   Monitor electrolytes, potassium noted as 3.3; replete to 4.   Monitor HH, transfuse for hb <7.   Antibiotics per ID's recommendations.   Heme/onc on board, recommendations appreciated.   Avoid nephrotoxic medications and adjust all medications to GFR.   No need for RRT at this time.   Nephrology will follow.

## 2025-01-23 NOTE — CONSULT NOTE ADULT - SUBJECTIVE AND OBJECTIVE BOX
NEPHROLOGY CONSULTATION NOTE    Patient is a 69-year-old man with extensive medical history including CAD (status post CABG;), DM (on insulin pump), atrial fibrillation on Xarelto, history of distal pancreatomy and splenectomy for unclear reason referred to the ED by his oncologist for abnormal labs. Patient was recently diagnosed with Diffuse large B cell Lymphoma on pathology of right groin mass and is following with [facility] oncology team. Patient was discharged on 01.17.2025 from hospital after inpatient chemotherapy (Completed cycle 1 of R-EPOCH (D1 on 1/11/2024). Tolerated well. Rituximab was given on D5).   Over past 3 days patient started to get a productive cough with yellow sputum production.  Patient is also endorsing chills and nausea which he states is baseline. He had 1 reading on low grade fever 100.5 on day of presentation.  ------------------------------------------  Above information obtained from admisison H&P  Nephrology was consulted for KRISTINE and fluid overload.   -Patient was reported to take lasix 80 mg daily, spironolactone and metolazone for his chronic edema.   -He previously reported chronic right LE edema s/p his CABG.   Patient is s/p chemotherapy started on his last admission.   -Patient presented with neutropenia, influenza positive.   -s/p vancomycin and cefepime..   Currently on Cefepime, azithromycin and oseltamavir.      PAST MEDICAL & SURGICAL HISTORY:  Diabetes mellitus, type 2  BPH (benign prostatic hyperplasia)  Water retention  Essential hypertension  Diabetes  CAD (coronary artery disease)  H/O hypercholesterolemia  Morbid obesity  Chronic kidney disease (CKD)  History of atrial fibrillation  H/O right coronary artery stent placement  History of resection of pancreas  History of cholecystectomy  History of left knee replacement  S/P CABG x 6  H/O cardiac radiofrequency ablation    Allergies:  No Known Allergies    Home Medications:  acetaminophen 325 mg oral tablet: 2 tab(s) orally every 6 hours As needed Temp greater or equal to 38C (100.4F), Mild Pain (1 - 3) (21 Jan 2025 00:58)  aspirin 81 mg oral tablet: orally once a day (21 Jan 2025 00:58)  azelastine nasal: prn (21 Jan 2025 00:58)  furosemide 80 mg oral tablet: 1 tab(s) orally once a day (21 Jan 2025 00:58)  INSULIN PUMP - HUMALOG:  (21 Jan 2025 00:58)  ipratropium nasal: prn (21 Jan 2025 00:58)  levocetirizine 5 mg oral tablet: 1 tab(s) orally once a day (21 Jan 2025 00:58)  metOLazone 2.5 mg oral tablet: 1 tab(s) orally once a day (21 Jan 2025 00:58)  metoprolol tartrate 25 mg oral tablet: 1 tab(s) orally 2 times a day (21 Jan 2025 00:58)  polyethylene glycol 3350 oral powder for reconstitution: 17 gram(s) orally 3 times a day (21 Jan 2025 00:58)  pravastatin 40 mg oral tablet: 1 tab(s) orally once a day (21 Jan 2025 00:58)  Repatha 140 mg/mL subcutaneous solution: 140 milligram(s) subcutaneously (21 Jan 2025 00:58)  rivaroxaban 15 mg oral tablet: 1 tab(s) orally once a day (before a meal) (21 Jan 2025 00:58)  sildenafil 20 mg oral tablet: 1 tab(s) orally (21 Jan 2025 00:58)  simethicone 80 mg oral tablet, chewable: 1 tab(s) orally every 6 hours (21 Jan 2025 00:58)  testosterone cypionate 200 mg/mL intramuscular solution: 200 unit(s) intramuscularly once a month (21 Jan 2025 00:58)  tirzepatide: 0.5 milligram(s) subcutaneous once a week (21 Jan 2025 00:58)  Vitamin C 500 mg oral capsule: 1 cap(s) orally once a day (21 Jan 2025 00:58)    Hospital Medications:   MEDICATIONS  (STANDING):  allopurinol 150 milliGRAM(s) Oral daily  ascorbic acid 500 milliGRAM(s) Oral daily  aspirin  chewable 81 milliGRAM(s) Oral daily  atorvastatin 10 milliGRAM(s) Oral at bedtime  azithromycin  IVPB      cefepime   IVPB 2000 milliGRAM(s) IV Intermittent every 12 hours  cetirizine 10 milliGRAM(s) Oral daily  dextrose 5%. 1000 milliLiter(s) (50 mL/Hr) IV Continuous <Continuous>  dextrose 5%. 1000 milliLiter(s) (100 mL/Hr) IV Continuous <Continuous>  dextrose 50% Injectable 25 Gram(s) IV Push once  dextrose 50% Injectable 12.5 Gram(s) IV Push once  dextrose 50% Injectable 25 Gram(s) IV Push once  filgrastim-sndz (ZARXIO) Injectable 480 MICROGram(s) SubCutaneous every 24 hours  furosemide   Injectable 40 milliGRAM(s) IV Push every 12 hours  glucagon  Injectable 1 milliGRAM(s) IntraMuscular once  insulin glargine Injectable (LANTUS) 30 Unit(s) SubCutaneous two times a day  insulin lispro (ADMELOG) corrective regimen sliding scale   SubCutaneous three times a day before meals  insulin lispro Injectable (ADMELOG) 20 Unit(s) SubCutaneous three times a day before meals  melatonin 10 milliGRAM(s) Oral at bedtime  metoprolol tartrate 25 milliGRAM(s) Oral two times a day  NIFEdipine XL 60 milliGRAM(s) Oral daily  oseltamivir 30 milliGRAM(s) Oral two times a day  pantoprazole    Tablet 40 milliGRAM(s) Oral before breakfast  polyethylene glycol 3350 17 Gram(s) Oral daily  potassium chloride  20 mEq/100 mL IVPB 20 milliEquivalent(s) IV Intermittent every 2 hours  senna 2 Tablet(s) Oral at bedtime  sertraline 25 milliGRAM(s) Oral daily    SOCIAL HISTORY:  Denies ETOH,Smoking,   FAMILY HISTORY:  Family history of lung cancer (Sibling)    Family history of diabetes mellitus (Mother)    Family history of coronary artery disease (Father)        REVIEW OF SYSTEMS:  Poor historian, unable to obtain much information likely due to BIPAP.     VITALS:  Vital Signs Last 24 Hrs  T(C): 36.8 (23 Jan 2025 12:06), Max: 37.1 (23 Jan 2025 00:20)  T(F): 98.3 (23 Jan 2025 12:06), Max: 98.8 (23 Jan 2025 08:25)  HR: 112 (23 Jan 2025 12:06) (98 - 120)  BP: 141/62 (23 Jan 2025 12:06) (124/60 - 141/62)  BP(mean): 99 (23 Jan 2025 00:20) (99 - 99)  RR: 22 (23 Jan 2025 12:06) (18 - 22)  SpO2: 99% (23 Jan 2025 12:06) (90% - 99%)    Parameters below as of 23 Jan 2025 12:06  Patient On (Oxygen Delivery Method): BiPAP/CPAP    O2 Concentration (%): 100    01-23 @ 07:01  -  01-23 @ 13:38  --------------------------------------------------------  IN: 250 mL / OUT: 300 mL / NET: -50 mL    PHYSICAL EXAM:  Constitutional: NAD  Respiratory: Bilateral LL crackles appreciated, currrenly on BiPAP.   Cardiovascular: S1, S2, RRR  Gastrointestinal: BS+, soft, NT/ND  Extremities: No cyanosis or clubbing. 2+ bilateral LE pitting edema appreciated.   Neurological: A/O x 3, no focal deficits  Psychiatric: Normal mood, normal affect  : No CVA tenderness. No moss.   Skin: No rashes  Vascular Access:    LABS:  01-23    132[L]  |  87[L]  |  102[HH]  ----------------------------<  322[H]  3.3[L]   |  33[H]  |  2.8[H]    Ca    7.5[L]      23 Jan 2025 06:02  Mg     2.0     01-23    TPro  4.5[L]  /  Alb  2.2[L]  /  TBili  0.5  /  DBili      /  AST  58[H]  /  ALT  45[H]  /  AlkPhos  181[H]  01-23    Creatinine Trend: 2.8 <--, 2.5 <--, 2.2 <--, 2.1 <--, 2.0 <--, 2.0 <--, 1.9 <--, 2.1 <--, 2.4 <--, 2.3 <--, 2.1 <--, 2.3 <--, 2.1 <--, 1.9 <--, 1.8 <--                        9.7    0.13  )-----------( 17       ( 23 Jan 2025 06:02 )             28.0     Urine Studies:  Urinalysis Basic - ( 23 Jan 2025 06:02 )    Color:  / Appearance:  / SG:  / pH:   Gluc: 322 mg/dL / Ketone:   / Bili:  / Urobili:    Blood:  / Protein:  / Nitrite:    Leuk Esterase:  / RBC:  / WBC    Sq Epi:  / Non Sq Epi:  / Bacteria:     RADIOLOGY & ADDITIONAL STUDIES:  CXR with LLL infiltrate  CTA Chest: Negative for pulmonary emboli. Interval development of infiltrates within the lower lobes and lingula which may reflect acute multilobar pneumonia.    CXR: Stable bilateral airspace opacities/effusions.

## 2025-01-23 NOTE — PROGRESS NOTE ADULT - SUBJECTIVE AND OBJECTIVE BOX
SUBJECTIVE/OVERNIGHT EVENTS  Today is hospital day 3d. This morning patient was seen and examined at bedside, resting comfortably in bed. Was transitioned from high alia to bipap yesterday evenng due to O2 sating 89-90s.    HOSPITAL COURSE  Day 1:   Day 2:   Day 3:     CODE STATUS:    FAMILY COMMUNICATION  Contact date:  Name of person contacted:  Relationship to patient:  Communication details:    MEDICATIONS  STANDING MEDICATIONS  allopurinol 150 milliGRAM(s) Oral daily  ascorbic acid 500 milliGRAM(s) Oral daily  aspirin  chewable 81 milliGRAM(s) Oral daily  atorvastatin 10 milliGRAM(s) Oral at bedtime  azithromycin  IVPB      cefepime   IVPB 2000 milliGRAM(s) IV Intermittent every 12 hours  cetirizine 10 milliGRAM(s) Oral daily  dextrose 5%. 1000 milliLiter(s) IV Continuous <Continuous>  dextrose 5%. 1000 milliLiter(s) IV Continuous <Continuous>  dextrose 50% Injectable 25 Gram(s) IV Push once  dextrose 50% Injectable 12.5 Gram(s) IV Push once  dextrose 50% Injectable 25 Gram(s) IV Push once  filgrastim-sndz (ZARXIO) Injectable 480 MICROGram(s) SubCutaneous every 24 hours  furosemide   Injectable 40 milliGRAM(s) IV Push every 12 hours  glucagon  Injectable 1 milliGRAM(s) IntraMuscular once  insulin glargine Injectable (LANTUS) 30 Unit(s) SubCutaneous two times a day  insulin lispro (ADMELOG) corrective regimen sliding scale   SubCutaneous three times a day before meals  insulin lispro Injectable (ADMELOG) 20 Unit(s) SubCutaneous three times a day before meals  melatonin 10 milliGRAM(s) Oral at bedtime  metoprolol tartrate 25 milliGRAM(s) Oral two times a day  morphine  - Injectable 2 milliGRAM(s) IV Push once  NIFEdipine XL 60 milliGRAM(s) Oral daily  oseltamivir 30 milliGRAM(s) Oral two times a day  pantoprazole    Tablet 40 milliGRAM(s) Oral before breakfast  polyethylene glycol 3350 17 Gram(s) Oral daily  potassium chloride  20 mEq/100 mL IVPB 20 milliEquivalent(s) IV Intermittent every 2 hours  senna 2 Tablet(s) Oral at bedtime  sertraline 25 milliGRAM(s) Oral daily    PRN MEDICATIONS  acetaminophen     Tablet .. 650 milliGRAM(s) Oral every 6 hours PRN  albuterol/ipratropium for Nebulization 3 milliLiter(s) Nebulizer every 4 hours PRN  dextrose Oral Gel 15 Gram(s) Oral once PRN  diazepam    Tablet 2 milliGRAM(s) Oral daily PRN  hydrOXYzine hydrochloride 25 milliGRAM(s) Oral every 6 hours PRN  ondansetron Injectable 4 milliGRAM(s) IV Push every 6 hours PRN  oxyCODONE    IR 5 milliGRAM(s) Oral four times a day PRN    VITALS  T(F): 98.8 (01-23-25 @ 08:25), Max: 98.8 (01-23-25 @ 08:25)  HR: 112 (01-23-25 @ 12:06) (98 - 120)  BP: 141/62 (01-23-25 @ 12:06) (124/60 - 141/62)  RR: 22 (01-23-25 @ 12:06) (18 - 22)  SpO2: 99% (01-23-25 @ 12:06) (90% - 99%)  POCT Blood Glucose.: 299 mg/dL (01-23-25 @ 12:23)  POCT Blood Glucose.: 318 mg/dL (01-23-25 @ 08:17)  POCT Blood Glucose.: 418 mg/dL (01-22-25 @ 21:17)  POCT Blood Glucose.: 437 mg/dL (01-22-25 @ 18:06)  POCT Blood Glucose.: 347 mg/dL (01-22-25 @ 12:59)    PHYSICAL EXAM  CONSTITUTIONAL: Well groomed, no apparent distress, on bipap  EYES: PERRLA and symmetric, EOMI, No conjunctival or scleral injection, non-icteric  ENMT: Oral mucosa with moist membranes. Normal dentition; no pharyngeal injection or exudates  NECK: Supple, symmetric  RESP: No respiratory distress, no use of accessory muscles; CTA b/l, no WRR  CV: RRR, +S1S2, no peripheral edema  GI: Soft, NT, distended, no rebound, no guarding; no palpable masses  SKIN: No rashes or ulcers noted  NEURO: Grossly intact    (  ) Indwelling Ojeda Catheter   Date insterted:    Reason (  ) Critical illness     (  ) urinary retention    (  ) Accurate Ins/Outs Monitoring     (  ) CMO patient    (  ) Central Line  Date inserted:  Location: (  ) Right IJ   (  ) Left IJ   (  ) Right Fem   (  ) Left Fem    (  ) SPC  (  ) pigtail  (  ) PEG tube  (  ) colostomy  (  ) jejunostomy  (  ) U-Dall    LABS             9.7    0.13  )-----------( 17       ( 01-23-25 @ 06:02 )             28.0     132  |  87  |  102  -------------------------<  322   01-23-25 @ 06:02  3.3  |  33  |  2.8    Ca      7.5     01-23-25 @ 06:02  Mg     2.0     01-23-25 @ 06:02    TPro  4.5  /  Alb  2.2  /  TBili  0.5  /  DBili  x   /  AST  58  /  ALT  45  /  AlkPhos  181  /  GGT  x     01-23-25 @ 06:02      Troponin T, High Sensitivity Result: 63 ng/L (01-20-25 @ 19:31)  Troponin T, High Sensitivity Result: 68 ng/L (01-20-25 @ 16:22)  Pro-Brain Natriuretic Peptide: 1169 pg/mL (01-20-25 @ 16:22)    Urinalysis Basic - ( 23 Jan 2025 06:02 )    Color: x / Appearance: x / SG: x / pH: x  Gluc: 322 mg/dL / Ketone: x  / Bili: x / Urobili: x   Blood: x / Protein: x / Nitrite: x   Leuk Esterase: x / RBC: x / WBC x   Sq Epi: x / Non Sq Epi: x / Bacteria: x          Urinalysis with Rflx Culture (collected 20 Jan 2025 19:07)    Culture - Blood (collected 20 Jan 2025 15:38)  Source: .Blood BLOOD  Preliminary Report (22 Jan 2025 23:08):    No growth at 48 Hours    Culture - Blood (collected 20 Jan 2025 15:38)  Source: .Blood BLOOD  Preliminary Report (22 Jan 2025 23:08):    No growth at 48 Hours      IMAGING

## 2025-01-23 NOTE — PROGRESS NOTE ADULT - ASSESSMENT
IMPRESSION:    Acute hypoxemic respiratory failure   Influenza A infection / pneumonia   RASHAD / OHS ?  Possible superimposed bacterial pneumonia   Fluid overload / bilateral pleural effusions   HO Large B Cell lymphoma SP Chemotherapy   Pancytopenia chem induced   HO CAD (status post CABG;), DM (on insulin pump), atrial fibrillation on Xarelto, and distal pancreatomy and splenectomy     PLAN:    CNS:  No depressants.      HEENT: Oral care    PULMONARY:  HOB @ 45 degrees.  Aspiration precautions.  Repeat CXR noted.  Chest US today.  Wean HFNCo2 as tolerated.  keep O2 sat 92-96%.  Incentive spirometry.  NIV during sleep and PRN during the day     CARDIOVASCULAR:  DC IVF.  Continue Volume contraction as tolerated.  ECHO noted.  repeat BNP    GI: GI prophylaxis.  Feeding.  Bowel regimen     RENAL:  Follow up lytes.  Correct as needed.  Monitor UO     INFECTIOUS DISEASE: Follow up cultures.  Continue ABX and Tamiflu per ID.  Would add Azithromycin for now. Procal.  FU Nasal MRSA     HEMATOLOGICAL:  DVT prophylaxis.  Hold AC.  Monitor CBC.  Hem onc eval noted.  Continue ZARXIO     ENDOCRINE:  Follow up FS.  Insulin protocol if needed.    MUSCULOSKELETAL:  OOB as tolerated with PT OT     SDU     Prognosis overall guarded

## 2025-01-23 NOTE — BH CONSULTATION LIAISON PROGRESS NOTE - NSBHFUPINTERVALHXFT_PSY_A_CORE
Chart reviewed. Patient appeared somnolent and was difficult to arouse. Patient was very agitated overnight due to respiratory distress and received prn haldol. Patient had been utilizing prns frequently due to agitation and anxiety. Limited communication from patient. Called daughter (Mery) who  Chart reviewed. Patient appeared somnolent and was difficult to arouse. Patient was very agitated overnight due to respiratory distress and received prn haldol. Patient had been utilizing prns frequently due to agitation and anxiety. Limited communication from patient. Called daughter (Mery) who is agreeable to his current psychotropic regimen for anxiety and agitation.

## 2025-01-23 NOTE — CONSULT NOTE ADULT - ATTENDING COMMENTS
Pt seen and examiend  KRISTINE on CKd  Hypona  seems vol up  cont LAsix for now, (at home is on LAsix + spironolactone+ metolazone)  plan as above

## 2025-01-24 LAB
ALBUMIN SERPL ELPH-MCNC: 2.5 G/DL — LOW (ref 3.5–5.2)
ALP SERPL-CCNC: 327 U/L — HIGH (ref 30–115)
ALT FLD-CCNC: 69 U/L — HIGH (ref 0–41)
ANION GAP SERPL CALC-SCNC: 11 MMOL/L — SIGNIFICANT CHANGE UP (ref 7–14)
ANION GAP SERPL CALC-SCNC: 12 MMOL/L — SIGNIFICANT CHANGE UP (ref 7–14)
ANISOCYTOSIS BLD QL: SIGNIFICANT CHANGE UP
AST SERPL-CCNC: 90 U/L — HIGH (ref 0–41)
BASOPHILS # BLD AUTO: 0 K/UL — SIGNIFICANT CHANGE UP (ref 0–0.2)
BASOPHILS # BLD AUTO: 0 K/UL — SIGNIFICANT CHANGE UP (ref 0–0.2)
BASOPHILS NFR BLD AUTO: 0 % — SIGNIFICANT CHANGE UP (ref 0–1)
BASOPHILS NFR BLD AUTO: 0 % — SIGNIFICANT CHANGE UP (ref 0–1)
BILIRUB SERPL-MCNC: 0.5 MG/DL — SIGNIFICANT CHANGE UP (ref 0.2–1.2)
BUN SERPL-MCNC: 101 MG/DL — CRITICAL HIGH (ref 10–20)
BUN SERPL-MCNC: 97 MG/DL — CRITICAL HIGH (ref 10–20)
CALCIUM SERPL-MCNC: 7.5 MG/DL — LOW (ref 8.4–10.5)
CALCIUM SERPL-MCNC: 8.1 MG/DL — LOW (ref 8.4–10.4)
CHLORIDE SERPL-SCNC: 88 MMOL/L — LOW (ref 98–110)
CHLORIDE SERPL-SCNC: 89 MMOL/L — LOW (ref 98–110)
CO2 SERPL-SCNC: 34 MMOL/L — HIGH (ref 17–32)
CO2 SERPL-SCNC: 34 MMOL/L — HIGH (ref 17–32)
CREAT SERPL-MCNC: 2.5 MG/DL — HIGH (ref 0.7–1.5)
CREAT SERPL-MCNC: 2.7 MG/DL — HIGH (ref 0.7–1.5)
EGFR: 25 ML/MIN/1.73M2 — LOW
EGFR: 27 ML/MIN/1.73M2 — LOW
EOSINOPHIL # BLD AUTO: 0 K/UL — SIGNIFICANT CHANGE UP (ref 0–0.7)
EOSINOPHIL # BLD AUTO: 0 K/UL — SIGNIFICANT CHANGE UP (ref 0–0.7)
EOSINOPHIL NFR BLD AUTO: 0 % — SIGNIFICANT CHANGE UP (ref 0–8)
EOSINOPHIL NFR BLD AUTO: 0 % — SIGNIFICANT CHANGE UP (ref 0–8)
GIANT PLATELETS BLD QL SMEAR: PRESENT — SIGNIFICANT CHANGE UP
GLUCOSE BLDC GLUCOMTR-MCNC: 101 MG/DL — HIGH (ref 70–99)
GLUCOSE BLDC GLUCOMTR-MCNC: 109 MG/DL — HIGH (ref 70–99)
GLUCOSE BLDC GLUCOMTR-MCNC: 120 MG/DL — HIGH (ref 70–99)
GLUCOSE BLDC GLUCOMTR-MCNC: 179 MG/DL — HIGH (ref 70–99)
GLUCOSE BLDC GLUCOMTR-MCNC: 210 MG/DL — HIGH (ref 70–99)
GLUCOSE SERPL-MCNC: 204 MG/DL — HIGH (ref 70–99)
GLUCOSE SERPL-MCNC: 77 MG/DL — SIGNIFICANT CHANGE UP (ref 70–99)
HCT VFR BLD CALC: 26.6 % — LOW (ref 42–52)
HCT VFR BLD CALC: 26.9 % — LOW (ref 42–52)
HGB BLD-MCNC: 9.2 G/DL — LOW (ref 14–18)
HGB BLD-MCNC: 9.3 G/DL — LOW (ref 14–18)
LEGIONELLA AG UR QL: NEGATIVE — SIGNIFICANT CHANGE UP
LYMPHOCYTES # BLD AUTO: 0.08 K/UL — LOW (ref 1.2–3.4)
LYMPHOCYTES # BLD AUTO: 0.11 K/UL — LOW (ref 1.2–3.4)
LYMPHOCYTES # BLD AUTO: 20.5 % — SIGNIFICANT CHANGE UP (ref 20.5–51.1)
LYMPHOCYTES # BLD AUTO: 8.8 % — LOW (ref 20.5–51.1)
MACROCYTES BLD QL: SIGNIFICANT CHANGE UP
MAGNESIUM SERPL-MCNC: 1.9 MG/DL — SIGNIFICANT CHANGE UP (ref 1.8–2.4)
MANUAL SMEAR VERIFICATION: SIGNIFICANT CHANGE UP
MCHC RBC-ENTMCNC: 30.5 PG — SIGNIFICANT CHANGE UP (ref 27–31)
MCHC RBC-ENTMCNC: 31.2 PG — HIGH (ref 27–31)
MCHC RBC-ENTMCNC: 34.6 G/DL — SIGNIFICANT CHANGE UP (ref 32–37)
MCHC RBC-ENTMCNC: 34.6 G/DL — SIGNIFICANT CHANGE UP (ref 32–37)
MCV RBC AUTO: 88.1 FL — SIGNIFICANT CHANGE UP (ref 80–94)
MCV RBC AUTO: 90.3 FL — SIGNIFICANT CHANGE UP (ref 80–94)
METAMYELOCYTES # FLD: 7.8 % — HIGH (ref 0–0)
METAMYELOCYTES NFR BLD: 7.8 % — HIGH (ref 0–0)
MONOCYTES # BLD AUTO: 0.09 K/UL — LOW (ref 0.1–0.6)
MONOCYTES # BLD AUTO: 0.21 K/UL — SIGNIFICANT CHANGE UP (ref 0.1–0.6)
MONOCYTES NFR BLD AUTO: 15.4 % — HIGH (ref 1.7–9.3)
MONOCYTES NFR BLD AUTO: 23.5 % — HIGH (ref 1.7–9.3)
MRSA PCR RESULT.: NEGATIVE — SIGNIFICANT CHANGE UP
NEUTROPHILS # BLD AUTO: 0.32 K/UL — LOW (ref 1.4–6.5)
NEUTROPHILS # BLD AUTO: 0.53 K/UL — LOW (ref 1.4–6.5)
NEUTROPHILS NFR BLD AUTO: 52 % — SIGNIFICANT CHANGE UP (ref 42.2–75.2)
NEUTROPHILS NFR BLD AUTO: 53.8 % — SIGNIFICANT CHANGE UP (ref 42.2–75.2)
NEUTS BAND # BLD: 7.9 % — HIGH (ref 0–6)
NEUTS BAND NFR BLD: 7.9 % — HIGH (ref 0–6)
PLAT MORPH BLD: NORMAL — SIGNIFICANT CHANGE UP
PLATELET # BLD AUTO: 40 K/UL — LOW (ref 130–400)
PLATELET # BLD AUTO: 41 K/UL — LOW (ref 130–400)
PMV BLD: 13 FL — HIGH (ref 7.4–10.4)
PMV BLD: 13.1 FL — HIGH (ref 7.4–10.4)
POLYCHROMASIA BLD QL SMEAR: SLIGHT — SIGNIFICANT CHANGE UP
POTASSIUM SERPL-MCNC: 3.3 MMOL/L — LOW (ref 3.5–5)
POTASSIUM SERPL-MCNC: 3.4 MMOL/L — LOW (ref 3.5–5)
POTASSIUM SERPL-SCNC: 3.3 MMOL/L — LOW (ref 3.5–5)
POTASSIUM SERPL-SCNC: 3.4 MMOL/L — LOW (ref 3.5–5)
PROT SERPL-MCNC: 4.5 G/DL — LOW (ref 6–8)
RBC # BLD: 2.98 M/UL — LOW (ref 4.7–6.1)
RBC # BLD: 3.02 M/UL — LOW (ref 4.7–6.1)
RBC # FLD: 13.2 % — SIGNIFICANT CHANGE UP (ref 11.5–14.5)
RBC # FLD: 13.3 % — SIGNIFICANT CHANGE UP (ref 11.5–14.5)
RBC BLD AUTO: ABNORMAL
S PNEUM AG UR QL: NEGATIVE — SIGNIFICANT CHANGE UP
SODIUM SERPL-SCNC: 133 MMOL/L — LOW (ref 135–146)
SODIUM SERPL-SCNC: 135 MMOL/L — SIGNIFICANT CHANGE UP (ref 135–146)
TARGETS BLD QL SMEAR: SLIGHT — SIGNIFICANT CHANGE UP
WBC # BLD: 0.56 K/UL — CRITICAL LOW (ref 4.8–10.8)
WBC # BLD: 0.88 K/UL — CRITICAL LOW (ref 4.8–10.8)
WBC # FLD AUTO: 0.56 K/UL — CRITICAL LOW (ref 4.8–10.8)
WBC # FLD AUTO: 0.88 K/UL — CRITICAL LOW (ref 4.8–10.8)

## 2025-01-24 PROCEDURE — 99233 SBSQ HOSP IP/OBS HIGH 50: CPT

## 2025-01-24 PROCEDURE — 99291 CRITICAL CARE FIRST HOUR: CPT

## 2025-01-24 PROCEDURE — 71045 X-RAY EXAM CHEST 1 VIEW: CPT | Mod: 26

## 2025-01-24 RX ORDER — HALOPERIDOL 10 MG/1
2.5 TABLET ORAL EVERY 6 HOURS
Refills: 0 | Status: DISCONTINUED | OUTPATIENT
Start: 2025-01-24 | End: 2025-01-28

## 2025-01-24 RX ORDER — INFLUENZA A VIRUS A/IDAHO/07/2018 (H1N1) ANTIGEN (MDCK CELL DERIVED, PROPIOLACTONE INACTIVATED, INFLUENZA A VIRUS A/INDIANA/08/2018 (H3N2) ANTIGEN (MDCK CELL DERIVED, PROPIOLACTONE INACTIVATED), INFLUENZA B VIRUS B/SINGAPORE/INFTT-16-0610/2016 ANTIGEN (MDCK CELL DERIVED, PROPIOLACTONE INACTIVATED), INFLUENZA B VIRUS B/IOWA/06/2017 ANTIGEN (MDCK CELL DERIVED, PROPIOLACTONE INACTIVATED) 15; 15; 15; 15 UG/.5ML; UG/.5ML; UG/.5ML; UG/.5ML
0.5 INJECTION, SUSPENSION INTRAMUSCULAR ONCE
Refills: 0 | Status: DISCONTINUED | OUTPATIENT
Start: 2025-01-24 | End: 2025-02-17

## 2025-01-24 RX ORDER — BENZOCAINE 220 MG/G
1 SPRAY, METERED PERIODONTAL THREE TIMES A DAY
Refills: 0 | Status: DISCONTINUED | OUTPATIENT
Start: 2025-01-24 | End: 2025-02-17

## 2025-01-24 RX ORDER — LORAZEPAM 4 MG/ML
1 VIAL (ML) INJECTION ONCE
Refills: 0 | Status: DISCONTINUED | OUTPATIENT
Start: 2025-01-24 | End: 2025-01-24

## 2025-01-24 RX ADMIN — OSELTAMIVIR PHOSPHATE 30 MILLIGRAM(S): 75 CAPSULE ORAL at 05:55

## 2025-01-24 RX ADMIN — ATORVASTATIN CALCIUM 10 MILLIGRAM(S): 80 TABLET, FILM COATED ORAL at 21:23

## 2025-01-24 RX ADMIN — OXYCODONE HYDROCHLORIDE 5 MILLIGRAM(S): 30 TABLET ORAL at 08:53

## 2025-01-24 RX ADMIN — INSULIN GLARGINE-YFGN 30 UNIT(S): 100 INJECTION, SOLUTION SUBCUTANEOUS at 21:22

## 2025-01-24 RX ADMIN — CEFEPIME 100 MILLIGRAM(S): 2 INJECTION, POWDER, FOR SOLUTION INTRAVENOUS at 05:55

## 2025-01-24 RX ADMIN — IPRATROPIUM BROMIDE AND ALBUTEROL SULFATE 3 MILLILITER(S): .5; 2.5 SOLUTION RESPIRATORY (INHALATION) at 19:56

## 2025-01-24 RX ADMIN — Medication 1 APPLICATION(S): at 21:46

## 2025-01-24 RX ADMIN — Medication 50 MILLIEQUIVALENT(S): at 21:22

## 2025-01-24 RX ADMIN — Medication 1 MILLIGRAM(S): at 14:01

## 2025-01-24 RX ADMIN — METOPROLOL SUCCINATE 25 MILLIGRAM(S): 50 TABLET, EXTENDED RELEASE ORAL at 05:55

## 2025-01-24 RX ADMIN — OXYCODONE HYDROCHLORIDE 5 MILLIGRAM(S): 30 TABLET ORAL at 09:23

## 2025-01-24 RX ADMIN — INSULIN LISPRO 20 UNIT(S): 100 INJECTION, SOLUTION INTRAVENOUS; SUBCUTANEOUS at 08:04

## 2025-01-24 RX ADMIN — Medication 60 MILLIGRAM(S): at 05:55

## 2025-01-24 RX ADMIN — CEFEPIME 100 MILLIGRAM(S): 2 INJECTION, POWDER, FOR SOLUTION INTRAVENOUS at 18:19

## 2025-01-24 RX ADMIN — BENZOCAINE 1 SPRAY(S): 220 SPRAY, METERED PERIODONTAL at 21:45

## 2025-01-24 RX ADMIN — Medication 2 TABLET(S): at 21:22

## 2025-01-24 RX ADMIN — DIAZEPAM 2 MILLIGRAM(S): 2 TABLET ORAL at 21:22

## 2025-01-24 RX ADMIN — Medication 255 MILLIGRAM(S): at 09:57

## 2025-01-24 RX ADMIN — INSULIN LISPRO 0: 100 INJECTION, SOLUTION INTRAVENOUS; SUBCUTANEOUS at 11:51

## 2025-01-24 RX ADMIN — Medication 500000 UNIT(S): at 21:45

## 2025-01-24 RX ADMIN — OXYCODONE HYDROCHLORIDE 5 MILLIGRAM(S): 30 TABLET ORAL at 01:15

## 2025-01-24 RX ADMIN — INSULIN LISPRO 2: 100 INJECTION, SOLUTION INTRAVENOUS; SUBCUTANEOUS at 08:03

## 2025-01-24 RX ADMIN — Medication 40 MILLIGRAM(S): at 08:14

## 2025-01-24 RX ADMIN — Medication 10 MILLIGRAM(S): at 21:23

## 2025-01-24 RX ADMIN — BUMETANIDE 2 MILLIGRAM(S): 1 TABLET ORAL at 05:56

## 2025-01-24 RX ADMIN — INSULIN GLARGINE-YFGN 30 UNIT(S): 100 INJECTION, SOLUTION SUBCUTANEOUS at 10:09

## 2025-01-24 RX ADMIN — BUMETANIDE 2 MILLIGRAM(S): 1 TABLET ORAL at 18:56

## 2025-01-24 NOTE — PROGRESS NOTE ADULT - SUBJECTIVE AND OBJECTIVE BOX
MORGAN BUSH  69y Male    CHIEF COMPLAINT:    Patient is a 69y old  Male who presents with a chief complaint of Shortness of breath (2025 19:42)    INTERVAL HPI/OVERNIGHT EVENTS:    Patient seen and examined. No acute events overnight. Remains critically ill on HFNC 60/100, complains of oral pain     ROS: All other systems are negative.    Vital Signs:    T(F): 98.1 (25 @ 11:56), Max: 98.9 (25 @ 00:00)  HR: 103 (25 @ 11:56) (100 - 128)  BP: 94/53 (25 @ 11:56) (94/53 - 134/62)  RR: 20 (25 @ 11:56) (20 - 22)  SpO2: 95% (25 @ 11:56) (93% - 99%)    2025 07:01  -  2025 07:00  --------------------------------------------------------  IN: 650 mL / OUT: 2750 mL / NET: -2100 mL    2025 07:01  -  2025 13:03  --------------------------------------------------------  IN: 0 mL / OUT: 350 mL / NET: -350 mL    Daily Weight in k (2025 00:00)    POCT Blood Glucose.: 120 mg/dL (2025 11:26)  POCT Blood Glucose.: 179 mg/dL (2025 10:06)  POCT Blood Glucose.: 210 mg/dL (2025 07:58)  POCT Blood Glucose.: 281 mg/dL (2025 22:29)  POCT Blood Glucose.: 281 mg/dL (2025 21:30)  POCT Blood Glucose.: 289 mg/dL (2025 15:29)    PHYSICAL EXAM:    GENERAL:  NAD  SKIN: No rashes or lesions  HEENT: Atraumatic. Normocephalic.    NECK: Supple, No JVD.   PULMONARY: bibasilar crackles  B/L. No wheezing. No rales  CVS: Normal S1, S2. Rate and Rhythm are regular.    ABDOMEN/GI: Soft, Nontender, Nondistended   MSK:  No clubbing or cyanosis   NEUROLOGIC: Moves all extremities   PSYCH: Awake and alert     Consultant(s) Notes Reviewed:  [x ] YES  [ ] NO  Care Discussed with Consultants/Other Providers [ x] YES  [ ] NO    LABS:                        9.3    0.88  )-----------( 41       ( 2025 05:13 )             26.9     133[L]  |  88[L]  |  97[HH]  ----------------------------<  204[H]  3.4[L]   |  34[H]  |  2.5[H]    Ca    7.5[L]      2025 05:13  Mg     1.9         TPro  4.5[L]  /  Alb  2.5[L]  /  TBili  0.5  /  DBili  x   /  AST  90[H]  /  ALT  69[H]  /  AlkPhos  327[H]      RADIOLOGY & ADDITIONAL TESTS:  Imaging or report Personally Reviewed:  [x] YES  [ ] NO  EKG reviewed: [x] YES  [ ] NO    Medications:  Standing  allopurinol 150 milliGRAM(s) Oral daily  ascorbic acid 500 milliGRAM(s) Oral daily  aspirin  chewable 81 milliGRAM(s) Oral daily  atorvastatin 10 milliGRAM(s) Oral at bedtime  azithromycin  IVPB      azithromycin  IVPB 500 milliGRAM(s) IV Intermittent every 24 hours  benzocaine 20% Spray 1 Spray(s) Topical three times a day  buMETAnide Injectable 2 milliGRAM(s) IV Push every 12 hours  cefepime   IVPB 2000 milliGRAM(s) IV Intermittent every 12 hours  cetirizine 10 milliGRAM(s) Oral daily  chlorhexidine 2% Cloths 1 Application(s) Topical daily  dextrose 5%. 1000 milliLiter(s) IV Continuous <Continuous>  dextrose 5%. 1000 milliLiter(s) IV Continuous <Continuous>  dextrose 50% Injectable 25 Gram(s) IV Push once  dextrose 50% Injectable 12.5 Gram(s) IV Push once  dextrose 50% Injectable 25 Gram(s) IV Push once  filgrastim-sndz (ZARXIO) Injectable 480 MICROGram(s) SubCutaneous every 24 hours  glucagon  Injectable 1 milliGRAM(s) IntraMuscular once  influenza  Vaccine (HIGH DOSE) 0.5 milliLiter(s) IntraMuscular once  insulin glargine Injectable (LANTUS) 30 Unit(s) SubCutaneous two times a day  insulin lispro (ADMELOG) corrective regimen sliding scale   SubCutaneous three times a day before meals  insulin lispro Injectable (ADMELOG) 20 Unit(s) SubCutaneous three times a day before meals  melatonin 10 milliGRAM(s) Oral at bedtime  metoprolol tartrate 25 milliGRAM(s) Oral two times a day  NIFEdipine XL 60 milliGRAM(s) Oral daily  nystatin    Suspension 369789 Unit(s) Swish and Swallow three times a day  oseltamivir 30 milliGRAM(s) Oral two times a day  pantoprazole    Tablet 40 milliGRAM(s) Oral before breakfast  polyethylene glycol 3350 17 Gram(s) Oral daily  potassium chloride  20 mEq/100 mL IVPB 20 milliEquivalent(s) IV Intermittent once  senna 2 Tablet(s) Oral at bedtime  sertraline 25 milliGRAM(s) Oral daily    PRN Meds  acetaminophen     Tablet .. 650 milliGRAM(s) Oral every 6 hours PRN  albuterol/ipratropium for Nebulization 3 milliLiter(s) Nebulizer every 4 hours PRN  dextrose Oral Gel 15 Gram(s) Oral once PRN  diazepam    Tablet 2 milliGRAM(s) Oral daily PRN  haloperidol    Injectable 2.5 milliGRAM(s) IntraMuscular every 6 hours PRN  hydrOXYzine hydrochloride 25 milliGRAM(s) Oral every 6 hours PRN  ondansetron Injectable 4 milliGRAM(s) IV Push every 6 hours PRN  oxyCODONE    IR 5 milliGRAM(s) Oral four times a day PRN

## 2025-01-24 NOTE — PROGRESS NOTE ADULT - SUBJECTIVE AND OBJECTIVE BOX
MORGAN BUSH  69y, Male  Allergy: No Known Allergies      LOS  4d    CHIEF COMPLAINT: Shortness of breath (21 Jan 2025 19:42)      INTERVAL EVENTS/HPI  - No acute events overnight  - T(F): , Max: 98.9 (01-24-25 @ 00:00)  - on HFNC  - WBC Count: 0.88 (01-24-25 @ 05:13)  WBC Count: 0.56 (01-24-25 @ 01:03)     - Creatinine: 2.5 (01-24-25 @ 05:13)  Creatinine: 2.8 (01-23-25 @ 06:02)       ROS  General: Denies rigors, nightsweats  HEENT: Denies headache, rhinorrhea, sore throat, eye pain  CV: Denies CP, palpitations  PULM: Denies wheezing, hemoptysis  GI: Denies hematemesis, hematochezia, melena  : Denies discharge, hematuria  MSK: Denies arthralgias, myalgias  SKIN: Denies rash, lesions  NEURO: Denies paresthesias, weakness  PSYCH: Denies depression, anxiety    VITALS:  T(F): 98.8, Max: 98.9 (01-24-25 @ 00:00)  HR: 103  BP: 125/60  RR: 20Vital Signs Last 24 Hrs  T(C): 37.1 (24 Jan 2025 16:00), Max: 37.2 (24 Jan 2025 00:00)  T(F): 98.8 (24 Jan 2025 16:00), Max: 98.9 (24 Jan 2025 00:00)  HR: 103 (24 Jan 2025 16:00) (103 - 128)  BP: 125/60 (24 Jan 2025 16:00) (94/53 - 134/62)  BP(mean): 97 (24 Jan 2025 11:56) (87 - 97)  RR: 20 (24 Jan 2025 16:00) (20 - 22)  SpO2: 97% (24 Jan 2025 16:00) (93% - 99%)    Parameters below as of 24 Jan 2025 16:00  Patient On (Oxygen Delivery Method): nasal cannula, high flow        PHYSICAL EXAM:  Gen: NAD, resting in bed  HEENT: Normocephalic, atraumatic  Neck: supple, no lymphadenopathy  CV: Regular rate & regular rhythm  Lungs: decreased BS at bases, no fremitus  Abdomen: Soft, BS present  Ext: Warm, well perfused  Neuro: non focal, awake  Skin: no rash, no erythema  Lines: no phlebitis    FH: Non-contributory  Social Hx: Non-contributory    TESTS & MEASUREMENTS:                        9.3    0.88  )-----------( 41       ( 24 Jan 2025 05:13 )             26.9     01-24    133[L]  |  88[L]  |  97[HH]  ----------------------------<  204[H]  3.4[L]   |  34[H]  |  2.5[H]    Ca    7.5[L]      24 Jan 2025 05:13  Mg     1.9     01-24    TPro  4.5[L]  /  Alb  2.5[L]  /  TBili  0.5  /  DBili  x   /  AST  90[H]  /  ALT  69[H]  /  AlkPhos  327[H]  01-24      LIVER FUNCTIONS - ( 24 Jan 2025 05:13 )  Alb: 2.5 g/dL / Pro: 4.5 g/dL / ALK PHOS: 327 U/L / ALT: 69 U/L / AST: 90 U/L / GGT: x           Urinalysis Basic - ( 24 Jan 2025 05:13 )    Color: x / Appearance: x / SG: x / pH: x  Gluc: 204 mg/dL / Ketone: x  / Bili: x / Urobili: x   Blood: x / Protein: x / Nitrite: x   Leuk Esterase: x / RBC: x / WBC x   Sq Epi: x / Non Sq Epi: x / Bacteria: x        Urinalysis with Rflx Culture (collected 01-20-25 @ 19:07)    Culture - Blood (collected 01-20-25 @ 15:38)  Source: .Blood BLOOD  Preliminary Report (01-23-25 @ 23:02):    No growth at 72 Hours    Culture - Blood (collected 01-20-25 @ 15:38)  Source: .Blood BLOOD  Preliminary Report (01-23-25 @ 23:02):    No growth at 72 Hours        Blood Gas Venous - Lactate: 1.4 mmol/L (01-20-25 @ 16:02)  Lactate, Blood: 1.3 mmol/L (01-20-25 @ 15:38)      INFECTIOUS DISEASES TESTING  MRSA PCR Result.: Negative (01-24-25 @ 10:45)  Rapid RVP Result: NotDetec (01-13-25 @ 13:24)      INFLAMMATORY MARKERS      RADIOLOGY & ADDITIONAL TESTS:  I have personally reviewed the last available Chest xray  CXR      CT      CARDIOLOGY TESTING  12 Lead ECG:   Ventricular Rate 118 BPM    QRS Duration 110 ms    Q-T Interval 326 ms    QTC Calculation(Bazett) 456 ms    R Axis 58 degrees    T Axis 35 degrees    Diagnosis Line Atrial fibrillation with rapid ventricular response  Incomplete right bundle branchblock  Abnormal ECG  Confirmed by TASH ESPARZA MD ([id number]) on 1/22/2025 9:16:25 PM (01-22-25 @ 17:25)  12 Lead ECG:   Ventricular Rate 110 BPM    Atrial Rate 110 BPM    P-R Interval 174 ms    QRS Duration 106 ms    Q-T Interval 330 ms    QTC Calculation(Bazett) 446 ms    P Axis 71 degrees    R Axis 66 degrees    T Axis 64 degrees    Diagnosis Line Sinus tachycardia  Incomplete right bundle branch block  Borderline ECG    Confirmed by SYEDA CHOWDARY MD [id number]) on 1/21/2025 6:54:55 AM (01-20-25 @ 15:35)      MEDICATIONS  allopurinol 150 Oral daily  ascorbic acid 500 Oral daily  aspirin  chewable 81 Oral daily  atorvastatin 10 Oral at bedtime  azithromycin  IVPB     azithromycin  IVPB 500 IV Intermittent every 24 hours  benzocaine 20% Spray 1 Topical three times a day  buMETAnide Injectable 2 IV Push every 12 hours  cefepime   IVPB 2000 IV Intermittent every 12 hours  cetirizine 10 Oral daily  chlorhexidine 2% Cloths 1 Topical daily  dextrose 5%. 1000 IV Continuous <Continuous>  dextrose 5%. 1000 IV Continuous <Continuous>  dextrose 50% Injectable 25 IV Push once  dextrose 50% Injectable 12.5 IV Push once  dextrose 50% Injectable 25 IV Push once  filgrastim-sndz (ZARXIO) Injectable 480 SubCutaneous every 24 hours  glucagon  Injectable 1 IntraMuscular once  influenza  Vaccine (HIGH DOSE) 0.5 IntraMuscular once  insulin glargine Injectable (LANTUS) 30 SubCutaneous two times a day  insulin lispro (ADMELOG) corrective regimen sliding scale  SubCutaneous three times a day before meals  insulin lispro Injectable (ADMELOG) 20 SubCutaneous three times a day before meals  melatonin 10 Oral at bedtime  metoprolol tartrate 25 Oral two times a day  NIFEdipine XL 60 Oral daily  nystatin    Suspension 123668 Swish and Swallow three times a day  oseltamivir 30 Oral two times a day  pantoprazole    Tablet 40 Oral before breakfast  polyethylene glycol 3350 17 Oral daily  potassium chloride  20 mEq/100 mL IVPB 20 IV Intermittent once  senna 2 Oral at bedtime  sertraline 25 Oral daily      WEIGHT  Weight (kg): 124.7 (01-20-25 @ 19:32)  Creatinine: 2.5 mg/dL (01-24-25 @ 05:13)      ANTIBIOTICS:  azithromycin  IVPB      azithromycin  IVPB 500 milliGRAM(s) IV Intermittent every 24 hours  cefepime   IVPB 2000 milliGRAM(s) IV Intermittent every 12 hours  nystatin    Suspension 108702 Unit(s) Swish and Swallow three times a day  oseltamivir 30 milliGRAM(s) Oral two times a day      All available historical records have been reviewed

## 2025-01-24 NOTE — PROGRESS NOTE ADULT - SUBJECTIVE AND OBJECTIVE BOX
Nephrology progress note    Patient was seen and examined, events over the last 24 h noted .    Allergies:  No Known Allergies    Hospital Medications:   MEDICATIONS  (STANDING):  allopurinol 150 milliGRAM(s) Oral daily  ascorbic acid 500 milliGRAM(s) Oral daily  aspirin  chewable 81 milliGRAM(s) Oral daily  atorvastatin 10 milliGRAM(s) Oral at bedtime  azithromycin  IVPB      azithromycin  IVPB 500 milliGRAM(s) IV Intermittent every 24 hours  buMETAnide Injectable 2 milliGRAM(s) IV Push every 12 hours  cefepime   IVPB 2000 milliGRAM(s) IV Intermittent every 12 hours  cetirizine 10 milliGRAM(s) Oral daily  chlorhexidine 2% Cloths 1 Application(s) Topical daily  dextrose 5%. 1000 milliLiter(s) (50 mL/Hr) IV Continuous <Continuous>  dextrose 5%. 1000 milliLiter(s) (100 mL/Hr) IV Continuous <Continuous>  dextrose 50% Injectable 25 Gram(s) IV Push once  dextrose 50% Injectable 12.5 Gram(s) IV Push once  dextrose 50% Injectable 25 Gram(s) IV Push once  filgrastim-sndz (ZARXIO) Injectable 480 MICROGram(s) SubCutaneous every 24 hours  glucagon  Injectable 1 milliGRAM(s) IntraMuscular once  influenza  Vaccine (HIGH DOSE) 0.5 milliLiter(s) IntraMuscular once  insulin glargine Injectable (LANTUS) 30 Unit(s) SubCutaneous two times a day  insulin lispro (ADMELOG) corrective regimen sliding scale   SubCutaneous three times a day before meals  insulin lispro Injectable (ADMELOG) 20 Unit(s) SubCutaneous three times a day before meals  melatonin 10 milliGRAM(s) Oral at bedtime  metoprolol tartrate 25 milliGRAM(s) Oral two times a day  NIFEdipine XL 60 milliGRAM(s) Oral daily  oseltamivir 30 milliGRAM(s) Oral two times a day  pantoprazole    Tablet 40 milliGRAM(s) Oral before breakfast  polyethylene glycol 3350 17 Gram(s) Oral daily  potassium chloride  20 mEq/100 mL IVPB 20 milliEquivalent(s) IV Intermittent once  senna 2 Tablet(s) Oral at bedtime  sertraline 25 milliGRAM(s) Oral daily        VITALS:  T(F): 98.4 (01-24-25 @ 08:05), Max: 98.9 (01-24-25 @ 00:00)  HR: 120 (01-24-25 @ 08:05)  BP: 130/66 (01-24-25 @ 08:05)  RR: 20 (01-24-25 @ 08:05)  SpO2: 97% (01-24-25 @ 08:05)  Wt(kg): --    01-23 @ 07:01  -  01-24 @ 07:00  --------------------------------------------------------  IN: 650 mL / OUT: 2750 mL / NET: -2100 mL          PHYSICAL EXAM:  Constitutional: NAD  HEENT: anicteric sclera, oropharynx clear, MMM  Neck: No JVD  Respiratory: on  Hi alia  Cardiovascular: S1, S2, RRR  Gastrointestinal: BS+, soft, NT/ND  Extremities: No cyanosis or clubbing. +++ peripheral edema  :  No moss.   Skin: No rashes    LABS:  01-24    133[L]  |  88[L]  |  97[HH]  ----------------------------<  204[H]  3.4[L]   |  34[H]  |  2.5[H]    Ca    7.5[L]      24 Jan 2025 05:13  Mg     1.9     01-24    TPro  4.5[L]  /  Alb  2.5[L]  /  TBili  0.5  /  DBili      /  AST  90[H]  /  ALT  69[H]  /  AlkPhos  327[H]  01-24                          9.3    0.88  )-----------( 41       ( 24 Jan 2025 05:13 )             26.9       Urine Studies:  Urinalysis Basic - ( 24 Jan 2025 05:13 )    Color:  / Appearance:  / SG:  / pH:   Gluc: 204 mg/dL / Ketone:   / Bili:  / Urobili:    Blood:  / Protein:  / Nitrite:    Leuk Esterase:  / RBC:  / WBC    Sq Epi:  / Non Sq Epi:  / Bacteria:         RADIOLOGY & ADDITIONAL STUDIES:

## 2025-01-24 NOTE — PROGRESS NOTE ADULT - SUBJECTIVE AND OBJECTIVE BOX
SUBJECTIVE:    Patient is a 69y old Male who presents with a chief complaint of Shortness of breath (21 Jan 2025 19:42)    Currently admitted to medicine with the primary diagnosis of Neutropenic fever       Today is hospital day 4d. This morning he is resting comfortably in bed and reports no new issues or overnight events.     PAST MEDICAL & SURGICAL HISTORY  Diabetes mellitus, type 2    BPH (benign prostatic hyperplasia)    Water retention    Essential hypertension    Diabetes    CAD (coronary artery disease)    H/O hypercholesterolemia    Morbid obesity    Chronic kidney disease (CKD)    History of atrial fibrillation    H/O right coronary artery stent placement    History of resection of pancreas    History of cholecystectomy    History of left knee replacement    S/P CABG x 6    H/O cardiac radiofrequency ablation      SOCIAL HISTORY:  Negative for smoking/alcohol/drug use.     ALLERGIES:  No Known Allergies    MEDICATIONS:  STANDING MEDICATIONS  allopurinol 150 milliGRAM(s) Oral daily  ascorbic acid 500 milliGRAM(s) Oral daily  aspirin  chewable 81 milliGRAM(s) Oral daily  atorvastatin 10 milliGRAM(s) Oral at bedtime  azithromycin  IVPB      azithromycin  IVPB 500 milliGRAM(s) IV Intermittent every 24 hours  buMETAnide Injectable 2 milliGRAM(s) IV Push every 12 hours  cefepime   IVPB 2000 milliGRAM(s) IV Intermittent every 12 hours  cetirizine 10 milliGRAM(s) Oral daily  chlorhexidine 2% Cloths 1 Application(s) Topical daily  dextrose 5%. 1000 milliLiter(s) IV Continuous <Continuous>  dextrose 5%. 1000 milliLiter(s) IV Continuous <Continuous>  dextrose 50% Injectable 25 Gram(s) IV Push once  dextrose 50% Injectable 12.5 Gram(s) IV Push once  dextrose 50% Injectable 25 Gram(s) IV Push once  filgrastim-sndz (ZARXIO) Injectable 480 MICROGram(s) SubCutaneous every 24 hours  glucagon  Injectable 1 milliGRAM(s) IntraMuscular once  influenza  Vaccine (HIGH DOSE) 0.5 milliLiter(s) IntraMuscular once  insulin glargine Injectable (LANTUS) 30 Unit(s) SubCutaneous two times a day  insulin lispro (ADMELOG) corrective regimen sliding scale   SubCutaneous three times a day before meals  insulin lispro Injectable (ADMELOG) 20 Unit(s) SubCutaneous three times a day before meals  melatonin 10 milliGRAM(s) Oral at bedtime  metoprolol tartrate 25 milliGRAM(s) Oral two times a day  NIFEdipine XL 60 milliGRAM(s) Oral daily  nystatin    Suspension [id number] Unit(s) Swish and Swallow three times a day  oseltamivir 30 milliGRAM(s) Oral two times a day  pantoprazole    Tablet 40 milliGRAM(s) Oral before breakfast  polyethylene glycol 3350 17 Gram(s) Oral daily  potassium chloride  20 mEq/100 mL IVPB 20 milliEquivalent(s) IV Intermittent once  senna 2 Tablet(s) Oral at bedtime  sertraline 25 milliGRAM(s) Oral daily    PRN MEDICATIONS  acetaminophen     Tablet .. 650 milliGRAM(s) Oral every 6 hours PRN  albuterol/ipratropium for Nebulization 3 milliLiter(s) Nebulizer every 4 hours PRN  dextrose Oral Gel 15 Gram(s) Oral once PRN  diazepam    Tablet 2 milliGRAM(s) Oral daily PRN  haloperidol    Injectable 2.5 milliGRAM(s) IntraMuscular every 6 hours PRN  hydrOXYzine hydrochloride 25 milliGRAM(s) Oral every 6 hours PRN  ondansetron Injectable 4 milliGRAM(s) IV Push every 6 hours PRN  oxyCODONE    IR 5 milliGRAM(s) Oral four times a day PRN    VITALS:   T(F): 98.4  HR: 120  BP: 130/66  RR: 20  SpO2: 97%    LABS:                        9.3    0.88  )-----------( 41       ( 24 Jan 2025 05:13 )             26.9     01-24    133[L]  |  88[L]  |  97[HH]  ----------------------------<  204[H]  3.4[L]   |  34[H]  |  2.5[H]    Ca    7.5[L]      24 Jan 2025 05:13  Mg     1.9     01-24    TPro  4.5[L]  /  Alb  2.5[L]  /  TBili  0.5  /  DBili  x   /  AST  90[H]  /  ALT  69[H]  /  AlkPhos  327[H]  01-24      Urinalysis Basic - ( 24 Jan 2025 05:13 )    Color: x / Appearance: x / SG: x / pH: x  Gluc: 204 mg/dL / Ketone: x  / Bili: x / Urobili: x   Blood: x / Protein: x / Nitrite: x   Leuk Esterase: x / RBC: x / WBC x   Sq Epi: x / Non Sq Epi: x / Bacteria: x                RADIOLOGY:    PHYSICAL EXAM:  GEN: No acute distress  LUNGS: Clear to auscultation bilaterally   HEART: Regular  ABD: Soft, non-tender, non-distended.  EXT: NC/NC/NE/2+PP/LUJAN/Skin Intact.   NEURO: AAOX3    Intravenous access:   NG tube:   Ojeda Catheter:   Indwelling Urethral Catheter:     Connect To:  Straight Drainage/Gravity    Indication:  Urine Output Monitoring in Critically Ill (01-23-25 @ 17:27) (not performed) [Active]       SUBJECTIVE:    Patient is a 69y old Male who presents with a chief complaint of Shortness of breath (21 Jan 2025 19:42)    Currently admitted to medicine with the primary diagnosis of Neutropenic fever       Today is hospital day 4d. This morning he is resting in bed, on bipap    PAST MEDICAL & SURGICAL HISTORY  Diabetes mellitus, type 2    BPH (benign prostatic hyperplasia)    Water retention    Essential hypertension    Diabetes    CAD (coronary artery disease)    H/O hypercholesterolemia    Morbid obesity    Chronic kidney disease (CKD)    History of atrial fibrillation    H/O right coronary artery stent placement    History of resection of pancreas    History of cholecystectomy    History of left knee replacement    S/P CABG x 6    H/O cardiac radiofrequency ablation      SOCIAL HISTORY:  Negative for smoking/alcohol/drug use.     ALLERGIES:  No Known Allergies    MEDICATIONS:  STANDING MEDICATIONS  allopurinol 150 milliGRAM(s) Oral daily  ascorbic acid 500 milliGRAM(s) Oral daily  aspirin  chewable 81 milliGRAM(s) Oral daily  atorvastatin 10 milliGRAM(s) Oral at bedtime  azithromycin  IVPB      azithromycin  IVPB 500 milliGRAM(s) IV Intermittent every 24 hours  buMETAnide Injectable 2 milliGRAM(s) IV Push every 12 hours  cefepime   IVPB 2000 milliGRAM(s) IV Intermittent every 12 hours  cetirizine 10 milliGRAM(s) Oral daily  chlorhexidine 2% Cloths 1 Application(s) Topical daily  dextrose 5%. 1000 milliLiter(s) IV Continuous <Continuous>  dextrose 5%. 1000 milliLiter(s) IV Continuous <Continuous>  dextrose 50% Injectable 25 Gram(s) IV Push once  dextrose 50% Injectable 12.5 Gram(s) IV Push once  dextrose 50% Injectable 25 Gram(s) IV Push once  filgrastim-sndz (ZARXIO) Injectable 480 MICROGram(s) SubCutaneous every 24 hours  glucagon  Injectable 1 milliGRAM(s) IntraMuscular once  influenza  Vaccine (HIGH DOSE) 0.5 milliLiter(s) IntraMuscular once  insulin glargine Injectable (LANTUS) 30 Unit(s) SubCutaneous two times a day  insulin lispro (ADMELOG) corrective regimen sliding scale   SubCutaneous three times a day before meals  insulin lispro Injectable (ADMELOG) 20 Unit(s) SubCutaneous three times a day before meals  melatonin 10 milliGRAM(s) Oral at bedtime  metoprolol tartrate 25 milliGRAM(s) Oral two times a day  NIFEdipine XL 60 milliGRAM(s) Oral daily  nystatin    Suspension [id number] Unit(s) Swish and Swallow three times a day  oseltamivir 30 milliGRAM(s) Oral two times a day  pantoprazole    Tablet 40 milliGRAM(s) Oral before breakfast  polyethylene glycol 3350 17 Gram(s) Oral daily  potassium chloride  20 mEq/100 mL IVPB 20 milliEquivalent(s) IV Intermittent once  senna 2 Tablet(s) Oral at bedtime  sertraline 25 milliGRAM(s) Oral daily    PRN MEDICATIONS  acetaminophen     Tablet .. 650 milliGRAM(s) Oral every 6 hours PRN  albuterol/ipratropium for Nebulization 3 milliLiter(s) Nebulizer every 4 hours PRN  dextrose Oral Gel 15 Gram(s) Oral once PRN  diazepam    Tablet 2 milliGRAM(s) Oral daily PRN  haloperidol    Injectable 2.5 milliGRAM(s) IntraMuscular every 6 hours PRN  hydrOXYzine hydrochloride 25 milliGRAM(s) Oral every 6 hours PRN  ondansetron Injectable 4 milliGRAM(s) IV Push every 6 hours PRN  oxyCODONE    IR 5 milliGRAM(s) Oral four times a day PRN    VITALS:   T(F): 98.4  HR: 120  BP: 130/66  RR: 20  SpO2: 97%    LABS:                        9.3    0.88  )-----------( 41       ( 24 Jan 2025 05:13 )             26.9     01-24    133[L]  |  88[L]  |  97[HH]  ----------------------------<  204[H]  3.4[L]   |  34[H]  |  2.5[H]    Ca    7.5[L]      24 Jan 2025 05:13  Mg     1.9     01-24    TPro  4.5[L]  /  Alb  2.5[L]  /  TBili  0.5  /  DBili  x   /  AST  90[H]  /  ALT  69[H]  /  AlkPhos  327[H]  01-24      Urinalysis Basic - ( 24 Jan 2025 05:13 )    Color: x / Appearance: x / SG: x / pH: x  Gluc: 204 mg/dL / Ketone: x  / Bili: x / Urobili: x   Blood: x / Protein: x / Nitrite: x   Leuk Esterase: x / RBC: x / WBC x   Sq Epi: x / Non Sq Epi: x / Bacteria: x            PHYSICAL EXAM:  GEN: No acute distress, on BIPAP,  LUNGS: bibasilar crackles  HEART: Regular  ABD: Soft, non-tender, non-distended.  EXT: NC/NC/NE/2+PP/LUJAN/Skin Intact.   NEURO: non focal    Intravenous access:   NG tube:   Ojeda Catheter:   Indwelling Urethral Catheter:     Connect To:  Straight Drainage/Gravity    Indication:  Urine Output Monitoring in Critically Ill (01-23-25 @ 17:27) (not performed) [Active]

## 2025-01-24 NOTE — PROGRESS NOTE ADULT - ASSESSMENT
IMPRESSION:    Acute hypoxemic respiratory failure   Influenza A infection / pneumonia   RASHAD / OHS ?  Possible superimposed bacterial pneumonia   Fluid overload / bilateral pleural effusions   HO Large B Cell lymphoma SP Chemotherapy   Pancytopenia chem induced   HO CAD (status post CABG;),  HO DM (on insulin pump)  HO atrial fibrillation on Xarelto  HO distal pancreatomy and splenectomy     PLAN:    CNS:  Avoid sedation.     HEENT: Oral care    PULMONARY:  HOB @ 45 degrees.  Aspiration precautions.  Repeat CXR noted.  Chest US today.  Wean HFNCo2 as tolerated.  keep O2 sat 92-96%.  Incentive spirometry.  NIV 4 on/ 4 off during sleep.     CARDIOVASCULAR:  Continue Volume contraction as tolerated.  ECHO noted.  Repeat limited ECHO. repeat BNP noted. IVC dilated on POCUS.     GI: GI prophylaxis.  Feeding.  Bowel regimen     RENAL:  Follow up lytes.  Correct as needed.  Monitor UO. CW Bumex. Can add metolazone, DW renal.     INFECTIOUS DISEASE: Follow up cultures.  Continue ABX and Tamiflu per ID. Procal.  FU Nasal MRSA     HEMATOLOGICAL:  DVT prophylaxis.  Hold AC.  Monitor CBC.  Hem onc eval noted.  Continue ZARXIO     ENDOCRINE:  Follow up FS.  Insulin protocol if needed.    MUSCULOSKELETAL:  OOB as tolerated with PT OT     SDU     Prognosis overall guarded    IMPRESSION:    Acute hypoxemic respiratory failure   Influenza A infection / pneumonia   RASHAD / OHS ?  Possible superimposed bacterial pneumonia   Fluid overload / bilateral pleural effusions   HO Large B Cell lymphoma SP Chemotherapy   Pancytopenia chem induced   HO CAD (status post CABG;),  HO DM (on insulin pump)  HO atrial fibrillation on Xarelto  HO distal pancreatomy and splenectomy     PLAN:    CNS:  Avoid sedation.     HEENT: Oral care    PULMONARY:  HOB @ 45 degrees.  Aspiration precautions.  Repeat CXR noted.  Chest US today.  Wean HFNCo2 as tolerated.  keep O2 sat 92-96%.  Incentive spirometry.  NIV 4 on/ 4 off during sleep.     CARDIOVASCULAR:  Continue Volume contraction as tolerated.  BUmex 2 mg BID for now.  ECHO noted.  Repeat limited ECHO. repeat BNP noted. IVC dilated on POCUS.     GI: GI prophylaxis.  Feeding.  Bowel regimen     RENAL:  Follow up lytes.  Correct as needed.  Monitor UO. CW Bumex.  DW renal     INFECTIOUS DISEASE: Follow up cultures.  Continue ABX and Tamiflu per ID. Procal.  FU Nasal MRSA     HEMATOLOGICAL:  DVT prophylaxis.  Hold AC.  Monitor CBC.  Hem onc eval noted.  Continue ZARXIO     ENDOCRINE:  Follow up FS.  Insulin protocol if needed.    MUSCULOSKELETAL:  OOB as tolerated with PT OT     SDU     Prognosis overall guarded

## 2025-01-24 NOTE — PROGRESS NOTE ADULT - ASSESSMENT
69-year-old man with extensive medical history including CAD (status post CABG;), DM (on insulin pump), atrial fibrillation on Xarelto, history of distal pancreatomy and splenectomy for unclear reason referred to the ED by his oncologist for abnormal labs.   Patient is known to take lasix, metolazone and spironolactone for his chronic resistant edema.   Nephrology was consulted for KRISTINE and mild hyponatremia.     #Non oliguric KRISTINE on CKD stage 3B likely 2/2 fluid overload  #Hyponatremia likely from fluid overload.   #h/o Large B cell Lymphoma, s/p chemotherapy.   #AHRF likely 2/2 Influenza A with superimposed bacterial PNA.  #Neutropenia s/p chemotherapy    Plan:  Monitor strict intake and output.   -Continue bumex, cr slghtyl imrpoved today near baseline (~ 2.3)   add metolaozne 2.5 mg (is on at home)   -Patient received IVF total of 2 L on admission contributing to fluid overload.   -Check renal/bladder US.   Monitor BMP daily.   Monitor electrolytes, potassium noted as 3.3; replete to 4.   Monitor HH, transfuse for hb <7.   Antibiotics per ID's recommendations.   Heme/onc on board, recommendations appreciated.   Avoid nephrotoxic medications and adjust all medications to GFR.   No need for RRT at this time.   Nephrology will follow.

## 2025-01-24 NOTE — PATIENT PROFILE ADULT - FALL HARM RISK - HARM RISK INTERVENTIONS
Assistance with ambulation/Assistance OOB with selected safe patient handling equipment/Communicate Risk of Fall with Harm to all staff/Discuss with provider need for PT consult/Monitor gait and stability/Provide patient with walking aids - walker, cane, crutches/Reinforce activity limits and safety measures with patient and family/Tailored Fall Risk Interventions/Visual Cue: Yellow wristband and red socks/Bed in lowest position, wheels locked, appropriate side rails in place/Call bell, personal items and telephone in reach/Instruct patient to call for assistance before getting out of bed or chair/Non-slip footwear when patient is out of bed/[place] to call system/Physically safe environment - no spills, clutter or unnecessary equipment/Purposeful Proactive Rounding/Room/bathroom lighting operational, light cord in reach Assistance with ambulation/Assistance OOB with selected safe patient handling equipment/Communicate Risk of Fall with Harm to all staff/Discuss with provider need for PT consult/Monitor gait and stability/Provide patient with walking aids - walker, cane, crutches/Reinforce activity limits and safety measures with patient and family/Review medications for side effects contributing to fall risk/Sit up slowly, dangle for a short time, stand at bedside before walking/Tailored Fall Risk Interventions/Toileting schedule using arm’s reach rule for commode and bathroom/Visual Cue: Yellow wristband and red socks/Bed in lowest position, wheels locked, appropriate side rails in place/Call bell, personal items and telephone in reach/Instruct patient to call for assistance before getting out of bed or chair/Non-slip footwear when patient is out of bed/[place] to call system/Physically safe environment - no spills, clutter or unnecessary equipment/Purposeful Proactive Rounding/Room/bathroom lighting operational, light cord in reach

## 2025-01-24 NOTE — PROGRESS NOTE ADULT - ASSESSMENT
69-year-old man with extensive medical history including CAD (status post CABG;), atrial fibrillation on Xarelto, history of distal pancreatomy and splenectomy for unclear reason referred to the ED by his oncologist for abnormal labs. Patient was recently diagnosed with Diffuse large B cell Lymphoma on pathology of right groin mass and was following his Oncologist Dr Angeli Hilliard. Over past 3 days patient started to get a productive cough with yellow sputum production.  Patient is also endorsing chills and nausea which he states is baseline. He had 1 reading on low grade fever 100.5 on day of presentation. Pt being managed for acute hypoxic respiratory failure ISO Influenza PNA.     #Acute Hypoxic Respiratory Failure 2/2 Multifocal Pneumonia  #Possible Volume Overload  # Neutropenic fever  # Influenza A infection  - CTA 1/20: Negative for pulmonary emboli. Interval development of infiltrates within the lower lobes and lingula which may reflect acute multilobar pneumonia  - blood cx NGTD x2, UA negative  - Pancytopenia s/p s/p 1st cycle chemo with R-EPOCH  - TTE 1/10/25: poor study, EF 60-65%  - Influenza A positive > Tamiflu (E/D 1/27/25): plan longer course given immunosuppresion   - Per ID, c/w cefepime and vancomycin  - check MRSA nares and f/u blood cx > if negative, d/c vanco  - check urine legionella/strep  - BNP elevated, CXR vascular congestion, pitting edema on exam. Home diuretics held on admission  - c/w Lasix 40mg IV BID  - patient on BIPAP-> Wean oxygen as tolerated  - Heme/Onc following: Continue Zarxio 480 mg sc daily    #Large B-cell Lymphoma  #Concern for Rodriguez's transformation   - Heme/Onc following   - Patient reports increased swelling and size of R inguinal mass for the past 2-3 months.   - CT pelvis on 10/12/24 showed interval increase of mass 10 x 7 x 10 cm with inguinal lymphadenopathy, suspicious for neoplastic process.  - Biopsy on 12/18/24 revealed diffuse large B-cell lymphoma with concern for Rodriguez's transformation.  - Patient was seen by Dr. Hilliard on 1/8/25 for initial consultation and was advised to come to ED due to concern for tumor lysis syndrome.  - Uric acid level now WNL at 7.9, CMP otherwise unremarkable.   - CT C/A/P 1.9.25 Stable appearance bulky right inguinal and right iliac lymphadenopathy including partially imaged 10.1 cm right inguinal soft tissue mass/lymph node.  - s/p PICC placement on 1/10/25  - s/p Bone marrow biopsy on 1/10/25  - Completed cycle 1 of R-EPOCH (D1 on 1/11/2024). Tolerated well. Rituximab was given on D5   - Uric acid 10.3 on 1/16/25, s/p 3 mg IV rasburicase  - Heme/Onc following, c/w Zarxio 480 mcg q 24 hr  - Spoke with [facility] Dr. Kishore Holland ([phone]) regarding transfer 1/23/25, no indication for transfer and pt is unstable for transfer    #KRISTINE on CKD 3B  #Chronic Lower Extremity Edema   - On home Lasix 80mg po q24   - Avoid volume overload.  - Lasix 40mg BID IV    #CAD s/p CABG  #Chronic Afib on Xarelto  #HTN  - Echo 1/10/25: EF 60-65%  - C/W metoprolol 25mg po q12  - C/W Nifedipine xl 60mg po q24   - Still holding lisinopril and aldactone from last admission  - c/w Xarelto 15mg w/ dinner (hold if platelet continue to trend down)    # HLD   - c/w Pravastatin -> using lip 10 hs here  - resume Repatha on discharge    #DM  - FS - self monitored via dex com  - On insulin pump @2.85 at home   - Endo consult: keep off insulin pump  - lantus 30 units BID, lispro 20 TID    #Seasonal allergies  #URI  - c/w zyrtec      # DVT PPX: Xarelto  # GI PPX: Pantoprazole   # Code Status: Full code  # Diet: Dash     Handoff: F/u MRSA, cultures, IV diuresis (monitor Cr), wean O2. Spoke with [facility] Dr. Kishore Holland ([phone]) regarding transfer, no indication for transfer and pt is unstable for transfer.    69-year-old man with extensive medical history including CAD (status post CABG;), atrial fibrillation on Xarelto, history of distal pancreatomy and splenectomy for unclear reason referred to the ED by his oncologist for abnormal labs. Patient was recently diagnosed with Diffuse large B cell Lymphoma on pathology of right groin mass and was following his Oncologist Dr Angeli Hilliard.  pt currently admitted to SDU for acute hypoxic respiratory failure ISO Influenza PNA and fluid overload .     Acute Hypoxic Respiratory Failure 2/2 Multifocal Pneumonia  Volume Overload  Neutropenic fever  Influenza A infection  - CTA 1/20: Negative for pulmonary emboli. Interval development of infiltrates within the lower lobes and lingula which may reflect acute multilobar pneumonia  - blood cx NGTD x2, UA negative, influenza A + , Nasal MRSA negative   - Pancytopenia s/p s/p 1st cycle chemo with R-EPOCH, now on Zarxio   - TTE 1/10/25: poor study, EF 60-65%  - Per ID, c/w cefepime, c/w tamiflu, planned for longer course given immunosuppression  -  d/c vanco  - check urine legionella/strep  - c/w Bumex 2mg IV BID. strict I&Os. check bladder scans q6H  -  If UO decreasing, add Metolazone 2.5  - patient currenlty on HFNC 60/100, taper down as tolerated   - Heme/Onc following    Large B-cell Lymphoma  Concern for Rodriguez's transformation  Pancytopenia   - Heme/Onc following   - s/p PICC placement on 1/10/25  - s/p Bone marrow biopsy on 1/10/25  - Completed cycle 1 of R-EPOCH (D1 on 1/11/2024). Tolerated well. Rituximab was given on D5   - Uric acid 10.3 on 1/16/25, s/p 3 mg IV rasburicase  - Heme/Onc following, c/w Zarxio 480 mcg q 24 hr  - team discussed with  [facility] Dr. Kishore Holland ([phone]) regarding transfer 1/23/25, no indication for transfer and pt is unstable for transfer  - daily CBC with diff, active T&S, s/p 1u plts yesterday     KRISTINE on CKD 3B  Chronic Lower Extremity Edema   - On home Lasix 80mg po q24 and metolazone  - Avoid volume overload.  - Bumex 2 q12H as above  - nephrology following  - strict I&Os, bladder scans q6H    CAD s/p CABG  Chronic Afib on Xarelto  HTN  - Echo 1/10/25: EF 60-65%  - C/W metoprolol 25mg po q12  - C/W Nifedipine xl 60mg po q24   - Still holding lisinopril and aldactone from last admission  - holding Xarelto , resume once plts >50     HLD   - c/w Pravastatin -> using atorva 10 hs here  - resume Repatha on discharge     DM  - FS - self monitored via dex com  - On insulin pump @2.85 at home   - Endo consult: keep off insulin pump  - lantus 30 units BID, lispro 20 TID and  monitor FS    Oral pain - no lesions appreciated on oral exam. Start Nystatin and hurricane for symptomatic mgmt    Overall prognosis is very guarded. high risk of decompensation. Continue monitoring in SDU  Family updated during rounds    Patient seen at bedside, total time spent to evaluate and treat the patient's acute illness and chronic medical conditions as well as time spent reviewing prior records, labs, radiology, documenting in electronic medical records,  discussing medical plan with   medical team was 55 minutes with >50% of time spent face to face with patient, discussing with patient/family as well as coordination of care              69-year-old man with extensive medical history including CAD (status post CABG;), atrial fibrillation on Xarelto, history of distal pancreatomy and splenectomy for unclear reason referred to the ED by his oncologist for abnormal labs. Patient was recently diagnosed with Diffuse large B cell Lymphoma on pathology of right groin mass and was following his Oncologist Dr Angeli Hilliard.  pt currently admitted to SDU for acute hypoxic respiratory failure ISO Influenza PNA and fluid overload .     Acute Hypoxic Respiratory Failure 2/2 Multifocal Pneumonia  Volume Overload  Neutropenic fever  Influenza A infection  - CTA 1/20: Negative for pulmonary emboli. Interval development of infiltrates within the lower lobes and lingula which may reflect acute multilobar pneumonia  - blood cx NGTD x2, UA negative, influenza A + , Nasal MRSA negative   - Pancytopenia s/p s/p 1st cycle chemo with R-EPOCH, now on Zarxio   - TTE 1/10/25: poor study, EF 60-65%  - Per ID, c/w cefepime, c/w tamiflu, planned for longer course given immunosuppression  -  d/c vanco  - check urine legionella/strep  - c/w Bumex 2mg IV BID. strict I&Os. check bladder scans q6H  -  If UO decreasing, add Metolazone 2.5  - patient currenlty on HFNC 60/100, taper down as tolerated   - Heme/Onc following    Large B-cell Lymphoma  Concern for Rodriguez's transformation  Pancytopenia   - Heme/Onc following   - s/p PICC placement on 1/10/25  - s/p Bone marrow biopsy on 1/10/25  - Completed cycle 1 of R-EPOCH (D1 on 1/11/2024). Tolerated well. Rituximab was given on D5   - Uric acid 10.3 on 1/16/25, s/p 3 mg IV rasburicase  - Heme/Onc following, c/w Zarxio 480 mcg q 24 hr  - team discussed with  [facility] Dr. Kishore Holland ([phone]) regarding transfer 1/23/25, no indication for transfer and pt is unstable for transfer  - updated Dr Holland today as well  - daily CBC with diff, active T&S, s/p 1u plts yesterday     KRISTINE on CKD 3B  Chronic Lower Extremity Edema   - On home Lasix 80mg po q24 and metolazone  - Avoid volume overload.  - Bumex 2 q12H as above  - nephrology following  - strict I&Os, bladder scans q6H    CAD s/p CABG  Chronic Afib on Xarelto  HTN  - Echo 1/10/25: EF 60-65%  - C/W metoprolol 25mg po q12  - C/W Nifedipine xl 60mg po q24   - Still holding lisinopril and aldactone from last admission  - holding Xarelto , resume once plts >50     HLD   - c/w Pravastatin -> using atorva 10 hs here  - resume Repatha on discharge     DM  - FS - self monitored via dex com  - On insulin pump @2.85 at home   - Endo consult: keep off insulin pump  - lantus 30 units BID, lispro 20 TID and  monitor FS    Oral pain - no lesions appreciated on oral exam. Start Nystatin and hurricane for symptomatic mgmt    Overall prognosis is very guarded. high risk of decompensation. Continue monitoring in SDU  Family updated during rounds    Patient seen at bedside, total time spent to evaluate and treat the patient's acute illness and chronic medical conditions as well as time spent reviewing prior records, labs, radiology, documenting in electronic medical records,  discussing medical plan with   medical team was 55 minutes with >50% of time spent face to face with patient, discussing with patient/family as well as coordination of care

## 2025-01-24 NOTE — PROGRESS NOTE ADULT - ATTENDING COMMENTS
IMPRESSION:    Acute hypoxemic respiratory failure   Influenza A infection / pneumonia   RASHAD / OHS ?  Possible superimposed bacterial pneumonia   Fluid overload / bilateral pleural effusions   HO Large B Cell lymphoma SP Chemotherapy   Pancytopenia chem induced   HO CAD (status post CABG;),  HO DM (on insulin pump)  HO atrial fibrillation on Xarelto  HO distal pancreatomy and splenectomy       Plan as outlined above

## 2025-01-24 NOTE — PATIENT PROFILE ADULT - NSPROHMDIABETBLDGLCTARGETFT_GEN_A_NUR
Called and left message that pcp out of office and we rescheduled for the following Friday.   If that date and time didn't work to call back to reschedule 185

## 2025-01-24 NOTE — PROGRESS NOTE ADULT - SUBJECTIVE AND OBJECTIVE BOX
INTERVAL HPI/OVERNIGHT EVENTS:  Patient was seen and examined at bedside. As per nurse and patient, no o/n events, patient resting comfortably. No complaints at this time. Patient denies: fever, chills, dizziness, weakness, HA, Changes in vision, CP, palpitations, SOB, cough, N/V/D/C, dysuria, changes in bowel movements, LE edema. ROS otherwise negative.    VITAL SIGNS:  T(F): 98.1 (01-24-25 @ 11:56)  HR: 103 (01-24-25 @ 11:56)  BP: 94/53 (01-24-25 @ 11:56)  RR: 20 (01-24-25 @ 11:56)  SpO2: 95% (01-24-25 @ 11:56)  Wt(kg): --    PHYSICAL EXAM:    Constitutional: WDWN, NAD  HEENT: PERRL, EOMI, sclera non-icteric, neck supple, trachea midline, no masses, no JVD, MMM, good dentition  Respiratory: CTA b/l, good air entry b/l, no wheezing, no rhonchi, no rales, without accessory muscle use and no intercostal retractions  Cardiovascular: RRR, normal S1S2, no M/R/G  Gastrointestinal: soft, NTND, no masses palpable, BS normal  Extremities: Warm, well perfused, pulses equal bilateral upper and lower extremities, noted edema, no clubbing  Neurological: AAOx3, CN Grossly intact  Skin: Normal temperature, warm, dry    MEDICATIONS  (STANDING):  allopurinol 150 milliGRAM(s) Oral daily  ascorbic acid 500 milliGRAM(s) Oral daily  aspirin  chewable 81 milliGRAM(s) Oral daily  atorvastatin 10 milliGRAM(s) Oral at bedtime  azithromycin  IVPB      azithromycin  IVPB 500 milliGRAM(s) IV Intermittent every 24 hours  benzocaine 20% Spray 1 Spray(s) Topical three times a day  buMETAnide Injectable 2 milliGRAM(s) IV Push every 12 hours  cefepime   IVPB 2000 milliGRAM(s) IV Intermittent every 12 hours  cetirizine 10 milliGRAM(s) Oral daily  chlorhexidine 2% Cloths 1 Application(s) Topical daily  dextrose 5%. 1000 milliLiter(s) (50 mL/Hr) IV Continuous <Continuous>  dextrose 5%. 1000 milliLiter(s) (100 mL/Hr) IV Continuous <Continuous>  dextrose 50% Injectable 25 Gram(s) IV Push once  dextrose 50% Injectable 12.5 Gram(s) IV Push once  dextrose 50% Injectable 25 Gram(s) IV Push once  filgrastim-sndz (ZARXIO) Injectable 480 MICROGram(s) SubCutaneous every 24 hours  glucagon  Injectable 1 milliGRAM(s) IntraMuscular once  influenza  Vaccine (HIGH DOSE) 0.5 milliLiter(s) IntraMuscular once  insulin glargine Injectable (LANTUS) 30 Unit(s) SubCutaneous two times a day  insulin lispro (ADMELOG) corrective regimen sliding scale   SubCutaneous three times a day before meals  insulin lispro Injectable (ADMELOG) 20 Unit(s) SubCutaneous three times a day before meals  melatonin 10 milliGRAM(s) Oral at bedtime  metoprolol tartrate 25 milliGRAM(s) Oral two times a day  NIFEdipine XL 60 milliGRAM(s) Oral daily  nystatin    Suspension 513796 Unit(s) Swish and Swallow three times a day  oseltamivir 30 milliGRAM(s) Oral two times a day  pantoprazole    Tablet 40 milliGRAM(s) Oral before breakfast  polyethylene glycol 3350 17 Gram(s) Oral daily  potassium chloride  20 mEq/100 mL IVPB 20 milliEquivalent(s) IV Intermittent once  senna 2 Tablet(s) Oral at bedtime  sertraline 25 milliGRAM(s) Oral daily    MEDICATIONS  (PRN):  acetaminophen     Tablet .. 650 milliGRAM(s) Oral every 6 hours PRN Temp greater or equal to 38C (100.4F), Mild Pain (1 - 3)  albuterol/ipratropium for Nebulization 3 milliLiter(s) Nebulizer every 4 hours PRN Shortness of Breath and/or Wheezing  dextrose Oral Gel 15 Gram(s) Oral once PRN Blood Glucose LESS THAN 70 milliGRAM(s)/deciliter  diazepam    Tablet 2 milliGRAM(s) Oral daily PRN anxiety  haloperidol    Injectable 2.5 milliGRAM(s) IntraMuscular every 6 hours PRN Agitation  hydrOXYzine hydrochloride 25 milliGRAM(s) Oral every 6 hours PRN Anxiety  ondansetron Injectable 4 milliGRAM(s) IV Push every 6 hours PRN Nausea and/or Vomiting  oxyCODONE    IR 5 milliGRAM(s) Oral four times a day PRN Severe Pain (7 - 10)      Allergies    No Known Allergies    Intolerances        LABS:                        9.3    0.88  )-----------( 41       ( 24 Jan 2025 05:13 )             26.9     01-24    133[L]  |  88[L]  |  97[HH]  ----------------------------<  204[H]  3.4[L]   |  34[H]  |  2.5[H]    Ca    7.5[L]      24 Jan 2025 05:13  Mg     1.9     01-24    TPro  4.5[L]  /  Alb  2.5[L]  /  TBili  0.5  /  DBili  x   /  AST  90[H]  /  ALT  69[H]  /  AlkPhos  327[H]  01-24      Urinalysis Basic - ( 24 Jan 2025 05:13 )    Color: x / Appearance: x / SG: x / pH: x  Gluc: 204 mg/dL / Ketone: x  / Bili: x / Urobili: x   Blood: x / Protein: x / Nitrite: x   Leuk Esterase: x / RBC: x / WBC x   Sq Epi: x / Non Sq Epi: x / Bacteria: x        RADIOLOGY & ADDITIONAL TESTS:  Reviewed

## 2025-01-24 NOTE — PROGRESS NOTE ADULT - SUBJECTIVE AND OBJECTIVE BOX
Patient is a 69y old  Male who presents with a chief complaint of Shortness of breath (21 Jan 2025 19:42)        Over Night Events:  On HFNC. Afebrile. No pressors.       ROS:     All ROS are negative except HPI         PHYSICAL EXAM    ICU Vital Signs Last 24 Hrs  T(C): 36.9 (24 Jan 2025 08:05), Max: 37.2 (24 Jan 2025 00:00)  T(F): 98.4 (24 Jan 2025 08:05), Max: 98.9 (24 Jan 2025 00:00)  HR: 120 (24 Jan 2025 08:05) (100 - 128)  BP: 130/66 (24 Jan 2025 08:05) (128/60 - 141/62)  BP(mean): 89 (24 Jan 2025 08:05) (87 - 105)  ABP: --  ABP(mean): --  RR: 20 (24 Jan 2025 08:05) (20 - 22)  SpO2: 97% (24 Jan 2025 08:05) (93% - 99%)    O2 Parameters below as of 24 Jan 2025 08:05  Patient On (Oxygen Delivery Method): nasal cannula, high flow            CONSTITUTIONAL:  in NAD    ENT:   Airway patent,   Mouth with normal mucosa.     EYES:   Pupils equal,   Round and reactive to light.    CARDIAC:   Normal rate,   Regular rhythm.    LE edema    RESPIRATORY:   Bilateral BS  Normal chest expansion  Not tachypneic,  No use of accessory muscles    GASTROINTESTINAL:  Abdomen soft,   Non-tender,   No guarding,     MUSCULOSKELETAL:   Range of motion is not limited,  No clubbing, cyanosis    NEUROLOGICAL:   Alert and oriented   No motor  deficits.    SKIN:   Skin normal color for race,   Warm and dry      01-23-25 @ 07:01  -  01-24-25 @ 07:00  --------------------------------------------------------  IN:    IV PiggyBack: 350 mL    Oral Fluid: 300 mL  Total IN: 650 mL    OUT:    Indwelling Catheter - Urethral (mL): 2450 mL    Voided (mL): 300 mL  Total OUT: 2750 mL    Total NET: -2100 mL          LABS:                            9.3    0.88  )-----------( 41       ( 24 Jan 2025 05:13 )             26.9                                               01-24    133[L]  |  88[L]  |  97[HH]  ----------------------------<  204[H]  3.4[L]   |  34[H]  |  2.5[H]    Ca    7.5[L]      24 Jan 2025 05:13  Mg     1.9     01-24    TPro  4.5[L]  /  Alb  2.5[L]  /  TBili  0.5  /  DBili  x   /  AST  90[H]  /  ALT  69[H]  /  AlkPhos  327[H]  01-24                                             Urinalysis Basic - ( 24 Jan 2025 05:13 )    Color: x / Appearance: x / SG: x / pH: x  Gluc: 204 mg/dL / Ketone: x  / Bili: x / Urobili: x   Blood: x / Protein: x / Nitrite: x   Leuk Esterase: x / RBC: x / WBC x   Sq Epi: x / Non Sq Epi: x / Bacteria: x                                                  LIVER FUNCTIONS - ( 24 Jan 2025 05:13 )  Alb: 2.5 g/dL / Pro: 4.5 g/dL / ALK PHOS: 327 U/L / ALT: 69 U/L / AST: 90 U/L / GGT: x                                                                                                                                       MEDICATIONS  (STANDING):  allopurinol 150 milliGRAM(s) Oral daily  ascorbic acid 500 milliGRAM(s) Oral daily  aspirin  chewable 81 milliGRAM(s) Oral daily  atorvastatin 10 milliGRAM(s) Oral at bedtime  azithromycin  IVPB      azithromycin  IVPB 500 milliGRAM(s) IV Intermittent every 24 hours  buMETAnide Injectable 2 milliGRAM(s) IV Push every 12 hours  cefepime   IVPB 2000 milliGRAM(s) IV Intermittent every 12 hours  cetirizine 10 milliGRAM(s) Oral daily  chlorhexidine 2% Cloths 1 Application(s) Topical daily  dextrose 5%. 1000 milliLiter(s) (50 mL/Hr) IV Continuous <Continuous>  dextrose 5%. 1000 milliLiter(s) (100 mL/Hr) IV Continuous <Continuous>  dextrose 50% Injectable 25 Gram(s) IV Push once  dextrose 50% Injectable 12.5 Gram(s) IV Push once  dextrose 50% Injectable 25 Gram(s) IV Push once  filgrastim-sndz (ZARXIO) Injectable 480 MICROGram(s) SubCutaneous every 24 hours  glucagon  Injectable 1 milliGRAM(s) IntraMuscular once  influenza  Vaccine (HIGH DOSE) 0.5 milliLiter(s) IntraMuscular once  insulin glargine Injectable (LANTUS) 30 Unit(s) SubCutaneous two times a day  insulin lispro (ADMELOG) corrective regimen sliding scale   SubCutaneous three times a day before meals  insulin lispro Injectable (ADMELOG) 20 Unit(s) SubCutaneous three times a day before meals  melatonin 10 milliGRAM(s) Oral at bedtime  metoprolol tartrate 25 milliGRAM(s) Oral two times a day  NIFEdipine XL 60 milliGRAM(s) Oral daily  oseltamivir 30 milliGRAM(s) Oral two times a day  pantoprazole    Tablet 40 milliGRAM(s) Oral before breakfast  polyethylene glycol 3350 17 Gram(s) Oral daily  potassium chloride  20 mEq/100 mL IVPB 20 milliEquivalent(s) IV Intermittent once  senna 2 Tablet(s) Oral at bedtime  sertraline 25 milliGRAM(s) Oral daily    MEDICATIONS  (PRN):  acetaminophen     Tablet .. 650 milliGRAM(s) Oral every 6 hours PRN Temp greater or equal to 38C (100.4F), Mild Pain (1 - 3)  albuterol/ipratropium for Nebulization 3 milliLiter(s) Nebulizer every 4 hours PRN Shortness of Breath and/or Wheezing  dextrose Oral Gel 15 Gram(s) Oral once PRN Blood Glucose LESS THAN 70 milliGRAM(s)/deciliter  diazepam    Tablet 2 milliGRAM(s) Oral daily PRN anxiety  haloperidol    Injectable 2.5 milliGRAM(s) IntraMuscular every 6 hours PRN Agitation and anxiety  hydrOXYzine hydrochloride 25 milliGRAM(s) Oral every 6 hours PRN Anxiety  ondansetron Injectable 4 milliGRAM(s) IV Push every 6 hours PRN Nausea and/or Vomiting  oxyCODONE    IR 5 milliGRAM(s) Oral four times a day PRN Severe Pain (7 - 10)      New X-rays reviewed:                                                                                  ECHO     Patient is a 69y old  Male who presents with a chief complaint of Shortness of breath (21 Jan 2025 19:42)        Over Night Events:  Remains on HFNCO2.  SOB wiht exertion.         ROS:     All ROS are negative except HPI         PHYSICAL EXAM    ICU Vital Signs Last 24 Hrs  T(C): 36.9 (24 Jan 2025 08:05), Max: 37.2 (24 Jan 2025 00:00)  T(F): 98.4 (24 Jan 2025 08:05), Max: 98.9 (24 Jan 2025 00:00)  HR: 120 (24 Jan 2025 08:05) (100 - 128)  BP: 130/66 (24 Jan 2025 08:05) (128/60 - 141/62)  BP(mean): 89 (24 Jan 2025 08:05) (87 - 105)  ABP: --  ABP(mean): --  RR: 20 (24 Jan 2025 08:05) (20 - 22)  SpO2: 97% (24 Jan 2025 08:05) (93% - 99%)    O2 Parameters below as of 24 Jan 2025 08:05  Patient On (Oxygen Delivery Method): nasal cannula, high flow            CONSTITUTIONAL:  in NAD    ENT:   Airway patent,   Mouth with normal mucosa.     EYES:   Pupils equal,   Round and reactive to light.    CARDIAC:   Normal rate,   Regular rhythm.    LE edema    RESPIRATORY:   Bilateral BS  Normal chest expansion   tachypneic,  No use of accessory muscles    GASTROINTESTINAL:  Abdomen soft,   Non-tender,   No guarding,     MUSCULOSKELETAL:   Range of motion is not limited,  No clubbing, cyanosis    NEUROLOGICAL:   Alert and oriented   No motor  deficits.    SKIN:   Skin normal color for race,   Warm and dry      01-23-25 @ 07:01  -  01-24-25 @ 07:00  --------------------------------------------------------  IN:    IV PiggyBack: 350 mL    Oral Fluid: 300 mL  Total IN: 650 mL    OUT:    Indwelling Catheter - Urethral (mL): 2450 mL    Voided (mL): 300 mL  Total OUT: 2750 mL    Total NET: -2100 mL          LABS:                            9.3    0.88  )-----------( 41       ( 24 Jan 2025 05:13 )             26.9                                               01-24    133[L]  |  88[L]  |  97[HH]  ----------------------------<  204[H]  3.4[L]   |  34[H]  |  2.5[H]    Ca    7.5[L]      24 Jan 2025 05:13  Mg     1.9     01-24    TPro  4.5[L]  /  Alb  2.5[L]  /  TBili  0.5  /  DBili  x   /  AST  90[H]  /  ALT  69[H]  /  AlkPhos  327[H]  01-24                                             Urinalysis Basic - ( 24 Jan 2025 05:13 )    Color: x / Appearance: x / SG: x / pH: x  Gluc: 204 mg/dL / Ketone: x  / Bili: x / Urobili: x   Blood: x / Protein: x / Nitrite: x   Leuk Esterase: x / RBC: x / WBC x   Sq Epi: x / Non Sq Epi: x / Bacteria: x                                                  LIVER FUNCTIONS - ( 24 Jan 2025 05:13 )  Alb: 2.5 g/dL / Pro: 4.5 g/dL / ALK PHOS: 327 U/L / ALT: 69 U/L / AST: 90 U/L / GGT: x                                                                                                                                       MEDICATIONS  (STANDING):  allopurinol 150 milliGRAM(s) Oral daily  ascorbic acid 500 milliGRAM(s) Oral daily  aspirin  chewable 81 milliGRAM(s) Oral daily  atorvastatin 10 milliGRAM(s) Oral at bedtime  azithromycin  IVPB      azithromycin  IVPB 500 milliGRAM(s) IV Intermittent every 24 hours  buMETAnide Injectable 2 milliGRAM(s) IV Push every 12 hours  cefepime   IVPB 2000 milliGRAM(s) IV Intermittent every 12 hours  cetirizine 10 milliGRAM(s) Oral daily  chlorhexidine 2% Cloths 1 Application(s) Topical daily  dextrose 5%. 1000 milliLiter(s) (50 mL/Hr) IV Continuous <Continuous>  dextrose 5%. 1000 milliLiter(s) (100 mL/Hr) IV Continuous <Continuous>  dextrose 50% Injectable 25 Gram(s) IV Push once  dextrose 50% Injectable 12.5 Gram(s) IV Push once  dextrose 50% Injectable 25 Gram(s) IV Push once  filgrastim-sndz (ZARXIO) Injectable 480 MICROGram(s) SubCutaneous every 24 hours  glucagon  Injectable 1 milliGRAM(s) IntraMuscular once  influenza  Vaccine (HIGH DOSE) 0.5 milliLiter(s) IntraMuscular once  insulin glargine Injectable (LANTUS) 30 Unit(s) SubCutaneous two times a day  insulin lispro (ADMELOG) corrective regimen sliding scale   SubCutaneous three times a day before meals  insulin lispro Injectable (ADMELOG) 20 Unit(s) SubCutaneous three times a day before meals  melatonin 10 milliGRAM(s) Oral at bedtime  metoprolol tartrate 25 milliGRAM(s) Oral two times a day  NIFEdipine XL 60 milliGRAM(s) Oral daily  oseltamivir 30 milliGRAM(s) Oral two times a day  pantoprazole    Tablet 40 milliGRAM(s) Oral before breakfast  polyethylene glycol 3350 17 Gram(s) Oral daily  potassium chloride  20 mEq/100 mL IVPB 20 milliEquivalent(s) IV Intermittent once  senna 2 Tablet(s) Oral at bedtime  sertraline 25 milliGRAM(s) Oral daily    MEDICATIONS  (PRN):  acetaminophen     Tablet .. 650 milliGRAM(s) Oral every 6 hours PRN Temp greater or equal to 38C (100.4F), Mild Pain (1 - 3)  albuterol/ipratropium for Nebulization 3 milliLiter(s) Nebulizer every 4 hours PRN Shortness of Breath and/or Wheezing  dextrose Oral Gel 15 Gram(s) Oral once PRN Blood Glucose LESS THAN 70 milliGRAM(s)/deciliter  diazepam    Tablet 2 milliGRAM(s) Oral daily PRN anxiety  haloperidol    Injectable 2.5 milliGRAM(s) IntraMuscular every 6 hours PRN Agitation and anxiety  hydrOXYzine hydrochloride 25 milliGRAM(s) Oral every 6 hours PRN Anxiety  ondansetron Injectable 4 milliGRAM(s) IV Push every 6 hours PRN Nausea and/or Vomiting  oxyCODONE    IR 5 milliGRAM(s) Oral four times a day PRN Severe Pain (7 - 10)      New X-rays reviewed:                                                                                  ECHO

## 2025-01-24 NOTE — PROGRESS NOTE ADULT - ASSESSMENT
70 yo man w/ extensive PMH including DLBCL on chemotherapy, presenting for dyspnea and productive cough, found to have neutropenic fever 2/2 underlying PNA and influenza A. Stay complicated by desaturation and increased WOB, found to be in volume overload 2/2 HFpEF decompensation. Upgraded to SDU.    # Neutropenic fever  # Influenza A  #Acute hypoxic respiratory failure ISO Influenza PNA   - Received 1 dose of ZARXIO 480 mcg SC inpatient on 1/17/25, 2 doses at home over the weekend  - CTA 1/20: Negative for pulmonary emboli.Interval development of infiltrates within the lower lobes and lingula which may reflect acute multilobar pneumonia  - Influenza A positive -> C/W Tamiflu  - C/W Cefepime and Vancomycin in context of neutropenia --> pending MRSA nares.  - patient on HFNC now -> Wean oxygen as tolerated  - ID following   -Pulm recs 1/23: Add azithromycin, hold AC, PT/OT    #Large B-cell Lymphoma  #Concern for Rodriguez's  transformation   - Heme/Onc following   - Patient reports increased swelling and size of R inguinal mass for the past 2-3 months.   - CT pelvis on 10/12/24 showed interval increase of mass 10 x 7 x 10 cm with inguinal lymphadenopathy, suspicious for neoplastic process.  - Biopsy on 12/18/24 revealed diffuse large B-cell lymphoma with concern for Rodriguez's transformation.  - Patient was seen by Dr. Hilliard on 1/8/25 for initial consultation and was advised to come to ED due to concern for tumor lysis syndrome.  - Uric acid level now WNL at 7.9, CMP otherwise unremarkable.   - CT C/A/P 1.9.25 Stable appearance bulky right inguinal and right iliac lymphadenopathy including partially imaged 10.1 cm right inguinal soft tissue mass/lymph node.  - s/p PICC placement on 1/10/25  - s/p Bone marrow biopsy on 1/10/25  - Pending hepatitis panel  - Completed cycle 1 of R-EPOCH (D1 on 1/11/2024). Tolerated well. Rituximab was given on D5   - Uric acid 10.3 on 1/16/25, s/p 3 mg IV rasburicase  - Received 1 dose of ZARXIO 480 mcg SC inpatient on 1/17/25, 2 doses at home over the weekend  - Zarxio 480 mcg q 24 hr ordered.    #Sore throat  #Suspected oropharyngeal candidiasis  - No oral thrush noted  - Started on nystatin 500,000 units q8h for 10 days  - Benzocaine spray prn pain    #CKD 3B  #Chronic Lower Extremity Edema   - On home Lasix 80mg po q24   - Hold diuretics in context of sepsis and fever  - Avoid volume overload.  - daily assessment for resumption of diuretics    #CAD s/p CABG  #Chronic Afib on Xarelto  #HTN  - Echo 1/10/25: EF 60-65%  - C/W metoprolol 25mg po q12  - C/W Nifedipine xl 60mg po q24   - Still holding lisinopril and aldactone from last admission  - c/w Xarelto 15mg w/ dinner (hold if platelet continue to trend down)    # HLD   - c/w Pravastatin -> using lip 10 hs here  - resume Repatha on discharge    #DM, uncontrolled  - FS - self monitored via dex com  - Fingerstick glucose: 299  - On insulin pump @2.85 at home   - Endo consult 1/22: glargine 30 units twice daily, lispro 20 units before meals, very insulin resistant.     #Hypokalemia  - K: 3.3  - Start iv 20 mEQq 2 hrs for 3 doses.  - F/u 16:00 BMP    #Seasonal allergies  #URI  - c/w zyrtec      # DVT PPX: Xarelto  # GI PPX: Pantoprazole   # Code Status: Full code  # Diet: Dash  68 yo man w/ extensive PMH including DLBCL on chemotherapy, presenting for dyspnea and productive cough, found to have neutropenic fever 2/2 underlying PNA and influenza A. Stay complicated by desaturation and increased WOB, found to be in volume overload 2/2 HFpEF decompensation. Upgraded to SDU.    # Neutropenic fever  # Influenza A  #Acute hypoxic respiratory failure ISO Influenza PNA   - Received 1 dose of ZARXIO 480 mcg SC inpatient on 1/17/25, 2 doses at home over the weekend  - CTA 1/20: Negative for pulmonary emboli.Interval development of infiltrates within the lower lobes and lingula which may reflect acute multilobar pneumonia  - Influenza A positive -> C/W Tamiflu  - C/W Cefepime and Vancomycin in context of neutropenia --> pending MRSA nares.  - patient on HFNC now -> Wean oxygen as tolerated  - ID following   -Pulm recs 1/23: Add azithromycin, hold AC, PT/OT    #Large B-cell Lymphoma  #Concern for Rodriguez's  transformation   - Heme/Onc following   - Patient reports increased swelling and size of R inguinal mass for the past 2-3 months.   - CT pelvis on 10/12/24 showed interval increase of mass 10 x 7 x 10 cm with inguinal lymphadenopathy, suspicious for neoplastic process.  - Biopsy on 12/18/24 revealed diffuse large B-cell lymphoma with concern for Rodriguez's transformation.  - Patient was seen by Dr. Hilliard on 1/8/25 for initial consultation and was advised to come to ED due to concern for tumor lysis syndrome.  - Uric acid level now WNL at 7.9, CMP otherwise unremarkable.   - CT C/A/P 1.9.25 Stable appearance bulky right inguinal and right iliac lymphadenopathy including partially imaged 10.1 cm right inguinal soft tissue mass/lymph node.  - s/p PICC placement on 1/10/25  - s/p Bone marrow biopsy on 1/10/25  - Pending hepatitis panel  - Completed cycle 1 of R-EPOCH (D1 on 1/11/2024). Tolerated well. Rituximab was given on D5   - Uric acid 10.3 on 1/16/25, s/p 3 mg IV rasburicase  - Received 1 dose of ZARXIO 480 mcg SC inpatient on 1/17/25, 2 doses at home over the weekend  - Zarxio 480 mcg q 24 hr ordered.    #Sore throat  #Suspected oropharyngeal candidiasis  - No oral thrush noted  - Started on nystatin 500,000 units q8h for 10 days  - Benzocaine spray prn pain    #CKD 3B  #Chronic Lower Extremity Edema   - On home Lasix 80mg po q24   - Hold diuretics in context of sepsis and fever  - Avoid volume overload.  - daily assessment for resumption of diuretics    #CAD s/p CABG  #Chronic Afib on Xarelto  #HTN  - Echo 1/10/25: EF 60-65%  - C/W metoprolol 25mg po q12  - C/W Nifedipine xl 60mg po q24   - Still holding lisinopril and aldactone from last admission  - c/w Xarelto 15mg w/ dinner (hold if platelet continue to trend down)    # HLD   - c/w Pravastatin -> using lip 10 hs here  - resume Repatha on discharge    #DM, uncontrolled  - FS - self monitored via dex com  - Fingerstick glucose: 299  - On insulin pump @2.85 at home   - Endo consult 1/22: glargine 30 units twice daily, lispro 20 units before meals, very insulin resistant.     #Hypokalemia  -Replenished  - Trend BMP    #Seasonal allergies  #URI  - c/w zyrtec      # DVT PPX: off Xarelto 2/2 thrombocytopenia  # GI PPX: Pantoprazole   # Code Status: Full code  # Diet: Dash

## 2025-01-24 NOTE — PROGRESS NOTE ADULT - ASSESSMENT
ASSESSMENT  69-year-old man with extensive medical history including CAD (status post CABG;), DM (on insulin pump), atrial fibrillation on Xarelto, history of distal pancreatomy and splenectomy for unclear reason referred to the ED by his oncologist for abnormal labs. Patient was recently diagnosed with Diffuse large B cell Lymphoma on pathology of right groin mass and is following with [facility] oncology team. Patient was discharged on 01.17.2025 from hospital after inpatient chemotherapy (Completed cycle 1 of R-EPOCH (D1 on 1/11/2024). Tolerated well. Rituximab was given on D5).   Over past 3 days patient started to get a productive cough with yellow sputum production.  Patient is also endorsing chills and nausea which he states is baseline. He had 1 reading on low grade fever 100.5 on day of presentation.    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    #Abx allergy: No Known Allergies    RECOMMENDATIONS  - continue cefepime 2g q 12 hours   - decrease tamiflu to 30 mg BID for 10 days   - on azithromycin -- if urine legionella is negative, can stop   - trend ANC   - supplemental O2 by primary   - monitor fever curve     Please call or message on [employer] Teams if with any questions.  Spectra [phone]

## 2025-01-24 NOTE — PROGRESS NOTE ADULT - ASSESSMENT
69-year-old man with extensive medical history including CAD (status post CABG;), atrial fibrillation on Xarelto, history of distal pancreatomy and splenectomy for unclear reason referred to the ED by his oncologist for abnormal labs. Patient was recently diagnosed with Diffuse large B cell Lymphoma on pathology of right groin mass and was following his Oncologist Dr Angeli Hilliard.     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 Rituximab given D5    CKD Stage 3B  baseline (1.8)    Recommendations   Patient s/p cycle 1 R-EPOCH for DLBL, presenting with neutropenic fever.  Continue zarxio 480 mcg SC daily until ANC >1000 (Start date 1/17/25, skipped one dose on 1/20/25)  c/w vancomycin and cefepime  ID eval appreciated, Bcx NGTD  Hold xarelto, resume when plts > 50k      69-year-old man with extensive medical history including CAD (status post CABG;), atrial fibrillation on Xarelto, history of distal pancreatomy and splenectomy for unclear reason referred to the ED by his oncologist for abnormal labs. Patient was recently diagnosed with Diffuse large B cell Lymphoma on pathology of right groin mass and was following his Oncologist Dr Angeli Hilliard.     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 Rituximab given D5    CKD Stage 3B  baseline (1.8)    Recommendations   Patient s/p cycle 1 R-EPOCH for DLBL, presenting with neutropenic fever.  Continue zarxio 480 mcg SC daily until ANC >1000 (Start date 1/17/25, skipped one dose on 1/20/25)  c/w vancomycin and cefepime  ID eval appreciated, Bcx NGTD  Continue holding xarelto, resume when plts > 50k

## 2025-01-25 ENCOUNTER — RESULT REVIEW (OUTPATIENT)
Age: 70
End: 2025-01-25

## 2025-01-25 LAB
ALBUMIN SERPL ELPH-MCNC: 2.5 G/DL — LOW (ref 3.5–5.2)
ALP SERPL-CCNC: 389 U/L — HIGH (ref 30–115)
ALT FLD-CCNC: 76 U/L — HIGH (ref 0–41)
ANION GAP SERPL CALC-SCNC: 11 MMOL/L — SIGNIFICANT CHANGE UP (ref 7–14)
ANION GAP SERPL CALC-SCNC: 11 MMOL/L — SIGNIFICANT CHANGE UP (ref 7–14)
ANION GAP SERPL CALC-SCNC: 12 MMOL/L — SIGNIFICANT CHANGE UP (ref 7–14)
AST SERPL-CCNC: 99 U/L — HIGH (ref 0–41)
BASOPHILS # BLD AUTO: 0 K/UL — SIGNIFICANT CHANGE UP (ref 0–0.2)
BASOPHILS NFR BLD AUTO: 0 % — SIGNIFICANT CHANGE UP (ref 0–1)
BILIRUB SERPL-MCNC: 0.5 MG/DL — SIGNIFICANT CHANGE UP (ref 0.2–1.2)
BUN SERPL-MCNC: 101 MG/DL — CRITICAL HIGH (ref 10–20)
BUN SERPL-MCNC: 103 MG/DL — CRITICAL HIGH (ref 10–20)
BUN SERPL-MCNC: 97 MG/DL — CRITICAL HIGH (ref 10–20)
CALCIUM SERPL-MCNC: 7.7 MG/DL — LOW (ref 8.4–10.5)
CALCIUM SERPL-MCNC: 7.8 MG/DL — LOW (ref 8.4–10.5)
CALCIUM SERPL-MCNC: 8 MG/DL — LOW (ref 8.4–10.5)
CHLORIDE SERPL-SCNC: 88 MMOL/L — LOW (ref 98–110)
CHLORIDE SERPL-SCNC: 90 MMOL/L — LOW (ref 98–110)
CHLORIDE SERPL-SCNC: 91 MMOL/L — LOW (ref 98–110)
CO2 SERPL-SCNC: 33 MMOL/L — HIGH (ref 17–32)
CO2 SERPL-SCNC: 34 MMOL/L — HIGH (ref 17–32)
CO2 SERPL-SCNC: 35 MMOL/L — HIGH (ref 17–32)
CREAT SERPL-MCNC: 2.5 MG/DL — HIGH (ref 0.7–1.5)
CREAT SERPL-MCNC: 2.5 MG/DL — HIGH (ref 0.7–1.5)
CREAT SERPL-MCNC: 2.6 MG/DL — HIGH (ref 0.7–1.5)
CULTURE RESULTS: SIGNIFICANT CHANGE UP
CULTURE RESULTS: SIGNIFICANT CHANGE UP
EGFR: 26 ML/MIN/1.73M2 — LOW
EGFR: 27 ML/MIN/1.73M2 — LOW
EGFR: 27 ML/MIN/1.73M2 — LOW
EOSINOPHIL # BLD AUTO: 0 K/UL — SIGNIFICANT CHANGE UP (ref 0–0.7)
EOSINOPHIL NFR BLD AUTO: 0 % — SIGNIFICANT CHANGE UP (ref 0–8)
GLUCOSE BLDC GLUCOMTR-MCNC: 101 MG/DL — HIGH (ref 70–99)
GLUCOSE BLDC GLUCOMTR-MCNC: 116 MG/DL — HIGH (ref 70–99)
GLUCOSE BLDC GLUCOMTR-MCNC: 204 MG/DL — HIGH (ref 70–99)
GLUCOSE BLDC GLUCOMTR-MCNC: 92 MG/DL — SIGNIFICANT CHANGE UP (ref 70–99)
GLUCOSE SERPL-MCNC: 77 MG/DL — SIGNIFICANT CHANGE UP (ref 70–99)
GLUCOSE SERPL-MCNC: 83 MG/DL — SIGNIFICANT CHANGE UP (ref 70–99)
GLUCOSE SERPL-MCNC: 85 MG/DL — SIGNIFICANT CHANGE UP (ref 70–99)
HCT VFR BLD CALC: 26.8 % — LOW (ref 42–52)
HGB BLD-MCNC: 9.5 G/DL — LOW (ref 14–18)
LYMPHOCYTES # BLD AUTO: 0.16 K/UL — LOW (ref 1.2–3.4)
LYMPHOCYTES # BLD AUTO: 2.6 % — LOW (ref 20.5–51.1)
MAGNESIUM SERPL-MCNC: 1.9 MG/DL — SIGNIFICANT CHANGE UP (ref 1.8–2.4)
MAGNESIUM SERPL-MCNC: 2 MG/DL — SIGNIFICANT CHANGE UP (ref 1.8–2.4)
MCHC RBC-ENTMCNC: 31.1 PG — HIGH (ref 27–31)
MCHC RBC-ENTMCNC: 35.4 G/DL — SIGNIFICANT CHANGE UP (ref 32–37)
MCV RBC AUTO: 87.9 FL — SIGNIFICANT CHANGE UP (ref 80–94)
MONOCYTES # BLD AUTO: 0.79 K/UL — HIGH (ref 0.1–0.6)
MONOCYTES NFR BLD AUTO: 13.1 % — HIGH (ref 1.7–9.3)
MRSA PCR RESULT.: NEGATIVE — SIGNIFICANT CHANGE UP
NEUTROPHILS # BLD AUTO: 4.95 K/UL — SIGNIFICANT CHANGE UP (ref 1.4–6.5)
NEUTROPHILS NFR BLD AUTO: 81.7 % — HIGH (ref 42.2–75.2)
PHOSPHATE SERPL-MCNC: 2.8 MG/DL — SIGNIFICANT CHANGE UP (ref 2.1–4.9)
PLATELET # BLD AUTO: 61 K/UL — LOW (ref 130–400)
PMV BLD: 11.7 FL — HIGH (ref 7.4–10.4)
POTASSIUM SERPL-MCNC: 3.2 MMOL/L — LOW (ref 3.5–5)
POTASSIUM SERPL-MCNC: 3.4 MMOL/L — LOW (ref 3.5–5)
POTASSIUM SERPL-MCNC: 3.6 MMOL/L — SIGNIFICANT CHANGE UP (ref 3.5–5)
POTASSIUM SERPL-SCNC: 3.2 MMOL/L — LOW (ref 3.5–5)
POTASSIUM SERPL-SCNC: 3.4 MMOL/L — LOW (ref 3.5–5)
POTASSIUM SERPL-SCNC: 3.6 MMOL/L — SIGNIFICANT CHANGE UP (ref 3.5–5)
PROCALCITONIN SERPL-MCNC: 29.8 NG/ML — HIGH (ref 0.02–0.1)
PROT SERPL-MCNC: 4.6 G/DL — LOW (ref 6–8)
RBC # BLD: 3.05 M/UL — LOW (ref 4.7–6.1)
RBC # FLD: 13.6 % — SIGNIFICANT CHANGE UP (ref 11.5–14.5)
SODIUM SERPL-SCNC: 134 MMOL/L — LOW (ref 135–146)
SODIUM SERPL-SCNC: 135 MMOL/L — SIGNIFICANT CHANGE UP (ref 135–146)
SODIUM SERPL-SCNC: 136 MMOL/L — SIGNIFICANT CHANGE UP (ref 135–146)
SPECIMEN SOURCE: SIGNIFICANT CHANGE UP
SPECIMEN SOURCE: SIGNIFICANT CHANGE UP
WBC # BLD: 6.06 K/UL — SIGNIFICANT CHANGE UP (ref 4.8–10.8)
WBC # FLD AUTO: 6.06 K/UL — SIGNIFICANT CHANGE UP (ref 4.8–10.8)

## 2025-01-25 PROCEDURE — 99232 SBSQ HOSP IP/OBS MODERATE 35: CPT

## 2025-01-25 PROCEDURE — 93308 TTE F-UP OR LMTD: CPT | Mod: 26

## 2025-01-25 PROCEDURE — 99233 SBSQ HOSP IP/OBS HIGH 50: CPT

## 2025-01-25 PROCEDURE — 99291 CRITICAL CARE FIRST HOUR: CPT

## 2025-01-25 PROCEDURE — 71045 X-RAY EXAM CHEST 1 VIEW: CPT | Mod: 26

## 2025-01-25 RX ORDER — HEPARIN SODIUM 1000 [USP'U]/ML
5000 INJECTION INTRAVENOUS; SUBCUTANEOUS EVERY 8 HOURS
Refills: 0 | Status: DISCONTINUED | OUTPATIENT
Start: 2025-01-25 | End: 2025-01-26

## 2025-01-25 RX ORDER — METOPROLOL SUCCINATE 50 MG/1
25 TABLET, EXTENDED RELEASE ORAL ONCE
Refills: 0 | Status: COMPLETED | OUTPATIENT
Start: 2025-01-25 | End: 2025-01-25

## 2025-01-25 RX ADMIN — Medication 150 MILLIGRAM(S): at 10:38

## 2025-01-25 RX ADMIN — HEPARIN SODIUM 5000 UNIT(S): 1000 INJECTION INTRAVENOUS; SUBCUTANEOUS at 22:24

## 2025-01-25 RX ADMIN — Medication 500000 UNIT(S): at 05:53

## 2025-01-25 RX ADMIN — OXYCODONE HYDROCHLORIDE 5 MILLIGRAM(S): 30 TABLET ORAL at 13:15

## 2025-01-25 RX ADMIN — CEFEPIME 100 MILLIGRAM(S): 2 INJECTION, POWDER, FOR SOLUTION INTRAVENOUS at 17:00

## 2025-01-25 RX ADMIN — Medication 1 APPLICATION(S): at 10:45

## 2025-01-25 RX ADMIN — BUMETANIDE 2 MILLIGRAM(S): 1 TABLET ORAL at 17:00

## 2025-01-25 RX ADMIN — BENZOCAINE 1 SPRAY(S): 220 SPRAY, METERED PERIODONTAL at 15:29

## 2025-01-25 RX ADMIN — BENZOCAINE 1 SPRAY(S): 220 SPRAY, METERED PERIODONTAL at 05:54

## 2025-01-25 RX ADMIN — Medication 81 MILLIGRAM(S): at 10:36

## 2025-01-25 RX ADMIN — POLYETHYLENE GLYCOL 3350 17 GRAM(S): 17 POWDER, FOR SOLUTION ORAL at 10:39

## 2025-01-25 RX ADMIN — INSULIN GLARGINE-YFGN 30 UNIT(S): 100 INJECTION, SOLUTION SUBCUTANEOUS at 22:25

## 2025-01-25 RX ADMIN — HEPARIN SODIUM 5000 UNIT(S): 1000 INJECTION INTRAVENOUS; SUBCUTANEOUS at 15:22

## 2025-01-25 RX ADMIN — INSULIN LISPRO 20 UNIT(S): 100 INJECTION, SOLUTION INTRAVENOUS; SUBCUTANEOUS at 12:02

## 2025-01-25 RX ADMIN — OSELTAMIVIR PHOSPHATE 30 MILLIGRAM(S): 75 CAPSULE ORAL at 05:59

## 2025-01-25 RX ADMIN — Medication 255 MILLIGRAM(S): at 10:25

## 2025-01-25 RX ADMIN — Medication 10 MILLIGRAM(S): at 10:38

## 2025-01-25 RX ADMIN — Medication 40 MILLIGRAM(S): at 05:59

## 2025-01-25 RX ADMIN — Medication 500 MILLIGRAM(S): at 10:37

## 2025-01-25 RX ADMIN — HYDROXYZINE HYDROCHLORIDE 25 MILLIGRAM(S): 25 TABLET, FILM COATED ORAL at 17:02

## 2025-01-25 RX ADMIN — HYDROXYZINE HYDROCHLORIDE 25 MILLIGRAM(S): 25 TABLET, FILM COATED ORAL at 10:41

## 2025-01-25 RX ADMIN — METOPROLOL SUCCINATE 25 MILLIGRAM(S): 50 TABLET, EXTENDED RELEASE ORAL at 10:25

## 2025-01-25 RX ADMIN — Medication 40 MILLIEQUIVALENT(S): at 10:24

## 2025-01-25 RX ADMIN — METOPROLOL SUCCINATE 25 MILLIGRAM(S): 50 TABLET, EXTENDED RELEASE ORAL at 05:54

## 2025-01-25 RX ADMIN — METOPROLOL SUCCINATE 25 MILLIGRAM(S): 50 TABLET, EXTENDED RELEASE ORAL at 17:00

## 2025-01-25 RX ADMIN — Medication 60 MILLIGRAM(S): at 05:55

## 2025-01-25 RX ADMIN — OSELTAMIVIR PHOSPHATE 30 MILLIGRAM(S): 75 CAPSULE ORAL at 17:01

## 2025-01-25 RX ADMIN — SERTRALINE 25 MILLIGRAM(S): 100 TABLET, FILM COATED ORAL at 10:37

## 2025-01-25 RX ADMIN — OXYCODONE HYDROCHLORIDE 5 MILLIGRAM(S): 30 TABLET ORAL at 10:38

## 2025-01-25 RX ADMIN — CEFEPIME 100 MILLIGRAM(S): 2 INJECTION, POWDER, FOR SOLUTION INTRAVENOUS at 05:55

## 2025-01-25 RX ADMIN — BUMETANIDE 2 MILLIGRAM(S): 1 TABLET ORAL at 05:56

## 2025-01-25 RX ADMIN — DIAZEPAM 2 MILLIGRAM(S): 2 TABLET ORAL at 05:58

## 2025-01-25 RX ADMIN — INSULIN LISPRO 2: 100 INJECTION, SOLUTION INTRAVENOUS; SUBCUTANEOUS at 12:02

## 2025-01-25 RX ADMIN — INSULIN GLARGINE-YFGN 30 UNIT(S): 100 INJECTION, SOLUTION SUBCUTANEOUS at 08:04

## 2025-01-25 RX ADMIN — Medication 40 MILLIEQUIVALENT(S): at 15:22

## 2025-01-25 NOTE — PROGRESS NOTE ADULT - SUBJECTIVE AND OBJECTIVE BOX
Nephrology progress note    Patient is seen and examined, events over the last 24 h noted.    Allergies:  No Known Allergies    Hospital Medications:   MEDICATIONS  (STANDING):  allopurinol 150 milliGRAM(s) Oral daily  ascorbic acid 500 milliGRAM(s) Oral daily  aspirin  chewable 81 milliGRAM(s) Oral daily  atorvastatin 10 milliGRAM(s) Oral at bedtime  azithromycin  IVPB      azithromycin  IVPB 500 milliGRAM(s) IV Intermittent every 24 hours  benzocaine 20% Spray 1 Spray(s) Topical three times a day  buMETAnide Injectable 2 milliGRAM(s) IV Push every 12 hours  cefepime   IVPB 2000 milliGRAM(s) IV Intermittent every 12 hours  cetirizine 10 milliGRAM(s) Oral daily  chlorhexidine 2% Cloths 1 Application(s) Topical daily  dextrose 5%. 1000 milliLiter(s) (100 mL/Hr) IV Continuous <Continuous>  dextrose 5%. 1000 milliLiter(s) (50 mL/Hr) IV Continuous <Continuous>  dextrose 50% Injectable 25 Gram(s) IV Push once  dextrose 50% Injectable 12.5 Gram(s) IV Push once  dextrose 50% Injectable 25 Gram(s) IV Push once  filgrastim-sndz (ZARXIO) Injectable 480 MICROGram(s) SubCutaneous every 24 hours  glucagon  Injectable 1 milliGRAM(s) IntraMuscular once  influenza  Vaccine (HIGH DOSE) 0.5 milliLiter(s) IntraMuscular once  insulin glargine Injectable (LANTUS) 30 Unit(s) SubCutaneous two times a day  insulin lispro (ADMELOG) corrective regimen sliding scale   SubCutaneous three times a day before meals  insulin lispro Injectable (ADMELOG) 20 Unit(s) SubCutaneous three times a day before meals  melatonin 10 milliGRAM(s) Oral at bedtime  metoprolol tartrate 25 milliGRAM(s) Oral two times a day  NIFEdipine XL 60 milliGRAM(s) Oral daily  nystatin    Suspension 198193 Unit(s) Swish and Swallow three times a day  oseltamivir 30 milliGRAM(s) Oral two times a day  pantoprazole    Tablet 40 milliGRAM(s) Oral before breakfast  polyethylene glycol 3350 17 Gram(s) Oral daily  potassium chloride    Tablet ER 40 milliEquivalent(s) Oral every 4 hours  potassium chloride  20 mEq/100 mL IVPB 20 milliEquivalent(s) IV Intermittent once  senna 2 Tablet(s) Oral at bedtime  sertraline 25 milliGRAM(s) Oral daily        VITALS:  T(F): 97.9 (01-25-25 @ 08:00), Max: 98.8 (01-24-25 @ 16:00)  HR: 107 (01-25-25 @ 08:00)  BP: 111/56 (01-25-25 @ 08:00)  RR: 20 (01-25-25 @ 08:00)  SpO2: 91% (01-25-25 @ 04:00)  Wt(kg): --    01-23 @ 07:01  -  01-24 @ 07:00  --------------------------------------------------------  IN: 650 mL / OUT: 2750 mL / NET: -2100 mL    01-24 @ 07:01 - 01-25 @ 07:00  --------------------------------------------------------  IN: 120 mL / OUT: 2250 mL / NET: -2130 mL          PHYSICAL EXAM:  Constitutional: NAD  HEENT: anicteric sclera, oropharynx clear, MMM  Neck: No JVD  Respiratory: on  Hi alia  Cardiovascular: S1, S2, RRR  Gastrointestinal: BS+, soft, NT/ND  Extremities: No cyanosis or clubbing. +++ peripheral edema  :  No moss.   Skin: No rashes      LABS:  01-25    134[L]  |  90[L]  |  101[HH]  ----------------------------<  83  3.2[L]   |  33[H]  |  2.5[H]    Ca    7.7[L]      25 Jan 2025 05:02  Phos  2.8     01-25  Mg     2.0     01-25    TPro  4.6[L]  /  Alb  2.5[L]  /  TBili  0.5  /  DBili      /  AST  99[H]  /  ALT  76[H]  /  AlkPhos  389[H]  01-25                          9.5    6.06  )-----------( 61       ( 25 Jan 2025 05:02 )             26.8       Urine Studies:  Urinalysis Basic - ( 25 Jan 2025 05:02 )    Color:  / Appearance:  / SG:  / pH:   Gluc: 83 mg/dL / Ketone:   / Bili:  / Urobili:    Blood:  / Protein:  / Nitrite:    Leuk Esterase:  / RBC:  / WBC    Sq Epi:  / Non Sq Epi:  / Bacteria:         RADIOLOGY & ADDITIONAL STUDIES:

## 2025-01-25 NOTE — PROGRESS NOTE ADULT - ASSESSMENT
IMPRESSION:    Acute hypoxemic respiratory failure   Influenza A infection / pneumonia   RASHAD / OHS ?  Possible superimposed bacterial pneumonia   Fluid overload / bilateral pleural effusions   HO Large B Cell lymphoma SP Chemotherapy   Pancytopenia chem induced   HO CAD (status post CABG;),  HO DM (on insulin pump)  HO atrial fibrillation on Xarelto  HO distal pancreatomy and splenectomy     PLAN:    CNS:  Avoid sedation.     HEENT: Oral care    PULMONARY:  HOB @ 45 degrees.  Aspiration precautions.  Repeat CXR noted.  Wean HFNCo2 as tolerated.  keep O2 sat 92-96%.  Incentive spirometry.  NIV 4 on/ 4 off during sleep.     CARDIOVASCULAR:  Continue Volume contraction as tolerated.  BUmex 2 mg BID for now.  Target -2 L daily, otherwise add metolazone.     GI: GI prophylaxis.  Feeding.  Bowel regimen     RENAL:  Follow up lytes.  Correct as needed.  Monitor UO. CW Bumex.     INFECTIOUS DISEASE: Follow up cultures.  Procal 29.  Nasal MRSA negative. DARNELL vanc and azithro.  Continue ABX and Tamiflu per ID.    HEMATOLOGICAL: Start DVT prophylaxis today.  if plt > 50k resume AC tomorrow.  Monitor CBC.  Hem onc eval noted.  DARNELL MCDONOUGH     ENDOCRINE:  Follow up FS.  Insulin protocol if needed.    MUSCULOSKELETAL:  OOB as tolerated with PT OT     SDU     Prognosis overall guarded  IMPRESSION:    Acute hypoxemic respiratory failure   Influenza A infection / pneumonia   RASHAD / OHS ?  Possible superimposed bacterial pneumonia   Fluid overload / bilateral pleural effusions   HO Large B Cell lymphoma SP Chemotherapy   Pancytopenia chem induced   HO CAD (status post CABG;),  HO DM (on insulin pump)  HO atrial fibrillation on Xarelto  HO distal pancreatomy and splenectomy     PLAN:    CNS:  Avoid sedation.     HEENT: Oral care    PULMONARY:  HOB @ 45 degrees.  Aspiration precautions.  Repeat CXR noted.  Wean HFNCo2 as tolerated.  keep O2 sat 92%-96%.  Incentive spirometry.  NIV 4 on/ 4 off during sleep.     CARDIOVASCULAR:  Continue Volume contraction as tolerated.  Bumex 2 mg BID for now.  Target -2 L daily, otherwise add metolazone.     GI: GI prophylaxis. PO Feeding.  Bowel regimen     RENAL:  Follow up lytes.  Correct as needed.  Monitor UO. CW Bumex.     INFECTIOUS DISEASE: Follow up cultures.  Procal 29.  Nasal MRSA negative. DC vanc and azithro.  Continue ABX and Tamiflu per ID.    HEMATOLOGICAL: Start DVT prophylaxis today.  if plt > 50k resume AC tomorrow.  Monitor CBC.  Hem onc eval noted.  DARNELL MCDONOUGH     ENDOCRINE:  Follow up FS.  Insulin protocol if needed.    MUSCULOSKELETAL:  OOB as tolerated with PT OT     SDU     Prognosis overall guarded

## 2025-01-25 NOTE — PROGRESS NOTE ADULT - SUBJECTIVE AND OBJECTIVE BOX
Patient is a 69y old  Male who presents with a chief complaint of Shortness of breath (21 Jan 2025 19:42)        Over Night Events: On HFNC        ROS:     All ROS are negative except HPI       PHYSICAL EXAM    ICU Vital Signs Last 24 Hrs  T(C): 36.7 (25 Jan 2025 04:00), Max: 37.1 (24 Jan 2025 16:00)  T(F): 98.1 (25 Jan 2025 04:00), Max: 98.8 (24 Jan 2025 16:00)  HR: 113 (25 Jan 2025 04:00) (95 - 120)  BP: 125/68 (25 Jan 2025 04:00) (94/53 - 146/69)  BP(mean): 91 (25 Jan 2025 04:00) (84 - 97)  RR: 20 (25 Jan 2025 04:00) (20 - 20)  SpO2: 91% (25 Jan 2025 04:00) (91% - 99%)    O2 Parameters below as of 24 Jan 2025 20:00  Patient On (Oxygen Delivery Method): nasal cannula, high flow      ENT: Airway patent,  EYES: Pupils equal, Round and reactive to light.  CARDIAC: Normal rate, Regular rhythm.    RESPIRATORY: No wheezing, Bilateral crackles and low BS at bases, Not tachypneic, No use of accessory muscles  GASTROINTESTINAL: Abdomen soft, Non-tender, No guarding,   NEUROLOGICAL: Alert and oriented   SKIN: Skin normal color for race, Warm and dry and intact.         01-24-25 @ 07:01  -  01-25-25 @ 07:00  --------------------------------------------------------  IN:    Oral Fluid: 120 mL  Total IN: 120 mL    OUT:    Indwelling Catheter - Urethral (mL): 2250 mL  Total OUT: 2250 mL    Total NET: -2130 mL        LABS:                            9.5    6.06  )-----------( 61       ( 25 Jan 2025 05:02 )             26.8                                               9.5    6.06  )-----------( 61       ( 01-25 @ 05:02 )             26.8                9.3    0.88  )-----------( 41       ( 01-24 @ 05:13 )             26.9                9.2    0.56  )-----------( 40       ( 01-24 @ 01:03 )             26.6                9.7    0.13  )-----------( 17       ( 01-23 @ 06:02 )             28.0                9.6    0.13  )-----------( 31       ( 01-22 @ 08:18 )             28.3                      01-25    134[L]  |  90[L]  |  101[HH]  ----------------------------<  83  3.2[L]   |  33[H]  |  2.5[H]    Ca    7.7[L]      25 Jan 2025 05:02  Phos  2.8     01-25  Mg     2.0     01-25    TPro  4.6[L]  /  Alb  2.5[L]  /  TBili  0.5  /  DBili  x   /  AST  99[H]  /  ALT  76[H]  /  AlkPhos  389[H]  01-25                                             Urinalysis Basic - ( 25 Jan 2025 05:02 )    Color: x / Appearance: x / SG: x / pH: x  Gluc: 83 mg/dL / Ketone: x  / Bili: x / Urobili: x   Blood: x / Protein: x / Nitrite: x   Leuk Esterase: x / RBC: x / WBC x   Sq Epi: x / Non Sq Epi: x / Bacteria: x                                                  LIVER FUNCTIONS - ( 25 Jan 2025 05:02 )  Alb: 2.5 g/dL / Pro: 4.6 g/dL / ALK PHOS: 389 U/L / ALT: 76 U/L / AST: 99 U/L / GGT: x                                                                                                                                    MEDICATIONS  (STANDING):  allopurinol 150 milliGRAM(s) Oral daily  ascorbic acid 500 milliGRAM(s) Oral daily  aspirin  chewable 81 milliGRAM(s) Oral daily  atorvastatin 10 milliGRAM(s) Oral at bedtime  azithromycin  IVPB      azithromycin  IVPB 500 milliGRAM(s) IV Intermittent every 24 hours  benzocaine 20% Spray 1 Spray(s) Topical three times a day  buMETAnide Injectable 2 milliGRAM(s) IV Push every 12 hours  cefepime   IVPB 2000 milliGRAM(s) IV Intermittent every 12 hours  cetirizine 10 milliGRAM(s) Oral daily  chlorhexidine 2% Cloths 1 Application(s) Topical daily  dextrose 5%. 1000 milliLiter(s) (100 mL/Hr) IV Continuous <Continuous>  dextrose 5%. 1000 milliLiter(s) (50 mL/Hr) IV Continuous <Continuous>  dextrose 50% Injectable 25 Gram(s) IV Push once  dextrose 50% Injectable 12.5 Gram(s) IV Push once  dextrose 50% Injectable 25 Gram(s) IV Push once  filgrastim-sndz (ZARXIO) Injectable 480 MICROGram(s) SubCutaneous every 24 hours  glucagon  Injectable 1 milliGRAM(s) IntraMuscular once  influenza  Vaccine (HIGH DOSE) 0.5 milliLiter(s) IntraMuscular once  insulin glargine Injectable (LANTUS) 30 Unit(s) SubCutaneous two times a day  insulin lispro (ADMELOG) corrective regimen sliding scale   SubCutaneous three times a day before meals  insulin lispro Injectable (ADMELOG) 20 Unit(s) SubCutaneous three times a day before meals  melatonin 10 milliGRAM(s) Oral at bedtime  metoprolol tartrate 25 milliGRAM(s) Oral two times a day  NIFEdipine XL 60 milliGRAM(s) Oral daily  nystatin    Suspension 325824 Unit(s) Swish and Swallow three times a day  oseltamivir 30 milliGRAM(s) Oral two times a day  pantoprazole    Tablet 40 milliGRAM(s) Oral before breakfast  polyethylene glycol 3350 17 Gram(s) Oral daily  potassium chloride    Tablet ER 40 milliEquivalent(s) Oral every 4 hours  potassium chloride  20 mEq/100 mL IVPB 20 milliEquivalent(s) IV Intermittent once  senna 2 Tablet(s) Oral at bedtime  sertraline 25 milliGRAM(s) Oral daily    MEDICATIONS  (PRN):  acetaminophen     Tablet .. 650 milliGRAM(s) Oral every 6 hours PRN Temp greater or equal to 38C (100.4F), Mild Pain (1 - 3)  albuterol/ipratropium for Nebulization 3 milliLiter(s) Nebulizer every 4 hours PRN Shortness of Breath and/or Wheezing  dextrose Oral Gel 15 Gram(s) Oral once PRN Blood Glucose LESS THAN 70 milliGRAM(s)/deciliter  diazepam    Tablet 2 milliGRAM(s) Oral daily PRN anxiety  haloperidol    Injectable 2.5 milliGRAM(s) IntraMuscular every 6 hours PRN Agitation  hydrOXYzine hydrochloride 25 milliGRAM(s) Oral every 6 hours PRN Anxiety  ondansetron Injectable 4 milliGRAM(s) IV Push every 6 hours PRN Nausea and/or Vomiting  oxyCODONE    IR 5 milliGRAM(s) Oral four times a day PRN Severe Pain (7 - 10)         Patient is a 69y old  Male who presents with a chief complaint of Shortness of breath (21 Jan 2025 19:42)        Over Night Events: On HFNC 60/90.        ROS:     All ROS are negative except HPI       PHYSICAL EXAM    ICU Vital Signs Last 24 Hrs  T(C): 36.7 (25 Jan 2025 04:00), Max: 37.1 (24 Jan 2025 16:00)  T(F): 98.1 (25 Jan 2025 04:00), Max: 98.8 (24 Jan 2025 16:00)  HR: 113 (25 Jan 2025 04:00) (95 - 120)  BP: 125/68 (25 Jan 2025 04:00) (94/53 - 146/69)  BP(mean): 91 (25 Jan 2025 04:00) (84 - 97)  RR: 20 (25 Jan 2025 04:00) (20 - 20)  SpO2: 91% (25 Jan 2025 04:00) (91% - 99%)    O2 Parameters below as of 24 Jan 2025 20:00  Patient On (Oxygen Delivery Method): nasal cannula, high flow      ENT: Airway patent,  EYES: Pupils equal, Round and reactive to light.  CARDIAC: Normal rate, Regular rhythm.    RESPIRATORY: No wheezing, Bilateral crackles and low BS at bases, Not tachypneic, No use of accessory muscles  GASTROINTESTINAL: Abdomen soft, Non-tender, No guarding,   NEUROLOGICAL: Alert and oriented   SKIN: Skin normal color for race, Warm and dry and intact.         01-24-25 @ 07:01  -  01-25-25 @ 07:00  --------------------------------------------------------  IN:    Oral Fluid: 120 mL  Total IN: 120 mL    OUT:    Indwelling Catheter - Urethral (mL): 2250 mL  Total OUT: 2250 mL    Total NET: -2130 mL        LABS:                            9.5    6.06  )-----------( 61       ( 25 Jan 2025 05:02 )             26.8                                               9.5    6.06  )-----------( 61       ( 01-25 @ 05:02 )             26.8                9.3    0.88  )-----------( 41       ( 01-24 @ 05:13 )             26.9                9.2    0.56  )-----------( 40       ( 01-24 @ 01:03 )             26.6                9.7    0.13  )-----------( 17       ( 01-23 @ 06:02 )             28.0                9.6    0.13  )-----------( 31       ( 01-22 @ 08:18 )             28.3                      01-25    134[L]  |  90[L]  |  101[HH]  ----------------------------<  83  3.2[L]   |  33[H]  |  2.5[H]    Ca    7.7[L]      25 Jan 2025 05:02  Phos  2.8     01-25  Mg     2.0     01-25    TPro  4.6[L]  /  Alb  2.5[L]  /  TBili  0.5  /  DBili  x   /  AST  99[H]  /  ALT  76[H]  /  AlkPhos  389[H]  01-25                                             Urinalysis Basic - ( 25 Jan 2025 05:02 )    Color: x / Appearance: x / SG: x / pH: x  Gluc: 83 mg/dL / Ketone: x  / Bili: x / Urobili: x   Blood: x / Protein: x / Nitrite: x   Leuk Esterase: x / RBC: x / WBC x   Sq Epi: x / Non Sq Epi: x / Bacteria: x                                                  LIVER FUNCTIONS - ( 25 Jan 2025 05:02 )  Alb: 2.5 g/dL / Pro: 4.6 g/dL / ALK PHOS: 389 U/L / ALT: 76 U/L / AST: 99 U/L / GGT: x                                                                                                                                    MEDICATIONS  (STANDING):  allopurinol 150 milliGRAM(s) Oral daily  ascorbic acid 500 milliGRAM(s) Oral daily  aspirin  chewable 81 milliGRAM(s) Oral daily  atorvastatin 10 milliGRAM(s) Oral at bedtime  azithromycin  IVPB      azithromycin  IVPB 500 milliGRAM(s) IV Intermittent every 24 hours  benzocaine 20% Spray 1 Spray(s) Topical three times a day  buMETAnide Injectable 2 milliGRAM(s) IV Push every 12 hours  cefepime   IVPB 2000 milliGRAM(s) IV Intermittent every 12 hours  cetirizine 10 milliGRAM(s) Oral daily  chlorhexidine 2% Cloths 1 Application(s) Topical daily  dextrose 5%. 1000 milliLiter(s) (100 mL/Hr) IV Continuous <Continuous>  dextrose 5%. 1000 milliLiter(s) (50 mL/Hr) IV Continuous <Continuous>  dextrose 50% Injectable 25 Gram(s) IV Push once  dextrose 50% Injectable 12.5 Gram(s) IV Push once  dextrose 50% Injectable 25 Gram(s) IV Push once  filgrastim-sndz (ZARXIO) Injectable 480 MICROGram(s) SubCutaneous every 24 hours  glucagon  Injectable 1 milliGRAM(s) IntraMuscular once  influenza  Vaccine (HIGH DOSE) 0.5 milliLiter(s) IntraMuscular once  insulin glargine Injectable (LANTUS) 30 Unit(s) SubCutaneous two times a day  insulin lispro (ADMELOG) corrective regimen sliding scale   SubCutaneous three times a day before meals  insulin lispro Injectable (ADMELOG) 20 Unit(s) SubCutaneous three times a day before meals  melatonin 10 milliGRAM(s) Oral at bedtime  metoprolol tartrate 25 milliGRAM(s) Oral two times a day  NIFEdipine XL 60 milliGRAM(s) Oral daily  nystatin    Suspension 836509 Unit(s) Swish and Swallow three times a day  oseltamivir 30 milliGRAM(s) Oral two times a day  pantoprazole    Tablet 40 milliGRAM(s) Oral before breakfast  polyethylene glycol 3350 17 Gram(s) Oral daily  potassium chloride    Tablet ER 40 milliEquivalent(s) Oral every 4 hours  potassium chloride  20 mEq/100 mL IVPB 20 milliEquivalent(s) IV Intermittent once  senna 2 Tablet(s) Oral at bedtime  sertraline 25 milliGRAM(s) Oral daily    MEDICATIONS  (PRN):  acetaminophen     Tablet .. 650 milliGRAM(s) Oral every 6 hours PRN Temp greater or equal to 38C (100.4F), Mild Pain (1 - 3)  albuterol/ipratropium for Nebulization 3 milliLiter(s) Nebulizer every 4 hours PRN Shortness of Breath and/or Wheezing  dextrose Oral Gel 15 Gram(s) Oral once PRN Blood Glucose LESS THAN 70 milliGRAM(s)/deciliter  diazepam    Tablet 2 milliGRAM(s) Oral daily PRN anxiety  haloperidol    Injectable 2.5 milliGRAM(s) IntraMuscular every 6 hours PRN Agitation  hydrOXYzine hydrochloride 25 milliGRAM(s) Oral every 6 hours PRN Anxiety  ondansetron Injectable 4 milliGRAM(s) IV Push every 6 hours PRN Nausea and/or Vomiting  oxyCODONE    IR 5 milliGRAM(s) Oral four times a day PRN Severe Pain (7 - 10)

## 2025-01-25 NOTE — PROGRESS NOTE ADULT - SUBJECTIVE AND OBJECTIVE BOX
MORGAN BUSH  69y Male    CHIEF COMPLAINT:    Patient is a 69y old  Male who presents with a chief complaint of Shortness of breath (2025 19:42)    INTERVAL HPI/OVERNIGHT EVENTS:    Patient seen and examined. No acute events overnight. Remains critically ill on HFNC     ROS: All other systems are negative.    Vital Signs:    T(F): 97.9 (25 @ 08:00), Max: 98.8 (25 @ 16:00)  HR: 107 (25 @ 08:00) (95 - 113)  BP: 111/56 (25 @ 08:00) (94/53 - 146/69)  RR: 20 (25 @ 08:00) (20 - 20)  SpO2: 91% (25 @ 04:00) (91% - 99%)    2025 07:01  -  2025 07:00  --------------------------------------------------------  IN: 120 mL / OUT: 2250 mL / NET: -2130 mL    Daily Weight in k (2025 00:00)    POCT Blood Glucose.: 116 mg/dL (2025 07:39)  POCT Blood Glucose.: 109 mg/dL (2025 21:02)  POCT Blood Glucose.: 101 mg/dL (2025 16:24)  POCT Blood Glucose.: 120 mg/dL (2025 11:26)  POCT Blood Glucose.: 179 mg/dL (2025 10:06)    PHYSICAL EXAM:    GENERAL:  NAD chronically ill appearing   SKIN: No rashes or lesions  HEENT: Atraumatic. Normocephalic.   NECK: Supple, No JVD.   PULMONARY: Crackles B/L. No wheezing. No rales  CVS: Normal S1, S2. Rate and Rhythm are regular  ABDOMEN/GI: Soft, Nontender, Nondistended  MSK:  No clubbign or cyanosis  NEUROLOGIC: follows commands   PSYCH: Awake and alert, agitated at times     Consultant(s) Notes Reviewed:  [x ] YES  [ ] NO  Care Discussed with Consultants/Other Providers [ x] YES  [ ] NO    LABS:                        9.5    6.06  )-----------( 61       ( 2025 05:02 )             26.8     134[L]  |  90[L]  |  101[HH]  ----------------------------<  83  3.2[L]   |  33[H]  |  2.5[H]    Ca    7.7[L]      2025 05:02  Phos  2.8       Mg     2.0         TPro  4.6[L]  /  Alb  2.5[L]  /  TBili  0.5  /  DBili  x   /  AST  99[H]  /  ALT  76[H]  /  AlkPhos  389[H]      RADIOLOGY & ADDITIONAL TESTS:  Imaging or report Personally Reviewed:  [x] YES  [ ] NO  EKG reviewed: [x] YES  [ ] NO    Medications:  Standing  allopurinol 150 milliGRAM(s) Oral daily  ascorbic acid 500 milliGRAM(s) Oral daily  aspirin  chewable 81 milliGRAM(s) Oral daily  atorvastatin 10 milliGRAM(s) Oral at bedtime  azithromycin  IVPB      azithromycin  IVPB 500 milliGRAM(s) IV Intermittent every 24 hours  benzocaine 20% Spray 1 Spray(s) Topical three times a day  buMETAnide Injectable 2 milliGRAM(s) IV Push every 12 hours  cefepime   IVPB 2000 milliGRAM(s) IV Intermittent every 12 hours  cetirizine 10 milliGRAM(s) Oral daily  chlorhexidine 2% Cloths 1 Application(s) Topical daily  dextrose 5%. 1000 milliLiter(s) IV Continuous <Continuous>  dextrose 5%. 1000 milliLiter(s) IV Continuous <Continuous>  dextrose 50% Injectable 25 Gram(s) IV Push once  dextrose 50% Injectable 12.5 Gram(s) IV Push once  dextrose 50% Injectable 25 Gram(s) IV Push once  filgrastim-sndz (ZARXIO) Injectable 480 MICROGram(s) SubCutaneous every 24 hours  glucagon  Injectable 1 milliGRAM(s) IntraMuscular once  influenza  Vaccine (HIGH DOSE) 0.5 milliLiter(s) IntraMuscular once  insulin glargine Injectable (LANTUS) 30 Unit(s) SubCutaneous two times a day  insulin lispro (ADMELOG) corrective regimen sliding scale   SubCutaneous three times a day before meals  insulin lispro Injectable (ADMELOG) 20 Unit(s) SubCutaneous three times a day before meals  melatonin 10 milliGRAM(s) Oral at bedtime  metoprolol tartrate 25 milliGRAM(s) Oral two times a day  NIFEdipine XL 60 milliGRAM(s) Oral daily  nystatin    Suspension [id number] Unit(s) Swish and Swallow three times a day  oseltamivir 30 milliGRAM(s) Oral two times a day  pantoprazole    Tablet 40 milliGRAM(s) Oral before breakfast  polyethylene glycol 3350 17 Gram(s) Oral daily  potassium chloride    Tablet ER 40 milliEquivalent(s) Oral every 4 hours  potassium chloride  20 mEq/100 mL IVPB 20 milliEquivalent(s) IV Intermittent once  senna 2 Tablet(s) Oral at bedtime  sertraline 25 milliGRAM(s) Oral daily    PRN Meds  acetaminophen     Tablet .. 650 milliGRAM(s) Oral every 6 hours PRN  albuterol/ipratropium for Nebulization 3 milliLiter(s) Nebulizer every 4 hours PRN  dextrose Oral Gel 15 Gram(s) Oral once PRN  diazepam    Tablet 2 milliGRAM(s) Oral daily PRN  haloperidol    Injectable 2.5 milliGRAM(s) IntraMuscular every 6 hours PRN  hydrOXYzine hydrochloride 25 milliGRAM(s) Oral every 6 hours PRN  ondansetron Injectable 4 milliGRAM(s) IV Push every 6 hours PRN  oxyCODONE    IR 5 milliGRAM(s) Oral four times a day PRN

## 2025-01-25 NOTE — PROGRESS NOTE ADULT - ATTENDING COMMENTS
IMPRESSION:    Acute hypoxemic respiratory failure   Influenza A infection / pneumonia   RASHAD / OHS ?  Possible superimposed bacterial pneumonia   Fluid overload / bilateral pleural effusions   HO Large B Cell lymphoma SP Chemotherapy   Pancytopenia chem induced   HO CAD (status post CABG;),  HO DM (on insulin pump)  HO atrial fibrillation on Xarelto  HO distal pancreatomy and splenectomy       Plan as outlined above  continue bumex   SDU

## 2025-01-25 NOTE — PROGRESS NOTE ADULT - ASSESSMENT
69-year-old man with extensive medical history including CAD (status post CABG;), atrial fibrillation on Xarelto, history of distal pancreatomy and splenectomy for unclear reason referred to the ED by his oncologist for abnormal labs. Patient was recently diagnosed with Diffuse large B cell Lymphoma on pathology of right groin mass and was following his Oncologist Dr Angeli Hilliard.  pt currently admitted to SDU for acute hypoxic respiratory failure ISO Influenza PNA and fluid overload .     Acute Hypoxic Respiratory Failure 2/2 Multifocal Pneumonia  Volume Overload  Neutropenic fever  Influenza A infection  - CTA 1/20: Negative for pulmonary emboli. Interval development of infiltrates within the lower lobes and lingula which may reflect acute multilobar pneumonia  - blood cx NGTD x2, UA negative, influenza A + , Nasal MRSA negative   - Pancytopenia s/p s/p 1st cycle chemo with R-EPOCH, now on Zarxio   - TTE 1/10/25: poor study, EF 60-65%  - Per ID, c/w cefepime, c/w tamiflu, planned for longer course given immunosuppression  - check urine legionella/strep  - c/w Bumex 2mg IV BID. strict I&Os. check bladder scans q6H  -  If UO decreasing, add Metolazone 2.5  - patient currenlty on HFNC 60/90, taper down as tolerated   - Heme/Onc following    Large B-cell Lymphoma  Concern for Rodriguez's transformation  Pancytopenia   - Heme/Onc following   - s/p PICC placement on 1/10/25  - s/p Bone marrow biopsy on 1/10/25  - Completed cycle 1 of R-EPOCH (D1 on 1/11/2024). Tolerated well. Rituximab was given on D5   - Uric acid 10.3 on 1/16/25, s/p 3 mg IV rasburicase  - Heme/Onc following, c/w Zarxio 480 mcg q 24 hr  -I discussed with  [facility] Dr. Kishore Holland ([phone]) regarding transfer 1/24/25, no indication for transfer and pt is unstable for transfer  - daily CBC with diff, active T&S, s/p 1u plts   - resume AC tomorrow if plts remain >50    KRISTINE on CKD 3B - basleine ~high 1s, low 2s  Chronic Lower Extremity Edema   Hypokalemia  - On home Lasix 80mg po q24 and metolazone  - Avoid volume overload.  - Bumex 2 q12H as above, may need metolazone  - nephrology following  - strict I&Os, bladder scans q6H  - replete K    CAD s/p CABG  Chronic Afib on Xarelto  HTN  - Echo 1/10/25: EF 60-65%  - C/W metoprolol 25mg po q12  - C/W Nifedipine xl 60mg po q24   - Still holding lisinopril and aldactone from last admission  - holding Xarelto , resume once plts >50     HLD   - c/w Pravastatin -> using atorva 10 hs here  - resume Repatha on discharge     DM  - FS - self monitored via dex com  - On insulin pump @2.85 at home   - Endo consult: keep off insulin pump  - lantus 30 units BID, lispro 20 TID and  monitor FS    Oral pain - no lesions appreciated on oral exam. Start Nystatin and hurricane for symptomatic mgmt    Overall prognosis is very guarded. high risk of decompensation. Continue monitoring in SDU    Patient seen at bedside, total time spent to evaluate and treat the patient's acute illness and chronic medical conditions as well as time spent reviewing prior records, labs, radiology, documenting in electronic medical records,  discussing medical plan with   medical team was 55 minutes with >50% of time spent face to face with patient, discussing with patient/family as well as coordination of care              69-year-old man with extensive medical history including CAD (status post CABG;), atrial fibrillation on Xarelto, history of distal pancreatomy and splenectomy for unclear reason referred to the ED by his oncologist for abnormal labs. Patient was recently diagnosed with Diffuse large B cell Lymphoma on pathology of right groin mass and was following his Oncologist Dr Angeli Hilliard.  pt currently admitted to SDU for acute hypoxic respiratory failure ISO Influenza PNA and fluid overload .     Acute Hypoxic Respiratory Failure 2/2 Multifocal Pneumonia  Volume Overload  Neutropenic fever  Influenza A infection  - CTA 1/20: Negative for pulmonary emboli. Interval development of infiltrates within the lower lobes and lingula which may reflect acute multilobar pneumonia  - blood cx NGTD x2, UA negative, influenza A + , Nasal MRSA negative   - Pancytopenia s/p s/p 1st cycle chemo with R-EPOCH, now on Zarxio   - TTE 1/10/25: poor study, EF 60-65%  - Per ID, c/w cefepime, c/w tamiflu, planned for longer course given immunosuppression  - check urine legionella/strep  - c/w Bumex 2mg IV BID. strict I&Os. check bladder scans q6H  -  If UO decreasing, add Metolazone 2.5  - patient currenlty on HFNC 60/90, taper down as tolerated   - Heme/Onc following    Large B-cell Lymphoma  Concern for Rodriguez's transformation  Pancytopenia   - Heme/Onc following   - s/p PICC placement on 1/10/25  - s/p Bone marrow biopsy on 1/10/25  - Completed cycle 1 of R-EPOCH (D1 on 1/11/2024). Tolerated well. Rituximab was given on D5   - Uric acid 10.3 on 1/16/25, s/p 3 mg IV rasburicase  - Heme/Onc following, dc ZArxio if ANC >1000  -I discussed with  [facility] Dr. Kishore Holland ([phone]) regarding transfer 1/24/25, no indication for transfer and pt is unstable for transfer  - daily CBC with diff, active T&S, s/p 1u plts   - resume AC tomorrow if plts remain >50    KRISTINE on CKD 3B - basleine ~high 1s, low 2s  Chronic Lower Extremity Edema   Hypokalemia  - On home Lasix 80mg po q24 and metolazone  - Avoid volume overload.  - Bumex 2 q12H as above, may need metolazone  - nephrology following  - strict I&Os, bladder scans q6H  - replete K    CAD s/p CABG  Chronic Afib on Xarelto  HTN  - Echo 1/10/25: EF 60-65%  - C/W metoprolol 25mg po q12  - C/W Nifedipine xl 60mg po q24   - Still holding lisinopril and aldactone from last admission  - holding Xarelto , resume once plts >50     HLD   - c/w Pravastatin -> using atorva 10 hs here  - resume Repatha on discharge     DM  - FS - self monitored via dex com  - On insulin pump @2.85 at home   - Endo consult: keep off insulin pump  - lantus 30 units BID, lispro 20 TID and  monitor FS    Oral pain - no lesions appreciated on oral exam. Start Nystatin and hurricane for symptomatic mgmt    Overall prognosis is very guarded. high risk of decompensation. Continue monitoring in SDU    Patient seen at bedside, total time spent to evaluate and treat the patient's acute illness and chronic medical conditions as well as time spent reviewing prior records, labs, radiology, documenting in electronic medical records,  discussing medical plan with   medical team was 55 minutes with >50% of time spent face to face with patient, discussing with patient/family as well as coordination of care

## 2025-01-25 NOTE — PROGRESS NOTE ADULT - SUBJECTIVE AND OBJECTIVE BOX
INTERVAL HPI/OVERNIGHT EVENTS:  Patient was seen and examined at bedside. As per nurse and patient, no o/n events, patient resting comfortably. No complaints at this time. Patient denies: fever, chills, dizziness, weakness, HA, Changes in vision, CP, palpitations, SOB, cough, N/V/D/C, dysuria, changes in bowel movements, has LE edema. ROS otherwise negative. BiPAP PRN.    VITAL SIGNS:  T(F): 97.9 (01-25-25 @ 08:00)  HR: 107 (01-25-25 @ 08:00)  BP: 111/56 (01-25-25 @ 08:00)  RR: 20 (01-25-25 @ 08:00)  SpO2: 91% (01-25-25 @ 04:00)  Wt(kg): --    PHYSICAL EXAM:    Constitutional: WDWN, NAD  HEENT: PERRL, EOMI, sclera non-icteric, neck supple, trachea midline, no masses, no JVD, MMM, good dentition  Respiratory: CTA b/l, good air entry b/l, no wheezing, no rhonchi, no rales, without accessory muscle use and no intercostal retractions  Cardiovascular: RRR, normal S1S2, no M/R/G  Gastrointestinal: soft, NTND, no masses palpable, BS normal  Extremities: Warm, well perfused, pulses equal bilateral upper and lower extremities, noted 3+ edema, no clubbing  Neurological: AAOx3, CN Grossly intact  Skin: Normal temperature, warm, dry    MEDICATIONS  (STANDING):  allopurinol 150 milliGRAM(s) Oral daily  ascorbic acid 500 milliGRAM(s) Oral daily  aspirin  chewable 81 milliGRAM(s) Oral daily  atorvastatin 10 milliGRAM(s) Oral at bedtime  azithromycin  IVPB      azithromycin  IVPB 500 milliGRAM(s) IV Intermittent every 24 hours  benzocaine 20% Spray 1 Spray(s) Topical three times a day  buMETAnide Injectable 2 milliGRAM(s) IV Push every 12 hours  cefepime   IVPB 2000 milliGRAM(s) IV Intermittent every 12 hours  cetirizine 10 milliGRAM(s) Oral daily  chlorhexidine 2% Cloths 1 Application(s) Topical daily  dextrose 5%. 1000 milliLiter(s) (100 mL/Hr) IV Continuous <Continuous>  dextrose 5%. 1000 milliLiter(s) (50 mL/Hr) IV Continuous <Continuous>  dextrose 50% Injectable 25 Gram(s) IV Push once  dextrose 50% Injectable 12.5 Gram(s) IV Push once  dextrose 50% Injectable 25 Gram(s) IV Push once  filgrastim-sndz (ZARXIO) Injectable 480 MICROGram(s) SubCutaneous every 24 hours  glucagon  Injectable 1 milliGRAM(s) IntraMuscular once  influenza  Vaccine (HIGH DOSE) 0.5 milliLiter(s) IntraMuscular once  insulin glargine Injectable (LANTUS) 30 Unit(s) SubCutaneous two times a day  insulin lispro (ADMELOG) corrective regimen sliding scale   SubCutaneous three times a day before meals  insulin lispro Injectable (ADMELOG) 20 Unit(s) SubCutaneous three times a day before meals  melatonin 10 milliGRAM(s) Oral at bedtime  metoprolol tartrate 25 milliGRAM(s) Oral two times a day  NIFEdipine XL 60 milliGRAM(s) Oral daily  nystatin    Suspension 245416 Unit(s) Swish and Swallow three times a day  oseltamivir 30 milliGRAM(s) Oral two times a day  pantoprazole    Tablet 40 milliGRAM(s) Oral before breakfast  polyethylene glycol 3350 17 Gram(s) Oral daily  potassium chloride    Tablet ER 40 milliEquivalent(s) Oral every 4 hours  potassium chloride  20 mEq/100 mL IVPB 20 milliEquivalent(s) IV Intermittent once  senna 2 Tablet(s) Oral at bedtime  sertraline 25 milliGRAM(s) Oral daily    MEDICATIONS  (PRN):  acetaminophen     Tablet .. 650 milliGRAM(s) Oral every 6 hours PRN Temp greater or equal to 38C (100.4F), Mild Pain (1 - 3)  albuterol/ipratropium for Nebulization 3 milliLiter(s) Nebulizer every 4 hours PRN Shortness of Breath and/or Wheezing  dextrose Oral Gel 15 Gram(s) Oral once PRN Blood Glucose LESS THAN 70 milliGRAM(s)/deciliter  diazepam    Tablet 2 milliGRAM(s) Oral daily PRN anxiety  haloperidol    Injectable 2.5 milliGRAM(s) IntraMuscular every 6 hours PRN Agitation  hydrOXYzine hydrochloride 25 milliGRAM(s) Oral every 6 hours PRN Anxiety  ondansetron Injectable 4 milliGRAM(s) IV Push every 6 hours PRN Nausea and/or Vomiting  oxyCODONE    IR 5 milliGRAM(s) Oral four times a day PRN Severe Pain (7 - 10)      Allergies    No Known Allergies    Intolerances        LABS:                        9.5    6.06  )-----------( 61       ( 25 Jan 2025 05:02 )             26.8     01-25    134[L]  |  90[L]  |  101[HH]  ----------------------------<  83  3.2[L]   |  33[H]  |  2.5[H]    Ca    7.7[L]      25 Jan 2025 05:02  Phos  2.8     01-25  Mg     2.0     01-25    TPro  4.6[L]  /  Alb  2.5[L]  /  TBili  0.5  /  DBili  x   /  AST  99[H]  /  ALT  76[H]  /  AlkPhos  389[H]  01-25      Urinalysis Basic - ( 25 Jan 2025 05:02 )    Color: x / Appearance: x / SG: x / pH: x  Gluc: 83 mg/dL / Ketone: x  / Bili: x / Urobili: x   Blood: x / Protein: x / Nitrite: x   Leuk Esterase: x / RBC: x / WBC x   Sq Epi: x / Non Sq Epi: x / Bacteria: x        RADIOLOGY & ADDITIONAL TESTS:  Reviewed

## 2025-01-25 NOTE — PROGRESS NOTE ADULT - ASSESSMENT
70 yo man w/ extensive PMH including DLBCL on chemotherapy, presenting for dyspnea and productive cough, found to have neutropenic fever 2/2 underlying PNA and influenza A. Stay complicated by desaturation and increased WOB, found to be in volume overload 2/2 HFpEF decompensation. Upgraded to SDU.    # Neutropenic fever  # Influenza A  #Acute hypoxic respiratory failure ISO Influenza PNA   - Received 1 dose of ZARXIO 480 mcg SC inpatient on 1/17/25, 2 doses at home over the weekend  - CTA 1/20: Negative for pulmonary emboli.Interval development of infiltrates within the lower lobes and lingula which may reflect acute multilobar pneumonia  - Influenza A positive -> C/W Tamiflu  - C/W Cefepime and Vancomycin in context of neutropenia --> pending MRSA nares.  - patient on HFNC now -> Wean oxygen as tolerated  - ID following   -Pulm recs 1/23: Add azithromycin, hold AC, PT/OT    #Large B-cell Lymphoma  #Concern for Rodriguez's  transformation   - Heme/Onc following   - Patient reports increased swelling and size of R inguinal mass for the past 2-3 months.   - CT pelvis on 10/12/24 showed interval increase of mass 10 x 7 x 10 cm with inguinal lymphadenopathy, suspicious for neoplastic process.  - Biopsy on 12/18/24 revealed diffuse large B-cell lymphoma with concern for Rodriguez's transformation.  - Patient was seen by Dr. Hilliard on 1/8/25 for initial consultation and was advised to come to ED due to concern for tumor lysis syndrome.  - Uric acid level now WNL at 7.9, CMP otherwise unremarkable.   - CT C/A/P 1.9.25 Stable appearance bulky right inguinal and right iliac lymphadenopathy including partially imaged 10.1 cm right inguinal soft tissue mass/lymph node.  - s/p PICC placement on 1/10/25  - s/p Bone marrow biopsy on 1/10/25  - Pending hepatitis panel  - Completed cycle 1 of R-EPOCH (D1 on 1/11/2024). Tolerated well. Rituximab was given on D5   - Uric acid 10.3 on 1/16/25, s/p 3 mg IV rasburicase  - Received 1 dose of ZARXIO 480 mcg SC inpatient on 1/17/25, 2 doses at home over the weekend  - Zarxio 480 mcg q 24 hr ordered.    #Sore throat  #Suspected oropharyngeal candidiasis  - No oral thrush noted  - Started on nystatin 500,000 units q8h for 10 days  - Benzocaine spray prn pain    #CKD 3B  #Chronic Lower Extremity Edema   - On home Lasix 80mg po q24   - Avoid volume overload.  - On IV Bumex    #CAD s/p CABG  #Chronic Afib on Xarelto  #HTN  - Echo 1/10/25: EF 60-65%  - C/W metoprolol 25mg po q12  - C/W Nifedipine xl 60mg po q24   - Still holding lisinopril and aldactone from last admission  - c/w Xarelto 15mg w/ dinner (hold if platelet count <50K)    # HLD   - c/w Pravastatin -> using lip 10 hs here  - resume Repatha on discharge    #DM, uncontrolled  - FS - self monitored via dex com  - Fingerstick glucose: 299  - On insulin pump @2.85 at home   - Endo consult 1/22: glargine 30 units twice daily, lispro 20 units before meals, very insulin resistant.     #Hypokalemia  -Replenished  - Trend BMP    #Seasonal allergies  #URI  - c/w zyrtec      # DVT PPX: off Xarelto 2/2 thrombocytopenia  # GI PPX: Pantoprazole   # Code Status: Full code  # Diet: Dash

## 2025-01-25 NOTE — PROGRESS NOTE ADULT - ASSESSMENT
69-year-old man with extensive medical history including CAD (status post CABG;), DM (on insulin pump), atrial fibrillation on Xarelto, history of distal pancreatomy and splenectomy for unclear reason referred to the ED by his oncologist for abnormal labs.  Nephrology was consulted for KRISTINE and mild hyponatremia.     #Non oliguric KRISTINE on CKD stage 3B - likely 2/2 fluid overload  #Hyponatremia - likely from fluid overload.   #h/o Large B cell Lymphoma - s/p chemotherapy   #AHRF - likely 2/2 Influenza A with superimposed bacterial PNA.  #Neutropenia - s/p chemotherapy  cr stable  - Monitor strict intake and output.   - Continue bumex  - add metolaozne 2.5 mg (is on at home)   - Monitor BMP daily.   - Monitor electrolytes, replete K to 4 and Mg to 2 on diuresis  - Monitor HH, transfuse for hb <7.   - Antibiotics per ID's recommendations.   - Heme/onc on board, recommendations appreciated.   - Avoid nephrotoxic medications and adjust all medications to GFR.   - No need for RRT at this time.     Nephrology will follow

## 2025-01-26 LAB
ALBUMIN SERPL ELPH-MCNC: 2.6 G/DL — LOW (ref 3.5–5.2)
ALP SERPL-CCNC: 467 U/L — HIGH (ref 30–115)
ALT FLD-CCNC: 75 U/L — HIGH (ref 0–41)
ANION GAP SERPL CALC-SCNC: 11 MMOL/L — SIGNIFICANT CHANGE UP (ref 7–14)
APTT BLD: 35.5 SEC — SIGNIFICANT CHANGE UP (ref 27–39.2)
APTT BLD: 55.3 SEC — HIGH (ref 27–39.2)
AST SERPL-CCNC: 83 U/L — HIGH (ref 0–41)
BASE EXCESS BLDA CALC-SCNC: 13.7 MMOL/L — HIGH (ref -2–3)
BASOPHILS # BLD AUTO: 0.07 K/UL — SIGNIFICANT CHANGE UP (ref 0–0.2)
BASOPHILS NFR BLD AUTO: 0.6 % — SIGNIFICANT CHANGE UP (ref 0–1)
BILIRUB SERPL-MCNC: 0.4 MG/DL — SIGNIFICANT CHANGE UP (ref 0.2–1.2)
BUN SERPL-MCNC: 104 MG/DL — CRITICAL HIGH (ref 10–20)
CALCIUM SERPL-MCNC: 8.1 MG/DL — LOW (ref 8.4–10.5)
CHLORIDE SERPL-SCNC: 91 MMOL/L — LOW (ref 98–110)
CO2 SERPL-SCNC: 33 MMOL/L — HIGH (ref 17–32)
CREAT SERPL-MCNC: 2.6 MG/DL — HIGH (ref 0.7–1.5)
EGFR: 26 ML/MIN/1.73M2 — LOW
EOSINOPHIL # BLD AUTO: 0.01 K/UL — SIGNIFICANT CHANGE UP (ref 0–0.7)
EOSINOPHIL NFR BLD AUTO: 0.1 % — SIGNIFICANT CHANGE UP (ref 0–8)
GAS PNL BLDA: SIGNIFICANT CHANGE UP
GLUCOSE BLDC GLUCOMTR-MCNC: 120 MG/DL — HIGH (ref 70–99)
GLUCOSE BLDC GLUCOMTR-MCNC: 145 MG/DL — HIGH (ref 70–99)
GLUCOSE BLDC GLUCOMTR-MCNC: 98 MG/DL — SIGNIFICANT CHANGE UP (ref 70–99)
GLUCOSE SERPL-MCNC: 77 MG/DL — SIGNIFICANT CHANGE UP (ref 70–99)
HCO3 BLDA-SCNC: 38 MMOL/L — HIGH (ref 21–28)
HCT VFR BLD CALC: 28.4 % — LOW (ref 42–52)
HGB BLD-MCNC: 9.7 G/DL — LOW (ref 14–18)
HOROWITZ INDEX BLDA+IHG-RTO: 80 — SIGNIFICANT CHANGE UP
IMM GRANULOCYTES NFR BLD AUTO: 18.1 % — HIGH (ref 0.1–0.3)
LYMPHOCYTES # BLD AUTO: 0.34 K/UL — LOW (ref 1.2–3.4)
LYMPHOCYTES # BLD AUTO: 2.8 % — LOW (ref 20.5–51.1)
MAGNESIUM SERPL-MCNC: 2 MG/DL — SIGNIFICANT CHANGE UP (ref 1.8–2.4)
MCHC RBC-ENTMCNC: 30.5 PG — SIGNIFICANT CHANGE UP (ref 27–31)
MCHC RBC-ENTMCNC: 34.2 G/DL — SIGNIFICANT CHANGE UP (ref 32–37)
MCV RBC AUTO: 89.3 FL — SIGNIFICANT CHANGE UP (ref 80–94)
MONOCYTES # BLD AUTO: 1.63 K/UL — HIGH (ref 0.1–0.6)
MONOCYTES NFR BLD AUTO: 13.5 % — HIGH (ref 1.7–9.3)
NEUTROPHILS # BLD AUTO: 7.83 K/UL — HIGH (ref 1.4–6.5)
NEUTROPHILS NFR BLD AUTO: 64.9 % — SIGNIFICANT CHANGE UP (ref 42.2–75.2)
NRBC # BLD: 0 /100 WBCS — SIGNIFICANT CHANGE UP (ref 0–0)
NRBC BLD-RTO: 0 /100 WBCS — SIGNIFICANT CHANGE UP (ref 0–0)
PCO2 BLDA: 43 MMHG — SIGNIFICANT CHANGE UP (ref 35–48)
PH BLDA: 7.55 — HIGH (ref 7.35–7.45)
PHOSPHATE SERPL-MCNC: 3.1 MG/DL — SIGNIFICANT CHANGE UP (ref 2.1–4.9)
PLATELET # BLD AUTO: 125 K/UL — LOW (ref 130–400)
PMV BLD: 12.2 FL — HIGH (ref 7.4–10.4)
PO2 BLDA: 67 MMHG — LOW (ref 83–108)
POTASSIUM SERPL-MCNC: 3.7 MMOL/L — SIGNIFICANT CHANGE UP (ref 3.5–5)
POTASSIUM SERPL-SCNC: 3.7 MMOL/L — SIGNIFICANT CHANGE UP (ref 3.5–5)
PROT SERPL-MCNC: 5 G/DL — LOW (ref 6–8)
RBC # BLD: 3.18 M/UL — LOW (ref 4.7–6.1)
RBC # FLD: 13.7 % — SIGNIFICANT CHANGE UP (ref 11.5–14.5)
SAO2 % BLDA: 97.3 % — SIGNIFICANT CHANGE UP (ref 94–98)
SODIUM SERPL-SCNC: 135 MMOL/L — SIGNIFICANT CHANGE UP (ref 135–146)
WBC # BLD: 12.06 K/UL — HIGH (ref 4.8–10.8)
WBC # FLD AUTO: 12.06 K/UL — HIGH (ref 4.8–10.8)

## 2025-01-26 PROCEDURE — 99233 SBSQ HOSP IP/OBS HIGH 50: CPT

## 2025-01-26 PROCEDURE — 99291 CRITICAL CARE FIRST HOUR: CPT

## 2025-01-26 PROCEDURE — 99232 SBSQ HOSP IP/OBS MODERATE 35: CPT

## 2025-01-26 PROCEDURE — 71045 X-RAY EXAM CHEST 1 VIEW: CPT | Mod: 26

## 2025-01-26 PROCEDURE — 93010 ELECTROCARDIOGRAM REPORT: CPT

## 2025-01-26 RX ORDER — HYDROMORPHONE/SOD CHLOR,ISO/PF 2 MG/10 ML
0.25 SYRINGE (ML) INJECTION ONCE
Refills: 0 | Status: DISCONTINUED | OUTPATIENT
Start: 2025-01-26 | End: 2025-01-26

## 2025-01-26 RX ORDER — HEPARIN SODIUM 1000 [USP'U]/ML
INJECTION INTRAVENOUS; SUBCUTANEOUS
Qty: 25000 | Refills: 0 | Status: DISCONTINUED | OUTPATIENT
Start: 2025-01-26 | End: 2025-01-27

## 2025-01-26 RX ORDER — LORAZEPAM 4 MG/ML
0.5 VIAL (ML) INJECTION ONCE
Refills: 0 | Status: DISCONTINUED | OUTPATIENT
Start: 2025-01-26 | End: 2025-01-26

## 2025-01-26 RX ORDER — METOPROLOL SUCCINATE 50 MG/1
5 TABLET, EXTENDED RELEASE ORAL EVERY 6 HOURS
Refills: 0 | Status: DISCONTINUED | OUTPATIENT
Start: 2025-01-26 | End: 2025-02-01

## 2025-01-26 RX ADMIN — Medication 255 MILLIGRAM(S): at 08:59

## 2025-01-26 RX ADMIN — IPRATROPIUM BROMIDE AND ALBUTEROL SULFATE 3 MILLILITER(S): .5; 2.5 SOLUTION RESPIRATORY (INHALATION) at 20:24

## 2025-01-26 RX ADMIN — CEFEPIME 100 MILLIGRAM(S): 2 INJECTION, POWDER, FOR SOLUTION INTRAVENOUS at 05:29

## 2025-01-26 RX ADMIN — Medication 0.25 MILLIGRAM(S): at 23:43

## 2025-01-26 RX ADMIN — BUMETANIDE 2 MILLIGRAM(S): 1 TABLET ORAL at 08:58

## 2025-01-26 RX ADMIN — Medication 0.5 MILLIGRAM(S): at 18:48

## 2025-01-26 RX ADMIN — Medication 0.5 MILLIGRAM(S): at 01:38

## 2025-01-26 RX ADMIN — HEPARIN SODIUM 1000 UNIT(S)/HR: 1000 INJECTION INTRAVENOUS; SUBCUTANEOUS at 12:23

## 2025-01-26 RX ADMIN — HEPARIN SODIUM 1000 UNIT(S)/HR: 1000 INJECTION INTRAVENOUS; SUBCUTANEOUS at 18:41

## 2025-01-26 RX ADMIN — BUMETANIDE 2 MILLIGRAM(S): 1 TABLET ORAL at 17:50

## 2025-01-26 RX ADMIN — Medication 1 APPLICATION(S): at 12:01

## 2025-01-26 RX ADMIN — INSULIN GLARGINE-YFGN 30 UNIT(S): 100 INJECTION, SOLUTION SUBCUTANEOUS at 08:58

## 2025-01-26 RX ADMIN — Medication 40 MILLIGRAM(S): at 11:50

## 2025-01-26 RX ADMIN — CEFEPIME 100 MILLIGRAM(S): 2 INJECTION, POWDER, FOR SOLUTION INTRAVENOUS at 17:52

## 2025-01-26 RX ADMIN — HEPARIN SODIUM 5000 UNIT(S): 1000 INJECTION INTRAVENOUS; SUBCUTANEOUS at 05:30

## 2025-01-26 NOTE — DIETITIAN INITIAL EVALUATION ADULT - DIET TYPE
DASH/TLC (sodium and cholesterol restricted diet)/soft and bite-sized/high fiber/consistent carbohydrate (evening snack)

## 2025-01-26 NOTE — SWALLOW BEDSIDE ASSESSMENT ADULT - SLP PERTINENT HISTORY OF CURRENT PROBLEM
69-year-old man with extensive medical history including CAD (status post CABG;), DM (on insulin pump), atrial fibrillation on Xarelto, history of distal pancreatomy and splenectomy for unclear reason referred to the ED by his oncologist for abnormal labs. Patient was recently diagnosed with Diffuse large B cell Lymphoma on pathology of right groin mass and is following with [facility] oncology team. Patient was discharged on 01.17.2025 from hospital after inpatient chemotherapy (Completed cycle 1 of R-EPOCH (D1 on 1/11/2024). Tolerated well. Rituximab was given on D5).

## 2025-01-26 NOTE — DIETITIAN INITIAL EVALUATION ADULT - COLLABORATION WITH OTHER PROVIDERS
I've contacted the patient's physician name Dr. Johnson via Teams regarding my nutritional recommendations.

## 2025-01-26 NOTE — DIETITIAN INITIAL EVALUATION ADULT - OTHER CALCULATIONS
Estimated Calorie Needs: 2025-2430kcal/day (25-30kcal/kg of IBW-81kg) -Due to obesity and CKD  Estimated Protein Needs: 81-97grams/day (1-1.2grams/kg of IBW-81kg) -Due to obesity and cancer but with consideration for low GFR   Estimated Fluid Needs: 2025-2430mL/day (1mL/kcal) vs Fluids per CEU Team

## 2025-01-26 NOTE — PROGRESS NOTE ADULT - SUBJECTIVE AND OBJECTIVE BOX
Nephrology progress note    Patient is seen and examined, events over the last 24 h noted.  Renal function stable, nonoliguric.    Allergies:  No Known Allergies    Hospital Medications:   MEDICATIONS  (STANDING):  allopurinol 150 milliGRAM(s) Oral daily  ascorbic acid 500 milliGRAM(s) Oral daily  aspirin  chewable 81 milliGRAM(s) Oral daily  atorvastatin 10 milliGRAM(s) Oral at bedtime  azithromycin  IVPB      azithromycin  IVPB 500 milliGRAM(s) IV Intermittent every 24 hours  benzocaine 20% Spray 1 Spray(s) Topical three times a day  buMETAnide Injectable 2 milliGRAM(s) IV Push every 12 hours  cefepime   IVPB 2000 milliGRAM(s) IV Intermittent every 12 hours  cetirizine 10 milliGRAM(s) Oral daily  chlorhexidine 2% Cloths 1 Application(s) Topical daily  dextrose 5%. 1000 milliLiter(s) (50 mL/Hr) IV Continuous <Continuous>  dextrose 5%. 1000 milliLiter(s) (100 mL/Hr) IV Continuous <Continuous>  dextrose 50% Injectable 25 Gram(s) IV Push once  dextrose 50% Injectable 12.5 Gram(s) IV Push once  dextrose 50% Injectable 25 Gram(s) IV Push once  glucagon  Injectable 1 milliGRAM(s) IntraMuscular once  heparin   Injectable 5000 Unit(s) SubCutaneous every 8 hours  influenza  Vaccine (HIGH DOSE) 0.5 milliLiter(s) IntraMuscular once  insulin glargine Injectable (LANTUS) 30 Unit(s) SubCutaneous two times a day  insulin lispro (ADMELOG) corrective regimen sliding scale   SubCutaneous three times a day before meals  insulin lispro Injectable (ADMELOG) 20 Unit(s) SubCutaneous three times a day before meals  melatonin 10 milliGRAM(s) Oral at bedtime  metoprolol tartrate 25 milliGRAM(s) Oral two times a day  NIFEdipine XL 60 milliGRAM(s) Oral daily  nystatin    Suspension 222002 Unit(s) Swish and Swallow three times a day  oseltamivir 30 milliGRAM(s) Oral two times a day  pantoprazole    Tablet 40 milliGRAM(s) Oral before breakfast  polyethylene glycol 3350 17 Gram(s) Oral daily  senna 2 Tablet(s) Oral at bedtime  sertraline 25 milliGRAM(s) Oral daily        VITALS:  T(F): 97.7 (01-26-25 @ 04:00), Max: 98 (01-25-25 @ 16:18)  HR: 122 (01-26-25 @ 07:28)  BP: 119/67 (01-26-25 @ 07:28)  RR: 20 (01-26-25 @ 07:28)  SpO2: 83% (01-26-25 @ 07:28)  Wt(kg): --    01-24 @ 07:01  -  01-25 @ 07:00  --------------------------------------------------------  IN: 120 mL / OUT: 2250 mL / NET: -2130 mL    01-25 @ 07:01  -  01-26 @ 07:00  --------------------------------------------------------  IN: 50 mL / OUT: 1600 mL / NET: -1550 mL          PHYSICAL EXAM:  Constitutional: NAD  HEENT: anicteric sclera, oropharynx clear, MMM  Neck: No JVD  Respiratory: on  Hi alia  Cardiovascular: S1, S2, RRR  Gastrointestinal: BS+, soft, NT/ND  Extremities: No cyanosis or clubbing. +++ peripheral edema  :  No moss.   Skin: No rashes      LABS:  01-26    135  |  91[L]  |  104[HH]  ----------------------------<  77  3.7   |  33[H]  |  2.6[H]    Ca    8.1[L]      26 Jan 2025 05:42  Phos  3.1     01-26  Mg     2.0     01-26    TPro  5.0[L]  /  Alb  2.6[L]  /  TBili  0.4  /  DBili      /  AST  83[H]  /  ALT  75[H]  /  AlkPhos  467[H]  01-26                          9.7    12.06 )-----------( 125      ( 26 Jan 2025 05:42 )             28.4       Urine Studies:  Urinalysis Basic - ( 26 Jan 2025 05:42 )    Color:  / Appearance:  / SG:  / pH:   Gluc: 77 mg/dL / Ketone:   / Bili:  / Urobili:    Blood:  / Protein:  / Nitrite:    Leuk Esterase:  / RBC:  / WBC    Sq Epi:  / Non Sq Epi:  / Bacteria:         RADIOLOGY & ADDITIONAL STUDIES:

## 2025-01-26 NOTE — DIETITIAN INITIAL EVALUATION ADULT - NAME AND PHONE
Intervention: 1.Meals and Snacks 2.Medical Food Supplement 3.Coordination of Care   Monitor/Evaluate: Diet order, energy intake, nutrition focused physical findings, renal and anemia profile

## 2025-01-26 NOTE — DIETITIAN INITIAL EVALUATION ADULT - PERTINENT MEDS FT
MEDICATIONS  (STANDING):  allopurinol 150 milliGRAM(s) Oral daily  ascorbic acid 500 milliGRAM(s) Oral daily  aspirin  chewable 81 milliGRAM(s) Oral daily  atorvastatin 10 milliGRAM(s) Oral at bedtime  azithromycin  IVPB      azithromycin  IVPB 500 milliGRAM(s) IV Intermittent every 24 hours  benzocaine 20% Spray 1 Spray(s) Topical three times a day  buMETAnide Injectable 2 milliGRAM(s) IV Push every 12 hours  cefepime   IVPB 2000 milliGRAM(s) IV Intermittent every 12 hours  cetirizine 10 milliGRAM(s) Oral daily  chlorhexidine 2% Cloths 1 Application(s) Topical daily  dextrose 5%. 1000 milliLiter(s) (50 mL/Hr) IV Continuous <Continuous>  dextrose 5%. 1000 milliLiter(s) (100 mL/Hr) IV Continuous <Continuous>  dextrose 50% Injectable 25 Gram(s) IV Push once  dextrose 50% Injectable 12.5 Gram(s) IV Push once  dextrose 50% Injectable 25 Gram(s) IV Push once  glucagon  Injectable 1 milliGRAM(s) IntraMuscular once  heparin   Injectable 5000 Unit(s) SubCutaneous every 8 hours  influenza  Vaccine (HIGH DOSE) 0.5 milliLiter(s) IntraMuscular once  insulin glargine Injectable (LANTUS) 30 Unit(s) SubCutaneous two times a day  insulin lispro (ADMELOG) corrective regimen sliding scale   SubCutaneous three times a day before meals  insulin lispro Injectable (ADMELOG) 20 Unit(s) SubCutaneous three times a day before meals  melatonin 10 milliGRAM(s) Oral at bedtime  metoprolol tartrate 25 milliGRAM(s) Oral two times a day  NIFEdipine XL 60 milliGRAM(s) Oral daily  nystatin    Suspension 315944 Unit(s) Swish and Swallow three times a day  oseltamivir 30 milliGRAM(s) Oral two times a day  pantoprazole    Tablet 40 milliGRAM(s) Oral before breakfast  polyethylene glycol 3350 17 Gram(s) Oral daily  senna 2 Tablet(s) Oral at bedtime  sertraline 25 milliGRAM(s) Oral daily    MEDICATIONS  (PRN):  acetaminophen     Tablet .. 650 milliGRAM(s) Oral every 6 hours PRN Temp greater or equal to 38C (100.4F), Mild Pain (1 - 3)  albuterol/ipratropium for Nebulization 3 milliLiter(s) Nebulizer every 4 hours PRN Shortness of Breath and/or Wheezing  dextrose Oral Gel 15 Gram(s) Oral once PRN Blood Glucose LESS THAN 70 milliGRAM(s)/deciliter  diazepam    Tablet 2 milliGRAM(s) Oral daily PRN anxiety  haloperidol    Injectable 2.5 milliGRAM(s) IntraMuscular every 6 hours PRN Agitation  hydrOXYzine hydrochloride 25 milliGRAM(s) Oral every 6 hours PRN Anxiety  ondansetron Injectable 4 milliGRAM(s) IV Push every 6 hours PRN Nausea and/or Vomiting  oxyCODONE    IR 5 milliGRAM(s) Oral four times a day PRN Severe Pain (7 - 10)

## 2025-01-26 NOTE — DIETITIAN INITIAL EVALUATION ADULT - LITERATURE/VIDEOS GIVEN
A soft and bite sized, high fiber diet education is provided via explanation and handouts derived from the nutrition care manual. The patient declined to receive nutrition education handouts pertaining to a diabetic and a heart healthy diet at this time. Will attempt to provide diabetic, heart healthy nutrition education handouts.

## 2025-01-26 NOTE — PROGRESS NOTE ADULT - ASSESSMENT
69-year-old man with extensive medical history including CAD (status post CABG;), atrial fibrillation on Xarelto, history of distal pancreatomy and splenectomy for unclear reason referred to the ED by his oncologist for abnormal labs. Patient was recently diagnosed with Diffuse large B cell Lymphoma on pathology of right groin mass and was following his Oncologist Dr Angeli Hilliard.  pt currently admitted to SDU for acute hypoxic respiratory failure ISO Influenza PNA and fluid overload .     Acute Hypoxic Respiratory Failure 2/2 Multifocal Pneumonia  Volume Overload  Neutropenic fever  Influenza A infection  - CTA 1/20: Negative for pulmonary emboli. Interval development of infiltrates within the lower lobes and lingula which may reflect acute multilobar pneumonia  - blood cx NGTD x2, UA negative, influenza A + , Nasal MRSA negative   - Pancytopenia s/p s/p 1st cycle chemo with R-EPOCH, now on Zarxio   - TTE 1/10/25: poor study, EF 60-65%  - Per ID, c/w cefepime, c/w tamiflu, planned for longer course given immunosuppression  - check urine legionella/strep  - c/w Bumex 2mg IV BID. strict I&Os. check bladder scans q6H  -  If UO decreasing, add Metolazone 2.5  - patient currenlty on continuous NIV, unable to tolerate HFNC, previously on 60/90  - check ABG,  taper down as tolerated   - Heme/Onc following    Large B-cell Lymphoma  Concern for Rodriguez's transformation  Pancytopenia   - Heme/Onc following   - s/p PICC placement on 1/10/25  - s/p Bone marrow biopsy on 1/10/25  - Completed cycle 1 of R-EPOCH (D1 on 1/11/2024). Tolerated well. Rituximab was given on D5   - Uric acid 10.3 on 1/16/25, s/p 3 mg IV rasburicase  - Heme/Onc following, dc ZArxio if ANC >1000  - I discussed with  [facility] Dr. Kishore Holland ([phone]) regarding transfer 1/24/25, no indication for transfer and pt is unstable for transfer  - daily CBC with diff, active T&S, s/p 1u plts   - start heparin drip no bolus, with close ptt monitoring     KRISTINE on CKD 3B - basleine ~high 1s, low 2s  Chronic Lower Extremity Edema   Hypokalemia  - On home Lasix 80mg po q24 and metolazone  - Avoid volume overload.  - Bumex 2 q12H as above, may need metolazone  - nephrology following  - strict I&Os, bladder scans q6H    CAD s/p CABG  Chronic Afib on Xarelto  HTN  - Echo 1/10/25: EF 60-65%  - change metoprolol to IV for now, hold Nifedipine as BP borderline  - Still holding lisinopril and aldactone from last admission  - holding Xarelto , resuming HEparin drip today     HLD   - c/w Pravastatin -> using atorva 10 hs here  - resume Repatha on discharge     DM  - FS - self monitored via dex com  - On insulin pump @2.85 at home   - Endo consult: keep off insulin pump  - lantus 30 units BID, hold lispro 20 TID while NPO and  monitor FS    Oral pain - no lesions appreciated on oral exam. Start Nystatin and hurricane for symptomatic mgmt    Overall prognosis is very guarded. high risk of decompensation. Fu pulm cc to discuss MICU transfer    Patient seen at bedside, total time spent to evaluate and treat the patient's acute illness and chronic medical conditions as well as time spent reviewing prior records, labs, radiology, documenting in electronic medical records,  discussing medical plan with   medical team was 55 minutes with >50% of time spent face to face with patient, discussing with patient/family as well as coordination of care              69-year-old man with extensive medical history including CAD (status post CABG;), atrial fibrillation on Xarelto, history of distal pancreatomy and splenectomy for unclear reason referred to the ED by his oncologist for abnormal labs. Patient was recently diagnosed with Diffuse large B cell Lymphoma on pathology of right groin mass and was following his Oncologist Dr Angeli Hilliard.  pt currently admitted to SDU for acute hypoxic respiratory failure ISO Influenza PNA and fluid overload .     Acute Hypoxic Respiratory Failure 2/2 Multifocal Pneumonia  Volume Overload  Neutropenic fever  Influenza A infection  - CTA 1/20: Negative for pulmonary emboli. Interval development of infiltrates within the lower lobes and lingula which may reflect acute multilobar pneumonia  - blood cx NGTD x2, UA negative, influenza A + , Nasal MRSA negative   - Pancytopenia s/p s/p 1st cycle chemo with R-EPOCH, now on Zarxio   - TTE 1/10/25: poor study, EF 60-65%  - Per ID, c/w cefepime, c/w tamiflu, planned for longer course given immunosuppression  - check urine legionella/strep  - c/w Bumex 2mg IV BID. strict I&Os. check bladder scans q6H  -  If UO decreasing, add Metolazone 2.5  - patient currenlty on continuous NIV, unable to tolerate HFNC, previously on 60/90  - check ABG  - Discussed with crit, patient will be upgraded to MICU  - Heme/Onc following    Large B-cell Lymphoma  Concern for Rodriguez's transformation  Pancytopenia   - Heme/Onc following   - s/p PICC placement on 1/10/25  - s/p Bone marrow biopsy on 1/10/25  - Completed cycle 1 of R-EPOCH (D1 on 1/11/2024). Tolerated well. Rituximab was given on D5   - Uric acid 10.3 on 1/16/25, s/p 3 mg IV rasburicase  - Heme/Onc following, dc ZArxio if ANC >1000  - I discussed with  [facility] Dr. Kishore Holland ([phone]) regarding transfer 1/24/25, no indication for transfer and pt is unstable for transfer  - daily CBC with diff, active T&S, s/p 1u plts   - start heparin drip no bolus, with close ptt monitoring     KRISTINE on CKD 3B - basleine ~high 1s, low 2s  Chronic Lower Extremity Edema   Hypokalemia  - On home Lasix 80mg po q24 and metolazone  - Avoid volume overload.  - Bumex 2 q12H as above, may need metolazone  - nephrology following  - strict I&Os, bladder scans q6H    CAD s/p CABG  Chronic Afib on Xarelto  HTN  - Echo 1/10/25: EF 60-65%  - change metoprolol to IV for now, hold Nifedipine as BP borderline  - Still holding lisinopril and aldactone from last admission  - holding Xarelto , resuming HEparin drip today     HLD   - c/w Pravastatin -> using atorva 10 hs here  - resume Repatha on discharge     DM  - FS - self monitored via dex com  - On insulin pump @2.85 at home   - Endo consult: keep off insulin pump  - lantus 30 units BID, hold lispro 20 TID while NPO and  monitor FS    Oral pain - no lesions appreciated on oral exam. Start Nystatin and hurricane for symptomatic mgmt    Overall prognosis is very guarded. high risk of decompensation. Pending trasfer to MICU  all discussed with daughter Mery    Patient seen at bedside, total time spent to evaluate and treat the patient's acute illness and chronic medical conditions as well as time spent reviewing prior records, labs, radiology, documenting in electronic medical records,  discussing medical plan with   medical team was 55 minutes with >50% of time spent face to face with patient, discussing with patient/family as well as coordination of care

## 2025-01-26 NOTE — DIETITIAN INITIAL EVALUATION ADULT - PERTINENT LABORATORY DATA
01-25    136  |  91[L]  |  103[HH]  ----------------------------<  85  3.6   |  34[H]  |  2.5[H]    Ca    7.8[L]      25 Jan 2025 16:50  Phos  2.8     01-25  Mg     1.9     01-25    TPro  4.6[L]  /  Alb  2.5[L]  /  TBili  0.5  /  DBili  x   /  AST  99[H]  /  ALT  76[H]  /  AlkPhos  389[H]  01-25  POCT Blood Glucose.: 101 mg/dL (01-25-25 @ 21:48)  A1C with Estimated Average Glucose Result: 8.9 % (12-04-24 @ 00:00)  A1C with Estimated Average Glucose Result: 8.3 % (10-18-24 @ 07:12)  A1C with Estimated Average Glucose Result: 8.8 % (03-29-24 @ 06:49)

## 2025-01-26 NOTE — PROGRESS NOTE ADULT - ASSESSMENT
IMPRESSION:    Acute hypoxemic respiratory failure   Influenza A infection / pneumonia   RASHAD / OHS   Possible superimposed bacterial pneumonia   Fluid overload / bilateral pleural effusions   HO Large B Cell lymphoma SP Chemotherapy   Pancytopenia chem induced   HO CAD (status post CABG;),  HO DM (on insulin pump)  HO atrial fibrillation on Xarelto  HO distal pancreatomy and splenectomy     PLAN:    CNS:  Avoid sedation.     HEENT: Oral care    PULMONARY:  HOB @ 45 degrees.  Aspiration precautions.  Repeat CXR noted.  Wean HFNCo2 as tolerated.  keep O2 sat 92%-96%.  Incentive spirometry.  NIV 4 on/ 4 off during sleep.     CARDIOVASCULAR:  Continue Volume contraction as tolerated.  Bumex 2 mg BID for now.  Target -2 L daily, otherwise add metolazone.     GI: GI prophylaxis. PO Feeding.  Bowel regimen     RENAL:  Follow up lytes.  Correct as needed.  Monitor UO. CW Bumex.     INFECTIOUS DISEASE: Follow up cultures.  Procal 29.  Nasal MRSA negative. DARNELL vanc and azithro.  Continue ABX and Tamiflu per ID.    HEMATOLOGICAL: DVT prophylaxis, resume AC.  Monitor CBC.  Hem onc eval noted.  DARNELL MCDONOUGH     ENDOCRINE:  Follow up FS.  Insulin protocol if needed.    MUSCULOSKELETAL:  OOB as tolerated with PT OT     SDU     Prognosis overall guarded    IMPRESSION:    Acute hypoxemic respiratory failure   Influenza A infection / pneumonia   RASHAD / OHS   Possible superimposed bacterial pneumonia   Fluid overload / bilateral pleural effusions   HO Large B Cell lymphoma SP Chemotherapy   Pancytopenia chem induced   HO CAD (status post CABG;),  HO DM (on insulin pump)  HO atrial fibrillation on Xarelto  HO distal pancreatomy and splenectomy     PLAN:    CNS:  Avoid sedation.     HEENT: Oral care    PULMONARY:  HOB @ 45 degrees.  Aspiration precautions.  Repeat CXR noted. keep O2 sat 92%-96%.  Incentive spirometry.  NIV 4 on/ 4 off during sleep. ABG now, high risk for intubation     CARDIOVASCULAR:  Continue Volume contraction as tolerated.  Bumex 2 mg BID for now.  Target -2 L daily, otherwise add metolazone.     GI: GI prophylaxis. PO Feeding.  Bowel regimen     RENAL:  Follow up lytes.  Correct as needed.  Monitor UO. CW Bumex.     INFECTIOUS DISEASE: Follow up cultures.  Procal 29.  Nasal MRSA negative. DARNELL vanc and azithro.  Continue ABX and Tamiflu per ID.    HEMATOLOGICAL: DVT prophylaxis, resume AC.  Monitor CBC.  Hem onc eval noted.  DARNELL HEXIHILARIO     ENDOCRINE:  Follow up FS.  Insulin protocol if needed.    MUSCULOSKELETAL:  OOB as tolerated with PT OT     SDU     Prognosis overall guarded

## 2025-01-26 NOTE — PROGRESS NOTE ADULT - SUBJECTIVE AND OBJECTIVE BOX
LATONIAMORGAN GALLARDO  69y Male    CHIEF COMPLAINT:    Patient is a 69y old  Male who presents with a chief complaint of SOB (26 Jan 2025 07:53)    INTERVAL HPI/OVERNIGHT EVENTS:    Patient seen and examined. No acute events overnight. Unable to tolerate off NIV     ROS: All other systems are negative.    Vital Signs:    T(F): 97.7 (01-26-25 @ 04:00), Max: 98 (01-25-25 @ 16:18)  HR: 122 (01-26-25 @ 07:28) (93 - 122)  BP: 119/67 (01-26-25 @ 07:28) (111/55 - 121/60)  RR: 20 (01-26-25 @ 07:28) (20 - 20)  SpO2: 83% (01-26-25 @ 07:28) (83% - 99%)    25 Jan 2025 07:01  -  26 Jan 2025 07:00  --------------------------------------------------------  IN: 50 mL / OUT: 1600 mL / NET: -1550 mL    POCT Blood Glucose.: 98 mg/dL (26 Jan 2025 08:57)  POCT Blood Glucose.: 101 mg/dL (25 Jan 2025 21:48)  POCT Blood Glucose.: 92 mg/dL (25 Jan 2025 16:22)  POCT Blood Glucose.: 204 mg/dL (25 Jan 2025 11:26)    PHYSICAL EXAM:    GENERAL:  NAD  SKIN: No rashes or lesions  HEENT: Atraumatic. Normocephalic   NECK: Supple, No JVD.    PULMONARY: decreased breath sounds B/L. No wheezing   CVS: Normal S1, S2. Rate and Rhythm are regular Tachycardic   ABDOMEN/GI: Soft, Nontender, Nondistended   MSK:  No edema B/L LE.  NEUROLOGIC:  No motor or sensory deficit.  PSYCH: Alert & oriented x 3, normal affect    Consultant(s) Notes Reviewed:  [x ] YES  [ ] NO  Care Discussed with Consultants/Other Providers [ x] YES  [ ] NO    LABS:                        9.7    12.06 )-----------( 125      ( 26 Jan 2025 05:42 )             28.4    135  |  91[L]  |  104[HH]  ----------------------------<  77  3.7   |  33[H]  |  2.6[H]    Ca    8.1[L]      26 Jan 2025 05:42  Phos  3.1     01-26  Mg     2.0     01-26    TPro  5.0[L]  /  Alb  2.6[L]  /  TBili  0.4  /  DBili  x   /  AST  83[H]  /  ALT  75[H]  /  AlkPhos  467[H]  01-26    RADIOLOGY & ADDITIONAL TESTS:  Imaging or report Personally Reviewed:  [x] YES  [ ] NO  EKG reviewed: [x] YES  [ ] NO    Medications:  Standing  allopurinol 150 milliGRAM(s) Oral daily  ascorbic acid 500 milliGRAM(s) Oral daily  aspirin  chewable 81 milliGRAM(s) Oral daily  atorvastatin 10 milliGRAM(s) Oral at bedtime  azithromycin  IVPB      azithromycin  IVPB 500 milliGRAM(s) IV Intermittent every 24 hours  benzocaine 20% Spray 1 Spray(s) Topical three times a day  buMETAnide Injectable 2 milliGRAM(s) IV Push every 12 hours  cefepime   IVPB 2000 milliGRAM(s) IV Intermittent every 12 hours  cetirizine 10 milliGRAM(s) Oral daily  chlorhexidine 2% Cloths 1 Application(s) Topical daily  dextrose 5%. 1000 milliLiter(s) IV Continuous <Continuous>  dextrose 5%. 1000 milliLiter(s) IV Continuous <Continuous>  dextrose 50% Injectable 25 Gram(s) IV Push once  dextrose 50% Injectable 12.5 Gram(s) IV Push once  dextrose 50% Injectable 25 Gram(s) IV Push once  glucagon  Injectable 1 milliGRAM(s) IntraMuscular once  heparin   Injectable 5000 Unit(s) SubCutaneous every 8 hours  influenza  Vaccine (HIGH DOSE) 0.5 milliLiter(s) IntraMuscular once  insulin glargine Injectable (LANTUS) 30 Unit(s) SubCutaneous two times a day  insulin lispro (ADMELOG) corrective regimen sliding scale   SubCutaneous three times a day before meals  insulin lispro Injectable (ADMELOG) 20 Unit(s) SubCutaneous three times a day before meals  melatonin 10 milliGRAM(s) Oral at bedtime  metoprolol tartrate 25 milliGRAM(s) Oral two times a day  NIFEdipine XL 60 milliGRAM(s) Oral daily  nystatin    Suspension 144195 Unit(s) Swish and Swallow three times a day  oseltamivir 30 milliGRAM(s) Oral two times a day  pantoprazole    Tablet 40 milliGRAM(s) Oral before breakfast  polyethylene glycol 3350 17 Gram(s) Oral daily  senna 2 Tablet(s) Oral at bedtime  sertraline 25 milliGRAM(s) Oral daily    PRN Meds  acetaminophen     Tablet .. 650 milliGRAM(s) Oral every 6 hours PRN  albuterol/ipratropium for Nebulization 3 milliLiter(s) Nebulizer every 4 hours PRN  dextrose Oral Gel 15 Gram(s) Oral once PRN  diazepam    Tablet 2 milliGRAM(s) Oral daily PRN  haloperidol    Injectable 2.5 milliGRAM(s) IntraMuscular every 6 hours PRN  hydrOXYzine hydrochloride 25 milliGRAM(s) Oral every 6 hours PRN  ondansetron Injectable 4 milliGRAM(s) IV Push every 6 hours PRN  oxyCODONE    IR 5 milliGRAM(s) Oral four times a day PRN

## 2025-01-26 NOTE — DIETITIAN INITIAL EVALUATION ADULT - NSFNSGIIOFT_GEN_A_CORE
Dx: 70y/o male with h/o DM, CAD, CABG, AFib on Xarelto, distal pancreatomy and splenectomy for unclear reason, referred to the ED by his oncologist for abnormal labs. The patient was recently diagnosed with diffuse large B cell Lymphoma on pathology of right groin mass. Admitted to SDU for acute hypoxic respiratory failure, ISO influenza pneumonia and fluid overload. Hospital course is complicated by volume overload, neutropenic fever, concern for Rodriguez's transformation, pancytopenia, KRISTINE on Stage 3B CKD (basleine ~high 1s, low 2s), chronic lower extremity edema and hypokalemia. MAP-108.

## 2025-01-26 NOTE — PROGRESS NOTE ADULT - ASSESSMENT
69-year-old man with extensive medical history including CAD (status post CABG;), DM (on insulin pump), atrial fibrillation on Xarelto, history of distal pancreatomy and splenectomy for unclear reason referred to the ED by his oncologist for abnormal labs.  Nephrology was consulted for KRISTINE and mild hyponatremia.     #Non oliguric KRISTINE on CKD stage 3B - likely 2/2 fluid overload  #Hyponatremia - likely from fluid overload.   #h/o Large B cell Lymphoma - s/p chemotherapy   #AHRF - likely 2/2 Influenza A with superimposed bacterial PNA.  #Neutropenia - s/p chemotherapy  cr stable  - Monitor BMP and UOP   - Continue bumex 2mg Q12h  - if UOP drops may add metolaozne 2.5 mg (is on at home)   - replete K to 4 and Mg to 2 while on diuresis  - Monitor HH, transfuse for hb <7  - Antibiotics per ID's recommendations.   - Heme/onc on board, recommendations appreciated.   - Avoid nephrotoxic medications and adjust all medications to GFR.   - No need for RRT at this time.     Nephrology will follow

## 2025-01-26 NOTE — PROGRESS NOTE ADULT - SUBJECTIVE AND OBJECTIVE BOX
Patient is a 69y old  Male who presents with a chief complaint of Pneumonia due to infectious organism     (26 Jan 2025 00:52)        Over Night Events: on NIV q hs, HFNC in am         ROS:     All ROS are negative except HPI         PHYSICAL EXAM    ICU Vital Signs Last 24 Hrs  T(C): 36.5 (26 Jan 2025 04:00), Max: 36.7 (25 Jan 2025 16:18)  T(F): 97.7 (26 Jan 2025 04:00), Max: 98 (25 Jan 2025 16:18)  HR: 110 (26 Jan 2025 04:00) (93 - 110)  BP: 119/64 (26 Jan 2025 04:00) (111/55 - 121/60)  BP(mean): 76 (25 Jan 2025 11:00) (76 - 76)  ABP: --  ABP(mean): --  RR: 20 (26 Jan 2025 04:00) (20 - 20)  SpO2: 99% (26 Jan 2025 04:00) (87% - 99%)    O2 Parameters below as of 25 Jan 2025 21:00  Patient On (Oxygen Delivery Method): BiPAP/CPAP    ENT: Airway patent,  EYES: Pupils equal, Round and reactive to light.  CARDIAC: Normal rate, Regular rhythm.    RESPIRATORY: No wheezing, Bilateral crackles and low BS at bases, Not tachypneic, No use of accessory muscles  GASTROINTESTINAL: Abdomen soft, Non-tender, No guarding,   NEUROLOGICAL: Alert and oriented   SKIN: Skin normal color for race, Warm and dry and intact.     01-25-25 @ 07:01  -  01-26-25 @ 07:00  --------------------------------------------------------  IN:    IV PiggyBack: 50 mL  Total IN: 50 mL    OUT:    Indwelling Catheter - Urethral (mL): 1600 mL  Total OUT: 1600 mL    Total NET: -1550 mL          LABS:                            9.7    12.06 )-----------( 125      ( 26 Jan 2025 05:42 )             28.4                                               01-26    135  |  91[L]  |  x   ----------------------------<  77  3.7   |  33[H]  |  2.6[H]    Ca    8.1[L]      26 Jan 2025 05:42  Phos  3.1     01-26  Mg     2.0     01-26    TPro  5.0[L]  /  Alb  2.6[L]  /  TBili  0.4  /  DBili  x   /  AST  83[H]  /  ALT  75[H]  /  AlkPhos  467[H]  01-26                                             Urinalysis Basic - ( 26 Jan 2025 05:42 )    Color: x / Appearance: x / SG: x / pH: x  Gluc: 77 mg/dL / Ketone: x  / Bili: x / Urobili: x   Blood: x / Protein: x / Nitrite: x   Leuk Esterase: x / RBC: x / WBC x   Sq Epi: x / Non Sq Epi: x / Bacteria: x                                                  LIVER FUNCTIONS - ( 26 Jan 2025 05:42 )  Alb: 2.6 g/dL / Pro: 5.0 g/dL / ALK PHOS: 467 U/L / ALT: 75 U/L / AST: 83 U/L / GGT: x                                                                                                                                       MEDICATIONS  (STANDING):  allopurinol 150 milliGRAM(s) Oral daily  ascorbic acid 500 milliGRAM(s) Oral daily  aspirin  chewable 81 milliGRAM(s) Oral daily  atorvastatin 10 milliGRAM(s) Oral at bedtime  azithromycin  IVPB      azithromycin  IVPB 500 milliGRAM(s) IV Intermittent every 24 hours  benzocaine 20% Spray 1 Spray(s) Topical three times a day  buMETAnide Injectable 2 milliGRAM(s) IV Push every 12 hours  cefepime   IVPB 2000 milliGRAM(s) IV Intermittent every 12 hours  cetirizine 10 milliGRAM(s) Oral daily  chlorhexidine 2% Cloths 1 Application(s) Topical daily  dextrose 5%. 1000 milliLiter(s) (50 mL/Hr) IV Continuous <Continuous>  dextrose 5%. 1000 milliLiter(s) (100 mL/Hr) IV Continuous <Continuous>  dextrose 50% Injectable 25 Gram(s) IV Push once  dextrose 50% Injectable 12.5 Gram(s) IV Push once  dextrose 50% Injectable 25 Gram(s) IV Push once  glucagon  Injectable 1 milliGRAM(s) IntraMuscular once  heparin   Injectable 5000 Unit(s) SubCutaneous every 8 hours  influenza  Vaccine (HIGH DOSE) 0.5 milliLiter(s) IntraMuscular once  insulin glargine Injectable (LANTUS) 30 Unit(s) SubCutaneous two times a day  insulin lispro (ADMELOG) corrective regimen sliding scale   SubCutaneous three times a day before meals  insulin lispro Injectable (ADMELOG) 20 Unit(s) SubCutaneous three times a day before meals  melatonin 10 milliGRAM(s) Oral at bedtime  metoprolol tartrate 25 milliGRAM(s) Oral two times a day  NIFEdipine XL 60 milliGRAM(s) Oral daily  nystatin    Suspension 338933 Unit(s) Swish and Swallow three times a day  oseltamivir 30 milliGRAM(s) Oral two times a day  pantoprazole    Tablet 40 milliGRAM(s) Oral before breakfast  polyethylene glycol 3350 17 Gram(s) Oral daily  senna 2 Tablet(s) Oral at bedtime  sertraline 25 milliGRAM(s) Oral daily    MEDICATIONS  (PRN):  acetaminophen     Tablet .. 650 milliGRAM(s) Oral every 6 hours PRN Temp greater or equal to 38C (100.4F), Mild Pain (1 - 3)  albuterol/ipratropium for Nebulization 3 milliLiter(s) Nebulizer every 4 hours PRN Shortness of Breath and/or Wheezing  dextrose Oral Gel 15 Gram(s) Oral once PRN Blood Glucose LESS THAN 70 milliGRAM(s)/deciliter  diazepam    Tablet 2 milliGRAM(s) Oral daily PRN anxiety  haloperidol    Injectable 2.5 milliGRAM(s) IntraMuscular every 6 hours PRN Agitation  hydrOXYzine hydrochloride 25 milliGRAM(s) Oral every 6 hours PRN Anxiety  ondansetron Injectable 4 milliGRAM(s) IV Push every 6 hours PRN Nausea and/or Vomiting  oxyCODONE    IR 5 milliGRAM(s) Oral four times a day PRN Severe Pain (7 - 10)           Patient is a 69y old  Male who presents with a chief complaint of Pneumonia due to infectious organism     (26 Jan 2025 00:52)        Over Night Events: on NIV, tachycardic, tachypneic         ROS:     All ROS are negative except HPI         PHYSICAL EXAM    ICU Vital Signs Last 24 Hrs  T(C): 36.5 (26 Jan 2025 04:00), Max: 36.7 (25 Jan 2025 16:18)  T(F): 97.7 (26 Jan 2025 04:00), Max: 98 (25 Jan 2025 16:18)  HR: 110 (26 Jan 2025 04:00) (93 - 110)  BP: 119/64 (26 Jan 2025 04:00) (111/55 - 121/60)  BP(mean): 76 (25 Jan 2025 11:00) (76 - 76)  ABP: --  ABP(mean): --  RR: 20 (26 Jan 2025 04:00) (20 - 20)  SpO2: 99% (26 Jan 2025 04:00) (87% - 99%)    O2 Parameters below as of 25 Jan 2025 21:00  Patient On (Oxygen Delivery Method): BiPAP/CPAP    ENT: Airway patent,  EYES: Pupils equal, Round and reactive to light.  CARDIAC: Normal rate, Regular rhythm.    RESPIRATORY: No wheezing, Bilateral crackles and low BS at bases, Not tachypneic, No use of accessory muscles  GASTROINTESTINAL: Abdomen soft, Non-tender, No guarding,   NEUROLOGICAL: Alert and oriented   SKIN: Skin normal color for race, Warm and dry and intact.     01-25-25 @ 07:01  -  01-26-25 @ 07:00  --------------------------------------------------------  IN:    IV PiggyBack: 50 mL  Total IN: 50 mL    OUT:    Indwelling Catheter - Urethral (mL): 1600 mL  Total OUT: 1600 mL    Total NET: -1550 mL          LABS:                            9.7    12.06 )-----------( 125      ( 26 Jan 2025 05:42 )             28.4                                               01-26    135  |  91[L]  |  x   ----------------------------<  77  3.7   |  33[H]  |  2.6[H]    Ca    8.1[L]      26 Jan 2025 05:42  Phos  3.1     01-26  Mg     2.0     01-26    TPro  5.0[L]  /  Alb  2.6[L]  /  TBili  0.4  /  DBili  x   /  AST  83[H]  /  ALT  75[H]  /  AlkPhos  467[H]  01-26                                             Urinalysis Basic - ( 26 Jan 2025 05:42 )    Color: x / Appearance: x / SG: x / pH: x  Gluc: 77 mg/dL / Ketone: x  / Bili: x / Urobili: x   Blood: x / Protein: x / Nitrite: x   Leuk Esterase: x / RBC: x / WBC x   Sq Epi: x / Non Sq Epi: x / Bacteria: x                                                  LIVER FUNCTIONS - ( 26 Jan 2025 05:42 )  Alb: 2.6 g/dL / Pro: 5.0 g/dL / ALK PHOS: 467 U/L / ALT: 75 U/L / AST: 83 U/L / GGT: x                                                                                                                                       MEDICATIONS  (STANDING):  allopurinol 150 milliGRAM(s) Oral daily  ascorbic acid 500 milliGRAM(s) Oral daily  aspirin  chewable 81 milliGRAM(s) Oral daily  atorvastatin 10 milliGRAM(s) Oral at bedtime  azithromycin  IVPB      azithromycin  IVPB 500 milliGRAM(s) IV Intermittent every 24 hours  benzocaine 20% Spray 1 Spray(s) Topical three times a day  buMETAnide Injectable 2 milliGRAM(s) IV Push every 12 hours  cefepime   IVPB 2000 milliGRAM(s) IV Intermittent every 12 hours  cetirizine 10 milliGRAM(s) Oral daily  chlorhexidine 2% Cloths 1 Application(s) Topical daily  dextrose 5%. 1000 milliLiter(s) (50 mL/Hr) IV Continuous <Continuous>  dextrose 5%. 1000 milliLiter(s) (100 mL/Hr) IV Continuous <Continuous>  dextrose 50% Injectable 25 Gram(s) IV Push once  dextrose 50% Injectable 12.5 Gram(s) IV Push once  dextrose 50% Injectable 25 Gram(s) IV Push once  glucagon  Injectable 1 milliGRAM(s) IntraMuscular once  heparin   Injectable 5000 Unit(s) SubCutaneous every 8 hours  influenza  Vaccine (HIGH DOSE) 0.5 milliLiter(s) IntraMuscular once  insulin glargine Injectable (LANTUS) 30 Unit(s) SubCutaneous two times a day  insulin lispro (ADMELOG) corrective regimen sliding scale   SubCutaneous three times a day before meals  insulin lispro Injectable (ADMELOG) 20 Unit(s) SubCutaneous three times a day before meals  melatonin 10 milliGRAM(s) Oral at bedtime  metoprolol tartrate 25 milliGRAM(s) Oral two times a day  NIFEdipine XL 60 milliGRAM(s) Oral daily  nystatin    Suspension 122855 Unit(s) Swish and Swallow three times a day  oseltamivir 30 milliGRAM(s) Oral two times a day  pantoprazole    Tablet 40 milliGRAM(s) Oral before breakfast  polyethylene glycol 3350 17 Gram(s) Oral daily  senna 2 Tablet(s) Oral at bedtime  sertraline 25 milliGRAM(s) Oral daily    MEDICATIONS  (PRN):  acetaminophen     Tablet .. 650 milliGRAM(s) Oral every 6 hours PRN Temp greater or equal to 38C (100.4F), Mild Pain (1 - 3)  albuterol/ipratropium for Nebulization 3 milliLiter(s) Nebulizer every 4 hours PRN Shortness of Breath and/or Wheezing  dextrose Oral Gel 15 Gram(s) Oral once PRN Blood Glucose LESS THAN 70 milliGRAM(s)/deciliter  diazepam    Tablet 2 milliGRAM(s) Oral daily PRN anxiety  haloperidol    Injectable 2.5 milliGRAM(s) IntraMuscular every 6 hours PRN Agitation  hydrOXYzine hydrochloride 25 milliGRAM(s) Oral every 6 hours PRN Anxiety  ondansetron Injectable 4 milliGRAM(s) IV Push every 6 hours PRN Nausea and/or Vomiting  oxyCODONE    IR 5 milliGRAM(s) Oral four times a day PRN Severe Pain (7 - 10)

## 2025-01-26 NOTE — DIETITIAN INITIAL EVALUATION ADULT - PHYSCIAL ASSESSMENT
obese/other (specify) Based on the results of the nutrition focused physical examination, the patient appears well nourished.

## 2025-01-26 NOTE — PROGRESS NOTE ADULT - ATTENDING COMMENTS
IMPRESSION:    Acute hypoxemic respiratory failure   Influenza A infection / pneumonia   RASHAD / OHS ?  Possible superimposed bacterial pneumonia   Fluid overload / bilateral pleural effusions   HO Large B Cell lymphoma SP Chemotherapy   Pancytopenia chem induced   HO CAD (status post CABG;),  HO DM (on insulin pump)  HO atrial fibrillation on Xarelto  HO distal pancreatomy and splenectomy       Plan as outlined above  continue bumex   upgrade to icu   low threshold for intubation

## 2025-01-26 NOTE — PROGRESS NOTE ADULT - SUBJECTIVE AND OBJECTIVE BOX
MORGAN BUSH 69y Male  MRN#: [id number]   Hospital Day: 6d    SUBJECTIVE  Patient is a 69y old Male who presents with a chief complaint of SOB (26 Jan 2025 07:53)  Currently admitted to medicine with the primary diagnosis of Neutropenic fever      INTERVAL HPI AND OVERNIGHT EVENTS:  Patient was examined and seen at bedside. This morning he is resting comfortably in bed and reports no issues or overnight events.    REVIEW OF SYMPTOMS:  CONSTITUTIONAL: No weakness, fevers or chills; No headaches  EYES: No visual changes, eye pain, or discharge  ENT: No vertigo; No ear pain or change in hearing; No sore throat or difficulty swallowing  NECK: No pain or stiffness  RESPIRATORY: No cough, wheezing, or hemoptysis; No shortness of breath  CARDIOVASCULAR: No chest pain or palpitations  GASTROINTESTINAL: No abdominal or epigastric pain; No nausea, vomiting, or hematemesis; No diarrhea or constipation; No melena or hematochezia  GENITOURINARY: No dysuria, frequency or hematuria  MUSCULOSKELETAL: No joint pain, no muscle pain, no weakness  NEUROLOGICAL: No numbness or weakness  SKIN: No itching or rashes    OBJECTIVE  PAST MEDICAL & SURGICAL HISTORY  Diabetes mellitus, type 2    BPH (benign prostatic hyperplasia)    Water retention    Essential hypertension    Diabetes    CAD (coronary artery disease)    H/O hypercholesterolemia    Morbid obesity    Chronic kidney disease (CKD)    History of atrial fibrillation    H/O right coronary artery stent placement    History of resection of pancreas    History of cholecystectomy    History of left knee replacement    S/P CABG x 6    H/O cardiac radiofrequency ablation      ALLERGIES:  No Known Allergies    MEDICATIONS:  STANDING MEDICATIONS  allopurinol 150 milliGRAM(s) Oral daily  ascorbic acid 500 milliGRAM(s) Oral daily  aspirin  chewable 81 milliGRAM(s) Oral daily  atorvastatin 10 milliGRAM(s) Oral at bedtime  azithromycin  IVPB      azithromycin  IVPB 500 milliGRAM(s) IV Intermittent every 24 hours  benzocaine 20% Spray 1 Spray(s) Topical three times a day  buMETAnide Injectable 2 milliGRAM(s) IV Push every 12 hours  cefepime   IVPB 2000 milliGRAM(s) IV Intermittent every 12 hours  cetirizine 10 milliGRAM(s) Oral daily  chlorhexidine 2% Cloths 1 Application(s) Topical daily  dextrose 5%. 1000 milliLiter(s) IV Continuous <Continuous>  dextrose 5%. 1000 milliLiter(s) IV Continuous <Continuous>  dextrose 50% Injectable 25 Gram(s) IV Push once  dextrose 50% Injectable 12.5 Gram(s) IV Push once  dextrose 50% Injectable 25 Gram(s) IV Push once  glucagon  Injectable 1 milliGRAM(s) IntraMuscular once  heparin  Infusion.  Unit(s)/Hr IV Continuous <Continuous>  influenza  Vaccine (HIGH DOSE) 0.5 milliLiter(s) IntraMuscular once  insulin glargine Injectable (LANTUS) 30 Unit(s) SubCutaneous two times a day  insulin lispro (ADMELOG) corrective regimen sliding scale   SubCutaneous three times a day before meals  insulin lispro Injectable (ADMELOG) 20 Unit(s) SubCutaneous three times a day before meals  melatonin 10 milliGRAM(s) Oral at bedtime  metoprolol tartrate Injectable 5 milliGRAM(s) IV Push every 6 hours  NIFEdipine XL 60 milliGRAM(s) Oral daily  nystatin    Suspension 070904 Unit(s) Swish and Swallow three times a day  oseltamivir 30 milliGRAM(s) Oral two times a day  pantoprazole  Injectable 40 milliGRAM(s) IV Push daily  polyethylene glycol 3350 17 Gram(s) Oral daily  senna 2 Tablet(s) Oral at bedtime  sertraline 25 milliGRAM(s) Oral daily    PRN MEDICATIONS  acetaminophen     Tablet .. 650 milliGRAM(s) Oral every 6 hours PRN  albuterol/ipratropium for Nebulization 3 milliLiter(s) Nebulizer every 4 hours PRN  dextrose Oral Gel 15 Gram(s) Oral once PRN  diazepam    Tablet 2 milliGRAM(s) Oral daily PRN  haloperidol    Injectable 2.5 milliGRAM(s) IntraMuscular every 6 hours PRN  hydrOXYzine hydrochloride 25 milliGRAM(s) Oral every 6 hours PRN  ondansetron Injectable 4 milliGRAM(s) IV Push every 6 hours PRN  oxyCODONE    IR 5 milliGRAM(s) Oral four times a day PRN      VITAL SIGNS: Last 24 Hours  T(C): 36.5 (26 Jan 2025 04:00), Max: 36.7 (25 Jan 2025 16:18)  T(F): 97.7 (26 Jan 2025 04:00), Max: 98 (25 Jan 2025 16:18)  HR: 122 (26 Jan 2025 07:28) (93 - 122)  BP: 119/67 (26 Jan 2025 07:28) (111/55 - 121/60)  BP(mean): 84 (26 Jan 2025 07:28) (76 - 84)  RR: 20 (26 Jan 2025 07:28) (20 - 20)  SpO2: 83% (26 Jan 2025 07:28) (83% - 99%)    LABS:                        9.7    12.06 )-----------( 125      ( 26 Jan 2025 05:42 )             28.4     01-26    135  |  91[L]  |  104[HH]  ----------------------------<  77  3.7   |  33[H]  |  2.6[H]    Ca    8.1[L]      26 Jan 2025 05:42  Phos  3.1     01-26  Mg     2.0     01-26    TPro  5.0[L]  /  Alb  2.6[L]  /  TBili  0.4  /  DBili  x   /  AST  83[H]  /  ALT  75[H]  /  AlkPhos  467[H]  01-26      Urinalysis Basic - ( 26 Jan 2025 05:42 )    Color: x / Appearance: x / SG: x / pH: x  Gluc: 77 mg/dL / Ketone: x  / Bili: x / Urobili: x   Blood: x / Protein: x / Nitrite: x   Leuk Esterase: x / RBC: x / WBC x   Sq Epi: x / Non Sq Epi: x / Bacteria: x      ABG - ( 26 Jan 2025 09:40 )  pH, Arterial: 7.55  pH, Blood: x     /  pCO2: 43    /  pO2: 67    / HCO3: 38    / Base Excess: 13.7  /  SaO2: 97.3          PHYSICAL EXAM:  CONSTITUTIONAL: No acute distress, well-developed, well-groomed, AAOx3  HEAD: Atraumatic, normocephalic  PULMONARY: decreased bilaterally  CARDIOVASCULAR: Regular rate and rhythm  GASTROINTESTINAL: Soft, non-tender, non-distended  MUSCULOSKELETAL:  no edema  SKIN: No rashes or lesions    ASSESSMENT & PLAN  69-year-old man with extensive medical history including CAD (status post CABG;), atrial fibrillation on Xarelto, history of distal pancreatomy and splenectomy for unclear reason referred to the ED by his oncologist for abnormal labs. Patient was recently diagnosed with Diffuse large B cell Lymphoma on pathology of right groin mass and was following his Oncologist Dr Angeli Hilliard.     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 Rituximab given D5    CKD Stage 3B  baseline (1.8)    Recommendations   Patient s/p cycle 1 R-EPOCH for DLBL, presenting with neutropenic fever.  Zarxio d/c, WBC now 12K   Antibiotics as per ID   platelet count recovered, 125K today, started on heparin drip

## 2025-01-26 NOTE — PROGRESS NOTE ADULT - ATTENDING COMMENTS
The patient was seen. Agree with above.   WBC and PLT improving.   Discontinue Zarxio.   Still on BiPAP for hypoxemia due to pneumonia. Followup with pulmonary  Continue ABx.  KRISTINE/CKD. Followup with nephrology.

## 2025-01-27 LAB
ALBUMIN SERPL ELPH-MCNC: 2.5 G/DL — LOW (ref 3.5–5.2)
ALP SERPL-CCNC: 475 U/L — HIGH (ref 30–115)
ALT FLD-CCNC: 63 U/L — HIGH (ref 0–41)
ANION GAP SERPL CALC-SCNC: 18 MMOL/L — HIGH (ref 7–14)
APTT BLD: 35.4 SEC — SIGNIFICANT CHANGE UP (ref 27–39.2)
APTT BLD: 51.6 SEC — HIGH (ref 27–39.2)
APTT BLD: 60.1 SEC — HIGH (ref 27–39.2)
AST SERPL-CCNC: 69 U/L — HIGH (ref 0–41)
BASOPHILS # BLD AUTO: 0.19 K/UL — SIGNIFICANT CHANGE UP (ref 0–0.2)
BASOPHILS NFR BLD AUTO: 1.1 % — HIGH (ref 0–1)
BILIRUB SERPL-MCNC: 0.6 MG/DL — SIGNIFICANT CHANGE UP (ref 0.2–1.2)
BUN SERPL-MCNC: 99 MG/DL — CRITICAL HIGH (ref 10–20)
CALCIUM SERPL-MCNC: 8.6 MG/DL — SIGNIFICANT CHANGE UP (ref 8.4–10.5)
CHLORIDE SERPL-SCNC: 93 MMOL/L — LOW (ref 98–110)
CO2 SERPL-SCNC: 29 MMOL/L — SIGNIFICANT CHANGE UP (ref 17–32)
CREAT SERPL-MCNC: 2.3 MG/DL — HIGH (ref 0.7–1.5)
EGFR: 30 ML/MIN/1.73M2 — LOW
EOSINOPHIL # BLD AUTO: 0 K/UL — SIGNIFICANT CHANGE UP (ref 0–0.7)
EOSINOPHIL NFR BLD AUTO: 0 % — SIGNIFICANT CHANGE UP (ref 0–8)
GLUCOSE BLDC GLUCOMTR-MCNC: 209 MG/DL — HIGH (ref 70–99)
GLUCOSE BLDC GLUCOMTR-MCNC: 214 MG/DL — HIGH (ref 70–99)
GLUCOSE BLDC GLUCOMTR-MCNC: 265 MG/DL — HIGH (ref 70–99)
GLUCOSE BLDC GLUCOMTR-MCNC: 266 MG/DL — HIGH (ref 70–99)
GLUCOSE SERPL-MCNC: 185 MG/DL — HIGH (ref 70–99)
HCT VFR BLD CALC: 28.7 % — LOW (ref 42–52)
HCT VFR BLD CALC: 28.9 % — LOW (ref 42–52)
HGB BLD-MCNC: 10.1 G/DL — LOW (ref 14–18)
HGB BLD-MCNC: 9.9 G/DL — LOW (ref 14–18)
IMM GRANULOCYTES NFR BLD AUTO: 24 % — HIGH (ref 0.1–0.3)
LYMPHOCYTES # BLD AUTO: 0.34 K/UL — LOW (ref 1.2–3.4)
LYMPHOCYTES # BLD AUTO: 2 % — LOW (ref 20.5–51.1)
MAGNESIUM SERPL-MCNC: 2.2 MG/DL — SIGNIFICANT CHANGE UP (ref 1.8–2.4)
MCHC RBC-ENTMCNC: 30.7 PG — SIGNIFICANT CHANGE UP (ref 27–31)
MCHC RBC-ENTMCNC: 31.2 PG — HIGH (ref 27–31)
MCHC RBC-ENTMCNC: 34.3 G/DL — SIGNIFICANT CHANGE UP (ref 32–37)
MCHC RBC-ENTMCNC: 35.2 G/DL — SIGNIFICANT CHANGE UP (ref 32–37)
MCV RBC AUTO: 88.6 FL — SIGNIFICANT CHANGE UP (ref 80–94)
MCV RBC AUTO: 89.5 FL — SIGNIFICANT CHANGE UP (ref 80–94)
MONOCYTES # BLD AUTO: 1.82 K/UL — HIGH (ref 0.1–0.6)
MONOCYTES NFR BLD AUTO: 10.8 % — HIGH (ref 1.7–9.3)
NEUTROPHILS # BLD AUTO: 10.48 K/UL — HIGH (ref 1.4–6.5)
NEUTROPHILS NFR BLD AUTO: 62.1 % — SIGNIFICANT CHANGE UP (ref 42.2–75.2)
NRBC # BLD: 1 /100 WBCS — HIGH (ref 0–0)
NRBC # BLD: 1 /100 WBCS — HIGH (ref 0–0)
NRBC BLD-RTO: 1 /100 WBCS — HIGH (ref 0–0)
NRBC BLD-RTO: 1 /100 WBCS — HIGH (ref 0–0)
PHOSPHATE SERPL-MCNC: 4.1 MG/DL — SIGNIFICANT CHANGE UP (ref 2.1–4.9)
PLATELET # BLD AUTO: 221 K/UL — SIGNIFICANT CHANGE UP (ref 130–400)
PLATELET # BLD AUTO: 294 K/UL — SIGNIFICANT CHANGE UP (ref 130–400)
PMV BLD: 11.7 FL — HIGH (ref 7.4–10.4)
PMV BLD: 12.1 FL — HIGH (ref 7.4–10.4)
POTASSIUM SERPL-MCNC: 4.1 MMOL/L — SIGNIFICANT CHANGE UP (ref 3.5–5)
POTASSIUM SERPL-SCNC: 4.1 MMOL/L — SIGNIFICANT CHANGE UP (ref 3.5–5)
PROT SERPL-MCNC: 5.3 G/DL — LOW (ref 6–8)
RBC # BLD: 3.23 M/UL — LOW (ref 4.7–6.1)
RBC # BLD: 3.24 M/UL — LOW (ref 4.7–6.1)
RBC # FLD: 14 % — SIGNIFICANT CHANGE UP (ref 11.5–14.5)
RBC # FLD: 14.3 % — SIGNIFICANT CHANGE UP (ref 11.5–14.5)
SODIUM SERPL-SCNC: 140 MMOL/L — SIGNIFICANT CHANGE UP (ref 135–146)
URATE SERPL-MCNC: 8.5 MG/DL — SIGNIFICANT CHANGE UP (ref 3.4–8.8)
WBC # BLD: 16.88 K/UL — HIGH (ref 4.8–10.8)
WBC # BLD: 19 K/UL — HIGH (ref 4.8–10.8)
WBC # FLD AUTO: 16.88 K/UL — HIGH (ref 4.8–10.8)
WBC # FLD AUTO: 19 K/UL — HIGH (ref 4.8–10.8)

## 2025-01-27 PROCEDURE — 71045 X-RAY EXAM CHEST 1 VIEW: CPT | Mod: 26

## 2025-01-27 PROCEDURE — 99233 SBSQ HOSP IP/OBS HIGH 50: CPT

## 2025-01-27 RX ORDER — HEPARIN SODIUM 1000 [USP'U]/ML
INJECTION INTRAVENOUS; SUBCUTANEOUS
Qty: 25000 | Refills: 0 | Status: DISCONTINUED | OUTPATIENT
Start: 2025-01-27 | End: 2025-02-03

## 2025-01-27 RX ORDER — OSELTAMIVIR PHOSPHATE 75 MG/1
30 CAPSULE ORAL
Refills: 0 | Status: COMPLETED | OUTPATIENT
Start: 2025-01-27 | End: 2025-01-31

## 2025-01-27 RX ORDER — NYSTATIN 100000 [USP'U]/G
1 CREAM TOPICAL EVERY 8 HOURS
Refills: 0 | Status: DISCONTINUED | OUTPATIENT
Start: 2025-01-27 | End: 2025-01-31

## 2025-01-27 RX ORDER — HEPARIN SODIUM 1000 [USP'U]/ML
6000 INJECTION INTRAVENOUS; SUBCUTANEOUS EVERY 6 HOURS
Refills: 0 | Status: DISCONTINUED | OUTPATIENT
Start: 2025-01-27 | End: 2025-02-03

## 2025-01-27 RX ORDER — LORAZEPAM 4 MG/ML
0.5 VIAL (ML) INJECTION ONCE
Refills: 0 | Status: DISCONTINUED | OUTPATIENT
Start: 2025-01-27 | End: 2025-01-27

## 2025-01-27 RX ORDER — HYDROXYZINE HYDROCHLORIDE 25 MG/1
25 TABLET, FILM COATED ORAL ONCE
Refills: 0 | Status: DISCONTINUED | OUTPATIENT
Start: 2025-01-27 | End: 2025-01-27

## 2025-01-27 RX ORDER — LORAZEPAM 4 MG/ML
0.25 VIAL (ML) INJECTION ONCE
Refills: 0 | Status: COMPLETED | OUTPATIENT
Start: 2025-01-27 | End: 2025-01-27

## 2025-01-27 RX ADMIN — INSULIN GLARGINE-YFGN 30 UNIT(S): 100 INJECTION, SOLUTION SUBCUTANEOUS at 08:33

## 2025-01-27 RX ADMIN — Medication 81 MILLIGRAM(S): at 12:15

## 2025-01-27 RX ADMIN — Medication 150 MILLIGRAM(S): at 12:16

## 2025-01-27 RX ADMIN — INSULIN GLARGINE-YFGN 30 UNIT(S): 100 INJECTION, SOLUTION SUBCUTANEOUS at 22:14

## 2025-01-27 RX ADMIN — Medication 0.25 MILLIGRAM(S): at 04:30

## 2025-01-27 RX ADMIN — BUMETANIDE 2 MILLIGRAM(S): 1 TABLET ORAL at 17:18

## 2025-01-27 RX ADMIN — NYSTATIN 1 APPLICATION(S): 100000 CREAM TOPICAL at 22:15

## 2025-01-27 RX ADMIN — Medication 0.5 MILLIGRAM(S): at 04:59

## 2025-01-27 RX ADMIN — METOPROLOL SUCCINATE 5 MILLIGRAM(S): 50 TABLET, EXTENDED RELEASE ORAL at 12:14

## 2025-01-27 RX ADMIN — INSULIN LISPRO 20 UNIT(S): 100 INJECTION, SOLUTION INTRAVENOUS; SUBCUTANEOUS at 12:13

## 2025-01-27 RX ADMIN — HEPARIN SODIUM 1000 UNIT(S)/HR: 1000 INJECTION INTRAVENOUS; SUBCUTANEOUS at 02:44

## 2025-01-27 RX ADMIN — HEPARIN SODIUM 1300 UNIT(S)/HR: 1000 INJECTION INTRAVENOUS; SUBCUTANEOUS at 09:31

## 2025-01-27 RX ADMIN — HEPARIN SODIUM 1000 UNIT(S)/HR: 1000 INJECTION INTRAVENOUS; SUBCUTANEOUS at 07:52

## 2025-01-27 RX ADMIN — CEFEPIME 100 MILLIGRAM(S): 2 INJECTION, POWDER, FOR SOLUTION INTRAVENOUS at 17:16

## 2025-01-27 RX ADMIN — BUMETANIDE 2 MILLIGRAM(S): 1 TABLET ORAL at 05:38

## 2025-01-27 RX ADMIN — SERTRALINE 25 MILLIGRAM(S): 100 TABLET, FILM COATED ORAL at 12:17

## 2025-01-27 RX ADMIN — Medication 255 MILLIGRAM(S): at 12:19

## 2025-01-27 RX ADMIN — CEFEPIME 100 MILLIGRAM(S): 2 INJECTION, POWDER, FOR SOLUTION INTRAVENOUS at 05:24

## 2025-01-27 RX ADMIN — Medication 10 MILLIGRAM(S): at 12:17

## 2025-01-27 RX ADMIN — Medication 500000 UNIT(S): at 22:15

## 2025-01-27 RX ADMIN — HEPARIN SODIUM 1000 UNIT(S)/HR: 1000 INJECTION INTRAVENOUS; SUBCUTANEOUS at 16:55

## 2025-01-27 RX ADMIN — Medication 40 MILLIGRAM(S): at 12:14

## 2025-01-27 RX ADMIN — Medication 1 APPLICATION(S): at 12:19

## 2025-01-27 RX ADMIN — HEPARIN SODIUM 1000 UNIT(S)/HR: 1000 INJECTION INTRAVENOUS; SUBCUTANEOUS at 23:39

## 2025-01-27 RX ADMIN — INSULIN LISPRO 3: 100 INJECTION, SOLUTION INTRAVENOUS; SUBCUTANEOUS at 12:12

## 2025-01-27 RX ADMIN — METOPROLOL SUCCINATE 5 MILLIGRAM(S): 50 TABLET, EXTENDED RELEASE ORAL at 17:17

## 2025-01-27 RX ADMIN — POLYETHYLENE GLYCOL 3350 17 GRAM(S): 17 POWDER, FOR SOLUTION ORAL at 12:14

## 2025-01-27 RX ADMIN — OSELTAMIVIR PHOSPHATE 30 MILLIGRAM(S): 75 CAPSULE ORAL at 18:52

## 2025-01-27 RX ADMIN — Medication 500 MILLIGRAM(S): at 12:14

## 2025-01-27 RX ADMIN — METOPROLOL SUCCINATE 5 MILLIGRAM(S): 50 TABLET, EXTENDED RELEASE ORAL at 05:24

## 2025-01-27 RX ADMIN — INSULIN LISPRO 2: 100 INJECTION, SOLUTION INTRAVENOUS; SUBCUTANEOUS at 17:08

## 2025-01-27 RX ADMIN — METOPROLOL SUCCINATE 5 MILLIGRAM(S): 50 TABLET, EXTENDED RELEASE ORAL at 00:24

## 2025-01-27 NOTE — PROGRESS NOTE ADULT - ASSESSMENT
69-year-old man with extensive medical history including CAD (status post CABG;), DM (on insulin pump), atrial fibrillation on Xarelto, history of distal pancreatomy and splenectomy for unclear reason referred to the ED by his oncologist for abnormal labs.  Nephrology was consulted for KRISTINE and mild hyponatremia.   #Non oliguric KRISTINE on CKD stage 3B - likely 2/2 fluid overload  #Hyponatremia - likely from fluid overload.   #h/o Large B cell Lymphoma - s/p chemotherapy   #AHRF - likely 2/2 Influenza A with superimposed bacterial PNA.  #Neutropenia - s/p chemotherapy  cr stable  - Continue bumex 2mg Q12h  - if UOP drops may add metolaozne 2.5 mg (is on at home)   - replete K to 4 and Mg to 2 while on diuresis  - ph at goal   - bp controlled   - Heme/onc on board, recommendations appreciated.   - Avoid nephrotoxic medications and adjust all medications to GFR.   - No need for RRT at this time.  will follow

## 2025-01-27 NOTE — PROGRESS NOTE ADULT - SUBJECTIVE AND OBJECTIVE BOX
Patient is a 69y old  Male who presents with a chief complaint of SOB (26 Jan 2025 07:53)        Over Night Events:  Events noted. Pending ICU bed. On NIV FiO2 90%. Afebrile.       ROS:     All ROS are negative except HPI         PHYSICAL EXAM    ICU Vital Signs Last 24 Hrs  T(C): 36.8 (27 Jan 2025 08:15), Max: 36.9 (27 Jan 2025 00:00)  T(F): 98.2 (27 Jan 2025 08:15), Max: 98.5 (27 Jan 2025 00:00)  HR: 114 (27 Jan 2025 08:15) (98 - 114)  BP: 128/65 (27 Jan 2025 08:15) (115/62 - 128/65)  BP(mean): 90 (27 Jan 2025 08:15) (85 - 90)  ABP: --  ABP(mean): --  RR: 20 (27 Jan 2025 08:15) (20 - 20)  SpO2: 96% (27 Jan 2025 08:30) (94% - 97%)    O2 Parameters below as of 27 Jan 2025 08:15  Patient On (Oxygen Delivery Method): BiPAP/CPAP            CONSTITUTIONAL:  ill appearing     ENT:   Airway patent,   Mouth with normal mucosa.     EYES:   Pupils equal,   Round and reactive to light.    CARDIAC:   tachycardic  2+ LE edema    RESPIRATORY:   Bilateral BS  Normal chest expansion  tachypneic,  No use of accessory muscles    GASTROINTESTINAL:  Abdomen soft,   Non-tender,   No guarding,     MUSCULOSKELETAL:   Range of motion is not limited,  No clubbing, cyanosis    NEUROLOGICAL:   Alert and oriented   No motor  deficits.    SKIN:   Skin normal color for race,   Warm and dry     01-26-25 @ 07:01  -  01-27-25 @ 07:00  --------------------------------------------------------  IN:    Heparin Infusion: 110 mL  Total IN: 110 mL    OUT:    Indwelling Catheter - Urethral (mL): 2800 mL    Oral Fluid: 0 mL  Total OUT: 2800 mL    Total NET: -2690 mL      01-27-25 @ 07:01  -  01-27-25 @ 09:40  --------------------------------------------------------  IN:    Heparin Infusion: 30 mL  Total IN: 30 mL    OUT:  Total OUT: 0 mL    Total NET: 30 mL          LABS:                            10.1   16.88 )-----------( 221      ( 27 Jan 2025 06:12 )             28.7                                               01-27    140  |  93[L]  |  99[HH]  ----------------------------<  185[H]  4.1   |  29  |  2.3[H]    Ca    8.6      27 Jan 2025 06:12  Phos  4.1     01-27  Mg     2.2     01-27    TPro  5.3[L]  /  Alb  2.5[L]  /  TBili  0.6  /  DBili  x   /  AST  69[H]  /  ALT  63[H]  /  AlkPhos  475[H]  01-27      PTT - ( 27 Jan 2025 08:33 )  PTT:35.4 sec                                       Urinalysis Basic - ( 27 Jan 2025 06:12 )    Color: x / Appearance: x / SG: x / pH: x  Gluc: 185 mg/dL / Ketone: x  / Bili: x / Urobili: x   Blood: x / Protein: x / Nitrite: x   Leuk Esterase: x / RBC: x / WBC x   Sq Epi: x / Non Sq Epi: x / Bacteria: x                                                  LIVER FUNCTIONS - ( 27 Jan 2025 06:12 )  Alb: 2.5 g/dL / Pro: 5.3 g/dL / ALK PHOS: 475 U/L / ALT: 63 U/L / AST: 69 U/L / GGT: x                                                                                                                                   ABG - ( 26 Jan 2025 09:40 )  pH, Arterial: 7.55  pH, Blood: x     /  pCO2: 43    /  pO2: 67    / HCO3: 38    / Base Excess: 13.7  /  SaO2: 97.3                MEDICATIONS  (STANDING):  allopurinol 150 milliGRAM(s) Oral daily  ascorbic acid 500 milliGRAM(s) Oral daily  aspirin  chewable 81 milliGRAM(s) Oral daily  atorvastatin 10 milliGRAM(s) Oral at bedtime  azithromycin  IVPB      azithromycin  IVPB 500 milliGRAM(s) IV Intermittent every 24 hours  benzocaine 20% Spray 1 Spray(s) Topical three times a day  buMETAnide Injectable 2 milliGRAM(s) IV Push every 12 hours  cefepime   IVPB 2000 milliGRAM(s) IV Intermittent every 12 hours  cetirizine 10 milliGRAM(s) Oral daily  chlorhexidine 2% Cloths 1 Application(s) Topical daily  dextrose 5%. 1000 milliLiter(s) (50 mL/Hr) IV Continuous <Continuous>  dextrose 5%. 1000 milliLiter(s) (100 mL/Hr) IV Continuous <Continuous>  dextrose 50% Injectable 25 Gram(s) IV Push once  dextrose 50% Injectable 12.5 Gram(s) IV Push once  dextrose 50% Injectable 25 Gram(s) IV Push once  glucagon  Injectable 1 milliGRAM(s) IntraMuscular once  heparin  Infusion.  Unit(s)/Hr (10 mL/Hr) IV Continuous <Continuous>  influenza  Vaccine (HIGH DOSE) 0.5 milliLiter(s) IntraMuscular once  insulin glargine Injectable (LANTUS) 30 Unit(s) SubCutaneous two times a day  insulin lispro (ADMELOG) corrective regimen sliding scale   SubCutaneous three times a day before meals  insulin lispro Injectable (ADMELOG) 20 Unit(s) SubCutaneous three times a day before meals  melatonin 10 milliGRAM(s) Oral at bedtime  metoprolol tartrate Injectable 5 milliGRAM(s) IV Push every 6 hours  NIFEdipine XL 60 milliGRAM(s) Oral daily  nystatin    Suspension 087902 Unit(s) Swish and Swallow three times a day  pantoprazole  Injectable 40 milliGRAM(s) IV Push daily  polyethylene glycol 3350 17 Gram(s) Oral daily  senna 2 Tablet(s) Oral at bedtime  sertraline 25 milliGRAM(s) Oral daily    MEDICATIONS  (PRN):  acetaminophen     Tablet .. 650 milliGRAM(s) Oral every 6 hours PRN Temp greater or equal to 38C (100.4F), Mild Pain (1 - 3)  albuterol/ipratropium for Nebulization 3 milliLiter(s) Nebulizer every 4 hours PRN Shortness of Breath and/or Wheezing  dextrose Oral Gel 15 Gram(s) Oral once PRN Blood Glucose LESS THAN 70 milliGRAM(s)/deciliter  diazepam    Tablet 2 milliGRAM(s) Oral daily PRN anxiety  haloperidol    Injectable 2.5 milliGRAM(s) IntraMuscular every 6 hours PRN Agitation  hydrOXYzine hydrochloride 25 milliGRAM(s) Oral every 6 hours PRN Anxiety  ondansetron Injectable 4 milliGRAM(s) IV Push every 6 hours PRN Nausea and/or Vomiting  oxyCODONE    IR 5 milliGRAM(s) Oral four times a day PRN Severe Pain (7 - 10)      New X-rays reviewed:                                                                                  ECHO   Patient is a 69y old  Male who presents with a chief complaint of SOB (26 Jan 2025 07:53)        Over Night Events:  Events noted. Pending ICU bed. On NIV FiO2 90%. Afebrile.         PHYSICAL EXAM    ICU Vital Signs Last 24 Hrs  T(C): 36.8 (27 Jan 2025 08:15), Max: 36.9 (27 Jan 2025 00:00)  T(F): 98.2 (27 Jan 2025 08:15), Max: 98.5 (27 Jan 2025 00:00)  HR: 114 (27 Jan 2025 08:15) (98 - 114)  BP: 128/65 (27 Jan 2025 08:15) (115/62 - 128/65)  BP(mean): 90 (27 Jan 2025 08:15) (85 - 90)  RR: 20 (27 Jan 2025 08:15) (20 - 20)  SpO2: 96% (27 Jan 2025 08:30) (94% - 97%)    O2 Parameters below as of 27 Jan 2025 08:15  Patient On (Oxygen Delivery Method): BiPAP/CPAP            CONSTITUTIONAL:  ill appearing     ENT:   Airway patent,   Mouth with normal mucosa.     EYES:   Pupils equal,   Round and reactive to light.    CARDIAC:   tachycardic  2+ LE edema    RESPIRATORY:   bl rhonchi    GASTROINTESTINAL:  Abdomen soft,   Non-tender,   No guarding,     MUSCULOSKELETAL:   Range of motion is not limited,  No clubbing, cyanosis    NEUROLOGICAL:   Alert and oriented       01-26-25 @ 07:01  -  01-27-25 @ 07:00  --------------------------------------------------------  IN:    Heparin Infusion: 110 mL  Total IN: 110 mL    OUT:    Indwelling Catheter - Urethral (mL): 2800 mL    Oral Fluid: 0 mL  Total OUT: 2800 mL    Total NET: -2690 mL      01-27-25 @ 07:01  -  01-27-25 @ 09:40  --------------------------------------------------------  IN:    Heparin Infusion: 30 mL  Total IN: 30 mL    OUT:  Total OUT: 0 mL    Total NET: 30 mL          LABS:                            10.1   16.88 )-----------( 221      ( 27 Jan 2025 06:12 )             28.7                                               01-27    140  |  93[L]  |  99[HH]  ----------------------------<  185[H]  4.1   |  29  |  2.3[H]    Ca    8.6      27 Jan 2025 06:12  Phos  4.1     01-27  Mg     2.2     01-27    TPro  5.3[L]  /  Alb  2.5[L]  /  TBili  0.6  /  DBili  x   /  AST  69[H]  /  ALT  63[H]  /  AlkPhos  475[H]  01-27      PTT - ( 27 Jan 2025 08:33 )  PTT:35.4 sec                                       Urinalysis Basic - ( 27 Jan 2025 06:12 )    Color: x / Appearance: x / SG: x / pH: x  Gluc: 185 mg/dL / Ketone: x  / Bili: x / Urobili: x   Blood: x / Protein: x / Nitrite: x   Leuk Esterase: x / RBC: x / WBC x   Sq Epi: x / Non Sq Epi: x / Bacteria: x                                                  LIVER FUNCTIONS - ( 27 Jan 2025 06:12 )  Alb: 2.5 g/dL / Pro: 5.3 g/dL / ALK PHOS: 475 U/L / ALT: 63 U/L / AST: 69 U/L / GGT: x                                                                                                                                   ABG - ( 26 Jan 2025 09:40 )  pH, Arterial: 7.55  pH, Blood: x     /  pCO2: 43    /  pO2: 67    / HCO3: 38    / Base Excess: 13.7  /  SaO2: 97.3                MEDICATIONS  (STANDING):  allopurinol 150 milliGRAM(s) Oral daily  ascorbic acid 500 milliGRAM(s) Oral daily  aspirin  chewable 81 milliGRAM(s) Oral daily  atorvastatin 10 milliGRAM(s) Oral at bedtime  azithromycin  IVPB      azithromycin  IVPB 500 milliGRAM(s) IV Intermittent every 24 hours  benzocaine 20% Spray 1 Spray(s) Topical three times a day  buMETAnide Injectable 2 milliGRAM(s) IV Push every 12 hours  cefepime   IVPB 2000 milliGRAM(s) IV Intermittent every 12 hours  cetirizine 10 milliGRAM(s) Oral daily  chlorhexidine 2% Cloths 1 Application(s) Topical daily  dextrose 5%. 1000 milliLiter(s) (50 mL/Hr) IV Continuous <Continuous>  dextrose 5%. 1000 milliLiter(s) (100 mL/Hr) IV Continuous <Continuous>  dextrose 50% Injectable 25 Gram(s) IV Push once  dextrose 50% Injectable 12.5 Gram(s) IV Push once  dextrose 50% Injectable 25 Gram(s) IV Push once  glucagon  Injectable 1 milliGRAM(s) IntraMuscular once  heparin  Infusion.  Unit(s)/Hr (10 mL/Hr) IV Continuous <Continuous>  influenza  Vaccine (HIGH DOSE) 0.5 milliLiter(s) IntraMuscular once  insulin glargine Injectable (LANTUS) 30 Unit(s) SubCutaneous two times a day  insulin lispro (ADMELOG) corrective regimen sliding scale   SubCutaneous three times a day before meals  insulin lispro Injectable (ADMELOG) 20 Unit(s) SubCutaneous three times a day before meals  melatonin 10 milliGRAM(s) Oral at bedtime  metoprolol tartrate Injectable 5 milliGRAM(s) IV Push every 6 hours  NIFEdipine XL 60 milliGRAM(s) Oral daily  nystatin    Suspension 794293 Unit(s) Swish and Swallow three times a day  pantoprazole  Injectable 40 milliGRAM(s) IV Push daily  polyethylene glycol 3350 17 Gram(s) Oral daily  senna 2 Tablet(s) Oral at bedtime  sertraline 25 milliGRAM(s) Oral daily    MEDICATIONS  (PRN):  acetaminophen     Tablet .. 650 milliGRAM(s) Oral every 6 hours PRN Temp greater or equal to 38C (100.4F), Mild Pain (1 - 3)  albuterol/ipratropium for Nebulization 3 milliLiter(s) Nebulizer every 4 hours PRN Shortness of Breath and/or Wheezing  dextrose Oral Gel 15 Gram(s) Oral once PRN Blood Glucose LESS THAN 70 milliGRAM(s)/deciliter  diazepam    Tablet 2 milliGRAM(s) Oral daily PRN anxiety  haloperidol    Injectable 2.5 milliGRAM(s) IntraMuscular every 6 hours PRN Agitation  hydrOXYzine hydrochloride 25 milliGRAM(s) Oral every 6 hours PRN Anxiety  ondansetron Injectable 4 milliGRAM(s) IV Push every 6 hours PRN Nausea and/or Vomiting  oxyCODONE    IR 5 milliGRAM(s) Oral four times a day PRN Severe Pain (7 - 10)

## 2025-01-27 NOTE — SWALLOW BEDSIDE ASSESSMENT ADULT - SLP GENERAL OBSERVATIONS
pt received in bed awake +generalized weakness +HFNC 60L/min, 100% O2 +pt's brother at bedside pt received in bed awake +generalized weakness +clear vocal quality +HFNC 60L/min, 100% O2 +pt's brother at bedside

## 2025-01-27 NOTE — PROGRESS NOTE ADULT - SUBJECTIVE AND OBJECTIVE BOX
seen and examined  24 h events noted           PAST HISTORY  --------------------------------------------------------------------------------  No significant changes to PMH, PSH, FHx, SHx, unless otherwise noted    ALLERGIES & MEDICATIONS  --------------------------------------------------------------------------------  Allergies    No Known Allergies    Intolerances      Standing Inpatient Medications  allopurinol 150 milliGRAM(s) Oral daily  ascorbic acid 500 milliGRAM(s) Oral daily  aspirin  chewable 81 milliGRAM(s) Oral daily  atorvastatin 10 milliGRAM(s) Oral at bedtime  azithromycin  IVPB      azithromycin  IVPB 500 milliGRAM(s) IV Intermittent every 24 hours  benzocaine 20% Spray 1 Spray(s) Topical three times a day  buMETAnide Injectable 2 milliGRAM(s) IV Push every 12 hours  cefepime   IVPB 2000 milliGRAM(s) IV Intermittent every 12 hours  cetirizine 10 milliGRAM(s) Oral daily  chlorhexidine 2% Cloths 1 Application(s) Topical daily  dextrose 5%. 1000 milliLiter(s) IV Continuous <Continuous>  dextrose 5%. 1000 milliLiter(s) IV Continuous <Continuous>  dextrose 50% Injectable 25 Gram(s) IV Push once  dextrose 50% Injectable 12.5 Gram(s) IV Push once  dextrose 50% Injectable 25 Gram(s) IV Push once  glucagon  Injectable 1 milliGRAM(s) IntraMuscular once  heparin  Infusion.  Unit(s)/Hr IV Continuous <Continuous>  influenza  Vaccine (HIGH DOSE) 0.5 milliLiter(s) IntraMuscular once  insulin glargine Injectable (LANTUS) 30 Unit(s) SubCutaneous two times a day  insulin lispro (ADMELOG) corrective regimen sliding scale   SubCutaneous three times a day before meals  insulin lispro Injectable (ADMELOG) 20 Unit(s) SubCutaneous three times a day before meals  melatonin 10 milliGRAM(s) Oral at bedtime  metoprolol tartrate Injectable 5 milliGRAM(s) IV Push every 6 hours  NIFEdipine XL 60 milliGRAM(s) Oral daily  nystatin    Suspension [id number] Unit(s) Swish and Swallow three times a day  pantoprazole  Injectable 40 milliGRAM(s) IV Push daily  polyethylene glycol 3350 17 Gram(s) Oral daily  senna 2 Tablet(s) Oral at bedtime  sertraline 25 milliGRAM(s) Oral daily    PRN Inpatient Medications  acetaminophen     Tablet .. 650 milliGRAM(s) Oral every 6 hours PRN  albuterol/ipratropium for Nebulization 3 milliLiter(s) Nebulizer every 4 hours PRN  dextrose Oral Gel 15 Gram(s) Oral once PRN  diazepam    Tablet 2 milliGRAM(s) Oral daily PRN  haloperidol    Injectable 2.5 milliGRAM(s) IntraMuscular every 6 hours PRN  hydrOXYzine hydrochloride 25 milliGRAM(s) Oral every 6 hours PRN  ondansetron Injectable 4 milliGRAM(s) IV Push every 6 hours PRN  oxyCODONE    IR 5 milliGRAM(s) Oral four times a day PRN        VITALS/PHYSICAL EXAM  --------------------------------------------------------------------------------  T(C): 36.9 (01-27-25 @ 04:00), Max: 36.9 (01-27-25 @ 00:00)  HR: 109 (01-27-25 @ 04:00) (98 - 113)  BP: 121/68 (01-27-25 @ 04:00) (115/62 - 123/61)  RR: 20 (01-27-25 @ 04:00) (20 - 20)  SpO2: 94% (01-27-25 @ 04:00) (94% - 97%)  Wt(kg): --        01-26-25 @ 07:01  -  01-27-25 @ 07:00  --------------------------------------------------------  IN: 110 mL / OUT: 2800 mL / NET: -2690 mL      Physical Exam:  	Gen: BIPAP  	Pulm: decrease BS  B/L  	CV:  S1S2; no rub  	Abd: +distended      LABS/STUDIES  --------------------------------------------------------------------------------              10.1   16.88 >-----------<  221      [01-27-25 @ 06:12]              28.7     135  |  91  |  104  ----------------------------<  77      [01-26-25 @ 05:42]  3.7   |  33  |  2.6        Ca     8.1     [01-26-25 @ 05:42]      Mg     2.0     [01-26-25 @ 05:42]      Phos  3.1     [01-26-25 @ 05:42]    TPro  5.0  /  Alb  2.6  /  TBili  0.4  /  DBili  x   /  AST  83  /  ALT  75  /  AlkPhos  467  [01-26-25 @ 05:42]      PTT: 55.3       [01-26-25 @ 18:20]      Creatinine Trend:  SCr 2.6 [01-26 @ 05:42]  SCr 2.5 [01-25 @ 16:50]  SCr 2.5 [01-25 @ 05:02]  SCr 2.6 [01-25 @ 00:39]  SCr 2.7 [01-24 @ 18:09]    Urinalysis - [01-26-25 @ 05:42]      Color  / Appearance  / SG  / pH       Gluc 77 / Ketone   / Bili  / Urobili        Blood  / Protein  / Leuk Est  / Nitrite       RBC  / WBC  / Hyaline  / Gran  / Sq Epi  / Non Sq Epi  / Bacteria         HBsAg Nonreact      [01-11-25 @ 13:09]  HBcAb Nonreact      [01-11-25 @ 13:09]  HCV 0.08, Nonreact      [01-11-25 @ 13:09]

## 2025-01-27 NOTE — CHART NOTE - NSCHARTNOTEFT_GEN_A_CORE
Transfer from: SDU to MICU      SDU COURSE    Patient had a RVP positive for flu. CXR was suggestive of possible super imposed multifocal PNA. Was also pancytopenic 2/2 chemotherapy - suspected neutropenic fever Started on abx for PNA and neutropenic fever - cfepime and azithromycin. Patient also volume overloaded currently on Bumex IV BID 2mg. If UO decreasing plan as per nephro was to add metolazone. Patient unable to be weaned off BiPAP. ABG - showing low pO2 with compensation. Hem /onc board for the the Diffuse large B cell lyphoma. s/p zarxio. Spoke to the patients doctor in [facility] - didn't accept transfre as the patient was deemed too unstable. Started patient on heparin drip for the A fib. c/w aptt monitoring. Patient is NPO as he is on continopus BiPAP. transitioned PO meds to IV medications.      HOSPITAL COURSE  69-year-old man with extensive medical history including CAD (status post CABG;), DM (on insulin pump), atrial fibrillation on Xarelto, history of distal pancreatomy and splenectomy for unclear reason referred to the ED by his oncologist for abnormal labs. Patient was recently diagnosed with Diffuse large B cell Lymphoma on pathology of right groin mass and is following with [facility] oncology team. Patient was discharged on 01.17.2025 from hospital after inpatient chemotherapy (Completed cycle 1 of R-EPOCH (D1 on 1/11/2024). Tolerated well. Rituximab was given on D5).   Over past 3 days patient started to get a productive cough with yellow sputum production.  Patient is also endorsing chills and nausea which he states is baseline. He had 1 reading on low grade fever 100.5 on day of presentation.      OBJECTIVE --  Vital Signs Last 24 Hrs  T(C): 36.8 (27 Jan 2025 08:15), Max: 36.9 (27 Jan 2025 00:00)  T(F): 98.2 (27 Jan 2025 08:15), Max: 98.5 (27 Jan 2025 00:00)  HR: 114 (27 Jan 2025 08:15) (98 - 114)  BP: 128/65 (27 Jan 2025 08:15) (115/62 - 128/65)  BP(mean): 90 (27 Jan 2025 08:15) (85 - 90)  RR: 20 (27 Jan 2025 08:15) (20 - 20)  SpO2: 92% (27 Jan 2025 09:50) (92% - 97%)    Parameters below as of 27 Jan 2025 09:50  Patient On (Oxygen Delivery Method): nasal cannula w/ humidification  O2 Flow (L/min): 60  O2 Concentration (%): 90    I&O's Summary    26 Jan 2025 07:01  -  27 Jan 2025 07:00  --------------------------------------------------------  IN: 110 mL / OUT: 2800 mL / NET: -2690 mL    27 Jan 2025 07:01  -  27 Jan 2025 10:59  --------------------------------------------------------  IN: 30 mL / OUT: 0 mL / NET: 30 mL      PHYSICAL EXAM:    GENERAL:  NAD ill appearing   SKIN: No rashes or lesions  HEENT: Atraumatic. Normocephalic.   NECK: Supple, No JVD.    PULMONARY: Decreased breath sounds  B/L with bibasilar crackles .  CVS: Normal S1, S2. Rate and Rhythm are regular.   ABDOMEN/GI: Soft, Nontender, Nondistended  MSK: edema B/L LE. No clubbing or cyanosis   NEUROLOGIC: moves all extremities   PSYCH: Awake and alert       MEDICATIONS  (STANDING):  allopurinol 150 milliGRAM(s) Oral daily  ascorbic acid 500 milliGRAM(s) Oral daily  aspirin  chewable 81 milliGRAM(s) Oral daily  atorvastatin 10 milliGRAM(s) Oral at bedtime  azithromycin  IVPB      azithromycin  IVPB 500 milliGRAM(s) IV Intermittent every 24 hours  benzocaine 20% Spray 1 Spray(s) Topical three times a day  buMETAnide Injectable 2 milliGRAM(s) IV Push every 12 hours  cefepime   IVPB 2000 milliGRAM(s) IV Intermittent every 12 hours  cetirizine 10 milliGRAM(s) Oral daily  chlorhexidine 2% Cloths 1 Application(s) Topical daily  dextrose 5%. 1000 milliLiter(s) (50 mL/Hr) IV Continuous <Continuous>  dextrose 5%. 1000 milliLiter(s) (100 mL/Hr) IV Continuous <Continuous>  dextrose 50% Injectable 25 Gram(s) IV Push once  dextrose 50% Injectable 12.5 Gram(s) IV Push once  dextrose 50% Injectable 25 Gram(s) IV Push once  glucagon  Injectable 1 milliGRAM(s) IntraMuscular once  heparin  Infusion.  Unit(s)/Hr (10 mL/Hr) IV Continuous <Continuous>  influenza  Vaccine (HIGH DOSE) 0.5 milliLiter(s) IntraMuscular once  insulin glargine Injectable (LANTUS) 30 Unit(s) SubCutaneous two times a day  insulin lispro (ADMELOG) corrective regimen sliding scale   SubCutaneous three times a day before meals  insulin lispro Injectable (ADMELOG) 20 Unit(s) SubCutaneous three times a day before meals  LORazepam   Injectable 0.25 milliGRAM(s) IV Push once  melatonin 10 milliGRAM(s) Oral at bedtime  metoprolol tartrate Injectable 5 milliGRAM(s) IV Push every 6 hours  nystatin    Suspension 141740 Unit(s) Swish and Swallow three times a day  pantoprazole  Injectable 40 milliGRAM(s) IV Push daily  polyethylene glycol 3350 17 Gram(s) Oral daily  senna 2 Tablet(s) Oral at bedtime  sertraline 25 milliGRAM(s) Oral daily    MEDICATIONS  (PRN):  acetaminophen     Tablet .. 650 milliGRAM(s) Oral every 6 hours PRN Temp greater or equal to 38C (100.4F), Mild Pain (1 - 3)  albuterol/ipratropium for Nebulization 3 milliLiter(s) Nebulizer every 4 hours PRN Shortness of Breath and/or Wheezing  dextrose Oral Gel 15 Gram(s) Oral once PRN Blood Glucose LESS THAN 70 milliGRAM(s)/deciliter  diazepam    Tablet 2 milliGRAM(s) Oral daily PRN anxiety  haloperidol    Injectable 2.5 milliGRAM(s) IntraMuscular every 6 hours PRN Agitation  hydrOXYzine hydrochloride 25 milliGRAM(s) Oral every 6 hours PRN Anxiety  ondansetron Injectable 4 milliGRAM(s) IV Push every 6 hours PRN Nausea and/or Vomiting  oxyCODONE    IR 5 milliGRAM(s) Oral four times a day PRN Severe Pain (7 - 10)        LABS                                            10.1                  Neurophils% (auto):   62.1   (01-27 @ 06:12):    16.88)-----------(221          Lymphocytes% (auto):  2.0                                           28.7                   Eosinphils% (auto):   0.0      Manual%: Neutrophils x    ; Lymphocytes x    ; Eosinophils x    ; Bands%: x    ; Blasts x                                    140    |  93     |  99                  Calcium: 8.6   / iCa: x      (01-27 @ 06:12)    ----------------------------<  185       Magnesium: 2.2                              4.1     |  29     |  2.3              Phosphorous: 4.1      TPro  5.3    /  Alb  2.5    /  TBili  0.6    /  DBili  x      /  AST  69     /  ALT  63     /  AlkPhos  475    27 Jan 2025 06:12    ( 01-27 @ 08:33 )   PT: x    ;   INR: x      aPTT: 35.4 sec          ASSESSMENT & PLAN:   69-year-old man with extensive medical history including CAD (status post CABG;), atrial fibrillation on Xarelto, history of distal pancreatomy and splenectomy for unclear reason referred to the ED by his oncologist for abnormal labs. Patient was recently diagnosed with Diffuse large B cell Lymphoma on pathology of right groin mass and was following his Oncologist Dr Angeli Hilliard.  pt currently admitted to SDU for acute hypoxic respiratory failure ISO Influenza PNA and fluid overload .     Acute Hypoxic Respiratory Failure 2/2 Multifocal Pneumonia  Volume Overload  Neutropenic fever  Influenza A infection  - CTA 1/20: Negative for pulmonary emboli. Interval development of infiltrates within the lower lobes and lingula which may reflect acute multilobar pneumonia  - blood cx NGTD x2, UA negative, influenza A + , Nasal MRSA negative   - Pancytopenia s/p s/p 1st cycle chemo with R-EPOCH, sp Zarxio   - TTE 1/10/25: poor study, EF 60-65%  - Per ID, c/w cefepime, c/w tamiflu, planned for longer course given immunosuppression  - urine legionella/strep negative   - c/w Bumex 2mg IV BID. strict I&Os. 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 Tolerated well. Rituximab was given on D5   - Uric acid 10.3 on 1/16/25, s/p 3 mg IV rasburicase  - Heme/Onc following, sp Zarxio  - I discussed with  [facility] Dr. Kishore Holland ([phone]) regarding transfer 1/24/25, no indication for transfer and pt is unstable for transfer  - daily CBC with diff, active T&S, s/p 1u plts   - cw heparin drip no bolus, with close ptt monitoring     KRISTINE on CKD 3B - basleine ~high 1s, low 2s  Chronic Lower Extremity Edema   Hypokalemia  - On home Lasix 80mg po q24 and metolazone  - Avoid volume overload.  - Bumex 2 q12H as above, may need metolazone  - nephrology following  - strict I&Os, bladder scans q6H    CAD s/p CABG  Chronic Afib on Xarelto  HTN  - Echo 1/10/25: EF 60-65%  - metoprolol IV for now, hold Nifedipine as BP borderline  - Still holding lisinopril and aldactone from last admission  - holding Xarelto , on heparin drip, tolerating so far      HLD   - c/w Pravastatin -> using atorva 10 hs here  - resume Repatha on discharge     DM  - FS - self monitored via dex com  - On insulin pump @2.85 at home   - Endo consult: keep off insulin pump  - lantus 30 units BID, hold lispro 20 TID while NPO and  monitor FS    Oral pain - no lesions appreciated on oral exam. Nystatin and hurricane for symptomatic mgmt

## 2025-01-27 NOTE — SWALLOW BEDSIDE ASSESSMENT ADULT - SWALLOW EVAL: DIAGNOSIS
+toleration for small single ice chips, puree and mildly thick liquids w/o overt s/s aspiration/penetration.

## 2025-01-27 NOTE — PROGRESS NOTE ADULT - ASSESSMENT
ASSESSMENT  69-year-old man with extensive medical history including CAD (status post CABG;), DM (on insulin pump), atrial fibrillation on Xarelto, history of distal pancreatomy and splenectomy for unclear reason referred to the ED by his oncologist for abnormal labs. Patient was recently diagnosed with Diffuse large B cell Lymphoma on pathology of right groin mass and is following with [facility] oncology team. Patient was discharged on 01.17.2025 from hospital after inpatient chemotherapy (Completed cycle 1 of R-EPOCH (D1 on 1/11/2024). Tolerated well. Rituximab was given on D5).   Over past 3 days patient started to get a productive cough with yellow sputum production.  Patient is also endorsing chills and nausea which he states is baseline. He had 1 reading on low grade fever 100.5 on day of presentation.    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    #Abx allergy: No Known Allergies    RECOMMENDATIONS  - CXR with bilateral opacities -- ANC has improved; remains on bipap   - continue cefepime 2g q 12 hours  - decrease tamiflu to 30 mg BID for 10 days (end date 1/30)   - stop azithromycin   - check fungitell and galactomannan for completness     Please call or message on [employer] Teams if with any questions.  Spectra [phone]

## 2025-01-27 NOTE — PROGRESS NOTE ADULT - SUBJECTIVE AND OBJECTIVE BOX
MORGAN BUSH  69y Male    CHIEF COMPLAINT:    Patient is a 69y old  Male who presents with a chief complaint of SOB (2025 07:53)    INTERVAL HPI/OVERNIGHT EVENTS:    Patient seen and examined. remains critically ill on NIV, pending transfer to MICU    ROS: All other systems are negative.    Vital Signs:    T(F): 98.2 (25 @ 08:15), Max: 98.5 (25 @ 00:00)  HR: 114 (25 @ 08:15) (98 - 114)  BP: 128/65 (25 @ 08:15) (115/62 - 128/65)  RR: 20 (25 @ 08:15) (20 - 20)  SpO2: 96% (25 @ 08:30) (94% - 97%)    2025 07:01  -  2025 07:00  --------------------------------------------------------  IN: 110 mL / OUT: 2800 mL / NET: -2690 mL    2025 07:01  -  2025 09:40  --------------------------------------------------------  IN: 30 mL / OUT: 0 mL / NET: 30 mL    Daily Weight in k.9 (2025 00:00)    POCT Blood Glucose.: 209 mg/dL (2025 07:47)  POCT Blood Glucose.: 145 mg/dL (2025 21:30)  POCT Blood Glucose.: 120 mg/dL (2025 16:37)    PHYSICAL EXAM:    GENERAL:  NAD ill appearing   SKIN: No rashes or lesions  HEENT: Atraumatic. Normocephalic.   NECK: Supple, No JVD.    PULMONARY: Decreased breath sounds  B/L with bibasilar crackles .  CVS: Normal S1, S2. Rate and Rhythm are regular.   ABDOMEN/GI: Soft, Nontender, Nondistended  MSK: edema B/L LE. No clubbing or cyanosis   NEUROLOGIC: moves all extremities   PSYCH: Awake and alert     Consultant(s) Notes Reviewed:  [x ] YES  [ ] NO  Care Discussed with Consultants/Other Providers [ x] YES  [ ] NO    LABS:                        10.1   16.88 )-----------( 221      ( 2025 06:12 )             28.7     140  |  93[L]  |  99[HH]  ----------------------------<  185[H]  4.1   |  29  |  2.3[H]    Ca    8.6      2025 06:12  Phos  4.1       Mg     2.2         TPro  5.3[L]  /  Alb  2.5[L]  /  TBili  0.6  /  DBili  x   /  AST  69[H]  /  ALT  63[H]  /  AlkPhos  475[H]      PTT - ( 2025 08:33 )  PTT:35.4 sec    RADIOLOGY & ADDITIONAL TESTS:  Imaging or report Personally Reviewed:  [x] YES  [ ] NO  EKG reviewed: [x] YES  [ ] NO    Medications:  Standing  allopurinol 150 milliGRAM(s) Oral daily  ascorbic acid 500 milliGRAM(s) Oral daily  aspirin  chewable 81 milliGRAM(s) Oral daily  atorvastatin 10 milliGRAM(s) Oral at bedtime  azithromycin  IVPB      azithromycin  IVPB 500 milliGRAM(s) IV Intermittent every 24 hours  benzocaine 20% Spray 1 Spray(s) Topical three times a day  buMETAnide Injectable 2 milliGRAM(s) IV Push every 12 hours  cefepime   IVPB 2000 milliGRAM(s) IV Intermittent every 12 hours  cetirizine 10 milliGRAM(s) Oral daily  chlorhexidine 2% Cloths 1 Application(s) Topical daily  dextrose 5%. 1000 milliLiter(s) IV Continuous <Continuous>  dextrose 5%. 1000 milliLiter(s) IV Continuous <Continuous>  dextrose 50% Injectable 25 Gram(s) IV Push once  dextrose 50% Injectable 12.5 Gram(s) IV Push once  dextrose 50% Injectable 25 Gram(s) IV Push once  glucagon  Injectable 1 milliGRAM(s) IntraMuscular once  heparin  Infusion.  Unit(s)/Hr IV Continuous <Continuous>  influenza  Vaccine (HIGH DOSE) 0.5 milliLiter(s) IntraMuscular once  insulin glargine Injectable (LANTUS) 30 Unit(s) SubCutaneous two times a day  insulin lispro (ADMELOG) corrective regimen sliding scale   SubCutaneous three times a day before meals  insulin lispro Injectable (ADMELOG) 20 Unit(s) SubCutaneous three times a day before meals  melatonin 10 milliGRAM(s) Oral at bedtime  metoprolol tartrate Injectable 5 milliGRAM(s) IV Push every 6 hours  NIFEdipine XL 60 milliGRAM(s) Oral daily  nystatin    Suspension 103599 Unit(s) Swish and Swallow three times a day  pantoprazole  Injectable 40 milliGRAM(s) IV Push daily  polyethylene glycol 3350 17 Gram(s) Oral daily  senna 2 Tablet(s) Oral at bedtime  sertraline 25 milliGRAM(s) Oral daily    PRN Meds  acetaminophen     Tablet .. 650 milliGRAM(s) Oral every 6 hours PRN  albuterol/ipratropium for Nebulization 3 milliLiter(s) Nebulizer every 4 hours PRN  dextrose Oral Gel 15 Gram(s) Oral once PRN  diazepam    Tablet 2 milliGRAM(s) Oral daily PRN  haloperidol    Injectable 2.5 milliGRAM(s) IntraMuscular every 6 hours PRN  hydrOXYzine hydrochloride 25 milliGRAM(s) Oral every 6 hours PRN  ondansetron Injectable 4 milliGRAM(s) IV Push every 6 hours PRN  oxyCODONE    IR 5 milliGRAM(s) Oral four times a day PRN

## 2025-01-27 NOTE — PROGRESS NOTE ADULT - SUBJECTIVE AND OBJECTIVE BOX
LATONIAMORGAN GALLARDO  69y, Male  Allergy: No Known Allergies      LOS  7d    CHIEF COMPLAINT: SOB (26 Jan 2025 07:53)      INTERVAL EVENTS/HPI  - No acute events overnight  - T(F): , Max: 98.5 (01-27-25 @ 00:00)  - on bipap -- reports ongoing dyspnea   - WBC Count: 16.88 (01-27-25 @ 06:12)  WBC Count: 12.06 (01-26-25 @ 05:42)     - Creatinine: 2.3 (01-27-25 @ 06:12)  Creatinine: 2.6 (01-26-25 @ 05:42)       ROS  unable to obtain history secondary to patient's mental status and/or sedation      VITALS:  T(F): 98.2, Max: 98.5 (01-27-25 @ 00:00)  HR: 114  BP: 128/65  RR: 20Vital Signs Last 24 Hrs  T(C): 36.8 (27 Jan 2025 08:15), Max: 36.9 (27 Jan 2025 00:00)  T(F): 98.2 (27 Jan 2025 08:15), Max: 98.5 (27 Jan 2025 00:00)  HR: 114 (27 Jan 2025 08:15) (98 - 114)  BP: 128/65 (27 Jan 2025 08:15) (115/62 - 128/65)  BP(mean): 90 (27 Jan 2025 08:15) (85 - 90)  RR: 20 (27 Jan 2025 08:15) (20 - 20)  SpO2: 92% (27 Jan 2025 09:50) (92% - 97%)    Parameters below as of 27 Jan 2025 09:50  Patient On (Oxygen Delivery Method): nasal cannula w/ humidification  O2 Flow (L/min): 60  O2 Concentration (%): 90    PHYSICAL EXAM:  Gen: NAD, resting in bed  HEENT: Normocephalic, atraumatic  Neck: supple, no lymphadenopathy  CV: Regular rate & regular rhythm  Lungs: decreased BS at bases, no fremitus  Abdomen: Soft, BS present  Ext: Warm, well perfused  Neuro: non focal, awake  Skin: no rash, no erythema  Lines: no phlebitis    FH: Non-contributory  Social Hx: Non-contributory    TESTS & MEASUREMENTS:                        10.1   16.88 )-----------( 221      ( 27 Jan 2025 06:12 )             28.7     01-27    140  |  93[L]  |  99[HH]  ----------------------------<  185[H]  4.1   |  29  |  2.3[H]    Ca    8.6      27 Jan 2025 06:12  Phos  4.1     01-27  Mg     2.2     01-27    TPro  5.3[L]  /  Alb  2.5[L]  /  TBili  0.6  /  DBili  x   /  AST  69[H]  /  ALT  63[H]  /  AlkPhos  475[H]  01-27      LIVER FUNCTIONS - ( 27 Jan 2025 06:12 )  Alb: 2.5 g/dL / Pro: 5.3 g/dL / ALK PHOS: 475 U/L / ALT: 63 U/L / AST: 69 U/L / GGT: x           Urinalysis Basic - ( 27 Jan 2025 06:12 )    Color: x / Appearance: x / SG: x / pH: x  Gluc: 185 mg/dL / Ketone: x  / Bili: x / Urobili: x   Blood: x / Protein: x / Nitrite: x   Leuk Esterase: x / RBC: x / WBC x   Sq Epi: x / Non Sq Epi: x / Bacteria: x        Urinalysis with Rflx Culture (collected 01-20-25 @ 19:07)    Culture - Blood (collected 01-20-25 @ 15:38)  Source: .Blood BLOOD  Final Report (01-25-25 @ 23:07):    No growth at 5 days    Culture - Blood (collected 01-20-25 @ 15:38)  Source: .Blood BLOOD  Final Report (01-25-25 @ 23:07):    No growth at 5 days            INFECTIOUS DISEASES TESTING  MRSA PCR Result.: Negative (01-25-25 @ 07:28)  Procalcitonin: 29.80 (01-24-25 @ 11:30)  MRSA PCR Result.: Negative (01-24-25 @ 10:45)  Legionella Antigen, Urine: Negative (01-23-25 @ 12:10)  Rapid RVP Result: NotDetec (01-13-25 @ 13:24)      INFLAMMATORY MARKERS      RADIOLOGY & ADDITIONAL TESTS:  I have personally reviewed the last available Chest xray  CXR      CT      CARDIOLOGY TESTING  12 Lead ECG:   Ventricular Rate 118 BPM    QRS Duration 110 ms    Q-T Interval 326 ms    QTC Calculation(Bazett) 456 ms    R Axis 58 degrees    T Axis 35 degrees    Diagnosis Line Atrial fibrillation with rapid ventricular response  Incomplete right bundle branchblock  Abnormal ECG  Confirmed by TASH ESPARZA MD ([id number]) on 1/22/2025 9:16:25 PM (01-22-25 @ 17:25)  12 Lead ECG:   Ventricular Rate 110 BPM    Atrial Rate 110 BPM    P-R Interval 174 ms    QRS Duration 106 ms    Q-T Interval 330 ms    QTC Calculation(Bazett) 446 ms    P Axis 71 degrees    R Axis 66 degrees    T Axis 64 degrees    Diagnosis Line Sinus tachycardia  Incomplete right bundle branch block  Borderline ECG    Confirmed by SYEDA CHOWDARY MD ([id number]) on 1/21/2025 6:54:55 AM (01-20-25 @ 15:35)      MEDICATIONS  allopurinol 150 Oral daily  ascorbic acid 500 Oral daily  aspirin  chewable 81 Oral daily  atorvastatin 10 Oral at bedtime  azithromycin  IVPB     azithromycin  IVPB 500 IV Intermittent every 24 hours  benzocaine 20% Spray 1 Topical three times a day  buMETAnide Injectable 2 IV Push every 12 hours  cefepime   IVPB 2000 IV Intermittent every 12 hours  cetirizine 10 Oral daily  chlorhexidine 2% Cloths 1 Topical daily  dextrose 5%. 1000 IV Continuous <Continuous>  dextrose 5%. 1000 IV Continuous <Continuous>  dextrose 50% Injectable 25 IV Push once  dextrose 50% Injectable 12.5 IV Push once  dextrose 50% Injectable 25 IV Push once  glucagon  Injectable 1 IntraMuscular once  heparin  Infusion.  IV Continuous <Continuous>  influenza  Vaccine (HIGH DOSE) 0.5 IntraMuscular once  insulin glargine Injectable (LANTUS) 30 SubCutaneous two times a day  insulin lispro (ADMELOG) corrective regimen sliding scale  SubCutaneous three times a day before meals  insulin lispro Injectable (ADMELOG) 20 SubCutaneous three times a day before meals  LORazepam   Injectable 0.25 IV Push once  melatonin 10 Oral at bedtime  metoprolol tartrate Injectable 5 IV Push every 6 hours  nystatin    Suspension 530881 Swish and Swallow three times a day  pantoprazole  Injectable 40 IV Push daily  polyethylene glycol 3350 17 Oral daily  senna 2 Oral at bedtime  sertraline 25 Oral daily      WEIGHT  Weight (kg): 124.7 (01-20-25 @ 19:32)  Creatinine: 2.3 mg/dL (01-27-25 @ 06:12)      ANTIBIOTICS:  azithromycin  IVPB      azithromycin  IVPB 500 milliGRAM(s) IV Intermittent every 24 hours  cefepime   IVPB 2000 milliGRAM(s) IV Intermittent every 12 hours  nystatin    Suspension 294990 Unit(s) Swish and Swallow three times a day      All available historical records have been reviewed

## 2025-01-27 NOTE — SWALLOW BEDSIDE ASSESSMENT ADULT - SLP PERTINENT HISTORY OF CURRENT PROBLEM
Pt is a 70 y/o M w/ extensive medical history including: CAD (status post CABG), DM (on insulin pump), atrial fibrillation (on Xarelto), distal pancreatomy and splenectomy, referred to the ED by his oncologist for abnormal labs. Of note, pt recently diagnosed with Diffuse large B cell Lymphoma on pathology of right groin mass and is following with [facility] oncology team. Patient was discharged on 1/171/2025 from hospital after inpatient chemotherapy (Completed cycle 1 of R-EPOCH (D1 on 1/11/2024). Pt is being treated for AHRF 2' multifocal PNA, volume overload, neutropenic fever, influenza A infection. CXR 1/27-> Unchanged bilateral patchy opacities. Pt is a 68 y/o M w/ extensive medical history including: CAD (status post CABG), DM (on insulin pump), atrial fibrillation (on Xarelto), distal pancreatomy and splenectomy, referred to the ED by his oncologist for abnormal labs. Of note, pt recently diagnosed w/ diffuse large B cell lymphoma on pathology of right groin mass and is following / [facility] oncology team. Pt discharged on 1/17/2025 from hospital after inpatient chemotherapy (Completed cycle 1 of R-EPOCH (D1 on 1/11/2024)). Pt is being treated for AHRF 2' multifocal PNA, volume overload, neutropenic fever, influenza A infection. CXR 1/27-> Unchanged bilateral patchy opacities.

## 2025-01-27 NOTE — PROGRESS NOTE ADULT - ASSESSMENT
69-year-old man with extensive medical history including CAD (status post CABG;), atrial fibrillation on Xarelto, history of distal pancreatomy and splenectomy for unclear reason referred to the ED by his oncologist for abnormal labs. Patient was recently diagnosed with Diffuse large B cell Lymphoma on pathology of right groin mass and was following his Oncologist Dr Angeli Hilliard.  pt currently admitted to SDU for acute hypoxic respiratory failure ISO Influenza PNA and fluid overload .     #Acute Hypoxic Respiratory Failure 2/2 Multifocal Pneumonia  #Volume Overload  #Neutropenic fever  #Influenza A infection  - CTA 1/20: Negative for pulmonary emboli. Interval development of infiltrates within the lower lobes and lingula which may reflect acute multilobar pneumonia  - blood cx NGTD x2, UA negative, influenza A + , Nasal MRSA negative   - Pancytopenia s/p s/p 1st cycle chemo with R-EPOCH, now on Zarxio   - TTE 1/10/25: poor study, EF 60-65%  - Per ID, c/w cefepime, c/w tamiflu, planned for longer course given immunosuppression  - check urine legionella/strep  - c/w Bumex 2mg IV BID. strict I&Os. check bladder scans q6H  -  If UO decreasing, add Metolazone 2.5  - patient currenlty on continuous NIV, unable to tolerate HFNC, previously on 60/90  - check ABG  - Discussed with crit, patient will be upgraded to MICU  - Heme/Onc following    #Large B-cell Lymphoma  #Concern for Rodriguez's transformation  #Pancytopenia   - Heme/Onc following   - s/p PICC placement on 1/10/25  - s/p Bone marrow biopsy on 1/10/25  - Completed cycle 1 of R-EPOCH (D1 on 1/11/2024). Tolerated well. Rituximab was given on D5   - Uric acid 10.3 on 1/16/25, s/p 3 mg IV rasburicase  - Heme/Onc following, dc ZArxio if ANC >1000  - I discussed with  [facility] Dr. Kishore Holland ([phone]) regarding transfer 1/24/25, no indication for transfer and pt is unstable for transfer  - daily CBC with diff, active T&S, s/p 1u plts   - start heparin drip no bolus, with close ptt monitoring     #KRISTINE on CKD 3B - basleine ~high 1s, low 2s  #Chronic Lower Extremity Edema   #Hypokalemia  - On home Lasix 80mg po q24 and metolazone  - Avoid volume overload.  - Bumex 2 q12H as above, may need metolazone  - nephrology following  - strict I&Os, bladder scans q6H    #CAD s/p CABG  #Chronic Afib on Xarelto  #HTN  - Echo 1/10/25: EF 60-65%  - change metoprolol to IV for now, hold Nifedipine as BP borderline  - Still holding lisinopril and aldactone from last admission  - holding Xarelto , resuming HEparin drip today    #HLD   - c/w Pravastatin -> using atorva 10 hs here  - resume Repatha on discharge    #DM  - FS - self monitored via dex com  - On insulin pump @2.85 at home   - Endo consult: keep off insulin pump  - lantus 30 units BID, hold lispro 20 TID while NPO and  monitor FS   69-year-old man with extensive medical history including CAD (status post CABG;), atrial fibrillation on Xarelto, history of distal pancreatomy and splenectomy for unclear reason referred to the ED by his oncologist for abnormal labs. Patient was recently diagnosed with Diffuse large B cell Lymphoma on pathology of right groin mass and was following his Oncologist Dr Angeli Hilliard.  pt currently admitted to SDU for acute hypoxic respiratory failure ISO Influenza PNA and fluid overload .     #Acute Hypoxic Respiratory Failure 2/2 Multifocal Pneumonia  #Volume Overload  #Neutropenic fever  #Influenza A infection  - CTA 1/20: Negative for pulmonary emboli. Interval development of infiltrates within the lower lobes and lingula which may reflect acute multilobar pneumonia  - blood cx NGTD x2, UA negative, influenza A + , Nasal MRSA negative   - Pancytopenia s/p s/p 1st cycle chemo with R-EPOCH, sp Zarxio   - TTE 1/10/25: poor study, EF 60-65%  - Per ID, c/w cefepime, c/w tamiflu, planned for longer course given immunosuppression  - urine legionella/strep negative   - c/w Bumex 2mg IV BID. strict I&Os. check bladder scans q6H  -  If UO decreasing, add Metolazone 2.5  - patient currently on continuous NIV, unable to tolerate HFNC, previously on 60/90  - Discussed with crit, patient upgraded to MICU  - Heme/Onc following    #Large B-cell Lymphoma  #Concern for Rodriguez's transformation  #Pancytopenia   - Heme/Onc following   - s/p PICC placement on 1/10/25  - s/p Bone marrow biopsy on 1/10/25  - Completed cycle 1 of R-EPOCH (D1 on 1/11/2024). Tolerated well. Rituximab was given on D5   - Uric acid 10.3 on 1/16/25, s/p 3 mg IV rasburicase  - Heme/Onc following, sp Zarxio  - I discussed with  [facility] Dr. Kishore Holland ([phone]) regarding transfer 1/24/25, no indication for transfer and pt is unstable for transfer  - daily CBC with diff, active T&S, s/p 1u plts   - cw heparin drip no bolus, with close ptt monitoring     #KRISTINE on CKD 3B - basleine ~high 1s, low 2s  #Chronic Lower Extremity Edema   #Hypokalemia  - On home Lasix 80mg po q24 and metolazone  - Avoid volume overload.  - Bumex 2 q12H as above, may need metolazone  - nephrology following  - strict I&Os, bladder scans q6H    #CAD s/p CABG  #Chronic Afib on Xarelto  #HTN  - Echo 1/10/25: EF 60-65%  - metoprolol IV for now. dc PO as on BiPAP  - hold Nifedipine as BP borderline  - Hold lisinopril and aldactone from last admission  - holding Xarelto , on heparin drip, tolerating so far     #HLD   - c/w Pravastatin -> using atorva 10 hs here  - resume Repatha on discharge    #DM  - CGM with dex com  - On insulin pump @2.85 at home   - Endo consult: keep off insulin pump  - lantus 30 units BID, hold lispro 20 TID while NPO and  monitor FS    #Oral pain   - no lesions appreciated on oral exam. Nystatin and hurricane for symptomatic mgmt

## 2025-01-27 NOTE — PROGRESS NOTE ADULT - ASSESSMENT
69-year-old man with extensive medical history including CAD (status post CABG;), atrial fibrillation on Xarelto, history of distal pancreatomy and splenectomy for unclear reason referred to the ED by his oncologist for abnormal labs. Patient was recently diagnosed with Diffuse large B cell Lymphoma on pathology of right groin mass and was following his Oncologist Dr Angeli Hilliard.  pt currently admitted to SDU for acute hypoxic respiratory failure ISO Influenza PNA and fluid overload .     Acute Hypoxic Respiratory Failure 2/2 Multifocal Pneumonia  Volume Overload  Neutropenic fever  Influenza A infection  - CTA 1/20: Negative for pulmonary emboli. Interval development of infiltrates within the lower lobes and lingula which may reflect acute multilobar pneumonia  - blood cx NGTD x2, UA negative, influenza A + , Nasal MRSA negative   - Pancytopenia s/p s/p 1st cycle chemo with R-EPOCH, sp Zarxio   - TTE 1/10/25: poor study, EF 60-65%  - Per ID, c/w cefepime, c/w tamiflu, planned for longer course given immunosuppression  - urine legionella/strep negative   - c/w Bumex 2mg IV BID. strict I&Os. 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 Tolerated well. Rituximab was given on D5   - Uric acid 10.3 on 1/16/25, s/p 3 mg IV rasburicase  - Heme/Onc following, sp Zarxio  - I discussed with  [facility] Dr. Kishore Holland ([phone]) regarding transfer 1/24/25, no indication for transfer and pt is unstable for transfer  - daily CBC with diff, active T&S, s/p 1u plts   - cw heparin drip no bolus, with close ptt monitoring     KRISTINE on CKD 3B - basleine ~high 1s, low 2s  Chronic Lower Extremity Edema   Hypokalemia  - On home Lasix 80mg po q24 and metolazone  - Avoid volume overload.  - Bumex 2 q12H as above, may need metolazone  - nephrology following  - strict I&Os, bladder scans q6H    CAD s/p CABG  Chronic Afib on Xarelto  HTN  - Echo 1/10/25: EF 60-65%  - metoprolol IV for now, hold Nifedipine as BP borderline  - Still holding lisinopril and aldactone from last admission  - holding Xarelto , on heparin drip, tolerating so far      HLD   - c/w Pravastatin -> using atorva 10 hs here  - resume Repatha on discharge     DM  - FS - self monitored via dex com  - On insulin pump @2.85 at home   - Endo consult: keep off insulin pump  - lantus 30 units BID, hold lispro 20 TID while NPO and  monitor FS    Oral pain - no lesions appreciated on oral exam. Nystatin and hurricane for symptomatic mgmt    Overall prognosis is very guarded. high risk of decompensation. Pending transfer to MICU  all discussed with daughter Mery, aware of transfer     Patient seen at bedside, total time spent to evaluate and treat the patient's acute illness and chronic medical conditions as well as time spent reviewing prior records, labs, radiology, documenting in electronic medical records,  discussing medical plan with medical team was 55 minutes with >50% of time spent face to face with patient, discussing with patient/family as well as coordination of care

## 2025-01-27 NOTE — PROGRESS NOTE ADULT - SUBJECTIVE AND OBJECTIVE BOX
SUBJECTIVE/OVERNIGHT EVENTS  Today is hospital day 7d. This morning patient was seen and examined at bedside, resting comfortably in bed. No acute or major events overnight. pending transfer to MICU    HOSPITAL COURSE  69-year-old man with extensive medical history including CAD (status post CABG;), DM (on insulin pump), atrial fibrillation on Xarelto, history of distal pancreatomy and splenectomy for unclear reason referred to the ED by his oncologist for abnormal labs. Patient was recently diagnosed with Diffuse large B cell Lymphoma on pathology of right groin mass and is following with [facility] oncology team. Patient was discharged on 01.17.2025 from hospital after inpatient chemotherapy (Completed cycle 1 of R-EPOCH (D1 on 1/11/2024). Tolerated well. Rituximab was given on D5).   Over past 3 days patient started to get a productive cough with yellow sputum production.  Patient is also endorsing chills and nausea which he states is baseline. He had 1 reading on low grade fever 100.5 on day of presentation.    On presentation vitals:  · BP Systolic	160 mm Hg  · BP Diastolic	86 mm Hg  · Heart Rate	89 /min  · Respiration Rate (breaths/min)	20 /min  · Temp (F)	98.9 Degrees F  · Temp (C)	37.2 Degrees C  · Temp site	oral    ED course:  O2 sats low on 4L O2 so he was promptly started on BIPAP. CXR with LLL infiltrate; sepsis labs sent with gentle hydration; abx given, Labs with neutropenia and elevated BNP, trop 68. EKG nonischemic.    CTA Chest: Negative for pulmonary emboli. Interval development of infiltrates within the lower lobes and lingula which may reflect acute multilobar pneumonia.    Patient admitted to medicine for acute hypoxic respiratory failure.      MEDICATIONS  STANDING MEDICATIONS  allopurinol 150 milliGRAM(s) Oral daily  ascorbic acid 500 milliGRAM(s) Oral daily  aspirin  chewable 81 milliGRAM(s) Oral daily  atorvastatin 10 milliGRAM(s) Oral at bedtime  azithromycin  IVPB      azithromycin  IVPB 500 milliGRAM(s) IV Intermittent every 24 hours  benzocaine 20% Spray 1 Spray(s) Topical three times a day  buMETAnide Injectable 2 milliGRAM(s) IV Push every 12 hours  cefepime   IVPB 2000 milliGRAM(s) IV Intermittent every 12 hours  cetirizine 10 milliGRAM(s) Oral daily  chlorhexidine 2% Cloths 1 Application(s) Topical daily  dextrose 5%. 1000 milliLiter(s) IV Continuous <Continuous>  dextrose 5%. 1000 milliLiter(s) IV Continuous <Continuous>  dextrose 50% Injectable 25 Gram(s) IV Push once  dextrose 50% Injectable 12.5 Gram(s) IV Push once  dextrose 50% Injectable 25 Gram(s) IV Push once  glucagon  Injectable 1 milliGRAM(s) IntraMuscular once  heparin  Infusion.  Unit(s)/Hr IV Continuous <Continuous>  influenza  Vaccine (HIGH DOSE) 0.5 milliLiter(s) IntraMuscular once  insulin glargine Injectable (LANTUS) 30 Unit(s) SubCutaneous two times a day  insulin lispro (ADMELOG) corrective regimen sliding scale   SubCutaneous three times a day before meals  insulin lispro Injectable (ADMELOG) 20 Unit(s) SubCutaneous three times a day before meals  melatonin 10 milliGRAM(s) Oral at bedtime  metoprolol tartrate Injectable 5 milliGRAM(s) IV Push every 6 hours  NIFEdipine XL 60 milliGRAM(s) Oral daily  nystatin    Suspension 606269 Unit(s) Swish and Swallow three times a day  pantoprazole  Injectable 40 milliGRAM(s) IV Push daily  polyethylene glycol 3350 17 Gram(s) Oral daily  senna 2 Tablet(s) Oral at bedtime  sertraline 25 milliGRAM(s) Oral daily    PRN MEDICATIONS  acetaminophen     Tablet .. 650 milliGRAM(s) Oral every 6 hours PRN  albuterol/ipratropium for Nebulization 3 milliLiter(s) Nebulizer every 4 hours PRN  dextrose Oral Gel 15 Gram(s) Oral once PRN  diazepam    Tablet 2 milliGRAM(s) Oral daily PRN  haloperidol    Injectable 2.5 milliGRAM(s) IntraMuscular every 6 hours PRN  hydrOXYzine hydrochloride 25 milliGRAM(s) Oral every 6 hours PRN  ondansetron Injectable 4 milliGRAM(s) IV Push every 6 hours PRN  oxyCODONE    IR 5 milliGRAM(s) Oral four times a day PRN    VITALS  T(F): 98.2 (01-27-25 @ 08:15), Max: 98.5 (01-27-25 @ 00:00)  HR: 114 (01-27-25 @ 08:15) (98 - 114)  BP: 128/65 (01-27-25 @ 08:15) (115/62 - 128/65)  RR: 20 (01-27-25 @ 08:15) (20 - 20)  SpO2: 96% (01-27-25 @ 08:15) (94% - 97%)  POCT Blood Glucose.: 209 mg/dL (01-27-25 @ 07:47)  POCT Blood Glucose.: 145 mg/dL (01-26-25 @ 21:30)  POCT Blood Glucose.: 120 mg/dL (01-26-25 @ 16:37)  POCT Blood Glucose.: 98 mg/dL (01-26-25 @ 08:57)    PHYSICAL EXAM  GENERAL  GENERAL: NAD, lying in bed comfortably  HEAD:  Atraumatic, normocephalic  EYES: EOMI, PERRL  NECK: Supple, trachea midline, no JVD  HEART: Regular rate and rhythm  LUNGS: Unlabored respirations.  Clear to auscultation bilaterally, no crackles, wheezing, or rhonchi  ABDOMEN: Soft, nontender, nondistended, +BS  EXTREMITIES: 2+ peripheral pulses bilaterally. No clubbing, cyanosis, or edema  NERVOUS SYSTEM:  A&Ox3, moving all extremities, no focal deficits     LABS             10.1   16.88 )-----------( 221      ( 01-27-25 @ 06:12 )             28.7     140  |  93  |  99  -------------------------<  185   01-27-25 @ 06:12  4.1  |  29  |  2.3    Ca      8.6     01-27-25 @ 06:12  Phos   4.1     01-27-25 @ 06:12  Mg     2.2     01-27-25 @ 06:12    TPro  5.3  /  Alb  2.5  /  TBili  0.6  /  DBili  x   /  AST  69  /  ALT  63  /  AlkPhos  475  /  GGT  x     01-27-25 @ 06:12    PTT - ( 01-26-25 @ 18:20 )  PTT:55.3 sec      Urinalysis Basic - ( 27 Jan 2025 06:12 )    Color: x / Appearance: x / SG: x / pH: x  Gluc: 185 mg/dL / Ketone: x  / Bili: x / Urobili: x   Blood: x / Protein: x / Nitrite: x   Leuk Esterase: x / RBC: x / WBC x   Sq Epi: x / Non Sq Epi: x / Bacteria: x      ABG - ( 26 Jan 2025 09:40 )  pH, Arterial: 7.55  pH, Blood: x     /  pCO2: 43    /  pO2: 67    / HCO3: 38    / Base Excess: 13.7  /  SaO2: 97.3                IMAGING SUBJECTIVE/OVERNIGHT EVENTS  Today is hospital day 7d. This morning patient was seen and examined at bedside, resting comfortably in bed. No acute or major events overnight. pending transfer to MICU    HOSPITAL COURSE  69-year-old man with extensive medical history including CAD (status post CABG;), DM (on insulin pump), atrial fibrillation on Xarelto, history of distal pancreatomy and splenectomy for unclear reason referred to the ED by his oncologist for abnormal labs. Patient was recently diagnosed with Diffuse large B cell Lymphoma on pathology of right groin mass and is following with [facility] oncology team. Patient was discharged on 01.17.2025 from hospital after inpatient chemotherapy (Completed cycle 1 of R-EPOCH (D1 on 1/11/2024). Tolerated well. Rituximab was given on D5).   Over past 3 days patient started to get a productive cough with yellow sputum production.  Patient is also endorsing chills and nausea which he states is baseline. He had 1 reading on low grade fever 100.5 on day of presentation.    On presentation vitals:  · BP Systolic	160 mm Hg  · BP Diastolic	86 mm Hg  · Heart Rate	89 /min  · Respiration Rate (breaths/min)	20 /min  · Temp (F)	98.9 Degrees F  · Temp (C)	37.2 Degrees C  · Temp site	oral    ED course:  O2 sats low on 4L O2 so he was promptly started on BIPAP. CXR with LLL infiltrate; sepsis labs sent with gentle hydration; abx given, Labs with neutropenia and elevated BNP, trop 68. EKG nonischemic.    CTA Chest: Negative for pulmonary emboli. Interval development of infiltrates within the lower lobes and lingula which may reflect acute multilobar pneumonia.    Patient admitted to medicine for acute hypoxic respiratory failure.      MEDICATIONS  STANDING MEDICATIONS  allopurinol 150 milliGRAM(s) Oral daily  ascorbic acid 500 milliGRAM(s) Oral daily  aspirin  chewable 81 milliGRAM(s) Oral daily  atorvastatin 10 milliGRAM(s) Oral at bedtime  azithromycin  IVPB      azithromycin  IVPB 500 milliGRAM(s) IV Intermittent every 24 hours  benzocaine 20% Spray 1 Spray(s) Topical three times a day  buMETAnide Injectable 2 milliGRAM(s) IV Push every 12 hours  cefepime   IVPB 2000 milliGRAM(s) IV Intermittent every 12 hours  cetirizine 10 milliGRAM(s) Oral daily  chlorhexidine 2% Cloths 1 Application(s) Topical daily  dextrose 5%. 1000 milliLiter(s) IV Continuous <Continuous>  dextrose 5%. 1000 milliLiter(s) IV Continuous <Continuous>  dextrose 50% Injectable 25 Gram(s) IV Push once  dextrose 50% Injectable 12.5 Gram(s) IV Push once  dextrose 50% Injectable 25 Gram(s) IV Push once  glucagon  Injectable 1 milliGRAM(s) IntraMuscular once  heparin  Infusion.  Unit(s)/Hr IV Continuous <Continuous>  influenza  Vaccine (HIGH DOSE) 0.5 milliLiter(s) IntraMuscular once  insulin glargine Injectable (LANTUS) 30 Unit(s) SubCutaneous two times a day  insulin lispro (ADMELOG) corrective regimen sliding scale   SubCutaneous three times a day before meals  insulin lispro Injectable (ADMELOG) 20 Unit(s) SubCutaneous three times a day before meals  melatonin 10 milliGRAM(s) Oral at bedtime  metoprolol tartrate Injectable 5 milliGRAM(s) IV Push every 6 hours  NIFEdipine XL 60 milliGRAM(s) Oral daily  nystatin    Suspension 043140 Unit(s) Swish and Swallow three times a day  pantoprazole  Injectable 40 milliGRAM(s) IV Push daily  polyethylene glycol 3350 17 Gram(s) Oral daily  senna 2 Tablet(s) Oral at bedtime  sertraline 25 milliGRAM(s) Oral daily    PRN MEDICATIONS  acetaminophen     Tablet .. 650 milliGRAM(s) Oral every 6 hours PRN  albuterol/ipratropium for Nebulization 3 milliLiter(s) Nebulizer every 4 hours PRN  dextrose Oral Gel 15 Gram(s) Oral once PRN  diazepam    Tablet 2 milliGRAM(s) Oral daily PRN  haloperidol    Injectable 2.5 milliGRAM(s) IntraMuscular every 6 hours PRN  hydrOXYzine hydrochloride 25 milliGRAM(s) Oral every 6 hours PRN  ondansetron Injectable 4 milliGRAM(s) IV Push every 6 hours PRN  oxyCODONE    IR 5 milliGRAM(s) Oral four times a day PRN    VITALS  T(F): 98.2 (01-27-25 @ 08:15), Max: 98.5 (01-27-25 @ 00:00)  HR: 114 (01-27-25 @ 08:15) (98 - 114)  BP: 128/65 (01-27-25 @ 08:15) (115/62 - 128/65)  RR: 20 (01-27-25 @ 08:15) (20 - 20)  SpO2: 96% (01-27-25 @ 08:15) (94% - 97%)  POCT Blood Glucose.: 209 mg/dL (01-27-25 @ 07:47)  POCT Blood Glucose.: 145 mg/dL (01-26-25 @ 21:30)  POCT Blood Glucose.: 120 mg/dL (01-26-25 @ 16:37)  POCT Blood Glucose.: 98 mg/dL (01-26-25 @ 08:57)    PHYSICAL EXAM  GENERAL:  NAD ill appearing   SKIN: No rashes or lesions  HEENT: Atraumatic. Normocephalic.   NECK: Supple, No JVD.    PULMONARY: Decreased breath sounds  B/L with bibasilar crackles .  CVS: Normal S1, S2. Rate and Rhythm are regular.   ABDOMEN/GI: Soft, Nontender, Nondistended  MSK: edema B/L LE. No clubbing or cyanosis   NEUROLOGIC: moves all extremities   PSYCH: Awake and alert       LABS             10.1   16.88 )-----------( 221      ( 01-27-25 @ 06:12 )             28.7     140  |  93  |  99  -------------------------<  185   01-27-25 @ 06:12  4.1  |  29  |  2.3    Ca      8.6     01-27-25 @ 06:12  Phos   4.1     01-27-25 @ 06:12  Mg     2.2     01-27-25 @ 06:12    TPro  5.3  /  Alb  2.5  /  TBili  0.6  /  DBili  x   /  AST  69  /  ALT  63  /  AlkPhos  475  /  GGT  x     01-27-25 @ 06:12    PTT - ( 01-26-25 @ 18:20 )  PTT:55.3 sec      Urinalysis Basic - ( 27 Jan 2025 06:12 )    Color: x / Appearance: x / SG: x / pH: x  Gluc: 185 mg/dL / Ketone: x  / Bili: x / Urobili: x   Blood: x / Protein: x / Nitrite: x   Leuk Esterase: x / RBC: x / WBC x   Sq Epi: x / Non Sq Epi: x / Bacteria: x      ABG - ( 26 Jan 2025 09:40 )  pH, Arterial: 7.55  pH, Blood: x     /  pCO2: 43    /  pO2: 67    / HCO3: 38    / Base Excess: 13.7  /  SaO2: 97.3                IMAGING

## 2025-01-27 NOTE — PROGRESS NOTE ADULT - ASSESSMENT
IMPRESSION:    Acute hypoxemic respiratory failure   Influenza A infection / pneumonia   RASHAD / OHS   Possible superimposed bacterial pneumonia   Fluid overload / bilateral pleural effusions   HO Large B Cell lymphoma SP Chemotherapy   Pancytopenia chem induced   HO CAD (status post CABG;),  HO DM (on insulin pump)  HO atrial fibrillation on Xarelto  HO distal pancreatomy and splenectomy     PLAN:    CNS:  Avoid sedation.     HEENT: Oral care    PULMONARY:  HOB @ 45 degrees.  Aspiration precautions.  Repeat CXR noted. keep O2 sat 92%-96%.  Incentive spirometry.  NIV 4 on/ 4 off during sleep. ABG now, high risk for intubation     CARDIOVASCULAR:  Continue Volume contraction as tolerated.  Bumex 2 mg BID for now.  Target -2 L daily, otherwise add metolazone. Hold antihypertensives for now.     GI: GI prophylaxis. PO Feeding.  Bowel regimen     RENAL:  Follow up lytes.  Correct as needed.  Monitor UO. CW Bumex.     INFECTIOUS DISEASE: Follow up cultures.  Procal 29, repeat procal.  Nasal MRSA negative. Continue ABX and Tamiflu per ID.      HEMATOLOGICAL: DVT prophylaxis, resume AC.  Monitor CBC.  Hem onc eval noted.  Off Zario now. Check uric acid.     ENDOCRINE:  Follow up FS.  Insulin protocol if needed.    MUSCULOSKELETAL:  OOB as tolerated with PT OT     MICU    Prognosis overall guarded    IMPRESSION:    Acute hypoxemic respiratory failure   Influenza A infection / pneumonia   RASHAD / OHS   Possible superimposed bacterial pneumonia   Fluid overload / bilateral pleural effusions   HO Large B Cell lymphoma SP Chemotherapy   HO CAD (status post CABG;),  HO DM (on insulin pump)  HO atrial fibrillation on Xarelto  HO distal pancreatomy and splenectomy     PLAN:    CNS:  Avoid sedation.     HEENT: Oral care    PULMONARY:  HOB @ 45 degrees.  Aspiration precautions.  Repeat CXR noted. keep O2 sat 92%-96%.  Incentive spirometry.  NIV 4 on/ 4 off during sleep. ABG now, high risk for intubation     CARDIOVASCULAR:  Continue Volume contraction as tolerated.  Bumex 2 mg BID for now.  Target -2 L daily, otherwise add metolazone. Hold antihypertensives for now.     GI: GI prophylaxis. PO Feeding.  Bowel regimen     RENAL:  Follow up lytes.  Correct as needed.  Monitor UO. CW Bumex.     INFECTIOUS DISEASE: Follow up cultures.  Procal 29, repeat procal.  Nasal MRSA negative. Continue ABX and Tamiflu per ID.      HEMATOLOGICAL: DVT prophylaxis, resume AC.  Monitor CBC.  Hem onc eval noted.  Off Zario now. Check uric acid.     ENDOCRINE:  Follow up FS.  Insulin protocol if needed.    MUSCULOSKELETAL:  OOB as tolerated with PT OT     MICU    Prognosis overall guarded

## 2025-01-28 LAB
ALBUMIN SERPL ELPH-MCNC: 2.3 G/DL — LOW (ref 3.5–5.2)
ALP SERPL-CCNC: 412 U/L — HIGH (ref 30–115)
ALT FLD-CCNC: 58 U/L — HIGH (ref 0–41)
ANION GAP SERPL CALC-SCNC: 13 MMOL/L — SIGNIFICANT CHANGE UP (ref 7–14)
APTT BLD: 53.1 SEC — HIGH (ref 27–39.2)
AST SERPL-CCNC: 61 U/L — HIGH (ref 0–41)
BASOPHILS # BLD AUTO: 0.15 K/UL — SIGNIFICANT CHANGE UP (ref 0–0.2)
BASOPHILS NFR BLD AUTO: 0.7 % — SIGNIFICANT CHANGE UP (ref 0–1)
BILIRUB SERPL-MCNC: 0.5 MG/DL — SIGNIFICANT CHANGE UP (ref 0.2–1.2)
BUN SERPL-MCNC: 99 MG/DL — CRITICAL HIGH (ref 10–20)
CALCIUM SERPL-MCNC: 8.2 MG/DL — LOW (ref 8.4–10.5)
CHLORIDE SERPL-SCNC: 96 MMOL/L — LOW (ref 98–110)
CHROM ANALY OVERALL INTERP SPEC-IMP: SIGNIFICANT CHANGE UP
CO2 SERPL-SCNC: 33 MMOL/L — HIGH (ref 17–32)
CREAT SERPL-MCNC: 2.3 MG/DL — HIGH (ref 0.7–1.5)
EGFR: 30 ML/MIN/1.73M2 — LOW
EOSINOPHIL # BLD AUTO: 0 K/UL — SIGNIFICANT CHANGE UP (ref 0–0.7)
EOSINOPHIL NFR BLD AUTO: 0 % — SIGNIFICANT CHANGE UP (ref 0–8)
GAS PNL BLDA: SIGNIFICANT CHANGE UP
GLUCOSE BLDC GLUCOMTR-MCNC: 262 MG/DL — HIGH (ref 70–99)
GLUCOSE BLDC GLUCOMTR-MCNC: 265 MG/DL — HIGH (ref 70–99)
GLUCOSE BLDC GLUCOMTR-MCNC: 302 MG/DL — HIGH (ref 70–99)
GLUCOSE BLDC GLUCOMTR-MCNC: 329 MG/DL — HIGH (ref 70–99)
GLUCOSE BLDC GLUCOMTR-MCNC: 378 MG/DL — HIGH (ref 70–99)
GLUCOSE SERPL-MCNC: 354 MG/DL — HIGH (ref 70–99)
HCT VFR BLD CALC: 27.1 % — LOW (ref 42–52)
HGB BLD-MCNC: 9.2 G/DL — LOW (ref 14–18)
IMM GRANULOCYTES NFR BLD AUTO: 25.1 % — HIGH (ref 0.1–0.3)
LYMPHOCYTES # BLD AUTO: 0.32 K/UL — LOW (ref 1.2–3.4)
LYMPHOCYTES # BLD AUTO: 1.6 % — LOW (ref 20.5–51.1)
MCHC RBC-ENTMCNC: 30.4 PG — SIGNIFICANT CHANGE UP (ref 27–31)
MCHC RBC-ENTMCNC: 33.9 G/DL — SIGNIFICANT CHANGE UP (ref 32–37)
MCV RBC AUTO: 89.4 FL — SIGNIFICANT CHANGE UP (ref 80–94)
MONOCYTES # BLD AUTO: 1.85 K/UL — HIGH (ref 0.1–0.6)
MONOCYTES NFR BLD AUTO: 9.2 % — SIGNIFICANT CHANGE UP (ref 1.7–9.3)
NEUTROPHILS # BLD AUTO: 12.76 K/UL — HIGH (ref 1.4–6.5)
NEUTROPHILS NFR BLD AUTO: 63.4 % — SIGNIFICANT CHANGE UP (ref 42.2–75.2)
NRBC # BLD: 1 /100 WBCS — HIGH (ref 0–0)
NRBC BLD-RTO: 1 /100 WBCS — HIGH (ref 0–0)
PLATELET # BLD AUTO: 293 K/UL — SIGNIFICANT CHANGE UP (ref 130–400)
PMV BLD: 11.9 FL — HIGH (ref 7.4–10.4)
POTASSIUM SERPL-MCNC: 3.7 MMOL/L — SIGNIFICANT CHANGE UP (ref 3.5–5)
POTASSIUM SERPL-SCNC: 3.7 MMOL/L — SIGNIFICANT CHANGE UP (ref 3.5–5)
PROCALCITONIN SERPL-MCNC: 4.48 NG/ML — HIGH (ref 0.02–0.1)
PROT SERPL-MCNC: 4.8 G/DL — LOW (ref 6–8)
RBC # BLD: 3.03 M/UL — LOW (ref 4.7–6.1)
RBC # FLD: 14.5 % — SIGNIFICANT CHANGE UP (ref 11.5–14.5)
SODIUM SERPL-SCNC: 142 MMOL/L — SIGNIFICANT CHANGE UP (ref 135–146)
WBC # BLD: 20.14 K/UL — HIGH (ref 4.8–10.8)
WBC # FLD AUTO: 20.14 K/UL — HIGH (ref 4.8–10.8)

## 2025-01-28 PROCEDURE — 99291 CRITICAL CARE FIRST HOUR: CPT

## 2025-01-28 PROCEDURE — 99232 SBSQ HOSP IP/OBS MODERATE 35: CPT

## 2025-01-28 PROCEDURE — 71045 X-RAY EXAM CHEST 1 VIEW: CPT | Mod: 26,76

## 2025-01-28 RX ORDER — INSULIN LISPRO 100 U/ML
INJECTION, SOLUTION INTRAVENOUS; SUBCUTANEOUS
Refills: 0 | Status: DISCONTINUED | OUTPATIENT
Start: 2025-01-28 | End: 2025-02-03

## 2025-01-28 RX ORDER — INSULIN LISPRO 100 U/ML
25 INJECTION, SOLUTION INTRAVENOUS; SUBCUTANEOUS
Refills: 0 | Status: DISCONTINUED | OUTPATIENT
Start: 2025-01-28 | End: 2025-02-03

## 2025-01-28 RX ORDER — HALOPERIDOL 10 MG/1
1 TABLET ORAL EVERY 6 HOURS
Refills: 0 | Status: DISCONTINUED | OUTPATIENT
Start: 2025-01-28 | End: 2025-01-29

## 2025-01-28 RX ORDER — HYDROXYZINE HYDROCHLORIDE 25 MG/1
25 TABLET, FILM COATED ORAL EVERY 6 HOURS
Refills: 0 | Status: DISCONTINUED | OUTPATIENT
Start: 2025-01-28 | End: 2025-02-17

## 2025-01-28 RX ADMIN — INSULIN GLARGINE-YFGN 30 UNIT(S): 100 INJECTION, SOLUTION SUBCUTANEOUS at 11:49

## 2025-01-28 RX ADMIN — HYDROXYZINE HYDROCHLORIDE 25 MILLIGRAM(S): 25 TABLET, FILM COATED ORAL at 15:09

## 2025-01-28 RX ADMIN — OSELTAMIVIR PHOSPHATE 30 MILLIGRAM(S): 75 CAPSULE ORAL at 17:26

## 2025-01-28 RX ADMIN — Medication 10 MILLIGRAM(S): at 21:06

## 2025-01-28 RX ADMIN — BENZOCAINE 1 SPRAY(S): 220 SPRAY, METERED PERIODONTAL at 13:08

## 2025-01-28 RX ADMIN — METOPROLOL SUCCINATE 5 MILLIGRAM(S): 50 TABLET, EXTENDED RELEASE ORAL at 17:38

## 2025-01-28 RX ADMIN — BENZOCAINE 1 SPRAY(S): 220 SPRAY, METERED PERIODONTAL at 21:05

## 2025-01-28 RX ADMIN — Medication 1000 MILLIGRAM(S): at 21:05

## 2025-01-28 RX ADMIN — BENZOCAINE 1 SPRAY(S): 220 SPRAY, METERED PERIODONTAL at 05:05

## 2025-01-28 RX ADMIN — Medication 1 APPLICATION(S): at 11:40

## 2025-01-28 RX ADMIN — Medication 81 MILLIGRAM(S): at 11:37

## 2025-01-28 RX ADMIN — Medication 40 MILLIGRAM(S): at 11:35

## 2025-01-28 RX ADMIN — OSELTAMIVIR PHOSPHATE 30 MILLIGRAM(S): 75 CAPSULE ORAL at 05:05

## 2025-01-28 RX ADMIN — INSULIN LISPRO 20 UNIT(S): 100 INJECTION, SOLUTION INTRAVENOUS; SUBCUTANEOUS at 08:10

## 2025-01-28 RX ADMIN — CEFEPIME 100 MILLIGRAM(S): 2 INJECTION, POWDER, FOR SOLUTION INTRAVENOUS at 05:04

## 2025-01-28 RX ADMIN — METOPROLOL SUCCINATE 5 MILLIGRAM(S): 50 TABLET, EXTENDED RELEASE ORAL at 23:59

## 2025-01-28 RX ADMIN — Medication 2 TABLET(S): at 21:06

## 2025-01-28 RX ADMIN — Medication 500000 UNIT(S): at 21:06

## 2025-01-28 RX ADMIN — NYSTATIN 1 APPLICATION(S): 100000 CREAM TOPICAL at 13:07

## 2025-01-28 RX ADMIN — Medication 500000 UNIT(S): at 13:08

## 2025-01-28 RX ADMIN — BUMETANIDE 2 MILLIGRAM(S): 1 TABLET ORAL at 17:38

## 2025-01-28 RX ADMIN — Medication 150 MILLIGRAM(S): at 11:37

## 2025-01-28 RX ADMIN — METOPROLOL SUCCINATE 5 MILLIGRAM(S): 50 TABLET, EXTENDED RELEASE ORAL at 11:36

## 2025-01-28 RX ADMIN — Medication 500 MILLIGRAM(S): at 11:37

## 2025-01-28 RX ADMIN — Medication 50 MILLIEQUIVALENT(S): at 15:10

## 2025-01-28 RX ADMIN — ATORVASTATIN CALCIUM 10 MILLIGRAM(S): 80 TABLET, FILM COATED ORAL at 21:05

## 2025-01-28 RX ADMIN — INSULIN GLARGINE-YFGN 30 UNIT(S): 100 INJECTION, SOLUTION SUBCUTANEOUS at 21:05

## 2025-01-28 RX ADMIN — Medication 650 MILLIGRAM(S): at 19:30

## 2025-01-28 RX ADMIN — Medication 50 MILLIEQUIVALENT(S): at 11:38

## 2025-01-28 RX ADMIN — Medication 2 MILLIGRAM(S): at 19:30

## 2025-01-28 RX ADMIN — INSULIN LISPRO 25 UNIT(S): 100 INJECTION, SOLUTION INTRAVENOUS; SUBCUTANEOUS at 11:51

## 2025-01-28 RX ADMIN — Medication 50 MILLIEQUIVALENT(S): at 13:03

## 2025-01-28 RX ADMIN — NYSTATIN 1 APPLICATION(S): 100000 CREAM TOPICAL at 05:05

## 2025-01-28 RX ADMIN — INSULIN LISPRO 4: 100 INJECTION, SOLUTION INTRAVENOUS; SUBCUTANEOUS at 11:50

## 2025-01-28 RX ADMIN — NYSTATIN 1 APPLICATION(S): 100000 CREAM TOPICAL at 21:06

## 2025-01-28 RX ADMIN — Medication 10 MILLIGRAM(S): at 11:39

## 2025-01-28 RX ADMIN — POLYETHYLENE GLYCOL 3350 17 GRAM(S): 17 POWDER, FOR SOLUTION ORAL at 11:38

## 2025-01-28 RX ADMIN — Medication 10 MILLIGRAM(S): at 05:00

## 2025-01-28 RX ADMIN — INSULIN LISPRO 4: 100 INJECTION, SOLUTION INTRAVENOUS; SUBCUTANEOUS at 06:30

## 2025-01-28 RX ADMIN — BUMETANIDE 2 MILLIGRAM(S): 1 TABLET ORAL at 05:04

## 2025-01-28 RX ADMIN — Medication 500000 UNIT(S): at 05:05

## 2025-01-28 RX ADMIN — INSULIN LISPRO 9: 100 INJECTION, SOLUTION INTRAVENOUS; SUBCUTANEOUS at 17:48

## 2025-01-28 RX ADMIN — HEPARIN SODIUM 1000 UNIT(S)/HR: 1000 INJECTION INTRAVENOUS; SUBCUTANEOUS at 06:01

## 2025-01-28 RX ADMIN — CEFEPIME 100 MILLIGRAM(S): 2 INJECTION, POWDER, FOR SOLUTION INTRAVENOUS at 17:39

## 2025-01-28 RX ADMIN — SERTRALINE 25 MILLIGRAM(S): 100 TABLET, FILM COATED ORAL at 11:39

## 2025-01-28 RX ADMIN — Medication 2 TABLET(S): at 05:01

## 2025-01-28 NOTE — BH CONSULTATION LIAISON PROGRESS NOTE - NSBHFUPINTERVALHXFT_PSY_A_CORE
Chart reviewed. Patient seen at bedside with girlfriend. He is currently endorsing anxiety and requesting medication for anxiety. Discussed with medical team and nursing staff which cristi Chart reviewed. Patient seen at bedside with girlfriend. He is currently endorsing anxiety and requesting medication for anxiety. Patient is non-verbal, but can communicate through writing. Discussed with medical team and nursing staff who had concerns about respiratory depression with benzos. Medical team is agreeable to hydroxyzine when off bipap and IV haldol if on bipap for anxiety. Will need daily EKG if using IV haldol--previous EKGs did not show prolonged qtc.

## 2025-01-28 NOTE — PROGRESS NOTE ADULT - ASSESSMENT
ASSESSMENT  69-year-old man with extensive medical history including CAD (status post CABG;), DM (on insulin pump), atrial fibrillation on Xarelto, history of distal pancreatomy and splenectomy for unclear reason referred to the ED by his oncologist for abnormal labs. Patient was recently diagnosed with Diffuse large B cell Lymphoma on pathology of right groin mass and is following with [facility] oncology team. Patient was discharged on 01.17.2025 from hospital after inpatient chemotherapy (Completed cycle 1 of R-EPOCH (D1 on 1/11/2024). Tolerated well. Rituximab was given on D5).   Over past 3 days patient started to get a productive cough with yellow sputum production.  Patient is also endorsing chills and nausea which he states is baseline. He had 1 reading on low grade fever 100.5 on day of presentation.    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    #Abx allergy: No Known Allergies    RECOMMENDATIONS  - continue cefepime 2g q 12 hours - end date 1/30 to complete 10 days  - decrease tamiflu to 30 mg BID for 10 days (end date 1/30)   - trend WBC -- last dose of WBC stimulator on 1/24   - ongoing diuresis with bumex BID     Please call or message on Microsoft Teams if with any questions.  Spectra [id number]

## 2025-01-28 NOTE — BH CONSULTATION LIAISON PROGRESS NOTE - NSBHCONSULTRECOMMENDOTHER_PSY_A_CORE FT
Thank you for letting us take care of you today. We hope all your questions were addressed. If a question was overlooked or something else comes to mind after you return home, please contact a member of your Care Team listed below. Please make sure you have a routine office visit set up to follow-up on [address]. Your Care Team at [place] [address] is Team #2  Lucrecia Oswald DO (Faculty)  Nikolay Aleman MD (Resident)  Luis Schuler MD (Resident)  Yoko Tesfaye MD (Resident)  Miracle Sutton MD (Resident)  Horace Johnson MD (Resident)  Ronna Skiff, PATIENCE [facility], [address] ([address] [facility])  Giovani Paz RN, ([address] [address] )  Jovanny Garner, Ph.D., (Behavioral Services)  Keith Farah [facility] (Clinical Pharmacist)     Office phone number: [phone]    If you need to get in right away due to illness, please be advised we have \"Same Day\" appointments available Monday-Friday. Please call us at [phone] option #3 to schedule your \"Same Day\" appointment. - Continue Zoloft 25 mg qd for anxiety/mood.  - Continue melatonin 10 mg qhs for insomnia, can hold if NPO  - Consider trazodone 50 mg qhs for sleep, can hold if NPO  - Will give a referral to outpatient psychiatrist--discussed with psychiatry SW    For breakthrough anxiety:  - Hydroxyzine 25 mg q6 hours prn if off Bipap  - Haldol IV 1 mg q 6 hrs prn if on Bipap  - Will hold benzos due to concerns about respiratory status.    For breakthrough agitation/distress from delirium:  -  Haldol 2.5 mg IM q 6 hrs prn  - Continue Zoloft 25 mg qd for anxiety/mood.  - Continue melatonin 10 mg qhs for insomnia, can hold if NPO  - Consider trazodone 50 mg qhs for sleep, can hold if NPO  - Will give a referral to outpatient psychiatrist--discussed with psychiatry SW    For breakthrough anxiety:  - Hydroxyzine 25 mg q6 hours prn if off Bipap  - Haldol IV 1 mg q 6 hrs prn if on Bipap (please do a daily EKG if giving IV haldol, hold if qtc is above 500 ms)  - Will hold benzos due to concerns about respiratory status.    For breakthrough agitation/distress from delirium:  -  Haldol 2.5 mg IM q 6 hrs prn

## 2025-01-28 NOTE — PROGRESS NOTE ADULT - ASSESSMENT
69-year-old man with extensive medical history including CAD (status post CABG;), DM (on insulin pump), atrial fibrillation on Xarelto, history of distal pancreatomy and splenectomy for unclear reason referred to the ED by his oncologist for abnormal labs.  Nephrology was consulted for KRISTINE and mild hyponatremia.   #Non oliguric KRISTINE on CKD stage 3B - likely 2/2 fluid overload  #Hyponatremia - likely from fluid overload.   #h/o Large B cell Lymphoma - s/p chemotherapy   #AHRF - likely 2/2 Influenza A with superimposed bacterial PNA.  #Neutropenia - s/p chemotherapy  cr stable  - Continue bumex 2mg Q12h  - if UOP drops may add metolaozne 2.5 mg (is on at home)   - replete K to 4 and Mg to 2 while on diuresis  - ph at goal   - bp controlled   - Heme/onc on board   - on ATB / tamiflu followed by ID   - Avoid nephrotoxic medications and adjust all medications to GFR.   - No need for RRT at this time.  will follow

## 2025-01-28 NOTE — SWALLOW BEDSIDE ASSESSMENT ADULT - SLP PERTINENT HISTORY OF CURRENT PROBLEM
Pt is a 68 y/o M w/ extensive medical history including: CAD (status post CABG), DM (on insulin pump), atrial fibrillation (on Xarelto), distal pancreatomy and splenectomy, referred to the ED by his oncologist for abnormal labs. Of note, pt recently diagnosed w/ diffuse large B cell lymphoma on pathology of right groin mass and is following / [facility] oncology team. Pt discharged on 1/17/2025 from hospital after inpatient chemotherapy (Completed cycle 1 of R-EPOCH (D1 on 1/11/2024)). Pt is being treated for severe sepsis - severe multifocal PNA w/ influenza A infection, AHRF, volume overload, neutropenic fever; +Immunodeficiency 2' malignancy and comorbidities. CXR 1/28-> unchanged diffuse bilateral opacities.

## 2025-01-28 NOTE — SWALLOW BEDSIDE ASSESSMENT ADULT - SLP GENERAL OBSERVATIONS
pt received in bed awake +generalized weakness +clear vocal quality +HFNC 60L/min, 80% O2 +family friend Peter at bedside

## 2025-01-28 NOTE — PROGRESS NOTE ADULT - SUBJECTIVE AND OBJECTIVE BOX
JOANNMORGAN ESPINOZA  69y, Male  Allergy: No Known Allergies      LOS  8d    CHIEF COMPLAINT: sob (27 Jan 2025 09:39)      INTERVAL EVENTS/HPI  - No acute events overnight  - T(F): , Max: 97.8 (01-28-25 @ 00:00)  - on bipap -- less fatigued today   - WBC Count: 20.14 (01-28-25 @ 05:00)  WBC Count: 19.00 (01-27-25 @ 22:55)     - Creatinine: 2.3 (01-28-25 @ 05:00)  Creatinine: 2.3 (01-27-25 @ 06:12)       ROS  General: Denies rigors, nightsweats  HEENT: Denies headache, rhinorrhea, sore throat, eye pain  CV: Denies CP, palpitations  PULM: Denies wheezing, hemoptysis  GI: Denies hematemesis, hematochezia, melena  : Denies discharge, hematuria  MSK: Denies arthralgias, myalgias  SKIN: Denies rash, lesions  NEURO: Denies paresthesias, weakness  PSYCH: Denies depression, anxiety    VITALS:  T(F): 96.3, Max: 97.8 (01-28-25 @ 00:00)  HR: 90  BP: 134/63  RR: 22Vital Signs Last 24 Hrs  T(C): 35.7 (28 Jan 2025 10:00), Max: 36.6 (28 Jan 2025 00:00)  T(F): 96.3 (28 Jan 2025 10:00), Max: 97.8 (28 Jan 2025 00:00)  HR: 90 (28 Jan 2025 10:00) (83 - 105)  BP: 134/63 (28 Jan 2025 10:00) (112/59 - 149/71)  BP(mean): 90 (28 Jan 2025 10:00) (82 - 102)  RR: 22 (28 Jan 2025 10:00) (19 - 40)  SpO2: 100% (28 Jan 2025 10:00) (91% - 100%)    Parameters below as of 28 Jan 2025 11:00  Patient On (Oxygen Delivery Method): BiPAP/CPAP, 16/8        PHYSICAL EXAM:  Gen: NAD, resting in bed  HEENT: Normocephalic, atraumatic  Neck: supple, no lymphadenopathy  CV: Regular rate & regular rhythm  Lungs: decreased BS at bases, no fremitus  Abdomen: Soft, BS present  Ext: Warm, well perfused  Neuro: non focal, awake  Skin: no rash, no erythema  Lines: no phlebitis    FH: Non-contributory  Social Hx: Non-contributory    TESTS & MEASUREMENTS:                        9.2    20.14 )-----------( 293      ( 28 Jan 2025 05:00 )             27.1     01-28    142  |  96[L]  |  99[HH]  ----------------------------<  354[H]  3.7   |  33[H]  |  2.3[H]    Ca    8.2[L]      28 Jan 2025 05:00  Phos  4.1     01-27  Mg     2.2     01-27    TPro  4.8[L]  /  Alb  2.3[L]  /  TBili  0.5  /  DBili  x   /  AST  61[H]  /  ALT  58[H]  /  AlkPhos  412[H]  01-28      LIVER FUNCTIONS - ( 28 Jan 2025 05:00 )  Alb: 2.3 g/dL / Pro: 4.8 g/dL / ALK PHOS: 412 U/L / ALT: 58 U/L / AST: 61 U/L / GGT: x           Urinalysis Basic - ( 28 Jan 2025 05:00 )    Color: x / Appearance: x / SG: x / pH: x  Gluc: 354 mg/dL / Ketone: x  / Bili: x / Urobili: x   Blood: x / Protein: x / Nitrite: x   Leuk Esterase: x / RBC: x / WBC x   Sq Epi: x / Non Sq Epi: x / Bacteria: x        Urinalysis with Rflx Culture (collected 01-20-25 @ 19:07)    Culture - Blood (collected 01-20-25 @ 15:38)  Source: .Blood BLOOD  Final Report (01-25-25 @ 23:07):    No growth at 5 days    Culture - Blood (collected 01-20-25 @ 15:38)  Source: .Blood BLOOD  Final Report (01-25-25 @ 23:07):    No growth at 5 days            INFECTIOUS DISEASES TESTING  Procalcitonin: 4.48 (01-27-25 @ 06:12)  MRSA PCR Result.: Negative (01-25-25 @ 07:28)  Procalcitonin: 29.80 (01-24-25 @ 11:30)  MRSA PCR Result.: Negative (01-24-25 @ 10:45)  Legionella Antigen, Urine: Negative (01-23-25 @ 12:10)  Rapid RVP Result: NotDetec (01-13-25 @ 13:24)      INFLAMMATORY MARKERS      RADIOLOGY & ADDITIONAL TESTS:  I have personally reviewed the last available Chest xray  CXR      CT      CARDIOLOGY TESTING  12 Lead ECG:   Ventricular Rate 109 BPM    QRS Duration 112 ms    Q-T Interval 314 ms    QTC Calculation(Bazett) 422 ms    R Axis 44 degrees    T Axis 27 degrees    Diagnosis Line Atrial fibrillation with rapid ventricular response  Abnormal ECG    Confirmed by TASH ESPARZA MD ([id number]) on 1/27/2025 9:55:13 PM (01-26-25 @ 23:29)  12 Lead ECG:   Ventricular Rate 118 BPM    QRS Duration 110 ms    Q-T Interval 326 ms    QTC Calculation(Bazett) 456 ms    R Axis 58 degrees    T Axis 35 degrees    Diagnosis Line Atrial fibrillation with rapid ventricular response  Incomplete right bundle branchblock  Abnormal ECG  Confirmed by TASH ESPARZA MD [id number]) on 1/22/2025 9:16:25 PM (01-22-25 @ 17:25)      MEDICATIONS  allopurinol 150 Oral daily  ascorbic acid 500 Oral daily  aspirin  chewable 81 Oral daily  atorvastatin 10 Oral at bedtime  benzocaine 20% Spray 1 Topical three times a day  buMETAnide Injectable 2 IV Push every 12 hours  cefepime   IVPB 2000 IV Intermittent every 12 hours  cetirizine 10 Oral daily  chlorhexidine 2% Cloths 1 Topical daily  dextrose 5%. 1000 IV Continuous <Continuous>  dextrose 5%. 1000 IV Continuous <Continuous>  dextrose 50% Injectable 25 IV Push once  dextrose 50% Injectable 12.5 IV Push once  dextrose 50% Injectable 25 IV Push once  glucagon  Injectable 1 IntraMuscular once  heparin  Infusion.  IV Continuous <Continuous>  influenza  Vaccine (HIGH DOSE) 0.5 IntraMuscular once  insulin glargine Injectable (LANTUS) 30 SubCutaneous two times a day  insulin lispro (ADMELOG) corrective regimen sliding scale  SubCutaneous three times a day before meals  insulin lispro Injectable (ADMELOG) 25 SubCutaneous three times a day before meals  melatonin 10 Oral at bedtime  metoprolol tartrate Injectable 5 IV Push every 6 hours  nystatin    Suspension 831570 Swish and Swallow three times a day  nystatin Powder 1 Topical every 8 hours  oseltamivir 30 Oral two times a day  pantoprazole  Injectable 40 IV Push daily  polyethylene glycol 3350 17 Oral daily  potassium chloride  20 mEq/100 mL IVPB 20 IV Intermittent every 2 hours  senna 2 Oral at bedtime  sertraline 25 Oral daily      WEIGHT  Weight (kg): 132.9 (01-27-25 @ 11:20)  Creatinine: 2.3 mg/dL (01-28-25 @ 05:00)      ANTIBIOTICS:  cefepime   IVPB 2000 milliGRAM(s) IV Intermittent every 12 hours  nystatin    Suspension 151392 Unit(s) Swish and Swallow three times a day  oseltamivir 30 milliGRAM(s) Oral two times a day      All available historical records have been reviewed

## 2025-01-28 NOTE — SWALLOW BEDSIDE ASSESSMENT ADULT - NS SPL SWALLOW CLINIC TRIAL FT
Pt declined further PO trials.  Thin liquids and chewables NT 2' increased respiratory demand and thus risk for aspiration. Pt declined further PO trials.  Thin liquids and chewables NT 2' increased respiratory demand and thus increased risk of aspiration.

## 2025-01-28 NOTE — SWALLOW BEDSIDE ASSESSMENT ADULT - SWALLOW EVAL: RECOMMENDED DIET
continue full mildly thick liquids; allow ice chips b/t meals following oral care continue full mildly thick liquids; allow ice chips/small sips of water b/t meals following oral care

## 2025-01-28 NOTE — PROGRESS NOTE ADULT - ASSESSMENT
IMPRESSION:    Acute hypoxemic respiratory failure   Influenza A infection / pneumonia   RASHAD / OHS   Possible superimposed bacterial pneumonia   Fluid overload / bilateral pleural effusions   HO Large B Cell lymphoma SP Chemotherapy   HO CAD (status post CABG;),  HO DM (on insulin pump)  HO atrial fibrillation on Xarelto  HO distal pancreatomy and splenectomy     PLAN:    CNS: No sedation.     HEENT: Oral care    PULMONARY: ABG today. CXR noted with bilateral infiltrates. BIPAP with Fio2 60%. CXR PM.     CARDIOVASCULAR: Echocardiogram: normal EF. BNP noted to be elevated. Bumex 2mg BID and negative blanace last 24 hours.     GI: GI prophylaxis. NPO for now.     RENAL:  BUN crea stable. Continue diuretic trial. Nephrology following. No hydro on kidney bladder US.     INFECTIOUS DISEASE: Tamiflu and Cefepime. MRSA negative. Cultures: repeat pending.    HEMATOLOGICAL: On AC with heparin. PTT therapeutic. Daily coags and CBC.     ENDOCRINE:  Follow up FS. Increase insulin regimen to keep 140-180.     MUSCULOSKELETAL:  OOB as tolerated with PT OT     MICU    Prognosis overall guarded

## 2025-01-28 NOTE — PROGRESS NOTE ADULT - SUBJECTIVE AND OBJECTIVE BOX
seen and examined  24 hevents noted   on BIPAP        PAST HISTORY  --------------------------------------------------------------------------------  No significant changes to PMH, PSH, FHx, SHx, unless otherwise noted    ALLERGIES & MEDICATIONS  --------------------------------------------------------------------------------  Allergies    No Known Allergies    Intolerances      Standing Inpatient Medications  allopurinol 150 milliGRAM(s) Oral daily  ascorbic acid 500 milliGRAM(s) Oral daily  aspirin  chewable 81 milliGRAM(s) Oral daily  atorvastatin 10 milliGRAM(s) Oral at bedtime  benzocaine 20% Spray 1 Spray(s) Topical three times a day  buMETAnide Injectable 2 milliGRAM(s) IV Push every 12 hours  cefepime   IVPB 2000 milliGRAM(s) IV Intermittent every 12 hours  cetirizine 10 milliGRAM(s) Oral daily  chlorhexidine 2% Cloths 1 Application(s) Topical daily  dextrose 5%. 1000 milliLiter(s) IV Continuous <Continuous>  dextrose 5%. 1000 milliLiter(s) IV Continuous <Continuous>  dextrose 50% Injectable 25 Gram(s) IV Push once  dextrose 50% Injectable 12.5 Gram(s) IV Push once  dextrose 50% Injectable 25 Gram(s) IV Push once  glucagon  Injectable 1 milliGRAM(s) IntraMuscular once  heparin  Infusion.  Unit(s)/Hr IV Continuous <Continuous>  influenza  Vaccine (HIGH DOSE) 0.5 milliLiter(s) IntraMuscular once  insulin glargine Injectable (LANTUS) 30 Unit(s) SubCutaneous two times a day  insulin lispro (ADMELOG) corrective regimen sliding scale   SubCutaneous three times a day before meals  insulin lispro Injectable (ADMELOG) 20 Unit(s) SubCutaneous three times a day before meals  melatonin 10 milliGRAM(s) Oral at bedtime  metoprolol tartrate Injectable 5 milliGRAM(s) IV Push every 6 hours  nystatin    Suspension 210516 Unit(s) Swish and Swallow three times a day  nystatin Powder 1 Application(s) Topical every 8 hours  oseltamivir 30 milliGRAM(s) Oral two times a day  pantoprazole  Injectable 40 milliGRAM(s) IV Push daily  polyethylene glycol 3350 17 Gram(s) Oral daily  senna 2 Tablet(s) Oral at bedtime  sertraline 25 milliGRAM(s) Oral daily    PRN Inpatient Medications  acetaminophen     Tablet .. 650 milliGRAM(s) Oral every 6 hours PRN  albuterol/ipratropium for Nebulization 3 milliLiter(s) Nebulizer every 4 hours PRN  dextrose Oral Gel 15 Gram(s) Oral once PRN  diazepam    Tablet 2 milliGRAM(s) Oral daily PRN  haloperidol    Injectable 2.5 milliGRAM(s) IntraMuscular every 6 hours PRN  heparin   Injectable 6000 Unit(s) IV Push every 6 hours PRN  ondansetron Injectable 4 milliGRAM(s) IV Push every 6 hours PRN  oxyCODONE    IR 5 milliGRAM(s) Oral four times a day PRN        VITALS/PHYSICAL EXAM  --------------------------------------------------------------------------------  T(C): 35.5 (01-28-25 @ 04:00), Max: 36.8 (01-27-25 @ 08:15)  HR: 90 (01-28-25 @ 07:00) (83 - 114)  BP: 134/63 (01-28-25 @ 07:00) (112/59 - 149/71)  RR: 25 (01-28-25 @ 07:00) (19 - 40)  SpO2: 100% (01-28-25 @ 07:00) (90% - 100%)  Wt(kg): --  Height (cm): 180.3 (01-27-25 @ 11:20)  Weight (kg): 132.9 (01-27-25 @ 11:20)  BMI (kg/m2): 40.9 (01-27-25 @ 11:20)  BSA (m2): 2.48 (01-27-25 @ 11:20)      01-27-25 @ 07:01  -  01-28-25 @ 07:00  --------------------------------------------------------  IN: 780 mL / OUT: 3725 mL / NET: -2945 mL      Physical Exam:  	Gen: BIPAP  	Pulm: decrease BS  B/L  	CV: S1S2; no rub  	Abd: +distended  	LE: edema  	  LABS/STUDIES  --------------------------------------------------------------------------------              9.2    20.14 >-----------<  293      [01-28-25 @ 05:00]              27.1     142  |  96  |  99  ----------------------------<  354      [01-28-25 @ 05:00]  3.7   |  33  |  2.3        Ca     8.2     [01-28-25 @ 05:00]      Mg     2.2     [01-27-25 @ 06:12]      Phos  4.1     [01-27-25 @ 06:12]    TPro  4.8  /  Alb  2.3  /  TBili  0.5  /  DBili  x   /  AST  61  /  ALT  58  /  AlkPhos  412  [01-28-25 @ 05:00]      PTT: 53.1       [01-28-25 @ 05:00]    Uric acid 8.5      [01-27-25 @ 11:34]    Creatinine Trend:  SCr 2.3 [01-28 @ 05:00]  SCr 2.3 [01-27 @ 06:12]  SCr 2.6 [01-26 @ 05:42]  SCr 2.5 [01-25 @ 16:50]  SCr 2.5 [01-25 @ 05:02]    Urinalysis - [01-28-25 @ 05:00]      Color  / Appearance  / SG  / pH       Gluc 354 / Ketone   / Bili  / Urobili        Blood  / Protein  / Leuk Est  / Nitrite       RBC  / WBC  / Hyaline  / Gran  / Sq Epi  / Non Sq Epi  / Bacteria         HBsAg Nonreact      [01-11-25 @ 13:09]  HBcAb Nonreact      [01-11-25 @ 13:09]  HCV 0.08, Nonreact      [01-11-25 @ 13:09]

## 2025-01-28 NOTE — PROGRESS NOTE ADULT - ASSESSMENT
#Acute hypoxemic respiratory failure 2/2   #Influenza A infection / ?Superimposed Bacterial PNA  #Neutropenic Fever  #Fluid overload / bilateral pleural effusions    1/28 - CXR today with b/l infiltrates. Continue with Bipap.   1/28 - Continue diuretics as BUN/Cr stable. Bumex 2mg BID. Maintain negative balance.   1/28 - Repeat CXR 12/8 for revaluation of pulmonary process s/p diuretics/Bipap.   1/28 - Patient off Bipap on HFNC comfortable.   1/28 - Continue Tamiflu and Cefepime as per ID recs.    1/28 - Repeat CXR 12/8 14:23: *Self Interpretation* Underpenetrated compared to previous CXR today. Increased Upper Left Opacity.   1/28 - End date Tamiflu and Cefepime 1/30 (Total 10d course).    #Other   1/28 - OOB as tolerated and PT/OT   1/28 - Adjusted ISS to +3  --------------------------------------------  Follow-Up: PM CBC for WBC. BMP for Lytes. AM CXR and EKG (now on haldol).

## 2025-01-28 NOTE — PROGRESS NOTE ADULT - SUBJECTIVE AND OBJECTIVE BOX
Patient is a 69y old  Male who presents with a chief complaint of sob (27 Jan 2025 09:39)        Over Night Events:    BIPAP 70% and Spo2 100%   On heparin IV         ROS:  See HPI    PHYSICAL EXAM    ICU Vital Signs Last 24 Hrs  T(C): 35.5 (28 Jan 2025 04:00), Max: 36.6 (28 Jan 2025 00:00)  T(F): 95.9 (28 Jan 2025 04:00), Max: 97.8 (28 Jan 2025 00:00)  HR: 90 (28 Jan 2025 07:00) (83 - 105)  BP: 134/63 (28 Jan 2025 07:00) (112/59 - 149/71)  BP(mean): 91 (28 Jan 2025 07:00) (82 - 102)  ABP: --  ABP(mean): --  RR: 25 (28 Jan 2025 07:00) (19 - 40)  SpO2: 100% (28 Jan 2025 07:00) (90% - 100%)    O2 Parameters below as of 28 Jan 2025 07:00  Patient On (Oxygen Delivery Method): BiPAP/CPAP    O2 Concentration (%): 70        General: NAD, BIPAP now   HEENT: LEXI             Lymphatic system: No cervical LN   Lungs: Bilateral BS, coarse   Cardiovascular: Regular   Gastrointestinal: Soft, Positive BS  Extremities: No clubbing.  Moves extremities.  Full Range of motion   Skin: Warm, intact  Neurological: No motor or sensory deficit       01-27-25 @ 07:01  -  01-28-25 @ 07:00  --------------------------------------------------------  IN:    Heparin Infusion: 80 mL    Heparin Infusion: 160 mL    IV PiggyBack: 300 mL    Oral Fluid: 240 mL  Total IN: 780 mL    OUT:    Indwelling Catheter - Urethral (mL): 3725 mL  Total OUT: 3725 mL    Total NET: -2945 mL          LABS:                            9.2    20.14 )-----------( 293      ( 28 Jan 2025 05:00 )             27.1                                               01-28    142  |  96[L]  |  99[HH]  ----------------------------<  354[H]  3.7   |  33[H]  |  2.3[H]    Ca    8.2[L]      28 Jan 2025 05:00  Phos  4.1     01-27  Mg     2.2     01-27    TPro  4.8[L]  /  Alb  2.3[L]  /  TBili  0.5  /  DBili  x   /  AST  61[H]  /  ALT  58[H]  /  AlkPhos  412[H]  01-28      PTT - ( 28 Jan 2025 05:00 )  PTT:53.1 sec                                       Urinalysis Basic - ( 28 Jan 2025 05:00 )    Color: x / Appearance: x / SG: x / pH: x  Gluc: 354 mg/dL / Ketone: x  / Bili: x / Urobili: x   Blood: x / Protein: x / Nitrite: x   Leuk Esterase: x / RBC: x / WBC x   Sq Epi: x / Non Sq Epi: x / Bacteria: x                                                  LIVER FUNCTIONS - ( 28 Jan 2025 05:00 )  Alb: 2.3 g/dL / Pro: 4.8 g/dL / ALK PHOS: 412 U/L / ALT: 58 U/L / AST: 61 U/L / GGT: x                                                                                                                                   ABG - ( 26 Jan 2025 09:40 )  pH, Arterial: 7.55  pH, Blood: x     /  pCO2: 43    /  pO2: 67    / HCO3: 38    / Base Excess: 13.7  /  SaO2: 97.3                MEDICATIONS  (STANDING):  allopurinol 150 milliGRAM(s) Oral daily  ascorbic acid 500 milliGRAM(s) Oral daily  aspirin  chewable 81 milliGRAM(s) Oral daily  atorvastatin 10 milliGRAM(s) Oral at bedtime  benzocaine 20% Spray 1 Spray(s) Topical three times a day  buMETAnide Injectable 2 milliGRAM(s) IV Push every 12 hours  cefepime   IVPB 2000 milliGRAM(s) IV Intermittent every 12 hours  cetirizine 10 milliGRAM(s) Oral daily  chlorhexidine 2% Cloths 1 Application(s) Topical daily  dextrose 5%. 1000 milliLiter(s) (100 mL/Hr) IV Continuous <Continuous>  dextrose 5%. 1000 milliLiter(s) (50 mL/Hr) IV Continuous <Continuous>  dextrose 50% Injectable 25 Gram(s) IV Push once  dextrose 50% Injectable 12.5 Gram(s) IV Push once  dextrose 50% Injectable 25 Gram(s) IV Push once  glucagon  Injectable 1 milliGRAM(s) IntraMuscular once  heparin  Infusion.  Unit(s)/Hr (10 mL/Hr) IV Continuous <Continuous>  influenza  Vaccine (HIGH DOSE) 0.5 milliLiter(s) IntraMuscular once  insulin glargine Injectable (LANTUS) 30 Unit(s) SubCutaneous two times a day  insulin lispro (ADMELOG) corrective regimen sliding scale   SubCutaneous three times a day before meals  insulin lispro Injectable (ADMELOG) 20 Unit(s) SubCutaneous three times a day before meals  melatonin 10 milliGRAM(s) Oral at bedtime  metoprolol tartrate Injectable 5 milliGRAM(s) IV Push every 6 hours  nystatin    Suspension 143638 Unit(s) Swish and Swallow three times a day  nystatin Powder 1 Application(s) Topical every 8 hours  oseltamivir 30 milliGRAM(s) Oral two times a day  pantoprazole  Injectable 40 milliGRAM(s) IV Push daily  polyethylene glycol 3350 17 Gram(s) Oral daily  senna 2 Tablet(s) Oral at bedtime  sertraline 25 milliGRAM(s) Oral daily    MEDICATIONS  (PRN):  acetaminophen     Tablet .. 650 milliGRAM(s) Oral every 6 hours PRN Temp greater or equal to 38C (100.4F), Mild Pain (1 - 3)  albuterol/ipratropium for Nebulization 3 milliLiter(s) Nebulizer every 4 hours PRN Shortness of Breath and/or Wheezing  dextrose Oral Gel 15 Gram(s) Oral once PRN Blood Glucose LESS THAN 70 milliGRAM(s)/deciliter  diazepam    Tablet 2 milliGRAM(s) Oral daily PRN anxiety  haloperidol    Injectable 2.5 milliGRAM(s) IntraMuscular every 6 hours PRN Agitation  heparin   Injectable 6000 Unit(s) IV Push every 6 hours PRN For aPTT less than 40  ondansetron Injectable 4 milliGRAM(s) IV Push every 6 hours PRN Nausea and/or Vomiting  oxyCODONE    IR 5 milliGRAM(s) Oral four times a day PRN Severe Pain (7 - 10)      Xrays: bilateral infiltrates/opacities                                                                                    ECHO

## 2025-01-28 NOTE — PROGRESS NOTE ADULT - SUBJECTIVE AND OBJECTIVE BOX
SUBJECTIVE/OVERNIGHT EVENTS  Today is hospital day 8d. This morning patient was seen and examined at bedside, resting comfortably in bed. No acute or major events overnight. Patient has no complaints at this time. . Patient and family were informed of the patient's plan of care at this time.    CODE STATUS: Full    MEDICATIONS  STANDING MEDICATIONS  allopurinol 150 milliGRAM(s) Oral daily  ascorbic acid 500 milliGRAM(s) Oral daily  aspirin  chewable 81 milliGRAM(s) Oral daily  atorvastatin 10 milliGRAM(s) Oral at bedtime  benzocaine 20% Spray 1 Spray(s) Topical three times a day  buMETAnide Injectable 2 milliGRAM(s) IV Push every 12 hours  cefepime   IVPB 2000 milliGRAM(s) IV Intermittent every 12 hours  cetirizine 10 milliGRAM(s) Oral daily  chlorhexidine 2% Cloths 1 Application(s) Topical daily  dextrose 5%. 1000 milliLiter(s) IV Continuous <Continuous>  dextrose 5%. 1000 milliLiter(s) IV Continuous <Continuous>  dextrose 50% Injectable 25 Gram(s) IV Push once  dextrose 50% Injectable 12.5 Gram(s) IV Push once  dextrose 50% Injectable 25 Gram(s) IV Push once  glucagon  Injectable 1 milliGRAM(s) IntraMuscular once  heparin  Infusion.  Unit(s)/Hr IV Continuous <Continuous>  influenza  Vaccine (HIGH DOSE) 0.5 milliLiter(s) IntraMuscular once  insulin glargine Injectable (LANTUS) 30 Unit(s) SubCutaneous two times a day  insulin lispro (ADMELOG) corrective regimen sliding scale   SubCutaneous three times a day before meals  insulin lispro Injectable (ADMELOG) 25 Unit(s) SubCutaneous three times a day before meals  melatonin 10 milliGRAM(s) Oral at bedtime  metoprolol tartrate Injectable 5 milliGRAM(s) IV Push every 6 hours  nystatin    Suspension 152727 Unit(s) Swish and Swallow three times a day  nystatin Powder 1 Application(s) Topical every 8 hours  oseltamivir 30 milliGRAM(s) Oral two times a day  pantoprazole  Injectable 40 milliGRAM(s) IV Push daily  polyethylene glycol 3350 17 Gram(s) Oral daily  potassium chloride  20 mEq/100 mL IVPB 20 milliEquivalent(s) IV Intermittent every 2 hours  senna 2 Tablet(s) Oral at bedtime  sertraline 25 milliGRAM(s) Oral daily    PRN MEDICATIONS  acetaminophen     Tablet .. 650 milliGRAM(s) Oral every 6 hours PRN  albuterol/ipratropium for Nebulization 3 milliLiter(s) Nebulizer every 4 hours PRN  dextrose Oral Gel 15 Gram(s) Oral once PRN  diazepam    Tablet 2 milliGRAM(s) Oral daily PRN  haloperidol    Injectable 2.5 milliGRAM(s) IntraMuscular every 6 hours PRN  heparin   Injectable 6000 Unit(s) IV Push every 6 hours PRN  ondansetron Injectable 4 milliGRAM(s) IV Push every 6 hours PRN  oxyCODONE    IR 5 milliGRAM(s) Oral four times a day PRN    VITALS  T(F): 97 (01-28-25 @ 14:00), Max: 97.8 (01-28-25 @ 00:00)  HR: 97 (01-28-25 @ 14:00) (77 - 99)  BP: 102/67 (01-28-25 @ 14:00) (102/67 - 149/71)  RR: 43 (01-28-25 @ 12:55) (19 - 43)  SpO2: 96% (01-28-25 @ 14:00) (93% - 100%)  POCT Blood Glucose.: 302 mg/dL (01-28-25 @ 11:47)  POCT Blood Glucose.: 378 mg/dL (01-28-25 @ 08:07)  POCT Blood Glucose.: 329 mg/dL (01-28-25 @ 05:57)  POCT Blood Glucose.: 266 mg/dL (01-27-25 @ 22:31)  POCT Blood Glucose.: 214 mg/dL (01-27-25 @ 16:41)    PHYSICAL EXAM  GENERAL  (x) NAD   (  ) obtunded     (  ) lethargic     (  ) somnolent    HEAD  (x) Atraumatic     (  ) hematoma     (  ) laceration (specify location:       )     NECK  (x) Supple     (  ) neck stiffness     (  ) nuchal rigidity     (  )  no JVD     (  ) JVD present ( -- cm)    HEART  Rate -->  (x) normal rate    (  ) bradycardic    (  ) tachycardic  Rhythm -->  (x) regular    (  ) regularly irregular    (  ) irregularly irregular  Murmurs -->  (x) normal s1/s2    (  ) systolic murmur    (  ) diastolic murmur    (  ) continuous murmur     (  ) S3 present    (  ) S4 present    LUNGS  (x)Unlabored respirations     (  ) tachypnea  (x) B/L air entry     (  ) decreased breath sounds in:  (location     )    (  ) no adventitious sound  (x) Coarse breath sounds  (x) crackles     (  ) wheezing      (  ) rhonchi      (specify location:  B/L     )  (  ) chest wall tenderness (specify location:       )    ABDOMEN  (x) Soft     (  ) tense   |   (x) nondistended     (  ) distended   |   (x) +BS     (  ) hypoactive bowel sounds     (  ) hyperactive bowel sounds  (x) nontender     (  ) RUQ tenderness     (  ) RLQ tenderness     (  ) LLQ tenderness     (  ) epigastric tenderness     (  ) diffuse tenderness  (  ) Splenomegaly      (  ) Hepatomegaly      (  ) Jaundice     (  ) ecchymosis     EXTREMITIES  (x) Normal     (  ) Rash     (  ) ecchymosis     (  ) varicose veins      (  ) pitting edema     (  ) non-pitting edema   (  ) ulceration     (  ) gangrene:     (location:     )    NERVOUS SYSTEM  (x) A&Ox3     (  ) confused     (  ) lethargic  (x) Responds to commands   (x) Moving all extremities    SKIN  (x) No rashes or lesions     (  ) maculopapular rash     (  ) pustules     (  ) vesicles     (  ) ulcer     (  ) ecchymosis     (specify location:     )    (x) Indwelling Ojeda Catheter   Date insterted:    Reason (  ) Critical illness     (  ) urinary retention    (x) Accurate Ins/Outs Monitoring     (  ) CMO patient    (  ) Central Line  Date inserted:  Location: (  ) Right IJ   (  ) Left IJ   (  ) Right Fem   (  ) Left Fem  (  ) SPC  (  ) pigtail  (  ) PEG tube  (  ) colostomy  (  ) jejunostomy  (  ) U-Dall    LABS             9.2    20.14 )-----------( 293      ( 01-28-25 @ 05:00 )             27.1     142  |  96  |  99  -------------------------<  354   01-28-25 @ 05:00  3.7  |  33  |  2.3    Ca      8.2     01-28-25 @ 05:00  Phos   4.1     01-27-25 @ 06:12  Mg     2.2     01-27-25 @ 06:12    TPro  4.8  /  Alb  2.3  /  TBili  0.5  /  DBili  x   /  AST  61  /  ALT  58  /  AlkPhos  412  /  GGT  x     01-28-25 @ 05:00    PTT - ( 01-28-25 @ 05:00 )  PTT:53.1 sec    Urinalysis Basic - ( 28 Jan 2025 05:00 )    Color: x / Appearance: x / SG: x / pH: x  Gluc: 354 mg/dL / Ketone: x  / Bili: x / Urobili: x   Blood: x / Protein: x / Nitrite: x   Leuk Esterase: x / RBC: x / WBC x   Sq Epi: x / Non Sq Epi: x / Bacteria: x    ABG - ( 28 Jan 2025 08:30 )  pH, Arterial: 7.52  pH, Blood: x     /  pCO2: 49    /  pO2: 79    / HCO3: 40    / Base Excess: 14.9  /  SaO2: 97.7      Culture - Blood (collected 27 Jan 2025 06:12)  Source: .Blood BLOOD  Preliminary Report (28 Jan 2025 14:01):    No growth at 24 hours    IMAGING    CXR 12/27: Unchanged bilateral patchy opacities.  CXR 12/28: Unchanged patchy bilateral opacities.  CXR 12/28: *Self Interpretation* Underpenetrated compared to previous CXR today. Increased Upper Left Opacity.

## 2025-01-29 LAB
ALBUMIN SERPL ELPH-MCNC: 2.4 G/DL — LOW (ref 3.5–5.2)
ALP SERPL-CCNC: 419 U/L — HIGH (ref 30–115)
ALT FLD-CCNC: 55 U/L — HIGH (ref 0–41)
ANION GAP SERPL CALC-SCNC: 9 MMOL/L — SIGNIFICANT CHANGE UP (ref 7–14)
APTT BLD: 45.6 SEC — HIGH (ref 27–39.2)
APTT BLD: 55.6 SEC — HIGH (ref 27–39.2)
APTT BLD: 60.1 SEC — HIGH (ref 27–39.2)
AST SERPL-CCNC: 53 U/L — HIGH (ref 0–41)
BASOPHILS # BLD AUTO: 0.27 K/UL — HIGH (ref 0–0.2)
BASOPHILS NFR BLD AUTO: 1.2 % — HIGH (ref 0–1)
BILIRUB SERPL-MCNC: 0.5 MG/DL — SIGNIFICANT CHANGE UP (ref 0.2–1.2)
BLD GP AB SCN SERPL QL: SIGNIFICANT CHANGE UP
BUN SERPL-MCNC: 85 MG/DL — CRITICAL HIGH (ref 10–20)
CALCIUM SERPL-MCNC: 8.6 MG/DL — SIGNIFICANT CHANGE UP (ref 8.4–10.4)
CHLORIDE SERPL-SCNC: 98 MMOL/L — SIGNIFICANT CHANGE UP (ref 98–110)
CO2 SERPL-SCNC: 34 MMOL/L — HIGH (ref 17–32)
CREAT SERPL-MCNC: 1.9 MG/DL — HIGH (ref 0.7–1.5)
EGFR: 38 ML/MIN/1.73M2 — LOW
EOSINOPHIL # BLD AUTO: 0 K/UL — SIGNIFICANT CHANGE UP (ref 0–0.7)
EOSINOPHIL NFR BLD AUTO: 0 % — SIGNIFICANT CHANGE UP (ref 0–8)
GAS PNL BLDA: SIGNIFICANT CHANGE UP
GLUCOSE BLDC GLUCOMTR-MCNC: 139 MG/DL — HIGH (ref 70–99)
GLUCOSE BLDC GLUCOMTR-MCNC: 216 MG/DL — HIGH (ref 70–99)
GLUCOSE BLDC GLUCOMTR-MCNC: 218 MG/DL — HIGH (ref 70–99)
GLUCOSE BLDC GLUCOMTR-MCNC: 99 MG/DL — SIGNIFICANT CHANGE UP (ref 70–99)
GLUCOSE SERPL-MCNC: 200 MG/DL — HIGH (ref 70–99)
HCT VFR BLD CALC: 30.7 % — LOW (ref 42–52)
HGB BLD-MCNC: 10.5 G/DL — LOW (ref 14–18)
IMM GRANULOCYTES NFR BLD AUTO: 27.1 % — HIGH (ref 0.1–0.3)
INR BLD: 1.4 RATIO — HIGH (ref 0.65–1.3)
LYMPHOCYTES # BLD AUTO: 0.73 K/UL — LOW (ref 1.2–3.4)
LYMPHOCYTES # BLD AUTO: 3.4 % — LOW (ref 20.5–51.1)
MCHC RBC-ENTMCNC: 30.7 PG — SIGNIFICANT CHANGE UP (ref 27–31)
MCHC RBC-ENTMCNC: 34.2 G/DL — SIGNIFICANT CHANGE UP (ref 32–37)
MCV RBC AUTO: 89.8 FL — SIGNIFICANT CHANGE UP (ref 80–94)
MONOCYTES # BLD AUTO: 1.76 K/UL — HIGH (ref 0.1–0.6)
MONOCYTES NFR BLD AUTO: 8.1 % — SIGNIFICANT CHANGE UP (ref 1.7–9.3)
NEUTROPHILS # BLD AUTO: 13.08 K/UL — HIGH (ref 1.4–6.5)
NEUTROPHILS NFR BLD AUTO: 60.2 % — SIGNIFICANT CHANGE UP (ref 42.2–75.2)
NRBC # BLD: 2 /100 WBCS — HIGH (ref 0–0)
NRBC BLD-RTO: 2 /100 WBCS — HIGH (ref 0–0)
PLATELET # BLD AUTO: 441 K/UL — HIGH (ref 130–400)
PMV BLD: 11.7 FL — HIGH (ref 7.4–10.4)
POTASSIUM SERPL-MCNC: 3.9 MMOL/L — SIGNIFICANT CHANGE UP (ref 3.5–5)
POTASSIUM SERPL-SCNC: 3.9 MMOL/L — SIGNIFICANT CHANGE UP (ref 3.5–5)
PROT SERPL-MCNC: 5.1 G/DL — LOW (ref 6–8)
PROTHROM AB SERPL-ACNC: 16.6 SEC — HIGH (ref 9.95–12.87)
RBC # BLD: 3.42 M/UL — LOW (ref 4.7–6.1)
RBC # FLD: 15 % — HIGH (ref 11.5–14.5)
SODIUM SERPL-SCNC: 141 MMOL/L — SIGNIFICANT CHANGE UP (ref 135–146)
WBC # BLD: 21.73 K/UL — HIGH (ref 4.8–10.8)
WBC # FLD AUTO: 21.73 K/UL — HIGH (ref 4.8–10.8)

## 2025-01-29 PROCEDURE — 71045 X-RAY EXAM CHEST 1 VIEW: CPT | Mod: 26

## 2025-01-29 PROCEDURE — 99291 CRITICAL CARE FIRST HOUR: CPT

## 2025-01-29 RX ORDER — POLYETHYLENE GLYCOL 3350 17 G/17G
17 POWDER, FOR SOLUTION ORAL EVERY 12 HOURS
Refills: 0 | Status: DISCONTINUED | OUTPATIENT
Start: 2025-01-29 | End: 2025-02-09

## 2025-01-29 RX ORDER — HALOPERIDOL 10 MG/1
3 TABLET ORAL EVERY 6 HOURS
Refills: 0 | Status: DISCONTINUED | OUTPATIENT
Start: 2025-01-29 | End: 2025-01-31

## 2025-01-29 RX ORDER — SENNA 187 MG
2 TABLET ORAL EVERY 12 HOURS
Refills: 0 | Status: DISCONTINUED | OUTPATIENT
Start: 2025-01-29 | End: 2025-02-17

## 2025-01-29 RX ORDER — MAGNESIUM HYDROXIDE 400 MG/5ML
30 SUSPENSION ORAL DAILY
Refills: 0 | Status: DISCONTINUED | OUTPATIENT
Start: 2025-01-29 | End: 2025-02-17

## 2025-01-29 RX ORDER — QUETIAPINE FUMARATE 25 MG/1
25 TABLET ORAL AT BEDTIME
Refills: 0 | Status: DISCONTINUED | OUTPATIENT
Start: 2025-01-29 | End: 2025-01-31

## 2025-01-29 RX ORDER — HALOPERIDOL 10 MG/1
1 TABLET ORAL EVERY 6 HOURS
Refills: 0 | Status: DISCONTINUED | OUTPATIENT
Start: 2025-01-29 | End: 2025-01-29

## 2025-01-29 RX ORDER — BUMETANIDE 1 MG/1
2 TABLET ORAL ONCE
Refills: 0 | Status: COMPLETED | OUTPATIENT
Start: 2025-01-29 | End: 2025-01-29

## 2025-01-29 RX ADMIN — NYSTATIN 1 APPLICATION(S): 100000 CREAM TOPICAL at 05:02

## 2025-01-29 RX ADMIN — CEFEPIME 100 MILLIGRAM(S): 2 INJECTION, POWDER, FOR SOLUTION INTRAVENOUS at 17:39

## 2025-01-29 RX ADMIN — HEPARIN SODIUM 1200 UNIT(S)/HR: 1000 INJECTION INTRAVENOUS; SUBCUTANEOUS at 07:34

## 2025-01-29 RX ADMIN — Medication 2 TABLET(S): at 17:40

## 2025-01-29 RX ADMIN — Medication 81 MILLIGRAM(S): at 12:40

## 2025-01-29 RX ADMIN — HYDROXYZINE HYDROCHLORIDE 25 MILLIGRAM(S): 25 TABLET, FILM COATED ORAL at 16:11

## 2025-01-29 RX ADMIN — INSULIN LISPRO 6: 100 INJECTION, SOLUTION INTRAVENOUS; SUBCUTANEOUS at 12:42

## 2025-01-29 RX ADMIN — Medication 40 MILLIGRAM(S): at 12:39

## 2025-01-29 RX ADMIN — Medication 500000 UNIT(S): at 21:15

## 2025-01-29 RX ADMIN — BUMETANIDE 2 MILLIGRAM(S): 1 TABLET ORAL at 05:01

## 2025-01-29 RX ADMIN — METOPROLOL SUCCINATE 5 MILLIGRAM(S): 50 TABLET, EXTENDED RELEASE ORAL at 05:01

## 2025-01-29 RX ADMIN — SERTRALINE 25 MILLIGRAM(S): 100 TABLET, FILM COATED ORAL at 12:40

## 2025-01-29 RX ADMIN — NYSTATIN 1 APPLICATION(S): 100000 CREAM TOPICAL at 13:43

## 2025-01-29 RX ADMIN — BENZOCAINE 1 SPRAY(S): 220 SPRAY, METERED PERIODONTAL at 21:15

## 2025-01-29 RX ADMIN — Medication 10 MILLIGRAM(S): at 21:16

## 2025-01-29 RX ADMIN — INSULIN LISPRO 6: 100 INJECTION, SOLUTION INTRAVENOUS; SUBCUTANEOUS at 08:30

## 2025-01-29 RX ADMIN — INSULIN GLARGINE-YFGN 30 UNIT(S): 100 INJECTION, SOLUTION SUBCUTANEOUS at 08:27

## 2025-01-29 RX ADMIN — BUMETANIDE 2 MILLIGRAM(S): 1 TABLET ORAL at 17:36

## 2025-01-29 RX ADMIN — METOPROLOL SUCCINATE 5 MILLIGRAM(S): 50 TABLET, EXTENDED RELEASE ORAL at 17:36

## 2025-01-29 RX ADMIN — Medication 1 APPLICATION(S): at 12:40

## 2025-01-29 RX ADMIN — CEFEPIME 100 MILLIGRAM(S): 2 INJECTION, POWDER, FOR SOLUTION INTRAVENOUS at 05:01

## 2025-01-29 RX ADMIN — Medication 500 MILLIGRAM(S): at 12:39

## 2025-01-29 RX ADMIN — POLYETHYLENE GLYCOL 3350 17 GRAM(S): 17 POWDER, FOR SOLUTION ORAL at 17:40

## 2025-01-29 RX ADMIN — HALOPERIDOL 3 MILLIGRAM(S): 10 TABLET ORAL at 23:14

## 2025-01-29 RX ADMIN — OSELTAMIVIR PHOSPHATE 30 MILLIGRAM(S): 75 CAPSULE ORAL at 05:02

## 2025-01-29 RX ADMIN — HEPARIN SODIUM 1200 UNIT(S)/HR: 1000 INJECTION INTRAVENOUS; SUBCUTANEOUS at 15:27

## 2025-01-29 RX ADMIN — Medication 150 MILLIGRAM(S): at 12:39

## 2025-01-29 RX ADMIN — Medication 500000 UNIT(S): at 13:43

## 2025-01-29 RX ADMIN — METOPROLOL SUCCINATE 5 MILLIGRAM(S): 50 TABLET, EXTENDED RELEASE ORAL at 23:13

## 2025-01-29 RX ADMIN — BENZOCAINE 1 SPRAY(S): 220 SPRAY, METERED PERIODONTAL at 05:01

## 2025-01-29 RX ADMIN — ATORVASTATIN CALCIUM 10 MILLIGRAM(S): 80 TABLET, FILM COATED ORAL at 21:16

## 2025-01-29 RX ADMIN — Medication 500000 UNIT(S): at 05:01

## 2025-01-29 RX ADMIN — NYSTATIN 1 APPLICATION(S): 100000 CREAM TOPICAL at 21:17

## 2025-01-29 RX ADMIN — BUMETANIDE 2 MILLIGRAM(S): 1 TABLET ORAL at 10:44

## 2025-01-29 RX ADMIN — HYDROXYZINE HYDROCHLORIDE 25 MILLIGRAM(S): 25 TABLET, FILM COATED ORAL at 22:29

## 2025-01-29 RX ADMIN — INSULIN LISPRO 25 UNIT(S): 100 INJECTION, SOLUTION INTRAVENOUS; SUBCUTANEOUS at 12:41

## 2025-01-29 RX ADMIN — INSULIN GLARGINE-YFGN 30 UNIT(S): 100 INJECTION, SOLUTION SUBCUTANEOUS at 21:15

## 2025-01-29 RX ADMIN — METOPROLOL SUCCINATE 5 MILLIGRAM(S): 50 TABLET, EXTENDED RELEASE ORAL at 12:39

## 2025-01-29 RX ADMIN — Medication 10 MILLIGRAM(S): at 12:39

## 2025-01-29 RX ADMIN — QUETIAPINE FUMARATE 25 MILLIGRAM(S): 25 TABLET ORAL at 23:30

## 2025-01-29 RX ADMIN — OSELTAMIVIR PHOSPHATE 30 MILLIGRAM(S): 75 CAPSULE ORAL at 17:37

## 2025-01-29 NOTE — PROGRESS NOTE ADULT - ASSESSMENT
IMPRESSION:    Acute hypoxemic respiratory failure   Influenza A infection / pneumonia   RASHAD / OHS   Possible superimposed bacterial pneumonia   Fluid overload / bilateral pleural effusions   HO Large B Cell lymphoma SP Chemotherapy   HO CAD (status post CABG;),  HO DM (on insulin pump)  HO atrial fibrillation on Xarelto  HO distal pancreatomy and splenectomy     PLAN:    CNS: No sedation. MS adequate.     HEENT: Oral care    PULMONARY: ABG now. BIPAP on and off 4/4. HFNC in between. NEbs as needed.     CARDIOVASCULAR: Echocardiogram: normal EF. BNP noted to be elevated. Bumex 2mg TID and negative blanace last 24 hours. Resume home metolazone.    GI: GI prophylaxis. ORal diet when off BIPAP. Free water restriction to 2 l. Aggressive bowel regimen.     RENAL:  BUN crea better. Continue diuretic trial. Nephrology following. No hydro on kidney bladder US.     INFECTIOUS DISEASE: Tamiflu and Cefepime. MRSA negative. Cultures: negative.     HEMATOLOGICAL: On AC with heparin. Keep PTT therapeutic. Daily coags and CBC.     ENDOCRINE:  Follow up FS. Increase insulin regimen to keep 140-180.     MUSCULOSKELETAL:  OOB as tolerated with PT OT.     MICU.     Prognosis remains guarded.

## 2025-01-29 NOTE — SWALLOW BEDSIDE ASSESSMENT ADULT - NS SPL SWALLOW CLINIC TRIAL FT
Pt declined further PO trials.  Thin liquids and chewables NT 2' increased respiratory demand and thus increased risk of aspiration.

## 2025-01-29 NOTE — PROGRESS NOTE ADULT - SUBJECTIVE AND OBJECTIVE BOX
seen and examined  24 h events noted   on bipap      PAST HISTORY  --------------------------------------------------------------------------------  No significant changes to PMH, PSH, FHx, SHx, unless otherwise noted    ALLERGIES & MEDICATIONS  --------------------------------------------------------------------------------  Allergies    No Known Allergies    Intolerances      Standing Inpatient Medications  allopurinol 150 milliGRAM(s) Oral daily  ascorbic acid 500 milliGRAM(s) Oral daily  aspirin  chewable 81 milliGRAM(s) Oral daily  atorvastatin 10 milliGRAM(s) Oral at bedtime  benzocaine 20% Spray 1 Spray(s) Topical three times a day  buMETAnide Injectable 2 milliGRAM(s) IV Push every 12 hours  cefepime   IVPB 2000 milliGRAM(s) IV Intermittent every 12 hours  cetirizine 10 milliGRAM(s) Oral daily  chlorhexidine 2% Cloths 1 Application(s) Topical daily  dextrose 5%. 1000 milliLiter(s) IV Continuous <Continuous>  dextrose 5%. 1000 milliLiter(s) IV Continuous <Continuous>  dextrose 50% Injectable 25 Gram(s) IV Push once  dextrose 50% Injectable 12.5 Gram(s) IV Push once  dextrose 50% Injectable 25 Gram(s) IV Push once  glucagon  Injectable 1 milliGRAM(s) IntraMuscular once  heparin  Infusion.  Unit(s)/Hr IV Continuous <Continuous>  influenza  Vaccine (HIGH DOSE) 0.5 milliLiter(s) IntraMuscular once  insulin glargine Injectable (LANTUS) 30 Unit(s) SubCutaneous two times a day  insulin lispro (ADMELOG) corrective regimen sliding scale   SubCutaneous three times a day before meals  insulin lispro Injectable (ADMELOG) 25 Unit(s) SubCutaneous three times a day before meals  melatonin 10 milliGRAM(s) Oral at bedtime  metoprolol tartrate Injectable 5 milliGRAM(s) IV Push every 6 hours  nystatin    Suspension 518801 Unit(s) Swish and Swallow three times a day  nystatin Powder 1 Application(s) Topical every 8 hours  oseltamivir 30 milliGRAM(s) Oral two times a day  pantoprazole  Injectable 40 milliGRAM(s) IV Push daily  polyethylene glycol 3350 17 Gram(s) Oral daily  senna 2 Tablet(s) Oral at bedtime  sertraline 25 milliGRAM(s) Oral daily    PRN Inpatient Medications  acetaminophen     Tablet .. 650 milliGRAM(s) Oral every 6 hours PRN  albuterol/ipratropium for Nebulization 3 milliLiter(s) Nebulizer every 4 hours PRN  dextrose Oral Gel 15 Gram(s) Oral once PRN  haloperidol    Injectable 1 milliGRAM(s) IntraMuscular every 6 hours PRN  heparin   Injectable 6000 Unit(s) IV Push every 6 hours PRN  hydrOXYzine hydrochloride 25 milliGRAM(s) Oral every 6 hours PRN  ondansetron Injectable 4 milliGRAM(s) IV Push every 6 hours PRN        VITALS/PHYSICAL EXAM  --------------------------------------------------------------------------------  T(C): 36.8 (01-29-25 @ 05:00), Max: 36.8 (01-29-25 @ 05:00)  HR: 89 (01-29-25 @ 06:37) (77 - 97)  BP: 142/66 (01-29-25 @ 06:00) (102/67 - 153/73)  RR: 34 (01-29-25 @ 06:00) (22 - 43)  SpO2: 94% (01-29-25 @ 06:37) (92% - 100%)  Wt(kg): --  Height (cm): 180.3 (01-27-25 @ 11:20)  Weight (kg): 132.9 (01-27-25 @ 11:20)  BMI (kg/m2): 40.9 (01-27-25 @ 11:20)  BSA (m2): 2.48 (01-27-25 @ 11:20)      01-28-25 @ 07:01  -  01-29-25 @ 07:00  --------------------------------------------------------  IN: 2090 mL / OUT: 3445 mL / NET: -1355 mL      Physical Exam:  	Gen: bipap  	Pulm:  B/L fletcher   	CV:  S1S2; no rub  	Abd: +distended  	LE:  edema  	    LABS/STUDIES  --------------------------------------------------------------------------------              10.5   21.73 >-----------<  441      [01-29-25 @ 05:01]              30.7     141  |  98  |  85  ----------------------------<  200      [01-29-25 @ 05:01]  3.9   |  34  |  1.9        Ca     8.6     [01-29-25 @ 05:01]    TPro  5.1  /  Alb  2.4  /  TBili  0.5  /  DBili  x   /  AST  53  /  ALT  55  /  AlkPhos  419  [01-29-25 @ 05:01]    PT/INR: PT 16.60, INR 1.40       [01-29-25 @ 05:01]  PTT: 45.6       [01-29-25 @ 05:01]    Uric acid 8.5      [01-27-25 @ 11:34]    Creatinine Trend:  SCr 1.9 [01-29 @ 05:01]  SCr 2.3 [01-28 @ 05:00]  SCr 2.3 [01-27 @ 06:12]  SCr 2.6 [01-26 @ 05:42]  SCr 2.5 [01-25 @ 16:50]    Urinalysis - [01-29-25 @ 05:01]      Color  / Appearance  / SG  / pH       Gluc 200 / Ketone   / Bili  / Urobili        Blood  / Protein  / Leuk Est  / Nitrite       RBC  / WBC  / Hyaline  / Gran  / Sq Epi  / Non Sq Epi  / Bacteria         HBsAg Nonreact      [01-11-25 @ 13:09]  HBcAb Nonreact      [01-11-25 @ 13:09]  HCV 0.08, Nonreact      [01-11-25 @ 13:09]

## 2025-01-29 NOTE — PROGRESS NOTE ADULT - ASSESSMENT
69-year-old man with extensive medical history including CAD (status post CABG;), DM (on insulin pump), atrial fibrillation on Xarelto, history of distal pancreatomy and splenectomy for unclear reason referred to the ED by his oncologist for abnormal labs.  Nephrology was consulted for KRISTINE and mild hyponatremia.   #Non oliguric KRISTINE on CKD stage 3B - likely 2/2 fluid overload  #Hyponatremia - likely from fluid overload.   #h/o Large B cell Lymphoma - s/p chemotherapy   #AHRF - likely 2/2 Influenza A with superimposed bacterial PNA.  #Neutropenia - s/p chemotherapy  cr trending down   - Continue bumex 2mg Q12h  - if UOP drops may add metolaozne 2.5 mg (is on at home)   - replete K to 4 and Mg to 2 while on diuresis  -  last ph at goal   - bp controlled   - Heme/onc on board   - on ATB / tamiflu followed by ID   - Avoid nephrotoxic medications and adjust all medications to GFR.   - No acute indication  for RRT at this time.  will follow

## 2025-01-29 NOTE — PROGRESS NOTE ADULT - SUBJECTIVE AND OBJECTIVE BOX
Patient is a 70y old  Male who presents with a chief complaint of sob (27 Jan 2025 09:39)        Over Night Events:    No fevers   BIPAP 60% Fio2   Good volumes         ROS:  See HPI    PHYSICAL EXAM    ICU Vital Signs Last 24 Hrs  T(C): 36.8 (29 Jan 2025 05:00), Max: 36.8 (29 Jan 2025 05:00)  T(F): 98.3 (29 Jan 2025 05:00), Max: 98.3 (29 Jan 2025 05:00)  HR: 89 (29 Jan 2025 06:37) (77 - 97)  BP: 142/66 (29 Jan 2025 06:00) (102/67 - 153/73)  BP(mean): 95 (29 Jan 2025 06:00) (72 - 105)  ABP: --  ABP(mean): --  RR: 34 (29 Jan 2025 06:00) (22 - 43)  SpO2: 94% (29 Jan 2025 06:37) (92% - 100%)    O2 Parameters below as of 29 Jan 2025 06:00  Patient On (Oxygen Delivery Method): nasal cannula w/ humidification  O2 Flow (L/min): 60  O2 Concentration (%): 80        General: AAO   HEENT: LEXI             Lymphatic system: No cervical LN   Lungs: Bilateral BS, clear   Cardiovascular: Regular   Gastrointestinal: Soft, Positive BS  Extremities: No clubbing.  Moves extremities.  Full Range of motion   Skin: Warm, intact  Neurological: No motor or sensory deficit       01-28-25 @ 07:01  -  01-29-25 @ 07:00  --------------------------------------------------------  IN:    Heparin Infusion: 240 mL    IV PiggyBack: 350 mL    Oral Fluid: 1500 mL  Total IN: 2090 mL    OUT:    Indwelling Catheter - Urethral (mL): 3445 mL  Total OUT: 3445 mL    Total NET: -1355 mL          LABS:                            10.5   21.73 )-----------( 441      ( 29 Jan 2025 05:01 )             30.7                                               01-29    141  |  98  |  85[HH]  ----------------------------<  200[H]  3.9   |  34[H]  |  1.9[H]    Ca    8.6      29 Jan 2025 05:01    TPro  5.1[L]  /  Alb  2.4[L]  /  TBili  0.5  /  DBili  x   /  AST  53[H]  /  ALT  55[H]  /  AlkPhos  419[H]  01-29      PT/INR - ( 29 Jan 2025 05:01 )   PT: 16.60 sec;   INR: 1.40 ratio         PTT - ( 29 Jan 2025 05:01 )  PTT:45.6 sec                                       Urinalysis Basic - ( 29 Jan 2025 05:01 )    Color: x / Appearance: x / SG: x / pH: x  Gluc: 200 mg/dL / Ketone: x  / Bili: x / Urobili: x   Blood: x / Protein: x / Nitrite: x   Leuk Esterase: x / RBC: x / WBC x   Sq Epi: x / Non Sq Epi: x / Bacteria: x                                                  LIVER FUNCTIONS - ( 29 Jan 2025 05:01 )  Alb: 2.4 g/dL / Pro: 5.1 g/dL / ALK PHOS: 419 U/L / ALT: 55 U/L / AST: 53 U/L / GGT: x                                                  Culture - Blood (collected 27 Jan 2025 06:12)  Source: .Blood BLOOD  Preliminary Report (28 Jan 2025 14:01):    No growth at 24 hours                                                                                       ABG - ( 28 Jan 2025 08:30 )  pH, Arterial: 7.52  pH, Blood: x     /  pCO2: 49    /  pO2: 79    / HCO3: 40    / Base Excess: 14.9  /  SaO2: 97.7                MEDICATIONS  (STANDING):  allopurinol 150 milliGRAM(s) Oral daily  ascorbic acid 500 milliGRAM(s) Oral daily  aspirin  chewable 81 milliGRAM(s) Oral daily  atorvastatin 10 milliGRAM(s) Oral at bedtime  benzocaine 20% Spray 1 Spray(s) Topical three times a day  buMETAnide Injectable 2 milliGRAM(s) IV Push every 12 hours  cefepime   IVPB 2000 milliGRAM(s) IV Intermittent every 12 hours  cetirizine 10 milliGRAM(s) Oral daily  chlorhexidine 2% Cloths 1 Application(s) Topical daily  dextrose 5%. 1000 milliLiter(s) (50 mL/Hr) IV Continuous <Continuous>  dextrose 5%. 1000 milliLiter(s) (100 mL/Hr) IV Continuous <Continuous>  dextrose 50% Injectable 25 Gram(s) IV Push once  dextrose 50% Injectable 12.5 Gram(s) IV Push once  dextrose 50% Injectable 25 Gram(s) IV Push once  glucagon  Injectable 1 milliGRAM(s) IntraMuscular once  heparin  Infusion.  Unit(s)/Hr (10 mL/Hr) IV Continuous <Continuous>  influenza  Vaccine (HIGH DOSE) 0.5 milliLiter(s) IntraMuscular once  insulin glargine Injectable (LANTUS) 30 Unit(s) SubCutaneous two times a day  insulin lispro (ADMELOG) corrective regimen sliding scale   SubCutaneous three times a day before meals  insulin lispro Injectable (ADMELOG) 25 Unit(s) SubCutaneous three times a day before meals  melatonin 10 milliGRAM(s) Oral at bedtime  metoprolol tartrate Injectable 5 milliGRAM(s) IV Push every 6 hours  nystatin    Suspension 831816 Unit(s) Swish and Swallow three times a day  nystatin Powder 1 Application(s) Topical every 8 hours  oseltamivir 30 milliGRAM(s) Oral two times a day  pantoprazole  Injectable 40 milliGRAM(s) IV Push daily  polyethylene glycol 3350 17 Gram(s) Oral daily  senna 2 Tablet(s) Oral at bedtime  sertraline 25 milliGRAM(s) Oral daily    MEDICATIONS  (PRN):  acetaminophen     Tablet .. 650 milliGRAM(s) Oral every 6 hours PRN Temp greater or equal to 38C (100.4F), Mild Pain (1 - 3)  albuterol/ipratropium for Nebulization 3 milliLiter(s) Nebulizer every 4 hours PRN Shortness of Breath and/or Wheezing  dextrose Oral Gel 15 Gram(s) Oral once PRN Blood Glucose LESS THAN 70 milliGRAM(s)/deciliter  haloperidol    Injectable 1 milliGRAM(s) IntraMuscular every 6 hours PRN Agitation  heparin   Injectable 6000 Unit(s) IV Push every 6 hours PRN For aPTT less than 40  hydrOXYzine hydrochloride 25 milliGRAM(s) Oral every 6 hours PRN Anxiety  ondansetron Injectable 4 milliGRAM(s) IV Push every 6 hours PRN Nausea and/or Vomiting      Xrays:                                                                                     ECHO

## 2025-01-29 NOTE — PROGRESS NOTE ADULT - ASSESSMENT
ASSESSMENT  69-year-old man with extensive medical history including CAD (status post CABG;), DM (on insulin pump), atrial fibrillation on Xarelto, history of distal pancreatomy and splenectomy for unclear reason referred to the ED by his oncologist for abnormal labs. Patient was recently diagnosed with Diffuse large B cell Lymphoma on pathology of right groin mass and is following with [facility] oncology team. Patient was discharged on 01.17.2025 from hospital after inpatient chemotherapy (Completed cycle 1 of R-EPOCH (D1 on 1/11/2024). Tolerated well. Rituximab was given on D5).   Over past 3 days patient started to get a productive cough with yellow sputum production.  Patient is also endorsing chills and nausea which he states is baseline. He had 1 reading on low grade fever 100.5 on day of presentation.    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    #Abx allergy: No Known Allergies    RECOMMENDATIONS  - continue cefepime 2g q 12 hours  - tamiflu to 30 mg BID for 10 days  - complete cefepime/tamiflu on 1/30   - trend WBC   - ongoing diuresis     Please call or message on [employer] Teams if with any questions.  Spectra [phone]

## 2025-01-29 NOTE — SWALLOW BEDSIDE ASSESSMENT ADULT - SLP PERTINENT HISTORY OF CURRENT PROBLEM
Pt is a 68 y/o M w/ extensive medical history including: CAD (status post CABG), DM (on insulin pump), atrial fibrillation (on Xarelto), distal pancreatomy and splenectomy, referred to the ED by his oncologist for abnormal labs. Of note, pt recently diagnosed w/ diffuse large B cell lymphoma on pathology of right groin mass and is following / [facility] oncology team. Pt discharged on 1/17/2025 from hospital after inpatient chemotherapy (Completed cycle 1 of R-EPOCH (D1 on 1/11/2024)). Pt is being treated for severe sepsis - severe multifocal PNA w/ influenza A infection, AHRF, volume overload, neutropenic fever; +Immunodeficiency 2' malignancy and comorbidities. CXR 1/29-> Unchanged extensive bilateral opacities.

## 2025-01-29 NOTE — SWALLOW BEDSIDE ASSESSMENT ADULT - SLP GENERAL OBSERVATIONS
pt received in bed awake +generalized weakness +clear vocal quality +HFNC 60L/min, 70% O2 +family friend Peter at bedside

## 2025-01-30 LAB
ALBUMIN SERPL ELPH-MCNC: 2.8 G/DL — LOW (ref 3.5–5.2)
ALP SERPL-CCNC: 453 U/L — HIGH (ref 30–115)
ALT FLD-CCNC: 56 U/L — HIGH (ref 0–41)
ANION GAP SERPL CALC-SCNC: 12 MMOL/L — SIGNIFICANT CHANGE UP (ref 7–14)
AST SERPL-CCNC: 65 U/L — HIGH (ref 0–41)
BASOPHILS # BLD AUTO: 0.27 K/UL — HIGH (ref 0–0.2)
BASOPHILS NFR BLD AUTO: 1.2 % — HIGH (ref 0–1)
BILIRUB SERPL-MCNC: 0.6 MG/DL — SIGNIFICANT CHANGE UP (ref 0.2–1.2)
BUN SERPL-MCNC: 77 MG/DL — CRITICAL HIGH (ref 10–20)
CALCIUM SERPL-MCNC: 9.1 MG/DL — SIGNIFICANT CHANGE UP (ref 8.4–10.4)
CHLORIDE SERPL-SCNC: 101 MMOL/L — SIGNIFICANT CHANGE UP (ref 98–110)
CO2 SERPL-SCNC: 33 MMOL/L — HIGH (ref 17–32)
CREAT SERPL-MCNC: 1.8 MG/DL — HIGH (ref 0.7–1.5)
CRP SERPL-MCNC: 87.8 MG/L — HIGH
EGFR: 40 ML/MIN/1.73M2 — LOW
EOSINOPHIL # BLD AUTO: 0.01 K/UL — SIGNIFICANT CHANGE UP (ref 0–0.7)
EOSINOPHIL NFR BLD AUTO: 0 % — SIGNIFICANT CHANGE UP (ref 0–8)
ERYTHROCYTE [SEDIMENTATION RATE] IN BLOOD: >140 MM/HR — HIGH (ref 0–10)
FUNGITELL: <31 PG/ML — SIGNIFICANT CHANGE UP
GAS PNL BLDA: SIGNIFICANT CHANGE UP
GLUCOSE BLDC GLUCOMTR-MCNC: 104 MG/DL — HIGH (ref 70–99)
GLUCOSE BLDC GLUCOMTR-MCNC: 105 MG/DL — HIGH (ref 70–99)
GLUCOSE BLDC GLUCOMTR-MCNC: 180 MG/DL — HIGH (ref 70–99)
GLUCOSE BLDC GLUCOMTR-MCNC: 190 MG/DL — HIGH (ref 70–99)
GLUCOSE BLDC GLUCOMTR-MCNC: 61 MG/DL — LOW (ref 70–99)
GLUCOSE SERPL-MCNC: 156 MG/DL — HIGH (ref 70–99)
HCT VFR BLD CALC: 31.4 % — LOW (ref 42–52)
HGB BLD-MCNC: 10.8 G/DL — LOW (ref 14–18)
IMM GRANULOCYTES NFR BLD AUTO: 29.5 % — HIGH (ref 0.1–0.3)
LYMPHOCYTES # BLD AUTO: 0.5 K/UL — LOW (ref 1.2–3.4)
LYMPHOCYTES # BLD AUTO: 2.2 % — LOW (ref 20.5–51.1)
MCHC RBC-ENTMCNC: 30.3 PG — SIGNIFICANT CHANGE UP (ref 27–31)
MCHC RBC-ENTMCNC: 34.4 G/DL — SIGNIFICANT CHANGE UP (ref 32–37)
MCV RBC AUTO: 88.2 FL — SIGNIFICANT CHANGE UP (ref 80–94)
MONOCYTES # BLD AUTO: 1.98 K/UL — HIGH (ref 0.1–0.6)
MONOCYTES NFR BLD AUTO: 8.9 % — SIGNIFICANT CHANGE UP (ref 1.7–9.3)
NEUTROPHILS # BLD AUTO: 12.91 K/UL — HIGH (ref 1.4–6.5)
NEUTROPHILS NFR BLD AUTO: 58.2 % — SIGNIFICANT CHANGE UP (ref 42.2–75.2)
NRBC # BLD: 5 /100 WBCS — HIGH (ref 0–0)
NRBC BLD-RTO: 5 /100 WBCS — HIGH (ref 0–0)
PLATELET # BLD AUTO: 592 K/UL — HIGH (ref 130–400)
PMV BLD: 11.7 FL — HIGH (ref 7.4–10.4)
POTASSIUM SERPL-MCNC: 3.9 MMOL/L — SIGNIFICANT CHANGE UP (ref 3.5–5)
POTASSIUM SERPL-SCNC: 3.9 MMOL/L — SIGNIFICANT CHANGE UP (ref 3.5–5)
PROT SERPL-MCNC: 5.4 G/DL — LOW (ref 6–8)
RBC # BLD: 3.56 M/UL — LOW (ref 4.7–6.1)
RBC # FLD: 15.4 % — HIGH (ref 11.5–14.5)
SODIUM SERPL-SCNC: 146 MMOL/L — SIGNIFICANT CHANGE UP (ref 135–146)
WBC # BLD: 22.23 K/UL — HIGH (ref 4.8–10.8)
WBC # FLD AUTO: 22.23 K/UL — HIGH (ref 4.8–10.8)

## 2025-01-30 PROCEDURE — 74018 RADEX ABDOMEN 1 VIEW: CPT | Mod: 26

## 2025-01-30 PROCEDURE — 71045 X-RAY EXAM CHEST 1 VIEW: CPT | Mod: 26

## 2025-01-30 PROCEDURE — 99291 CRITICAL CARE FIRST HOUR: CPT

## 2025-01-30 PROCEDURE — 93010 ELECTROCARDIOGRAM REPORT: CPT

## 2025-01-30 RX ORDER — SIMETHICONE 80 MG
80 TABLET,CHEWABLE ORAL ONCE
Refills: 0 | Status: COMPLETED | OUTPATIENT
Start: 2025-01-30 | End: 2025-01-30

## 2025-01-30 RX ORDER — ACETAZOLAMIDE 250 MG/1
500 TABLET ORAL ONCE
Refills: 0 | Status: DISCONTINUED | OUTPATIENT
Start: 2025-01-30 | End: 2025-01-30

## 2025-01-30 RX ORDER — ACETAZOLAMIDE 250 MG/1
500 TABLET ORAL ONCE
Refills: 0 | Status: COMPLETED | OUTPATIENT
Start: 2025-01-30 | End: 2025-01-30

## 2025-01-30 RX ORDER — LACTULOSE 10 G/15ML
20 SOLUTION ORAL
Refills: 0 | Status: COMPLETED | OUTPATIENT
Start: 2025-01-30 | End: 2025-01-30

## 2025-01-30 RX ORDER — SALINE 7; 19 G/118ML; G/118ML
1 ENEMA RECTAL ONCE
Refills: 0 | Status: COMPLETED | OUTPATIENT
Start: 2025-01-30 | End: 2025-01-30

## 2025-01-30 RX ADMIN — LACTULOSE 20 GRAM(S): 10 SOLUTION ORAL at 14:45

## 2025-01-30 RX ADMIN — CEFEPIME 100 MILLIGRAM(S): 2 INJECTION, POWDER, FOR SOLUTION INTRAVENOUS at 17:28

## 2025-01-30 RX ADMIN — LACTULOSE 20 GRAM(S): 10 SOLUTION ORAL at 16:14

## 2025-01-30 RX ADMIN — ATORVASTATIN CALCIUM 10 MILLIGRAM(S): 80 TABLET, FILM COATED ORAL at 21:48

## 2025-01-30 RX ADMIN — POLYETHYLENE GLYCOL 3350 17 GRAM(S): 17 POWDER, FOR SOLUTION ORAL at 17:28

## 2025-01-30 RX ADMIN — HEPARIN SODIUM 1200 UNIT(S)/HR: 1000 INJECTION INTRAVENOUS; SUBCUTANEOUS at 00:22

## 2025-01-30 RX ADMIN — Medication 1 APPLICATION(S): at 12:46

## 2025-01-30 RX ADMIN — Medication 150 MILLIGRAM(S): at 12:47

## 2025-01-30 RX ADMIN — HYDROXYZINE HYDROCHLORIDE 25 MILLIGRAM(S): 25 TABLET, FILM COATED ORAL at 12:47

## 2025-01-30 RX ADMIN — Medication 81 MILLIGRAM(S): at 12:50

## 2025-01-30 RX ADMIN — LACTULOSE 20 GRAM(S): 10 SOLUTION ORAL at 12:14

## 2025-01-30 RX ADMIN — MAGNESIUM HYDROXIDE 30 MILLILITER(S): 400 SUSPENSION ORAL at 21:58

## 2025-01-30 RX ADMIN — SALINE 1 ENEMA: 7; 19 ENEMA RECTAL at 23:28

## 2025-01-30 RX ADMIN — Medication 2 TABLET(S): at 17:28

## 2025-01-30 RX ADMIN — Medication 500000 UNIT(S): at 21:48

## 2025-01-30 RX ADMIN — Medication 10 MILLIGRAM(S): at 21:47

## 2025-01-30 RX ADMIN — OSELTAMIVIR PHOSPHATE 30 MILLIGRAM(S): 75 CAPSULE ORAL at 05:35

## 2025-01-30 RX ADMIN — Medication 500000 UNIT(S): at 05:35

## 2025-01-30 RX ADMIN — QUETIAPINE FUMARATE 25 MILLIGRAM(S): 25 TABLET ORAL at 21:48

## 2025-01-30 RX ADMIN — NYSTATIN 1 APPLICATION(S): 100000 CREAM TOPICAL at 05:37

## 2025-01-30 RX ADMIN — BENZOCAINE 1 SPRAY(S): 220 SPRAY, METERED PERIODONTAL at 14:46

## 2025-01-30 RX ADMIN — NYSTATIN 1 APPLICATION(S): 100000 CREAM TOPICAL at 14:50

## 2025-01-30 RX ADMIN — ACETAZOLAMIDE 110 MILLIGRAM(S): 250 TABLET ORAL at 21:57

## 2025-01-30 RX ADMIN — Medication 80 MILLIGRAM(S): at 23:28

## 2025-01-30 RX ADMIN — INSULIN LISPRO 25 UNIT(S): 100 INJECTION, SOLUTION INTRAVENOUS; SUBCUTANEOUS at 13:15

## 2025-01-30 RX ADMIN — POLYETHYLENE GLYCOL 3350 17 GRAM(S): 17 POWDER, FOR SOLUTION ORAL at 05:36

## 2025-01-30 RX ADMIN — LACTULOSE 20 GRAM(S): 10 SOLUTION ORAL at 10:49

## 2025-01-30 RX ADMIN — SERTRALINE 25 MILLIGRAM(S): 100 TABLET, FILM COATED ORAL at 12:47

## 2025-01-30 RX ADMIN — ACETAZOLAMIDE 110 MILLIGRAM(S): 250 TABLET ORAL at 14:45

## 2025-01-30 RX ADMIN — BENZOCAINE 1 SPRAY(S): 220 SPRAY, METERED PERIODONTAL at 05:35

## 2025-01-30 RX ADMIN — INSULIN LISPRO 3: 100 INJECTION, SOLUTION INTRAVENOUS; SUBCUTANEOUS at 08:05

## 2025-01-30 RX ADMIN — CEFEPIME 100 MILLIGRAM(S): 2 INJECTION, POWDER, FOR SOLUTION INTRAVENOUS at 05:34

## 2025-01-30 RX ADMIN — Medication 40 MILLIGRAM(S): at 12:51

## 2025-01-30 RX ADMIN — NYSTATIN 1 APPLICATION(S): 100000 CREAM TOPICAL at 21:49

## 2025-01-30 RX ADMIN — Medication 2 TABLET(S): at 05:34

## 2025-01-30 RX ADMIN — BENZOCAINE 1 SPRAY(S): 220 SPRAY, METERED PERIODONTAL at 21:48

## 2025-01-30 RX ADMIN — METOPROLOL SUCCINATE 5 MILLIGRAM(S): 50 TABLET, EXTENDED RELEASE ORAL at 17:28

## 2025-01-30 RX ADMIN — INSULIN GLARGINE-YFGN 30 UNIT(S): 100 INJECTION, SOLUTION SUBCUTANEOUS at 21:48

## 2025-01-30 RX ADMIN — INSULIN GLARGINE-YFGN 30 UNIT(S): 100 INJECTION, SOLUTION SUBCUTANEOUS at 08:08

## 2025-01-30 RX ADMIN — METOPROLOL SUCCINATE 5 MILLIGRAM(S): 50 TABLET, EXTENDED RELEASE ORAL at 12:49

## 2025-01-30 RX ADMIN — BUMETANIDE 2 MILLIGRAM(S): 1 TABLET ORAL at 05:34

## 2025-01-30 RX ADMIN — Medication 500000 UNIT(S): at 14:46

## 2025-01-30 RX ADMIN — OSELTAMIVIR PHOSPHATE 30 MILLIGRAM(S): 75 CAPSULE ORAL at 18:26

## 2025-01-30 RX ADMIN — METOPROLOL SUCCINATE 5 MILLIGRAM(S): 50 TABLET, EXTENDED RELEASE ORAL at 05:35

## 2025-01-30 RX ADMIN — INSULIN LISPRO 3: 100 INJECTION, SOLUTION INTRAVENOUS; SUBCUTANEOUS at 13:16

## 2025-01-30 RX ADMIN — BUMETANIDE 2 MILLIGRAM(S): 1 TABLET ORAL at 17:26

## 2025-01-30 RX ADMIN — Medication 10 MILLIGRAM(S): at 12:47

## 2025-01-30 RX ADMIN — INSULIN LISPRO 25 UNIT(S): 100 INJECTION, SOLUTION INTRAVENOUS; SUBCUTANEOUS at 08:05

## 2025-01-30 RX ADMIN — Medication 500 MILLIGRAM(S): at 12:50

## 2025-01-30 NOTE — SWALLOW FEES ASSESSMENT ADULT - RECOMMENDED CONSISTENCY
[facility]  Office: [phone]  David Frederick DO, Daron Andrews DO, Ezio Duncan DO, Kentrell Desir DO, J Luis Molina MD, Neida Noriega MD, Benji Keys MD, Ana Vogt MD,  Umer Nash MD, Claudine Smith MD, Leticia Aguilar MD,  Meghan Strong DO, Robbi Jacobs MD, Henry Mondragon MD, Tim Frederick DO, Margot Moya MD,  Ever Branch DO, Marysol Amaro MD, Mery Freedman MD, Mary Felder MD, Laney Andrade MD,  Fantasma Paul MD, Shelly Magallon MD, Megan Valverde MD, Mason Hewitt MD, Parker Mason MD, Dmitriy Sagastume MD, Braxton Yanes DO, Duane Shaffer MD, Shirley Waterhouse, CNP,  Emmy Dooley, CNP, Braxton White, CNP,  Scarlet Hutson, SISI, Laurie Upton, CNP, Daysi Morrow, CNP, Meche Oakley, CNP, Briseida Negron, CNP, Chelsy Anderson PA-C, VIRI KaminskiC, Yaneth Galan, CNP, Yumiko Ashby, CNP,  Juju Martinez, CNP, Clary Leach, CNP,  Jerilyn Darden, CNP, Radha Castro, CNP         [facility]   IN-PATIENT SERVICE   [facility]    HISTORY AND PHYSICAL EXAMINATION            Date:   11/29/2024  Patient name:  Samy Miranda  Date of admission:  11/29/2024  1:14 PM  MRN:   [id number]  Account:  [id number]  YOB: 1961  PCP:    Kenia Parks MD  Room:   ER03/ER03  Code Status:    Prior    Chief Complaint:     Chief Complaint   Patient presents with    Chest Pain     Pt arrives with co mid chest pain which began last night. Pt states he had an mi approx 1.5 years ago and pain feels exact same . Pt does not remember who cardiologist is        History Obtained From:     patient, electronic medical record    History of Present Illness:     63-year-old male with past medical history of CAD status post PCI by August 2023, type 2 diabetes presented to the emergency department endorsing chest pressure.  He states last night he had slight sensation of chest discomfort.  He woke up today with worsening chest discomfort and a gradual 
soft and bite sized, mildly thick liquids; allow ice chips and small sips of water b/t meals following oral care

## 2025-01-30 NOTE — PROGRESS NOTE ADULT - ASSESSMENT
Assessment	  #Acute hypoxemic respiratory failure 2/2   #Influenza A infection / ?Superimposed Bacterial PNA  #Neutropenic Fever  #Fluid overload / bilateral pleural effusions    1/28 - CXR today with b/l infiltrates. Continue with Bipap.   1/28 - Continue diuretics as BUN/Cr stable. Bumex 2mg BID. Maintain negative balance.   1/28 - Repeat CXR 12/8 for revaluation of pulmonary process s/p diuretics/Bipap.   1/28 - Patient off Bipap on HFNC comfortable.   1/28 - Continue Tamiflu and Cefepime as per ID recs.    1/28 - Repeat CXR 12/8 14:23: *Self Interpretation* Underpenetrated compared to previous CXR today. Increased Upper Left Opacity.    1/28 - End date Tamiflu and Cefepime 1/30 (Total 10d course).   1/30 - Finish Tamiflu and Cefepime courses (Today is day 10/10).    1/30 - CXR today showing diffuse bilateral opacities, unchanged from prior.    1/30 - Trial BIPAP O&O Q4Hr. While off place on HFNC.    1/30 - Albuterol Nebs as needed.    1/30 - BNP noted, elevated.   1/30 - Continue diuresis with Bumex.     #HO Diabetes on Insulin Pump:   1/28 - BS Contro Adjusted ISS to +3   1/30 - Continue to monitor BS level. Adjust as needed. Goal 140-180.     #HO of AFib   1/30 - Continue anticoagulation with Heparin.    1/30 - Daily PT/PTT, maintain therapeutic PTT.     #Agitation   1/30 - Patient agitated overnight. Increased Haldol to 3. Started Seroquel 25.   1/30 - Ordered EKG to assess for QTc prolongation. If <500, continue Seroquel.    1/30 - Continue Melatonin    1/30 - QTc 489. Continue Seroquel. Follow up AM EKG for QTc.    #Other  Code: Full  Activity: OOB as tolerated and PT/OT  Diet: Oral diet while off BIPAP.  Bowel Regimen: Aggressive bowel regimen.   MRSA: (-)  Ojeda: Indwelling. Straight Drainage.   --------------------------------------------  Follow-Up:   -AM CXR for routine evaluation of cardiopulmonary process.   -AM EKG for QTc  -AM Coags  -Procalcitonin  -ESR/CRP.

## 2025-01-30 NOTE — PROGRESS NOTE ADULT - SUBJECTIVE AND OBJECTIVE BOX
seen and examined  24 h events noted   on high flow         PAST HISTORY  --------------------------------------------------------------------------------  No significant changes to PMH, PSH, FHx, SHx, unless otherwise noted    ALLERGIES & MEDICATIONS  --------------------------------------------------------------------------------  Allergies    No Known Allergies    Intolerances      Standing Inpatient Medications  allopurinol 150 milliGRAM(s) Oral daily  ascorbic acid 500 milliGRAM(s) Oral daily  aspirin  chewable 81 milliGRAM(s) Oral daily  atorvastatin 10 milliGRAM(s) Oral at bedtime  benzocaine 20% Spray 1 Spray(s) Topical three times a day  buMETAnide Injectable 2 milliGRAM(s) IV Push every 12 hours  cefepime   IVPB 2000 milliGRAM(s) IV Intermittent every 12 hours  cetirizine 10 milliGRAM(s) Oral daily  chlorhexidine 2% Cloths 1 Application(s) Topical daily  dextrose 5%. 1000 milliLiter(s) IV Continuous <Continuous>  dextrose 5%. 1000 milliLiter(s) IV Continuous <Continuous>  dextrose 50% Injectable 25 Gram(s) IV Push once  dextrose 50% Injectable 12.5 Gram(s) IV Push once  dextrose 50% Injectable 25 Gram(s) IV Push once  glucagon  Injectable 1 milliGRAM(s) IntraMuscular once  heparin  Infusion.  Unit(s)/Hr IV Continuous <Continuous>  influenza  Vaccine (HIGH DOSE) 0.5 milliLiter(s) IntraMuscular once  insulin glargine Injectable (LANTUS) 30 Unit(s) SubCutaneous two times a day  insulin lispro (ADMELOG) corrective regimen sliding scale   SubCutaneous three times a day before meals  insulin lispro Injectable (ADMELOG) 25 Unit(s) SubCutaneous three times a day before meals  melatonin 10 milliGRAM(s) Oral at bedtime  metolazone 2.5 milliGRAM(s) Oral <User Schedule>  metoprolol tartrate Injectable 5 milliGRAM(s) IV Push every 6 hours  nystatin    Suspension 236847 Unit(s) Swish and Swallow three times a day  nystatin Powder 1 Application(s) Topical every 8 hours  oseltamivir 30 milliGRAM(s) Oral two times a day  pantoprazole  Injectable 40 milliGRAM(s) IV Push daily  polyethylene glycol 3350 17 Gram(s) Oral every 12 hours  QUEtiapine 25 milliGRAM(s) Oral at bedtime  senna 2 Tablet(s) Oral every 12 hours  sertraline 25 milliGRAM(s) Oral daily    PRN Inpatient Medications  acetaminophen     Tablet .. 650 milliGRAM(s) Oral every 6 hours PRN  albuterol/ipratropium for Nebulization 3 milliLiter(s) Nebulizer every 4 hours PRN  dextrose Oral Gel 15 Gram(s) Oral once PRN  haloperidol    Injectable 3 milliGRAM(s) IntraMuscular every 6 hours PRN  heparin   Injectable 6000 Unit(s) IV Push every 6 hours PRN  hydrOXYzine hydrochloride 25 milliGRAM(s) Oral every 6 hours PRN  magnesium hydroxide Suspension 30 milliLiter(s) Oral daily PRN  ondansetron Injectable 4 milliGRAM(s) IV Push every 6 hours PRN    VITALS/PHYSICAL EXAM  --------------------------------------------------------------------------------  T(C): 36.1 (01-30-25 @ 04:00), Max: 36.6 (01-29-25 @ 20:00)  HR: 90 (01-30-25 @ 07:00) (87 - 114)  BP: 127/67 (01-30-25 @ 07:00) (124/69 - 178/70)  RR: 20 (01-30-25 @ 07:00) (20 - 43)  SpO2: 100% (01-30-25 @ 08:19) (89% - 100%)  Wt(kg): --        01-29-25 @ 07:01  -  01-30-25 @ 07:00  --------------------------------------------------------  IN: 1117 mL / OUT: 4265 mL / NET: -3148 mL      Physical Exam:  	Gen: on high flow   	Pulm: decrease BS B/L  	CV: S1S2; no rub  	Abd: +distended  	LE:  edema      LABS/STUDIES  --------------------------------------------------------------------------------              10.8   22.23 >-----------<  592      [01-30-25 @ 05:10]              31.4     146  |  101  |  77  ----------------------------<  156      [01-30-25 @ 05:10]  3.9   |  33  |  1.8        Ca     9.1     [01-30-25 @ 05:10]    TPro  5.4  /  Alb  2.8  /  TBili  0.6  /  DBili  x   /  AST  65  /  ALT  56  /  AlkPhos  453  [01-30-25 @ 05:10]    PT/INR: PT 16.60, INR 1.40       [01-29-25 @ 05:01]  PTT: 60.1       [01-29-25 @ 20:59]      Creatinine Trend:  SCr 1.8 [01-30 @ 05:10]  SCr 1.9 [01-29 @ 05:01]  SCr 2.3 [01-28 @ 05:00]  SCr 2.3 [01-27 @ 06:12]  SCr 2.6 [01-26 @ 05:42]    Urinalysis - [01-30-25 @ 05:10]      Color  / Appearance  / SG  / pH       Gluc 156 / Ketone   / Bili  / Urobili        Blood  / Protein  / Leuk Est  / Nitrite       RBC  / WBC  / Hyaline  / Gran  / Sq Epi  / Non Sq Epi  / Bacteria         HBsAg Nonreact      [01-11-25 @ 13:09]  HBcAb Nonreact      [01-11-25 @ 13:09]  HCV 0.08, Nonreact      [01-11-25 @ 13:09]

## 2025-01-30 NOTE — PROGRESS NOTE ADULT - SUBJECTIVE AND OBJECTIVE BOX
Patient is a 70y old  Male who presents with a chief complaint of sob (27 Jan 2025 09:39)      Over Night Events:  Patient seen and examined. agitated over night. received haldol. on hfnc 60/60. on heparin drips      ROS:  See HPI    PHYSICAL EXAM    ICU Vital Signs Last 24 Hrs  T(C): 36.3 (30 Jan 2025 08:00), Max: 36.6 (29 Jan 2025 20:00)  T(F): 97.4 (30 Jan 2025 08:00), Max: 97.9 (29 Jan 2025 20:00)  HR: 100 (30 Jan 2025 09:00) (87 - 114)  BP: 168/74 (30 Jan 2025 08:00) (124/69 - 178/70)  BP(mean): 107 (30 Jan 2025 08:00) (85 - 113)  ABP: --  ABP(mean): --  RR: 16 (30 Jan 2025 09:00) (16 - 43)  SpO2: 91% (30 Jan 2025 09:00) (89% - 100%)    O2 Parameters below as of 30 Jan 2025 09:00  Patient On (Oxygen Delivery Method): nasal cannula, high flow  O2 Flow (L/min): 60  O2 Concentration (%): 70        General: NAD           Lymph Nodes: NO cervical LN   Lungs: Bilateral BS  Cardiovascular: Regular   Abdomen: Soft, Positive BS  Extremities: No clubbing   Skin: warm   Neurological:   Musculoskeletal: move all ext     I&O's Detail    29 Jan 2025 07:01  -  30 Jan 2025 07:00  --------------------------------------------------------  IN:    Heparin Infusion: 287 mL    IV PiggyBack: 100 mL    Oral Fluid: 730 mL  Total IN: 1117 mL    OUT:    Indwelling Catheter - Urethral (mL): 4265 mL  Total OUT: 4265 mL    Total NET: -3148 mL          LABS:                          10.8   22.23 )-----------( 592      ( 30 Jan 2025 05:10 )             31.4         30 Jan 2025 05:10    146    |  101    |  77     ----------------------------<  156    3.9     |  33     |  1.8      Ca    9.1        30 Jan 2025 05:10    TPro  5.4    /  Alb  2.8    /  TBili  0.6    /  DBili  x      /  AST  65     /  ALT  56     /  AlkPhos  453    30 Jan 2025 05:10  Amylase x     lipase x                                                 PT/INR - ( 29 Jan 2025 05:01 )   PT: 16.60 sec;   INR: 1.40 ratio         PTT - ( 29 Jan 2025 20:59 )  PTT:60.1 sec                                       Urinalysis Basic - ( 30 Jan 2025 05:10 )    Color: x / Appearance: x / SG: x / pH: x  Gluc: 156 mg/dL / Ketone: x  / Bili: x / Urobili: x   Blood: x / Protein: x / Nitrite: x   Leuk Esterase: x / RBC: x / WBC x   Sq Epi: x / Non Sq Epi: x / Bacteria: x                                                                                                                                                 ABG - ( 30 Jan 2025 04:35 )  pH, Arterial: 7.57  pH, Blood: x     /  pCO2: 44    /  pO2: 53    / HCO3: 40    / Base Excess: 16.2  /  SaO2: 87.0                MEDICATIONS  (STANDING):  allopurinol 150 milliGRAM(s) Oral daily  ascorbic acid 500 milliGRAM(s) Oral daily  aspirin  chewable 81 milliGRAM(s) Oral daily  atorvastatin 10 milliGRAM(s) Oral at bedtime  benzocaine 20% Spray 1 Spray(s) Topical three times a day  buMETAnide Injectable 2 milliGRAM(s) IV Push every 12 hours  cefepime   IVPB 2000 milliGRAM(s) IV Intermittent every 12 hours  cetirizine 10 milliGRAM(s) Oral daily  chlorhexidine 2% Cloths 1 Application(s) Topical daily  dextrose 5%. 1000 milliLiter(s) (50 mL/Hr) IV Continuous <Continuous>  dextrose 5%. 1000 milliLiter(s) (100 mL/Hr) IV Continuous <Continuous>  dextrose 50% Injectable 25 Gram(s) IV Push once  dextrose 50% Injectable 12.5 Gram(s) IV Push once  dextrose 50% Injectable 25 Gram(s) IV Push once  glucagon  Injectable 1 milliGRAM(s) IntraMuscular once  heparin  Infusion.  Unit(s)/Hr (10 mL/Hr) IV Continuous <Continuous>  influenza  Vaccine (HIGH DOSE) 0.5 milliLiter(s) IntraMuscular once  insulin glargine Injectable (LANTUS) 30 Unit(s) SubCutaneous two times a day  insulin lispro (ADMELOG) corrective regimen sliding scale   SubCutaneous three times a day before meals  insulin lispro Injectable (ADMELOG) 25 Unit(s) SubCutaneous three times a day before meals  melatonin 10 milliGRAM(s) Oral at bedtime  metolazone 2.5 milliGRAM(s) Oral <User Schedule>  metoprolol tartrate Injectable 5 milliGRAM(s) IV Push every 6 hours  nystatin    Suspension [id number] Unit(s) Swish and Swallow three times a day  nystatin Powder 1 Application(s) Topical every 8 hours  oseltamivir 30 milliGRAM(s) Oral two times a day  pantoprazole  Injectable 40 milliGRAM(s) IV Push daily  polyethylene glycol 3350 17 Gram(s) Oral every 12 hours  QUEtiapine 25 milliGRAM(s) Oral at bedtime  senna 2 Tablet(s) Oral every 12 hours  sertraline 25 milliGRAM(s) Oral daily    MEDICATIONS  (PRN):  acetaminophen     Tablet .. 650 milliGRAM(s) Oral every 6 hours PRN Temp greater or equal to 38C (100.4F), Mild Pain (1 - 3)  albuterol/ipratropium for Nebulization 3 milliLiter(s) Nebulizer every 4 hours PRN Shortness of Breath and/or Wheezing  dextrose Oral Gel 15 Gram(s) Oral once PRN Blood Glucose LESS THAN 70 milliGRAM(s)/deciliter  haloperidol    Injectable 3 milliGRAM(s) IntraMuscular every 6 hours PRN Agitation  heparin   Injectable 6000 Unit(s) IV Push every 6 hours PRN For aPTT less than 40  hydrOXYzine hydrochloride 25 milliGRAM(s) Oral every 6 hours PRN Anxiety  magnesium hydroxide Suspension 30 milliLiter(s) Oral daily PRN Constipation  ondansetron Injectable 4 milliGRAM(s) IV Push every 6 hours PRN Nausea and/or Vomiting

## 2025-01-30 NOTE — SWALLOW FEES ASSESSMENT ADULT - SLP PERTINENT HISTORY OF CURRENT PROBLEM
Pt is a 68 y/o M w/ extensive medical history including: CAD (status post CABG), DM (on insulin pump), atrial fibrillation (on Xarelto), distal pancreatomy and splenectomy, referred to the ED by his oncologist for abnormal labs. Of note, pt recently diagnosed w/ diffuse large B cell lymphoma on pathology of right groin mass and is following / [facility] oncology team. Pt discharged on 1/17/2025 from hospital after inpatient chemotherapy (Completed cycle 1 of R-EPOCH (D1 on 1/11/2024)). Pt is being treated for severe sepsis - severe multifocal PNA w/ influenza A infection, AHRF, volume overload, neutropenic fever; +Immunodeficiency 2' malignancy and comorbidities. CXR 1/30-> Diffuse bilateral opacities, overall unchanged.

## 2025-01-30 NOTE — PROGRESS NOTE ADULT - ATTENDING COMMENTS
IMPRESSION:    Acute hypoxemic respiratory failure on HFNC  Influenza A infection / pneumonia   RASHAD / OHS   Possible superimposed bacterial pneumonia   Fluid overload / bilateral pleural effusions   HO Large B Cell lymphoma SP Chemotherapy   HO CAD (status post CABG;),  HO DM (on insulin pump)  HO atrial fibrillation on Xarelto  HO distal pancreatomy and splenectomy       Impression and plan above have been altered and are my own.

## 2025-01-30 NOTE — PROGRESS NOTE ADULT - ASSESSMENT
ASSESSMENT  69-year-old man with extensive medical history including CAD (status post CABG;), DM (on insulin pump), atrial fibrillation on Xarelto, history of distal pancreatomy and splenectomy for unclear reason referred to the ED by his oncologist for abnormal labs. Patient was recently diagnosed with Diffuse large B cell Lymphoma on pathology of right groin mass and is following with [facility] oncology team. Patient was discharged on 01.17.2025 from hospital after inpatient chemotherapy (Completed cycle 1 of R-EPOCH (D1 on 1/11/2024). Tolerated well. Rituximab was given on D5).   Over past 3 days patient started to get a productive cough with yellow sputum production.  Patient is also endorsing chills and nausea which he states is baseline. He had 1 reading on low grade fever 100.5 on day of presentation.    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    #Abx allergy: No Known Allergies    RECOMMENDATIONS  - last day cefepime 2g q 12 hours and tamiflu today   - ongoing diuresis   - aspiration precautions     Please call or message on Microsoft Teams if with any questions.  Spectra [phone]

## 2025-01-30 NOTE — PROGRESS NOTE ADULT - ASSESSMENT
69-year-old man with extensive medical history including CAD (status post CABG;), DM (on insulin pump), atrial fibrillation on Xarelto, history of distal pancreatomy and splenectomy for unclear reason referred to the ED by his oncologist for abnormal labs.  Nephrology was consulted for KRISTINE and mild hyponatremia.   #Non oliguric KRISTINE on CKD stage 3B - likely 2/2 fluid overload  #Hyponatremia - likely from fluid overload.   #h/o Large B cell Lymphoma - s/p chemotherapy   #AHRF - likely 2/2 Influenza A with superimposed bacterial PNA.  #Neutropenia - s/p chemotherapy  cr trending down   - Continue bumex 2mg Q12h/ metolazone   - replete K to 4 and Mg to 2 while on diuresis  -  last ph at goal   - bp controlled   - Heme/onc on board   - on ATB / tamiflu followed by ID   - Avoid nephrotoxic medications and adjust all medications to GFR.   - No acute indication  for RRT at this time.  will sign off recall as needed

## 2025-01-30 NOTE — SWALLOW FEES ASSESSMENT ADULT - SLP GENERAL OBSERVATIONS
pt received in bed awake +generalized weakness +confused +HFNC 60L/min, 70% O2 +daughter Mery at bedside during study

## 2025-01-30 NOTE — PROGRESS NOTE ADULT - SUBJECTIVE AND OBJECTIVE BOX
MORGAN BUSH  70y, Male  Allergy: No Known Allergies      LOS  10d    CHIEF COMPLAINT: sob (27 Jan 2025 09:39)      INTERVAL EVENTS/HPI  - No acute events overnight  - T(F): , Max: 98.2 (01-30-25 @ 12:00)  - on HFNC   - WBC Count: 22.23 (01-30-25 @ 05:10)  WBC Count: 21.73 (01-29-25 @ 05:01)     - Creatinine: 1.8 (01-30-25 @ 05:10)  Creatinine: 1.9 (01-29-25 @ 05:01)       ROS  General: Denies rigors, nightsweats  HEENT: Denies headache, rhinorrhea, sore throat, eye pain  CV: Denies CP, palpitations  PULM: Denies wheezing, hemoptysis  GI: Denies hematemesis, hematochezia, melena  : Denies discharge, hematuria  MSK: Denies arthralgias, myalgias  SKIN: Denies rash, lesions  NEURO: Denies paresthesias, weakness  PSYCH: Denies depression, anxiety    VITALS:  T(F): 98.2, Max: 98.2 (01-30-25 @ 12:00)  HR: 95  BP: 156/76  RR: 18Vital Signs Last 24 Hrs  T(C): 36.8 (30 Jan 2025 12:00), Max: 36.8 (30 Jan 2025 12:00)  T(F): 98.2 (30 Jan 2025 12:00), Max: 98.2 (30 Jan 2025 12:00)  HR: 95 (30 Jan 2025 15:00) (88 - 114)  BP: 156/76 (30 Jan 2025 15:00) (124/69 - 178/70)  BP(mean): 109 (30 Jan 2025 15:00) (86 - 113)  RR: 18 (30 Jan 2025 15:00) (16 - 43)  SpO2: 97% (30 Jan 2025 15:00) (89% - 100%)    Parameters below as of 30 Jan 2025 15:00  Patient On (Oxygen Delivery Method): nasal cannula, high flow  O2 Flow (L/min): 60  O2 Concentration (%): 70    PHYSICAL EXAM:  Gen: NAD, resting in bed  HEENT: Normocephalic, atraumatic  Neck: supple, no lymphadenopathy  CV: Regular rate & regular rhythm  Lungs: decreased BS at bases, no fremitus  Abdomen: Soft, BS present  Ext: Warm, well perfused  Neuro: non focal, awake  Skin: no rash, no erythema  Lines: no phlebitis    FH: Non-contributory  Social Hx: Non-contributory    TESTS & MEASUREMENTS:                        10.8   22.23 )-----------( 592      ( 30 Jan 2025 05:10 )             31.4     01-30    146  |  101  |  77[HH]  ----------------------------<  156[H]  3.9   |  33[H]  |  1.8[H]    Ca    9.1      30 Jan 2025 05:10    TPro  5.4[L]  /  Alb  2.8[L]  /  TBili  0.6  /  DBili  x   /  AST  65[H]  /  ALT  56[H]  /  AlkPhos  453[H]  01-30      LIVER FUNCTIONS - ( 30 Jan 2025 05:10 )  Alb: 2.8 g/dL / Pro: 5.4 g/dL / ALK PHOS: 453 U/L / ALT: 56 U/L / AST: 65 U/L / GGT: x           Urinalysis Basic - ( 30 Jan 2025 05:10 )    Color: x / Appearance: x / SG: x / pH: x  Gluc: 156 mg/dL / Ketone: x  / Bili: x / Urobili: x   Blood: x / Protein: x / Nitrite: x   Leuk Esterase: x / RBC: x / WBC x   Sq Epi: x / Non Sq Epi: x / Bacteria: x        Culture - Blood (collected 01-27-25 @ 06:12)  Source: .Blood BLOOD  Preliminary Report (01-30-25 @ 14:01):    No growth at 72 Hours    Urinalysis with Rflx Culture (collected 01-20-25 @ 19:07)    Culture - Blood (collected 01-20-25 @ 15:38)  Source: .Blood BLOOD  Final Report (01-25-25 @ 23:07):    No growth at 5 days    Culture - Blood (collected 01-20-25 @ 15:38)  Source: .Blood BLOOD  Final Report (01-25-25 @ 23:07):    No growth at 5 days            INFECTIOUS DISEASES TESTING  Fungitell: <31 (01-28-25 @ 05:00)  Procalcitonin: 4.48 (01-27-25 @ 06:12)  MRSA PCR Result.: Negative (01-25-25 @ 07:28)  Procalcitonin: 29.80 (01-24-25 @ 11:30)  MRSA PCR Result.: Negative (01-24-25 @ 10:45)  Legionella Antigen, Urine: Negative (01-23-25 @ 12:10)  Rapid RVP Result: NotDetec (01-13-25 @ 13:24)      INFLAMMATORY MARKERS      RADIOLOGY & ADDITIONAL TESTS:  I have personally reviewed the last available Chest xray  CXR      CT      CARDIOLOGY TESTING  12 Lead ECG:   Ventricular Rate 111 BPM    QRS Duration 116 ms    Q-T Interval 360 ms    QTC Calculation(Bazett) 489 ms    R Axis 43 degrees    T Axis -25 degrees    Diagnosis Line Atrial fibrillation with rapid ventricular response  Nonspecific T wave abnormality  Abnormal ECG    Confirmed by Mahesh Coreas ([id number]) on 1/30/2025 7:22:07 AM (01-30-25 @ 05:34)  12 Lead ECG:   Ventricular Rate 109 BPM    QRS Duration 112 ms    Q-T Interval 314 ms    QTC Calculation(Bazett) 422 ms    R Axis 44 degrees    T Axis 27 degrees    Diagnosis Line Atrial fibrillation with rapid ventricular response  Abnormal ECG    Confirmed by VILMA MCLEAN, TASH ([id number]) on 1/27/2025 9:55:13 PM (01-26-25 @ 23:29)      MEDICATIONS  acetaZOLAMIDE  IVPB 500 IV Intermittent once  allopurinol 150 Oral daily  ascorbic acid 500 Oral daily  aspirin  chewable 81 Oral daily  atorvastatin 10 Oral at bedtime  benzocaine 20% Spray 1 Topical three times a day  buMETAnide Injectable 2 IV Push every 12 hours  cefepime   IVPB 2000 IV Intermittent every 12 hours  cetirizine 10 Oral daily  chlorhexidine 2% Cloths 1 Topical daily  dextrose 5%. 1000 IV Continuous <Continuous>  dextrose 5%. 1000 IV Continuous <Continuous>  dextrose 50% Injectable 25 IV Push once  dextrose 50% Injectable 12.5 IV Push once  dextrose 50% Injectable 25 IV Push once  glucagon  Injectable 1 IntraMuscular once  heparin  Infusion.  IV Continuous <Continuous>  influenza  Vaccine (HIGH DOSE) 0.5 IntraMuscular once  insulin glargine Injectable (LANTUS) 30 SubCutaneous two times a day  insulin lispro (ADMELOG) corrective regimen sliding scale  SubCutaneous three times a day before meals  insulin lispro Injectable (ADMELOG) 25 SubCutaneous three times a day before meals  lactulose Syrup 20 Oral every 2 hours  melatonin 10 Oral at bedtime  metoprolol tartrate Injectable 5 IV Push every 6 hours  nystatin    Suspension 031189 Swish and Swallow three times a day  nystatin Powder 1 Topical every 8 hours  oseltamivir 30 Oral two times a day  pantoprazole  Injectable 40 IV Push daily  polyethylene glycol 3350 17 Oral every 12 hours  QUEtiapine 25 Oral at bedtime  senna 2 Oral every 12 hours  sertraline 25 Oral daily      WEIGHT  Weight (kg): 132.9 (01-27-25 @ 11:20)  Creatinine: 1.8 mg/dL (01-30-25 @ 05:10)      ANTIBIOTICS:  cefepime   IVPB 2000 milliGRAM(s) IV Intermittent every 12 hours  nystatin    Suspension [id number] Unit(s) Swish and Swallow three times a day  oseltamivir 30 milliGRAM(s) Oral two times a day      All available historical records have been reviewed

## 2025-01-30 NOTE — SWALLOW FEES ASSESSMENT ADULT - DIAGNOSTIC IMPRESSIONS
oropharyngeal swallow grossly WFL for mildly thick liquids, puree, and soft and bite sized consistencies; +moderate pharyngeal dysphagia for thin liquids

## 2025-01-30 NOTE — PROGRESS NOTE ADULT - ASSESSMENT
IMPRESSION:    Acute hypoxemic respiratory failure on HFNC  Influenza A infection / pneumonia   RASHAD / OHS   Possible superimposed bacterial pneumonia   Fluid overload / bilateral pleural effusions   HO Large B Cell lymphoma SP Chemotherapy   HO CAD (status post CABG;),  HO DM (on insulin pump)  HO atrial fibrillation on Xarelto  HO distal pancreatomy and splenectomy     PLAN:    CNS: No sedation. MS adequate. melatonin at night. continue with 25mg seroquel at night. check ekg    HEENT: Oral care    PULMONARY: ABG now. BIPAP on and off 4/4. HFNC in between. NEbs as needed.     CARDIOVASCULAR: Echocardiogram: normal EF. BNP noted to be elevated. c/w bumex bid, c/w home 2.5 metolazone. diamox 500mg x1.    GI: GI prophylaxis. ORal diet when off BIPAP. Free water restriction to 2 l. senna, miralax bid, lactulose q2hrs until BM.    RENAL:  BUN crea better. Continue diuretic trial. Nephrology signed off. No hydro on kidney bladder US.     INFECTIOUS DISEASE: Tamiflu and Cefepime finish course today is the last day. MRSA negative. Cultures: negative.     HEMATOLOGICAL: On AC with heparin for afib. Keep PTT therapeutic. Daily coags and CBC.     ENDOCRINE:  Follow up FS. Increase insulin regimen to keep 140-180.     MUSCULOSKELETAL:  OOB as tolerated with PT OT.     FULL CODE    MICU.     Prognosis remains guarded.     lines: moss IMPRESSION:    Acute hypoxemic respiratory failure on HFNC  Influenza A infection / pneumonia   RASHAD / OHS   Possible superimposed bacterial pneumonia   Fluid overload / bilateral pleural effusions   HO Large B Cell lymphoma SP Chemotherapy   HO CAD (status post CABG;),  HO DM (on insulin pump)  HO atrial fibrillation on Xarelto  HO distal pancreatomy and splenectomy     PLAN:    CNS: No sedation. MS adequate. Melatonin at night. Continue with 25mg Seroquel at night. Check ekg for QTc.     HEENT: Oral care    PULMONARY: ABG now. BIPAP on and off 4/4. HFNC in between. Nebs as needed.     CARDIOVASCULAR: Echocardiogram: normal EF. BNP noted to be elevated. c/w bumex bid, c/w home 2.5 metolazone. Diamox 500mg x1.    GI: GI prophylaxis. Oral diet when off BIPAP. Free water restriction to 2 l. Aggressive BM regimen; may need enema.     RENAL:  BUN crea better. Continue diuretic trial. Nephrology signed off. No hydro on kidney bladder US.     INFECTIOUS DISEASE: Tamiflu and Cefepime finish course today is the last day. MRSA negative. Cultures: negative. Repeat procal. Check inflammatory markers.     HEMATOLOGICAL: On AC with heparin for afib. Keep PTT therapeutic. Daily coags and CBC.     ENDOCRINE:  Follow up FS. Increase insulin regimen to keep 140-180.     MUSCULOSKELETAL:  OOB as tolerated with PT OT.     FULL CODE    MICU.     Prognosis remains guarded.     lines: moss

## 2025-01-30 NOTE — PROGRESS NOTE ADULT - SUBJECTIVE AND OBJECTIVE BOX
SUBJECTIVE/OVERNIGHT EVENTS  Today is hospital day 10d. This morning patient was seen and examined at bedside, resting comfortably in bed. Accompanied by family.  Family oriented regarding patient's clinical condition and treatment plan. Verbally confirmed they understood and agreed with plan.    Patient became agitated overnight. Grabbing at NIV. Removed Bipap. Placed on HFNC 60/60. Increased Haldol to 3 and started on Seroquel 25 for agitation.    CODE STATUS: Full    MEDICATIONS  STANDING MEDICATIONS  acetaZOLAMIDE  IVPB 500 milliGRAM(s) IV Intermittent once  allopurinol 150 milliGRAM(s) Oral daily  ascorbic acid 500 milliGRAM(s) Oral daily  aspirin  chewable 81 milliGRAM(s) Oral daily  atorvastatin 10 milliGRAM(s) Oral at bedtime  benzocaine 20% Spray 1 Spray(s) Topical three times a day  buMETAnide Injectable 2 milliGRAM(s) IV Push every 12 hours  cefepime   IVPB 2000 milliGRAM(s) IV Intermittent every 12 hours  cetirizine 10 milliGRAM(s) Oral daily  chlorhexidine 2% Cloths 1 Application(s) Topical daily  dextrose 5%. 1000 milliLiter(s) IV Continuous <Continuous>  dextrose 5%. 1000 milliLiter(s) IV Continuous <Continuous>  dextrose 50% Injectable 25 Gram(s) IV Push once  dextrose 50% Injectable 12.5 Gram(s) IV Push once  dextrose 50% Injectable 25 Gram(s) IV Push once  glucagon  Injectable 1 milliGRAM(s) IntraMuscular once  heparin  Infusion.  Unit(s)/Hr IV Continuous <Continuous>  influenza  Vaccine (HIGH DOSE) 0.5 milliLiter(s) IntraMuscular once  insulin glargine Injectable (LANTUS) 30 Unit(s) SubCutaneous two times a day  insulin lispro (ADMELOG) corrective regimen sliding scale   SubCutaneous three times a day before meals  insulin lispro Injectable (ADMELOG) 25 Unit(s) SubCutaneous three times a day before meals  lactulose Syrup 20 Gram(s) Oral every 2 hours  melatonin 10 milliGRAM(s) Oral at bedtime  metolazone 2.5 milliGRAM(s) Oral <User Schedule>  metoprolol tartrate Injectable 5 milliGRAM(s) IV Push every 6 hours  nystatin    Suspension [id number] Unit(s) Swish and Swallow three times a day  nystatin Powder 1 Application(s) Topical every 8 hours  oseltamivir 30 milliGRAM(s) Oral two times a day  pantoprazole  Injectable 40 milliGRAM(s) IV Push daily  polyethylene glycol 3350 17 Gram(s) Oral every 12 hours  QUEtiapine 25 milliGRAM(s) Oral at bedtime  senna 2 Tablet(s) Oral every 12 hours  sertraline 25 milliGRAM(s) Oral daily    PRN MEDICATIONS  acetaminophen     Tablet .. 650 milliGRAM(s) Oral every 6 hours PRN  albuterol/ipratropium for Nebulization 3 milliLiter(s) Nebulizer every 4 hours PRN  dextrose Oral Gel 15 Gram(s) Oral once PRN  haloperidol    Injectable 3 milliGRAM(s) IntraMuscular every 6 hours PRN  heparin   Injectable 6000 Unit(s) IV Push every 6 hours PRN  hydrOXYzine hydrochloride 25 milliGRAM(s) Oral every 6 hours PRN  magnesium hydroxide Suspension 30 milliLiter(s) Oral daily PRN  ondansetron Injectable 4 milliGRAM(s) IV Push every 6 hours PRN    VITALS  T(F): 97.4 (01-30-25 @ 08:00), Max: 97.9 (01-29-25 @ 20:00)  HR: 100 (01-30-25 @ 12:00) (88 - 114)  BP: 163/67 (01-30-25 @ 12:00) (124/69 - 178/70)  RR: 20 (01-30-25 @ 12:00) (16 - 43)  SpO2: 99% (01-30-25 @ 12:00) (89% - 100%)  POCT Blood Glucose.: 180 mg/dL (01-30-25 @ 13:10)  POCT Blood Glucose.: 190 mg/dL (01-30-25 @ 07:56)  POCT Blood Glucose.: 139 mg/dL (01-29-25 @ 21:12)  POCT Blood Glucose.: 99 mg/dL (01-29-25 @ 17:32)    PHYSICAL EXAM  GENERAL  (x) NAD, lying in bed comfortably     (  ) obtunded     (  ) lethargic     (  ) somnolent    HEAD  (x) Atraumatic     (  ) hematoma     (  ) laceration (specify location:       )     NECK  (x) Supple     (  ) neck stiffness     (  ) nuchal rigidity     (  )  no JVD     (  ) JVD present ( -- cm)    HEART  Rate -->  (x) normal rate    (  ) bradycardic    (  ) tachycardic  Rhythm -->  (x) regular    (  ) regularly irregular    (  ) irregularly irregular  Murmurs -->  (x) normal s1/s2    (  ) systolic murmur    (  ) diastolic murmur    (  ) continuous murmur     (  ) S3 present    (  ) S4 present    LUNGS  (x)Unlabored respirations     (  ) tachypnea  (x) B/L air entry     (  ) decreased breath sounds in:  (location     )    (  ) no adventitious sound     (x) crackles  (x) Coarse breath sounds  (  ) wheezing      (  ) rhonchi      (specify location:  Bilaterally  (  ) chest wall tenderness (specify location:       )    ABDOMEN  (x Soft     (  ) tense   |   (x)nondistended     (  ) distended   |   (x)+BS     (  ) hypoactive bowel sounds     (  ) hyperactive bowel sounds  (  ) nontender     (  ) RUQ tenderness     (  ) RLQ tenderness     (  ) LLQ tenderness     (  ) epigastric tenderness     (  ) diffuse tenderness  (  ) Splenomegaly      (  ) Hepatomegaly      (  ) Jaundice     (  ) ecchymosis   (x) No guarding  (x) No rebound    EXTREMITIES  (x) Normal     (  ) Rash     (  ) ecchymosis     (  ) varicose veins      (  ) pitting edema     (  ) non-pitting edema   (  ) ulceration     (  ) gangrene:     (location:     )    NERVOUS SYSTEM  (x) A&Ox1   (x) confused     (  ) lethargic  (x) Follows commands.  (x) Responds and arousable to loud voice.   (x) Moves all extremities.    SKIN  (x) No rashes or lesions     (  ) maculopapular rash     (  ) pustules     (  ) vesicles     (  ) ulcer     (  ) ecchymosis     (specify location:     )    (x) Indwelling Ojeda Catheter   Date insterted:    Reason (  ) Critical illness     (  ) urinary retention    (x) Accurate Ins/Outs Monitoring     (  ) CMO patient    (  ) Central Line  Date inserted:  Location: (  ) Right IJ   (  ) Left IJ   (  ) Right Fem   (  ) Left Fem  (  ) SPC  (  ) pigtail  (  ) PEG tube  (  ) colostomy  (  ) jejunostomy  (  ) U-Dall    LABS             10.8   22.23 )-----------( 592      ( 01-30-25 @ 05:10 )             31.4     146  |  101  |  77  -------------------------<  156   01-30-25 @ 05:10  3.9  |  33  |  1.8    Ca      9.1     01-30-25 @ 05:10    TPro  5.4  /  Alb  2.8  /  TBili  0.6  /  DBili  x   /  AST  65  /  ALT  56  /  AlkPhos  453  /  GGT  x     01-30-25 @ 05:10    PT/INR - ( 01-29-25 @ 05:01 )   PT: 16.60 sec[H];   INR: 1.40 ratio[H]  PTT - ( 01-29-25 @ 20:59 )  PTT:60.1 sec    Urinalysis Basic - ( 30 Jan 2025 05:10 )    Color: x / Appearance: x / SG: x / pH: x  Gluc: 156 mg/dL / Ketone: x  / Bili: x / Urobili: x   Blood: x / Protein: x / Nitrite: x   Leuk Esterase: x / RBC: x / WBC x   Sq Epi: x / Non Sq Epi: x / Bacteria: x    ABG - ( 30 Jan 2025 04:35 )  pH, Arterial: 7.57  pH, Blood: x     /  pCO2: 44    /  pO2: 53    / HCO3: 40    / Base Excess: 16.2  /  SaO2: 87.0      IMAGING    CXR 12/27: Unchanged bilateral patchy opacities.  CXR 12/28: Unchanged patchy bilateral opacities.  CXR 12/28: *Self Interpretation* Underpenetrated compared to previous CXR today. Increased Upper Left Opacity.   KUB XR 1/30: Nonobstructive bowel gas pattern.  CXR 1/30: Diffuse bilateral opacities, overall unchanged.  KBUS: No evidence of Hydronephrosis.

## 2025-01-31 LAB
ALBUMIN SERPL ELPH-MCNC: 2.6 G/DL — LOW (ref 3.5–5.2)
ALP SERPL-CCNC: 424 U/L — HIGH (ref 30–115)
ALT FLD-CCNC: 60 U/L — HIGH (ref 0–41)
ANION GAP SERPL CALC-SCNC: 13 MMOL/L — SIGNIFICANT CHANGE UP (ref 7–14)
APTT BLD: 64.4 SEC — HIGH (ref 27–39.2)
AST SERPL-CCNC: 74 U/L — HIGH (ref 0–41)
BASOPHILS # BLD AUTO: 0.25 K/UL — HIGH (ref 0–0.2)
BASOPHILS NFR BLD AUTO: 1.2 % — HIGH (ref 0–1)
BILIRUB SERPL-MCNC: 0.4 MG/DL — SIGNIFICANT CHANGE UP (ref 0.2–1.2)
BUN SERPL-MCNC: 77 MG/DL — CRITICAL HIGH (ref 10–20)
CALCIUM SERPL-MCNC: 8.6 MG/DL — SIGNIFICANT CHANGE UP (ref 8.4–10.4)
CHLORIDE SERPL-SCNC: 99 MMOL/L — SIGNIFICANT CHANGE UP (ref 98–110)
CO2 SERPL-SCNC: 31 MMOL/L — SIGNIFICANT CHANGE UP (ref 17–32)
CREAT SERPL-MCNC: 2.2 MG/DL — HIGH (ref 0.7–1.5)
EGFR: 32 ML/MIN/1.73M2 — LOW
EOSINOPHIL # BLD AUTO: 0.01 K/UL — SIGNIFICANT CHANGE UP (ref 0–0.7)
EOSINOPHIL NFR BLD AUTO: 0 % — SIGNIFICANT CHANGE UP (ref 0–8)
GAS PNL BLDA: SIGNIFICANT CHANGE UP
GLUCOSE BLDC GLUCOMTR-MCNC: 118 MG/DL — HIGH (ref 70–99)
GLUCOSE BLDC GLUCOMTR-MCNC: 177 MG/DL — HIGH (ref 70–99)
GLUCOSE BLDC GLUCOMTR-MCNC: 194 MG/DL — HIGH (ref 70–99)
GLUCOSE BLDC GLUCOMTR-MCNC: 221 MG/DL — HIGH (ref 70–99)
GLUCOSE BLDC GLUCOMTR-MCNC: 58 MG/DL — LOW (ref 70–99)
GLUCOSE BLDC GLUCOMTR-MCNC: 93 MG/DL — SIGNIFICANT CHANGE UP (ref 70–99)
GLUCOSE SERPL-MCNC: 161 MG/DL — HIGH (ref 70–99)
HCT VFR BLD CALC: 30 % — LOW (ref 42–52)
HGB BLD-MCNC: 9.6 G/DL — LOW (ref 14–18)
IMM GRANULOCYTES NFR BLD AUTO: 24.9 % — HIGH (ref 0.1–0.3)
LYMPHOCYTES # BLD AUTO: 0.47 K/UL — LOW (ref 1.2–3.4)
LYMPHOCYTES # BLD AUTO: 2.3 % — LOW (ref 20.5–51.1)
MAGNESIUM SERPL-MCNC: 1.8 MG/DL — SIGNIFICANT CHANGE UP (ref 1.8–2.4)
MCHC RBC-ENTMCNC: 29.7 PG — SIGNIFICANT CHANGE UP (ref 27–31)
MCHC RBC-ENTMCNC: 32 G/DL — SIGNIFICANT CHANGE UP (ref 32–37)
MCV RBC AUTO: 92.9 FL — SIGNIFICANT CHANGE UP (ref 80–94)
MONOCYTES # BLD AUTO: 1.71 K/UL — HIGH (ref 0.1–0.6)
MONOCYTES NFR BLD AUTO: 8.4 % — SIGNIFICANT CHANGE UP (ref 1.7–9.3)
NEUTROPHILS # BLD AUTO: 12.82 K/UL — HIGH (ref 1.4–6.5)
NEUTROPHILS NFR BLD AUTO: 63.2 % — SIGNIFICANT CHANGE UP (ref 42.2–75.2)
NRBC # BLD: 8 /100 WBCS — HIGH (ref 0–0)
NRBC BLD-RTO: 8 /100 WBCS — HIGH (ref 0–0)
PHOSPHATE SERPL-MCNC: 5.3 MG/DL — HIGH (ref 2.1–4.9)
PLATELET # BLD AUTO: 571 K/UL — HIGH (ref 130–400)
PMV BLD: 11.6 FL — HIGH (ref 7.4–10.4)
POTASSIUM SERPL-MCNC: 4.2 MMOL/L — SIGNIFICANT CHANGE UP (ref 3.5–5)
POTASSIUM SERPL-SCNC: 4.2 MMOL/L — SIGNIFICANT CHANGE UP (ref 3.5–5)
PROCALCITONIN SERPL-MCNC: 1.27 NG/ML — HIGH (ref 0.02–0.1)
PROT SERPL-MCNC: 5 G/DL — LOW (ref 6–8)
RBC # BLD: 3.23 M/UL — LOW (ref 4.7–6.1)
RBC # FLD: 16 % — HIGH (ref 11.5–14.5)
SODIUM SERPL-SCNC: 143 MMOL/L — SIGNIFICANT CHANGE UP (ref 135–146)
WBC # BLD: 20.33 K/UL — HIGH (ref 4.8–10.8)
WBC # FLD AUTO: 20.33 K/UL — HIGH (ref 4.8–10.8)

## 2025-01-31 PROCEDURE — 93010 ELECTROCARDIOGRAM REPORT: CPT

## 2025-01-31 PROCEDURE — 71045 X-RAY EXAM CHEST 1 VIEW: CPT | Mod: 26

## 2025-01-31 PROCEDURE — 74018 RADEX ABDOMEN 1 VIEW: CPT | Mod: 26

## 2025-01-31 PROCEDURE — 99232 SBSQ HOSP IP/OBS MODERATE 35: CPT

## 2025-01-31 PROCEDURE — 99233 SBSQ HOSP IP/OBS HIGH 50: CPT

## 2025-01-31 RX ORDER — ACETAMINOPHEN 500 MG/5ML
1000 LIQUID (ML) ORAL ONCE
Refills: 0 | Status: COMPLETED | OUTPATIENT
Start: 2025-01-31 | End: 2025-01-31

## 2025-01-31 RX ORDER — SERTRALINE 100 MG/1
50 TABLET, FILM COATED ORAL DAILY
Refills: 0 | Status: DISCONTINUED | OUTPATIENT
Start: 2025-02-01 | End: 2025-02-15

## 2025-01-31 RX ORDER — BUMETANIDE 1 MG/1
2 TABLET ORAL DAILY
Refills: 0 | Status: DISCONTINUED | OUTPATIENT
Start: 2025-02-01 | End: 2025-02-05

## 2025-01-31 RX ORDER — HALOPERIDOL 10 MG/1
2.5 TABLET ORAL EVERY 6 HOURS
Refills: 0 | Status: DISCONTINUED | OUTPATIENT
Start: 2025-01-31 | End: 2025-02-06

## 2025-01-31 RX ORDER — SALINE 7; 19 G/118ML; G/118ML
1 ENEMA RECTAL ONCE
Refills: 0 | Status: COMPLETED | OUTPATIENT
Start: 2025-01-31 | End: 2025-01-31

## 2025-01-31 RX ORDER — TRAZODONE HCL 100 MG
50 TABLET ORAL AT BEDTIME
Refills: 0 | Status: DISCONTINUED | OUTPATIENT
Start: 2025-01-31 | End: 2025-02-06

## 2025-01-31 RX ORDER — NYSTATIN 100000 [USP'U]/G
1 CREAM TOPICAL EVERY 12 HOURS
Refills: 0 | Status: DISCONTINUED | OUTPATIENT
Start: 2025-01-31 | End: 2025-02-17

## 2025-01-31 RX ORDER — INSULIN GLARGINE-YFGN 100 [IU]/ML
27 INJECTION, SOLUTION SUBCUTANEOUS
Refills: 0 | Status: DISCONTINUED | OUTPATIENT
Start: 2025-01-31 | End: 2025-02-03

## 2025-01-31 RX ADMIN — SALINE 1 ENEMA: 7; 19 ENEMA RECTAL at 02:41

## 2025-01-31 RX ADMIN — Medication 81 MILLIGRAM(S): at 11:01

## 2025-01-31 RX ADMIN — OSELTAMIVIR PHOSPHATE 30 MILLIGRAM(S): 75 CAPSULE ORAL at 06:21

## 2025-01-31 RX ADMIN — BENZOCAINE 1 SPRAY(S): 220 SPRAY, METERED PERIODONTAL at 06:20

## 2025-01-31 RX ADMIN — METOPROLOL SUCCINATE 5 MILLIGRAM(S): 50 TABLET, EXTENDED RELEASE ORAL at 02:43

## 2025-01-31 RX ADMIN — INSULIN LISPRO 25 UNIT(S): 100 INJECTION, SOLUTION INTRAVENOUS; SUBCUTANEOUS at 11:02

## 2025-01-31 RX ADMIN — BENZOCAINE 1 SPRAY(S): 220 SPRAY, METERED PERIODONTAL at 13:13

## 2025-01-31 RX ADMIN — Medication 40 MILLIGRAM(S): at 11:02

## 2025-01-31 RX ADMIN — Medication 150 MILLIGRAM(S): at 11:01

## 2025-01-31 RX ADMIN — BUMETANIDE 2 MILLIGRAM(S): 1 TABLET ORAL at 06:22

## 2025-01-31 RX ADMIN — Medication 10 MILLIGRAM(S): at 11:01

## 2025-01-31 RX ADMIN — ATORVASTATIN CALCIUM 10 MILLIGRAM(S): 80 TABLET, FILM COATED ORAL at 23:13

## 2025-01-31 RX ADMIN — Medication 500 MILLIGRAM(S): at 11:01

## 2025-01-31 RX ADMIN — INSULIN LISPRO 25 UNIT(S): 100 INJECTION, SOLUTION INTRAVENOUS; SUBCUTANEOUS at 08:40

## 2025-01-31 RX ADMIN — BENZOCAINE 1 SPRAY(S): 220 SPRAY, METERED PERIODONTAL at 23:14

## 2025-01-31 RX ADMIN — Medication 400 MILLIGRAM(S): at 02:42

## 2025-01-31 RX ADMIN — POLYETHYLENE GLYCOL 3350 17 GRAM(S): 17 POWDER, FOR SOLUTION ORAL at 17:13

## 2025-01-31 RX ADMIN — INSULIN GLARGINE-YFGN 30 UNIT(S): 100 INJECTION, SOLUTION SUBCUTANEOUS at 08:39

## 2025-01-31 RX ADMIN — METOPROLOL SUCCINATE 5 MILLIGRAM(S): 50 TABLET, EXTENDED RELEASE ORAL at 11:02

## 2025-01-31 RX ADMIN — INSULIN LISPRO 3: 100 INJECTION, SOLUTION INTRAVENOUS; SUBCUTANEOUS at 08:40

## 2025-01-31 RX ADMIN — Medication 2 TABLET(S): at 17:13

## 2025-01-31 RX ADMIN — POLYETHYLENE GLYCOL 3350 17 GRAM(S): 17 POWDER, FOR SOLUTION ORAL at 06:21

## 2025-01-31 RX ADMIN — INSULIN LISPRO 6: 100 INJECTION, SOLUTION INTRAVENOUS; SUBCUTANEOUS at 11:02

## 2025-01-31 RX ADMIN — Medication 500000 UNIT(S): at 23:13

## 2025-01-31 RX ADMIN — Medication 2 TABLET(S): at 06:21

## 2025-01-31 RX ADMIN — NYSTATIN 1 APPLICATION(S): 100000 CREAM TOPICAL at 17:13

## 2025-01-31 RX ADMIN — Medication 500000 UNIT(S): at 06:20

## 2025-01-31 RX ADMIN — HEPARIN SODIUM 1200 UNIT(S)/HR: 1000 INJECTION INTRAVENOUS; SUBCUTANEOUS at 06:19

## 2025-01-31 RX ADMIN — SERTRALINE 25 MILLIGRAM(S): 100 TABLET, FILM COATED ORAL at 11:01

## 2025-01-31 RX ADMIN — NYSTATIN 1 APPLICATION(S): 100000 CREAM TOPICAL at 06:21

## 2025-01-31 RX ADMIN — METOPROLOL SUCCINATE 5 MILLIGRAM(S): 50 TABLET, EXTENDED RELEASE ORAL at 17:13

## 2025-01-31 RX ADMIN — Medication 1000 MILLIGRAM(S): at 03:00

## 2025-01-31 RX ADMIN — Medication 500000 UNIT(S): at 13:13

## 2025-01-31 RX ADMIN — Medication 1 APPLICATION(S): at 11:16

## 2025-01-31 RX ADMIN — Medication 10 MILLIGRAM(S): at 23:13

## 2025-01-31 NOTE — PROGRESS NOTE ADULT - SUBJECTIVE AND OBJECTIVE BOX
SUBJECTIVE/OVERNIGHT EVENTS  Today is hospital day 11d. This morning patient was seen and examined at bedside, resting comfortably in bed.   No acute or major events overnight.   Patient has no complaints at this time. Denies fever, chills, nausea, vomiting, diarrhea, constipation, hematochezia, dysuria, hematuria, chest pain, palpitations, sob.   Patient and patient's family was informed of the patient's plan of care at this time.    CODE STATUS: Full Code    MEDICATIONS  STANDING MEDICATIONS  allopurinol 150 milliGRAM(s) Oral daily  ascorbic acid 500 milliGRAM(s) Oral daily  aspirin  chewable 81 milliGRAM(s) Oral daily  atorvastatin 10 milliGRAM(s) Oral at bedtime  benzocaine 20% Spray 1 Spray(s) Topical three times a day  cetirizine 10 milliGRAM(s) Oral daily  chlorhexidine 2% Cloths 1 Application(s) Topical daily  dextrose 5%. 1000 milliLiter(s) IV Continuous <Continuous>  dextrose 5%. 1000 milliLiter(s) IV Continuous <Continuous>  dextrose 50% Injectable 25 Gram(s) IV Push once  dextrose 50% Injectable 12.5 Gram(s) IV Push once  dextrose 50% Injectable 25 Gram(s) IV Push once  glucagon  Injectable 1 milliGRAM(s) IntraMuscular once  heparin  Infusion.  Unit(s)/Hr IV Continuous <Continuous>  influenza  Vaccine (HIGH DOSE) 0.5 milliLiter(s) IntraMuscular once  insulin glargine Injectable (LANTUS) 27 Unit(s) SubCutaneous two times a day  insulin lispro (ADMELOG) corrective regimen sliding scale   SubCutaneous three times a day before meals  insulin lispro Injectable (ADMELOG) 25 Unit(s) SubCutaneous three times a day before meals  melatonin 10 milliGRAM(s) Oral at bedtime  metolazone 2.5 milliGRAM(s) Oral daily  metoprolol tartrate Injectable 5 milliGRAM(s) IV Push every 6 hours  nystatin    Suspension 601469 Unit(s) Swish and Swallow three times a day  nystatin Cream 1 Application(s) Topical every 12 hours  pantoprazole  Injectable 40 milliGRAM(s) IV Push daily  polyethylene glycol 3350 17 Gram(s) Oral every 12 hours  senna 2 Tablet(s) Oral every 12 hours    PRN MEDICATIONS  acetaminophen     Tablet .. 650 milliGRAM(s) Oral every 6 hours PRN  albuterol/ipratropium for Nebulization 3 milliLiter(s) Nebulizer every 4 hours PRN  dextrose Oral Gel 15 Gram(s) Oral once PRN  haloperidol    Injectable 2.5 milliGRAM(s) IntraMuscular every 6 hours PRN  heparin   Injectable 6000 Unit(s) IV Push every 6 hours PRN  hydrOXYzine hydrochloride 25 milliGRAM(s) Oral every 6 hours PRN  magnesium hydroxide Suspension 30 milliLiter(s) Oral daily PRN  ondansetron Injectable 4 milliGRAM(s) IV Push every 6 hours PRN  traZODone 50 milliGRAM(s) Oral at bedtime PRN    VITALS  T(F): 97.7 (01-31-25 @ 12:00), Max: 97.9 (01-30-25 @ 20:00)  HR: 88 (01-31-25 @ 13:00) (81 - 101)  BP: 124/67 (01-31-25 @ 13:00) (107/53 - 173/67)  RR: 30 (01-31-25 @ 13:00) (15 - 31)  SpO2: 99% (01-31-25 @ 13:00) (91% - 100%)  POCT Blood Glucose.: 221 mg/dL (01-31-25 @ 10:44)  POCT Blood Glucose.: 194 mg/dL (01-31-25 @ 08:38)  POCT Blood Glucose.: 177 mg/dL (01-31-25 @ 06:15)  POCT Blood Glucose.: 105 mg/dL (01-30-25 @ 21:45)  POCT Blood Glucose.: 104 mg/dL (01-30-25 @ 17:24)  POCT Blood Glucose.: 61 mg/dL (01-30-25 @ 16:38)    PHYSICAL EXAM  GENERAL  (x) NAD, lying in bed comfortably     (  ) obtunded     (  ) lethargic     (  ) somnolent    HEAD  (s) Atraumatic     (  ) hematoma     (  ) laceration (specify location:       )     NECK  (x) Supple     (  ) neck stiffness     (  ) nuchal rigidity     (  )  no JVD     (  ) JVD present ( -- cm)    HEART  Rate -->  (x) normal rate    (  ) bradycardic    (  ) tachycardic  Rhythm -->  (x) regular    (  ) regularly irregular    (  ) irregularly irregular  Murmurs -->  (x) normal s1/s2    (  ) systolic murmur    (  ) diastolic murmur    (  ) continuous murmur     (  ) S3 present    (  ) S4 present    LUNGS  (x)Unlabored respirations     (  ) tachypnea  (x) B/L air entry     (  ) decreased breath sounds in:  (location     )    (  ) no adventitious sound     (  ) crackles     (  ) wheezing      (  ) rhonchi   (x) Coarse breath sounds   (specify location: bilaterally  )  (  ) chest wall tenderness (specify location:       )    ABDOMEN  (x) Soft     (  ) tense   |   (x) nondistended     (  ) distended   |   (x) +BS     (  ) hypoactive bowel sounds     (  ) hyperactive bowel sounds  (  ) nontender     (  ) RUQ tenderness     (  ) RLQ tenderness     (  ) LLQ tenderness     (  ) epigastric tenderness     (  ) diffuse tenderness  (  ) Splenomegaly      (  ) Hepatomegaly      (  ) Jaundice     (  ) ecchymosis     EXTREMITIES  (x) Normal     (  ) Rash     (  ) ecchymosis     (  ) varicose veins      (  ) pitting edema     (  ) non-pitting edema   (  ) ulceration     (  ) gangrene:     (location:     )    NERVOUS SYSTEM  (x) A&Ox2 (Person & Place)     (  ) confused     (  ) lethargic  (x) Moves all extremities  (x) Follows commands    SKIN  (x) No rashes or lesions     (  ) maculopapular rash     (  ) pustules     (  ) vesicles     (  ) ulcer     (  ) ecchymosis     (specify location:     )    (x) Indwelling Ojeda Catheter   Date insterted:    Reason (  ) Critical illness     (  ) urinary retention    (x) Accurate Ins/Outs Monitoring     (  ) CMO patient    (  ) Central Line - (x) No Central Line  Date inserted:  Location: (  ) Right IJ   (  ) Left IJ   (  ) Right Fem   (  ) Left Fem  (  ) SPC  (  ) pigtail  (  ) PEG tube  (  ) colostomy  (  ) jejunostomy  (  ) U-Dall    LABS             9.6    20.33 )-----------( 571      ( 01-31-25 @ 04:50 )             30.0     143  |  99  |  77  -------------------------<  161   01-31-25 @ 04:50  4.2  |  31  |  2.2    Ca      8.6     01-31-25 @ 04:50  Phos   5.3     01-31-25 @ 04:50  Mg     1.8     01-31-25 @ 04:50    TPro  5.0  /  Alb  2.6  /  TBili  0.4  /  DBili  x   /  AST  74  /  ALT  60  /  AlkPhos  424  /  GGT  x     01-31-25 @ 04:50    PTT - ( 01-31-25 @ 04:50 )  PTT:64.4 sec    Urinalysis Basic - ( 31 Jan 2025 04:50 )    Color: x / Appearance: x / SG: x / pH: x  Gluc: 161 mg/dL / Ketone: x  / Bili: x / Urobili: x   Blood: x / Protein: x / Nitrite: x   Leuk Esterase: x / RBC: x / WBC x   Sq Epi: x / Non Sq Epi: x / Bacteria: x    ABG - ( 31 Jan 2025 07:40 )  pH, Arterial: 7.44  pH, Blood: x     /  pCO2: 56    /  pO2: 80    / HCO3: 38    / Base Excess: 11.6  /  SaO2: 97.4      IMAGING    CXR 12/27: Unchanged bilateral patchy opacities.  CXR 12/28: Unchanged patchy bilateral opacities.  CXR 12/28: *Self Interpretation* Underpenetrated compared to previous CXR today. Increased Upper Left Opacity.   KUB XR 1/30: Nonobstructive bowel gas pattern.  CXR 1/30: Diffuse bilateral opacities, overall unchanged.  KBUS: No evidence of Hydronephrosis.   CXR 1/31: Bilateral diffuse opacities, decreased.  KUB XR 1/31: Unchanged gaseous distention of the small and large bowel.

## 2025-01-31 NOTE — SWALLOW BEDSIDE ASSESSMENT ADULT - SLP PERTINENT HISTORY OF CURRENT PROBLEM
Pt is a 68 y/o M w/ extensive medical history including: CAD (status post CABG), DM (on insulin pump), atrial fibrillation (on Xarelto), distal pancreatomy and splenectomy, referred to the ED by his oncologist for abnormal labs. Of note, pt recently diagnosed w/ diffuse large B cell lymphoma on pathology of right groin mass and is following / [facility] oncology team. Pt discharged on 1/17/2025 from hospital after inpatient chemotherapy (Completed cycle 1 of R-EPOCH (D1 on 1/11/2024)). Pt is being treated for severe sepsis - severe multifocal PNA w/ influenza A infection, AHRF, volume overload, neutropenic fever; +Immunodeficiency 2' malignancy and comorbidities. CXR 1/31-> Bilateral diffuse opacities, decreased.

## 2025-01-31 NOTE — BH CONSULTATION LIAISON PROGRESS NOTE - NSICDXBHSECONDARYDX_PSY_ALL_CORE
Adjustment disorder with mixed anxiety and depressed mood   F43.23  Insomnia   G47.00  

## 2025-01-31 NOTE — PROGRESS NOTE ADULT - ASSESSMENT
ASSESSMENT  69-year-old man with extensive medical history including CAD (status post CABG;), DM (on insulin pump), atrial fibrillation on Xarelto, history of distal pancreatomy and splenectomy for unclear reason referred to the ED by his oncologist for abnormal labs. Patient was recently diagnosed with Diffuse large B cell Lymphoma on pathology of right groin mass and is following with [facility] oncology team. Patient was discharged on 01.17.2025 from hospital after inpatient chemotherapy (Completed cycle 1 of R-EPOCH (D1 on 1/11/2024). Tolerated well. Rituximab was given on D5).   Over past 3 days patient started to get a productive cough with yellow sputum production.  Patient is also endorsing chills and nausea which he states is baseline. He had 1 reading on low grade fever 100.5 on day of presentation.    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    #Abx allergy: No Known Allergies    RECOMMENDATIONS  - continue to monitor off antibiotics   - ongoing diuresis   - aspiration precautions   - trend WBC for now    Please call or message on [facility] Teams if with any questions.  Spectra [phone]

## 2025-01-31 NOTE — PROGRESS NOTE ADULT - ASSESSMENT
Assessment	  #Acute hypoxemic respiratory failure 2/2   #Influenza A infection / ?Superimposed Bacterial PNA  #Neutropenic Fever  #Fluid overload / bilateral pleural effusions    1/28 - CXR today with b/l infiltrates. Continue with Bipap.   1/28 - Continue diuretics as BUN/Cr stable. Bumex 2mg BID. Maintain negative balance.   1/28 - Repeat CXR 12/8 for revaluation of pulmonary process s/p diuretics/Bipap.   1/28 - Patient off Bipap on HFNC comfortable.   1/28 - Continue Tamiflu and Cefepime as per ID recs.    1/28 - Repeat CXR 12/8 14:23: *Self Interpretation* Underpenetrated compared to previous CXR today. Increased Upper Left Opacity.    1/28 - End date Tamiflu and Cefepime 1/30 (Total 10d course).   1/30 - Finish Tamiflu and Cefepime courses (Today is day 10/10).    1/30 - CXR today showing diffuse bilateral opacities, unchanged from prior.    1/30 - Trial BIPAP O&O Q4Hr. While off place on HFNC.    1/30 - Albuterol Nebs as needed.    1/30 - BNP noted, elevated.   1/30 - Continue diuresis with Bumex.    1/31 - PRN BIPAP. Now on HFNC 50/60.    1/31 - CXR improving, bilateral opacities decreasing.    1/31 - Procal, ESR, and CRP high.    1/31 - Resume home diuretics. Free water restriction to 2L.        #HO Diabetes on Insulin Pump:   1/28 - BS Contro Adjusted ISS to +3   1/30 - Continue to monitor BS level. Adjust as needed. Goal 140-180.     #HO of AFib   1/30 - Continue anticoagulation with Heparin.    1/30 - Daily PT/PTT, maintain therapeutic PTT.     #Agitation   1/30 - Patient agitated overnight. Increased Haldol to 3. Started Seroquel 25.   1/30 - Ordered EKG to assess for QTc prolongation. If <500, continue Seroquel.    1/30 - Continue Melatonin    1/30 - QTc 489. Continue Seroquel. Follow up AM EKG for QTc.   1/31 - No agitation over night. D/C Seroquel.     #Other  Code: Full  Activity: OOB as tolerated and PT/OT  Diet: Oral diet while off BIPAP.  Bowel Regimen: Aggressive bowel regimen.   MRSA: (-)  Ojeda: Indwelling. Straight Drainage.   --------------------------------------------  Follow-Up:   -AM CXR for routine evaluation of cardiopulmonary process.   -AM EKG for QTc  -AM Coags  -Procalcitonin  -Monitor I&O. Maintain negative balance. .

## 2025-01-31 NOTE — BH CONSULTATION LIAISON PROGRESS NOTE - CURRENT MEDICATION
MEDICATIONS  (STANDING):  allopurinol 150 milliGRAM(s) Oral daily  ascorbic acid 500 milliGRAM(s) Oral daily  aspirin  chewable 81 milliGRAM(s) Oral daily  atorvastatin 10 milliGRAM(s) Oral at bedtime  azithromycin  IVPB      cefepime   IVPB 2000 milliGRAM(s) IV Intermittent every 12 hours  cetirizine 10 milliGRAM(s) Oral daily  dextrose 5%. 1000 milliLiter(s) (50 mL/Hr) IV Continuous <Continuous>  dextrose 5%. 1000 milliLiter(s) (100 mL/Hr) IV Continuous <Continuous>  dextrose 50% Injectable 25 Gram(s) IV Push once  dextrose 50% Injectable 12.5 Gram(s) IV Push once  dextrose 50% Injectable 25 Gram(s) IV Push once  filgrastim-sndz (ZARXIO) Injectable 480 MICROGram(s) SubCutaneous every 24 hours  furosemide   Injectable 40 milliGRAM(s) IV Push every 12 hours  glucagon  Injectable 1 milliGRAM(s) IntraMuscular once  insulin glargine Injectable (LANTUS) 30 Unit(s) SubCutaneous two times a day  insulin lispro (ADMELOG) corrective regimen sliding scale   SubCutaneous three times a day before meals  insulin lispro Injectable (ADMELOG) 20 Unit(s) SubCutaneous three times a day before meals  melatonin 10 milliGRAM(s) Oral at bedtime  metoprolol tartrate 25 milliGRAM(s) Oral two times a day  NIFEdipine XL 60 milliGRAM(s) Oral daily  oseltamivir 30 milliGRAM(s) Oral two times a day  pantoprazole    Tablet 40 milliGRAM(s) Oral before breakfast  polyethylene glycol 3350 17 Gram(s) Oral daily  potassium chloride  20 mEq/100 mL IVPB 20 milliEquivalent(s) IV Intermittent every 2 hours  senna 2 Tablet(s) Oral at bedtime  sertraline 25 milliGRAM(s) Oral daily    MEDICATIONS  (PRN):  acetaminophen     Tablet .. 650 milliGRAM(s) Oral every 6 hours PRN Temp greater or equal to 38C (100.4F), Mild Pain (1 - 3)  albuterol/ipratropium for Nebulization 3 milliLiter(s) Nebulizer every 4 hours PRN Shortness of Breath and/or Wheezing  dextrose Oral Gel 15 Gram(s) Oral once PRN Blood Glucose LESS THAN 70 milliGRAM(s)/deciliter  diazepam    Tablet 2 milliGRAM(s) Oral daily PRN anxiety  haloperidol    Injectable 2.5 milliGRAM(s) IntraMuscular every 6 hours PRN Agitation and anxiety  hydrOXYzine hydrochloride 25 milliGRAM(s) Oral every 6 hours PRN Anxiety  ondansetron Injectable 4 milliGRAM(s) IV Push every 6 hours PRN Nausea and/or Vomiting  oxyCODONE    IR 5 milliGRAM(s) Oral four times a day PRN Severe Pain (7 - 10)  
MEDICATIONS  (STANDING):  allopurinol 150 milliGRAM(s) Oral daily  ascorbic acid 500 milliGRAM(s) Oral daily  aspirin  chewable 81 milliGRAM(s) Oral daily  atorvastatin 10 milliGRAM(s) Oral at bedtime  benzocaine 20% Spray 1 Spray(s) Topical three times a day  buMETAnide Injectable 2 milliGRAM(s) IV Push every 12 hours  cefepime   IVPB 2000 milliGRAM(s) IV Intermittent every 12 hours  cetirizine 10 milliGRAM(s) Oral daily  chlorhexidine 2% Cloths 1 Application(s) Topical daily  dextrose 5%. 1000 milliLiter(s) (50 mL/Hr) IV Continuous <Continuous>  dextrose 5%. 1000 milliLiter(s) (100 mL/Hr) IV Continuous <Continuous>  dextrose 50% Injectable 25 Gram(s) IV Push once  dextrose 50% Injectable 12.5 Gram(s) IV Push once  dextrose 50% Injectable 25 Gram(s) IV Push once  glucagon  Injectable 1 milliGRAM(s) IntraMuscular once  heparin  Infusion.  Unit(s)/Hr (10 mL/Hr) IV Continuous <Continuous>  influenza  Vaccine (HIGH DOSE) 0.5 milliLiter(s) IntraMuscular once  insulin glargine Injectable (LANTUS) 30 Unit(s) SubCutaneous two times a day  insulin lispro (ADMELOG) corrective regimen sliding scale   SubCutaneous three times a day before meals  insulin lispro Injectable (ADMELOG) 25 Unit(s) SubCutaneous three times a day before meals  melatonin 10 milliGRAM(s) Oral at bedtime  metoprolol tartrate Injectable 5 milliGRAM(s) IV Push every 6 hours  nystatin    Suspension 192734 Unit(s) Swish and Swallow three times a day  nystatin Powder 1 Application(s) Topical every 8 hours  oseltamivir 30 milliGRAM(s) Oral two times a day  pantoprazole  Injectable 40 milliGRAM(s) IV Push daily  polyethylene glycol 3350 17 Gram(s) Oral daily  potassium chloride  20 mEq/100 mL IVPB 20 milliEquivalent(s) IV Intermittent every 2 hours  senna 2 Tablet(s) Oral at bedtime  sertraline 25 milliGRAM(s) Oral daily    MEDICATIONS  (PRN):  acetaminophen     Tablet .. 650 milliGRAM(s) Oral every 6 hours PRN Temp greater or equal to 38C (100.4F), Mild Pain (1 - 3)  albuterol/ipratropium for Nebulization 3 milliLiter(s) Nebulizer every 4 hours PRN Shortness of Breath and/or Wheezing  dextrose Oral Gel 15 Gram(s) Oral once PRN Blood Glucose LESS THAN 70 milliGRAM(s)/deciliter  diazepam    Tablet 2 milliGRAM(s) Oral daily PRN anxiety  haloperidol    Injectable 1 milliGRAM(s) IV Push every 6 hours PRN Agitation  heparin   Injectable 6000 Unit(s) IV Push every 6 hours PRN For aPTT less than 40  hydrOXYzine hydrochloride 25 milliGRAM(s) Oral every 6 hours PRN Anxiety  ondansetron Injectable 4 milliGRAM(s) IV Push every 6 hours PRN Nausea and/or Vomiting  oxyCODONE    IR 5 milliGRAM(s) Oral four times a day PRN Severe Pain (7 - 10)  
MEDICATIONS  (STANDING):  allopurinol 150 milliGRAM(s) Oral daily  ascorbic acid 500 milliGRAM(s) Oral daily  aspirin  chewable 81 milliGRAM(s) Oral daily  atorvastatin 10 milliGRAM(s) Oral at bedtime  benzocaine 20% Spray 1 Spray(s) Topical three times a day  cetirizine 10 milliGRAM(s) Oral daily  chlorhexidine 2% Cloths 1 Application(s) Topical daily  dextrose 5%. 1000 milliLiter(s) (50 mL/Hr) IV Continuous <Continuous>  dextrose 5%. 1000 milliLiter(s) (100 mL/Hr) IV Continuous <Continuous>  dextrose 50% Injectable 25 Gram(s) IV Push once  dextrose 50% Injectable 12.5 Gram(s) IV Push once  dextrose 50% Injectable 25 Gram(s) IV Push once  glucagon  Injectable 1 milliGRAM(s) IntraMuscular once  heparin  Infusion.  Unit(s)/Hr (10 mL/Hr) IV Continuous <Continuous>  influenza  Vaccine (HIGH DOSE) 0.5 milliLiter(s) IntraMuscular once  insulin glargine Injectable (LANTUS) 27 Unit(s) SubCutaneous two times a day  insulin lispro (ADMELOG) corrective regimen sliding scale   SubCutaneous three times a day before meals  insulin lispro Injectable (ADMELOG) 25 Unit(s) SubCutaneous three times a day before meals  melatonin 10 milliGRAM(s) Oral at bedtime  metolazone 2.5 milliGRAM(s) Oral daily  metoprolol tartrate Injectable 5 milliGRAM(s) IV Push every 6 hours  nystatin    Suspension 664162 Unit(s) Swish and Swallow three times a day  nystatin Cream 1 Application(s) Topical every 12 hours  pantoprazole  Injectable 40 milliGRAM(s) IV Push daily  polyethylene glycol 3350 17 Gram(s) Oral every 12 hours  senna 2 Tablet(s) Oral every 12 hours  sertraline 25 milliGRAM(s) Oral daily    MEDICATIONS  (PRN):  acetaminophen     Tablet .. 650 milliGRAM(s) Oral every 6 hours PRN Temp greater or equal to 38C (100.4F), Mild Pain (1 - 3)  albuterol/ipratropium for Nebulization 3 milliLiter(s) Nebulizer every 4 hours PRN Shortness of Breath and/or Wheezing  dextrose Oral Gel 15 Gram(s) Oral once PRN Blood Glucose LESS THAN 70 milliGRAM(s)/deciliter  haloperidol    Injectable 3 milliGRAM(s) IntraMuscular every 6 hours PRN Agitation  heparin   Injectable 6000 Unit(s) IV Push every 6 hours PRN For aPTT less than 40  hydrOXYzine hydrochloride 25 milliGRAM(s) Oral every 6 hours PRN Anxiety  magnesium hydroxide Suspension 30 milliLiter(s) Oral daily PRN Constipation  ondansetron Injectable 4 milliGRAM(s) IV Push every 6 hours PRN Nausea and/or Vomiting

## 2025-01-31 NOTE — BH CONSULTATION LIAISON PROGRESS NOTE - NSBHFUPINTERVALHXFT_PSY_A_CORE
Chart reviewed. No agitation overnight. Received haldol prn due to confusion/agitation from delirium 2 nights ago. Seen with daughter at bedside. Currently feeling less confused. Pleasant, cooperative, well related. No acute reports of psychiatric issues with mood/anxiety. Agreeable to continue Zoloft and hydroxyzine although requesting dose increase. Patient okay to follow up with PCP for aftercare.

## 2025-01-31 NOTE — PROGRESS NOTE ADULT - ASSESSMENT
IMPRESSION:    Acute hypoxemic respiratory failure on HFNC  Influenza A infection / pneumonia   RASHAD / OHS   Possible superimposed bacterial pneumonia   Fluid overload / bilateral pleural effusions   HO Large B Cell lymphoma SP Chemotherapy   HO CAD (status post CABG;),  HO DM (on insulin pump)  HO atrial fibrillation on Xarelto  HO distal pancreatomy and splenectomy     PLAN:    CNS: No agitation. DC Seroquel.     HEENT: Oral care    PULMONARY: APRN BIPAP. CXR better. HFNC 50/50 for now.     CARDIOVASCULAR: Echocardiogram: normal EF. BNP noted to be elevated. Resume home oral dieretics     GI: GI prophylaxis. Oral diet when off BIPAP. Free water restriction to 2 l. Tap water enema; aggressive BM regimen.     RENAL:  Rise in BUN, creat noted. Resume home diuretic doses. Nephro following.     INFECTIOUS DISEASE: Finished abx course. procal. ESR and CRP high.     HEMATOLOGICAL: On AC with heparin for afib. Keep PTT therapeutic. Daily coags and CBC. Hem follow up.     ENDOCRINE:  Follow up FS. Increase insulin regimen to keep 140-180.     MUSCULOSKELETAL:  Out of bed to chair; PT OT.     FULL CODE    MICU for today; possible SDU tomorrow.     Voiding trial once ambulating. PICC line.

## 2025-01-31 NOTE — BH CONSULTATION LIAISON PROGRESS NOTE - NSBHCHARTREVIEWVS_PSY_A_CORE FT
Vital Signs Last 24 Hrs  T(C): 36.9 (23 Jan 2025 15:36), Max: 37.1 (23 Jan 2025 00:20)  T(F): 98.5 (23 Jan 2025 15:36), Max: 98.8 (23 Jan 2025 08:25)  HR: 100 (23 Jan 2025 15:36) (98 - 120)  BP: 133/89 (23 Jan 2025 15:36) (124/60 - 141/62)  BP(mean): 105 (23 Jan 2025 15:36) (99 - 105)  RR: 20 (23 Jan 2025 15:36) (18 - 22)  SpO2: 99% (23 Jan 2025 15:36) (90% - 99%)    Parameters below as of 23 Jan 2025 15:36  Patient On (Oxygen Delivery Method): BiPAP/CPAP    O2 Concentration (%): 100
Vital Signs Last 24 Hrs  T(C): 36.1 (28 Jan 2025 14:00), Max: 36.6 (28 Jan 2025 00:00)  T(F): 97 (28 Jan 2025 14:00), Max: 97.8 (28 Jan 2025 00:00)  HR: 97 (28 Jan 2025 14:00) (77 - 99)  BP: 102/67 (28 Jan 2025 14:00) (102/67 - 149/71)  BP(mean): 72 (28 Jan 2025 14:00) (72 - 102)  RR: 43 (28 Jan 2025 12:55) (19 - 43)  SpO2: 96% (28 Jan 2025 14:00) (93% - 100%)    Parameters below as of 28 Jan 2025 15:00  Patient On (Oxygen Delivery Method): nasal cannula, high flow  O2 Flow (L/min): 60  O2 Concentration (%): 80
Vital Signs Last 24 Hrs  T(C): 36.3 (31 Jan 2025 08:00), Max: 36.8 (30 Jan 2025 12:00)  T(F): 97.3 (31 Jan 2025 08:00), Max: 98.2 (30 Jan 2025 12:00)  HR: 93 (31 Jan 2025 10:00) (85 - 108)  BP: 154/71 (31 Jan 2025 10:00) (107/53 - 173/67)  BP(mean): 102 (31 Jan 2025 10:00) (74 - 115)  RR: 21 (31 Jan 2025 10:00) (15 - 31)  SpO2: 97% (31 Jan 2025 10:00) (91% - 100%)    Parameters below as of 31 Jan 2025 10:00  Patient On (Oxygen Delivery Method): nasal cannula, high flow  O2 Flow (L/min): 50  O2 Concentration (%): 50

## 2025-01-31 NOTE — BH CONSULTATION LIAISON PROGRESS NOTE - NSBHCONSULTRECOMMENDOTHER_PSY_A_CORE FT
- Increase Zoloft 50 mg qd for anxiety/mood. Continue on discharge--give 30 day supply.  - Consider melatonin 10 mg qhs for insomnia  - Consider trazodone 50 mg qhs prn for sleep  - Patient would like to follow up with PCP    For breakthrough anxiety:  - Hydroxyzine 25 mg q6 hours prn. Stop on discharge.  - Will hold benzos due to concerns about respiratory status.    For breakthrough agitation/distress from delirium:  -  Haldol 2.5 mg IM q 6 hrs prn    Psychiatry will sign off. Please reconsult prn.

## 2025-01-31 NOTE — BH CONSULTATION LIAISON PROGRESS NOTE - NSBHCONSULTFOLLOWAFTERCARE_PSY_A_CORE FT
Patient to follow up with PCP for anxiety/psych medication.
Can follow up with outpatient psychiatry on discharge. SW to give referral if amenable.

## 2025-01-31 NOTE — BH CONSULTATION LIAISON PROGRESS NOTE - NSBHCHARTREVIEWINVESTIGATE_PSY_A_CORE FT
< from: 12 Lead ECG (01.31.25 @ 05:01) >    Ventricular Rate 86 BPM    Atrial Rate 86 BPM    P-R Interval 186 ms    QRS Duration 110 ms    Q-T Interval 410 ms    QTC Calculation(Bazett) 490 ms    < end of copied text >    
< from: 12 Lead ECG (01.22.25 @ 17:25) >      Ventricular Rate 118 BPM    QRS Duration 110 ms    Q-T Interval 326 ms    QTC Calculation(Bazett) 456 ms    < end of copied text >

## 2025-01-31 NOTE — BH CONSULTATION LIAISON PROGRESS NOTE - NSBHCHARTREVIEWLAB_PSY_A_CORE FT
01-31    143  |  99  |  77[HH]  ----------------------------<  161[H]  4.2   |  31  |  2.2[H]    Ca    8.6      31 Jan 2025 04:50  Phos  5.3     01-31  Mg     1.8     01-31    TPro  5.0[L]  /  Alb  2.6[L]  /  TBili  0.4  /  DBili  x   /  AST  74[H]  /  ALT  60[H]  /  AlkPhos  424[H]  01-31                          9.6    20.33 )-----------( 571      ( 31 Jan 2025 04:50 )             30.0   
01-23    132[L]  |  87[L]  |  102[HH]  ----------------------------<  322[H]  3.3[L]   |  33[H]  |  2.8[H]    Ca    7.5[L]      23 Jan 2025 06:02  Mg     2.0     01-23    TPro  4.5[L]  /  Alb  2.2[L]  /  TBili  0.5  /  DBili  x   /  AST  58[H]  /  ALT  45[H]  /  AlkPhos  181[H]  01-23                          9.7    0.13  )-----------( 17       ( 23 Jan 2025 06:02 )             28.0

## 2025-01-31 NOTE — PROGRESS NOTE ADULT - SUBJECTIVE AND OBJECTIVE BOX
MORGAN BUSH  70y, Male  Allergy: No Known Allergies      LOS  11d    CHIEF COMPLAINT: sob (27 Jan 2025 09:39)      INTERVAL EVENTS/HPI  - No acute events overnight  - T(F): , Max: 98.2 (01-30-25 @ 12:00)  - remains on HFNC - no worsening dyspnea   - WBC Count: 20.33 (01-31-25 @ 04:50)  WBC Count: 22.23 (01-30-25 @ 05:10)     - Creatinine: 2.2 (01-31-25 @ 04:50)  Creatinine: 1.8 (01-30-25 @ 05:10)       ROS  General: Denies rigors, nightsweats  HEENT: Denies headache, rhinorrhea, sore throat, eye pain  CV: Denies CP, palpitations  PULM: Denies wheezing, hemoptysis  GI: Denies hematemesis, hematochezia, melena  : Denies discharge, hematuria  MSK: Denies arthralgias, myalgias  SKIN: Denies rash, lesions  NEURO: Denies paresthesias, weakness  PSYCH: Denies depression, anxiety    VITALS:  T(F): 97.3, Max: 98.2 (01-30-25 @ 12:00)  HR: 86  BP: 158/74  RR: 16Vital Signs Last 24 Hrs  T(C): 36.3 (31 Jan 2025 08:00), Max: 36.8 (30 Jan 2025 12:00)  T(F): 97.3 (31 Jan 2025 08:00), Max: 98.2 (30 Jan 2025 12:00)  HR: 86 (31 Jan 2025 08:00) (85 - 108)  BP: 158/74 (31 Jan 2025 08:00) (118/56 - 173/67)  BP(mean): 106 (31 Jan 2025 08:00) (79 - 115)  RR: 16 (31 Jan 2025 08:00) (15 - 31)  SpO2: 97% (31 Jan 2025 08:00) (91% - 100%)    Parameters below as of 31 Jan 2025 09:00  Patient On (Oxygen Delivery Method): nasal cannula, high flow  O2 Flow (L/min): 50  O2 Concentration (%): 50    PHYSICAL EXAM:  Gen: NAD, resting in bed  HEENT: Normocephalic, atraumatic  Neck: supple, no lymphadenopathy  CV: Regular rate & regular rhythm  Lungs: decreased BS at bases, no fremitus  Abdomen: Soft, BS present  Ext: Warm, well perfused  Neuro: non focal, awake  Skin: no rash, no erythema  Lines: no phlebitis    FH: Non-contributory  Social Hx: Non-contributory    TESTS & MEASUREMENTS:                        9.6    20.33 )-----------( 571      ( 31 Jan 2025 04:50 )             30.0     01-31    143  |  99  |  77[HH]  ----------------------------<  161[H]  4.2   |  31  |  2.2[H]    Ca    8.6      31 Jan 2025 04:50  Phos  5.3     01-31  Mg     1.8     01-31    TPro  5.0[L]  /  Alb  2.6[L]  /  TBili  0.4  /  DBili  x   /  AST  74[H]  /  ALT  60[H]  /  AlkPhos  424[H]  01-31      LIVER FUNCTIONS - ( 31 Jan 2025 04:50 )  Alb: 2.6 g/dL / Pro: 5.0 g/dL / ALK PHOS: 424 U/L / ALT: 60 U/L / AST: 74 U/L / GGT: x           Urinalysis Basic - ( 31 Jan 2025 04:50 )    Color: x / Appearance: x / SG: x / pH: x  Gluc: 161 mg/dL / Ketone: x  / Bili: x / Urobili: x   Blood: x / Protein: x / Nitrite: x   Leuk Esterase: x / RBC: x / WBC x   Sq Epi: x / Non Sq Epi: x / Bacteria: x        Culture - Blood (collected 01-27-25 @ 06:12)  Source: .Blood BLOOD  Preliminary Report (01-30-25 @ 14:01):    No growth at 72 Hours    Urinalysis with Rflx Culture (collected 01-20-25 @ 19:07)    Culture - Blood (collected 01-20-25 @ 15:38)  Source: .Blood BLOOD  Final Report (01-25-25 @ 23:07):    No growth at 5 days    Culture - Blood (collected 01-20-25 @ 15:38)  Source: .Blood BLOOD  Final Report (01-25-25 @ 23:07):    No growth at 5 days            INFECTIOUS DISEASES TESTING  Fungitell: <31 (01-28-25 @ 05:00)  Procalcitonin: 4.48 (01-27-25 @ 06:12)  MRSA PCR Result.: Negative (01-25-25 @ 07:28)  Procalcitonin: 29.80 (01-24-25 @ 11:30)  MRSA PCR Result.: Negative (01-24-25 @ 10:45)  Legionella Antigen, Urine: Negative (01-23-25 @ 12:10)  Rapid RVP Result: NotDetec (01-13-25 @ 13:24)      INFLAMMATORY MARKERS  Sedimentation Rate, Erythrocyte: >140 mm/Hr (01-30-25 @ 17:00)  C-Reactive Protein: 87.8 mg/L (01-30-25 @ 17:00)      RADIOLOGY & ADDITIONAL TESTS:  I have personally reviewed the last available Chest xray  CXR      CT      CARDIOLOGY TESTING  12 Lead ECG:   Ventricular Rate 86 BPM    Atrial Rate 86 BPM    P-R Interval 186 ms    QRS Duration 110 ms    Q-T Interval 410 ms    QTC Calculation(Bazett) 490 ms    P Axis 18 degrees    R Axis 25 degrees    T Axis 57 degrees    Diagnosis Line Normal sinus rhythm  Nonspecific T wave abnormality    Abnormal ECG    Confirmed by SARA MARIN MD ([id number]) on 1/31/2025 7:07:01 AM (01-31-25 @ 05:01)  12 Lead ECG:   Ventricular Rate 111 BPM    QRS Duration 116 ms    Q-T Interval 360 ms    QTC Calculation(Bazett) 489 ms    R Axis 43 degrees    T Axis -25 degrees    Diagnosis Line Atrial fibrillation with rapid ventricular response  Nonspecific T wave abnormality  Abnormal ECG    Confirmed by Mahesh Coreas ([id number]) on 1/30/2025 7:22:07 AM (01-30-25 @ 05:34)      MEDICATIONS  allopurinol 150 Oral daily  ascorbic acid 500 Oral daily  aspirin  chewable 81 Oral daily  atorvastatin 10 Oral at bedtime  benzocaine 20% Spray 1 Topical three times a day  cetirizine 10 Oral daily  chlorhexidine 2% Cloths 1 Topical daily  dextrose 5%. 1000 IV Continuous <Continuous>  dextrose 5%. 1000 IV Continuous <Continuous>  dextrose 50% Injectable 25 IV Push once  dextrose 50% Injectable 12.5 IV Push once  dextrose 50% Injectable 25 IV Push once  glucagon  Injectable 1 IntraMuscular once  heparin  Infusion.  IV Continuous <Continuous>  influenza  Vaccine (HIGH DOSE) 0.5 IntraMuscular once  insulin glargine Injectable (LANTUS) 27 SubCutaneous two times a day  insulin lispro (ADMELOG) corrective regimen sliding scale  SubCutaneous three times a day before meals  insulin lispro Injectable (ADMELOG) 25 SubCutaneous three times a day before meals  melatonin 10 Oral at bedtime  metolazone 2.5 Oral daily  metoprolol tartrate Injectable 5 IV Push every 6 hours  nystatin    Suspension 965200 Swish and Swallow three times a day  nystatin Cream 1 Topical every 12 hours  pantoprazole  Injectable 40 IV Push daily  polyethylene glycol 3350 17 Oral every 12 hours  senna 2 Oral every 12 hours  sertraline 25 Oral daily      WEIGHT  Weight (kg): 132.9 (01-27-25 @ 11:20)  Creatinine: 2.2 mg/dL (01-31-25 @ 04:50)      ANTIBIOTICS:  nystatin    Suspension 774759 Unit(s) Swish and Swallow three times a day      All available historical records have been reviewed

## 2025-01-31 NOTE — SWALLOW BEDSIDE ASSESSMENT ADULT - SWALLOW EVAL: RECOMMENDED DIET
continue soft and bite sized, mildly thick liquids; allow ice chips and small sips of water b/t meals

## 2025-01-31 NOTE — SWALLOW BEDSIDE ASSESSMENT ADULT - SLP GENERAL OBSERVATIONS
pt received in bed awake +generalized weakness +confused +HFNC 50L/min, 50% O2 +daughter Mery at bedside

## 2025-01-31 NOTE — BH CONSULTATION LIAISON PROGRESS NOTE - NSBHASSESSMENTFT_PSY_ALL_CORE
Mr. Ike Narvaez 69-year-old male, domiciled alone, has 1 child daughter and grandchild, retired, PMHx of CAD s/p CABG, A-fib on Xarelto, DM on insulin pump, and recent diagnosis of Large B cell lymphoma on chemo, Rituximab, with last treatment 5 days ago, NSSIB,  who was admitted to the hospital for acute hypoxia respiratory failure 2/2 pneumonia, consulted to psych for possible adjustment disorder and anxiety.     Per interview, patient's anxious and depressive emotions seem to stem from worsening medical symptoms and acute distress from difficulty with breathing. No history of recurrent depressant symptoms or significant clinical course of psychiatric illness. Given identifiable stressor and associated mood/anxiety symptoms diagnosis likely is adjustment disorder.     1/31/25--patient feeling more medically stable. Ease of breathing has improved--alongside anxiety and delirium. He is agreeable to increasing Zoloft to 50 mg qd and hydroxyzine 25 mg q6 hours prn. No acute safety concerns. Patient wants to live and is future oriented. No indication for inpatient psychiatry. Patient can follow up with PCP who can taper if needed in 6-12 months.  
Mr. Ike Narvaez 69-year-old male, domiciled alone, has 1 child daughter and grandchild, retired, PMHx of CAD s/p CABG, A-fib on Xarelto, DM on insulin pump, and recent diagnosis of Large B cell lymphoma on chemo, Rituximab, with last treatment 5 days ago, NSSIB,  who was admitted to the hospital for acute hypoxia respiratory failure 2/2 pneumonia, consulted to psych for possible adjustment disorder and anxiety.     Per interview, patient's anxious and depressive emotions seem to stem from worsening medical symptoms and acute distress from difficulty with breathing. No history of recurrent depressant symptoms or significant clinical course of psychiatric illness. Given identifiable stressor and associated mood/anxiety symptoms diagnosis likely is adjustment disorder. Patient is interested in starting a daily medication for his anxiety. He is agreeable to starting Zoloft 25 mg qd and hydroxyzine 25 mg q6 hours prn. Due to high flow bipap can utilize haldol IV if needed for anxiety. Discussed side effects and risks vs benefits with the patient. Patient is agreeable to starting melatonin nightly to improve his sleep. Currently sometimes NPO due to bipap. No acute safety concerns. Patient wants to live and is future oriented. No indication for inpatient psychiatry.   
Mr. Ike Narvaez 69-year-old male, domiciled alone, has 1 child daughter and grandchild, retired, PMHx of CAD s/p CABG, A-fib on Xarelto, DM on insulin pump, and recent diagnosis of Large B cell lymphoma on chemo, Rituximab, with last treatment 5 days ago, NSSIB,  who was admitted to the hospital for acute hypoxia respiratory failure 2/2 pneumonia, consulted to psych for possible adjustment disorder and anxiety.     Per interview, patient's anxious and depressive emotions seem to stem from worsening medical symptoms and acute distress from difficulty with breathing. No history of recurrent depressant symptoms or significant clinical course of psychiatric illness. Given identifiable stressor and associated mood/anxiety symptoms diagnosis likely is adjustment disorder. Patient is interested in starting a daily medication for his anxiety. He is agreeable to starting Zoloft 25 mg qd and hydroxyzine 25 mg q6 hours prn. Endorsed relative benefit for his daughter--likely will have improved efficacy. Discussed side effects and risks vs benefits with the patient. Patient is agreeable to starting melatonin nightly to improve his sleep. No acute safety concerns. Patient wants to live and is future oriented. No indication for inpatient psychiatry.    Patient on re-assessment is now delirious and intermittently agitated. He had some improvement from haldol overnight however, is now NPO due to bipap.

## 2025-01-31 NOTE — PROGRESS NOTE ADULT - SUBJECTIVE AND OBJECTIVE BOX
Patient is a 70y old  Male who presents with a chief complaint of sob (27 Jan 2025 09:39)        Over Night Events:    No fevers   HFNC 50/50         ROS:  See HPI    PHYSICAL EXAM    ICU Vital Signs Last 24 Hrs  T(C): 36.3 (31 Jan 2025 08:00), Max: 36.8 (30 Jan 2025 12:00)  T(F): 97.3 (31 Jan 2025 08:00), Max: 98.2 (30 Jan 2025 12:00)  HR: 86 (31 Jan 2025 08:00) (85 - 108)  BP: 158/74 (31 Jan 2025 08:00) (118/56 - 173/67)  BP(mean): 106 (31 Jan 2025 08:00) (79 - 115)  ABP: --  ABP(mean): --  RR: 16 (31 Jan 2025 08:00) (15 - 31)  SpO2: 97% (31 Jan 2025 08:00) (91% - 100%)    O2 Parameters below as of 31 Jan 2025 08:00  Patient On (Oxygen Delivery Method): nasal cannula, high flow  O2 Flow (L/min): 60  O2 Concentration (%): 70        General: NAD   HEENT: LEXI             Lymphatic system: No cervical LN   Lungs: Bilateral BS, coarse   Cardiovascular: Regular   Gastrointestinal: Soft, Positive BS  Extremities: No clubbing.  Moves extremities.  Full Range of motion   Skin: Warm, intact  Neurological: No motor or sensory deficit       01-30-25 @ 07:01  -  01-31-25 @ 07:00  --------------------------------------------------------  IN:    Heparin Infusion: 288 mL    IV PiggyBack: 250 mL    Oral Fluid: 1000 mL  Total IN: 1538 mL    OUT:    Indwelling Catheter - Urethral (mL): 3040 mL  Total OUT: 3040 mL    Total NET: -1502 mL      01-31-25 @ 07:01  -  01-31-25 @ 08:27  --------------------------------------------------------  IN:    Heparin Infusion: 12 mL  Total IN: 12 mL    OUT:    Indwelling Catheter - Urethral (mL): 35 mL  Total OUT: 35 mL    Total NET: -23 mL          LABS:                            9.6    20.33 )-----------( 571      ( 31 Jan 2025 04:50 )             30.0                                               01-31    143  |  99  |  77[HH]  ----------------------------<  161[H]  4.2   |  31  |  2.2[H]    Ca    8.6      31 Jan 2025 04:50  Phos  5.3     01-31  Mg     1.8     01-31    TPro  5.0[L]  /  Alb  2.6[L]  /  TBili  0.4  /  DBili  x   /  AST  74[H]  /  ALT  60[H]  /  AlkPhos  424[H]  01-31      PTT - ( 31 Jan 2025 04:50 )  PTT:64.4 sec                                       Urinalysis Basic - ( 31 Jan 2025 04:50 )    Color: x / Appearance: x / SG: x / pH: x  Gluc: 161 mg/dL / Ketone: x  / Bili: x / Urobili: x   Blood: x / Protein: x / Nitrite: x   Leuk Esterase: x / RBC: x / WBC x   Sq Epi: x / Non Sq Epi: x / Bacteria: x                                                  LIVER FUNCTIONS - ( 31 Jan 2025 04:50 )  Alb: 2.6 g/dL / Pro: 5.0 g/dL / ALK PHOS: 424 U/L / ALT: 60 U/L / AST: 74 U/L / GGT: x                                                                                                                                   ABG - ( 31 Jan 2025 07:40 )  pH, Arterial: 7.44  pH, Blood: x     /  pCO2: 56    /  pO2: 80    / HCO3: 38    / Base Excess: 11.6  /  SaO2: 97.4                MEDICATIONS  (STANDING):  allopurinol 150 milliGRAM(s) Oral daily  ascorbic acid 500 milliGRAM(s) Oral daily  aspirin  chewable 81 milliGRAM(s) Oral daily  atorvastatin 10 milliGRAM(s) Oral at bedtime  benzocaine 20% Spray 1 Spray(s) Topical three times a day  buMETAnide Injectable 2 milliGRAM(s) IV Push every 12 hours  cetirizine 10 milliGRAM(s) Oral daily  chlorhexidine 2% Cloths 1 Application(s) Topical daily  dextrose 5%. 1000 milliLiter(s) (50 mL/Hr) IV Continuous <Continuous>  dextrose 5%. 1000 milliLiter(s) (100 mL/Hr) IV Continuous <Continuous>  dextrose 50% Injectable 25 Gram(s) IV Push once  dextrose 50% Injectable 12.5 Gram(s) IV Push once  dextrose 50% Injectable 25 Gram(s) IV Push once  glucagon  Injectable 1 milliGRAM(s) IntraMuscular once  heparin  Infusion.  Unit(s)/Hr (10 mL/Hr) IV Continuous <Continuous>  influenza  Vaccine (HIGH DOSE) 0.5 milliLiter(s) IntraMuscular once  insulin glargine Injectable (LANTUS) 30 Unit(s) SubCutaneous two times a day  insulin lispro (ADMELOG) corrective regimen sliding scale   SubCutaneous three times a day before meals  insulin lispro Injectable (ADMELOG) 25 Unit(s) SubCutaneous three times a day before meals  melatonin 10 milliGRAM(s) Oral at bedtime  metoprolol tartrate Injectable 5 milliGRAM(s) IV Push every 6 hours  nystatin    Suspension 288229 Unit(s) Swish and Swallow three times a day  nystatin Cream 1 Application(s) Topical every 12 hours  pantoprazole  Injectable 40 milliGRAM(s) IV Push daily  polyethylene glycol 3350 17 Gram(s) Oral every 12 hours  QUEtiapine 25 milliGRAM(s) Oral at bedtime  senna 2 Tablet(s) Oral every 12 hours  sertraline 25 milliGRAM(s) Oral daily    MEDICATIONS  (PRN):  acetaminophen     Tablet .. 650 milliGRAM(s) Oral every 6 hours PRN Temp greater or equal to 38C (100.4F), Mild Pain (1 - 3)  albuterol/ipratropium for Nebulization 3 milliLiter(s) Nebulizer every 4 hours PRN Shortness of Breath and/or Wheezing  dextrose Oral Gel 15 Gram(s) Oral once PRN Blood Glucose LESS THAN 70 milliGRAM(s)/deciliter  haloperidol    Injectable 3 milliGRAM(s) IntraMuscular every 6 hours PRN Agitation  heparin   Injectable 6000 Unit(s) IV Push every 6 hours PRN For aPTT less than 40  hydrOXYzine hydrochloride 25 milliGRAM(s) Oral every 6 hours PRN Anxiety  magnesium hydroxide Suspension 30 milliLiter(s) Oral daily PRN Constipation  ondansetron Injectable 4 milliGRAM(s) IV Push every 6 hours PRN Nausea and/or Vomiting      Xrays:  better                                                                                    ECHO

## 2025-01-31 NOTE — BH CONSULTATION LIAISON PROGRESS NOTE - NSBHATTESTBILLING_PSY_A_CORE
51550-Rbqlqhhzte OBS or IP - moderate complexity OR 35-49 mins
79055-Ovibnarkpo OBS or IP - moderate complexity OR 35-49 mins
26578-Lfzdgrztzk OBS or IP - moderate complexity OR 35-49 mins

## 2025-02-01 LAB
ALBUMIN SERPL ELPH-MCNC: 3 G/DL — LOW (ref 3.5–5.2)
ALP SERPL-CCNC: 465 U/L — HIGH (ref 30–115)
ALT FLD-CCNC: 61 U/L — HIGH (ref 0–41)
ANION GAP SERPL CALC-SCNC: 14 MMOL/L — SIGNIFICANT CHANGE UP (ref 7–14)
APTT BLD: 68.9 SEC — HIGH (ref 27–39.2)
AST SERPL-CCNC: 69 U/L — HIGH (ref 0–41)
BASOPHILS # BLD AUTO: 0 K/UL — SIGNIFICANT CHANGE UP (ref 0–0.2)
BASOPHILS NFR BLD AUTO: 0 % — SIGNIFICANT CHANGE UP (ref 0–1)
BILIRUB SERPL-MCNC: 0.4 MG/DL — SIGNIFICANT CHANGE UP (ref 0.2–1.2)
BLD GP AB SCN SERPL QL: SIGNIFICANT CHANGE UP
BUN SERPL-MCNC: 75 MG/DL — CRITICAL HIGH (ref 10–20)
CALCIUM SERPL-MCNC: 8.6 MG/DL — SIGNIFICANT CHANGE UP (ref 8.4–10.5)
CHLORIDE SERPL-SCNC: 97 MMOL/L — LOW (ref 98–110)
CO2 SERPL-SCNC: 35 MMOL/L — HIGH (ref 17–32)
CREAT SERPL-MCNC: 2.1 MG/DL — HIGH (ref 0.7–1.5)
CULTURE RESULTS: SIGNIFICANT CHANGE UP
EGFR: 34 ML/MIN/1.73M2 — LOW
EOSINOPHIL # BLD AUTO: 0 K/UL — SIGNIFICANT CHANGE UP (ref 0–0.7)
EOSINOPHIL NFR BLD AUTO: 0 % — SIGNIFICANT CHANGE UP (ref 0–8)
GALACTOMANNAN AG SERPL-ACNC: 0.04 INDEX — SIGNIFICANT CHANGE UP (ref 0–0.49)
GLUCOSE BLDC GLUCOMTR-MCNC: 131 MG/DL — HIGH (ref 70–99)
GLUCOSE BLDC GLUCOMTR-MCNC: 138 MG/DL — HIGH (ref 70–99)
GLUCOSE BLDC GLUCOMTR-MCNC: 142 MG/DL — HIGH (ref 70–99)
GLUCOSE BLDC GLUCOMTR-MCNC: 156 MG/DL — HIGH (ref 70–99)
GLUCOSE SERPL-MCNC: 108 MG/DL — HIGH (ref 70–99)
HCT VFR BLD CALC: 32.6 % — LOW (ref 42–52)
HGB BLD-MCNC: 10.3 G/DL — LOW (ref 14–18)
LYMPHOCYTES # BLD AUTO: 0.17 K/UL — LOW (ref 1.2–3.4)
LYMPHOCYTES # BLD AUTO: 0.9 % — LOW (ref 20.5–51.1)
MAGNESIUM SERPL-MCNC: 2.2 MG/DL — SIGNIFICANT CHANGE UP (ref 1.8–2.4)
MCHC RBC-ENTMCNC: 30.3 PG — SIGNIFICANT CHANGE UP (ref 27–31)
MCHC RBC-ENTMCNC: 31.6 G/DL — LOW (ref 32–37)
MCV RBC AUTO: 95.9 FL — HIGH (ref 80–94)
MONOCYTES # BLD AUTO: 2.88 K/UL — HIGH (ref 0.1–0.6)
MONOCYTES NFR BLD AUTO: 15.2 % — HIGH (ref 1.7–9.3)
NEUTROPHILS # BLD AUTO: 14.7 K/UL — HIGH (ref 1.4–6.5)
NEUTROPHILS NFR BLD AUTO: 74.1 % — SIGNIFICANT CHANGE UP (ref 42.2–75.2)
NRBC # BLD: SIGNIFICANT CHANGE UP /100 WBCS (ref 0–0)
NRBC BLD-RTO: SIGNIFICANT CHANGE UP /100 WBCS (ref 0–0)
PHOSPHATE SERPL-MCNC: 4.6 MG/DL — SIGNIFICANT CHANGE UP (ref 2.1–4.9)
PLATELET # BLD AUTO: 720 K/UL — HIGH (ref 130–400)
PMV BLD: 12 FL — HIGH (ref 7.4–10.4)
POTASSIUM SERPL-MCNC: 5.1 MMOL/L — HIGH (ref 3.5–5)
POTASSIUM SERPL-SCNC: 5.1 MMOL/L — HIGH (ref 3.5–5)
PROT SERPL-MCNC: 5.4 G/DL — LOW (ref 6–8)
RBC # BLD: 3.4 M/UL — LOW (ref 4.7–6.1)
RBC # FLD: 16.4 % — HIGH (ref 11.5–14.5)
SODIUM SERPL-SCNC: 146 MMOL/L — SIGNIFICANT CHANGE UP (ref 135–146)
SPECIMEN SOURCE: SIGNIFICANT CHANGE UP
WBC # BLD: 18.94 K/UL — HIGH (ref 4.8–10.8)
WBC # FLD AUTO: 18.94 K/UL — HIGH (ref 4.8–10.8)

## 2025-02-01 PROCEDURE — 93010 ELECTROCARDIOGRAM REPORT: CPT

## 2025-02-01 PROCEDURE — 71045 X-RAY EXAM CHEST 1 VIEW: CPT | Mod: 26

## 2025-02-01 PROCEDURE — 99233 SBSQ HOSP IP/OBS HIGH 50: CPT

## 2025-02-01 RX ORDER — ACETAMINOPHEN 500 MG/5ML
1000 LIQUID (ML) ORAL ONCE
Refills: 0 | Status: COMPLETED | OUTPATIENT
Start: 2025-02-01 | End: 2025-02-01

## 2025-02-01 RX ORDER — SIMETHICONE 80 MG
80 TABLET,CHEWABLE ORAL DAILY
Refills: 0 | Status: DISCONTINUED | OUTPATIENT
Start: 2025-02-01 | End: 2025-02-17

## 2025-02-01 RX ORDER — CALCIUM CARBONATE 750 MG/1
1 TABLET ORAL THREE TIMES A DAY
Refills: 0 | Status: DISCONTINUED | OUTPATIENT
Start: 2025-02-01 | End: 2025-02-17

## 2025-02-01 RX ORDER — METOPROLOL SUCCINATE 50 MG/1
25 TABLET, EXTENDED RELEASE ORAL ONCE
Refills: 0 | Status: DISCONTINUED | OUTPATIENT
Start: 2025-02-01 | End: 2025-02-01

## 2025-02-01 RX ORDER — METOPROLOL SUCCINATE 50 MG/1
25 TABLET, EXTENDED RELEASE ORAL
Refills: 0 | Status: DISCONTINUED | OUTPATIENT
Start: 2025-02-01 | End: 2025-02-17

## 2025-02-01 RX ORDER — CALCIUM CARBONATE 750 MG/1
1 TABLET ORAL ONCE
Refills: 0 | Status: COMPLETED | OUTPATIENT
Start: 2025-02-01 | End: 2025-02-01

## 2025-02-01 RX ORDER — METOPROLOL SUCCINATE 50 MG/1
25 TABLET, EXTENDED RELEASE ORAL
Refills: 0 | Status: DISCONTINUED | OUTPATIENT
Start: 2025-02-01 | End: 2025-02-01

## 2025-02-01 RX ORDER — METOPROLOL SUCCINATE 50 MG/1
25 TABLET, EXTENDED RELEASE ORAL ONCE
Refills: 0 | Status: COMPLETED | OUTPATIENT
Start: 2025-02-01 | End: 2025-02-01

## 2025-02-01 RX ADMIN — Medication 1 APPLICATION(S): at 12:56

## 2025-02-01 RX ADMIN — Medication 80 MILLIGRAM(S): at 22:37

## 2025-02-01 RX ADMIN — SERTRALINE 50 MILLIGRAM(S): 100 TABLET, FILM COATED ORAL at 12:38

## 2025-02-01 RX ADMIN — INSULIN LISPRO 25 UNIT(S): 100 INJECTION, SOLUTION INTRAVENOUS; SUBCUTANEOUS at 12:39

## 2025-02-01 RX ADMIN — METOPROLOL SUCCINATE 25 MILLIGRAM(S): 50 TABLET, EXTENDED RELEASE ORAL at 00:34

## 2025-02-01 RX ADMIN — BENZOCAINE 1 SPRAY(S): 220 SPRAY, METERED PERIODONTAL at 13:19

## 2025-02-01 RX ADMIN — NYSTATIN 1 APPLICATION(S): 100000 CREAM TOPICAL at 17:13

## 2025-02-01 RX ADMIN — Medication 4 MILLIGRAM(S): at 13:32

## 2025-02-01 RX ADMIN — Medication 500 MILLIGRAM(S): at 12:38

## 2025-02-01 RX ADMIN — NYSTATIN 1 APPLICATION(S): 100000 CREAM TOPICAL at 05:11

## 2025-02-01 RX ADMIN — INSULIN LISPRO 25 UNIT(S): 100 INJECTION, SOLUTION INTRAVENOUS; SUBCUTANEOUS at 08:58

## 2025-02-01 RX ADMIN — Medication 500000 UNIT(S): at 22:37

## 2025-02-01 RX ADMIN — HEPARIN SODIUM 1200 UNIT(S)/HR: 1000 INJECTION INTRAVENOUS; SUBCUTANEOUS at 09:02

## 2025-02-01 RX ADMIN — INSULIN LISPRO 3: 100 INJECTION, SOLUTION INTRAVENOUS; SUBCUTANEOUS at 08:59

## 2025-02-01 RX ADMIN — BENZOCAINE 1 SPRAY(S): 220 SPRAY, METERED PERIODONTAL at 22:38

## 2025-02-01 RX ADMIN — Medication 400 MILLIGRAM(S): at 16:37

## 2025-02-01 RX ADMIN — INSULIN LISPRO 0: 100 INJECTION, SOLUTION INTRAVENOUS; SUBCUTANEOUS at 16:41

## 2025-02-01 RX ADMIN — METOPROLOL SUCCINATE 25 MILLIGRAM(S): 50 TABLET, EXTENDED RELEASE ORAL at 17:12

## 2025-02-01 RX ADMIN — Medication 10 MILLIGRAM(S): at 22:37

## 2025-02-01 RX ADMIN — INSULIN GLARGINE-YFGN 27 UNIT(S): 100 INJECTION, SOLUTION SUBCUTANEOUS at 22:38

## 2025-02-01 RX ADMIN — BUMETANIDE 2 MILLIGRAM(S): 1 TABLET ORAL at 05:12

## 2025-02-01 RX ADMIN — Medication 1000 MILLIGRAM(S): at 16:47

## 2025-02-01 RX ADMIN — Medication 40 MILLIGRAM(S): at 12:39

## 2025-02-01 RX ADMIN — METOPROLOL SUCCINATE 25 MILLIGRAM(S): 50 TABLET, EXTENDED RELEASE ORAL at 05:13

## 2025-02-01 RX ADMIN — INSULIN LISPRO 25 UNIT(S): 100 INJECTION, SOLUTION INTRAVENOUS; SUBCUTANEOUS at 16:41

## 2025-02-01 RX ADMIN — POLYETHYLENE GLYCOL 3350 17 GRAM(S): 17 POWDER, FOR SOLUTION ORAL at 05:09

## 2025-02-01 RX ADMIN — Medication 2 TABLET(S): at 17:13

## 2025-02-01 RX ADMIN — INSULIN GLARGINE-YFGN 27 UNIT(S): 100 INJECTION, SOLUTION SUBCUTANEOUS at 08:58

## 2025-02-01 RX ADMIN — Medication 10 MILLIGRAM(S): at 12:38

## 2025-02-01 RX ADMIN — ATORVASTATIN CALCIUM 10 MILLIGRAM(S): 80 TABLET, FILM COATED ORAL at 22:38

## 2025-02-01 RX ADMIN — Medication 150 MILLIGRAM(S): at 12:38

## 2025-02-01 RX ADMIN — Medication 81 MILLIGRAM(S): at 12:39

## 2025-02-01 RX ADMIN — BENZOCAINE 1 SPRAY(S): 220 SPRAY, METERED PERIODONTAL at 05:09

## 2025-02-01 RX ADMIN — Medication 2 TABLET(S): at 05:12

## 2025-02-01 RX ADMIN — Medication 500000 UNIT(S): at 13:20

## 2025-02-01 RX ADMIN — INSULIN LISPRO 0: 100 INJECTION, SOLUTION INTRAVENOUS; SUBCUTANEOUS at 12:40

## 2025-02-01 RX ADMIN — POLYETHYLENE GLYCOL 3350 17 GRAM(S): 17 POWDER, FOR SOLUTION ORAL at 17:12

## 2025-02-01 RX ADMIN — CALCIUM CARBONATE 1 TABLET(S): 750 TABLET ORAL at 05:08

## 2025-02-01 RX ADMIN — Medication 500000 UNIT(S): at 05:12

## 2025-02-01 NOTE — PHYSICAL THERAPY INITIAL EVALUATION ADULT - LEVEL OF INDEPENDENCE: SIT/STAND, REHAB EVAL
did not proceed due to weakness, decreased O2 ( 88%) with SOB . Pt tolerated 10 mins sitting at edge of bed . Pt 's spO2 improved to 94% once semirecline in bed/unable to perform

## 2025-02-01 NOTE — PHYSICAL THERAPY INITIAL EVALUATION ADULT - PERTINENT HX OF CURRENT PROBLEM, REHAB EVAL
69-year-old man with extensive medical history including CAD (status post CABG;), DM (on insulin pump), atrial fibrillation on Xarelto, history of distal pancreatomy and splenectomy for unclear reason referred to the ED by his oncologist for abnormal labs. Patient was recently diagnosed with Diffuse large B cell Lymphoma on pathology of right groin mass and is following with [facility] oncology team. Patient was discharged on 01.17.2025 from hospital after inpatient chemotherapy (Completed cycle 1 of R-EPOCH (D1 on 1/11/2024). Tolerated well. Rituximab was given on D5).   Over past 3 days patient started to get a productive cough with yellow sputum production.  Patient is also endorsing chills and nausea which he states is baseline. He had 1 reading on low grade fever 100.5 on day of presentation.   Pt. referred to PT for eval and tx.

## 2025-02-01 NOTE — OCCUPATIONAL THERAPY INITIAL EVALUATION ADULT - GENERAL OBSERVATIONS, REHAB EVAL
Pt encountered and left semi reclined in bed. Brother at the bedside. +moss, +heparin drip, +HFNC 40, +tele, +pulse ox. +LUE midline, +RUE PICC line. Agreeable to OT IE. PT Sara present to assist with pt safety and mobility. BILLY Joyce also present to assist with line management while mobilizing patient. Patient presents with generalized weakness and significant deconditioning mpacting functional performance and activity tolerace at this time. Patient reports increased fatigue seated EOB ~5 min 02 monitored and fluctuated between 88-97% on HFNC. Patient returned to bed post assessment. All lines and monitoring intact NAD. BILLY Joyce present and aware.

## 2025-02-01 NOTE — OCCUPATIONAL THERAPY INITIAL EVALUATION ADULT - PERTINENT HX OF CURRENT PROBLEM, REHAB EVAL
69-year-old man with extensive medical history including CAD (status post CABG;), DM (on insulin pump), atrial fibrillation on Xarelto, history of distal pancreatomy and splenectomy for unclear reason referred to the ED by his oncologist for abnormal labs. Patient was discharged on 01.17.2025 from hospital after inpatient chemotherapy (Completed cycle 1 of R-EPOCH (D1 on 1/11/2024). Pt found to have chronic hypercapnia likely due to narcotics, Acute hypoxemic respiratory failure on HFNC, Influenza A infection / pneumonia, RASHAD / OHS, Possible superimposed bacterial pneumonia

## 2025-02-01 NOTE — PROGRESS NOTE ADULT - ASSESSMENT
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 DC Seroquel.     HEENT: Oral care    PULMONARY: APRN BIPAP. CXR better. HFNC 50/50 for now.     CARDIOVASCULAR: Echocardiogram: normal EF. BNP noted to be elevated.   continue home oral dieretics     GI: GI prophylaxis.   Oral diet when on HFNC.   Free water restriction to 2 l.   Tap water enema prn; aggressive BM regimen.     RENAL:  Rise in BUN, creat noted. Resume home diuretic doses. Nephro following.     INFECTIOUS DISEASE: Finished abx course. procal. ESR and CRP high.     HEMATOLOGICAL: On AC with heparin for afib. Keep PTT therapeutic. Daily coags and CBC. Hem follow up.     ENDOCRINE:  Follow up FS. Increase insulin regimen to keep 140-180.     MUSCULOSKELETAL:  Out of bed to chair; PT OT.     FULL CODE    MICU for today; possible SDU AM.     Voiding trial once ambulating. PICC line.

## 2025-02-01 NOTE — OCCUPATIONAL THERAPY INITIAL EVALUATION ADULT - FINE MOTOR COORDINATION, LEFT HAND THUMB/FINGER OPPOSITION SKILLS, OT EVAL
Date & Time: 8/28/2024, 5:52 PM  Patient: Phill Thacker  Encounter Provider(s):    Denton Dunn DO Johnston, Glennon, PA-C       To Whom It May Concern:    Phill Thacker was seen and treated in our department on 8/28/2024.  No gym or sports until evaluated and cleared by orthopedic specialty  If you have any questions or concerns, please do not hesitate to call.        _____________________________  Physician/APC Signature            normal performance

## 2025-02-01 NOTE — OCCUPATIONAL THERAPY INITIAL EVALUATION ADULT - LIVES WITH, PROFILE
ph, +JUDE, +single level setup except goes down 8 steps to basement to access stall shower, +chair/alone

## 2025-02-01 NOTE — OCCUPATIONAL THERAPY INITIAL EVALUATION ADULT - LEVEL OF INDEPENDENCE:TOILET, OT EVAL
HTN (hypertension) CIDP (chronic inflammatory demyelinating polyneuropathy) +moss/dependent (less than 25% patients effort)

## 2025-02-01 NOTE — PROGRESS NOTE ADULT - SUBJECTIVE AND OBJECTIVE BOX
Patient is a 70y old  Male who presents with a chief complaint of sob (27 Jan 2025 09:39)      INTERVAL HPI/OVERNIGHT EVENTS:    acetaminophen     Tablet .. 650 milliGRAM(s) Oral every 6 hours PRN  albuterol/ipratropium for Nebulization 3 milliLiter(s) Nebulizer every 4 hours PRN  allopurinol 150 milliGRAM(s) Oral daily  ascorbic acid 500 milliGRAM(s) Oral daily  aspirin  chewable 81 milliGRAM(s) Oral daily  atorvastatin 10 milliGRAM(s) Oral at bedtime  benzocaine 20% Spray 1 Spray(s) Topical three times a day  buMETAnide 2 milliGRAM(s) Oral daily  cetirizine 10 milliGRAM(s) Oral daily  chlorhexidine 2% Cloths 1 Application(s) Topical daily  dextrose 5%. 1000 milliLiter(s) IV Continuous <Continuous>  dextrose 5%. 1000 milliLiter(s) IV Continuous <Continuous>  dextrose 50% Injectable 25 Gram(s) IV Push once  dextrose 50% Injectable 12.5 Gram(s) IV Push once  dextrose 50% Injectable 25 Gram(s) IV Push once  dextrose Oral Gel 15 Gram(s) Oral once PRN  glucagon  Injectable 1 milliGRAM(s) IntraMuscular once  haloperidol    Injectable 2.5 milliGRAM(s) IntraMuscular every 6 hours PRN  heparin   Injectable 6000 Unit(s) IV Push every 6 hours PRN  heparin  Infusion.  Unit(s)/Hr IV Continuous <Continuous>  hydrOXYzine hydrochloride 25 milliGRAM(s) Oral every 6 hours PRN  influenza  Vaccine (HIGH DOSE) 0.5 milliLiter(s) IntraMuscular once  insulin glargine Injectable (LANTUS) 27 Unit(s) SubCutaneous two times a day  insulin lispro (ADMELOG) corrective regimen sliding scale   SubCutaneous three times a day before meals  insulin lispro Injectable (ADMELOG) 25 Unit(s) SubCutaneous three times a day before meals  magnesium hydroxide Suspension 30 milliLiter(s) Oral daily PRN  melatonin 10 milliGRAM(s) Oral at bedtime  metolazone 2.5 milliGRAM(s) Oral daily  metoprolol tartrate 25 milliGRAM(s) Oral two times a day  nystatin    Suspension 093471 Unit(s) Swish and Swallow three times a day  nystatin Cream 1 Application(s) Topical every 12 hours  ondansetron Injectable 4 milliGRAM(s) IV Push every 6 hours PRN  pantoprazole  Injectable 40 milliGRAM(s) IV Push daily  polyethylene glycol 3350 17 Gram(s) Oral every 12 hours  senna 2 Tablet(s) Oral every 12 hours  sertraline 50 milliGRAM(s) Oral daily  traZODone 50 milliGRAM(s) Oral at bedtime PRN      REVIEW OF SYSTEMS:  CONSTITUTIONAL: No fever, chills  EYES: No eye pain, visual disturbances, or discharge  ENMT:  No difficulty hearing, tinnitus, vertigo; No sinus or throat pain  RESPIRATORY: No cough, wheezing, chills or hemoptysis; No shortness of breath  CARDIOVASCULAR: No chest pain, palpitations  GASTROINTESTINAL: No abdominal pain. No nausea, vomiting, or diarrhea  GENITOURINARY: No dysuria  NEUROLOGICAL: No HA  SKIN: No itching, burning, rashes, or lesions   ENDOCRINE: No heat or cold intolerance; No hair loss  PSYCHIATRIC: No depression, anxiety, mood swings, or difficulty sleeping      T(C): 36.1 (02-01-25 @ 00:00), Max: 36.7 (01-31-25 @ 16:00)  HR: 87 (02-01-25 @ 03:00) (77 - 93)  BP: 138/63 (02-01-25 @ 03:00) (107/53 - 196/77)  RR: 22 (02-01-25 @ 03:00) (15 - 30)  SpO2: 99% (02-01-25 @ 03:00) (93% - 100%)  Wt(kg): --Vital Signs Last 24 Hrs  T(C): 36.1 (01 Feb 2025 00:00), Max: 36.7 (31 Jan 2025 16:00)  T(F): 97 (01 Feb 2025 00:00), Max: 98.1 (31 Jan 2025 16:00)  HR: 87 (01 Feb 2025 03:00) (77 - 93)  BP: 138/63 (01 Feb 2025 03:00) (107/53 - 196/77)  BP(mean): 90 (01 Feb 2025 03:00) (74 - 111)  RR: 22 (01 Feb 2025 03:00) (15 - 30)  SpO2: 99% (01 Feb 2025 03:00) (93% - 100%)    Parameters below as of 01 Feb 2025 00:00  Patient On (Oxygen Delivery Method): nasal cannula, high flow  O2 Flow (L/min): 40  O2 Concentration (%): 40    PHYSICAL EXAM:  General: NAD   HEENT: LEXI             Lymphatic system: No cervical LN   Lungs: Bilateral BS, coarse   Cardiovascular: Regular   Gastrointestinal: Soft, Positive BS  Extremities: No clubbing.  Moves extremities.  Full Range of motion   Skin: Warm, intact  Neurological: No motor or sensory deficit       Consultant(s) Notes Reviewed:  [x ] YES  [ ] NO  Care Discussed with Consultants/Other Providers [ x] YES  [ ] NO    LABS:                        9.6    20.33 )-----------( 571      ( 31 Jan 2025 04:50 )             30.0     01-31    143  |  99  |  77[HH]  ----------------------------<  161[H]  4.2   |  31  |  2.2[H]    Ca    8.6      31 Jan 2025 04:50  Phos  5.3     01-31  Mg     1.8     01-31    TPro  5.0[L]  /  Alb  2.6[L]  /  TBili  0.4  /  DBili  x   /  AST  74[H]  /  ALT  60[H]  /  AlkPhos  424[H]  01-31    PTT - ( 31 Jan 2025 04:50 )  PTT:64.4 sec  Urinalysis Basic - ( 31 Jan 2025 04:50 )    Color: x / Appearance: x / SG: x / pH: x  Gluc: 161 mg/dL / Ketone: x  / Bili: x / Urobili: x   Blood: x / Protein: x / Nitrite: x   Leuk Esterase: x / RBC: x / WBC x   Sq Epi: x / Non Sq Epi: x / Bacteria: x      CAPILLARY BLOOD GLUCOSE      POCT Blood Glucose.: 93 mg/dL (31 Jan 2025 23:03)  POCT Blood Glucose.: 118 mg/dL (31 Jan 2025 19:15)  POCT Blood Glucose.: 58 mg/dL (31 Jan 2025 17:07)  POCT Blood Glucose.: 221 mg/dL (31 Jan 2025 10:44)  POCT Blood Glucose.: 194 mg/dL (31 Jan 2025 08:38)  POCT Blood Glucose.: 177 mg/dL (31 Jan 2025 06:15)      ABG - ( 31 Jan 2025 07:40 )  pH, Arterial: 7.44  pH, Blood: x     /  pCO2: 56    /  pO2: 80    / HCO3: 38    / Base Excess: 11.6  /  SaO2: 97.4              Urinalysis Basic - ( 31 Jan 2025 04:50 )    Color: x / Appearance: x / SG: x / pH: x  Gluc: 161 mg/dL / Ketone: x  / Bili: x / Urobili: x   Blood: x / Protein: x / Nitrite: x   Leuk Esterase: x / RBC: x / WBC x   Sq Epi: x / Non Sq Epi: x / Bacteria: x          RADIOLOGY & ADDITIONAL TESTS:    Imaging Personally Reviewed:  [ ] YES  [ ] NO

## 2025-02-01 NOTE — PROGRESS NOTE ADULT - ASSESSMENT
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 DC Seroquel.     HEENT: Oral care    PULMONARY: APRN BIPAP. CXR better. HFNC 50/50 for now.     CARDIOVASCULAR: Echocardiogram: normal EF. BNP noted to be elevated. Resume home oral dieretics     GI: GI prophylaxis. Oral diet when off BIPAP. Free water restriction to 2 l. Tap water enema; aggressive BM regimen.     RENAL:  Rise in BUN, creat noted. Resume home diuretic doses. Nephro following.     INFECTIOUS DISEASE: Finished abx course. procal. ESR and CRP high.     HEMATOLOGICAL: On AC with heparin for afib. Keep PTT therapeutic. Daily coags and CBC. Hem follow up.     ENDOCRINE:  Follow up FS. Increase insulin regimen to keep 140-180.     MUSCULOSKELETAL:  Out of bed to chair; PT OT.     FULL CODE    possible SDU today.     Voiding trial once ambulating. PICC line.

## 2025-02-01 NOTE — PROGRESS NOTE ADULT - SUBJECTIVE AND OBJECTIVE BOX
Patient is a 70y old  Male who presents with a chief complaint of sob (27 Jan 2025 09:39)        HPI:  69-year-old man with extensive medical history including CAD (status post CABG;), DM (on insulin pump), atrial fibrillation on Xarelto, history of distal pancreatomy and splenectomy for unclear reason referred to the ED by his oncologist for abnormal labs. Patient was recently diagnosed with Diffuse large B cell Lymphoma on pathology of right groin mass and is following with [facility] oncology team. Patient was discharged on 01.17.2025 from hospital after inpatient chemotherapy (Completed cycle 1 of R-EPOCH (D1 on 1/11/2024). Tolerated well. Rituximab was given on D5).   Over past 3 days patient started to get a productive cough with yellow sputum production.  Patient is also endorsing chills and nausea which he states is baseline. He had 1 reading on low grade fever 100.5 on day of presentation.    On presentation vitals:  · BP Systolic	160 mm Hg  · BP Diastolic	86 mm Hg  · Heart Rate	89 /min  · Respiration Rate (breaths/min)	20 /min  · Temp (F)	98.9 Degrees F  · Temp (C)	37.2 Degrees C  · Temp site	oral    ED course:  O2 sats low on 4L O2 so he was promptly started on BIPAP. CXR with LLL infiltrate; sepsis labs sent with gentle hydration; abx given, Labs with neutropenia and elevated BNP, trop 68. EKG nonischemic.    CTA Chest: Negative for pulmonary emboli. Interval development of infiltrates within the lower lobes and lingula which may reflect acute multilobar pneumonia.    Patient admitted to medicine for acute hypoxic respiratory failure.   (20 Jan 2025 23:47)      PAST MEDICAL & SURGICAL HISTORY:  Diabetes mellitus, type 2      BPH (benign prostatic hyperplasia)      Water retention      Essential hypertension      Diabetes      CAD (coronary artery disease)      H/O hypercholesterolemia      Morbid obesity      Chronic kidney disease (CKD)      History of atrial fibrillation      H/O right coronary artery stent placement      History of resection of pancreas      History of cholecystectomy      History of left knee replacement      S/P CABG x 6      H/O cardiac radiofrequency ablation          FAMILY HISTORY:  Family history of lung cancer (Sibling)    Family history of diabetes mellitus (Mother)    Family history of coronary artery disease (Father)          Pt evaluated on rounds.  I reviewed the radiology tests and hospital record.    I reviewed previous notes on this patient.    Interval Events: No overnight events.      REVIEW OF SYSTEMS:   see HPI      OBJECTIVE:  ICU Vital Signs Last 24 Hrs  T(C): 36.1 (01 Feb 2025 08:00), Max: 36.7 (31 Jan 2025 16:00)  T(F): 97 (01 Feb 2025 08:00), Max: 98.1 (31 Jan 2025 16:00)  HR: 92 (01 Feb 2025 10:00) (77 - 94)  BP: 142/76 (01 Feb 2025 10:00) (110/62 - 196/77)  BP(mean): 100 (01 Feb 2025 10:00) (80 - 111)  ABP: --  ABP(mean): --  RR: 25 (01 Feb 2025 10:00) (16 - 30)  SpO2: 99% (01 Feb 2025 10:00) (91% - 100%)    O2 Parameters below as of 01 Feb 2025 10:00  Patient On (Oxygen Delivery Method): nasal cannula, high flow  O2 Flow (L/min): 40  O2 Concentration (%): 40          01-31 @ 07:01 - 02-01 @ 07:00  --------------------------------------------------------  IN: 1128 mL / OUT: 2650 mL / NET: -1522 mL    02-01 @ 07:01  -  02-01 @ 11:03  --------------------------------------------------------  IN: 168 mL / OUT: 450 mL / NET: -282 mL      CAPILLARY BLOOD GLUCOSE      POCT Blood Glucose.: 156 mg/dL (01 Feb 2025 08:33)        PHYSICAL EXAM:     · ENMT:   Airway patent,   Nasal mucosa clear.  Mouth with normal mucosa.   No thrush    · EYES:   Clear bilaterally,   pupils equal,   round and reactive to light.    · CARDIAC:   Normal rate,   regular rhythm.    Heart sounds S1, S2.   No murmurs, no rubs or gallops on auscultation  no edema        CAROTID:   normal systolic impulse  no bruits    · RESPIRATORY:   rales  normal chest expansion  no retractions or use of accessory muscles  percussion of chest demonstrates no hyperresonance or dullness    · GASTROINTESTINAL:  Abdomen soft,   non-tender,   + BS  liver/spleen not palpable    · SKIN:   Skin normal color for race,   warm, dry   No evidence of rash.    · HEME LYMPH:   no splenomegaly.  No cervical  lymphadenopathy.  no inguinal lymphadenopathy    HOSPITAL MEDICATIONS:  MEDICATIONS  (STANDING):  allopurinol 150 milliGRAM(s) Oral daily  ascorbic acid 500 milliGRAM(s) Oral daily  aspirin  chewable 81 milliGRAM(s) Oral daily  atorvastatin 10 milliGRAM(s) Oral at bedtime  benzocaine 20% Spray 1 Spray(s) Topical three times a day  buMETAnide 2 milliGRAM(s) Oral daily  cetirizine 10 milliGRAM(s) Oral daily  chlorhexidine 2% Cloths 1 Application(s) Topical daily  dextrose 5%. 1000 milliLiter(s) (50 mL/Hr) IV Continuous <Continuous>  dextrose 5%. 1000 milliLiter(s) (100 mL/Hr) IV Continuous <Continuous>  dextrose 50% Injectable 25 Gram(s) IV Push once  dextrose 50% Injectable 12.5 Gram(s) IV Push once  dextrose 50% Injectable 25 Gram(s) IV Push once  glucagon  Injectable 1 milliGRAM(s) IntraMuscular once  heparin  Infusion.  Unit(s)/Hr (10 mL/Hr) IV Continuous <Continuous>  influenza  Vaccine (HIGH DOSE) 0.5 milliLiter(s) IntraMuscular once  insulin glargine Injectable (LANTUS) 27 Unit(s) SubCutaneous two times a day  insulin lispro (ADMELOG) corrective regimen sliding scale   SubCutaneous three times a day before meals  insulin lispro Injectable (ADMELOG) 25 Unit(s) SubCutaneous three times a day before meals  melatonin 10 milliGRAM(s) Oral at bedtime  metolazone 2.5 milliGRAM(s) Oral daily  metoprolol tartrate 25 milliGRAM(s) Oral two times a day  nystatin    Suspension 683422 Unit(s) Swish and Swallow three times a day  nystatin Cream 1 Application(s) Topical every 12 hours  pantoprazole  Injectable 40 milliGRAM(s) IV Push daily  polyethylene glycol 3350 17 Gram(s) Oral every 12 hours  senna 2 Tablet(s) Oral every 12 hours  sertraline 50 milliGRAM(s) Oral daily    MEDICATIONS  (PRN):  acetaminophen     Tablet .. 650 milliGRAM(s) Oral every 6 hours PRN Temp greater or equal to 38C (100.4F), Mild Pain (1 - 3)  albuterol/ipratropium for Nebulization 3 milliLiter(s) Nebulizer every 4 hours PRN Shortness of Breath and/or Wheezing  dextrose Oral Gel 15 Gram(s) Oral once PRN Blood Glucose LESS THAN 70 milliGRAM(s)/deciliter  haloperidol    Injectable 2.5 milliGRAM(s) IntraMuscular every 6 hours PRN Agitation  heparin   Injectable 6000 Unit(s) IV Push every 6 hours PRN For aPTT less than 40  hydrOXYzine hydrochloride 25 milliGRAM(s) Oral every 6 hours PRN Anxiety  magnesium hydroxide Suspension 30 milliLiter(s) Oral daily PRN Constipation  ondansetron Injectable 4 milliGRAM(s) IV Push every 6 hours PRN Nausea and/or Vomiting  traZODone 50 milliGRAM(s) Oral at bedtime PRN insomnia    sodium chloride 0.9%.: Solution, 1000 milliLiter(s) infuse at 75 mL/Hr  sodium chloride 0.9% Bolus:   1000 milliLiter(s), IV Bolus, once, infuse over 60 Minute(s), Stop After 1 Doses      LABS:                        10.3   18.94 )-----------( 720      ( 01 Feb 2025 05:15 )             32.6     02-01    146  |  97[L]  |  75[HH]  ----------------------------<  108[H]  5.1[H]   |  35[H]  |  2.1[H]    Ca    8.6      01 Feb 2025 05:15  Phos  4.6     02-01  Mg     2.2     02-01    TPro  5.4[L]  /  Alb  3.0[L]  /  TBili  0.4  /  DBili  x   /  AST  69[H]  /  ALT  61[H]  /  AlkPhos  465[H]  02-01    PTT - ( 01 Feb 2025 05:15 )  PTT:68.9 sec  Urinalysis Basic - ( 01 Feb 2025 05:15 )    Color: x / Appearance: x / SG: x / pH: x  Gluc: 108 mg/dL / Ketone: x  / Bili: x / Urobili: x   Blood: x / Protein: x / Nitrite: x   Leuk Esterase: x / RBC: x / WBC x   Sq Epi: x / Non Sq Epi: x / Bacteria: x      Arterial Blood Gas:  01-31 @ 07:40  7.44/56/80/38/97.4/11.6  ABG lactate: --  Arterial Blood Gas:  01-30 @ 20:36  7.49/54/92/41/98.5/15.3  ABG lactate: --  Arterial Blood Gas:  01-30 @ 13:38  7.60/39/78/38/97.2/15.4  ABG lactate: --              SARS-CoV-2: NotDetec (13 Jan 2025 13:24)        ABG - ( 31 Jan 2025 07:40 )  pH, Arterial: 7.44  pH, Blood: x     /  pCO2: 56    /  pO2: 80    / HCO3: 38    / Base Excess: 11.6  /  SaO2: 97.4                RADIOLOGY: Today I personally interpreted the latest CXR and other pertinent films.

## 2025-02-01 NOTE — PHYSICAL THERAPY INITIAL EVALUATION ADULT - GENERAL OBSERVATIONS, REHAB EVAL
EKG/Medications/Labs/Imaging Studies
[id number] pm Chart reviewed. Pt. seen semirecline in bed , in No apparent distress , + O2 ( 40%) via HFNC, telemetry , left UE midline, RUE PICc line, moss catheter , brother ( Kurtis) at bedside . OT Maria Del Carmen and RN Maria Del Carmen present .  Pt. alert and oriented X 4

## 2025-02-02 LAB
ALBUMIN SERPL ELPH-MCNC: 3 G/DL — LOW (ref 3.5–5.2)
ALP SERPL-CCNC: 452 U/L — HIGH (ref 30–115)
ALT FLD-CCNC: 56 U/L — HIGH (ref 0–41)
ANION GAP SERPL CALC-SCNC: 16 MMOL/L — HIGH (ref 7–14)
APTT BLD: 77.6 SEC — CRITICAL HIGH (ref 27–39.2)
AST SERPL-CCNC: 53 U/L — HIGH (ref 0–41)
BASOPHILS # BLD AUTO: 0.18 K/UL — SIGNIFICANT CHANGE UP (ref 0–0.2)
BASOPHILS NFR BLD AUTO: 1 % — SIGNIFICANT CHANGE UP (ref 0–1)
BILIRUB SERPL-MCNC: 0.4 MG/DL — SIGNIFICANT CHANGE UP (ref 0.2–1.2)
BUN SERPL-MCNC: 76 MG/DL — CRITICAL HIGH (ref 10–20)
CALCIUM SERPL-MCNC: 8.6 MG/DL — SIGNIFICANT CHANGE UP (ref 8.4–10.5)
CHLORIDE SERPL-SCNC: 98 MMOL/L — SIGNIFICANT CHANGE UP (ref 98–110)
CO2 SERPL-SCNC: 27 MMOL/L — SIGNIFICANT CHANGE UP (ref 17–32)
CREAT SERPL-MCNC: 2.2 MG/DL — HIGH (ref 0.7–1.5)
EGFR: 32 ML/MIN/1.73M2 — LOW
EOSINOPHIL # BLD AUTO: 0.03 K/UL — SIGNIFICANT CHANGE UP (ref 0–0.7)
EOSINOPHIL NFR BLD AUTO: 0.2 % — SIGNIFICANT CHANGE UP (ref 0–8)
GLUCOSE BLDC GLUCOMTR-MCNC: 108 MG/DL — HIGH (ref 70–99)
GLUCOSE BLDC GLUCOMTR-MCNC: 119 MG/DL — HIGH (ref 70–99)
GLUCOSE BLDC GLUCOMTR-MCNC: 130 MG/DL — HIGH (ref 70–99)
GLUCOSE BLDC GLUCOMTR-MCNC: 167 MG/DL — HIGH (ref 70–99)
GLUCOSE BLDC GLUCOMTR-MCNC: 178 MG/DL — HIGH (ref 70–99)
GLUCOSE BLDC GLUCOMTR-MCNC: 57 MG/DL — LOW (ref 70–99)
GLUCOSE BLDC GLUCOMTR-MCNC: 94 MG/DL — SIGNIFICANT CHANGE UP (ref 70–99)
GLUCOSE SERPL-MCNC: 126 MG/DL — HIGH (ref 70–99)
HCT VFR BLD CALC: 33.1 % — LOW (ref 42–52)
HGB BLD-MCNC: 10.4 G/DL — LOW (ref 14–18)
IMM GRANULOCYTES NFR BLD AUTO: 13.8 % — HIGH (ref 0.1–0.3)
LYMPHOCYTES # BLD AUTO: 0.4 K/UL — LOW (ref 1.2–3.4)
LYMPHOCYTES # BLD AUTO: 2.2 % — LOW (ref 20.5–51.1)
MAGNESIUM SERPL-MCNC: 2.4 MG/DL — SIGNIFICANT CHANGE UP (ref 1.8–2.4)
MCHC RBC-ENTMCNC: 30.3 PG — SIGNIFICANT CHANGE UP (ref 27–31)
MCHC RBC-ENTMCNC: 31.4 G/DL — LOW (ref 32–37)
MCV RBC AUTO: 96.5 FL — HIGH (ref 80–94)
MONOCYTES # BLD AUTO: 2.86 K/UL — HIGH (ref 0.1–0.6)
MONOCYTES NFR BLD AUTO: 15.8 % — HIGH (ref 1.7–9.3)
NEUTROPHILS # BLD AUTO: 12.13 K/UL — HIGH (ref 1.4–6.5)
NEUTROPHILS NFR BLD AUTO: 67 % — SIGNIFICANT CHANGE UP (ref 42.2–75.2)
NRBC # BLD: 9 /100 WBCS — HIGH (ref 0–0)
NRBC BLD-RTO: 9 /100 WBCS — HIGH (ref 0–0)
PHOSPHATE SERPL-MCNC: 5.1 MG/DL — HIGH (ref 2.1–4.9)
PLATELET # BLD AUTO: 748 K/UL — HIGH (ref 130–400)
PMV BLD: 11.6 FL — HIGH (ref 7.4–10.4)
POTASSIUM SERPL-MCNC: 5.2 MMOL/L — HIGH (ref 3.5–5)
POTASSIUM SERPL-SCNC: 5.2 MMOL/L — HIGH (ref 3.5–5)
PROT SERPL-MCNC: 5.4 G/DL — LOW (ref 6–8)
RBC # BLD: 3.43 M/UL — LOW (ref 4.7–6.1)
RBC # FLD: 16.3 % — HIGH (ref 11.5–14.5)
SODIUM SERPL-SCNC: 141 MMOL/L — SIGNIFICANT CHANGE UP (ref 135–146)
WBC # BLD: 18.09 K/UL — HIGH (ref 4.8–10.8)
WBC # FLD AUTO: 18.09 K/UL — HIGH (ref 4.8–10.8)

## 2025-02-02 PROCEDURE — 99233 SBSQ HOSP IP/OBS HIGH 50: CPT

## 2025-02-02 PROCEDURE — 71045 X-RAY EXAM CHEST 1 VIEW: CPT | Mod: 26

## 2025-02-02 PROCEDURE — 93010 ELECTROCARDIOGRAM REPORT: CPT

## 2025-02-02 RX ORDER — ACETAMINOPHEN 500 MG/5ML
1000 LIQUID (ML) ORAL ONCE
Refills: 0 | Status: COMPLETED | OUTPATIENT
Start: 2025-02-02 | End: 2025-02-02

## 2025-02-02 RX ORDER — DEXTROSE 50 % IN WATER 50 %
25 SYRINGE (ML) INTRAVENOUS ONCE
Refills: 0 | Status: COMPLETED | OUTPATIENT
Start: 2025-02-02 | End: 2025-02-02

## 2025-02-02 RX ADMIN — Medication 2 TABLET(S): at 06:58

## 2025-02-02 RX ADMIN — Medication 25 MILLILITER(S): at 19:00

## 2025-02-02 RX ADMIN — INSULIN LISPRO 25 UNIT(S): 100 INJECTION, SOLUTION INTRAVENOUS; SUBCUTANEOUS at 07:49

## 2025-02-02 RX ADMIN — BENZOCAINE 1 SPRAY(S): 220 SPRAY, METERED PERIODONTAL at 06:58

## 2025-02-02 RX ADMIN — INSULIN LISPRO 3: 100 INJECTION, SOLUTION INTRAVENOUS; SUBCUTANEOUS at 07:50

## 2025-02-02 RX ADMIN — BENZOCAINE 1 SPRAY(S): 220 SPRAY, METERED PERIODONTAL at 21:40

## 2025-02-02 RX ADMIN — Medication 1 APPLICATION(S): at 11:46

## 2025-02-02 RX ADMIN — Medication 40 MILLIGRAM(S): at 11:45

## 2025-02-02 RX ADMIN — Medication 81 MILLIGRAM(S): at 11:44

## 2025-02-02 RX ADMIN — Medication 1000 MILLIGRAM(S): at 21:00

## 2025-02-02 RX ADMIN — METOPROLOL SUCCINATE 25 MILLIGRAM(S): 50 TABLET, EXTENDED RELEASE ORAL at 06:58

## 2025-02-02 RX ADMIN — Medication 500000 UNIT(S): at 23:11

## 2025-02-02 RX ADMIN — NYSTATIN 1 APPLICATION(S): 100000 CREAM TOPICAL at 07:00

## 2025-02-02 RX ADMIN — Medication 500000 UNIT(S): at 13:04

## 2025-02-02 RX ADMIN — Medication 400 MILLIGRAM(S): at 12:59

## 2025-02-02 RX ADMIN — Medication 500000 UNIT(S): at 06:58

## 2025-02-02 RX ADMIN — POLYETHYLENE GLYCOL 3350 17 GRAM(S): 17 POWDER, FOR SOLUTION ORAL at 17:22

## 2025-02-02 RX ADMIN — ATORVASTATIN CALCIUM 10 MILLIGRAM(S): 80 TABLET, FILM COATED ORAL at 21:40

## 2025-02-02 RX ADMIN — SERTRALINE 50 MILLIGRAM(S): 100 TABLET, FILM COATED ORAL at 11:44

## 2025-02-02 RX ADMIN — NYSTATIN 1 APPLICATION(S): 100000 CREAM TOPICAL at 17:22

## 2025-02-02 RX ADMIN — POLYETHYLENE GLYCOL 3350 17 GRAM(S): 17 POWDER, FOR SOLUTION ORAL at 06:58

## 2025-02-02 RX ADMIN — Medication 500 MILLIGRAM(S): at 11:44

## 2025-02-02 RX ADMIN — Medication 150 MILLIGRAM(S): at 11:44

## 2025-02-02 RX ADMIN — BUMETANIDE 2 MILLIGRAM(S): 1 TABLET ORAL at 07:00

## 2025-02-02 RX ADMIN — BENZOCAINE 1 SPRAY(S): 220 SPRAY, METERED PERIODONTAL at 13:04

## 2025-02-02 RX ADMIN — Medication 2 TABLET(S): at 17:22

## 2025-02-02 RX ADMIN — Medication 400 MILLIGRAM(S): at 20:30

## 2025-02-02 RX ADMIN — INSULIN GLARGINE-YFGN 27 UNIT(S): 100 INJECTION, SOLUTION SUBCUTANEOUS at 07:50

## 2025-02-02 RX ADMIN — Medication 1000 MILLIGRAM(S): at 12:59

## 2025-02-02 RX ADMIN — Medication 10 MILLIGRAM(S): at 21:41

## 2025-02-02 RX ADMIN — METOPROLOL SUCCINATE 25 MILLIGRAM(S): 50 TABLET, EXTENDED RELEASE ORAL at 17:21

## 2025-02-02 RX ADMIN — Medication 10 MILLIGRAM(S): at 11:44

## 2025-02-02 NOTE — PROGRESS NOTE ADULT - SUBJECTIVE AND OBJECTIVE BOX
Patient is a 70y old  Male who presents with a chief complaint of sob (27 Jan 2025 09:39)        HPI:  69-year-old man with extensive medical history including CAD (status post CABG;), DM (on insulin pump), atrial fibrillation on Xarelto, history of distal pancreatomy and splenectomy for unclear reason referred to the ED by his oncologist for abnormal labs. Patient was recently diagnosed with Diffuse large B cell Lymphoma on pathology of right groin mass and is following with [facility] oncology team. Patient was discharged on 01.17.2025 from hospital after inpatient chemotherapy (Completed cycle 1 of R-EPOCH (D1 on 1/11/2024). Tolerated well. Rituximab was given on D5).   Over past 3 days patient started to get a productive cough with yellow sputum production.  Patient is also endorsing chills and nausea which he states is baseline. He had 1 reading on low grade fever 100.5 on day of presentation.      ED course:  O2 sats low on 4L O2 so he was promptly started on BIPAP. CXR with LLL infiltrate; sepsis labs sent with gentle hydration; abx given, Labs with neutropenia and elevated BNP, trop 68. EKG nonischemic.    CTA Chest: Negative for pulmonary emboli. Interval development of infiltrates within the lower lobes and lingula which may reflect acute multilobar pneumonia.    Patient admitted to medicine for acute hypoxic respiratory failure.   (20 Jan 2025 23:47)      PAST MEDICAL & SURGICAL HISTORY:  Diabetes mellitus, type 2      BPH (benign prostatic hyperplasia)      Water retention      Essential hypertension      Diabetes      CAD (coronary artery disease)      H/O hypercholesterolemia      Morbid obesity      Chronic kidney disease (CKD)      History of atrial fibrillation      H/O right coronary artery stent placement      History of resection of pancreas      History of cholecystectomy      History of left knee replacement      S/P CABG x 6      H/O cardiac radiofrequency ablation          FAMILY HISTORY:  Family history of lung cancer (Sibling)    Family history of diabetes mellitus (Mother)    Family history of coronary artery disease (Father)          Pt evaluated on rounds.  I reviewed the radiology tests and hospital record.    I reviewed previous notes on this patient.    Interval Events: No overnight events.      REVIEW OF SYSTEMS:   see HPI      OBJECTIVE:  ICU Vital Signs Last 24 Hrs  T(C): 36.7 (02 Feb 2025 08:00), Max: 36.8 (01 Feb 2025 12:00)  T(F): 98.1 (02 Feb 2025 08:00), Max: 98.3 (01 Feb 2025 12:00)  HR: 70 (02 Feb 2025 09:00) (70 - 93)  BP: 115/55 (02 Feb 2025 09:00) (115/55 - 178/73)  BP(mean): 77 (02 Feb 2025 09:00) (77 - 110)  ABP: --  ABP(mean): --  RR: 15 (02 Feb 2025 09:00) (15 - 28)  SpO2: 98% (02 Feb 2025 09:00) (68% - 100%)    O2 Parameters below as of 02 Feb 2025 09:00  Patient On (Oxygen Delivery Method): nasal cannula, high flow  O2 Flow (L/min): 40  O2 Concentration (%): 60          02-01 @ 07:01  -  02-02 @ 07:00  --------------------------------------------------------  IN: 748 mL / OUT: 2195 mL / NET: -1447 mL    02-02 @ 07:01  -  02-02 @ 10:33  --------------------------------------------------------  IN: 24 mL / OUT: 0 mL / NET: 24 mL      CAPILLARY BLOOD GLUCOSE      POCT Blood Glucose.: 130 mg/dL (02 Feb 2025 09:55)        PHYSICAL EXAM:     · ENMT:   Airway patent,   Nasal mucosa clear.  Mouth with normal mucosa.   No thrush    · EYES:   Clear bilaterally,   pupils equal,   round and reactive to light.    · CARDIAC:   Normal rate,   regular rhythm.    Heart sounds S1, S2.   No murmurs, no rubs or gallops on auscultation  no edema        CAROTID:   normal systolic impulse  no bruits    · RESPIRATORY:   rales  normal chest expansion  no retractions or use of accessory muscles  percussion of chest demonstrates no hyperresonance or dullness    · GASTROINTESTINAL:  Abdomen soft,   non-tender,   + BS  liver/spleen not palpable    · SKIN:   Skin normal color for race,   warm, dry   No evidence of rash.    · HEME LYMPH:   no splenomegaly.  No cervical  lymphadenopathy.  no inguinal lymphadenopathy    HOSPITAL MEDICATIONS:  MEDICATIONS  (STANDING):  allopurinol 150 milliGRAM(s) Oral daily  ascorbic acid 500 milliGRAM(s) Oral daily  aspirin  chewable 81 milliGRAM(s) Oral daily  atorvastatin 10 milliGRAM(s) Oral at bedtime  benzocaine 20% Spray 1 Spray(s) Topical three times a day  buMETAnide 2 milliGRAM(s) Oral daily  cetirizine 10 milliGRAM(s) Oral daily  chlorhexidine 2% Cloths 1 Application(s) Topical daily  dextrose 5%. 1000 milliLiter(s) (50 mL/Hr) IV Continuous <Continuous>  dextrose 5%. 1000 milliLiter(s) (100 mL/Hr) IV Continuous <Continuous>  dextrose 50% Injectable 25 Gram(s) IV Push once  dextrose 50% Injectable 12.5 Gram(s) IV Push once  dextrose 50% Injectable 25 Gram(s) IV Push once  glucagon  Injectable 1 milliGRAM(s) IntraMuscular once  heparin  Infusion.  Unit(s)/Hr (10 mL/Hr) IV Continuous <Continuous>  influenza  Vaccine (HIGH DOSE) 0.5 milliLiter(s) IntraMuscular once  insulin glargine Injectable (LANTUS) 27 Unit(s) SubCutaneous two times a day  insulin lispro (ADMELOG) corrective regimen sliding scale   SubCutaneous three times a day before meals  insulin lispro Injectable (ADMELOG) 25 Unit(s) SubCutaneous three times a day before meals  melatonin 10 milliGRAM(s) Oral at bedtime  metolazone 2.5 milliGRAM(s) Oral daily  metoprolol tartrate 25 milliGRAM(s) Oral two times a day  nystatin    Suspension 107810 Unit(s) Swish and Swallow three times a day  nystatin Cream 1 Application(s) Topical every 12 hours  pantoprazole  Injectable 40 milliGRAM(s) IV Push daily  polyethylene glycol 3350 17 Gram(s) Oral every 12 hours  senna 2 Tablet(s) Oral every 12 hours  sertraline 50 milliGRAM(s) Oral daily    MEDICATIONS  (PRN):  acetaminophen     Tablet .. 650 milliGRAM(s) Oral every 6 hours PRN Temp greater or equal to 38C (100.4F), Mild Pain (1 - 3)  albuterol/ipratropium for Nebulization 3 milliLiter(s) Nebulizer every 4 hours PRN Shortness of Breath and/or Wheezing  calcium carbonate    500 mG (Tums) Chewable 1 Tablet(s) Chew three times a day PRN Dyspepsia  dextrose Oral Gel 15 Gram(s) Oral once PRN Blood Glucose LESS THAN 70 milliGRAM(s)/deciliter  haloperidol    Injectable 2.5 milliGRAM(s) IntraMuscular every 6 hours PRN Agitation  heparin   Injectable 6000 Unit(s) IV Push every 6 hours PRN For aPTT less than 40  hydrOXYzine hydrochloride 25 milliGRAM(s) Oral every 6 hours PRN Anxiety  magnesium hydroxide Suspension 30 milliLiter(s) Oral daily PRN Constipation  ondansetron Injectable 4 milliGRAM(s) IV Push every 6 hours PRN Nausea and/or Vomiting  simethicone 80 milliGRAM(s) Chew daily PRN Gas  traZODone 50 milliGRAM(s) Oral at bedtime PRN insomnia    sodium chloride 0.9%.: Solution, 1000 milliLiter(s) infuse at 75 mL/Hr  sodium chloride 0.9% Bolus:   1000 milliLiter(s), IV Bolus, once, infuse over 60 Minute(s), Stop After 1 Doses      LABS:                        10.4   18.09 )-----------( 748      ( 02 Feb 2025 04:58 )             33.1     02-02    141  |  98  |  76[HH]  ----------------------------<  126[H]  5.2[H]   |  27  |  2.2[H]    Ca    8.6      02 Feb 2025 04:58  Phos  5.1     02-02  Mg     2.4     02-02    TPro  5.4[L]  /  Alb  3.0[L]  /  TBili  0.4  /  DBili  x   /  AST  53[H]  /  ALT  56[H]  /  AlkPhos  452[H]  02-02    PTT - ( 02 Feb 2025 04:58 )  PTT:77.6 sec  Urinalysis Basic - ( 02 Feb 2025 04:58 )    Color: x / Appearance: x / SG: x / pH: x  Gluc: 126 mg/dL / Ketone: x  / Bili: x / Urobili: x   Blood: x / Protein: x / Nitrite: x   Leuk Esterase: x / RBC: x / WBC x   Sq Epi: x / Non Sq Epi: x / Bacteria: x                  SARS-CoV-2: NotDetec (13 Jan 2025 13:24)            RADIOLOGY: Today I personally interpreted the latest CXR and other pertinent films.

## 2025-02-02 NOTE — PROGRESS NOTE ADULT - SUBJECTIVE AND OBJECTIVE BOX
SUBJECTIVE/OVERNIGHT EVENTS  Today is hospital day 13d. This morning patient was seen and examined at bedside, resting comfortably in bed. No acute or major events overnight.    HOSPITAL COURSE  Day 1:   Day 2:   Day 3:     CODE STATUS:    FAMILY COMMUNICATION  Contact date:  Name of person contacted:  Relationship to patient:  Communication details:    MEDICATIONS  STANDING MEDICATIONS  allopurinol 150 milliGRAM(s) Oral daily  ascorbic acid 500 milliGRAM(s) Oral daily  aspirin  chewable 81 milliGRAM(s) Oral daily  atorvastatin 10 milliGRAM(s) Oral at bedtime  benzocaine 20% Spray 1 Spray(s) Topical three times a day  buMETAnide 2 milliGRAM(s) Oral daily  cetirizine 10 milliGRAM(s) Oral daily  chlorhexidine 2% Cloths 1 Application(s) Topical daily  dextrose 5%. 1000 milliLiter(s) IV Continuous <Continuous>  dextrose 5%. 1000 milliLiter(s) IV Continuous <Continuous>  dextrose 50% Injectable 25 Gram(s) IV Push once  dextrose 50% Injectable 12.5 Gram(s) IV Push once  dextrose 50% Injectable 25 Gram(s) IV Push once  glucagon  Injectable 1 milliGRAM(s) IntraMuscular once  heparin  Infusion.  Unit(s)/Hr IV Continuous <Continuous>  influenza  Vaccine (HIGH DOSE) 0.5 milliLiter(s) IntraMuscular once  insulin glargine Injectable (LANTUS) 27 Unit(s) SubCutaneous two times a day  insulin lispro (ADMELOG) corrective regimen sliding scale   SubCutaneous three times a day before meals  insulin lispro Injectable (ADMELOG) 25 Unit(s) SubCutaneous three times a day before meals  melatonin 10 milliGRAM(s) Oral at bedtime  metolazone 2.5 milliGRAM(s) Oral daily  metoprolol tartrate 25 milliGRAM(s) Oral two times a day  nystatin    Suspension 773410 Unit(s) Swish and Swallow three times a day  nystatin Cream 1 Application(s) Topical every 12 hours  pantoprazole  Injectable 40 milliGRAM(s) IV Push daily  polyethylene glycol 3350 17 Gram(s) Oral every 12 hours  senna 2 Tablet(s) Oral every 12 hours  sertraline 50 milliGRAM(s) Oral daily    PRN MEDICATIONS  acetaminophen     Tablet .. 650 milliGRAM(s) Oral every 6 hours PRN  albuterol/ipratropium for Nebulization 3 milliLiter(s) Nebulizer every 4 hours PRN  calcium carbonate    500 mG (Tums) Chewable 1 Tablet(s) Chew three times a day PRN  dextrose Oral Gel 15 Gram(s) Oral once PRN  haloperidol    Injectable 2.5 milliGRAM(s) IntraMuscular every 6 hours PRN  heparin   Injectable 6000 Unit(s) IV Push every 6 hours PRN  hydrOXYzine hydrochloride 25 milliGRAM(s) Oral every 6 hours PRN  magnesium hydroxide Suspension 30 milliLiter(s) Oral daily PRN  ondansetron Injectable 4 milliGRAM(s) IV Push every 6 hours PRN  simethicone 80 milliGRAM(s) Chew daily PRN  traZODone 50 milliGRAM(s) Oral at bedtime PRN    VITALS  T(F): 97.8 (02-02-25 @ 00:00), Max: 98.3 (02-01-25 @ 12:00)  HR: 79 (02-02-25 @ 02:00) (74 - 94)  BP: 165/70 (02-02-25 @ 02:00) (125/59 - 169/77)  RR: 20 (02-02-25 @ 02:00) (16 - 28)  SpO2: 99% (02-02-25 @ 02:00) (91% - 100%)  POCT Blood Glucose.: 142 mg/dL (02-01-25 @ 22:03)  POCT Blood Glucose.: 138 mg/dL (02-01-25 @ 16:37)  POCT Blood Glucose.: 131 mg/dL (02-01-25 @ 12:37)  POCT Blood Glucose.: 156 mg/dL (02-01-25 @ 08:33)    PHYSICAL EXAM  GENERAL: NAD, lying in bed comfortably  HEART: Regular rate and rhythm, no murmurs, rubs, or gallops  LUNGS: Unlabored respirations.  Clear to auscultation bilaterally, no crackles, wheezing, or rhonchi  ABDOMEN: Soft, nontender, nondistended, +BS  EXTREMITIES: b/l pitting edema  NERVOUS SYSTEM:  A&Ox3  SKIN: No rashes or lesions    LABS             10.3   18.94 )-----------( 720      ( 02-01-25 @ 05:15 )             32.6     146  |  97  |  75  -------------------------<  108   02-01-25 @ 05:15  5.1  |  35  |  2.1    Ca      8.6     02-01-25 @ 05:15  Phos   4.6     02-01-25 @ 05:15  Mg     2.2     02-01-25 @ 05:15    TPro  5.4  /  Alb  3.0  /  TBili  0.4  /  DBili  x   /  AST  69  /  ALT  61  /  AlkPhos  465  /  GGT  x     02-01-25 @ 05:15    PTT - ( 02-01-25 @ 05:15 )  PTT:68.9 sec      Urinalysis Basic - ( 01 Feb 2025 05:15 )    Color: x / Appearance: x / SG: x / pH: x  Gluc: 108 mg/dL / Ketone: x  / Bili: x / Urobili: x   Blood: x / Protein: x / Nitrite: x   Leuk Esterase: x / RBC: x / WBC x   Sq Epi: x / Non Sq Epi: x / Bacteria: x      ABG - ( 31 Jan 2025 07:40 )  pH, Arterial: 7.44  pH, Blood: x     /  pCO2: 56    /  pO2: 80    / HCO3: 38    / Base Excess: 11.6  /  SaO2: 97.4                IMAGING

## 2025-02-02 NOTE — PROGRESS NOTE ADULT - ASSESSMENT
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 DC Seroquel.     HEENT: Oral care    PULMONARY: APRN BIPAP. CXR better. taper HFNC 50/50  as tolerated    CARDIOVASCULAR: Echocardiogram: normal EF. BNP noted to be elevated.   continue home oral dieretics     GI: GI prophylaxis.   Oral diet when on HFNC.   Free water restriction to 2 l.   Tap water enema prn; aggressive BM regimen.     RENAL:  Rise in BUN, creat noted. Resume home diuretic doses. Nephro following.     INFECTIOUS DISEASE: Finished abx course. procal. ESR and CRP high.     HEMATOLOGICAL: On AC with heparin for afib. Keep PTT therapeutic. Daily coags and CBC. Hem follow up.     ENDOCRINE:  Follow up FS. Increase insulin regimen to keep 140-180.     MUSCULOSKELETAL:  Out of bed to chair; PT OT.     FULL CODE     SDU .     Voiding trial once ambulating. PICC line.

## 2025-02-02 NOTE — PROGRESS NOTE ADULT - ASSESSMENT
69-year-old man with extensive medical history including CAD (status post CABG;), DM (on insulin pump), atrial fibrillation on Xarelto, history of distal pancreatomy and splenectomy for unclear reason referred to the ED by his oncologist for abnormal labs. Patient was recently diagnosed with Diffuse large B cell Lymphoma on pathology of right groin mass and is following with [facility] oncology team. Patient was discharged on 01.17.2025 from hospital after inpatient chemotherapy (Completed cycle 1 of R-EPOCH (D1 on 1/11/2024). Tolerated well. Rituximab was given on D5).   Over past 3 days patient started to get a productive cough with yellow sputum production.     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 DC Seroquel.     HEENT: Oral care    PULMONARY: HFNC 40/50, not tolerating BIPAP    CARDIOVASCULAR: Echocardiogram: EF 53%. BNP noted to be elevated. c/w home oral dieretics     GI: GI prophylaxis. Oral diet when off BIPAP. Free water restriction to 2 l. Tap water enema; aggressive BM regimen.     RENAL:  Rise in BUN, creat noted. Resume home diuretic doses. Nephro following.     INFECTIOUS DISEASE: monitor off Abx now per ID    HEMATOLOGICAL: On AC with heparin for afib. Keep PTT therapeutic. Daily coags and CBC. Hem following..     ENDOCRINE:  Follow up FS. Increase insulin regimen to keep 140-180.     MUSCULOSKELETAL:  Out of bed to chair; PT OT.     FULL CODE    possible SDU today.     Voiding trial once ambulating. PICC line.

## 2025-02-02 NOTE — CHART NOTE - NSCHARTNOTEFT_GEN_A_CORE
Transfer from: CCU  Transfer to: SDU    69-year-old man with extensive medical history including CAD (status post CABG;), DM (on insulin pump), atrial fibrillation on Xarelto, history of distal pancreatomy and splenectomy for unclear reason referred to the ED by his oncologist for abnormal labs. Patient was recently diagnosed with Diffuse large B cell Lymphoma on pathology of right groin mass and is following with [facility] oncology team. Patient was discharged on 01.17.2025 from hospital after inpatient chemotherapy (Completed cycle 1 of R-EPOCH (D1 on 1/11/2024). Tolerated well. Rituximab was given on D5).   Over past 3 days patient started to get a productive cough with yellow sputum production.     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 Increase insulin regimen to keep 140-180.     MUSCULOSKELETAL:  Out of bed to chair; PT OT.     FULL CODE    possible SDU today.     Voiding trial once ambulating. PICC line. Transfer from: CCU  Transfer to: SDU    HPI:  69-year-old man with extensive medical history including CAD (status post CABG;), DM (on insulin pump), atrial fibrillation on Xarelto, history of distal pancreatomy and splenectomy for unclear reason referred to the ED by his oncologist for abnormal labs. Patient was recently diagnosed with Diffuse large B cell Lymphoma on pathology of right groin mass and is following with [facility] oncology team. Patient was discharged on 01.17.2025 from hospital after inpatient chemotherapy (Completed cycle 1 of R-EPOCH (D1 on 1/11/2024). Tolerated well. Rituximab was given on D5).   Over past 3 days patient started to get a productive cough with yellow sputum production.    CCU course:  Pt was admitted for AHRF 2/2 Influenza +/- superimposed PNA. Pt started on BIPAP and weaned to HFNC. Received tamiflu course + course of azithromycin+cefepime. Currently being monitored off Abx by ID.    Pt clinically improved, ready for downgrade to SDU.    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

## 2025-02-03 LAB
ALBUMIN SERPL ELPH-MCNC: 3.3 G/DL — LOW (ref 3.5–5.2)
ALP SERPL-CCNC: 442 U/L — HIGH (ref 30–115)
ALT FLD-CCNC: 49 U/L — HIGH (ref 0–41)
ANION GAP SERPL CALC-SCNC: 15 MMOL/L — HIGH (ref 7–14)
APTT BLD: 86.1 SEC — CRITICAL HIGH (ref 27–39.2)
APTT BLD: 95.3 SEC — CRITICAL HIGH (ref 27–39.2)
AST SERPL-CCNC: 47 U/L — HIGH (ref 0–41)
BASOPHILS # BLD AUTO: 0.14 K/UL — SIGNIFICANT CHANGE UP (ref 0–0.2)
BASOPHILS NFR BLD AUTO: 0.9 % — SIGNIFICANT CHANGE UP (ref 0–1)
BILIRUB SERPL-MCNC: 0.4 MG/DL — SIGNIFICANT CHANGE UP (ref 0.2–1.2)
BUN SERPL-MCNC: 74 MG/DL — CRITICAL HIGH (ref 10–20)
CALCIUM SERPL-MCNC: 8.7 MG/DL — SIGNIFICANT CHANGE UP (ref 8.4–10.5)
CHLORIDE SERPL-SCNC: 98 MMOL/L — SIGNIFICANT CHANGE UP (ref 98–110)
CO2 SERPL-SCNC: 29 MMOL/L — SIGNIFICANT CHANGE UP (ref 17–32)
CREAT SERPL-MCNC: 2 MG/DL — HIGH (ref 0.7–1.5)
EGFR: 36 ML/MIN/1.73M2 — LOW
EOSINOPHIL # BLD AUTO: 0.09 K/UL — SIGNIFICANT CHANGE UP (ref 0–0.7)
EOSINOPHIL NFR BLD AUTO: 0.6 % — SIGNIFICANT CHANGE UP (ref 0–8)
GLUCOSE BLDC GLUCOMTR-MCNC: 105 MG/DL — HIGH (ref 70–99)
GLUCOSE BLDC GLUCOMTR-MCNC: 108 MG/DL — HIGH (ref 70–99)
GLUCOSE BLDC GLUCOMTR-MCNC: 115 MG/DL — HIGH (ref 70–99)
GLUCOSE BLDC GLUCOMTR-MCNC: 168 MG/DL — HIGH (ref 70–99)
GLUCOSE BLDC GLUCOMTR-MCNC: 92 MG/DL — SIGNIFICANT CHANGE UP (ref 70–99)
GLUCOSE SERPL-MCNC: 73 MG/DL — SIGNIFICANT CHANGE UP (ref 70–99)
HCT VFR BLD CALC: 31.7 % — LOW (ref 42–52)
HCT VFR BLD CALC: 32.9 % — LOW (ref 42–52)
HGB BLD-MCNC: 10 G/DL — LOW (ref 14–18)
HGB BLD-MCNC: 10.4 G/DL — LOW (ref 14–18)
IMM GRANULOCYTES NFR BLD AUTO: 8.7 % — HIGH (ref 0.1–0.3)
LYMPHOCYTES # BLD AUTO: 0.53 K/UL — LOW (ref 1.2–3.4)
LYMPHOCYTES # BLD AUTO: 3.3 % — LOW (ref 20.5–51.1)
MAGNESIUM SERPL-MCNC: 2.6 MG/DL — HIGH (ref 1.8–2.4)
MCHC RBC-ENTMCNC: 30 PG — SIGNIFICANT CHANGE UP (ref 27–31)
MCHC RBC-ENTMCNC: 30.1 PG — SIGNIFICANT CHANGE UP (ref 27–31)
MCHC RBC-ENTMCNC: 31.5 G/DL — LOW (ref 32–37)
MCHC RBC-ENTMCNC: 31.6 G/DL — LOW (ref 32–37)
MCV RBC AUTO: 95.1 FL — HIGH (ref 80–94)
MCV RBC AUTO: 95.2 FL — HIGH (ref 80–94)
MONOCYTES # BLD AUTO: 3.12 K/UL — HIGH (ref 0.1–0.6)
MONOCYTES NFR BLD AUTO: 19.5 % — HIGH (ref 1.7–9.3)
NEUTROPHILS # BLD AUTO: 10.73 K/UL — HIGH (ref 1.4–6.5)
NEUTROPHILS NFR BLD AUTO: 67 % — SIGNIFICANT CHANGE UP (ref 42.2–75.2)
NRBC # BLD: 4 /100 WBCS — HIGH (ref 0–0)
NRBC # BLD: 7 /100 WBCS — HIGH (ref 0–0)
NRBC BLD-RTO: 4 /100 WBCS — HIGH (ref 0–0)
NRBC BLD-RTO: 7 /100 WBCS — HIGH (ref 0–0)
PHOSPHATE SERPL-MCNC: 4.7 MG/DL — SIGNIFICANT CHANGE UP (ref 2.1–4.9)
PLATELET # BLD AUTO: 864 K/UL — HIGH (ref 130–400)
PLATELET # BLD AUTO: 883 K/UL — HIGH (ref 130–400)
PMV BLD: 11.7 FL — HIGH (ref 7.4–10.4)
PMV BLD: 11.9 FL — HIGH (ref 7.4–10.4)
POTASSIUM SERPL-MCNC: 4.9 MMOL/L — SIGNIFICANT CHANGE UP (ref 3.5–5)
POTASSIUM SERPL-SCNC: 4.9 MMOL/L — SIGNIFICANT CHANGE UP (ref 3.5–5)
PROT SERPL-MCNC: 5.7 G/DL — LOW (ref 6–8)
RBC # BLD: 3.33 M/UL — LOW (ref 4.7–6.1)
RBC # BLD: 3.46 M/UL — LOW (ref 4.7–6.1)
RBC # FLD: 16.2 % — HIGH (ref 11.5–14.5)
RBC # FLD: 16.3 % — HIGH (ref 11.5–14.5)
SODIUM SERPL-SCNC: 142 MMOL/L — SIGNIFICANT CHANGE UP (ref 135–146)
WBC # BLD: 16 K/UL — HIGH (ref 4.8–10.8)
WBC # BLD: 17.41 K/UL — HIGH (ref 4.8–10.8)
WBC # FLD AUTO: 16 K/UL — HIGH (ref 4.8–10.8)
WBC # FLD AUTO: 17.41 K/UL — HIGH (ref 4.8–10.8)

## 2025-02-03 PROCEDURE — 99233 SBSQ HOSP IP/OBS HIGH 50: CPT

## 2025-02-03 PROCEDURE — 93010 ELECTROCARDIOGRAM REPORT: CPT

## 2025-02-03 PROCEDURE — 99223 1ST HOSP IP/OBS HIGH 75: CPT

## 2025-02-03 PROCEDURE — 73521 X-RAY EXAM HIPS BI 2 VIEWS: CPT | Mod: 26

## 2025-02-03 PROCEDURE — 71045 X-RAY EXAM CHEST 1 VIEW: CPT | Mod: 26

## 2025-02-03 RX ORDER — HEPARIN SODIUM 1000 [USP'U]/ML
10000 INJECTION INTRAVENOUS; SUBCUTANEOUS EVERY 6 HOURS
Refills: 0 | Status: DISCONTINUED | OUTPATIENT
Start: 2025-02-03 | End: 2025-02-05

## 2025-02-03 RX ORDER — HEPARIN SODIUM 1000 [USP'U]/ML
5000 INJECTION INTRAVENOUS; SUBCUTANEOUS EVERY 6 HOURS
Refills: 0 | Status: DISCONTINUED | OUTPATIENT
Start: 2025-02-03 | End: 2025-02-05

## 2025-02-03 RX ORDER — TAMSULOSIN HYDROCHLORIDE 0.4 MG/1
0.4 CAPSULE ORAL AT BEDTIME
Refills: 0 | Status: DISCONTINUED | OUTPATIENT
Start: 2025-02-03 | End: 2025-02-17

## 2025-02-03 RX ORDER — BISACODYL 5 MG
5 TABLET, DELAYED RELEASE (ENTERIC COATED) ORAL DAILY
Refills: 0 | Status: DISCONTINUED | OUTPATIENT
Start: 2025-02-03 | End: 2025-02-17

## 2025-02-03 RX ORDER — INSULIN LISPRO 100 U/ML
3 INJECTION, SOLUTION INTRAVENOUS; SUBCUTANEOUS
Refills: 0 | Status: DISCONTINUED | OUTPATIENT
Start: 2025-02-03 | End: 2025-02-06

## 2025-02-03 RX ORDER — HEPARIN SODIUM 1000 [USP'U]/ML
1200 INJECTION INTRAVENOUS; SUBCUTANEOUS
Qty: 25000 | Refills: 0 | Status: DISCONTINUED | OUTPATIENT
Start: 2025-02-03 | End: 2025-02-05

## 2025-02-03 RX ORDER — OXYCODONE HYDROCHLORIDE 30 MG/1
5 TABLET ORAL EVERY 6 HOURS
Refills: 0 | Status: DISCONTINUED | OUTPATIENT
Start: 2025-02-03 | End: 2025-02-05

## 2025-02-03 RX ORDER — LIDOCAINE HYDROCHLORIDE 20 MG/ML
1 JELLY TOPICAL ONCE
Refills: 0 | Status: COMPLETED | OUTPATIENT
Start: 2025-02-03 | End: 2025-02-03

## 2025-02-03 RX ORDER — HYDROMORPHONE/SOD CHLOR,ISO/PF 2 MG/10 ML
0.2 SYRINGE (ML) INJECTION ONCE
Refills: 0 | Status: DISCONTINUED | OUTPATIENT
Start: 2025-02-03 | End: 2025-02-03

## 2025-02-03 RX ORDER — INSULIN GLARGINE-YFGN 100 [IU]/ML
10 INJECTION, SOLUTION SUBCUTANEOUS AT BEDTIME
Refills: 0 | Status: DISCONTINUED | OUTPATIENT
Start: 2025-02-03 | End: 2025-02-06

## 2025-02-03 RX ORDER — INSULIN LISPRO 100 U/ML
INJECTION, SOLUTION INTRAVENOUS; SUBCUTANEOUS
Refills: 0 | Status: DISCONTINUED | OUTPATIENT
Start: 2025-02-03 | End: 2025-02-06

## 2025-02-03 RX ADMIN — LIDOCAINE HYDROCHLORIDE 1 PATCH: 20 JELLY TOPICAL at 11:40

## 2025-02-03 RX ADMIN — BENZOCAINE 1 SPRAY(S): 220 SPRAY, METERED PERIODONTAL at 22:40

## 2025-02-03 RX ADMIN — Medication 150 MILLIGRAM(S): at 11:42

## 2025-02-03 RX ADMIN — POLYETHYLENE GLYCOL 3350 17 GRAM(S): 17 POWDER, FOR SOLUTION ORAL at 05:31

## 2025-02-03 RX ADMIN — Medication 1 APPLICATION(S): at 11:44

## 2025-02-03 RX ADMIN — Medication 0.2 MILLIGRAM(S): at 14:10

## 2025-02-03 RX ADMIN — NYSTATIN 1 APPLICATION(S): 100000 CREAM TOPICAL at 17:30

## 2025-02-03 RX ADMIN — OXYCODONE HYDROCHLORIDE 5 MILLIGRAM(S): 30 TABLET ORAL at 22:41

## 2025-02-03 RX ADMIN — TAMSULOSIN HYDROCHLORIDE 0.4 MILLIGRAM(S): 0.4 CAPSULE ORAL at 21:36

## 2025-02-03 RX ADMIN — Medication 2 TABLET(S): at 17:27

## 2025-02-03 RX ADMIN — Medication 10 MILLIGRAM(S): at 21:37

## 2025-02-03 RX ADMIN — Medication 0.2 MILLIGRAM(S): at 13:40

## 2025-02-03 RX ADMIN — Medication 81 MILLIGRAM(S): at 11:40

## 2025-02-03 RX ADMIN — METOPROLOL SUCCINATE 25 MILLIGRAM(S): 50 TABLET, EXTENDED RELEASE ORAL at 17:27

## 2025-02-03 RX ADMIN — HEPARIN SODIUM 1200 UNIT(S)/HR: 1000 INJECTION INTRAVENOUS; SUBCUTANEOUS at 19:04

## 2025-02-03 RX ADMIN — HYDROXYZINE HYDROCHLORIDE 25 MILLIGRAM(S): 25 TABLET, FILM COATED ORAL at 21:46

## 2025-02-03 RX ADMIN — Medication 4 MILLIGRAM(S): at 13:05

## 2025-02-03 RX ADMIN — HEPARIN SODIUM 1200 UNIT(S)/HR: 1000 INJECTION INTRAVENOUS; SUBCUTANEOUS at 09:54

## 2025-02-03 RX ADMIN — HEPARIN SODIUM 0 UNIT(S)/HR: 1000 INJECTION INTRAVENOUS; SUBCUTANEOUS at 08:55

## 2025-02-03 RX ADMIN — LIDOCAINE HYDROCHLORIDE 1 PATCH: 20 JELLY TOPICAL at 19:00

## 2025-02-03 RX ADMIN — Medication 650 MILLIGRAM(S): at 06:28

## 2025-02-03 RX ADMIN — Medication 40 MILLIGRAM(S): at 11:44

## 2025-02-03 RX ADMIN — METOPROLOL SUCCINATE 25 MILLIGRAM(S): 50 TABLET, EXTENDED RELEASE ORAL at 05:31

## 2025-02-03 RX ADMIN — Medication 500000 UNIT(S): at 05:31

## 2025-02-03 RX ADMIN — SERTRALINE 50 MILLIGRAM(S): 100 TABLET, FILM COATED ORAL at 11:45

## 2025-02-03 RX ADMIN — BUMETANIDE 2 MILLIGRAM(S): 1 TABLET ORAL at 05:31

## 2025-02-03 RX ADMIN — INSULIN LISPRO 1: 100 INJECTION, SOLUTION INTRAVENOUS; SUBCUTANEOUS at 17:24

## 2025-02-03 RX ADMIN — OXYCODONE HYDROCHLORIDE 5 MILLIGRAM(S): 30 TABLET ORAL at 21:46

## 2025-02-03 RX ADMIN — Medication 50 MILLIGRAM(S): at 21:36

## 2025-02-03 RX ADMIN — HEPARIN SODIUM 1200 UNIT(S)/HR: 1000 INJECTION INTRAVENOUS; SUBCUTANEOUS at 17:29

## 2025-02-03 RX ADMIN — BENZOCAINE 1 SPRAY(S): 220 SPRAY, METERED PERIODONTAL at 05:32

## 2025-02-03 RX ADMIN — HEPARIN SODIUM 1200 UNIT(S)/HR: 1000 INJECTION INTRAVENOUS; SUBCUTANEOUS at 07:35

## 2025-02-03 RX ADMIN — INSULIN GLARGINE-YFGN 10 UNIT(S): 100 INJECTION, SOLUTION SUBCUTANEOUS at 21:37

## 2025-02-03 RX ADMIN — LIDOCAINE HYDROCHLORIDE 1 PATCH: 20 JELLY TOPICAL at 22:40

## 2025-02-03 RX ADMIN — Medication 500 MILLIGRAM(S): at 11:41

## 2025-02-03 RX ADMIN — INSULIN LISPRO 3 UNIT(S): 100 INJECTION, SOLUTION INTRAVENOUS; SUBCUTANEOUS at 17:24

## 2025-02-03 RX ADMIN — NYSTATIN 1 APPLICATION(S): 100000 CREAM TOPICAL at 05:32

## 2025-02-03 RX ADMIN — ATORVASTATIN CALCIUM 10 MILLIGRAM(S): 80 TABLET, FILM COATED ORAL at 21:37

## 2025-02-03 RX ADMIN — POLYETHYLENE GLYCOL 3350 17 GRAM(S): 17 POWDER, FOR SOLUTION ORAL at 17:27

## 2025-02-03 RX ADMIN — Medication 650 MILLIGRAM(S): at 06:43

## 2025-02-03 RX ADMIN — Medication 2 TABLET(S): at 05:31

## 2025-02-03 NOTE — SWALLOW BEDSIDE ASSESSMENT ADULT - SLP PERTINENT HISTORY OF CURRENT PROBLEM
Pt is a 70 y/o M w/ extensive medical history including: CAD (status post CABG), DM (on insulin pump), atrial fibrillation (on Xarelto), distal pancreatomy and splenectomy, referred to the ED by his oncologist for abnormal labs. Of note, pt recently diagnosed w/ diffuse large B cell lymphoma on pathology of right groin mass and is following / [facility] oncology team. Pt discharged on 1/17/2025 from hospital after inpatient chemotherapy (Completed cycle 1 of R-EPOCH (D1 on 1/11/2024)). Pt is being treated for severe sepsis - severe multifocal PNA w/ influenza A infection, AHRF, volume overload, neutropenic fever; +Immunodeficiency 2' malignancy and comorbidities. FEES 1/30, recs for soft, mildly thick liquids.

## 2025-02-03 NOTE — PROGRESS NOTE ADULT - SUBJECTIVE AND OBJECTIVE BOX
SUBJECTIVE/OVERNIGHT EVENTS  Today is hospital day 14d. This morning patient was seen and examined at bedside, resting comfortably in bed. No acute or major events overnight.    HOSPITAL COURSE  Day 1:   Day 2:   Day 3:     CODE STATUS:    FAMILY COMMUNICATION  Contact date:  Name of person contacted:  Relationship to patient:  Communication details:    MEDICATIONS  STANDING MEDICATIONS  allopurinol 150 milliGRAM(s) Oral daily  ascorbic acid 500 milliGRAM(s) Oral daily  aspirin  chewable 81 milliGRAM(s) Oral daily  atorvastatin 10 milliGRAM(s) Oral at bedtime  benzocaine 20% Spray 1 Spray(s) Topical three times a day  buMETAnide 2 milliGRAM(s) Oral daily  cetirizine 10 milliGRAM(s) Oral daily  chlorhexidine 2% Cloths 1 Application(s) Topical daily  dextrose 5%. 1000 milliLiter(s) IV Continuous <Continuous>  dextrose 5%. 1000 milliLiter(s) IV Continuous <Continuous>  dextrose 50% Injectable 25 Gram(s) IV Push once  dextrose 50% Injectable 12.5 Gram(s) IV Push once  dextrose 50% Injectable 25 Gram(s) IV Push once  glucagon  Injectable 1 milliGRAM(s) IntraMuscular once  heparin  Infusion. 1200 Unit(s)/Hr IV Continuous <Continuous>  influenza  Vaccine (HIGH DOSE) 0.5 milliLiter(s) IntraMuscular once  insulin glargine Injectable (LANTUS) 27 Unit(s) SubCutaneous two times a day  insulin lispro (ADMELOG) corrective regimen sliding scale   SubCutaneous three times a day before meals  insulin lispro Injectable (ADMELOG) 25 Unit(s) SubCutaneous three times a day before meals  lidocaine   4% Patch 1 Patch Transdermal once  melatonin 10 milliGRAM(s) Oral at bedtime  metolazone 2.5 milliGRAM(s) Oral daily  metoprolol tartrate 25 milliGRAM(s) Oral two times a day  nystatin Cream 1 Application(s) Topical every 12 hours  pantoprazole  Injectable 40 milliGRAM(s) IV Push daily  polyethylene glycol 3350 17 Gram(s) Oral every 12 hours  senna 2 Tablet(s) Oral every 12 hours  sertraline 50 milliGRAM(s) Oral daily  tamsulosin 0.4 milliGRAM(s) Oral at bedtime    PRN MEDICATIONS  acetaminophen     Tablet .. 650 milliGRAM(s) Oral every 6 hours PRN  albuterol/ipratropium for Nebulization 3 milliLiter(s) Nebulizer every 4 hours PRN  calcium carbonate    500 mG (Tums) Chewable 1 Tablet(s) Chew three times a day PRN  dextrose Oral Gel 15 Gram(s) Oral once PRN  haloperidol    Injectable 2.5 milliGRAM(s) IntraMuscular every 6 hours PRN  heparin   Injectable 84309 Unit(s) IV Push every 6 hours PRN  heparin   Injectable 5000 Unit(s) IV Push every 6 hours PRN  hydrOXYzine hydrochloride 25 milliGRAM(s) Oral every 6 hours PRN  magnesium hydroxide Suspension 30 milliLiter(s) Oral daily PRN  ondansetron Injectable 4 milliGRAM(s) IV Push every 6 hours PRN  simethicone 80 milliGRAM(s) Chew daily PRN  traZODone 50 milliGRAM(s) Oral at bedtime PRN    VITALS  T(F): 98.2 (02-03-25 @ 07:53), Max: 98.2 (02-03-25 @ 07:53)  HR: 81 (02-03-25 @ 07:53) (69 - 84)  BP: 140/61 (02-03-25 @ 07:53) (106/56 - 157/71)  RR: 20 (02-03-25 @ 07:53) (14 - 22)  SpO2: 95% (02-03-25 @ 07:53) (92% - 100%)  POCT Blood Glucose.: 108 mg/dL (02-03-25 @ 08:32)  POCT Blood Glucose.: 92 mg/dL (02-03-25 @ 07:26)  POCT Blood Glucose.: 108 mg/dL (02-02-25 @ 21:38)  POCT Blood Glucose.: 119 mg/dL (02-02-25 @ 19:10)  POCT Blood Glucose.: 167 mg/dL (02-02-25 @ 17:46)  POCT Blood Glucose.: 57 mg/dL (02-02-25 @ 17:08)  POCT Blood Glucose.: 94 mg/dL (02-02-25 @ 11:42)    PHYSICAL EXAM  GENERAL  (  x) NAD, lying in bed comfortably     (  ) obtunded     (  ) lethargic     (  ) somnolent    HEAD  (  ) Atraumatic     (  ) hematoma     (  ) laceration (specify location:       )     NECK  (  ) Supple     (  ) neck stiffness     (  ) nuchal rigidity     (  )  no JVD     (  ) JVD present ( -- cm)    HEART  Rate -->  ( x ) normal rate    (  ) bradycardic    (  ) tachycardic  Rhythm -->  ( x ) regular    (  ) regularly irregular    (  ) irregularly irregular  Murmurs -->  (  ) normal s1/s2    (  ) systolic murmur    (  ) diastolic murmur    (  ) continuous murmur     (  ) S3 present    (  ) S4 present    LUNGS  ( x )Unlabored respirations     (  ) tachypnea  (  x) B/L air entry     (  ) decreased breath sounds in:  (location     )    (  ) no adventitious sound     (  ) crackles     (  ) wheezing      (  ) rhonchi      (specify location:       )  (  ) chest wall tenderness (specify location:       )    ABDOMEN  ( x) Soft     (  ) tense   |   (  ) nondistended     (  ) distended   |   (  ) +BS     (  ) hypoactive bowel sounds     (  ) hyperactive bowel sounds  ( x ) nontender     (  ) RUQ tenderness     (  ) RLQ tenderness     (  ) LLQ tenderness     (  ) epigastric tenderness     (  ) diffuse tenderness  (  ) Splenomegaly      (  ) Hepatomegaly      (  ) Jaundice     (  ) ecchymosis     EXTREMITIES  (  ) Normal     (  ) Rash     (  ) ecchymosis     (  ) varicose veins      (x  ) pitting edema     (  ) non-pitting edema   (  ) ulceration     (  ) gangrene:     (location:     )    NERVOUS SYSTEM  (  x) A&Ox3     (  ) confused     (  ) lethargic  CN II-XII:     (  ) Intact     (  ) focal deficits  (Specify:     )   Upper extremities:     (  ) strength X/5     (  ) focal deficit (specify:    )  Lower extremities:     (  ) strength  X/5    (  ) focal deficit (specify:    )    SKIN  (  ) No rashes or lesions     (  ) maculopapular rash     (  ) pustules     (  ) vesicles     (  ) ulcer     (  ) ecchymosis     (specify location:     )    (  x) Indwelling Ojeda Catheter   Date insterted:    Reason (  ) Critical illness     (  ) urinary retention    (  ) Accurate Ins/Outs Monitoring     (  ) CMO patient    (  ) Central Line  Date inserted:  Location: (  ) Right IJ   (  ) Left IJ   (  ) Right Fem   (  ) Left Fem    (  ) SPC  (  ) pigtail  (  ) PEG tube  (  ) colostomy  (  ) jejunostomy  (  ) U-Dall    LABS             10.4   16.00 )-----------( 883      ( 02-03-25 @ 05:00 )             32.9     142  |  98  |  74  -------------------------<  73   02-03-25 @ 05:00  4.9  |  29  |  2.0    Ca      8.7     02-03-25 @ 05:00  Phos   4.7     02-03-25 @ 05:00  Mg     2.6     02-03-25 @ 05:00    TPro  5.7  /  Alb  3.3  /  TBili  0.4  /  DBili  x   /  AST  47  /  ALT  49  /  AlkPhos  442  /  GGT  x     02-03-25 @ 05:00    PTT - ( 02-03-25 @ 07:54 )  PTT:95.3 sec      Urinalysis Basic - ( 03 Feb 2025 05:00 )    Color: x / Appearance: x / SG: x / pH: x  Gluc: 73 mg/dL / Ketone: x  / Bili: x / Urobili: x   Blood: x / Protein: x / Nitrite: x   Leuk Esterase: x / RBC: x / WBC x   Sq Epi: x / Non Sq Epi: x / Bacteria: x          IMAGING SUBJECTIVE/OVERNIGHT EVENTS  Today is hospital day 14d. This morning patient was seen and examined at bedside, resting comfortably in bed. No acute or major events overnight.      MEDICATIONS  STANDING MEDICATIONS  allopurinol 150 milliGRAM(s) Oral daily  ascorbic acid 500 milliGRAM(s) Oral daily  aspirin  chewable 81 milliGRAM(s) Oral daily  atorvastatin 10 milliGRAM(s) Oral at bedtime  benzocaine 20% Spray 1 Spray(s) Topical three times a day  buMETAnide 2 milliGRAM(s) Oral daily  cetirizine 10 milliGRAM(s) Oral daily  chlorhexidine 2% Cloths 1 Application(s) Topical daily  dextrose 5%. 1000 milliLiter(s) IV Continuous <Continuous>  dextrose 5%. 1000 milliLiter(s) IV Continuous <Continuous>  dextrose 50% Injectable 25 Gram(s) IV Push once  dextrose 50% Injectable 12.5 Gram(s) IV Push once  dextrose 50% Injectable 25 Gram(s) IV Push once  glucagon  Injectable 1 milliGRAM(s) IntraMuscular once  heparin  Infusion. 1200 Unit(s)/Hr IV Continuous <Continuous>  influenza  Vaccine (HIGH DOSE) 0.5 milliLiter(s) IntraMuscular once  insulin glargine Injectable (LANTUS) 27 Unit(s) SubCutaneous two times a day  insulin lispro (ADMELOG) corrective regimen sliding scale   SubCutaneous three times a day before meals  insulin lispro Injectable (ADMELOG) 25 Unit(s) SubCutaneous three times a day before meals  lidocaine   4% Patch 1 Patch Transdermal once  melatonin 10 milliGRAM(s) Oral at bedtime  metolazone 2.5 milliGRAM(s) Oral daily  metoprolol tartrate 25 milliGRAM(s) Oral two times a day  nystatin Cream 1 Application(s) Topical every 12 hours  pantoprazole  Injectable 40 milliGRAM(s) IV Push daily  polyethylene glycol 3350 17 Gram(s) Oral every 12 hours  senna 2 Tablet(s) Oral every 12 hours  sertraline 50 milliGRAM(s) Oral daily  tamsulosin 0.4 milliGRAM(s) Oral at bedtime    PRN MEDICATIONS  acetaminophen     Tablet .. 650 milliGRAM(s) Oral every 6 hours PRN  albuterol/ipratropium for Nebulization 3 milliLiter(s) Nebulizer every 4 hours PRN  calcium carbonate    500 mG (Tums) Chewable 1 Tablet(s) Chew three times a day PRN  dextrose Oral Gel 15 Gram(s) Oral once PRN  haloperidol    Injectable 2.5 milliGRAM(s) IntraMuscular every 6 hours PRN  heparin   Injectable 95587 Unit(s) IV Push every 6 hours PRN  heparin   Injectable 5000 Unit(s) IV Push every 6 hours PRN  hydrOXYzine hydrochloride 25 milliGRAM(s) Oral every 6 hours PRN  magnesium hydroxide Suspension 30 milliLiter(s) Oral daily PRN  ondansetron Injectable 4 milliGRAM(s) IV Push every 6 hours PRN  simethicone 80 milliGRAM(s) Chew daily PRN  traZODone 50 milliGRAM(s) Oral at bedtime PRN    VITALS  T(F): 98.2 (02-03-25 @ 07:53), Max: 98.2 (02-03-25 @ 07:53)  HR: 81 (02-03-25 @ 07:53) (69 - 84)  BP: 140/61 (02-03-25 @ 07:53) (106/56 - 157/71)  RR: 20 (02-03-25 @ 07:53) (14 - 22)  SpO2: 95% (02-03-25 @ 07:53) (92% - 100%)  POCT Blood Glucose.: 108 mg/dL (02-03-25 @ 08:32)  POCT Blood Glucose.: 92 mg/dL (02-03-25 @ 07:26)  POCT Blood Glucose.: 108 mg/dL (02-02-25 @ 21:38)  POCT Blood Glucose.: 119 mg/dL (02-02-25 @ 19:10)  POCT Blood Glucose.: 167 mg/dL (02-02-25 @ 17:46)  POCT Blood Glucose.: 57 mg/dL (02-02-25 @ 17:08)  POCT Blood Glucose.: 94 mg/dL (02-02-25 @ 11:42)    PHYSICAL EXAM  GENERAL  (  x) NAD, lying in bed comfortably     (  ) obtunded     (  ) lethargic     (  ) somnolent    HEAD  (  ) Atraumatic     (  ) hematoma     (  ) laceration (specify location:       )     NECK  (  ) Supple     (  ) neck stiffness     (  ) nuchal rigidity     (  )  no JVD     (  ) JVD present ( -- cm)    HEART  Rate -->  ( x ) normal rate    (  ) bradycardic    (  ) tachycardic  Rhythm -->  ( x ) regular    (  ) regularly irregular    (  ) irregularly irregular  Murmurs -->  (  ) normal s1/s2    (  ) systolic murmur    (  ) diastolic murmur    (  ) continuous murmur     (  ) S3 present    (  ) S4 present    LUNGS  ( x )Unlabored respirations     (  ) tachypnea  (  x) B/L air entry     (  ) decreased breath sounds in:  (location     )    (  ) no adventitious sound     (  ) crackles     (  ) wheezing      (  ) rhonchi      (specify location:       )  (  ) chest wall tenderness (specify location:       )    ABDOMEN  ( x) Soft     (  ) tense   |   (  ) nondistended     (  ) distended   |   (  ) +BS     (  ) hypoactive bowel sounds     (  ) hyperactive bowel sounds  ( x ) nontender     (  ) RUQ tenderness     (  ) RLQ tenderness     (  ) LLQ tenderness     (  ) epigastric tenderness     (  ) diffuse tenderness  (  ) Splenomegaly      (  ) Hepatomegaly      (  ) Jaundice     (  ) ecchymosis     EXTREMITIES  (  ) Normal     (  ) Rash     (  ) ecchymosis     (  ) varicose veins      (x  ) pitting edema     (  ) non-pitting edema   (  ) ulceration     (  ) gangrene:     (location:     )    NERVOUS SYSTEM  (  x) A&Ox3     (  ) confused     (  ) lethargic  CN II-XII:     (  ) Intact     (  ) focal deficits  (Specify:     )   Upper extremities:     (  ) strength X/5     (  ) focal deficit (specify:    )  Lower extremities:     (  ) strength  X/5    (  ) focal deficit (specify:    )    SKIN  (  ) No rashes or lesions     (  ) maculopapular rash     (  ) pustules     (  ) vesicles     (  ) ulcer     (  ) ecchymosis     (specify location:     )    (  x) Indwelling Ojeda Catheter   Date insterted:    Reason (  ) Critical illness     (  ) urinary retention    (  ) Accurate Ins/Outs Monitoring     (  ) CMO patient    (  ) Central Line  Date inserted:  Location: (  ) Right IJ   (  ) Left IJ   (  ) Right Fem   (  ) Left Fem    (  ) SPC  (  ) pigtail  (  ) PEG tube  (  ) colostomy  (  ) jejunostomy  (  ) U-Dall    LABS             10.4   16.00 )-----------( 883      ( 02-03-25 @ 05:00 )             32.9     142  |  98  |  74  -------------------------<  73   02-03-25 @ 05:00  4.9  |  29  |  2.0    Ca      8.7     02-03-25 @ 05:00  Phos   4.7     02-03-25 @ 05:00  Mg     2.6     02-03-25 @ 05:00    TPro  5.7  /  Alb  3.3  /  TBili  0.4  /  DBili  x   /  AST  47  /  ALT  49  /  AlkPhos  442  /  GGT  x     02-03-25 @ 05:00    PTT - ( 02-03-25 @ 07:54 )  PTT:95.3 sec      Urinalysis Basic - ( 03 Feb 2025 05:00 )    Color: x / Appearance: x / SG: x / pH: x  Gluc: 73 mg/dL / Ketone: x  / Bili: x / Urobili: x   Blood: x / Protein: x / Nitrite: x   Leuk Esterase: x / RBC: x / WBC x   Sq Epi: x / Non Sq Epi: x / Bacteria: x          IMAGING

## 2025-02-03 NOTE — PROGRESS NOTE ADULT - ASSESSMENT
69-year-old man with extensive medical history including CAD (status post CABG;), DM (on insulin pump), atrial fibrillation on Xarelto, history of distal pancreatomy and splenectomy for unclear reason referred to the ED by his oncologist for abnormal labs. Patient was recently diagnosed with Diffuse large B cell Lymphoma on pathology of right groin mass and is following with [facility] oncology team. Patient was discharged on 01.17.2025 from hospital after inpatient chemotherapy (Completed cycle 1 of R-EPOCH (D1 on 1/11/2024). Tolerated well. Rituximab was given on D5). Pt was admitted for AHRF 2/2 Influenza +/- superimposed PNA. Pt started on BIPAP and weaned to HFNC. Received tamiflu course + course of azithromycin+cefepime. Currently being monitored off Abx by ID. Downgraded from CCU now in SDU      #Acute hypoxemic respiratory failure on HFNC  #Influenza A infection / pneumonia   #RASHAD / OHS   #Possible superimposed bacterial pneumonia   #Fluid overload / bilateral pleural effusions   - weaned to HFNC  - wean as tolerated  - monitor off Abx per ID    #Large B Cell lymphoma SP Chemotherapy   - heme/onc following    #CAD (status post CABG;)  - ASA + atorvastatin    #DM (on insulin pump)  - lantus + lispro TID + ISS    # atrial fibrillation on Xarelto  - xarelto switched to heparin ggt    # distal pancreatomy and splenectomy     #urinary retention  - has moss  - TOV after PT   69-year-old man with extensive medical history including CAD (status post CABG;), DM (on insulin pump), atrial fibrillation on Xarelto, history of distal pancreatomy and splenectomy for unclear reason referred to the ED by his oncologist for abnormal labs. Patient was recently diagnosed with Diffuse large B cell Lymphoma on pathology of right groin mass and is following with [facility] oncology team. Patient was discharged on 01.17.2025 from hospital after inpatient chemotherapy (Completed cycle 1 of R-EPOCH (D1 on 1/11/2024). Tolerated well. Rituximab was given on D5). Pt was admitted for AHRF 2/2 Influenza +/- superimposed PNA. Pt started on BIPAP and weaned to HFNC. Received tamiflu course + course of azithromycin+cefepime. Currently being monitored off Abx by ID. Downgraded from CCU now in SDU      #Acute hypoxemic respiratory failure on HFNC  #Influenza A infection / pneumonia   #RASHAD / OHS   #Possible superimposed bacterial pneumonia   #Acute on chronic diastolic CHF  - weaned to HFNC  - wean as tolerated  - pt was consulted by ID, treated with IV Abx, completed the course of Tamiflu -monitor off Abx per ID  - TTE 1/10/25: poor study, EF 60-65%, pt has moderated     #Large B Cell lymphoma SP Chemotherapy   - s/p PICC placement on 1/10/25  - s/p Bone marrow biopsy on 1/10/25  - Completed cycle 1 of R-EPOCH (D1 on 1/11/2024). Tolerated well. Rituximab was given on D5   - Uric acid 10.3 on 1/16/25, s/p 3 mg IV rasburicase  - s/p Zarxio  - c/w Allopurinol   - Heme/Onc follow up to comment on plan of care      # KRISTINE on CKD 3B / Chronic Lower Extremity Edema   - baseline Cr around 1.8-2  - c/w diuretics   - monitor BUN/cr and urine output  - c/w Lynette for now  - avoid nephrotoxic medications  - nephro following        #CAD (status post CABG;)  #HLD  - ASA + atorvastatin    #DM (on insulin pump)  - lantus + lispro TID + ISS  - On insulin pump  at home   - Endo rec to keep off insulin pump    # atrial fibrillation on Xarelto  - xarelto switched to heparin ggt    # Oral pain due to ulcer with black discoloration   - c/w local [facility]  - consult ENT to examine black discoloration

## 2025-02-03 NOTE — CONSULT NOTE ADULT - SUBJECTIVE AND OBJECTIVE BOX
70-year-old man with PMH of CAD s/p CABG, DM (on insulin pump), A. Fib on Xarelto, history of distal pancreatomy and splenectomy for unclear reason referred to the ED by his oncologist for abnormal labs. Patient was recently diagnosed with Diffuse large B cell Lymphoma on pathology of right groin mass and is following with [facility] oncology team. Patient was discharged on 01.17.2025 from hospital after inpatient chemotherapy (Completed cycle 1 of R-EPOCH (D1 on 1/11/2024). Tolerated well. Rituximab was given on D5). Pt was admitted for AHRF 2/2 Influenza +/- superimposed PNA. Pt started on BIPAP and weaned to HFNC. Received Tamiflu course + course of azithromycin+ cefepime Currently being monitored off Abx by ID. Downgraded from CCU now in SDU.  ENT consulted to evaluate Pt. for black lessons on the base of tongue.   Pt. seen and examined, sitting in chair on HFNC.   Pt. reports difficulty swallowing, oral pain and burning sensation to the oral mucosa and tongue that started 1 week ago.   Pt. is following as OP with Dr. Gallardo for chronic rhinitis and Chronic dysfunction of both eustachian tubes s/p cerumen removal.             PAST MEDICAL & SURGICAL HISTORY:  Diabetes mellitus, type 2  BPH (benign prostatic hyperplasia)  Water retention  Essential hypertension  Diabetes  CAD (coronary artery disease)  H/O hypercholesterolemia  Morbid obesity  Chronic kidney disease (CKD)  History of atrial fibrillation  H/O right coronary artery stent placement  History of resection of pancreas  History of cholecystectomy  History of left knee replacement  S/P CABG x 6  H/O cardiac radiofrequency ablation      MEDICATIONS  (STANDING):  allopurinol 150 milliGRAM(s) Oral daily  ascorbic acid 500 milliGRAM(s) Oral daily  aspirin  chewable 81 milliGRAM(s) Oral daily  atorvastatin 10 milliGRAM(s) Oral at bedtime  benzocaine 20% Spray 1 Spray(s) Topical three times a day  buMETAnide 2 milliGRAM(s) Oral daily  cetirizine 10 milliGRAM(s) Oral daily  chlorhexidine 2% Cloths 1 Application(s) Topical daily  dextrose 5%. 1000 milliLiter(s) (50 mL/Hr) IV Continuous <Continuous>  dextrose 5%. 1000 milliLiter(s) (100 mL/Hr) IV Continuous <Continuous>  dextrose 50% Injectable 25 Gram(s) IV Push once  dextrose 50% Injectable 12.5 Gram(s) IV Push once  dextrose 50% Injectable 25 Gram(s) IV Push once  glucagon  Injectable 1 milliGRAM(s) IntraMuscular once  heparin  Infusion. 1200 Unit(s)/Hr (12 mL/Hr) IV Continuous <Continuous>  influenza  Vaccine (HIGH DOSE) 0.5 milliLiter(s) IntraMuscular once  insulin glargine Injectable (LANTUS) 10 Unit(s) SubCutaneous at bedtime  insulin lispro (ADMELOG) corrective regimen sliding scale   SubCutaneous three times a day before meals  insulin lispro Injectable (ADMELOG) 3 Unit(s) SubCutaneous three times a day before meals  melatonin 10 milliGRAM(s) Oral at bedtime  metolazone 2.5 milliGRAM(s) Oral daily  metoprolol tartrate 25 milliGRAM(s) Oral two times a day  nystatin Cream 1 Application(s) Topical every 12 hours  pantoprazole  Injectable 40 milliGRAM(s) IV Push daily  polyethylene glycol 3350 17 Gram(s) Oral every 12 hours  senna 2 Tablet(s) Oral every 12 hours  sertraline 50 milliGRAM(s) Oral daily  tamsulosin 0.4 milliGRAM(s) Oral at bedtime    MEDICATIONS  (PRN):  acetaminophen     Tablet .. 650 milliGRAM(s) Oral every 6 hours PRN Temp greater or equal to 38C (100.4F), Mild Pain (1 - 3)  albuterol/ipratropium for Nebulization 3 milliLiter(s) Nebulizer every 4 hours PRN Shortness of Breath and/or Wheezing  calcium carbonate    500 mG (Tums) Chewable 1 Tablet(s) Chew three times a day PRN Dyspepsia  dextrose Oral Gel 15 Gram(s) Oral once PRN Blood Glucose LESS THAN 70 milliGRAM(s)/deciliter  haloperidol    Injectable 2.5 milliGRAM(s) IntraMuscular every 6 hours PRN Agitation  heparin   Injectable 85329 Unit(s) IV Push every 6 hours PRN For aPTT less than 40  heparin   Injectable 5000 Unit(s) IV Push every 6 hours PRN For aPTT between 40 - 57  hydrOXYzine hydrochloride 25 milliGRAM(s) Oral every 6 hours PRN Anxiety  magnesium hydroxide Suspension 30 milliLiter(s) Oral daily PRN Constipation  ondansetron Injectable 4 milliGRAM(s) IV Push every 6 hours PRN Nausea and/or Vomiting  simethicone 80 milliGRAM(s) Chew daily PRN Gas  traZODone 50 milliGRAM(s) Oral at bedtime PRN insomnia      Allergies:NKDA       SOCIAL HISTORY:    FAMILY HISTORY:  Family history of lung cancer (Sibling)    Family history of diabetes mellitus (Mother)    Family history of coronary artery disease (Father)        REVIEW OF SYSTEMS   [x] A ten-point review of systems was otherwise negative except as noted.     Vital Signs Last 24 Hrs  T(C): 36.7 (03 Feb 2025 11:41), Max: 36.8 (03 Feb 2025 07:53)  T(F): 98.1 (03 Feb 2025 11:41), Max: 98.2 (03 Feb 2025 07:53)  HR: 79 (03 Feb 2025 11:41) (69 - 84)  BP: 116/58 (03 Feb 2025 11:41) (114/88 - 157/71)  BP(mean): 84 (03 Feb 2025 11:41) (82 - 102)  RR: 20 (03 Feb 2025 11:41) (14 - 22)  SpO2: 100% (03 Feb 2025 11:41) (92% - 100%)    Parameters below as of 03 Feb 2025 11:41  Patient On (Oxygen Delivery Method): nasal cannula, high flow      PE:  GEN: NAD, awake and alert. +drooling and pooling of secretions. No stridor or stertor. Good vocal quality, no hoarseness.   SKIN: Good color, non diaphoretic.  HEENT: Oral mucosa appears with areas of erythema and edema, + ecchymotic changes with ? bite marks noted to the right sided base of tongue with mild oozing of blood noted at time of oral evaluation, + edema and erythema with area of white patches ? leukoplakia  noted to the left sided base of the tongue, tongue midline, Uvula midline.   NECK: Trachea midline, Neck supple, no TTP to B/L lateral neck, no cervical LAD.  ABDO: Soft, NT.  EXT: LUJAN x 4       LABS:                        10.4   16.00 )-----------( 883      ( 03 Feb 2025 05:00 )             32.9     02-03    142  |  98  |  74[HH]  ----------------------------<  73  4.9   |  29  |  2.0[H]    Ca    8.7      03 Feb 2025 05:00  Phos  4.7     02-03  Mg     2.6     02-03    TPro  5.7[L]  /  Alb  3.3[L]  /  TBili  0.4  /  DBili  x   /  AST  47[H]  /  ALT  49[H]  /  AlkPhos  442[H]  02-03    PTT - ( 03 Feb 2025 07:54 )  PTT:95.3 sec

## 2025-02-03 NOTE — PROGRESS NOTE ADULT - ASSESSMENT
69-year-old man with extensive medical history including CAD (status post CABG;), atrial fibrillation on Xarelto, history of distal pancreatomy and splenectomy for unclear reason referred to the ED by his oncologist for abnormal labs. Patient was recently diagnosed with Diffuse large B cell Lymphoma on pathology of right groin mass and was following his Oncologist Dr Angeli Hilliard.  pt currently admitted to SDU for acute hypoxic respiratory failure ISO Influenza PNA and fluid overload .     A/P   # Acute Hypoxic Respiratory Failure 2/2 Multifocal Pneumonia/ Volume Overload/ Acute on chronic diastolic CHF/  Influenza A infection/ RASHAD/ OHS   - clinically improving, on high low oxygen for now, will taper off as tolerated   - NIV PRN and QHS   - nebs Q 6 hours, supportive care, aspiration precautions   - CTA 1/20: Negative for pulmonary emboli. 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 Tolerated well. Rituximab was given on D5   - Uric acid 10.3 on 1/16/25, s/p 3 mg IV rasburicase  - sp Zarxio  - daily CBC with diff, active T&S  - c/w Allopurinol     # KRISTINE on CKD 3B / Chronic Lower Extremity Edema   - baseline ~high 1s, low 2s  - c/w diuretics   -nephrology is following, recommendations noted  - monitor BUN/cr and urine output  - c/w Ojeda for now  - avoid nephrotoxic medications     # CAD s/p CABG/ Chronic Afib on Xarelto/ HTN  - c/w ASA, statin, BB  - Still holding lisinopril and aldactone from last admission  - holding Xarelto , on heparin drip  - telemetry monitoring, monitor and replete electrolytes      #HLD   - on Atorvastatin now   - resume Repatha on discharge    # DM  - CC diet   - FS - self monitored via dex com  - On insulin pump @2.85 at home   - Endo consult: keep off insulin pump  - On Admelog 25 units TID with meals now and SS     # Oral pain due to ulcer with black discoloration   - c/w local [facility] care  - consult ENT to examine black discoloration   - oncology follow up ( pt likely developed mucositis)     # Agitation/ confusion Insomnia   - pt is calm now   - on Haldol PRN , Sertraline and melatonin   - supportive care     # GI  - on bowerl regimen and PPIs       # Dispo: SDU

## 2025-02-03 NOTE — PROGRESS NOTE ADULT - ASSESSMENT
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 DC Seroquel.     HEENT: Oral care    PULMONARY: APRN BIPAP. CXR better. taper HFNC 50/50  as tolerated    CARDIOVASCULAR: Echocardiogram: normal EF. BNP noted to be elevated.   continue home oral dieretics     GI: GI prophylaxis.   Oral diet when on HFNC.   Free water restriction to 2 l.   Tap water enema prn; aggressive BM regimen.     RENAL:  Rise in BUN, creat noted. Resume home diuretic doses. Nephro following.     INFECTIOUS DISEASE: Finished abx course. procal. ESR and CRP high.     HEMATOLOGICAL: On AC with heparin for afib. Keep PTT therapeutic. Daily coags and CBC. Hem follow up.     ENDOCRINE:  Follow up FS. Increase insulin regimen to keep 140-180.     MUSCULOSKELETAL:  Out of bed to chair; PT OT.     FULL CODE     SDU .     Voiding trial once ambulating. PICC line.  IMPRESSION:  Acute hypoxemic respiratory failure on HFNC  Influenza A infection / pneumonia   RASHAD / OHS   Fluid overload / bilateral pleural effusions   HO Large B Cell lymphoma SP Chemotherapy   HO CAD (status post CABG;),  HO DM (on insulin pump)  HO atrial fibrillation on Xarelto  HO distal pancreatomy and splenectomy     PLAN:    CNS:  Avoid depressants.      HEENT: Oral care    PULMONARY: Wean O2 as tolerated.  Target O2 sat 88-92%.  Continue NIV during sleep.      CARDIOVASCULAR: Echocardiogram: normal EF. BNP noted to be elevated.     GI: GI prophylaxis.  Bowel regimen.      RENAL:  Rise in BUN, creat noted. Resume home diuretic doses. Nephro following.     INFECTIOUS DISEASE: Monitor VS      HEMATOLOGICAL: On AC with heparin for afib.  Transition to Eliquis.   Daily coags and CBC. Hem follow up.     ENDOCRINE:  Follow up FS. Increase insulin regimen to keep 140-180.     MUSCULOSKELETAL:  Out of bed to chair; PT OT.     FULL CODE     SDU .     Voiding trial once ambulating. PICC line.

## 2025-02-03 NOTE — CONSULT NOTE ADULT - ASSESSMENT
70-year-old man with PMH of CAD s/p CABG, DM (on insulin pump), A. Fib on Xarelto, history of distal pancreatomy and splenectomy for unclear reason referred to the ED by his oncologist for abnormal labs. Patient was recently diagnosed with Diffuse large B cell Lymphoma on pathology of right groin mass and is following with [facility] oncology team. Patient was discharged on 01.17.2025 from hospital after inpatient chemotherapy (Completed cycle 1 of R-EPOCH (D1 on 1/11/2024). Tolerated well. Rituximab was given on D5). Pt was admitted for AHRF 2/2 Influenza +/- superimposed PNA. Pt started on BIPAP and weaned to HFNC. Received Tamiflu course + course of azithromycin+ cefepime Currently being monitored off Abx by ID. Downgraded from CCU now in SDU.  ENT consulted to evaluate Pt. for black lessons on the base of tongue.   Pt. seen and examined, sitting in chair on HFNC.   Pt. reports difficulty swallowing, oral pain and burning sensation to the oral mucosa and tongue that started 1 week ago.   Pt. is following as OP with Dr. Gallardo for chronic rhinitis and Chronic dysfunction of both eustachian tubes s/p cerumen removal.     PE:  Oral mucosa appears with areas of erythema and edema, + ecchymotic changes with ? bite marks noted to the right sided base of tongue with mild oozing of blood noted at time of oral evaluation, + edema and erythema with area of white patches ? leukoplakia  noted to the left sided base of the tongue, tongue midline, Uvula midline.       Plan:   F/U SLP recommendations, will f/u FEES.   Analgesia prn x pain   F/U Hem/Oncology recommendations   Good oral care   Consider Magic Mouthwash   Will d/w ENT attending       70-year-old man with PMH of CAD s/p CABG, DM (on insulin pump), A. Fib on Xarelto, history of distal pancreatomy and splenectomy for unclear reason referred to the ED by his oncologist for abnormal labs. Patient was recently diagnosed with Diffuse large B cell Lymphoma on pathology of right groin mass and is following with [facility] oncology team. Patient was discharged on 01.17.2025 from hospital after inpatient chemotherapy (Completed cycle 1 of R-EPOCH (D1 on 1/11/2024). Tolerated well. Rituximab was given on D5). Pt was admitted for AHRF 2/2 Influenza +/- superimposed PNA. Pt started on BIPAP and weaned to HFNC. Received Tamiflu course + course of azithromycin+ cefepime Currently being monitored off Abx by ID. Downgraded from CCU now in SDU.  ENT consulted to evaluate Pt. for black lessons on the base of tongue.   Pt. seen and examined, sitting in chair on HFNC.   Pt. reports difficulty swallowing, oral pain and burning sensation to the oral mucosa and tongue that started 1 week ago.   Pt. is following as OP with Dr. Gallardo for chronic rhinitis and Chronic dysfunction of both eustachian tubes s/p cerumen removal.     PE:  Oral mucosa appears with areas of erythema and edema, + ecchymotic changes with ? bite marks noted to the right sided base of tongue with mild oozing of blood noted at time of oral evaluation, + edema and erythema with area of white patches ? leukoplakia  noted to the left sided base of the tongue, tongue midline, Uvula midline.       Plan:   F/U SLP recommendations, will f/u FEES.   Analgesia prn x pain   F/U Hem/Oncology recommendations   Good oral care   Consider Magic Mouthwash   Will d/w ENT attending        ADDENDUM:  Patient seen and examined at bedside with Dr Gallardo, recommendations below:  - Continue pain control  - Recommend magic mouthwash  - Oral care  - Recommend CT Neck with IV contrast for further evaluation  - F/u Heme/onc recs  - Nothing to do for sublingual ecchymosis at this time

## 2025-02-03 NOTE — SWALLOW BEDSIDE ASSESSMENT ADULT - SLP GENERAL OBSERVATIONS
pt received OOB to chair awake alert +HFNC 40L/min, 38% O2 +blood-tinged drool noted, +R-sided sublingual ecchymosis noted, RN/MD/ENT made aware; gentle oral care provided.

## 2025-02-03 NOTE — PROGRESS NOTE ADULT - SUBJECTIVE AND OBJECTIVE BOX
Patient is a 70y old  Male who presents with a chief complaint of sob (27 Jan 2025 09:39)        Over Night Events:        ROS:     All ROS are negative except HPI         PHYSICAL EXAM    ICU Vital Signs Last 24 Hrs  T(C): 36.7 (03 Feb 2025 04:00), Max: 36.7 (02 Feb 2025 08:00)  T(F): 98 (03 Feb 2025 04:00), Max: 98.1 (02 Feb 2025 08:00)  HR: 82 (03 Feb 2025 04:00) (69 - 84)  BP: 154/70 (03 Feb 2025 04:00) (106/56 - 165/75)  BP(mean): 91 (02 Feb 2025 22:00) (76 - 108)  ABP: --  ABP(mean): --  RR: 18 (03 Feb 2025 04:00) (14 - 22)  SpO2: 94% (03 Feb 2025 04:00) (92% - 100%)    O2 Parameters below as of 03 Feb 2025 04:00  Patient On (Oxygen Delivery Method): nasal cannula w/ humidification  O2 Flow (L/min): 40  O2 Concentration (%): 50        CONSTITUTIONAL:  Well nourished.  NAD    ENT:   Airway patent,   Mouth with normal mucosa.   No thrush    EYES:   Pupils equal,   Round and reactive to light.    CARDIAC:   Normal rate,   Regular rhythm.    No edema      Vascular:  Normal systolic impulse  No Carotid bruits    RESPIRATORY:   No wheezing  Bilateral BS  Normal chest expansion  Not tachypneic,  No use of accessory muscles    GASTROINTESTINAL:  Abdomen soft,   Non-tender,   No guarding,   + BS    MUSCULOSKELETAL:   Range of motion is not limited,  No clubbing, cyanosis    NEUROLOGICAL:   Alert and oriented   No motor  deficits.    SKIN:   Skin normal color for race,   Warm and dry and intact.   No evidence of rash.    PSYCHIATRIC:   Normal mood and affect.   No apparent risk to self or others.    HEMATOLOGICAL:  No cervical  lymphadenopathy.  no inguinal lymphadenopathy      02-02-25 @ 07:01  -  02-03-25 @ 07:00  --------------------------------------------------------  IN:    Heparin Infusion: 180 mL    IV PiggyBack: 100 mL    Oral Fluid: 780 mL  Total IN: 1060 mL    OUT:    Indwelling Catheter - Urethral (mL): 1985 mL  Total OUT: 1985 mL    Total NET: -925 mL          LABS:                            10.4   16.00 )-----------( 883      ( 03 Feb 2025 05:00 )             32.9                                               02-03    142  |  98  |  74[HH]  ----------------------------<  73  4.9   |  29  |  2.0[H]    Ca    8.7      03 Feb 2025 05:00  Phos  4.7     02-03  Mg     2.6     02-03    TPro  5.7[L]  /  Alb  3.3[L]  /  TBili  0.4  /  DBili  x   /  AST  47[H]  /  ALT  49[H]  /  AlkPhos  442[H]  02-03      PTT - ( 02 Feb 2025 04:58 )  PTT:77.6 sec                                       Urinalysis Basic - ( 03 Feb 2025 05:00 )    Color: x / Appearance: x / SG: x / pH: x  Gluc: 73 mg/dL / Ketone: x  / Bili: x / Urobili: x   Blood: x / Protein: x / Nitrite: x   Leuk Esterase: x / RBC: x / WBC x   Sq Epi: x / Non Sq Epi: x / Bacteria: x                                                  LIVER FUNCTIONS - ( 03 Feb 2025 05:00 )  Alb: 3.3 g/dL / Pro: 5.7 g/dL / ALK PHOS: 442 U/L / ALT: 49 U/L / AST: 47 U/L / GGT: x                                                                                                                                       MEDICATIONS  (STANDING):  allopurinol 150 milliGRAM(s) Oral daily  ascorbic acid 500 milliGRAM(s) Oral daily  aspirin  chewable 81 milliGRAM(s) Oral daily  atorvastatin 10 milliGRAM(s) Oral at bedtime  benzocaine 20% Spray 1 Spray(s) Topical three times a day  buMETAnide 2 milliGRAM(s) Oral daily  cetirizine 10 milliGRAM(s) Oral daily  chlorhexidine 2% Cloths 1 Application(s) Topical daily  dextrose 5%. 1000 milliLiter(s) (50 mL/Hr) IV Continuous <Continuous>  dextrose 5%. 1000 milliLiter(s) (100 mL/Hr) IV Continuous <Continuous>  dextrose 50% Injectable 25 Gram(s) IV Push once  dextrose 50% Injectable 12.5 Gram(s) IV Push once  dextrose 50% Injectable 25 Gram(s) IV Push once  glucagon  Injectable 1 milliGRAM(s) IntraMuscular once  heparin  Infusion.  Unit(s)/Hr (10 mL/Hr) IV Continuous <Continuous>  influenza  Vaccine (HIGH DOSE) 0.5 milliLiter(s) IntraMuscular once  insulin glargine Injectable (LANTUS) 27 Unit(s) SubCutaneous two times a day  insulin lispro (ADMELOG) corrective regimen sliding scale   SubCutaneous three times a day before meals  insulin lispro Injectable (ADMELOG) 25 Unit(s) SubCutaneous three times a day before meals  melatonin 10 milliGRAM(s) Oral at bedtime  metolazone 2.5 milliGRAM(s) Oral daily  metoprolol tartrate 25 milliGRAM(s) Oral two times a day  nystatin Cream 1 Application(s) Topical every 12 hours  pantoprazole  Injectable 40 milliGRAM(s) IV Push daily  polyethylene glycol 3350 17 Gram(s) Oral every 12 hours  senna 2 Tablet(s) Oral every 12 hours  sertraline 50 milliGRAM(s) Oral daily    MEDICATIONS  (PRN):  acetaminophen     Tablet .. 650 milliGRAM(s) Oral every 6 hours PRN Temp greater or equal to 38C (100.4F), Mild Pain (1 - 3)  albuterol/ipratropium for Nebulization 3 milliLiter(s) Nebulizer every 4 hours PRN Shortness of Breath and/or Wheezing  calcium carbonate    500 mG (Tums) Chewable 1 Tablet(s) Chew three times a day PRN Dyspepsia  dextrose Oral Gel 15 Gram(s) Oral once PRN Blood Glucose LESS THAN 70 milliGRAM(s)/deciliter  haloperidol    Injectable 2.5 milliGRAM(s) IntraMuscular every 6 hours PRN Agitation  heparin   Injectable 6000 Unit(s) IV Push every 6 hours PRN For aPTT less than 40  hydrOXYzine hydrochloride 25 milliGRAM(s) Oral every 6 hours PRN Anxiety  magnesium hydroxide Suspension 30 milliLiter(s) Oral daily PRN Constipation  ondansetron Injectable 4 milliGRAM(s) IV Push every 6 hours PRN Nausea and/or Vomiting  simethicone 80 milliGRAM(s) Chew daily PRN Gas  traZODone 50 milliGRAM(s) Oral at bedtime PRN insomnia      New X-rays reviewed:                                                                                  ECHO    CXR interpreted by me:       Patient is a 70y old  Male who presents with a chief complaint of sob (27 Jan 2025 09:39)        Over Night Events:  Remains on HFNCO2.  Off pressors.          ROS:     All ROS are negative except HPI         PHYSICAL EXAM    ICU Vital Signs Last 24 Hrs  T(C): 36.7 (03 Feb 2025 04:00), Max: 36.7 (02 Feb 2025 08:00)  T(F): 98 (03 Feb 2025 04:00), Max: 98.1 (02 Feb 2025 08:00)  HR: 82 (03 Feb 2025 04:00) (69 - 84)  BP: 154/70 (03 Feb 2025 04:00) (106/56 - 165/75)  BP(mean): 91 (02 Feb 2025 22:00) (76 - 108)  ABP: --  ABP(mean): --  RR: 18 (03 Feb 2025 04:00) (14 - 22)  SpO2: 94% (03 Feb 2025 04:00) (92% - 100%)    O2 Parameters below as of 03 Feb 2025 04:00  Patient On (Oxygen Delivery Method): nasal cannula w/ humidification  O2 Flow (L/min): 40  O2 Concentration (%): 50        CONSTITUTIONAL:    NAD    ENT:   Airway patent,   Mouth with normal mucosa.       EYES:   Pupils equal,   Round and reactive to light.    CARDIAC:   Normal rate,   Regular rhythm.        RESPIRATORY:   No wheezing  Bilateral crackles   Normal chest expansion  Not tachypneic,  No use of accessory muscles    GASTROINTESTINAL:  Abdomen soft,   Non-tender,   No guarding,   + BS    MUSCULOSKELETAL:   Range of motion is not limited,  No clubbing, cyanosis    NEUROLOGICAL:   Alert and oriented   No motor  deficits.    SKIN:   Skin normal color for race,   Warm and dry  No evidence of rash.        02-02-25 @ 07:01  -  02-03-25 @ 07:00  --------------------------------------------------------  IN:    Heparin Infusion: 180 mL    IV PiggyBack: 100 mL    Oral Fluid: 780 mL  Total IN: 1060 mL    OUT:    Indwelling Catheter - Urethral (mL): 1985 mL  Total OUT: 1985 mL    Total NET: -925 mL          LABS:                            10.4   16.00 )-----------( 883      ( 03 Feb 2025 05:00 )             32.9                                               02-03    142  |  98  |  74[HH]  ----------------------------<  73  4.9   |  29  |  2.0[H]    Creatinine Trend  BUN 74, Cr 2.0, (02-03-25 @ 05:00)  Creatinine Trend  BUN 76, Cr 2.2, (02-02-25 @ 04:58)  Creatinine Trend  BUN 75, Cr 2.1, (02-01-25 @ 05:15)  Creatinine Trend  BUN 77, Cr 2.2, (01-31-25 @ 04:50)  Creatinine Trend  BUN 77, Cr 1.8, (01-30-25 @ 05:10)      Ca    8.7      03 Feb 2025 05:00  Phos  4.7     02-03  Mg     2.6     02-03    TPro  5.7[L]  /  Alb  3.3[L]  /  TBili  0.4  /  DBili  x   /  AST  47[H]  /  ALT  49[H]  /  AlkPhos  442[H]  02-03      PTT - ( 02 Feb 2025 04:58 )  PTT:77.6 sec                                       Urinalysis Basic - ( 03 Feb 2025 05:00 )    Color: x / Appearance: x / SG: x / pH: x  Gluc: 73 mg/dL / Ketone: x  / Bili: x / Urobili: x   Blood: x / Protein: x / Nitrite: x   Leuk Esterase: x / RBC: x / WBC x   Sq Epi: x / Non Sq Epi: x / Bacteria: x                                                  LIVER FUNCTIONS - ( 03 Feb 2025 05:00 )  Alb: 3.3 g/dL / Pro: 5.7 g/dL / ALK PHOS: 442 U/L / ALT: 49 U/L / AST: 47 U/L / GGT: x                                                                                                                                       MEDICATIONS  (STANDING):  allopurinol 150 milliGRAM(s) Oral daily  ascorbic acid 500 milliGRAM(s) Oral daily  aspirin  chewable 81 milliGRAM(s) Oral daily  atorvastatin 10 milliGRAM(s) Oral at bedtime  benzocaine 20% Spray 1 Spray(s) Topical three times a day  buMETAnide 2 milliGRAM(s) Oral daily  cetirizine 10 milliGRAM(s) Oral daily  chlorhexidine 2% Cloths 1 Application(s) Topical daily  dextrose 5%. 1000 milliLiter(s) (50 mL/Hr) IV Continuous <Continuous>  dextrose 5%. 1000 milliLiter(s) (100 mL/Hr) IV Continuous <Continuous>  dextrose 50% Injectable 25 Gram(s) IV Push once  dextrose 50% Injectable 12.5 Gram(s) IV Push once  dextrose 50% Injectable 25 Gram(s) IV Push once  glucagon  Injectable 1 milliGRAM(s) IntraMuscular once  heparin  Infusion.  Unit(s)/Hr (10 mL/Hr) IV Continuous <Continuous>  influenza  Vaccine (HIGH DOSE) 0.5 milliLiter(s) IntraMuscular once  insulin glargine Injectable (LANTUS) 27 Unit(s) SubCutaneous two times a day  insulin lispro (ADMELOG) corrective regimen sliding scale   SubCutaneous three times a day before meals  insulin lispro Injectable (ADMELOG) 25 Unit(s) SubCutaneous three times a day before meals  melatonin 10 milliGRAM(s) Oral at bedtime  metolazone 2.5 milliGRAM(s) Oral daily  metoprolol tartrate 25 milliGRAM(s) Oral two times a day  nystatin Cream 1 Application(s) Topical every 12 hours  pantoprazole  Injectable 40 milliGRAM(s) IV Push daily  polyethylene glycol 3350 17 Gram(s) Oral every 12 hours  senna 2 Tablet(s) Oral every 12 hours  sertraline 50 milliGRAM(s) Oral daily    MEDICATIONS  (PRN):  acetaminophen     Tablet .. 650 milliGRAM(s) Oral every 6 hours PRN Temp greater or equal to 38C (100.4F), Mild Pain (1 - 3)  albuterol/ipratropium for Nebulization 3 milliLiter(s) Nebulizer every 4 hours PRN Shortness of Breath and/or Wheezing  calcium carbonate    500 mG (Tums) Chewable 1 Tablet(s) Chew three times a day PRN Dyspepsia  dextrose Oral Gel 15 Gram(s) Oral once PRN Blood Glucose LESS THAN 70 milliGRAM(s)/deciliter  haloperidol    Injectable 2.5 milliGRAM(s) IntraMuscular every 6 hours PRN Agitation  heparin   Injectable 6000 Unit(s) IV Push every 6 hours PRN For aPTT less than 40  hydrOXYzine hydrochloride 25 milliGRAM(s) Oral every 6 hours PRN Anxiety  magnesium hydroxide Suspension 30 milliLiter(s) Oral daily PRN Constipation  ondansetron Injectable 4 milliGRAM(s) IV Push every 6 hours PRN Nausea and/or Vomiting  simethicone 80 milliGRAM(s) Chew daily PRN Gas  traZODone 50 milliGRAM(s) Oral at bedtime PRN insomnia      New X-rays reviewed:                                                                                  ECHO

## 2025-02-03 NOTE — PROGRESS NOTE ADULT - SUBJECTIVE AND OBJECTIVE BOX
69-year-old man with extensive medical history including CAD (status post CABG;), DM (on insulin pump), atrial fibrillation on Xarelto, history of distal pancreatomy and splenectomy for unclear reason referred to the ED by his oncologist for abnormal labs. Patient was recently diagnosed with Diffuse large B cell Lymphoma on pathology of right groin mass and is following with [facility] oncology team. Patient was discharged on 01.17.2025 from hospital after inpatient chemotherapy (Completed cycle 1 of R-EPOCH (D1 on 1/11/2024). Tolerated well. Rituximab was given on D5).   Patient started to get a productive cough with yellow sputum production. He was admitted to SDU for  Acute Hypoxic Respiratory Failure 2/2 Multifocal Pneumonia, Volume Overload, Neutropenic fever, Influenza A infection, decompensated and was upgraded to ICU on 1/27,  started on BIPAP and weaned to HFNC. Received tamiflu course + course of azithromycin+cefepime. Currently being monitored off Abx by ID.  Pt clinically improved and was  downgrade back  to SDU.  Today pt is c/o pain in his mouth, weak, has occasional cough,  has pain around left hip joint, comfortable on high flow oxygen.     PAST MEDICAL & SURGICAL HISTORY:  Diabetes mellitus, type 2  BPH (benign prostatic hyperplasia)  Water retention  Essential hypertension  Diabetes  CAD (coronary artery disease)  H/O hypercholesterolemia  Morbid obesity  Chronic kidney disease (CKD)  History of atrial fibrillation  H/O right coronary artery stent placement  History of resection of pancreas  History of cholecystectomy  History of left knee replacement  S/P CABG x 6  H/O cardiac radiofrequency ablation      Vital Signs Last 24 Hrs  T(C): 36.7 (03 Feb 2025 11:41), Max: 36.8 (03 Feb 2025 07:53)  T(F): 98.1 (03 Feb 2025 11:41), Max: 98.2 (03 Feb 2025 07:53)  HR: 79 (03 Feb 2025 11:41) (69 - 84)  BP: 116/58 (03 Feb 2025 11:41) (114/88 - 157/71)  BP(mean): 84 (03 Feb 2025 11:41) (82 - 102)  RR: 20 (03 Feb 2025 11:41) (14 - 22)  SpO2: 100% (03 Feb 2025 11:41) (92% - 100%)    Parameters below as of 03 Feb 2025 11:41  Patient On (Oxygen Delivery Method): nasal cannula, high flow      PHYSICAL EXAM:  GENERAL: NAD, obese   HEAD:  Atraumatic, Normocephalic  EYES: EOMI, PERRLA, conjunctiva and sclera clear  ENMT: ulcerative lesions with areas of black discoloration noted in the mouth   NECK: Supple, No JVD, Normal thyroid, wide neck   NERVOUS SYSTEM:  Awake and alert, answering questions, following commands, hard of hearing   CHEST/LUNG: Coarse BS b/l , shallow breathing   HEART: S1, S2   ABDOMEN: obese  Soft, Nontender, Nondistended; Bowel sounds present  EXTREMITIES:  2+ edema on LE       LABS:                        10.4   16.00 )-----------( 883      ( 03 Feb 2025 05:00 )             32.9     02-03    142  |  98  |  74[HH]  ----------------------------<  73  4.9   |  29  |  2.0[H]    Ca    8.7      03 Feb 2025 05:00  Phos  4.7     02-03  Mg     2.6     02-03    TPro  5.7[L]  /  Alb  3.3[L]  /  TBili  0.4  /  DBili  x   /  AST  47[H]  /  ALT  49[H]  /  AlkPhos  442[H]  02-03    PTT - ( 03 Feb 2025 07:54 )  PTT:95.3 sec  Urinalysis Basic - ( 03 Feb 2025 05:00 )    Color: x / Appearance: x / SG: x / pH: x  Gluc: 73 mg/dL / Ketone: x  / Bili: x / Urobili: x   Blood: x / Protein: x / Nitrite: x   Leuk Esterase: x / RBC: x / WBC x   Sq Epi: x / Non Sq Epi: x / Bacteria: x      Culture - Blood in AM (01.27.25 @ 06:12)   Specimen Source: .Blood BLOOD  Culture Results:   No growth at 5 days      RADIOLOGY & ADDITIONAL TESTS:      < from: Xray Chest 1 View- PORTABLE-Routine (02.03.25 @ 06:05) >  IMPRESSION:    Unchanged bibasilar opacities, left greater than right.    < end of copied text >  < from: VA Duplex Lower Ext Vein Scan, Bilat (01.09.25 @ 09:12) >  IMPRESSION:  No evidence of deep venous thrombosis in either lower extremity.    There is a proximal thigh/right groin mass measuring greater than 6.9 x   9.2 x 11 cm    < end of copied text >  < from: TTE Echo w/o Contrast Follow Up Limited (01.25.25 @ 12:38) >  Summary:   1. Technically difficult study.   2. LV Ejection Fraction by Roche's Method with a biplane EFof 53 %.   3. The left ventricular diastolic function could not be assessed in this   study.   4. Degenerative mitral valve.   5. No evidence of mitral valve regurgitation.   6. Moderate tricuspid regurgitation.   7. Endocardial visualization was enhanced with intravenous echo contrast.    MEDICATIONS  (STANDING):  allopurinol 150 milliGRAM(s) Oral daily  ascorbic acid 500 milliGRAM(s) Oral daily  aspirin  chewable 81 milliGRAM(s) Oral daily  atorvastatin 10 milliGRAM(s) Oral at bedtime  benzocaine 20% Spray 1 Spray(s) Topical three times a day  buMETAnide 2 milliGRAM(s) Oral daily  cetirizine 10 milliGRAM(s) Oral daily  chlorhexidine 2% Cloths 1 Application(s) Topical daily  dextrose 5%. 1000 milliLiter(s) (50 mL/Hr) IV Continuous <Continuous>  dextrose 5%. 1000 milliLiter(s) (100 mL/Hr) IV Continuous <Continuous>  dextrose 50% Injectable 25 Gram(s) IV Push once  dextrose 50% Injectable 12.5 Gram(s) IV Push once  dextrose 50% Injectable 25 Gram(s) IV Push once  glucagon  Injectable 1 milliGRAM(s) IntraMuscular once  heparin  Infusion. 1200 Unit(s)/Hr (12 mL/Hr) IV Continuous <Continuous>  influenza  Vaccine (HIGH DOSE) 0.5 milliLiter(s) IntraMuscular once  insulin lispro (ADMELOG) corrective regimen sliding scale   SubCutaneous three times a day before meals  insulin lispro Injectable (ADMELOG) 25 Unit(s) SubCutaneous three times a day before meals  melatonin 10 milliGRAM(s) Oral at bedtime  metolazone 2.5 milliGRAM(s) Oral daily  metoprolol tartrate 25 milliGRAM(s) Oral two times a day  nystatin Cream 1 Application(s) Topical every 12 hours  pantoprazole  Injectable 40 milliGRAM(s) IV Push daily  polyethylene glycol 3350 17 Gram(s) Oral every 12 hours  senna 2 Tablet(s) Oral every 12 hours  sertraline 50 milliGRAM(s) Oral daily  tamsulosin 0.4 milliGRAM(s) Oral at bedtime    MEDICATIONS  (PRN):  acetaminophen     Tablet .. 650 milliGRAM(s) Oral every 6 hours PRN Temp greater or equal to 38C (100.4F), Mild Pain (1 - 3)  albuterol/ipratropium for Nebulization 3 milliLiter(s) Nebulizer every 4 hours PRN Shortness of Breath and/or Wheezing  calcium carbonate    500 mG (Tums) Chewable 1 Tablet(s) Chew three times a day PRN Dyspepsia  dextrose Oral Gel 15 Gram(s) Oral once PRN Blood Glucose LESS THAN 70 milliGRAM(s)/deciliter  haloperidol    Injectable 2.5 milliGRAM(s) IntraMuscular every 6 hours PRN Agitation  heparin   Injectable 63465 Unit(s) IV Push every 6 hours PRN For aPTT less than 40  heparin   Injectable 5000 Unit(s) IV Push every 6 hours PRN For aPTT between 40 - 57  hydrOXYzine hydrochloride 25 milliGRAM(s) Oral every 6 hours PRN Anxiety  magnesium hydroxide Suspension 30 milliLiter(s) Oral daily PRN Constipation  ondansetron Injectable 4 milliGRAM(s) IV Push every 6 hours PRN Nausea and/or Vomiting  simethicone 80 milliGRAM(s) Chew daily PRN Gas  traZODone 50 milliGRAM(s) Oral at bedtime PRN insomnia

## 2025-02-04 LAB
ALBUMIN SERPL ELPH-MCNC: 3.1 G/DL — LOW (ref 3.5–5.2)
ALP SERPL-CCNC: 425 U/L — HIGH (ref 30–115)
ALT FLD-CCNC: 41 U/L — SIGNIFICANT CHANGE UP (ref 0–41)
APTT BLD: 90.7 SEC — CRITICAL HIGH (ref 27–39.2)
AST SERPL-CCNC: 42 U/L — HIGH (ref 0–41)
BASOPHILS # BLD AUTO: 0.13 K/UL — SIGNIFICANT CHANGE UP (ref 0–0.2)
BASOPHILS NFR BLD AUTO: 0.9 % — SIGNIFICANT CHANGE UP (ref 0–1)
BILIRUB SERPL-MCNC: 0.4 MG/DL — SIGNIFICANT CHANGE UP (ref 0.2–1.2)
BLD GP AB SCN SERPL QL: SIGNIFICANT CHANGE UP
BUN SERPL-MCNC: 73 MG/DL — CRITICAL HIGH (ref 10–20)
CALCIUM SERPL-MCNC: 8.9 MG/DL — SIGNIFICANT CHANGE UP (ref 8.4–10.4)
CHLORIDE SERPL-SCNC: 97 MMOL/L — LOW (ref 98–110)
CO2 SERPL-SCNC: 29 MMOL/L — SIGNIFICANT CHANGE UP (ref 17–32)
CREAT SERPL-MCNC: 2.1 MG/DL — HIGH (ref 0.7–1.5)
EGFR: 34 ML/MIN/1.73M2 — LOW
EOSINOPHIL # BLD AUTO: 0.13 K/UL — SIGNIFICANT CHANGE UP (ref 0–0.7)
EOSINOPHIL NFR BLD AUTO: 0.9 % — SIGNIFICANT CHANGE UP (ref 0–8)
GLUCOSE BLDC GLUCOMTR-MCNC: 119 MG/DL — HIGH (ref 70–99)
GLUCOSE BLDC GLUCOMTR-MCNC: 123 MG/DL — HIGH (ref 70–99)
GLUCOSE BLDC GLUCOMTR-MCNC: 197 MG/DL — HIGH (ref 70–99)
GLUCOSE BLDC GLUCOMTR-MCNC: 199 MG/DL — HIGH (ref 70–99)
GLUCOSE SERPL-MCNC: 105 MG/DL — HIGH (ref 70–99)
HCT VFR BLD CALC: 31.7 % — LOW (ref 42–52)
HGB BLD-MCNC: 10 G/DL — LOW (ref 14–18)
LYMPHOCYTES # BLD AUTO: 0.52 K/UL — LOW (ref 1.2–3.4)
LYMPHOCYTES # BLD AUTO: 3.5 % — LOW (ref 20.5–51.1)
MAGNESIUM SERPL-MCNC: 2.4 MG/DL — SIGNIFICANT CHANGE UP (ref 1.8–2.4)
MCHC RBC-ENTMCNC: 30 PG — SIGNIFICANT CHANGE UP (ref 27–31)
MCHC RBC-ENTMCNC: 31.5 G/DL — LOW (ref 32–37)
MCV RBC AUTO: 95.2 FL — HIGH (ref 80–94)
MONOCYTES # BLD AUTO: 4.01 K/UL — HIGH (ref 0.1–0.6)
MONOCYTES NFR BLD AUTO: 27.2 % — HIGH (ref 1.7–9.3)
NEUTROPHILS # BLD AUTO: 9.68 K/UL — HIGH (ref 1.4–6.5)
NEUTROPHILS NFR BLD AUTO: 64 % — SIGNIFICANT CHANGE UP (ref 42.2–75.2)
NRBC # BLD: SIGNIFICANT CHANGE UP /100 WBCS (ref 0–0)
NRBC BLD-RTO: SIGNIFICANT CHANGE UP /100 WBCS (ref 0–0)
PHOSPHATE SERPL-MCNC: 5 MG/DL — HIGH (ref 2.1–4.9)
PLATELET # BLD AUTO: 849 K/UL — HIGH (ref 130–400)
PMV BLD: 11.8 FL — HIGH (ref 7.4–10.4)
POTASSIUM SERPL-MCNC: 4.4 MMOL/L — SIGNIFICANT CHANGE UP (ref 3.5–5)
POTASSIUM SERPL-SCNC: 4.4 MMOL/L — SIGNIFICANT CHANGE UP (ref 3.5–5)
PROT SERPL-MCNC: 5.6 G/DL — LOW (ref 6–8)
RBC # BLD: 3.33 M/UL — LOW (ref 4.7–6.1)
RBC # FLD: 16.2 % — HIGH (ref 11.5–14.5)
SODIUM SERPL-SCNC: 141 MMOL/L — SIGNIFICANT CHANGE UP (ref 135–146)
WBC # BLD: 14.73 K/UL — HIGH (ref 4.8–10.8)
WBC # FLD AUTO: 14.73 K/UL — HIGH (ref 4.8–10.8)

## 2025-02-04 PROCEDURE — 99233 SBSQ HOSP IP/OBS HIGH 50: CPT

## 2025-02-04 PROCEDURE — 71045 X-RAY EXAM CHEST 1 VIEW: CPT | Mod: 26

## 2025-02-04 PROCEDURE — 93010 ELECTROCARDIOGRAM REPORT: CPT

## 2025-02-04 PROCEDURE — 70491 CT SOFT TISSUE NECK W/DYE: CPT | Mod: 26

## 2025-02-04 RX ORDER — DIPHENHYDRAMINE HYDROCHLORIDE AND LIDOCAINE HYDROCHLORIDE AND ALUMINUM HYDROXIDE AND MAGNESIUM HYDRO
10 KIT EVERY 4 HOURS
Refills: 0 | Status: DISCONTINUED | OUTPATIENT
Start: 2025-02-04 | End: 2025-02-05

## 2025-02-04 RX ADMIN — Medication 40 MILLIGRAM(S): at 13:38

## 2025-02-04 RX ADMIN — OXYCODONE HYDROCHLORIDE 5 MILLIGRAM(S): 30 TABLET ORAL at 21:55

## 2025-02-04 RX ADMIN — Medication 10 MILLIGRAM(S): at 13:39

## 2025-02-04 RX ADMIN — HEPARIN SODIUM 1200 UNIT(S)/HR: 1000 INJECTION INTRAVENOUS; SUBCUTANEOUS at 01:07

## 2025-02-04 RX ADMIN — ATORVASTATIN CALCIUM 10 MILLIGRAM(S): 80 TABLET, FILM COATED ORAL at 21:34

## 2025-02-04 RX ADMIN — Medication 81 MILLIGRAM(S): at 13:38

## 2025-02-04 RX ADMIN — TAMSULOSIN HYDROCHLORIDE 0.4 MILLIGRAM(S): 0.4 CAPSULE ORAL at 21:33

## 2025-02-04 RX ADMIN — POLYETHYLENE GLYCOL 3350 17 GRAM(S): 17 POWDER, FOR SOLUTION ORAL at 05:56

## 2025-02-04 RX ADMIN — NYSTATIN 1 APPLICATION(S): 100000 CREAM TOPICAL at 17:45

## 2025-02-04 RX ADMIN — BUMETANIDE 2 MILLIGRAM(S): 1 TABLET ORAL at 05:56

## 2025-02-04 RX ADMIN — INSULIN LISPRO 3 UNIT(S): 100 INJECTION, SOLUTION INTRAVENOUS; SUBCUTANEOUS at 17:36

## 2025-02-04 RX ADMIN — METOPROLOL SUCCINATE 25 MILLIGRAM(S): 50 TABLET, EXTENDED RELEASE ORAL at 17:38

## 2025-02-04 RX ADMIN — NYSTATIN 1 APPLICATION(S): 100000 CREAM TOPICAL at 05:58

## 2025-02-04 RX ADMIN — SERTRALINE 50 MILLIGRAM(S): 100 TABLET, FILM COATED ORAL at 13:39

## 2025-02-04 RX ADMIN — BENZOCAINE 1 SPRAY(S): 220 SPRAY, METERED PERIODONTAL at 05:57

## 2025-02-04 RX ADMIN — HEPARIN SODIUM 1200 UNIT(S)/HR: 1000 INJECTION INTRAVENOUS; SUBCUTANEOUS at 22:00

## 2025-02-04 RX ADMIN — Medication 150 MILLIGRAM(S): at 13:39

## 2025-02-04 RX ADMIN — Medication 1 APPLICATION(S): at 13:39

## 2025-02-04 RX ADMIN — BENZOCAINE 1 SPRAY(S): 220 SPRAY, METERED PERIODONTAL at 21:34

## 2025-02-04 RX ADMIN — Medication 2 TABLET(S): at 05:56

## 2025-02-04 RX ADMIN — DIPHENHYDRAMINE HYDROCHLORIDE AND LIDOCAINE HYDROCHLORIDE AND ALUMINUM HYDROXIDE AND MAGNESIUM HYDRO 10 MILLILITER(S): KIT at 22:25

## 2025-02-04 RX ADMIN — METOPROLOL SUCCINATE 25 MILLIGRAM(S): 50 TABLET, EXTENDED RELEASE ORAL at 05:56

## 2025-02-04 RX ADMIN — INSULIN GLARGINE-YFGN 10 UNIT(S): 100 INJECTION, SOLUTION SUBCUTANEOUS at 21:39

## 2025-02-04 RX ADMIN — OXYCODONE HYDROCHLORIDE 5 MILLIGRAM(S): 30 TABLET ORAL at 20:40

## 2025-02-04 RX ADMIN — Medication 500 MILLIGRAM(S): at 13:39

## 2025-02-04 RX ADMIN — Medication 10 MILLIGRAM(S): at 21:34

## 2025-02-04 RX ADMIN — INSULIN LISPRO 1: 100 INJECTION, SOLUTION INTRAVENOUS; SUBCUTANEOUS at 17:36

## 2025-02-04 RX ADMIN — DIPHENHYDRAMINE HYDROCHLORIDE AND LIDOCAINE HYDROCHLORIDE AND ALUMINUM HYDROXIDE AND MAGNESIUM HYDRO 10 MILLILITER(S): KIT at 13:50

## 2025-02-04 RX ADMIN — HEPARIN SODIUM 1200 UNIT(S)/HR: 1000 INJECTION INTRAVENOUS; SUBCUTANEOUS at 00:56

## 2025-02-04 NOTE — PROGRESS NOTE ADULT - SUBJECTIVE AND OBJECTIVE BOX
Patient is a 70y old  Male who presents with a chief complaint of sob (27 Jan 2025 09:39)        Over Night Events:  ON NC 4 liters.  Refuses NIV         ROS:     All ROS are negative except HPI         PHYSICAL EXAM    ICU Vital Signs Last 24 Hrs  T(C): 36.1 (04 Feb 2025 07:18), Max: 36.7 (03 Feb 2025 11:41)  T(F): 96.9 (04 Feb 2025 07:18), Max: 98.1 (03 Feb 2025 11:41)  HR: 86 (04 Feb 2025 07:18) (77 - 90)  BP: 108/56 (04 Feb 2025 07:18) (108/56 - 171/99)  BP(mean): 76 (04 Feb 2025 07:18) (76 - 127)  ABP: --  ABP(mean): --  RR: 18 (04 Feb 2025 07:18) (18 - 20)  SpO2: 90% (04 Feb 2025 07:18) (90% - 100%)    O2 Parameters below as of 04 Feb 2025 07:18  Patient On (Oxygen Delivery Method): nasal cannula            CONSTITUTIONAL:  Well nourished.  NAD    ENT:   Airway patent,   Mouth with normal mucosa.       EYES:   Pupils equal,   Round and reactive to light.    CARDIAC:   Normal rate,   Irregular rhythm.    edema      RESPIRATORY:   No wheezing  Bilateral BS  Normal chest expansion  Not tachypneic,  No use of accessory muscles    GASTROINTESTINAL:  Abdomen soft,   Non-tender,   No guarding,   + BS    MUSCULOSKELETAL:   Range of motion is not limited,  No clubbing, cyanosis    NEUROLOGICAL:   Alert and oriented   No motor  deficits.    SKIN:   Skin normal color for race,   Warm and dry        02-03-25 @ 07:01  -  02-04-25 @ 07:00  --------------------------------------------------------  IN:    Heparin Infusion: 24 mL    Heparin Infusion: 264 mL    Oral Fluid: 350 mL  Total IN: 638 mL    OUT:    Indwelling Catheter - Urethral (mL): 2100 mL  Total OUT: 2100 mL    Total NET: -1462 mL          LABS:                            10.0   14.73 )-----------( 849      ( 04 Feb 2025 04:36 )             31.7                                               02-04    141  |  97[L]  |  73[HH]  ----------------------------<  105[H]  4.4   |  29  |  2.1[H]    Ca    8.9      04 Feb 2025 04:36  Phos  5.0     02-04  Mg     2.4     02-04    TPro  5.6[L]  /  Alb  3.1[L]  /  TBili  0.4  /  DBili  x   /  AST  42[H]  /  ALT  41  /  AlkPhos  425[H]  02-04      PTT - ( 03 Feb 2025 23:45 )  PTT:90.7 sec                                       Urinalysis Basic - ( 04 Feb 2025 04:36 )    Color: x / Appearance: x / SG: x / pH: x  Gluc: 105 mg/dL / Ketone: x  / Bili: x / Urobili: x   Blood: x / Protein: x / Nitrite: x   Leuk Esterase: x / RBC: x / WBC x   Sq Epi: x / Non Sq Epi: x / Bacteria: x                                                  LIVER FUNCTIONS - ( 04 Feb 2025 04:36 )  Alb: 3.1 g/dL / Pro: 5.6 g/dL / ALK PHOS: 425 U/L / ALT: 41 U/L / AST: 42 U/L / GGT: x                                                                                                                                       MEDICATIONS  (STANDING):  allopurinol 150 milliGRAM(s) Oral daily  ascorbic acid 500 milliGRAM(s) Oral daily  aspirin  chewable 81 milliGRAM(s) Oral daily  atorvastatin 10 milliGRAM(s) Oral at bedtime  benzocaine 20% Spray 1 Spray(s) Topical three times a day  buMETAnide 2 milliGRAM(s) Oral daily  cetirizine 10 milliGRAM(s) Oral daily  chlorhexidine 2% Cloths 1 Application(s) Topical daily  dextrose 5%. 1000 milliLiter(s) (100 mL/Hr) IV Continuous <Continuous>  dextrose 5%. 1000 milliLiter(s) (50 mL/Hr) IV Continuous <Continuous>  dextrose 50% Injectable 25 Gram(s) IV Push once  dextrose 50% Injectable 12.5 Gram(s) IV Push once  dextrose 50% Injectable 25 Gram(s) IV Push once  glucagon  Injectable 1 milliGRAM(s) IntraMuscular once  heparin  Infusion. 1200 Unit(s)/Hr (12 mL/Hr) IV Continuous <Continuous>  influenza  Vaccine (HIGH DOSE) 0.5 milliLiter(s) IntraMuscular once  insulin glargine Injectable (LANTUS) 10 Unit(s) SubCutaneous at bedtime  insulin lispro (ADMELOG) corrective regimen sliding scale   SubCutaneous three times a day before meals  insulin lispro Injectable (ADMELOG) 3 Unit(s) SubCutaneous three times a day before meals  melatonin 10 milliGRAM(s) Oral at bedtime  metolazone 2.5 milliGRAM(s) Oral daily  metoprolol tartrate 25 milliGRAM(s) Oral two times a day  nystatin Cream 1 Application(s) Topical every 12 hours  pantoprazole  Injectable 40 milliGRAM(s) IV Push daily  polyethylene glycol 3350 17 Gram(s) Oral every 12 hours  senna 2 Tablet(s) Oral every 12 hours  sertraline 50 milliGRAM(s) Oral daily  tamsulosin 0.4 milliGRAM(s) Oral at bedtime    MEDICATIONS  (PRN):  acetaminophen     Tablet .. 650 milliGRAM(s) Oral every 6 hours PRN Temp greater or equal to 38C (100.4F), Mild Pain (1 - 3)  albuterol/ipratropium for Nebulization 3 milliLiter(s) Nebulizer every 4 hours PRN Shortness of Breath and/or Wheezing  bisacodyl 5 milliGRAM(s) Oral daily PRN Constipation  calcium carbonate    500 mG (Tums) Chewable 1 Tablet(s) Chew three times a day PRN Dyspepsia  dextrose Oral Gel 15 Gram(s) Oral once PRN Blood Glucose LESS THAN 70 milliGRAM(s)/deciliter  haloperidol    Injectable 2.5 milliGRAM(s) IntraMuscular every 6 hours PRN Agitation  heparin   Injectable 11189 Unit(s) IV Push every 6 hours PRN For aPTT less than 40  heparin   Injectable 5000 Unit(s) IV Push every 6 hours PRN For aPTT between 40 - 57  hydrOXYzine hydrochloride 25 milliGRAM(s) Oral every 6 hours PRN Anxiety  magnesium hydroxide Suspension 30 milliLiter(s) Oral daily PRN Constipation  ondansetron Injectable 4 milliGRAM(s) IV Push every 6 hours PRN Nausea and/or Vomiting  oxyCODONE    IR 5 milliGRAM(s) Oral every 6 hours PRN Severe Pain (7 - 10)  simethicone 80 milliGRAM(s) Chew daily PRN Gas  traZODone 50 milliGRAM(s) Oral at bedtime PRN insomnia      New X-rays reviewed:                                                                                  ECHO

## 2025-02-04 NOTE — PROGRESS NOTE ADULT - SUBJECTIVE AND OBJECTIVE BOX
69-year-old man with extensive medical history including CAD (status post CABG;), DM (on insulin pump), atrial fibrillation on Xarelto, history of distal pancreatomy and splenectomy for unclear reason referred to the ED by his oncologist for abnormal labs. Patient was recently diagnosed with Diffuse large B cell Lymphoma on pathology of right groin mass and is following with [facility] oncology team. Patient was discharged on 01.17.2025 from hospital after inpatient chemotherapy (Completed cycle 1 of R-EPOCH (D1 on 1/11/2024). Tolerated well. Rituximab was given on D5).   Patient started to get a productive cough with yellow sputum production. He was admitted to SDU for  Acute Hypoxic Respiratory Failure 2/2 Multifocal Pneumonia, Volume Overload, Neutropenic fever, Influenza A infection, decompensated and was upgraded to ICU on 1/27,  started on BIPAP and weaned to HFNC. Received tamiflu course + course of azithromycin+cefepime. Currently being monitored off Abx by ID.  Pt clinically improved and was  downgrade back  to SDU.  Today pt is c/o constipation, overall feels better, comfortable on NC.     PAST MEDICAL & SURGICAL HISTORY:  Diabetes mellitus, type 2  BPH (benign prostatic hyperplasia)  Water retention  Essential hypertension  Diabetes  CAD (coronary artery disease)  H/O hypercholesterolemia  Morbid obesity  Chronic kidney disease (CKD)  History of atrial fibrillation  H/O right coronary artery stent placement  History of resection of pancreas  History of cholecystectomy  History of left knee replacement  S/P CABG x 6  H/O cardiac radiofrequency ablation      Vital Signs Last 24 Hrs  T(C): 36.6 (04 Feb 2025 11:00), Max: 36.6 (04 Feb 2025 11:00)  T(F): 97.9 (04 Feb 2025 11:00), Max: 97.9 (04 Feb 2025 11:00)  HR: 92 (04 Feb 2025 11:00) (77 - 92)  BP: 137/62 (04 Feb 2025 11:00) (108/56 - 171/99)  BP(mean): 89 (04 Feb 2025 11:00) (76 - 127)  RR: 20 (04 Feb 2025 11:00) (18 - 20)  SpO2: 96% (04 Feb 2025 11:00) (90% - 98%)    Parameters below as of 04 Feb 2025 11:00  Patient On (Oxygen Delivery Method): nasal cannula        PHYSICAL EXAM:  GENERAL: NAD, obese   HEAD:  Atraumatic, Normocephalic  EYES: EOMI, PERRLA, conjunctiva and sclera clear  ENMT: ulcerative lesions with areas of black discoloration noted in the mouth   NECK: Supple, No JVD, Normal thyroid, wide neck   NERVOUS SYSTEM:  Awake and alert, answering questions, following commands, hard of hearing   CHEST/LUNG: Coarse BS b/l , shallow breathing   HEART: S1, S2   ABDOMEN: obese  Soft, Nontender, Nondistended; Bowel sounds present  EXTREMITIES:  2+ edema on LE       LABS:                                   10.0   14.73 )-----------( 849      ( 04 Feb 2025 04:36 )             31.7   02-04    141  |  97[L]  |  73[HH]  ----------------------------<  105[H]  4.4   |  29  |  2.1[H]    Ca    8.9      04 Feb 2025 04:36  Phos  5.0     02-04  Mg     2.4     02-04    TPro  5.6[L]  /  Alb  3.1[L]  /  TBili  0.4  /  DBili  x   /  AST  42[H]  /  ALT  41  /  AlkPhos  425[H]  02-04  3    PTT - ( 03 Feb 2025 07:54 )  PTT:95.3 sec  Urinalysis Basic - ( 03 Feb 2025 05:00 )    Color: x / Appearance: x / SG: x / pH: x  Gluc: 73 mg/dL / Ketone: x  / Bili: x / Urobili: x   Blood: x / Protein: x / Nitrite: x   Leuk Esterase: x / RBC: x / WBC x   Sq Epi: x / Non Sq Epi: x / Bacteria: x  Culture - Blood in AM (01.27.25 @ 06:12)   Specimen Source: .Blood BLOOD  Culture Results:   No growth at 5 days      RADIOLOGY & ADDITIONAL TESTS:      < from: Xray Chest 1 View- PORTABLE-Routine (02.03.25 @ 06:05) >  IMPRESSION:    Unchanged bibasilar opacities, left greater than right.    < end of copied text >  < from: VA Duplex Lower Ext Vein Scan, Bilat (01.09.25 @ 09:12) >  IMPRESSION:  No evidence of deep venous thrombosis in either lower extremity.    There is a proximal thigh/right groin mass measuring greater than 6.9 x   9.2 x 11 cm    < end of copied text >  < from: TTE Echo w/o Contrast Follow Up Limited (01.25.25 @ 12:38) >  Summary:   1. Technically difficult study.   2. LV Ejection Fraction by Roche's Method with a biplane EFof 53 %.   3. The left ventricular diastolic function could not be assessed in this   study.   4. Degenerative mitral valve.   5. No evidence of mitral valve regurgitation.   6. Moderate tricuspid regurgitation.   7. Endocardial visualization was enhanced with intravenous echo contrast.    MEDICATIONS  (STANDING):  allopurinol 150 milliGRAM(s) Oral daily  ascorbic acid 500 milliGRAM(s) Oral daily  aspirin  chewable 81 milliGRAM(s) Oral daily  atorvastatin 10 milliGRAM(s) Oral at bedtime  benzocaine 20% Spray 1 Spray(s) Topical three times a day  buMETAnide 2 milliGRAM(s) Oral daily  cetirizine 10 milliGRAM(s) Oral daily  chlorhexidine 2% Cloths 1 Application(s) Topical daily  dextrose 5%. 1000 milliLiter(s) (100 mL/Hr) IV Continuous <Continuous>  dextrose 5%. 1000 milliLiter(s) (50 mL/Hr) IV Continuous <Continuous>  dextrose 50% Injectable 25 Gram(s) IV Push once  dextrose 50% Injectable 12.5 Gram(s) IV Push once  dextrose 50% Injectable 25 Gram(s) IV Push once  glucagon  Injectable 1 milliGRAM(s) IntraMuscular once  heparin  Infusion. 1200 Unit(s)/Hr (12 mL/Hr) IV Continuous <Continuous>  influenza  Vaccine (HIGH DOSE) 0.5 milliLiter(s) IntraMuscular once  insulin glargine Injectable (LANTUS) 10 Unit(s) SubCutaneous at bedtime  insulin lispro (ADMELOG) corrective regimen sliding scale   SubCutaneous three times a day before meals  insulin lispro Injectable (ADMELOG) 3 Unit(s) SubCutaneous three times a day before meals  melatonin 10 milliGRAM(s) Oral at bedtime  metolazone 2.5 milliGRAM(s) Oral daily  metoprolol tartrate 25 milliGRAM(s) Oral two times a day  nystatin Cream 1 Application(s) Topical every 12 hours  pantoprazole  Injectable 40 milliGRAM(s) IV Push daily  polyethylene glycol 3350 17 Gram(s) Oral every 12 hours  senna 2 Tablet(s) Oral every 12 hours  sertraline 50 milliGRAM(s) Oral daily  tamsulosin 0.4 milliGRAM(s) Oral at bedtime    MEDICATIONS  (PRN):  acetaminophen     Tablet .. 650 milliGRAM(s) Oral every 6 hours PRN Temp greater or equal to 38C (100.4F), Mild Pain (1 - 3)  albuterol/ipratropium for Nebulization 3 milliLiter(s) Nebulizer every 4 hours PRN Shortness of Breath and/or Wheezing  bisacodyl 5 milliGRAM(s) Oral daily PRN Constipation  calcium carbonate    500 mG (Tums) Chewable 1 Tablet(s) Chew three times a day PRN Dyspepsia  dextrose Oral Gel 15 Gram(s) Oral once PRN Blood Glucose LESS THAN 70 milliGRAM(s)/deciliter  FIRST- Mouthwash  BLM 10 milliLiter(s) Swish and Spit every 4 hours PRN Mouth Care  haloperidol    Injectable 2.5 milliGRAM(s) IntraMuscular every 6 hours PRN Agitation  heparin   Injectable 34879 Unit(s) IV Push every 6 hours PRN For aPTT less than 40  heparin   Injectable 5000 Unit(s) IV Push every 6 hours PRN For aPTT between 40 - 57  hydrOXYzine hydrochloride 25 milliGRAM(s) Oral every 6 hours PRN Anxiety  magnesium hydroxide Suspension 30 milliLiter(s) Oral daily PRN Constipation  ondansetron Injectable 4 milliGRAM(s) IV Push every 6 hours PRN Nausea and/or Vomiting  oxyCODONE    IR 5 milliGRAM(s) Oral every 6 hours PRN Severe Pain (7 - 10)  simethicone 80 milliGRAM(s) Chew daily PRN Gas  traZODone 50 milliGRAM(s) Oral at bedtime PRN insomnia

## 2025-02-04 NOTE — CHART NOTE - NSCHARTNOTEFT_GEN_A_CORE
ENT previously seen patient for tongue ecchymosis/ pain; recommended CT Neck for further evaluation    2/4/25 CT Neck w/ con Impression:  No CT evidence of a discrete tongue mass. Note, streak artifact from   dental hardware/mildly limits evaluation of the oral cavity.    --- End of Report ---    STAR FERNANDO MD; Attending Radiologist  This document has been electronically signed. Feb 4 2025  2:55PM    Plan:  - No acute ENT/surgical intervention indicated at this time  - Pt likely developing mucositis 2/2 chemo  - Recommend good oral care along with magic mouthwash and dexmethasone swish and spit for further care and comfort  - Recall prn [phone]

## 2025-02-04 NOTE — PROGRESS NOTE ADULT - ASSESSMENT
69-year-old man with extensive medical history including CAD (status post CABG;), atrial fibrillation on Xarelto, history of distal pancreatomy and splenectomy for unclear reason referred to the ED by his oncologist for abnormal labs. Patient was recently diagnosed with Diffuse large B cell Lymphoma on pathology of right groin mass and was following his Oncologist Dr Angeli Hilliard.  pt currently admitted to SDU for acute hypoxic respiratory failure ISO Influenza PNA and fluid overload .     A/P   # Acute Hypoxic Respiratory Failure 2/2 Multifocal Pneumonia/ Volume Overload/ Acute on chronic diastolic CHF/  Influenza A infection/ RASHAD/ OHS   - clinically improving, comfortable on NC now   - NIV PRN and QHS   - nebs Q 6 hours, supportive care, aspiration precautions   - CTA 1/20: Negative for pulmonary emboli. 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 Tolerated well. Rituximab was given on D5   - Uric acid 10.3 on 1/16/25, s/p 3 mg IV rasburicase  - sp Zarxio  - daily CBC with diff, active T&S  - c/w Allopurinol     # KRISTINE on CKD 3B / Chronic Lower Extremity Edema   - baseline ~high 1s, low 2s  - c/w diuretics   -nephrology is following, recommendations noted  - monitor BUN/cr and urine output  - c/w Lynette for now  - avoid nephrotoxic medications     # CAD s/p CABG/ Chronic Afib on Xarelto/ HTN  - c/w ASA, statin, BB  - Still holding lisinopril and aldactone from last admission  - holding Xarelto , on heparin drip  - telemetry monitoring, monitor and replete electrolytes      #HLD   - on Atorvastatin now   - resume Repatha on discharge    # DM  - CC diet   - FS - self monitored via dex com  - On insulin pump @2.85 at home   - Endo consult: keep off insulin pump  - On Admelog 25 units TID with meals now and SS     # Oral pain due to ulcer with black discoloration   - c/w local couth care  - consulted by  ENT , c/w local care   - oncology follow up ( pt likely developed mucositis)     # Agitation/ confusion Insomnia   - pt is calm now   - on Haldol PRN , Sertraline and melatonin   - supportive care     # GI/ constipation   - on bowel regimen and PPIs   - give tap water enema today     #Progress Note Handoff  Pending (specify):  give enema, tap water, c/w bowel regimen, oncology follow up ( to comment on chemo and AC), monitor BUN/cr, urine output, PT evaluation, consider TOV  after PT eval and when constipation resolves , supportive care   Family discussion: I spoke with pt, he agreed with a plan of care   Disposition: DGT 3 B

## 2025-02-04 NOTE — PROGRESS NOTE ADULT - ASSESSMENT
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      HEENT: Oral care    PULMONARY: Wean O2 as tolerated.  Target O2 sat 88-92%.  Refusing NIV during sleep.      CARDIOVASCULAR: Echocardiogram: normal EF. BNP noted to be elevated.     GI: GI prophylaxis.  Bowel regimen.      RENAL:  Follow up lytes.  COrrect as needed      INFECTIOUS DISEASE: Monitor VS      HEMATOLOGICAL: On AC with heparin for afib.  Transition to Eliquis.   Daily coags and CBC. Hem follow up.     ENDOCRINE:  Follow up FS. Increase insulin regimen to keep 140-180.     MUSCULOSKELETAL:  Out of bed to chair; PT OT.     FULL CODE    SDU .     Possible DG PM     Voiding trial once ambulating. PICC line.

## 2025-02-05 LAB
ALBUMIN SERPL ELPH-MCNC: 3 G/DL — LOW (ref 3.5–5.2)
ALP SERPL-CCNC: 368 U/L — HIGH (ref 30–115)
ALT FLD-CCNC: 31 U/L — SIGNIFICANT CHANGE UP (ref 0–41)
APTT BLD: 143.6 SEC — CRITICAL HIGH (ref 27–39.2)
AST SERPL-CCNC: 33 U/L — SIGNIFICANT CHANGE UP (ref 0–41)
BASOPHILS # BLD AUTO: 0.12 K/UL — SIGNIFICANT CHANGE UP (ref 0–0.2)
BASOPHILS NFR BLD AUTO: 0.8 % — SIGNIFICANT CHANGE UP (ref 0–1)
BILIRUB SERPL-MCNC: 0.4 MG/DL — SIGNIFICANT CHANGE UP (ref 0.2–1.2)
BUN SERPL-MCNC: 72 MG/DL — CRITICAL HIGH (ref 10–20)
CALCIUM SERPL-MCNC: 8.5 MG/DL — SIGNIFICANT CHANGE UP (ref 8.4–10.5)
CHLORIDE SERPL-SCNC: 93 MMOL/L — LOW (ref 98–110)
CO2 SERPL-SCNC: 24 MMOL/L — SIGNIFICANT CHANGE UP (ref 17–32)
CREAT SERPL-MCNC: 1.9 MG/DL — HIGH (ref 0.7–1.5)
EGFR: 38 ML/MIN/1.73M2 — LOW
EOSINOPHIL # BLD AUTO: 0.17 K/UL — SIGNIFICANT CHANGE UP (ref 0–0.7)
EOSINOPHIL NFR BLD AUTO: 1.1 % — SIGNIFICANT CHANGE UP (ref 0–8)
GLUCOSE BLDC GLUCOMTR-MCNC: 201 MG/DL — HIGH (ref 70–99)
GLUCOSE BLDC GLUCOMTR-MCNC: 217 MG/DL — HIGH (ref 70–99)
GLUCOSE BLDC GLUCOMTR-MCNC: 235 MG/DL — HIGH (ref 70–99)
GLUCOSE BLDC GLUCOMTR-MCNC: 266 MG/DL — HIGH (ref 70–99)
GLUCOSE SERPL-MCNC: 209 MG/DL — HIGH (ref 70–99)
HCT VFR BLD CALC: 29.7 % — LOW (ref 42–52)
HGB BLD-MCNC: 9.5 G/DL — LOW (ref 14–18)
IMM GRANULOCYTES NFR BLD AUTO: 4 % — HIGH (ref 0.1–0.3)
LYMPHOCYTES # BLD AUTO: 0.67 K/UL — LOW (ref 1.2–3.4)
LYMPHOCYTES # BLD AUTO: 4.5 % — LOW (ref 20.5–51.1)
MAGNESIUM SERPL-MCNC: 2.4 MG/DL — SIGNIFICANT CHANGE UP (ref 1.8–2.4)
MCHC RBC-ENTMCNC: 30.5 PG — SIGNIFICANT CHANGE UP (ref 27–31)
MCHC RBC-ENTMCNC: 32 G/DL — SIGNIFICANT CHANGE UP (ref 32–37)
MCV RBC AUTO: 95.5 FL — HIGH (ref 80–94)
MONOCYTES # BLD AUTO: 2.71 K/UL — HIGH (ref 0.1–0.6)
MONOCYTES NFR BLD AUTO: 18.2 % — HIGH (ref 1.7–9.3)
NEUTROPHILS # BLD AUTO: 10.59 K/UL — HIGH (ref 1.4–6.5)
NEUTROPHILS NFR BLD AUTO: 71.4 % — SIGNIFICANT CHANGE UP (ref 42.2–75.2)
NRBC # BLD: 2 /100 WBCS — HIGH (ref 0–0)
NRBC BLD-RTO: 2 /100 WBCS — HIGH (ref 0–0)
PHOSPHATE SERPL-MCNC: 4.2 MG/DL — SIGNIFICANT CHANGE UP (ref 2.1–4.9)
PLATELET # BLD AUTO: 761 K/UL — HIGH (ref 130–400)
PMV BLD: 11.3 FL — HIGH (ref 7.4–10.4)
POTASSIUM SERPL-MCNC: 4.2 MMOL/L — SIGNIFICANT CHANGE UP (ref 3.5–5)
POTASSIUM SERPL-SCNC: 4.2 MMOL/L — SIGNIFICANT CHANGE UP (ref 3.5–5)
PROT SERPL-MCNC: 5.5 G/DL — LOW (ref 6–8)
RBC # BLD: 3.11 M/UL — LOW (ref 4.7–6.1)
RBC # FLD: 15.7 % — HIGH (ref 11.5–14.5)
SODIUM SERPL-SCNC: 137 MMOL/L — SIGNIFICANT CHANGE UP (ref 135–146)
WBC # BLD: 14.86 K/UL — HIGH (ref 4.8–10.8)
WBC # FLD AUTO: 14.86 K/UL — HIGH (ref 4.8–10.8)

## 2025-02-05 PROCEDURE — 99233 SBSQ HOSP IP/OBS HIGH 50: CPT

## 2025-02-05 PROCEDURE — 71045 X-RAY EXAM CHEST 1 VIEW: CPT | Mod: 26

## 2025-02-05 PROCEDURE — 93010 ELECTROCARDIOGRAM REPORT: CPT

## 2025-02-05 RX ORDER — DIPHENHYDRAMINE HYDROCHLORIDE AND LIDOCAINE HYDROCHLORIDE AND ALUMINUM HYDROXIDE AND MAGNESIUM HYDRO
10 KIT EVERY 4 HOURS
Refills: 0 | Status: DISCONTINUED | OUTPATIENT
Start: 2025-02-05 | End: 2025-02-17

## 2025-02-05 RX ORDER — HYDROMORPHONE/SOD CHLOR,ISO/PF 2 MG/10 ML
0.2 SYRINGE (ML) INJECTION ONCE
Refills: 0 | Status: DISCONTINUED | OUTPATIENT
Start: 2025-02-05 | End: 2025-02-05

## 2025-02-05 RX ORDER — BUMETANIDE 1 MG/1
1 TABLET ORAL
Refills: 0 | Status: DISCONTINUED | OUTPATIENT
Start: 2025-02-05 | End: 2025-02-05

## 2025-02-05 RX ORDER — OXYCODONE HYDROCHLORIDE 30 MG/1
10 TABLET ORAL EVERY 6 HOURS
Refills: 0 | Status: DISCONTINUED | OUTPATIENT
Start: 2025-02-05 | End: 2025-02-06

## 2025-02-05 RX ORDER — ACETAMINOPHEN 500 MG/5ML
975 LIQUID (ML) ORAL EVERY 8 HOURS
Refills: 0 | Status: DISCONTINUED | OUTPATIENT
Start: 2025-02-05 | End: 2025-02-06

## 2025-02-05 RX ORDER — RIVAROXABAN 10 MG/1
15 TABLET, FILM COATED ORAL
Refills: 0 | Status: DISCONTINUED | OUTPATIENT
Start: 2025-02-05 | End: 2025-02-05

## 2025-02-05 RX ORDER — NIFEDIPINE 30 MG
60 TABLET, EXTENDED RELEASE 24 HR ORAL DAILY
Refills: 0 | Status: DISCONTINUED | OUTPATIENT
Start: 2025-02-05 | End: 2025-02-17

## 2025-02-05 RX ORDER — BUMETANIDE 1 MG/1
2 TABLET ORAL
Refills: 0 | Status: DISCONTINUED | OUTPATIENT
Start: 2025-02-05 | End: 2025-02-06

## 2025-02-05 RX ORDER — HEPARIN SODIUM 1000 [USP'U]/ML
5000 INJECTION INTRAVENOUS; SUBCUTANEOUS EVERY 12 HOURS
Refills: 0 | Status: DISCONTINUED | OUTPATIENT
Start: 2025-02-05 | End: 2025-02-06

## 2025-02-05 RX ORDER — HYDROMORPHONE/SOD CHLOR,ISO/PF 2 MG/10 ML
0.5 SYRINGE (ML) INJECTION EVERY 6 HOURS
Refills: 0 | Status: DISCONTINUED | OUTPATIENT
Start: 2025-02-05 | End: 2025-02-12

## 2025-02-05 RX ADMIN — Medication 0.2 MILLIGRAM(S): at 00:52

## 2025-02-05 RX ADMIN — INSULIN GLARGINE-YFGN 10 UNIT(S): 100 INJECTION, SOLUTION SUBCUTANEOUS at 21:21

## 2025-02-05 RX ADMIN — Medication 10 MILLIGRAM(S): at 21:12

## 2025-02-05 RX ADMIN — DIPHENHYDRAMINE HYDROCHLORIDE AND LIDOCAINE HYDROCHLORIDE AND ALUMINUM HYDROXIDE AND MAGNESIUM HYDRO 10 MILLILITER(S): KIT at 21:12

## 2025-02-05 RX ADMIN — Medication 10 MILLIGRAM(S): at 12:48

## 2025-02-05 RX ADMIN — Medication 0.5 MILLIGRAM(S): at 12:44

## 2025-02-05 RX ADMIN — Medication 2 TABLET(S): at 05:36

## 2025-02-05 RX ADMIN — Medication 0.5 MILLIGRAM(S): at 20:43

## 2025-02-05 RX ADMIN — INSULIN LISPRO 3 UNIT(S): 100 INJECTION, SOLUTION INTRAVENOUS; SUBCUTANEOUS at 08:32

## 2025-02-05 RX ADMIN — HYDROXYZINE HYDROCHLORIDE 25 MILLIGRAM(S): 25 TABLET, FILM COATED ORAL at 08:27

## 2025-02-05 RX ADMIN — DIPHENHYDRAMINE HYDROCHLORIDE AND LIDOCAINE HYDROCHLORIDE AND ALUMINUM HYDROXIDE AND MAGNESIUM HYDRO 10 MILLILITER(S): KIT at 17:54

## 2025-02-05 RX ADMIN — Medication 975 MILLIGRAM(S): at 14:03

## 2025-02-05 RX ADMIN — METOPROLOL SUCCINATE 25 MILLIGRAM(S): 50 TABLET, EXTENDED RELEASE ORAL at 05:35

## 2025-02-05 RX ADMIN — POLYETHYLENE GLYCOL 3350 17 GRAM(S): 17 POWDER, FOR SOLUTION ORAL at 05:37

## 2025-02-05 RX ADMIN — METOPROLOL SUCCINATE 25 MILLIGRAM(S): 50 TABLET, EXTENDED RELEASE ORAL at 17:53

## 2025-02-05 RX ADMIN — DIPHENHYDRAMINE HYDROCHLORIDE AND LIDOCAINE HYDROCHLORIDE AND ALUMINUM HYDROXIDE AND MAGNESIUM HYDRO 10 MILLILITER(S): KIT at 14:04

## 2025-02-05 RX ADMIN — DIPHENHYDRAMINE HYDROCHLORIDE AND LIDOCAINE HYDROCHLORIDE AND ALUMINUM HYDROXIDE AND MAGNESIUM HYDRO 10 MILLILITER(S): KIT at 09:44

## 2025-02-05 RX ADMIN — BUMETANIDE 2 MILLIGRAM(S): 1 TABLET ORAL at 18:01

## 2025-02-05 RX ADMIN — INSULIN LISPRO 3 UNIT(S): 100 INJECTION, SOLUTION INTRAVENOUS; SUBCUTANEOUS at 12:21

## 2025-02-05 RX ADMIN — Medication 0.5 MILLIGRAM(S): at 19:34

## 2025-02-05 RX ADMIN — INSULIN LISPRO 3 UNIT(S): 100 INJECTION, SOLUTION INTRAVENOUS; SUBCUTANEOUS at 17:34

## 2025-02-05 RX ADMIN — HEPARIN SODIUM 0 UNIT(S)/HR: 1000 INJECTION INTRAVENOUS; SUBCUTANEOUS at 05:54

## 2025-02-05 RX ADMIN — HEPARIN SODIUM 5000 UNIT(S): 1000 INJECTION INTRAVENOUS; SUBCUTANEOUS at 18:02

## 2025-02-05 RX ADMIN — INSULIN LISPRO 2: 100 INJECTION, SOLUTION INTRAVENOUS; SUBCUTANEOUS at 12:21

## 2025-02-05 RX ADMIN — NYSTATIN 1 APPLICATION(S): 100000 CREAM TOPICAL at 05:36

## 2025-02-05 RX ADMIN — HEPARIN SODIUM 0 UNIT(S)/HR: 1000 INJECTION INTRAVENOUS; SUBCUTANEOUS at 07:21

## 2025-02-05 RX ADMIN — TAMSULOSIN HYDROCHLORIDE 0.4 MILLIGRAM(S): 0.4 CAPSULE ORAL at 21:12

## 2025-02-05 RX ADMIN — INSULIN LISPRO 2: 100 INJECTION, SOLUTION INTRAVENOUS; SUBCUTANEOUS at 17:34

## 2025-02-05 RX ADMIN — NYSTATIN 1 APPLICATION(S): 100000 CREAM TOPICAL at 17:58

## 2025-02-05 RX ADMIN — Medication 0.2 MILLIGRAM(S): at 03:23

## 2025-02-05 RX ADMIN — Medication 975 MILLIGRAM(S): at 22:18

## 2025-02-05 RX ADMIN — SERTRALINE 50 MILLIGRAM(S): 100 TABLET, FILM COATED ORAL at 12:47

## 2025-02-05 RX ADMIN — ATORVASTATIN CALCIUM 10 MILLIGRAM(S): 80 TABLET, FILM COATED ORAL at 21:12

## 2025-02-05 RX ADMIN — POLYETHYLENE GLYCOL 3350 17 GRAM(S): 17 POWDER, FOR SOLUTION ORAL at 17:53

## 2025-02-05 RX ADMIN — INSULIN LISPRO 2: 100 INJECTION, SOLUTION INTRAVENOUS; SUBCUTANEOUS at 08:31

## 2025-02-05 RX ADMIN — BUMETANIDE 1 MILLIGRAM(S): 1 TABLET ORAL at 12:47

## 2025-02-05 RX ADMIN — Medication 2 TABLET(S): at 17:53

## 2025-02-05 RX ADMIN — HEPARIN SODIUM 1200 UNIT(S)/HR: 1000 INJECTION INTRAVENOUS; SUBCUTANEOUS at 03:16

## 2025-02-05 RX ADMIN — Medication 975 MILLIGRAM(S): at 21:12

## 2025-02-05 RX ADMIN — BUMETANIDE 2 MILLIGRAM(S): 1 TABLET ORAL at 05:35

## 2025-02-05 RX ADMIN — Medication 60 MILLIGRAM(S): at 21:12

## 2025-02-05 RX ADMIN — Medication 150 MILLIGRAM(S): at 12:45

## 2025-02-05 RX ADMIN — Medication 650 MILLIGRAM(S): at 08:27

## 2025-02-05 RX ADMIN — Medication 500 MILLIGRAM(S): at 12:46

## 2025-02-05 RX ADMIN — Medication 40 MILLIGRAM(S): at 12:44

## 2025-02-05 RX ADMIN — HEPARIN SODIUM 800 UNIT(S)/HR: 1000 INJECTION INTRAVENOUS; SUBCUTANEOUS at 07:26

## 2025-02-05 RX ADMIN — HYDROXYZINE HYDROCHLORIDE 25 MILLIGRAM(S): 25 TABLET, FILM COATED ORAL at 17:58

## 2025-02-05 RX ADMIN — Medication 1 APPLICATION(S): at 12:48

## 2025-02-05 RX ADMIN — Medication 0.2 MILLIGRAM(S): at 04:31

## 2025-02-05 NOTE — SWALLOW BEDSIDE ASSESSMENT ADULT - SLP GENERAL OBSERVATIONS
pt received in bed asleep arousable +generalized weakness +3L NC +dried blood noted t/o oral cavity, gentle oral care provided. Daughter Mery at bedside, family/staff report poor PO intake.

## 2025-02-05 NOTE — PROGRESS NOTE ADULT - SUBJECTIVE AND OBJECTIVE BOX
MORGAN BUSH 70y Male  MRN#: [id number]   Hospital Day: 16d    SUBJECTIVE  Patient is a 70y old Male who presents with a chief complaint of sob (27 Jan 2025 09:39)  Currently admitted to medicine with the primary diagnosis of Neutropenic fever      INTERVAL HPI AND OVERNIGHT EVENTS:  Patient was examined and seen at bedside.       REVIEW OF SYMPTOMS:  c/o constipation. last BM 2-3 days ago   C/o mouth sores   feels tired  breathing stable with NC     OBJECTIVE  PAST MEDICAL & SURGICAL HISTORY  Diabetes mellitus, type 2    BPH (benign prostatic hyperplasia)    Water retention    Essential hypertension    Diabetes    CAD (coronary artery disease)    H/O hypercholesterolemia    Morbid obesity    Chronic kidney disease (CKD)    History of atrial fibrillation    H/O right coronary artery stent placement    History of resection of pancreas    History of cholecystectomy    History of left knee replacement    S/P CABG x 6    H/O cardiac radiofrequency ablation      ALLERGIES:  No Known Allergies          VITAL SIGNS: Last 24 Hours  T(C): 36.5 (05 Feb 2025 04:58), Max: 36.6 (04 Feb 2025 11:00)  T(F): 97.7 (05 Feb 2025 04:58), Max: 97.9 (04 Feb 2025 11:00)  HR: 86 (05 Feb 2025 04:58) (80 - 92)  BP: 132/74 (05 Feb 2025 04:58) (132/74 - 164/70)  BP(mean): 101 (04 Feb 2025 16:00) (89 - 101)  RR: 18 (05 Feb 2025 04:58) (18 - 20)  SpO2: 96% (05 Feb 2025 04:58) (96% - 99%)    LABS:                        9.5    14.86 )-----------( 761      ( 05 Feb 2025 07:26 )             29.7     02-04    141  |  97[L]  |  73[HH]  ----------------------------<  105[H]  4.4   |  29  |  2.1[H]    Ca    8.9      04 Feb 2025 04:36  Phos  5.0     02-04  Mg     2.4     02-04    TPro  5.6[L]  /  Alb  3.1[L]  /  TBili  0.4  /  DBili  x   /  AST  42[H]  /  ALT  41  /  AlkPhos  425[H]  02-04    PTT - ( 05 Feb 2025 04:45 )  PTT:143.6 sec  Urinalysis Basic - ( 04 Feb 2025 04:36 )    Color: x / Appearance: x / SG: x / pH: x  Gluc: 105 mg/dL / Ketone: x  / Bili: x / Urobili: x   Blood: x / Protein: x / Nitrite: x   Leuk Esterase: x / RBC: x / WBC x   Sq Epi: x / Non Sq Epi: x / Bacteria: x          PHYSICAL EXAM:      ASSESSMENT & PLAN:    #Large B-cell Lymphoma  #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   CT pelvis on 10/12/24 showed interval increase of mass 10 x 7 x 10 cm with inguinal lymphadenopathy, suspicious for neoplastic process.  Biopsy on 12/18/24 revealed diffuse large B-cell lymphoma with concern for Rodriguez's transformation.  Patient was seen by Dr. Hilliard on 1/8/25 for initial consultation and was advised to come to ED due to concern for tumor lysis syndrome.  CT C/A/P 1.9.25 Stable appearance bulky right inguinal and right iliac lymphadenopathy including partially imaged 10.1 cm right inguinal soft tissue mass/lymph node.  s/p Bone marrow biopsy on 1/10/25  Echo 1/10/25: EF 60-65%  hepatitis panel NEG   Completed cycle 1 of R-EPOCH (D1 on 1/11/2024). Tolerated well. Rituximab was given on D5.  Received zarxio after chemotherapy      #Neutropenic fever  S/p Zarxio   last received on   2/2 PNA, INF A    completed 10 day course of cefepime/tramiflu 1/30       # Acute Hypoxic Respiratory Failure 2/2 Multifocal Pneumonia/ Volume Overload/ Acute on chronic diastolic CHF/  Influenza A infection  presented with neutropenia   2/2   ECHO 1.25.25 53%     #Anemia   #Thrombocytosis       #CKD Stage 3B  baseline (1.8)    Recommendations   Due for cycle 2 of R-EPOCH on 2.2.25 which was held due to critical illness. Still requiring 4L oxygen via NC. Feels tired. Evaluated by PT, recommending Rehab.   If ANC <500 on =3 measurements or platelets <25,000/microL on one measurement, doses reduced by 20% from preceding cycle. Doxorubicin and etoposide doses are not reduced below starting dose.  C/w allopurinol renally dosed     PRELIM NOTE    MORGAN BUSH 70y Male  MRN#: [id number]   Hospital Day: 16d    SUBJECTIVE  Patient is a 70y old Male who presents with a chief complaint of sob (27 Jan 2025 09:39)  Currently admitted to medicine with the primary diagnosis of Neutropenic fever      INTERVAL HPI AND OVERNIGHT EVENTS:  Patient was examined and seen at bedside.       REVIEW OF SYMPTOMS:  c/o constipation. last BM 2-3 days ago   C/o mouth sores   feels tired  breathing stable with NC     OBJECTIVE  PAST MEDICAL & SURGICAL HISTORY  Diabetes mellitus, type 2    BPH (benign prostatic hyperplasia)    Water retention    Essential hypertension    Diabetes    CAD (coronary artery disease)    H/O hypercholesterolemia    Morbid obesity    Chronic kidney disease (CKD)    History of atrial fibrillation    H/O right coronary artery stent placement    History of resection of pancreas    History of cholecystectomy    History of left knee replacement    S/P CABG x 6    H/O cardiac radiofrequency ablation      ALLERGIES:  No Known Allergies          VITAL SIGNS: Last 24 Hours  T(C): 36.5 (05 Feb 2025 04:58), Max: 36.6 (04 Feb 2025 11:00)  T(F): 97.7 (05 Feb 2025 04:58), Max: 97.9 (04 Feb 2025 11:00)  HR: 86 (05 Feb 2025 04:58) (80 - 92)  BP: 132/74 (05 Feb 2025 04:58) (132/74 - 164/70)  BP(mean): 101 (04 Feb 2025 16:00) (89 - 101)  RR: 18 (05 Feb 2025 04:58) (18 - 20)  SpO2: 96% (05 Feb 2025 04:58) (96% - 99%)    LABS:                        9.5    14.86 )-----------( 761      ( 05 Feb 2025 07:26 )             29.7     02-04    141  |  97[L]  |  73[HH]  ----------------------------<  105[H]  4.4   |  29  |  2.1[H]    Ca    8.9      04 Feb 2025 04:36  Phos  5.0     02-04  Mg     2.4     02-04    TPro  5.6[L]  /  Alb  3.1[L]  /  TBili  0.4  /  DBili  x   /  AST  42[H]  /  ALT  41  /  AlkPhos  425[H]  02-04    PTT - ( 05 Feb 2025 04:45 )  PTT:143.6 sec  Urinalysis Basic - ( 04 Feb 2025 04:36 )    Color: x / Appearance: x / SG: x / pH: x  Gluc: 105 mg/dL / Ketone: x  / Bili: x / Urobili: x   Blood: x / Protein: x / Nitrite: x   Leuk Esterase: x / RBC: x / WBC x   Sq Epi: x / Non Sq Epi: x / Bacteria: x          PHYSICAL EXAM:  GENERAL: NAD, back pain  HEENT: mucositis +  CHEST/LUNG: Reduced breath sounds, normal effort  HEART: Regular rate and rhythm; No murmurs, rubs, or gallops  ABDOMEN: BSx4; Soft, nontender, nondistended  EXTREMITIES:  no edema  NERVOUS SYSTEM:  A&Ox3, no focal deficits   SKIN: No rashes or lesions    ASSESSMENT & PLAN:    #Large B-cell Lymphoma  #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   CT pelvis on 10/12/24 showed interval increase of mass 10 x 7 x 10 cm with inguinal lymphadenopathy, suspicious for neoplastic process.  Biopsy on 12/18/24 revealed diffuse large B-cell lymphoma with concern for Rodriguez's transformation.  Patient was seen by Dr. Hilliard on 1/8/25 for initial consultation and was advised to come to ED due to concern for tumor lysis syndrome.  CT C/A/P 1.9.25 Stable appearance bulky right inguinal and right iliac lymphadenopathy including partially imaged 10.1 cm right inguinal soft tissue mass/lymph node.  s/p Bone marrow biopsy on 1/10/25  Echo 1/10/25: EF 60-65%  hepatitis panel NEG   Completed cycle 1 of R-EPOCH (D1 on 1/11/2024). Tolerated well. Rituximab was given on D5.  Received zarxio after chemotherapy      #Neutropenic fever  S/p Zarxio   last received on   2/2 PNA, INF A    completed 10 day course of cefepime/tramiflu 1/30       # Acute Hypoxic Respiratory Failure 2/2 Multifocal Pneumonia/ Volume Overload/ Acute on chronic diastolic CHF/  Influenza A infection  presented with neutropenia   2/2   ECHO 1.25.25 53%     #Anemia   #Thrombocytosis   likely reactive     #CKD Stage 3B  baseline (1.8)    Recommendations   Due for cycle 2 of R-EPOCH on 2.2.25 which was held due to critical illness. Still requiring 4L oxygen via NC. Feels fatigued.   Discussed with him and family present bedside, that though he is due for cycle2 of chemotherapy, he is still recovering from his critical illness and is too weak to be able to tolerate chemotherapy. AT this point, he needs to rebuild his strength, improve his nutrition and feel stronger before he gets his next cycle. Family is agreeable with the plan.   C/w allopurinol renally dosed   Follow up outpatient with Dr Hilliard on discharge for evaluation for initiating cycle2 of R-EPOCH.   Encourage OOB   Encourage oral intake. Ensure PRN

## 2025-02-05 NOTE — PROGRESS NOTE ADULT - SUBJECTIVE AND OBJECTIVE BOX
24H events:    Patient is a 70y old Male who presents with a chief complaint of sob (27 Jan 2025 09:39)    Primary diagnosis of Neutropenic fever          Today is hospital day 16d.   This morning patient was seen and examined at bedside, resting comfortably in bed.    No acute or major events overnight.    Code Status:    Family communication:  Contact date:  Name of person contacted:  Relationship to patient:  Communication details:  What matters most:    PAST MEDICAL & SURGICAL HISTORY  Diabetes mellitus, type 2    BPH (benign prostatic hyperplasia)    Water retention    Essential hypertension    Diabetes    CAD (coronary artery disease)    H/O hypercholesterolemia    Morbid obesity    Chronic kidney disease (CKD)    History of atrial fibrillation    H/O right coronary artery stent placement    History of resection of pancreas    History of cholecystectomy    History of left knee replacement    S/P CABG x 6    H/O cardiac radiofrequency ablation      SOCIAL HISTORY:  Social History:      ALLERGIES:  No Known Allergies    MEDICATIONS:  STANDING MEDICATIONS  allopurinol 150 milliGRAM(s) Oral daily  ascorbic acid 500 milliGRAM(s) Oral daily  aspirin  chewable 81 milliGRAM(s) Oral daily  atorvastatin 10 milliGRAM(s) Oral at bedtime  benzocaine 20% Spray 1 Spray(s) Topical three times a day  buMETAnide 2 milliGRAM(s) Oral daily  cetirizine 10 milliGRAM(s) Oral daily  chlorhexidine 2% Cloths 1 Application(s) Topical daily  dextrose 5%. 1000 milliLiter(s) IV Continuous <Continuous>  dextrose 5%. 1000 milliLiter(s) IV Continuous <Continuous>  dextrose 50% Injectable 25 Gram(s) IV Push once  dextrose 50% Injectable 12.5 Gram(s) IV Push once  dextrose 50% Injectable 25 Gram(s) IV Push once  glucagon  Injectable 1 milliGRAM(s) IntraMuscular once  heparin  Infusion. 1200 Unit(s)/Hr IV Continuous <Continuous>  influenza  Vaccine (HIGH DOSE) 0.5 milliLiter(s) IntraMuscular once  insulin glargine Injectable (LANTUS) 10 Unit(s) SubCutaneous at bedtime  insulin lispro (ADMELOG) corrective regimen sliding scale   SubCutaneous three times a day before meals  insulin lispro Injectable (ADMELOG) 3 Unit(s) SubCutaneous three times a day before meals  melatonin 10 milliGRAM(s) Oral at bedtime  metolazone 2.5 milliGRAM(s) Oral daily  metoprolol tartrate 25 milliGRAM(s) Oral two times a day  nystatin Cream 1 Application(s) Topical every 12 hours  pantoprazole  Injectable 40 milliGRAM(s) IV Push daily  polyethylene glycol 3350 17 Gram(s) Oral every 12 hours  senna 2 Tablet(s) Oral every 12 hours  sertraline 50 milliGRAM(s) Oral daily  tamsulosin 0.4 milliGRAM(s) Oral at bedtime    PRN MEDICATIONS  acetaminophen     Tablet .. 650 milliGRAM(s) Oral every 6 hours PRN  albuterol/ipratropium for Nebulization 3 milliLiter(s) Nebulizer every 4 hours PRN  bisacodyl 5 milliGRAM(s) Oral daily PRN  calcium carbonate    500 mG (Tums) Chewable 1 Tablet(s) Chew three times a day PRN  dextrose Oral Gel 15 Gram(s) Oral once PRN  FIRST- Mouthwash  BLM 10 milliLiter(s) Swish and Spit every 4 hours PRN  haloperidol    Injectable 2.5 milliGRAM(s) IntraMuscular every 6 hours PRN  heparin   Injectable 75962 Unit(s) IV Push every 6 hours PRN  heparin   Injectable 5000 Unit(s) IV Push every 6 hours PRN  hydrOXYzine hydrochloride 25 milliGRAM(s) Oral every 6 hours PRN  magnesium hydroxide Suspension 30 milliLiter(s) Oral daily PRN  ondansetron Injectable 4 milliGRAM(s) IV Push every 6 hours PRN  oxyCODONE    IR 5 milliGRAM(s) Oral every 6 hours PRN  simethicone 80 milliGRAM(s) Chew daily PRN  traZODone 50 milliGRAM(s) Oral at bedtime PRN    VITALS:   T(F): 97.7  HR: 86  BP: 132/74  RR: 18  SpO2: 96%    PHYSICAL EXAM:  GENERAL: NAD, back pain  EYES: EOMI, PERRLA, conjunctiva and sclera clear  ENT: Bleeding from tongue  CHEST/LUNG: Reduced breath sounds, normal effort  HEART: Regular rate and rhythm; No murmurs, rubs, or gallops  ABDOMEN: BSx4; Soft, nontender, nondistended  EXTREMITIES:  2+ Peripheral Pulses, brisk capillary refill. No clubbing, cyanosis, or edema  NERVOUS SYSTEM:  A&Ox3, no focal deficits   SKIN: No rashes or lesions      LABS:                        9.5    14.86 )-----------( 761      ( 05 Feb 2025 07:26 )             29.7     02-04    141  |  97[L]  |  73[HH]  ----------------------------<  105[H]  4.4   |  29  |  2.1[H]    Ca    8.9      04 Feb 2025 04:36  Phos  5.0     02-04  Mg     2.4     02-04    TPro  5.6[L]  /  Alb  3.1[L]  /  TBili  0.4  /  DBili  x   /  AST  42[H]  /  ALT  41  /  AlkPhos  425[H]  02-04    PTT - ( 05 Feb 2025 04:45 )  PTT:143.6 sec  Urinalysis Basic - ( 04 Feb 2025 04:36 )    Color: x / Appearance: x / SG: x / pH: x  Gluc: 105 mg/dL / Ketone: x  / Bili: x / Urobili: x   Blood: x / Protein: x / Nitrite: x   Leuk Esterase: x / RBC: x / WBC x   Sq Epi: x / Non Sq Epi: x / Bacteria: x     24H events:    Patient is a 70y old Male who presents with a chief complaint of sob (27 Jan 2025 09:39)    Primary diagnosis of Neutropenic fever          Today is hospital day 16d.   This morning patient was seen and examined at bedside. Significant bleeding still present on tongue and in mouth. Hemoglobin relatively stable, started ppx heparin subq and holding ASA. Awaiting heme/onc plan. Started pain regimen for severe back pain, may bring in Palliative if not improved.       Code Status: Full      PAST MEDICAL & SURGICAL HISTORY  Diabetes mellitus, type 2    BPH (benign prostatic hyperplasia)    Water retention    Essential hypertension    Diabetes    CAD (coronary artery disease)    H/O hypercholesterolemia    Morbid obesity    Chronic kidney disease (CKD)    History of atrial fibrillation    H/O right coronary artery stent placement    History of resection of pancreas    History of cholecystectomy    History of left knee replacement    S/P CABG x 6    H/O cardiac radiofrequency ablation      SOCIAL HISTORY:  Social History:      ALLERGIES:  No Known Allergies    MEDICATIONS:  STANDING MEDICATIONS  allopurinol 150 milliGRAM(s) Oral daily  ascorbic acid 500 milliGRAM(s) Oral daily  aspirin  chewable 81 milliGRAM(s) Oral daily  atorvastatin 10 milliGRAM(s) Oral at bedtime  benzocaine 20% Spray 1 Spray(s) Topical three times a day  buMETAnide 2 milliGRAM(s) Oral daily  cetirizine 10 milliGRAM(s) Oral daily  chlorhexidine 2% Cloths 1 Application(s) Topical daily  dextrose 5%. 1000 milliLiter(s) IV Continuous <Continuous>  dextrose 5%. 1000 milliLiter(s) IV Continuous <Continuous>  dextrose 50% Injectable 25 Gram(s) IV Push once  dextrose 50% Injectable 12.5 Gram(s) IV Push once  dextrose 50% Injectable 25 Gram(s) IV Push once  glucagon  Injectable 1 milliGRAM(s) IntraMuscular once  heparin  Infusion. 1200 Unit(s)/Hr IV Continuous <Continuous>  influenza  Vaccine (HIGH DOSE) 0.5 milliLiter(s) IntraMuscular once  insulin glargine Injectable (LANTUS) 10 Unit(s) SubCutaneous at bedtime  insulin lispro (ADMELOG) corrective regimen sliding scale   SubCutaneous three times a day before meals  insulin lispro Injectable (ADMELOG) 3 Unit(s) SubCutaneous three times a day before meals  melatonin 10 milliGRAM(s) Oral at bedtime  metolazone 2.5 milliGRAM(s) Oral daily  metoprolol tartrate 25 milliGRAM(s) Oral two times a day  nystatin Cream 1 Application(s) Topical every 12 hours  pantoprazole  Injectable 40 milliGRAM(s) IV Push daily  polyethylene glycol 3350 17 Gram(s) Oral every 12 hours  senna 2 Tablet(s) Oral every 12 hours  sertraline 50 milliGRAM(s) Oral daily  tamsulosin 0.4 milliGRAM(s) Oral at bedtime    PRN MEDICATIONS  acetaminophen     Tablet .. 650 milliGRAM(s) Oral every 6 hours PRN  albuterol/ipratropium for Nebulization 3 milliLiter(s) Nebulizer every 4 hours PRN  bisacodyl 5 milliGRAM(s) Oral daily PRN  calcium carbonate    500 mG (Tums) Chewable 1 Tablet(s) Chew three times a day PRN  dextrose Oral Gel 15 Gram(s) Oral once PRN  FIRST- Mouthwash  BLM 10 milliLiter(s) Swish and Spit every 4 hours PRN  haloperidol    Injectable 2.5 milliGRAM(s) IntraMuscular every 6 hours PRN  heparin   Injectable 93240 Unit(s) IV Push every 6 hours PRN  heparin   Injectable 5000 Unit(s) IV Push every 6 hours PRN  hydrOXYzine hydrochloride 25 milliGRAM(s) Oral every 6 hours PRN  magnesium hydroxide Suspension 30 milliLiter(s) Oral daily PRN  ondansetron Injectable 4 milliGRAM(s) IV Push every 6 hours PRN  oxyCODONE    IR 5 milliGRAM(s) Oral every 6 hours PRN  simethicone 80 milliGRAM(s) Chew daily PRN  traZODone 50 milliGRAM(s) Oral at bedtime PRN    VITALS:   T(F): 97.7  HR: 86  BP: 132/74  RR: 18  SpO2: 96%    PHYSICAL EXAM:  GENERAL: NAD, back pain  EYES: EOMI, PERRLA, conjunctiva and sclera clear  ENT: Bleeding from tongue  CHEST/LUNG: Reduced breath sounds, normal effort  HEART: Regular rate and rhythm; No murmurs, rubs, or gallops  ABDOMEN: BSx4; Soft, nontender, nondistended  EXTREMITIES:  2+ Peripheral Pulses, brisk capillary refill. No clubbing, cyanosis, or edema  NERVOUS SYSTEM:  A&Ox3, no focal deficits   SKIN: No rashes or lesions      LABS:                        9.5    14.86 )-----------( 761      ( 05 Feb 2025 07:26 )             29.7     02-04    141  |  97[L]  |  73[HH]  ----------------------------<  105[H]  4.4   |  29  |  2.1[H]    Ca    8.9      04 Feb 2025 04:36  Phos  5.0     02-04  Mg     2.4     02-04    TPro  5.6[L]  /  Alb  3.1[L]  /  TBili  0.4  /  DBili  x   /  AST  42[H]  /  ALT  41  /  AlkPhos  425[H]  02-04    PTT - ( 05 Feb 2025 04:45 )  PTT:143.6 sec  Urinalysis Basic - ( 04 Feb 2025 04:36 )    Color: x / Appearance: x / SG: x / pH: x  Gluc: 105 mg/dL / Ketone: x  / Bili: x / Urobili: x   Blood: x / Protein: x / Nitrite: x   Leuk Esterase: x / RBC: x / WBC x   Sq Epi: x / Non Sq Epi: x / Bacteria: x

## 2025-02-05 NOTE — PROGRESS NOTE ADULT - ATTENDING COMMENTS
Patient was seen and examined with fellows during rounds covering inpatient hematology oncology service on 2/5/2025. Information was obtained from medical chart review and discussion with the patient.     Diffuse large B-cell lymphoma status post R-EPOCH admitted for pneumonia/influenza infection. Recovering well. Monitor labs. PT/OT to optimize patient's performance status before next cycle of chemotherapy treatment. Please provide hematology oncology clinic follow-up once stable for discharge. D/w hematology oncology team.

## 2025-02-05 NOTE — PROGRESS NOTE ADULT - ATTENDING COMMENTS
# Acute Hypoxic Respiratory Failure 2/2 Multifocal Pneumonia and Acute on chronic diastolic CHF  # Influenza A infection  # RASHAD/ [facility]   - CTA 1/20: Negative for pulmonary emboli. Interval development of infiltrates within the lower lobes and lingula which may reflect acute multilobar pneumonia  - blood cx NGTD x2, UA negative, influenza A + , Nasal MRSA negative   - TTE 1/10/25: poor study, EF 60-65%, moderate TR  - start IV Bumex 2mb BID and c/w metolazone   - NIV PRN and QHS   - nebs Q 6 hours, supportive care, aspiration precautions   - fluid restriction 1200 ml in 24 hours , intake and output monitoring, daily weight     # Oral bleeding due to mucositis   - c/w local oral care  - CT neck w/IV contrast no tongue mass.   - c/w magic mouthwash.     # Large B-cell Lymphoma/ Concern for Rodriguez's transformation  - Heme/Onc follow up to comment on plan of care   - s/p PICC placement on 1/10/25  - s/p Bone marrow biopsy on 1/10/25  - Completed cycle 1 of R-EPOCH (D1 on 1/11/2024). Tolerated well. Rituximab was given on D5   - Uric acid 10.3 on 1/16/25, s/p 3 mg IV rasburicase  - sp Zarxio  - daily CBC with diff, active T&S  - c/w Allopurinol     # KRISTINE on CKD 3B / Chronic Lower Extremity Edema   - baseline ~high 1s, low 2s  - avoid nephrotoxic medications     # CAD s/p CABG/ Chronic Afib on Xarelto/ HTN  - c/w statin, BB  - Hold ASA for oral bleeding   - Still holding lisinopril and aldactone from last admission  - holding Xarelto, holding for oral bleeding      #HLD   - on Atorvastatin now   - resume Repatha on discharge    # DM  - Endo consult: keep off insulin pump  - lantus 10 lispro 3      DVT: heparin subq    Plan of care d/w daughter over the phone   Dispo: Home vs STR (family prefers home with home PT)

## 2025-02-05 NOTE — SWALLOW BEDSIDE ASSESSMENT ADULT - SLP PERTINENT HISTORY OF CURRENT PROBLEM
Pt is a 70 y/o M w/ extensive medical history including: CAD (status post CABG), DM (on insulin pump), atrial fibrillation (on Xarelto), distal pancreatomy and splenectomy, referred to the ED by his oncologist for abnormal labs. Of note, pt recently diagnosed w/ diffuse large B cell lymphoma on pathology of right groin mass and is following / [facility] oncology team. Pt discharged on 1/17/2025 from hospital after inpatient chemotherapy (Completed cycle 1 of R-EPOCH (D1 on 1/11/2024)). Pt is being treated for severe sepsis - severe multifocal PNA w/ influenza A infection, AHRF, volume overload, neutropenic fever; +Immunodeficiency 2' malignancy and comorbidities. FEES 1/30 while in 2T on HFNC.

## 2025-02-05 NOTE — PROGRESS NOTE ADULT - ASSESSMENT
# Acute Hypoxic Respiratory Failure 2/2 Multifocal Pneumonia/ Volume Overload/ Acute on chronic diastolic CHF/  Influenza A infection/ RASHAD/ O[facility]   - clinically improving, on high low oxygen for now, will taper off as tolerated   - NIV PRN and QHS   - nebs Q 6 hours, supportive care, aspiration precautions   - CTA 1/20: Negative for pulmonary emboli. 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 Tolerated well. Rituximab was given on D5   - Uric acid 10.3 on 1/16/25, s/p 3 mg IV rasburicase  - sp Zarxio  - daily CBC with diff, active T&S  - c/w Allopurinol     # KRISTINE on CKD 3B / Chronic Lower Extremity Edema   - baseline ~high 1s, low 2s  - c/w diuretics   -nephrology is following, recommendations noted  - monitor BUN/cr and urine output  - ajay d/c'd  - avoid nephrotoxic medications     # CAD s/p CABG/ Chronic Afib on Xarelto/ HTN  - c/w statin, BB  - Hold ASA  - Still holding lisinopril and aldactone from last admission  - holding Xarelto, started ppx heparin subq     #HLD   - on Atorvastatin now   - resume Repatha on discharge    # DM  - CC diet   - FS - self monitored via dex com  - On insulin pump @2.85 at home   - Endo consult: keep off insulin pump  - lantus 10 lispro 3    # Oral pain due to ulcer with black discoloration   - c/w local couth care  - consult ENT to examine black discoloration -> rec CT neck w/ IV contrast, magic mouthwash  - CT neck w/IV contrast no tongue mass.   - oncology thinking this is mucositis   - Monitor bleed, keep active TS and trend CBC  - Will c/w magic mouthwash.     # Agitation/ confusion Insomnia   - pt is calm now   - on Haldol PRN , Sertraline and melatonin   - supportive care     # GI  - on teofilo regimen and PPIs.    DVT: heparin subq  Diet: Regular, fluid rstriction 1200cc  Activity: AAT  GI: PPI  Code: Full    To-do: Onc recs, monitor bleeding on heparin subq, PT, CBC # Acute Hypoxic Respiratory Failure 2/2 Multifocal Pneumonia/ Volume Overload/ Acute on chronic diastolic CHF/  Influenza A infection/ RASHAD/ OHS   - clinically improving, on high low oxygen for now, will taper off as tolerated   - NIV PRN and QHS   - nebs Q 6 hours, supportive care, aspiration precautions   - CTA 1/20: Negative for pulmonary emboli. 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 Tolerated well. Rituximab was given on D5   - Uric acid 10.3 on 1/16/25, s/p 3 mg IV rasburicase  - sp Zarxio  - daily CBC with diff, active T&S  - c/w Allopurinol   - No onc plans to start chemo/treatment inpatient, can dc when medically appropriate with follow-up with Dr. Hilliard who will schedule cycle 2 of treatment.     # KRISTINE on CKD 3B / Chronic Lower Extremity Edema   - baseline ~high 1s, low 2s  - c/w diuretics   -nephrology is following, recommendations noted  - monitor BUN/cr and urine output  - ajay d/c'd  - avoid nephrotoxic medications     # CAD s/p CABG/ Chronic Afib on Xarelto/ HTN  - c/w statin, BB  - Hold ASA  - Still holding lisinopril and aldactone from last admission  - holding Xarelto, started ppx heparin subq     #HLD   - on Atorvastatin now   - resume Repatha on discharge    # DM  - CC diet   - FS - self monitored via dex com  - On insulin pump @2.85 at home   - Endo consult: keep off insulin pump  - lantus 10 lispro 3    # Oral pain due to ulcer with black discoloration   - c/w local couth care  - consult ENT to examine black discoloration -> rec CT neck w/ IV contrast, magic mouthwash  - CT neck w/IV contrast no tongue mass.   - oncology thinking this is mucositis   - Monitor bleed, keep active TS and trend CBC  - Will c/w magic mouthwash.     # Agitation/ confusion Insomnia   - pt is calm now   - on Haldol PRN , Sertraline and melatonin   - supportive care     # GI  - on teofilo regimen and PPIs.    DVT: heparin subq  Diet: Regular, fluid rstriction 1200cc  Activity: AAT  GI: PPI  Code: Full    To-do: Outpatient onc f/u with Dr. Hilliard, monitor bleeding on heparin subq, PT, CBC

## 2025-02-06 ENCOUNTER — APPOINTMENT (OUTPATIENT)
Dept: OTOLARYNGOLOGY | Facility: CLINIC | Age: 70
End: 2025-02-06

## 2025-02-06 LAB
ALBUMIN SERPL ELPH-MCNC: 2.9 G/DL — LOW (ref 3.5–5.2)
ALP SERPL-CCNC: 345 U/L — HIGH (ref 30–115)
ALT FLD-CCNC: 27 U/L — SIGNIFICANT CHANGE UP (ref 0–41)
ANION GAP SERPL CALC-SCNC: 16 MMOL/L — HIGH (ref 7–14)
APTT BLD: 37.8 SEC — SIGNIFICANT CHANGE UP (ref 27–39.2)
AST SERPL-CCNC: 46 U/L — HIGH (ref 0–41)
BASE EXCESS BLDA CALC-SCNC: 13 MMOL/L — HIGH (ref -2–3)
BASOPHILS # BLD AUTO: 0.08 K/UL — SIGNIFICANT CHANGE UP (ref 0–0.2)
BASOPHILS NFR BLD AUTO: 0.6 % — SIGNIFICANT CHANGE UP (ref 0–1)
BILIRUB SERPL-MCNC: 0.4 MG/DL — SIGNIFICANT CHANGE UP (ref 0.2–1.2)
BUN SERPL-MCNC: 78 MG/DL — CRITICAL HIGH (ref 10–20)
CALCIUM SERPL-MCNC: 8.7 MG/DL — SIGNIFICANT CHANGE UP (ref 8.4–10.4)
CHLORIDE SERPL-SCNC: 91 MMOL/L — LOW (ref 98–110)
CO2 SERPL-SCNC: 29 MMOL/L — SIGNIFICANT CHANGE UP (ref 17–32)
CREAT SERPL-MCNC: 2.2 MG/DL — HIGH (ref 0.7–1.5)
EGFR: 32 ML/MIN/1.73M2 — LOW
EOSINOPHIL # BLD AUTO: 0.05 K/UL — SIGNIFICANT CHANGE UP (ref 0–0.7)
EOSINOPHIL NFR BLD AUTO: 0.4 % — SIGNIFICANT CHANGE UP (ref 0–8)
GAS PNL BLDA: SIGNIFICANT CHANGE UP
GLUCOSE BLDC GLUCOMTR-MCNC: 313 MG/DL — HIGH (ref 70–99)
GLUCOSE BLDC GLUCOMTR-MCNC: 339 MG/DL — HIGH (ref 70–99)
GLUCOSE BLDC GLUCOMTR-MCNC: 340 MG/DL — HIGH (ref 70–99)
GLUCOSE BLDC GLUCOMTR-MCNC: 385 MG/DL — HIGH (ref 70–99)
GLUCOSE SERPL-MCNC: 310 MG/DL — HIGH (ref 70–99)
HCO3 BLDA-SCNC: 39 MMOL/L — HIGH (ref 21–28)
HCT VFR BLD CALC: 28.8 % — LOW (ref 42–52)
HGB BLD-MCNC: 9.4 G/DL — LOW (ref 14–18)
HOROWITZ INDEX BLDA+IHG-RTO: 30 — SIGNIFICANT CHANGE UP
IMM GRANULOCYTES NFR BLD AUTO: 4.8 % — HIGH (ref 0.1–0.3)
LACTATE SERPL-SCNC: 1.7 MMOL/L — SIGNIFICANT CHANGE UP (ref 0.7–2)
LYMPHOCYTES # BLD AUTO: 0.63 K/UL — LOW (ref 1.2–3.4)
LYMPHOCYTES # BLD AUTO: 4.5 % — LOW (ref 20.5–51.1)
MAGNESIUM SERPL-MCNC: 2.2 MG/DL — SIGNIFICANT CHANGE UP (ref 1.8–2.4)
MCHC RBC-ENTMCNC: 30.5 PG — SIGNIFICANT CHANGE UP (ref 27–31)
MCHC RBC-ENTMCNC: 32.6 G/DL — SIGNIFICANT CHANGE UP (ref 32–37)
MCV RBC AUTO: 93.5 FL — SIGNIFICANT CHANGE UP (ref 80–94)
MONOCYTES # BLD AUTO: 2.82 K/UL — HIGH (ref 0.1–0.6)
MONOCYTES NFR BLD AUTO: 20.3 % — HIGH (ref 1.7–9.3)
NEUTROPHILS # BLD AUTO: 9.64 K/UL — HIGH (ref 1.4–6.5)
NEUTROPHILS NFR BLD AUTO: 69.4 % — SIGNIFICANT CHANGE UP (ref 42.2–75.2)
NRBC # BLD: 1 /100 WBCS — HIGH (ref 0–0)
NRBC BLD-RTO: 1 /100 WBCS — HIGH (ref 0–0)
NT-PROBNP SERPL-SCNC: 814 PG/ML — HIGH (ref 0–300)
PCO2 BLDA: 52 MMHG — HIGH (ref 35–48)
PH BLDA: 7.48 — HIGH (ref 7.35–7.45)
PLATELET # BLD AUTO: 691 K/UL — HIGH (ref 130–400)
PMV BLD: 11.8 FL — HIGH (ref 7.4–10.4)
PO2 BLDA: 73 MMHG — LOW (ref 83–108)
POTASSIUM SERPL-MCNC: 5.1 MMOL/L — HIGH (ref 3.5–5)
POTASSIUM SERPL-SCNC: 5.1 MMOL/L — HIGH (ref 3.5–5)
PROT SERPL-MCNC: 5.3 G/DL — LOW (ref 6–8)
RBC # BLD: 3.08 M/UL — LOW (ref 4.7–6.1)
RBC # FLD: 15.7 % — HIGH (ref 11.5–14.5)
SAO2 % BLDA: 96.7 % — SIGNIFICANT CHANGE UP (ref 94–98)
SODIUM SERPL-SCNC: 136 MMOL/L — SIGNIFICANT CHANGE UP (ref 135–146)
WBC # BLD: 13.89 K/UL — HIGH (ref 4.8–10.8)
WBC # FLD AUTO: 13.89 K/UL — HIGH (ref 4.8–10.8)

## 2025-02-06 PROCEDURE — 71250 CT THORAX DX C-: CPT | Mod: 26

## 2025-02-06 PROCEDURE — 93010 ELECTROCARDIOGRAM REPORT: CPT

## 2025-02-06 PROCEDURE — 99233 SBSQ HOSP IP/OBS HIGH 50: CPT

## 2025-02-06 PROCEDURE — 71045 X-RAY EXAM CHEST 1 VIEW: CPT | Mod: 26

## 2025-02-06 RX ORDER — LABETALOL HYDROCHLORIDE 200 MG/1
10 TABLET, FILM COATED ORAL ONCE
Refills: 0 | Status: COMPLETED | OUTPATIENT
Start: 2025-02-06 | End: 2025-02-06

## 2025-02-06 RX ORDER — BUMETANIDE 1 MG/1
2 TABLET ORAL EVERY 8 HOURS
Refills: 0 | Status: DISCONTINUED | OUTPATIENT
Start: 2025-02-06 | End: 2025-02-06

## 2025-02-06 RX ORDER — INSULIN GLARGINE-YFGN 100 [IU]/ML
25 INJECTION, SOLUTION SUBCUTANEOUS AT BEDTIME
Refills: 0 | Status: DISCONTINUED | OUTPATIENT
Start: 2025-02-06 | End: 2025-02-07

## 2025-02-06 RX ORDER — OXYCODONE HYDROCHLORIDE 30 MG/1
10 TABLET ORAL EVERY 6 HOURS
Refills: 0 | Status: DISCONTINUED | OUTPATIENT
Start: 2025-02-06 | End: 2025-02-13

## 2025-02-06 RX ORDER — VANCOMYCIN HCL IN 5 % DEXTROSE 1.5G/250ML
2000 PLASTIC BAG, INJECTION (ML) INTRAVENOUS EVERY 24 HOURS
Refills: 0 | Status: DISCONTINUED | OUTPATIENT
Start: 2025-02-06 | End: 2025-02-06

## 2025-02-06 RX ORDER — CEFEPIME 2 G/20ML
2000 INJECTION, POWDER, FOR SOLUTION INTRAVENOUS EVERY 12 HOURS
Refills: 0 | Status: DISCONTINUED | OUTPATIENT
Start: 2025-02-07 | End: 2025-02-13

## 2025-02-06 RX ORDER — CEFEPIME 2 G/20ML
INJECTION, POWDER, FOR SOLUTION INTRAVENOUS
Refills: 0 | Status: DISCONTINUED | OUTPATIENT
Start: 2025-02-06 | End: 2025-02-13

## 2025-02-06 RX ORDER — INSULIN LISPRO 100 U/ML
8 INJECTION, SOLUTION INTRAVENOUS; SUBCUTANEOUS
Refills: 0 | Status: DISCONTINUED | OUTPATIENT
Start: 2025-02-06 | End: 2025-02-07

## 2025-02-06 RX ORDER — BUMETANIDE 1 MG/1
2 TABLET ORAL DAILY
Refills: 0 | Status: DISCONTINUED | OUTPATIENT
Start: 2025-02-07 | End: 2025-02-12

## 2025-02-06 RX ORDER — INSULIN LISPRO 100 U/ML
INJECTION, SOLUTION INTRAVENOUS; SUBCUTANEOUS
Refills: 0 | Status: DISCONTINUED | OUTPATIENT
Start: 2025-02-06 | End: 2025-02-17

## 2025-02-06 RX ORDER — HEPARIN SODIUM 1000 [USP'U]/ML
INJECTION INTRAVENOUS; SUBCUTANEOUS
Qty: 25000 | Refills: 0 | Status: DISCONTINUED | OUTPATIENT
Start: 2025-02-06 | End: 2025-02-07

## 2025-02-06 RX ORDER — HEPARIN SODIUM 1000 [USP'U]/ML
5000 INJECTION INTRAVENOUS; SUBCUTANEOUS EVERY 8 HOURS
Refills: 0 | Status: DISCONTINUED | OUTPATIENT
Start: 2025-02-06 | End: 2025-02-06

## 2025-02-06 RX ORDER — CEFEPIME 2 G/20ML
2000 INJECTION, POWDER, FOR SOLUTION INTRAVENOUS ONCE
Refills: 0 | Status: COMPLETED | OUTPATIENT
Start: 2025-02-06 | End: 2025-02-06

## 2025-02-06 RX ORDER — CEFEPIME 2 G/20ML
INJECTION, POWDER, FOR SOLUTION INTRAVENOUS
Refills: 0 | Status: DISCONTINUED | OUTPATIENT
Start: 2025-02-06 | End: 2025-02-06

## 2025-02-06 RX ADMIN — Medication 10 MILLIGRAM(S): at 21:18

## 2025-02-06 RX ADMIN — METOPROLOL SUCCINATE 25 MILLIGRAM(S): 50 TABLET, EXTENDED RELEASE ORAL at 17:14

## 2025-02-06 RX ADMIN — DIPHENHYDRAMINE HYDROCHLORIDE AND LIDOCAINE HYDROCHLORIDE AND ALUMINUM HYDROXIDE AND MAGNESIUM HYDRO 10 MILLILITER(S): KIT at 17:15

## 2025-02-06 RX ADMIN — DIPHENHYDRAMINE HYDROCHLORIDE AND LIDOCAINE HYDROCHLORIDE AND ALUMINUM HYDROXIDE AND MAGNESIUM HYDRO 10 MILLILITER(S): KIT at 05:03

## 2025-02-06 RX ADMIN — POLYETHYLENE GLYCOL 3350 17 GRAM(S): 17 POWDER, FOR SOLUTION ORAL at 17:15

## 2025-02-06 RX ADMIN — Medication 0.5 MILLIGRAM(S): at 22:03

## 2025-02-06 RX ADMIN — DIPHENHYDRAMINE HYDROCHLORIDE AND LIDOCAINE HYDROCHLORIDE AND ALUMINUM HYDROXIDE AND MAGNESIUM HYDRO 10 MILLILITER(S): KIT at 22:36

## 2025-02-06 RX ADMIN — ATORVASTATIN CALCIUM 10 MILLIGRAM(S): 80 TABLET, FILM COATED ORAL at 21:19

## 2025-02-06 RX ADMIN — NYSTATIN 1 APPLICATION(S): 100000 CREAM TOPICAL at 17:13

## 2025-02-06 RX ADMIN — Medication 250 MILLIGRAM(S): at 15:22

## 2025-02-06 RX ADMIN — POLYETHYLENE GLYCOL 3350 17 GRAM(S): 17 POWDER, FOR SOLUTION ORAL at 05:03

## 2025-02-06 RX ADMIN — INSULIN LISPRO 3 UNIT(S): 100 INJECTION, SOLUTION INTRAVENOUS; SUBCUTANEOUS at 12:39

## 2025-02-06 RX ADMIN — Medication 975 MILLIGRAM(S): at 06:18

## 2025-02-06 RX ADMIN — DIPHENHYDRAMINE HYDROCHLORIDE AND LIDOCAINE HYDROCHLORIDE AND ALUMINUM HYDROXIDE AND MAGNESIUM HYDRO 10 MILLILITER(S): KIT at 10:07

## 2025-02-06 RX ADMIN — INSULIN LISPRO 10: 100 INJECTION, SOLUTION INTRAVENOUS; SUBCUTANEOUS at 17:13

## 2025-02-06 RX ADMIN — INSULIN LISPRO 4: 100 INJECTION, SOLUTION INTRAVENOUS; SUBCUTANEOUS at 12:39

## 2025-02-06 RX ADMIN — NYSTATIN 1 APPLICATION(S): 100000 CREAM TOPICAL at 05:04

## 2025-02-06 RX ADMIN — LABETALOL HYDROCHLORIDE 10 MILLIGRAM(S): 200 TABLET, FILM COATED ORAL at 13:50

## 2025-02-06 RX ADMIN — INSULIN LISPRO 8 UNIT(S): 100 INJECTION, SOLUTION INTRAVENOUS; SUBCUTANEOUS at 17:13

## 2025-02-06 RX ADMIN — HYDROXYZINE HYDROCHLORIDE 25 MILLIGRAM(S): 25 TABLET, FILM COATED ORAL at 20:20

## 2025-02-06 RX ADMIN — Medication 0.5 MILLIGRAM(S): at 21:21

## 2025-02-06 RX ADMIN — INSULIN LISPRO 4: 100 INJECTION, SOLUTION INTRAVENOUS; SUBCUTANEOUS at 08:27

## 2025-02-06 RX ADMIN — Medication 0.5 MILLIGRAM(S): at 01:36

## 2025-02-06 RX ADMIN — INSULIN GLARGINE-YFGN 25 UNIT(S): 100 INJECTION, SOLUTION SUBCUTANEOUS at 22:35

## 2025-02-06 RX ADMIN — Medication 40 MILLIGRAM(S): at 12:42

## 2025-02-06 RX ADMIN — Medication 2 TABLET(S): at 05:03

## 2025-02-06 RX ADMIN — HEPARIN SODIUM 1000 UNIT(S)/HR: 1000 INJECTION INTRAVENOUS; SUBCUTANEOUS at 14:06

## 2025-02-06 RX ADMIN — Medication 2 TABLET(S): at 17:14

## 2025-02-06 RX ADMIN — OXYCODONE HYDROCHLORIDE 10 MILLIGRAM(S): 30 TABLET ORAL at 23:10

## 2025-02-06 RX ADMIN — Medication 0.5 MILLIGRAM(S): at 02:08

## 2025-02-06 RX ADMIN — TAMSULOSIN HYDROCHLORIDE 0.4 MILLIGRAM(S): 0.4 CAPSULE ORAL at 21:18

## 2025-02-06 RX ADMIN — METOPROLOL SUCCINATE 25 MILLIGRAM(S): 50 TABLET, EXTENDED RELEASE ORAL at 05:05

## 2025-02-06 RX ADMIN — CEFEPIME 100 MILLIGRAM(S): 2 INJECTION, POWDER, FOR SOLUTION INTRAVENOUS at 13:41

## 2025-02-06 RX ADMIN — HEPARIN SODIUM 5000 UNIT(S): 1000 INJECTION INTRAVENOUS; SUBCUTANEOUS at 05:04

## 2025-02-06 RX ADMIN — Medication 975 MILLIGRAM(S): at 05:03

## 2025-02-06 RX ADMIN — BUMETANIDE 2 MILLIGRAM(S): 1 TABLET ORAL at 05:07

## 2025-02-06 RX ADMIN — BENZOCAINE 1 SPRAY(S): 220 SPRAY, METERED PERIODONTAL at 22:36

## 2025-02-06 RX ADMIN — INSULIN LISPRO 3 UNIT(S): 100 INJECTION, SOLUTION INTRAVENOUS; SUBCUTANEOUS at 08:28

## 2025-02-06 NOTE — SWALLOW BEDSIDE ASSESSMENT ADULT - SWALLOW EVAL: DIAGNOSIS
+mild oral dysphagia for thin liquids w/o overt s/s aspiration/penetration; pt declined further PO trials.

## 2025-02-06 NOTE — SWALLOW BEDSIDE ASSESSMENT ADULT - SLP PERTINENT HISTORY OF CURRENT PROBLEM
Pt is a 68 y/o M w/ extensive medical history including: CAD (status post CABG), DM (on insulin pump), atrial fibrillation (on Xarelto), distal pancreatomy and splenectomy, referred to the ED by his oncologist for abnormal labs. Of note, pt recently diagnosed w/ diffuse large B cell lymphoma on pathology of right groin mass and is following / [facility] oncology team. Pt discharged on 1/17/2025 from hospital after inpatient chemotherapy (Completed cycle 1 of R-EPOCH (D1 on 1/11/2024)). Pt is being treated for severe sepsis - severe multifocal PNA w/ influenza A infection, AHRF, volume overload, neutropenic fever; +Immunodeficiency 2' malignancy and comorbidities. FEES 1/30 while in 2T on HFNC.

## 2025-02-06 NOTE — PROGRESS NOTE ADULT - SUBJECTIVE AND OBJECTIVE BOX
24H events:    Patient is a 70y old Male who presents with a chief complaint of sob (27 Jan 2025 09:39)    Primary diagnosis of Neutropenic fever          Today is hospital day 17d.   This morning patient was seen and examined at bedside. Significant bleeding still present on tongue and in mouth. Hemoglobin relatively stable, started ppx heparin subq and holding ASA. Awaiting heme/onc plan. Started pain regimen for severe back pain, may bring in Palliative if not improved.       Code Status: Full      PAST MEDICAL & SURGICAL HISTORY  Diabetes mellitus, type 2    BPH (benign prostatic hyperplasia)    Water retention    Essential hypertension    Diabetes    CAD (coronary artery disease)    H/O hypercholesterolemia    Morbid obesity    Chronic kidney disease (CKD)    History of atrial fibrillation    H/O right coronary artery stent placement    History of resection of pancreas    History of cholecystectomy    History of left knee replacement    S/P CABG x 6    H/O cardiac radiofrequency ablation      SOCIAL HISTORY:  Social History:      ALLERGIES:  No Known Allergies    MEDICATIONS:  STANDING MEDICATIONS  allopurinol 150 milliGRAM(s) Oral daily  ascorbic acid 500 milliGRAM(s) Oral daily  aspirin  chewable 81 milliGRAM(s) Oral daily  atorvastatin 10 milliGRAM(s) Oral at bedtime  benzocaine 20% Spray 1 Spray(s) Topical three times a day  buMETAnide 2 milliGRAM(s) Oral daily  cetirizine 10 milliGRAM(s) Oral daily  chlorhexidine 2% Cloths 1 Application(s) Topical daily  dextrose 5%. 1000 milliLiter(s) IV Continuous <Continuous>  dextrose 5%. 1000 milliLiter(s) IV Continuous <Continuous>  dextrose 50% Injectable 25 Gram(s) IV Push once  dextrose 50% Injectable 12.5 Gram(s) IV Push once  dextrose 50% Injectable 25 Gram(s) IV Push once  glucagon  Injectable 1 milliGRAM(s) IntraMuscular once  heparin  Infusion. 1200 Unit(s)/Hr IV Continuous <Continuous>  influenza  Vaccine (HIGH DOSE) 0.5 milliLiter(s) IntraMuscular once  insulin glargine Injectable (LANTUS) 10 Unit(s) SubCutaneous at bedtime  insulin lispro (ADMELOG) corrective regimen sliding scale   SubCutaneous three times a day before meals  insulin lispro Injectable (ADMELOG) 3 Unit(s) SubCutaneous three times a day before meals  melatonin 10 milliGRAM(s) Oral at bedtime  metolazone 2.5 milliGRAM(s) Oral daily  metoprolol tartrate 25 milliGRAM(s) Oral two times a day  nystatin Cream 1 Application(s) Topical every 12 hours  pantoprazole  Injectable 40 milliGRAM(s) IV Push daily  polyethylene glycol 3350 17 Gram(s) Oral every 12 hours  senna 2 Tablet(s) Oral every 12 hours  sertraline 50 milliGRAM(s) Oral daily  tamsulosin 0.4 milliGRAM(s) Oral at bedtime    PRN MEDICATIONS  acetaminophen     Tablet .. 650 milliGRAM(s) Oral every 6 hours PRN  albuterol/ipratropium for Nebulization 3 milliLiter(s) Nebulizer every 4 hours PRN  bisacodyl 5 milliGRAM(s) Oral daily PRN  calcium carbonate    500 mG (Tums) Chewable 1 Tablet(s) Chew three times a day PRN  dextrose Oral Gel 15 Gram(s) Oral once PRN  FIRST- Mouthwash  BLM 10 milliLiter(s) Swish and Spit every 4 hours PRN  haloperidol    Injectable 2.5 milliGRAM(s) IntraMuscular every 6 hours PRN  heparin   Injectable 32149 Unit(s) IV Push every 6 hours PRN  heparin   Injectable 5000 Unit(s) IV Push every 6 hours PRN  hydrOXYzine hydrochloride 25 milliGRAM(s) Oral every 6 hours PRN  magnesium hydroxide Suspension 30 milliLiter(s) Oral daily PRN  ondansetron Injectable 4 milliGRAM(s) IV Push every 6 hours PRN  oxyCODONE    IR 5 milliGRAM(s) Oral every 6 hours PRN  simethicone 80 milliGRAM(s) Chew daily PRN  traZODone 50 milliGRAM(s) Oral at bedtime PRN    VITALS:   T(F): 97.7  HR: 86  BP: 132/74  RR: 18  SpO2: 96%    PHYSICAL EXAM:  GENERAL: NAD, back pain  EYES: EOMI, PERRLA, conjunctiva and sclera clear  ENT: Bleeding from tongue  CHEST/LUNG: Reduced breath sounds, normal effort  HEART: Regular rate and rhythm; No murmurs, rubs, or gallops  ABDOMEN: BSx4; Soft, nontender, nondistended  EXTREMITIES:  2+ Peripheral Pulses, brisk capillary refill. No clubbing, cyanosis, or edema  NERVOUS SYSTEM:  A&Ox3, no focal deficits   SKIN: No rashes or lesions      LABS:                        9.5    14.86 )-----------( 761      ( 05 Feb 2025 07:26 )             29.7     02-04    141  |  97[L]  |  73[HH]  ----------------------------<  105[H]  4.4   |  29  |  2.1[H]    Ca    8.9      04 Feb 2025 04:36  Phos  5.0     02-04  Mg     2.4     02-04    TPro  5.6[L]  /  Alb  3.1[L]  /  TBili  0.4  /  DBili  x   /  AST  42[H]  /  ALT  41  /  AlkPhos  425[H]  02-04    PTT - ( 05 Feb 2025 04:45 )  PTT:143.6 sec  Urinalysis Basic - ( 04 Feb 2025 04:36 )    Color: x / Appearance: x / SG: x / pH: x  Gluc: 105 mg/dL / Ketone: x  / Bili: x / Urobili: x   Blood: x / Protein: x / Nitrite: x   Leuk Esterase: x / RBC: x / WBC x   Sq Epi: x / Non Sq Epi: x / Bacteria: x     24H events:    Patient is a 70y old Male who presents with a chief complaint of sob (27 Jan 2025 09:39)    Primary diagnosis of Neutropenic fever          Today is hospital day 17d.   This morning patient was seen and examined at bedside. Oral bleeding appears improved. Pain improved on new regimen. Will increase to heparin to q8h and continue diuresis.       Code Status: Full      PAST MEDICAL & SURGICAL HISTORY  Diabetes mellitus, type 2    BPH (benign prostatic hyperplasia)    Water retention    Essential hypertension    Diabetes    CAD (coronary artery disease)    H/O hypercholesterolemia    Morbid obesity    Chronic kidney disease (CKD)    History of atrial fibrillation    H/O right coronary artery stent placement    History of resection of pancreas    History of cholecystectomy    History of left knee replacement    S/P CABG x 6    H/O cardiac radiofrequency ablation      SOCIAL HISTORY:  Social History:      ALLERGIES:  No Known Allergies    MEDICATIONS:  STANDING MEDICATIONS  acetaminophen     Tablet .. 975 milliGRAM(s) Oral every 8 hours  allopurinol 150 milliGRAM(s) Oral daily  ascorbic acid 500 milliGRAM(s) Oral daily  atorvastatin 10 milliGRAM(s) Oral at bedtime  benzocaine 20% Spray 1 Spray(s) Topical three times a day  buMETAnide Injectable 2 milliGRAM(s) IV Push two times a day  cetirizine 10 milliGRAM(s) Oral daily  chlorhexidine 2% Cloths 1 Application(s) Topical daily  dextrose 5%. 1000 milliLiter(s) IV Continuous <Continuous>  dextrose 5%. 1000 milliLiter(s) IV Continuous <Continuous>  dextrose 50% Injectable 25 Gram(s) IV Push once  dextrose 50% Injectable 12.5 Gram(s) IV Push once  dextrose 50% Injectable 25 Gram(s) IV Push once  FIRST- Mouthwash  BLM 10 milliLiter(s) Swish and Spit every 4 hours  glucagon  Injectable 1 milliGRAM(s) IntraMuscular once  heparin   Injectable 5000 Unit(s) SubCutaneous every 8 hours  influenza  Vaccine (HIGH DOSE) 0.5 milliLiter(s) IntraMuscular once  insulin glargine Injectable (LANTUS) 10 Unit(s) SubCutaneous at bedtime  insulin lispro (ADMELOG) corrective regimen sliding scale   SubCutaneous three times a day before meals  insulin lispro Injectable (ADMELOG) 3 Unit(s) SubCutaneous three times a day before meals  melatonin 10 milliGRAM(s) Oral at bedtime  metolazone 2.5 milliGRAM(s) Oral daily  metoprolol tartrate 25 milliGRAM(s) Oral two times a day  NIFEdipine XL 60 milliGRAM(s) Oral daily  nystatin Cream 1 Application(s) Topical every 12 hours  pantoprazole  Injectable 40 milliGRAM(s) IV Push daily  polyethylene glycol 3350 17 Gram(s) Oral every 12 hours  senna 2 Tablet(s) Oral every 12 hours  sertraline 50 milliGRAM(s) Oral daily  tamsulosin 0.4 milliGRAM(s) Oral at bedtime    PRN MEDICATIONS  albuterol/ipratropium for Nebulization 3 milliLiter(s) Nebulizer every 4 hours PRN  bisacodyl 5 milliGRAM(s) Oral daily PRN  calcium carbonate    500 mG (Tums) Chewable 1 Tablet(s) Chew three times a day PRN  dextrose Oral Gel 15 Gram(s) Oral once PRN  haloperidol    Injectable 2.5 milliGRAM(s) IntraMuscular every 6 hours PRN  HYDROmorphone  Injectable 0.5 milliGRAM(s) IV Push every 6 hours PRN  hydrOXYzine hydrochloride 25 milliGRAM(s) Oral every 6 hours PRN  magnesium hydroxide Suspension 30 milliLiter(s) Oral daily PRN  ondansetron Injectable 4 milliGRAM(s) IV Push every 6 hours PRN  oxyCODONE    IR 10 milliGRAM(s) Oral every 6 hours PRN  simethicone 80 milliGRAM(s) Chew daily PRN  traZODone 50 milliGRAM(s) Oral at bedtime PRN    VITALS:   T(F): 98.2  HR: 102  BP: 128/69  RR: 18  SpO2: 96%      PHYSICAL EXAM:  GENERAL: NAD, back pain  EYES: EOMI, PERRLA, conjunctiva and sclera clear  ENT: Bleeding from tongue improved from before  CHEST/LUNG: Reduced breath sounds, normal effort  HEART: Regular rate and rhythm; No murmurs, rubs, or gallops  ABDOMEN: BSx4; Soft, nontender, nondistended  EXTREMITIES:  2+ Peripheral Pulses, brisk capillary refill. No clubbing, cyanosis, or edema  NERVOUS SYSTEM:  A&Ox3, no focal deficits   SKIN: No rashes or lesions      LABS:                        9.4    13.89 )-----------( 691      ( 06 Feb 2025 08:51 )             28.8     02-06    136  |  91[L]  |  78[HH]  ----------------------------<  310[H]  5.1[H]   |  29  |  2.2[H]    Ca    8.7      06 Feb 2025 08:51  Phos  4.2     02-05  Mg     2.2     02-06    TPro  5.3[L]  /  Alb  2.9[L]  /  TBili  0.4  /  DBili  x   /  AST  46[H]  /  ALT  27  /  AlkPhos  345[H]  02-06    PTT - ( 05 Feb 2025 04:45 )  PTT:143.6 sec  Urinalysis Basic - ( 06 Feb 2025 08:51 )    Color: x / Appearance: x / SG: x / pH: x  Gluc: 310 mg/dL / Ketone: x  / Bili: x / Urobili: x   Blood: x / Protein: x / Nitrite: x   Leuk Esterase: x / RBC: x / WBC x   Sq Epi: x / Non Sq Epi: x / Bacteria: x     24H events:    Patient is a 70y old Male who presents with a chief complaint of sob (27 Jan 2025 09:39)    Primary diagnosis of Neutropenic fever          Today is hospital day 17d.   This morning patient was seen and examined at bedside. Oral bleeding appears improved. Pain improved on new regimen. Will increase to heparin to q8h and continue diuresis.     Later in the AM patient began to further desaturate on nc. Stat CXR ordered and given stat dose of metolazone 2.5mg. Increased diuresis to bumex TID.       Code Status: Full      PAST MEDICAL & SURGICAL HISTORY  Diabetes mellitus, type 2    BPH (benign prostatic hyperplasia)    Water retention    Essential hypertension    Diabetes    CAD (coronary artery disease)    H/O hypercholesterolemia    Morbid obesity    Chronic kidney disease (CKD)    History of atrial fibrillation    H/O right coronary artery stent placement    History of resection of pancreas    History of cholecystectomy    History of left knee replacement    S/P CABG x 6    H/O cardiac radiofrequency ablation      SOCIAL HISTORY:  Social History:      ALLERGIES:  No Known Allergies    MEDICATIONS:  STANDING MEDICATIONS  acetaminophen     Tablet .. 975 milliGRAM(s) Oral every 8 hours  allopurinol 150 milliGRAM(s) Oral daily  ascorbic acid 500 milliGRAM(s) Oral daily  atorvastatin 10 milliGRAM(s) Oral at bedtime  benzocaine 20% Spray 1 Spray(s) Topical three times a day  buMETAnide Injectable 2 milliGRAM(s) IV Push two times a day  cetirizine 10 milliGRAM(s) Oral daily  chlorhexidine 2% Cloths 1 Application(s) Topical daily  dextrose 5%. 1000 milliLiter(s) IV Continuous <Continuous>  dextrose 5%. 1000 milliLiter(s) IV Continuous <Continuous>  dextrose 50% Injectable 25 Gram(s) IV Push once  dextrose 50% Injectable 12.5 Gram(s) IV Push once  dextrose 50% Injectable 25 Gram(s) IV Push once  FIRST- Mouthwash  BLM 10 milliLiter(s) Swish and Spit every 4 hours  glucagon  Injectable 1 milliGRAM(s) IntraMuscular once  heparin   Injectable 5000 Unit(s) SubCutaneous every 8 hours  influenza  Vaccine (HIGH DOSE) 0.5 milliLiter(s) IntraMuscular once  insulin glargine Injectable (LANTUS) 10 Unit(s) SubCutaneous at bedtime  insulin lispro (ADMELOG) corrective regimen sliding scale   SubCutaneous three times a day before meals  insulin lispro Injectable (ADMELOG) 3 Unit(s) SubCutaneous three times a day before meals  melatonin 10 milliGRAM(s) Oral at bedtime  metolazone 2.5 milliGRAM(s) Oral daily  metoprolol tartrate 25 milliGRAM(s) Oral two times a day  NIFEdipine XL 60 milliGRAM(s) Oral daily  nystatin Cream 1 Application(s) Topical every 12 hours  pantoprazole  Injectable 40 milliGRAM(s) IV Push daily  polyethylene glycol 3350 17 Gram(s) Oral every 12 hours  senna 2 Tablet(s) Oral every 12 hours  sertraline 50 milliGRAM(s) Oral daily  tamsulosin 0.4 milliGRAM(s) Oral at bedtime    PRN MEDICATIONS  albuterol/ipratropium for Nebulization 3 milliLiter(s) Nebulizer every 4 hours PRN  bisacodyl 5 milliGRAM(s) Oral daily PRN  calcium carbonate    500 mG (Tums) Chewable 1 Tablet(s) Chew three times a day PRN  dextrose Oral Gel 15 Gram(s) Oral once PRN  haloperidol    Injectable 2.5 milliGRAM(s) IntraMuscular every 6 hours PRN  HYDROmorphone  Injectable 0.5 milliGRAM(s) IV Push every 6 hours PRN  hydrOXYzine hydrochloride 25 milliGRAM(s) Oral every 6 hours PRN  magnesium hydroxide Suspension 30 milliLiter(s) Oral daily PRN  ondansetron Injectable 4 milliGRAM(s) IV Push every 6 hours PRN  oxyCODONE    IR 10 milliGRAM(s) Oral every 6 hours PRN  simethicone 80 milliGRAM(s) Chew daily PRN  traZODone 50 milliGRAM(s) Oral at bedtime PRN    VITALS:   T(F): 98.2  HR: 102  BP: 128/69  RR: 18  SpO2: 96%      PHYSICAL EXAM:  GENERAL: NAD, back pain  EYES: EOMI, PERRLA, conjunctiva and sclera clear  ENT: Bleeding from tongue improved from before  CHEST/LUNG: Reduced breath sounds, normal effort, however increasingly dyspneic later in the AM and desatting  HEART: Regular rate and rhythm; No murmurs, rubs, or gallops  ABDOMEN: BSx4; Soft, nontender, nondistended  EXTREMITIES:  2+ Peripheral Pulses, brisk capillary refill. No clubbing, cyanosis, or edema  NERVOUS SYSTEM:  A&Ox3, no focal deficits   SKIN: No rashes or lesions      LABS:                        9.4    13.89 )-----------( 691      ( 06 Feb 2025 08:51 )             28.8     02-06    136  |  91[L]  |  78[HH]  ----------------------------<  310[H]  5.1[H]   |  29  |  2.2[H]    Ca    8.7      06 Feb 2025 08:51  Phos  4.2     02-05  Mg     2.2     02-06    TPro  5.3[L]  /  Alb  2.9[L]  /  TBili  0.4  /  DBili  x   /  AST  46[H]  /  ALT  27  /  AlkPhos  345[H]  02-06    PTT - ( 05 Feb 2025 04:45 )  PTT:143.6 sec  Urinalysis Basic - ( 06 Feb 2025 08:51 )    Color: x / Appearance: x / SG: x / pH: x  Gluc: 310 mg/dL / Ketone: x  / Bili: x / Urobili: x   Blood: x / Protein: x / Nitrite: x   Leuk Esterase: x / RBC: x / WBC x   Sq Epi: x / Non Sq Epi: x / Bacteria: x

## 2025-02-06 NOTE — CHART NOTE - NSCHARTNOTEFT_GEN_A_CORE
Transfer Note    Transfer from 3B to SDU    HPI: HPI:69-year-old man with extensive medical history including CAD (status post CABG;), DM (on insulin pump), atrial fibrillation on Xarelto, history of distal pancreatomy and splenectomy for unclear reason referred to the ED by his oncologist for abnormal labs. Patient was recently diagnosed with Diffuse large B cell Lymphoma on pathology of right groin mass and is following with [facility] oncology team. Patient was discharged on 01.17.2025 from hospital after inpatient chemotherapy (Completed cycle 1 of R-EPOCH (D1 on 1/11/2024). Tolerated well. Rituximab was given on D5).   Patient started to get a productive cough with yellow sputum production. He was admitted to SDU for  Acute Hypoxic Respiratory Failure 2/2 Multifocal Pneumonia, Volume Overload, Neutropenic fever, Influenza A infection, decompensated and was upgraded to ICU on 1/27,  started on BIPAP and weaned to HFNC. Received tamiflu course + course of azithromycin+cefepime. Currently being monitored off Abx by ID.    Pt clinically improved and was  downgrade back  to SDU. Here he was weaned off HFNC onto NC. ENT was consulted for black lesions on the base of tongue. They rec CT neck with IV contrast to investigate further. Follow up CT w/ IV contrast read, heme onc for any further management of large B celllymphoma with concern for rodriguez trnasformation and if ok to transition from heparin to xarelto or eliquis regarding his a-fib.    3B Course: During time in 3B, patient began to desaturate and required increased ventilation to Bipap. ABG was collected showing metabolic alkalosis with pCO2 52, pO2 73, HCO3 39, FiO2 30. Also endorsed subjective chills. Lactate was normal. Blood cultures, procal, legionella ordered. Patient was initially being diuresed on Bumex 20mg BID, now dc'd. CXR performed showed some mild congestive opacities improved from before. Fungitell ordered. CT chest noncon ordered. Patient was restarted on IV abx, cefepime 2g BID and vancomycin. Patient will be upgraded to SDU for acute respiratory failure 2/2 suspected multifocal pneumonia.     Follow-up:  - CT Chest noncon  - Cultures, legionella, procal, fungitell        Plan:  # Acute Hypoxic Respiratory Failure 2/2 Multifocal Pneumonia/ Volume Overload/ Acute on chronic diastolic CHF/  Influenza A infection/ RASHAD/ OHS   - clinically improving, on high low oxygen for now, will taper off as tolerated   - NIV PRN and QHS   - nebs Q 6 hours, supportive care, aspiration precautions   - CTA 1/20: Negative for pulmonary emboli. Interval development of infiltrates within the lower lobes and lingula which may reflect acute multilobar pneumonia  - blood cx NGTD x2, UA negative, influenza A + , Nasal MRSA negative   - pt was consulted by ID, treated with IV Abx, completed the course of Tamiflu   - will monitor off Abx for now   - TTE 1/10/25: poor study, EF 60-65%, pt has moderated TR  - fluid restriction 1200 ml in 24 hours , intake and output monitoring, daily weight   - Desaturated again this AM on NC, and Bipap was started  - Dc'd bumex  - F/u CT Chest noncon  - F/u cultures, legionella, procal, fungitell  - STarted IV cefepime 2g BID and vancomycin    # Large B-cell Lymphoma/ Concern for Rodriguez's transformation  - Heme/Onc follow up to comment on plan of care   - s/p PICC placement on 1/10/25  - s/p Bone marrow biopsy on 1/10/25  - Completed cycle 1 of R-EPOCH (D1 on 1/11/2024). Tolerated well. Rituximab was given on D5   - Uric acid 10.3 on 1/16/25, s/p 3 mg IV rasburicase  - sp Zarxio  - daily CBC with diff, active T&S  - c/w Allopurinol   - No onc plans to start chemo/treatment inpatient, can dc when medically appropriate with follow-up with Dr. Hilliard who will schedule cycle 2 of treatment.     # KRISTINE on CKD 3B / Chronic Lower Extremity Edema   - baseline ~high 1s, low 2s  - c/w diuretics (bumex 2mg IV BID increased to 2mg IV TID)  -nephrology is following, recommendations noted  - monitor BUN/cr and urine output  - ajay d/c'd  - avoid nephrotoxic medications     # CAD s/p CABG/ Chronic Afib on Xarelto/ HTN  - c/w statin, BB  - Holding ASA  - Still holding lisinopril and aldactone from last admission  - holding Xarelto, started ppx heparin subq, increased to q8h     #HLD   - on Atorvastatin now   - resume Repatha on discharge    # DM  - CC diet   - FS - self monitored via dex com  - On insulin pump @2.85 at home   - Endo consult: keep off insulin pump  - lantus 10 lispro 3    # Oral pain due to ulcer with black discoloration   - c/w local couth care  - consult ENT to examine black discoloration -> rec CT neck w/ IV contrast, magic mouthwash  - CT neck w/IV contrast no tongue mass.   - oncology thinking this is mucositis   - Monitor bleed, keep active TS and trend CBC.   - Will c/w magic mouthwash.   - Bleeding is resolving    # Agitation/ confusion Insomnia   - pt is calm now   - on Haldol PRN , Sertraline and melatonin   - supportive care     # GI  - on teofilo regimen and PPIs.    DVT: heparin subq  Diet: Regular, fluid rstriction 1200cc  Activity: AAT  GI: PPI  Code: Full

## 2025-02-06 NOTE — CONSULT NOTE ADULT - ASSESSMENT
IMPRESSION:    Acute hypoxemic respiratory failure  Possible PNA  volume overload  HFpEF  Large B cell lymphoma  Afib on Xarelto  Hx of HTN and DM      PLAN:    CNS: Pain control, avoid depressants     HEENT: Oral care    PULMONARY:  HOB @ 45 degrees.  Aspiration precautions.  on HFNC, BIPAP PRN, CT chest NC    CARDIOVASCULAR:  c/w bumex bid, hold metolazone given metabolic alkalosis, rate controlled, heparin ggt     GI: GI prophylaxis protonix, feeding when off bipap, bowel regimen prn    RENAL:  Follow up lytes.  Correct as needed.  strict i/os    INFECTIOUS DISEASE:  f/u cultures, cefepime, MRSA negative, dc vanc    HEMATOLOGICAL:  heparin ggt, repeat US duplex LE, monitor CBC and PTT    ENDOCRINE:  Follow up FS.  Insulin protocol if needed.    MUSCULOSKELETAL: bedrest for now    Dispo: upgrade to SDU   IMPRESSION:    Acute hypoxemic respiratory failure  Possible PNA - suspect retrocardiac   Volume overload - better now   HFpEF  Large B cell lymphoma  Afib on Xarelto - on IV heparin now   Hx of HTN and DM      PLAN:    CNS: Pain control, avoid depressants. MS remains adequate.     HEENT: Oral care    PULMONARY:  HOB @ 45 degrees.  Aspiration precautions.  On HFNC 50/50, BIPAP PRN, CT chest NC pending. CXR with possible retrocardiac infiltrate. Wean off Fio2 based on O2 saturation.h     CARDIOVASCULAR:  Lower bumex to bid, hold metolazone given metabolic alkalosis, rate controlled, heparin ggt; adjust to therapeutic.     GI: GI prophylaxis protonix, speech and swallow; rule out aspiration.     RENAL:  Follow up lytes.  Correct as needed.  BUN creat not far from baseline. Metabolic alkalosis noted; likely from over diuresis.     INFECTIOUS DISEASE:  Send pan culture; procal. Zosyn for now; Adjust to kidney function. Repeat MRSA nasal.     HEMATOLOGICAL:  heparin ggt; adjust to therapeutic PTT, repeat US duplex LE, monitor CBC and PTT.     ENDOCRINE:  Follow up FS.  Insulin protocol if needed.    MUSCULOSKELETAL: Out of bed to chair.     Dispo: upgrade to SDU; Prognosis is guarded.

## 2025-02-06 NOTE — PROGRESS NOTE ADULT - ASSESSMENT
# Acute Hypoxic Respiratory Failure 2/2 Multifocal Pneumonia/ Volume Overload/ Acute on chronic diastolic CHF/  Influenza A infection/ RASHAD/ OHS   - clinically improving, on high low oxygen for now, will taper off as tolerated   - NIV PRN and QHS   - nebs Q 6 hours, supportive care, aspiration precautions   - CTA 1/20: Negative for pulmonary emboli. 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 Tolerated well. Rituximab was given on D5   - Uric acid 10.3 on 1/16/25, s/p 3 mg IV rasburicase  - sp Zarxio  - daily CBC with diff, active T&S  - c/w Allopurinol   - No onc plans to start chemo/treatment inpatient, can dc when medically appropriate with follow-up with Dr. Hilliard who will schedule cycle 2 of treatment.     # KRISTINE on CKD 3B / Chronic Lower Extremity Edema   - baseline ~high 1s, low 2s  - c/w diuretics   -nephrology is following, recommendations noted  - monitor BUN/cr and urine output  - ajay d/c'd  - avoid nephrotoxic medications     # CAD s/p CABG/ Chronic Afib on Xarelto/ HTN  - c/w statin, BB  - Hold ASA  - Still holding lisinopril and aldactone from last admission  - holding Xarelto, started ppx heparin subq     #HLD   - on Atorvastatin now   - resume Repatha on discharge    # DM  - CC diet   - FS - self monitored via dex com  - On insulin pump @2.85 at home   - Endo consult: keep off insulin pump  - lantus 10 lispro 3    # Oral pain due to ulcer with black discoloration   - c/w local couth care  - consult ENT to examine black discoloration -> rec CT neck w/ IV contrast, magic mouthwash  - CT neck w/IV contrast no tongue mass.   - oncology thinking this is mucositis   - Monitor bleed, keep active TS and trend CBC  - Will c/w magic mouthwash.     # Agitation/ confusion Insomnia   - pt is calm now   - on Haldol PRN , Sertraline and melatonin   - supportive care     # GI  - on teofilo regimen and PPIs.    DVT: heparin subq  Diet: Regular, fluid rstriction 1200cc  Activity: AAT  GI: PPI  Code: Full    To-do: Outpatient onc f/u with Dr. Hilliard, monitor bleeding on heparin subq, PT, CBC # Acute Hypoxic Respiratory Failure 2/2 Multifocal Pneumonia/ Volume Overload/ Acute on chronic diastolic CHF/  Influenza A infection/ RASHAD/ OHS   - clinically improving, on high low oxygen for now, will taper off as tolerated   - NIV PRN and QHS   - nebs Q 6 hours, supportive care, aspiration precautions   - CTA 1/20: Negative for pulmonary emboli. 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 Tolerated well. Rituximab was given on D5   - Uric acid 10.3 on 1/16/25, s/p 3 mg IV rasburicase  - sp Zarxio  - daily CBC with diff, active T&S  - c/w Allopurinol   - No onc plans to start chemo/treatment inpatient, can dc when medically appropriate with follow-up with Dr. Hilliard who will schedule cycle 2 of treatment.     # KRISTINE on CKD 3B / Chronic Lower Extremity Edema   - baseline ~high 1s, low 2s  - c/w diuretics (bumex 2mg IV BID)  -nephrology is following, recommendations noted  - monitor BUN/cr and urine output  - ajay d/c'd  - avoid nephrotoxic medications     # CAD s/p CABG/ Chronic Afib on Xarelto/ HTN  - c/w statin, BB  - Holding ASA  - Still holding lisinopril and aldactone from last admission  - holding Xarelto, started ppx heparin subq, increased to q8h     #HLD   - on Atorvastatin now   - resume Repatha on discharge    # DM  - CC diet   - FS - self monitored via dex com  - On insulin pump @2.85 at home   - Endo consult: keep off insulin pump  - lantus 10 lispro 3    # Oral pain due to ulcer with black discoloration   - c/w local couth care  - consult ENT to examine black discoloration -> rec CT neck w/ IV contrast, magic mouthwash  - CT neck w/IV contrast no tongue mass.   - oncology thinking this is mucositis   - Monitor bleed, keep active TS and trend CBC.   - Will c/w magic mouthwash.   - Bleeding is resolving    # Agitation/ confusion Insomnia   - pt is calm now   - on Haldol PRN , Sertraline and melatonin   - supportive care     # GI  - on teofilo regimen and PPIs.    DVT: heparin subq  Diet: Regular, fluid rstriction 1200cc  Activity: AAT  GI: PPI  Code: Full    To-do: Outpatient onc f/u with Dr. Hilliard, monitor bleeding on heparin subq, 4PM BMP on bumex, anticipate for tmmrw # Acute Hypoxic Respiratory Failure 2/2 Multifocal Pneumonia/ Volume Overload/ Acute on chronic diastolic CHF/  Influenza A infection/ RASHAD/ OHS   - clinically improving, on high low oxygen for now, will taper off as tolerated   - NIV PRN and QHS   - nebs Q 6 hours, supportive care, aspiration precautions   - CTA 1/20: Negative for pulmonary emboli. Interval development of infiltrates within the lower lobes and lingula which may reflect acute multilobar pneumonia  - blood cx NGTD x2, UA negative, influenza A + , Nasal MRSA negative   - pt was consulted by ID, treated with IV Abx, completed the course of Tamiflu   - will monitor off Abx for now   - TTE 1/10/25: poor study, EF 60-65%, pt has moderated TR  - fluid restriction 1200 ml in 24 hours , intake and output monitoring, daily weight   - Desaturated again this AM on NC, and Bipap was started  - Increased diuresis to bumex 20mg TID and stat metolazone 2.5mg given.   - Stat CXR ordered, f/u    # Large B-cell Lymphoma/ Concern for Rodriguez's transformation  - Heme/Onc follow up to comment on plan of care   - s/p PICC placement on 1/10/25  - s/p Bone marrow biopsy on 1/10/25  - Completed cycle 1 of R-EPOCH (D1 on 1/11/2024). Tolerated well. Rituximab was given on D5   - Uric acid 10.3 on 1/16/25, s/p 3 mg IV rasburicase  - sp Zarxio  - daily CBC with diff, active T&S  - c/w Allopurinol   - No onc plans to start chemo/treatment inpatient, can dc when medically appropriate with follow-up with Dr. Hilliard who will schedule cycle 2 of treatment.     # KRISTINE on CKD 3B / Chronic Lower Extremity Edema   - baseline ~high 1s, low 2s  - c/w diuretics (bumex 2mg IV BID increased to 2mg IV TID)  -nephrology is following, recommendations noted  - monitor BUN/cr and urine output  - ajay d/c'd  - avoid nephrotoxic medications     # CAD s/p CABG/ Chronic Afib on Xarelto/ HTN  - c/w statin, BB  - Holding ASA  - Still holding lisinopril and aldactone from last admission  - holding Xarelto, started ppx heparin subq, increased to q8h     #HLD   - on Atorvastatin now   - resume Repatha on discharge    # DM  - CC diet   - FS - self monitored via dex com  - On insulin pump @2.85 at home   - Endo consult: keep off insulin pump  - lantus 10 lispro 3    # Oral pain due to ulcer with black discoloration   - c/w local couth care  - consult ENT to examine black discoloration -> rec CT neck w/ IV contrast, magic mouthwash  - CT neck w/IV contrast no tongue mass.   - oncology thinking this is mucositis   - Monitor bleed, keep active TS and trend CBC.   - Will c/w magic mouthwash.   - Bleeding is resolving    # Agitation/ confusion Insomnia   - pt is calm now   - on Haldol PRN , Sertraline and melatonin   - supportive care     # GI  - on teofilo regimen and PPIs.    DVT: heparin subq  Diet: Regular, fluid rstriction 1200cc  Activity: AAT  GI: PPI  Code: Full    To-do: CXR, 4PM BMP on increased bumex TID, outpatient onc f/u with Dr. Hilliard, monitor bleeding on heparin subq.

## 2025-02-06 NOTE — CONSULT NOTE ADULT - SUBJECTIVE AND OBJECTIVE BOX
Patient is a 70y old  Male who presents with a chief complaint of sob (27 Jan 2025 09:39)      HPI:  69-year-old man with extensive medical history including CAD (status post CABG;), DM (on insulin pump), atrial fibrillation on Xarelto, history of distal pancreatomy and splenectomy for unclear reason referred to the ED by his oncologist for abnormal labs. Patient was recently diagnosed with Diffuse large B cell Lymphoma on pathology of right groin mass and is following with [facility] oncology team. Patient was discharged on 01.17.2025 from hospital after inpatient chemotherapy (Completed cycle 1 of R-EPOCH (D1 on 1/11/2024). Tolerated well. Rituximab was given on D5).   Over past 3 days patient started to get a productive cough with yellow sputum production.  Patient is also endorsing chills and nausea which he states is baseline. He had 1 reading on low grade fever 100.5 on day of presentation.    On presentation vitals:  · BP Systolic	160 mm Hg  · BP Diastolic	86 mm Hg  · Heart Rate	89 /min  · Respiration Rate (breaths/min)	20 /min  · Temp (F)	98.9 Degrees F  · Temp (C)	37.2 Degrees C  · Temp site	oral    ED course:  O2 sats low on 4L O2 so he was promptly started on BIPAP. CXR with LLL infiltrate; sepsis labs sent with gentle hydration; abx given, Labs with neutropenia and elevated BNP, trop 68. EKG nonischemic.    CTA Chest: Negative for pulmonary emboli. Interval development of infiltrates within the lower lobes and lingula which may reflect acute multilobar pneumonia.    Patient admitted to medicine for acute hypoxic respiratory failure.   (20 Jan 2025 23:47)      PAST MEDICAL & SURGICAL HISTORY:  Diabetes mellitus, type 2      BPH (benign prostatic hyperplasia)      Water retention      Essential hypertension      Diabetes      CAD (coronary artery disease)      H/O hypercholesterolemia      Morbid obesity      Chronic kidney disease (CKD)      History of atrial fibrillation      H/O right coronary artery stent placement      History of resection of pancreas      History of cholecystectomy      History of left knee replacement      S/P CABG x 6      H/O cardiac radiofrequency ablation          FAMILY HISTORY:  Family history of lung cancer (Sibling)    Family history of diabetes mellitus (Mother)    Family history of coronary artery disease (Father)        Review of systems: Negative except as mentioned in HPI    Allergies    No Known Allergies    Intolerances        albuterol/ipratropium for Nebulization 3 milliLiter(s) Nebulizer every 4 hours PRN  allopurinol 150 milliGRAM(s) Oral daily  ascorbic acid 500 milliGRAM(s) Oral daily  atorvastatin 10 milliGRAM(s) Oral at bedtime  benzocaine 20% Spray 1 Spray(s) Topical three times a day  bisacodyl 5 milliGRAM(s) Oral daily PRN  calcium carbonate    500 mG (Tums) Chewable 1 Tablet(s) Chew three times a day PRN  cefepime   IVPB      chlorhexidine 2% Cloths 1 Application(s) Topical daily  dextrose 5%. 1000 milliLiter(s) IV Continuous <Continuous>  dextrose 5%. 1000 milliLiter(s) IV Continuous <Continuous>  dextrose 50% Injectable 25 Gram(s) IV Push once  dextrose 50% Injectable 12.5 Gram(s) IV Push once  dextrose 50% Injectable 25 Gram(s) IV Push once  dextrose Oral Gel 15 Gram(s) Oral once PRN  FIRST- Mouthwash  BLM 10 milliLiter(s) Swish and Spit every 4 hours  glucagon  Injectable 1 milliGRAM(s) IntraMuscular once  heparin  Infusion.  Unit(s)/Hr IV Continuous <Continuous>  HYDROmorphone  Injectable 0.5 milliGRAM(s) IV Push every 6 hours PRN  hydrOXYzine hydrochloride 25 milliGRAM(s) Oral every 6 hours PRN  influenza  Vaccine (HIGH DOSE) 0.5 milliLiter(s) IntraMuscular once  insulin glargine Injectable (LANTUS) 25 Unit(s) SubCutaneous at bedtime  insulin lispro (ADMELOG) corrective regimen sliding scale   SubCutaneous three times a day before meals  insulin lispro Injectable (ADMELOG) 8 Unit(s) SubCutaneous three times a day before meals  magnesium hydroxide Suspension 30 milliLiter(s) Oral daily PRN  melatonin 10 milliGRAM(s) Oral at bedtime  metolazone 2.5 milliGRAM(s) Oral daily  metoprolol tartrate 25 milliGRAM(s) Oral two times a day  NIFEdipine XL 60 milliGRAM(s) Oral daily  nystatin Cream 1 Application(s) Topical every 12 hours  ondansetron Injectable 4 milliGRAM(s) IV Push every 6 hours PRN  oxyCODONE    IR 10 milliGRAM(s) Oral every 6 hours PRN  pantoprazole  Injectable 40 milliGRAM(s) IV Push daily  polyethylene glycol 3350 17 Gram(s) Oral every 12 hours  senna 2 Tablet(s) Oral every 12 hours  sertraline 50 milliGRAM(s) Oral daily  simethicone 80 milliGRAM(s) Chew daily PRN  tamsulosin 0.4 milliGRAM(s) Oral at bedtime  vancomycin  IVPB 2000 milliGRAM(s) IV Intermittent every 24 hours  : Home Meds:      PHYSICAL EXAM    ICU Vital Signs Last 24 Hrs  T(C): 36.8 (06 Feb 2025 13:28), Max: 36.8 (06 Feb 2025 05:23)  T(F): 98.2 (06 Feb 2025 13:28), Max: 98.2 (06 Feb 2025 05:23)  HR: 99 (06 Feb 2025 13:28) (84 - 102)  BP: 173/73 (06 Feb 2025 13:28) (128/69 - 173/73)  BP(mean): --  ABP: --  ABP(mean): --  RR: 18 (06 Feb 2025 13:28) (18 - 20)  SpO2: 94% (06 Feb 2025 13:28) (88% - 100%)    O2 Parameters below as of 06 Feb 2025 10:30  Patient On (Oxygen Delivery Method): venti mask as per MD orders at bedside.             General: tachypneic   HEENT:  LEXI              Lymph Nodes: No cervical LN   Lungs: Bilateral BS, left basal rales   Cardiovascular: Regular  Abdomen: Soft, Positive BS  Extremities: No clubbing  Skin: Warm  Neurological: Non focal       02-05-25 @ 07:01  -  02-06-25 @ 07:00  --------------------------------------------------------  IN:  Total IN: 0 mL    OUT:    Voided (mL): 1025 mL  Total OUT: 1025 mL    Total NET: -1025 mL          LABS:                          9.4    13.89 )-----------( 691      ( 06 Feb 2025 08:51 )             28.8                                               02-06    136  |  91[L]  |  78[HH]  ----------------------------<  310[H]  5.1[H]   |  29  |  2.2[H]    Ca    8.7      06 Feb 2025 08:51  Phos  4.2     02-05  Mg     2.2     02-06    TPro  5.3[L]  /  Alb  2.9[L]  /  TBili  0.4  /  DBili  x   /  AST  46[H]  /  ALT  27  /  AlkPhos  345[H]  02-06      PTT - ( 06 Feb 2025 13:28 )  PTT:37.8 sec                                       Urinalysis Basic - ( 06 Feb 2025 08:51 )    Color: x / Appearance: x / SG: x / pH: x  Gluc: 310 mg/dL / Ketone: x  / Bili: x / Urobili: x   Blood: x / Protein: x / Nitrite: x   Leuk Esterase: x / RBC: x / WBC x   Sq Epi: x / Non Sq Epi: x / Bacteria: x                                                  LIVER FUNCTIONS - ( 06 Feb 2025 08:51 )  Alb: 2.9 g/dL / Pro: 5.3 g/dL / ALK PHOS: 345 U/L / ALT: 27 U/L / AST: 46 U/L / GGT: x                                                                                                                                   ABG - ( 06 Feb 2025 12:35 )  pH, Arterial: 7.48  pH, Blood: x     /  pCO2: 52    /  pO2: 73    / HCO3: 39    / Base Excess: 13.0  /  SaO2: 96.7            MEDICATIONS  (STANDING):  allopurinol 150 milliGRAM(s) Oral daily  ascorbic acid 500 milliGRAM(s) Oral daily  atorvastatin 10 milliGRAM(s) Oral at bedtime  benzocaine 20% Spray 1 Spray(s) Topical three times a day  cefepime   IVPB      chlorhexidine 2% Cloths 1 Application(s) Topical daily  dextrose 5%. 1000 milliLiter(s) (100 mL/Hr) IV Continuous <Continuous>  dextrose 5%. 1000 milliLiter(s) (50 mL/Hr) IV Continuous <Continuous>  dextrose 50% Injectable 25 Gram(s) IV Push once  dextrose 50% Injectable 12.5 Gram(s) IV Push once  dextrose 50% Injectable 25 Gram(s) IV Push once  FIRST- Mouthwash  BLM 10 milliLiter(s) Swish and Spit every 4 hours  glucagon  Injectable 1 milliGRAM(s) IntraMuscular once  heparin  Infusion.  Unit(s)/Hr (10 mL/Hr) IV Continuous <Continuous>  influenza  Vaccine (HIGH DOSE) 0.5 milliLiter(s) IntraMuscular once  insulin glargine Injectable (LANTUS) 25 Unit(s) SubCutaneous at bedtime  insulin lispro (ADMELOG) corrective regimen sliding scale   SubCutaneous three times a day before meals  insulin lispro Injectable (ADMELOG) 8 Unit(s) SubCutaneous three times a day before meals  melatonin 10 milliGRAM(s) Oral at bedtime  metolazone 2.5 milliGRAM(s) Oral daily  metoprolol tartrate 25 milliGRAM(s) Oral two times a day  NIFEdipine XL 60 milliGRAM(s) Oral daily  nystatin Cream 1 Application(s) Topical every 12 hours  pantoprazole  Injectable 40 milliGRAM(s) IV Push daily  polyethylene glycol 3350 17 Gram(s) Oral every 12 hours  senna 2 Tablet(s) Oral every 12 hours  sertraline 50 milliGRAM(s) Oral daily  tamsulosin 0.4 milliGRAM(s) Oral at bedtime  vancomycin  IVPB 2000 milliGRAM(s) IV Intermittent every 24 hours    MEDICATIONS  (PRN):  albuterol/ipratropium for Nebulization 3 milliLiter(s) Nebulizer every 4 hours PRN Shortness of Breath and/or Wheezing  bisacodyl 5 milliGRAM(s) Oral daily PRN Constipation  calcium carbonate    500 mG (Tums) Chewable 1 Tablet(s) Chew three times a day PRN Dyspepsia  dextrose Oral Gel 15 Gram(s) Oral once PRN Blood Glucose LESS THAN 70 milliGRAM(s)/deciliter  HYDROmorphone  Injectable 0.5 milliGRAM(s) IV Push every 6 hours PRN Severe Pain (7 - 10)  hydrOXYzine hydrochloride 25 milliGRAM(s) Oral every 6 hours PRN Anxiety  magnesium hydroxide Suspension 30 milliLiter(s) Oral daily PRN Constipation  ondansetron Injectable 4 milliGRAM(s) IV Push every 6 hours PRN Nausea and/or Vomiting  oxyCODONE    IR 10 milliGRAM(s) Oral every 6 hours PRN Moderate Pain (4 - 6)  simethicone 80 milliGRAM(s) Chew daily PRN Gas

## 2025-02-06 NOTE — PROGRESS NOTE ADULT - ATTENDING COMMENTS
Patient desaturating on nasal cannula. Placed on Venti mask 50% then transitioned to NIV.       # Acute Hypoxic Respiratory Failure 2/2 Multifocal Pneumonia and Acute on chronic diastolic CHF  # Influenza A infection  # RASHAD/ [facility]   - CTA 1/20: Negative for pulmonary emboli. Interval development of infiltrates within the lower lobes and lingula which may reflect acute multilobar pneumonia  - blood cx NGTD x2, UA negative, influenza A + , Nasal MRSA negative   - TTE 1/10/25: poor study, EF 60-65%, moderate TR  - s/p 10 day course of cefepime and IV diuresis then transitioned to oral Bumex   - CXR 2/5: worsening opacities   - 2/6 patient desaturating   Plan:   - c/w IV Bumex 2mg and c/w metolazone   - check CT Chest   - start vanc/cefepime for possible PNA   - NIV PRN and QHS   - Critical care eval   - nebs Q 6 hours, supportive care, aspiration precautions   - fluid restriction 1200 ml in 24 hours, intake and output monitoring, daily weight     # Oral bleeding due to mucositis - resolved   - c/w local oral care  - CT neck w/IV contrast no tongue mass.   - c/w magic mouthwash.     # Large B-cell Lymphoma/ Concern for Rodriguez's transformation  - Heme/Onc follow up to comment on plan of care   - s/p PICC placement on 1/10/25  - s/p Bone marrow biopsy on 1/10/25  - Completed cycle 1 of R-EPOCH (D1 on 1/11/2024). Tolerated well. Rituximab was given on D5   - Uric acid 10.3 on 1/16/25, s/p 3 mg IV rasburicase  - sp Zarxio  - daily CBC with diff, active T&S  - c/w Allopurinol     # KRISTINE on CKD 3B / Chronic Lower Extremity Edema   - baseline ~high 1s, low 2s  - avoid nephrotoxic medications     # CAD s/p CABG/ Chronic Afib on Xarelto/ HTN  - c/w statin, BB  - Hold ASA for oral bleeding   - Still holding lisinopril and aldactone from last admission  - holding Xarelto, c/w heparin gtt      #HLD   - on Atorvastatin now   - resume Repatha on discharge    # DM2   - Endo consult: keep off insulin pump  - Increase lantus 25 and increase lispro to 8 units TIDAC     DVT: heparin gtt     Prognosis guarded     Plan of care d/w daughter    Dispo: Home vs STR (family prefers home with home PT) Patient desaturating on nasal cannula. Placed on Venti mask 50% then transitioned to NIV.       # Acute Hypoxic Respiratory Failure 2/2 Multifocal Pneumonia and Acute on chronic diastolic CHF  # Influenza A infection  # RASHAD/ [facility]   - CTA 1/20: Negative for pulmonary emboli. Interval development of infiltrates within the lower lobes and lingula which may reflect acute multilobar pneumonia  - blood cx NGTD x2, UA negative, influenza A + , Nasal MRSA negative   - TTE 1/10/25: poor study, EF 60-65%, moderate TR  - s/p 10 day course of cefepime and IV diuresis then transitioned to oral Bumex   - CXR 2/5: worsening opacities - started on IV Bumex   - 2/6 patient desaturating   Plan:   - check CT Chest   - start vanc/cefepime for possible PNA   - NIV PRN and QHS   - Critical care eval   - nebs Q 6 hours, supportive care, aspiration precautions   - fluid restriction 1200 ml in 24 hours, intake and output monitoring, daily weight   - c/w oral Bumex and metolazone     # Oral bleeding due to mucositis - resolved   - c/w local oral care  - CT neck w/IV contrast no tongue mass.   - c/w magic mouthwash.     # Large B-cell Lymphoma/ Concern for Rodriguez's transformation  - Heme/Onc follow up to comment on plan of care   - s/p PICC placement on 1/10/25  - s/p Bone marrow biopsy on 1/10/25  - Completed cycle 1 of R-EPOCH (D1 on 1/11/2024). Tolerated well. Rituximab was given on D5   - Uric acid 10.3 on 1/16/25, s/p 3 mg IV rasburicase  - sp Zarxio  - daily CBC with diff, active T&S  - c/w Allopurinol     # KRISTINE on CKD 3B / Chronic Lower Extremity Edema   - baseline ~high 1s, low 2s  - avoid nephrotoxic medications     # CAD s/p CABG/ Chronic Afib on Xarelto/ HTN  - c/w statin, BB  - Hold ASA for oral bleeding   - Still holding lisinopril and aldactone from last admission  - holding Xarelto, c/w heparin gtt      #HLD   - on Atorvastatin now   - resume Repatha on discharge    # DM2   - Endo consult: keep off insulin pump  - Increase lantus 25 and increase lispro to 8 units TIDAC     DVT: heparin gtt     Prognosis guarded     Plan of care d/w daughter and intensivist   Dispo: Home vs STR (family prefers home with home PT)

## 2025-02-06 NOTE — SWALLOW BEDSIDE ASSESSMENT ADULT - SLP GENERAL OBSERVATIONS
pt received in bed asleep arousable +generalized weakness +HFNC 50L/min, 50% O2 +gentle oral care provided. Daughter Mery at bedside.

## 2025-02-06 NOTE — CONSULT NOTE ADULT - ATTENDING COMMENTS
IMPRESSION:    Acute hypoxemic respiratory failure  Possible PNA - suspect retrocardiac   Volume overload - better now   HFpEF  Large B cell lymphoma  Afib on Xarelto - on IV heparin now   Hx of HTN and DM    Impression and plan above have been altered and are my own.

## 2025-02-07 LAB
ALBUMIN SERPL ELPH-MCNC: 3 G/DL — LOW (ref 3.5–5.2)
ALP SERPL-CCNC: 317 U/L — HIGH (ref 30–115)
ALT FLD-CCNC: 27 U/L — SIGNIFICANT CHANGE UP (ref 0–41)
APTT BLD: 92.8 SEC — CRITICAL HIGH (ref 27–39.2)
AST SERPL-CCNC: 63 U/L — HIGH (ref 0–41)
BASOPHILS # BLD AUTO: 0.22 K/UL — HIGH (ref 0–0.2)
BASOPHILS NFR BLD AUTO: 1.3 % — HIGH (ref 0–1)
BILIRUB SERPL-MCNC: 0.4 MG/DL — SIGNIFICANT CHANGE UP (ref 0.2–1.2)
BLD GP AB SCN SERPL QL: SIGNIFICANT CHANGE UP
BUN SERPL-MCNC: 91 MG/DL — CRITICAL HIGH (ref 10–20)
CALCIUM SERPL-MCNC: 8.7 MG/DL — SIGNIFICANT CHANGE UP (ref 8.4–10.4)
CHLORIDE SERPL-SCNC: 93 MMOL/L — LOW (ref 98–110)
CO2 SERPL-SCNC: 27 MMOL/L — SIGNIFICANT CHANGE UP (ref 17–32)
CREAT SERPL-MCNC: 2.6 MG/DL — HIGH (ref 0.7–1.5)
EGFR: 26 ML/MIN/1.73M2 — LOW
EOSINOPHIL # BLD AUTO: 0.08 K/UL — SIGNIFICANT CHANGE UP (ref 0–0.7)
EOSINOPHIL NFR BLD AUTO: 0.5 % — SIGNIFICANT CHANGE UP (ref 0–8)
GLUCOSE BLDC GLUCOMTR-MCNC: 375 MG/DL — HIGH (ref 70–99)
GLUCOSE BLDC GLUCOMTR-MCNC: 381 MG/DL — HIGH (ref 70–99)
GLUCOSE BLDC GLUCOMTR-MCNC: 381 MG/DL — HIGH (ref 70–99)
GLUCOSE BLDC GLUCOMTR-MCNC: 402 MG/DL — HIGH (ref 70–99)
GLUCOSE BLDC GLUCOMTR-MCNC: 402 MG/DL — HIGH (ref 70–99)
GLUCOSE SERPL-MCNC: 325 MG/DL — HIGH (ref 70–99)
HCT VFR BLD CALC: 27.7 % — LOW (ref 42–52)
HGB BLD-MCNC: 8.9 G/DL — LOW (ref 14–18)
IMM GRANULOCYTES NFR BLD AUTO: 4.8 % — HIGH (ref 0.1–0.3)
LYMPHOCYTES # BLD AUTO: 0.87 K/UL — LOW (ref 1.2–3.4)
LYMPHOCYTES # BLD AUTO: 5.1 % — LOW (ref 20.5–51.1)
MAGNESIUM SERPL-MCNC: 2.3 MG/DL — SIGNIFICANT CHANGE UP (ref 1.8–2.4)
MCHC RBC-ENTMCNC: 29.9 PG — SIGNIFICANT CHANGE UP (ref 27–31)
MCHC RBC-ENTMCNC: 32.1 G/DL — SIGNIFICANT CHANGE UP (ref 32–37)
MCV RBC AUTO: 93 FL — SIGNIFICANT CHANGE UP (ref 80–94)
MONOCYTES # BLD AUTO: 4.45 K/UL — HIGH (ref 0.1–0.6)
MONOCYTES NFR BLD AUTO: 26.2 % — HIGH (ref 1.7–9.3)
NEUTROPHILS # BLD AUTO: 10.54 K/UL — HIGH (ref 1.4–6.5)
NEUTROPHILS NFR BLD AUTO: 62.1 % — SIGNIFICANT CHANGE UP (ref 42.2–75.2)
NRBC # BLD: 1 /100 WBCS — HIGH (ref 0–0)
NRBC BLD-RTO: 1 /100 WBCS — HIGH (ref 0–0)
PLATELET # BLD AUTO: 644 K/UL — HIGH (ref 130–400)
PMV BLD: 11.8 FL — HIGH (ref 7.4–10.4)
POTASSIUM SERPL-MCNC: 4.4 MMOL/L — SIGNIFICANT CHANGE UP (ref 3.5–5)
POTASSIUM SERPL-SCNC: 4.4 MMOL/L — SIGNIFICANT CHANGE UP (ref 3.5–5)
PROCALCITONIN SERPL-MCNC: 0.31 NG/ML — HIGH (ref 0.02–0.1)
PROT SERPL-MCNC: 5.4 G/DL — LOW (ref 6–8)
RBC # BLD: 2.98 M/UL — LOW (ref 4.7–6.1)
RBC # FLD: 15.9 % — HIGH (ref 11.5–14.5)
SODIUM SERPL-SCNC: 136 MMOL/L — SIGNIFICANT CHANGE UP (ref 135–146)
VANCOMYCIN FLD-MCNC: 16.9 UG/ML — HIGH (ref 5–10)
WBC # BLD: 16.98 K/UL — HIGH (ref 4.8–10.8)
WBC # FLD AUTO: 16.98 K/UL — HIGH (ref 4.8–10.8)

## 2025-02-07 PROCEDURE — 99291 CRITICAL CARE FIRST HOUR: CPT

## 2025-02-07 PROCEDURE — 99233 SBSQ HOSP IP/OBS HIGH 50: CPT

## 2025-02-07 RX ORDER — FLUTICASONE PROPIONATE 50 UG/1
1 SPRAY, METERED NASAL ONCE
Refills: 0 | Status: COMPLETED | OUTPATIENT
Start: 2025-02-07 | End: 2025-02-07

## 2025-02-07 RX ORDER — MAGNESIUM, ALUMINUM HYDROXIDE 200-200 MG
30 TABLET,CHEWABLE ORAL EVERY 4 HOURS
Refills: 0 | Status: DISCONTINUED | OUTPATIENT
Start: 2025-02-07 | End: 2025-02-17

## 2025-02-07 RX ORDER — VANCOMYCIN HCL IN 5 % DEXTROSE 1.5G/250ML
750 PLASTIC BAG, INJECTION (ML) INTRAVENOUS ONCE
Refills: 0 | Status: COMPLETED | OUTPATIENT
Start: 2025-02-07 | End: 2025-02-07

## 2025-02-07 RX ORDER — IPRATROPIUM BROMIDE AND ALBUTEROL SULFATE .5; 2.5 MG/3ML; MG/3ML
3 SOLUTION RESPIRATORY (INHALATION) EVERY 4 HOURS
Refills: 0 | Status: DISCONTINUED | OUTPATIENT
Start: 2025-02-07 | End: 2025-02-09

## 2025-02-07 RX ORDER — INSULIN LISPRO 100 U/ML
11 INJECTION, SOLUTION INTRAVENOUS; SUBCUTANEOUS
Refills: 0 | Status: DISCONTINUED | OUTPATIENT
Start: 2025-02-07 | End: 2025-02-08

## 2025-02-07 RX ORDER — MAGNESIUM HYDROXIDE 400 MG/5ML
30 SUSPENSION ORAL ONCE
Refills: 0 | Status: COMPLETED | OUTPATIENT
Start: 2025-02-07 | End: 2025-02-07

## 2025-02-07 RX ORDER — ACETAMINOPHEN 500 MG/5ML
975 LIQUID (ML) ORAL ONCE
Refills: 0 | Status: COMPLETED | OUTPATIENT
Start: 2025-02-07 | End: 2025-02-07

## 2025-02-07 RX ORDER — APIXABAN 2.5 MG/1
5 TABLET, FILM COATED ORAL EVERY 12 HOURS
Refills: 0 | Status: DISCONTINUED | OUTPATIENT
Start: 2025-02-07 | End: 2025-02-17

## 2025-02-07 RX ORDER — INSULIN GLARGINE-YFGN 100 [IU]/ML
31 INJECTION, SOLUTION SUBCUTANEOUS AT BEDTIME
Refills: 0 | Status: DISCONTINUED | OUTPATIENT
Start: 2025-02-07 | End: 2025-02-07

## 2025-02-07 RX ORDER — METHYLPREDNISOLONE ACETATE 80 MG/ML
40 INJECTION, SUSPENSION INTRA-ARTICULAR; INTRALESIONAL; INTRAMUSCULAR; SOFT TISSUE DAILY
Refills: 0 | Status: DISCONTINUED | OUTPATIENT
Start: 2025-02-07 | End: 2025-02-10

## 2025-02-07 RX ORDER — INSULIN GLARGINE-YFGN 100 [IU]/ML
30 INJECTION, SOLUTION SUBCUTANEOUS AT BEDTIME
Refills: 0 | Status: DISCONTINUED | OUTPATIENT
Start: 2025-02-07 | End: 2025-02-08

## 2025-02-07 RX ADMIN — Medication 5 MILLIGRAM(S): at 09:23

## 2025-02-07 RX ADMIN — INSULIN LISPRO 11 UNIT(S): 100 INJECTION, SOLUTION INTRAVENOUS; SUBCUTANEOUS at 17:28

## 2025-02-07 RX ADMIN — SERTRALINE 50 MILLIGRAM(S): 100 TABLET, FILM COATED ORAL at 12:50

## 2025-02-07 RX ADMIN — NYSTATIN 1 APPLICATION(S): 100000 CREAM TOPICAL at 05:32

## 2025-02-07 RX ADMIN — HYDROXYZINE HYDROCHLORIDE 25 MILLIGRAM(S): 25 TABLET, FILM COATED ORAL at 13:56

## 2025-02-07 RX ADMIN — Medication 150 MILLIGRAM(S): at 12:11

## 2025-02-07 RX ADMIN — INSULIN LISPRO 8 UNIT(S): 100 INJECTION, SOLUTION INTRAVENOUS; SUBCUTANEOUS at 07:58

## 2025-02-07 RX ADMIN — METHYLPREDNISOLONE ACETATE 40 MILLIGRAM(S): 80 INJECTION, SUSPENSION INTRA-ARTICULAR; INTRALESIONAL; INTRAMUSCULAR; SOFT TISSUE at 12:14

## 2025-02-07 RX ADMIN — METOPROLOL SUCCINATE 25 MILLIGRAM(S): 50 TABLET, EXTENDED RELEASE ORAL at 17:28

## 2025-02-07 RX ADMIN — Medication 250 MILLIGRAM(S): at 14:00

## 2025-02-07 RX ADMIN — METOPROLOL SUCCINATE 25 MILLIGRAM(S): 50 TABLET, EXTENDED RELEASE ORAL at 05:31

## 2025-02-07 RX ADMIN — CEFEPIME 100 MILLIGRAM(S): 2 INJECTION, POWDER, FOR SOLUTION INTRAVENOUS at 05:32

## 2025-02-07 RX ADMIN — TAMSULOSIN HYDROCHLORIDE 0.4 MILLIGRAM(S): 0.4 CAPSULE ORAL at 21:31

## 2025-02-07 RX ADMIN — DIPHENHYDRAMINE HYDROCHLORIDE AND LIDOCAINE HYDROCHLORIDE AND ALUMINUM HYDROXIDE AND MAGNESIUM HYDRO 10 MILLILITER(S): KIT at 17:30

## 2025-02-07 RX ADMIN — INSULIN LISPRO 12: 100 INJECTION, SOLUTION INTRAVENOUS; SUBCUTANEOUS at 17:29

## 2025-02-07 RX ADMIN — Medication 10 MILLIGRAM(S): at 21:30

## 2025-02-07 RX ADMIN — ATORVASTATIN CALCIUM 10 MILLIGRAM(S): 80 TABLET, FILM COATED ORAL at 21:31

## 2025-02-07 RX ADMIN — BENZOCAINE 1 SPRAY(S): 220 SPRAY, METERED PERIODONTAL at 05:30

## 2025-02-07 RX ADMIN — DIPHENHYDRAMINE HYDROCHLORIDE AND LIDOCAINE HYDROCHLORIDE AND ALUMINUM HYDROXIDE AND MAGNESIUM HYDRO 10 MILLILITER(S): KIT at 22:15

## 2025-02-07 RX ADMIN — POLYETHYLENE GLYCOL 3350 17 GRAM(S): 17 POWDER, FOR SOLUTION ORAL at 17:27

## 2025-02-07 RX ADMIN — INSULIN GLARGINE-YFGN 30 UNIT(S): 100 INJECTION, SOLUTION SUBCUTANEOUS at 22:35

## 2025-02-07 RX ADMIN — CEFEPIME 100 MILLIGRAM(S): 2 INJECTION, POWDER, FOR SOLUTION INTRAVENOUS at 17:29

## 2025-02-07 RX ADMIN — IPRATROPIUM BROMIDE AND ALBUTEROL SULFATE 3 MILLILITER(S): .5; 2.5 SOLUTION RESPIRATORY (INHALATION) at 20:02

## 2025-02-07 RX ADMIN — HYDROXYZINE HYDROCHLORIDE 25 MILLIGRAM(S): 25 TABLET, FILM COATED ORAL at 05:34

## 2025-02-07 RX ADMIN — APIXABAN 5 MILLIGRAM(S): 2.5 TABLET, FILM COATED ORAL at 21:30

## 2025-02-07 RX ADMIN — BUMETANIDE 2 MILLIGRAM(S): 1 TABLET ORAL at 05:30

## 2025-02-07 RX ADMIN — DIPHENHYDRAMINE HYDROCHLORIDE AND LIDOCAINE HYDROCHLORIDE AND ALUMINUM HYDROXIDE AND MAGNESIUM HYDRO 10 MILLILITER(S): KIT at 05:30

## 2025-02-07 RX ADMIN — Medication 60 MILLIGRAM(S): at 05:31

## 2025-02-07 RX ADMIN — INSULIN LISPRO 10: 100 INJECTION, SOLUTION INTRAVENOUS; SUBCUTANEOUS at 07:58

## 2025-02-07 RX ADMIN — IPRATROPIUM BROMIDE AND ALBUTEROL SULFATE 3 MILLILITER(S): .5; 2.5 SOLUTION RESPIRATORY (INHALATION) at 17:22

## 2025-02-07 RX ADMIN — POLYETHYLENE GLYCOL 3350 17 GRAM(S): 17 POWDER, FOR SOLUTION ORAL at 05:30

## 2025-02-07 RX ADMIN — INSULIN LISPRO 11 UNIT(S): 100 INJECTION, SOLUTION INTRAVENOUS; SUBCUTANEOUS at 12:45

## 2025-02-07 RX ADMIN — Medication 1 APPLICATION(S): at 12:50

## 2025-02-07 RX ADMIN — MAGNESIUM HYDROXIDE 30 MILLILITER(S): 400 SUSPENSION ORAL at 17:41

## 2025-02-07 RX ADMIN — NYSTATIN 1 APPLICATION(S): 100000 CREAM TOPICAL at 18:21

## 2025-02-07 RX ADMIN — Medication 500 MILLIGRAM(S): at 12:11

## 2025-02-07 RX ADMIN — HEPARIN SODIUM 0 UNIT(S)/HR: 1000 INJECTION INTRAVENOUS; SUBCUTANEOUS at 13:54

## 2025-02-07 RX ADMIN — Medication 0.5 MILLIGRAM(S): at 23:52

## 2025-02-07 RX ADMIN — Medication 2 TABLET(S): at 17:27

## 2025-02-07 RX ADMIN — Medication 80 MILLIGRAM(S): at 13:56

## 2025-02-07 RX ADMIN — DIPHENHYDRAMINE HYDROCHLORIDE AND LIDOCAINE HYDROCHLORIDE AND ALUMINUM HYDROXIDE AND MAGNESIUM HYDRO 10 MILLILITER(S): KIT at 03:03

## 2025-02-07 RX ADMIN — INSULIN LISPRO 10: 100 INJECTION, SOLUTION INTRAVENOUS; SUBCUTANEOUS at 12:45

## 2025-02-07 RX ADMIN — OXYCODONE HYDROCHLORIDE 10 MILLIGRAM(S): 30 TABLET ORAL at 00:57

## 2025-02-07 RX ADMIN — Medication 40 MILLIGRAM(S): at 12:11

## 2025-02-07 RX ADMIN — Medication 2 TABLET(S): at 05:30

## 2025-02-07 RX ADMIN — FLUTICASONE PROPIONATE 1 SPRAY(S): 50 SPRAY, METERED NASAL at 17:41

## 2025-02-07 RX ADMIN — DIPHENHYDRAMINE HYDROCHLORIDE AND LIDOCAINE HYDROCHLORIDE AND ALUMINUM HYDROXIDE AND MAGNESIUM HYDRO 10 MILLILITER(S): KIT at 12:10

## 2025-02-07 RX ADMIN — BENZOCAINE 1 SPRAY(S): 220 SPRAY, METERED PERIODONTAL at 21:30

## 2025-02-07 RX ADMIN — Medication 975 MILLIGRAM(S): at 17:28

## 2025-02-07 RX ADMIN — BENZOCAINE 1 SPRAY(S): 220 SPRAY, METERED PERIODONTAL at 13:57

## 2025-02-07 NOTE — PROGRESS NOTE ADULT - NS ATTEST RISK PROBLEM GEN_ALL_CORE FT
Acute worsening of hypoxemia  Possible GN pneumonia   High risk medications that require intesive monitoring: Vancomycin     I have personally seen and examined this patient.    I have reviewed all pertinent clinical information and reviewed all relevant imaging and diagnostic studies personally.   I counseled the patient about diagnostic testing and treatment plan. All questions were answered.   I discussed recommendations with the primary team.
dw cc, sdu, labs ordered, labs reviewed, HFNC, heparin ggt

## 2025-02-07 NOTE — PROGRESS NOTE ADULT - SUBJECTIVE AND OBJECTIVE BOX
RACHELLEMORGAN  70y, Male  Allergy: No Known Allergies      LOS  18d    CHIEF COMPLAINT: sob (27 Jan 2025 09:39)      INTERVAL EVENTS/HPI  - T(F): , Max: 99.5 (02-07-25 @ 08:00)  - reconsulted for antibiotics management   - Initially neutropenic with flu -- completed cefepime course from 1/21-1/30 along with tamiflu for possible super imposed pneumonia   - recently downgraded to floor requiring 3L Nasal canula, but now with worsenign hypoxemia  - currently on HFNC     - WBC Count: 16.98 (02-07-25 @ 04:20)  WBC Count: 13.89 (02-06-25 @ 08:51)     - Creatinine: 2.6 (02-07-25 @ 04:20)  Creatinine: 2.2 (02-06-25 @ 08:51)       ROS  General: Denies rigors, nightsweats  HEENT: Denies headache, rhinorrhea, sore throat, eye pain  CV: Denies CP, palpitations  PULM: Denies wheezing, hemoptysis  GI: Denies hematemesis, hematochezia, melena  : Denies discharge, hematuria  MSK: Denies arthralgias, myalgias  SKIN: Denies rash, lesions  NEURO: Denies paresthesias, weakness  PSYCH: Denies depression, anxiety    VITALS:  T(F): 99.5, Max: 99.5 (02-07-25 @ 08:00)  HR: 88  BP: 115/58  RR: 18Vital Signs Last 24 Hrs  T(C): 37.5 (07 Feb 2025 08:00), Max: 37.5 (07 Feb 2025 08:00)  T(F): 99.5 (07 Feb 2025 08:00), Max: 99.5 (07 Feb 2025 08:00)  HR: 88 (07 Feb 2025 08:00) (88 - 101)  BP: 115/58 (07 Feb 2025 08:00) (115/58 - 173/73)  BP(mean): 80 (07 Feb 2025 08:00) (80 - 89)  RR: 18 (06 Feb 2025 18:39) (18 - 20)  SpO2: 94% (07 Feb 2025 08:00) (88% - 100%)    Parameters below as of 07 Feb 2025 08:00  Patient On (Oxygen Delivery Method): nasal cannula, high flow  O2 Flow (L/min): 50  O2 Concentration (%): 50    PHYSICAL EXAM:  Gen: NAD, resting in bed  HEENT: Normocephalic, atraumatic  Neck: supple, no lymphadenopathy  CV: Regular rate & regular rhythm  Lungs: decreased BS at bases, no fremitus  Abdomen: Soft, BS present  Ext: Warm, well perfused  Neuro: non focal, awake  Skin: no rash, no erythema  Lines: no phlebitis    FH: Non-contributory  Social Hx: Non-contributory    TESTS & MEASUREMENTS:                        8.9    16.98 )-----------( 644      ( 07 Feb 2025 04:20 )             27.7     02-07    136  |  93[L]  |  91[HH]  ----------------------------<  325[H]  4.4   |  27  |  2.6[H]    Ca    8.7      07 Feb 2025 04:20  Mg     2.3     02-07    TPro  5.4[L]  /  Alb  3.0[L]  /  TBili  0.4  /  DBili  x   /  AST  63[H]  /  ALT  27  /  AlkPhos  317[H]  02-07      LIVER FUNCTIONS - ( 07 Feb 2025 04:20 )  Alb: 3.0 g/dL / Pro: 5.4 g/dL / ALK PHOS: 317 U/L / ALT: 27 U/L / AST: 63 U/L / GGT: x           Urinalysis Basic - ( 07 Feb 2025 04:20 )    Color: x / Appearance: x / SG: x / pH: x  Gluc: 325 mg/dL / Ketone: x  / Bili: x / Urobili: x   Blood: x / Protein: x / Nitrite: x   Leuk Esterase: x / RBC: x / WBC x   Sq Epi: x / Non Sq Epi: x / Bacteria: x        Culture - Blood (collected 01-27-25 @ 06:12)  Source: .Blood BLOOD  Final Report (02-01-25 @ 14:00):    No growth at 5 days    Urinalysis with Rflx Culture (collected 01-20-25 @ 19:07)    Culture - Blood (collected 01-20-25 @ 15:38)  Source: .Blood BLOOD  Final Report (01-25-25 @ 23:07):    No growth at 5 days    Culture - Blood (collected 01-20-25 @ 15:38)  Source: .Blood BLOOD  Final Report (01-25-25 @ 23:07):    No growth at 5 days        Lactate, Blood: 1.7 mmol/L (02-06-25 @ 12:30)      INFECTIOUS DISEASES TESTING  Procalcitonin: 0.31 (02-06-25 @ 12:40)  Procalcitonin: 1.27 (01-30-25 @ 17:00)  Fungitell: <31 (01-28-25 @ 05:00)  Aspergillus Galactomannan Antigen: 0.04 (01-28-25 @ 05:00)  Procalcitonin: 4.48 (01-27-25 @ 06:12)  MRSA PCR Result.: Negative (01-25-25 @ 07:28)  Procalcitonin: 29.80 (01-24-25 @ 11:30)  MRSA PCR Result.: Negative (01-24-25 @ 10:45)  Legionella Antigen, Urine: Negative (01-23-25 @ 12:10)  Rapid RVP Result: NotDetec (01-13-25 @ 13:24)      INFLAMMATORY MARKERS  Sedimentation Rate, Erythrocyte: >140 mm/Hr (01-30-25 @ 17:00)  C-Reactive Protein: 87.8 mg/L (01-30-25 @ 17:00)      RADIOLOGY & ADDITIONAL TESTS:  I have personally reviewed the last available Chest xray  CXR      CT  CT Chest No Cont:   ACC: [id number] EXAM:  CT CHEST   ORDERED BY: BRI CHAVIRA     PROCEDURE DATE:  02/06/2025          INTERPRETATION:  CLINICAL INFORMATION: Pneumonia    COMPARISON: 1/20/2025    CONTRAST/COMPLICATIONS:  IV Contrast: NONE  Oral Contrast: NONE  .    PROCEDURE:  Noncontrast CT of the chest was obtained. Axial, sagittal and coronal   images as well as MIP.    FINDINGS:    LUNGS AND LARGE AIRWAYS: Patent central airways. Bilateral peribronchial   opacities predominantly involving lower segments of the left upper lobe,   right middle lobe and bilateral lower lobes. The trachea and proximal   bronchi are patent.  PLEURA: Trace pleural effusions bilaterally.  VESSELS: Aortic calcifications. Coronary artery calcifications.  HEART: Cardiomegaly. Patient is status post coronary artery bypass.  MEDIASTINUM AND SHANIA: Subcentimeter mediastinal lymph nodes.  CHEST WALL AND LOWER NECK: Within normal limits.  VISUALIZED UPPER ABDOMEN: Status post cholecystectomy.  BONES: The patient is status post median sternotomy. Degenerative changes   of the spine seen.      IMPRESSION:      Worsening bilateral peribronchial opacities compatible with   bronchopneumonia        --- End of Report ---            TOMMIE QUIROZ MD; Attending Radiologist  This document has been electronically signed. Feb 6 2025  4:52PM (02-06-25 @ 16:09)      CARDIOLOGY TESTING  12 Lead ECG:   Ventricular Rate 88 BPM    Atrial Rate 88 BPM    P-R Interval 216 ms    QRS Duration 110 ms    Q-T Interval 386 ms    QTC Calculation(Bazett) 467 ms    P Axis 40 degrees    R Axis 12 degrees    T Axis 12 degrees    Diagnosis Line Sinus rhythm with 1st degree A-V block  Otherwise normal ECG    Confirmed by Mahesh Coreas ([id number]) on 2/6/2025 8:51:58 AM (02-06-25 @ 08:12)  12 Lead ECG:   Ventricular Rate 79 BPM    Atrial Rate 79 BPM    P-R Interval 198 ms    QRS Duration 100 ms    Q-T Interval 412 ms    QTC Calculation(Bazett) 472 ms    P Axis 37 degrees    R Axis 12 degrees    T Axis -10 degrees    Diagnosis Line Normal sinus rhythm  Minimal voltage criteria for LVH, may be normal variant ( R in aVL )  Inferior infarct , age undetermined  Cannot rule out Anterior infarct , age undetermined  Abnormal ECG    Confirmed by Mahesh Coreas ([id number]) on 2/5/2025 6:27:30 PM (02-05-25 @ 07:56)      MEDICATIONS  albuterol/ipratropium for Nebulization 3 Nebulizer every 4 hours  allopurinol 150 Oral daily  ascorbic acid 500 Oral daily  atorvastatin 10 Oral at bedtime  benzocaine 20% Spray 1 Topical three times a day  buMETAnide 2 Oral daily  cefepime   IVPB 2000 IV Intermittent every 12 hours  cefepime   IVPB     chlorhexidine 2% Cloths 1 Topical daily  dextrose 5%. 1000 IV Continuous <Continuous>  dextrose 5%. 1000 IV Continuous <Continuous>  dextrose 50% Injectable 25 IV Push once  dextrose 50% Injectable 12.5 IV Push once  dextrose 50% Injectable 25 IV Push once  FIRST- Mouthwash  BLM 10 Swish and Spit every 4 hours  glucagon  Injectable 1 IntraMuscular once  heparin  Infusion.  IV Continuous <Continuous>  influenza  Vaccine (HIGH DOSE) 0.5 IntraMuscular once  insulin glargine Injectable (LANTUS) 30 SubCutaneous at bedtime  insulin lispro (ADMELOG) corrective regimen sliding scale  SubCutaneous three times a day before meals  insulin lispro Injectable (ADMELOG) 11 SubCutaneous three times a day before meals  melatonin 10 Oral at bedtime  metolazone 2.5 Oral daily  metoprolol tartrate 25 Oral two times a day  NIFEdipine XL 60 Oral daily  nystatin Cream 1 Topical every 12 hours  pantoprazole  Injectable 40 IV Push daily  polyethylene glycol 3350 17 Oral every 12 hours  senna 2 Oral every 12 hours  sertraline 50 Oral daily  tamsulosin 0.4 Oral at bedtime  vancomycin  IVPB 750 IV Intermittent once      WEIGHT  Weight (kg): 132.9 (01-27-25 @ 11:20)  Creatinine: 2.6 mg/dL (02-07-25 @ 04:20)      ANTIBIOTICS:  cefepime   IVPB 2000 milliGRAM(s) IV Intermittent every 12 hours  cefepime   IVPB      vancomycin  IVPB 750 milliGRAM(s) IV Intermittent once      All available historical records have been reviewed

## 2025-02-07 NOTE — PROGRESS NOTE ADULT - ASSESSMENT
IMPRESSION:    Acute hypoxemic respiratory failure  Possible PNA   Volume overload improved   KRISTINE on CKD   HFpEF  Large B cell lymphoma  Afib on Xarelto - on IV heparin now   Hx of HTN and DM      PLAN:    CNS: Pain control, avoid depressants. MS remains adequate.     HEENT: Oral care    PULMONARY:  HOB @ 45 degrees.  Aspiration precautions.  Wean FiO2 as tolerated.  keep O2 sat 92-96%.  BIPAP during sleep and PRN during the day.   CT chest NC noted.       CARDIOVASCULAR:  Keep even balance for now.  Bumex daily.  Hold metolazone.  Continue rte control.     GI: GI prophylaxis protonix, speech and swallow; rule out aspiration.     RENAL:  Follow up lytes.  Correct as needed.  Monitor UO     INFECTIOUS DISEASE:  Send pan culture; procal. Zosyn for now; Adjust to kidney function. Repeat MRSA nasal.     HEMATOLOGICAL:  on IV Heparin ggt.  Monitor CBC and coags.       ENDOCRINE:  Follow up FS.  Insulin protocol if needed.    MUSCULOSKELETAL: Out of bed to chair. PT OT     Dispo: SDU; Prognosis is guarded.

## 2025-02-07 NOTE — PROGRESS NOTE ADULT - SUBJECTIVE AND OBJECTIVE BOX
Interval History  Patient is a 70y old Male who presents with a chief complaint of sob (27 Jan 2025 09:39)    MORGAN BUSH is admitted with a primary diagnosis of Neutropenic fever      Today is hospital day 18d. The patient was examined at the bedside this morning. No acute overnight events were reported.    Admission HPI  HPI:  69-year-old man with extensive medical history including CAD (status post CABG;), DM (on insulin pump), atrial fibrillation on Xarelto, history of distal pancreatomy and splenectomy for unclear reason referred to the ED by his oncologist for abnormal labs. Patient was recently diagnosed with Diffuse large B cell Lymphoma on pathology of right groin mass and is following with [facility] oncology team. Patient was discharged on 01.17.2025 from hospital after inpatient chemotherapy (Completed cycle 1 of R-EPOCH (D1 on 1/11/2024). Tolerated well. Rituximab was given on D5).   Over past 3 days patient started to get a productive cough with yellow sputum production.  Patient is also endorsing chills and nausea which he states is baseline. He had 1 reading on low grade fever 100.5 on day of presentation.    On presentation vitals:  · BP Systolic	160 mm Hg  · BP Diastolic	86 mm Hg  · Heart Rate	89 /min  · Respiration Rate (breaths/min)	20 /min  · Temp (F)	98.9 Degrees F  · Temp (C)	37.2 Degrees C  · Temp site	oral    ED course:  O2 sats low on 4L O2 so he was promptly started on BIPAP. CXR with LLL infiltrate; sepsis labs sent with gentle hydration; abx given, Labs with neutropenia and elevated BNP, trop 68. EKG nonischemic.    CTA Chest: Negative for pulmonary emboli. Interval development of infiltrates within the lower lobes and lingula which may reflect acute multilobar pneumonia.    Patient admitted to medicine for acute hypoxic respiratory failure.   (20 Jan 2025 23:47)      Past Medical and Surgical History  Diabetes mellitus, type 2    BPH (benign prostatic hyperplasia)    Water retention    Essential hypertension    Diabetes    CAD (coronary artery disease)    H/O hypercholesterolemia    Morbid obesity    Chronic kidney disease (CKD)    History of atrial fibrillation    H/O right coronary artery stent placement    History of resection of pancreas    History of cholecystectomy    History of left knee replacement    S/P CABG x 6    H/O cardiac radiofrequency ablation      Family History  FAMILY HISTORY:  Family history of lung cancer (Sibling)    Family history of diabetes mellitus (Mother)    Family history of coronary artery disease (Father)      Social History  Social History:      Allergies  No Known Allergies    Medications  Standing Medications  albuterol/ipratropium for Nebulization 3 milliLiter(s) Nebulizer every 4 hours  allopurinol 150 milliGRAM(s) Oral daily  ascorbic acid 500 milliGRAM(s) Oral daily  atorvastatin 10 milliGRAM(s) Oral at bedtime  benzocaine 20% Spray 1 Spray(s) Topical three times a day  buMETAnide 2 milliGRAM(s) Oral daily  cefepime   IVPB 2000 milliGRAM(s) IV Intermittent every 12 hours  cefepime   IVPB      chlorhexidine 2% Cloths 1 Application(s) Topical daily  dextrose 5%. 1000 milliLiter(s) IV Continuous <Continuous>  dextrose 5%. 1000 milliLiter(s) IV Continuous <Continuous>  dextrose 50% Injectable 25 Gram(s) IV Push once  dextrose 50% Injectable 12.5 Gram(s) IV Push once  dextrose 50% Injectable 25 Gram(s) IV Push once  FIRST- Mouthwash  BLM 10 milliLiter(s) Swish and Spit every 4 hours  glucagon  Injectable 1 milliGRAM(s) IntraMuscular once  heparin  Infusion.  Unit(s)/Hr IV Continuous <Continuous>  influenza  Vaccine (HIGH DOSE) 0.5 milliLiter(s) IntraMuscular once  insulin glargine Injectable (LANTUS) 30 Unit(s) SubCutaneous at bedtime  insulin lispro (ADMELOG) corrective regimen sliding scale   SubCutaneous three times a day before meals  insulin lispro Injectable (ADMELOG) 11 Unit(s) SubCutaneous three times a day before meals  melatonin 10 milliGRAM(s) Oral at bedtime  methylPREDNISolone sodium succinate Injectable 40 milliGRAM(s) IV Push daily  metolazone 2.5 milliGRAM(s) Oral daily  metoprolol tartrate 25 milliGRAM(s) Oral two times a day  NIFEdipine XL 60 milliGRAM(s) Oral daily  nystatin Cream 1 Application(s) Topical every 12 hours  pantoprazole  Injectable 40 milliGRAM(s) IV Push daily  polyethylene glycol 3350 17 Gram(s) Oral every 12 hours  senna 2 Tablet(s) Oral every 12 hours  sertraline 50 milliGRAM(s) Oral daily  tamsulosin 0.4 milliGRAM(s) Oral at bedtime  vancomycin  IVPB 750 milliGRAM(s) IV Intermittent once    PRN Medications  bisacodyl 5 milliGRAM(s) Oral daily PRN  calcium carbonate    500 mG (Tums) Chewable 1 Tablet(s) Chew three times a day PRN  dextrose Oral Gel 15 Gram(s) Oral once PRN  HYDROmorphone  Injectable 0.5 milliGRAM(s) IV Push every 6 hours PRN  hydrOXYzine hydrochloride 25 milliGRAM(s) Oral every 6 hours PRN  magnesium hydroxide Suspension 30 milliLiter(s) Oral daily PRN  ondansetron Injectable 4 milliGRAM(s) IV Push every 6 hours PRN  oxyCODONE    IR 10 milliGRAM(s) Oral every 6 hours PRN  simethicone 80 milliGRAM(s) Chew daily PRN    Vitals  T(F): 99.5  HR: 88  BP: 115/58  RR: 18  SpO2: 94%    I&O's      Physical Examination  General: Appears comfortable   Head: NCAT  Neck: Supple, no JVD  Cardiac: Regular rate and rhythm  Pulmonary: CTAB  Abdominal: Soft, nontender, and nondistended with positive bowel sounds  Extremities: No pitting edema  Neurologic: AOx3, nonfocal  Skin: No rashes or lesions    Labs                        8.9    16.98 )-----------( 644      ( 07 Feb 2025 04:20 )             27.7     02-07    136  |  93[L]  |  91[HH]  ----------------------------<  325[H]  4.4   |  27  |  2.6[H]    Ca    8.7      07 Feb 2025 04:20  Mg     2.3     02-07    TPro  5.4[L]  /  Alb  3.0[L]  /  TBili  0.4  /  DBili  x   /  AST  63[H]  /  ALT  27  /  AlkPhos  317[H]  02-07    PTT - ( 06 Feb 2025 13:28 )  PTT:37.8 sec  Urinalysis Basic - ( 07 Feb 2025 04:20 )    Color: x / Appearance: x / SG: x / pH: x  Gluc: 325 mg/dL / Ketone: x  / Bili: x / Urobili: x   Blood: x / Protein: x / Nitrite: x   Leuk Esterase: x / RBC: x / WBC x   Sq Epi: x / Non Sq Epi: x / Bacteria: x      CAPILLARY BLOOD GLUCOSE      POCT Blood Glucose.: 381 mg/dL (07 Feb 2025 07:50)  POCT Blood Glucose.: 313 mg/dL (06 Feb 2025 21:36)  POCT Blood Glucose.: 385 mg/dL (06 Feb 2025 17:00)  POCT Blood Glucose.: 339 mg/dL (06 Feb 2025 11:52)    ABG - ( 06 Feb 2025 12:35 )  pH, Arterial: 7.48  pH, Blood: x     /  pCO2: 52    /  pO2: 73    / HCO3: 39    / Base Excess: 13.0  /  SaO2: 96.7                Imaging  CXR      CT  CT Chest No Cont:   ACC: [id number] EXAM:  CT CHEST   ORDERED BY: BRI CHAVIRA     PROCEDURE DATE:  02/06/2025          INTERPRETATION:  CLINICAL INFORMATION: Pneumonia    COMPARISON: 1/20/2025    CONTRAST/COMPLICATIONS:  IV Contrast: NONE  Oral Contrast: NONE  .    PROCEDURE:  Noncontrast CT of the chest was obtained. Axial, sagittal and coronal   images as well as MIP.    FINDINGS:    LUNGS AND LARGE AIRWAYS: Patent central airways. Bilateral peribronchial   opacities predominantly involving lower segments of the left upper lobe,   right middle lobe and bilateral lower lobes. The trachea and proximal   bronchi are patent.  PLEURA: Trace pleural effusions bilaterally.  VESSELS: Aortic calcifications. Coronary artery calcifications.  HEART: Cardiomegaly. Patient is status post coronary artery bypass.  MEDIASTINUM AND SHANIA: Subcentimeter mediastinal lymph nodes.  CHEST WALL AND LOWER NECK: Within normal limits.  VISUALIZED UPPER ABDOMEN: Status post cholecystectomy.  BONES: The patient is status post median sternotomy. Degenerative changes   of the spine seen.      IMPRESSION:      Worsening bilateral peribronchial opacities compatible with   bronchopneumonia        --- End of Report ---            TOMMIE QUIROZ MD; Attending Radiologist  This document has been electronically signed. Feb 6 2025  4:52PM (02-06-25 @ 16:09)   Interval History  Patient is a 70y old Male who presents with a chief complaint of sob (27 Jan 2025 09:39)    MORGAN BUSH is admitted with a primary diagnosis of Neutropenic fever      Today is hospital day 18d. The patient was examined at the bedside this morning. No acute overnight events were reported.    Admission HPI  HPI:  69-year-old man with extensive medical history including CAD (status post CABG;), DM (on insulin pump), atrial fibrillation on Xarelto, history of distal pancreatomy and splenectomy for unclear reason referred to the ED by his oncologist for abnormal labs. Patient was recently diagnosed with Diffuse large B cell Lymphoma on pathology of right groin mass and is following with [facility] oncology team. Patient was discharged on 01.17.2025 from hospital after inpatient chemotherapy (Completed cycle 1 of R-EPOCH (D1 on 1/11/2024). Tolerated well. Rituximab was given on D5).   Over past 3 days patient started to get a productive cough with yellow sputum production.  Patient is also endorsing chills and nausea which he states is baseline. He had 1 reading on low grade fever 100.5 on day of presentation.    On presentation vitals:  · BP Systolic	160 mm Hg  · BP Diastolic	86 mm Hg  · Heart Rate	89 /min  · Respiration Rate (breaths/min)	20 /min  · Temp (F)	98.9 Degrees F  · Temp (C)	37.2 Degrees C  · Temp site	oral    ED course:  O2 sats low on 4L O2 so he was promptly started on BIPAP. CXR with LLL infiltrate; sepsis labs sent with gentle hydration; abx given, Labs with neutropenia and elevated BNP, trop 68. EKG nonischemic.    CTA Chest: Negative for pulmonary emboli. Interval development of infiltrates within the lower lobes and lingula which may reflect acute multilobar pneumonia.    Patient admitted to medicine for acute hypoxic respiratory failure.   (20 Jan 2025 23:47)      Past Medical and Surgical History  Diabetes mellitus, type 2    BPH (benign prostatic hyperplasia)    Water retention    Essential hypertension    Diabetes    CAD (coronary artery disease)    H/O hypercholesterolemia    Morbid obesity    Chronic kidney disease (CKD)    History of atrial fibrillation    H/O right coronary artery stent placement    History of resection of pancreas    History of cholecystectomy    History of left knee replacement    S/P CABG x 6    H/O cardiac radiofrequency ablation      Family History  FAMILY HISTORY:  Family history of lung cancer (Sibling)    Family history of diabetes mellitus (Mother)    Family history of coronary artery disease (Father)      Social History  Social History:      Allergies  No Known Allergies    Medications  Standing Medications  albuterol/ipratropium for Nebulization 3 milliLiter(s) Nebulizer every 4 hours  allopurinol 150 milliGRAM(s) Oral daily  ascorbic acid 500 milliGRAM(s) Oral daily  atorvastatin 10 milliGRAM(s) Oral at bedtime  benzocaine 20% Spray 1 Spray(s) Topical three times a day  buMETAnide 2 milliGRAM(s) Oral daily  cefepime   IVPB 2000 milliGRAM(s) IV Intermittent every 12 hours  cefepime   IVPB      chlorhexidine 2% Cloths 1 Application(s) Topical daily  dextrose 5%. 1000 milliLiter(s) IV Continuous <Continuous>  dextrose 5%. 1000 milliLiter(s) IV Continuous <Continuous>  dextrose 50% Injectable 25 Gram(s) IV Push once  dextrose 50% Injectable 12.5 Gram(s) IV Push once  dextrose 50% Injectable 25 Gram(s) IV Push once  FIRST- Mouthwash  BLM 10 milliLiter(s) Swish and Spit every 4 hours  glucagon  Injectable 1 milliGRAM(s) IntraMuscular once  heparin  Infusion.  Unit(s)/Hr IV Continuous <Continuous>  influenza  Vaccine (HIGH DOSE) 0.5 milliLiter(s) IntraMuscular once  insulin glargine Injectable (LANTUS) 30 Unit(s) SubCutaneous at bedtime  insulin lispro (ADMELOG) corrective regimen sliding scale   SubCutaneous three times a day before meals  insulin lispro Injectable (ADMELOG) 11 Unit(s) SubCutaneous three times a day before meals  melatonin 10 milliGRAM(s) Oral at bedtime  methylPREDNISolone sodium succinate Injectable 40 milliGRAM(s) IV Push daily  metolazone 2.5 milliGRAM(s) Oral daily  metoprolol tartrate 25 milliGRAM(s) Oral two times a day  NIFEdipine XL 60 milliGRAM(s) Oral daily  nystatin Cream 1 Application(s) Topical every 12 hours  pantoprazole  Injectable 40 milliGRAM(s) IV Push daily  polyethylene glycol 3350 17 Gram(s) Oral every 12 hours  senna 2 Tablet(s) Oral every 12 hours  sertraline 50 milliGRAM(s) Oral daily  tamsulosin 0.4 milliGRAM(s) Oral at bedtime  vancomycin  IVPB 750 milliGRAM(s) IV Intermittent once    PRN Medications  bisacodyl 5 milliGRAM(s) Oral daily PRN  calcium carbonate    500 mG (Tums) Chewable 1 Tablet(s) Chew three times a day PRN  dextrose Oral Gel 15 Gram(s) Oral once PRN  HYDROmorphone  Injectable 0.5 milliGRAM(s) IV Push every 6 hours PRN  hydrOXYzine hydrochloride 25 milliGRAM(s) Oral every 6 hours PRN  magnesium hydroxide Suspension 30 milliLiter(s) Oral daily PRN  ondansetron Injectable 4 milliGRAM(s) IV Push every 6 hours PRN  oxyCODONE    IR 10 milliGRAM(s) Oral every 6 hours PRN  simethicone 80 milliGRAM(s) Chew daily PRN    Vitals  T(F): 99.5  HR: 88  BP: 115/58  RR: 18  SpO2: 94%    I&O's      Physical Examination  General: Appears comfortable   Head: HFNC in place  Neck: Supple, no JVD  Cardiac: Regular rate and rhythm  Pulmonary: Wheezing b/l  Abdominal: Soft, nontender, and nondistended  Extremities: No pitting edema  Neurologic: AOx3, nonfocal  Skin: No rashes or lesions    Labs                        8.9    16.98 )-----------( 644      ( 07 Feb 2025 04:20 )             27.7     02-07    136  |  93[L]  |  91[HH]  ----------------------------<  325[H]  4.4   |  27  |  2.6[H]    Ca    8.7      07 Feb 2025 04:20  Mg     2.3     02-07    TPro  5.4[L]  /  Alb  3.0[L]  /  TBili  0.4  /  DBili  x   /  AST  63[H]  /  ALT  27  /  AlkPhos  317[H]  02-07    PTT - ( 06 Feb 2025 13:28 )  PTT:37.8 sec  Urinalysis Basic - ( 07 Feb 2025 04:20 )    Color: x / Appearance: x / SG: x / pH: x  Gluc: 325 mg/dL / Ketone: x  / Bili: x / Urobili: x   Blood: x / Protein: x / Nitrite: x   Leuk Esterase: x / RBC: x / WBC x   Sq Epi: x / Non Sq Epi: x / Bacteria: x      CAPILLARY BLOOD GLUCOSE      POCT Blood Glucose.: 381 mg/dL (07 Feb 2025 07:50)  POCT Blood Glucose.: 313 mg/dL (06 Feb 2025 21:36)  POCT Blood Glucose.: 385 mg/dL (06 Feb 2025 17:00)  POCT Blood Glucose.: 339 mg/dL (06 Feb 2025 11:52)    ABG - ( 06 Feb 2025 12:35 )  pH, Arterial: 7.48  pH, Blood: x     /  pCO2: 52    /  pO2: 73    / HCO3: 39    / Base Excess: 13.0  /  SaO2: 96.7                Imaging  CXR      CT  CT Chest No Cont:   ACC: [id number] EXAM:  CT CHEST   ORDERED BY: BRI CHAVIRA     PROCEDURE DATE:  02/06/2025          INTERPRETATION:  CLINICAL INFORMATION: Pneumonia    COMPARISON: 1/20/2025    CONTRAST/COMPLICATIONS:  IV Contrast: NONE  Oral Contrast: NONE  .    PROCEDURE:  Noncontrast CT of the chest was obtained. Axial, sagittal and coronal   images as well as MIP.    FINDINGS:    LUNGS AND LARGE AIRWAYS: Patent central airways. Bilateral peribronchial   opacities predominantly involving lower segments of the left upper lobe,   right middle lobe and bilateral lower lobes. The trachea and proximal   bronchi are patent.  PLEURA: Trace pleural effusions bilaterally.  VESSELS: Aortic calcifications. Coronary artery calcifications.  HEART: Cardiomegaly. Patient is status post coronary artery bypass.  MEDIASTINUM AND SHANIA: Subcentimeter mediastinal lymph nodes.  CHEST WALL AND LOWER NECK: Within normal limits.  VISUALIZED UPPER ABDOMEN: Status post cholecystectomy.  BONES: The patient is status post median sternotomy. Degenerative changes   of the spine seen.      IMPRESSION:      Worsening bilateral peribronchial opacities compatible with   bronchopneumonia        --- End of Report ---            TOMMIE QUIROZ MD; Attending Radiologist  This document has been electronically signed. Feb 6 2025  4:52PM (02-06-25 @ 16:09)

## 2025-02-07 NOTE — SWALLOW BEDSIDE ASSESSMENT ADULT - SLP PERTINENT HISTORY OF CURRENT PROBLEM
Pt is a 68 y/o M w/ extensive medical history including: CAD (status post CABG), DM (on insulin pump), atrial fibrillation (on Xarelto), distal pancreatomy and splenectomy, referred to the ED by his oncologist for abnormal labs. Of note, pt recently diagnosed w/ diffuse large B cell lymphoma on pathology of right groin mass and is following / [facility] oncology team. Pt discharged on 1/17/2025 from hospital after inpatient chemotherapy (Completed cycle 1 of R-EPOCH (D1 on 1/11/2024)). Pt is being treated for severe sepsis - severe multifocal PNA w/ influenza A infection, AHRF, volume overload, neutropenic fever; +Immunodeficiency 2' malignancy and comorbidities. FEES 1/30 while in 2T on HFNC.

## 2025-02-07 NOTE — PROGRESS NOTE ADULT - ASSESSMENT
# Acute Hypoxic Respiratory Failure 2/2 Multifocal Pneumonia/ Volume Overload/ Acute on chronic diastolic CHF/  Influenza A infection/ RASHAD/ OHS   - clinically improving, on high low oxygen for now, will taper off as tolerated   - NIV PRN and QHS   - nebs Q 6 hours, supportive care, aspiration precautions   - CTA 1/20: Negative for pulmonary emboli. Interval development of infiltrates within the lower lobes and lingula which may reflect acute multilobar pneumonia  - blood cx NGTD x2, UA negative, influenza A + , Nasal MRSA negative   - pt was consulted by ID, treated with IV Abx, completed the course of Tamiflu   - will monitor off Abx for now   - TTE 1/10/25: poor study, EF 60-65%, pt has moderated TR  - fluid restriction 1200 ml in 24 hours , intake and output monitoring, daily weight   - Desaturated again this AM on NC, and Bipap was started  - Dc'd bumex  - F/u CT Chest noncon  - F/u cultures, legionella, procal, fungitell  - STarted IV cefepime 2g BID and vancomycin    # Large B-cell Lymphoma/ Concern for Rodriguez's transformation  - Heme/Onc follow up to comment on plan of care   - s/p PICC placement on 1/10/25  - s/p Bone marrow biopsy on 1/10/25  - Completed cycle 1 of R-EPOCH (D1 on 1/11/2024). Tolerated well. Rituximab was given on D5   - Uric acid 10.3 on 1/16/25, s/p 3 mg IV rasburicase  - sp Zarxio  - daily CBC with diff, active T&S  - c/w Allopurinol   - No onc plans to start chemo/treatment inpatient, can dc when medically appropriate with follow-up with Dr. Hilliard who will schedule cycle 2 of treatment.     # KRISTINE on CKD 3B / Chronic Lower Extremity Edema   - baseline ~high 1s, low 2s  - c/w diuretics (bumex 2mg IV BID increased to 2mg IV TID)  -nephrology is following, recommendations noted  - monitor BUN/cr and urine output  - ajay d/c'd  - avoid nephrotoxic medications     # CAD s/p CABG/ Chronic Afib on Xarelto/ HTN  - c/w statin, BB  - Holding ASA  - Still holding lisinopril and aldactone from last admission  - holding Xarelto, started ppx heparin subq, increased to q8h     #HLD   - on Atorvastatin now   - resume Repatha on discharge    # DM  - CC diet   - FS - self monitored via dex com  - On insulin pump @2.85 at home   - Endo consult: keep off insulin pump  - lantus 10 lispro 3    # Oral pain due to ulcer with black discoloration   - c/w local couth care  - consult ENT to examine black discoloration -> rec CT neck w/ IV contrast, magic mouthwash  - CT neck w/IV contrast no tongue mass.   - oncology thinking this is mucositis   - Monitor bleed, keep active TS and trend CBC.   - Will c/w magic mouthwash.   - Bleeding is resolving    # Agitation/ confusion Insomnia   - pt is calm now   - on Haldol PRN , Sertraline and melatonin   - supportive care     # GI  - on teofilo regimen and PPIs.   # Acute Hypoxic Respiratory Failure 2/2 Multifocal Pneumonia/ Volume Overload/ Acute on chronic diastolic CHF/  Influenza A infection/ RASHAD/ OHS   - clinically improving, on high low oxygen for now, will taper off as tolerated   - NIV PRN and QHS   - nebs Q 6 hours, supportive care, aspiration precautions   - CTA 1/20: Negative for pulmonary emboli. Interval development of infiltrates within the lower lobes and lingula which may reflect acute multilobar pneumonia  - blood cx NGTD x2, UA negative, influenza A + , Nasal MRSA negative   - pt was consulted by ID, treated with IV Abx, completed the course of Tamiflu   - TTE 1/10/25: poor study, EF 60-65%, pt has moderated TR  - Fluid restriction 1200 ml in 24 hours , intake and output monitoring, daily weight   - Desaturated again yesterday AM on NC, and Bipap was started  - CT Chest noncon: Worsening bilateral peribronchial opacities compatible with bronchopneumonia  - Start Solumedrol 40mg IV QD  - C/w Bumex 2 QD PO, hold Metolazone  - C/w Cefepime and Vancomycin  - F/u cultures, legionella, procal, fungitell  - ID following    # Large B-cell Lymphoma/ Concern for Rodriguez's transformation  - Heme/Onc follow up to comment on plan of care   - s/p PICC placement on 1/10/25  - s/p Bone marrow biopsy on 1/10/25  - Completed cycle 1 of R-EPOCH (D1 on 1/11/2024). Tolerated well. Rituximab was given on D5   - Uric acid 10.3 on 1/16/25, s/p 3 mg IV rasburicase  - sp Zarxio  - daily CBC with diff, active T&S  - c/w Allopurinol   - No onc plans to start chemo/treatment inpatient, can dc when medically appropriate with follow-up with Dr. Hilliard who will schedule cycle 2 of treatment.     # KRISTINE on CKD 3B / Chronic Lower Extremity Edema   - baseline ~high 1s, low 2s  - nephrology is following, recommendations noted  - monitor BUN/cr and urine output  - ajay d/c'd  - avoid nephrotoxic medications     # CAD s/p CABG/ Chronic Afib on Xarelto/ HTN  - c/w statin, BB  - Holding ASA  - Still holding lisinopril and aldactone from last admission  - C/w heparin drip     #HLD   - on Atorvastatin now   - resume Repatha on discharge    # DM  - CC diet   - FS - self monitored via dex com  - On insulin pump @2.85 at home   - Endo consult: keep off insulin pump  - Adjust insulin for goal glucose 140-180    # Oral pain due to ulcer with black discoloration   - c/w local cou care  - consult ENT to examine black discoloration -> rec CT neck w/ IV contrast, magic mouthwash  - CT neck w/IV contrast no tongue mass.   - oncology thinking this is mucositis   - Monitor bleed, keep active TS and trend CBC.   - Will c/w magic mouthwash.   - Bleeding is resolving    # Agitation/ confusion Insomnia   - pt is calm now   - on Haldol PRN , Sertraline and melatonin   - supportive care     # GI  - on teofilo regimen and PPIs.    #Misc  #Code Status: Full Code  #DVT ppx: Heparin drip  #GI ppx: PPI  #Diet: CC  #Activity: OOBTC  #Inpatient Dispo: SDU  #Discharge Dispo: Home vs Rehab   # Acute Hypoxic Respiratory Failure 2/2 Multifocal Pneumonia/  Acute on chronic diastolic CHF/  Influenza A infection/ RASHAD/ OHS   - clinically improving, on high low oxygen for now, will taper off as tolerated   - NIV PRN and QHS   - nebs Q 6 hours, supportive care, aspiration precautions   - CTA 1/20: Negative for pulmonary emboli. Interval development of infiltrates within the lower lobes and lingula which may reflect acute multilobar pneumonia  - blood cx NGTD x2, UA negative, influenza A + , Nasal MRSA negative   - pt was consulted by ID, treated with IV Abx, completed the course of Tamiflu   - TTE 1/10/25: poor study, EF 60-65%, pt has moderated TR  - fluid restriction 1200 ml in 24 hours , intake and output monitoring, daily weight   - Desaturated again this AM on NC, and Bipap was started  - Dc'd bumex  - CT chest: Worsening bilateral peribronchial opacities compatible with bronchopneumonia  - F/u cultures, legionella, procal, fungitell  - Started IV cefepime 2g BID and vancomycin  - Follow up ID  - HFNC 40/40, today, titrate off   - duonebs and solumedrol 40 mg daily , wheeze on exam    # Large B-cell Lymphoma/ Concern for Rodriguez's transformation  #immunocompromised state given Lymphoma   - Heme/Onc follow up to comment on plan of care   - s/p PICC placement on 1/10/25  - s/p Bone marrow biopsy on 1/10/25  - Completed cycle 1 of R-EPOCH (D1 on 1/11/2024). Tolerated well. Rituximab was given on D5   - Uric acid 10.3 on 1/16/25, s/p 3 mg IV rasburicase  - sp Zarxio  - daily CBC with diff, active T&S  - c/w Allopurinol   - No onc plans to start chemo/treatment inpatient, can dc when medically appropriate with follow-up with Dr. Hilliard who will schedule cycle 2 of treatment.     # KRISTINE on CKD 3B / Chronic Lower Extremity Edema   - baseline ~high 1s, low 2s  - c/w diuretics (bumex 2mg IV BID increased to 2mg IV TID)  -nephrology is following, recommendations noted  - monitor BUN/cr and urine output  - moss d/c'd  - avoid nephrotoxic medications     # CAD s/p CABG/ Chronic Afib on Xarelto/ HTN  - c/w statin, BB  - Holding ASA  - Still holding lisinopril and aldactone from last admission  - sylvester need to switch to eliquis given CKD from xaerlto   - dc heparin ggt in pm start eliquis 5 mg BID      #HLD   - on Atorvastatin now   - resume Repatha on discharge    # DM  - CC diet   - FS - self monitored via dex com  - On insulin pump @2.85 at home   - Endo consult: keep off insulin pump  - lantus 10 lispro 3    # Oral pain due to ulcer with black discoloration   - c/w local couth care  - consult ENT to examine black discoloration -> rec CT neck w/ IV contrast, magic mouthwash  - CT neck w/IV contrast no tongue mass.   - oncology thinking this is mucositis   - Monitor bleed, keep active TS and trend CBC.   - Will c/w magic mouthwash.   - Bleeding is resolving    # Agitation/ confusion Insomnia   - pt is calm now   - on Haldol PRN , Sertraline and melatonin   - supportive care       # GI  - on teofilo regimen and PPIs.    #Misc  #Code Status: Full Code  #DVT ppx: Heparin drip  #GI ppx: PPI  #Diet: CC  #Activity: OOBTC  #Inpatient Dispo: SDU  #Discharge Dispo: Home vs Rehab

## 2025-02-07 NOTE — CHART NOTE - NSCHARTNOTEFT_GEN_A_CORE
Registered Dietitian Follow-Up     Patient Profile Reviewed                           Yes [X]   No []     Nutrition History Previously Obtained        Yes [X]  No []       Pertinent Subjective Information: Family at bedside, report pt with poor appetite. Reports pt has been drinking oral nutrition supplements as PO meals are not appealing. Pt is drinking Ensure Max instead of PO meals, prefers it over Glucerna.      Pertinent Medical Interventions: 69-year-old man with extensive medical history including CAD (status post CABG;), DM (on insulin pump), atrial fibrillation on Xarelto, history of distal pancreatomy and splenectomy for unclear reason referred to the ED by his oncologist for abnormal labs. Patient was recently diagnosed with Diffuse large B cell Lymphoma on pathology of right groin mass and is following with [facility] oncology team. Patient was discharged on 2025 from hospital after inpatient chemotherapy (Completed cycle 1 of R-EPOCH (D1 on 2024). Tolerated well. Rituximab was given on D5).   #Severe Sepsis - Severe Multifocal Pneumonia with Influenza A infection   #Worsening Hypoxemia  - transferred to SICU   #DLBCL - on chemo   Per SLP : Regular with thin liquids      Diet order:   Diet, Regular:   Consistent Carbohydrate {No Snacks} (CSTCHO)  1200mL Fluid Restriction ([id number])  Free Water Flush Instructions:  Vanilla ensure  Supplement Feeding Modality:  Oral  Glucerna Shake Cans or Servings Per Day:  1       Frequency:  Two Times a day  Ensure Max Cans or Servings Per Day:  1       Frequency:  Three Times a day (25 @ 12:03) [Active]    Anthropometrics:  Height (cm): 180.3 cm   Weight (kg): 132.9 kg   BMI: 40.9  IBW: 78.2     Daily Weight in k (), Weight in k ()      MEDICATIONS  (STANDING):  albuterol/ipratropium for Nebulization 3 milliLiter(s) Nebulizer every 4 hours  allopurinol 150 milliGRAM(s) Oral daily  ascorbic acid 500 milliGRAM(s) Oral daily  atorvastatin 10 milliGRAM(s) Oral at bedtime  benzocaine 20% Spray 1 Spray(s) Topical three times a day  buMETAnide 2 milliGRAM(s) Oral daily  cefepime   IVPB 2000 milliGRAM(s) IV Intermittent every 12 hours  cefepime   IVPB      chlorhexidine 2% Cloths 1 Application(s) Topical daily  dextrose 5%. 1000 milliLiter(s) (100 mL/Hr) IV Continuous <Continuous>  dextrose 5%. 1000 milliLiter(s) (50 mL/Hr) IV Continuous <Continuous>  dextrose 50% Injectable 25 Gram(s) IV Push once  dextrose 50% Injectable 12.5 Gram(s) IV Push once  dextrose 50% Injectable 25 Gram(s) IV Push once  FIRST- Mouthwash  BLM 10 milliLiter(s) Swish and Spit every 4 hours  glucagon  Injectable 1 milliGRAM(s) IntraMuscular once  heparin  Infusion.  Unit(s)/Hr (10 mL/Hr) IV Continuous <Continuous>  influenza  Vaccine (HIGH DOSE) 0.5 milliLiter(s) IntraMuscular once  insulin glargine Injectable (LANTUS) 30 Unit(s) SubCutaneous at bedtime  insulin lispro (ADMELOG) corrective regimen sliding scale   SubCutaneous three times a day before meals  insulin lispro Injectable (ADMELOG) 11 Unit(s) SubCutaneous three times a day before meals  melatonin 10 milliGRAM(s) Oral at bedtime  metolazone 2.5 milliGRAM(s) Oral daily  metoprolol tartrate 25 milliGRAM(s) Oral two times a day  NIFEdipine XL 60 milliGRAM(s) Oral daily  nystatin Cream 1 Application(s) Topical every 12 hours  pantoprazole  Injectable 40 milliGRAM(s) IV Push daily  polyethylene glycol 3350 17 Gram(s) Oral every 12 hours  senna 2 Tablet(s) Oral every 12 hours  sertraline 50 milliGRAM(s) Oral daily  tamsulosin 0.4 milliGRAM(s) Oral at bedtime  vancomycin  IVPB 750 milliGRAM(s) IV Intermittent once    MEDICATIONS  (PRN):  bisacodyl 5 milliGRAM(s) Oral daily PRN Constipation  calcium carbonate    500 mG (Tums) Chewable 1 Tablet(s) Chew three times a day PRN Dyspepsia  dextrose Oral Gel 15 Gram(s) Oral once PRN Blood Glucose LESS THAN 70 milliGRAM(s)/deciliter  HYDROmorphone  Injectable 0.5 milliGRAM(s) IV Push every 6 hours PRN Severe Pain (7 - 10)  hydrOXYzine hydrochloride 25 milliGRAM(s) Oral every 6 hours PRN Anxiety  magnesium hydroxide Suspension 30 milliLiter(s) Oral daily PRN Constipation  ondansetron Injectable 4 milliGRAM(s) IV Push every 6 hours PRN Nausea and/or Vomiting  oxyCODONE    IR 10 milliGRAM(s) Oral every 6 hours PRN Moderate Pain (4 - 6)  simethicone 80 milliGRAM(s) Chew daily PRN Gas    Pertinent Labs:  @ 04:20: Na 136, BUN 91[HH], Cr 2.6[H], [H], K+ 4.4, Phos --, Mg 2.3, Alk Phos 317[H], ALT/SGPT 27, AST/SGOT 63[H], HbA1c --    Finger Sticks:  POCT Blood Glucose.: 381 mg/dL ( @ 07:50)  POCT Blood Glucose.: 313 mg/dL ( @ 21:36)  POCT Blood Glucose.: 385 mg/dL ( @ 17:00)  POCT Blood Glucose.: 339 mg/dL ( @ 11:52)    Physical Findings:  - Appearance: AAOx4  - GI function: No n/v reported. Pt constipated per family, last BM    - Tubes: N/A  - Oral/Mouth cavity: Regular with 1200 mL fluid restriction  - Skin: no pressure injures  - Edema: no edema      Nutrition Requirements:   Weight Used: IBW: 78.2 kg      Estimated Energy Needs    Continue []  Adjust [X] - due to incorrect IBW calculation   Adjusted Energy Recommendations: 1955- 2346 kcal/day (25-30kcal/kg of IBW-78.2 kg) -Due to obesity and CKD        Estimated Protein Needs    Continue []  Adjust [X] same reason as above  Adjusted Protein Recommendations:  78- 94 grams/day (1-1.2grams/kg of IBW-78.2kg) -Due to obesity and cancer but with consideration for low GFR         Estimated Fluid Needs        Continue []  Adjust [X]  Adjusted Fluid Recommendations:  1200 mL per MD     Nutrient Intake: Poor PO intake, pt only drinking oral nutrition supplements     [X] Previous Nutrition Diagnosis: Inadequate Oral Intake r/t poor appetite and constipation             [X] Ongoing          [] Resolved    Additional previous nutrition dx: swallowing difficulty r/t sore throat. - resolved per family at bedside     Nutrition Education: Discussed food preferences with family at bedside, Pt prefers softer foods and soups. Service Note updated in [facility].   Discussed and reviewed oral nutrition supplements, pt prefers Ensure Max over Glucerna.      Goal/Expected Outcome: Pt to meet and tolerate >/=75% of estimated needs in 3-5 days.      Indicator/Monitoring: diet order, energy intake, weights, labs, GI s/s, BG, skin status, NFPE    Recommendation:   1. Continue with current diet order  2. D/c Glucerna BID as pt prefers Ensure Max. Continue with Ensure Max TID - prefers vanilla flavor   3. Encourage PO intake and assist with meal times prn     Pt at high risk     RD to remain available: Tenzin Bronson x [phone] or via TEAMS

## 2025-02-07 NOTE — PROGRESS NOTE ADULT - ASSESSMENT
ASSESSMENT  69-year-old man with extensive medical history including CAD (status post CABG;), DM (on insulin pump), atrial fibrillation on Xarelto, history of distal pancreatomy and splenectomy for unclear reason referred to the ED by his oncologist for abnormal labs. Patient was recently diagnosed with Diffuse large B cell Lymphoma on pathology of right groin mass and is following with [facility] oncology team. Patient was discharged on 01.17.2025 from hospital after inpatient chemotherapy (Completed cycle 1 of R-EPOCH (D1 on 1/11/2024). Tolerated well. Rituximab was given on D5).   Over past 3 days patient started to get a productive cough with yellow sputum production.  Patient is also endorsing chills and nausea which he states is baseline. He had 1 reading on low grade fever 100.5 on day of presentation.    IMPRESSION  #Severe Sepsis - Severe Multifocal Pneumonia with Influenza A infection   - completed 10 day course of cefepime/tramiflu 1/30     #Neutropenic Fever- resolved  #Acute Hypoxemic Respiratory failure   #Pulmonary Edema     #Worsening Hypoxemia 2/6 - transferred to SICU   - CT Angio Chest PE Protocol w/ IV Cont (01.20.25 @ 20:56): Negative for pulmonary emboli. Interval development of infiltrates within the lower lobes and lingula   which may reflect acute multilobar pneumonia.   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    #Abx allergy: No Known Allergies    RECOMMENDATIONS  - CT Chest reviewed and previous comparison to 1/20; Serial CXR have been stable   - received dose of vancomycin 2/6 -- check MRSA nares -- if negative, no need to re-dose vancomycin  - continue cefepime 2g q 12 hours (should also cover for mouth anerobes)  - CT Chest not suggestive of fungal pneumonia, but given recent flu/Neutropenic fever, can check fungitell and aspergillus galactomannan serum   - check sputum Cx if able to produce     Please call or message on [employer] Teams if with any questions.  Spectra [phone]

## 2025-02-07 NOTE — PROGRESS NOTE ADULT - SUBJECTIVE AND OBJECTIVE BOX
Patient is a 70y old  Male who presents with a chief complaint of sob (27 Jan 2025 09:39)        Over Night Events:  on HFNCO2.  off pressors.  SP CT Chest         ROS:     All ROS are negative except HPI         PHYSICAL EXAM    ICU Vital Signs Last 24 Hrs  T(C): 37.5 (07 Feb 2025 08:00), Max: 37.5 (07 Feb 2025 08:00)  T(F): 99.5 (07 Feb 2025 08:00), Max: 99.5 (07 Feb 2025 08:00)  HR: 88 (07 Feb 2025 08:00) (88 - 101)  BP: 139/62 (07 Feb 2025 00:00) (117/58 - 173/73)  BP(mean): 89 (07 Feb 2025 00:00) (83 - 89)  ABP: --  ABP(mean): --  RR: 18 (06 Feb 2025 18:39) (18 - 20)  SpO2: 95% (07 Feb 2025 00:00) (88% - 100%)    O2 Parameters below as of 06 Feb 2025 20:13  Patient On (Oxygen Delivery Method): nasal cannula, high flow  O2 Flow (L/min): 50  O2 Concentration (%): 50        CONSTITUTIONAL:  NAD    ENT:   Airway patent,   Mouth with normal mucosa.     EYES:   Pupils equal,   Round and reactive to light.    CARDIAC:   Normal rate,   Regular rhythm.        RESPIRATORY:   No wheezing  Bilateral crackles   Normal chest expansion  Not tachypneic,  No use of accessory muscles    GASTROINTESTINAL:  Abdomen soft,   Non-tender,   No guarding,   + BS    MUSCULOSKELETAL:   Range of motion is not limited,  No clubbing, cyanosis    NEUROLOGICAL:   Alert and oriented   No motor  deficits.    SKIN:   Skin normal color for race,   Warm and dry  No evidence of rash.        LABS:                            8.9    16.98 )-----------( 644      ( 07 Feb 2025 04:20 )             27.7                                               02-07    136  |  93[L]  |  91[HH]  ----------------------------<  325[H]  4.4   |  27  |  2.6[H]    Ca    8.7      07 Feb 2025 04:20  Mg     2.3     02-07    TPro  5.4[L]  /  Alb  3.0[L]  /  TBili  0.4  /  DBili  x   /  AST  63[H]  /  ALT  27  /  AlkPhos  317[H]  02-07      PTT - ( 06 Feb 2025 13:28 )  PTT:37.8 sec                                       Urinalysis Basic - ( 07 Feb 2025 04:20 )    Color: x / Appearance: x / SG: x / pH: x  Gluc: 325 mg/dL / Ketone: x  / Bili: x / Urobili: x   Blood: x / Protein: x / Nitrite: x   Leuk Esterase: x / RBC: x / WBC x   Sq Epi: x / Non Sq Epi: x / Bacteria: x                                                  LIVER FUNCTIONS - ( 07 Feb 2025 04:20 )  Alb: 3.0 g/dL / Pro: 5.4 g/dL / ALK PHOS: 317 U/L / ALT: 27 U/L / AST: 63 U/L / GGT: x                                                                                                                                   ABG - ( 06 Feb 2025 12:35 )  pH, Arterial: 7.48  pH, Blood: x     /  pCO2: 52    /  pO2: 73    / HCO3: 39    / Base Excess: 13.0  /  SaO2: 96.7                MEDICATIONS  (STANDING):  albuterol/ipratropium for Nebulization 3 milliLiter(s) Nebulizer every 4 hours  allopurinol 150 milliGRAM(s) Oral daily  ascorbic acid 500 milliGRAM(s) Oral daily  atorvastatin 10 milliGRAM(s) Oral at bedtime  benzocaine 20% Spray 1 Spray(s) Topical three times a day  buMETAnide 2 milliGRAM(s) Oral daily  cefepime   IVPB 2000 milliGRAM(s) IV Intermittent every 12 hours  cefepime   IVPB      chlorhexidine 2% Cloths 1 Application(s) Topical daily  dextrose 5%. 1000 milliLiter(s) (100 mL/Hr) IV Continuous <Continuous>  dextrose 5%. 1000 milliLiter(s) (50 mL/Hr) IV Continuous <Continuous>  dextrose 50% Injectable 25 Gram(s) IV Push once  dextrose 50% Injectable 12.5 Gram(s) IV Push once  dextrose 50% Injectable 25 Gram(s) IV Push once  FIRST- Mouthwash  BLM 10 milliLiter(s) Swish and Spit every 4 hours  glucagon  Injectable 1 milliGRAM(s) IntraMuscular once  heparin  Infusion.  Unit(s)/Hr (10 mL/Hr) IV Continuous <Continuous>  influenza  Vaccine (HIGH DOSE) 0.5 milliLiter(s) IntraMuscular once  insulin glargine Injectable (LANTUS) 30 Unit(s) SubCutaneous at bedtime  insulin lispro (ADMELOG) corrective regimen sliding scale   SubCutaneous three times a day before meals  insulin lispro Injectable (ADMELOG) 11 Unit(s) SubCutaneous three times a day before meals  melatonin 10 milliGRAM(s) Oral at bedtime  metolazone 2.5 milliGRAM(s) Oral daily  metoprolol tartrate 25 milliGRAM(s) Oral two times a day  NIFEdipine XL 60 milliGRAM(s) Oral daily  nystatin Cream 1 Application(s) Topical every 12 hours  pantoprazole  Injectable 40 milliGRAM(s) IV Push daily  polyethylene glycol 3350 17 Gram(s) Oral every 12 hours  senna 2 Tablet(s) Oral every 12 hours  sertraline 50 milliGRAM(s) Oral daily  tamsulosin 0.4 milliGRAM(s) Oral at bedtime  vancomycin  IVPB 750 milliGRAM(s) IV Intermittent once    MEDICATIONS  (PRN):  bisacodyl 5 milliGRAM(s) Oral daily PRN Constipation  calcium carbonate    500 mG (Tums) Chewable 1 Tablet(s) Chew three times a day PRN Dyspepsia  dextrose Oral Gel 15 Gram(s) Oral once PRN Blood Glucose LESS THAN 70 milliGRAM(s)/deciliter  HYDROmorphone  Injectable 0.5 milliGRAM(s) IV Push every 6 hours PRN Severe Pain (7 - 10)  hydrOXYzine hydrochloride 25 milliGRAM(s) Oral every 6 hours PRN Anxiety  magnesium hydroxide Suspension 30 milliLiter(s) Oral daily PRN Constipation  ondansetron Injectable 4 milliGRAM(s) IV Push every 6 hours PRN Nausea and/or Vomiting  oxyCODONE    IR 10 milliGRAM(s) Oral every 6 hours PRN Moderate Pain (4 - 6)  simethicone 80 milliGRAM(s) Chew daily PRN Gas      New X-rays reviewed:                                                                                  ECHO

## 2025-02-07 NOTE — SWALLOW BEDSIDE ASSESSMENT ADULT - SLP GENERAL OBSERVATIONS
pt received in bed awake +generalized weakness +HFNC 40L/min, 50% O2 +gentle oral care provided. Daughter Mery at bedside. Detail Level: Detailed

## 2025-02-07 NOTE — PROGRESS NOTE ADULT - ATTENDING COMMENTS
Pt seen and examined at bedside. No cp or sob.   Vital Signs (24 Hrs):  T(C): 37.5 (02-07-25 @ 08:00), Max: 37.5 (02-07-25 @ 08:00)  HR: 88 (02-07-25 @ 08:00) (88 - 99)  BP: 115/58 (02-07-25 @ 08:00) (115/58 - 173/73)  RR: 18 (02-06-25 @ 18:39) (18 - 18)  SpO2: 94% (02-07-25 @ 08:00) (91% - 100%)      PHYSICAL EXAM:  GENERAL: NAD, well-developed  HEAD:  Atraumatic, Normocephalic  EYES: EOMI, PERRLA, conjunctiva and sclera clear  NECK: Supple, No JVD  CHEST/LUNG: ronchi, wheeze  HEART: Regular rate and rhythm; No murmurs, rubs, or gallops  ABDOMEN: Soft, Nontender, Nondistended; Bowel sounds present  EXTREMITIES:  2+ Peripheral Pulses, No clubbing, cyanosis, or edema  PSYCH: AAOx3  NEUROLOGY: non-focal  SKIN: No rashes or lesions  Labs reviewed  Imaging reviewed independently and reviewed read  EKG reviewed independently and reviewed read    Plan as above. DW resident. Agree to plan. Made edits    #Progress Note Handoff  Pending (specify):  as above  Family discussion: house staff updated pt family  Disposition: [facility], sdu   Decision to admit the pt is based on acuity as above

## 2025-02-08 LAB
ALBUMIN SERPL ELPH-MCNC: 3.1 G/DL — LOW (ref 3.5–5.2)
ALBUMIN SERPL ELPH-MCNC: 3.2 G/DL — LOW (ref 3.5–5.2)
ALP SERPL-CCNC: 261 U/L — HIGH (ref 30–115)
ALP SERPL-CCNC: 262 U/L — HIGH (ref 30–115)
ALT FLD-CCNC: 31 U/L — SIGNIFICANT CHANGE UP (ref 0–41)
ALT FLD-CCNC: 31 U/L — SIGNIFICANT CHANGE UP (ref 0–41)
ANION GAP SERPL CALC-SCNC: 12 MMOL/L — SIGNIFICANT CHANGE UP (ref 7–14)
ANION GAP SERPL CALC-SCNC: 15 MMOL/L — HIGH (ref 7–14)
ANION GAP SERPL CALC-SCNC: 16 MMOL/L — HIGH (ref 7–14)
AST SERPL-CCNC: 56 U/L — HIGH (ref 0–41)
AST SERPL-CCNC: 65 U/L — HIGH (ref 0–41)
B-OH-BUTYR SERPL-SCNC: <0.2 MMOL/L — SIGNIFICANT CHANGE UP
BASOPHILS # BLD AUTO: 0.09 K/UL — SIGNIFICANT CHANGE UP (ref 0–0.2)
BASOPHILS NFR BLD AUTO: 0.6 % — SIGNIFICANT CHANGE UP (ref 0–1)
BILIRUB SERPL-MCNC: 0.3 MG/DL — SIGNIFICANT CHANGE UP (ref 0.2–1.2)
BILIRUB SERPL-MCNC: 0.3 MG/DL — SIGNIFICANT CHANGE UP (ref 0.2–1.2)
BUN SERPL-MCNC: 115 MG/DL — CRITICAL HIGH (ref 10–20)
BUN SERPL-MCNC: 121 MG/DL — CRITICAL HIGH (ref 10–20)
BUN SERPL-MCNC: 123 MG/DL — CRITICAL HIGH (ref 10–20)
CALCIUM SERPL-MCNC: 8.4 MG/DL — SIGNIFICANT CHANGE UP (ref 8.4–10.5)
CALCIUM SERPL-MCNC: 8.4 MG/DL — SIGNIFICANT CHANGE UP (ref 8.4–10.5)
CALCIUM SERPL-MCNC: 8.9 MG/DL — SIGNIFICANT CHANGE UP (ref 8.4–10.5)
CHLORIDE SERPL-SCNC: 88 MMOL/L — LOW (ref 98–110)
CHLORIDE SERPL-SCNC: 88 MMOL/L — LOW (ref 98–110)
CHLORIDE SERPL-SCNC: 90 MMOL/L — LOW (ref 98–110)
CO2 SERPL-SCNC: 26 MMOL/L — SIGNIFICANT CHANGE UP (ref 17–32)
CO2 SERPL-SCNC: 27 MMOL/L — SIGNIFICANT CHANGE UP (ref 17–32)
CO2 SERPL-SCNC: 31 MMOL/L — SIGNIFICANT CHANGE UP (ref 17–32)
CREAT SERPL-MCNC: 2.6 MG/DL — HIGH (ref 0.7–1.5)
CREAT SERPL-MCNC: 2.7 MG/DL — HIGH (ref 0.7–1.5)
CREAT SERPL-MCNC: 2.9 MG/DL — HIGH (ref 0.7–1.5)
EGFR: 23 ML/MIN/1.73M2 — LOW
EGFR: 25 ML/MIN/1.73M2 — LOW
EGFR: 26 ML/MIN/1.73M2 — LOW
EOSINOPHIL # BLD AUTO: 0 K/UL — SIGNIFICANT CHANGE UP (ref 0–0.7)
EOSINOPHIL NFR BLD AUTO: 0 % — SIGNIFICANT CHANGE UP (ref 0–8)
FUNGITELL: 37 PG/ML — SIGNIFICANT CHANGE UP
GLUCOSE BLDC GLUCOMTR-MCNC: 365 MG/DL — HIGH (ref 70–99)
GLUCOSE BLDC GLUCOMTR-MCNC: 391 MG/DL — HIGH (ref 70–99)
GLUCOSE BLDC GLUCOMTR-MCNC: 420 MG/DL — HIGH (ref 70–99)
GLUCOSE BLDC GLUCOMTR-MCNC: 434 MG/DL — HIGH (ref 70–99)
GLUCOSE BLDC GLUCOMTR-MCNC: 439 MG/DL — HIGH (ref 70–99)
GLUCOSE BLDC GLUCOMTR-MCNC: 469 MG/DL — CRITICAL HIGH (ref 70–99)
GLUCOSE BLDC GLUCOMTR-MCNC: 469 MG/DL — CRITICAL HIGH (ref 70–99)
GLUCOSE BLDC GLUCOMTR-MCNC: 517 MG/DL — CRITICAL HIGH (ref 70–99)
GLUCOSE BLDC GLUCOMTR-MCNC: 527 MG/DL — CRITICAL HIGH (ref 70–99)
GLUCOSE BLDC GLUCOMTR-MCNC: 547 MG/DL — CRITICAL HIGH (ref 70–99)
GLUCOSE BLDC GLUCOMTR-MCNC: 558 MG/DL — CRITICAL HIGH (ref 70–99)
GLUCOSE SERPL-MCNC: 382 MG/DL — HIGH (ref 70–99)
GLUCOSE SERPL-MCNC: 469 MG/DL — CRITICAL HIGH (ref 70–99)
GLUCOSE SERPL-MCNC: 529 MG/DL — CRITICAL HIGH (ref 70–99)
HCT VFR BLD CALC: 27.1 % — LOW (ref 42–52)
HGB BLD-MCNC: 8.9 G/DL — LOW (ref 14–18)
IMM GRANULOCYTES NFR BLD AUTO: 4.4 % — HIGH (ref 0.1–0.3)
LYMPHOCYTES # BLD AUTO: 0.83 K/UL — LOW (ref 1.2–3.4)
LYMPHOCYTES # BLD AUTO: 5.1 % — LOW (ref 20.5–51.1)
MAGNESIUM SERPL-MCNC: 2.7 MG/DL — HIGH (ref 1.8–2.4)
MCHC RBC-ENTMCNC: 30.3 PG — SIGNIFICANT CHANGE UP (ref 27–31)
MCHC RBC-ENTMCNC: 32.8 G/DL — SIGNIFICANT CHANGE UP (ref 32–37)
MCV RBC AUTO: 92.2 FL — SIGNIFICANT CHANGE UP (ref 80–94)
MONOCYTES # BLD AUTO: 2.11 K/UL — HIGH (ref 0.1–0.6)
MONOCYTES NFR BLD AUTO: 13.1 % — HIGH (ref 1.7–9.3)
MRSA PCR RESULT.: NEGATIVE — SIGNIFICANT CHANGE UP
NEUTROPHILS # BLD AUTO: 12.4 K/UL — HIGH (ref 1.4–6.5)
NEUTROPHILS NFR BLD AUTO: 76.8 % — HIGH (ref 42.2–75.2)
NRBC # BLD: 1 /100 WBCS — HIGH (ref 0–0)
NRBC BLD-RTO: 1 /100 WBCS — HIGH (ref 0–0)
PLATELET # BLD AUTO: 611 K/UL — HIGH (ref 130–400)
PMV BLD: 11.7 FL — HIGH (ref 7.4–10.4)
POTASSIUM SERPL-MCNC: 4.6 MMOL/L — SIGNIFICANT CHANGE UP (ref 3.5–5)
POTASSIUM SERPL-MCNC: 4.8 MMOL/L — SIGNIFICANT CHANGE UP (ref 3.5–5)
POTASSIUM SERPL-MCNC: 4.9 MMOL/L — SIGNIFICANT CHANGE UP (ref 3.5–5)
POTASSIUM SERPL-SCNC: 4.6 MMOL/L — SIGNIFICANT CHANGE UP (ref 3.5–5)
POTASSIUM SERPL-SCNC: 4.8 MMOL/L — SIGNIFICANT CHANGE UP (ref 3.5–5)
POTASSIUM SERPL-SCNC: 4.9 MMOL/L — SIGNIFICANT CHANGE UP (ref 3.5–5)
PROT SERPL-MCNC: 5.4 G/DL — LOW (ref 6–8)
PROT SERPL-MCNC: 5.8 G/DL — LOW (ref 6–8)
RBC # BLD: 2.94 M/UL — LOW (ref 4.7–6.1)
RBC # FLD: 15.5 % — HIGH (ref 11.5–14.5)
SODIUM SERPL-SCNC: 130 MMOL/L — LOW (ref 135–146)
SODIUM SERPL-SCNC: 130 MMOL/L — LOW (ref 135–146)
SODIUM SERPL-SCNC: 133 MMOL/L — LOW (ref 135–146)
VANCOMYCIN FLD-MCNC: 17.2 UG/ML — HIGH (ref 5–10)
WBC # BLD: 16.14 K/UL — HIGH (ref 4.8–10.8)
WBC # FLD AUTO: 16.14 K/UL — HIGH (ref 4.8–10.8)

## 2025-02-08 PROCEDURE — 71045 X-RAY EXAM CHEST 1 VIEW: CPT | Mod: 26

## 2025-02-08 PROCEDURE — 99233 SBSQ HOSP IP/OBS HIGH 50: CPT

## 2025-02-08 RX ORDER — VANCOMYCIN HCL IN 5 % DEXTROSE 1.5G/250ML
1000 PLASTIC BAG, INJECTION (ML) INTRAVENOUS ONCE
Refills: 0 | Status: COMPLETED | OUTPATIENT
Start: 2025-02-08 | End: 2025-02-08

## 2025-02-08 RX ORDER — SODIUM CHLORIDE 9 G/1000ML
1000 INJECTION, SOLUTION INTRAVENOUS
Refills: 0 | Status: DISCONTINUED | OUTPATIENT
Start: 2025-02-08 | End: 2025-02-09

## 2025-02-08 RX ORDER — INSULIN LISPRO 100 U/ML
16 INJECTION, SOLUTION INTRAVENOUS; SUBCUTANEOUS
Refills: 0 | Status: DISCONTINUED | OUTPATIENT
Start: 2025-02-08 | End: 2025-02-09

## 2025-02-08 RX ORDER — INSULIN GLARGINE-YFGN 100 [IU]/ML
40 INJECTION, SOLUTION SUBCUTANEOUS AT BEDTIME
Refills: 0 | Status: DISCONTINUED | OUTPATIENT
Start: 2025-02-08 | End: 2025-02-15

## 2025-02-08 RX ORDER — INSULIN LISPRO 100 U/ML
10 INJECTION, SOLUTION INTRAVENOUS; SUBCUTANEOUS ONCE
Refills: 0 | Status: COMPLETED | OUTPATIENT
Start: 2025-02-08 | End: 2025-02-08

## 2025-02-08 RX ADMIN — APIXABAN 5 MILLIGRAM(S): 2.5 TABLET, FILM COATED ORAL at 08:05

## 2025-02-08 RX ADMIN — CEFEPIME 100 MILLIGRAM(S): 2 INJECTION, POWDER, FOR SOLUTION INTRAVENOUS at 20:34

## 2025-02-08 RX ADMIN — Medication 7 UNIT(S)/HR: at 19:42

## 2025-02-08 RX ADMIN — BENZOCAINE 1 SPRAY(S): 220 SPRAY, METERED PERIODONTAL at 05:18

## 2025-02-08 RX ADMIN — DIPHENHYDRAMINE HYDROCHLORIDE AND LIDOCAINE HYDROCHLORIDE AND ALUMINUM HYDROXIDE AND MAGNESIUM HYDRO 10 MILLILITER(S): KIT at 18:16

## 2025-02-08 RX ADMIN — METOPROLOL SUCCINATE 25 MILLIGRAM(S): 50 TABLET, EXTENDED RELEASE ORAL at 05:17

## 2025-02-08 RX ADMIN — SODIUM CHLORIDE 100 MILLILITER(S): 9 INJECTION, SOLUTION INTRAVENOUS at 20:11

## 2025-02-08 RX ADMIN — Medication 11 UNIT(S): at 14:14

## 2025-02-08 RX ADMIN — Medication 13 UNIT(S): at 18:21

## 2025-02-08 RX ADMIN — BENZOCAINE 1 SPRAY(S): 220 SPRAY, METERED PERIODONTAL at 21:11

## 2025-02-08 RX ADMIN — METOPROLOL SUCCINATE 25 MILLIGRAM(S): 50 TABLET, EXTENDED RELEASE ORAL at 20:33

## 2025-02-08 RX ADMIN — INSULIN LISPRO 16 UNIT(S): 100 INJECTION, SOLUTION INTRAVENOUS; SUBCUTANEOUS at 18:17

## 2025-02-08 RX ADMIN — Medication 250 MILLIGRAM(S): at 12:46

## 2025-02-08 RX ADMIN — BENZOCAINE 1 SPRAY(S): 220 SPRAY, METERED PERIODONTAL at 16:52

## 2025-02-08 RX ADMIN — METHYLPREDNISOLONE ACETATE 40 MILLIGRAM(S): 80 INJECTION, SUSPENSION INTRA-ARTICULAR; INTRALESIONAL; INTRAMUSCULAR; SOFT TISSUE at 05:16

## 2025-02-08 RX ADMIN — DIPHENHYDRAMINE HYDROCHLORIDE AND LIDOCAINE HYDROCHLORIDE AND ALUMINUM HYDROXIDE AND MAGNESIUM HYDRO 10 MILLILITER(S): KIT at 21:08

## 2025-02-08 RX ADMIN — Medication 10 MILLIGRAM(S): at 21:11

## 2025-02-08 RX ADMIN — APIXABAN 5 MILLIGRAM(S): 2.5 TABLET, FILM COATED ORAL at 21:11

## 2025-02-08 RX ADMIN — POLYETHYLENE GLYCOL 3350 17 GRAM(S): 17 POWDER, FOR SOLUTION ORAL at 05:16

## 2025-02-08 RX ADMIN — Medication 0.5 MILLIGRAM(S): at 23:42

## 2025-02-08 RX ADMIN — Medication 60 MILLIGRAM(S): at 05:17

## 2025-02-08 RX ADMIN — INSULIN LISPRO 16 UNIT(S): 100 INJECTION, SOLUTION INTRAVENOUS; SUBCUTANEOUS at 12:21

## 2025-02-08 RX ADMIN — DIPHENHYDRAMINE HYDROCHLORIDE AND LIDOCAINE HYDROCHLORIDE AND ALUMINUM HYDROXIDE AND MAGNESIUM HYDRO 10 MILLILITER(S): KIT at 05:18

## 2025-02-08 RX ADMIN — Medication 1 APPLICATION(S): at 12:19

## 2025-02-08 RX ADMIN — SERTRALINE 50 MILLIGRAM(S): 100 TABLET, FILM COATED ORAL at 12:18

## 2025-02-08 RX ADMIN — POLYETHYLENE GLYCOL 3350 17 GRAM(S): 17 POWDER, FOR SOLUTION ORAL at 20:34

## 2025-02-08 RX ADMIN — IPRATROPIUM BROMIDE AND ALBUTEROL SULFATE 3 MILLILITER(S): .5; 2.5 SOLUTION RESPIRATORY (INHALATION) at 19:24

## 2025-02-08 RX ADMIN — Medication 2 TABLET(S): at 20:34

## 2025-02-08 RX ADMIN — INSULIN LISPRO 12: 100 INJECTION, SOLUTION INTRAVENOUS; SUBCUTANEOUS at 18:16

## 2025-02-08 RX ADMIN — Medication 500 MILLIGRAM(S): at 12:18

## 2025-02-08 RX ADMIN — Medication 7 UNIT(S): at 08:04

## 2025-02-08 RX ADMIN — DIPHENHYDRAMINE HYDROCHLORIDE AND LIDOCAINE HYDROCHLORIDE AND ALUMINUM HYDROXIDE AND MAGNESIUM HYDRO 10 MILLILITER(S): KIT at 10:14

## 2025-02-08 RX ADMIN — Medication 40 MILLIGRAM(S): at 12:19

## 2025-02-08 RX ADMIN — CEFEPIME 100 MILLIGRAM(S): 2 INJECTION, POWDER, FOR SOLUTION INTRAVENOUS at 05:17

## 2025-02-08 RX ADMIN — OXYCODONE HYDROCHLORIDE 10 MILLIGRAM(S): 30 TABLET ORAL at 01:51

## 2025-02-08 RX ADMIN — INSULIN LISPRO 12: 100 INJECTION, SOLUTION INTRAVENOUS; SUBCUTANEOUS at 08:03

## 2025-02-08 RX ADMIN — NYSTATIN 1 APPLICATION(S): 100000 CREAM TOPICAL at 05:18

## 2025-02-08 RX ADMIN — TAMSULOSIN HYDROCHLORIDE 0.4 MILLIGRAM(S): 0.4 CAPSULE ORAL at 21:10

## 2025-02-08 RX ADMIN — Medication 150 MILLIGRAM(S): at 12:18

## 2025-02-08 RX ADMIN — Medication 2 TABLET(S): at 05:16

## 2025-02-08 RX ADMIN — BUMETANIDE 2 MILLIGRAM(S): 1 TABLET ORAL at 05:16

## 2025-02-08 RX ADMIN — Medication 0.5 MILLIGRAM(S): at 08:08

## 2025-02-08 RX ADMIN — INSULIN LISPRO 12: 100 INJECTION, SOLUTION INTRAVENOUS; SUBCUTANEOUS at 12:16

## 2025-02-08 RX ADMIN — INSULIN LISPRO 10 UNIT(S): 100 INJECTION, SOLUTION INTRAVENOUS; SUBCUTANEOUS at 12:17

## 2025-02-08 RX ADMIN — ATORVASTATIN CALCIUM 10 MILLIGRAM(S): 80 TABLET, FILM COATED ORAL at 21:11

## 2025-02-08 RX ADMIN — IPRATROPIUM BROMIDE AND ALBUTEROL SULFATE 3 MILLILITER(S): .5; 2.5 SOLUTION RESPIRATORY (INHALATION) at 14:19

## 2025-02-08 RX ADMIN — Medication 0.5 MILLIGRAM(S): at 08:38

## 2025-02-08 RX ADMIN — NYSTATIN 1 APPLICATION(S): 100000 CREAM TOPICAL at 21:11

## 2025-02-08 RX ADMIN — IPRATROPIUM BROMIDE AND ALBUTEROL SULFATE 3 MILLILITER(S): .5; 2.5 SOLUTION RESPIRATORY (INHALATION) at 08:20

## 2025-02-08 RX ADMIN — INSULIN LISPRO 10 UNIT(S): 100 INJECTION, SOLUTION INTRAVENOUS; SUBCUTANEOUS at 16:53

## 2025-02-08 NOTE — PROGRESS NOTE ADULT - ASSESSMENT
# Acute Hypoxic Respiratory Failure 2/2 Multifocal Pneumonia/  Acute on chronic diastolic CHF/  Influenza A infection/ RASHAD/ [facility]   - clinically improving, on high low oxygen for now, will taper off as tolerated   - NIV PRN and QHS   - nebs Q 6 hours, supportive care, aspiration precautions   - CTA 1/20: Negative for pulmonary emboli. Interval development of infiltrates within the lower lobes and lingula which may reflect acute multilobar pneumonia  - blood cx NGTD x2, UA negative, influenza A + , Nasal MRSA negative   - pt was consulted by ID, treated with IV Abx, completed the course of Tamiflu   - TTE 1/10/25: poor study, EF 60-65%, pt has moderated TR  - fluid restriction 1200 ml in 24 hours , intake and output monitoring, daily weight   - Desaturated again while in ICU on NC, and Bipap was started  - CT chest: Worsening bilateral peribronchial opacities compatible with bronchopneumonia  - F/u cultures, legionella, procal, fungitell  - Started IV cefepime 2g BID and vancomycin  - Follow up ID  - HFNC 40/40, today, titrate off   - duonebs and solumedrol 40 mg daily , wheeze improved     # 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 Tolerated well. Rituximab was given on D5   - Uric acid 10.3 on 1/16/25, s/p 3 mg IV rasburicase  - sp Zarxio  - daily CBC with diff, active T&S  - c/w Allopurinol   - No onc plans to start chemo/treatment inpatient, can dc when medically appropriate with follow-up with Dr. Hilliard who will schedule cycle 2 of treatment.     # KRISTIEN on CKD 3B / Chronic Lower Extremity Edema   - baseline ~high 1s, low 2s  - c/w diuretics: bumex 2mg IV BID --> to 2mg IV TID --> 2 mg PO daily  - monitor BUN/cr and urine output  - ajay d/c'd  - avoid nephrotoxic medications   - will hold off Metolazone on 02/08 as BUN/Cr 115/2.7  - nephrology is following, recommendations noted    # CAD s/p CABG/ Chronic Afib on Xarelto/ HTN  - c/w statin, BB  - Holding ASA  - Still holding lisinopril and aldactone from last admission  - sylvester need to switch to eliquis given CKD from xaerlto   - dc heparin ggt in pm start eliquis 5 mg BID     # HLD   - on Atorvastatin now   - resume Repatha on discharge    # DM 2/2 steroids and hyperglycemia   - CC diet   - FS - self monitored via dex com  - On insulin pump @2.85 at home   - Endo consult: keep off insulin pump  - lantus 10 lispro 3 --> has been steadily increased : lantus increased from 30 to 40 and lispro from 10 to 16 on 02/08 --> s/p Humulin 7 units 1 dose  - changed diet from saying diet regular and CC, to carb consistant no snacks  - BMP in evening , gap closed    # Oral pain due to ulcer with black discoloration   - c/w local couth care  - consult ENT to examine black discoloration -> rec CT neck w/ IV contrast, magic mouthwash  - CT neck w/IV contrast no tongue mass.   - oncology thinking this is mucositis   - Monitor bleed, keep active TS and trend CBC.   - Will c/w magic mouthwash.   - Bleeding is resolving    # Agitation/ confusion   # Insomnia   - pt is calm now   - on Haldol PRN , Sertraline and melatonin   - supportive care     # GI  - on teofilo regimen and PPIs.    #Progress Note Handoff  Pending (specify):  as above  Family discussion: house staff updated pt family  Disposition: [facility], sdu   Decision to admit the pt is based on acuity as above

## 2025-02-08 NOTE — PROGRESS NOTE ADULT - ASSESSMENT
# Acute Hypoxic Respiratory Failure 2/2 Multifocal Pneumonia/  Acute on chronic diastolic CHF/  Influenza A infection/ RASHAD/ [facility]   - clinically improving, on high low oxygen for now, will taper off as tolerated   - NIV PRN and QHS   - nebs Q 6 hours, supportive care, aspiration precautions   - CTA 1/20: Negative for pulmonary emboli. Interval development of infiltrates within the lower lobes and lingula which may reflect acute multilobar pneumonia  - blood cx NGTD x2, UA negative, influenza A + , Nasal MRSA negative   - pt was consulted by ID, treated with IV Abx, completed the course of Tamiflu   - TTE 1/10/25: poor study, EF 60-65%, pt has moderated TR  - fluid restriction 1200 ml in 24 hours , intake and output monitoring, daily weight   - Desaturated again while in ICU on NC, and Bipap was started  - CT chest: Worsening bilateral peribronchial opacities compatible with bronchopneumonia  - F/u cultures, legionella, procal, fungitell  - Started IV cefepime 2g BID and vancomycin  - Follow up ID  - HFNC 40/40, today, titrate off   - duonebs and solumedrol 40 mg daily , wheeze on exam    # 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 Tolerated well. Rituximab was given on D5   - Uric acid 10.3 on 1/16/25, s/p 3 mg IV rasburicase  - sp Zarxio  - daily CBC with diff, active T&S  - c/w Allopurinol   - No onc plans to start chemo/treatment inpatient, can dc when medically appropriate with follow-up with Dr. Hilliard who will schedule cycle 2 of treatment.     # KRISTINE on CKD 3B / Chronic Lower Extremity Edema   - baseline ~high 1s, low 2s  - c/w diuretics: bumex 2mg IV BID --> to 2mg IV TID --> 2 mg PO daily  - monitor BUN/cr and urine output  - ajay d/c'd  - avoid nephrotoxic medications   - will hold off Metolazone on 02/08 as BUN/Cr 115/2.7  - nephrology is following, recommendations noted    # CAD s/p CABG/ Chronic Afib on Xarelto/ HTN  - c/w statin, BB  - Holding ASA  - Still holding lisinopril and aldactone from last admission  - sylvester need to switch to eliquis given CKD from xaerlto   - dc heparin ggt in pm start eliquis 5 mg BID     # HLD   - on Atorvastatin now   - resume Repatha on discharge    # DM  - CC diet   - FS - self monitored via dex com  - On insulin pump @2.85 at home   - Endo consult: keep off insulin pump  - lantus 10 lispro 3 --> has been steadily increased : lantus increased from 30 to 40 and lispro from 10 to 16 on 02/08 --> s/p Humulin 7 units 1 dose    # Oral pain due to ulcer with black discoloration   - c/w local couth care  - consult ENT to examine black discoloration -> rec CT neck w/ IV contrast, magic mouthwash  - CT neck w/IV contrast no tongue mass.   - oncology thinking this is mucositis   - Monitor bleed, keep active TS and trend CBC.   - Will c/w magic mouthwash.   - Bleeding is resolving    # Agitation/ confusion   # Insomnia   - pt is calm now   - on Haldol PRN , Sertraline and melatonin   - supportive care     # GI  - on teofilo regimen and PPIs.    #Misc  #Code Status: Full Code  #DVT ppx: Heparin drip  #GI ppx: PPI  #Diet: CC  #Activity: OOBTC  #Inpatient Dispo: SDU  #Discharge Dispo: Home vs Rehab

## 2025-02-08 NOTE — PROGRESS NOTE ADULT - ASSESSMENT
IMPRESSION:    Acute hypoxemic respiratory failure  Possible PNA   Volume overload improved   KRISTINE on CKD   HFpEF  Large B cell lymphoma  Afib on Xarelto - on IV heparin now   Hx of HTN and DM      PLAN:    CNS: Pain control, avoid depressants. MS remains adequate.     HEENT: Oral care    PULMONARY:  HOB @ 45 degrees.  Aspiration precautions.  Wean FiO2 as tolerated.  keep O2 sat 92-96%.  BIPAP during sleep and PRN during the day.   CT chest NC noted.  f/u MRSA nares    CARDIOVASCULAR:  Keep even balance for now. Bumex daily. Hold metolazone. Continue rte control.     GI: GI prophylaxis protonix, speech and swallow; rule out aspiration.     RENAL:  Follow up lytes. Correct as needed.  Monitor UO     INFECTIOUS DISEASE:  Send pan culture; procal. Adjust to kidney function. Repeat MRSA nasal. f/u ID reccs     HEMATOLOGICAL:  on Eliquis.  Monitor CBC and coags.       ENDOCRINE:  Follow up FS.  Insulin protocol if needed.    MUSCULOSKELETAL: Out of bed to chair. PT OT     Dispo: SDU; Prognosis is guarded.      IMPRESSION:    Acute hypoxemic respiratory failure  Possible PNA   Volume overload improved   KRISTINE on CKD   HFpEF  Large B cell lymphoma  Afib on Xarelto - on IV heparin now   Hx of HTN and DM      PLAN:    CNS: Pain control, avoid depressants. MS remains adequate.     HEENT: Oral care    PULMONARY:  HOB @ 45 degrees.  Aspiration precautions.  Wean FiO2 as tolerated.  keep O2 sat 92-96%.  BIPAP during sleep and PRN during the day. Wean down to 40/40.   CT chest NC noted with bibasilar consolidations.  f/u MRSA nares    CARDIOVASCULAR:  Keep even balance for now. Bumex daily. Hold metolazone. Continue rate control.     GI: GI prophylaxis protonix, diet per speech and swallow.    RENAL:  Follow up lytes. Correct as needed.  Monitor UO     INFECTIOUS DISEASE:  Send pan culture; procal. Adjust to kidney function. Repeat MRSA nasal. f/u ID reccs. fungitel. Abx per ID. Vanc level.     HEMATOLOGICAL:  on Eliquis.  Monitor CBC and coags.       ENDOCRINE:  Follow up FS.  Insulin protocol if needed.    MUSCULOSKELETAL: Out of bed to chair. PT OT     Dispo: SDU; Prognosis is guarded.

## 2025-02-08 NOTE — PROGRESS NOTE ADULT - SUBJECTIVE AND OBJECTIVE BOX
Patient is a 70y old  Male who presents with a chief complaint of SOB (02-08-25)      Pt seen and examined at bedside. No CP or SOB.      ABG - ( 06 Feb 2025 12:35 )  pH: 7.48  /  pCO2: 52    /  pO2: 73    / HCO3: 39    / Base Excess: 13.0  /  SaO2: 96.7                PAST MEDICAL & SURGICAL HISTORY:  Diabetes mellitus, type 2    BPH (benign prostatic hyperplasia)    Water retention    Essential hypertension    Diabetes    CAD (coronary artery disease)    H/O hypercholesterolemia    Morbid obesity    Chronic kidney disease (CKD)    History of atrial fibrillation    H/O right coronary artery stent placement    History of resection of pancreas    History of cholecystectomy    History of left knee replacement    S/P CABG x 6    H/O cardiac radiofrequency ablation        VITAL SIGNS (Last 24 hrs):  T(C): 36.1 (02-08-25 @ 08:00), Max: 37.2 (02-07-25 @ 12:00)  HR: 72 (02-08-25 @ 08:00) (69 - 95)  BP: 116/60 (02-08-25 @ 08:00) (112/62 - 128/66)  RR: 18 (02-08-25 @ 08:00) (18 - 18)  SpO2: 95% (02-08-25 @ 08:00) (90% - 99%)  Wt(kg): --  Daily     Daily     I&O's Summary    07 Feb 2025 07:01  -  08 Feb 2025 07:00  --------------------------------------------------------  IN: 76 mL / OUT: 500 mL / NET: -424 mL        PHYSICAL EXAM:  GENERAL: NAD, well-developed  HEAD:  Atraumatic, Normocephalic  EYES: EOMI, PERRLA, conjunctiva and sclera clear  NECK: Supple, No JVD  CHEST/LUNG: Clear to auscultation bilaterally; No wheeze  HEART: Regular rate and rhythm; No murmurs, rubs, or gallops  ABDOMEN: Soft, Nontender, Nondistended; Bowel sounds present  EXTREMITIES:  2+ Peripheral Pulses, No clubbing, cyanosis, or edema  PSYCH: AAOx3  NEUROLOGY: non-focal  SKIN: No rashes or lesions    Labs Reviewed  Spoke to patient in regards to abnormal labs.    CBC Full  -  ( 08 Feb 2025 05:59 )  WBC Count : 16.14 K/uL  Hemoglobin : 8.9 g/dL  Hematocrit : 27.1 %  Platelet Count - Automated : 611 K/uL  Mean Cell Volume : 92.2 fL  Mean Cell Hemoglobin : 30.3 pg  Mean Cell Hemoglobin Concentration : 32.8 g/dL  Auto Neutrophil # : 12.40 K/uL  Auto Lymphocyte # : 0.83 K/uL  Auto Monocyte # : 2.11 K/uL  Auto Eosinophil # : 0.00 K/uL  Auto Basophil # : 0.09 K/uL  Auto Neutrophil % : 76.8 %  Auto Lymphocyte % : 5.1 %  Auto Monocyte % : 13.1 %  Auto Eosinophil % : 0.0 %  Auto Basophil % : 0.6 %    BMP:    02-08 @ 05:59    Blood Urea Nitrogen - 115  Calcium - 8.9  Carbond Dioxide - 31  Chloride - 90  Creatinine - 2.7  Glucose - 382  Potassium - 4.9  Sodium - 133      Hemoglobin A1c -   PTT - ( 07 Feb 2025 11:00 )  PTT:92.8 sec  Urine Culture:  02-06 @ 12:30 Urine culture: --    Culture Results:   No growth at 24 hours  Method Type: --  Organism: --  Organism Identification: --  Specimen Source: .Blood BLOOD        COVID Labs  CRP:  87.8 (01-30-25)    Procalcitonin: 0.31 ng/mL (02-06-25 @ 12:40)  Procalcitonin: 1.27 ng/mL (01-30-25 @ 17:00)  Procalcitonin: 4.48 ng/mL (01-27-25 @ 06:12)    D-Dimer:      Fungitell: <31 pg/mL (01-28-25 @ 05:00)    Fungitell: <31 pg/mL (01-28-25 @ 05:00)      Imaging reviewed independently and reviewed read    < from: Xray Chest 1 View- PORTABLE-Urgent (Xray Chest 1 View- PORTABLE-Urgent .) (02.08.25 @ 09:40) >  IMPRESSION:    No radiographic evidence of acute cardiopulmonary disease.    < end of copied text >      MEDICATIONS  (STANDING):  albuterol/ipratropium for Nebulization 3 milliLiter(s) Nebulizer every 4 hours  allopurinol 150 milliGRAM(s) Oral daily  apixaban 5 milliGRAM(s) Oral every 12 hours  ascorbic acid 500 milliGRAM(s) Oral daily  atorvastatin 10 milliGRAM(s) Oral at bedtime  benzocaine 20% Spray 1 Spray(s) Topical three times a day  buMETAnide 2 milliGRAM(s) Oral daily  cefepime   IVPB 2000 milliGRAM(s) IV Intermittent every 12 hours  cefepime   IVPB      chlorhexidine 2% Cloths 1 Application(s) Topical daily  dextrose 5%. 1000 milliLiter(s) (100 mL/Hr) IV Continuous <Continuous>  dextrose 5%. 1000 milliLiter(s) (50 mL/Hr) IV Continuous <Continuous>  dextrose 50% Injectable 25 Gram(s) IV Push once  dextrose 50% Injectable 12.5 Gram(s) IV Push once  dextrose 50% Injectable 25 Gram(s) IV Push once  FIRST- Mouthwash  BLM 10 milliLiter(s) Swish and Spit every 4 hours  glucagon  Injectable 1 milliGRAM(s) IntraMuscular once  influenza  Vaccine (HIGH DOSE) 0.5 milliLiter(s) IntraMuscular once  insulin glargine Injectable (LANTUS) 40 Unit(s) SubCutaneous at bedtime  insulin lispro (ADMELOG) corrective regimen sliding scale   SubCutaneous three times a day before meals  insulin lispro Injectable (ADMELOG) 16 Unit(s) SubCutaneous three times a day before meals  melatonin 10 milliGRAM(s) Oral at bedtime  methylPREDNISolone sodium succinate Injectable 40 milliGRAM(s) IV Push daily  metoprolol tartrate 25 milliGRAM(s) Oral two times a day  NIFEdipine XL 60 milliGRAM(s) Oral daily  nystatin Cream 1 Application(s) Topical every 12 hours  pantoprazole  Injectable 40 milliGRAM(s) IV Push daily  polyethylene glycol 3350 17 Gram(s) Oral every 12 hours  senna 2 Tablet(s) Oral every 12 hours  sertraline 50 milliGRAM(s) Oral daily  tamsulosin 0.4 milliGRAM(s) Oral at bedtime  vancomycin  IVPB 1000 milliGRAM(s) IV Intermittent once    MEDICATIONS  (PRN):  aluminum hydroxide/magnesium hydroxide/simethicone Suspension 30 milliLiter(s) Oral every 4 hours PRN Dyspepsia  bisacodyl 5 milliGRAM(s) Oral daily PRN Constipation  calcium carbonate    500 mG (Tums) Chewable 1 Tablet(s) Chew three times a day PRN Dyspepsia  dextrose Oral Gel 15 Gram(s) Oral once PRN Blood Glucose LESS THAN 70 milliGRAM(s)/deciliter  HYDROmorphone  Injectable 0.5 milliGRAM(s) IV Push every 6 hours PRN Severe Pain (7 - 10)  hydrOXYzine hydrochloride 25 milliGRAM(s) Oral every 6 hours PRN Anxiety  magnesium hydroxide Suspension 30 milliLiter(s) Oral daily PRN Constipation  ondansetron Injectable 4 milliGRAM(s) IV Push every 6 hours PRN Nausea and/or Vomiting  oxyCODONE    IR 10 milliGRAM(s) Oral every 6 hours PRN Moderate Pain (4 - 6)  simethicone 80 milliGRAM(s) Chew daily PRN Gas

## 2025-02-08 NOTE — PROGRESS NOTE ADULT - SUBJECTIVE AND OBJECTIVE BOX
24H events:    Patient is a 70y old Male who presents with a chief complaint of SOB (08 Feb 2025 07:17)    Primary diagnosis of Neutropenic fever    Today is hospital day 19d. This morning patient was seen and examined at bedside, resting comfortably in bed.    No acute or major events overnight. Hemodynamically stable, tolerating oral diet, voiding appropriately with appropriate bowel movements.        PAST MEDICAL & SURGICAL HISTORY  Diabetes mellitus, type 2    BPH (benign prostatic hyperplasia)    Water retention    Essential hypertension    Diabetes    CAD (coronary artery disease)    H/O hypercholesterolemia    Morbid obesity    Chronic kidney disease (CKD)    History of atrial fibrillation    H/O right coronary artery stent placement    History of resection of pancreas    History of cholecystectomy    History of left knee replacement    S/P CABG x 6    H/O cardiac radiofrequency ablation      SOCIAL HISTORY:  Social History:      ALLERGIES:  No Known Allergies    MEDICATIONS:  STANDING MEDICATIONS  albuterol/ipratropium for Nebulization 3 milliLiter(s) Nebulizer every 4 hours  allopurinol 150 milliGRAM(s) Oral daily  apixaban 5 milliGRAM(s) Oral every 12 hours  ascorbic acid 500 milliGRAM(s) Oral daily  atorvastatin 10 milliGRAM(s) Oral at bedtime  benzocaine 20% Spray 1 Spray(s) Topical three times a day  buMETAnide 2 milliGRAM(s) Oral daily  cefepime   IVPB 2000 milliGRAM(s) IV Intermittent every 12 hours  cefepime   IVPB      chlorhexidine 2% Cloths 1 Application(s) Topical daily  dextrose 5%. 1000 milliLiter(s) IV Continuous <Continuous>  dextrose 5%. 1000 milliLiter(s) IV Continuous <Continuous>  dextrose 50% Injectable 25 Gram(s) IV Push once  dextrose 50% Injectable 12.5 Gram(s) IV Push once  dextrose 50% Injectable 25 Gram(s) IV Push once  FIRST- Mouthwash  BLM 10 milliLiter(s) Swish and Spit every 4 hours  glucagon  Injectable 1 milliGRAM(s) IntraMuscular once  influenza  Vaccine (HIGH DOSE) 0.5 milliLiter(s) IntraMuscular once  insulin glargine Injectable (LANTUS) 40 Unit(s) SubCutaneous at bedtime  insulin lispro (ADMELOG) corrective regimen sliding scale   SubCutaneous three times a day before meals  insulin lispro Injectable (ADMELOG) 16 Unit(s) SubCutaneous three times a day before meals  melatonin 10 milliGRAM(s) Oral at bedtime  methylPREDNISolone sodium succinate Injectable 40 milliGRAM(s) IV Push daily  metoprolol tartrate 25 milliGRAM(s) Oral two times a day  NIFEdipine XL 60 milliGRAM(s) Oral daily  nystatin Cream 1 Application(s) Topical every 12 hours  pantoprazole  Injectable 40 milliGRAM(s) IV Push daily  polyethylene glycol 3350 17 Gram(s) Oral every 12 hours  senna 2 Tablet(s) Oral every 12 hours  sertraline 50 milliGRAM(s) Oral daily  tamsulosin 0.4 milliGRAM(s) Oral at bedtime  vancomycin  IVPB 1000 milliGRAM(s) IV Intermittent once    PRN MEDICATIONS  aluminum hydroxide/magnesium hydroxide/simethicone Suspension 30 milliLiter(s) Oral every 4 hours PRN  bisacodyl 5 milliGRAM(s) Oral daily PRN  calcium carbonate    500 mG (Tums) Chewable 1 Tablet(s) Chew three times a day PRN  dextrose Oral Gel 15 Gram(s) Oral once PRN  HYDROmorphone  Injectable 0.5 milliGRAM(s) IV Push every 6 hours PRN  hydrOXYzine hydrochloride 25 milliGRAM(s) Oral every 6 hours PRN  magnesium hydroxide Suspension 30 milliLiter(s) Oral daily PRN  ondansetron Injectable 4 milliGRAM(s) IV Push every 6 hours PRN  oxyCODONE    IR 10 milliGRAM(s) Oral every 6 hours PRN  simethicone 80 milliGRAM(s) Chew daily PRN    VITALS:   T(F): 97  HR: 72  BP: 116/60  RR: 18  SpO2: 95%    PHYSICAL EXAM:  General: Appears comfortable   Head: HFNC in place  Neck: Supple, no JVD  Cardiac: Regular rate and rhythm  Pulmonary: Decreased breath sounds at the bases  Abdominal: Soft, nontender, and nondistended  Extremities: No pitting edema  Neurologic: AOx3, nonfocal  Skin: No rashes or lesions     LABS:                        8.9    16.14 )-----------( 611      ( 08 Feb 2025 05:59 )             27.1     02-08    133[L]  |  90[L]  |  115[HH]  ----------------------------<  382[H]  4.9   |  31  |  2.7[H]    Ca    8.9      08 Feb 2025 05:59  Mg     2.7     02-08    TPro  5.8[L]  /  Alb  3.1[L]  /  TBili  0.3  /  DBili  x   /  AST  65[H]  /  ALT  31  /  AlkPhos  262[H]  02-08    PTT - ( 07 Feb 2025 11:00 )  PTT:92.8 sec    Urinalysis Basic - ( 08 Feb 2025 05:59 )  Color: x / Appearance: x / SG: x / pH: x  Gluc: 382 mg/dL / Ketone: x  / Bili: x / Urobili: x   Blood: x / Protein: x / Nitrite: x   Leuk Esterase: x / RBC: x / WBC x   Sq Epi: x / Non Sq Epi: x / Bacteria: x    ABG - ( 06 Feb 2025 12:35 )  pH, Arterial: 7.48  pH, Blood: x     /  pCO2: 52    /  pO2: 73    / HCO3: 39    / Base Excess: 13.0  /  SaO2: 96.7      Culture - Blood (collected 06 Feb 2025 12:30)  Source: .Blood BLOOD  Preliminary Report (07 Feb 2025 22:02):    No growth at 24 hours      RADIOLOGY:    ACC: [id number] EXAM:  XR CHEST PORTABLE URGENT 1V   ORDERED BY: DANNY NORMAN     PROCEDURE DATE:  02/08/2025      INTERPRETATION:  CLINICAL HISTORY: Cough    COMPARISON: 2/6/2025.    TECHNIQUE: Frontal    FINDINGS:    Support devices: None.    Cardiac/mediastinum/hilum: Stable.    Lung parenchyma/Pleura: No focal parenchymal opacities, pleural effusions, or pneumothorax.    Skeleton/soft tissues: Stable.    IMPRESSION:    No radiographic evidence of acute cardiopulmonary disease.    --- End of Report ---    ERIK MARTINEZ MD; Attending Radiologist  This document has been electronically signed. Feb 8 2025  9:54AM

## 2025-02-08 NOTE — PROGRESS NOTE ADULT - SUBJECTIVE AND OBJECTIVE BOX
Patient is a 70y old  Male who presents with a chief complaint of sob (27 Jan 2025 09:39)        Over Night Events: on HFNC, off pressors         ROS:     All ROS are negative except HPI         PHYSICAL EXAM    ICU Vital Signs Last 24 Hrs  T(C): 37.2 (07 Feb 2025 16:00), Max: 37.5 (07 Feb 2025 08:00)  T(F): 99 (07 Feb 2025 16:00), Max: 99.5 (07 Feb 2025 08:00)  HR: 81 (08 Feb 2025 04:00) (69 - 95)  BP: 128/66 (08 Feb 2025 04:00) (112/62 - 128/66)  BP(mean): 91 (08 Feb 2025 04:00) (80 - 91)  ABP: --  ABP(mean): --  RR: --  SpO2: 99% (08 Feb 2025 04:00) (90% - 99%)    O2 Parameters below as of 08 Feb 2025 00:11  Patient On (Oxygen Delivery Method): nasal cannula, high flow        CONSTITUTIONAL:  NAD    ENT:   Airway patent,   Mouth with normal mucosa.     EYES:   Pupils equal,   Round and reactive to light.    CARDIAC:   Normal rate,   Regular rhythm.        RESPIRATORY:   No wheezing  Bilateral crackles   Normal chest expansion  Not tachypneic,  No use of accessory muscles    GASTROINTESTINAL:  Abdomen soft,   Non-tender,   No guarding,   + BS    MUSCULOSKELETAL:   Range of motion is not limited,  No clubbing, cyanosis    NEUROLOGICAL:   Alert and oriented   No motor  deficits.    SKIN:   Skin normal color for race,   Warm and dry  No evidence of rash.      02-07-25 @ 07:01  -  02-08-25 @ 07:00  --------------------------------------------------------  IN:    Heparin Infusion: 76 mL  Total IN: 76 mL    OUT:    Voided (mL): 500 mL  Total OUT: 500 mL    Total NET: -424 mL          LABS:                            8.9    16.14 )-----------( 611      ( 08 Feb 2025 05:59 )             27.1                                               02-08    133[L]  |  90[L]  |  x   ----------------------------<  382[H]  4.9   |  31  |  2.7[H]    Ca    8.9      08 Feb 2025 05:59  Mg     2.7     02-08    TPro  5.8[L]  /  Alb  3.1[L]  /  TBili  0.3  /  DBili  x   /  AST  65[H]  /  ALT  31  /  AlkPhos  262[H]  02-08      PTT - ( 07 Feb 2025 11:00 )  PTT:92.8 sec                                       Urinalysis Basic - ( 08 Feb 2025 05:59 )    Color: x / Appearance: x / SG: x / pH: x  Gluc: 382 mg/dL / Ketone: x  / Bili: x / Urobili: x   Blood: x / Protein: x / Nitrite: x   Leuk Esterase: x / RBC: x / WBC x   Sq Epi: x / Non Sq Epi: x / Bacteria: x                                                  LIVER FUNCTIONS - ( 08 Feb 2025 05:59 )  Alb: 3.1 g/dL / Pro: 5.8 g/dL / ALK PHOS: 262 U/L / ALT: 31 U/L / AST: 65 U/L / GGT: x                                                  Culture - Blood (collected 06 Feb 2025 12:30)  Source: .Blood BLOOD  Preliminary Report (07 Feb 2025 22:02):    No growth at 24 hours                                                                                       ABG - ( 06 Feb 2025 12:35 )  pH, Arterial: 7.48  pH, Blood: x     /  pCO2: 52    /  pO2: 73    / HCO3: 39    / Base Excess: 13.0  /  SaO2: 96.7                MEDICATIONS  (STANDING):  albuterol/ipratropium for Nebulization 3 milliLiter(s) Nebulizer every 4 hours  allopurinol 150 milliGRAM(s) Oral daily  apixaban 5 milliGRAM(s) Oral every 12 hours  ascorbic acid 500 milliGRAM(s) Oral daily  atorvastatin 10 milliGRAM(s) Oral at bedtime  benzocaine 20% Spray 1 Spray(s) Topical three times a day  buMETAnide 2 milliGRAM(s) Oral daily  cefepime   IVPB 2000 milliGRAM(s) IV Intermittent every 12 hours  cefepime   IVPB      chlorhexidine 2% Cloths 1 Application(s) Topical daily  dextrose 5%. 1000 milliLiter(s) (100 mL/Hr) IV Continuous <Continuous>  dextrose 5%. 1000 milliLiter(s) (50 mL/Hr) IV Continuous <Continuous>  dextrose 50% Injectable 25 Gram(s) IV Push once  dextrose 50% Injectable 12.5 Gram(s) IV Push once  dextrose 50% Injectable 25 Gram(s) IV Push once  FIRST- Mouthwash  BLM 10 milliLiter(s) Swish and Spit every 4 hours  glucagon  Injectable 1 milliGRAM(s) IntraMuscular once  influenza  Vaccine (HIGH DOSE) 0.5 milliLiter(s) IntraMuscular once  insulin glargine Injectable (LANTUS) 30 Unit(s) SubCutaneous at bedtime  insulin lispro (ADMELOG) corrective regimen sliding scale   SubCutaneous three times a day before meals  insulin lispro Injectable (ADMELOG) 11 Unit(s) SubCutaneous three times a day before meals  melatonin 10 milliGRAM(s) Oral at bedtime  methylPREDNISolone sodium succinate Injectable 40 milliGRAM(s) IV Push daily  metoprolol tartrate 25 milliGRAM(s) Oral two times a day  NIFEdipine XL 60 milliGRAM(s) Oral daily  nystatin Cream 1 Application(s) Topical every 12 hours  pantoprazole  Injectable 40 milliGRAM(s) IV Push daily  polyethylene glycol 3350 17 Gram(s) Oral every 12 hours  senna 2 Tablet(s) Oral every 12 hours  sertraline 50 milliGRAM(s) Oral daily  tamsulosin 0.4 milliGRAM(s) Oral at bedtime    MEDICATIONS  (PRN):  aluminum hydroxide/magnesium hydroxide/simethicone Suspension 30 milliLiter(s) Oral every 4 hours PRN Dyspepsia  bisacodyl 5 milliGRAM(s) Oral daily PRN Constipation  calcium carbonate    500 mG (Tums) Chewable 1 Tablet(s) Chew three times a day PRN Dyspepsia  dextrose Oral Gel 15 Gram(s) Oral once PRN Blood Glucose LESS THAN 70 milliGRAM(s)/deciliter  HYDROmorphone  Injectable 0.5 milliGRAM(s) IV Push every 6 hours PRN Severe Pain (7 - 10)  hydrOXYzine hydrochloride 25 milliGRAM(s) Oral every 6 hours PRN Anxiety  magnesium hydroxide Suspension 30 milliLiter(s) Oral daily PRN Constipation  ondansetron Injectable 4 milliGRAM(s) IV Push every 6 hours PRN Nausea and/or Vomiting  oxyCODONE    IR 10 milliGRAM(s) Oral every 6 hours PRN Moderate Pain (4 - 6)  simethicone 80 milliGRAM(s) Chew daily PRN Gas

## 2025-02-09 LAB
ALBUMIN SERPL ELPH-MCNC: 3 G/DL — LOW (ref 3.5–5.2)
ALP SERPL-CCNC: 221 U/L — HIGH (ref 30–115)
ALT FLD-CCNC: 35 U/L — SIGNIFICANT CHANGE UP (ref 0–41)
ANION GAP SERPL CALC-SCNC: 14 MMOL/L — SIGNIFICANT CHANGE UP (ref 7–14)
ANION GAP SERPL CALC-SCNC: 14 MMOL/L — SIGNIFICANT CHANGE UP (ref 7–14)
AST SERPL-CCNC: 57 U/L — HIGH (ref 0–41)
BASOPHILS # BLD AUTO: 0.07 K/UL — SIGNIFICANT CHANGE UP (ref 0–0.2)
BASOPHILS NFR BLD AUTO: 0.5 % — SIGNIFICANT CHANGE UP (ref 0–1)
BILIRUB SERPL-MCNC: 0.3 MG/DL — SIGNIFICANT CHANGE UP (ref 0.2–1.2)
BUN SERPL-MCNC: 124 MG/DL — CRITICAL HIGH (ref 10–20)
BUN SERPL-MCNC: 125 MG/DL — CRITICAL HIGH (ref 10–20)
CALCIUM SERPL-MCNC: 8.5 MG/DL — SIGNIFICANT CHANGE UP (ref 8.4–10.5)
CALCIUM SERPL-MCNC: 8.6 MG/DL — SIGNIFICANT CHANGE UP (ref 8.4–10.5)
CHLORIDE SERPL-SCNC: 93 MMOL/L — LOW (ref 98–110)
CHLORIDE SERPL-SCNC: 93 MMOL/L — LOW (ref 98–110)
CO2 SERPL-SCNC: 30 MMOL/L — SIGNIFICANT CHANGE UP (ref 17–32)
CO2 SERPL-SCNC: 31 MMOL/L — SIGNIFICANT CHANGE UP (ref 17–32)
CREAT SERPL-MCNC: 2.4 MG/DL — HIGH (ref 0.7–1.5)
CREAT SERPL-MCNC: 2.5 MG/DL — HIGH (ref 0.7–1.5)
EGFR: 27 ML/MIN/1.73M2 — LOW
EGFR: 29 ML/MIN/1.73M2 — LOW
EOSINOPHIL # BLD AUTO: 0.01 K/UL — SIGNIFICANT CHANGE UP (ref 0–0.7)
EOSINOPHIL NFR BLD AUTO: 0.1 % — SIGNIFICANT CHANGE UP (ref 0–8)
GLUCOSE BLDC GLUCOMTR-MCNC: 101 MG/DL — HIGH (ref 70–99)
GLUCOSE BLDC GLUCOMTR-MCNC: 109 MG/DL — HIGH (ref 70–99)
GLUCOSE BLDC GLUCOMTR-MCNC: 125 MG/DL — HIGH (ref 70–99)
GLUCOSE BLDC GLUCOMTR-MCNC: 187 MG/DL — HIGH (ref 70–99)
GLUCOSE BLDC GLUCOMTR-MCNC: 225 MG/DL — HIGH (ref 70–99)
GLUCOSE BLDC GLUCOMTR-MCNC: 251 MG/DL — HIGH (ref 70–99)
GLUCOSE BLDC GLUCOMTR-MCNC: 302 MG/DL — HIGH (ref 70–99)
GLUCOSE BLDC GLUCOMTR-MCNC: 378 MG/DL — HIGH (ref 70–99)
GLUCOSE BLDC GLUCOMTR-MCNC: 380 MG/DL — HIGH (ref 70–99)
GLUCOSE BLDC GLUCOMTR-MCNC: 426 MG/DL — HIGH (ref 70–99)
GLUCOSE BLDC GLUCOMTR-MCNC: 87 MG/DL — SIGNIFICANT CHANGE UP (ref 70–99)
GLUCOSE SERPL-MCNC: 225 MG/DL — HIGH (ref 70–99)
GLUCOSE SERPL-MCNC: 89 MG/DL — SIGNIFICANT CHANGE UP (ref 70–99)
HCT VFR BLD CALC: 27.1 % — LOW (ref 42–52)
HGB BLD-MCNC: 8.8 G/DL — LOW (ref 14–18)
IMM GRANULOCYTES NFR BLD AUTO: 5.3 % — HIGH (ref 0.1–0.3)
LYMPHOCYTES # BLD AUTO: 0.67 K/UL — LOW (ref 1.2–3.4)
LYMPHOCYTES # BLD AUTO: 4.5 % — LOW (ref 20.5–51.1)
MAGNESIUM SERPL-MCNC: 2.6 MG/DL — HIGH (ref 1.8–2.4)
MCHC RBC-ENTMCNC: 29.9 PG — SIGNIFICANT CHANGE UP (ref 27–31)
MCHC RBC-ENTMCNC: 32.5 G/DL — SIGNIFICANT CHANGE UP (ref 32–37)
MCV RBC AUTO: 92.2 FL — SIGNIFICANT CHANGE UP (ref 80–94)
MONOCYTES # BLD AUTO: 2.83 K/UL — HIGH (ref 0.1–0.6)
MONOCYTES NFR BLD AUTO: 19.2 % — HIGH (ref 1.7–9.3)
NEUTROPHILS # BLD AUTO: 10.39 K/UL — HIGH (ref 1.4–6.5)
NEUTROPHILS NFR BLD AUTO: 70.4 % — SIGNIFICANT CHANGE UP (ref 42.2–75.2)
NRBC # BLD: 1 /100 WBCS — HIGH (ref 0–0)
NRBC BLD-RTO: 1 /100 WBCS — HIGH (ref 0–0)
PLATELET # BLD AUTO: 572 K/UL — HIGH (ref 130–400)
PMV BLD: 11.6 FL — HIGH (ref 7.4–10.4)
POTASSIUM SERPL-MCNC: 4.3 MMOL/L — SIGNIFICANT CHANGE UP (ref 3.5–5)
POTASSIUM SERPL-MCNC: 4.4 MMOL/L — SIGNIFICANT CHANGE UP (ref 3.5–5)
POTASSIUM SERPL-SCNC: 4.3 MMOL/L — SIGNIFICANT CHANGE UP (ref 3.5–5)
POTASSIUM SERPL-SCNC: 4.4 MMOL/L — SIGNIFICANT CHANGE UP (ref 3.5–5)
PROT SERPL-MCNC: 5.5 G/DL — LOW (ref 6–8)
RBC # BLD: 2.94 M/UL — LOW (ref 4.7–6.1)
RBC # FLD: 15.1 % — HIGH (ref 11.5–14.5)
SODIUM SERPL-SCNC: 137 MMOL/L — SIGNIFICANT CHANGE UP (ref 135–146)
SODIUM SERPL-SCNC: 138 MMOL/L — SIGNIFICANT CHANGE UP (ref 135–146)
VANCOMYCIN FLD-MCNC: 20.2 UG/ML — HIGH (ref 5–10)
WBC # BLD: 14.75 K/UL — HIGH (ref 4.8–10.8)
WBC # FLD AUTO: 14.75 K/UL — HIGH (ref 4.8–10.8)

## 2025-02-09 PROCEDURE — 70450 CT HEAD/BRAIN W/O DYE: CPT | Mod: 26

## 2025-02-09 PROCEDURE — 99233 SBSQ HOSP IP/OBS HIGH 50: CPT | Mod: GC

## 2025-02-09 PROCEDURE — 71045 X-RAY EXAM CHEST 1 VIEW: CPT | Mod: 26

## 2025-02-09 RX ORDER — IPRATROPIUM BROMIDE AND ALBUTEROL SULFATE .5; 2.5 MG/3ML; MG/3ML
3 SOLUTION RESPIRATORY (INHALATION) EVERY 6 HOURS
Refills: 0 | Status: DISCONTINUED | OUTPATIENT
Start: 2025-02-09 | End: 2025-02-17

## 2025-02-09 RX ORDER — SODIUM CHLORIDE 9 G/1000ML
1000 INJECTION, SOLUTION INTRAVENOUS
Refills: 0 | Status: DISCONTINUED | OUTPATIENT
Start: 2025-02-09 | End: 2025-02-10

## 2025-02-09 RX ORDER — INSULIN GLARGINE-YFGN 100 [IU]/ML
40 INJECTION, SOLUTION SUBCUTANEOUS ONCE
Refills: 0 | Status: COMPLETED | OUTPATIENT
Start: 2025-02-09 | End: 2025-02-09

## 2025-02-09 RX ORDER — INSULIN LISPRO 100 U/ML
18 INJECTION, SOLUTION INTRAVENOUS; SUBCUTANEOUS
Refills: 0 | Status: DISCONTINUED | OUTPATIENT
Start: 2025-02-09 | End: 2025-02-11

## 2025-02-09 RX ORDER — POTASSIUM CHLORIDE, DEXTROSE MONOHYDRATE AND SODIUM CHLORIDE 150; 5; 900 MG/100ML; G/100ML; MG/100ML
1000 INJECTION, SOLUTION INTRAVENOUS
Refills: 0 | Status: DISCONTINUED | OUTPATIENT
Start: 2025-02-09 | End: 2025-02-09

## 2025-02-09 RX ORDER — POLYETHYLENE GLYCOL 3350 17 G/17G
17 POWDER, FOR SOLUTION ORAL
Refills: 0 | Status: DISCONTINUED | OUTPATIENT
Start: 2025-02-09 | End: 2025-02-17

## 2025-02-09 RX ADMIN — ATORVASTATIN CALCIUM 10 MILLIGRAM(S): 80 TABLET, FILM COATED ORAL at 21:57

## 2025-02-09 RX ADMIN — INSULIN LISPRO 16 UNIT(S): 100 INJECTION, SOLUTION INTRAVENOUS; SUBCUTANEOUS at 12:29

## 2025-02-09 RX ADMIN — Medication 10 MILLIGRAM(S): at 21:56

## 2025-02-09 RX ADMIN — IPRATROPIUM BROMIDE AND ALBUTEROL SULFATE 3 MILLILITER(S): .5; 2.5 SOLUTION RESPIRATORY (INHALATION) at 14:20

## 2025-02-09 RX ADMIN — CEFEPIME 100 MILLIGRAM(S): 2 INJECTION, POWDER, FOR SOLUTION INTRAVENOUS at 06:03

## 2025-02-09 RX ADMIN — Medication 0.5 MILLIGRAM(S): at 06:05

## 2025-02-09 RX ADMIN — INSULIN LISPRO 12: 100 INJECTION, SOLUTION INTRAVENOUS; SUBCUTANEOUS at 17:37

## 2025-02-09 RX ADMIN — METOPROLOL SUCCINATE 25 MILLIGRAM(S): 50 TABLET, EXTENDED RELEASE ORAL at 06:04

## 2025-02-09 RX ADMIN — BENZOCAINE 1 SPRAY(S): 220 SPRAY, METERED PERIODONTAL at 13:27

## 2025-02-09 RX ADMIN — Medication 60 MILLIGRAM(S): at 06:04

## 2025-02-09 RX ADMIN — IPRATROPIUM BROMIDE AND ALBUTEROL SULFATE 3 MILLILITER(S): .5; 2.5 SOLUTION RESPIRATORY (INHALATION) at 08:40

## 2025-02-09 RX ADMIN — DIPHENHYDRAMINE HYDROCHLORIDE AND LIDOCAINE HYDROCHLORIDE AND ALUMINUM HYDROXIDE AND MAGNESIUM HYDRO 10 MILLILITER(S): KIT at 10:48

## 2025-02-09 RX ADMIN — NYSTATIN 1 APPLICATION(S): 100000 CREAM TOPICAL at 06:04

## 2025-02-09 RX ADMIN — POTASSIUM CHLORIDE, DEXTROSE MONOHYDRATE AND SODIUM CHLORIDE 100 MILLILITER(S): 150; 5; 900 INJECTION, SOLUTION INTRAVENOUS at 03:40

## 2025-02-09 RX ADMIN — BENZOCAINE 1 SPRAY(S): 220 SPRAY, METERED PERIODONTAL at 21:57

## 2025-02-09 RX ADMIN — INSULIN GLARGINE-YFGN 40 UNIT(S): 100 INJECTION, SOLUTION SUBCUTANEOUS at 21:56

## 2025-02-09 RX ADMIN — HYDROXYZINE HYDROCHLORIDE 25 MILLIGRAM(S): 25 TABLET, FILM COATED ORAL at 22:28

## 2025-02-09 RX ADMIN — BENZOCAINE 1 SPRAY(S): 220 SPRAY, METERED PERIODONTAL at 06:03

## 2025-02-09 RX ADMIN — DIPHENHYDRAMINE HYDROCHLORIDE AND LIDOCAINE HYDROCHLORIDE AND ALUMINUM HYDROXIDE AND MAGNESIUM HYDRO 10 MILLILITER(S): KIT at 13:32

## 2025-02-09 RX ADMIN — Medication 0.5 MILLIGRAM(S): at 00:00

## 2025-02-09 RX ADMIN — INSULIN LISPRO 10: 100 INJECTION, SOLUTION INTRAVENOUS; SUBCUTANEOUS at 12:29

## 2025-02-09 RX ADMIN — Medication 150 MILLIGRAM(S): at 12:26

## 2025-02-09 RX ADMIN — CEFEPIME 100 MILLIGRAM(S): 2 INJECTION, POWDER, FOR SOLUTION INTRAVENOUS at 17:38

## 2025-02-09 RX ADMIN — POLYETHYLENE GLYCOL 3350 17 GRAM(S): 17 POWDER, FOR SOLUTION ORAL at 06:04

## 2025-02-09 RX ADMIN — Medication 8 UNIT(S)/HR: at 22:27

## 2025-02-09 RX ADMIN — INSULIN LISPRO 18 UNIT(S): 100 INJECTION, SOLUTION INTRAVENOUS; SUBCUTANEOUS at 17:36

## 2025-02-09 RX ADMIN — Medication 0.5 MILLIGRAM(S): at 06:30

## 2025-02-09 RX ADMIN — Medication 0.5 MILLIGRAM(S): at 22:27

## 2025-02-09 RX ADMIN — DIPHENHYDRAMINE HYDROCHLORIDE AND LIDOCAINE HYDROCHLORIDE AND ALUMINUM HYDROXIDE AND MAGNESIUM HYDRO 10 MILLILITER(S): KIT at 21:56

## 2025-02-09 RX ADMIN — DIPHENHYDRAMINE HYDROCHLORIDE AND LIDOCAINE HYDROCHLORIDE AND ALUMINUM HYDROXIDE AND MAGNESIUM HYDRO 10 MILLILITER(S): KIT at 17:38

## 2025-02-09 RX ADMIN — HYDROXYZINE HYDROCHLORIDE 25 MILLIGRAM(S): 25 TABLET, FILM COATED ORAL at 03:54

## 2025-02-09 RX ADMIN — DIPHENHYDRAMINE HYDROCHLORIDE AND LIDOCAINE HYDROCHLORIDE AND ALUMINUM HYDROXIDE AND MAGNESIUM HYDRO 10 MILLILITER(S): KIT at 01:27

## 2025-02-09 RX ADMIN — TAMSULOSIN HYDROCHLORIDE 0.4 MILLIGRAM(S): 0.4 CAPSULE ORAL at 21:56

## 2025-02-09 RX ADMIN — INSULIN GLARGINE-YFGN 40 UNIT(S): 100 INJECTION, SOLUTION SUBCUTANEOUS at 03:40

## 2025-02-09 RX ADMIN — Medication 0.5 MILLIGRAM(S): at 23:01

## 2025-02-09 RX ADMIN — APIXABAN 5 MILLIGRAM(S): 2.5 TABLET, FILM COATED ORAL at 08:36

## 2025-02-09 RX ADMIN — Medication 40 MILLIGRAM(S): at 12:28

## 2025-02-09 RX ADMIN — SERTRALINE 50 MILLIGRAM(S): 100 TABLET, FILM COATED ORAL at 12:28

## 2025-02-09 RX ADMIN — Medication 500 MILLIGRAM(S): at 12:27

## 2025-02-09 RX ADMIN — APIXABAN 5 MILLIGRAM(S): 2.5 TABLET, FILM COATED ORAL at 20:26

## 2025-02-09 RX ADMIN — Medication 2 TABLET(S): at 06:04

## 2025-02-09 RX ADMIN — METOPROLOL SUCCINATE 25 MILLIGRAM(S): 50 TABLET, EXTENDED RELEASE ORAL at 17:38

## 2025-02-09 RX ADMIN — SODIUM CHLORIDE 75 MILLILITER(S): 9 INJECTION, SOLUTION INTRAVENOUS at 23:21

## 2025-02-09 RX ADMIN — NYSTATIN 1 APPLICATION(S): 100000 CREAM TOPICAL at 18:26

## 2025-02-09 RX ADMIN — BUMETANIDE 2 MILLIGRAM(S): 1 TABLET ORAL at 06:04

## 2025-02-09 RX ADMIN — POLYETHYLENE GLYCOL 3350 17 GRAM(S): 17 POWDER, FOR SOLUTION ORAL at 13:18

## 2025-02-09 RX ADMIN — Medication 1 APPLICATION(S): at 12:30

## 2025-02-09 RX ADMIN — METHYLPREDNISOLONE ACETATE 40 MILLIGRAM(S): 80 INJECTION, SUSPENSION INTRA-ARTICULAR; INTRALESIONAL; INTRAMUSCULAR; SOFT TISSUE at 06:03

## 2025-02-09 RX ADMIN — DIPHENHYDRAMINE HYDROCHLORIDE AND LIDOCAINE HYDROCHLORIDE AND ALUMINUM HYDROXIDE AND MAGNESIUM HYDRO 10 MILLILITER(S): KIT at 06:03

## 2025-02-09 NOTE — PROGRESS NOTE ADULT - ASSESSMENT
# DM  - CC diet   - FS - self monitored via dex com --> On insulin pump @2.85 at home   - Endo consult: keep off insulin pump  - lantus 10 lispro 3 --> has been steadily increased : lantus increased from 30 to 40 and lispro from 10 to 16 on 02/08 --> s/p Humulin 7 units 1 dose  - Patient received total of 31 and Humulin Admelog 16x3  - Repeat BMP showed Gluocse of 529, mild increase in AG (12 --> 15) and Beta hydroxy < 0.2  - Discussed with pulm fellow, started on insulin drip (7 units /min) and upgraded to MICU, NS fluid with 20 mEq K but slower rate given HFpEF decompensation  - F/U FS and repeat BMP    # Acute Hypoxic Respiratory Failure 2/2 Multifocal Pneumonia/  Acute on chronic diastolic CHF/  Influenza A infection/ RASHAD/ O[facility]   - clinically improving, on high low oxygen for now, will taper off as tolerated   - NIV PRN and QHS   - nebs Q 6 hours, supportive care, aspiration precautions   - CTA 1/20: Negative for pulmonary emboli. Interval development of infiltrates within the lower lobes and lingula which may reflect acute multilobar pneumonia  - blood cx NGTD x2, UA negative, influenza A + , Nasal MRSA negative   - pt was consulted by ID, treated with IV Abx, completed the course of Tamiflu   - TTE 1/10/25: poor study, EF 60-65%, pt has moderated TR  - fluid restriction 1200 ml in 24 hours , intake and output monitoring, daily weight   - Desaturated again while in ICU on NC, and Bipap was started  - CT chest: Worsening bilateral peribronchial opacities compatible with bronchopneumonia  - F/u cultures, legionella, procal, fungitell  - Started IV cefepime 2g BID and vancomycin  - Follow up ID  - HFNC 40/40, titrate off   - duonebs and solumedrol 40 mg daily , wheeze on exam    # 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 Tolerated well. Rituximab was given on D5   - Uric acid 10.3 on 1/16/25, s/p 3 mg IV rasburicase  - sp Zarxio  - daily CBC with diff, active T&S  - c/w Allopurinol   - No onc plans to start chemo/treatment inpatient, can dc when medically appropriate with follow-up with Dr. Hilliard who will schedule cycle 2 of treatment.     # RKISTINE on CKD 3B / Chronic Lower Extremity Edema   - baseline ~high 1s, low 2s  - c/w diuretics: bumex 2mg IV BID --> to 2mg IV TID --> 2 mg PO daily  - monitor BUN/cr and urine output  - moss d/c'd  - avoid nephrotoxic medications   - will hold off Metolazone on 02/08 as BUN/Cr 115/2.7  - nephrology is following, recommendations noted    # CAD s/p CABG/ Chronic Afib on Xarelto/ HTN  - c/w statin, BB  - Holding ASA  - Still holding lisinopril and aldactone from last admission  - sylvester need to switch to eliquis given CKD from xaerlto   - dc heparin ggt in pm start eliquis 5 mg BID     # HLD   - on Atorvastatin now   - resume Repatha on discharge    # Oral pain due to ulcer with black discoloration   - c/w local couth care  - consult ENT to examine black discoloration -> rec CT neck w/ IV contrast, magic mouthwash  - CT neck w/IV contrast no tongue mass.   - oncology thinking this is mucositis   - Monitor bleed, keep active TS and trend CBC.   - Will c/w magic mouthwash.   - Bleeding is resolving    # Agitation/ confusion   # Insomnia   - pt is calm now   - on Haldol PRN , Sertraline and melatonin   - supportive care     # GI  - on teofilo regimen and PPIs.    #Misc  #Code Status: Full Code  #DVT ppx: Eliquis  #GI ppx: PPI  #Diet: CC  #Activity: OOBTC  #Inpatient Dispo: MICU  #Discharge Dispo: Home vs Rehab.       # DM  - CC diet   - FS - self monitored via dex com --> On insulin pump @2.85 at home   - Endo consult: keep off insulin pump  - lantus 10 lispro 3 --> has been steadily increased : lantus increased from 30 to 40 and lispro from 10 to 16 on 02/08 --> s/p Humulin 7 units 1 dose  - Patient received total of 31 and Humulin Admelog 16x3  - Repeat BMP showed Gluocse of 529, mild increase in AG (12 --> 15) and Beta hydroxy < 0.2  - On 2/8 started on insulin drip (7 units /min) and upgraded to MICU, NS fluid with 20 mEq K but slower rate given HFpEF decompensation  - Off insulin drip overnight, switched to SQ insulin, monitor BS.    # Acute Hypoxic Respiratory Failure 2/2 Multifocal Pneumonia/  Acute on chronic diastolic CHF/  Influenza A infection/ RASHAD/ OHS   - clinically improving, on high low oxygen for now, will taper off as tolerated   - NIV PRN and QHS   - nebs Q 6 hours, supportive care, aspiration precautions   - CTA 1/20: Negative for pulmonary emboli. Interval development of infiltrates within the lower lobes and lingula which may reflect acute multilobar pneumonia  - blood cx NGTD x2, UA negative, influenza A + , Nasal MRSA negative   - pt was consulted by ID, treated with IV Abx, completed the course of Tamiflu   - TTE 1/10/25: poor study, EF 60-65%, pt has moderated TR  - fluid restriction 1200 ml in 24 hours , intake and output monitoring, daily weight   - Desaturated again while in ICU on NC, and Bipap was started  - CT chest: Worsening bilateral peribronchial opacities compatible with bronchopneumonia  - F/u cultures, legionella, procal, fungitell  - Started IV cefepime 2g BID, vanco stopped as MRSA negative  - Follow up ID  - HFNC 40/40, titrated off, now on NC 3L  - duonebs and solumedrol 40 mg daily , wheeze on exam, titrate solumedrol down     # 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 Tolerated well. Rituximab was given on D5   - Uric acid 10.3 on 1/16/25, s/p 3 mg IV rasburicase  - sp Zarxio  - daily CBC with diff, active T&S  - c/w Allopurinol   - No onc plans to start chemo/treatment inpatient, can dc when medically appropriate with follow-up with Dr. Hilliard who will schedule cycle 2 of treatment.     # KRISTINE on CKD 3B / Chronic Lower Extremity Edema   - baseline ~high 1s, low 2s  - c/w diuretics: bumex 2mg IV BID --> to 2mg IV TID --> 2 mg PO daily  - monitor BUN/cr and urine output  - ajay d/c'd  - avoid nephrotoxic medications   - will hold off Metolazone on 02/08 as BUN/Cr 115/2.7  - nephrology is following, recommendations noted    # CAD s/p CABG/ Chronic Afib on Xarelto/ HTN  - c/w statin, BB  - Holding ASA  - Still holding lisinopril and aldactone from last admission  - josephley need to switch to eliquis given CKD from xaerlto   - dc heparin ggt started eliquis 5 mg BID     # LLE weakness  CTH ordered today  PT following    # HLD   - on Atorvastatin now   - resume Repatha on discharge    # Oral pain due to ulcer with black discoloration   - c/w local couth care  - consult ENT to examine black discoloration -> rec CT neck w/ IV contrast, magic mouthwash  - CT neck w/IV contrast no tongue mass.   - oncology thinking this is mucositis   - Monitor bleed, keep active TS and trend CBC.   - Will c/w magic mouthwash.   - Bleeding is resolving    # Agitation/ confusion   # Insomnia   - pt is calm now   - on Haldol PRN , Sertraline and melatonin   - supportive care     # GI  - on teofilo regimen and PPIs.    #Misc  #Code Status: Full Code  #DVT ppx: Eliquis  #GI ppx: PPI  #Diet: CC  #Activity: OOBTC  #Inpatient Dispo: DG to SDU  #Discharge Dispo: Home vs Rehab.

## 2025-02-09 NOTE — PROGRESS NOTE ADULT - SUBJECTIVE AND OBJECTIVE BOX
Patient is a 70y old  Male who presents with a chief complaint of SOB (08 Feb 2025 10:54)        Over Night Events:    Was started on insulin gtt for elevated BG without DKA  BG now controlled, insulin gtt d/c    ROS:     All ROS are negative except HPI     PHYSICAL EXAM    ICU Vital Signs Last 24 Hrs  T(C): 36.1 (09 Feb 2025 08:00), Max: 36.8 (08 Feb 2025 12:00)  T(F): 96.9 (09 Feb 2025 08:00), Max: 98.2 (08 Feb 2025 12:00)  HR: 66 (09 Feb 2025 08:00) (66 - 90)  BP: 119/59 (09 Feb 2025 08:00) (104/59 - 148/69)  BP(mean): 85 (09 Feb 2025 08:00) (75 - 99)  ABP: --  ABP(mean): --  RR: 20 (09 Feb 2025 08:00) (17 - 25)  SpO2: 97% (09 Feb 2025 08:00) (90% - 99%)    O2 Parameters below as of 09 Feb 2025 08:00  Patient On (Oxygen Delivery Method): nasal cannula  O2 Flow (L/min): 5          Constitutional: no acute distress, well nourished well developed  Neuro: moving all 4 limbs spontaneously, no facial droop or dysarthria  HEENT: NCAT, anicteric  Neck: no visible lymphadenopathy or goiter  Pulm: no respiratory distress. clear to auscultation bilaterally  Cardiac: extremities appear pink and well-perfused.  regular rhythm and rate, no murmur detected  Abdomen: non-distended  Extremities: no peripheral edema      02-08-25 @ 07:01  -  02-09-25 @ 07:00  --------------------------------------------------------  IN:    Insulin: 21 mL    Insulin: 44 mL    IV PiggyBack: 400 mL    Oral Fluid: 1719 mL    sodium chloride 0.9% w/ Additives: 700 mL  Total IN: 2884 mL    OUT:    Voided (mL): 1665 mL  Total OUT: 1665 mL    Total NET: 1219 mL      02-09-25 @ 07:01  -  02-09-25 @ 08:37  --------------------------------------------------------  IN:    Oral Fluid: 237 mL  Total IN: 237 mL    OUT:  Total OUT: 0 mL    Total NET: 237 mL          LABS:                            8.8    14.75 )-----------( 572      ( 09 Feb 2025 04:38 )             27.1                                               02-09    138  |  93[L]  |  125[HH]  ----------------------------<  89  4.4   |  31  |  2.4[H]    Ca    8.6      09 Feb 2025 04:38  Mg     2.6     02-09    TPro  5.5[L]  /  Alb  3.0[L]  /  TBili  0.3  /  DBili  x   /  AST  57[H]  /  ALT  35  /  AlkPhos  221[H]  02-09      PTT - ( 07 Feb 2025 11:00 )  PTT:92.8 sec                                       Urinalysis Basic - ( 09 Feb 2025 04:38 )    Color: x / Appearance: x / SG: x / pH: x  Gluc: 89 mg/dL / Ketone: x  / Bili: x / Urobili: x   Blood: x / Protein: x / Nitrite: x   Leuk Esterase: x / RBC: x / WBC x   Sq Epi: x / Non Sq Epi: x / Bacteria: x                                                  LIVER FUNCTIONS - ( 09 Feb 2025 04:38 )  Alb: 3.0 g/dL / Pro: 5.5 g/dL / ALK PHOS: 221 U/L / ALT: 35 U/L / AST: 57 U/L / GGT: x                                                  Culture - Blood (collected 06 Feb 2025 12:30)  Source: .Blood BLOOD  Preliminary Report (08 Feb 2025 22:01):    No growth at 48 Hours                                                                                           MEDICATIONS  (STANDING):  albuterol/ipratropium for Nebulization 3 milliLiter(s) Nebulizer every 4 hours  allopurinol 150 milliGRAM(s) Oral daily  apixaban 5 milliGRAM(s) Oral every 12 hours  ascorbic acid 500 milliGRAM(s) Oral daily  atorvastatin 10 milliGRAM(s) Oral at bedtime  benzocaine 20% Spray 1 Spray(s) Topical three times a day  buMETAnide 2 milliGRAM(s) Oral daily  cefepime   IVPB 2000 milliGRAM(s) IV Intermittent every 12 hours  cefepime   IVPB      chlorhexidine 2% Cloths 1 Application(s) Topical daily  dextrose 5%. 1000 milliLiter(s) (50 mL/Hr) IV Continuous <Continuous>  dextrose 5%. 1000 milliLiter(s) (100 mL/Hr) IV Continuous <Continuous>  dextrose 50% Injectable 25 Gram(s) IV Push once  dextrose 50% Injectable 12.5 Gram(s) IV Push once  dextrose 50% Injectable 25 Gram(s) IV Push once  FIRST- Mouthwash  BLM 10 milliLiter(s) Swish and Spit every 4 hours  glucagon  Injectable 1 milliGRAM(s) IntraMuscular once  influenza  Vaccine (HIGH DOSE) 0.5 milliLiter(s) IntraMuscular once  insulin glargine Injectable (LANTUS) 40 Unit(s) SubCutaneous at bedtime  insulin lispro (ADMELOG) corrective regimen sliding scale   SubCutaneous three times a day before meals  insulin lispro Injectable (ADMELOG) 16 Unit(s) SubCutaneous three times a day before meals  melatonin 10 milliGRAM(s) Oral at bedtime  methylPREDNISolone sodium succinate Injectable 40 milliGRAM(s) IV Push daily  metoprolol tartrate 25 milliGRAM(s) Oral two times a day  NIFEdipine XL 60 milliGRAM(s) Oral daily  nystatin Cream 1 Application(s) Topical every 12 hours  pantoprazole  Injectable 40 milliGRAM(s) IV Push daily  polyethylene glycol 3350 17 Gram(s) Oral every 12 hours  senna 2 Tablet(s) Oral every 12 hours  sertraline 50 milliGRAM(s) Oral daily  tamsulosin 0.4 milliGRAM(s) Oral at bedtime    MEDICATIONS  (PRN):  aluminum hydroxide/magnesium hydroxide/simethicone Suspension 30 milliLiter(s) Oral every 4 hours PRN Dyspepsia  bisacodyl 5 milliGRAM(s) Oral daily PRN Constipation  calcium carbonate    500 mG (Tums) Chewable 1 Tablet(s) Chew three times a day PRN Dyspepsia  dextrose Oral Gel 15 Gram(s) Oral once PRN Blood Glucose LESS THAN 70 milliGRAM(s)/deciliter  HYDROmorphone  Injectable 0.5 milliGRAM(s) IV Push every 6 hours PRN Severe Pain (7 - 10)  hydrOXYzine hydrochloride 25 milliGRAM(s) Oral every 6 hours PRN Anxiety  magnesium hydroxide Suspension 30 milliLiter(s) Oral daily PRN Constipation  ondansetron Injectable 4 milliGRAM(s) IV Push every 6 hours PRN Nausea and/or Vomiting  oxyCODONE    IR 10 milliGRAM(s) Oral every 6 hours PRN Moderate Pain (4 - 6)  simethicone 80 milliGRAM(s) Chew daily PRN Gas      New X-rays reviewed:       ECHO reviewed    CXR interpreted by me:    2/9  images and radiologist read reviewed, by my read demonstrating stable LLL opacity Patient is a 70y old  Male who presents with a chief complaint of SOB (08 Feb 2025 10:54)        Over Night Events:    Was started on insulin gtt for elevated BG without DKA  BG now controlled, insulin gtt d/c  c/o 4 days of RLE paresis    ROS:     All ROS are negative except HPI     PHYSICAL EXAM    ICU Vital Signs Last 24 Hrs  T(C): 36.1 (09 Feb 2025 08:00), Max: 36.8 (08 Feb 2025 12:00)  T(F): 96.9 (09 Feb 2025 08:00), Max: 98.2 (08 Feb 2025 12:00)  HR: 66 (09 Feb 2025 08:00) (66 - 90)  BP: 119/59 (09 Feb 2025 08:00) (104/59 - 148/69)  BP(mean): 85 (09 Feb 2025 08:00) (75 - 99)  ABP: --  ABP(mean): --  RR: 20 (09 Feb 2025 08:00) (17 - 25)  SpO2: 97% (09 Feb 2025 08:00) (90% - 99%)    O2 Parameters below as of 09 Feb 2025 08:00  Patient On (Oxygen Delivery Method): nasal cannula  O2 Flow (L/min): 5          Constitutional: no acute distress, well nourished well developed  Neuro: moving all 4 limbs spontaneously, no facial droop or dysarthria  HEENT: NCAT, anicteric  Neck: no visible lymphadenopathy or goiter  Pulm: no respiratory distress. clear to auscultation bilaterally  Cardiac: extremities appear pink and well-perfused.  regular rhythm and rate, no murmur detected  Abdomen: non-distended  Extremities: no peripheral edema      02-08-25 @ 07:01  -  02-09-25 @ 07:00  --------------------------------------------------------  IN:    Insulin: 21 mL    Insulin: 44 mL    IV PiggyBack: 400 mL    Oral Fluid: 1719 mL    sodium chloride 0.9% w/ Additives: 700 mL  Total IN: 2884 mL    OUT:    Voided (mL): 1665 mL  Total OUT: 1665 mL    Total NET: 1219 mL      02-09-25 @ 07:01  -  02-09-25 @ 08:37  --------------------------------------------------------  IN:    Oral Fluid: 237 mL  Total IN: 237 mL    OUT:  Total OUT: 0 mL    Total NET: 237 mL          LABS:                            8.8    14.75 )-----------( 572      ( 09 Feb 2025 04:38 )             27.1                                               02-09    138  |  93[L]  |  125[HH]  ----------------------------<  89  4.4   |  31  |  2.4[H]    Ca    8.6      09 Feb 2025 04:38  Mg     2.6     02-09    TPro  5.5[L]  /  Alb  3.0[L]  /  TBili  0.3  /  DBili  x   /  AST  57[H]  /  ALT  35  /  AlkPhos  221[H]  02-09      PTT - ( 07 Feb 2025 11:00 )  PTT:92.8 sec                                       Urinalysis Basic - ( 09 Feb 2025 04:38 )    Color: x / Appearance: x / SG: x / pH: x  Gluc: 89 mg/dL / Ketone: x  / Bili: x / Urobili: x   Blood: x / Protein: x / Nitrite: x   Leuk Esterase: x / RBC: x / WBC x   Sq Epi: x / Non Sq Epi: x / Bacteria: x                                                  LIVER FUNCTIONS - ( 09 Feb 2025 04:38 )  Alb: 3.0 g/dL / Pro: 5.5 g/dL / ALK PHOS: 221 U/L / ALT: 35 U/L / AST: 57 U/L / GGT: x                                                  Culture - Blood (collected 06 Feb 2025 12:30)  Source: .Blood BLOOD  Preliminary Report (08 Feb 2025 22:01):    No growth at 48 Hours                                                                                           MEDICATIONS  (STANDING):  albuterol/ipratropium for Nebulization 3 milliLiter(s) Nebulizer every 4 hours  allopurinol 150 milliGRAM(s) Oral daily  apixaban 5 milliGRAM(s) Oral every 12 hours  ascorbic acid 500 milliGRAM(s) Oral daily  atorvastatin 10 milliGRAM(s) Oral at bedtime  benzocaine 20% Spray 1 Spray(s) Topical three times a day  buMETAnide 2 milliGRAM(s) Oral daily  cefepime   IVPB 2000 milliGRAM(s) IV Intermittent every 12 hours  cefepime   IVPB      chlorhexidine 2% Cloths 1 Application(s) Topical daily  dextrose 5%. 1000 milliLiter(s) (50 mL/Hr) IV Continuous <Continuous>  dextrose 5%. 1000 milliLiter(s) (100 mL/Hr) IV Continuous <Continuous>  dextrose 50% Injectable 25 Gram(s) IV Push once  dextrose 50% Injectable 12.5 Gram(s) IV Push once  dextrose 50% Injectable 25 Gram(s) IV Push once  FIRST- Mouthwash  BLM 10 milliLiter(s) Swish and Spit every 4 hours  glucagon  Injectable 1 milliGRAM(s) IntraMuscular once  influenza  Vaccine (HIGH DOSE) 0.5 milliLiter(s) IntraMuscular once  insulin glargine Injectable (LANTUS) 40 Unit(s) SubCutaneous at bedtime  insulin lispro (ADMELOG) corrective regimen sliding scale   SubCutaneous three times a day before meals  insulin lispro Injectable (ADMELOG) 16 Unit(s) SubCutaneous three times a day before meals  melatonin 10 milliGRAM(s) Oral at bedtime  methylPREDNISolone sodium succinate Injectable 40 milliGRAM(s) IV Push daily  metoprolol tartrate 25 milliGRAM(s) Oral two times a day  NIFEdipine XL 60 milliGRAM(s) Oral daily  nystatin Cream 1 Application(s) Topical every 12 hours  pantoprazole  Injectable 40 milliGRAM(s) IV Push daily  polyethylene glycol 3350 17 Gram(s) Oral every 12 hours  senna 2 Tablet(s) Oral every 12 hours  sertraline 50 milliGRAM(s) Oral daily  tamsulosin 0.4 milliGRAM(s) Oral at bedtime    MEDICATIONS  (PRN):  aluminum hydroxide/magnesium hydroxide/simethicone Suspension 30 milliLiter(s) Oral every 4 hours PRN Dyspepsia  bisacodyl 5 milliGRAM(s) Oral daily PRN Constipation  calcium carbonate    500 mG (Tums) Chewable 1 Tablet(s) Chew three times a day PRN Dyspepsia  dextrose Oral Gel 15 Gram(s) Oral once PRN Blood Glucose LESS THAN 70 milliGRAM(s)/deciliter  HYDROmorphone  Injectable 0.5 milliGRAM(s) IV Push every 6 hours PRN Severe Pain (7 - 10)  hydrOXYzine hydrochloride 25 milliGRAM(s) Oral every 6 hours PRN Anxiety  magnesium hydroxide Suspension 30 milliLiter(s) Oral daily PRN Constipation  ondansetron Injectable 4 milliGRAM(s) IV Push every 6 hours PRN Nausea and/or Vomiting  oxyCODONE    IR 10 milliGRAM(s) Oral every 6 hours PRN Moderate Pain (4 - 6)  simethicone 80 milliGRAM(s) Chew daily PRN Gas      New X-rays reviewed:       ECHO reviewed    CXR interpreted by me:    2/9  images and radiologist read reviewed, by my read demonstrating stable LLL opacity

## 2025-02-09 NOTE — PROGRESS NOTE ADULT - ASSESSMENT
IMPRESSION:    Acute hypoxemic respiratory failure  Possible PNA   Volume overload improved   KRISTINE on CKD   HFpEF  Large B cell lymphoma  Afib on Xarelto - on IV heparin now   Hx of HTN and DM      PLAN:    CNS: Pain control, avoid depressants. MS remains adequate.     HEENT: Oral care    PULMONARY:  HOB @ 45 degrees.  Aspiration precautions.  Wean FiO2 as tolerated.  keep O2 sat 92-96%.  BIPAP during sleep and PRN during the day. Wean down to 40/40.   CT chest NC noted with bibasilar consolidations.  f/u MRSA nares    CARDIOVASCULAR:  Keep even balance for now. Bumex daily. Hold metolazone. Continue rate control.     GI: GI prophylaxis protonix, diet per speech and swallow.    RENAL:  Follow up lytes. Correct as needed.  Monitor UO     INFECTIOUS DISEASE:  Send pan culture; procal. Adjust to kidney function. Repeat MRSA nasal. f/u ID reccs. fungitel. Abx per ID. Vanc level.     HEMATOLOGICAL:  on Eliquis.  Monitor CBC and coags.       ENDOCRINE:  Follow up FS.  Insulin protocol as needed to target -180.    MUSCULOSKELETAL: Out of bed to chair. PT OT         Dispo: SDU; Prognosis is guarded.      IMPRESSION:    Acute hypoxemic respiratory failure  Possible PNA   Volume overload improved   KRISTINE on CKD   HFpEF  Large B cell lymphoma  Afib on Xarelto - on IV heparin now   Hx of HTN and DM      PLAN:    CNS: Pain control, avoid depressants. MS remains adequate.  CTH given subacute focal neuro deficit.    HEENT: Oral care    PULMONARY:  HOB @ 45 degrees.  Aspiration precautions.  Wean FiO2 as tolerated.  keep O2 sat 92-96%.  BIPAP during sleep and PRN during the day. Wean down to 40/40.   CT chest NC noted with bibasilar consolidations.  f/u MRSA nares    CARDIOVASCULAR:  Keep even balance for now. Bumex daily. Hold metolazone. Continue rate control.     GI: GI prophylaxis protonix, diet per speech and swallow.    RENAL:  Follow up lytes. Correct as needed.  Monitor UO     INFECTIOUS DISEASE:  Send pan culture; procal. Adjust to kidney function. Repeat MRSA nasal. f/u ID reccs. fungitel. Abx per ID. Vanc level.     HEMATOLOGICAL:  on Eliquis.  Monitor CBC and coags.       ENDOCRINE:  Follow up FS.  Insulin protocol as needed to target -180.    MUSCULOSKELETAL: Out of bed to chair. PT OT         Dispo: SDU; Prognosis is guarded.

## 2025-02-09 NOTE — PROGRESS NOTE ADULT - SUBJECTIVE AND OBJECTIVE BOX
SUBJECTIVE/OVERNIGHT EVENTS  Today is hospital day 20d. This morning patient was seen and examined at bedside, resting comfortably in bed. No acute or major events overnight.    MEDICATIONS  STANDING MEDICATIONS  albuterol/ipratropium for Nebulization 3 milliLiter(s) Nebulizer every 4 hours  allopurinol 150 milliGRAM(s) Oral daily  apixaban 5 milliGRAM(s) Oral every 12 hours  ascorbic acid 500 milliGRAM(s) Oral daily  atorvastatin 10 milliGRAM(s) Oral at bedtime  benzocaine 20% Spray 1 Spray(s) Topical three times a day  buMETAnide 2 milliGRAM(s) Oral daily  cefepime   IVPB 2000 milliGRAM(s) IV Intermittent every 12 hours  cefepime   IVPB      chlorhexidine 2% Cloths 1 Application(s) Topical daily  dextrose 5% + sodium chloride 0.9% with potassium chloride 20 mEq/L 1000 milliLiter(s) IV Continuous <Continuous>  dextrose 5%. 1000 milliLiter(s) IV Continuous <Continuous>  dextrose 5%. 1000 milliLiter(s) IV Continuous <Continuous>  dextrose 50% Injectable 25 Gram(s) IV Push once  dextrose 50% Injectable 12.5 Gram(s) IV Push once  dextrose 50% Injectable 25 Gram(s) IV Push once  FIRST- Mouthwash  BLM 10 milliLiter(s) Swish and Spit every 4 hours  glucagon  Injectable 1 milliGRAM(s) IntraMuscular once  influenza  Vaccine (HIGH DOSE) 0.5 milliLiter(s) IntraMuscular once  insulin glargine Injectable (LANTUS) 40 Unit(s) SubCutaneous at bedtime  insulin lispro (ADMELOG) corrective regimen sliding scale   SubCutaneous three times a day before meals  insulin lispro Injectable (ADMELOG) 16 Unit(s) SubCutaneous three times a day before meals  insulin regular Infusion 8 Unit(s)/Hr IV Continuous <Continuous>  melatonin 10 milliGRAM(s) Oral at bedtime  methylPREDNISolone sodium succinate Injectable 40 milliGRAM(s) IV Push daily  metoprolol tartrate 25 milliGRAM(s) Oral two times a day  NIFEdipine XL 60 milliGRAM(s) Oral daily  nystatin Cream 1 Application(s) Topical every 12 hours  pantoprazole  Injectable 40 milliGRAM(s) IV Push daily  polyethylene glycol 3350 17 Gram(s) Oral every 12 hours  senna 2 Tablet(s) Oral every 12 hours  sertraline 50 milliGRAM(s) Oral daily  tamsulosin 0.4 milliGRAM(s) Oral at bedtime    PRN MEDICATIONS  aluminum hydroxide/magnesium hydroxide/simethicone Suspension 30 milliLiter(s) Oral every 4 hours PRN  bisacodyl 5 milliGRAM(s) Oral daily PRN  calcium carbonate    500 mG (Tums) Chewable 1 Tablet(s) Chew three times a day PRN  dextrose Oral Gel 15 Gram(s) Oral once PRN  HYDROmorphone  Injectable 0.5 milliGRAM(s) IV Push every 6 hours PRN  hydrOXYzine hydrochloride 25 milliGRAM(s) Oral every 6 hours PRN  magnesium hydroxide Suspension 30 milliLiter(s) Oral daily PRN  ondansetron Injectable 4 milliGRAM(s) IV Push every 6 hours PRN  oxyCODONE    IR 10 milliGRAM(s) Oral every 6 hours PRN  simethicone 80 milliGRAM(s) Chew daily PRN    VITALS  T(F): 97.6 (02-09-25 @ 04:00), Max: 98.2 (02-08-25 @ 12:00)  HR: 72 (02-09-25 @ 05:00) (69 - 90)  BP: 104/59 (02-09-25 @ 05:00) (104/59 - 148/69)  RR: 23 (02-09-25 @ 05:00) (17 - 25)  SpO2: 96% (02-09-25 @ 05:00) (90% - 97%)  POCT Blood Glucose.: 87 mg/dL (02-09-25 @ 05:40)  POCT Blood Glucose.: 101 mg/dL (02-09-25 @ 05:00)  POCT Blood Glucose.: 125 mg/dL (02-09-25 @ 03:58)  POCT Blood Glucose.: 187 mg/dL (02-09-25 @ 02:44)  POCT Blood Glucose.: 225 mg/dL (02-09-25 @ 01:46)  POCT Blood Glucose.: 251 mg/dL (02-09-25 @ 00:43)  POCT Blood Glucose.: 365 mg/dL (02-08-25 @ 23:45)  POCT Blood Glucose.: 391 mg/dL (02-08-25 @ 22:42)  POCT Blood Glucose.: 439 mg/dL (02-08-25 @ 21:42)  POCT Blood Glucose.: 469 mg/dL (02-08-25 @ 20:40)  POCT Blood Glucose.: 527 mg/dL (02-08-25 @ 19:41)  POCT Blood Glucose.: 517 mg/dL (02-08-25 @ 17:53)  POCT Blood Glucose.: 558 mg/dL (02-08-25 @ 16:20)  POCT Blood Glucose.: 547 mg/dL (02-08-25 @ 14:12)  POCT Blood Glucose.: 469 mg/dL (02-08-25 @ 11:22)  POCT Blood Glucose.: 434 mg/dL (02-08-25 @ 10:03)  POCT Blood Glucose.: 420 mg/dL (02-08-25 @ 07:33)    PHYSICAL EXAM  CCO*3  GBAE, clear lungs  Regular S1S2, no murmurs  Soft abdomen, non tender  No LLE      LABS             8.8    14.75 )-----------( 572      ( 02-09-25 @ 04:38 )             27.1     137  |  93  |  124  -------------------------<  225   02-09-25 @ 01:10  4.3  |  30  |  2.5    Ca      8.5     02-09-25 @ 01:10  Mg     2.7     02-08-25 @ 05:59    TPro  5.4  /  Alb  3.2  /  TBili  0.3  /  DBili  x   /  AST  56  /  ALT  31  /  AlkPhos  261  /  GGT  x     02-08-25 @ 17:29    PTT - ( 02-07-25 @ 11:00 )  PTT:92.8 sec    Pro-Brain Natriuretic Peptide: 814 pg/mL (02-06-25 @ 13:17)    Urinalysis Basic - ( 09 Feb 2025 01:10 )    Color: x / Appearance: x / SG: x / pH: x  Gluc: 225 mg/dL / Ketone: x  / Bili: x / Urobili: x   Blood: x / Protein: x / Nitrite: x   Leuk Esterase: x / RBC: x / WBC x   Sq Epi: x / Non Sq Epi: x / Bacteria: x          Culture - Blood (collected 06 Feb 2025 12:30)  Source: .Blood BLOOD  Preliminary Report (08 Feb 2025 22:01):    No growth at 48 Hours      IMAGING         Transfer Note from CCU to SDU    HPI:69-year-old man with extensive medical history including CAD (status post CABG;), DM (on insulin pump), atrial fibrillation on Xarelto, history of distal pancreatomy and splenectomy for unclear reason referred to the ED by his oncologist for abnormal labs. Patient was recently diagnosed with Diffuse large B cell Lymphoma on pathology of right groin mass and is following with [facility] oncology team. Patient was discharged on 01.17.2025 from hospital after inpatient chemotherapy (Completed cycle 1 of R-EPOCH (D1 on 1/11/2024). Tolerated well. Rituximab was given on D5).   Patient started to get a productive cough with yellow sputum production. He was admitted to SDU for  Acute Hypoxic Respiratory Failure 2/2 Multifocal Pneumonia, Volume Overload, Neutropenic fever, Influenza A infection, decompensated and was upgraded to ICU on 1/27,  started on BIPAP and weaned to HFNC. Received tamiflu course + course of azithromycin+cefepime. Currently being monitored off Abx by ID.    Pt clinically improved and was  downgrade back  to SDU. Here he was weaned off HFNC onto NC. ENT was consulted for black lesions on the base of tongue. They rec CT neck with IV contrast to investigate further. Follow up CT w/ IV contrast read, heme onc for any further management of large B celllymphoma with concern for frazier trnasformation and if ok to transition from heparin to xarelto or eliquis regarding his a-fib.    3B Course: During time in 3B, patient began to desaturate and required increased ventilation to Bipap. ABG was collected showing metabolic alkalosis with pCO2 52, pO2 73, HCO3 39, FiO2 30. Also endorsed subjective chills. Lactate was normal. Blood cultures, procal, legionella ordered. Patient was initially being diuresed on Bumex 20mg BID, now dc'd. CXR performed showed some mild congestive opacities improved from before. Fungitell ordered. CT chest noncon ordered. Patient was restarted on IV abx, cefepime 2g BID and vancomycin. Patient will be upgraded to SDU for acute respiratory failure 2/2 suspected multifocal pneumonia.    SDU Course: patient Hypoxic respiratory failure improvingbut since initiation of Methylprednisolone 40 daily, FS have been uncontrolled  Given trend and history on uncontroilled DM, will require insulin drip and ICU monitoring. Patient otherwise asymptomatic, hemodynamically stable and euvolemic on exam    CCU course:  Pt arrived to CCU with a BS of 101, off insulin drip since an hour, already was switched to insulin SQ. Otherwise pt mentioned LLE weakness since admission, in addition to constipation. CTH ordered, bowel regimen increased.      MEDICATIONS  STANDING MEDICATIONS  albuterol/ipratropium for Nebulization 3 milliLiter(s) Nebulizer every 4 hours  allopurinol 150 milliGRAM(s) Oral daily  apixaban 5 milliGRAM(s) Oral every 12 hours  ascorbic acid 500 milliGRAM(s) Oral daily  atorvastatin 10 milliGRAM(s) Oral at bedtime  benzocaine 20% Spray 1 Spray(s) Topical three times a day  buMETAnide 2 milliGRAM(s) Oral daily  cefepime   IVPB 2000 milliGRAM(s) IV Intermittent every 12 hours  cefepime   IVPB      chlorhexidine 2% Cloths 1 Application(s) Topical daily  dextrose 5% + sodium chloride 0.9% with potassium chloride 20 mEq/L 1000 milliLiter(s) IV Continuous <Continuous>  dextrose 5%. 1000 milliLiter(s) IV Continuous <Continuous>  dextrose 5%. 1000 milliLiter(s) IV Continuous <Continuous>  dextrose 50% Injectable 25 Gram(s) IV Push once  dextrose 50% Injectable 12.5 Gram(s) IV Push once  dextrose 50% Injectable 25 Gram(s) IV Push once  FIRST- Mouthwash  BLM 10 milliLiter(s) Swish and Spit every 4 hours  glucagon  Injectable 1 milliGRAM(s) IntraMuscular once  influenza  Vaccine (HIGH DOSE) 0.5 milliLiter(s) IntraMuscular once  insulin glargine Injectable (LANTUS) 40 Unit(s) SubCutaneous at bedtime  insulin lispro (ADMELOG) corrective regimen sliding scale   SubCutaneous three times a day before meals  insulin lispro Injectable (ADMELOG) 16 Unit(s) SubCutaneous three times a day before meals  insulin regular Infusion 8 Unit(s)/Hr IV Continuous <Continuous>  melatonin 10 milliGRAM(s) Oral at bedtime  methylPREDNISolone sodium succinate Injectable 40 milliGRAM(s) IV Push daily  metoprolol tartrate 25 milliGRAM(s) Oral two times a day  NIFEdipine XL 60 milliGRAM(s) Oral daily  nystatin Cream 1 Application(s) Topical every 12 hours  pantoprazole  Injectable 40 milliGRAM(s) IV Push daily  polyethylene glycol 3350 17 Gram(s) Oral every 12 hours  senna 2 Tablet(s) Oral every 12 hours  sertraline 50 milliGRAM(s) Oral daily  tamsulosin 0.4 milliGRAM(s) Oral at bedtime    PRN MEDICATIONS  aluminum hydroxide/magnesium hydroxide/simethicone Suspension 30 milliLiter(s) Oral every 4 hours PRN  bisacodyl 5 milliGRAM(s) Oral daily PRN  calcium carbonate    500 mG (Tums) Chewable 1 Tablet(s) Chew three times a day PRN  dextrose Oral Gel 15 Gram(s) Oral once PRN  HYDROmorphone  Injectable 0.5 milliGRAM(s) IV Push every 6 hours PRN  hydrOXYzine hydrochloride 25 milliGRAM(s) Oral every 6 hours PRN  magnesium hydroxide Suspension 30 milliLiter(s) Oral daily PRN  ondansetron Injectable 4 milliGRAM(s) IV Push every 6 hours PRN  oxyCODONE    IR 10 milliGRAM(s) Oral every 6 hours PRN  simethicone 80 milliGRAM(s) Chew daily PRN    VITALS  T(F): 97.6 (02-09-25 @ 04:00), Max: 98.2 (02-08-25 @ 12:00)  HR: 72 (02-09-25 @ 05:00) (69 - 90)  BP: 104/59 (02-09-25 @ 05:00) (104/59 - 148/69)  RR: 23 (02-09-25 @ 05:00) (17 - 25)  SpO2: 96% (02-09-25 @ 05:00) (90% - 97%)  POCT Blood Glucose.: 87 mg/dL (02-09-25 @ 05:40)  POCT Blood Glucose.: 101 mg/dL (02-09-25 @ 05:00)  POCT Blood Glucose.: 125 mg/dL (02-09-25 @ 03:58)  POCT Blood Glucose.: 187 mg/dL (02-09-25 @ 02:44)  POCT Blood Glucose.: 225 mg/dL (02-09-25 @ 01:46)  POCT Blood Glucose.: 251 mg/dL (02-09-25 @ 00:43)  POCT Blood Glucose.: 365 mg/dL (02-08-25 @ 23:45)  POCT Blood Glucose.: 391 mg/dL (02-08-25 @ 22:42)  POCT Blood Glucose.: 439 mg/dL (02-08-25 @ 21:42)  POCT Blood Glucose.: 469 mg/dL (02-08-25 @ 20:40)  POCT Blood Glucose.: 527 mg/dL (02-08-25 @ 19:41)  POCT Blood Glucose.: 517 mg/dL (02-08-25 @ 17:53)  POCT Blood Glucose.: 558 mg/dL (02-08-25 @ 16:20)  POCT Blood Glucose.: 547 mg/dL (02-08-25 @ 14:12)  POCT Blood Glucose.: 469 mg/dL (02-08-25 @ 11:22)  POCT Blood Glucose.: 434 mg/dL (02-08-25 @ 10:03)  POCT Blood Glucose.: 420 mg/dL (02-08-25 @ 07:33)    PHYSICAL EXAM  CCO*3  GBAE, clear lungs  Regular S1S2, no murmurs  Soft abdomen, non tender  No LLE      LABS             8.8    14.75 )-----------( 572      ( 02-09-25 @ 04:38 )             27.1     137  |  93  |  124  -------------------------<  225   02-09-25 @ 01:10  4.3  |  30  |  2.5    Ca      8.5     02-09-25 @ 01:10  Mg     2.7     02-08-25 @ 05:59    TPro  5.4  /  Alb  3.2  /  TBili  0.3  /  DBili  x   /  AST  56  /  ALT  31  /  AlkPhos  261  /  GGT  x     02-08-25 @ 17:29    PTT - ( 02-07-25 @ 11:00 )  PTT:92.8 sec    Pro-Brain Natriuretic Peptide: 814 pg/mL (02-06-25 @ 13:17)    Urinalysis Basic - ( 09 Feb 2025 01:10 )    Color: x / Appearance: x / SG: x / pH: x  Gluc: 225 mg/dL / Ketone: x  / Bili: x / Urobili: x   Blood: x / Protein: x / Nitrite: x   Leuk Esterase: x / RBC: x / WBC x   Sq Epi: x / Non Sq Epi: x / Bacteria: x          Culture - Blood (collected 06 Feb 2025 12:30)  Source: .Blood BLOOD  Preliminary Report (08 Feb 2025 22:01):    No growth at 48 Hours      IMAGING         Transfer Note from CCU to Floor    HPI:69-year-old man with extensive medical history including CAD (status post CABG;), DM (on insulin pump), atrial fibrillation on Xarelto, history of distal pancreatomy and splenectomy for unclear reason referred to the ED by his oncologist for abnormal labs. Patient was recently diagnosed with Diffuse large B cell Lymphoma on pathology of right groin mass and is following with [facility] oncology team. Patient was discharged on 01.17.2025 from hospital after inpatient chemotherapy (Completed cycle 1 of R-EPOCH (D1 on 1/11/2024). Tolerated well. Rituximab was given on D5).   Patient started to get a productive cough with yellow sputum production. He was admitted to SDU for  Acute Hypoxic Respiratory Failure 2/2 Multifocal Pneumonia, Volume Overload, Neutropenic fever, Influenza A infection, decompensated and was upgraded to ICU on 1/27,  started on BIPAP and weaned to HFNC. Received tamiflu course + course of azithromycin+cefepime. Currently being monitored off Abx by ID.    Pt clinically improved and was  downgrade back  to SDU. Here he was weaned off HFNC onto NC. ENT was consulted for black lesions on the base of tongue. They rec CT neck with IV contrast to investigate further. Follow up CT w/ IV contrast read, heme onc for any further management of large B celllymphoma with concern for frazier trnasformation and if ok to transition from heparin to xarelto or eliquis regarding his a-fib.    3B Course: During time in 3B, patient began to desaturate and required increased ventilation to Bipap. ABG was collected showing metabolic alkalosis with pCO2 52, pO2 73, HCO3 39, FiO2 30. Also endorsed subjective chills. Lactate was normal. Blood cultures, procal, legionella ordered. Patient was initially being diuresed on Bumex 20mg BID, now dc'd. CXR performed showed some mild congestive opacities improved from before. Fungitell ordered. CT chest noncon ordered. Patient was restarted on IV abx, cefepime 2g BID and vancomycin. Patient will be upgraded to SDU for acute respiratory failure 2/2 suspected multifocal pneumonia.    SDU Course: patient Hypoxic respiratory failure improvingbut since initiation of Methylprednisolone 40 daily, FS have been uncontrolled  Given trend and history on uncontroilled DM, will require insulin drip and ICU monitoring. Patient otherwise asymptomatic, hemodynamically stable and euvolemic on exam    CCU course:  Pt arrived to CCU with a BS of 101, off insulin drip since an hour, already was switched to insulin SQ. Otherwise pt mentioned LLE weakness since admission, in addition to constipation. CTH ordered, bowel regimen increased.    MEDICATIONS  STANDING MEDICATIONS  albuterol/ipratropium for Nebulization 3 milliLiter(s) Nebulizer every 4 hours  allopurinol 150 milliGRAM(s) Oral daily  apixaban 5 milliGRAM(s) Oral every 12 hours  ascorbic acid 500 milliGRAM(s) Oral daily  atorvastatin 10 milliGRAM(s) Oral at bedtime  benzocaine 20% Spray 1 Spray(s) Topical three times a day  buMETAnide 2 milliGRAM(s) Oral daily  cefepime   IVPB 2000 milliGRAM(s) IV Intermittent every 12 hours  cefepime   IVPB      chlorhexidine 2% Cloths 1 Application(s) Topical daily  dextrose 5% + sodium chloride 0.9% with potassium chloride 20 mEq/L 1000 milliLiter(s) IV Continuous <Continuous>  dextrose 5%. 1000 milliLiter(s) IV Continuous <Continuous>  dextrose 5%. 1000 milliLiter(s) IV Continuous <Continuous>  dextrose 50% Injectable 25 Gram(s) IV Push once  dextrose 50% Injectable 12.5 Gram(s) IV Push once  dextrose 50% Injectable 25 Gram(s) IV Push once  FIRST- Mouthwash  BLM 10 milliLiter(s) Swish and Spit every 4 hours  glucagon  Injectable 1 milliGRAM(s) IntraMuscular once  influenza  Vaccine (HIGH DOSE) 0.5 milliLiter(s) IntraMuscular once  insulin glargine Injectable (LANTUS) 40 Unit(s) SubCutaneous at bedtime  insulin lispro (ADMELOG) corrective regimen sliding scale   SubCutaneous three times a day before meals  insulin lispro Injectable (ADMELOG) 16 Unit(s) SubCutaneous three times a day before meals  insulin regular Infusion 8 Unit(s)/Hr IV Continuous <Continuous>  melatonin 10 milliGRAM(s) Oral at bedtime  methylPREDNISolone sodium succinate Injectable 40 milliGRAM(s) IV Push daily  metoprolol tartrate 25 milliGRAM(s) Oral two times a day  NIFEdipine XL 60 milliGRAM(s) Oral daily  nystatin Cream 1 Application(s) Topical every 12 hours  pantoprazole  Injectable 40 milliGRAM(s) IV Push daily  polyethylene glycol 3350 17 Gram(s) Oral every 12 hours  senna 2 Tablet(s) Oral every 12 hours  sertraline 50 milliGRAM(s) Oral daily  tamsulosin 0.4 milliGRAM(s) Oral at bedtime    PRN MEDICATIONS  aluminum hydroxide/magnesium hydroxide/simethicone Suspension 30 milliLiter(s) Oral every 4 hours PRN  bisacodyl 5 milliGRAM(s) Oral daily PRN  calcium carbonate    500 mG (Tums) Chewable 1 Tablet(s) Chew three times a day PRN  dextrose Oral Gel 15 Gram(s) Oral once PRN  HYDROmorphone  Injectable 0.5 milliGRAM(s) IV Push every 6 hours PRN  hydrOXYzine hydrochloride 25 milliGRAM(s) Oral every 6 hours PRN  magnesium hydroxide Suspension 30 milliLiter(s) Oral daily PRN  ondansetron Injectable 4 milliGRAM(s) IV Push every 6 hours PRN  oxyCODONE    IR 10 milliGRAM(s) Oral every 6 hours PRN  simethicone 80 milliGRAM(s) Chew daily PRN    VITALS  T(F): 97.6 (02-09-25 @ 04:00), Max: 98.2 (02-08-25 @ 12:00)  HR: 72 (02-09-25 @ 05:00) (69 - 90)  BP: 104/59 (02-09-25 @ 05:00) (104/59 - 148/69)  RR: 23 (02-09-25 @ 05:00) (17 - 25)  SpO2: 96% (02-09-25 @ 05:00) (90% - 97%)  POCT Blood Glucose.: 87 mg/dL (02-09-25 @ 05:40)  POCT Blood Glucose.: 101 mg/dL (02-09-25 @ 05:00)  POCT Blood Glucose.: 125 mg/dL (02-09-25 @ 03:58)  POCT Blood Glucose.: 187 mg/dL (02-09-25 @ 02:44)  POCT Blood Glucose.: 225 mg/dL (02-09-25 @ 01:46)  POCT Blood Glucose.: 251 mg/dL (02-09-25 @ 00:43)  POCT Blood Glucose.: 365 mg/dL (02-08-25 @ 23:45)  POCT Blood Glucose.: 391 mg/dL (02-08-25 @ 22:42)  POCT Blood Glucose.: 439 mg/dL (02-08-25 @ 21:42)  POCT Blood Glucose.: 469 mg/dL (02-08-25 @ 20:40)  POCT Blood Glucose.: 527 mg/dL (02-08-25 @ 19:41)  POCT Blood Glucose.: 517 mg/dL (02-08-25 @ 17:53)  POCT Blood Glucose.: 558 mg/dL (02-08-25 @ 16:20)  POCT Blood Glucose.: 547 mg/dL (02-08-25 @ 14:12)  POCT Blood Glucose.: 469 mg/dL (02-08-25 @ 11:22)  POCT Blood Glucose.: 434 mg/dL (02-08-25 @ 10:03)  POCT Blood Glucose.: 420 mg/dL (02-08-25 @ 07:33)    PHYSICAL EXAM  CCO*3  GBAE, clear lungs  Regular S1S2, no murmurs  Soft abdomen, non tender  No LLE      LABS             8.8    14.75 )-----------( 572      ( 02-09-25 @ 04:38 )             27.1     137  |  93  |  124  -------------------------<  225   02-09-25 @ 01:10  4.3  |  30  |  2.5    Ca      8.5     02-09-25 @ 01:10  Mg     2.7     02-08-25 @ 05:59    TPro  5.4  /  Alb  3.2  /  TBili  0.3  /  DBili  x   /  AST  56  /  ALT  31  /  AlkPhos  261  /  GGT  x     02-08-25 @ 17:29    PTT - ( 02-07-25 @ 11:00 )  PTT:92.8 sec    Pro-Brain Natriuretic Peptide: 814 pg/mL (02-06-25 @ 13:17)    Urinalysis Basic - ( 09 Feb 2025 01:10 )    Color: x / Appearance: x / SG: x / pH: x  Gluc: 225 mg/dL / Ketone: x  / Bili: x / Urobili: x   Blood: x / Protein: x / Nitrite: x   Leuk Esterase: x / RBC: x / WBC x   Sq Epi: x / Non Sq Epi: x / Bacteria: x          Culture - Blood (collected 06 Feb 2025 12:30)  Source: .Blood BLOOD  Preliminary Report (08 Feb 2025 22:01):    No growth at 48 Hours      IMAGING

## 2025-02-10 LAB
ALBUMIN SERPL ELPH-MCNC: 3.1 G/DL — LOW (ref 3.5–5.2)
ALP SERPL-CCNC: 229 U/L — HIGH (ref 30–115)
ALT FLD-CCNC: 41 U/L — SIGNIFICANT CHANGE UP (ref 0–41)
ANION GAP SERPL CALC-SCNC: 11 MMOL/L — SIGNIFICANT CHANGE UP (ref 7–14)
ANION GAP SERPL CALC-SCNC: 12 MMOL/L — SIGNIFICANT CHANGE UP (ref 7–14)
AST SERPL-CCNC: 51 U/L — HIGH (ref 0–41)
BASOPHILS # BLD AUTO: 0.09 K/UL — SIGNIFICANT CHANGE UP (ref 0–0.2)
BASOPHILS NFR BLD AUTO: 0.5 % — SIGNIFICANT CHANGE UP (ref 0–1)
BILIRUB SERPL-MCNC: 0.3 MG/DL — SIGNIFICANT CHANGE UP (ref 0.2–1.2)
BLD GP AB SCN SERPL QL: SIGNIFICANT CHANGE UP
BUN SERPL-MCNC: 121 MG/DL — CRITICAL HIGH (ref 10–20)
BUN SERPL-MCNC: 121 MG/DL — CRITICAL HIGH (ref 10–20)
CALCIUM SERPL-MCNC: 8.3 MG/DL — LOW (ref 8.4–10.5)
CALCIUM SERPL-MCNC: 8.9 MG/DL — SIGNIFICANT CHANGE UP (ref 8.4–10.5)
CHLORIDE SERPL-SCNC: 95 MMOL/L — LOW (ref 98–110)
CHLORIDE SERPL-SCNC: 96 MMOL/L — LOW (ref 98–110)
CO2 SERPL-SCNC: 30 MMOL/L — SIGNIFICANT CHANGE UP (ref 17–32)
CO2 SERPL-SCNC: 30 MMOL/L — SIGNIFICANT CHANGE UP (ref 17–32)
CREAT SERPL-MCNC: 2 MG/DL — HIGH (ref 0.7–1.5)
CREAT SERPL-MCNC: 2 MG/DL — HIGH (ref 0.7–1.5)
EGFR: 36 ML/MIN/1.73M2 — LOW
EGFR: 36 ML/MIN/1.73M2 — LOW
EOSINOPHIL # BLD AUTO: 0.03 K/UL — SIGNIFICANT CHANGE UP (ref 0–0.7)
EOSINOPHIL NFR BLD AUTO: 0.2 % — SIGNIFICANT CHANGE UP (ref 0–8)
FUNGITELL: <31 PG/ML — SIGNIFICANT CHANGE UP
GLUCOSE BLDC GLUCOMTR-MCNC: 104 MG/DL — HIGH (ref 70–99)
GLUCOSE BLDC GLUCOMTR-MCNC: 108 MG/DL — HIGH (ref 70–99)
GLUCOSE BLDC GLUCOMTR-MCNC: 139 MG/DL — HIGH (ref 70–99)
GLUCOSE BLDC GLUCOMTR-MCNC: 168 MG/DL — HIGH (ref 70–99)
GLUCOSE BLDC GLUCOMTR-MCNC: 194 MG/DL — HIGH (ref 70–99)
GLUCOSE BLDC GLUCOMTR-MCNC: 200 MG/DL — HIGH (ref 70–99)
GLUCOSE BLDC GLUCOMTR-MCNC: 221 MG/DL — HIGH (ref 70–99)
GLUCOSE BLDC GLUCOMTR-MCNC: 225 MG/DL — HIGH (ref 70–99)
GLUCOSE BLDC GLUCOMTR-MCNC: 276 MG/DL — HIGH (ref 70–99)
GLUCOSE BLDC GLUCOMTR-MCNC: 79 MG/DL — SIGNIFICANT CHANGE UP (ref 70–99)
GLUCOSE BLDC GLUCOMTR-MCNC: 93 MG/DL — SIGNIFICANT CHANGE UP (ref 70–99)
GLUCOSE SERPL-MCNC: 103 MG/DL — HIGH (ref 70–99)
GLUCOSE SERPL-MCNC: 244 MG/DL — HIGH (ref 70–99)
HCT VFR BLD CALC: 28.4 % — LOW (ref 42–52)
HGB BLD-MCNC: 9.3 G/DL — LOW (ref 14–18)
IMM GRANULOCYTES NFR BLD AUTO: 6.6 % — HIGH (ref 0.1–0.3)
LYMPHOCYTES # BLD AUTO: 1.04 K/UL — LOW (ref 1.2–3.4)
LYMPHOCYTES # BLD AUTO: 5.9 % — LOW (ref 20.5–51.1)
MAGNESIUM SERPL-MCNC: 2.6 MG/DL — HIGH (ref 1.8–2.4)
MCHC RBC-ENTMCNC: 30.3 PG — SIGNIFICANT CHANGE UP (ref 27–31)
MCHC RBC-ENTMCNC: 32.7 G/DL — SIGNIFICANT CHANGE UP (ref 32–37)
MCV RBC AUTO: 92.5 FL — SIGNIFICANT CHANGE UP (ref 80–94)
MONOCYTES # BLD AUTO: 3.23 K/UL — HIGH (ref 0.1–0.6)
MONOCYTES NFR BLD AUTO: 18.5 % — HIGH (ref 1.7–9.3)
NEUTROPHILS # BLD AUTO: 11.95 K/UL — HIGH (ref 1.4–6.5)
NEUTROPHILS NFR BLD AUTO: 68.3 % — SIGNIFICANT CHANGE UP (ref 42.2–75.2)
NRBC # BLD: 2 /100 WBCS — HIGH (ref 0–0)
NRBC BLD-RTO: 2 /100 WBCS — HIGH (ref 0–0)
PLATELET # BLD AUTO: 551 K/UL — HIGH (ref 130–400)
PMV BLD: 11.8 FL — HIGH (ref 7.4–10.4)
POTASSIUM SERPL-MCNC: 4.5 MMOL/L — SIGNIFICANT CHANGE UP (ref 3.5–5)
POTASSIUM SERPL-MCNC: 4.7 MMOL/L — SIGNIFICANT CHANGE UP (ref 3.5–5)
POTASSIUM SERPL-SCNC: 4.5 MMOL/L — SIGNIFICANT CHANGE UP (ref 3.5–5)
POTASSIUM SERPL-SCNC: 4.7 MMOL/L — SIGNIFICANT CHANGE UP (ref 3.5–5)
PROT SERPL-MCNC: 5.8 G/DL — LOW (ref 6–8)
RBC # BLD: 3.07 M/UL — LOW (ref 4.7–6.1)
RBC # FLD: 15.3 % — HIGH (ref 11.5–14.5)
SODIUM SERPL-SCNC: 137 MMOL/L — SIGNIFICANT CHANGE UP (ref 135–146)
SODIUM SERPL-SCNC: 137 MMOL/L — SIGNIFICANT CHANGE UP (ref 135–146)
WBC # BLD: 17.5 K/UL — HIGH (ref 4.8–10.8)
WBC # FLD AUTO: 17.5 K/UL — HIGH (ref 4.8–10.8)

## 2025-02-10 PROCEDURE — 71045 X-RAY EXAM CHEST 1 VIEW: CPT | Mod: 26

## 2025-02-10 PROCEDURE — 99233 SBSQ HOSP IP/OBS HIGH 50: CPT

## 2025-02-10 RX ORDER — PREDNISONE 20 MG/1
40 TABLET ORAL DAILY
Refills: 0 | Status: DISCONTINUED | OUTPATIENT
Start: 2025-02-11 | End: 2025-02-11

## 2025-02-10 RX ADMIN — Medication 1 APPLICATION(S): at 11:39

## 2025-02-10 RX ADMIN — Medication 4 UNIT(S)/HR: at 02:48

## 2025-02-10 RX ADMIN — APIXABAN 5 MILLIGRAM(S): 2.5 TABLET, FILM COATED ORAL at 07:50

## 2025-02-10 RX ADMIN — BENZOCAINE 1 SPRAY(S): 220 SPRAY, METERED PERIODONTAL at 05:26

## 2025-02-10 RX ADMIN — IPRATROPIUM BROMIDE AND ALBUTEROL SULFATE 3 MILLILITER(S): .5; 2.5 SOLUTION RESPIRATORY (INHALATION) at 07:56

## 2025-02-10 RX ADMIN — DIPHENHYDRAMINE HYDROCHLORIDE AND LIDOCAINE HYDROCHLORIDE AND ALUMINUM HYDROXIDE AND MAGNESIUM HYDRO 10 MILLILITER(S): KIT at 05:28

## 2025-02-10 RX ADMIN — INSULIN LISPRO 4: 100 INJECTION, SOLUTION INTRAVENOUS; SUBCUTANEOUS at 11:38

## 2025-02-10 RX ADMIN — CEFEPIME 100 MILLIGRAM(S): 2 INJECTION, POWDER, FOR SOLUTION INTRAVENOUS at 05:27

## 2025-02-10 RX ADMIN — Medication 0.5 MILLIGRAM(S): at 06:00

## 2025-02-10 RX ADMIN — NYSTATIN 1 APPLICATION(S): 100000 CREAM TOPICAL at 17:26

## 2025-02-10 RX ADMIN — METOPROLOL SUCCINATE 25 MILLIGRAM(S): 50 TABLET, EXTENDED RELEASE ORAL at 05:27

## 2025-02-10 RX ADMIN — Medication 40 MILLIGRAM(S): at 11:37

## 2025-02-10 RX ADMIN — Medication 10 MILLIGRAM(S): at 21:23

## 2025-02-10 RX ADMIN — Medication 0.5 MILLIGRAM(S): at 07:00

## 2025-02-10 RX ADMIN — NYSTATIN 1 APPLICATION(S): 100000 CREAM TOPICAL at 05:28

## 2025-02-10 RX ADMIN — TAMSULOSIN HYDROCHLORIDE 0.4 MILLIGRAM(S): 0.4 CAPSULE ORAL at 21:24

## 2025-02-10 RX ADMIN — CEFEPIME 100 MILLIGRAM(S): 2 INJECTION, POWDER, FOR SOLUTION INTRAVENOUS at 17:25

## 2025-02-10 RX ADMIN — METOPROLOL SUCCINATE 25 MILLIGRAM(S): 50 TABLET, EXTENDED RELEASE ORAL at 17:25

## 2025-02-10 RX ADMIN — INSULIN LISPRO 18 UNIT(S): 100 INJECTION, SOLUTION INTRAVENOUS; SUBCUTANEOUS at 08:14

## 2025-02-10 RX ADMIN — INSULIN GLARGINE-YFGN 40 UNIT(S): 100 INJECTION, SOLUTION SUBCUTANEOUS at 21:24

## 2025-02-10 RX ADMIN — Medication 2 UNIT(S)/HR: at 03:18

## 2025-02-10 RX ADMIN — Medication 500 MILLIGRAM(S): at 11:37

## 2025-02-10 RX ADMIN — INSULIN LISPRO 18 UNIT(S): 100 INJECTION, SOLUTION INTRAVENOUS; SUBCUTANEOUS at 11:38

## 2025-02-10 RX ADMIN — Medication 150 MILLIGRAM(S): at 11:36

## 2025-02-10 RX ADMIN — BENZOCAINE 1 SPRAY(S): 220 SPRAY, METERED PERIODONTAL at 13:07

## 2025-02-10 RX ADMIN — DIPHENHYDRAMINE HYDROCHLORIDE AND LIDOCAINE HYDROCHLORIDE AND ALUMINUM HYDROXIDE AND MAGNESIUM HYDRO 10 MILLILITER(S): KIT at 13:06

## 2025-02-10 RX ADMIN — Medication 60 MILLIGRAM(S): at 05:27

## 2025-02-10 RX ADMIN — HYDROXYZINE HYDROCHLORIDE 25 MILLIGRAM(S): 25 TABLET, FILM COATED ORAL at 21:23

## 2025-02-10 RX ADMIN — Medication 0.5 MILLIGRAM(S): at 20:32

## 2025-02-10 RX ADMIN — DIPHENHYDRAMINE HYDROCHLORIDE AND LIDOCAINE HYDROCHLORIDE AND ALUMINUM HYDROXIDE AND MAGNESIUM HYDRO 10 MILLILITER(S): KIT at 17:24

## 2025-02-10 RX ADMIN — Medication 0.5 MILLIGRAM(S): at 23:49

## 2025-02-10 RX ADMIN — IPRATROPIUM BROMIDE AND ALBUTEROL SULFATE 3 MILLILITER(S): .5; 2.5 SOLUTION RESPIRATORY (INHALATION) at 13:29

## 2025-02-10 RX ADMIN — METHYLPREDNISOLONE ACETATE 40 MILLIGRAM(S): 80 INJECTION, SUSPENSION INTRA-ARTICULAR; INTRALESIONAL; INTRAMUSCULAR; SOFT TISSUE at 05:27

## 2025-02-10 RX ADMIN — SERTRALINE 50 MILLIGRAM(S): 100 TABLET, FILM COATED ORAL at 11:37

## 2025-02-10 RX ADMIN — BUMETANIDE 2 MILLIGRAM(S): 1 TABLET ORAL at 05:26

## 2025-02-10 RX ADMIN — BENZOCAINE 1 SPRAY(S): 220 SPRAY, METERED PERIODONTAL at 21:24

## 2025-02-10 RX ADMIN — IPRATROPIUM BROMIDE AND ALBUTEROL SULFATE 3 MILLILITER(S): .5; 2.5 SOLUTION RESPIRATORY (INHALATION) at 21:43

## 2025-02-10 RX ADMIN — INSULIN LISPRO 2: 100 INJECTION, SOLUTION INTRAVENOUS; SUBCUTANEOUS at 17:27

## 2025-02-10 RX ADMIN — DIPHENHYDRAMINE HYDROCHLORIDE AND LIDOCAINE HYDROCHLORIDE AND ALUMINUM HYDROXIDE AND MAGNESIUM HYDRO 10 MILLILITER(S): KIT at 21:24

## 2025-02-10 RX ADMIN — ATORVASTATIN CALCIUM 10 MILLIGRAM(S): 80 TABLET, FILM COATED ORAL at 21:24

## 2025-02-10 RX ADMIN — INSULIN LISPRO 18 UNIT(S): 100 INJECTION, SOLUTION INTRAVENOUS; SUBCUTANEOUS at 17:29

## 2025-02-10 RX ADMIN — APIXABAN 5 MILLIGRAM(S): 2.5 TABLET, FILM COATED ORAL at 20:32

## 2025-02-10 NOTE — PROGRESS NOTE ADULT - ASSESSMENT
ASSESSMENT  69-year-old man with extensive medical history including CAD (status post CABG;), DM (on insulin pump), atrial fibrillation on Xarelto, history of distal pancreatomy and splenectomy for unclear reason referred to the ED by his oncologist for abnormal labs. Patient was recently diagnosed with Diffuse large B cell Lymphoma on pathology of right groin mass and is following with [facility] oncology team. Patient was discharged on 01.17.2025 from hospital after inpatient chemotherapy (Completed cycle 1 of R-EPOCH (D1 on 1/11/2024). Tolerated well. Rituximab was given on D5).   Over past 3 days patient started to get a productive cough with yellow sputum production.  Patient is also endorsing chills and nausea which he states is baseline. He had 1 reading on low grade fever 100.5 on day of presentation.    IMPRESSION  #Severe Sepsis - Severe Multifocal Pneumonia with Influenza A infection   - completed 10 day course of cefepime/tramiflu 1/30     #Neutropenic Fever- resolved  #Acute Hypoxemic Respiratory failure   #Pulmonary Edema     #Worsening Hypoxemia 2/6 - transferred to SICU   - CT Angio Chest PE Protocol w/ IV Cont (01.20.25 @ 20:56): Negative for pulmonary emboli. Interval development of infiltrates within the lower lobes and lingula   which may reflect acute multilobar pneumonia.   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    #Abx allergy: No Known Allergies    RECOMMENDATIONS  - cefepime 2g q 12 hours - tentative end date 2/12   - trend WBC - recently on solumedrol     Please call or message on [employer] Teams if with any questions.  Spectra [phone]

## 2025-02-10 NOTE — SWALLOW BEDSIDE ASSESSMENT ADULT - SLP PRECAUTIONS/LIMITATIONS: HEARING
impaired/assistive device in use
impaired
impaired

## 2025-02-10 NOTE — SWALLOW BEDSIDE ASSESSMENT ADULT - SWALLOW EVAL: DIAGNOSIS
+toleration for thin liquids w/o overt s/s aspiration/penetration; pt declined other PO trials at this time, however, reports improved overall appetite. +toleration for thin liquids w/o overt s/s aspiration/penetration; pt declined other PO trials at this time.

## 2025-02-10 NOTE — PROGRESS NOTE ADULT - SUBJECTIVE AND OBJECTIVE BOX
Patient is a 70y old  Male who presents with a chief complaint of SOB (09 Feb 2025 08:37)      Over Night Events:  Patient seen and examined.    upgraded to icu for insulin drip for hyperglycemia   conmfortable     ROS:  See HPI    PHYSICAL EXAM    ICU Vital Signs Last 24 Hrs  T(C): 35.8 (10 Feb 2025 08:00), Max: 36.1 (09 Feb 2025 12:00)  T(F): 96.5 (10 Feb 2025 08:00), Max: 97 (09 Feb 2025 12:00)  HR: 68 (10 Feb 2025 08:00) (63 - 92)  BP: 134/70 (10 Feb 2025 07:00) (101/51 - 172/78)  BP(mean): 95 (10 Feb 2025 07:00) (72 - 114)  ABP: --  ABP(mean): --  RR: 21 (10 Feb 2025 08:00) (12 - 25)  SpO2: 96% (10 Feb 2025 08:00) (94% - 99%)    O2 Parameters below as of 10 Feb 2025 08:00  Patient On (Oxygen Delivery Method): nasal cannula  O2 Flow (L/min): 3          General: awake   HEENT:              katlin  Lymph Nodes: NO cervical LN   Lungs: Bilateral BS  Cardiovascular: Regular   Abdomen: Soft, Positive BS  Extremities: No clubbing   Skin: warm   Neurological: no focal   Musculoskeletal: move all ext     I&O's Detail    09 Feb 2025 07:01  -  10 Feb 2025 07:00  --------------------------------------------------------  IN:    Insulin: 6 mL    Insulin: 30 mL    Insulin: 4 mL    IV PiggyBack: 100 mL    Oral Fluid: 1978 mL    sodium chloride 0.45%: 675 mL  Total IN: 2793 mL    OUT:    Voided (mL): 3550 mL  Total OUT: 3550 mL    Total NET: -757 mL          LABS:                          9.3    17.50 )-----------( 551      ( 10 Feb 2025 04:17 )             28.4         10 Feb 2025 04:17    137    |  96     |  121    ----------------------------<  103    4.5     |  30     |  2.0      Ca    8.9        10 Feb 2025 04:17  Mg     2.6       10 Feb 2025 04:17    TPro  5.8    /  Alb  3.1    /  TBili  0.3    /  DBili  x      /  AST  51     /  ALT  41     /  AlkPhos  229    10 Feb 2025 04:17  Amylase x     lipase x                                                                                        Urinalysis Basic - ( 10 Feb 2025 04:17 )    Color: x / Appearance: x / SG: x / pH: x  Gluc: 103 mg/dL / Ketone: x  / Bili: x / Urobili: x   Blood: x / Protein: x / Nitrite: x   Leuk Esterase: x / RBC: x / WBC x   Sq Epi: x / Non Sq Epi: x / Bacteria: x                                                                                                                                                     MEDICATIONS  (STANDING):  albuterol/ipratropium for Nebulization 3 milliLiter(s) Nebulizer every 6 hours  allopurinol 150 milliGRAM(s) Oral daily  apixaban 5 milliGRAM(s) Oral every 12 hours  ascorbic acid 500 milliGRAM(s) Oral daily  atorvastatin 10 milliGRAM(s) Oral at bedtime  benzocaine 20% Spray 1 Spray(s) Topical three times a day  buMETAnide 2 milliGRAM(s) Oral daily  cefepime   IVPB 2000 milliGRAM(s) IV Intermittent every 12 hours  cefepime   IVPB      chlorhexidine 2% Cloths 1 Application(s) Topical daily  dextrose 5%. 1000 milliLiter(s) (50 mL/Hr) IV Continuous <Continuous>  dextrose 5%. 1000 milliLiter(s) (100 mL/Hr) IV Continuous <Continuous>  dextrose 50% Injectable 25 Gram(s) IV Push once  dextrose 50% Injectable 12.5 Gram(s) IV Push once  dextrose 50% Injectable 25 Gram(s) IV Push once  FIRST- Mouthwash  BLM 10 milliLiter(s) Swish and Spit every 4 hours  glucagon  Injectable 1 milliGRAM(s) IntraMuscular once  influenza  Vaccine (HIGH DOSE) 0.5 milliLiter(s) IntraMuscular once  insulin glargine Injectable (LANTUS) 40 Unit(s) SubCutaneous at bedtime  insulin lispro (ADMELOG) corrective regimen sliding scale   SubCutaneous three times a day before meals  insulin lispro Injectable (ADMELOG) 18 Unit(s) SubCutaneous three times a day before meals  melatonin 10 milliGRAM(s) Oral at bedtime  methylPREDNISolone sodium succinate Injectable 40 milliGRAM(s) IV Push daily  metoprolol tartrate 25 milliGRAM(s) Oral two times a day  NIFEdipine XL 60 milliGRAM(s) Oral daily  nystatin Cream 1 Application(s) Topical every 12 hours  pantoprazole  Injectable 40 milliGRAM(s) IV Push daily  polyethylene glycol 3350 17 Gram(s) Oral <User Schedule>  senna 2 Tablet(s) Oral every 12 hours  sertraline 50 milliGRAM(s) Oral daily  sodium chloride 0.45%. 1000 milliLiter(s) (75 mL/Hr) IV Continuous <Continuous>  tamsulosin 0.4 milliGRAM(s) Oral at bedtime    MEDICATIONS  (PRN):  aluminum hydroxide/magnesium hydroxide/simethicone Suspension 30 milliLiter(s) Oral every 4 hours PRN Dyspepsia  bisacodyl 5 milliGRAM(s) Oral daily PRN Constipation  calcium carbonate    500 mG (Tums) Chewable 1 Tablet(s) Chew three times a day PRN Dyspepsia  dextrose Oral Gel 15 Gram(s) Oral once PRN Blood Glucose LESS THAN 70 milliGRAM(s)/deciliter  HYDROmorphone  Injectable 0.5 milliGRAM(s) IV Push every 6 hours PRN Severe Pain (7 - 10)  hydrOXYzine hydrochloride 25 milliGRAM(s) Oral every 6 hours PRN Anxiety  magnesium hydroxide Suspension 30 milliLiter(s) Oral daily PRN Constipation  ondansetron Injectable 4 milliGRAM(s) IV Push every 6 hours PRN Nausea and/or Vomiting  oxyCODONE    IR 10 milliGRAM(s) Oral every 6 hours PRN Moderate Pain (4 - 6)  simethicone 80 milliGRAM(s) Chew daily PRN Gas          Xrays:  stable left lower opacity                                                                       ECHO:  [facility] ICU:

## 2025-02-10 NOTE — SWALLOW BEDSIDE ASSESSMENT ADULT - SWALLOW EVAL: RECOMMENDED FEEDING/EATING TECHNIQUES
crush medication (when feasible)/position upright (90 degrees)/small sips/bites
crush medication (when feasible)/oral hygiene/position upright (90 degrees)/small sips/bites

## 2025-02-10 NOTE — SWALLOW BEDSIDE ASSESSMENT ADULT - SLP PRECAUTIONS/LIMITATIONS: VISION
impaired/assistive device in use

## 2025-02-10 NOTE — SWALLOW BEDSIDE ASSESSMENT ADULT - NS ASR SWALLOW FINDINGS DISCUS
Physician/Nursing/Patient/Family
Physician/Nursing/Patient
Physician/Nursing/Patient/Family
Nursing/Patient/Family
Physician/Nursing/Patient/Family
Nursing/Patient/Family

## 2025-02-10 NOTE — SWALLOW BEDSIDE ASSESSMENT ADULT - ORAL PHASE
Delayed oral transit time/Stasis in lateral sulci
Within functional limits
Within functional limits
Delayed oral transit time/Stasis in lateral sulci
Within functional limits

## 2025-02-10 NOTE — PROGRESS NOTE ADULT - ASSESSMENT
# DM  - CC diet   - FS - self monitored via dex com --> On insulin pump @2.85 at home   - Endo consult: keep off insulin pump  - lantus 10 lispro 3 --> has been steadily increased : lantus increased from 30 to 40 and lispro from 10 to 16 on 02/08 --> s/p Humulin 7 units 1 dose  - Patient received total of 31 and Humulin Admelog 16x3  - Repeat BMP showed Gluocse of 529, mild increase in AG (12 --> 15) and Beta hydroxy < 0.2  - On 2/8 started on insulin drip (7 units /min) and upgraded to MICU, NS fluid with 20 mEq K but slower rate given HFpEF decompensation  - Off insulin drip overnight, switched to SQ insulin, monitor BS.  - 2/9 Patient transitioned off insulin drip to Basal Bolus Insulin, but missed dose. Glucose elevated and Insulin drip was restarted.   - 2/10 Insulin drip stopped. Glucose within normal range. Patient transitioned to basal bolus insulin.   - 2/10 Adjust basal bolus regimen as needed to maintain target blood glucose.     # Acute Hypoxic Respiratory Failure 2/2 Multifocal Pneumonia/  Acute on chronic diastolic CHF/  Influenza A infection/ RASHAD/ OHS   - clinically improving, on high low oxygen for now, will taper off as tolerated   - NIV PRN and QHS   - nebs Q 6 hours, supportive care, aspiration precautions   - CTA 1/20: Negative for pulmonary emboli. Interval development of infiltrates within the lower lobes and lingula which may reflect acute multilobar pneumonia  - blood cx NGTD x2, UA negative, influenza A + , Nasal MRSA negative   - pt was consulted by ID, treated with IV Abx, completed the course of Tamiflu   - TTE 1/10/25: poor study, EF 60-65%, pt has moderated TR  - fluid restriction 1200 ml in 24 hours , intake and output monitoring, daily weight   - Desaturated again while in ICU on NC, and Bipap was started  - CT chest: Worsening bilateral peribronchial opacities compatible with bronchopneumonia  - F/u cultures, legionella, procal, fungitell  - Started IV cefepime 2g BID, vanco stopped as MRSA negative  - Follow up ID  - HFNC 40/40, titrated off, now on NC 3L  - duonebs and solumedrol 40 mg daily , wheeze on exam, titrate solumedrol down     # 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 Tolerated well. Rituximab was given on D5   - Uric acid 10.3 on 1/16/25, s/p 3 mg IV rasburicase  - sp Zarxio  - daily CBC with diff, active T&S  - c/w Allopurinol   - No onc plans to start chemo/treatment inpatient, can dc when medically appropriate with follow-up with Dr. Hilliard who will schedule cycle 2 of treatment.     # KRISTINE on CKD 3B / Chronic Lower Extremity Edema   - baseline ~high 1s, low 2s  - c/w diuretics: bumex 2mg IV BID --> to 2mg IV TID --> 2 mg PO daily  - monitor BUN/cr and urine output  - ajay d/c'd  - avoid nephrotoxic medications   - will hold off Metolazone on 02/08 as BUN/Cr 115/2.7  - nephrology is following, recommendations noted    # CAD s/p CABG/ Chronic Afib on Xarelto/ HTN  - c/w statin, BB  - Holding ASA  - Still holding lisinopril and aldactone from last admission  - sylvester need to switch to eliquis given CKD from xaerlto   - dc heparin ggt started eliquis 5 mg BID     # LLE weakness  CTH ordered today  PT following    # HLD   - on Atorvastatin now   - resume Repatha on discharge    # Oral pain due to ulcer with black discoloration   - c/w local couth care  - consult ENT to examine black discoloration -> rec CT neck w/ IV contrast, magic mouthwash  - CT neck w/IV contrast no tongue mass.   - oncology thinking this is mucositis   - Monitor bleed, keep active TS and trend CBC.   - Will c/w magic mouthwash.   - Bleeding is resolving    # Agitation/ confusion   # Insomnia   - pt is calm now   - on Haldol PRN , Sertraline and melatonin   - supportive care     # GI  - On bowel regimen and PPIs.    #Misc  #Code Status: Full Code  #DVT ppx: Eliquis  #GI ppx: PPI  #Diet: CC  #Activity: OOBTC  #Inpatient Dispo: DG to SDU  #Discharge Dispo: Home vs Rehab.

## 2025-02-10 NOTE — SWALLOW BEDSIDE ASSESSMENT ADULT - MODE OF PRESENTATION
cup/self fed/fed by clinician
cup/spoon/self fed/fed by clinician
fed by clinician
cup/spoon/fed by clinician
straw/self fed/fed by clinician
cup/spoon/fed by clinician
straw/self fed/fed by clinician
straw/fed by clinician
straw/self fed

## 2025-02-10 NOTE — SWALLOW BEDSIDE ASSESSMENT ADULT - DATE
05-Feb-2025
27-Jan-2025
03-Feb-2025
26-Jan-2025
28-Jan-2025
31-Jan-2025
29-Jan-2025
10-Feb-2025
07-Feb-2025
06-Feb-2025
details…

## 2025-02-10 NOTE — SWALLOW BEDSIDE ASSESSMENT ADULT - ORAL PREPARATORY PHASE
Within functional limits
Reduced oral grading/Bolus falls into anterior sulcus/Bolus falls into left lateral sulci/Bolus falls into right lateral sulci
Reduced oral grading/Bolus falls into anterior sulcus/Bolus falls into left lateral sulci/Bolus falls into right lateral sulci
Within functional limits
Reduced oral grading

## 2025-02-10 NOTE — SWALLOW BEDSIDE ASSESSMENT ADULT - COMMENTS
s/p FEES, recommend soft and bite sized, mildly thick liquids.
Pt downgraded to 3B. CXR 2/5-> Increased bilateral opacities.  No CT evidence of a discrete tongue mass. Seen by ENT, pt likely developing mucositis 2' chemo. Recommend good oral care along with magic mouthwash and dexamethasone swish and spit for further care and comfort
pt placed on BiPAP, no po trials offered.
Pt upgraded to CCU over the weekend for hyperglycemia. Pt for downgrade back to SDU.  CXR 2/10-> Redemonstration bilateral opacities  CTH (-).
Increased Upper Left Opacity.
Pt downgraded to SDU. CXR 2/3-> Unchanged bibasilar opacities, left greater than right.
Pt upgraded to SDU for worsening hypoxemia, AHRF, possible GN PNA.  CT chest 2/6-> Worsening bilateral peribronchial opacities compatible with bronchopneumonia.
FEES planned for today, however pt anxious and declining study at this time.
Pt for upgrade to SDU, possible multifocal PNA - suspect retrocardiac per critical care note.  CXR 2/6-> Mild congestive change. Improved.

## 2025-02-10 NOTE — SWALLOW BEDSIDE ASSESSMENT ADULT - SLP PERTINENT HISTORY OF CURRENT PROBLEM
Pt is a 68 y/o M w/ extensive medical history including: CAD (status post CABG), DM (on insulin pump), atrial fibrillation (on Xarelto), distal pancreatomy and splenectomy, referred to the ED by his oncologist for abnormal labs. Of note, pt recently diagnosed w/ diffuse large B cell lymphoma on pathology of right groin mass and is following / [facility] oncology team. Pt discharged on 1/17/2025 from hospital after inpatient chemotherapy (Completed cycle 1 of R-EPOCH (D1 on 1/11/2024)). Pt is being treated for severe sepsis - severe multifocal PNA w/ influenza A infection, AHRF, volume overload, neutropenic fever; +Immunodeficiency 2' malignancy and comorbidities. FEES 1/30 while in 2T on HFNC.

## 2025-02-10 NOTE — PROGRESS NOTE ADULT - ASSESSMENT
IMPRESSION:    Acute hypoxemic respiratory failure  Possible PNA   Volume overload improved   KRISTINE on CKD   HFpEF  Large B cell lymphoma  Afib on Xarelto - on IV heparin now   Hx of HTN and DM      PLAN:    CNS: Pain control, avoid depressants. MS remains adequate.  CTH given subacute focal neuro deficit.    HEENT: Oral care    PULMONARY:  HOB @ 45 degrees.  Aspiration precautions.  Wean FiO2 as tolerated.  keep O2 sat 92-96%.  BIPAP during sleep and PRN during the day. Wean down to 40/40.   CT chest NC noted with bibasilar consolidations.  f/u MRSA nares  switch to prednisone 40 mg daily and taper   CARDIOVASCULAR:  Keep even balance for now. Bumex daily. Hold metolazone. Continue rate control.     GI: GI prophylaxis protonix, diet per speech and swallow.    RENAL:  Follow up lytes. Correct as needed.  Monitor UO     INFECTIOUS DISEASE:  Send pan culture; procal. Adjust to kidney function. Repeat MRSA nasal. f/u ID reccs. fungitel. Abx per ID.    on cefepime   HEMATOLOGICAL:  on Eliquis.  Monitor CBC and coags.       ENDOCRINE:  Follow up FS.  on lantus received 40 over night follow endo     target -180.    MUSCULOSKELETAL: Out of bed to chair. PT OT     Dispo: MICU ; Prognosis is guarded.

## 2025-02-10 NOTE — PROGRESS NOTE ADULT - SUBJECTIVE AND OBJECTIVE BOX
MORGAN BUSH  70y, Male  Allergy: No Known Allergies      LOS  21d    CHIEF COMPLAINT: SOB (09 Feb 2025 08:37)      INTERVAL EVENTS/HPI  - No acute events overnight  - T(F): , Max: 97 (02-09-25 @ 12:00)  - on nasal 3L NC, on insulin drip  - WBC Count: 17.50 (02-10-25 @ 04:17)  WBC Count: 14.75 (02-09-25 @ 04:38)     - Creatinine: 2.0 (02-10-25 @ 04:17)  Creatinine: 2.0 (02-10-25 @ 00:19)       ROS  General: Denies rigors, nightsweats  HEENT: Denies headache, rhinorrhea, sore throat, eye pain  CV: Denies CP, palpitations  PULM: Denies wheezing, hemoptysis  GI: Denies hematemesis, hematochezia, melena  : Denies discharge, hematuria  MSK: Denies arthralgias, myalgias  SKIN: Denies rash, lesions  NEURO: Denies paresthesias, weakness  PSYCH: Denies depression, anxiety    VITALS:  T(F): 96.5, Max: 97 (02-09-25 @ 12:00)  HR: 79  BP: 129/56  RR: 26Vital Signs Last 24 Hrs  T(C): 35.8 (10 Feb 2025 08:00), Max: 36.1 (09 Feb 2025 12:00)  T(F): 96.5 (10 Feb 2025 08:00), Max: 97 (09 Feb 2025 12:00)  HR: 79 (10 Feb 2025 09:00) (63 - 92)  BP: 129/56 (10 Feb 2025 09:00) (114/57 - 172/78)  BP(mean): 81 (10 Feb 2025 09:00) (79 - 114)  RR: 26 (10 Feb 2025 09:00) (12 - 26)  SpO2: 93% (10 Feb 2025 09:00) (93% - 99%)    Parameters below as of 10 Feb 2025 10:00  Patient On (Oxygen Delivery Method): nasal cannula        PHYSICAL EXAM:  Gen: NAD, resting in bed  HEENT: Normocephalic, atraumatic  Neck: supple, no lymphadenopathy  CV: Regular rate & regular rhythm  Lungs: decreased BS at bases, no fremitus  Abdomen: Soft, BS present  Ext: Warm, well perfused  Neuro: non focal, awake  Skin: no rash, no erythema  Lines: no phlebitis    FH: Non-contributory  Social Hx: Non-contributory    TESTS & MEASUREMENTS:                        9.3    17.50 )-----------( 551      ( 10 Feb 2025 04:17 )             28.4     02-10    137  |  96[L]  |  121[HH]  ----------------------------<  103[H]  4.5   |  30  |  2.0[H]    Ca    8.9      10 Feb 2025 04:17  Mg     2.6     02-10    TPro  5.8[L]  /  Alb  3.1[L]  /  TBili  0.3  /  DBili  x   /  AST  51[H]  /  ALT  41  /  AlkPhos  229[H]  02-10      LIVER FUNCTIONS - ( 10 Feb 2025 04:17 )  Alb: 3.1 g/dL / Pro: 5.8 g/dL / ALK PHOS: 229 U/L / ALT: 41 U/L / AST: 51 U/L / GGT: x           Urinalysis Basic - ( 10 Feb 2025 04:17 )    Color: x / Appearance: x / SG: x / pH: x  Gluc: 103 mg/dL / Ketone: x  / Bili: x / Urobili: x   Blood: x / Protein: x / Nitrite: x   Leuk Esterase: x / RBC: x / WBC x   Sq Epi: x / Non Sq Epi: x / Bacteria: x        Culture - Blood (collected 02-06-25 @ 12:30)  Source: .Blood BLOOD  Preliminary Report (02-09-25 @ 22:01):    No growth at 72 Hours    Culture - Blood (collected 01-27-25 @ 06:12)  Source: .Blood BLOOD  Final Report (02-01-25 @ 14:00):    No growth at 5 days    Urinalysis with Rflx Culture (collected 01-20-25 @ 19:07)    Culture - Blood (collected 01-20-25 @ 15:38)  Source: .Blood BLOOD  Final Report (01-25-25 @ 23:07):    No growth at 5 days    Culture - Blood (collected 01-20-25 @ 15:38)  Source: .Blood BLOOD  Final Report (01-25-25 @ 23:07):    No growth at 5 days        Lactate, Blood: 1.7 mmol/L (02-06-25 @ 12:30)      INFECTIOUS DISEASES TESTING  MRSA PCR Result.: Negative (02-07-25 @ 10:58)  Fungitell: 37 (02-06-25 @ 18:06)  Procalcitonin: 0.31 (02-06-25 @ 12:40)  Procalcitonin: 1.27 (01-30-25 @ 17:00)  Fungitell: <31 (01-28-25 @ 05:00)  Aspergillus Galactomannan Antigen: 0.04 (01-28-25 @ 05:00)  Procalcitonin: 4.48 (01-27-25 @ 06:12)  MRSA PCR Result.: Negative (01-25-25 @ 07:28)  Procalcitonin: 29.80 (01-24-25 @ 11:30)  MRSA PCR Result.: Negative (01-24-25 @ 10:45)  Legionella Antigen, Urine: Negative (01-23-25 @ 12:10)  Rapid RVP Result: NotDetec (01-13-25 @ 13:24)      INFLAMMATORY MARKERS  Sedimentation Rate, Erythrocyte: >140 mm/Hr (01-30-25 @ 17:00)  C-Reactive Protein: 87.8 mg/L (01-30-25 @ 17:00)      RADIOLOGY & ADDITIONAL TESTS:  I have personally reviewed the last available Chest xray  CXR  Xray Chest 1 View- PORTABLE-Urgent:   ACC: [id number] EXAM:  XR CHEST PORTABLE URGENT 1V   ORDERED BY: DANNY NORMAN     PROCEDURE DATE:  02/08/2025          INTERPRETATION:  CLINICAL HISTORY: Cough    COMPARISON: 2/6/2025.    TECHNIQUE: Frontal    FINDINGS:    Support devices: None.    Cardiac/mediastinum/hilum: Stable.    Lung parenchyma/Pleura: No focal parenchymal opacities, pleural   effusions, or pneumothorax.    Skeleton/soft tissues: Stable.      IMPRESSION:    No radiographic evidence of acute cardiopulmonary disease.    --- End of Report ---            ERIK MARTINEZ MD; Attending Radiologist  This document has been electronically signed. Feb 8 2025  9:54AM (02-08-25 @ 09:40)      CT      CARDIOLOGY TESTING  12 Lead ECG:   Ventricular Rate 88 BPM    Atrial Rate 88 BPM    P-R Interval 216 ms    QRS Duration 110 ms    Q-T Interval 386 ms    QTC Calculation(Bazett) 467 ms    P Axis 40 degrees    R Axis 12 degrees    T Axis 12 degrees    Diagnosis Line Sinus rhythm with 1st degree A-V block  Otherwise normal ECG    Confirmed by Mahesh Coreas ([id number]) on 2/6/2025 8:51:58 AM (02-06-25 @ 08:12)  12 Lead ECG:   Ventricular Rate 79 BPM    Atrial Rate 79 BPM    P-R Interval 198 ms    QRS Duration 100 ms    Q-T Interval 412 ms    QTC Calculation(Bazett) 472 ms    P Axis 37 degrees    R Axis 12 degrees    T Axis -10 degrees    Diagnosis Line Normal sinus rhythm  Minimal voltage criteria for LVH, may be normal variant ( R in aVL )  Inferior infarct , age undetermined  Cannot rule out Anterior infarct , age undetermined  Abnormal ECG    Confirmed by Mahesh Coreas ([id number]) on 2/5/2025 6:27:30 PM (02-05-25 @ 07:56)      MEDICATIONS  albuterol/ipratropium for Nebulization 3 Nebulizer every 6 hours  allopurinol 150 Oral daily  apixaban 5 Oral every 12 hours  ascorbic acid 500 Oral daily  atorvastatin 10 Oral at bedtime  benzocaine 20% Spray 1 Topical three times a day  buMETAnide 2 Oral daily  cefepime   IVPB 2000 IV Intermittent every 12 hours  cefepime   IVPB     chlorhexidine 2% Cloths 1 Topical daily  dextrose 5%. 1000 IV Continuous <Continuous>  dextrose 5%. 1000 IV Continuous <Continuous>  dextrose 50% Injectable 25 IV Push once  dextrose 50% Injectable 12.5 IV Push once  dextrose 50% Injectable 25 IV Push once  FIRST- Mouthwash  BLM 10 Swish and Spit every 4 hours  glucagon  Injectable 1 IntraMuscular once  influenza  Vaccine (HIGH DOSE) 0.5 IntraMuscular once  insulin glargine Injectable (LANTUS) 40 SubCutaneous at bedtime  insulin lispro (ADMELOG) corrective regimen sliding scale  SubCutaneous three times a day before meals  insulin lispro Injectable (ADMELOG) 18 SubCutaneous three times a day before meals  melatonin 10 Oral at bedtime  metoprolol tartrate 25 Oral two times a day  NIFEdipine XL 60 Oral daily  nystatin Cream 1 Topical every 12 hours  pantoprazole  Injectable 40 IV Push daily  polyethylene glycol 3350 17 Oral <User Schedule>  senna 2 Oral every 12 hours  sertraline 50 Oral daily  tamsulosin 0.4 Oral at bedtime      WEIGHT  Weight (kg): 132.9 (01-27-25 @ 11:20)  Creatinine: 2.0 mg/dL (02-10-25 @ 04:17)  Creatinine: 2.0 mg/dL (02-10-25 @ 00:19)      ANTIBIOTICS:  cefepime   IVPB 2000 milliGRAM(s) IV Intermittent every 12 hours  cefepime   IVPB          All available historical records have been reviewed

## 2025-02-10 NOTE — SWALLOW BEDSIDE ASSESSMENT ADULT - SLP GENERAL OBSERVATIONS
pt received in bed awake alert +3L NC pt received in bed awake alert +3L NC; pt reports improved appetite, reports mostly eating food brought in by family.

## 2025-02-10 NOTE — PROGRESS NOTE ADULT - SUBJECTIVE AND OBJECTIVE BOX
This note is updated with the current CCU course as of 2/10, but please refer to the Transfer Note 2/8 and 2/9 for complete hospital course.     CCU Course:  Patient admitted to CCU for hyperglycemia. Labs, clinical status, and presentation not consistent with DKA or HHS. On arrival to unit patient's blood sugar was controlled and he was transitioned off insulin drip to basal bolus insulin. Sugars were well controlled, but patient missed doses of insulin and blood sugar level shirley for which the insulin drip was restarted. Insulin drip was later discontinued as patient's blood glucose level normalized. Transitioned back off of drip to basal bolus insulin. Continue to adjust basal bolus insulin regimen as needed to maintain target blood glucose. Patient is stable for downgrade to SDU.     SUBJECTIVE/OVERNIGHT EVENTS  Today is hospital day 21d.   This morning patient was seen and examined at bedside, resting comfortably in bed.   No acute or major events overnight.   Patient has no complaints at this time.   Denies fever, chills, nausea, vomiting, diarrhea, constipation, hematochezia, dysuria, hematuria, chest pain, palpitations, sob.   Patient was informed of their plan of care at this time.    CODE STATUS: Full Code    MEDICATIONS  STANDING MEDICATIONS  albuterol/ipratropium for Nebulization 3 milliLiter(s) Nebulizer every 6 hours  allopurinol 150 milliGRAM(s) Oral daily  apixaban 5 milliGRAM(s) Oral every 12 hours  ascorbic acid 500 milliGRAM(s) Oral daily  atorvastatin 10 milliGRAM(s) Oral at bedtime  benzocaine 20% Spray 1 Spray(s) Topical three times a day  buMETAnide 2 milliGRAM(s) Oral daily  cefepime   IVPB 2000 milliGRAM(s) IV Intermittent every 12 hours  cefepime   IVPB      chlorhexidine 2% Cloths 1 Application(s) Topical daily  dextrose 5%. 1000 milliLiter(s) IV Continuous <Continuous>  dextrose 5%. 1000 milliLiter(s) IV Continuous <Continuous>  dextrose 50% Injectable 25 Gram(s) IV Push once  dextrose 50% Injectable 12.5 Gram(s) IV Push once  dextrose 50% Injectable 25 Gram(s) IV Push once  FIRST- Mouthwash  BLM 10 milliLiter(s) Swish and Spit every 4 hours  glucagon  Injectable 1 milliGRAM(s) IntraMuscular once  influenza  Vaccine (HIGH DOSE) 0.5 milliLiter(s) IntraMuscular once  insulin glargine Injectable (LANTUS) 40 Unit(s) SubCutaneous at bedtime  insulin lispro (ADMELOG) corrective regimen sliding scale   SubCutaneous three times a day before meals  insulin lispro Injectable (ADMELOG) 18 Unit(s) SubCutaneous three times a day before meals  melatonin 10 milliGRAM(s) Oral at bedtime  metoprolol tartrate 25 milliGRAM(s) Oral two times a day  NIFEdipine XL 60 milliGRAM(s) Oral daily  nystatin Cream 1 Application(s) Topical every 12 hours  pantoprazole  Injectable 40 milliGRAM(s) IV Push daily  polyethylene glycol 3350 17 Gram(s) Oral <User Schedule>  senna 2 Tablet(s) Oral every 12 hours  sertraline 50 milliGRAM(s) Oral daily  tamsulosin 0.4 milliGRAM(s) Oral at bedtime    PRN MEDICATIONS  aluminum hydroxide/magnesium hydroxide/simethicone Suspension 30 milliLiter(s) Oral every 4 hours PRN  bisacodyl 5 milliGRAM(s) Oral daily PRN  calcium carbonate    500 mG (Tums) Chewable 1 Tablet(s) Chew three times a day PRN  dextrose Oral Gel 15 Gram(s) Oral once PRN  HYDROmorphone  Injectable 0.5 milliGRAM(s) IV Push every 6 hours PRN  hydrOXYzine hydrochloride 25 milliGRAM(s) Oral every 6 hours PRN  magnesium hydroxide Suspension 30 milliLiter(s) Oral daily PRN  ondansetron Injectable 4 milliGRAM(s) IV Push every 6 hours PRN  oxyCODONE    IR 10 milliGRAM(s) Oral every 6 hours PRN  simethicone 80 milliGRAM(s) Chew daily PRN    VITALS  T(F): 96.5 (02-10-25 @ 08:00), Max: 97 (02-09-25 @ 12:00)  HR: 82 (02-10-25 @ 10:00) (63 - 92)  BP: 125/58 (02-10-25 @ 10:00) (114/57 - 172/78)  RR: 24 (02-10-25 @ 10:00) (12 - 26)  SpO2: 95% (02-10-25 @ 10:00) (93% - 99%)  POCT Blood Glucose.: 221 mg/dL (02-10-25 @ 11:35)  POCT Blood Glucose.: 104 mg/dL (02-10-25 @ 07:30)  POCT Blood Glucose.: 79 mg/dL (02-10-25 @ 06:19)  POCT Blood Glucose.: 93 mg/dL (02-10-25 @ 05:24)  POCT Blood Glucose.: 108 mg/dL (02-10-25 @ 04:10)  POCT Blood Glucose.: 139 mg/dL (02-10-25 @ 03:15)  POCT Blood Glucose.: 168 mg/dL (02-10-25 @ 02:14)  POCT Blood Glucose.: 225 mg/dL (02-10-25 @ 01:10)  POCT Blood Glucose.: 276 mg/dL (02-10-25 @ 00:15)  POCT Blood Glucose.: 302 mg/dL (02-09-25 @ 23:18)  POCT Blood Glucose.: 378 mg/dL (02-09-25 @ 21:41)  POCT Blood Glucose.: 426 mg/dL (02-09-25 @ 16:33)    PHYSICAL EXAM  CONSTITUTIONAL: well developed, nontoxic appearing, in no acute distress, speaking in full sentences  SKIN: warm, dry, no rash, cap refill < 2 seconds  HEENT: normocephalic, atraumatic, no conjunctival erythema, moist mucous membranes, patent airway  NECK: supple  CV:  regular rate, regular rhythm, 2+ radial pulses bilaterally  RESP: no wheezes, no rales, no rhonchi, normal work of breathing  ABD: soft, no tenderness, nondistended, no rebound, no guarding  MSK: normal ROM, no cyanosis, no edema  NEURO: alert, oriented, grossly unremarkable  PSYCH: cooperative, appropriate    LABS             9.3    17.50 )-----------( [id number]      ( 02-10-25 @ 04:17 )             28.4     137  |  96  |  121  -------------------------<  103   02-10-25 @ 04:17  4.5  |  30  |  2.0    Ca      8.9     02-10-25 @ 04:17  Mg     2.6     02-10-25 @ 04:17    TPro  5.8  /  Alb  3.1  /  TBili  0.3  /  DBili  x   /  AST  51  /  ALT  41  /  AlkPhos  229  /  GGT  x     02-10-25 @ 04:17    Urinalysis Basic - ( 10 Feb 2025 04:17 )    Color: x / Appearance: x / SG: x / pH: x  Gluc: 103 mg/dL / Ketone: x  / Bili: x / Urobili: x   Blood: x / Protein: x / Nitrite: x   Leuk Esterase: x / RBC: x / WBC x   Sq Epi: x / Non Sq Epi: x / Bacteria: x    IMAGING    CXR 2/10: No radiographic evidence of acute cardiopulmonary disease. Redemonstration bilateral opacities  CR Head 2/9: No evidence of acute transcortical infarct, acute intracranial hemorrhage, or mass effect.

## 2025-02-10 NOTE — SWALLOW BEDSIDE ASSESSMENT ADULT - CONSISTENCIES ADMINISTERED
thin liquid/regular solid
thin liquid/regular solid
mildly thick
mildly thick
small single ice chips/mildly thick/pureed
thin liquid/regular solid
thin liquid/regular solid
mildly thick/soft & bite-sized
thin liquid/regular solid

## 2025-02-10 NOTE — SWALLOW BEDSIDE ASSESSMENT ADULT - SWALLOW EVAL: FEEDING ASSISTANCE
1:1 feed/frequent cues/help required

## 2025-02-11 ENCOUNTER — TRANSCRIPTION ENCOUNTER (OUTPATIENT)
Age: 70
End: 2025-02-11

## 2025-02-11 LAB
ALBUMIN SERPL ELPH-MCNC: 3.1 G/DL — LOW (ref 3.5–5.2)
ALP SERPL-CCNC: 223 U/L — HIGH (ref 30–115)
ALT FLD-CCNC: 35 U/L — SIGNIFICANT CHANGE UP (ref 0–41)
ANION GAP SERPL CALC-SCNC: 15 MMOL/L — HIGH (ref 7–14)
AST SERPL-CCNC: 35 U/L — SIGNIFICANT CHANGE UP (ref 0–41)
BASOPHILS # BLD AUTO: 0.11 K/UL — SIGNIFICANT CHANGE UP (ref 0–0.2)
BASOPHILS NFR BLD AUTO: 0.6 % — SIGNIFICANT CHANGE UP (ref 0–1)
BILIRUB SERPL-MCNC: 0.3 MG/DL — SIGNIFICANT CHANGE UP (ref 0.2–1.2)
BUN SERPL-MCNC: 110 MG/DL — CRITICAL HIGH (ref 10–20)
CALCIUM SERPL-MCNC: 8.9 MG/DL — SIGNIFICANT CHANGE UP (ref 8.4–10.5)
CHLORIDE SERPL-SCNC: 97 MMOL/L — LOW (ref 98–110)
CO2 SERPL-SCNC: 28 MMOL/L — SIGNIFICANT CHANGE UP (ref 17–32)
CREAT SERPL-MCNC: 2.1 MG/DL — HIGH (ref 0.7–1.5)
CULTURE RESULTS: SIGNIFICANT CHANGE UP
EGFR: 34 ML/MIN/1.73M2 — LOW
EOSINOPHIL # BLD AUTO: 0.09 K/UL — SIGNIFICANT CHANGE UP (ref 0–0.7)
EOSINOPHIL NFR BLD AUTO: 0.5 % — SIGNIFICANT CHANGE UP (ref 0–8)
GLUCOSE BLDC GLUCOMTR-MCNC: 157 MG/DL — HIGH (ref 70–99)
GLUCOSE BLDC GLUCOMTR-MCNC: 203 MG/DL — HIGH (ref 70–99)
GLUCOSE BLDC GLUCOMTR-MCNC: 256 MG/DL — HIGH (ref 70–99)
GLUCOSE BLDC GLUCOMTR-MCNC: 263 MG/DL — HIGH (ref 70–99)
GLUCOSE SERPL-MCNC: 157 MG/DL — HIGH (ref 70–99)
HCT VFR BLD CALC: 26.7 % — LOW (ref 42–52)
HGB BLD-MCNC: 8.5 G/DL — LOW (ref 14–18)
IMM GRANULOCYTES NFR BLD AUTO: 9.2 % — HIGH (ref 0.1–0.3)
LYMPHOCYTES # BLD AUTO: 1.15 K/UL — LOW (ref 1.2–3.4)
LYMPHOCYTES # BLD AUTO: 6.7 % — LOW (ref 20.5–51.1)
MAGNESIUM SERPL-MCNC: 2.5 MG/DL — HIGH (ref 1.8–2.4)
MCHC RBC-ENTMCNC: 29.6 PG — SIGNIFICANT CHANGE UP (ref 27–31)
MCHC RBC-ENTMCNC: 31.8 G/DL — LOW (ref 32–37)
MCV RBC AUTO: 93 FL — SIGNIFICANT CHANGE UP (ref 80–94)
MONOCYTES # BLD AUTO: 3.2 K/UL — HIGH (ref 0.1–0.6)
MONOCYTES NFR BLD AUTO: 18.8 % — HIGH (ref 1.7–9.3)
NEUTROPHILS # BLD AUTO: 10.93 K/UL — HIGH (ref 1.4–6.5)
NEUTROPHILS NFR BLD AUTO: 64.2 % — SIGNIFICANT CHANGE UP (ref 42.2–75.2)
NRBC # BLD: 2 /100 WBCS — HIGH (ref 0–0)
NRBC BLD-RTO: 2 /100 WBCS — HIGH (ref 0–0)
PLATELET # BLD AUTO: 568 K/UL — HIGH (ref 130–400)
PMV BLD: 11.7 FL — HIGH (ref 7.4–10.4)
POTASSIUM SERPL-MCNC: 5 MMOL/L — SIGNIFICANT CHANGE UP (ref 3.5–5)
POTASSIUM SERPL-SCNC: 5 MMOL/L — SIGNIFICANT CHANGE UP (ref 3.5–5)
PROT SERPL-MCNC: 5.4 G/DL — LOW (ref 6–8)
RBC # BLD: 2.87 M/UL — LOW (ref 4.7–6.1)
RBC # FLD: 15.2 % — HIGH (ref 11.5–14.5)
SODIUM SERPL-SCNC: 140 MMOL/L — SIGNIFICANT CHANGE UP (ref 135–146)
SPECIMEN SOURCE: SIGNIFICANT CHANGE UP
WBC # BLD: 17.04 K/UL — HIGH (ref 4.8–10.8)
WBC # FLD AUTO: 17.04 K/UL — HIGH (ref 4.8–10.8)

## 2025-02-11 PROCEDURE — 99233 SBSQ HOSP IP/OBS HIGH 50: CPT

## 2025-02-11 PROCEDURE — 71045 X-RAY EXAM CHEST 1 VIEW: CPT | Mod: 26

## 2025-02-11 RX ORDER — PREDNISONE 20 MG/1
30 TABLET ORAL DAILY
Refills: 0 | Status: DISCONTINUED | OUTPATIENT
Start: 2025-02-13 | End: 2025-02-13

## 2025-02-11 RX ORDER — PREDNISONE 20 MG/1
40 TABLET ORAL ONCE
Refills: 0 | Status: DISCONTINUED | OUTPATIENT
Start: 2025-02-12 | End: 2025-02-13

## 2025-02-11 RX ORDER — INSULIN LISPRO 100 U/ML
20 INJECTION, SOLUTION INTRAVENOUS; SUBCUTANEOUS
Refills: 0 | Status: DISCONTINUED | OUTPATIENT
Start: 2025-02-11 | End: 2025-02-13

## 2025-02-11 RX ADMIN — INSULIN LISPRO 18 UNIT(S): 100 INJECTION, SOLUTION INTRAVENOUS; SUBCUTANEOUS at 11:40

## 2025-02-11 RX ADMIN — DIPHENHYDRAMINE HYDROCHLORIDE AND LIDOCAINE HYDROCHLORIDE AND ALUMINUM HYDROXIDE AND MAGNESIUM HYDRO 10 MILLILITER(S): KIT at 17:26

## 2025-02-11 RX ADMIN — PREDNISONE 40 MILLIGRAM(S): 20 TABLET ORAL at 05:20

## 2025-02-11 RX ADMIN — TAMSULOSIN HYDROCHLORIDE 0.4 MILLIGRAM(S): 0.4 CAPSULE ORAL at 21:15

## 2025-02-11 RX ADMIN — SERTRALINE 50 MILLIGRAM(S): 100 TABLET, FILM COATED ORAL at 11:39

## 2025-02-11 RX ADMIN — BENZOCAINE 1 SPRAY(S): 220 SPRAY, METERED PERIODONTAL at 05:21

## 2025-02-11 RX ADMIN — BENZOCAINE 1 SPRAY(S): 220 SPRAY, METERED PERIODONTAL at 21:16

## 2025-02-11 RX ADMIN — CEFEPIME 100 MILLIGRAM(S): 2 INJECTION, POWDER, FOR SOLUTION INTRAVENOUS at 17:22

## 2025-02-11 RX ADMIN — NYSTATIN 1 APPLICATION(S): 100000 CREAM TOPICAL at 17:54

## 2025-02-11 RX ADMIN — METOPROLOL SUCCINATE 25 MILLIGRAM(S): 50 TABLET, EXTENDED RELEASE ORAL at 17:25

## 2025-02-11 RX ADMIN — INSULIN GLARGINE-YFGN 40 UNIT(S): 100 INJECTION, SOLUTION SUBCUTANEOUS at 23:10

## 2025-02-11 RX ADMIN — ATORVASTATIN CALCIUM 10 MILLIGRAM(S): 80 TABLET, FILM COATED ORAL at 21:16

## 2025-02-11 RX ADMIN — DIPHENHYDRAMINE HYDROCHLORIDE AND LIDOCAINE HYDROCHLORIDE AND ALUMINUM HYDROXIDE AND MAGNESIUM HYDRO 10 MILLILITER(S): KIT at 05:21

## 2025-02-11 RX ADMIN — INSULIN LISPRO 2: 100 INJECTION, SOLUTION INTRAVENOUS; SUBCUTANEOUS at 08:26

## 2025-02-11 RX ADMIN — DIPHENHYDRAMINE HYDROCHLORIDE AND LIDOCAINE HYDROCHLORIDE AND ALUMINUM HYDROXIDE AND MAGNESIUM HYDRO 10 MILLILITER(S): KIT at 14:17

## 2025-02-11 RX ADMIN — INSULIN LISPRO 18 UNIT(S): 100 INJECTION, SOLUTION INTRAVENOUS; SUBCUTANEOUS at 08:26

## 2025-02-11 RX ADMIN — Medication 0.5 MILLIGRAM(S): at 14:25

## 2025-02-11 RX ADMIN — Medication 1 APPLICATION(S): at 11:40

## 2025-02-11 RX ADMIN — HYDROXYZINE HYDROCHLORIDE 25 MILLIGRAM(S): 25 TABLET, FILM COATED ORAL at 23:00

## 2025-02-11 RX ADMIN — APIXABAN 5 MILLIGRAM(S): 2.5 TABLET, FILM COATED ORAL at 08:26

## 2025-02-11 RX ADMIN — Medication 0.5 MILLIGRAM(S): at 02:37

## 2025-02-11 RX ADMIN — Medication 500 MILLIGRAM(S): at 11:39

## 2025-02-11 RX ADMIN — Medication 40 MILLIGRAM(S): at 11:38

## 2025-02-11 RX ADMIN — Medication 60 MILLIGRAM(S): at 05:20

## 2025-02-11 RX ADMIN — APIXABAN 5 MILLIGRAM(S): 2.5 TABLET, FILM COATED ORAL at 21:15

## 2025-02-11 RX ADMIN — INSULIN LISPRO 6: 100 INJECTION, SOLUTION INTRAVENOUS; SUBCUTANEOUS at 11:40

## 2025-02-11 RX ADMIN — Medication 0.5 MILLIGRAM(S): at 04:41

## 2025-02-11 RX ADMIN — CEFEPIME 100 MILLIGRAM(S): 2 INJECTION, POWDER, FOR SOLUTION INTRAVENOUS at 05:19

## 2025-02-11 RX ADMIN — Medication 10 MILLIGRAM(S): at 21:16

## 2025-02-11 RX ADMIN — INSULIN LISPRO 20 UNIT(S): 100 INJECTION, SOLUTION INTRAVENOUS; SUBCUTANEOUS at 17:25

## 2025-02-11 RX ADMIN — DIPHENHYDRAMINE HYDROCHLORIDE AND LIDOCAINE HYDROCHLORIDE AND ALUMINUM HYDROXIDE AND MAGNESIUM HYDRO 10 MILLILITER(S): KIT at 10:22

## 2025-02-11 RX ADMIN — INSULIN LISPRO 6: 100 INJECTION, SOLUTION INTRAVENOUS; SUBCUTANEOUS at 17:24

## 2025-02-11 RX ADMIN — Medication 0.5 MILLIGRAM(S): at 14:40

## 2025-02-11 RX ADMIN — METOPROLOL SUCCINATE 25 MILLIGRAM(S): 50 TABLET, EXTENDED RELEASE ORAL at 05:20

## 2025-02-11 RX ADMIN — BUMETANIDE 2 MILLIGRAM(S): 1 TABLET ORAL at 05:20

## 2025-02-11 RX ADMIN — NYSTATIN 1 APPLICATION(S): 100000 CREAM TOPICAL at 05:20

## 2025-02-11 RX ADMIN — IPRATROPIUM BROMIDE AND ALBUTEROL SULFATE 3 MILLILITER(S): .5; 2.5 SOLUTION RESPIRATORY (INHALATION) at 07:45

## 2025-02-11 RX ADMIN — Medication 150 MILLIGRAM(S): at 11:38

## 2025-02-11 RX ADMIN — DIPHENHYDRAMINE HYDROCHLORIDE AND LIDOCAINE HYDROCHLORIDE AND ALUMINUM HYDROXIDE AND MAGNESIUM HYDRO 10 MILLILITER(S): KIT at 21:16

## 2025-02-11 NOTE — PROGRESS NOTE ADULT - ASSESSMENT
# DM  - CC diet   - FS - self monitored via dex com --> On insulin pump @2.85 at home   - Endo consult: keep off insulin pump  - lantus 10 lispro 3 --> has been steadily increased : lantus increased from 30 to 40 and lispro from 10 to 16 on 02/08 --> s/p Humulin 7 units 1 dose  - Patient received total of 31 and Humulin Admelog 16x3  - Repeat BMP showed Gluocse of 529, mild increase in AG (12 --> 15) and Beta hydroxy < 0.2  - On 2/8 started on insulin drip (7 units /min) and upgraded to MICU, NS fluid with 20 mEq K but slower rate given HFpEF decompensation  - Off insulin drip overnight, switched to SQ insulin, monitor BS.  - 2/9 Patient transitioned off insulin drip to Basal Bolus Insulin, but missed dose. Glucose elevated and Insulin drip was restarted.   - 2/10 Insulin drip stopped. Glucose within normal range. Patient transitioned to basal bolus insulin.   - 2/10 Adjust basal bolus regimen as needed to maintain target blood glucose.     # Acute Hypoxic Respiratory Failure 2/2 Multifocal Pneumonia/  Acute on chronic diastolic CHF/  Influenza A infection/ RSAHAD/ OHS   - clinically improving, on high low oxygen for now, will taper off as tolerated   - NIV PRN and QHS   - nebs Q 6 hours, supportive care, aspiration precautions   - CTA 1/20: Negative for pulmonary emboli. Interval development of infiltrates within the lower lobes and lingula which may reflect acute multilobar pneumonia  - blood cx NGTD x2, UA negative, influenza A + , Nasal MRSA negative   - pt was consulted by ID, treated with IV Abx, completed the course of Tamiflu   - TTE 1/10/25: poor study, EF 60-65%, pt has moderated TR  - fluid restriction 1200 ml in 24 hours , intake and output monitoring, daily weight   - Desaturated again while in ICU on NC, and Bipap was started  - CT chest: Worsening bilateral peribronchial opacities compatible with bronchopneumonia  - F/u cultures, legionella, procal, fungitell  - Started IV cefepime 2g BID, vanco stopped as MRSA negative  - Follow up ID  - HFNC 40/40, titrated off, now on NC 3L  - duonebs and solumedrol 40 mg daily , wheeze on exam, titrate solumedrol down   2/11 - Finish Cefepime course tomorrow 2/12/25    # 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 Tolerated well. Rituximab was given on D5   - Uric acid 10.3 on 1/16/25, s/p 3 mg IV rasburicase  - sp Zarxio  - daily CBC with diff, active T&S  - c/w Allopurinol   - No onc plans to start chemo/treatment inpatient, can dc when medically appropriate with follow-up with Dr. Hilliard who will schedule cycle 2 of treatment.     # KRISTINE on CKD 3B / Chronic Lower Extremity Edema   - baseline ~high 1s, low 2s  - c/w diuretics: bumex 2mg IV BID --> to 2mg IV TID --> 2 mg PO daily  - monitor BUN/cr and urine output  - ajay d/c'd  - avoid nephrotoxic medications   - will hold off Metolazone on 02/08 as BUN/Cr 115/2.7  - nephrology is following, recommendations noted  2/11 - Nephrology cleared patient for discharge.    # CAD s/p CABG/ Chronic Afib on Xarelto/ HTN  - c/w statin, BB  - Holding ASA  - Still holding lisinopril and aldactone from last admission  - sylvester need to switch to eliquis given CKD from xaerlto   - dc heparin ggt started eliquis 5 mg BID     # LLE weakness  CTH ordered today  PT following    # HLD   - on Atorvastatin now   - resume Repatha on discharge    # Oral pain due to ulcer with black discoloration   - c/w local couth care  - consult ENT to examine black discoloration -> rec CT neck w/ IV contrast, magic mouthwash  - CT neck w/IV contrast no tongue mass.   - oncology thinking this is mucositis   - Monitor bleed, keep active TS and trend CBC.   - Will c/w magic mouthwash.   - Bleeding is resolving    # Agitation/ confusion   # Insomnia   - pt is calm now   - on Haldol PRN , Sertraline and melatonin   - supportive care     # GI  - On bowel regimen and PPIs.    #Misc  #Code Status: Full Code  #DVT ppx: Eliquis  #GI ppx: PPI  #Diet: CC  #Activity: OOBTC  #Inpatient Dispo: DG to SDU  #Discharge Dispo: Home vs Rehab.    Follow-Up: Physical Therapy as patient with LLE weakness secondary to deconditioning from prolonged hospital stay and unstable on feet. Continue to monitor FS, adjust insulin regiment as needed. Finish Cefepime course tomorrow 2/12/25. Coordinate home care,

## 2025-02-11 NOTE — PROGRESS NOTE ADULT - ASSESSMENT
69-year-old man with extensive medical history including CAD (status post CABG;), DM (on insulin pump), atrial fibrillation on Xarelto, history of distal pancreatomy and splenectomy for unclear reason referred to the ED by his oncologist for abnormal labs.  Nephrology was consulted for KRISTINE and mild hyponatremia.   #CKD 3b creat at baseline  #Hyponatremia - resolved   #h/o Large B cell Lymphoma - s/p chemotherapy   #AHRF - likely 2/2 Influenza A with superimposed bacterial PNA. better   #Neutropenia - s/p chemotherapy  cr at baseline  - Continue bumex 2mg Q24h   -  last ph at goal   - bp controlled   - Heme/onc on board   - Avoid nephrotoxic medications and adjust all medications to GFR.   - No acute indication  for RRT at this time.    to follow with Dr Kleiner once discharged

## 2025-02-11 NOTE — DISCHARGE NOTE NURSING/CASE MANAGEMENT/SOCIAL WORK - FINANCIAL ASSISTANCE
[facility] provides services at a reduced cost to those who are determined to be eligible through [facility]’s financial assistance program. Information regarding [facility]’s financial assistance program can be found by going to [email or web address] or by calling [phone].

## 2025-02-11 NOTE — PROGRESS NOTE ADULT - ASSESSMENT
ASSESSMENT  69-year-old man with extensive medical history including CAD (status post CABG;), DM (on insulin pump), atrial fibrillation on Xarelto, history of distal pancreatomy and splenectomy for unclear reason referred to the ED by his oncologist for abnormal labs. Patient was recently diagnosed with Diffuse large B cell Lymphoma on pathology of right groin mass and is following with [facility] oncology team. Patient was discharged on 01.17.2025 from hospital after inpatient chemotherapy (Completed cycle 1 of R-EPOCH (D1 on 1/11/2024). Tolerated well. Rituximab was given on D5).   Over past 3 days patient started to get a productive cough with yellow sputum production.  Patient is also endorsing chills and nausea which he states is baseline. He had 1 reading on low grade fever 100.5 on day of presentation.    IMPRESSION  #Severe Sepsis - Severe Multifocal Pneumonia with Influenza A infection   - completed 10 day course of cefepime/tramiflu 1/30     #Neutropenic Fever- resolved  #Acute Hypoxemic Respiratory failure   #Pulmonary Edema     #Worsening Hypoxemia 2/6 - transferred to SICU   - CT Angio Chest PE Protocol w/ IV Cont (01.20.25 @ 20:56): Negative for pulmonary emboli. Interval development of infiltrates within the lower lobes and lingula   which may reflect acute multilobar pneumonia.   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    #Abx allergy: No Known Allergies    RECOMMENDATIONS  - cefepime 2g q 12 hours - last day tomorrow  2/12   - aspiration precautions     Please call or message on Microsoft Teams if with any questions.  Spectra [phone]

## 2025-02-11 NOTE — PROGRESS NOTE ADULT - SUBJECTIVE AND OBJECTIVE BOX
This note is updated with the current CCU course as of 2/11, but please refer to the Transfer Note 2/8 and 2/9 for complete hospital course.     CCU Course:  Patient admitted to CCU for hyperglycemia. Labs, clinical status, and presentation not consistent with DKA or HHS. On arrival to unit patient's blood sugar was controlled and he was transitioned off insulin drip to basal bolus insulin. Sugars were well controlled, but patient missed doses of insulin and blood sugar level shirley for which the insulin drip was restarted. Insulin drip was later discontinued as patient's blood glucose level normalized. Transitioned back off of drip to basal bolus insulin. Continue to adjust basal bolus insulin regimen as needed to maintain target blood glucose. Patient is stable for downgrade to SDU.     SUBJECTIVE/OVERNIGHT EVENTS  Today is hospital day 22d.   This morning patient was seen and examined at bedside, resting comfortably in bed.   No acute or major events overnight.   Patient has no complaints at this time.   Denies fever, chills, nausea, vomiting, diarrhea, constipation, hematochezia, dysuria, hematuria, chest pain, palpitations, sob.   Patient was informed of their plan of care at this time.    CODE STATUS: Full Code    MEDICATIONS  STANDING MEDICATIONS  albuterol/ipratropium for Nebulization 3 milliLiter(s) Nebulizer every 6 hours  allopurinol 150 milliGRAM(s) Oral daily  apixaban 5 milliGRAM(s) Oral every 12 hours  ascorbic acid 500 milliGRAM(s) Oral daily  atorvastatin 10 milliGRAM(s) Oral at bedtime  benzocaine 20% Spray 1 Spray(s) Topical three times a day  buMETAnide 2 milliGRAM(s) Oral daily  cefepime   IVPB 2000 milliGRAM(s) IV Intermittent every 12 hours  cefepime   IVPB      chlorhexidine 2% Cloths 1 Application(s) Topical daily  dextrose 5%. 1000 milliLiter(s) IV Continuous <Continuous>  dextrose 5%. 1000 milliLiter(s) IV Continuous <Continuous>  dextrose 50% Injectable 25 Gram(s) IV Push once  dextrose 50% Injectable 12.5 Gram(s) IV Push once  dextrose 50% Injectable 25 Gram(s) IV Push once  FIRST- Mouthwash  BLM 10 milliLiter(s) Swish and Spit every 4 hours  glucagon  Injectable 1 milliGRAM(s) IntraMuscular once  influenza  Vaccine (HIGH DOSE) 0.5 milliLiter(s) IntraMuscular once  insulin glargine Injectable (LANTUS) 40 Unit(s) SubCutaneous at bedtime  insulin lispro (ADMELOG) corrective regimen sliding scale   SubCutaneous three times a day before meals  insulin lispro Injectable (ADMELOG) 18 Unit(s) SubCutaneous three times a day before meals  melatonin 10 milliGRAM(s) Oral at bedtime  metoprolol tartrate 25 milliGRAM(s) Oral two times a day  NIFEdipine XL 60 milliGRAM(s) Oral daily  nystatin Cream 1 Application(s) Topical every 12 hours  pantoprazole  Injectable 40 milliGRAM(s) IV Push daily  polyethylene glycol 3350 17 Gram(s) Oral <User Schedule>  senna 2 Tablet(s) Oral every 12 hours  sertraline 50 milliGRAM(s) Oral daily  tamsulosin 0.4 milliGRAM(s) Oral at bedtime    PRN MEDICATIONS  aluminum hydroxide/magnesium hydroxide/simethicone Suspension 30 milliLiter(s) Oral every 4 hours PRN  bisacodyl 5 milliGRAM(s) Oral daily PRN  calcium carbonate    500 mG (Tums) Chewable 1 Tablet(s) Chew three times a day PRN  dextrose Oral Gel 15 Gram(s) Oral once PRN  HYDROmorphone  Injectable 0.5 milliGRAM(s) IV Push every 6 hours PRN  hydrOXYzine hydrochloride 25 milliGRAM(s) Oral every 6 hours PRN  magnesium hydroxide Suspension 30 milliLiter(s) Oral daily PRN  ondansetron Injectable 4 milliGRAM(s) IV Push every 6 hours PRN  oxyCODONE    IR 10 milliGRAM(s) Oral every 6 hours PRN  simethicone 80 milliGRAM(s) Chew daily PRN    VITALS  T(F): 96.6 (02-11-25 @ 12:00), Max: 96.6 (02-10-25 @ 20:00)  HR: 97 (02-11-25 @ 12:00) (71 - 97)  BP: 157/77 (02-11-25 @ 12:00) (102/79 - 172/81)  RR: 24 (02-11-25 @ 12:00) (14 - 29)  SpO2: 98% (02-11-25 @ 12:00) (93% - 99%)  POCT Blood Glucose.: 256 mg/dL (02-11-25 @ 11:36)  POCT Blood Glucose.: 157 mg/dL (02-11-25 @ 08:13)  POCT Blood Glucose.: 200 mg/dL (02-10-25 @ 21:17)  POCT Blood Glucose.: 194 mg/dL (02-10-25 @ 17:19)    PHYSICAL EXAM  CONSTITUTIONAL: well developed, nontoxic appearing, in no acute distress, speaking in full sentences  SKIN: warm, dry, no rash, cap refill < 2 seconds  HEENT: normocephalic, atraumatic, no conjunctival erythema, moist mucous membranes, patent airway  NECK: supple  CV:  regular rate, regular rhythm, 2+ radial pulses bilaterally  RESP: no wheezes, no rales, no rhonchi, normal work of breathing  ABD: soft, no tenderness, nondistended, no rebound, no guarding  MSK: normal ROM, no cyanosis, no edema, LLE decreased motor strength.  NEURO: alert, oriented, grossly unremarkable  PSYCH: cooperative, appropriate    LABS             8.5    17.04 )-----------( 568      ( 02-11-25 @ 04:19 )             26.7     140  |  97  |  110  -------------------------<  157   02-11-25 @ 04:19  5.0  |  28  |  2.1    Ca      8.9     02-11-25 @ 04:19  Mg     2.5     02-11-25 @ 04:19    TPro  5.4  /  Alb  3.1  /  TBili  0.3  /  DBili  x   /  AST  35  /  ALT  35  /  AlkPhos  223  /  GGT  x     02-11-25 @ 04:19    Urinalysis Basic - ( 11 Feb 2025 04:19 )    Color: x / Appearance: x / SG: x / pH: x  Gluc: 157 mg/dL / Ketone: x  / Bili: x / Urobili: x   Blood: x / Protein: x / Nitrite: x   Leuk Esterase: x / RBC: x / WBC x   Sq Epi: x / Non Sq Epi: x / Bacteria: x    IMAGING    CXR 2/10: No radiographic evidence of acute cardiopulmonary disease. Redemonstration bilateral opacities  CR Head 2/9: No evidence of acute transcortical infarct, acute intracranial hemorrhage, or mass effect.

## 2025-02-11 NOTE — PROGRESS NOTE ADULT - SUBJECTIVE AND OBJECTIVE BOX
Patient is a 70y old  Male who presents with a chief complaint of SOB (09 Feb 2025 08:37)      Over Night Events:  Patient seen and examined.    off insulin drip   on NC   ROS:  See HPI    PHYSICAL EXAM    ICU Vital Signs Last 24 Hrs  T(C): 35.8 (11 Feb 2025 08:00), Max: 36 (10 Feb 2025 12:00)  T(F): 96.5 (11 Feb 2025 08:00), Max: 96.8 (10 Feb 2025 12:00)  HR: 71 (11 Feb 2025 07:00) (68 - 94)  BP: 136/64 (11 Feb 2025 07:00) (102/79 - 172/81)  BP(mean): 92 (11 Feb 2025 07:00) (81 - 116)  ABP: --  ABP(mean): --  RR: 20 (11 Feb 2025 07:00) (14 - 29)  SpO2: 99% (11 Feb 2025 07:00) (93% - 99%)    O2 Parameters below as of 11 Feb 2025 08:00  Patient On (Oxygen Delivery Method): nasal cannula            General: awake   HEENT:         katlin       Lymph Nodes: NO cervical LN   Lungs: Bilateral BS  Cardiovascular: Regular   Abdomen: Soft, Positive BS  Extremities: No clubbing   Skin: warm   Neurological: no focal   Musculoskeletal: move all ext     I&O's Detail    10 Feb 2025 07:01  -  11 Feb 2025 07:00  --------------------------------------------------------  IN:    IV PiggyBack: 100 mL    Oral Fluid: 1590 mL    sodium chloride 0.45%: 112.5 mL  Total IN: 1802.5 mL    OUT:    Voided (mL): 4300 mL  Total OUT: 4300 mL    Total NET: -2497.5 mL          LABS:                          8.5    17.04 )-----------( 568      ( 11 Feb 2025 04:19 )             26.7         11 Feb 2025 04:19    140    |  97     |  110    ----------------------------<  157    5.0     |  28     |  2.1      Ca    8.9        11 Feb 2025 04:19  Mg     2.5       11 Feb 2025 04:19    TPro  5.4    /  Alb  3.1    /  TBili  0.3    /  DBili  x      /  AST  35     /  ALT  35     /  AlkPhos  223    11 Feb 2025 04:19  Amylase x     lipase x                                                                                        Urinalysis Basic - ( 11 Feb 2025 04:19 )    Color: x / Appearance: x / SG: x / pH: x  Gluc: 157 mg/dL / Ketone: x  / Bili: x / Urobili: x   Blood: x / Protein: x / Nitrite: x   Leuk Esterase: x / RBC: x / WBC x   Sq Epi: x / Non Sq Epi: x / Bacteria: x                                                                                                                                                     MEDICATIONS  (STANDING):  albuterol/ipratropium for Nebulization 3 milliLiter(s) Nebulizer every 6 hours  allopurinol 150 milliGRAM(s) Oral daily  apixaban 5 milliGRAM(s) Oral every 12 hours  ascorbic acid 500 milliGRAM(s) Oral daily  atorvastatin 10 milliGRAM(s) Oral at bedtime  benzocaine 20% Spray 1 Spray(s) Topical three times a day  buMETAnide 2 milliGRAM(s) Oral daily  cefepime   IVPB 2000 milliGRAM(s) IV Intermittent every 12 hours  cefepime   IVPB      chlorhexidine 2% Cloths 1 Application(s) Topical daily  dextrose 5%. 1000 milliLiter(s) (50 mL/Hr) IV Continuous <Continuous>  dextrose 5%. 1000 milliLiter(s) (100 mL/Hr) IV Continuous <Continuous>  dextrose 50% Injectable 25 Gram(s) IV Push once  dextrose 50% Injectable 12.5 Gram(s) IV Push once  dextrose 50% Injectable 25 Gram(s) IV Push once  FIRST- Mouthwash  BLM 10 milliLiter(s) Swish and Spit every 4 hours  glucagon  Injectable 1 milliGRAM(s) IntraMuscular once  influenza  Vaccine (HIGH DOSE) 0.5 milliLiter(s) IntraMuscular once  insulin glargine Injectable (LANTUS) 40 Unit(s) SubCutaneous at bedtime  insulin lispro (ADMELOG) corrective regimen sliding scale   SubCutaneous three times a day before meals  insulin lispro Injectable (ADMELOG) 18 Unit(s) SubCutaneous three times a day before meals  melatonin 10 milliGRAM(s) Oral at bedtime  metoprolol tartrate 25 milliGRAM(s) Oral two times a day  NIFEdipine XL 60 milliGRAM(s) Oral daily  nystatin Cream 1 Application(s) Topical every 12 hours  pantoprazole  Injectable 40 milliGRAM(s) IV Push daily  polyethylene glycol 3350 17 Gram(s) Oral <User Schedule>  predniSONE   Tablet 40 milliGRAM(s) Oral daily  senna 2 Tablet(s) Oral every 12 hours  sertraline 50 milliGRAM(s) Oral daily  tamsulosin 0.4 milliGRAM(s) Oral at bedtime    MEDICATIONS  (PRN):  aluminum hydroxide/magnesium hydroxide/simethicone Suspension 30 milliLiter(s) Oral every 4 hours PRN Dyspepsia  bisacodyl 5 milliGRAM(s) Oral daily PRN Constipation  calcium carbonate    500 mG (Tums) Chewable 1 Tablet(s) Chew three times a day PRN Dyspepsia  dextrose Oral Gel 15 Gram(s) Oral once PRN Blood Glucose LESS THAN 70 milliGRAM(s)/deciliter  HYDROmorphone  Injectable 0.5 milliGRAM(s) IV Push every 6 hours PRN Severe Pain (7 - 10)  hydrOXYzine hydrochloride 25 milliGRAM(s) Oral every 6 hours PRN Anxiety  magnesium hydroxide Suspension 30 milliLiter(s) Oral daily PRN Constipation  ondansetron Injectable 4 milliGRAM(s) IV Push every 6 hours PRN Nausea and/or Vomiting  oxyCODONE    IR 10 milliGRAM(s) Oral every 6 hours PRN Moderate Pain (4 - 6)  simethicone 80 milliGRAM(s) Chew daily PRN Gas          Xrays:     ECHO:  [facility] ICU:

## 2025-02-11 NOTE — PROGRESS NOTE ADULT - SUBJECTIVE AND OBJECTIVE BOX
MORGAN BUSH  70y, Male  Allergy: No Known Allergies      LOS  22d    CHIEF COMPLAINT: SOB (09 Feb 2025 08:37)      INTERVAL EVENTS/HPI  - No acute events overnight  - T(F): , Max: 96.6 (02-10-25 @ 20:00)  - Denies any worsening symptoms  - Tolerating medication  - WBC Count: 17.04 (02-11-25 @ 04:19)  WBC Count: 17.50 (02-10-25 @ 04:17)     - Creatinine: 2.1 (02-11-25 @ 04:19)  Creatinine: 2.0 (02-10-25 @ 04:17)       ROS  General: Denies rigors, nightsweats  HEENT: Denies headache, rhinorrhea, sore throat, eye pain  CV: Denies CP, palpitations  PULM: Denies wheezing, hemoptysis  GI: Denies hematemesis, hematochezia, melena  : Denies discharge, hematuria  MSK: Denies arthralgias, myalgias  SKIN: Denies rash, lesions  NEURO: Denies paresthesias, weakness  PSYCH: Denies depression, anxiety    VITALS:  T(F): 96.6, Max: 96.6 (02-10-25 @ 20:00)  HR: 99  BP: 137/63  RR: 23Vital Signs Last 24 Hrs  T(C): 35.9 (11 Feb 2025 12:00), Max: 35.9 (10 Feb 2025 20:00)  T(F): 96.6 (11 Feb 2025 12:00), Max: 96.6 (10 Feb 2025 20:00)  HR: 99 (11 Feb 2025 14:00) (71 - 101)  BP: 137/63 (11 Feb 2025 14:00) (102/79 - 172/81)  BP(mean): 91 (11 Feb 2025 14:00) (76 - 116)  RR: 23 (11 Feb 2025 14:00) (14 - 29)  SpO2: 99% (11 Feb 2025 14:00) (93% - 99%)    Parameters below as of 11 Feb 2025 14:00  Patient On (Oxygen Delivery Method): room air        PHYSICAL EXAM:  Gen: NAD, resting in bed  HEENT: Normocephalic, atraumatic  Neck: supple, no lymphadenopathy  CV: Regular rate & regular rhythm  Lungs: decreased BS at bases, no fremitus  Abdomen: Soft, BS present  Ext: Warm, well perfused  Neuro: non focal, awake  Skin: no rash, no erythema  Lines: no phlebitis    FH: Non-contributory  Social Hx: Non-contributory    TESTS & MEASUREMENTS:                        8.5    17.04 )-----------( 568      ( 11 Feb 2025 04:19 )             26.7     02-11    140  |  97[L]  |  110[HH]  ----------------------------<  157[H]  5.0   |  28  |  2.1[H]    Ca    8.9      11 Feb 2025 04:19  Mg     2.5     02-11    TPro  5.4[L]  /  Alb  3.1[L]  /  TBili  0.3  /  DBili  x   /  AST  35  /  ALT  35  /  AlkPhos  223[H]  02-11      LIVER FUNCTIONS - ( 11 Feb 2025 04:19 )  Alb: 3.1 g/dL / Pro: 5.4 g/dL / ALK PHOS: 223 U/L / ALT: 35 U/L / AST: 35 U/L / GGT: x           Urinalysis Basic - ( 11 Feb 2025 04:19 )    Color: x / Appearance: x / SG: x / pH: x  Gluc: 157 mg/dL / Ketone: x  / Bili: x / Urobili: x   Blood: x / Protein: x / Nitrite: x   Leuk Esterase: x / RBC: x / WBC x   Sq Epi: x / Non Sq Epi: x / Bacteria: x        Culture - Blood (collected 02-06-25 @ 12:30)  Source: .Blood BLOOD  Preliminary Report (02-10-25 @ 22:00):    No growth at 4 days    Culture - Blood (collected 01-27-25 @ 06:12)  Source: .Blood BLOOD  Final Report (02-01-25 @ 14:00):    No growth at 5 days    Urinalysis with Rflx Culture (collected 01-20-25 @ 19:07)    Culture - Blood (collected 01-20-25 @ 15:38)  Source: .Blood BLOOD  Final Report (01-25-25 @ 23:07):    No growth at 5 days    Culture - Blood (collected 01-20-25 @ 15:38)  Source: .Blood BLOOD  Final Report (01-25-25 @ 23:07):    No growth at 5 days            INFECTIOUS DISEASES TESTING  Fungitell: <31 (02-08-25 @ 05:59)  MRSA PCR Result.: Negative (02-07-25 @ 10:58)  Fungitell: 37 (02-06-25 @ 18:06)  Procalcitonin: 0.31 (02-06-25 @ 12:40)  Procalcitonin: 1.27 (01-30-25 @ 17:00)  Fungitell: <31 (01-28-25 @ 05:00)  Aspergillus Galactomannan Antigen: 0.04 (01-28-25 @ 05:00)  Procalcitonin: 4.48 (01-27-25 @ 06:12)  MRSA PCR Result.: Negative (01-25-25 @ 07:28)  Procalcitonin: 29.80 (01-24-25 @ 11:30)  MRSA PCR Result.: Negative (01-24-25 @ 10:45)  Legionella Antigen, Urine: Negative (01-23-25 @ 12:10)  Rapid RVP Result: NotDetec (01-13-25 @ 13:24)      INFLAMMATORY MARKERS  Sedimentation Rate, Erythrocyte: >140 mm/Hr (01-30-25 @ 17:00)  C-Reactive Protein: 87.8 mg/L (01-30-25 @ 17:00)      RADIOLOGY & ADDITIONAL TESTS:  I have personally reviewed the last available Chest xray  CXR      CT      CARDIOLOGY TESTING  12 Lead ECG:   Ventricular Rate 88 BPM    Atrial Rate 88 BPM    P-R Interval 216 ms    QRS Duration 110 ms    Q-T Interval 386 ms    QTC Calculation(Bazett) 467 ms    P Axis 40 degrees    R Axis 12 degrees    T Axis 12 degrees    Diagnosis Line Sinus rhythm with 1st degree A-V block  Otherwise normal ECG    Confirmed by Mahesh Coreas ([id number]) on 2/6/2025 8:51:58 AM (02-06-25 @ 08:12)  12 Lead ECG:   Ventricular Rate 79 BPM    Atrial Rate 79 BPM    P-R Interval 198 ms    QRS Duration 100 ms    Q-T Interval 412 ms    QTC Calculation(Bazett) 472 ms    P Axis 37 degrees    R Axis 12 degrees    T Axis -10 degrees    Diagnosis Line Normal sinus rhythm  Minimal voltage criteria for LVH, may be normal variant ( R in aVL )  Inferior infarct , age undetermined  Cannot rule out Anterior infarct , age undetermined  Abnormal ECG    Confirmed by Mahesh Coreas ([id number]) on 2/5/2025 6:27:30 PM (02-05-25 @ 07:56)      MEDICATIONS  albuterol/ipratropium for Nebulization 3 Nebulizer every 6 hours  allopurinol 150 Oral daily  apixaban 5 Oral every 12 hours  ascorbic acid 500 Oral daily  atorvastatin 10 Oral at bedtime  benzocaine 20% Spray 1 Topical three times a day  buMETAnide 2 Oral daily  cefepime   IVPB 2000 IV Intermittent every 12 hours  cefepime   IVPB     chlorhexidine 2% Cloths 1 Topical daily  dextrose 5%. 1000 IV Continuous <Continuous>  dextrose 5%. 1000 IV Continuous <Continuous>  dextrose 50% Injectable 25 IV Push once  dextrose 50% Injectable 12.5 IV Push once  dextrose 50% Injectable 25 IV Push once  FIRST- Mouthwash  BLM 10 Swish and Spit every 4 hours  glucagon  Injectable 1 IntraMuscular once  influenza  Vaccine (HIGH DOSE) 0.5 IntraMuscular once  insulin glargine Injectable (LANTUS) 40 SubCutaneous at bedtime  insulin lispro (ADMELOG) corrective regimen sliding scale  SubCutaneous three times a day before meals  insulin lispro Injectable (ADMELOG) 18 SubCutaneous three times a day before meals  melatonin 10 Oral at bedtime  metoprolol tartrate 25 Oral two times a day  NIFEdipine XL 60 Oral daily  nystatin Cream 1 Topical every 12 hours  pantoprazole  Injectable 40 IV Push daily  polyethylene glycol 3350 17 Oral <User Schedule>  senna 2 Oral every 12 hours  sertraline 50 Oral daily  tamsulosin 0.4 Oral at bedtime      WEIGHT  Weight (kg): 132.9 (01-27-25 @ 11:20)  Creatinine: 2.1 mg/dL (02-11-25 @ 04:19)      ANTIBIOTICS:  cefepime   IVPB 2000 milliGRAM(s) IV Intermittent every 12 hours  cefepime   IVPB          All available historical records have been reviewed

## 2025-02-11 NOTE — DISCHARGE NOTE NURSING/CASE MANAGEMENT/SOCIAL WORK - PATIENT PORTAL LINK FT
You can access the FollowMyHealth Patient Portal offered by [facility] by registering at the following website: [email or web address]. By joining [employer]’s FollowMyHealth portal, you will also be able to view your health information using other applications (apps) compatible with our system.

## 2025-02-11 NOTE — PROGRESS NOTE ADULT - ASSESSMENT
IMPRESSION:    Acute hypoxemic respiratory failure  Possible PNA   Volume overload improved   KRISTINE on CKD   HFpEF  Large B cell lymphoma  Afib on Xarelto - on IV heparin now   Hx of HTN and DM      PLAN:    CNS: Pain control, avoid depressants. MS remains adequate.  CTH given subacute focal neuro deficit.    HEENT: Oral care    PULMONARY:  HOB @ 45 degrees.  Aspiration precautions.  Wean FiO2 as tolerated.  keep O2 sat 92-96%.  BIPAP during sleep and PRN during the day. Wean down to 40/40.   CT chest NC noted with bibasilar consolidations.  f/u MRSA nares  switch to prednisone 40 mg daily and taper   CARDIOVASCULAR:  Keep even balance for now. Bumex daily. Hold metolazone. Continue rate control.     GI: GI prophylaxis protonix, diet per speech and swallow.    RENAL:  Follow up lytes. Correct as needed.  Monitor UO   recall renal for d/c plan   INFECTIOUS DISEASE:  Send pan culture; procal. Adjust to kidney function. Repeat MRSA nasal. f/u ID reccs. fungitel. Abx per ID.    on cefepime one more day   HEMATOLOGICAL:  on Eliquis.  Monitor CBC and coags.       ENDOCRINE:  Follow up FS.  on lantus received 40 over night follow endo     target -180.    MUSCULOSKELETAL: Out of bed to chair. PT OT     Dispo:  floor    Prognosis is guarded.   PT / rehab   check pox on RA rest and exertion   need renal and pt clearance for d/c not ready for d/c now

## 2025-02-12 LAB
ALBUMIN SERPL ELPH-MCNC: 3.2 G/DL — LOW (ref 3.5–5.2)
ALP SERPL-CCNC: 203 U/L — HIGH (ref 30–115)
ALT FLD-CCNC: 32 U/L — SIGNIFICANT CHANGE UP (ref 0–41)
ANION GAP SERPL CALC-SCNC: 11 MMOL/L — SIGNIFICANT CHANGE UP (ref 7–14)
AST SERPL-CCNC: 28 U/L — SIGNIFICANT CHANGE UP (ref 0–41)
BASOPHILS # BLD AUTO: 0.18 K/UL — SIGNIFICANT CHANGE UP (ref 0–0.2)
BASOPHILS NFR BLD AUTO: 0.9 % — SIGNIFICANT CHANGE UP (ref 0–1)
BILIRUB SERPL-MCNC: 0.3 MG/DL — SIGNIFICANT CHANGE UP (ref 0.2–1.2)
BUN SERPL-MCNC: 97 MG/DL — CRITICAL HIGH (ref 10–20)
CALCIUM SERPL-MCNC: 8.9 MG/DL — SIGNIFICANT CHANGE UP (ref 8.4–10.5)
CHLORIDE SERPL-SCNC: 97 MMOL/L — LOW (ref 98–110)
CO2 SERPL-SCNC: 29 MMOL/L — SIGNIFICANT CHANGE UP (ref 17–32)
CREAT SERPL-MCNC: 1.5 MG/DL — SIGNIFICANT CHANGE UP (ref 0.7–1.5)
EGFR: 50 ML/MIN/1.73M2 — LOW
EOSINOPHIL # BLD AUTO: 0.38 K/UL — SIGNIFICANT CHANGE UP (ref 0–0.7)
EOSINOPHIL NFR BLD AUTO: 1.8 % — SIGNIFICANT CHANGE UP (ref 0–8)
GALACTOMANNAN AG SERPL-ACNC: 0.05 INDEX — SIGNIFICANT CHANGE UP (ref 0–0.49)
GLUCOSE BLDC GLUCOMTR-MCNC: 143 MG/DL — HIGH (ref 70–99)
GLUCOSE BLDC GLUCOMTR-MCNC: 170 MG/DL — HIGH (ref 70–99)
GLUCOSE BLDC GLUCOMTR-MCNC: 215 MG/DL — HIGH (ref 70–99)
GLUCOSE BLDC GLUCOMTR-MCNC: 219 MG/DL — HIGH (ref 70–99)
GLUCOSE BLDC GLUCOMTR-MCNC: 50 MG/DL — CRITICAL LOW (ref 70–99)
GLUCOSE BLDC GLUCOMTR-MCNC: 72 MG/DL — SIGNIFICANT CHANGE UP (ref 70–99)
GLUCOSE SERPL-MCNC: 190 MG/DL — HIGH (ref 70–99)
HCT VFR BLD CALC: 28.2 % — LOW (ref 42–52)
HGB BLD-MCNC: 9.2 G/DL — LOW (ref 14–18)
IMM GRANULOCYTES NFR BLD AUTO: 10.6 % — HIGH (ref 0.1–0.3)
LYMPHOCYTES # BLD AUTO: 1.43 K/UL — SIGNIFICANT CHANGE UP (ref 1.2–3.4)
LYMPHOCYTES # BLD AUTO: 6.9 % — LOW (ref 20.5–51.1)
MAGNESIUM SERPL-MCNC: 2.3 MG/DL — SIGNIFICANT CHANGE UP (ref 1.8–2.4)
MCHC RBC-ENTMCNC: 30.2 PG — SIGNIFICANT CHANGE UP (ref 27–31)
MCHC RBC-ENTMCNC: 32.6 G/DL — SIGNIFICANT CHANGE UP (ref 32–37)
MCV RBC AUTO: 92.5 FL — SIGNIFICANT CHANGE UP (ref 80–94)
MONOCYTES # BLD AUTO: 3.57 K/UL — HIGH (ref 0.1–0.6)
MONOCYTES NFR BLD AUTO: 17.3 % — HIGH (ref 1.7–9.3)
NEUTROPHILS # BLD AUTO: 12.9 K/UL — HIGH (ref 1.4–6.5)
NEUTROPHILS NFR BLD AUTO: 62.5 % — SIGNIFICANT CHANGE UP (ref 42.2–75.2)
NRBC BLD AUTO-RTO: 2 /100 WBCS — HIGH (ref 0–0)
PLATELET # BLD AUTO: 553 K/UL — HIGH (ref 130–400)
PMV BLD: 11.5 FL — HIGH (ref 7.4–10.4)
POTASSIUM SERPL-MCNC: 4.5 MMOL/L — SIGNIFICANT CHANGE UP (ref 3.5–5)
POTASSIUM SERPL-SCNC: 4.5 MMOL/L — SIGNIFICANT CHANGE UP (ref 3.5–5)
PROT SERPL-MCNC: 5.6 G/DL — LOW (ref 6–8)
RBC # BLD: 3.05 M/UL — LOW (ref 4.7–6.1)
RBC # FLD: 15.2 % — HIGH (ref 11.5–14.5)
SODIUM SERPL-SCNC: 137 MMOL/L — SIGNIFICANT CHANGE UP (ref 135–146)
WBC # BLD: 20.66 K/UL — HIGH (ref 4.8–10.8)
WBC # FLD AUTO: 20.66 K/UL — HIGH (ref 4.8–10.8)

## 2025-02-12 PROCEDURE — 71045 X-RAY EXAM CHEST 1 VIEW: CPT | Mod: 26

## 2025-02-12 PROCEDURE — 99233 SBSQ HOSP IP/OBS HIGH 50: CPT

## 2025-02-12 RX ORDER — DEXTROSE 50 % IN WATER 50 %
25 SYRINGE (ML) INTRAVENOUS ONCE
Refills: 0 | Status: COMPLETED | OUTPATIENT
Start: 2025-02-12 | End: 2025-02-12

## 2025-02-12 RX ORDER — SODIUM CHLORIDE 9 G/1000ML
250 INJECTION, SOLUTION INTRAVENOUS ONCE
Refills: 0 | Status: DISCONTINUED | OUTPATIENT
Start: 2025-02-12 | End: 2025-02-12

## 2025-02-12 RX ORDER — DEXTROSE 50 % IN WATER 50 %
12.5 SYRINGE (ML) INTRAVENOUS ONCE
Refills: 0 | Status: COMPLETED | OUTPATIENT
Start: 2025-02-12 | End: 2025-02-12

## 2025-02-12 RX ADMIN — DIPHENHYDRAMINE HYDROCHLORIDE AND LIDOCAINE HYDROCHLORIDE AND ALUMINUM HYDROXIDE AND MAGNESIUM HYDRO 10 MILLILITER(S): KIT at 09:50

## 2025-02-12 RX ADMIN — INSULIN LISPRO 2: 100 INJECTION, SOLUTION INTRAVENOUS; SUBCUTANEOUS at 08:12

## 2025-02-12 RX ADMIN — Medication 2 TABLET(S): at 06:18

## 2025-02-12 RX ADMIN — METOPROLOL SUCCINATE 25 MILLIGRAM(S): 50 TABLET, EXTENDED RELEASE ORAL at 06:18

## 2025-02-12 RX ADMIN — DIPHENHYDRAMINE HYDROCHLORIDE AND LIDOCAINE HYDROCHLORIDE AND ALUMINUM HYDROXIDE AND MAGNESIUM HYDRO 10 MILLILITER(S): KIT at 18:04

## 2025-02-12 RX ADMIN — CEFEPIME 100 MILLIGRAM(S): 2 INJECTION, POWDER, FOR SOLUTION INTRAVENOUS at 18:04

## 2025-02-12 RX ADMIN — ATORVASTATIN CALCIUM 10 MILLIGRAM(S): 80 TABLET, FILM COATED ORAL at 21:21

## 2025-02-12 RX ADMIN — Medication 1 APPLICATION(S): at 11:16

## 2025-02-12 RX ADMIN — DIPHENHYDRAMINE HYDROCHLORIDE AND LIDOCAINE HYDROCHLORIDE AND ALUMINUM HYDROXIDE AND MAGNESIUM HYDRO 10 MILLILITER(S): KIT at 13:08

## 2025-02-12 RX ADMIN — Medication 12.5 GRAM(S): at 18:03

## 2025-02-12 RX ADMIN — Medication 0.5 MILLIGRAM(S): at 02:30

## 2025-02-12 RX ADMIN — APIXABAN 5 MILLIGRAM(S): 2.5 TABLET, FILM COATED ORAL at 20:20

## 2025-02-12 RX ADMIN — TAMSULOSIN HYDROCHLORIDE 0.4 MILLIGRAM(S): 0.4 CAPSULE ORAL at 21:20

## 2025-02-12 RX ADMIN — INSULIN LISPRO 20 UNIT(S): 100 INJECTION, SOLUTION INTRAVENOUS; SUBCUTANEOUS at 08:13

## 2025-02-12 RX ADMIN — Medication 2 TABLET(S): at 18:02

## 2025-02-12 RX ADMIN — DIPHENHYDRAMINE HYDROCHLORIDE AND LIDOCAINE HYDROCHLORIDE AND ALUMINUM HYDROXIDE AND MAGNESIUM HYDRO 10 MILLILITER(S): KIT at 06:19

## 2025-02-12 RX ADMIN — BENZOCAINE 1 SPRAY(S): 220 SPRAY, METERED PERIODONTAL at 06:18

## 2025-02-12 RX ADMIN — BENZOCAINE 1 SPRAY(S): 220 SPRAY, METERED PERIODONTAL at 21:19

## 2025-02-12 RX ADMIN — DIPHENHYDRAMINE HYDROCHLORIDE AND LIDOCAINE HYDROCHLORIDE AND ALUMINUM HYDROXIDE AND MAGNESIUM HYDRO 10 MILLILITER(S): KIT at 21:19

## 2025-02-12 RX ADMIN — APIXABAN 5 MILLIGRAM(S): 2.5 TABLET, FILM COATED ORAL at 08:12

## 2025-02-12 RX ADMIN — Medication 500 MILLIGRAM(S): at 11:15

## 2025-02-12 RX ADMIN — Medication 40 MILLIGRAM(S): at 11:15

## 2025-02-12 RX ADMIN — CEFEPIME 100 MILLIGRAM(S): 2 INJECTION, POWDER, FOR SOLUTION INTRAVENOUS at 06:19

## 2025-02-12 RX ADMIN — METOPROLOL SUCCINATE 25 MILLIGRAM(S): 50 TABLET, EXTENDED RELEASE ORAL at 18:04

## 2025-02-12 RX ADMIN — Medication 60 MILLIGRAM(S): at 06:18

## 2025-02-12 RX ADMIN — Medication 150 MILLIGRAM(S): at 11:15

## 2025-02-12 RX ADMIN — SERTRALINE 50 MILLIGRAM(S): 100 TABLET, FILM COATED ORAL at 11:15

## 2025-02-12 RX ADMIN — INSULIN GLARGINE-YFGN 40 UNIT(S): 100 INJECTION, SOLUTION SUBCUTANEOUS at 21:20

## 2025-02-12 RX ADMIN — INSULIN LISPRO 20 UNIT(S): 100 INJECTION, SOLUTION INTRAVENOUS; SUBCUTANEOUS at 11:16

## 2025-02-12 RX ADMIN — BUMETANIDE 2 MILLIGRAM(S): 1 TABLET ORAL at 06:18

## 2025-02-12 RX ADMIN — NYSTATIN 1 APPLICATION(S): 100000 CREAM TOPICAL at 18:03

## 2025-02-12 RX ADMIN — HYDROXYZINE HYDROCHLORIDE 25 MILLIGRAM(S): 25 TABLET, FILM COATED ORAL at 16:34

## 2025-02-12 RX ADMIN — NYSTATIN 1 APPLICATION(S): 100000 CREAM TOPICAL at 07:07

## 2025-02-12 RX ADMIN — BENZOCAINE 1 SPRAY(S): 220 SPRAY, METERED PERIODONTAL at 13:08

## 2025-02-12 NOTE — PROGRESS NOTE ADULT - ASSESSMENT
ASSESSMENT  69-year-old man with extensive medical history including CAD (status post CABG;), DM (on insulin pump), atrial fibrillation on Xarelto, history of distal pancreatomy and splenectomy for unclear reason referred to the ED by his oncologist for abnormal labs. Patient was recently diagnosed with Diffuse large B cell Lymphoma on pathology of right groin mass and is following with [facility] oncology team. Patient was discharged on 01.17.2025 from hospital after inpatient chemotherapy (Completed cycle 1 of R-EPOCH (D1 on 1/11/2024). Tolerated well. Rituximab was given on D5).   Over past 3 days patient started to get a productive cough with yellow sputum production.  Patient is also endorsing chills and nausea which he states is baseline. He had 1 reading on low grade fever 100.5 on day of presentation.    IMPRESSION  #Severe Sepsis - Severe Multifocal Pneumonia with Influenza A infection   - completed 10 day course of cefepime/tramiflu 1/30     #Neutropenic Fever- resolved  #Acute Hypoxemic Respiratory failure   #Pulmonary Edema     #Worsening Hypoxemia 2/6 - transferred to SICU   - CT Angio Chest PE Protocol w/ IV Cont (01.20.25 @ 20:56): Negative for pulmonary emboli. Interval development of infiltrates within the lower lobes and lingula   which may reflect acute multilobar pneumonia.   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    #Abx allergy: No Known Allergies    RECOMMENDATIONS  - cefepime 2g q 12 hours -last day today - CXR stable   - aspiration precautions   - trend WBC - no alternative infectious etiology     Please call or message on [employer] Teams if with any questions.  Spectra [phone]

## 2025-02-12 NOTE — CHART NOTE - NSCHARTNOTESELECT_GEN_ALL_CORE
ENT/Event Note
Transfer Note
contact/Event Note
Follow Up/Nutrition Services
RD Follow Up/Nutrition Services
RW/Event Note
Transfer Note
Transfer/Event Note

## 2025-02-12 NOTE — PROGRESS NOTE ADULT - SUBJECTIVE AND OBJECTIVE BOX
Patient is a 70y old  Male who presents with a chief complaint of SOB (09 Feb 2025 08:37)      Over Night Events:  Patient seen and examined.   feel good on RA   pending PT eval   patient want to be d/c home   valentin has appointment at [place] for readmission     ROS:  See HPI    PHYSICAL EXAM    ICU Vital Signs Last 24 Hrs  T(C): 36.2 (12 Feb 2025 04:00), Max: 36.2 (12 Feb 2025 00:00)  T(F): 97.2 (12 Feb 2025 04:00), Max: 97.2 (12 Feb 2025 04:00)  HR: 84 (12 Feb 2025 07:00) (84 - 101)  BP: 141/74 (12 Feb 2025 07:00) (124/61 - 164/70)  BP(mean): 101 (12 Feb 2025 07:00) (76 - 105)  ABP: --  ABP(mean): --  RR: 17 (12 Feb 2025 07:00) (14 - 37)  SpO2: 98% (12 Feb 2025 07:00) (93% - 99%)    O2 Parameters below as of 12 Feb 2025 08:00  Patient On (Oxygen Delivery Method): room air            General: awake   HEENT:         katlin       Lymph Nodes: NO cervical LN   Lungs: Bilateral BS  Cardiovascular: Regular   Abdomen: Soft, Positive BS  Extremities: No clubbing   Skin: warm   Neurological: no focal   Musculoskeletal: move all ext     I&O's Detail    11 Feb 2025 07:01  -  12 Feb 2025 07:00  --------------------------------------------------------  IN:    IV PiggyBack: 100 mL    Oral Fluid: 2160 mL  Total IN: 2260 mL    OUT:    Voided (mL): 3345 mL  Total OUT: 3345 mL    Total NET: -1085 mL          LABS:                          9.2    20.66 )-----------( 553      ( 12 Feb 2025 04:29 )             28.2         12 Feb 2025 04:29    137    |  97     |  97     ----------------------------<  190    4.5     |  29     |  1.5      Ca    8.9        12 Feb 2025 04:29  Mg     2.3       12 Feb 2025 04:29    TPro  5.6    /  Alb  3.2    /  TBili  0.3    /  DBili  x      /  AST  28     /  ALT  32     /  AlkPhos  203    12 Feb 2025 04:29  Amylase x     lipase x                                                                                        Urinalysis Basic - ( 12 Feb 2025 04:29 )    Color: x / Appearance: x / SG: x / pH: x  Gluc: 190 mg/dL / Ketone: x  / Bili: x / Urobili: x   Blood: x / Protein: x / Nitrite: x   Leuk Esterase: x / RBC: x / WBC x   Sq Epi: x / Non Sq Epi: x / Bacteria: x                                                                                                                                                     MEDICATIONS  (STANDING):  albuterol/ipratropium for Nebulization 3 milliLiter(s) Nebulizer every 6 hours  allopurinol 150 milliGRAM(s) Oral daily  apixaban 5 milliGRAM(s) Oral every 12 hours  ascorbic acid 500 milliGRAM(s) Oral daily  atorvastatin 10 milliGRAM(s) Oral at bedtime  benzocaine 20% Spray 1 Spray(s) Topical three times a day  buMETAnide 2 milliGRAM(s) Oral daily  cefepime   IVPB 2000 milliGRAM(s) IV Intermittent every 12 hours  cefepime   IVPB      chlorhexidine 2% Cloths 1 Application(s) Topical daily  dextrose 5%. 1000 milliLiter(s) (100 mL/Hr) IV Continuous <Continuous>  dextrose 5%. 1000 milliLiter(s) (50 mL/Hr) IV Continuous <Continuous>  dextrose 50% Injectable 25 Gram(s) IV Push once  dextrose 50% Injectable 12.5 Gram(s) IV Push once  dextrose 50% Injectable 25 Gram(s) IV Push once  FIRST- Mouthwash  BLM 10 milliLiter(s) Swish and Spit every 4 hours  glucagon  Injectable 1 milliGRAM(s) IntraMuscular once  influenza  Vaccine (HIGH DOSE) 0.5 milliLiter(s) IntraMuscular once  insulin glargine Injectable (LANTUS) 40 Unit(s) SubCutaneous at bedtime  insulin lispro (ADMELOG) corrective regimen sliding scale   SubCutaneous three times a day before meals  insulin lispro Injectable (ADMELOG) 20 Unit(s) SubCutaneous three times a day before meals  melatonin 10 milliGRAM(s) Oral at bedtime  metoprolol tartrate 25 milliGRAM(s) Oral two times a day  NIFEdipine XL 60 milliGRAM(s) Oral daily  nystatin Cream 1 Application(s) Topical every 12 hours  pantoprazole  Injectable 40 milliGRAM(s) IV Push daily  polyethylene glycol 3350 17 Gram(s) Oral <User Schedule>  predniSONE   Tablet 40 milliGRAM(s) Oral once  senna 2 Tablet(s) Oral every 12 hours  sertraline 50 milliGRAM(s) Oral daily  tamsulosin 0.4 milliGRAM(s) Oral at bedtime    MEDICATIONS  (PRN):  aluminum hydroxide/magnesium hydroxide/simethicone Suspension 30 milliLiter(s) Oral every 4 hours PRN Dyspepsia  bisacodyl 5 milliGRAM(s) Oral daily PRN Constipation  calcium carbonate    500 mG (Tums) Chewable 1 Tablet(s) Chew three times a day PRN Dyspepsia  dextrose Oral Gel 15 Gram(s) Oral once PRN Blood Glucose LESS THAN 70 milliGRAM(s)/deciliter  HYDROmorphone  Injectable 0.5 milliGRAM(s) IV Push every 6 hours PRN Severe Pain (7 - 10)  hydrOXYzine hydrochloride 25 milliGRAM(s) Oral every 6 hours PRN Anxiety  magnesium hydroxide Suspension 30 milliLiter(s) Oral daily PRN Constipation  ondansetron Injectable 4 milliGRAM(s) IV Push every 6 hours PRN Nausea and/or Vomiting  oxyCODONE    IR 10 milliGRAM(s) Oral every 6 hours PRN Moderate Pain (4 - 6)  simethicone 80 milliGRAM(s) Chew daily PRN Gas          Xrays:  stable                                                                                  ECHO:  [facility] ICU:

## 2025-02-12 NOTE — PROGRESS NOTE ADULT - SUBJECTIVE AND OBJECTIVE BOX
MORGAN BUSH  70y, Male  Allergy: No Known Allergies      LOS  23d    CHIEF COMPLAINT: SOB (09 Feb 2025 08:37)      INTERVAL EVENTS/HPI  - No acute events overnight  - T(F): , Max: 97.2 (02-12-25 @ 04:00)  - on nasal canula -- denies any diarrhea/abdominal pain   - WBC Count: 20.66 (02-12-25 @ 04:29)  WBC Count: 17.04 (02-11-25 @ 04:19)     - Creatinine: 1.5 (02-12-25 @ 04:29)  Creatinine: 2.1 (02-11-25 @ 04:19)       ROS  General: Denies rigors, nightsweats  HEENT: Denies headache, rhinorrhea, sore throat, eye pain  CV: Denies CP, palpitations  PULM: Denies wheezing, hemoptysis  GI: Denies hematemesis, hematochezia, melena  : Denies discharge, hematuria  MSK: Denies arthralgias, myalgias  SKIN: Denies rash, lesions  NEURO: Denies paresthesias, weakness  PSYCH: Denies depression, anxiety    VITALS:  T(F): 96.7, Max: 97.2 (02-12-25 @ 04:00)  HR: 91  BP: 162/76  RR: 26Vital Signs Last 24 Hrs  T(C): 35.9 (12 Feb 2025 08:00), Max: 36.2 (12 Feb 2025 00:00)  T(F): 96.7 (12 Feb 2025 08:00), Max: 97.2 (12 Feb 2025 04:00)  HR: 91 (12 Feb 2025 08:00) (84 - 101)  BP: 162/76 (12 Feb 2025 08:00) (124/61 - 164/70)  BP(mean): 109 (12 Feb 2025 08:00) (76 - 109)  RR: 26 (12 Feb 2025 08:00) (14 - 37)  SpO2: 82% (12 Feb 2025 08:00) (82% - 99%)    Parameters below as of 12 Feb 2025 08:00  Patient On (Oxygen Delivery Method): room air        PHYSICAL EXAM:  Gen: NAD, resting in bed  HEENT: Normocephalic, atraumatic  Neck: supple, no lymphadenopathy  CV: Regular rate & regular rhythm  Lungs: decreased BS at bases, no fremitus  Abdomen: Soft, BS present  Ext: Warm, well perfused  Neuro: non focal, awake  Skin: no rash, no erythema  Lines: no phlebitis    FH: Non-contributory  Social Hx: Non-contributory    TESTS & MEASUREMENTS:                        9.2    20.66 )-----------( 553      ( 12 Feb 2025 04:29 )             28.2     02-12    137  |  97[L]  |  97[HH]  ----------------------------<  190[H]  4.5   |  29  |  1.5    Ca    8.9      12 Feb 2025 04:29  Mg     2.3     02-12    TPro  5.6[L]  /  Alb  3.2[L]  /  TBili  0.3  /  DBili  x   /  AST  28  /  ALT  32  /  AlkPhos  203[H]  02-12      LIVER FUNCTIONS - ( 12 Feb 2025 04:29 )  Alb: 3.2 g/dL / Pro: 5.6 g/dL / ALK PHOS: 203 U/L / ALT: 32 U/L / AST: 28 U/L / GGT: x           Urinalysis Basic - ( 12 Feb 2025 04:29 )    Color: x / Appearance: x / SG: x / pH: x  Gluc: 190 mg/dL / Ketone: x  / Bili: x / Urobili: x   Blood: x / Protein: x / Nitrite: x   Leuk Esterase: x / RBC: x / WBC x   Sq Epi: x / Non Sq Epi: x / Bacteria: x        Culture - Blood (collected 02-06-25 @ 12:30)  Source: .Blood BLOOD  Final Report (02-11-25 @ 22:00):    No growth at 5 days    Culture - Blood (collected 01-27-25 @ 06:12)  Source: .Blood BLOOD  Final Report (02-01-25 @ 14:00):    No growth at 5 days    Urinalysis with Rflx Culture (collected 01-20-25 @ 19:07)    Culture - Blood (collected 01-20-25 @ 15:38)  Source: .Blood BLOOD  Final Report (01-25-25 @ 23:07):    No growth at 5 days    Culture - Blood (collected 01-20-25 @ 15:38)  Source: .Blood BLOOD  Final Report (01-25-25 @ 23:07):    No growth at 5 days            INFECTIOUS DISEASES TESTING  Fungitell: <31 (02-08-25 @ 05:59)  MRSA PCR Result.: Negative (02-07-25 @ 10:58)  Fungitell: 37 (02-06-25 @ 18:06)  Procalcitonin: 0.31 (02-06-25 @ 12:40)  Procalcitonin: 1.27 (01-30-25 @ 17:00)  Fungitell: <31 (01-28-25 @ 05:00)  Aspergillus Galactomannan Antigen: 0.04 (01-28-25 @ 05:00)  Procalcitonin: 4.48 (01-27-25 @ 06:12)  MRSA PCR Result.: Negative (01-25-25 @ 07:28)  Procalcitonin: 29.80 (01-24-25 @ 11:30)  MRSA PCR Result.: Negative (01-24-25 @ 10:45)  Legionella Antigen, Urine: Negative (01-23-25 @ 12:10)  Rapid RVP Result: NotDetec (01-13-25 @ 13:24)      INFLAMMATORY MARKERS  Sedimentation Rate, Erythrocyte: >140 mm/Hr (01-30-25 @ 17:00)  C-Reactive Protein: 87.8 mg/L (01-30-25 @ 17:00)      RADIOLOGY & ADDITIONAL TESTS:  I have personally reviewed the last available Chest xray  CXR      CT      CARDIOLOGY TESTING  12 Lead ECG:   Ventricular Rate 88 BPM    Atrial Rate 88 BPM    P-R Interval 216 ms    QRS Duration 110 ms    Q-T Interval 386 ms    QTC Calculation(Bazett) 467 ms    P Axis 40 degrees    R Axis 12 degrees    T Axis 12 degrees    Diagnosis Line Sinus rhythm with 1st degree A-V block  Otherwise normal ECG    Confirmed by Mahesh Coreas ([id number]) on 2/6/2025 8:51:58 AM (02-06-25 @ 08:12)  12 Lead ECG:   Ventricular Rate 79 BPM    Atrial Rate 79 BPM    P-R Interval 198 ms    QRS Duration 100 ms    Q-T Interval 412 ms    QTC Calculation(Bazett) 472 ms    P Axis 37 degrees    R Axis 12 degrees    T Axis -10 degrees    Diagnosis Line Normal sinus rhythm  Minimal voltage criteria for LVH, may be normal variant ( R in aVL )  Inferior infarct , age undetermined  Cannot rule out Anterior infarct , age undetermined  Abnormal ECG    Confirmed by Mahesh Coreas ([id number]) on 2/5/2025 6:27:30 PM (02-05-25 @ 07:56)      MEDICATIONS  albuterol/ipratropium for Nebulization 3 Nebulizer every 6 hours  allopurinol 150 Oral daily  apixaban 5 Oral every 12 hours  ascorbic acid 500 Oral daily  atorvastatin 10 Oral at bedtime  benzocaine 20% Spray 1 Topical three times a day  buMETAnide 2 Oral daily  cefepime   IVPB 2000 IV Intermittent every 12 hours  cefepime   IVPB     chlorhexidine 2% Cloths 1 Topical daily  dextrose 5%. 1000 IV Continuous <Continuous>  dextrose 5%. 1000 IV Continuous <Continuous>  dextrose 50% Injectable 25 IV Push once  dextrose 50% Injectable 12.5 IV Push once  dextrose 50% Injectable 25 IV Push once  FIRST- Mouthwash  BLM 10 Swish and Spit every 4 hours  glucagon  Injectable 1 IntraMuscular once  influenza  Vaccine (HIGH DOSE) 0.5 IntraMuscular once  insulin glargine Injectable (LANTUS) 40 SubCutaneous at bedtime  insulin lispro (ADMELOG) corrective regimen sliding scale  SubCutaneous three times a day before meals  insulin lispro Injectable (ADMELOG) 20 SubCutaneous three times a day before meals  melatonin 10 Oral at bedtime  metoprolol tartrate 25 Oral two times a day  NIFEdipine XL 60 Oral daily  nystatin Cream 1 Topical every 12 hours  pantoprazole  Injectable 40 IV Push daily  polyethylene glycol 3350 17 Oral <User Schedule>  predniSONE   Tablet 40 Oral once  senna 2 Oral every 12 hours  sertraline 50 Oral daily  tamsulosin 0.4 Oral at bedtime      WEIGHT  Weight (kg): 132.9 (01-27-25 @ 11:20)  Creatinine: 1.5 mg/dL (02-12-25 @ 04:29)      ANTIBIOTICS:  cefepime   IVPB 2000 milliGRAM(s) IV Intermittent every 12 hours  cefepime   IVPB          All available historical records have been reviewed

## 2025-02-12 NOTE — PROGRESS NOTE ADULT - CRITICAL CARE ATTENDING COMMENT
I have personally seen and examined this patient.    I have reviewed all pertinent clinical information and reviewed all relevant imaging and diagnostic studies personally.   I counseled the patient about diagnostic testing and treatment plan. All questions were answered.   I discussed recommendations with the primary team.
This patient is critically ill due to the following:  * Multiple organ failure requiring complex decision-making, and there is a high probability of imminent or life-threatening deterioration in the patient’s condition  * The patient required frequent reassessments and monitoring to ensure response to interventions and therapies.    Critical care time includes time spent evaluating and treating the patient's acute illness as well as time spent reviewing labs, radiology,  and discussing the case with a multidisciplinary team in an effort to prevent further life threatening deterioration or end organ damage. This time is independent of any procedures performed.
This patient is critically ill due to the following:  * Multiple organ failure requiring complex decision-making, and there is a high probability of imminent or life-threatening deterioration in the patient’s condition  * The patient required frequent reassessments and monitoring to ensure response to interventions and therapies.    Critical care time includes time spent evaluating and treating the patient's acute illness as well as time spent reviewing labs, radiology,  and discussing the case with a multidisciplinary team in an effort to prevent further life threatening deterioration or end organ damage. This time is independent of any procedures performed.

## 2025-02-12 NOTE — PROGRESS NOTE ADULT - ASSESSMENT
IMPRESSION:    Acute hypoxemic respiratory failure  Possible PNA   Volume overload improved   KRISTINE on CKD   HFpEF  Large B cell lymphoma  Afib on Xarelto - on IV heparin now   Hx of HTN and DM      PLAN:    CNS: Pain control, avoid depressants. MS remains adequate.  CTH given subacute focal neuro deficit.    HEENT: Oral care    PULMONARY:  HOB @ 45 degrees.  Aspiration precautions.  Wean FiO2 as tolerated.  keep O2 sat 92-96%.  BIPAP during sleep and PRN during the day. Wean down to 40/40.   CT chest NC noted with bibasilar consolidations.  f/u MRSA nares  switch to prednisone 40 mg daily and taper   CARDIOVASCULAR:  Keep even balance for now. Bumex daily. Hold metolazone. Continue rate control.     GI: GI prophylaxis protonix, diet per speech and swallow.    RENAL:  Follow up lytes. Correct as needed.  Monitor UO   recall renal for d/c plan   INFECTIOUS DISEASE:  today last day  cefepime   HEMATOLOGICAL:  on Eliquis.  Monitor CBC and coags.       ENDOCRINE:  Follow up FS.  on lantus received 40 over night follow endo     target -180.    MUSCULOSKELETAL: Out of bed to chair. PT OT     Dispo:  floor   PT / rehab for d/c plan   check pox on RA rest and exertion   case discussed with daughter and patient      IMPRESSION:    Acute hypoxemic respiratory failure  Possible PNA   Volume overload improved   KRISTINE on CKD   HFpEF  Large B cell lymphoma  Afib on Xarelto - on IV heparin now   Hx of HTN and DM      PLAN:    CNS: Pain control, avoid depressants. MS remains adequate.  CTH given subacute focal neuro deficit.    HEENT: Oral care    PULMONARY:  HOB @ 45 degrees.  Aspiration precautions.  Wean FiO2 as tolerated.  keep O2 sat 92-96%.  BIPAP during sleep and PRN during the day. Wean down to 40/40.   CT chest NC noted with bibasilar consolidations.  f/u MRSA nares  switch to prednisone 40 mg daily and taper   CARDIOVASCULAR:  Keep even balance for now. Bumex daily. Hold metolazone. Continue rate control.     GI: GI prophylaxis protonix, diet per speech and swallow.    RENAL:  Follow up lytes. Correct as needed.  Monitor UO   recall renal for d/c plan   INFECTIOUS DISEASE:  today last day  cefepime   HEMATOLOGICAL:  on Eliquis.  Monitor CBC and coags.       ENDOCRINE:  Follow up FS.  on lantus received 40 over night follow endo     target -180.    MUSCULOSKELETAL: Out of bed to chair. PT OT     Dispo:  tele   PT / rehab for d/c plan   check pox on RA rest and exertion   case discussed with daughter and patient

## 2025-02-12 NOTE — CHART NOTE - NSCHARTNOTEFT_GEN_A_CORE
Registered Dietitian Follow-Up     Patient Profile Reviewed                           Yes [x]   No []     Pertinent Subjective Information:  Patient was asleep at time of visit. RD spoke with RN - patient had fair PO intake of breakfast meal. He also drank Glucerna Shake.      Pertinent Medical Interventions:  Acute hypoxemic respiratory failure  Possible PNA   Volume overload improved   KRISTINE on CKD   HFpEF  Large B cell lymphoma  Afib on Xarelto - on IV heparin now   Hx of HTN and DM    BIPAP during sleep and PRN during the day.    SLP swallow bedside 2/10:  recommending regular texture and thin liquids  frequent cues/help required 1:1 feed     Diet order:   Diet, Consistent Carbohydrate/No Snacks:   DASH/TLC {Sodium & Cholesterol Restricted} (DASH)  1200mL Fluid Restriction ([id number])  Supplement Feeding Modality:  Oral  Glucerna Shake Cans or Servings Per Day:  1       Frequency:  Three Times a day (25 @ 06:00) [Active]    Anthropometrics:  Height: 180.3 cm   Weight: 132.9 kg  BMI: 40.9  IBW: 78.2 kg    Daily Weight in k.8 (), Weight in k.3 ()  132.9 kg () vs 115.8 kg ()  14.8% x 2 weeks     MEDICATIONS  (STANDING):  albuterol/ipratropium for Nebulization 3 milliLiter(s) Nebulizer every 6 hours  allopurinol 150 milliGRAM(s) Oral daily  apixaban 5 milliGRAM(s) Oral every 12 hours  ascorbic acid 500 milliGRAM(s) Oral daily  atorvastatin 10 milliGRAM(s) Oral at bedtime  benzocaine 20% Spray 1 Spray(s) Topical three times a day  cefepime   IVPB 2000 milliGRAM(s) IV Intermittent every 12 hours  cefepime   IVPB      chlorhexidine 2% Cloths 1 Application(s) Topical daily  dextrose 5%. 1000 milliLiter(s) (50 mL/Hr) IV Continuous <Continuous>  dextrose 5%. 1000 milliLiter(s) (100 mL/Hr) IV Continuous <Continuous>  dextrose 50% Injectable 25 Gram(s) IV Push once  dextrose 50% Injectable 12.5 Gram(s) IV Push once  dextrose 50% Injectable 25 Gram(s) IV Push once  FIRST- Mouthwash  BLM 10 milliLiter(s) Swish and Spit every 4 hours  glucagon  Injectable 1 milliGRAM(s) IntraMuscular once  influenza  Vaccine (HIGH DOSE) 0.5 milliLiter(s) IntraMuscular once  insulin glargine Injectable (LANTUS) 40 Unit(s) SubCutaneous at bedtime  insulin lispro (ADMELOG) corrective regimen sliding scale   SubCutaneous three times a day before meals  insulin lispro Injectable (ADMELOG) 20 Unit(s) SubCutaneous three times a day before meals  melatonin 10 milliGRAM(s) Oral at bedtime  metoprolol tartrate 25 milliGRAM(s) Oral two times a day  NIFEdipine XL 60 milliGRAM(s) Oral daily  nystatin Cream 1 Application(s) Topical every 12 hours  pantoprazole  Injectable 40 milliGRAM(s) IV Push daily  polyethylene glycol 3350 17 Gram(s) Oral <User Schedule>  predniSONE   Tablet 40 milliGRAM(s) Oral once  senna 2 Tablet(s) Oral every 12 hours  sertraline 50 milliGRAM(s) Oral daily  tamsulosin 0.4 milliGRAM(s) Oral at bedtime    MEDICATIONS  (PRN):  aluminum hydroxide/magnesium hydroxide/simethicone Suspension 30 milliLiter(s) Oral every 4 hours PRN Dyspepsia  bisacodyl 5 milliGRAM(s) Oral daily PRN Constipation  calcium carbonate    500 mG (Tums) Chewable 1 Tablet(s) Chew three times a day PRN Dyspepsia  dextrose Oral Gel 15 Gram(s) Oral once PRN Blood Glucose LESS THAN 70 milliGRAM(s)/deciliter  HYDROmorphone  Injectable 0.5 milliGRAM(s) IV Push every 6 hours PRN Severe Pain (7 - 10)  hydrOXYzine hydrochloride 25 milliGRAM(s) Oral every 6 hours PRN Anxiety  magnesium hydroxide Suspension 30 milliLiter(s) Oral daily PRN Constipation  ondansetron Injectable 4 milliGRAM(s) IV Push every 6 hours PRN Nausea and/or Vomiting  oxyCODONE    IR 10 milliGRAM(s) Oral every 6 hours PRN Moderate Pain (4 - 6)  simethicone 80 milliGRAM(s) Chew daily PRN Gas    Pertinent Labs:  @ 04:29: Na 137, BUN 97[HH], Cr 1.5, [H], K+ 4.5, Phos --, Mg 2.3, Alk Phos 203[H], ALT/SGPT 32, AST/SGOT 28, HbA1c --    Finger Sticks:  POCT Blood Glucose.: 143 mg/dL ( @ 11:09)  POCT Blood Glucose.: 170 mg/dL ( @ 07:52)  POCT Blood Glucose.: 203 mg/dL ( @ 22:23)  POCT Blood Glucose.: 263 mg/dL ( @ 17:18)    Physical Findings:  - Appearance: WDL  - GI function: last BM   - Tubes: n/a - no feeding tubes   - Oral/Mouth cavity: regular texture, thin liquids  - Skin: no pressure injuries documented   - Edema: generalized 1+ edema      Nutrition Requirements:  Weight Used: 115.8 kg () - BMI 35.5 (obesity class 2) - acuity of illness, critical care setting,      Estimated Energy Needs    Continue []  Adjust [x]  1273 - 1621 kcal/day (11-14 kcal/kg)      Estimated Protein Needs    Continue []  Adjust [x]  78 - 94 gm/day ( 1-1.2 gm/kg IBW 78.2 kg)     Estimated Fluid Needs        Continue []  Adjust [x]  1200 mL fluid restriction      Nutrient Intake: PO intake <50% of meals and supplement     [x] Previous Nutrition Diagnosis:  Inadequate Oral Intake             [x] Ongoing          [] Resolved    Nutrition Education: deferred     Nutrition Intervention:  meals and snacks, medical food supplements, coordination of care     Goal/Expected Outcome:   Patient to have PO intake >50% of meals, snacks, supplement within 3-5 days     Indicator/Monitoring:   energy intake, weight, labs, skin status, NFPF, BM, GI s/s      Recommendation:   1) Continue current diet order  2) Monitor PO intake - provide encouragement and assistance with all meals, snacks, supplement  3) Monitor electrolytes, BG, renal profile  -adjust insulin regimen PRN   4) Monitor BM, GI s/s - continue bowel regimen as ordered    Patient is at high nutrition risk, RD to f/u  RD to remain available: Amabr Holland RD [phone] or via Teams

## 2025-02-13 LAB
ALBUMIN SERPL ELPH-MCNC: 3.2 G/DL — LOW (ref 3.5–5.2)
ALP SERPL-CCNC: 204 U/L — HIGH (ref 30–115)
ALT FLD-CCNC: 30 U/L — SIGNIFICANT CHANGE UP (ref 0–41)
ANION GAP SERPL CALC-SCNC: 12 MMOL/L — SIGNIFICANT CHANGE UP (ref 7–14)
AST SERPL-CCNC: 29 U/L — SIGNIFICANT CHANGE UP (ref 0–41)
BASOPHILS # BLD AUTO: 0 K/UL — SIGNIFICANT CHANGE UP (ref 0–0.2)
BASOPHILS NFR BLD AUTO: 0 % — SIGNIFICANT CHANGE UP (ref 0–1)
BILIRUB SERPL-MCNC: 0.3 MG/DL — SIGNIFICANT CHANGE UP (ref 0.2–1.2)
BLD GP AB SCN SERPL QL: SIGNIFICANT CHANGE UP
BUN SERPL-MCNC: 93 MG/DL — CRITICAL HIGH (ref 10–20)
CALCIUM SERPL-MCNC: 8.6 MG/DL — SIGNIFICANT CHANGE UP (ref 8.4–10.5)
CHLORIDE SERPL-SCNC: 95 MMOL/L — LOW (ref 98–110)
CO2 SERPL-SCNC: 27 MMOL/L — SIGNIFICANT CHANGE UP (ref 17–32)
CREAT SERPL-MCNC: 1.7 MG/DL — HIGH (ref 0.7–1.5)
EGFR: 43 ML/MIN/1.73M2 — LOW
EOSINOPHIL # BLD AUTO: 1.54 K/UL — HIGH (ref 0–0.7)
EOSINOPHIL NFR BLD AUTO: 6.8 % — SIGNIFICANT CHANGE UP (ref 0–8)
GLUCOSE BLDC GLUCOMTR-MCNC: 174 MG/DL — HIGH (ref 70–99)
GLUCOSE BLDC GLUCOMTR-MCNC: 193 MG/DL — HIGH (ref 70–99)
GLUCOSE BLDC GLUCOMTR-MCNC: 382 MG/DL — HIGH (ref 70–99)
GLUCOSE BLDC GLUCOMTR-MCNC: 402 MG/DL — HIGH (ref 70–99)
GLUCOSE SERPL-MCNC: 225 MG/DL — HIGH (ref 70–99)
HCT VFR BLD CALC: 29 % — LOW (ref 42–52)
HGB BLD-MCNC: 9.5 G/DL — LOW (ref 14–18)
LYMPHOCYTES # BLD AUTO: 0.57 K/UL — LOW (ref 1.2–3.4)
LYMPHOCYTES # BLD AUTO: 2.5 % — LOW (ref 20.5–51.1)
MAGNESIUM SERPL-MCNC: 2.4 MG/DL — SIGNIFICANT CHANGE UP (ref 1.8–2.4)
MCHC RBC-ENTMCNC: 30.4 PG — SIGNIFICANT CHANGE UP (ref 27–31)
MCHC RBC-ENTMCNC: 32.8 G/DL — SIGNIFICANT CHANGE UP (ref 32–37)
MCV RBC AUTO: 92.7 FL — SIGNIFICANT CHANGE UP (ref 80–94)
MONOCYTES # BLD AUTO: 3.47 K/UL — HIGH (ref 0.1–0.6)
MONOCYTES NFR BLD AUTO: 15.3 % — HIGH (ref 1.7–9.3)
NEUTROPHILS # BLD AUTO: 14.83 K/UL — HIGH (ref 1.4–6.5)
NEUTROPHILS NFR BLD AUTO: 64.4 % — SIGNIFICANT CHANGE UP (ref 42.2–75.2)
PLATELET # BLD AUTO: 527 K/UL — HIGH (ref 130–400)
PMV BLD: 11.7 FL — HIGH (ref 7.4–10.4)
POTASSIUM SERPL-MCNC: 4.9 MMOL/L — SIGNIFICANT CHANGE UP (ref 3.5–5)
POTASSIUM SERPL-SCNC: 4.9 MMOL/L — SIGNIFICANT CHANGE UP (ref 3.5–5)
PROT SERPL-MCNC: 5.5 G/DL — LOW (ref 6–8)
RBC # BLD: 3.13 M/UL — LOW (ref 4.7–6.1)
RBC # FLD: 15.7 % — HIGH (ref 11.5–14.5)
SODIUM SERPL-SCNC: 134 MMOL/L — LOW (ref 135–146)
WBC # BLD: 22.71 K/UL — HIGH (ref 4.8–10.8)
WBC # FLD AUTO: 22.71 K/UL — HIGH (ref 4.8–10.8)

## 2025-02-13 PROCEDURE — 99233 SBSQ HOSP IP/OBS HIGH 50: CPT

## 2025-02-13 PROCEDURE — 71045 X-RAY EXAM CHEST 1 VIEW: CPT | Mod: 26

## 2025-02-13 RX ORDER — HYDROMORPHONE/SOD CHLOR,ISO/PF 2 MG/10 ML
0.5 SYRINGE (ML) INJECTION EVERY 6 HOURS
Refills: 0 | Status: DISCONTINUED | OUTPATIENT
Start: 2025-02-13 | End: 2025-02-17

## 2025-02-13 RX ORDER — PREDNISONE 20 MG/1
10 TABLET ORAL DAILY
Refills: 0 | Status: DISCONTINUED | OUTPATIENT
Start: 2025-02-17 | End: 2025-02-17

## 2025-02-13 RX ORDER — PREDNISONE 20 MG/1
20 TABLET ORAL DAILY
Refills: 0 | Status: COMPLETED | OUTPATIENT
Start: 2025-02-14 | End: 2025-02-16

## 2025-02-13 RX ORDER — INSULIN LISPRO 100 U/ML
24 INJECTION, SOLUTION INTRAVENOUS; SUBCUTANEOUS
Refills: 0 | Status: DISCONTINUED | OUTPATIENT
Start: 2025-02-13 | End: 2025-02-14

## 2025-02-13 RX ADMIN — METOPROLOL SUCCINATE 25 MILLIGRAM(S): 50 TABLET, EXTENDED RELEASE ORAL at 06:38

## 2025-02-13 RX ADMIN — INSULIN LISPRO 20 UNIT(S): 100 INJECTION, SOLUTION INTRAVENOUS; SUBCUTANEOUS at 07:59

## 2025-02-13 RX ADMIN — DIPHENHYDRAMINE HYDROCHLORIDE AND LIDOCAINE HYDROCHLORIDE AND ALUMINUM HYDROXIDE AND MAGNESIUM HYDRO 10 MILLILITER(S): KIT at 22:35

## 2025-02-13 RX ADMIN — BENZOCAINE 1 SPRAY(S): 220 SPRAY, METERED PERIODONTAL at 13:39

## 2025-02-13 RX ADMIN — Medication 10 MILLIGRAM(S): at 21:16

## 2025-02-13 RX ADMIN — Medication 60 MILLIGRAM(S): at 06:37

## 2025-02-13 RX ADMIN — POLYETHYLENE GLYCOL 3350 17 GRAM(S): 17 POWDER, FOR SOLUTION ORAL at 13:39

## 2025-02-13 RX ADMIN — INSULIN LISPRO 2: 100 INJECTION, SOLUTION INTRAVENOUS; SUBCUTANEOUS at 07:59

## 2025-02-13 RX ADMIN — INSULIN LISPRO 12: 100 INJECTION, SOLUTION INTRAVENOUS; SUBCUTANEOUS at 17:25

## 2025-02-13 RX ADMIN — BENZOCAINE 1 SPRAY(S): 220 SPRAY, METERED PERIODONTAL at 06:37

## 2025-02-13 RX ADMIN — POLYETHYLENE GLYCOL 3350 17 GRAM(S): 17 POWDER, FOR SOLUTION ORAL at 06:38

## 2025-02-13 RX ADMIN — METOPROLOL SUCCINATE 25 MILLIGRAM(S): 50 TABLET, EXTENDED RELEASE ORAL at 17:24

## 2025-02-13 RX ADMIN — NYSTATIN 1 APPLICATION(S): 100000 CREAM TOPICAL at 17:36

## 2025-02-13 RX ADMIN — BENZOCAINE 1 SPRAY(S): 220 SPRAY, METERED PERIODONTAL at 22:35

## 2025-02-13 RX ADMIN — OXYCODONE HYDROCHLORIDE 10 MILLIGRAM(S): 30 TABLET ORAL at 04:45

## 2025-02-13 RX ADMIN — NYSTATIN 1 APPLICATION(S): 100000 CREAM TOPICAL at 06:38

## 2025-02-13 RX ADMIN — ATORVASTATIN CALCIUM 10 MILLIGRAM(S): 80 TABLET, FILM COATED ORAL at 21:17

## 2025-02-13 RX ADMIN — Medication 0.5 MILLIGRAM(S): at 23:18

## 2025-02-13 RX ADMIN — SERTRALINE 50 MILLIGRAM(S): 100 TABLET, FILM COATED ORAL at 12:55

## 2025-02-13 RX ADMIN — Medication 10 MILLIGRAM(S): at 00:00

## 2025-02-13 RX ADMIN — Medication 500 MILLIGRAM(S): at 12:54

## 2025-02-13 RX ADMIN — IPRATROPIUM BROMIDE AND ALBUTEROL SULFATE 3 MILLILITER(S): .5; 2.5 SOLUTION RESPIRATORY (INHALATION) at 08:09

## 2025-02-13 RX ADMIN — Medication 0.5 MILLIGRAM(S): at 22:48

## 2025-02-13 RX ADMIN — INSULIN GLARGINE-YFGN 40 UNIT(S): 100 INJECTION, SOLUTION SUBCUTANEOUS at 22:35

## 2025-02-13 RX ADMIN — Medication 2 TABLET(S): at 17:24

## 2025-02-13 RX ADMIN — Medication 1 APPLICATION(S): at 13:43

## 2025-02-13 RX ADMIN — PREDNISONE 30 MILLIGRAM(S): 20 TABLET ORAL at 06:38

## 2025-02-13 RX ADMIN — INSULIN LISPRO 20 UNIT(S): 100 INJECTION, SOLUTION INTRAVENOUS; SUBCUTANEOUS at 17:25

## 2025-02-13 RX ADMIN — DIPHENHYDRAMINE HYDROCHLORIDE AND LIDOCAINE HYDROCHLORIDE AND ALUMINUM HYDROXIDE AND MAGNESIUM HYDRO 10 MILLILITER(S): KIT at 12:56

## 2025-02-13 RX ADMIN — OXYCODONE HYDROCHLORIDE 10 MILLIGRAM(S): 30 TABLET ORAL at 04:04

## 2025-02-13 RX ADMIN — DIPHENHYDRAMINE HYDROCHLORIDE AND LIDOCAINE HYDROCHLORIDE AND ALUMINUM HYDROXIDE AND MAGNESIUM HYDRO 10 MILLILITER(S): KIT at 06:51

## 2025-02-13 RX ADMIN — INSULIN LISPRO 10: 100 INJECTION, SOLUTION INTRAVENOUS; SUBCUTANEOUS at 12:53

## 2025-02-13 RX ADMIN — APIXABAN 5 MILLIGRAM(S): 2.5 TABLET, FILM COATED ORAL at 21:16

## 2025-02-13 RX ADMIN — Medication 2 TABLET(S): at 06:38

## 2025-02-13 RX ADMIN — Medication 40 MILLIGRAM(S): at 12:56

## 2025-02-13 RX ADMIN — INSULIN LISPRO 20 UNIT(S): 100 INJECTION, SOLUTION INTRAVENOUS; SUBCUTANEOUS at 12:53

## 2025-02-13 RX ADMIN — Medication 150 MILLIGRAM(S): at 12:54

## 2025-02-13 RX ADMIN — APIXABAN 5 MILLIGRAM(S): 2.5 TABLET, FILM COATED ORAL at 07:58

## 2025-02-13 RX ADMIN — TAMSULOSIN HYDROCHLORIDE 0.4 MILLIGRAM(S): 0.4 CAPSULE ORAL at 21:17

## 2025-02-13 NOTE — PROGRESS NOTE ADULT - SUBJECTIVE AND OBJECTIVE BOX
This note is updated with the current CCU course as of 2/13, but please refer to the Transfer Notes 2/8 and 2/9 for complete hospital course.     CCU Course (Up-to-date as of 2/13:  Patient admitted to CCU for hyperglycemia. Labs, clinical status, and presentation not consistent with DKA or HHS. On arrival to unit patient's blood sugar was controlled and he was transitioned off insulin drip to basal bolus insulin. Sugars were well controlled, but patient missed doses of insulin and blood sugar level shirley for which the insulin drip was restarted. Insulin drip was later discontinued as patient's blood glucose level normalized. Transitioned back off of drip to basal bolus insulin. Continue to adjust basal bolus insulin regimen as needed to maintain target blood glucose. Patient was evaluated by PT who referred patient was motivated to ambulate, but patient unable to and and Patient is stable for downgrade to Floor      SUBJECTIVE/OVERNIGHT EVENTS  Today is hospital day 24d.   This morning patient was seen and examined at bedside, resting comfortably in bed.   No acute or major events overnight.   Patient has no complaints at this time.   Denies fever, chills, nausea, vomiting, diarrhea, constipation, hematochezia, dysuria, hematuria, chest pain, palpitations, sob.   Patient was informed of their plan of care at this time.    CODE STATUS: Full Code    MEDICATIONS  STANDING MEDICATIONS  albuterol/ipratropium for Nebulization 3 milliLiter(s) Nebulizer every 6 hours  allopurinol 150 milliGRAM(s) Oral daily  apixaban 5 milliGRAM(s) Oral every 12 hours  ascorbic acid 500 milliGRAM(s) Oral daily  atorvastatin 10 milliGRAM(s) Oral at bedtime  benzocaine 20% Spray 1 Spray(s) Topical three times a day  chlorhexidine 2% Cloths 1 Application(s) Topical daily  dextrose 5%. 1000 milliLiter(s) IV Continuous <Continuous>  dextrose 5%. 1000 milliLiter(s) IV Continuous <Continuous>  dextrose 50% Injectable 25 Gram(s) IV Push once  dextrose 50% Injectable 12.5 Gram(s) IV Push once  dextrose 50% Injectable 25 Gram(s) IV Push once  FIRST- Mouthwash  BLM 10 milliLiter(s) Swish and Spit every 4 hours  glucagon  Injectable 1 milliGRAM(s) IntraMuscular once  influenza  Vaccine (HIGH DOSE) 0.5 milliLiter(s) IntraMuscular once  insulin glargine Injectable (LANTUS) 40 Unit(s) SubCutaneous at bedtime  insulin lispro (ADMELOG) corrective regimen sliding scale   SubCutaneous three times a day before meals  insulin lispro Injectable (ADMELOG) 20 Unit(s) SubCutaneous three times a day before meals  melatonin 10 milliGRAM(s) Oral at bedtime  metoprolol tartrate 25 milliGRAM(s) Oral two times a day  NIFEdipine XL 60 milliGRAM(s) Oral daily  nystatin Cream 1 Application(s) Topical every 12 hours  pantoprazole  Injectable 40 milliGRAM(s) IV Push daily  polyethylene glycol 3350 17 Gram(s) Oral <User Schedule>  senna 2 Tablet(s) Oral every 12 hours  sertraline 50 milliGRAM(s) Oral daily  tamsulosin 0.4 milliGRAM(s) Oral at bedtime    PRN MEDICATIONS  aluminum hydroxide/magnesium hydroxide/simethicone Suspension 30 milliLiter(s) Oral every 4 hours PRN  bisacodyl 5 milliGRAM(s) Oral daily PRN  calcium carbonate    500 mG (Tums) Chewable 1 Tablet(s) Chew three times a day PRN  dextrose Oral Gel 15 Gram(s) Oral once PRN  HYDROmorphone  Injectable 0.5 milliGRAM(s) IV Push every 6 hours PRN  hydrOXYzine hydrochloride 25 milliGRAM(s) Oral every 6 hours PRN  magnesium hydroxide Suspension 30 milliLiter(s) Oral daily PRN  ondansetron Injectable 4 milliGRAM(s) IV Push every 6 hours PRN  oxyCODONE    IR 10 milliGRAM(s) Oral every 6 hours PRN  simethicone 80 milliGRAM(s) Chew daily PRN    VITALS  T(F): 97.8 (02-13-25 @ 08:00), Max: 97.8 (02-13-25 @ 08:00)  HR: 88 (02-13-25 @ 11:00) (76 - 97)  BP: 154/67 (02-13-25 @ 11:00) (122/66 - 166/74)  RR: 24 (02-13-25 @ 11:00) (17 - 24)  SpO2: 99% (02-13-25 @ 11:00) (97% - 100%)  POCT Blood Glucose.: 382 mg/dL (02-13-25 @ 12:47)  POCT Blood Glucose.: 193 mg/dL (02-13-25 @ 07:45)  POCT Blood Glucose.: 219 mg/dL (02-12-25 @ 21:07)  POCT Blood Glucose.: 215 mg/dL (02-12-25 @ 18:37)  POCT Blood Glucose.: 72 mg/dL (02-12-25 @ 17:51)  POCT Blood Glucose.: 50 mg/dL (02-12-25 @ 17:27)    PHYSICAL EXAM  GENERAL  (  ) NAD, lying in bed comfortably     (  ) obtunded     (  ) lethargic     (  ) somnolent    HEAD  (  ) Atraumatic     (  ) hematoma     (  ) laceration (specify location:       )     NECK  (  ) Supple     (  ) neck stiffness     (  ) nuchal rigidity     (  )  no JVD     (  ) JVD present ( -- cm)    HEART  Rate -->  (  ) normal rate    (  ) bradycardic    (  ) tachycardic  Rhythm -->  (  ) regular    (  ) regularly irregular    (  ) irregularly irregular  Murmurs -->  (  ) normal s1/s2    (  ) systolic murmur    (  ) diastolic murmur    (  ) continuous murmur     (  ) S3 present    (  ) S4 present    LUNGS  (  )Unlabored respirations     (  ) tachypnea  (  ) B/L air entry     (  ) decreased breath sounds in:  (location     )    (  ) no adventitious sound     (  ) crackles     (  ) wheezing      (  ) rhonchi      (specify location:       )  (  ) chest wall tenderness (specify location:       )    ABDOMEN  (  ) Soft     (  ) tense   |   (  ) nondistended     (  ) distended   |   (  ) +BS     (  ) hypoactive bowel sounds     (  ) hyperactive bowel sounds  (  ) nontender     (  ) RUQ tenderness     (  ) RLQ tenderness     (  ) LLQ tenderness     (  ) epigastric tenderness     (  ) diffuse tenderness  (  ) Splenomegaly      (  ) Hepatomegaly      (  ) Jaundice     (  ) ecchymosis     EXTREMITIES  (  ) Normal     (  ) Rash     (  ) ecchymosis     (  ) varicose veins      (  ) pitting edema     (  ) non-pitting edema   (  ) ulceration     (  ) gangrene:     (location:     )    NERVOUS SYSTEM  (  ) A&Ox3     (  ) confused     (  ) lethargic  CN II-XII:     (  ) Intact     (  ) focal deficits  (Specify:     )   Upper extremities:     (  ) strength X/5     (  ) focal deficit (specify:    )  Lower extremities:     (  ) strength  X/5    (  ) focal deficit (specify:    )    SKIN  (  ) No rashes or lesions     (  ) maculopapular rash     (  ) pustules     (  ) vesicles     (  ) ulcer     (  ) ecchymosis     (specify location:     )    (  ) Indwelling Ojeda Catheter   Date insterted:    Reason (  ) Critical illness     (  ) urinary retention    (  ) Accurate Ins/Outs Monitoring     (  ) CMO patient    (  ) Central Line  Date inserted:  Location: (  ) Right IJ   (  ) Left IJ   (  ) Right Fem   (  ) Left Fem  (  ) SPC  (  ) pigtail  (  ) PEG tube  (  ) colostomy  (  ) jejunostomy  (  ) U-Dall    LABS             9.5    22.71 )-----------( 527      ( 02-13-25 @ 04:18 )             29.0     134  |  95  |  93  -------------------------<  225   02-13-25 @ 04:18  4.9  |  27  |  1.7    Ca      8.6     02-13-25 @ 04:18  Mg     2.4     02-13-25 @ 04:18    TPro  5.5  /  Alb  3.2  /  TBili  0.3  /  DBili  x   /  AST  29  /  ALT  30  /  AlkPhos  204  /  GGT  x     02-13-25 @ 04:18        Urinalysis Basic - ( 13 Feb 2025 04:18 )    Color: x / Appearance: x / SG: x / pH: x  Gluc: 225 mg/dL / Ketone: x  / Bili: x / Urobili: x   Blood: x / Protein: x / Nitrite: x   Leuk Esterase: x / RBC: x / WBC x   Sq Epi: x / Non Sq Epi: x / Bacteria: x          IMAGING This note is updated with the current CCU course as of 2/13, but please refer to the Transfer Notes 2/8 and 2/9 for complete hospital course.     CCU Course (Up-to-date as of 2/13:  Patient admitted to CCU for hyperglycemia. Labs, clinical status, and presentation not consistent with DKA or HHS. On arrival to unit patient's blood sugar was controlled and he was transitioned off insulin drip to basal bolus insulin. Sugars were well controlled, but patient missed doses of insulin and blood sugar level shirley for which the insulin drip was restarted. Insulin drip was later discontinued as patient's blood glucose level normalized. Transitioned back off of drip to basal bolus insulin. Continue to adjust basal bolus insulin regimen as needed to maintain target blood glucose. Patient was evaluated by PT who referred patient was motivated to ambulate, but patient unable to and and Patient is stable for downgrade to Floor      SUBJECTIVE/OVERNIGHT EVENTS  Today is hospital day 24d.   This morning patient was seen and examined at bedside, resting comfortably in bed.   No acute or major events overnight.   Patient has no complaints at this time.   Denies fever, chills, nausea, vomiting, diarrhea, constipation, hematochezia, dysuria, hematuria, chest pain, palpitations, sob.   Patient was informed of their plan of care at this time.    CODE STATUS: Full Code    MEDICATIONS  STANDING MEDICATIONS  albuterol/ipratropium for Nebulization 3 milliLiter(s) Nebulizer every 6 hours  allopurinol 150 milliGRAM(s) Oral daily  apixaban 5 milliGRAM(s) Oral every 12 hours  ascorbic acid 500 milliGRAM(s) Oral daily  atorvastatin 10 milliGRAM(s) Oral at bedtime  benzocaine 20% Spray 1 Spray(s) Topical three times a day  chlorhexidine 2% Cloths 1 Application(s) Topical daily  dextrose 5%. 1000 milliLiter(s) IV Continuous <Continuous>  dextrose 5%. 1000 milliLiter(s) IV Continuous <Continuous>  dextrose 50% Injectable 25 Gram(s) IV Push once  dextrose 50% Injectable 12.5 Gram(s) IV Push once  dextrose 50% Injectable 25 Gram(s) IV Push once  FIRST- Mouthwash  BLM 10 milliLiter(s) Swish and Spit every 4 hours  glucagon  Injectable 1 milliGRAM(s) IntraMuscular once  influenza  Vaccine (HIGH DOSE) 0.5 milliLiter(s) IntraMuscular once  insulin glargine Injectable (LANTUS) 40 Unit(s) SubCutaneous at bedtime  insulin lispro (ADMELOG) corrective regimen sliding scale   SubCutaneous three times a day before meals  insulin lispro Injectable (ADMELOG) 20 Unit(s) SubCutaneous three times a day before meals  melatonin 10 milliGRAM(s) Oral at bedtime  metoprolol tartrate 25 milliGRAM(s) Oral two times a day  NIFEdipine XL 60 milliGRAM(s) Oral daily  nystatin Cream 1 Application(s) Topical every 12 hours  pantoprazole  Injectable 40 milliGRAM(s) IV Push daily  polyethylene glycol 3350 17 Gram(s) Oral <User Schedule>  senna 2 Tablet(s) Oral every 12 hours  sertraline 50 milliGRAM(s) Oral daily  tamsulosin 0.4 milliGRAM(s) Oral at bedtime    PRN MEDICATIONS  aluminum hydroxide/magnesium hydroxide/simethicone Suspension 30 milliLiter(s) Oral every 4 hours PRN  bisacodyl 5 milliGRAM(s) Oral daily PRN  calcium carbonate    500 mG (Tums) Chewable 1 Tablet(s) Chew three times a day PRN  dextrose Oral Gel 15 Gram(s) Oral once PRN  HYDROmorphone  Injectable 0.5 milliGRAM(s) IV Push every 6 hours PRN  hydrOXYzine hydrochloride 25 milliGRAM(s) Oral every 6 hours PRN  magnesium hydroxide Suspension 30 milliLiter(s) Oral daily PRN  ondansetron Injectable 4 milliGRAM(s) IV Push every 6 hours PRN  oxyCODONE    IR 10 milliGRAM(s) Oral every 6 hours PRN  simethicone 80 milliGRAM(s) Chew daily PRN    VITALS  T(F): 97.8 (02-13-25 @ 08:00), Max: 97.8 (02-13-25 @ 08:00)  HR: 88 (02-13-25 @ 11:00) (76 - 97)  BP: 154/67 (02-13-25 @ 11:00) (122/66 - 166/74)  RR: 24 (02-13-25 @ 11:00) (17 - 24)  SpO2: 99% (02-13-25 @ 11:00) (97% - 100%)  POCT Blood Glucose.: 382 mg/dL (02-13-25 @ 12:47)  POCT Blood Glucose.: 193 mg/dL (02-13-25 @ 07:45)  POCT Blood Glucose.: 219 mg/dL (02-12-25 @ 21:07)  POCT Blood Glucose.: 215 mg/dL (02-12-25 @ 18:37)  POCT Blood Glucose.: 72 mg/dL (02-12-25 @ 17:51)  POCT Blood Glucose.: 50 mg/dL (02-12-25 @ 17:27)    PHYSICAL EXAM  CONSTITUTIONAL: well developed, nontoxic appearing, in no acute distress, speaking in full sentences  SKIN: warm, dry, no rash, cap refill < 2 seconds  HEENT: normocephalic, atraumatic, no conjunctival erythema, moist mucous membranes, patent airway  NECK: supple  CV:  regular rate, regular rhythm, 2+ radial pulses bilaterally  RESP: no wheezes, no rales, no rhonchi, normal work of breathing  ABD: soft, no tenderness, nondistended, no rebound, no guarding  MSK: normal ROM, no cyanosis, no edema, LLE decreased motor strength.  NEURO: alert, oriented, grossly unremarkable  PSYCH: cooperative, appropriate    LABS             9.5    22.71 )-----------( 527      ( 02-13-25 @ 04:18 )             29.0     134  |  95  |  93  -------------------------<  225   02-13-25 @ 04:18  4.9  |  27  |  1.7    Ca      8.6     02-13-25 @ 04:18  Mg     2.4     02-13-25 @ 04:18    TPro  5.5  /  Alb  3.2  /  TBili  0.3  /  DBili  x   /  AST  29  /  ALT  30  /  AlkPhos  204  /  GGT  x     02-13-25 @ 04:18    Urinalysis Basic - ( 13 Feb 2025 04:18 )    Color: x / Appearance: x / SG: x / pH: x  Gluc: 225 mg/dL / Ketone: x  / Bili: x / Urobili: x   Blood: x / Protein: x / Nitrite: x   Leuk Esterase: x / RBC: x / WBC x   Sq Epi: x / Non Sq Epi: x / Bacteria: x    IMAGING    CXR 2/13: Low lung volumes with unchanged small bibasilar opacities.  CXR 2/12:  Support devices: Right approximately PICC. Multiple telemetry wires overlie chest.  Cardiac/mediastinum/hilum: Stable.  Lung parenchyma/Pleura: Unchanged patchy bibasilar opacities. Low lung volumes. No pneumothorax.  Skeleton/soft tissues: Stable.  CXR 2/10: No radiographic evidence of acute cardiopulmonary disease. Redemonstration bilateral opacities  CR Head 2/9: No evidence of acute transcortical infarct, acute intracranial hemorrhage, or mass effect. This note is updated with the current CCU course as of 2/13, but please refer to the Transfer Notes 2/8 and 2/9 for complete hospital course.     CCU Course (Up-to-date as of 2/13:  Patient admitted to CCU for hyperglycemia. Labs, clinical status, and presentation not consistent with DKA or HHS. On arrival to unit patient's blood sugar was controlled and he was transitioned off insulin drip to basal bolus insulin. Sugars were well controlled, but patient missed doses of insulin and blood sugar level shirley for which the insulin drip was restarted. Insulin drip was later discontinued as patient's blood glucose level normalized. Transitioned back off of drip to basal bolus insulin. Continue to adjust basal bolus insulin regimen as needed to maintain target blood glucose. Patient was evaluated by PT who referred patient was motivated to ambulate, but patient unable to do so and now requires rehab placement. Patient is stable for downgrade to Floor approved by Dr. Gomez. Patient is pending Rehabilitation Facility placement.     SUBJECTIVE/OVERNIGHT EVENTS  Today is hospital day 24d.   This morning patient was seen and examined at bedside, resting comfortably in bed.   No acute or major events overnight.   Patient has no complaints at this time.   Denies fever, chills, nausea, vomiting, diarrhea, constipation, hematochezia, dysuria, hematuria, chest pain, palpitations, sob.   Patient was informed of their plan of care at this time.    CODE STATUS: Full Code    MEDICATIONS  STANDING MEDICATIONS  albuterol/ipratropium for Nebulization 3 milliLiter(s) Nebulizer every 6 hours  allopurinol 150 milliGRAM(s) Oral daily  apixaban 5 milliGRAM(s) Oral every 12 hours  ascorbic acid 500 milliGRAM(s) Oral daily  atorvastatin 10 milliGRAM(s) Oral at bedtime  benzocaine 20% Spray 1 Spray(s) Topical three times a day  chlorhexidine 2% Cloths 1 Application(s) Topical daily  dextrose 5%. 1000 milliLiter(s) IV Continuous <Continuous>  dextrose 5%. 1000 milliLiter(s) IV Continuous <Continuous>  dextrose 50% Injectable 25 Gram(s) IV Push once  dextrose 50% Injectable 12.5 Gram(s) IV Push once  dextrose 50% Injectable 25 Gram(s) IV Push once  FIRST- Mouthwash  BLM 10 milliLiter(s) Swish and Spit every 4 hours  glucagon  Injectable 1 milliGRAM(s) IntraMuscular once  influenza  Vaccine (HIGH DOSE) 0.5 milliLiter(s) IntraMuscular once  insulin glargine Injectable (LANTUS) 40 Unit(s) SubCutaneous at bedtime  insulin lispro (ADMELOG) corrective regimen sliding scale   SubCutaneous three times a day before meals  insulin lispro Injectable (ADMELOG) 20 Unit(s) SubCutaneous three times a day before meals  melatonin 10 milliGRAM(s) Oral at bedtime  metoprolol tartrate 25 milliGRAM(s) Oral two times a day  NIFEdipine XL 60 milliGRAM(s) Oral daily  nystatin Cream 1 Application(s) Topical every 12 hours  pantoprazole  Injectable 40 milliGRAM(s) IV Push daily  polyethylene glycol 3350 17 Gram(s) Oral <User Schedule>  senna 2 Tablet(s) Oral every 12 hours  sertraline 50 milliGRAM(s) Oral daily  tamsulosin 0.4 milliGRAM(s) Oral at bedtime    PRN MEDICATIONS  aluminum hydroxide/magnesium hydroxide/simethicone Suspension 30 milliLiter(s) Oral every 4 hours PRN  bisacodyl 5 milliGRAM(s) Oral daily PRN  calcium carbonate    500 mG (Tums) Chewable 1 Tablet(s) Chew three times a day PRN  dextrose Oral Gel 15 Gram(s) Oral once PRN  HYDROmorphone  Injectable 0.5 milliGRAM(s) IV Push every 6 hours PRN  hydrOXYzine hydrochloride 25 milliGRAM(s) Oral every 6 hours PRN  magnesium hydroxide Suspension 30 milliLiter(s) Oral daily PRN  ondansetron Injectable 4 milliGRAM(s) IV Push every 6 hours PRN  oxyCODONE    IR 10 milliGRAM(s) Oral every 6 hours PRN  simethicone 80 milliGRAM(s) Chew daily PRN    VITALS  T(F): 97.8 (02-13-25 @ 08:00), Max: 97.8 (02-13-25 @ 08:00)  HR: 88 (02-13-25 @ 11:00) (76 - 97)  BP: 154/67 (02-13-25 @ 11:00) (122/66 - 166/74)  RR: 24 (02-13-25 @ 11:00) (17 - 24)  SpO2: 99% (02-13-25 @ 11:00) (97% - 100%)  POCT Blood Glucose.: 382 mg/dL (02-13-25 @ 12:47)  POCT Blood Glucose.: 193 mg/dL (02-13-25 @ 07:45)  POCT Blood Glucose.: 219 mg/dL (02-12-25 @ 21:07)  POCT Blood Glucose.: 215 mg/dL (02-12-25 @ 18:37)  POCT Blood Glucose.: 72 mg/dL (02-12-25 @ 17:51)  POCT Blood Glucose.: 50 mg/dL (02-12-25 @ 17:27)    PHYSICAL EXAM  CONSTITUTIONAL: well developed, nontoxic appearing, in no acute distress, speaking in full sentences  SKIN: warm, dry, no rash, cap refill < 2 seconds  HEENT: normocephalic, atraumatic, no conjunctival erythema, moist mucous membranes, patent airway  NECK: supple  CV:  regular rate, regular rhythm, 2+ radial pulses bilaterally  RESP: no wheezes, no rales, no rhonchi, normal work of breathing  ABD: soft, no tenderness, nondistended, no rebound, no guarding  MSK: normal ROM, no cyanosis, no edema, LLE decreased motor strength.  NEURO: alert, oriented, grossly unremarkable  PSYCH: cooperative, appropriate    LABS             9.5    22.71 )-----------( 527      ( 02-13-25 @ 04:18 )             29.0     134  |  95  |  93  -------------------------<  225   02-13-25 @ 04:18  4.9  |  27  |  1.7    Ca      8.6     02-13-25 @ 04:18  Mg     2.4     02-13-25 @ 04:18    TPro  5.5  /  Alb  3.2  /  TBili  0.3  /  DBili  x   /  AST  29  /  ALT  30  /  AlkPhos  204  /  GGT  x     02-13-25 @ 04:18    Urinalysis Basic - ( 13 Feb 2025 04:18 )    Color: x / Appearance: x / SG: x / pH: x  Gluc: 225 mg/dL / Ketone: x  / Bili: x / Urobili: x   Blood: x / Protein: x / Nitrite: x   Leuk Esterase: x / RBC: x / WBC x   Sq Epi: x / Non Sq Epi: x / Bacteria: x    IMAGING    CXR 2/13: Low lung volumes with unchanged small bibasilar opacities.  CXR 2/12:  Support devices: Right approximately PICC. Multiple telemetry wires overlie chest.  Cardiac/mediastinum/hilum: Stable.  Lung parenchyma/Pleura: Unchanged patchy bibasilar opacities. Low lung volumes. No pneumothorax.  Skeleton/soft tissues: Stable.  CXR 2/10: No radiographic evidence of acute cardiopulmonary disease. Redemonstration bilateral opacities  CR Head 2/9: No evidence of acute transcortical infarct, acute intracranial hemorrhage, or mass effect.

## 2025-02-13 NOTE — PROGRESS NOTE ADULT - ASSESSMENT
# DM  - CC diet   - FS - self monitored via dex com --> On insulin pump @2.85 at home   - Endo consult: keep off insulin pump  - lantus 10 lispro 3 --> has been steadily increased : lantus increased from 30 to 40 and lispro from 10 to 16 on 02/08 --> s/p Humulin 7 units 1 dose  - Patient received total of 31 and Humulin Admelog 16x3  - Repeat BMP showed Gluocse of 529, mild increase in AG (12 --> 15) and Beta hydroxy < 0.2  - On 2/8 started on insulin drip (7 units /min) and upgraded to MICU, NS fluid with 20 mEq K but slower rate given HFpEF decompensation  - Off insulin drip overnight, switched to SQ insulin, monitor BS.  - 2/9 Patient transitioned off insulin drip to Basal Bolus Insulin, but missed dose. Glucose elevated and Insulin drip was restarted.   - 2/10 Insulin drip stopped. Glucose within normal range. Patient transitioned to basal bolus insulin.   - 2/10 Adjust basal bolus regimen as needed to maintain target blood glucose.     # Acute Hypoxic Respiratory Failure 2/2 Multifocal Pneumonia/  Acute on chronic diastolic CHF/  Influenza A infection/ RASHAD/ OHS   - clinically improving, on high low oxygen for now, will taper off as tolerated   - NIV PRN and QHS   - nebs Q 6 hours, supportive care, aspiration precautions   - CTA 1/20: Negative for pulmonary emboli. Interval development of infiltrates within the lower lobes and lingula which may reflect acute multilobar pneumonia  - blood cx NGTD x2, UA negative, influenza A + , Nasal MRSA negative   - pt was consulted by ID, treated with IV Abx, completed the course of Tamiflu   - TTE 1/10/25: poor study, EF 60-65%, pt has moderated TR  - fluid restriction 1200 ml in 24 hours , intake and output monitoring, daily weight   - Desaturated again while in ICU on NC, and Bipap was started  - CT chest: Worsening bilateral peribronchial opacities compatible with bronchopneumonia  - F/u cultures, legionella, procal, fungitell  - Started IV cefepime 2g BID, vanco stopped as MRSA negative  - Follow up ID  - HFNC 40/40, titrated off, now on NC 3L  - duonebs and solumedrol 40 mg daily , wheeze on exam, titrate solumedrol down   2/11 - Finish Cefepime course tomorrow 2/12/25    # 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 Tolerated well. Rituximab was given on D5   - Uric acid 10.3 on 1/16/25, s/p 3 mg IV rasburicase  - sp Zarxio  - daily CBC with diff, active T&S  - c/w Allopurinol   - No onc plans to start chemo/treatment inpatient, can dc when medically appropriate with follow-up with Dr. Hilliard who will schedule cycle 2 of treatment.     # KRISTINE on CKD 3B / Chronic Lower Extremity Edema   - baseline ~high 1s, low 2s  - c/w diuretics: bumex 2mg IV BID --> to 2mg IV TID --> 2 mg PO daily  - monitor BUN/cr and urine output  - ajay d/c'd  - avoid nephrotoxic medications   - will hold off Metolazone on 02/08 as BUN/Cr 115/2.7  - nephrology is following, recommendations noted  2/11 - Nephrology cleared patient for discharge.    # CAD s/p CABG/ Chronic Afib on Xarelto/ HTN  - c/w statin, BB  - Holding ASA  - Still holding lisinopril and aldactone from last admission  - sylvester need to switch to eliquis given CKD from xaerlto   - dc heparin ggt started eliquis 5 mg BID     # LLE weakness  CTH ordered today  PT following  2/13 - Pending rehabilitation facility placement as patient unable to ambulate without assistances/safely so unsafe to discharge home. PT following.     # HLD   - on Atorvastatin now   - resume Repatha on discharge    # Oral pain due to ulcer with black discoloration   - c/w local couth care  - consult ENT to examine black discoloration -> rec CT neck w/ IV contrast, magic mouthwash  - CT neck w/IV contrast no tongue mass.   - oncology thinking this is mucositis   - Monitor bleed, keep active TS and trend CBC.   - Will c/w magic mouthwash.   - Bleeding is resolving    # Agitation/ confusion   # Insomnia   - pt is calm now   - on Haldol PRN , Sertraline and melatonin   - supportive care     # GI  - On bowel regimen and PPIs.    #Misc  #Code Status: Full Code  #DVT ppx: Eliquis  #GI ppx: PPI  #Diet: CC  #Activity: AAT. Following with PT given LLE weakness. Pending Rehabilitation Facility placement given deconditioning and LLE weakness.   #Inpatient Dispo: DG to SDU  #Discharge Dispo: Home vs Rehab.    Follow-Up: Physical Therapy as patient with LLE weakness secondary to deconditioning from prolonged hospital stay and unstable on feet. Pending rehabilitation facility placement. Continue to monitor FS, adjust insulin regiment as needed.

## 2025-02-13 NOTE — PHARMACOTHERAPY INTERVENTION NOTE - NSPHARMCOMMASP
ASP - Lab/ test recommended
ASP - Lab/ test recommended
ASP - De-escalation
ASP - Therapy recommended/ Alternative therapy

## 2025-02-13 NOTE — PROGRESS NOTE ADULT - SUBJECTIVE AND OBJECTIVE BOX
Patient is a 70y old  Male who presents with a chief complaint of SOB (09 Feb 2025 08:37)      Over Night Events:  Patient seen and examined.   yesterday get dizzy and lost balance while getting up ?? orthostatic hypotension     ROS:  See HPI    PHYSICAL EXAM    ICU Vital Signs Last 24 Hrs  T(C): 36.4 (13 Feb 2025 04:00), Max: 36.4 (13 Feb 2025 04:00)  T(F): 97.6 (13 Feb 2025 04:00), Max: 97.6 (13 Feb 2025 04:00)  HR: 87 (13 Feb 2025 07:00) (76 - 97)  BP: 158/78 (13 Feb 2025 07:00) (100/70 - 166/74)  BP(mean): 111 (13 Feb 2025 07:00) (80 - 111)  ABP: --  ABP(mean): --  RR: 18 (13 Feb 2025 07:00) (16 - 28)  SpO2: 99% (13 Feb 2025 07:00) (94% - 100%)    O2 Parameters below as of 13 Feb 2025 07:00  Patient On (Oxygen Delivery Method): room air            General: awake   HEENT:     katlin           Lymph Nodes: NO cervical LN   Lungs: Bilateral BS  Cardiovascular: Regular   Abdomen: Soft, Positive BS  Extremities: No clubbing   Skin: warm   Neurological:  no focal   Musculoskeletal: move all ext     I&O's Detail    12 Feb 2025 07:01  -  13 Feb 2025 07:00  --------------------------------------------------------  IN:    Oral Fluid: 1140 mL  Total IN: 1140 mL    OUT:    Voided (mL): 2375 mL  Total OUT: 2375 mL    Total NET: -1235 mL          LABS:                          9.5    22.71 )-----------( 527      ( 13 Feb 2025 04:18 )             29.0         13 Feb 2025 04:18    134    |  95     |  93     ----------------------------<  225    4.9     |  27     |  1.7      Ca    8.6        13 Feb 2025 04:18  Mg     2.4       13 Feb 2025 04:18    TPro  5.5    /  Alb  3.2    /  TBili  0.3    /  DBili  x      /  AST  29     /  ALT  30     /  AlkPhos  204    13 Feb 2025 04:18  Amylase x     lipase x                                                                                        Urinalysis Basic - ( 13 Feb 2025 04:18 )    Color: x / Appearance: x / SG: x / pH: x  Gluc: 225 mg/dL / Ketone: x  / Bili: x / Urobili: x   Blood: x / Protein: x / Nitrite: x   Leuk Esterase: x / RBC: x / WBC x   Sq Epi: x / Non Sq Epi: x / Bacteria: x                                                                                                                                                     MEDICATIONS  (STANDING):  albuterol/ipratropium for Nebulization 3 milliLiter(s) Nebulizer every 6 hours  allopurinol 150 milliGRAM(s) Oral daily  apixaban 5 milliGRAM(s) Oral every 12 hours  ascorbic acid 500 milliGRAM(s) Oral daily  atorvastatin 10 milliGRAM(s) Oral at bedtime  benzocaine 20% Spray 1 Spray(s) Topical three times a day  chlorhexidine 2% Cloths 1 Application(s) Topical daily  dextrose 5%. 1000 milliLiter(s) (50 mL/Hr) IV Continuous <Continuous>  dextrose 5%. 1000 milliLiter(s) (100 mL/Hr) IV Continuous <Continuous>  dextrose 50% Injectable 25 Gram(s) IV Push once  dextrose 50% Injectable 12.5 Gram(s) IV Push once  dextrose 50% Injectable 25 Gram(s) IV Push once  FIRST- Mouthwash  BLM 10 milliLiter(s) Swish and Spit every 4 hours  glucagon  Injectable 1 milliGRAM(s) IntraMuscular once  influenza  Vaccine (HIGH DOSE) 0.5 milliLiter(s) IntraMuscular once  insulin glargine Injectable (LANTUS) 40 Unit(s) SubCutaneous at bedtime  insulin lispro (ADMELOG) corrective regimen sliding scale   SubCutaneous three times a day before meals  insulin lispro Injectable (ADMELOG) 20 Unit(s) SubCutaneous three times a day before meals  melatonin 10 milliGRAM(s) Oral at bedtime  metoprolol tartrate 25 milliGRAM(s) Oral two times a day  NIFEdipine XL 60 milliGRAM(s) Oral daily  nystatin Cream 1 Application(s) Topical every 12 hours  pantoprazole  Injectable 40 milliGRAM(s) IV Push daily  polyethylene glycol 3350 17 Gram(s) Oral <User Schedule>  predniSONE   Tablet 40 milliGRAM(s) Oral once  predniSONE   Tablet 30 milliGRAM(s) Oral daily  senna 2 Tablet(s) Oral every 12 hours  sertraline 50 milliGRAM(s) Oral daily  tamsulosin 0.4 milliGRAM(s) Oral at bedtime    MEDICATIONS  (PRN):  aluminum hydroxide/magnesium hydroxide/simethicone Suspension 30 milliLiter(s) Oral every 4 hours PRN Dyspepsia  bisacodyl 5 milliGRAM(s) Oral daily PRN Constipation  calcium carbonate    500 mG (Tums) Chewable 1 Tablet(s) Chew three times a day PRN Dyspepsia  dextrose Oral Gel 15 Gram(s) Oral once PRN Blood Glucose LESS THAN 70 milliGRAM(s)/deciliter  hydrOXYzine hydrochloride 25 milliGRAM(s) Oral every 6 hours PRN Anxiety  magnesium hydroxide Suspension 30 milliLiter(s) Oral daily PRN Constipation  ondansetron Injectable 4 milliGRAM(s) IV Push every 6 hours PRN Nausea and/or Vomiting  oxyCODONE    IR 10 milliGRAM(s) Oral every 6 hours PRN Moderate Pain (4 - 6)  simethicone 80 milliGRAM(s) Chew daily PRN Gas          Xrays:  TLC:  OG:  ET tube:                                                                                       ECHO:  [facility] ICU:

## 2025-02-13 NOTE — CONSULT NOTE ADULT - CONSULT REQUESTED DATE/TIME
06-Feb-2025 15:01
22-Jan-2025 12:59
21-Jan-2025 11:03
23-Jan-2025 13:38
03-Feb-2025 11:54
13-Feb-2025 16:32
22-Jan-2025 06:31
21-Jan-2025 20:13

## 2025-02-13 NOTE — PHARMACOTHERAPY INTERVENTION NOTE - COMMENTS
Recommended obtaining a vancomycin level for 2/9 AM to assist with further dosing recommendations.    Cory Nash, PharmD, [facility]  Clinical Pharmacy Specialist, Infectious Diseases  Tele-Antimicrobial Stewardship Program (Tele-ASP)  Tele-ASP Phone: [phone] 
Recommended to discontinue cefepime since patient completed duration of therapy as per ID. ID mentions chest XR clear.      Jayde Flores, PharmD   Clinical Pharmacy Specialist, Infectious Diseases  Tele-Antimicrobial Stewardship Program (Tele-ASP)  Tele-ASP Phone: [phone]  
Recommend obtaining an MRSA nasal PCR to assist with de-escalation of vancomycin ordered empirically for PNA.    Cory aNsh, PharmD, [facility]  Clinical Pharmacy Specialist, Infectious Diseases  Tele-Antimicrobial Stewardship Program (Tele-ASP)  Tele-ASP Phone: [phone] 
Patient is receiving vancomycin dosed by level in the setting of KRISTINE for empiric coverage and today 2/7 0420 was 16.9mg/L. Recommended administering vancomycin 750mg IV x 1 at 1400 as this predicts a therapeutic 24h AUC/BENJAMÍN of 437.50 (goal 400-600). Recommended to obtain vancomycin level tomorrow 2/8 with AM labs.
Recommend re-dosing vancomycin 1g x 1 in the setting of a vancomycin AM level of ~17mcg/mL and the patient receiving vancomycin dose by levels in setting of KRISTINE and empiric treatment for PNA.      Cory Nash, PharmD, [facility]  Clinical Pharmacy Specialist, Infectious Diseases  Tele-Antimicrobial Stewardship Program (Tele-ASP)  Tele-ASP Phone: [phone] 
Consistent with ID's note, recommended to discontinue cefepime.    Evelia Cheng, PharmD  Clinical Pharmacy Specialist, Infectious Diseases  Tele-Antimicrobial Stewardship Program (Tele-ASP)  Tele-ASP Phone: [phone] 
Patient received 1 dose of vancomycin 2000mg IV q24h for empiric coverage. Per [employer], his current regimen predicts a supratherapeutic steady state AUC/BENJAMÍN of 1025 mg/L*hr (goal 400-600). Recommended to d/c standing vancomycin order and obtain a vancomycin level tomorrow 2/7 AM due to fluctuating renal function/KRISTINE/CKD.
Recommended discontinuing vancomycin dose by levels in the setting of a negative MRSA nasal PCR in accordance with ID consult recommendations.    Cory Nash, PharmD, [facility]P  Clinical Pharmacy Specialist, Infectious Diseases  Tele-Antimicrobial Stewardship Program (Tele-ASP)  Tele-ASP Phone: [phone]

## 2025-02-13 NOTE — PROGRESS NOTE ADULT - ASSESSMENT
IMPRESSION:    Acute hypoxemic respiratory failure  Possible PNA   Volume overload improved   KRISTINE on CKD   HFpEF  Large B cell lymphoma  Afib on Xarelto - on IV heparin now   Hx of HTN and DM      PLAN:    CNS: Pain control, avoid depressants. MS remains adequate.  CTH given subacute focal neuro deficit.    HEENT: Oral care    PULMONARY:  HOB @ 45 degrees.  Aspiration precautions.  Wean FiO2 as tolerated.  keep O2 sat 92-96%.  BIPAP during sleep and PRN during the day. Wean down to 40/40.   CT chest NC noted with bibasilar consolidations.  f/u MRSA nares    prednisone 20 mg daily and taper   CARDIOVASCULAR:  Keep even balance for now. hold Bumex today reasses valentin to resume was on bumex 2 check orthostatic BP   . Hold metolazone. Continue rate control.     GI: GI prophylaxis protonix, diet per speech and swallow.    RENAL:  Follow up lytes. Correct as needed.  Monitor UO   recall renal for d/c plan   INFECTIOUS DISEASE:  today last day  cefepime   HEMATOLOGICAL:  on Eliquis.  Monitor CBC and coags.       ENDOCRINE:  Follow up FS.  on lantus received 40 over night follow endo     target -180.    MUSCULOSKELETAL: Out of bed to chair. PT OT     Dispo: floor   PT / rehab for d/c plan   check pox on RA rest and exertion   case discussed with daughter and patient

## 2025-02-13 NOTE — CONSULT NOTE ADULT - ASSESSMENT
IMPRESSION: Rehab of debilitation / Acute Hypoxic Respiratory Failure 2/2 Multifocal Pneumonia/  Acute on chronic diastolic CHF/  Influenza A infection / CAD (status post CABG;), DM (on insulin pump), atrial fibrillation     PRECAUTIONS: [   ] Cardiac  [   ] Respiratory  [   ] Seizures [   ] Contact Isolation  [   ] Droplet Isolation  [   ] Other    Weight Bearing Status:     RECOMMENDATION:    Out of Bed to Chair     DVT/Decubiti Prophylaxis    REHAB PLAN:     [  x  ] Bedside P/T 3-5 times a week   [    ]   Bedside O/T  2-3 times a week             [    ] Speech Therapy               [    ]  No Rehab Therapy Indicated   Conditioning/ROM                                    ADL  Bed Mobility                                               Conditioning/ROM  Transfers                                                     Bed Mobility  Sitting /Standing Balance                         Transfers                                        Gait Training                                               Sitting/Standing Balance  Stair Training [   ]Applicable                    Home equipment Eval                                                                        Splinting  [   ] Only      GOALS:   ADL   [    ]   Independent                    Transfers  [  x  ] Independent                          Ambulation  [x    ] Independent     [  x   ] With device                            [    ]  CG                                                         [    ]  CG                                                                  [    ] CG                            [    ] Min A                                                   [    ] Min A                                                              [    ] Min  A          DISCHARGE PLAN:   [    ]  Good candidate for Intensive Rehabilitation/Hospital based                                             Will tolerate 3hrs Intensive Rehab Daily                                       [   x  ]  Short Term Rehab in Skilled Nursing Facility                                       [     ]  Home with Outpatient or  services                                         [     ]  Possible Candidate for Intensive Hospital based Rehab

## 2025-02-13 NOTE — CONSULT NOTE ADULT - CONSULT REASON
acute hypoxemic respiratory failure
Black lesions on the base of tongue
Uncontrolled DM, has insulin pump
deconditioning
KRISTINE  Hypervolemic Hyponatremia
Neutropenic fever
Influenza/Pneumonia
ARF

## 2025-02-13 NOTE — CONSULT NOTE ADULT - SUBJECTIVE AND OBJECTIVE BOX
HPI:  69-year-old man with extensive medical history including CAD (status post CABG;), DM (on insulin pump), atrial fibrillation on Xarelto, history of distal pancreatomy and splenectomy for unclear reason referred to the ED by his oncologist for abnormal labs. Patient was recently diagnosed with Diffuse large B cell Lymphoma on pathology of right groin mass and is following with [facility] oncology team. Patient was discharged on 01.17.2025 from hospital after inpatient chemotherapy (Completed cycle 1 of R-EPOCH (D1 on 1/11/2024). Tolerated well. Rituximab was given on D5).   Over past 3 days patient started to get a productive cough with yellow sputum production.  Patient is also endorsing chills and nausea which he states is baseline. He had 1 reading on low grade fever 100.5 on day of presentation.      ED course:  O2 sats low on 4L O2 so he was promptly started on BIPAP. CXR with LLL infiltrate; sepsis labs sent with gentle hydration; abx given, Labs with neutropenia and elevated BNP, trop 68. EKG nonischemic.    CTA Chest: Negative for pulmonary emboli. Interval development of infiltrates within the lower lobes and lingula which may reflect acute multilobar pneumonia.    Patient admitted to medicine for acute hypoxic respiratory failure.     Acute Hypoxic Respiratory Failure 2/2 Multifocal Pneumonia/  Acute on chronic diastolic CHF/  Influenza A infection/ RASHAD/ O[facility]   - clinically improving, on high low oxygen for now, will taper off as tolerated   - NIV PRN and QHS   - nebs Q 6 hours, supportive care, aspiration precautions   - CTA 1/20: Negative for pulmonary emboli. Interval development of infiltrates within the lower lobes and lingula which may reflect acute multilobar pneumonia  - blood cx NGTD x2, UA negative, influenza A + , Nasal MRSA negative   - pt was consulted by ID, treated with IV Abx, completed the course of Tamiflu   - TTE 1/10/25: poor study, EF 60-65%, pt has moderated TR  - fluid restriction 1200 ml in 24 hours , intake and output monitoring, daily weight   - Desaturated again while in ICU on NC, and Bipap was started  - CT chest: Worsening bilateral peribronchial opacities compatible with bronchopneumonia  - F/u cultures, legionella, procal, fungitell  - Started IV cefepime 2g BID, vanco stopped as MRSA negative  - Follow up ID  - HFNC 40/40, titrated off, now on NC 3L  - duonebs and solumedrol 40 mg daily , wheeze on exam, titrate solumedrol down   2/11 - Finish Cefepime course tomorrow 2/12/25      PAST MEDICAL & SURGICAL HISTORY:  Diabetes mellitus, type 2      BPH (benign prostatic hyperplasia)      Water retention      Essential hypertension      Diabetes      CAD (coronary artery disease)      H/O hypercholesterolemia      Morbid obesity      Chronic kidney disease (CKD)      History of atrial fibrillation      H/O right coronary artery stent placement      History of resection of pancreas      History of cholecystectomy      History of left knee replacement      S/P CABG x 6      H/O cardiac radiofrequency ablation          Hospital Course:    TODAY'S SUBJECTIVE & REVIEW OF SYMPTOMS:     Constitutional WNL   Cardio WNL   Resp sob    GI WNL  Heme WNL  Endo WNL  Skin WNL  MSK WNL  Neuro WNL  Cognitive WNL  Psych WNL      MEDICATIONS  (STANDING):  albuterol/ipratropium for Nebulization 3 milliLiter(s) Nebulizer every 6 hours  allopurinol 150 milliGRAM(s) Oral daily  apixaban 5 milliGRAM(s) Oral every 12 hours  ascorbic acid 500 milliGRAM(s) Oral daily  atorvastatin 10 milliGRAM(s) Oral at bedtime  benzocaine 20% Spray 1 Spray(s) Topical three times a day  chlorhexidine 2% Cloths 1 Application(s) Topical daily  dextrose 5%. 1000 milliLiter(s) (50 mL/Hr) IV Continuous <Continuous>  dextrose 5%. 1000 milliLiter(s) (100 mL/Hr) IV Continuous <Continuous>  dextrose 50% Injectable 25 Gram(s) IV Push once  dextrose 50% Injectable 12.5 Gram(s) IV Push once  dextrose 50% Injectable 25 Gram(s) IV Push once  FIRST- Mouthwash  BLM 10 milliLiter(s) Swish and Spit every 4 hours  glucagon  Injectable 1 milliGRAM(s) IntraMuscular once  influenza  Vaccine (HIGH DOSE) 0.5 milliLiter(s) IntraMuscular once  insulin glargine Injectable (LANTUS) 40 Unit(s) SubCutaneous at bedtime  insulin lispro (ADMELOG) corrective regimen sliding scale   SubCutaneous three times a day before meals  insulin lispro Injectable (ADMELOG) 20 Unit(s) SubCutaneous three times a day before meals  melatonin 10 milliGRAM(s) Oral at bedtime  metoprolol tartrate 25 milliGRAM(s) Oral two times a day  NIFEdipine XL 60 milliGRAM(s) Oral daily  nystatin Cream 1 Application(s) Topical every 12 hours  pantoprazole  Injectable 40 milliGRAM(s) IV Push daily  polyethylene glycol 3350 17 Gram(s) Oral <User Schedule>  senna 2 Tablet(s) Oral every 12 hours  sertraline 50 milliGRAM(s) Oral daily  tamsulosin 0.4 milliGRAM(s) Oral at bedtime    MEDICATIONS  (PRN):  aluminum hydroxide/magnesium hydroxide/simethicone Suspension 30 milliLiter(s) Oral every 4 hours PRN Dyspepsia  bisacodyl 5 milliGRAM(s) Oral daily PRN Constipation  calcium carbonate    500 mG (Tums) Chewable 1 Tablet(s) Chew three times a day PRN Dyspepsia  dextrose Oral Gel 15 Gram(s) Oral once PRN Blood Glucose LESS THAN 70 milliGRAM(s)/deciliter  HYDROmorphone  Injectable 0.5 milliGRAM(s) IV Push every 6 hours PRN Severe Pain (7 - 10)  hydrOXYzine hydrochloride 25 milliGRAM(s) Oral every 6 hours PRN Anxiety  magnesium hydroxide Suspension 30 milliLiter(s) Oral daily PRN Constipation  ondansetron Injectable 4 milliGRAM(s) IV Push every 6 hours PRN Nausea and/or Vomiting  oxyCODONE    IR 10 milliGRAM(s) Oral every 6 hours PRN Moderate Pain (4 - 6)  simethicone 80 milliGRAM(s) Chew daily PRN Gas      FAMILY HISTORY:  Family history of lung cancer (Sibling)    Family history of diabetes mellitus (Mother)    Family history of coronary artery disease (Father)        Allergies    No Known Allergies    Intolerances        SOCIAL HISTORY:    [  ] Etoh  [  ] Smoking  [  ] Substance abuse     Home Environment:  [ x  ] Home Alone  [   ] Lives with Family  [   ] Home Health Aid    Dwelling:  [   ] Apartment  [ x  ] Private House  [   ] Adult Home  [   ] Skilled Nursing Facility      [   ] Short Term  [   ] Long Term  [  x ] Stairs       Elevator [   ]    FUNCTIONAL STATUS PTA: (Check all that apply)  Ambulation: [  x  ]Independent    [   ] Dependent     [   ] Non-Ambulatory  Assistive Device: [   ] SA Cane  [   ]  Q Cane  [   ] Walker  [   ]  Wheelchair  ADL : [x   ] Independent  [    ]  Dependent       Vital Signs Last 24 Hrs  T(C): 36.7 (13 Feb 2025 12:00), Max: 36.7 (13 Feb 2025 12:00)  T(F): 98 (13 Feb 2025 12:00), Max: 98 (13 Feb 2025 12:00)  HR: 99 (13 Feb 2025 14:00) (76 - 100)  BP: 168/74 (13 Feb 2025 14:00) (124/59 - 179/81)  BP(mean): 106 (13 Feb 2025 14:00) (85 - 116)  RR: 21 (13 Feb 2025 14:00) (17 - 24)  SpO2: 100% (13 Feb 2025 14:00) (97% - 100%)    Parameters below as of 13 Feb 2025 10:00  Patient On (Oxygen Delivery Method): room air          PHYSICAL EXAM: Awake & Alert  GENERAL: NAD  HEAD:  Normocephalic  CHEST/LUNG: Clear   HEART: S1S2+  ABDOMEN: Soft, Nontender  EXTREMITIES:  no calf tenderness    NERVOUS SYSTEM:  Cranial Nerves 2-12 intact [   ] Abnormal  [   ]  ROM: WFL all extremities [ x  ]  Abnormal [   ]  Motor Strength: WFL all extremities  [ x  ]  Abnormal [   ]  Sensation: intact to light touch [  x ] Abnormal [   ]    FUNCTIONAL STATUS:  Bed Mobility: Independent [   ]  Supervision [   ]  Needs Assistance [ x  ]  N/A [   ]  Transfers: Independent [   ]  Supervision [   ]  Needs Assistance [ x  ]  N/A [   ]   Ambulation: Independent [   ]  Supervision [   ]  Needs Assistance [   ]  N/A [   ]  ADL: Independent [   ] Requires Assistance [   ] N/A [   ]      LABS:                        9.5    22.71 )-----------( 527      ( 13 Feb 2025 04:18 )             29.0     02-13    134[L]  |  95[L]  |  93[HH]  ----------------------------<  225[H]  4.9   |  27  |  1.7[H]    Ca    8.6      13 Feb 2025 04:18  Mg     2.4     02-13    TPro  5.5[L]  /  Alb  3.2[L]  /  TBili  0.3  /  DBili  x   /  AST  29  /  ALT  30  /  AlkPhos  204[H]  02-13      Urinalysis Basic - ( 13 Feb 2025 04:18 )    Color: x / Appearance: x / SG: x / pH: x  Gluc: 225 mg/dL / Ketone: x  / Bili: x / Urobili: x   Blood: x / Protein: x / Nitrite: x   Leuk Esterase: x / RBC: x / WBC x   Sq Epi: x / Non Sq Epi: x / Bacteria: x        RADIOLOGY & ADDITIONAL STUDIES:

## 2025-02-14 LAB
ALBUMIN SERPL ELPH-MCNC: 3.5 G/DL — SIGNIFICANT CHANGE UP (ref 3.5–5.2)
ALP SERPL-CCNC: 228 U/L — HIGH (ref 30–115)
ALT FLD-CCNC: 32 U/L — SIGNIFICANT CHANGE UP (ref 0–41)
ANION GAP SERPL CALC-SCNC: 18 MMOL/L — HIGH (ref 7–14)
AST SERPL-CCNC: 36 U/L — SIGNIFICANT CHANGE UP (ref 0–41)
BASOPHILS # BLD AUTO: 0.25 K/UL — HIGH (ref 0–0.2)
BASOPHILS NFR BLD AUTO: 0.8 % — SIGNIFICANT CHANGE UP (ref 0–1)
BILIRUB SERPL-MCNC: 0.3 MG/DL — SIGNIFICANT CHANGE UP (ref 0.2–1.2)
BUN SERPL-MCNC: 82 MG/DL — CRITICAL HIGH (ref 10–20)
CALCIUM SERPL-MCNC: 9.2 MG/DL — SIGNIFICANT CHANGE UP (ref 8.4–10.5)
CHLORIDE SERPL-SCNC: 101 MMOL/L — SIGNIFICANT CHANGE UP (ref 98–110)
CO2 SERPL-SCNC: 21 MMOL/L — SIGNIFICANT CHANGE UP (ref 17–32)
CREAT SERPL-MCNC: 1.3 MG/DL — SIGNIFICANT CHANGE UP (ref 0.7–1.5)
EGFR: 60 ML/MIN/1.73M2 — SIGNIFICANT CHANGE UP
EOSINOPHIL # BLD AUTO: 1.05 K/UL — HIGH (ref 0–0.7)
EOSINOPHIL NFR BLD AUTO: 3.4 % — SIGNIFICANT CHANGE UP (ref 0–8)
GLUCOSE BLDC GLUCOMTR-MCNC: 155 MG/DL — HIGH (ref 70–99)
GLUCOSE BLDC GLUCOMTR-MCNC: 187 MG/DL — HIGH (ref 70–99)
GLUCOSE BLDC GLUCOMTR-MCNC: 197 MG/DL — HIGH (ref 70–99)
GLUCOSE BLDC GLUCOMTR-MCNC: 261 MG/DL — HIGH (ref 70–99)
GLUCOSE SERPL-MCNC: 95 MG/DL — SIGNIFICANT CHANGE UP (ref 70–99)
HCT VFR BLD CALC: 29.9 % — LOW (ref 42–52)
HGB BLD-MCNC: 9.6 G/DL — LOW (ref 14–18)
IMM GRANULOCYTES NFR BLD AUTO: 9.6 % — HIGH (ref 0.1–0.3)
LYMPHOCYTES # BLD AUTO: 2.49 K/UL — SIGNIFICANT CHANGE UP (ref 1.2–3.4)
LYMPHOCYTES # BLD AUTO: 8.1 % — LOW (ref 20.5–51.1)
MAGNESIUM SERPL-MCNC: 2.7 MG/DL — HIGH (ref 1.8–2.4)
MCHC RBC-ENTMCNC: 30.2 PG — SIGNIFICANT CHANGE UP (ref 27–31)
MCHC RBC-ENTMCNC: 32.1 G/DL — SIGNIFICANT CHANGE UP (ref 32–37)
MCV RBC AUTO: 94 FL — SIGNIFICANT CHANGE UP (ref 80–94)
MONOCYTES # BLD AUTO: 3.56 K/UL — HIGH (ref 0.1–0.6)
MONOCYTES NFR BLD AUTO: 11.6 % — HIGH (ref 1.7–9.3)
NEUTROPHILS # BLD AUTO: 20.37 K/UL — HIGH (ref 1.4–6.5)
NEUTROPHILS NFR BLD AUTO: 66.5 % — SIGNIFICANT CHANGE UP (ref 42.2–75.2)
NRBC BLD AUTO-RTO: 2 /100 WBCS — HIGH (ref 0–0)
PLATELET # BLD AUTO: 563 K/UL — HIGH (ref 130–400)
PMV BLD: 11.6 FL — HIGH (ref 7.4–10.4)
POTASSIUM SERPL-MCNC: 5.2 MMOL/L — HIGH (ref 3.5–5)
POTASSIUM SERPL-SCNC: 5.2 MMOL/L — HIGH (ref 3.5–5)
PROT SERPL-MCNC: 6.1 G/DL — SIGNIFICANT CHANGE UP (ref 6–8)
RBC # BLD: 3.18 M/UL — LOW (ref 4.7–6.1)
RBC # FLD: 15.9 % — HIGH (ref 11.5–14.5)
SODIUM SERPL-SCNC: 140 MMOL/L — SIGNIFICANT CHANGE UP (ref 135–146)
WBC # BLD: 30.66 K/UL — HIGH (ref 4.8–10.8)
WBC # FLD AUTO: 30.66 K/UL — HIGH (ref 4.8–10.8)

## 2025-02-14 PROCEDURE — 71045 X-RAY EXAM CHEST 1 VIEW: CPT | Mod: 26

## 2025-02-14 PROCEDURE — 99232 SBSQ HOSP IP/OBS MODERATE 35: CPT

## 2025-02-14 RX ORDER — INSULIN LISPRO 100 U/ML
28 INJECTION, SOLUTION INTRAVENOUS; SUBCUTANEOUS
Refills: 0 | Status: DISCONTINUED | OUTPATIENT
Start: 2025-02-14 | End: 2025-02-15

## 2025-02-14 RX ADMIN — ATORVASTATIN CALCIUM 10 MILLIGRAM(S): 80 TABLET, FILM COATED ORAL at 21:38

## 2025-02-14 RX ADMIN — Medication 10 MILLIGRAM(S): at 21:38

## 2025-02-14 RX ADMIN — INSULIN LISPRO 2: 100 INJECTION, SOLUTION INTRAVENOUS; SUBCUTANEOUS at 08:29

## 2025-02-14 RX ADMIN — Medication 500 MILLIGRAM(S): at 12:20

## 2025-02-14 RX ADMIN — IPRATROPIUM BROMIDE AND ALBUTEROL SULFATE 3 MILLILITER(S): .5; 2.5 SOLUTION RESPIRATORY (INHALATION) at 19:39

## 2025-02-14 RX ADMIN — INSULIN LISPRO 24 UNIT(S): 100 INJECTION, SOLUTION INTRAVENOUS; SUBCUTANEOUS at 12:17

## 2025-02-14 RX ADMIN — SERTRALINE 50 MILLIGRAM(S): 100 TABLET, FILM COATED ORAL at 12:19

## 2025-02-14 RX ADMIN — DIPHENHYDRAMINE HYDROCHLORIDE AND LIDOCAINE HYDROCHLORIDE AND ALUMINUM HYDROXIDE AND MAGNESIUM HYDRO 10 MILLILITER(S): KIT at 06:16

## 2025-02-14 RX ADMIN — BENZOCAINE 1 SPRAY(S): 220 SPRAY, METERED PERIODONTAL at 21:36

## 2025-02-14 RX ADMIN — Medication 1 APPLICATION(S): at 12:21

## 2025-02-14 RX ADMIN — POLYETHYLENE GLYCOL 3350 17 GRAM(S): 17 POWDER, FOR SOLUTION ORAL at 21:37

## 2025-02-14 RX ADMIN — DIPHENHYDRAMINE HYDROCHLORIDE AND LIDOCAINE HYDROCHLORIDE AND ALUMINUM HYDROXIDE AND MAGNESIUM HYDRO 10 MILLILITER(S): KIT at 17:21

## 2025-02-14 RX ADMIN — INSULIN LISPRO 2: 100 INJECTION, SOLUTION INTRAVENOUS; SUBCUTANEOUS at 17:21

## 2025-02-14 RX ADMIN — BENZOCAINE 1 SPRAY(S): 220 SPRAY, METERED PERIODONTAL at 06:16

## 2025-02-14 RX ADMIN — APIXABAN 5 MILLIGRAM(S): 2.5 TABLET, FILM COATED ORAL at 08:30

## 2025-02-14 RX ADMIN — Medication 0.5 MILLIGRAM(S): at 23:40

## 2025-02-14 RX ADMIN — DIPHENHYDRAMINE HYDROCHLORIDE AND LIDOCAINE HYDROCHLORIDE AND ALUMINUM HYDROXIDE AND MAGNESIUM HYDRO 10 MILLILITER(S): KIT at 21:35

## 2025-02-14 RX ADMIN — DIPHENHYDRAMINE HYDROCHLORIDE AND LIDOCAINE HYDROCHLORIDE AND ALUMINUM HYDROXIDE AND MAGNESIUM HYDRO 10 MILLILITER(S): KIT at 12:23

## 2025-02-14 RX ADMIN — INSULIN LISPRO 6: 100 INJECTION, SOLUTION INTRAVENOUS; SUBCUTANEOUS at 12:17

## 2025-02-14 RX ADMIN — TAMSULOSIN HYDROCHLORIDE 0.4 MILLIGRAM(S): 0.4 CAPSULE ORAL at 21:38

## 2025-02-14 RX ADMIN — METOPROLOL SUCCINATE 25 MILLIGRAM(S): 50 TABLET, EXTENDED RELEASE ORAL at 17:21

## 2025-02-14 RX ADMIN — PREDNISONE 20 MILLIGRAM(S): 20 TABLET ORAL at 06:15

## 2025-02-14 RX ADMIN — INSULIN LISPRO 24 UNIT(S): 100 INJECTION, SOLUTION INTRAVENOUS; SUBCUTANEOUS at 08:29

## 2025-02-14 RX ADMIN — IPRATROPIUM BROMIDE AND ALBUTEROL SULFATE 3 MILLILITER(S): .5; 2.5 SOLUTION RESPIRATORY (INHALATION) at 15:50

## 2025-02-14 RX ADMIN — INSULIN GLARGINE-YFGN 40 UNIT(S): 100 INJECTION, SOLUTION SUBCUTANEOUS at 21:36

## 2025-02-14 RX ADMIN — Medication 60 MILLIGRAM(S): at 06:15

## 2025-02-14 RX ADMIN — NYSTATIN 1 APPLICATION(S): 100000 CREAM TOPICAL at 17:26

## 2025-02-14 RX ADMIN — Medication 150 MILLIGRAM(S): at 12:20

## 2025-02-14 RX ADMIN — BENZOCAINE 1 SPRAY(S): 220 SPRAY, METERED PERIODONTAL at 17:20

## 2025-02-14 RX ADMIN — APIXABAN 5 MILLIGRAM(S): 2.5 TABLET, FILM COATED ORAL at 21:36

## 2025-02-14 RX ADMIN — INSULIN LISPRO 28 UNIT(S): 100 INJECTION, SOLUTION INTRAVENOUS; SUBCUTANEOUS at 17:22

## 2025-02-14 RX ADMIN — POLYETHYLENE GLYCOL 3350 17 GRAM(S): 17 POWDER, FOR SOLUTION ORAL at 12:24

## 2025-02-14 RX ADMIN — Medication 2 TABLET(S): at 17:21

## 2025-02-14 RX ADMIN — METOPROLOL SUCCINATE 25 MILLIGRAM(S): 50 TABLET, EXTENDED RELEASE ORAL at 06:15

## 2025-02-14 RX ADMIN — Medication 40 MILLIGRAM(S): at 12:16

## 2025-02-14 NOTE — PROGRESS NOTE ADULT - SUBJECTIVE AND OBJECTIVE BOX
MORGAN BUSH  70y, Male  Allergy: No Known Allergies      LOS  25d    CHIEF COMPLAINT: Sepsis (14 Feb 2025 11:32)      INTERVAL EVENTS/HPI  - No acute events overnight  - T(F): , Max: 98 (02-14-25 @ 19:55)  - Denies any worsening symptoms  - Tolerating medication  - WBC Count: 30.66 (02-14-25 @ 06:49)  WBC Count: 22.71 (02-13-25 @ 04:18)     - Creatinine: 1.3 (02-14-25 @ 06:49)  Creatinine: 1.7 (02-13-25 @ 04:18)       ROS  General: Denies rigors, nightsweats  HEENT: Denies headache, rhinorrhea, sore throat, eye pain  CV: Denies CP, palpitations  PULM: Denies wheezing, hemoptysis  GI: Denies hematemesis, hematochezia, melena  : Denies discharge, hematuria  MSK: Denies arthralgias, myalgias  SKIN: Denies rash, lesions  NEURO: Denies paresthesias, weakness  PSYCH: Denies depression, anxiety    VITALS:  T(F): 98, Max: 98 (02-14-25 @ 19:55)  HR: 86  BP: 134/60  RR: 18Vital Signs Last 24 Hrs  T(C): 36.7 (14 Feb 2025 19:55), Max: 36.7 (14 Feb 2025 19:55)  T(F): 98 (14 Feb 2025 19:55), Max: 98 (14 Feb 2025 19:55)  HR: 86 (14 Feb 2025 19:55) (84 - 98)  BP: 134/60 (14 Feb 2025 19:55) (116/64 - 134/60)  BP(mean): --  RR: 18 (14 Feb 2025 19:55) (18 - 18)  SpO2: 100% (14 Feb 2025 14:26) (99% - 100%)    Parameters below as of 14 Feb 2025 19:55  Patient On (Oxygen Delivery Method): room air        PHYSICAL EXAM:  Gen: NAD, resting in bed  HEENT: Normocephalic, atraumatic  Neck: supple, no lymphadenopathy  CV: Regular rate & regular rhythm  Lungs: decreased BS at bases, no fremitus  Abdomen: Soft, BS present  Ext: Warm, well perfused  Neuro: non focal, awake  Skin: no rash, no erythema  Lines: no phlebitis    FH: Non-contributory  Social Hx: Non-contributory    TESTS & MEASUREMENTS:                        9.6    30.66 )-----------( 563      ( 14 Feb 2025 06:49 )             29.9     02-14    140  |  101  |  82[HH]  ----------------------------<  95  5.2[H]   |  21  |  1.3    Ca    9.2      14 Feb 2025 06:49  Mg     2.7     02-14    TPro  6.1  /  Alb  3.5  /  TBili  0.3  /  DBili  x   /  AST  36  /  ALT  32  /  AlkPhos  228[H]  02-14      LIVER FUNCTIONS - ( 14 Feb 2025 06:49 )  Alb: 3.5 g/dL / Pro: 6.1 g/dL / ALK PHOS: 228 U/L / ALT: 32 U/L / AST: 36 U/L / GGT: x           Urinalysis Basic - ( 14 Feb 2025 06:49 )    Color: x / Appearance: x / SG: x / pH: x  Gluc: 95 mg/dL / Ketone: x  / Bili: x / Urobili: x   Blood: x / Protein: x / Nitrite: x   Leuk Esterase: x / RBC: x / WBC x   Sq Epi: x / Non Sq Epi: x / Bacteria: x        Culture - Blood (collected 02-06-25 @ 12:30)  Source: .Blood BLOOD  Final Report (02-11-25 @ 22:00):    No growth at 5 days    Culture - Blood (collected 01-27-25 @ 06:12)  Source: .Blood BLOOD  Final Report (02-01-25 @ 14:00):    No growth at 5 days    Urinalysis with Rflx Culture (collected 01-20-25 @ 19:07)    Culture - Blood (collected 01-20-25 @ 15:38)  Source: .Blood BLOOD  Final Report (01-25-25 @ 23:07):    No growth at 5 days    Culture - Blood (collected 01-20-25 @ 15:38)  Source: .Blood BLOOD  Final Report (01-25-25 @ 23:07):    No growth at 5 days            INFECTIOUS DISEASES TESTING  Fungitell: <31 (02-08-25 @ 05:59)  Aspergillus Galactomannan Antigen: 0.05 (02-08-25 @ 05:59)  MRSA PCR Result.: Negative (02-07-25 @ 10:58)  Fungitell: 37 (02-06-25 @ 18:06)  Procalcitonin: 0.31 (02-06-25 @ 12:40)  Procalcitonin: 1.27 (01-30-25 @ 17:00)  Fungitell: <31 (01-28-25 @ 05:00)  Aspergillus Galactomannan Antigen: 0.04 (01-28-25 @ 05:00)  Procalcitonin: 4.48 (01-27-25 @ 06:12)  MRSA PCR Result.: Negative (01-25-25 @ 07:28)  Procalcitonin: 29.80 (01-24-25 @ 11:30)  MRSA PCR Result.: Negative (01-24-25 @ 10:45)  Legionella Antigen, Urine: Negative (01-23-25 @ 12:10)  Rapid RVP Result: NotDetec (01-13-25 @ 13:24)      INFLAMMATORY MARKERS  Sedimentation Rate, Erythrocyte: >140 mm/Hr (01-30-25 @ 17:00)  C-Reactive Protein: 87.8 mg/L (01-30-25 @ 17:00)      RADIOLOGY & ADDITIONAL TESTS:  I have personally reviewed the last available Chest xray  CXR      CT      CARDIOLOGY TESTING  12 Lead ECG:   Ventricular Rate 88 BPM    Atrial Rate 88 BPM    P-R Interval 216 ms    QRS Duration 110 ms    Q-T Interval 386 ms    QTC Calculation(Bazett) 467 ms    P Axis 40 degrees    R Axis 12 degrees    T Axis 12 degrees    Diagnosis Line Sinus rhythm with 1st degree A-V block  Otherwise normal ECG    Confirmed by Mahesh Coreas ([id number]) on 2/6/2025 8:51:58 AM (02-06-25 @ 08:12)  12 Lead ECG:   Ventricular Rate 79 BPM    Atrial Rate 79 BPM    P-R Interval 198 ms    QRS Duration 100 ms    Q-T Interval 412 ms    QTC Calculation(Bazett) 472 ms    P Axis 37 degrees    R Axis 12 degrees    T Axis -10 degrees    Diagnosis Line Normal sinus rhythm  Minimal voltage criteria for LVH, may be normal variant ( R in aVL )  Inferior infarct , age undetermined  Cannot rule out Anterior infarct , age undetermined  Abnormal ECG    Confirmed by Mahesh Coreas ([id number]) on 2/5/2025 6:27:30 PM (02-05-25 @ 07:56)      MEDICATIONS  albuterol/ipratropium for Nebulization 3 Nebulizer every 6 hours  allopurinol 150 Oral daily  apixaban 5 Oral every 12 hours  ascorbic acid 500 Oral daily  atorvastatin 10 Oral at bedtime  benzocaine 20% Spray 1 Topical three times a day  chlorhexidine 2% Cloths 1 Topical daily  dextrose 5%. 1000 IV Continuous <Continuous>  dextrose 5%. 1000 IV Continuous <Continuous>  dextrose 50% Injectable 25 IV Push once  dextrose 50% Injectable 12.5 IV Push once  dextrose 50% Injectable 25 IV Push once  FIRST- Mouthwash  BLM 10 Swish and Spit every 4 hours  glucagon  Injectable 1 IntraMuscular once  influenza  Vaccine (HIGH DOSE) 0.5 IntraMuscular once  insulin glargine Injectable (LANTUS) 40 SubCutaneous at bedtime  insulin lispro (ADMELOG) corrective regimen sliding scale  SubCutaneous three times a day before meals  insulin lispro Injectable (ADMELOG) 28 SubCutaneous three times a day before meals  melatonin 10 Oral at bedtime  metoprolol tartrate 25 Oral two times a day  NIFEdipine XL 60 Oral daily  nystatin Cream 1 Topical every 12 hours  pantoprazole  Injectable 40 IV Push daily  polyethylene glycol 3350 17 Oral <User Schedule>  predniSONE   Tablet 20 Oral daily  senna 2 Oral every 12 hours  sertraline 50 Oral daily  tamsulosin 0.4 Oral at bedtime      WEIGHT  Weight (kg): 132.9 (01-27-25 @ 11:20)  Creatinine: 1.3 mg/dL (02-14-25 @ 06:49)      ANTIBIOTICS:      All available historical records have been reviewed

## 2025-02-14 NOTE — DISCHARGE NOTE PROVIDER - HOSPITAL COURSE
This 69-year-old male with CAD s/p CABG, DM, atrial fibrillation, and recent chemotherapy (R-EPOCH) for diffuse large B-cell lymphoma (DLBCL) presented with acute hypoxic respiratory failure secondary to multifocal pneumonia, influenza A, and neutropenic fever.  He was initially admitted to a step-down unit (SDU) but decompensated and was **upgraded to the ICU** on 1/27, requiring BiPAP and antibiotics (Tamiflu, azithromycin, cefepime). After clinical improvement, he was **downgraded back to the SDU**, where he was weaned to nasal cannula.  During his SDU stay, ENT was consulted for tongue lesions, and concern was raised for possible Rodriguez transformation of his DLBCL. Subsequently, during a stay on a general medical floor (3B), he experienced a second episode of respiratory decompensation, requiring BiPAP and additional antibiotics (cefepime, vancomycin) and prompting **upgrade back to the SDU**. He developed hyperglycemia requiring insulin, and was then **upgraded to the CCU** for uncontrolled blood sugars despite steroid initiation.  In the CCU, his insulin was transitioned from a drip back to subcutaneous injections. He also reports lower extremity weakness and constipation, for which a CT of the head and bowel regimen adjustments were ordered.    # Acute Hypoxic Respiratory Failure 2/2 Multifocal Pneumonia/  Acute on chronic diastolic CHF/  Influenza A infection/ RASHAD/ OHS   - clinically improving, on high low oxygen for now, will taper off as tolerated   - NIV PRN and QHS   - nebs Q 6 hours, supportive care, aspiration precautions   - CTA 1/20: Negative for pulmonary emboli. Interval development of infiltrates within the lower lobes and lingula which may reflect acute multilobar pneumonia  - blood cx NGTD x2, UA negative, influenza A + , Nasal MRSA negative   - pt was consulted by ID, treated with IV Abx, completed the course of Tamiflu   - TTE 1/10/25: poor study, EF 60-65%, pt has moderated TR  - fluid restriction 1200 ml in 24 hours , intake and output monitoring, daily weight   - Desaturated again while in ICU on NC, and Bipap was started  - CT chest: Worsening bilateral peribronchial opacities compatible with bronchopneumonia  - F/u cultures, legionella, procal, fungitell  - Started IV cefepime 2g BID, vanco stopped as MRSA negative  - Follow up ID  - HFNC 40/40, titrated off, now on NC 3L  - duonebs and solumedrol 40 mg daily , wheeze on exam, titrate solumedrol down   2/11 - Finish Cefepime course tomorrow 2/12/25    # 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 Tolerated well. Rituximab was given on D5   - Uric acid 10.3 on 1/16/25, s/p 3 mg IV rasburicase  - sp Zarxio  - daily CBC with diff, active T&S  - c/w Allopurinol   - No onc plans to start chemo/treatment inpatient, can dc when medically appropriate with follow-up with Dr. Hilliard who will schedule cycle 2 of treatment.     # KRISTINE on CKD 3B / Chronic Lower Extremity Edema   - baseline ~high 1s, low 2s  - c/w diuretics: bumex 2mg IV BID --> to 2mg IV TID --> 2 mg PO daily  - monitor BUN/cr and urine output  - ajay d/c'd  - avoid nephrotoxic medications   - will hold off Metolazone on 02/08 as BUN/Cr 115/2.7  - nephrology is following, recommendations noted  2/11 - Nephrology cleared patient for discharge. This 69-year-old male with CAD s/p CABG, DM, atrial fibrillation, and recent chemotherapy (R-EPOCH) for diffuse large B-cell lymphoma (DLBCL) presented with acute hypoxic respiratory failure secondary to multifocal pneumonia, influenza A, and neutropenic fever.  He was initially admitted to a step-down unit (SDU) but decompensated and was **upgraded to the ICU** on 1/27, requiring BiPAP and antibiotics (Tamiflu, azithromycin, cefepime). After clinical improvement, he was **downgraded back to the SDU**, where he was weaned to nasal cannula.  During his SDU stay, ENT was consulted for tongue lesions, and concern was raised for possible Rodriguez transformation of his DLBCL. Subsequently, during a stay on a general medical floor (3B), he experienced a second episode of respiratory decompensation, requiring BiPAP and additional antibiotics (cefepime, vancomycin) and prompting **upgrade back to the SDU**. He developed hyperglycemia requiring insulin, and was then **upgraded to the CCU** for uncontrolled blood sugars despite steroid initiation.  In the CCU, his insulin was transitioned from a drip back to subcutaneous injections. He also reports lower extremity weakness and constipation, for which a CT of the head and bowel regimen adjustments were ordered.    # Acute Hypoxic Respiratory Failure 2/2 Multifocal Pneumonia/  Acute on chronic diastolic CHF/  Influenza A infection/ RASHAD/ OHS   - clinically improving, on high low oxygen for now, will taper off as tolerated   - NIV PRN and QHS   - nebs Q 6 hours, supportive care, aspiration precautions   - CTA 1/20: Negative for pulmonary emboli. Interval development of infiltrates within the lower lobes and lingula which may reflect acute multilobar pneumonia  - blood cx NGTD x2, UA negative, influenza A + , Nasal MRSA negative   - pt was consulted by ID, treated with IV Abx, completed the course of Tamiflu   - TTE 1/10/25: poor study, EF 60-65%, pt has moderated TR  - fluid restriction 1200 ml in 24 hours , intake and output monitoring, daily weight   - Desaturated again while in ICU on NC, and Bipap was started  - CT chest: Worsening bilateral peribronchial opacities compatible with bronchopneumonia  - F/u cultures, legionella, procal, fungitell  - Started IV cefepime 2g BID, vanco stopped as MRSA negative  - Follow up ID  - HFNC 40/40, titrated off, now on NC 3L  - duonebs and solumedrol 40 mg daily , wheeze on exam, titrate solumedrol down   2/11 - Finish Cefepime course tomorrow 2/12/25    # Large B-cell Lymphoma/ Concern for Rodriguez's transformation FOR OP FOLLOW UP for cycle 2 of chemotherapy  # Immunocompromised state given Lymphoma   - Heme/Onc follow up to comment on plan of care   - s/p PICC placement on 1/10/25  - s/p Bone marrow biopsy on 1/10/25  - Completed cycle 1 of R-EPOCH (D1 on 1/11/2024). Tolerated well. Rituximab was given on D5   - Uric acid 10.3 on 1/16/25, s/p 3 mg IV rasburicase  - sp Zarxio  - daily CBC with diff, active T&S  - c/w Allopurinol   - No onc plans to start chemo/treatment inpatient, can dc when medically appropriate with follow-up with Dr. Hilliard who will schedule cycle 2 of treatment.     # KRISTINE on CKD 3B / Chronic Lower Extremity Edema   - baseline ~high 1s, low 2s  - c/w diuretics: bumex 2mg IV BID --> to 2mg IV TID --> 2 mg PO daily  - monitor BUN/cr and urine output  - ajay d/c'd  - avoid nephrotoxic medications   - will hold off Metolazone on 02/08 as BUN/Cr 115/2.7  - nephrology is following, recommendations noted  - dry on exam DCed metolazone on dc cw home lasix for now  - Cr at BL 1.5    2/11 - Nephrology cleared patient for discharge. This 69-year-old male with CAD s/p CABG, DM, atrial fibrillation, and recent chemotherapy (R-EPOCH) for diffuse large B-cell lymphoma (DLBCL) presented with acute hypoxic respiratory failure secondary to multifocal pneumonia, influenza A, and neutropenic fever.  He was initially admitted to a step-down unit (SDU) but decompensated and was **upgraded to the ICU** on 1/27, requiring BiPAP and antibiotics (Tamiflu, azithromycin, cefepime). After clinical improvement, he was **downgraded back to the SDU**, where he was weaned to nasal cannula.  During his SDU stay, ENT was consulted for tongue lesions, and concern was raised for possible Rodriguez transformation of his DLBCL. Subsequently, during a stay on a general medical floor (3B), he experienced a second episode of respiratory decompensation, requiring BiPAP and additional antibiotics (cefepime, vancomycin) and prompting **upgrade back to the SDU**. He developed hyperglycemia requiring insulin, and was then **upgraded to the CCU** for uncontrolled blood sugars despite steroid initiation.  In the CCU, his insulin was transitioned from a drip back to subcutaneous injections. He also reports lower extremity weakness and constipation, for which a CT of the head and bowel regimen adjustments were ordered.    # Acute Hypoxic Respiratory Failure 2/2 Multifocal Pneumonia/  Acute on chronic diastolic CHF/  Influenza A infection/ RASHAD/ OHS   - clinically improving, on high low oxygen for now, will taper off as tolerated   - NIV PRN and QHS   - nebs Q 6 hours, supportive care, aspiration precautions   - CTA 1/20: Negative for pulmonary emboli. Interval development of infiltrates within the lower lobes and lingula which may reflect acute multilobar pneumonia  - blood cx NGTD x2, UA negative, influenza A + , Nasal MRSA negative   - pt was consulted by ID, treated with IV Abx, completed the course of Tamiflu   - TTE 1/10/25: poor study, EF 60-65%, pt has moderated TR  - fluid restriction 1200 ml in 24 hours , intake and output monitoring, daily weight   - Desaturated again while in ICU on NC, and Bipap was started  - CT chest: Worsening bilateral peribronchial opacities compatible with bronchopneumonia  - F/u cultures, legionella, procal, fungitell  - Started IV cefepime 2g BID, vanco stopped as MRSA negative  - Follow up ID  - HFNC 40/40, titrated off, now on NC 3L  - duonebs and solumedrol 40 mg daily , wheeze on exam, titrate solumedrol down   2/11 - Finished Cefepime course 2/12/25    # Large B-cell Lymphoma/ Concern for Rodriguez's transformation FOR OP FOLLOW UP for cycle 2 of chemotherapy  # Immunocompromised state given Lymphoma   - Heme/Onc follow up to comment on plan of care   - s/p PICC placement on 1/10/25  - s/p Bone marrow biopsy on 1/10/25  - Completed cycle 1 of R-EPOCH (D1 on 1/11/2024). Tolerated well. Rituximab was given on D5   - Uric acid 10.3 on 1/16/25, s/p 3 mg IV rasburicase  - sp Zarxio  - daily CBC with diff, active T&S  - c/w Allopurinol   - No onc plans to start chemo/treatment inpatient, can dc when medically appropriate with follow-up with Dr. Hilliard who will schedule cycle 2 of treatment.     # KRISTINE on CKD 3B / Chronic Lower Extremity Edema   - baseline ~high 1s, low 2s  - c/w diuretics: bumex 2mg IV BID --> to 2mg IV TID --> 2 mg PO daily  - monitor BUN/cr and urine output  - ajay d/c'd  - avoid nephrotoxic medications   - will hold off Metolazone on 02/08 as BUN/Cr 115/2.7  - nephrology is following, recommendations noted  - dry on exam DCed metolazone on dc cw home lasix for now  - Cr at BL 1.5    2/11 - Nephrology cleared patient for discharge.

## 2025-02-14 NOTE — PROGRESS NOTE ADULT - ASSESSMENT
ASSESSMENT  69-year-old man with extensive medical history including CAD (status post CABG;), DM (on insulin pump), atrial fibrillation on Xarelto, history of distal pancreatomy and splenectomy for unclear reason referred to the ED by his oncologist for abnormal labs. Patient was recently diagnosed with Diffuse large B cell Lymphoma on pathology of right groin mass and is following with [facility] oncology team. Patient was discharged on 01.17.2025 from hospital after inpatient chemotherapy (Completed cycle 1 of R-EPOCH (D1 on 1/11/2024). Tolerated well. Rituximab was given on D5).   Over past 3 days patient started to get a productive cough with yellow sputum production.  Patient is also endorsing chills and nausea which he states is baseline. He had 1 reading on low grade fever 100.5 on day of presentation.    IMPRESSION  #Severe Sepsis - Severe Multifocal Pneumonia with Influenza A infection   - completed 10 day course of cefepime/tramiflu 1/30     #Neutropenic Fever- resolved  #Acute Hypoxemic Respiratory failure   #Pulmonary Edema     #Worsening Hypoxemia 2/6 - transferred to SICU   - CT Angio Chest PE Protocol w/ IV Cont (01.20.25 @ 20:56): Negative for pulmonary emboli. Interval development of infiltrates within the lower lobes and lingula   which may reflect acute multilobar pneumonia.   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    #Abx allergy: No Known Allergies    RECOMMENDATIONS  - completed antibiotics - no diarrhea/abdominal pain/phlebitis   - WBC remains elevated -- potentially related to lymphoma -- had high WBC in 3/2024 prior to diagnosis of lymphoma   - heme-onc follow-up   - continue to monitor off antibiotics     Please call or message on [employer] Teams if with any questions.  Spectra [phone]

## 2025-02-14 NOTE — DISCHARGE NOTE PROVIDER - NSDCCPCAREPLAN_GEN_ALL_CORE_FT
PRINCIPAL DISCHARGE DIAGNOSIS  Diagnosis: Neutropenic fever  Assessment and Plan of Treatment: nderstanding Neutropenic Fever  Neutropenia, a low neutrophil count (a type of white blood cell crucial for fighting infection), can significantly increase your risk of serious infections. When neutropenia is combined with a fever, it's called neutropenic fever, and it's considered a medical emergency.  What is Neutropenic Fever?  Neutropenic fever is defined as:  Fever: A single oral temperature of 100.4°F (38°C) or higher, or a temperature of 100°F (37.8°C) sustained for 1 hour.  Neutropenia: A low absolute neutrophil count (ANC), typically less than 500 cells/mm³ or expected to fall below that level within 48 hours. Your doctor will monitor your ANC.  Why is it a Medical Emergency?  Because neutrophils are essential for fighting infection, a low count can allow infections to develop quickly and become severe. The fever is a sign that your body is fighting an infection, but your weakened immune system may not be able to handle it without prompt medical attention.  What Causes Neutropenia?  Several factors can cause neutropenia, including:  Chemotherapy: This is the most common cause.  Other medications: Certain medications, like some antibiotics, can sometimes cause neutropenia.  Bone marrow disorders: Conditions affecting the bone marrow, where neutrophils are produced.  Autoimmune diseases: Some autoimmune diseases can attack neutrophils.  Severe infections: Ironically, some serious infections can themselves lead to neutropenia.  What are the Symptoms?  The primary symptoms of neutropenic fever are:  Fever: As mentioned, this is a key indicator.  Chills or sweats: Often accompany fever.  Other signs of infection: These can vary depending on the location of the infection and may include cough, sore throat, diarrhea, abdominal pain, redness or swelling around a wound, or pain during urination.  What Should You Do if You Think You Have Neutropenic Fever?  If you're experiencing fever and are neutropenic, you should contact your doctor or go to the emergency room immediately.      SECONDARY DISCHARGE DIAGNOSES  Diagnosis: Pneumonia  Assessment and Plan of Treatment: Pneumonia  Pneumonia is an infection of the lungs. Pneumonia may be caused by bacteria, viruses, or funguses. Symptoms include coughing, fever, chest pain when breathing deeply or coughing, shortness of breath, fatigue, or muscle aches. Pneumonia can be diagnosed with a medical history and physical exam, as well as other tests which may include a chest X-ray. If you were prescribed an antibiotic medicine, take it as told by your health care provider and do not stop taking the antibiotic even if you start to feel better. Do not use tobacco products, including cigarettes, chewing tobacco, and e-cigarettes.  SEEK IMMEDIATE MEDICAL CARE IF YOU HAVE ANY OF THE FOLLOWING SYMPTOMS: worsening shortness of breath, worsening chest pain, coughing up blood, change in mental status, lightheadedness/dizziness.    Diagnosis: KRISTINE (acute kidney injury)  Assessment and Plan of Treatment: During your hospital stay you were found to have an Acute Kidney Injury. The exact cause of this injury is unknown but it is likely that you were somewhat dehydrated, causing lack of blood flow to your kidneys and causing some mild reversible kidney injury. The Blood levels have returned back to your baseline.  Follow up with your PMD regarding this issue and if there is any further assessment needed to follow up and or medication adjustments needed.  Keep Hydrated as directed by your physician to prevent future occurences of any Kidney Injury.   SEEK IMMEDIATE MEDICAL CARE IF YOU HAVE ANY OF THE FOLLOWING SYMPTOMS: chest pain, shortness of breath, abdominal pain, sweating, vomiting, blood in vomit/bowel movements/urine, dizziness/lightheadedness, weakness or numbness to face/arm/leg, trouble speaking or understanding, facial droop.     PRINCIPAL DISCHARGE DIAGNOSIS  Diagnosis: Neutropenic fever  Assessment and Plan of Treatment: You were treated for neutropenic fever (you were diagnosed with pneumonia and the flu )and completed antibiotic course and Tamiflu during the hospital stay. Please follow up with oncology regarding when you will be ready to restart chemotherapy.        SECONDARY DISCHARGE DIAGNOSES  Diagnosis: Pneumonia  Assessment and Plan of Treatment: Pneumonia  Pneumonia is an infection of the lungs. Pneumonia may be caused by bacteria, viruses, or funguses. Symptoms include coughing, fever, chest pain when breathing deeply or coughing, shortness of breath, fatigue, or muscle aches. Pneumonia can be diagnosed with a medical history and physical exam, as well as other tests which may include a chest X-ray. If you were prescribed an antibiotic medicine, take it as told by your health care provider and do not stop taking the antibiotic even if you start to feel better. Do not use tobacco products, including cigarettes, chewing tobacco, and e-cigarettes.  SEEK IMMEDIATE MEDICAL CARE IF YOU HAVE ANY OF THE FOLLOWING SYMPTOMS: worsening shortness of breath, worsening chest pain, coughing up blood, change in mental status, lightheadedness/dizziness.    Diagnosis: KRISTINE (acute kidney injury)  Assessment and Plan of Treatment: During your hospital stay you were found to have an Acute Kidney Injury. The exact cause of this injury is unknown but it is likely that you were somewhat dehydrated, causing lack of blood flow to your kidneys and causing some mild reversible kidney injury. The Blood levels have returned back to your baseline.  Follow up with your PMD regarding this issue and if there is any further assessment needed to follow up and or medication adjustments needed.  Keep Hydrated as directed by your physician to prevent future occurences of any Kidney Injury.   SEEK IMMEDIATE MEDICAL CARE IF YOU HAVE ANY OF THE FOLLOWING SYMPTOMS: chest pain, shortness of breath, abdominal pain, sweating, vomiting, blood in vomit/bowel movements/urine, dizziness/lightheadedness, weakness or numbness to face/arm/leg, trouble speaking or understanding, facial droop.

## 2025-02-14 NOTE — DISCHARGE NOTE PROVIDER - CARE PROVIDER_API CALL
Viktor Hilliard  Medical Oncology  [address]  [place] [address]  Phone: [phone]  Fax: [phone]  Follow Up Time: 1 week    Kleiner, Morton Jay  Nephrology  [address] [place] [id number]  Phone: [phone]  Fax: [phone]  Follow Up Time: 1 week

## 2025-02-14 NOTE — DISCHARGE NOTE PROVIDER - NSDCFUSCHEDAPPT_GEN_ALL_CORE_FT
Saad Lane  [facility] Physician [facility]  CARDIOLOGY [address] [place] [address]  Scheduled Appointment: 03/25/2025

## 2025-02-14 NOTE — PROGRESS NOTE ADULT - ASSESSMENT
This 69-year-old male with a history of CAD, DM, atrial fibrillation, and recent chemotherapy for diffuse large B-cell lymphoma was admitted for acute hypoxic respiratory failure secondary to multifocal pneumonia, complicated by influenza A and neutropenic fever.  He improved with antibiotics and respiratory support, but then developed a second episode of respiratory decompensation requiring further antibiotics and BiPAP.  His lymphoma is being monitored for possible Rodriguez transformation.  He also experienced hyperglycemia requiring insulin drip and reports lower extremity weakness and constipation.    # DM  - CC diet   - FS - self monitored via dex com --> On insulin pump @2.85 at home   - Endo consult: keep off insulin pump  - lantus 10 lispro 3 --> has been steadily increased : lantus increased from 30 to 40 and lispro from 10 to 16 on 02/08 --> s/p Humulin 7 units 1 dose  - Patient received total of 31 and Humulin Admelog 16x3  - Repeat BMP showed Gluocse of 529, mild increase in AG (12 --> 15) and Beta hydroxy < 0.2  - On 2/8 started on insulin drip (7 units /min) and upgraded to MICU, NS fluid with 20 mEq K but slower rate given HFpEF decompensation  - Off insulin drip overnight, switched to SQ insulin, monitor BS.  - 2/9 Patient transitioned off insulin drip to Basal Bolus Insulin, but missed dose. Glucose elevated and Insulin drip was restarted.   - 2/10 Insulin drip stopped. Glucose within normal range. Patient transitioned to basal bolus insulin.   - 2/10 Adjust basal bolus regimen as needed to maintain target blood glucose.     # Acute Hypoxic Respiratory Failure 2/2 Multifocal Pneumonia/  Acute on chronic diastolic CHF/  Influenza A infection/ RASHAD/ O[facility]   - clinically improving, on high low oxygen for now, will taper off as tolerated   - NIV PRN and QHS   - nebs Q 6 hours, supportive care, aspiration precautions   - CTA 1/20: Negative for pulmonary emboli. Interval development of infiltrates within the lower lobes and lingula which may reflect acute multilobar pneumonia  - blood cx NGTD x2, UA negative, influenza A + , Nasal MRSA negative   - pt was consulted by ID, treated with IV Abx, completed the course of Tamiflu   - TTE 1/10/25: poor study, EF 60-65%, pt has moderated TR  - fluid restriction 1200 ml in 24 hours , intake and output monitoring, daily weight   - Desaturated again while in ICU on NC, and Bipap was started  - CT chest: Worsening bilateral peribronchial opacities compatible with bronchopneumonia  - F/u cultures, legionella, procal, fungitell  - Started IV cefepime 2g BID, vanco stopped as MRSA negative  - Follow up ID  - HFNC 40/40, titrated off, now on NC 3L  - duonebs and solumedrol 40 mg daily , wheeze on exam, titrate solumedrol down   2/11 - Finish Cefepime course tomorrow 2/12/25    # 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 Tolerated well. Rituximab was given on D5   - Uric acid 10.3 on 1/16/25, s/p 3 mg IV rasburicase  - sp Zarxio  - daily CBC with diff, active T&S  - c/w Allopurinol   - No onc plans to start chemo/treatment inpatient, can dc when medically appropriate with follow-up with Dr. Hilliard who will schedule cycle 2 of treatment.     # KRISTINE on CKD 3B / Chronic Lower Extremity Edema   - baseline ~high 1s, low 2s  - c/w diuretics: bumex 2mg IV BID --> to 2mg IV TID --> 2 mg PO daily  - monitor BUN/cr and urine output  - ajay d/c'd  - avoid nephrotoxic medications   - will hold off Metolazone on 02/08 as BUN/Cr 115/2.7  - nephrology is following, recommendations noted  2/11 - Nephrology cleared patient for discharge.    # CAD s/p CABG/ Chronic Afib on Xarelto/ HTN  - c/w statin, BB  - Holding ASA  - Still holding lisinopril and aldactone from last admission  - josephley need to switch to eliquis given CKD from xaerlto   - dc heparin ggt started eliquis 5 mg BID     # LLE weakness  - CTH ordered today  - PT following  - 2/13 - Pending rehabilitation facility placement as patient unable to ambulate without assistances/safely so unsafe to discharge home. PT following.     # HLD   - on Atorvastatin now   - resume Repatha on discharge    # Oral pain due to ulcer with black discoloration   - c/w local couth care  - consult ENT to examine black discoloration -> rec CT neck w/ IV contrast, magic mouthwash  - CT neck w/IV contrast no tongue mass.   - oncology thinking this is mucositis   - Monitor bleed, keep active TS and trend CBC.   - Will c/w magic mouthwash.   - Bleeding is resolving    # Agitation/ confusion   # Insomnia   - pt is calm now   - on Haldol PRN , Sertraline and melatonin   - supportive care     # GI  - On bowel regimen and PPIs.    #Misc  #Code Status: Full Code  #DVT ppx: Eliquis  #GI ppx: PPI  #Diet: CC  #Activity: AAT. Following with PT given LLE weakness. Pending Rehabilitation Facility placement given deconditioning and LLE weakness.   #Inpatient Dispo: DG to SDU  #Discharge Dispo: Home vs Rehab.    Follow-Up: Physical Therapy as patient with LLE weakness secondary to deconditioning from prolonged hospital stay and unstable on feet. Pending rehabilitation facility placement. Continue to monitor FS, adjust insulin regiment as needed.

## 2025-02-14 NOTE — DISCHARGE NOTE PROVIDER - NSDCFUADDINST_GEN_ALL_CORE_FT
you had an acute kidney disease that's why we stopped your metolazone, your kidneys recovered for now please follow up with your nephrologist as soon as you can  follow with your hematologist to get your 2nd cycle of chemotherapy you had an acute kidney disease that's why we stopped your metolazone, your kidneys recovered for now please follow up with your nephrologist as soon as you can  follow with your hematologist to get your 2nd cycle of chemotherapy      Insulin sliding scale qAC (in addition to lispro listed)  2 units for -200  4 units for -250  6 units for -300  8 units for -350  10 units for -400    12 units for FS >400 and inform provider

## 2025-02-14 NOTE — PROGRESS NOTE ADULT - TIME BILLING
I have personally seen and examined this patient.    I have reviewed all pertinent clinical information and reviewed all relevant imaging and diagnostic studies personally.   I counseled the patient about diagnostic testing and treatment plan. All questions were answered.   I discussed recommendations with the primary team.
I have personally seen and examined this patient.    I have reviewed all pertinent clinical information and reviewed all relevant imaging and diagnostic studies personally.   I counseled the patient about diagnostic testing and treatment plan. All questions were answered.   I discussed recommendations with the primary team.
time spent on review of labs, imaging studies, old records, obtaining history, personally examining patient, counselling and communicating with patient, entering orders for medications/tests/etc, discussions with other health care providers, documentation in electronic health records, independent interpretation of labs, imaging/procedure results and care coordination.
Total time spent to complete patient's bedside assessment, review medical chart, discuss medical plan of care with covering medical team was more than 50 minutes  with >50% of time spent face to face with patient, discussion with patient/family and/or coordination of care.

## 2025-02-14 NOTE — DISCHARGE NOTE PROVIDER - PROVIDER TOKENS
PROVIDER:[TOKEN:[[id number]],FOLLOWUP:[1 week]],PROVIDER:[TOKEN:[[id number]],FOLLOWUP:[1 week]]

## 2025-02-14 NOTE — DISCHARGE NOTE PROVIDER - NSDCFUADDAPPT_GEN_ALL_CORE_FT
- No onc plans to start chemo/treatment inpatient, can dc when medically appropriate with follow-up with Dr. Hilliard who will schedule cycle 2 of treatment. - No oncology plans to start chemo/treatment inpatient, can dc when medically appropriate with follow-up with Dr. Hilliard who will schedule cycle 2 of treatment.

## 2025-02-14 NOTE — DISCHARGE NOTE PROVIDER - CARE PROVIDERS DIRECT ADDRESSES
,DirectAddress_Unknown,[email or web address]

## 2025-02-14 NOTE — PROGRESS NOTE ADULT - SUBJECTIVE AND OBJECTIVE BOX
SUBJECTIVE/OVERNIGHT EVENTS  Today is hospital day 25d. This morning patient was seen and examined at bedside, resting comfortably in bed.  AOx4, vitals wnl on RA. No catheters or lines inserted.  No acute or major events overnight.  Patient is doing well. He denies having any CP, SOB, palpitaitons, swelling, cough, abdominal pain, N/V/D/C, burning with urination, frequency, fever, chills.    HPI:  69-year-old man with extensive medical history including CAD (status post CABG;), DM (on insulin pump), atrial fibrillation on Xarelto, history of distal pancreatomy and splenectomy for unclear reason referred to the ED by his oncologist for abnormal labs. Patient was recently diagnosed with Diffuse large B cell Lymphoma on pathology of right groin mass and is following with [facility] oncology team. Patient was discharged on 01.17.2025 from hospital after inpatient chemotherapy (Completed cycle 1 of R-EPOCH (D1 on 1/11/2024). Tolerated well. Rituximab was given on D5).   Over past 3 days patient started to get a productive cough with yellow sputum production.  Patient is also endorsing chills and nausea which he states is baseline. He had 1 reading on low grade fever 100.5 on day of presentation.    On presentation vitals:  · BP Systolic	160 mm Hg  · BP Diastolic	86 mm Hg  · Heart Rate	89 /min  · Respiration Rate (breaths/min)	20 /min  · Temp (F)	98.9 Degrees F  · Temp (C)	37.2 Degrees C  · Temp site	oral    ED course:  O2 sats low on 4L O2 so he was promptly started on BIPAP. CXR with LLL infiltrate; sepsis labs sent with gentle hydration; abx given, Labs with neutropenia and elevated BNP, trop 68. EKG nonischemic.    CTA Chest: Negative for pulmonary emboli. Interval development of infiltrates within the lower lobes and lingula which may reflect acute multilobar pneumonia.    Patient admitted to medicine for acute hypoxic respiratory failure.   (20 Jan 2025 23:47)      VITALS  T(F): 97.9 (02-14-25 @ 05:13), Max: 98 (02-13-25 @ 12:00)  HR: 84 (02-14-25 @ 05:13) (79 - 100)  BP: 116/64 (02-14-25 @ 05:13) (116/64 - 179/81)  RR: 18 (02-14-25 @ 05:13) (18 - 24)  SpO2: 99% (02-14-25 @ 05:13) (97% - 100%)  POCT Blood Glucose.: 155 mg/dL (02-14-25 @ 07:56)  POCT Blood Glucose.: 174 mg/dL (02-13-25 @ 21:05)  POCT Blood Glucose.: 402 mg/dL (02-13-25 @ 17:18)  POCT Blood Glucose.: 382 mg/dL (02-13-25 @ 12:47)          PHYSICAL EXAM      GENERAL: No acute distress   CHEST/LUNG: Clear to auscultation bilaterally; No wheeze  HEART: Regular rate and rhythm; No murmurs  ABDOMEN: Soft, nontender, nondistended.  BACK: no spinal tenderness, no CVA tenderness  EXTREMITIES:  RLE edema, chronic as per pt. Previous surgery scar (Bypass)  PSYCH: Nl behavior, nl affect  NEUROLOGY: AAOx3, non-focal, moves all extremities spontaneously  SKIN: Normal color, No rashes or lesions        MEDICATIONS  STANDING MEDICATIONS  albuterol/ipratropium for Nebulization 3 milliLiter(s) Nebulizer every 6 hours  allopurinol 150 milliGRAM(s) Oral daily  apixaban 5 milliGRAM(s) Oral every 12 hours  ascorbic acid 500 milliGRAM(s) Oral daily  atorvastatin 10 milliGRAM(s) Oral at bedtime  benzocaine 20% Spray 1 Spray(s) Topical three times a day  chlorhexidine 2% Cloths 1 Application(s) Topical daily  dextrose 5%. 1000 milliLiter(s) IV Continuous <Continuous>  dextrose 5%. 1000 milliLiter(s) IV Continuous <Continuous>  dextrose 50% Injectable 25 Gram(s) IV Push once  dextrose 50% Injectable 12.5 Gram(s) IV Push once  dextrose 50% Injectable 25 Gram(s) IV Push once  FIRST- Mouthwash  BLM 10 milliLiter(s) Swish and Spit every 4 hours  glucagon  Injectable 1 milliGRAM(s) IntraMuscular once  influenza  Vaccine (HIGH DOSE) 0.5 milliLiter(s) IntraMuscular once  insulin glargine Injectable (LANTUS) 40 Unit(s) SubCutaneous at bedtime  insulin lispro (ADMELOG) corrective regimen sliding scale   SubCutaneous three times a day before meals  insulin lispro Injectable (ADMELOG) 24 Unit(s) SubCutaneous three times a day before meals  melatonin 10 milliGRAM(s) Oral at bedtime  metoprolol tartrate 25 milliGRAM(s) Oral two times a day  NIFEdipine XL 60 milliGRAM(s) Oral daily  nystatin Cream 1 Application(s) Topical every 12 hours  pantoprazole  Injectable 40 milliGRAM(s) IV Push daily  polyethylene glycol 3350 17 Gram(s) Oral <User Schedule>  predniSONE   Tablet 20 milliGRAM(s) Oral daily  senna 2 Tablet(s) Oral every 12 hours  sertraline 50 milliGRAM(s) Oral daily  tamsulosin 0.4 milliGRAM(s) Oral at bedtime    PRN MEDICATIONS  aluminum hydroxide/magnesium hydroxide/simethicone Suspension 30 milliLiter(s) Oral every 4 hours PRN  bisacodyl 5 milliGRAM(s) Oral daily PRN  calcium carbonate    500 mG (Tums) Chewable 1 Tablet(s) Chew three times a day PRN  dextrose Oral Gel 15 Gram(s) Oral once PRN  HYDROmorphone  Injectable 0.5 milliGRAM(s) IV Push every 6 hours PRN  hydrOXYzine hydrochloride 25 milliGRAM(s) Oral every 6 hours PRN  magnesium hydroxide Suspension 30 milliLiter(s) Oral daily PRN  ondansetron Injectable 4 milliGRAM(s) IV Push every 6 hours PRN  simethicone 80 milliGRAM(s) Chew daily PRN        PAST MEDICAL & SURGICAL HISTORY:  Diabetes mellitus, type 2      BPH (benign prostatic hyperplasia)      Water retention      Essential hypertension      Diabetes      CAD (coronary artery disease)      H/O hypercholesterolemia      Morbid obesity      Chronic kidney disease (CKD)      History of atrial fibrillation      H/O right coronary artery stent placement      History of resection of pancreas      History of cholecystectomy      History of left knee replacement      S/P CABG x 6      H/O cardiac radiofrequency ablation          LABS             9.6    30.66 )-----------( 563      ( 02-14-25 @ 06:49 )             29.9     134  |  95  |  93  -------------------------<  225   02-13-25 @ 04:18  4.9  |  27  |  1.7    Ca      8.6     02-13-25 @ 04:18  Mg     2.4     02-13-25 @ 04:18    TPro  5.5  /  Alb  3.2  /  TBili  0.3  /  DBili  x   /  AST  29  /  ALT  30  /  AlkPhos  204  /  GGT  x     02-13-25 @ 04:18        Urinalysis Basic - ( 13 Feb 2025 04:18 )    Color: x / Appearance: x / SG: x / pH: x  Gluc: 225 mg/dL / Ketone: x  / Bili: x / Urobili: x   Blood: x / Protein: x / Nitrite: x   Leuk Esterase: x / RBC: x / WBC x   Sq Epi: x / Non Sq Epi: x / Bacteria: x          IMAGING

## 2025-02-14 NOTE — PROGRESS NOTE ADULT - ATTENDING COMMENTS
Plan as above. At this time patient is medically optimized and ready for transition to subacute rehab. Pending PT evaluation and Heme/Onc eval for commenting on leukocytosis in setting of malignancy

## 2025-02-14 NOTE — DISCHARGE NOTE PROVIDER - NSDCMRMEDTOKEN_GEN_ALL_CORE_FT
acetaminophen 325 mg oral tablet: 2 tab(s) orally every 6 hours As needed Temp greater or equal to 38C (100.4F), Mild Pain (1 - 3)  allopurinol 100 mg oral tablet: 1.5 cap(s) orally once a day  aspirin 81 mg oral tablet: orally once a day  azelastine nasal: prn  furosemide 80 mg oral tablet: 1 tab(s) orally once a day  INSULIN PUMP - HUMALOG:   ipratropium nasal: prn  levocetirizine 5 mg oral tablet: 1 tab(s) orally once a day  metoclopramide 5 mg oral tablet: 1 tab(s) orally every 8 hours  metOLazone 2.5 mg oral tablet: 1 tab(s) orally once a day  metoprolol tartrate 25 mg oral tablet: 1 tab(s) orally 2 times a day  NIFEdipine 60 mg oral tablet, extended release: 1 tab(s) orally once a day  ondansetron 4 mg oral tablet: 1 tab(s) orally every 6 hours as needed for  nausea  oxycodone-acetaminophen 5 mg-325 mg oral tablet: 1 tab(s) orally every 6 hours as needed for Severe Pain (7 - 10) MDD: 4 tab  polyethylene glycol 3350 oral powder for reconstitution: 17 gram(s) orally 3 times a day  pravastatin 40 mg oral tablet: 1 tab(s) orally once a day  Repatha 140 mg/mL subcutaneous solution: 140 milligram(s) subcutaneously  rivaroxaban 15 mg oral tablet: 1 tab(s) orally once a day (before a meal)  sildenafil 20 mg oral tablet: 1 tab(s) orally  simethicone 80 mg oral tablet, chewable: 1 tab(s) orally every 6 hours  testosterone cypionate 200 mg/mL intramuscular solution: 200 unit(s) intramuscularly once a month  tirzepatide: 0.5 milligram(s) subcutaneous once a week  Vitamin C 500 mg oral capsule: 1 cap(s) orally once a day   acetaminophen 325 mg oral tablet: 2 tab(s) orally every 6 hours As needed Temp greater or equal to 38C (100.4F), Mild Pain (1 - 3)  allopurinol 100 mg oral tablet: 1.5 cap(s) orally once a day  aspirin 81 mg oral tablet: orally once a day  azelastine nasal: 1 spray(s) nasal 3 times a day as needed for  allergy symptoms  furosemide 80 mg oral tablet: 1 tab(s) orally once a day  INSULIN PUMP - HUMALOG:   ipratropium nasal: 1 spray(s) nasal 2 times a day as needed for  allergy symptoms  levocetirizine 5 mg oral tablet: 1 tab(s) orally once a day  metoclopramide 5 mg oral tablet: 1 tab(s) orally every 8 hours  metoprolol tartrate 25 mg oral tablet: 1 tab(s) orally 2 times a day  Nephro-Uziel Rx oral tablet: 1 tab(s) orally once a day  NIFEdipine 60 mg oral tablet, extended release: 1 tab(s) orally once a day  ondansetron 4 mg oral tablet: 1 tab(s) orally every 6 hours as needed for  nausea  oxycodone-acetaminophen 5 mg-325 mg oral tablet: 1 tab(s) orally every 6 hours as needed for Severe Pain (7 - 10) MDD: 4 tab  polyethylene glycol 3350 oral powder for reconstitution: 17 gram(s) orally 3 times a day  pravastatin 40 mg oral tablet: 1 tab(s) orally once a day  predniSONE 10 mg oral tablet: 1 tab(s) orally once a day ruth ann 2 tabs on day 1 2/16, then one tab daily for 3 more days  Repatha 140 mg/mL subcutaneous solution: 140 milligram(s) subcutaneously  rivaroxaban 15 mg oral tablet: 1 tab(s) orally once a day (before a meal)  sildenafil 20 mg oral tablet: 1 tab(s) orally  simethicone 80 mg oral tablet, chewable: 1 tab(s) orally every 6 hours  testosterone cypionate 200 mg/mL intramuscular solution: 200 unit(s) intramuscularly once a month  tirzepatide: 0.5 milligram(s) subcutaneous once a week  Vitamin C 500 mg oral capsule: 1 cap(s) orally once a day   acetaminophen 325 mg oral tablet: 2 tab(s) orally every 6 hours As needed Temp greater or equal to 38C (100.4F), Mild Pain (1 - 3)  allopurinol 100 mg oral tablet: 1.5 cap(s) orally once a day  azelastine nasal: 1 spray(s) nasal 3 times a day as needed for  allergy symptoms  bumetanide 2 mg oral tablet: 1 tab(s) orally once a day  insulin glargine 100 units/mL subcutaneous solution: 40 unit(s) subcutaneous once a day (at bedtime)  insulin lispro 100 units/mL injectable solution: 24 unit(s) subcutaneous 3 times a day (before meals)  ipratropium nasal: 1 spray(s) nasal 2 times a day as needed for  allergy symptoms  levocetirizine 5 mg oral tablet: 1 tab(s) orally once a day  metoprolol tartrate 25 mg oral tablet: 1 tab(s) orally 2 times a day  Nephro-Uziel Rx oral tablet: 1 tab(s) orally once a day  NIFEdipine 60 mg oral tablet, extended release: 1 tab(s) orally once a day  ondansetron 4 mg oral tablet: 1 tab(s) orally every 6 hours as needed for  nausea  oxycodone-acetaminophen 5 mg-325 mg oral tablet: 1 tab(s) orally every 6 hours as needed for Severe Pain (7 - 10) MDD: 4 tab  polyethylene glycol 3350 oral powder for reconstitution: 17 gram(s) orally 3 times a day  pravastatin 40 mg oral tablet: 1 tab(s) orally once a day  predniSONE 10 mg oral tablet: 1 tab(s) orally once a day 1 tab once a day on 2/18 and 2/19 and then stop  rivaroxaban 15 mg oral tablet: 1 tab(s) orally once a day (before a meal)  senna leaf extract oral tablet: 2 tab(s) orally every 12 hours  sertraline 50 mg oral tablet: 1 tab(s) orally once a day  sildenafil 20 mg oral tablet: 1 tab(s) orally once a day  simethicone 80 mg oral tablet, chewable: 1 tab(s) orally every 6 hours  testosterone cypionate 200 mg/mL intramuscular solution: 200 unit(s) intramuscularly once a month  Vitamin C 500 mg oral capsule: 1 cap(s) orally once a day

## 2025-02-15 LAB
ALBUMIN SERPL ELPH-MCNC: 3.3 G/DL — LOW (ref 3.5–5.2)
ALP SERPL-CCNC: 209 U/L — HIGH (ref 30–115)
ALT FLD-CCNC: 35 U/L — SIGNIFICANT CHANGE UP (ref 0–41)
ANION GAP SERPL CALC-SCNC: 13 MMOL/L — SIGNIFICANT CHANGE UP (ref 7–14)
AST SERPL-CCNC: 32 U/L — SIGNIFICANT CHANGE UP (ref 0–41)
BASOPHILS # BLD AUTO: 0.15 K/UL — SIGNIFICANT CHANGE UP (ref 0–0.2)
BASOPHILS NFR BLD AUTO: 0.6 % — SIGNIFICANT CHANGE UP (ref 0–1)
BILIRUB SERPL-MCNC: 0.3 MG/DL — SIGNIFICANT CHANGE UP (ref 0.2–1.2)
BUN SERPL-MCNC: 74 MG/DL — CRITICAL HIGH (ref 10–20)
CALCIUM SERPL-MCNC: 8.6 MG/DL — SIGNIFICANT CHANGE UP (ref 8.4–10.5)
CHLORIDE SERPL-SCNC: 98 MMOL/L — SIGNIFICANT CHANGE UP (ref 98–110)
CO2 SERPL-SCNC: 25 MMOL/L — SIGNIFICANT CHANGE UP (ref 17–32)
CREAT SERPL-MCNC: 1.6 MG/DL — HIGH (ref 0.7–1.5)
EGFR: 47 ML/MIN/1.73M2 — LOW
EOSINOPHIL # BLD AUTO: 1.27 K/UL — HIGH (ref 0–0.7)
EOSINOPHIL NFR BLD AUTO: 4.7 % — SIGNIFICANT CHANGE UP (ref 0–8)
GLUCOSE BLDC GLUCOMTR-MCNC: 165 MG/DL — HIGH (ref 70–99)
GLUCOSE BLDC GLUCOMTR-MCNC: 178 MG/DL — HIGH (ref 70–99)
GLUCOSE BLDC GLUCOMTR-MCNC: 298 MG/DL — HIGH (ref 70–99)
GLUCOSE BLDC GLUCOMTR-MCNC: 314 MG/DL — HIGH (ref 70–99)
GLUCOSE SERPL-MCNC: 283 MG/DL — HIGH (ref 70–99)
HCT VFR BLD CALC: 30.6 % — LOW (ref 42–52)
HGB BLD-MCNC: 9.8 G/DL — LOW (ref 14–18)
IMM GRANULOCYTES NFR BLD AUTO: 7.5 % — HIGH (ref 0.1–0.3)
LYMPHOCYTES # BLD AUTO: 1.73 K/UL — SIGNIFICANT CHANGE UP (ref 1.2–3.4)
LYMPHOCYTES # BLD AUTO: 6.4 % — LOW (ref 20.5–51.1)
MAGNESIUM SERPL-MCNC: 2.5 MG/DL — HIGH (ref 1.8–2.4)
MCHC RBC-ENTMCNC: 30.3 PG — SIGNIFICANT CHANGE UP (ref 27–31)
MCHC RBC-ENTMCNC: 32 G/DL — SIGNIFICANT CHANGE UP (ref 32–37)
MCV RBC AUTO: 94.7 FL — HIGH (ref 80–94)
MONOCYTES # BLD AUTO: 2.49 K/UL — HIGH (ref 0.1–0.6)
MONOCYTES NFR BLD AUTO: 9.2 % — SIGNIFICANT CHANGE UP (ref 1.7–9.3)
NEUTROPHILS # BLD AUTO: 19.28 K/UL — HIGH (ref 1.4–6.5)
NEUTROPHILS NFR BLD AUTO: 71.6 % — SIGNIFICANT CHANGE UP (ref 42.2–75.2)
NRBC BLD AUTO-RTO: 2 /100 WBCS — HIGH (ref 0–0)
PLATELET # BLD AUTO: 564 K/UL — HIGH (ref 130–400)
PMV BLD: 11.2 FL — HIGH (ref 7.4–10.4)
POTASSIUM SERPL-MCNC: 5.2 MMOL/L — HIGH (ref 3.5–5)
POTASSIUM SERPL-SCNC: 5.2 MMOL/L — HIGH (ref 3.5–5)
PROT SERPL-MCNC: 5.6 G/DL — LOW (ref 6–8)
RBC # BLD: 3.23 M/UL — LOW (ref 4.7–6.1)
RBC # FLD: 16.1 % — HIGH (ref 11.5–14.5)
SODIUM SERPL-SCNC: 136 MMOL/L — SIGNIFICANT CHANGE UP (ref 135–146)
WBC # BLD: 26.93 K/UL — HIGH (ref 4.8–10.8)
WBC # FLD AUTO: 26.93 K/UL — HIGH (ref 4.8–10.8)

## 2025-02-15 PROCEDURE — 73701 CT LOWER EXTREMITY W/DYE: CPT | Mod: 26,LT

## 2025-02-15 PROCEDURE — 71045 X-RAY EXAM CHEST 1 VIEW: CPT | Mod: 26

## 2025-02-15 PROCEDURE — 99233 SBSQ HOSP IP/OBS HIGH 50: CPT

## 2025-02-15 PROCEDURE — 99232 SBSQ HOSP IP/OBS MODERATE 35: CPT

## 2025-02-15 RX ORDER — ACETAMINOPHEN 500 MG/5ML
650 LIQUID (ML) ORAL EVERY 6 HOURS
Refills: 0 | Status: DISCONTINUED | OUTPATIENT
Start: 2025-02-15 | End: 2025-02-17

## 2025-02-15 RX ORDER — INSULIN GLARGINE-YFGN 100 [IU]/ML
45 INJECTION, SOLUTION SUBCUTANEOUS AT BEDTIME
Refills: 0 | Status: DISCONTINUED | OUTPATIENT
Start: 2025-02-15 | End: 2025-02-17

## 2025-02-15 RX ORDER — PREDNISONE 20 MG/1
1 TABLET ORAL
Qty: 5 | Refills: 0
Start: 2025-02-15 | End: 2025-02-19

## 2025-02-15 RX ORDER — HYDROMORPHONE/SOD CHLOR,ISO/PF 2 MG/10 ML
0.5 SYRINGE (ML) INJECTION ONCE
Refills: 0 | Status: DISCONTINUED | OUTPATIENT
Start: 2025-02-15 | End: 2025-02-15

## 2025-02-15 RX ORDER — ACETAMINOPHEN 500 MG/5ML
975 LIQUID (ML) ORAL ONCE
Refills: 0 | Status: COMPLETED | OUTPATIENT
Start: 2025-02-15 | End: 2025-02-15

## 2025-02-15 RX ORDER — BUMETANIDE 1 MG/1
2 TABLET ORAL DAILY
Refills: 0 | Status: DISCONTINUED | OUTPATIENT
Start: 2025-02-15 | End: 2025-02-17

## 2025-02-15 RX ORDER — INSULIN LISPRO 100 U/ML
33 INJECTION, SOLUTION INTRAVENOUS; SUBCUTANEOUS
Refills: 0 | Status: DISCONTINUED | OUTPATIENT
Start: 2025-02-15 | End: 2025-02-17

## 2025-02-15 RX ADMIN — METOPROLOL SUCCINATE 25 MILLIGRAM(S): 50 TABLET, EXTENDED RELEASE ORAL at 17:49

## 2025-02-15 RX ADMIN — APIXABAN 5 MILLIGRAM(S): 2.5 TABLET, FILM COATED ORAL at 08:24

## 2025-02-15 RX ADMIN — Medication 975 MILLIGRAM(S): at 02:29

## 2025-02-15 RX ADMIN — INSULIN LISPRO 33 UNIT(S): 100 INJECTION, SOLUTION INTRAVENOUS; SUBCUTANEOUS at 17:48

## 2025-02-15 RX ADMIN — Medication 1 APPLICATION(S): at 12:18

## 2025-02-15 RX ADMIN — Medication 0.5 MILLIGRAM(S): at 02:51

## 2025-02-15 RX ADMIN — APIXABAN 5 MILLIGRAM(S): 2.5 TABLET, FILM COATED ORAL at 21:34

## 2025-02-15 RX ADMIN — DIPHENHYDRAMINE HYDROCHLORIDE AND LIDOCAINE HYDROCHLORIDE AND ALUMINUM HYDROXIDE AND MAGNESIUM HYDRO 10 MILLILITER(S): KIT at 22:37

## 2025-02-15 RX ADMIN — Medication 2 TABLET(S): at 17:49

## 2025-02-15 RX ADMIN — Medication 650 MILLIGRAM(S): at 23:02

## 2025-02-15 RX ADMIN — Medication 0.5 MILLIGRAM(S): at 23:02

## 2025-02-15 RX ADMIN — POLYETHYLENE GLYCOL 3350 17 GRAM(S): 17 POWDER, FOR SOLUTION ORAL at 05:57

## 2025-02-15 RX ADMIN — POLYETHYLENE GLYCOL 3350 17 GRAM(S): 17 POWDER, FOR SOLUTION ORAL at 12:08

## 2025-02-15 RX ADMIN — Medication 0.5 MILLIGRAM(S): at 00:44

## 2025-02-15 RX ADMIN — Medication 0.5 MILLIGRAM(S): at 22:35

## 2025-02-15 RX ADMIN — Medication 60 MILLIGRAM(S): at 05:56

## 2025-02-15 RX ADMIN — Medication 0.5 MILLIGRAM(S): at 10:34

## 2025-02-15 RX ADMIN — IPRATROPIUM BROMIDE AND ALBUTEROL SULFATE 3 MILLILITER(S): .5; 2.5 SOLUTION RESPIRATORY (INHALATION) at 14:17

## 2025-02-15 RX ADMIN — INSULIN LISPRO 33 UNIT(S): 100 INJECTION, SOLUTION INTRAVENOUS; SUBCUTANEOUS at 21:35

## 2025-02-15 RX ADMIN — TAMSULOSIN HYDROCHLORIDE 0.4 MILLIGRAM(S): 0.4 CAPSULE ORAL at 22:38

## 2025-02-15 RX ADMIN — Medication 150 MILLIGRAM(S): at 12:08

## 2025-02-15 RX ADMIN — INSULIN GLARGINE-YFGN 45 UNIT(S): 100 INJECTION, SOLUTION SUBCUTANEOUS at 21:37

## 2025-02-15 RX ADMIN — Medication 500 MILLIGRAM(S): at 12:07

## 2025-02-15 RX ADMIN — Medication 40 MILLIGRAM(S): at 12:08

## 2025-02-15 RX ADMIN — BENZOCAINE 1 SPRAY(S): 220 SPRAY, METERED PERIODONTAL at 17:48

## 2025-02-15 RX ADMIN — INSULIN LISPRO 8: 100 INJECTION, SOLUTION INTRAVENOUS; SUBCUTANEOUS at 11:59

## 2025-02-15 RX ADMIN — DIPHENHYDRAMINE HYDROCHLORIDE AND LIDOCAINE HYDROCHLORIDE AND ALUMINUM HYDROXIDE AND MAGNESIUM HYDRO 10 MILLILITER(S): KIT at 17:48

## 2025-02-15 RX ADMIN — BENZOCAINE 1 SPRAY(S): 220 SPRAY, METERED PERIODONTAL at 05:58

## 2025-02-15 RX ADMIN — Medication 0.5 MILLIGRAM(S): at 02:57

## 2025-02-15 RX ADMIN — IPRATROPIUM BROMIDE AND ALBUTEROL SULFATE 3 MILLILITER(S): .5; 2.5 SOLUTION RESPIRATORY (INHALATION) at 21:01

## 2025-02-15 RX ADMIN — Medication 10 MILLIGRAM(S): at 22:38

## 2025-02-15 RX ADMIN — DIPHENHYDRAMINE HYDROCHLORIDE AND LIDOCAINE HYDROCHLORIDE AND ALUMINUM HYDROXIDE AND MAGNESIUM HYDRO 10 MILLILITER(S): KIT at 14:10

## 2025-02-15 RX ADMIN — Medication 650 MILLIGRAM(S): at 15:35

## 2025-02-15 RX ADMIN — INSULIN LISPRO 28 UNIT(S): 100 INJECTION, SOLUTION INTRAVENOUS; SUBCUTANEOUS at 08:25

## 2025-02-15 RX ADMIN — BENZOCAINE 1 SPRAY(S): 220 SPRAY, METERED PERIODONTAL at 22:37

## 2025-02-15 RX ADMIN — ATORVASTATIN CALCIUM 10 MILLIGRAM(S): 80 TABLET, FILM COATED ORAL at 22:38

## 2025-02-15 RX ADMIN — METOPROLOL SUCCINATE 25 MILLIGRAM(S): 50 TABLET, EXTENDED RELEASE ORAL at 05:56

## 2025-02-15 RX ADMIN — PREDNISONE 20 MILLIGRAM(S): 20 TABLET ORAL at 05:56

## 2025-02-15 RX ADMIN — IPRATROPIUM BROMIDE AND ALBUTEROL SULFATE 3 MILLILITER(S): .5; 2.5 SOLUTION RESPIRATORY (INHALATION) at 02:11

## 2025-02-15 RX ADMIN — INSULIN LISPRO 28 UNIT(S): 100 INJECTION, SOLUTION INTRAVENOUS; SUBCUTANEOUS at 12:07

## 2025-02-15 RX ADMIN — INSULIN LISPRO 2: 100 INJECTION, SOLUTION INTRAVENOUS; SUBCUTANEOUS at 17:47

## 2025-02-15 RX ADMIN — Medication 975 MILLIGRAM(S): at 02:08

## 2025-02-15 RX ADMIN — Medication 650 MILLIGRAM(S): at 15:05

## 2025-02-15 RX ADMIN — IPRATROPIUM BROMIDE AND ALBUTEROL SULFATE 3 MILLILITER(S): .5; 2.5 SOLUTION RESPIRATORY (INHALATION) at 08:08

## 2025-02-15 RX ADMIN — SERTRALINE 50 MILLIGRAM(S): 100 TABLET, FILM COATED ORAL at 12:07

## 2025-02-15 RX ADMIN — INSULIN LISPRO 6: 100 INJECTION, SOLUTION INTRAVENOUS; SUBCUTANEOUS at 08:24

## 2025-02-15 RX ADMIN — BUMETANIDE 2 MILLIGRAM(S): 1 TABLET ORAL at 22:38

## 2025-02-15 NOTE — PROGRESS NOTE ADULT - ATTENDING COMMENTS
Patient examined by myself  , above note read   Patient complains of severe pain in LLE ( proximal femur ---knee ) requiring dilaudid , tenderness + , limited ROM . NO swelling . NO numbness .  negative pelvis xray  Persistent leukocytosis partly due to prior splenectomy , ( gran : 19 Mono : 2.49 EOS : 1,27 )  , no definite infection  recommend CT of left hip and femur .   Flow cytometry .

## 2025-02-15 NOTE — PROGRESS NOTE ADULT - ASSESSMENT
69-year-old male with a history of CAD, DM, atrial fibrillation, and recent chemotherapy for diffuse large B-cell lymphoma was admitted for acute hypoxic respiratory failure secondary to multifocal pneumonia, complicated by influenza A and neutropenic fever.  He improved with antibiotics and respiratory support, but then developed a second episode of respiratory decompensation requiring further antibiotics and BiPAP.  His lymphoma is being monitored for possible Rodriguez transformation.  He also experienced hyperglycemia requiring insulin drip and reports lower extremity weakness and constipation. Currently HDS, afebrile and sating well on RA.     # DM  - CC diet   - FS - self monitored via dex com --> On insulin pump @2.85 at home   - Endo consult: keep off insulin pump  - lantus 10 lispro 3 --> has been steadily increased : lantus increased from 30 to 40 and lispro from 10 to 16 on 02/08 --> s/p Humulin 7 units 1 dose  - Patient received total of 31 and Humulin Admelog 16x3  - Repeat BMP showed Gluocse of 529, mild increase in AG (12 --> 15) and Beta hydroxy < 0.2  - On 2/8 started on insulin drip (7 units /min) and upgraded to MICU, NS fluid with 20 mEq K but slower rate given HFpEF decompensation  - Off insulin drip overnight, switched to SQ insulin, monitor BS.  - 2/9 Patient transitioned off insulin drip to Basal Bolus Insulin, but missed dose. Glucose elevated and Insulin drip was restarted.   - 2/10 Insulin drip stopped. Glucose within normal range. Patient transitioned to basal bolus insulin.   - 2/10 Adjust basal bolus regimen as needed to maintain target blood glucose.   - currently on lantus 40u qhs and admelog 28u ac--> increased to lantus 45u qhs and admelog 33u ac + SSI    # Acute Hypoxic Respiratory Failure 2/2 Multifocal Pneumonia/  Acute on chronic diastolic CHF/  Influenza A infection/ RASHAD/ OHS   - clinically improving, on high low oxygen for now, will taper off as tolerated   - NIV PRN and QHS   - nebs Q 6 hours, supportive care, aspiration precautions   - CTA 1/20: Negative for pulmonary emboli. Interval development of infiltrates within the lower lobes and lingula which may reflect acute multilobar pneumonia  - blood cx NGTD x2, UA negative, influenza A + , Nasal MRSA negative   - pt was consulted by ID, treated with IV Abx, completed the course of Tamiflu   - TTE 1/10/25: poor study, EF 60-65%, pt has moderated TR  - fluid restriction 1200 ml in 24 hours , intake and output monitoring, daily weight   - Desaturated again while in ICU on NC, and Bipap was started;   - CT chest: Worsening bilateral peribronchial opacities compatible with bronchopneumonia  -now on the floor and on RA  - duonebs  - steroid taper; currently on prednisone 20mg x3 days--> prednisone 10mg x3 days(start 2/17)  - s/p  Cefepime course     # 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 Tolerated well. Rituximab was given on D5   - Uric acid 10.3 on 1/16/25, s/p 3 mg IV rasburicase  - sp Zarxio  - daily CBC with diff, active T&S  - c/w Allopurinol   - No onc plans to start chemo/treatment inpatient, can dc when medically appropriate with follow-up with Dr. Hilliard who will schedule cycle 2 of treatment.     # KRISTINE on CKD 3B   # Chronic Lower Extremity Edema   - baseline ~1.5-2.0  - monitor BUN/cr and urine output  - moss d/c'd  - avoid nephrotoxic medications   - will hold off Metolazone on 02/08 as BUN/Cr 115/2.7  - nephrology is following, recommendations noted  - restarted bumex 2mg daily per Neph; will monitor sCr closely    # CAD s/p CABG  # Chronic Afib on Xarelto  # HTN  - c/w metoprolol tartrate 25 milliGRAM(s) Oral two times a day  - NIFEdipine XL 60 milliGRAM(s) Oral daily  - apixaban 5 milliGRAM(s) Oral every 12 hours  - Holding ASA  - Still holding lisinopril and aldactone from last admission  - sylvester need to switch to eliquis given CKD from xaerlto   - started eliquis 5 mg BID     # LLE weakness  - CTH ordered today  - PT following  - 2/13 - Pending rehabilitation facility placement as patient unable to ambulate without assistances/safely so unsafe to discharge home. PT following.     # HLD   - atorvastatin 10 milliGRAM(s) Oral at bedtime  - resume Repatha on discharge    # Oral pain due to ulcer with black discoloration   - c/w local [facility] care  - consult ENT to examine black discoloration -> rec CT neck w/ IV contrast, magic mouthwash  - CT neck w/IV contrast no tongue mass.   - oncology thinking this is mucositis   - Monitor bleed, keep active TS and trend CBC.   - Will c/w magic mouthwash.   - Bleeding is resolving    # Agitation/ confusion   # Insomnia   - pt is calm now   - on Haldol PRN , Sertraline and melatonin   - supportive care     # GI  - On bowel regimen and PPIs.    # BPH  - tamsulosin 0.4 milliGRAM(s) Oral at bedtime    #Misc  #Code Status: Full Code  #DVT ppx: Eliquis  #GI ppx: PPI  #Diet: CC  #Activity: AAT. Following with PT given LLE weakness. Pending Rehabilitation Facility placement given deconditioning and LLE weakness.   #Discharge Dispo: rehab  pending: per request of Heme/onc getting CT hip and femur

## 2025-02-15 NOTE — PROGRESS NOTE ADULT - SUBJECTIVE AND OBJECTIVE BOX
MORGAN BUSH 70y Male  MRN#: [id number]   Hospital Day: 26d    SUBJECTIVE  Patient is a 70y old Male who presents with a chief complaint of Sepsis (14 Feb 2025 11:32)  Currently admitted to medicine with the primary diagnosis of Neutropenic fever      INTERVAL HPI AND OVERNIGHT EVENTS:  Patient was examined and seen at bedside    REVIEW OF SYMPTOMS:      OBJECTIVE  PAST MEDICAL & SURGICAL HISTORY  Diabetes mellitus, type 2    BPH (benign prostatic hyperplasia)    Water retention    Essential hypertension    Diabetes    CAD (coronary artery disease)    H/O hypercholesterolemia    Morbid obesity    Chronic kidney disease (CKD)    History of atrial fibrillation    H/O right coronary artery stent placement    History of resection of pancreas    History of cholecystectomy    History of left knee replacement    S/P CABG x 6    H/O cardiac radiofrequency ablation      ALLERGIES:  No Known Allergies    MEDICATIONS:  STANDING MEDICATIONS  albuterol/ipratropium for Nebulization 3 milliLiter(s) Nebulizer every 6 hours  allopurinol 150 milliGRAM(s) Oral daily  apixaban 5 milliGRAM(s) Oral every 12 hours  ascorbic acid 500 milliGRAM(s) Oral daily  atorvastatin 10 milliGRAM(s) Oral at bedtime  benzocaine 20% Spray 1 Spray(s) Topical three times a day  chlorhexidine 2% Cloths 1 Application(s) Topical daily  dextrose 5%. 1000 milliLiter(s) IV Continuous <Continuous>  dextrose 5%. 1000 milliLiter(s) IV Continuous <Continuous>  dextrose 50% Injectable 25 Gram(s) IV Push once  dextrose 50% Injectable 12.5 Gram(s) IV Push once  dextrose 50% Injectable 25 Gram(s) IV Push once  FIRST- Mouthwash  BLM 10 milliLiter(s) Swish and Spit every 4 hours  glucagon  Injectable 1 milliGRAM(s) IntraMuscular once  influenza  Vaccine (HIGH DOSE) 0.5 milliLiter(s) IntraMuscular once  insulin glargine Injectable (LANTUS) 40 Unit(s) SubCutaneous at bedtime  insulin lispro (ADMELOG) corrective regimen sliding scale   SubCutaneous three times a day before meals  insulin lispro Injectable (ADMELOG) 28 Unit(s) SubCutaneous three times a day before meals  melatonin 10 milliGRAM(s) Oral at bedtime  metoprolol tartrate 25 milliGRAM(s) Oral two times a day  NIFEdipine XL 60 milliGRAM(s) Oral daily  nystatin Cream 1 Application(s) Topical every 12 hours  pantoprazole  Injectable 40 milliGRAM(s) IV Push daily  polyethylene glycol 3350 17 Gram(s) Oral <User Schedule>  predniSONE   Tablet 20 milliGRAM(s) Oral daily  senna 2 Tablet(s) Oral every 12 hours  sertraline 50 milliGRAM(s) Oral daily  tamsulosin 0.4 milliGRAM(s) Oral at bedtime    PRN MEDICATIONS  aluminum hydroxide/magnesium hydroxide/simethicone Suspension 30 milliLiter(s) Oral every 4 hours PRN  bisacodyl 5 milliGRAM(s) Oral daily PRN  calcium carbonate    500 mG (Tums) Chewable 1 Tablet(s) Chew three times a day PRN  dextrose Oral Gel 15 Gram(s) Oral once PRN  HYDROmorphone  Injectable 0.5 milliGRAM(s) IV Push every 6 hours PRN  hydrOXYzine hydrochloride 25 milliGRAM(s) Oral every 6 hours PRN  magnesium hydroxide Suspension 30 milliLiter(s) Oral daily PRN  ondansetron Injectable 4 milliGRAM(s) IV Push every 6 hours PRN  simethicone 80 milliGRAM(s) Chew daily PRN      VITAL SIGNS: Last 24 Hours  T(C): 36.7 (15 Feb 2025 04:51), Max: 36.7 (14 Feb 2025 19:55)  T(F): 98.1 (15 Feb 2025 04:51), Max: 98.1 (15 Feb 2025 04:51)  HR: 89 (15 Feb 2025 04:51) (86 - 98)  BP: 121/60 (15 Feb 2025 04:51) (121/60 - 134/60)  BP(mean): 105 (15 Feb 2025 04:51) (105 - 105)  RR: 18 (15 Feb 2025 04:51) (18 - 18)  SpO2: 97% (15 Feb 2025 04:51) (97% - 100%)    LABS:                        9.6    30.66 )-----------( 563      ( 14 Feb 2025 06:49 )             29.9     02-14    140  |  101  |  82[HH]  ----------------------------<  95  5.2[H]   |  21  |  1.3    Ca    9.2      14 Feb 2025 06:49  Mg     2.7     02-14    TPro  6.1  /  Alb  3.5  /  TBili  0.3  /  DBili  x   /  AST  36  /  ALT  32  /  AlkPhos  228[H]  02-14      Urinalysis Basic - ( 14 Feb 2025 06:49 )    Color: x / Appearance: x / SG: x / pH: x  Gluc: 95 mg/dL / Ketone: x  / Bili: x / Urobili: x   Blood: x / Protein: x / Nitrite: x   Leuk Esterase: x / RBC: x / WBC x   Sq Epi: x / Non Sq Epi: x / Bacteria: x      PHYSICAL EXAM:  CONSTITUTIONAL: No acute distress, well-developed, well-groomed, AAOx3  HEAD: Atraumatic, normocephalic  EYES: EOM intact, PERRLA, conjunctiva and sclera clear  ENT: Supple, no masses, no thyromegaly, no bruits, no JVD; moist mucous membranes  PULMONARY: Clear to auscultation bilaterally; no wheezes, rales, or rhonchi  CARDIOVASCULAR: Regular rate and rhythm; no murmurs, rubs, or gallops  GASTROINTESTINAL: Soft, non-tender, non-distended; bowel sounds present  MUSCULOSKELETAL: 2+ peripheral pulses; no clubbing, no cyanosis, no edema  NEUROLOGY: non-focal  SKIN: No rashes or lesions; warm and dry     MORGAN BUSH 70y Male  MRN#: [id number]   Hospital Day: 26d    SUBJECTIVE  Patient is a 70y old Male who presents with a chief complaint of Sepsis (14 Feb 2025 11:32)  Currently admitted to medicine with the primary diagnosis of Neutropenic fever      INTERVAL HPI AND OVERNIGHT EVENTS:  Patient was examined and seen at bedside    REVIEW OF SYMPTOMS:  No fever  Has LLE weakness    OBJECTIVE  PAST MEDICAL & SURGICAL HISTORY  Diabetes mellitus, type 2    BPH (benign prostatic hyperplasia)    Water retention    Essential hypertension    Diabetes    CAD (coronary artery disease)    H/O hypercholesterolemia    Morbid obesity    Chronic kidney disease (CKD)    History of atrial fibrillation    H/O right coronary artery stent placement    History of resection of pancreas    History of cholecystectomy    History of left knee replacement    S/P CABG x 6    H/O cardiac radiofrequency ablation      ALLERGIES:  No Known Allergies    MEDICATIONS:  STANDING MEDICATIONS  albuterol/ipratropium for Nebulization 3 milliLiter(s) Nebulizer every 6 hours  allopurinol 150 milliGRAM(s) Oral daily  apixaban 5 milliGRAM(s) Oral every 12 hours  ascorbic acid 500 milliGRAM(s) Oral daily  atorvastatin 10 milliGRAM(s) Oral at bedtime  benzocaine 20% Spray 1 Spray(s) Topical three times a day  chlorhexidine 2% Cloths 1 Application(s) Topical daily  dextrose 5%. 1000 milliLiter(s) IV Continuous <Continuous>  dextrose 5%. 1000 milliLiter(s) IV Continuous <Continuous>  dextrose 50% Injectable 25 Gram(s) IV Push once  dextrose 50% Injectable 12.5 Gram(s) IV Push once  dextrose 50% Injectable 25 Gram(s) IV Push once  FIRST- Mouthwash  BLM 10 milliLiter(s) Swish and Spit every 4 hours  glucagon  Injectable 1 milliGRAM(s) IntraMuscular once  influenza  Vaccine (HIGH DOSE) 0.5 milliLiter(s) IntraMuscular once  insulin glargine Injectable (LANTUS) 40 Unit(s) SubCutaneous at bedtime  insulin lispro (ADMELOG) corrective regimen sliding scale   SubCutaneous three times a day before meals  insulin lispro Injectable (ADMELOG) 28 Unit(s) SubCutaneous three times a day before meals  melatonin 10 milliGRAM(s) Oral at bedtime  metoprolol tartrate 25 milliGRAM(s) Oral two times a day  NIFEdipine XL 60 milliGRAM(s) Oral daily  nystatin Cream 1 Application(s) Topical every 12 hours  pantoprazole  Injectable 40 milliGRAM(s) IV Push daily  polyethylene glycol 3350 17 Gram(s) Oral <User Schedule>  predniSONE   Tablet 20 milliGRAM(s) Oral daily  senna 2 Tablet(s) Oral every 12 hours  sertraline 50 milliGRAM(s) Oral daily  tamsulosin 0.4 milliGRAM(s) Oral at bedtime    PRN MEDICATIONS  aluminum hydroxide/magnesium hydroxide/simethicone Suspension 30 milliLiter(s) Oral every 4 hours PRN  bisacodyl 5 milliGRAM(s) Oral daily PRN  calcium carbonate    500 mG (Tums) Chewable 1 Tablet(s) Chew three times a day PRN  dextrose Oral Gel 15 Gram(s) Oral once PRN  HYDROmorphone  Injectable 0.5 milliGRAM(s) IV Push every 6 hours PRN  hydrOXYzine hydrochloride 25 milliGRAM(s) Oral every 6 hours PRN  magnesium hydroxide Suspension 30 milliLiter(s) Oral daily PRN  ondansetron Injectable 4 milliGRAM(s) IV Push every 6 hours PRN  simethicone 80 milliGRAM(s) Chew daily PRN      VITAL SIGNS: Last 24 Hours  T(C): 36.7 (15 Feb 2025 04:51), Max: 36.7 (14 Feb 2025 19:55)  T(F): 98.1 (15 Feb 2025 04:51), Max: 98.1 (15 Feb 2025 04:51)  HR: 89 (15 Feb 2025 04:51) (86 - 98)  BP: 121/60 (15 Feb 2025 04:51) (121/60 - 134/60)  BP(mean): 105 (15 Feb 2025 04:51) (105 - 105)  RR: 18 (15 Feb 2025 04:51) (18 - 18)  SpO2: 97% (15 Feb 2025 04:51) (97% - 100%)    LABS:                        9.6    30.66 )-----------( 563      ( 14 Feb 2025 06:49 )             29.9     02-14    140  |  101  |  82[HH]  ----------------------------<  95  5.2[H]   |  21  |  1.3    Ca    9.2      14 Feb 2025 06:49  Mg     2.7     02-14    TPro  6.1  /  Alb  3.5  /  TBili  0.3  /  DBili  x   /  AST  36  /  ALT  32  /  AlkPhos  228[H]  02-14      Urinalysis Basic - ( 14 Feb 2025 06:49 )    Color: x / Appearance: x / SG: x / pH: x  Gluc: 95 mg/dL / Ketone: x  / Bili: x / Urobili: x   Blood: x / Protein: x / Nitrite: x   Leuk Esterase: x / RBC: x / WBC x   Sq Epi: x / Non Sq Epi: x / Bacteria: x      PHYSICAL EXAM:  CONSTITUTIONAL: No acute distress, well-developed, well-groomed, AAOx3  PULMONARY: Clear to auscultation bilaterally; no wheezes, rales, or rhonchi  CARDIOVASCULAR: Regular rate and rhythm; no murmurs, rubs, or gallops  GASTROINTESTINAL: Soft, non-tender, non-distended; bowel sounds present  Right inguinal mass has significantly decreased, now around 2 cm  MUSCULOSKELETAL: 2+ peripheral pulses; no clubbing, no cyanosis, no edema  NEUROLOGY: Has signifiant pain on the left hip, during flexon, power 1/5 on LLE  SKIN: No rashes or lesions; warm and dry

## 2025-02-15 NOTE — PROGRESS NOTE ADULT - SUBJECTIVE AND OBJECTIVE BOX
MORGAN BUSH  70y  Male      Patient is a 70y old  Male who presents with a chief complaint of Sepsis (14 Feb 2025 11:32)      INTERVAL HPI/OVERNIGHT EVENTS: no acute events overnight. no complaints. endorses doing in bed exercises     T(C): 36.6 (02-15-25 @ 13:32), Max: 36.7 (02-14-25 @ 19:55)  HR: 82 (02-15-25 @ 13:32) (82 - 98)  BP: 142/79 (02-15-25 @ 13:32) (121/60 - 142/79)  RR: 18 (02-15-25 @ 13:32) (18 - 18)  SpO2: 96% (02-15-25 @ 13:32) (96% - 100%)  Wt(kg): --Vital Signs Last 24 Hrs  T(C): 36.6 (15 Feb 2025 13:32), Max: 36.7 (14 Feb 2025 19:55)  T(F): 97.9 (15 Feb 2025 13:32), Max: 98.1 (15 Feb 2025 04:51)  HR: 82 (15 Feb 2025 13:32) (82 - 98)  BP: 142/79 (15 Feb 2025 13:32) (121/60 - 142/79)  BP(mean): 100 (15 Feb 2025 13:32) (100 - 105)  RR: 18 (15 Feb 2025 13:32) (18 - 18)  SpO2: 96% (15 Feb 2025 13:32) (96% - 100%)    Parameters below as of 15 Feb 2025 13:32  Patient On (Oxygen Delivery Method): room air      02-14-25 @ 07:01  -  02-15-25 @ 07:00  --------------------------------------------------------  IN: 410 mL / OUT: 1000 mL / NET: -590 mL        PHYSICAL EXAM:  GENERAL: elderly M, NAD, non toxic appearing  PSYCH: no agitation, baseline mentation  NERVOUS SYSTEM:  Alert & Oriented X3   PULMONARY: symmetrical chest rise, no accessory muscle use  GI: Nondistended  EXTREMITIES:  No clubbing, cyanosis, or edema  SKIN: No rashes or lesions    Consultant(s) Notes Reviewed:  [x ] YES  [ ] NO    Discussed with Consultants/Other Providers [ x] YES     LABS                          9.8    26.93 )-----------( 564      ( 15 Feb 2025 08:36 )             30.6     02-15    136  |  98  |  74[HH]  ----------------------------<  283[H]  5.2[H]   |  25  |  1.6[H]    Ca    8.6      15 Feb 2025 08:36  Mg     2.5     02-15    TPro  5.6[L]  /  Alb  3.3[L]  /  TBili  0.3  /  DBili  x   /  AST  32  /  ALT  35  /  AlkPhos  209[H]  02-15      Urinalysis Basic - ( 15 Feb 2025 08:36 )    Color: x / Appearance: x / SG: x / pH: x  Gluc: 283 mg/dL / Ketone: x  / Bili: x / Urobili: x   Blood: x / Protein: x / Nitrite: x   Leuk Esterase: x / RBC: x / WBC x   Sq Epi: x / Non Sq Epi: x / Bacteria: x    POCT Blood Glucose.: 314 mg/dL (15 Feb 2025 11:04)    RADIOLOGY & ADDITIONAL TESTS:  - no images 2/15  Imaging Personally Reviewed:  [ ] YES  [ ] NO    HEALTH ISSUES - PROBLEM Dx:  Insomnia  Adjustment disorder with mixed anxiety and depressed mood      MEDICATIONS  (STANDING):  albuterol/ipratropium for Nebulization 3 milliLiter(s) Nebulizer every 6 hours  allopurinol 150 milliGRAM(s) Oral daily  apixaban 5 milliGRAM(s) Oral every 12 hours  ascorbic acid 500 milliGRAM(s) Oral daily  atorvastatin 10 milliGRAM(s) Oral at bedtime  benzocaine 20% Spray 1 Spray(s) Topical three times a day  chlorhexidine 2% Cloths 1 Application(s) Topical daily  dextrose 5%. 1000 milliLiter(s) (50 mL/Hr) IV Continuous <Continuous>  dextrose 5%. 1000 milliLiter(s) (100 mL/Hr) IV Continuous <Continuous>  dextrose 50% Injectable 25 Gram(s) IV Push once  dextrose 50% Injectable 12.5 Gram(s) IV Push once  dextrose 50% Injectable 25 Gram(s) IV Push once  FIRST- Mouthwash  BLM 10 milliLiter(s) Swish and Spit every 4 hours  glucagon  Injectable 1 milliGRAM(s) IntraMuscular once  influenza  Vaccine (HIGH DOSE) 0.5 milliLiter(s) IntraMuscular once  insulin glargine Injectable (LANTUS) 40 Unit(s) SubCutaneous at bedtime  insulin lispro (ADMELOG) corrective regimen sliding scale   SubCutaneous three times a day before meals  insulin lispro Injectable (ADMELOG) 28 Unit(s) SubCutaneous three times a day before meals  melatonin 10 milliGRAM(s) Oral at bedtime  metoprolol tartrate 25 milliGRAM(s) Oral two times a day  NIFEdipine XL 60 milliGRAM(s) Oral daily  nystatin Cream 1 Application(s) Topical every 12 hours  pantoprazole  Injectable 40 milliGRAM(s) IV Push daily  polyethylene glycol 3350 17 Gram(s) Oral <User Schedule>  predniSONE   Tablet 20 milliGRAM(s) Oral daily  senna 2 Tablet(s) Oral every 12 hours  sertraline 50 milliGRAM(s) Oral daily  tamsulosin 0.4 milliGRAM(s) Oral at bedtime    MEDICATIONS  (PRN):  aluminum hydroxide/magnesium hydroxide/simethicone Suspension 30 milliLiter(s) Oral every 4 hours PRN Dyspepsia  bisacodyl 5 milliGRAM(s) Oral daily PRN Constipation  calcium carbonate    500 mG (Tums) Chewable 1 Tablet(s) Chew three times a day PRN Dyspepsia  dextrose Oral Gel 15 Gram(s) Oral once PRN Blood Glucose LESS THAN 70 milliGRAM(s)/deciliter  HYDROmorphone  Injectable 0.5 milliGRAM(s) IV Push every 6 hours PRN Severe Pain (7 - 10)  hydrOXYzine hydrochloride 25 milliGRAM(s) Oral every 6 hours PRN Anxiety  magnesium hydroxide Suspension 30 milliLiter(s) Oral daily PRN Constipation  ondansetron Injectable 4 milliGRAM(s) IV Push every 6 hours PRN Nausea and/or Vomiting  simethicone 80 milliGRAM(s) Chew daily PRN Gas

## 2025-02-15 NOTE — PROGRESS NOTE ADULT - ASSESSMENT
#Large B-cell Lymphoma  #Concern for Rodriguez's  transformation   Patient report ED increased swelling and size of R inguinal mass for the past 2-3 months.   CT pelvis on 10/12/24 showed interval increase of mass 10 x 7 x 10 cm with inguinal lymphadenopathy, suspicious for neoplastic process.  Biopsy on 12/18/24 revealed diffuse large B-cell lymphoma with concern for Rodriguez's transformation.  Patient was seen by Dr. Hilliard on 1/8/25 for initial consultation and was advised to come to ED due to concern for tumor lysis syndrome.  CT C/A/P 1.9.25 Stable appearance bulky right inguinal and right iliac lymphadenopathy including partially imaged 10.1 cm right inguinal soft tissue mass/lymph node.  s/p Bone marrow biopsy on 1/10/25  Echo 1/10/25: EF 60-65%  hepatitis panel NEG   Completed cycle 1 of R-EPOCH (D1 on 1/11/2024). Tolerated well. Rituximab was given on D5.  Received zarxio after chemotherapy      #Neutropenic fever  S/p Zarxio   last received on   2/2 PNA, INF A    completed 10 day course of cefepime/tramiflu 1/30       # Acute Hypoxic Respiratory Failure 2/2 Multifocal Pneumonia/ Volume Overload/ Acute on chronic diastolic CHF/  Influenza A infection  presented with neutropenia   2/2   ECHO 1.25.25 53%     #Anemia   #Thrombocytosis   likely reactive     #CKD Stage 3B  baseline (1.8)    Recommendations   Due for cycle 2 of R-EPOCH on 2.2.25 which was held due to critical illness. Still requiring 4L oxygen via NC. Feels fatigued.   C/w allopurinol renally dosed   Follow up outpatient with Dr Hilliard on discharge for evaluation for initiating cycle2 of R-EPOCH.      #Large B-cell Lymphoma  #Concern for Rodriguez's  transformation   Patient report ED increased swelling and size of R inguinal mass for the past 2-3 months.   CT pelvis on 10/12/24 showed interval increase of mass 10 x 7 x 10 cm with inguinal lymphadenopathy, suspicious for neoplastic process.  Biopsy on 12/18/24 revealed diffuse large B-cell lymphoma with concern for Rodriguez's transformation.  Patient was seen by Dr. Hilliard on 1/8/25 for initial consultation and was advised to come to ED due to concern for tumor lysis syndrome.  CT C/A/P 1.9.25 Stable appearance bulky right inguinal and right iliac lymphadenopathy including partially imaged 10.1 cm right inguinal soft tissue mass/lymph node.  s/p Bone marrow biopsy on 1/10/25  Echo 1/10/25: EF 60-65%  hepatitis panel NEG   Completed cycle 1 of R-EPOCH (D1 on 1/11/2024). Tolerated well. Rituximab was given on D5.  Received zarxio after chemotherapy      #Neutropenic fever  S/p Zarxio   last received on   2/2 PNA, INF A    completed 10 day course of cefepime/tramiflu 1/30       # Acute Hypoxic Respiratory Failure 2/2 Multifocal Pneumonia/ Volume Overload/ Acute on chronic diastolic CHF/  Influenza A infection  presented with neutropenia   2/2   ECHO 1.25.25 53%     #Anemia   #Thrombocytosis   likely reactive     #CKD Stage 3B  baseline (1.8)    #Left lower extremity weakness  # Left hip pain  CT head negative    # Leukocytosis:  No obvious signs of infection, except for hip pain  Last dose of neupogen was 10 days back  Could be d/t post splenectomy status    Recommendations   Recommend getting CT left hip/femur to rule out fracture, acute pathology causing pain/weakness  If CT negative may need MRI lumbar spine to r/o radiculopathy causing pain and weakness  Due for cycle 2 of R-EPOCH on 2.2.25 which was held due to critical illness. Still requiring 4L oxygen via NC. Feels fatigued.   C/w allopurinol renally dosed   Follow up outpatient with Dr Hilliard on discharge for evaluation for initiating cycle2 of R-EPOCH.      #Large B-cell Lymphoma  #Concern for Rodriguez's  transformation   Patient report ED increased swelling and size of R inguinal mass for the past 2-3 months.   CT pelvis on 10/12/24 showed interval increase of mass 10 x 7 x 10 cm with inguinal lymphadenopathy, suspicious for neoplastic process.  Biopsy on 12/18/24 revealed diffuse large B-cell lymphoma with concern for Rodriguez's transformation.  Patient was seen by Dr. Hilliard on 1/8/25 for initial consultation and was advised to come to ED due to concern for tumor lysis syndrome.  CT C/A/P 1.9.25 Stable appearance bulky right inguinal and right iliac lymphadenopathy including partially imaged 10.1 cm right inguinal soft tissue mass/lymph node.  s/p Bone marrow biopsy on 1/10/25  Echo 1/10/25: EF 60-65%  hepatitis panel NEG   Completed cycle 1 of R-EPOCH (D1 on 1/11/2024). Tolerated well. Rituximab was given on D5.  Received zarxio after chemotherapy      #Neutropenic fever  S/p Zarxio   last received on   2/2 PNA, INF A    completed 10 day course of cefepime/tramiflu 1/30       # Acute Hypoxic Respiratory Failure 2/2 Multifocal Pneumonia/ Volume Overload/ Acute on chronic diastolic CHF/  Influenza A infection  presented with neutropenia   2/2   ECHO 1.25.25 53%     #Anemia   #Thrombocytosis   likely reactive     #CKD Stage 3B  baseline (1.8)    #Left lower extremity weakness  # Left hip pain  CT head negative    # Leukocytosis:  No obvious signs of infection, except for hip pain  Last dose of neupogen was 10 days back  Could be d/t post splenectomy status    Recommendations   Recommend getting CT left hip/femur to rule out fracture, acute pathology causing pain/weakness  If CT negative may need MRI lumbar spine to r/o radiculopathy causing pain and weakness  Due for cycle 2 of R-EPOCH on 2.2.25 which was held due to critical illness.   C/w allopurinol renally dosed   Follow up outpatient with Dr Hilliard on discharge for evaluation for initiating cycle2 of R-EPOCH.

## 2025-02-16 LAB
ALBUMIN SERPL ELPH-MCNC: 3.3 G/DL — LOW (ref 3.5–5.2)
ALP SERPL-CCNC: 192 U/L — HIGH (ref 30–115)
ALT FLD-CCNC: 37 U/L — SIGNIFICANT CHANGE UP (ref 0–41)
ANION GAP SERPL CALC-SCNC: 13 MMOL/L — SIGNIFICANT CHANGE UP (ref 7–14)
AST SERPL-CCNC: 36 U/L — SIGNIFICANT CHANGE UP (ref 0–41)
BASOPHILS # BLD AUTO: 0.17 K/UL — SIGNIFICANT CHANGE UP (ref 0–0.2)
BASOPHILS NFR BLD AUTO: 0.6 % — SIGNIFICANT CHANGE UP (ref 0–1)
BILIRUB SERPL-MCNC: 0.3 MG/DL — SIGNIFICANT CHANGE UP (ref 0.2–1.2)
BLD GP AB SCN SERPL QL: SIGNIFICANT CHANGE UP
BUN SERPL-MCNC: 70 MG/DL — CRITICAL HIGH (ref 10–20)
CALCIUM SERPL-MCNC: 8.7 MG/DL — SIGNIFICANT CHANGE UP (ref 8.4–10.5)
CHLORIDE SERPL-SCNC: 99 MMOL/L — SIGNIFICANT CHANGE UP (ref 98–110)
CO2 SERPL-SCNC: 26 MMOL/L — SIGNIFICANT CHANGE UP (ref 17–32)
CREAT SERPL-MCNC: 1.4 MG/DL — SIGNIFICANT CHANGE UP (ref 0.7–1.5)
EGFR: 55 ML/MIN/1.73M2 — LOW
EOSINOPHIL # BLD AUTO: 1.35 K/UL — HIGH (ref 0–0.7)
EOSINOPHIL NFR BLD AUTO: 4.6 % — SIGNIFICANT CHANGE UP (ref 0–8)
GLUCOSE BLDC GLUCOMTR-MCNC: 120 MG/DL — HIGH (ref 70–99)
GLUCOSE BLDC GLUCOMTR-MCNC: 121 MG/DL — HIGH (ref 70–99)
GLUCOSE BLDC GLUCOMTR-MCNC: 206 MG/DL — HIGH (ref 70–99)
GLUCOSE BLDC GLUCOMTR-MCNC: 280 MG/DL — HIGH (ref 70–99)
GLUCOSE BLDC GLUCOMTR-MCNC: 387 MG/DL — HIGH (ref 70–99)
GLUCOSE SERPL-MCNC: 138 MG/DL — HIGH (ref 70–99)
HCT VFR BLD CALC: 30 % — LOW (ref 42–52)
HGB BLD-MCNC: 9.7 G/DL — LOW (ref 14–18)
IMM GRANULOCYTES NFR BLD AUTO: 6.2 % — HIGH (ref 0.1–0.3)
LYMPHOCYTES # BLD AUTO: 1.67 K/UL — SIGNIFICANT CHANGE UP (ref 1.2–3.4)
LYMPHOCYTES # BLD AUTO: 5.7 % — LOW (ref 20.5–51.1)
MAGNESIUM SERPL-MCNC: 2.3 MG/DL — SIGNIFICANT CHANGE UP (ref 1.8–2.4)
MCHC RBC-ENTMCNC: 30.6 PG — SIGNIFICANT CHANGE UP (ref 27–31)
MCHC RBC-ENTMCNC: 32.3 G/DL — SIGNIFICANT CHANGE UP (ref 32–37)
MCV RBC AUTO: 94.6 FL — HIGH (ref 80–94)
MONOCYTES # BLD AUTO: 2.34 K/UL — HIGH (ref 0.1–0.6)
MONOCYTES NFR BLD AUTO: 7.9 % — SIGNIFICANT CHANGE UP (ref 1.7–9.3)
NEUTROPHILS # BLD AUTO: 22.15 K/UL — HIGH (ref 1.4–6.5)
NEUTROPHILS NFR BLD AUTO: 75 % — SIGNIFICANT CHANGE UP (ref 42.2–75.2)
NRBC BLD AUTO-RTO: 2 /100 WBCS — HIGH (ref 0–0)
PLATELET # BLD AUTO: 591 K/UL — HIGH (ref 130–400)
PMV BLD: 11.3 FL — HIGH (ref 7.4–10.4)
POTASSIUM SERPL-MCNC: 5.2 MMOL/L — HIGH (ref 3.5–5)
POTASSIUM SERPL-SCNC: 5.2 MMOL/L — HIGH (ref 3.5–5)
PROT SERPL-MCNC: 5.6 G/DL — LOW (ref 6–8)
RBC # BLD: 3.17 M/UL — LOW (ref 4.7–6.1)
RBC # FLD: 16.5 % — HIGH (ref 11.5–14.5)
SODIUM SERPL-SCNC: 138 MMOL/L — SIGNIFICANT CHANGE UP (ref 135–146)
WBC # BLD: 29.51 K/UL — HIGH (ref 4.8–10.8)
WBC # FLD AUTO: 29.51 K/UL — HIGH (ref 4.8–10.8)

## 2025-02-16 PROCEDURE — 99232 SBSQ HOSP IP/OBS MODERATE 35: CPT

## 2025-02-16 RX ADMIN — INSULIN LISPRO 10: 100 INJECTION, SOLUTION INTRAVENOUS; SUBCUTANEOUS at 18:49

## 2025-02-16 RX ADMIN — Medication 500 MILLIGRAM(S): at 11:35

## 2025-02-16 RX ADMIN — Medication 0.5 MILLIGRAM(S): at 04:46

## 2025-02-16 RX ADMIN — PREDNISONE 20 MILLIGRAM(S): 20 TABLET ORAL at 06:18

## 2025-02-16 RX ADMIN — BENZOCAINE 1 SPRAY(S): 220 SPRAY, METERED PERIODONTAL at 06:18

## 2025-02-16 RX ADMIN — HYDROXYZINE HYDROCHLORIDE 25 MILLIGRAM(S): 25 TABLET, FILM COATED ORAL at 16:15

## 2025-02-16 RX ADMIN — INSULIN LISPRO 33 UNIT(S): 100 INJECTION, SOLUTION INTRAVENOUS; SUBCUTANEOUS at 11:35

## 2025-02-16 RX ADMIN — Medication 40 MILLIGRAM(S): at 11:38

## 2025-02-16 RX ADMIN — INSULIN LISPRO 33 UNIT(S): 100 INJECTION, SOLUTION INTRAVENOUS; SUBCUTANEOUS at 22:17

## 2025-02-16 RX ADMIN — DIPHENHYDRAMINE HYDROCHLORIDE AND LIDOCAINE HYDROCHLORIDE AND ALUMINUM HYDROXIDE AND MAGNESIUM HYDRO 10 MILLILITER(S): KIT at 11:35

## 2025-02-16 RX ADMIN — INSULIN LISPRO 33 UNIT(S): 100 INJECTION, SOLUTION INTRAVENOUS; SUBCUTANEOUS at 18:50

## 2025-02-16 RX ADMIN — Medication 0.5 MILLIGRAM(S): at 20:14

## 2025-02-16 RX ADMIN — BENZOCAINE 1 SPRAY(S): 220 SPRAY, METERED PERIODONTAL at 14:02

## 2025-02-16 RX ADMIN — INSULIN GLARGINE-YFGN 45 UNIT(S): 100 INJECTION, SOLUTION SUBCUTANEOUS at 22:18

## 2025-02-16 RX ADMIN — POLYETHYLENE GLYCOL 3350 17 GRAM(S): 17 POWDER, FOR SOLUTION ORAL at 20:14

## 2025-02-16 RX ADMIN — APIXABAN 5 MILLIGRAM(S): 2.5 TABLET, FILM COATED ORAL at 08:55

## 2025-02-16 RX ADMIN — Medication 0.5 MILLIGRAM(S): at 20:58

## 2025-02-16 RX ADMIN — BUMETANIDE 2 MILLIGRAM(S): 1 TABLET ORAL at 06:18

## 2025-02-16 RX ADMIN — DIPHENHYDRAMINE HYDROCHLORIDE AND LIDOCAINE HYDROCHLORIDE AND ALUMINUM HYDROXIDE AND MAGNESIUM HYDRO 10 MILLILITER(S): KIT at 18:51

## 2025-02-16 RX ADMIN — Medication 10 MILLIGRAM(S): at 22:19

## 2025-02-16 RX ADMIN — Medication 650 MILLIGRAM(S): at 20:57

## 2025-02-16 RX ADMIN — TAMSULOSIN HYDROCHLORIDE 0.4 MILLIGRAM(S): 0.4 CAPSULE ORAL at 22:19

## 2025-02-16 RX ADMIN — Medication 650 MILLIGRAM(S): at 03:00

## 2025-02-16 RX ADMIN — IPRATROPIUM BROMIDE AND ALBUTEROL SULFATE 3 MILLILITER(S): .5; 2.5 SOLUTION RESPIRATORY (INHALATION) at 20:04

## 2025-02-16 RX ADMIN — DIPHENHYDRAMINE HYDROCHLORIDE AND LIDOCAINE HYDROCHLORIDE AND ALUMINUM HYDROXIDE AND MAGNESIUM HYDRO 10 MILLILITER(S): KIT at 06:19

## 2025-02-16 RX ADMIN — Medication 650 MILLIGRAM(S): at 18:55

## 2025-02-16 RX ADMIN — Medication 150 MILLIGRAM(S): at 11:35

## 2025-02-16 RX ADMIN — METOPROLOL SUCCINATE 25 MILLIGRAM(S): 50 TABLET, EXTENDED RELEASE ORAL at 18:52

## 2025-02-16 RX ADMIN — Medication 60 MILLIGRAM(S): at 06:18

## 2025-02-16 RX ADMIN — APIXABAN 5 MILLIGRAM(S): 2.5 TABLET, FILM COATED ORAL at 20:13

## 2025-02-16 RX ADMIN — NYSTATIN 1 APPLICATION(S): 100000 CREAM TOPICAL at 19:03

## 2025-02-16 RX ADMIN — IPRATROPIUM BROMIDE AND ALBUTEROL SULFATE 3 MILLILITER(S): .5; 2.5 SOLUTION RESPIRATORY (INHALATION) at 14:18

## 2025-02-16 RX ADMIN — DIPHENHYDRAMINE HYDROCHLORIDE AND LIDOCAINE HYDROCHLORIDE AND ALUMINUM HYDROXIDE AND MAGNESIUM HYDRO 10 MILLILITER(S): KIT at 14:01

## 2025-02-16 RX ADMIN — Medication 1 APPLICATION(S): at 11:45

## 2025-02-16 RX ADMIN — METOPROLOL SUCCINATE 25 MILLIGRAM(S): 50 TABLET, EXTENDED RELEASE ORAL at 06:18

## 2025-02-16 RX ADMIN — INSULIN LISPRO 4: 100 INJECTION, SOLUTION INTRAVENOUS; SUBCUTANEOUS at 11:35

## 2025-02-16 RX ADMIN — ATORVASTATIN CALCIUM 10 MILLIGRAM(S): 80 TABLET, FILM COATED ORAL at 22:18

## 2025-02-16 RX ADMIN — INSULIN LISPRO 33 UNIT(S): 100 INJECTION, SOLUTION INTRAVENOUS; SUBCUTANEOUS at 08:55

## 2025-02-16 RX ADMIN — Medication 2 TABLET(S): at 18:49

## 2025-02-16 NOTE — PROGRESS NOTE ADULT - ATTENDING COMMENTS
Plan as above. At this time, patient is currently HDS, afebrile and sating well on RA. Labs are most notable for a prominent leukocytosis that is downtrending and likely attributed to his malignancy. At this time, awaiting Radiologist read on CT hip/femur to rule out metastatic fracture/mets. Also awaiting auth for rehab.

## 2025-02-16 NOTE — PROGRESS NOTE ADULT - ASSESSMENT
69-year-old male with a history of CAD, DM, atrial fibrillation, and recent chemotherapy for diffuse large B-cell lymphoma was admitted for acute hypoxic respiratory failure secondary to multifocal pneumonia, complicated by influenza A and neutropenic fever.  He improved with antibiotics and respiratory support, but then developed a second episode of respiratory decompensation requiring further antibiotics and BiPAP.  His lymphoma is being monitored for possible Rodriguez transformation.  He also experienced hyperglycemia requiring insulin drip and reports lower extremity weakness and constipation. Currently HDS, afebrile and sating well on RA.     # DM  - CC diet   - FS - self monitored via dex com --> On insulin pump @2.85 at home   - Endo consult: keep off insulin pump  - lantus 10 lispro 3 --> has been steadily increased : lantus increased from 30 to 40 and lispro from 10 to 16 on 02/08 --> s/p Humulin 7 units 1 dose  - Patient received total of 31 and Humulin Admelog 16x3  - Repeat BMP showed Gluocse of 529, mild increase in AG (12 --> 15) and Beta hydroxy < 0.2  - On 2/8 started on insulin drip (7 units /min) and upgraded to MICU, NS fluid with 20 mEq K but slower rate given HFpEF decompensation  - Off insulin drip overnight, switched to SQ insulin, monitor BS.  - 2/9 Patient transitioned off insulin drip to Basal Bolus Insulin, but missed dose. Glucose elevated and Insulin drip was restarted.   - 2/10 Insulin drip stopped. Glucose within normal range. Patient transitioned to basal bolus insulin.   - 2/10 Adjust basal bolus regimen as needed to maintain target blood glucose.   - currently on lantus 40u qhs and admelog 28u ac--> increased to lantus 45u qhs and admelog 33u ac + SSI    # Acute Hypoxic Respiratory Failure 2/2 Multifocal Pneumonia/  Acute on chronic diastolic CHF/  Influenza A infection/ RASHAD/ OHS   - clinically improving, on high low oxygen for now, will taper off as tolerated   - NIV PRN and QHS   - nebs Q 6 hours, supportive care, aspiration precautions   - CTA 1/20: Negative for pulmonary emboli. Interval development of infiltrates within the lower lobes and lingula which may reflect acute multilobar pneumonia  - blood cx NGTD x2, UA negative, influenza A + , Nasal MRSA negative   - pt was consulted by ID, treated with IV Abx, completed the course of Tamiflu   - TTE 1/10/25: poor study, EF 60-65%, pt has moderated TR  - fluid restriction 1200 ml in 24 hours , intake and output monitoring, daily weight   - Desaturated again while in ICU on NC, and Bipap was started;   - CT chest: Worsening bilateral peribronchial opacities compatible with bronchopneumonia  -now on the floor and on RA  - duonebs  - steroid taper; currently on prednisone 20mg x3 days--> prednisone 10mg x3 days(start 2/17)  - s/p  Cefepime course     # 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 Tolerated well. Rituximab was given on D5   - Uric acid 10.3 on 1/16/25, s/p 3 mg IV rasburicase  - sp Zarxio  - daily CBC with diff, active T&S  - c/w Allopurinol   - No onc plans to start chemo/treatment inpatient, can dc when medically appropriate with follow-up with Dr. Hilliard who will schedule cycle 2 of treatment.     # KRISTINE on CKD 3B   # Chronic Lower Extremity Edema   - baseline ~1.5-2.0  - monitor BUN/cr and urine output  - moss d/c'd  - avoid nephrotoxic medications   - will hold off Metolazone on 02/08 as BUN/Cr 115/2.7  - nephrology is following, recommendations noted  - restarted bumex 2mg daily per Neph; will monitor sCr closely    # CAD s/p CABG  # Chronic Afib on Xarelto  # HTN  - c/w metoprolol tartrate 25 milliGRAM(s) Oral two times a day  - NIFEdipine XL 60 milliGRAM(s) Oral daily  - apixaban 5 milliGRAM(s) Oral every 12 hours  - Holding ASA  - Still holding lisinopril and aldactone from last admission  - sylvester need to switch to eliquis given CKD from xaerlto   - started eliquis 5 mg BID     # LLE weakness  - CTH ordered today  - PT following  - 2/13 - Pending rehabilitation facility placement as patient unable to ambulate without assistances/safely so unsafe to discharge home. PT following.     # HLD   - atorvastatin 10 milliGRAM(s) Oral at bedtime  - resume Repatha on discharge    # Oral pain due to ulcer with black discoloration   - c/w local [facility] care  - consult ENT to examine black discoloration -> rec CT neck w/ IV contrast, magic mouthwash  - CT neck w/IV contrast no tongue mass.   - oncology thinking this is mucositis   - Monitor bleed, keep active TS and trend CBC.   - Will c/w magic mouthwash.   - Bleeding is resolving    # Agitation/ confusion   # Insomnia   - pt is calm now   - on Haldol PRN , Sertraline and melatonin   - supportive care     # GI  - On bowel regimen and PPIs.    # BPH  - tamsulosin 0.4 milliGRAM(s) Oral at bedtime    #Misc  #Code Status: Full Code  #DVT ppx: Eliquis  #GI ppx: PPI  #Diet: CC  #Activity: AAT. Following with PT given LLE weakness. Pending Rehabilitation Facility placement given deconditioning and LLE weakness.   #Discharge Dispo: rehab  pending: per request of Heme/onc getting CT hip and femur

## 2025-02-16 NOTE — PROGRESS NOTE ADULT - SUBJECTIVE AND OBJECTIVE BOX
SUBJECTIVE/OVERNIGHT EVENTS  Today is hospital day 27d. This morning patient was seen and examined at bedside, resting comfortably in bed.  AOx4, vitals wnl on RA. No catheters or lines inserted.  No acute or major events overnight.    HPI:  69-year-old man with extensive medical history including CAD (status post CABG;), DM (on insulin pump), atrial fibrillation on Xarelto, history of distal pancreatomy and splenectomy for unclear reason referred to the ED by his oncologist for abnormal labs. Patient was recently diagnosed with Diffuse large B cell Lymphoma on pathology of right groin mass and is following with [facility] oncology team. Patient was discharged on 01.17.2025 from hospital after inpatient chemotherapy (Completed cycle 1 of R-EPOCH (D1 on 1/11/2024). Tolerated well. Rituximab was given on D5).   Over past 3 days patient started to get a productive cough with yellow sputum production.  Patient is also endorsing chills and nausea which he states is baseline. He had 1 reading on low grade fever 100.5 on day of presentation.    On presentation vitals:  · BP Systolic	160 mm Hg  · BP Diastolic	86 mm Hg  · Heart Rate	89 /min  · Respiration Rate (breaths/min)	20 /min  · Temp (F)	98.9 Degrees F  · Temp (C)	37.2 Degrees C  · Temp site	oral    ED course:  O2 sats low on 4L O2 so he was promptly started on BIPAP. CXR with LLL infiltrate; sepsis labs sent with gentle hydration; abx given, Labs with neutropenia and elevated BNP, trop 68. EKG nonischemic.    CTA Chest: Negative for pulmonary emboli. Interval development of infiltrates within the lower lobes and lingula which may reflect acute multilobar pneumonia.    Patient admitted to medicine for acute hypoxic respiratory failure.   (20 Jan 2025 23:47)      VITALS  T(F): 98.3 (02-16-25 @ 05:26), Max: 98.3 (02-15-25 @ 20:16)  HR: 86 (02-16-25 @ 05:26) (80 - 86)  BP: 118/73 (02-16-25 @ 05:26) (116/67 - 142/79)  RR: 18 (02-16-25 @ 05:26) (18 - 18)  SpO2: 95% (02-16-25 @ 05:26) (95% - 96%)  POCT Blood Glucose.: 121 mg/dL (02-16-25 @ 07:30)  POCT Blood Glucose.: 120 mg/dL (02-16-25 @ 06:30)  POCT Blood Glucose.: 178 mg/dL (02-15-25 @ 21:09)  POCT Blood Glucose.: 165 mg/dL (02-15-25 @ 17:06)  POCT Blood Glucose.: 314 mg/dL (02-15-25 @ 11:04)          PHYSICAL EXAM      GENERAL: No acute distress   CHEST/LUNG: Clear to auscultation bilaterally; No wheeze  HEART: Regular rate and rhythm; No murmurs  ABDOMEN: Soft, nontender, nondistended.  BACK: no spinal tenderness, no CVA tenderness  EXTREMITIES:  RLE edema, chronic as per pt. Previous surgery scar (Bypass)  PSYCH: Nl behavior, nl affect  NEUROLOGY: AAOx3, non-focal, moves all extremities spontaneously  SKIN: Normal color, No rashes or lesions          MEDICATIONS  STANDING MEDICATIONS  albuterol/ipratropium for Nebulization 3 milliLiter(s) Nebulizer every 6 hours  allopurinol 150 milliGRAM(s) Oral daily  apixaban 5 milliGRAM(s) Oral every 12 hours  ascorbic acid 500 milliGRAM(s) Oral daily  atorvastatin 10 milliGRAM(s) Oral at bedtime  benzocaine 20% Spray 1 Spray(s) Topical three times a day  buMETAnide 2 milliGRAM(s) Oral daily  chlorhexidine 2% Cloths 1 Application(s) Topical daily  dextrose 5%. 1000 milliLiter(s) IV Continuous <Continuous>  dextrose 5%. 1000 milliLiter(s) IV Continuous <Continuous>  dextrose 50% Injectable 25 Gram(s) IV Push once  dextrose 50% Injectable 12.5 Gram(s) IV Push once  dextrose 50% Injectable 25 Gram(s) IV Push once  FIRST- Mouthwash  BLM 10 milliLiter(s) Swish and Spit every 4 hours  glucagon  Injectable 1 milliGRAM(s) IntraMuscular once  influenza  Vaccine (HIGH DOSE) 0.5 milliLiter(s) IntraMuscular once  insulin glargine Injectable (LANTUS) 45 Unit(s) SubCutaneous at bedtime  insulin lispro (ADMELOG) corrective regimen sliding scale   SubCutaneous three times a day before meals  insulin lispro Injectable (ADMELOG) 33 Unit(s) SubCutaneous four times a day before meals  melatonin 10 milliGRAM(s) Oral at bedtime  metoprolol tartrate 25 milliGRAM(s) Oral two times a day  NIFEdipine XL 60 milliGRAM(s) Oral daily  nystatin Cream 1 Application(s) Topical every 12 hours  pantoprazole  Injectable 40 milliGRAM(s) IV Push daily  polyethylene glycol 3350 17 Gram(s) Oral <User Schedule>  senna 2 Tablet(s) Oral every 12 hours  tamsulosin 0.4 milliGRAM(s) Oral at bedtime    PRN MEDICATIONS  acetaminophen     Tablet .. 650 milliGRAM(s) Oral every 6 hours PRN  aluminum hydroxide/magnesium hydroxide/simethicone Suspension 30 milliLiter(s) Oral every 4 hours PRN  bisacodyl 5 milliGRAM(s) Oral daily PRN  calcium carbonate    500 mG (Tums) Chewable 1 Tablet(s) Chew three times a day PRN  dextrose Oral Gel 15 Gram(s) Oral once PRN  HYDROmorphone  Injectable 0.5 milliGRAM(s) IV Push every 6 hours PRN  hydrOXYzine hydrochloride 25 milliGRAM(s) Oral every 6 hours PRN  magnesium hydroxide Suspension 30 milliLiter(s) Oral daily PRN  ondansetron Injectable 4 milliGRAM(s) IV Push every 6 hours PRN  simethicone 80 milliGRAM(s) Chew daily PRN        PAST MEDICAL & SURGICAL HISTORY:  Diabetes mellitus, type 2      BPH (benign prostatic hyperplasia)      Water retention      Essential hypertension      Diabetes      CAD (coronary artery disease)      H/O hypercholesterolemia      Morbid obesity      Chronic kidney disease (CKD)      History of atrial fibrillation      H/O right coronary artery stent placement      History of resection of pancreas      History of cholecystectomy      History of left knee replacement      S/P CABG x 6      H/O cardiac radiofrequency ablation          LABS             9.8    26.93 )-----------( 564      ( 02-15-25 @ 08:36 )             30.6     136  |  98  |  74  -------------------------<  283   02-15-25 @ 08:36  5.2  |  25  |  1.6    Ca      8.6     02-15-25 @ 08:36  Mg     2.5     02-15-25 @ 08:36    TPro  5.6  /  Alb  3.3  /  TBili  0.3  /  DBili  x   /  AST  32  /  ALT  35  /  AlkPhos  209  /  GGT  x     02-15-25 @ 08:36        Urinalysis Basic - ( 15 Feb 2025 08:36 )    Color: x / Appearance: x / SG: x / pH: x  Gluc: 283 mg/dL / Ketone: x  / Bili: x / Urobili: x   Blood: x / Protein: x / Nitrite: x   Leuk Esterase: x / RBC: x / WBC x   Sq Epi: x / Non Sq Epi: x / Bacteria: x          IMAGING

## 2025-02-16 NOTE — PROGRESS NOTE ADULT - ATTENDING SUPERVISION STATEMENT
Fellow
Resident
Fellow
Resident
Fellow
Resident
Resident

## 2025-02-17 VITALS
SYSTOLIC BLOOD PRESSURE: 135 MMHG | RESPIRATION RATE: 18 BRPM | DIASTOLIC BLOOD PRESSURE: 76 MMHG | HEART RATE: 96 BPM | TEMPERATURE: 98 F

## 2025-02-17 LAB
ALBUMIN SERPL ELPH-MCNC: 3.4 G/DL — LOW (ref 3.5–5.2)
ALP SERPL-CCNC: 191 U/L — HIGH (ref 30–115)
ALT FLD-CCNC: 38 U/L — SIGNIFICANT CHANGE UP (ref 0–41)
ANION GAP SERPL CALC-SCNC: 13 MMOL/L — SIGNIFICANT CHANGE UP (ref 7–14)
AST SERPL-CCNC: 32 U/L — SIGNIFICANT CHANGE UP (ref 0–41)
BASOPHILS # BLD AUTO: 0.13 K/UL — SIGNIFICANT CHANGE UP (ref 0–0.2)
BASOPHILS NFR BLD AUTO: 0.5 % — SIGNIFICANT CHANGE UP (ref 0–1)
BILIRUB SERPL-MCNC: 0.3 MG/DL — SIGNIFICANT CHANGE UP (ref 0.2–1.2)
BUN SERPL-MCNC: 67 MG/DL — CRITICAL HIGH (ref 10–20)
CALCIUM SERPL-MCNC: 8.9 MG/DL — SIGNIFICANT CHANGE UP (ref 8.4–10.5)
CHLORIDE SERPL-SCNC: 97 MMOL/L — LOW (ref 98–110)
CO2 SERPL-SCNC: 30 MMOL/L — SIGNIFICANT CHANGE UP (ref 17–32)
CREAT SERPL-MCNC: 1.6 MG/DL — HIGH (ref 0.7–1.5)
EGFR: 47 ML/MIN/1.73M2 — LOW
EOSINOPHIL # BLD AUTO: 1.08 K/UL — HIGH (ref 0–0.7)
EOSINOPHIL NFR BLD AUTO: 3.8 % — SIGNIFICANT CHANGE UP (ref 0–8)
GLUCOSE BLDC GLUCOMTR-MCNC: 163 MG/DL — HIGH (ref 70–99)
GLUCOSE BLDC GLUCOMTR-MCNC: 187 MG/DL — HIGH (ref 70–99)
GLUCOSE BLDC GLUCOMTR-MCNC: 72 MG/DL — SIGNIFICANT CHANGE UP (ref 70–99)
GLUCOSE BLDC GLUCOMTR-MCNC: 98 MG/DL — SIGNIFICANT CHANGE UP (ref 70–99)
GLUCOSE SERPL-MCNC: 37 MG/DL — CRITICAL LOW (ref 70–99)
HCT VFR BLD CALC: 30.8 % — LOW (ref 42–52)
HGB BLD-MCNC: 9.6 G/DL — LOW (ref 14–18)
IMM GRANULOCYTES NFR BLD AUTO: 5.1 % — HIGH (ref 0.1–0.3)
LYMPHOCYTES # BLD AUTO: 1.99 K/UL — SIGNIFICANT CHANGE UP (ref 1.2–3.4)
LYMPHOCYTES # BLD AUTO: 6.9 % — LOW (ref 20.5–51.1)
MAGNESIUM SERPL-MCNC: 2.3 MG/DL — SIGNIFICANT CHANGE UP (ref 1.8–2.4)
MCHC RBC-ENTMCNC: 30.2 PG — SIGNIFICANT CHANGE UP (ref 27–31)
MCHC RBC-ENTMCNC: 31.2 G/DL — LOW (ref 32–37)
MCV RBC AUTO: 96.9 FL — HIGH (ref 80–94)
MONOCYTES # BLD AUTO: 3.1 K/UL — HIGH (ref 0.1–0.6)
MONOCYTES NFR BLD AUTO: 10.8 % — HIGH (ref 1.7–9.3)
NEUTROPHILS # BLD AUTO: 20.9 K/UL — HIGH (ref 1.4–6.5)
NEUTROPHILS NFR BLD AUTO: 72.9 % — SIGNIFICANT CHANGE UP (ref 42.2–75.2)
NRBC BLD AUTO-RTO: 1 /100 WBCS — HIGH (ref 0–0)
PLATELET # BLD AUTO: 612 K/UL — HIGH (ref 130–400)
PMV BLD: 11.1 FL — HIGH (ref 7.4–10.4)
POTASSIUM SERPL-MCNC: 5.1 MMOL/L — HIGH (ref 3.5–5)
POTASSIUM SERPL-SCNC: 5.1 MMOL/L — HIGH (ref 3.5–5)
PROT SERPL-MCNC: 5.8 G/DL — LOW (ref 6–8)
RBC # BLD: 3.18 M/UL — LOW (ref 4.7–6.1)
RBC # FLD: 16.8 % — HIGH (ref 11.5–14.5)
SODIUM SERPL-SCNC: 140 MMOL/L — SIGNIFICANT CHANGE UP (ref 135–146)
WBC # BLD: 28.66 K/UL — HIGH (ref 4.8–10.8)
WBC # FLD AUTO: 28.66 K/UL — HIGH (ref 4.8–10.8)

## 2025-02-17 PROCEDURE — 99239 HOSP IP/OBS DSCHRG MGMT >30: CPT

## 2025-02-17 RX ORDER — PREDNISONE 20 MG/1
1 TABLET ORAL
Qty: 2 | Refills: 0
Start: 2025-02-17 | End: 2025-02-18

## 2025-02-17 RX ORDER — INSULIN LISPRO 100 U/ML
24 INJECTION, SOLUTION INTRAVENOUS; SUBCUTANEOUS
Refills: 0 | Status: DISCONTINUED | OUTPATIENT
Start: 2025-02-17 | End: 2025-02-17

## 2025-02-17 RX ORDER — SENNA 187 MG
2 TABLET ORAL
Qty: 0 | Refills: 0 | DISCHARGE
Start: 2025-02-17

## 2025-02-17 RX ORDER — INSULIN GLARGINE-YFGN 100 [IU]/ML
40 INJECTION, SOLUTION SUBCUTANEOUS
Qty: 0 | Refills: 0 | DISCHARGE
Start: 2025-02-17

## 2025-02-17 RX ORDER — INSULIN LISPRO 100 U/ML
24 INJECTION, SOLUTION INTRAVENOUS; SUBCUTANEOUS
Qty: 0 | Refills: 0 | DISCHARGE
Start: 2025-02-17

## 2025-02-17 RX ORDER — BUMETANIDE 1 MG/1
1 TABLET ORAL
Qty: 0 | Refills: 0 | DISCHARGE
Start: 2025-02-17

## 2025-02-17 RX ORDER — SERTRALINE 100 MG/1
1 TABLET, FILM COATED ORAL
Qty: 30 | Refills: 0
Start: 2025-02-17

## 2025-02-17 RX ORDER — INSULIN GLARGINE-YFGN 100 [IU]/ML
40 INJECTION, SOLUTION SUBCUTANEOUS AT BEDTIME
Refills: 0 | Status: DISCONTINUED | OUTPATIENT
Start: 2025-02-17 | End: 2025-02-17

## 2025-02-17 RX ADMIN — INSULIN LISPRO 33 UNIT(S): 100 INJECTION, SOLUTION INTRAVENOUS; SUBCUTANEOUS at 08:28

## 2025-02-17 RX ADMIN — BENZOCAINE 1 SPRAY(S): 220 SPRAY, METERED PERIODONTAL at 05:19

## 2025-02-17 RX ADMIN — Medication 0.5 MILLIGRAM(S): at 17:20

## 2025-02-17 RX ADMIN — POLYETHYLENE GLYCOL 3350 17 GRAM(S): 17 POWDER, FOR SOLUTION ORAL at 14:38

## 2025-02-17 RX ADMIN — NYSTATIN 1 APPLICATION(S): 100000 CREAM TOPICAL at 05:18

## 2025-02-17 RX ADMIN — Medication 1 APPLICATION(S): at 11:53

## 2025-02-17 RX ADMIN — BENZOCAINE 1 SPRAY(S): 220 SPRAY, METERED PERIODONTAL at 01:20

## 2025-02-17 RX ADMIN — Medication 650 MILLIGRAM(S): at 09:47

## 2025-02-17 RX ADMIN — DIPHENHYDRAMINE HYDROCHLORIDE AND LIDOCAINE HYDROCHLORIDE AND ALUMINUM HYDROXIDE AND MAGNESIUM HYDRO 10 MILLILITER(S): KIT at 17:24

## 2025-02-17 RX ADMIN — POLYETHYLENE GLYCOL 3350 17 GRAM(S): 17 POWDER, FOR SOLUTION ORAL at 05:16

## 2025-02-17 RX ADMIN — INSULIN LISPRO 24 UNIT(S): 100 INJECTION, SOLUTION INTRAVENOUS; SUBCUTANEOUS at 17:25

## 2025-02-17 RX ADMIN — IPRATROPIUM BROMIDE AND ALBUTEROL SULFATE 3 MILLILITER(S): .5; 2.5 SOLUTION RESPIRATORY (INHALATION) at 07:36

## 2025-02-17 RX ADMIN — NYSTATIN 1 APPLICATION(S): 100000 CREAM TOPICAL at 17:26

## 2025-02-17 RX ADMIN — INSULIN LISPRO 2: 100 INJECTION, SOLUTION INTRAVENOUS; SUBCUTANEOUS at 11:52

## 2025-02-17 RX ADMIN — DIPHENHYDRAMINE HYDROCHLORIDE AND LIDOCAINE HYDROCHLORIDE AND ALUMINUM HYDROXIDE AND MAGNESIUM HYDRO 10 MILLILITER(S): KIT at 14:39

## 2025-02-17 RX ADMIN — Medication 0.5 MILLIGRAM(S): at 03:07

## 2025-02-17 RX ADMIN — Medication 40 MILLIGRAM(S): at 11:54

## 2025-02-17 RX ADMIN — Medication 0.5 MILLIGRAM(S): at 16:38

## 2025-02-17 RX ADMIN — Medication 0.5 MILLIGRAM(S): at 10:48

## 2025-02-17 RX ADMIN — Medication 500 MILLIGRAM(S): at 11:53

## 2025-02-17 RX ADMIN — HYDROXYZINE HYDROCHLORIDE 25 MILLIGRAM(S): 25 TABLET, FILM COATED ORAL at 03:59

## 2025-02-17 RX ADMIN — Medication 60 MILLIGRAM(S): at 05:17

## 2025-02-17 RX ADMIN — Medication 2 TABLET(S): at 05:17

## 2025-02-17 RX ADMIN — INSULIN LISPRO 24 UNIT(S): 100 INJECTION, SOLUTION INTRAVENOUS; SUBCUTANEOUS at 11:52

## 2025-02-17 RX ADMIN — APIXABAN 5 MILLIGRAM(S): 2.5 TABLET, FILM COATED ORAL at 08:26

## 2025-02-17 RX ADMIN — PREDNISONE 10 MILLIGRAM(S): 20 TABLET ORAL at 05:17

## 2025-02-17 RX ADMIN — DIPHENHYDRAMINE HYDROCHLORIDE AND LIDOCAINE HYDROCHLORIDE AND ALUMINUM HYDROXIDE AND MAGNESIUM HYDRO 10 MILLILITER(S): KIT at 09:47

## 2025-02-17 RX ADMIN — METOPROLOL SUCCINATE 25 MILLIGRAM(S): 50 TABLET, EXTENDED RELEASE ORAL at 17:26

## 2025-02-17 RX ADMIN — BUMETANIDE 2 MILLIGRAM(S): 1 TABLET ORAL at 05:16

## 2025-02-17 RX ADMIN — BENZOCAINE 1 SPRAY(S): 220 SPRAY, METERED PERIODONTAL at 16:39

## 2025-02-17 RX ADMIN — Medication 150 MILLIGRAM(S): at 11:53

## 2025-02-17 RX ADMIN — APIXABAN 5 MILLIGRAM(S): 2.5 TABLET, FILM COATED ORAL at 17:26

## 2025-02-17 RX ADMIN — Medication 650 MILLIGRAM(S): at 11:20

## 2025-02-17 RX ADMIN — METOPROLOL SUCCINATE 25 MILLIGRAM(S): 50 TABLET, EXTENDED RELEASE ORAL at 05:17

## 2025-02-17 NOTE — PROGRESS NOTE ADULT - SUBJECTIVE AND OBJECTIVE BOX
MORGAN BUSH  70y Male    INTERVAL HPI/OVERNIGHT EVENTS:    pt with left leg pain  CT scan without fracture or osseous abnormality  no fever  FS reviewed and insulin adjusted  pt now with bed at SNF and family wants him to go today per resident  no fever    T(F): 98.1 (02-17-25 @ 14:00), Max: 98.2 (02-16-25 @ 20:32)  HR: 96 (02-17-25 @ 14:00) (68 - 108)  BP: 135/76 (02-17-25 @ 14:00) (110/67 - 163/78)  RR: 18 (02-17-25 @ 14:00) (18 - 18)  SpO2: 99% (02-17-25 @ 05:00) (99% - 99%) on RA  I&O's Summary    17 Feb 2025 07:01  -  17 Feb 2025 15:43  --------------------------------------------------------  IN: 360 mL / OUT: 1300 mL / NET: -940 mL      CAPILLARY BLOOD GLUCOSE      POCT Blood Glucose.: 163 mg/dL (17 Feb 2025 11:02)  POCT Blood Glucose.: 187 mg/dL (17 Feb 2025 08:25)  POCT Blood Glucose.: 72 mg/dL (17 Feb 2025 07:22)  POCT Blood Glucose.: 280 mg/dL (16 Feb 2025 21:23)  POCT Blood Glucose.: 387 mg/dL (16 Feb 2025 18:42)        PHYSICAL EXAM:  GENERAL: NAD  HEAD:  Normocephalic  EYES:  conjunctiva and sclera clear  ENMT: Moist mucous membranes  NERVOUS SYSTEM:  Alert, awake, Good concentration  CHEST/LUNG: CTA b/l  HEART: Regular rate and rhythm  ABDOMEN: Soft, Nontender, Nondistended  EXTREMITIES:   No edema  SKIN: warm, dry    Consultant(s) Notes Reviewed:  [x ] YES  [ ] NO  Care Discussed with Consultants/Other Providers [ x] YES  [ ] NO    MEDICATIONS  (STANDING):  allopurinol 150 milliGRAM(s) Oral daily  apixaban 5 milliGRAM(s) Oral every 12 hours  ascorbic acid 500 milliGRAM(s) Oral daily  atorvastatin 10 milliGRAM(s) Oral at bedtime  benzocaine 20% Spray 1 Spray(s) Topical three times a day  buMETAnide 2 milliGRAM(s) Oral daily  chlorhexidine 2% Cloths 1 Application(s) Topical daily  dextrose 5%. 1000 milliLiter(s) (50 mL/Hr) IV Continuous <Continuous>  dextrose 5%. 1000 milliLiter(s) (100 mL/Hr) IV Continuous <Continuous>  dextrose 50% Injectable 25 Gram(s) IV Push once  dextrose 50% Injectable 12.5 Gram(s) IV Push once  dextrose 50% Injectable 25 Gram(s) IV Push once  FIRST- Mouthwash  BLM 10 milliLiter(s) Swish and Spit every 4 hours  glucagon  Injectable 1 milliGRAM(s) IntraMuscular once  influenza  Vaccine (HIGH DOSE) 0.5 milliLiter(s) IntraMuscular once  insulin glargine Injectable (LANTUS) 40 Unit(s) SubCutaneous at bedtime  insulin lispro (ADMELOG) corrective regimen sliding scale   SubCutaneous three times a day before meals  insulin lispro Injectable (ADMELOG) 24 Unit(s) SubCutaneous three times a day before meals  melatonin 10 milliGRAM(s) Oral at bedtime  metoprolol tartrate 25 milliGRAM(s) Oral two times a day  NIFEdipine XL 60 milliGRAM(s) Oral daily  nystatin Cream 1 Application(s) Topical every 12 hours  pantoprazole  Injectable 40 milliGRAM(s) IV Push daily  polyethylene glycol 3350 17 Gram(s) Oral <User Schedule>  predniSONE   Tablet 10 milliGRAM(s) Oral daily  senna 2 Tablet(s) Oral every 12 hours  tamsulosin 0.4 milliGRAM(s) Oral at bedtime    MEDICATIONS  (PRN):  acetaminophen     Tablet .. 650 milliGRAM(s) Oral every 6 hours PRN Temp greater or equal to 38C (100.4F), Mild Pain (1 - 3), Moderate Pain (4 - 6)  aluminum hydroxide/magnesium hydroxide/simethicone Suspension 30 milliLiter(s) Oral every 4 hours PRN Dyspepsia  bisacodyl 5 milliGRAM(s) Oral daily PRN Constipation  calcium carbonate    500 mG (Tums) Chewable 1 Tablet(s) Chew three times a day PRN Dyspepsia  dextrose Oral Gel 15 Gram(s) Oral once PRN Blood Glucose LESS THAN 70 milliGRAM(s)/deciliter  HYDROmorphone  Injectable 0.5 milliGRAM(s) IV Push every 6 hours PRN Severe Pain (7 - 10)  hydrOXYzine hydrochloride 25 milliGRAM(s) Oral every 6 hours PRN Anxiety  magnesium hydroxide Suspension 30 milliLiter(s) Oral daily PRN Constipation  ondansetron Injectable 4 milliGRAM(s) IV Push every 6 hours PRN Nausea and/or Vomiting  simethicone 80 milliGRAM(s) Chew daily PRN Gas      LABS:                        9.6    28.66 )-----------( 612      ( 17 Feb 2025 06:03 )             30.8     02-17    140  |  97[L]  |  67[HH]  ----------------------------<  37[LL]  5.1[H]   |  30  |  1.6[H]    Ca    8.9      17 Feb 2025 06:03  Mg     2.3     02-17    TPro  5.8[L]  /  Alb  3.4[L]  /  TBili  0.3  /  DBili  x   /  AST  32  /  ALT  38  /  AlkPhos  191[H]  02-17                RADIOLOGY & ADDITIONAL TESTS:    Imaging or report Personally Reviewed:  [ x] YES  [ ] NO    < from: CT Femur w/ IV Cont, Left (02.15.25 @ 18:43) >  IMPRESSION:    No acute osseous abnormality. No suspicious bony lesion.    Left total knee arthroplasty, without CT evidence of hardware   complication.    Moderate osteoarthritis of the left hip, bilateral sacroiliac joints and   visualized lower lumbar spine.    < end of copied text >        < from: Xray Chest 1 View- PORTABLE-Routine (02.15.25 @ 06:38) >  FINDINGS/  IMPRESSION:    Support devices: Stable right PICC    Cardiac/mediastinum/hilum: Stable.    Lung parenchyma/Pleura: Stable left basilar opacity.    Skeleton/soft tissues: Stable.    < end of copied text >        < from: CT Head No Cont (02.09.25 @ 09:55) >  IMPRESSION:  No evidence of acute transcortical infarct, acute intracranial   hemorrhage, or mass effect.      < end of copied text >        < from: CT Chest No Cont (02.06.25 @ 16:09) >    IMPRESSION:      Worsening bilateral peribronchial opacities compatible with   bronchopneumonia    < end of copied text >          < from: CT Neck Soft Tissue w/ IV Cont (02.04.25 @ 12:51) >    IMPRESSION:    No CT evidence of a discrete tongue mass. Note, streak artifact from   dental hardware/mildly limits evaluation of the oral cavity.    < end of copied text >        < from: Xray Hip 2 Views, Bilateral (02.03.25 @ 17:53) >  Findings/  impression:    Frontal and frog-leg view of both hips were obtained.    There are degenerative changes of both hips with no fractures.    Degenerative changes are seen visualized lower lumbar spine. There are   vascular calcifications.    Ojeda catheter is noted.    < end of copied text >        < from: US Kidney and Bladder (01.23.25 @ 17:50) >  IMPRESSION:  No hydronephrosis.      < end of copied text >        < from: Xray Pelvis AP only (01.21.25 @ 16:46) >  Findings/  impression:    No acute fracture or dislocation. Moderate osteoarthritis of the   bilateral hips, sacroiliac joints and lower lumbar spine.      < end of copied text >  < from: CT Angio Chest PE Protocol w/ IV Cont (01.20.25 @ 20:56) >  IMPRESSION:    Negative for pulmonary emboli.    Interval development of infiltrates within the lower lobes and lingula   which may reflect acute multilobar pneumonia.    < end of copied text >        < from: TTE Echo w/o Contrast Follow Up Limited (01.25.25 @ 12:38) >    Summary:   1. Technically difficult study.   2. LV Ejection Fraction by Roche's Method with a biplane EFof 53 %.   3. The left ventricular diastolic function could not be assessed in this   study.   4. Degenerative mitral valve.   5. No evidence of mitral valve regurgitation.   6. Moderate tricuspid regurgitation.   7. Endocardial visualization was enhanced with intravenous echo contrast.    < end of copied text >              Case discussed with residents and RN on rounds today    Care discussed with pt

## 2025-02-17 NOTE — PROGRESS NOTE ADULT - SUBJECTIVE AND OBJECTIVE BOX
SUBJECTIVE/OVERNIGHT EVENTS  Today is hospital day 28d. This morning patient was seen and examined at bedside, resting comfortably in bed.  AOx4, vitals wnl on RA. No catheters or lines inserted.  No acute or major events overnight.  Patient has no acute complaints. He still suffers from left knee/ above knee pain. He otherwise denies having any CP, SOB, palpitaitons, swelling, cough, abdominal pain, N/V/D/C, burning with urination, frequency, fever, chills.    HPI:  69-year-old man with extensive medical history including CAD (status post CABG;), DM (on insulin pump), atrial fibrillation on Xarelto, history of distal pancreatomy and splenectomy for unclear reason referred to the ED by his oncologist for abnormal labs. Patient was recently diagnosed with Diffuse large B cell Lymphoma on pathology of right groin mass and is following with [facility] oncology team. Patient was discharged on 01.17.2025 from hospital after inpatient chemotherapy (Completed cycle 1 of R-EPOCH (D1 on 1/11/2024). Tolerated well. Rituximab was given on D5).   Over past 3 days patient started to get a productive cough with yellow sputum production.  Patient is also endorsing chills and nausea which he states is baseline. He had 1 reading on low grade fever 100.5 on day of presentation.    On presentation vitals:  · BP Systolic	160 mm Hg  · BP Diastolic	86 mm Hg  · Heart Rate	89 /min  · Respiration Rate (breaths/min)	20 /min  · Temp (F)	98.9 Degrees F  · Temp (C)	37.2 Degrees C  · Temp site	oral    ED course:  O2 sats low on 4L O2 so he was promptly started on BIPAP. CXR with LLL infiltrate; sepsis labs sent with gentle hydration; abx given, Labs with neutropenia and elevated BNP, trop 68. EKG nonischemic.    CTA Chest: Negative for pulmonary emboli. Interval development of infiltrates within the lower lobes and lingula which may reflect acute multilobar pneumonia.    Patient admitted to medicine for acute hypoxic respiratory failure.   (20 Jan 2025 23:47)      VITALS  T(F): 97.9 (02-17-25 @ 05:00), Max: 98.2 (02-16-25 @ 20:32)  HR: 86 (02-17-25 @ 05:00) (86 - 108)  BP: 134/71 (02-17-25 @ 05:00) (134/71 - 163/78)  RR: 18 (02-17-25 @ 05:00) (18 - 18)  SpO2: 99% (02-17-25 @ 05:00) (99% - 99%)  POCT Blood Glucose.: 187 mg/dL (02-17-25 @ 08:25)  POCT Blood Glucose.: 72 mg/dL (02-17-25 @ 07:22)  POCT Blood Glucose.: 280 mg/dL (02-16-25 @ 21:23)  POCT Blood Glucose.: 387 mg/dL (02-16-25 @ 18:42)  POCT Blood Glucose.: 206 mg/dL (02-16-25 @ 11:04)          PHYSICAL EXAM      GENERAL: No acute distress   CHEST/LUNG: Clear to auscultation bilaterally; No wheeze  HEART: Regular rate and rhythm; No murmurs  ABDOMEN: Soft, nontender, nondistended.  BACK: no spinal tenderness, no CVA tenderness  EXTREMITIES:  RLE edema, no cyanosis or clubbing.  PSYCH: Nl behavior, nl affect  NEUROLOGY: AAOx3, non-focal, moves all extremities spontaneously  SKIN: RLE bypass scar        MEDICATIONS  STANDING MEDICATIONS  albuterol/ipratropium for Nebulization 3 milliLiter(s) Nebulizer every 6 hours  allopurinol 150 milliGRAM(s) Oral daily  apixaban 5 milliGRAM(s) Oral every 12 hours  ascorbic acid 500 milliGRAM(s) Oral daily  atorvastatin 10 milliGRAM(s) Oral at bedtime  benzocaine 20% Spray 1 Spray(s) Topical three times a day  buMETAnide 2 milliGRAM(s) Oral daily  chlorhexidine 2% Cloths 1 Application(s) Topical daily  dextrose 5%. 1000 milliLiter(s) IV Continuous <Continuous>  dextrose 5%. 1000 milliLiter(s) IV Continuous <Continuous>  dextrose 50% Injectable 25 Gram(s) IV Push once  dextrose 50% Injectable 12.5 Gram(s) IV Push once  dextrose 50% Injectable 25 Gram(s) IV Push once  FIRST- Mouthwash  BLM 10 milliLiter(s) Swish and Spit every 4 hours  glucagon  Injectable 1 milliGRAM(s) IntraMuscular once  influenza  Vaccine (HIGH DOSE) 0.5 milliLiter(s) IntraMuscular once  insulin glargine Injectable (LANTUS) 45 Unit(s) SubCutaneous at bedtime  insulin lispro (ADMELOG) corrective regimen sliding scale   SubCutaneous three times a day before meals  insulin lispro Injectable (ADMELOG) 24 Unit(s) SubCutaneous three times a day before meals  melatonin 10 milliGRAM(s) Oral at bedtime  metoprolol tartrate 25 milliGRAM(s) Oral two times a day  NIFEdipine XL 60 milliGRAM(s) Oral daily  nystatin Cream 1 Application(s) Topical every 12 hours  pantoprazole  Injectable 40 milliGRAM(s) IV Push daily  polyethylene glycol 3350 17 Gram(s) Oral <User Schedule>  predniSONE   Tablet 10 milliGRAM(s) Oral daily  senna 2 Tablet(s) Oral every 12 hours  tamsulosin 0.4 milliGRAM(s) Oral at bedtime    PRN MEDICATIONS  acetaminophen     Tablet .. 650 milliGRAM(s) Oral every 6 hours PRN  aluminum hydroxide/magnesium hydroxide/simethicone Suspension 30 milliLiter(s) Oral every 4 hours PRN  bisacodyl 5 milliGRAM(s) Oral daily PRN  calcium carbonate    500 mG (Tums) Chewable 1 Tablet(s) Chew three times a day PRN  dextrose Oral Gel 15 Gram(s) Oral once PRN  HYDROmorphone  Injectable 0.5 milliGRAM(s) IV Push every 6 hours PRN  hydrOXYzine hydrochloride 25 milliGRAM(s) Oral every 6 hours PRN  magnesium hydroxide Suspension 30 milliLiter(s) Oral daily PRN  ondansetron Injectable 4 milliGRAM(s) IV Push every 6 hours PRN  simethicone 80 milliGRAM(s) Chew daily PRN        PAST MEDICAL & SURGICAL HISTORY:  Diabetes mellitus, type 2      BPH (benign prostatic hyperplasia)      Water retention      Essential hypertension      Diabetes      CAD (coronary artery disease)      H/O hypercholesterolemia      Morbid obesity      Chronic kidney disease (CKD)      History of atrial fibrillation      H/O right coronary artery stent placement      History of resection of pancreas      History of cholecystectomy      History of left knee replacement      S/P CABG x 6      H/O cardiac radiofrequency ablation          LABS             9.6    28.66 )-----------( 612      ( 02-17-25 @ 06:03 )             30.8     140  |  97  |  67  -------------------------<  37   02-17-25 @ 06:03  5.1  |  30  |  1.6    Ca      8.9     02-17-25 @ 06:03  Mg     2.3     02-17-25 @ 06:03    TPro  5.8  /  Alb  3.4  /  TBili  0.3  /  DBili  x   /  AST  32  /  ALT  38  /  AlkPhos  191  /  GGT  x     02-17-25 @ 06:03        Urinalysis Basic - ( 17 Feb 2025 06:03 )    Color: x / Appearance: x / SG: x / pH: x  Gluc: 37 mg/dL / Ketone: x  / Bili: x / Urobili: x   Blood: x / Protein: x / Nitrite: x   Leuk Esterase: x / RBC: x / WBC x   Sq Epi: x / Non Sq Epi: x / Bacteria: x          IMAGING

## 2025-02-17 NOTE — PROGRESS NOTE ADULT - ASSESSMENT
69-year-old male with a history of CAD, DM, atrial fibrillation, and recent chemotherapy for diffuse large B-cell lymphoma was admitted for acute hypoxic respiratory failure secondary to multifocal pneumonia, complicated by influenza A and neutropenic fever.  He improved with antibiotics and respiratory support, but then developed a second episode of respiratory decompensation requiring further antibiotics and BiPAP.  His lymphoma is being monitored for possible Rodriguez transformation.  He also experienced hyperglycemia requiring insulin drip and reports lower extremity weakness and constipation. Currently HDS, afebrile and sating well on RA.     # DM  - CC diet   - FS - self monitored via dex com --> On insulin pump @2.85 at home   - Endo consult: keep off insulin pump  - lantus 10 lispro 3 --> has been steadily increased : lantus increased from 30 to 40 and lispro from 10 to 16 on 02/08 --> s/p Humulin 7 units 1 dose  - Patient received total of 31 and Humulin Admelog 16x3  - Repeat BMP showed Gluocse of 529, mild increase in AG (12 --> 15) and Beta hydroxy < 0.2  - On 2/8 started on insulin drip (7 units /min) and upgraded to MICU, NS fluid with 20 mEq K but slower rate given HFpEF decompensation  - Off insulin drip overnight, switched to SQ insulin, monitor BS.  - 2/9 Patient transitioned off insulin drip to Basal Bolus Insulin, but missed dose. Glucose elevated and Insulin drip was restarted.   - 2/10 Insulin drip stopped. Glucose within normal range. Patient transitioned to basal bolus insulin.   - 2/10 Adjust basal bolus regimen as needed to maintain target blood glucose.   - currently on lantus 40u qhs and admelog 28u ac--> increased to lantus 45u qhs and admelog 33u ac + SSI    # Acute Hypoxic Respiratory Failure 2/2 Multifocal Pneumonia/  Acute on chronic diastolic CHF/  Influenza A infection/ RASHAD/ OHS   - clinically improving, on high low oxygen for now, will taper off as tolerated   - NIV PRN and QHS   - nebs Q 6 hours, supportive care, aspiration precautions   - CTA 1/20: Negative for pulmonary emboli. Interval development of infiltrates within the lower lobes and lingula which may reflect acute multilobar pneumonia  - blood cx NGTD x2, UA negative, influenza A + , Nasal MRSA negative   - pt was consulted by ID, treated with IV Abx, completed the course of Tamiflu   - TTE 1/10/25: poor study, EF 60-65%, pt has moderated TR  - fluid restriction 1200 ml in 24 hours , intake and output monitoring, daily weight   - Desaturated again while in ICU on NC, and Bipap was started;   - CT chest: Worsening bilateral peribronchial opacities compatible with bronchopneumonia  -now on the floor and on RA  - duonebs  - steroid taper; currently on prednisone 20mg x3 days--> prednisone 10mg x3 days(start 2/17)  - s/p  Cefepime course     # 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 Tolerated well. Rituximab was given on D5   - Uric acid 10.3 on 1/16/25, s/p 3 mg IV rasburicase  - sp Zarxio  - daily CBC with diff, active T&S  - c/w Allopurinol   - No onc plans to start chemo/treatment inpatient, can dc when medically appropriate with follow-up with Dr. Hilliard who will schedule cycle 2 of treatment.     # KRISTINE on CKD 3B   # Chronic Lower Extremity Edema   - baseline ~1.5-2.0  - monitor BUN/cr and urine output  - moss d/c'd  - avoid nephrotoxic medications   - will hold off Metolazone on 02/08 as BUN/Cr 115/2.7  - nephrology is following, recommendations noted  - restarted bumex 2mg daily per Neph; will monitor sCr closely    # CAD s/p CABG  # Chronic Afib on Xarelto  # HTN  - c/w metoprolol tartrate 25 milliGRAM(s) Oral two times a day  - NIFEdipine XL 60 milliGRAM(s) Oral daily  - apixaban 5 milliGRAM(s) Oral every 12 hours  - Holding ASA  - Still holding lisinopril and aldactone from last admission  - sylvester need to switch to eliquis given CKD from xaerlto   - started eliquis 5 mg BID     # LLE weakness  - CTH ordered today  - PT following  - 2/13 - Pending rehabilitation facility placement as patient unable to ambulate without assistances/safely so unsafe to discharge home. PT following.     # HLD   - atorvastatin 10 milliGRAM(s) Oral at bedtime  - resume Repatha on discharge    # Oral pain due to ulcer with black discoloration   - c/w local [facility] care  - consult ENT to examine black discoloration -> rec CT neck w/ IV contrast, magic mouthwash  - CT neck w/IV contrast no tongue mass.   - oncology thinking this is mucositis   - Monitor bleed, keep active TS and trend CBC.   - Will c/w magic mouthwash.   - Bleeding is resolving    # Agitation/ confusion   # Insomnia   - pt is calm now   - on Haldol PRN , Sertraline and melatonin   - supportive care     # GI  - On bowel regimen and PPIs.    # BPH  - tamsulosin 0.4 milliGRAM(s) Oral at bedtime    #Misc  #Code Status: Full Code  #DVT ppx: Eliquis  #GI ppx: PPI  #Diet: CC  #Activity: AAT. Following with PT given LLE weakness. Pending Rehabilitation Facility placement given deconditioning and LLE weakness.   #Discharge Dispo: rehab  pending: per request of Heme/onc getting CT hip and femur    68 y/o man with PMH of CAD, DM on insulin pump, chronic Afib on Xarelto and s/p recent chemotherapy for diffuse large B-cell lymphoma was admitted for acute hypoxemic respiratory failure secondary to multifocal pneumonia, complicated by influenza A and neutropenic fever.  He improved with antibiotics and respiratory support, but then developed a second episode of respiratory decompensation requiring further antibiotics and BiPAP.  His lymphoma is being monitored for possible Rodriguez transformation.  He also experienced hyperglycemia requiring insulin drip and reported lower extremity weakness and constipation.     1. DM with hyperglycemia  on insulin pump at home and CGM  per Endocrine, keep off insulin pump - needs outpt f/u with Endocrine  carb consistent diet  required insulin drip during hospital course as outlined above  now on lantus 40 units SQ qhs and lispro 24 units SQ qAC + sliding scale (doses adjusted based on FS of 72 this morning)  FS monitoring at [facility]    2. Acute Hypoxemic Respiratory Failure due to Multifocal Pneumonia, Acute on chronic HFpEF, Influenza A infection  history of RASHAD and OHS   clinically improving and now on RA  NIV PRN and QHS   nebs Q 6 hours, supportive care, aspiration precautions   CTA 1/20: Negative for pulmonary emboli. Interval development of infiltrates within the lower lobes and lingula which may reflect acute multilobar pneumonia  blood cx negative  UA negative  influenza A + s/p Tamiflu  Nasal MRSA negative  s/p course of IV abx - evaluated by ID   on prednisone taper (last day 2/19)  ECHO report reviewed and above  on bumex PO  daily wts  fluid restriction 1.2L    2. Large B-cell Lymphoma/ Concern for Rodriguez's transformation  Immunocompromised state   s/p PICC placement on 1/10/25  s/p Bone marrow biopsy on 1/10/25  Completed cycle 1 of R-EPOCH (D1 on 1/11/2024). Tolerated well. Rituximab was given on D5   Uric acid 10.3 on 1/16/25, s/p 3 mg IV rasburicase and on allopurinol 150mg daily  s/p Zarxio  No oncology plans to start chemo/treatment inpatient - pt will follow-up as outpt with Dr. Hilliard who will schedule cycle 2 of treatment.   leukocytosis likely due to lymphoma - pt afebrile    3. KRISTINE on CKD 3B - improving  Chronic Lower Extremity Edema   baseline ~1.5-2.0  moss d/c'd  avoid nephrotoxic medications   holding metolazone  on bumex 2mg PO daily per nephrology    4. CAD s/p CABG - ASA held - re-evaluate as outpt  on statin and metoprolol    5. Chronic Afib on apixaban and metoprolol    6. HTN  on metoprolol and nifedipine    7. Hyperlipidemia on statin  on Repatha as outpt    8. Oral pain due to ulcer with black discoloration   on local care  s/p ENT eval and CT neck - report above  possibly mucositis per oncology  Bleeding resolved    7. Agitation/ confusion   Insomnia   on Sertraline     8. DVT prophylaxis - on apixaban    9. Left LE pain  CT scan report reviewed - no acute osseous abnormality  +mod OA  tylenol  oxycodone  continue PT    full code  medically stable for discharge to [facility] today      previous notes reviewed by me    med rec and discharge papers reviewed by me    spent 60 minutes on the discharge process of this pt

## 2025-02-17 NOTE — PROGRESS NOTE ADULT - PROVIDER SPECIALTY LIST ADULT
CCU
Critical Care
Heme/Onc
Hospitalist
Internal Medicine
Internal Medicine
Nephrology
Pulmonology
CCU
Critical Care
Critical Care
Heme/Onc
Heme/Onc
Infectious Disease
Internal Medicine
MICU
Nephrology
Pulmonology
Pulmonology
CCU
Critical Care
Hospitalist
Infectious Disease
Internal Medicine
MICU
MICU
Nephrology
Pulmonology
Infectious Disease
Critical Care
Critical Care
Hospitalist
Infectious Disease

## 2025-02-17 NOTE — PROGRESS NOTE ADULT - ASSESSMENT
69-year-old male with a history of CAD, DM, atrial fibrillation. He was recently diagnosed with DLBC lymphoma and underwent 1 cycle of chemo. A few days later pt developed fever and neutropenia and was admitted to SDU for multifocal PNA and Influenza A. His hospital course has been complicated KRISTINE, agitation and elevated glucose levels but no DKA.  He initially improved with antibiotics and respiratory support and was downgraded to 3b, but then developed a second episode of respiratory decompensation requiring further antibiotics and BiPAP and upgraded to CEU again. His uncontrolled glucose levels required IV insulin and MICU transfer. His glucose finally improved and he was downgraded to 4A. His lymphoma is being monitored for possible Rodriguez transformation. Currently HDS, afebrile and sating well on RA. Pending rehab auth.    # DM  - FS - self monitored via dex com --> On insulin pump @2.85 at home   - Endo consult: keep off insulin pump  - Repeat BMP showed Gluocse of 529, mild increase in AG (12 --> 15) and Beta hydroxy < 0.2  - On 2/8 started on insulin drip (7 units /min) and upgraded to MICU  - 2/10 Insulin drip stopped. Glucose within normal range. Patient transitioned to basal bolus insulin.   - Adjust basal bolus regimen as needed to maintain target blood glucose.   - currently on lantus 45U qhs and admelog 24 + SSI  - On CC diet    # Acute Hypoxic Respiratory Failure 2/2 Multifocal Pneumonia/  Acute on chronic diastolic CHF/  Influenza A infection/ [facility]/ [facility]   - HDS, no fevers. Currently on RA  - CTA 1/20: Negative for pulmonary emboli. Interval development of infiltrates within the lower lobes and lingula which may reflect acute multilobar pneumonia  - blood cx NGTD x2, UA negative, influenza A + , Nasal MRSA negative   - pt was consulted by ID, treated with IV Abx, completed the course of Tamiflu   - TTE 1/10/25: poor study, EF 60-65%, pt has moderated TR  - fluid restriction 1200 ml in 24 hours , intake and output monitoring, daily weight   - Desaturated again while in ICU on NC, and Bipap was started;   - CT chest: Worsening bilateral peribronchial opacities compatible with bronchopneumonia  - now on the floor and on RA  - nebs Q 6 hours, supportive care, aspiration precautions   - steroid taper; currently on prednisone 20mg x3 days--> prednisone 10mg x3 days (start 2/17)      # 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 Tolerated well. Rituximab was given on D5   - Uric acid 10.3 on 1/16/25, s/p 3 mg IV rasburicase  - sp Zarxio  - daily CBC with diff, active T&S  - c/w Allopurinol   - No onc plans to start chemo/treatment inpatient, can dc when medically appropriate with follow-up with Dr. Hilliard who will schedule cycle 2 of treatment.     # Leukocytosis:  No obvious signs of infection, except for hip pain  Last dose of neupogen was 10 days back  Could be d/t post splenectomy status    # KRISTINE on CKD 3B   # Chronic Lower Extremity Edema   - baseline ~1.5-2.0  - moss d/c'd  - avoid nephrotoxic medications   - will hold off Metolazone on 02/08 as BUN/Cr 115/2.7  - Nephro recs appreciated: C/w bumex 2mg daily      # CAD s/p CABG  # Chronic Afib on Xarelto  # HTN  - c/w metoprolol tartrate 25 milliGRAM(s) Oral two times a day  - NIFEdipine XL 60 milliGRAM(s) Oral daily  - apixaban 5 milliGRAM(s) Oral every 12 hours  - Holding ASA  - Still holding lisinopril and aldactone from last admission  - likley need to switch to eliquis given CKD from xaerlto   - started eliquis 5 mg BID     # LLE weakness  - CTH ordered today  - PT following  - 2/13 - Pending rehabilitation facility placement as patient unable to ambulate without assistances/safely so unsafe to discharge home. PT following.     # HLD   - atorvastatin 10 milliGRAM(s) Oral at bedtime  - resume Repatha on discharge    # Oral pain due to ulcer with black discoloration   - c/w local couth care  - consult ENT to examine black discoloration -> rec CT neck w/ IV contrast, magic mouthwash  - CT neck w/IV contrast no tongue mass.   - oncology thinking this is mucositis   - Monitor bleed, keep active TS and trend CBC.   - Will c/w magic mouthwash.   - Bleeding is resolving    # Agitation/ confusion   # Insomnia   - pt is calm now   - S/p Haldol PRN , Sertraline and melatonin     # GI  - On bowel regimen and PPIs.    # BPH  - tamsulosin 0.4 milliGRAM(s) Oral at bedtime    #Misc  #Code Status: Full Code  #DVT ppx: Eliquis  #GI ppx: PPI  #Diet: CC  #Activity: AAT. Following with PT given LLE weakness. Pending Rehabilitation Facility placement given deconditioning and LLE weakness.   #Discharge Dispo: rehab  pending: per request of Heme/onc getting CT hip and femur

## 2025-02-20 ENCOUNTER — TRANSCRIPTION ENCOUNTER (OUTPATIENT)
Age: 70
End: 2025-02-20

## 2025-02-20 DIAGNOSIS — F43.23 ADJUSTMENT DISORDER WITH MIXED ANXIETY AND DEPRESSED MOOD: ICD-10-CM

## 2025-02-20 DIAGNOSIS — N18.32 CHRONIC KIDNEY DISEASE, STAGE 3B: ICD-10-CM

## 2025-02-20 DIAGNOSIS — Z95.1 PRESENCE OF AORTOCORONARY BYPASS GRAFT: ICD-10-CM

## 2025-02-20 DIAGNOSIS — D84.81 IMMUNODEFICIENCY DUE TO CONDITIONS CLASSIFIED ELSEWHERE: ICD-10-CM

## 2025-02-20 DIAGNOSIS — J10.08 INFLUENZA DUE TO OTHER IDENTIFIED INFLUENZA VIRUS WITH OTHER SPECIFIED PNEUMONIA: ICD-10-CM

## 2025-02-20 DIAGNOSIS — I13.0 HYPERTENSIVE HEART AND CHRONIC KIDNEY DISEASE WITH HEART FAILURE AND STAGE 1 THROUGH STAGE 4 CHRONIC KIDNEY DISEASE, OR UNSPECIFIED CHRONIC KIDNEY DISEASE: ICD-10-CM

## 2025-02-20 DIAGNOSIS — J15.9 UNSPECIFIED BACTERIAL PNEUMONIA: ICD-10-CM

## 2025-02-20 DIAGNOSIS — Z96.652 PRESENCE OF LEFT ARTIFICIAL KNEE JOINT: ICD-10-CM

## 2025-02-20 DIAGNOSIS — E11.65 TYPE 2 DIABETES MELLITUS WITH HYPERGLYCEMIA: ICD-10-CM

## 2025-02-20 DIAGNOSIS — K12.32 ORAL MUCOSITIS (ULCERATIVE) DUE TO OTHER DRUGS: ICD-10-CM

## 2025-02-20 DIAGNOSIS — E87.1 HYPO-OSMOLALITY AND HYPONATREMIA: ICD-10-CM

## 2025-02-20 DIAGNOSIS — D61.810 ANTINEOPLASTIC CHEMOTHERAPY INDUCED PANCYTOPENIA: ICD-10-CM

## 2025-02-20 DIAGNOSIS — J06.9 ACUTE UPPER RESPIRATORY INFECTION, UNSPECIFIED: ICD-10-CM

## 2025-02-20 DIAGNOSIS — I25.10 ATHEROSCLEROTIC HEART DISEASE OF NATIVE CORONARY ARTERY WITHOUT ANGINA PECTORIS: ICD-10-CM

## 2025-02-20 DIAGNOSIS — E66.2 MORBID (SEVERE) OBESITY WITH ALVEOLAR HYPOVENTILATION: ICD-10-CM

## 2025-02-20 DIAGNOSIS — E87.6 HYPOKALEMIA: ICD-10-CM

## 2025-02-20 DIAGNOSIS — N17.9 ACUTE KIDNEY FAILURE, UNSPECIFIED: ICD-10-CM

## 2025-02-20 DIAGNOSIS — I48.0 PAROXYSMAL ATRIAL FIBRILLATION: ICD-10-CM

## 2025-02-20 DIAGNOSIS — Z79.84 LONG TERM (CURRENT) USE OF ORAL HYPOGLYCEMIC DRUGS: ICD-10-CM

## 2025-02-20 DIAGNOSIS — R65.20 SEVERE SEPSIS WITHOUT SEPTIC SHOCK: ICD-10-CM

## 2025-02-20 DIAGNOSIS — I50.33 ACUTE ON CHRONIC DIASTOLIC (CONGESTIVE) HEART FAILURE: ICD-10-CM

## 2025-02-20 DIAGNOSIS — A41.9 SEPSIS, UNSPECIFIED ORGANISM: ICD-10-CM

## 2025-02-20 DIAGNOSIS — E78.00 PURE HYPERCHOLESTEROLEMIA, UNSPECIFIED: ICD-10-CM

## 2025-02-20 DIAGNOSIS — C83.35 DIFFUSE LARGE B-CELL LYMPHOMA, LYMPH NODES OF INGUINAL REGION AND LOWER LIMB: ICD-10-CM

## 2025-02-20 DIAGNOSIS — E11.22 TYPE 2 DIABETES MELLITUS WITH DIABETIC CHRONIC KIDNEY DISEASE: ICD-10-CM

## 2025-02-20 DIAGNOSIS — J96.01 ACUTE RESPIRATORY FAILURE WITH HYPOXIA: ICD-10-CM

## 2025-02-20 DIAGNOSIS — T45.1X5A ADVERSE EFFECT OF ANTINEOPLASTIC AND IMMUNOSUPPRESSIVE DRUGS, INITIAL ENCOUNTER: ICD-10-CM

## 2025-03-20 ENCOUNTER — APPOINTMENT (OUTPATIENT)
Dept: CARDIOLOGY | Facility: CLINIC | Age: 70
End: 2025-03-20

## 2025-04-04 NOTE — ASU PATIENT PROFILE, ADULT - PREVIOUS BLOOD TRANSFUSION?
In an effort to ensure that our patients [employer], a Team Member has reviewed your chart and identified an opportunity to provide the best care possible. An attempt was made to discuss or schedule due or overdue Preventive or Chronic Condition care.Care Gaps identified: Wellness Visits.    The Outcome was Contact was not made, no answer/busy. We are attempting to schedule a yearly wellness visit. If you have any questions or need help with scheduling, contact your primary care provider..   Type of Appointment needed: Comprehensive Annual Visit   no

## 2025-07-15 ENCOUNTER — NON-APPOINTMENT (OUTPATIENT)
Age: 70
End: 2025-07-15

## 2025-07-16 ENCOUNTER — APPOINTMENT (OUTPATIENT)
Dept: OTOLARYNGOLOGY | Facility: CLINIC | Age: 70
End: 2025-07-16

## 2025-07-16 ENCOUNTER — APPOINTMENT (OUTPATIENT)
Dept: OTOLARYNGOLOGY | Facility: CLINIC | Age: 70
End: 2025-07-16
Payer: MEDICARE

## 2025-07-16 PROCEDURE — 31231 NASAL ENDOSCOPY DX: CPT

## 2025-07-16 PROCEDURE — 92550 TYMPANOMETRY & REFLEX THRESH: CPT | Mod: 52

## 2025-07-16 PROCEDURE — 92557 COMPREHENSIVE HEARING TEST: CPT

## 2025-07-16 PROCEDURE — 99213 OFFICE O/P EST LOW 20 MIN: CPT | Mod: 25

## 2025-07-25 ENCOUNTER — APPOINTMENT (OUTPATIENT)
Dept: CARDIOLOGY | Facility: CLINIC | Age: 70
End: 2025-07-25
Payer: MEDICARE

## 2025-07-25 VITALS — TEMPERATURE: 97.6 F | WEIGHT: 278 LBS | BODY MASS INDEX: 37.65 KG/M2 | HEIGHT: 72 IN

## 2025-07-25 VITALS — HEART RATE: 93 BPM | DIASTOLIC BLOOD PRESSURE: 76 MMHG | SYSTOLIC BLOOD PRESSURE: 130 MMHG

## 2025-07-25 DIAGNOSIS — E11.9 TYPE 2 DIABETES MELLITUS W/OUT COMPLICATIONS: ICD-10-CM

## 2025-07-25 DIAGNOSIS — I25.10 ATHEROSCLEROTIC HEART DISEASE OF NATIVE CORONARY ARTERY W/OUT ANGINA PECTORIS: ICD-10-CM

## 2025-07-25 DIAGNOSIS — Z87.898 PERSONAL HISTORY OF OTHER SPECIFIED CONDITIONS: ICD-10-CM

## 2025-07-25 DIAGNOSIS — M46.1 SACROILIITIS, NOT ELSEWHERE CLASSIFIED: ICD-10-CM

## 2025-07-25 DIAGNOSIS — H90.3 SENSORINEURAL HEARING LOSS, BILATERAL: ICD-10-CM

## 2025-07-25 PROCEDURE — 93000 ELECTROCARDIOGRAM COMPLETE: CPT

## 2025-07-25 PROCEDURE — 99214 OFFICE O/P EST MOD 30 MIN: CPT | Mod: 25

## 2025-07-25 RX ORDER — SERTRALINE HYDROCHLORIDE 50 MG/1
50 TABLET, FILM COATED ORAL
Refills: 0 | Status: ACTIVE | COMMUNITY

## 2025-07-25 RX ORDER — INSULIN GLARGINE 100 [IU]/ML
100 INJECTION, SOLUTION SUBCUTANEOUS
Refills: 0 | Status: ACTIVE | COMMUNITY

## 2025-07-25 RX ORDER — PANTOPRAZOLE 40 MG/1
40 TABLET, DELAYED RELEASE ORAL
Refills: 0 | Status: ACTIVE | COMMUNITY

## 2025-07-25 RX ORDER — ALLOPURINOL 100 MG/1
100 TABLET ORAL
Refills: 0 | Status: ACTIVE | COMMUNITY

## 2025-07-25 RX ORDER — GABAPENTIN 300 MG/1
300 CAPSULE ORAL
Refills: 0 | Status: ACTIVE | COMMUNITY

## 2025-07-25 RX ORDER — LORATADINE 10 MG/1
10 TABLET ORAL
Refills: 0 | Status: ACTIVE | COMMUNITY

## 2025-07-25 RX ORDER — METOPROLOL TARTRATE 25 MG/1
25 TABLET ORAL
Refills: 0 | Status: ACTIVE | COMMUNITY

## 2025-07-25 RX ORDER — SACCHAROMYCES BOULARDII 250 MG
250 CAPSULE ORAL
Refills: 0 | Status: ACTIVE | COMMUNITY

## 2025-07-25 RX ORDER — TORSEMIDE 10 MG/1
10 TABLET ORAL
Refills: 0 | Status: DISCONTINUED | COMMUNITY
End: 2025-07-25

## 2025-07-25 RX ORDER — FERROUS SULFATE 324(65)MG
324 TABLET, DELAYED RELEASE (ENTERIC COATED) ORAL
Refills: 0 | Status: ACTIVE | COMMUNITY

## 2025-07-25 RX ORDER — ACYCLOVIR 400 MG/1
400 TABLET ORAL
Refills: 0 | Status: ACTIVE | COMMUNITY

## 2025-07-25 RX ORDER — INSULIN LISPRO 100 [IU]/ML
100 INJECTION, SOLUTION INTRAVENOUS; SUBCUTANEOUS
Refills: 0 | Status: ACTIVE | COMMUNITY

## 2025-07-25 RX ORDER — TAMSULOSIN HYDROCHLORIDE 0.4 MG/1
0.4 CAPSULE ORAL
Refills: 0 | Status: ACTIVE | COMMUNITY

## 2025-07-25 RX ORDER — ONDANSETRON 8 MG/1
8 TABLET, ORALLY DISINTEGRATING ORAL
Refills: 0 | Status: ACTIVE | COMMUNITY

## 2025-07-25 RX ORDER — VENETOCLAX 100 MG/1
100 TABLET, FILM COATED ORAL
Refills: 0 | Status: ACTIVE | COMMUNITY

## 2025-07-25 RX ORDER — PRAVASTATIN SODIUM 40 MG/1
40 TABLET ORAL
Refills: 0 | Status: ACTIVE | COMMUNITY

## 2025-07-25 RX ORDER — BUMETANIDE 2 MG/1
2 TABLET ORAL
Refills: 0 | Status: ACTIVE | COMMUNITY

## 2025-08-04 ENCOUNTER — APPOINTMENT (OUTPATIENT)
Dept: CARDIOLOGY | Facility: CLINIC | Age: 70
End: 2025-08-04
Payer: MEDICARE

## 2025-08-04 DIAGNOSIS — I48.92 UNSPECIFIED ATRIAL FLUTTER: ICD-10-CM

## 2025-08-04 DIAGNOSIS — R00.2 PALPITATIONS: ICD-10-CM

## 2025-08-04 DIAGNOSIS — I10 ESSENTIAL (PRIMARY) HYPERTENSION: ICD-10-CM

## 2025-08-04 PROCEDURE — 93306 TTE W/DOPPLER COMPLETE: CPT

## 2025-08-10 PROBLEM — I48.92 ATRIAL FLUTTER, UNSPECIFIED TYPE: Status: ACTIVE | Noted: 2025-07-25

## 2025-08-20 ENCOUNTER — APPOINTMENT (OUTPATIENT)
Dept: CARDIOLOGY | Facility: CLINIC | Age: 70
End: 2025-08-20

## 2025-09-16 ENCOUNTER — APPOINTMENT (OUTPATIENT)
Dept: ELECTROPHYSIOLOGY | Facility: CLINIC | Age: 70
End: 2025-09-16